# Patient Record
Sex: MALE | Race: WHITE | NOT HISPANIC OR LATINO | Employment: OTHER | URBAN - METROPOLITAN AREA
[De-identification: names, ages, dates, MRNs, and addresses within clinical notes are randomized per-mention and may not be internally consistent; named-entity substitution may affect disease eponyms.]

---

## 2017-02-06 ENCOUNTER — HOSPITAL ENCOUNTER (INPATIENT)
Facility: MEDICAL CENTER | Age: 57
LOS: 3 days | DRG: 378 | End: 2017-02-09
Attending: HOSPITALIST | Admitting: INTERNAL MEDICINE
Payer: COMMERCIAL

## 2017-02-06 ENCOUNTER — RESOLUTE PROFESSIONAL BILLING HOSPITAL PROF FEE (OUTPATIENT)
Dept: HOSPITALIST | Facility: MEDICAL CENTER | Age: 57
End: 2017-02-06
Payer: MEDICARE

## 2017-02-06 PROBLEM — K92.0 HEMATEMESIS: Status: ACTIVE | Noted: 2017-02-06

## 2017-02-06 PROBLEM — I85.00 ESOPHAGEAL VARICES (HCC): Status: ACTIVE | Noted: 2017-02-06

## 2017-02-06 LAB
ALBUMIN SERPL BCP-MCNC: 4.2 G/DL (ref 3.2–4.9)
ALBUMIN/GLOB SERPL: 1.1 G/DL
ALP SERPL-CCNC: 72 U/L (ref 30–99)
ALT SERPL-CCNC: 41 U/L (ref 2–50)
AMMONIA PLAS-SCNC: 55 UMOL/L (ref 11–45)
ANION GAP SERPL CALC-SCNC: 11 MMOL/L (ref 0–11.9)
APTT PPP: 30.6 SEC (ref 24.7–36)
AST SERPL-CCNC: 51 U/L (ref 12–45)
BASOPHILS # BLD AUTO: 0.7 % (ref 0–1.8)
BASOPHILS # BLD: 0.03 K/UL (ref 0–0.12)
BILIRUB SERPL-MCNC: 0.9 MG/DL (ref 0.1–1.5)
BUN SERPL-MCNC: 8 MG/DL (ref 8–22)
CALCIUM SERPL-MCNC: 9.5 MG/DL (ref 8.5–10.5)
CHLORIDE SERPL-SCNC: 105 MMOL/L (ref 96–112)
CO2 SERPL-SCNC: 22 MMOL/L (ref 20–33)
COMMENT 1642: NORMAL
CREAT SERPL-MCNC: 0.61 MG/DL (ref 0.5–1.4)
EOSINOPHIL # BLD AUTO: 0.16 K/UL (ref 0–0.51)
EOSINOPHIL NFR BLD: 3.8 % (ref 0–6.9)
ERYTHROCYTE [DISTWIDTH] IN BLOOD BY AUTOMATED COUNT: 46.4 FL (ref 35.9–50)
GFR SERPL CREATININE-BSD FRML MDRD: >60 ML/MIN/1.73 M 2
GLOBULIN SER CALC-MCNC: 3.8 G/DL (ref 1.9–3.5)
GLUCOSE SERPL-MCNC: 104 MG/DL (ref 65–99)
HCT VFR BLD AUTO: 43 % (ref 42–52)
HGB BLD-MCNC: 14.9 G/DL (ref 14–18)
IMM GRANULOCYTES # BLD AUTO: 0 K/UL (ref 0–0.11)
IMM GRANULOCYTES NFR BLD AUTO: 0 % (ref 0–0.9)
INR PPP: 1.21 (ref 0.87–1.13)
LYMPHOCYTES # BLD AUTO: 1.28 K/UL (ref 1–4.8)
LYMPHOCYTES NFR BLD: 30.3 % (ref 22–41)
MCH RBC QN AUTO: 32.1 PG (ref 27–33)
MCHC RBC AUTO-ENTMCNC: 34.7 G/DL (ref 33.7–35.3)
MCV RBC AUTO: 92.7 FL (ref 81.4–97.8)
MONOCYTES # BLD AUTO: 0.41 K/UL (ref 0–0.85)
MONOCYTES NFR BLD AUTO: 9.7 % (ref 0–13.4)
MORPHOLOGY BLD-IMP: NORMAL
NEUTROPHILS # BLD AUTO: 2.34 K/UL (ref 1.82–7.42)
NEUTROPHILS NFR BLD: 55.5 % (ref 44–72)
NRBC # BLD AUTO: 0 K/UL
NRBC BLD AUTO-RTO: 0 /100 WBC
PLATELET # BLD AUTO: 46 K/UL (ref 164–446)
PLATELETS.RETICULATED NFR BLD AUTO: 11.1 K/UL (ref 0.6–13.1)
PMV BLD AUTO: 12.4 FL (ref 9–12.9)
POTASSIUM SERPL-SCNC: 4.2 MMOL/L (ref 3.6–5.5)
PROT SERPL-MCNC: 8 G/DL (ref 6–8.2)
PROTHROMBIN TIME: 15.7 SEC (ref 12–14.6)
RBC # BLD AUTO: 4.64 M/UL (ref 4.7–6.1)
SODIUM SERPL-SCNC: 138 MMOL/L (ref 135–145)
WBC # BLD AUTO: 4.2 K/UL (ref 4.8–10.8)

## 2017-02-06 PROCEDURE — 85055 RETICULATED PLATELET ASSAY: CPT

## 2017-02-06 PROCEDURE — 770020 HCHG ROOM/CARE - TELE (206)

## 2017-02-06 PROCEDURE — 85730 THROMBOPLASTIN TIME PARTIAL: CPT

## 2017-02-06 PROCEDURE — 85610 PROTHROMBIN TIME: CPT

## 2017-02-06 PROCEDURE — 700111 HCHG RX REV CODE 636 W/ 250 OVERRIDE (IP): Performed by: INTERNAL MEDICINE

## 2017-02-06 PROCEDURE — A9270 NON-COVERED ITEM OR SERVICE: HCPCS | Performed by: INTERNAL MEDICINE

## 2017-02-06 PROCEDURE — 85025 COMPLETE CBC W/AUTO DIFF WBC: CPT

## 2017-02-06 PROCEDURE — 93005 ELECTROCARDIOGRAM TRACING: CPT | Performed by: INTERNAL MEDICINE

## 2017-02-06 PROCEDURE — HZ2ZZZZ DETOXIFICATION SERVICES FOR SUBSTANCE ABUSE TREATMENT: ICD-10-PCS | Performed by: INTERNAL MEDICINE

## 2017-02-06 PROCEDURE — 80053 COMPREHEN METABOLIC PANEL: CPT

## 2017-02-06 PROCEDURE — 700102 HCHG RX REV CODE 250 W/ 637 OVERRIDE(OP): Performed by: INTERNAL MEDICINE

## 2017-02-06 PROCEDURE — 99222 1ST HOSP IP/OBS MODERATE 55: CPT | Mod: AI | Performed by: INTERNAL MEDICINE

## 2017-02-06 PROCEDURE — 700101 HCHG RX REV CODE 250: Performed by: INTERNAL MEDICINE

## 2017-02-06 PROCEDURE — 82140 ASSAY OF AMMONIA: CPT

## 2017-02-06 PROCEDURE — 36415 COLL VENOUS BLD VENIPUNCTURE: CPT

## 2017-02-06 PROCEDURE — 93010 ELECTROCARDIOGRAM REPORT: CPT | Performed by: INTERNAL MEDICINE

## 2017-02-06 RX ORDER — DOCUSATE SODIUM 100 MG/1
100 CAPSULE, LIQUID FILLED ORAL EVERY MORNING
Status: DISCONTINUED | OUTPATIENT
Start: 2017-02-07 | End: 2017-02-09 | Stop reason: HOSPADM

## 2017-02-06 RX ORDER — LORAZEPAM 1 MG/1
2 TABLET ORAL
Status: DISCONTINUED | OUTPATIENT
Start: 2017-02-06 | End: 2017-02-09

## 2017-02-06 RX ORDER — AMOXICILLIN 250 MG
1 CAPSULE ORAL NIGHTLY
Status: DISCONTINUED | OUTPATIENT
Start: 2017-02-07 | End: 2017-02-09 | Stop reason: HOSPADM

## 2017-02-06 RX ORDER — FERROUS SULFATE 325(65) MG
325 TABLET ORAL
Status: DISCONTINUED | OUTPATIENT
Start: 2017-02-06 | End: 2017-02-09 | Stop reason: HOSPADM

## 2017-02-06 RX ORDER — LACTULOSE 20 G/30ML
30 SOLUTION ORAL 3 TIMES DAILY
Status: DISCONTINUED | OUTPATIENT
Start: 2017-02-06 | End: 2017-02-09 | Stop reason: HOSPADM

## 2017-02-06 RX ORDER — CHOLECALCIFEROL (VITAMIN D3) 125 MCG
2000 CAPSULE ORAL DAILY
Status: DISCONTINUED | OUTPATIENT
Start: 2017-02-07 | End: 2017-02-09 | Stop reason: HOSPADM

## 2017-02-06 RX ORDER — FOLIC ACID 1 MG/1
1 TABLET ORAL DAILY
Status: DISCONTINUED | OUTPATIENT
Start: 2017-02-07 | End: 2017-02-09 | Stop reason: HOSPADM

## 2017-02-06 RX ORDER — PROMETHAZINE HYDROCHLORIDE 25 MG/1
12.5-25 SUPPOSITORY RECTAL EVERY 4 HOURS PRN
Status: DISCONTINUED | OUTPATIENT
Start: 2017-02-06 | End: 2017-02-09 | Stop reason: HOSPADM

## 2017-02-06 RX ORDER — LORAZEPAM 2 MG/ML
1 INJECTION INTRAMUSCULAR
Status: DISCONTINUED | OUTPATIENT
Start: 2017-02-06 | End: 2017-02-09

## 2017-02-06 RX ORDER — AMOXICILLIN 250 MG
1 CAPSULE ORAL
Status: DISCONTINUED | OUTPATIENT
Start: 2017-02-06 | End: 2017-02-09 | Stop reason: HOSPADM

## 2017-02-06 RX ORDER — VITAMIN E 268 MG
400 CAPSULE ORAL DAILY
COMMUNITY
End: 2018-09-13

## 2017-02-06 RX ORDER — LORAZEPAM 1 MG/1
4 TABLET ORAL
Status: DISCONTINUED | OUTPATIENT
Start: 2017-02-06 | End: 2017-02-09

## 2017-02-06 RX ORDER — LORAZEPAM 2 MG/ML
2 INJECTION INTRAMUSCULAR
Status: DISCONTINUED | OUTPATIENT
Start: 2017-02-06 | End: 2017-02-09

## 2017-02-06 RX ORDER — LORAZEPAM 1 MG/1
0.5 TABLET ORAL EVERY 4 HOURS PRN
Status: DISCONTINUED | OUTPATIENT
Start: 2017-02-06 | End: 2017-02-09

## 2017-02-06 RX ORDER — LORAZEPAM 1 MG/1
3 TABLET ORAL
Status: DISCONTINUED | OUTPATIENT
Start: 2017-02-06 | End: 2017-02-09

## 2017-02-06 RX ORDER — BISACODYL 10 MG
10 SUPPOSITORY, RECTAL RECTAL
Status: DISCONTINUED | OUTPATIENT
Start: 2017-02-06 | End: 2017-02-09 | Stop reason: HOSPADM

## 2017-02-06 RX ORDER — ENEMA 19; 7 G/133ML; G/133ML
1 ENEMA RECTAL
Status: DISCONTINUED | OUTPATIENT
Start: 2017-02-06 | End: 2017-02-09 | Stop reason: HOSPADM

## 2017-02-06 RX ORDER — TRAZODONE HYDROCHLORIDE 50 MG/1
50 TABLET ORAL NIGHTLY
Status: DISCONTINUED | OUTPATIENT
Start: 2017-02-06 | End: 2017-02-09 | Stop reason: HOSPADM

## 2017-02-06 RX ORDER — ONDANSETRON 2 MG/ML
4 INJECTION INTRAMUSCULAR; INTRAVENOUS EVERY 4 HOURS PRN
Status: DISCONTINUED | OUTPATIENT
Start: 2017-02-06 | End: 2017-02-09 | Stop reason: HOSPADM

## 2017-02-06 RX ORDER — CHLORDIAZEPOXIDE HYDROCHLORIDE 25 MG/1
25 CAPSULE, GELATIN COATED ORAL EVERY 6 HOURS
Status: DISCONTINUED | OUTPATIENT
Start: 2017-02-08 | End: 2017-02-09 | Stop reason: HOSPADM

## 2017-02-06 RX ORDER — LORAZEPAM 2 MG/ML
1.5 INJECTION INTRAMUSCULAR
Status: DISCONTINUED | OUTPATIENT
Start: 2017-02-06 | End: 2017-02-09

## 2017-02-06 RX ORDER — LORAZEPAM 2 MG/ML
0.5 INJECTION INTRAMUSCULAR EVERY 4 HOURS PRN
Status: DISCONTINUED | OUTPATIENT
Start: 2017-02-06 | End: 2017-02-09

## 2017-02-06 RX ORDER — PROPRANOLOL HYDROCHLORIDE 10 MG/1
20 TABLET ORAL 2 TIMES DAILY
Status: DISCONTINUED | OUTPATIENT
Start: 2017-02-06 | End: 2017-02-09 | Stop reason: HOSPADM

## 2017-02-06 RX ORDER — LACTULOSE 20 G/30ML
30 SOLUTION ORAL
Status: DISCONTINUED | OUTPATIENT
Start: 2017-02-06 | End: 2017-02-09 | Stop reason: HOSPADM

## 2017-02-06 RX ORDER — ONDANSETRON 4 MG/1
4 TABLET, ORALLY DISINTEGRATING ORAL EVERY 4 HOURS PRN
Status: DISCONTINUED | OUTPATIENT
Start: 2017-02-06 | End: 2017-02-09 | Stop reason: HOSPADM

## 2017-02-06 RX ORDER — CHLORDIAZEPOXIDE HYDROCHLORIDE 25 MG/1
50 CAPSULE, GELATIN COATED ORAL EVERY 6 HOURS
Status: COMPLETED | OUTPATIENT
Start: 2017-02-07 | End: 2017-02-07

## 2017-02-06 RX ORDER — THIAMINE MONONITRATE (VIT B1) 100 MG
100 TABLET ORAL DAILY
Status: DISCONTINUED | OUTPATIENT
Start: 2017-02-07 | End: 2017-02-09 | Stop reason: HOSPADM

## 2017-02-06 RX ORDER — PROMETHAZINE HYDROCHLORIDE 25 MG/1
12.5-25 TABLET ORAL EVERY 4 HOURS PRN
Status: DISCONTINUED | OUTPATIENT
Start: 2017-02-06 | End: 2017-02-09 | Stop reason: HOSPADM

## 2017-02-06 RX ORDER — MORPHINE SULFATE 4 MG/ML
2 INJECTION, SOLUTION INTRAMUSCULAR; INTRAVENOUS
Status: DISCONTINUED | OUTPATIENT
Start: 2017-02-06 | End: 2017-02-07

## 2017-02-06 RX ORDER — PANTOPRAZOLE SODIUM 40 MG/1
40 TABLET, DELAYED RELEASE ORAL DAILY
Status: DISCONTINUED | OUTPATIENT
Start: 2017-02-07 | End: 2017-02-06

## 2017-02-06 RX ORDER — LORAZEPAM 1 MG/1
1 TABLET ORAL EVERY 4 HOURS PRN
Status: DISCONTINUED | OUTPATIENT
Start: 2017-02-06 | End: 2017-02-09

## 2017-02-06 RX ORDER — QUETIAPINE FUMARATE 25 MG/1
100 TABLET, FILM COATED ORAL
Status: DISCONTINUED | OUTPATIENT
Start: 2017-02-06 | End: 2017-02-09 | Stop reason: HOSPADM

## 2017-02-06 RX ORDER — OMEPRAZOLE 20 MG/1
20 CAPSULE, DELAYED RELEASE ORAL DAILY
Status: DISCONTINUED | OUTPATIENT
Start: 2017-02-07 | End: 2017-02-07

## 2017-02-06 RX ORDER — ACETAMINOPHEN 325 MG/1
650 TABLET ORAL EVERY 4 HOURS PRN
Status: DISCONTINUED | OUTPATIENT
Start: 2017-02-06 | End: 2017-02-06

## 2017-02-06 RX ADMIN — PROPRANOLOL HYDROCHLORIDE 20 MG: 10 TABLET ORAL at 23:04

## 2017-02-06 RX ADMIN — CHLORDIAZEPOXIDE HYDROCHLORIDE 50 MG: 25 CAPSULE ORAL at 23:04

## 2017-02-06 RX ADMIN — LACTULOSE 30 ML: 10 SOLUTION ORAL at 23:05

## 2017-02-06 RX ADMIN — POTASSIUM CHLORIDE: 2 INJECTION, SOLUTION, CONCENTRATE INTRAVENOUS at 22:30

## 2017-02-06 RX ADMIN — Medication 325 MG: at 23:04

## 2017-02-06 RX ADMIN — QUETIAPINE FUMARATE 100 MG: 25 TABLET, FILM COATED ORAL at 23:04

## 2017-02-06 RX ADMIN — TRAZODONE HYDROCHLORIDE 50 MG: 50 TABLET ORAL at 23:04

## 2017-02-06 ASSESSMENT — LIFESTYLE VARIABLES
TOTAL SCORE: 2
AVERAGE NUMBER OF DAYS PER WEEK YOU HAVE A DRINK CONTAINING ALCOHOL: 7
ORIENTATION AND CLOUDING OF SENSORIUM: ORIENTED AND CAN DO SERIAL ADDITIONS
EVER FELT BAD OR GUILTY ABOUT YOUR DRINKING: YES
ANXIETY: NO ANXIETY (AT EASE)
DOES PATIENT WANT TO STOP DRINKING: YES
CONSUMPTION TOTAL: POSITIVE
TREMOR: NO TREMOR
HAVE YOU EVER FELT YOU SHOULD CUT DOWN ON YOUR DRINKING: YES
AGITATION: NORMAL ACTIVITY
AUDITORY DISTURBANCES: NOT PRESENT
HOW MANY TIMES IN THE PAST YEAR HAVE YOU HAD 5 OR MORE DRINKS IN A DAY: 130
HEADACHE, FULLNESS IN HEAD: MILD
HAVE PEOPLE ANNOYED YOU BY CRITICIZING YOUR DRINKING: NO
ON A TYPICAL DAY WHEN YOU DRINK ALCOHOL HOW MANY DRINKS DO YOU HAVE: 6
DOES PATIENT WANT TO TALK TO SOMEONE ABOUT QUITTING: YES
EVER HAD A DRINK FIRST THING IN THE MORNING TO STEADY YOUR NERVES TO GET RID OF A HANGOVER: NO
TOTAL SCORE: 2
TOTAL SCORE: 2
PAROXYSMAL SWEATS: NO SWEAT VISIBLE
ALCOHOL_USE: YES
EVER_SMOKED: YES
TOTAL SCORE: 2
VISUAL DISTURBANCES: NOT PRESENT
NAUSEA AND VOMITING: NO NAUSEA AND NO VOMITING

## 2017-02-06 ASSESSMENT — PAIN SCALES - GENERAL
PAINLEVEL_OUTOF10: 0
PAINLEVEL_OUTOF10: 0

## 2017-02-06 NOTE — IP AVS SNAPSHOT
Tykoon Access Code: [email or web address]  Expires: 3/8/2017 11:28 AM    Your email address is not on file at [facility].  Email Addresses are required for you to sign up for Tykoon, please contact [phone] to verify your personal information and to provide your email address prior to attempting to register for Tykoon.    Inocencio Naylormings  [address]  [place] [address]    Tykoon  A secure, online tool to manage your health information     [facility]’s Tykoon® is a secure, online tool that connects you to your personalized health information from the privacy of your home -- day or night - making it very easy for you to manage your healthcare. Once the activation process is completed, you can even access your medical information using the Tykoon brayan, which is available for free in the Apple Brayan store or Google Play store.     To learn more about Tykoon, visit [email or web address]    There are two levels of access available (as shown below):   My Chart Features  [facility] Primary Care Doctor [facility]  Specialists [facility]  Urgent  Care Non-[facility] Primary Care Doctor   Email your healthcare team securely and privately 24/7 X X X    Manage appointments: schedule your next appointment; view details of past/upcoming appointments X      Request prescription refills. X      View recent personal medical records, including lab and immunizations X X X X   View health record, including health history, allergies, medications X X X X   Read reports about your outpatient visits, procedures, consult and ER notes X X X X   See your discharge summary, which is a recap of your hospital and/or ER visit that includes your diagnosis, lab results, and care plan X X  X     How to register for Intivixt:  Once your e-mail address has been verified, follow the following steps to sign up for Intivixt.     1. Go to  [email or web address]  2. Click on the Sign Up Now box, which takes you to the New Member Sign Up page. You  will need to provide the following information:  a. Enter your Platfora Access Code exactly as it appears at the top of this page. (You will not need to use this code after you’ve completed the sign-up process. If you do not sign up before the expiration date, you must request a new code.)   b. Enter your date of birth.   c. Enter your home email address.   d. Click Submit, and follow the next screen’s instructions.  3. Create a Mobile Content Networkst ID. This will be your Platfora login ID and cannot be changed, so think of one that is secure and easy to remember.  4. Create a Platfora password. You can change your password at any time.  5. Enter your Password Reset Question and Answer. This can be used at a later time if you forget your password.   6. Enter your e-mail address. This allows you to receive e-mail notifications when new information is available in Platfora.  7. Click Sign Up. You can now view your health information.    For assistance activating your Platfora account, call [phone]

## 2017-02-06 NOTE — IP AVS SNAPSHOT
" Home Care Instructions                                                                                                                  Name:Inocencio Shabazz  Medical Record Number:[id number]  CSN: [id number]    YOB: 1960   Age: 57 y.o.  Sex: male  HT:1.727 m (5' 8\") WT: 61.916 kg (136 lb 8 oz)          Admit Date: 2/6/2017     Discharge Date:   Today's Date: 2/9/2017  Attending Doctor:  Robbie Becker M.D.                  Allergies:  Haldol            Discharge Instructions       Discharge Instructions    Discharged to home by walking with self. Discharged via walking, hospital escort: Refused.  Special equipment needed: Not Applicable    Be sure to schedule a follow-up appointment with your primary care doctor or any specialists as instructed.     Discharge Plan:   Diet Plan: Discussed  Activity Level: Discussed  Smoking Cessation Offered: Patient Refused  Confirmed Follow up Appointment: Patient to Call and Schedule Appointment  Confirmed Symptoms Management: Discussed  Medication Reconciliation Updated: Yes  Influenza Vaccine Indication: Indicated: 9 to 64 years of age  Influenza Vaccine Given - only chart on this line when given: Influenza Vaccine Given (See MAR)    I understand that a diet low in cholesterol, fat, and sodium is recommended for good health. Unless I have been given specific instructions below for another diet, I accept this instruction as my diet prescription.   Other diet: AS TOLERATED    Special Instructions: None    · Is patient discharged on Warfarin / Coumadin?   No     · Is patient Post Blood Transfusion?  No    Depression / Suicide Risk    As you are [facility], it is important to learn how to keep safe from harming yourself.    Recognize the warning signs:  · Abrupt changes in personality, positive or negative- including increase in energy   · Giving away possessions  · Change in eating patterns- significant weight changes-  positive or " "negative  · Change in sleeping patterns- unable to sleep or sleeping all the time   · Unwillingness or inability to communicate  · Depression  · Unusual sadness, discouragement and loneliness  · Talk of wanting to die  · Neglect of personal appearance   · Rebelliousness- reckless behavior  · Withdrawal from people/activities they love  · Confusion- inability to concentrate     If you or a loved one observes any of these behaviors or has concerns about self-harm, here's what you can do:  · Talk about it- your feelings and reasons for harming yourself  · Remove any means that you might use to hurt yourself (examples: pills, rope, extension cords, firearm)  · Get professional help from the community (Mental Health, Substance Abuse, psychological counseling)  · Do not be alone:Call your Safe Contact- someone whom you trust who will be there for you.  · Call your local CRISIS HOTLINE 454-7175 or 772-027-9938  · Call your local Children's Mobile Crisis Response Team [place] (775) 398-1062 or wwwGradient X  · Call the toll free National Suicide Prevention Hotlines   · National Suicide Prevention Lifeline 127-413-RHYZ (7713)  · National Hope Line Network 800-SUICIDE (849-8953)  You Can Quit Smoking  If you are ready to quit smoking or are thinking about it, congratulations! You have chosen to help yourself be healthier and live longer! There are lots of different ways to quit smoking. Nicotine gum, nicotine patches, a nicotine inhaler, or nicotine nasal spray can help with physical craving. Hypnosis, support groups, and medicines help break the habit of smoking.  TIPS TO GET OFF AND STAY OFF CIGARETTES  · Learn to predict your moods. Do not let a bad situation be your excuse to have a cigarette. Some situations in your life might tempt you to have a cigarette.  · Ask friends and co-workers not to smoke around you.  · Make your home smoke-free.  · Never have \"just one\" cigarette. It leads to wanting another and " "another. Remind yourself of your decision to quit.  · On a card, make a list of your reasons for not smoking. Read it at least the same number of times a day as you have a cigarette. Tell yourself everyday, \"I do not want to smoke. I choose not to smoke.\"  · Ask someone at home or work to help you with your plan to quit smoking.  · Have something planned after you eat or have a cup of coffee. Take a walk or get other exercise to perk you up. This will help to keep you from overeating.  · Try a relaxation exercise to calm you down and decrease your stress. Remember, you may be tense and nervous the first two weeks after you quit. This will pass.  · Find new activities to keep your hands busy. Play with a pen, coin, or rubber band. Doodle or draw things on paper.  · Brush your teeth right after eating. This will help cut down the craving for the taste of tobacco after meals. You can try mouthwash too.  · Try gum, breath mints, or diet candy to keep something in your mouth.  IF YOU SMOKE AND WANT TO QUIT:  · Do not stock up on cigarettes. Never buy a carton. Wait until one pack is finished before you buy another.  · Never carry cigarettes with you at work or at home.  · Keep cigarettes as far away from you as possible. Leave them with someone else.  · Never carry matches or a lighter with you.  · Ask yourself, \"Do I need this cigarette or is this just a reflex?\"  · Bet with someone that you can quit. Put cigarette money in a [employer] bank every morning. If you smoke, you give up the money. If you do not smoke, by the end of the week, you keep the money.  · Keep trying. It takes 21 days to change a habit!  · Talk to your doctor about using medicines to help you quit. These include nicotine replacement gum, lozenges, or skin patches.     This information is not intended to replace advice given to you by your health care provider. Make sure you discuss any questions you have with your health care provider.     Document " Released: 10/14/2010 Document Revised: 03/11/2013 Document Reviewed: 10/14/2010  [employer] Interactive Patient Education ©2016 [employer]      Alcohol, Frequently Asked Questions  WHAT IS ALCOHOL?  Ethyl alcohol, or ethanol, affects mood or behavior (psychoactive). It is a drug found in beer, wine, and hard liquor. Hard liquor is whiskey, vodka, gin, etc. Alcohol is produced by the fermentation of yeast, sugars, and starches.   WHAT DOES ALCOHOL DO TO THE BODY?  · Alcohol is a central nervous system depressant.   · It is rapidly absorbed from the stomach and small intestine. It passes into the bloodstream. It is then widely distributed throughout the body.   · Harmful effects of alcohol, can include:   · Impaired judgment.   · Reduced reaction time.   · Slurred speech.   · Difficulty walking.   · The effects of alcohol on the body are directly related to the amount consumed.   · In small amounts, alcohol can have a relaxing effect.   · When consumed rapidly and in large amounts, alcohol can also result in coma and death.   · Alcohol can interact with a number of prescription and non-prescription medications in ways that can intensify the effect of alcohol, of the medications themselves, or both.   · Alcohol use by pregnant women can cause serious damage to the developing fetus.   WHAT IS A STANDARD DRINK?  A standard drink is one 12 ounce beer, one 5 ounce glass of wine, or one 1.5 ounce shot of distilled spirits. Each of these drinks contains about half an ounce of alcohol.   IS BEER OR WINE SAFER TO DRINK THAN HARD LIQUOR?   No. One 12 ounce beer has about the same amount of alcohol as one 5 ounce glass of wine, or one 1.5 ounce shot of liquor.   WHAT IS MODERATE DRINKING?  Based on current U.S. Government dietary guidelines, moderate drinking for women is defined as an average of 1 drink or less per day. Moderate drinking for men is defined as an average of 2 drinks or less per day.   WHAT IS HEAVY  DRINKING?  Heavy drinking is consuming alcohol in excess of 1 drink per day on average for women and greater than 2 drinks per day on average for men.   WHAT IS BINGE DRINKING?  Binge drinking is generally defined as having 5 or more drinks on one occasion. One occasion means in a row or within a short period of time. However, among women, binge drinking is often defined as having 4 or more drinks on one occasion. This lower cut-point is used for women because women are generally of smaller stature than men.   WHAT IS ALCOHOLISM?  Alcoholism is a primary, long-lasting (chronic) disease with inherited (genetic) tendencies. Alcoholism has psychological and social (psychosocial), and environmental factors influencing its development and signs and symptoms. The disease often becomes more severe (progressive) and eventually fatal. It is characterized by continuous or periodic:  · Lack of control over drinking.   · Fixation with the drug alcohol.   · Use of alcohol despite harmful consequences.   · Distortions in thinking, including denial.   WHAT IS ALCOHOL ABUSE?  Alcohol abuse is characterized by recurrent alcohol-related problems, including problems with relationships, job performance, or both; the use of alcohol in hazardous situations (e.g., while driving a car); or some combination of these.  WHY ARE SOME PEOPLE MORE SENSITIVE TO ALCOHOL THAN OTHERS?  Individual reactions to alcohol can vary greatly, and may be influenced by many factors, including:  · Age.   · Gender.   · Race.   · A specific origin or culture (ethnicity).   · Physical condition.   · The amount of food eaten before drinking.   · The use of drugs or medicines.   · Family history of alcohol problems.   · Many other factors as well.   Therefore, while drinking guidelines and definitions of drinking patterns can be very helpful in identifying risky patterns of alcohol use, personal decisions about whether to drink, and if so, when and how much, should  "take into account these individual factors as well.   WHAT DOES IT MEAN TO DRINK TOO MUCH?  · A person may be said to be \"drinking too much\" or engaging in \"excessive drinking\" if they exceed the guidelines for average or daily alcohol consumption.   · Among men, excessive drinking may be defined as more than 4 drinks per day or an average of more than 2 drinks per day over a 7 or 30 day period.   · Among women, excessive drinking may be defined as more than 3 drinks per day or an average of more than 1 drink per day over a 7 or 30 day period.   · Current guidelines state that some individuals (youth under age 21 years, pregnant women, and persons recovering from alcoholism) should not drink at all. Among these people, any alcohol consumption may be too much.   · Anyone who chooses to drink should be aware that individual reactions to alcohol can vary greatly. When unsure about drinking, it is best to consult one's own personal caregiver.   WHAT DOES IT MEAN TO GET DRUNK?  Drunkenness is the state of being intoxicated by consumption of alcoholic beverages to a degree that mental and physical faculties are noticeably impaired. However, the number of drinks that an individual needs to consume to get drunk varies based on a number of factors. These include: age, gender, physical condition, the amount of food eaten before drinking, and the use of drugs or medicines. Binge drinking typically results in intoxication.   WHAT DOES IT MEAN TO BE ABOVE THE LEGAL LIMIT FOR DRINKING?  Legal limits for operating a vehicle are defined by a government agency. Blood or breath tests are used to find out how much alcohol is in your system. If you are found to have more alcohol in your system than is allowed in your region, you will be punished by law. This does not mean it is safe to drive under the legal limit.  WHAT ARE COMMON HEALTH EFFECTS OF DRINKING TOO MUCH?  Excessive drinking, including binge and heavy drinking, has numerous " chronic (long-term) and acute (short-term) health effects.  Chronic health consequences of excessive drinking can include:  · Damage to liver cells (liver cirrhosis).   · Inflammation of the pancreas (pancreatitis).   · Various cancers, including:   · Liver cancer.   · Mouth cancer.   · Throat cancer.   · Voice box (larynx) cancer.   · Esophageal cancer.   · High blood pressure.   · Psychological disorders.   · Sudden (acute) health consequences of excessive drinking can include:   · Alcohol poisoning and death.   · Motor vehicle injuries.   · Falls.   · Domestic violence.   · Rape.   · Child abuse.   HOW DO I KNOW IF IT IS OKAY TO DRINK?  If you choose to drink alcohol, do so in moderation. Remember that there are some people who should not drink alcohol at all. These people include:  · Children and adolescents.   · People who cannot restrict their drinking to moderate levels.   · Women who may become pregnant or who are pregnant.   · People who plan to drive or operate machinery.   · People taking prescription or over-the-counter medications that can interact with alcohol.   When in doubt, it is always best to consult one's own caregiver.  HOW DO I KNOW IF I HAVE A DRINKING PROBLEM?  Drinking is a problem if it causes trouble in:  · Your relationships.   · School.   · Social activities.   · How you think and feel.   If you are concerned that either you or someone in your family might have a drinking problem, consult your caregivers. There are many resources available to assist people with drinking problems.  Document Released: 12/20/2004 Document Revised: 06/18/2013 Document Reviewed: 12/11/2009  ExitCare® Patient Information ©2013 [employer].      Follow-up Information     1. Follow up with [facility] In 1 week.    Contact information    [address] [place] [address] [id number] [phone]           Discharge Medication Instructions:    Below are the medications your physician  expects you to take upon discharge:    Review all your home medications and newly ordered medications with your doctor and/or pharmacist. Follow medication instructions as directed by your doctor and/or pharmacist.    Please keep your medication list with you and share with your physician.               Medication List      CONTINUE taking these medications        Instructions    ferrous sulfate 325 (65 FE) MG tablet   Last time this was given:  325 mg on 2/9/2017 12:55 PM   Next Dose Due:  Tonight Dinner    Take 1 Tab by mouth 3 times a day, with meals.   Dose:  325 mg       folic acid 1 MG Tabs   Last time this was given:  1 mg on 2/9/2017  9:49 AM   Commonly known as:  FOLVITE   Next Dose Due:  TOMORROW AM      Take 1 Tab by mouth every day.   Dose:  1 mg       lactulose 10 GM/15ML Soln   Last time this was given:  30 mL on 2/9/2017  9:48 AM   Next Dose Due:  TONIGHT     Take 20 g by mouth 3 times a day.   Dose:  20 g       MULTIVITAMIN ADULT PO   Next Dose Due:  Tomorrow am      Take 1 tablet by mouth every day.   Dose:  1 tablet       pantoprazole 40 MG Tbec   Commonly known as:  PROTONIX   Next Dose Due:  TOMORROW AM    Take 40 mg by mouth every day.   Dose:  40 mg       propranolol 40 MG Tabs   Last time this was given:  20 mg on 2/9/2017  9:48 AM   Commonly known as:  INDERAL   Next Dose Due:  TONIGHT    Take 20 mg by mouth 2 times a day.   Dose:  20 mg       quetiapine 200 MG Tabs   Last time this was given:  100 mg on 2/8/2017  8:37 PM   Commonly known as:  SEROQUEL   Next Dose Due:  TONIGHT    Take 100 mg by mouth every bedtime.   Dose:  100 mg       thiamine 100 MG tablet   Last time this was given:  100 mg on 2/9/2017  9:48 AM   Commonly known as:  THIAMINE   Next Dose Due:  TOMORROW AM      Take 1 Tab by mouth every day.   Dose:  100 mg       vitamin B-12 1000 MCG Tabs   Last time this was given:  2,000 mcg on 2/9/2017  9:48 AM   Next Dose Due:  TOMORROW AM      Take 2,000 mcg by mouth every day.    Dose:  2000 mcg       vitamin e 400 UNIT Caps   Last time this was given:  400 Units on 2/9/2017  9:48 AM   Commonly known as:  VITAMIN E   Next Dose Due:  TOMORROW AM      Take 400 Units by mouth every day.   Dose:  400 Units         STOP taking these medications     trazodone 50 MG Tabs   Commonly known as:  DESYREL               Instructions           Diet / Nutrition:    Follow any diet instructions given to you by your doctor or the dietician, including how much salt (sodium) you are allowed each day.    If you are overweight, talk to your doctor about a weight reduction plan.    Activity:    Remain physically active following your doctor's instructions about exercise and activity.    Rest often.     Any time you become even a little tired or short of breath, SIT DOWN and rest.    Worsening Symptoms:    Report any of the following signs and symptoms to the doctor's office immediately:    *Pain of jaw, arm, or neck  *Chest pain not relieved by medication                               *Dizziness or loss of consciousness  *Difficulty breathing even when at rest   *More tired than usual                                       *Bleeding drainage or swelling of surgical site  *Swelling of feet, ankles, legs or stomach                 *Fever (>100ºF)  *Pink or blood tinged sputum  *Weight gain (3lbs/day or 5lbs /week)           *Shock from internal defibrillator (if applicable)  *Palpitations or irregular heartbeats                *Cool and/or numb extremities    Stroke Awareness    Common Risk Factors for Stroke include:    Age  Atrial Fibrillation  Carotid Artery Stenosis  Diabetes Mellitus  Excessive alcohol consumption  High blood pressure  Overweight   Physical inactivity  Smoking    Warning signs and symptoms of a stroke include:    *Sudden numbness or weakness of the face, arm or leg (especially on one side of the body).  *Sudden confusion, trouble speaking or understanding.  *Sudden trouble seeing in one or  both eyes.  *Sudden trouble walking, dizziness, loss of balance or coordination.Sudden severe headache with no known cause.    It is very important to get treatment quickly when a stroke occurs. If you experience any of the above warning signs, call 911 immediately.                   Disclaimer         Quit Smoking / Tobacco Use:    I understand the use of any tobacco products increases my chance of suffering from future heart disease or stroke and could cause other illnesses which may shorten my life. Quitting the use of tobacco products is the single most important thing I can do to improve my health. For further information on smoking / tobacco cessation call a Toll Free Quit Line at 1-820.668.5646 (*National Cancer [place]) or 1-871.914.2776 (American Lung Association) or you can access the web based program at www.[email or web address] [email or web address] - 3P.org.    [place] Tobacco Users Help Line:  (439) 365-3368       Toll Free: 1-313.251.1834  Quit Tobacco Program [facility] (516)631-5499    Crisis Hotline:    [place] Crisis Hotline:  8-756-WNMFFRB or 1-761.577.4754    [place] Crisis Hotline:    1-163.600.2181 or 446-218-7546    Discharge Survey:   Thank you for choosing [facility]. We hope we did everything we could to make your hospital stay a pleasant one. You may be receiving a phone survey and we would appreciate your time and participation in answering the questions. Your input is very valuable to us in our efforts to improve our service to our patients and their families.        My signature on this form indicates that:    1. I have reviewed and understand the above information.  2. My questions regarding this information have been answered to my satisfaction.  3. I have formulated a plan with my discharge nurse to obtain my prescribed medications for home.                  Disclaimer         __________________________________                     __________       ________                       Patient Signature                                                  Date                    Time

## 2017-02-06 NOTE — IP AVS SNAPSHOT
" <p align=\"LEFT\"><IMG SRC=\"//[email or web address]$/[email or web address]\" alt=\"Image\" WIDTH=\"50%\" HEIGHT=\"200\" BORDER=\"\"></p>                   Name:Inocencio Shabazz  Medical Record Number:[id number]  CSN: [id number]    YOB: 1960   Age: 57 y.o.  Sex: male  HT:1.727 m (5' 8\") WT: 61.916 kg (136 lb 8 oz)          Admit Date: 2/6/2017     Discharge Date:   Today's Date: 2/9/2017  Attending Doctor:  Robbie Becker M.D.                  Allergies:  Haldol          Follow-up Information     1. Follow up with [facility] In 1 week.    Contact information    [address] [place] [address] [id number] [phone]           Medication List      Take these Medications        Instructions    ferrous sulfate 325 (65 FE) MG tablet    Take 1 Tab by mouth 3 times a day, with meals.   Dose:  325 mg       folic acid 1 MG Tabs   Commonly known as:  FOLVITE    Take 1 Tab by mouth every day.   Dose:  1 mg       lactulose 10 GM/15ML Soln    Take 20 g by mouth 3 times a day.   Dose:  20 g       MULTIVITAMIN ADULT PO    Take 1 tablet by mouth every day.   Dose:  1 tablet       pantoprazole 40 MG Tbec   Commonly known as:  PROTONIX    Take 40 mg by mouth every day.   Dose:  40 mg       propranolol 40 MG Tabs   Commonly known as:  INDERAL    Take 20 mg by mouth 2 times a day.   Dose:  20 mg       quetiapine 200 MG Tabs   Commonly known as:  SEROQUEL    Take 100 mg by mouth every bedtime.   Dose:  100 mg       thiamine 100 MG tablet   Commonly known as:  THIAMINE    Take 1 Tab by mouth every day.   Dose:  100 mg       vitamin B-12 1000 MCG Tabs    Take 2,000 mcg by mouth every day.   Dose:  2000 mcg       vitamin e 400 UNIT Caps   Commonly known as:  VITAMIN E    Take 400 Units by mouth every day.   Dose:  400 Units         "

## 2017-02-06 NOTE — IP AVS SNAPSHOT
2/9/2017          Inocencio Shabazz  [address]  [place] [address]    Dear Inocencio:    [facility] wants to ensure your discharge home is safe and you or your loved ones have had all your questions answered regarding your care after you leave the hospital.    You may receive a telephone call within two days of your discharge.  This call is to make certain you understand your discharge instructions as well as ensure we provided you with the best care possible during your stay with us.     The call will only last approximately 3-5 minutes and will be done by a nurse.    Once again, we want to ensure your discharge home is safe and that you have a clear understanding of any next steps in your care.  If you have any questions or concerns, please do not hesitate to contact us, we are here for you.  Thank you for choosing [facility] for your healthcare needs.    Sincerely,    Garrison Abraham    [facility]

## 2017-02-07 ENCOUNTER — APPOINTMENT (OUTPATIENT)
Dept: RADIOLOGY | Facility: MEDICAL CENTER | Age: 57
DRG: 378 | End: 2017-02-07
Attending: INTERNAL MEDICINE
Payer: COMMERCIAL

## 2017-02-07 PROBLEM — R51.9 HEADACHE: Status: ACTIVE | Noted: 2017-02-07

## 2017-02-07 PROBLEM — K21.9 GERD (GASTROESOPHAGEAL REFLUX DISEASE): Status: ACTIVE | Noted: 2017-02-07

## 2017-02-07 PROBLEM — B88.8 INFESTATION BY BED BUG: Status: ACTIVE | Noted: 2017-02-07

## 2017-02-07 LAB
BASOPHILS # BLD AUTO: 1.1 % (ref 0–1.8)
BASOPHILS # BLD: 0.03 K/UL (ref 0–0.12)
EKG IMPRESSION: NORMAL
EKG IMPRESSION: NORMAL
EOSINOPHIL # BLD AUTO: 0.12 K/UL (ref 0–0.51)
EOSINOPHIL NFR BLD: 4.2 % (ref 0–6.9)
ERYTHROCYTE [DISTWIDTH] IN BLOOD BY AUTOMATED COUNT: 45.2 FL (ref 35.9–50)
HCT VFR BLD AUTO: 41.1 % (ref 42–52)
HGB BLD-MCNC: 13.9 G/DL (ref 14–18)
IMM GRANULOCYTES # BLD AUTO: 0.01 K/UL (ref 0–0.11)
IMM GRANULOCYTES NFR BLD AUTO: 0.4 % (ref 0–0.9)
LYMPHOCYTES # BLD AUTO: 1.06 K/UL (ref 1–4.8)
LYMPHOCYTES NFR BLD: 37.2 % (ref 22–41)
MAGNESIUM SERPL-MCNC: 1.8 MG/DL (ref 1.5–2.5)
MCH RBC QN AUTO: 31.2 PG (ref 27–33)
MCHC RBC AUTO-ENTMCNC: 33.8 G/DL (ref 33.7–35.3)
MCV RBC AUTO: 92.2 FL (ref 81.4–97.8)
MONOCYTES # BLD AUTO: 0.29 K/UL (ref 0–0.85)
MONOCYTES NFR BLD AUTO: 10.2 % (ref 0–13.4)
MORPHOLOGY BLD-IMP: NORMAL
NEUTROPHILS # BLD AUTO: 1.34 K/UL (ref 1.82–7.42)
NEUTROPHILS NFR BLD: 46.9 % (ref 44–72)
NRBC # BLD AUTO: 0 K/UL
NRBC BLD AUTO-RTO: 0 /100 WBC
PHOSPHATE SERPL-MCNC: 2.8 MG/DL (ref 2.5–4.5)
PLATELET # BLD AUTO: 40 K/UL (ref 164–446)
PLATELETS.RETICULATED NFR BLD AUTO: 12 K/UL (ref 0.6–13.1)
PMV BLD AUTO: 12.8 FL (ref 9–12.9)
RBC # BLD AUTO: 4.46 M/UL (ref 4.7–6.1)
WBC # BLD AUTO: 2.9 K/UL (ref 4.8–10.8)

## 2017-02-07 PROCEDURE — 700102 HCHG RX REV CODE 250 W/ 637 OVERRIDE(OP): Performed by: HOSPITALIST

## 2017-02-07 PROCEDURE — 85025 COMPLETE CBC W/AUTO DIFF WBC: CPT

## 2017-02-07 PROCEDURE — 90686 IIV4 VACC NO PRSV 0.5 ML IM: CPT | Performed by: INTERNAL MEDICINE

## 2017-02-07 PROCEDURE — 99233 SBSQ HOSP IP/OBS HIGH 50: CPT | Performed by: HOSPITALIST

## 2017-02-07 PROCEDURE — 85055 RETICULATED PLATELET ASSAY: CPT

## 2017-02-07 PROCEDURE — 700102 HCHG RX REV CODE 250 W/ 637 OVERRIDE(OP): Performed by: INTERNAL MEDICINE

## 2017-02-07 PROCEDURE — 90471 IMMUNIZATION ADMIN: CPT

## 2017-02-07 PROCEDURE — A9270 NON-COVERED ITEM OR SERVICE: HCPCS | Performed by: HOSPITALIST

## 2017-02-07 PROCEDURE — 83735 ASSAY OF MAGNESIUM: CPT

## 2017-02-07 PROCEDURE — 36415 COLL VENOUS BLD VENIPUNCTURE: CPT

## 2017-02-07 PROCEDURE — 3E0234Z INTRODUCTION OF SERUM, TOXOID AND VACCINE INTO MUSCLE, PERCUTANEOUS APPROACH: ICD-10-PCS | Performed by: INTERNAL MEDICINE

## 2017-02-07 PROCEDURE — 700102 HCHG RX REV CODE 250 W/ 637 OVERRIDE(OP): Performed by: PHARMACIST

## 2017-02-07 PROCEDURE — A9270 NON-COVERED ITEM OR SERVICE: HCPCS | Performed by: INTERNAL MEDICINE

## 2017-02-07 PROCEDURE — 93010 ELECTROCARDIOGRAM REPORT: CPT | Performed by: INTERNAL MEDICINE

## 2017-02-07 PROCEDURE — 770020 HCHG ROOM/CARE - TELE (206)

## 2017-02-07 PROCEDURE — 700111 HCHG RX REV CODE 636 W/ 250 OVERRIDE (IP): Performed by: INTERNAL MEDICINE

## 2017-02-07 PROCEDURE — A9270 NON-COVERED ITEM OR SERVICE: HCPCS | Performed by: PHARMACIST

## 2017-02-07 PROCEDURE — 93005 ELECTROCARDIOGRAM TRACING: CPT | Performed by: INTERNAL MEDICINE

## 2017-02-07 PROCEDURE — 84100 ASSAY OF PHOSPHORUS: CPT

## 2017-02-07 RX ORDER — OMEPRAZOLE 20 MG/1
40 CAPSULE, DELAYED RELEASE ORAL DAILY
Status: DISCONTINUED | OUTPATIENT
Start: 2017-02-08 | End: 2017-02-09 | Stop reason: HOSPADM

## 2017-02-07 RX ORDER — VITAMIN E 268 MG
400 CAPSULE ORAL DAILY
Status: DISCONTINUED | OUTPATIENT
Start: 2017-02-07 | End: 2017-02-09 | Stop reason: HOSPADM

## 2017-02-07 RX ORDER — ACETAMINOPHEN 500 MG
500 TABLET ORAL EVERY 6 HOURS PRN
Status: DISCONTINUED | OUTPATIENT
Start: 2017-02-07 | End: 2017-02-09 | Stop reason: HOSPADM

## 2017-02-07 RX ADMIN — FOLIC ACID 1 MG: 1 TABLET ORAL at 09:58

## 2017-02-07 RX ADMIN — TRAZODONE HYDROCHLORIDE 50 MG: 50 TABLET ORAL at 22:45

## 2017-02-07 RX ADMIN — CHLORDIAZEPOXIDE HYDROCHLORIDE 50 MG: 25 CAPSULE ORAL at 13:27

## 2017-02-07 RX ADMIN — CHLORDIAZEPOXIDE HYDROCHLORIDE 50 MG: 25 CAPSULE ORAL at 18:44

## 2017-02-07 RX ADMIN — OMEPRAZOLE 20 MG: 20 CAPSULE, DELAYED RELEASE ORAL at 09:00

## 2017-02-07 RX ADMIN — CHLORDIAZEPOXIDE HYDROCHLORIDE 50 MG: 25 CAPSULE ORAL at 05:13

## 2017-02-07 RX ADMIN — Medication 100 MG: at 09:57

## 2017-02-07 RX ADMIN — PROPRANOLOL HYDROCHLORIDE 20 MG: 10 TABLET ORAL at 22:45

## 2017-02-07 RX ADMIN — Medication 325 MG: at 13:26

## 2017-02-07 RX ADMIN — LACTULOSE 30 ML: 10 SOLUTION ORAL at 16:48

## 2017-02-07 RX ADMIN — LACTULOSE 30 ML: 10 SOLUTION ORAL at 22:45

## 2017-02-07 RX ADMIN — Medication 325 MG: at 16:48

## 2017-02-07 RX ADMIN — Medication 325 MG: at 09:58

## 2017-02-07 RX ADMIN — CYANOCOBALAMIN TAB 500 MCG 2000 MCG: 500 TAB at 09:57

## 2017-02-07 RX ADMIN — QUETIAPINE FUMARATE 100 MG: 25 TABLET, FILM COATED ORAL at 22:45

## 2017-02-07 RX ADMIN — LACTULOSE 30 ML: 10 SOLUTION ORAL at 09:57

## 2017-02-07 RX ADMIN — Medication 400 UNITS: at 13:26

## 2017-02-07 RX ADMIN — THERA TABS 1 TABLET: TAB at 09:57

## 2017-02-07 RX ADMIN — INFLUENZA A VIRUS A/CALIFORNIA/7/2009 X-179A (H1N1) ANTIGEN (FORMALDEHYDE INACTIVATED), INFLUENZA A VIRUS A/HONG KONG/4801/2014 X-263B (H3N2) ANTIGEN (FORMALDEHYDE INACTIVATED), INFLUENZA B VIRUS B/PHUKET/3073/2013 ANTIGEN (FORMALDEHYDE INACTIVATED), AND INFLUENZA B VIRUS B/BRISBANE/60/2008 ANTIGEN (FORMALDEHYDE INACTIVATED) 0.5 ML: 15; 15; 15; 15 INJECTION, SUSPENSION INTRAMUSCULAR at 05:14

## 2017-02-07 ASSESSMENT — LIFESTYLE VARIABLES
TOTAL SCORE: 0
ANXIETY: NO ANXIETY (AT EASE)
ANXIETY: NO ANXIETY (AT EASE)
VISUAL DISTURBANCES: NOT PRESENT
ORIENTATION AND CLOUDING OF SENSORIUM: ORIENTED AND CAN DO SERIAL ADDITIONS
VISUAL DISTURBANCES: NOT PRESENT
ORIENTATION AND CLOUDING OF SENSORIUM: ORIENTED AND CAN DO SERIAL ADDITIONS
NAUSEA AND VOMITING: NO NAUSEA AND NO VOMITING
TREMOR: NO TREMOR
TOTAL SCORE: 0
ORIENTATION AND CLOUDING OF SENSORIUM: ORIENTED AND CAN DO SERIAL ADDITIONS
NAUSEA AND VOMITING: NO NAUSEA AND NO VOMITING
HEADACHE, FULLNESS IN HEAD: MILD
TREMOR: NO TREMOR
TOTAL SCORE: 2
AGITATION: NORMAL ACTIVITY
TOTAL SCORE: 2
ORIENTATION AND CLOUDING OF SENSORIUM: ORIENTED AND CAN DO SERIAL ADDITIONS
AGITATION: NORMAL ACTIVITY
PAROXYSMAL SWEATS: NO SWEAT VISIBLE
HEADACHE, FULLNESS IN HEAD: NOT PRESENT
VISUAL DISTURBANCES: NOT PRESENT
NAUSEA AND VOMITING: NO NAUSEA AND NO VOMITING
PAROXYSMAL SWEATS: NO SWEAT VISIBLE
ORIENTATION AND CLOUDING OF SENSORIUM: ORIENTED AND CAN DO SERIAL ADDITIONS
ANXIETY: NO ANXIETY (AT EASE)
TREMOR: NO TREMOR
VISUAL DISTURBANCES: NOT PRESENT
VISUAL DISTURBANCES: NOT PRESENT
TREMOR: NO TREMOR
ANXIETY: NO ANXIETY (AT EASE)
PAROXYSMAL SWEATS: NO SWEAT VISIBLE
TOTAL SCORE: 2
HEADACHE, FULLNESS IN HEAD: NOT PRESENT
ANXIETY: NO ANXIETY (AT EASE)
PAROXYSMAL SWEATS: NO SWEAT VISIBLE
NAUSEA AND VOMITING: NO NAUSEA AND NO VOMITING
HEADACHE, FULLNESS IN HEAD: MILD
AGITATION: NORMAL ACTIVITY
AUDITORY DISTURBANCES: NOT PRESENT
AUDITORY DISTURBANCES: NOT PRESENT
VISUAL DISTURBANCES: NOT PRESENT
AUDITORY DISTURBANCES: NOT PRESENT
AGITATION: NORMAL ACTIVITY
TREMOR: NO TREMOR
AGITATION: NORMAL ACTIVITY
PAROXYSMAL SWEATS: NO SWEAT VISIBLE
HEADACHE, FULLNESS IN HEAD: NOT PRESENT
HEADACHE, FULLNESS IN HEAD: MILD
NAUSEA AND VOMITING: NO NAUSEA AND NO VOMITING
TOTAL SCORE: 0
TREMOR: NO TREMOR
AUDITORY DISTURBANCES: NOT PRESENT
AGITATION: NORMAL ACTIVITY
AUDITORY DISTURBANCES: NOT PRESENT
ANXIETY: NO ANXIETY (AT EASE)
PAROXYSMAL SWEATS: NO SWEAT VISIBLE
AUDITORY DISTURBANCES: NOT PRESENT
NAUSEA AND VOMITING: NO NAUSEA AND NO VOMITING
ORIENTATION AND CLOUDING OF SENSORIUM: ORIENTED AND CAN DO SERIAL ADDITIONS

## 2017-02-07 ASSESSMENT — PAIN SCALES - GENERAL
PAINLEVEL_OUTOF10: 0
PAINLEVEL_OUTOF10: 2

## 2017-02-07 ASSESSMENT — ENCOUNTER SYMPTOMS
VOMITING: 0
DIARRHEA: 0
NAUSEA: 0
FEVER: 0
DEPRESSION: 0
SHORTNESS OF BREATH: 0
CHILLS: 0
COUGH: 0
ABDOMINAL PAIN: 0
DIZZINESS: 0
PALPITATIONS: 0
HEADACHES: 0
BLURRED VISION: 0
CONSTIPATION: 0
WHEEZING: 0

## 2017-02-07 NOTE — CARE PLAN
Problem: Safety  Goal: Will remain free from injury  Outcome: PROGRESSING AS EXPECTED  Pt educated about use of call light and bed alarm when getting out of bed. Call light within reach. Bed alarm in use. Pt verbalized understanding and calls appropriately. Personal slipper socks in place. Bed in low and locked position. Appropriate sign outside of room.         Problem: Knowledge Deficit  Goal: Knowledge of disease process/condition, treatment plan, diagnostic tests, and medications will improve  Outcome: PROGRESSING AS EXPECTED  Discussed POC with pt. Answered all questions appropriately. White board updated. All medications and interventions explained before performed. Pt verbalized understanding.

## 2017-02-07 NOTE — PROGRESS NOTES
Pt to floor with [facility] via chase. Consent to treat signed. Tele monitor in place. Monitor room notified. Bed alarm in use. Admitting provider paged. Pt resting in bed. No complaints at this time.

## 2017-02-07 NOTE — PROGRESS NOTES
2 RN skin check done with Shad QURESHI. Pt has small scabs to BUE. Sacrum is discolored, red/blanching, mepilex in place. L butt cheek has old dried skin. Heels are dry and calloused.

## 2017-02-07 NOTE — PROGRESS NOTES
Assumed care at 0715. Bedside report received from Night RN Birdie. Patient's chart and MAR reviewed. 12 hour chart check complete. Assessment complete, pt states no pain at this time. Pt is awake in bed. Pt is A & O x 4. Patient was updated on plan of care for the day. Questions answered and concerns addressed.  Pt denies any additional needs at this time. White board updated. Call light, phone and personal belongings within reach. Bed alarm on and working appropriately. Pt MD fabricio notified. Awaiting new room, patient to be showered for bed bugs once room available.

## 2017-02-07 NOTE — PROGRESS NOTES
Eriberto from Lab called with critical result of platelet 40 at 0348. Critical lab result read back to Eriberto.   MD is already aware of low platelet count. Will pass on to Day RN to address further in rounds.

## 2017-02-07 NOTE — PROGRESS NOTES
Hospital Medicine Progress Note, Adult, Complex               Author: Robbie Susankarin Date & Time created: 2/7/2017  9:11 AM     ID: 57 year old male hx of liver cirrhosis, admitted for nausea and vomiting, still drinking alcohol.     Interval History:  2/7 - no further vomiting, lethargic but arousable and answering appropriately.    Consults:  none    Review of Systems:  Review of Systems   Constitutional: Negative for fever and chills.   Eyes: Negative for blurred vision.   Respiratory: Negative for cough, shortness of breath and wheezing.    Cardiovascular: Negative for chest pain and palpitations.   Gastrointestinal: Negative for nausea, vomiting, abdominal pain, diarrhea and constipation.   Genitourinary: Negative for dysuria.   Neurological: Negative for dizziness and headaches.   Psychiatric/Behavioral: Negative for depression.       Physical Exam:  Physical Exam   Constitutional: He is oriented to person, place, and time. No distress.   Chronically appearing male  Lethargic     HENT:   Head: Normocephalic and atraumatic.   Eyes: Conjunctivae and EOM are normal. Pupils are equal, round, and reactive to light. No scleral icterus.   Neck: Normal range of motion. Neck supple. No JVD present.   Cardiovascular: Normal rate, regular rhythm and normal heart sounds.    No murmur heard.  Pulmonary/Chest: Breath sounds normal. No stridor. No respiratory distress. He has no wheezes. He has no rales.   Abdominal: Soft. Bowel sounds are normal. He exhibits no distension. There is no tenderness. There is no rebound.   Musculoskeletal: Normal range of motion. He exhibits no edema or tenderness.   Neurological: He is alert and oriented to person, place, and time.   letheragic but easily arousable.     Skin: Skin is warm and dry. He is not diaphoretic.       Labs:        Invalid input(s): [id number]      Recent Labs      02/06/17   2100  02/07/17   0152   SODIUM  138   --    POTASSIUM  4.2   --    CHLORIDE  105   --     CO2  22   --    BUN  8   --    CREATININE  0.61   --    MAGNESIUM   --   1.8   PHOSPHORUS   --   2.8   CALCIUM  9.5   --      Recent Labs      17   2100   ALTSGPT  41   ASTSGOT  51*   ALKPHOSPHAT  72   TBILIRUBIN  0.9   GLUCOSE  104*     Recent Labs      17   2100  17   0152   RBC  4.64*  4.46*   HEMOGLOBIN  14.9  13.9*   HEMATOCRIT  43.0  41.1*   PLATELETCT  46*  40*   PROTHROMBTM  15.7*   --    APTT  30.6   --    INR  1.21*   --      Recent Labs      17   2100  17   0152   WBC  4.2*  2.9*   NEUTSPOLYS  55.50  46.90   LYMPHOCYTES  30.30  37.20   MONOCYTES  9.70  10.20   EOSINOPHILS  3.80  4.20   BASOPHILS  0.70  1.10   ASTSGOT  51*   --    ALTSGPT  41   --    ALKPHOSPHAT  72   --    TBILIRUBIN  0.9   --            Hemodynamics:  Temp (24hrs), Av.1 °C (98.8 °F), Min:36.9 °C (98.4 °F), Max:37.4 °C (99.3 °F)  Temperature: 36.9 °C (98.4 °F)  Pulse  Av.7  Min: 60  Max: 65   Blood Pressure: 101/65 mmHg     Respiratory:    Respiration: 18, Pulse Oximetry: 93 %        RUL Breath Sounds: Clear, RML Breath Sounds: Diminished, RLL Breath Sounds: Diminished, ROM Breath Sounds: Clear, LLL Breath Sounds: Diminished  Fluids:    Intake/Output Summary (Last 24 hours) at 17 0911  Last data filed at 17 2300   Gross per 24 hour   Intake    120 ml   Output    350 ml   Net   -230 ml     Weight: 62.3 kg (137 lb 5.6 oz)  GI/Nutrition:  Orders Placed This Encounter   Procedures   • Diet NPO     Standing Status: Standing      Number of Occurrences: 1      Standing Expiration Date:      Order Specific Question:  Restrict to:     Answer:  Sips with Medications [3]     Medical Decision Making, by Problem:  Active Hospital Problems    Diagnosis   • *Hematemesis [K92.0]  -per patient had a episode prior to admission but no further episodes.  -H/H stable  -will monitor for now, may need to consult GI if acute bleeding  -transition diet to clear liquids and monitor   • Pancytopenia (HCC)  [D61.818]  -secondary to liver cirrhosis  -continue iron, folic acid, b12   • Alcoholic cirrhosis (HCC) [K70.30]  -continues to drink alcohol, last drink 2 days ago  - INR 1.21  -continue lactulose, ammonia 55  -continue mvi,thiamine, folate  -continue propranolol  -no evidence of fluid overload/ascites on exam  -continue vitamin E   • Chronic hepatitis C without hepatic coma (HCC) [B18.2]   • GERD (gastroesophageal reflux disease) [K21.9]  -continue omeprazole   • Headache [R51]  -pending MRI brain   • Esophageal varices (CMS-HCC) [I85.00]  -continue propranolol    • Schizophrenia (CMS-HCC) [F20.9]  -continue seroquel   • Alcoholism (CMS-HCC) [F10.20]  -continues to drink, last drink 2 days ago  -CIWA, librium, mvi, thiamine folate  -aspiration, seizure, fall and neurochecks precautions   • Portal hypertension (CMS-HCC) [K76.6]  -continue propranolol   *Bed bugs  -shower and isolation    I have discussed with treatment team during rounds  Full code    EKG reviewed, Labs reviewed, Medications reviewed and Radiology images reviewed  Jimenez catheter: No Jimenez        DVT prophylaxis - mechanical: SCDs  Ulcer prophylaxis: Not indicated

## 2017-02-07 NOTE — PROGRESS NOTES
Direct admit from [facility], Dr. Ardon, [phone].  Accepted by Dr. Chong for Hematemesis and esophageal varices.  ADT signed & held @ 9240, needs to be released upon pt arrival.  No written orders received.  Pt coming by ground.

## 2017-02-07 NOTE — PROGRESS NOTES
Eriberto from Lab called with critical result of platelets 46 at 2335. Critical lab result read back to Eriberto.   Carmella Hobson notified of critical lab result at 0020.  Critical lab result read back by Carmella Hobson.

## 2017-02-07 NOTE — PROGRESS NOTES
MRI has to be postponed until patient is clear of bed bugs. Charge RN notified. Patient to be showered and transferred to new room once one becomes available. RN to keep MRI updated on status.

## 2017-02-07 NOTE — H&P
CHIEF COMPLAINT:  Intractable headache and vomiting blood.    HISTORY OF PRESENT ILLNESS:  This is a 57-year-old male with a history of   liver cirrhosis, hepatitis C, had a history of bleeding esophageal varix that   was banded 1-2 years ago, who has been complaining of intractable headache for   the past 24 hours associated with nausea and vomiting.  He had taken about   3-4 tablets of aspirin to relieve the headache, but it did not seem like it   help.  Last night, he had one episode of vomiting blood but that did not   happen again.  In fact, today he did not have any vomiting, but he has been   nauseated whole day, feeling tired and weak.  Headaches persisted, so he   decided to present himself to the emergency [facility] at [facility].  CAT scan there of the   brain without contrast did not show acute changes, only showed atrophy with   ventriculomegaly.  Chest x-ray is clear.  Hemoglobin initially was 15.2.  The   patient was transferred to facility since they do not have a GI on call at the   moment.    PAST MEDICAL HISTORY:  Alcohol dependence, schizophrenia, GERD, liver   cirrhosis, esophageal varix, chronic hepatitis C, vitamin D deficiency,   acquired pancytopenia.    ALLERGIES:  TO HALDOL.    SOCIAL HISTORY:  Long history of alcohol abuse, a pint of whiskey every day.    Smokes about a pack a day for 40 years.  Denies drug use.    FAMILY HISTORY:  No family history of cancer.    PAST SURGICAL HISTORY:  EGD with banding.    HOME MEDICATIONS:  He takes cyanocobalamin 1000 mcg two tablets every day,   ferrous sulfate 325 mg three times a day, folic acid 1 mg daily, lactulose 20   g three times a day, Protonix 40 mg daily, propranolol 20 mg b.i.d., Seroquel   100 mg at bedtime, thiamine 100 mg daily, trazodone 50 mg every evening.    REVIEW OF SYSTEMS:  All other systems reviewed were negative.    PHYSICAL EXAMINATION:  VITAL SIGNS:  Blood pressure is 131/86, pulse of 60, respiratory rate of 18,   temperature of 37,  oxygen is 94% on room air.  GENERAL:  Patient is disheveled, lying in bed, not in distress.  HEENT:  Head normocephalic, atraumatic.  Eyes, pupils are reactive to light,   anicteric sclerae.  Pinkish palpebral conjunctivae.  Oral mucosa, no oral   lesions noted, moist mucosa.  NECK:  No JVD, no lymphadenopathy, no thyromegaly.   CHEST AND LUNGS:  Equal chest expansion.  Clear to auscultation bilaterally.    No crackles, no wheezing.  CARDIOVASCULAR:  Regular rate and rhythm.  S1 and S2 heard.  No murmurs noted.  GASTROINTESTINAL:  Positive bowel sounds.  No tenderness.  No   hepatosplenomegaly.  EXTREMITIES:  Pulses palpable in both upper and lower extremities.  No edema   noted.  NEUROLOGIC:  Cranial nerves II-XII intact.  Alert and oriented x3.  SKIN:  No cyanosis.  Capillary refill time normal.  The patient has bite marks   on both upper extremities, some are healed, some are about 3-4 days old.    LABORATORY DATA:  Currently pending.    ASSESSMENT AND PLAN:  1.  Intractable headache.  We will get MRI of the brain without contrast.  We   will give morphine p.r.n. for pain.  2.  Hematemesis.  His last hemoglobin was 15.2 which was done at [facility] today.  We   will trend this one and will check CBC again today and tomorrow.  I do not   think we need GI consult at this point in time, especially if hemoglobin and   hematocrit is stable.  I think this was triggered by his intractable headache   wherein he had nausea and vomiting.  3.  Alcohol abuse.  I will put him on CIWA protocol.  4.  History of esophageal varix, as above.  We will continue to monitor.  5.  History of schizophrenia.  Continue with Seroquel.  6.  Chronic hepatitis C.  7.  Vitamin D deficiency.  Continue vitamin D supplements.  8.  Acquired pancytopenia secondary to alcoholism. Was seen by hematologist  in the past.  9. Thrombocytopenia. Will monitor. Probably baseline.  10.  Deep venous thrombosis prophylaxis.  I will put him on sequential   compression  devices.  11. Bed bugs. Contact isolation.    MEDICAL DECISION MAKING:  More than 2 midnights.       ____________________________________     MD MAC Kemp / VICENTE    DD:  02/06/2017 22:09:24  DT:  02/06/2017 23:50:41    D#:  [id number]  Job#:  [id number]

## 2017-02-07 NOTE — PROGRESS NOTES
"Assumed care report received. Assessment completed. AOx4. Pt resting in bed denies any pain at this time. Pt states just has a \"little headache\". No other complaints at this time. Plan of care discussed, verbalized understanding. Fall precautions in place. Call light within reach. Tele monitor in place. White board updated. Bed alarm in use.     "

## 2017-02-08 ENCOUNTER — APPOINTMENT (OUTPATIENT)
Dept: RADIOLOGY | Facility: MEDICAL CENTER | Age: 57
DRG: 378 | End: 2017-02-08
Attending: INTERNAL MEDICINE
Payer: COMMERCIAL

## 2017-02-08 LAB
ALBUMIN SERPL BCP-MCNC: 3.6 G/DL (ref 3.2–4.9)
ALBUMIN/GLOB SERPL: 1.1 G/DL
ALP SERPL-CCNC: 63 U/L (ref 30–99)
ALT SERPL-CCNC: 39 U/L (ref 2–50)
AMMONIA PLAS-SCNC: 53 UMOL/L (ref 11–45)
ANION GAP SERPL CALC-SCNC: 6 MMOL/L (ref 0–11.9)
ANISOCYTOSIS BLD QL SMEAR: ABNORMAL
AST SERPL-CCNC: 45 U/L (ref 12–45)
BASOPHILS # BLD AUTO: 7 % (ref 0–1.8)
BASOPHILS # BLD: 0.11 K/UL (ref 0–0.12)
BILIRUB SERPL-MCNC: 1.1 MG/DL (ref 0.1–1.5)
BUN SERPL-MCNC: 8 MG/DL (ref 8–22)
CALCIUM SERPL-MCNC: 9 MG/DL (ref 8.5–10.5)
CHLORIDE SERPL-SCNC: 107 MMOL/L (ref 96–112)
CO2 SERPL-SCNC: 24 MMOL/L (ref 20–33)
CREAT SERPL-MCNC: 0.54 MG/DL (ref 0.5–1.4)
EKG IMPRESSION: NORMAL
EOSINOPHIL # BLD AUTO: 0.03 K/UL (ref 0–0.51)
EOSINOPHIL NFR BLD: 1.8 % (ref 0–6.9)
ERYTHROCYTE [DISTWIDTH] IN BLOOD BY AUTOMATED COUNT: 45.1 FL (ref 35.9–50)
GFR SERPL CREATININE-BSD FRML MDRD: >60 ML/MIN/1.73 M 2
GLOBULIN SER CALC-MCNC: 3.4 G/DL (ref 1.9–3.5)
GLUCOSE SERPL-MCNC: 85 MG/DL (ref 65–99)
HCT VFR BLD AUTO: 39.5 % (ref 42–52)
HGB BLD-MCNC: 13.6 G/DL (ref 14–18)
IMM GRANULOCYTES # BLD AUTO: 0 K/UL (ref 0–0.11)
IMM GRANULOCYTES NFR BLD AUTO: 0 % (ref 0–0.9)
LYMPHOCYTES # BLD AUTO: 0.46 K/UL (ref 1–4.8)
LYMPHOCYTES NFR BLD: 28.9 % (ref 22–41)
MACROCYTES BLD QL SMEAR: ABNORMAL
MAGNESIUM SERPL-MCNC: 1.7 MG/DL (ref 1.5–2.5)
MANUAL DIFF BLD: NORMAL
MCH RBC QN AUTO: 31.9 PG (ref 27–33)
MCHC RBC AUTO-ENTMCNC: 34.4 G/DL (ref 33.7–35.3)
MCV RBC AUTO: 92.7 FL (ref 81.4–97.8)
MONOCYTES # BLD AUTO: 0.2 K/UL (ref 0–0.85)
MONOCYTES NFR BLD AUTO: 12.3 % (ref 0–13.4)
MORPHOLOGY BLD-IMP: NORMAL
NEUTROPHILS # BLD AUTO: 0.8 K/UL (ref 1.82–7.42)
NEUTROPHILS NFR BLD: 50 % (ref 44–72)
NRBC # BLD AUTO: 0 K/UL
NRBC BLD AUTO-RTO: 0 /100 WBC
PHOSPHATE SERPL-MCNC: 4.3 MG/DL (ref 2.5–4.5)
PLATELET # BLD AUTO: 35 K/UL (ref 164–446)
PLATELET BLD QL SMEAR: NORMAL
PLATELETS.RETICULATED NFR BLD AUTO: 12.5 K/UL (ref 0.6–13.1)
PMV BLD AUTO: 12 FL (ref 9–12.9)
POTASSIUM SERPL-SCNC: 3.7 MMOL/L (ref 3.6–5.5)
PROT SERPL-MCNC: 7 G/DL (ref 6–8.2)
RBC # BLD AUTO: 4.26 M/UL (ref 4.7–6.1)
RBC BLD AUTO: PRESENT
SODIUM SERPL-SCNC: 137 MMOL/L (ref 135–145)
WBC # BLD AUTO: 1.6 K/UL (ref 4.8–10.8)

## 2017-02-08 PROCEDURE — 700112 HCHG RX REV CODE 229: Performed by: INTERNAL MEDICINE

## 2017-02-08 PROCEDURE — 85055 RETICULATED PLATELET ASSAY: CPT

## 2017-02-08 PROCEDURE — 84100 ASSAY OF PHOSPHORUS: CPT

## 2017-02-08 PROCEDURE — 85007 BL SMEAR W/DIFF WBC COUNT: CPT

## 2017-02-08 PROCEDURE — 700102 HCHG RX REV CODE 250 W/ 637 OVERRIDE(OP): Performed by: HOSPITALIST

## 2017-02-08 PROCEDURE — 70551 MRI BRAIN STEM W/O DYE: CPT

## 2017-02-08 PROCEDURE — 93005 ELECTROCARDIOGRAM TRACING: CPT | Performed by: INTERNAL MEDICINE

## 2017-02-08 PROCEDURE — A9270 NON-COVERED ITEM OR SERVICE: HCPCS | Performed by: HOSPITALIST

## 2017-02-08 PROCEDURE — 83735 ASSAY OF MAGNESIUM: CPT

## 2017-02-08 PROCEDURE — 36415 COLL VENOUS BLD VENIPUNCTURE: CPT

## 2017-02-08 PROCEDURE — 770006 HCHG ROOM/CARE - MED/SURG/GYN SEMI*

## 2017-02-08 PROCEDURE — 700102 HCHG RX REV CODE 250 W/ 637 OVERRIDE(OP): Performed by: INTERNAL MEDICINE

## 2017-02-08 PROCEDURE — 93010 ELECTROCARDIOGRAM REPORT: CPT | Performed by: INTERNAL MEDICINE

## 2017-02-08 PROCEDURE — 85027 COMPLETE CBC AUTOMATED: CPT

## 2017-02-08 PROCEDURE — 82140 ASSAY OF AMMONIA: CPT

## 2017-02-08 PROCEDURE — A9270 NON-COVERED ITEM OR SERVICE: HCPCS | Performed by: INTERNAL MEDICINE

## 2017-02-08 PROCEDURE — 99233 SBSQ HOSP IP/OBS HIGH 50: CPT | Performed by: HOSPITALIST

## 2017-02-08 PROCEDURE — 80053 COMPREHEN METABOLIC PANEL: CPT

## 2017-02-08 RX ORDER — POTASSIUM CHLORIDE 20 MEQ/1
40 TABLET, EXTENDED RELEASE ORAL ONCE
Status: COMPLETED | OUTPATIENT
Start: 2017-02-08 | End: 2017-02-08

## 2017-02-08 RX ADMIN — LACTULOSE 30 ML: 10 SOLUTION ORAL at 20:37

## 2017-02-08 RX ADMIN — DOCUSATE SODIUM 100 MG: 100 CAPSULE ORAL at 08:15

## 2017-02-08 RX ADMIN — QUETIAPINE FUMARATE 100 MG: 25 TABLET, FILM COATED ORAL at 20:37

## 2017-02-08 RX ADMIN — CHLORDIAZEPOXIDE HYDROCHLORIDE 25 MG: 25 CAPSULE ORAL at 06:12

## 2017-02-08 RX ADMIN — POTASSIUM CHLORIDE 40 MEQ: 1500 TABLET, EXTENDED RELEASE ORAL at 06:48

## 2017-02-08 RX ADMIN — Medication 400 UNITS: at 09:00

## 2017-02-08 RX ADMIN — CYANOCOBALAMIN TAB 500 MCG 2000 MCG: 500 TAB at 09:00

## 2017-02-08 RX ADMIN — FOLIC ACID 1 MG: 1 TABLET ORAL at 09:00

## 2017-02-08 RX ADMIN — THERA TABS 1 TABLET: TAB at 08:16

## 2017-02-08 RX ADMIN — CHLORDIAZEPOXIDE HYDROCHLORIDE 25 MG: 25 CAPSULE ORAL at 11:05

## 2017-02-08 RX ADMIN — Medication 100 MG: at 09:00

## 2017-02-08 RX ADMIN — TRAZODONE HYDROCHLORIDE 50 MG: 50 TABLET ORAL at 20:37

## 2017-02-08 RX ADMIN — PROPRANOLOL HYDROCHLORIDE 20 MG: 10 TABLET ORAL at 08:15

## 2017-02-08 RX ADMIN — Medication 325 MG: at 16:09

## 2017-02-08 RX ADMIN — Medication 325 MG: at 11:04

## 2017-02-08 RX ADMIN — CHLORDIAZEPOXIDE HYDROCHLORIDE 25 MG: 25 CAPSULE ORAL at 00:19

## 2017-02-08 RX ADMIN — CHLORDIAZEPOXIDE HYDROCHLORIDE 25 MG: 25 CAPSULE ORAL at 23:55

## 2017-02-08 RX ADMIN — CHLORDIAZEPOXIDE HYDROCHLORIDE 25 MG: 25 CAPSULE ORAL at 16:08

## 2017-02-08 RX ADMIN — ACETAMINOPHEN 500 MG: 500 TABLET, FILM COATED ORAL at 08:26

## 2017-02-08 RX ADMIN — OMEPRAZOLE 40 MG: 20 CAPSULE, DELAYED RELEASE ORAL at 08:16

## 2017-02-08 RX ADMIN — Medication 325 MG: at 08:16

## 2017-02-08 RX ADMIN — FOLIC ACID 1 MG: 1 TABLET ORAL at 08:16

## 2017-02-08 RX ADMIN — Medication 100 MG: at 08:16

## 2017-02-08 RX ADMIN — LACTULOSE 30 ML: 10 SOLUTION ORAL at 08:26

## 2017-02-08 ASSESSMENT — LIFESTYLE VARIABLES
AUDITORY DISTURBANCES: NOT PRESENT
PAROXYSMAL SWEATS: NO SWEAT VISIBLE
HEADACHE, FULLNESS IN HEAD: NOT PRESENT
TREMOR: NO TREMOR
TOTAL SCORE: 0
ANXIETY: NO ANXIETY (AT EASE)
ANXIETY: NO ANXIETY (AT EASE)
AUDITORY DISTURBANCES: NOT PRESENT
HEADACHE, FULLNESS IN HEAD: NOT PRESENT
AUDITORY DISTURBANCES: NOT PRESENT
ANXIETY: NO ANXIETY (AT EASE)
NAUSEA AND VOMITING: NO NAUSEA AND NO VOMITING
AGITATION: NORMAL ACTIVITY
AGITATION: NORMAL ACTIVITY
VISUAL DISTURBANCES: NOT PRESENT
AGITATION: NORMAL ACTIVITY
NAUSEA AND VOMITING: NO NAUSEA AND NO VOMITING
ORIENTATION AND CLOUDING OF SENSORIUM: ORIENTED AND CAN DO SERIAL ADDITIONS
NAUSEA AND VOMITING: NO NAUSEA AND NO VOMITING
HEADACHE, FULLNESS IN HEAD: NOT PRESENT
TREMOR: NO TREMOR
VISUAL DISTURBANCES: NOT PRESENT
ORIENTATION AND CLOUDING OF SENSORIUM: ORIENTED AND CAN DO SERIAL ADDITIONS
HEADACHE, FULLNESS IN HEAD: NOT PRESENT
ORIENTATION AND CLOUDING OF SENSORIUM: ORIENTED AND CAN DO SERIAL ADDITIONS
AGITATION: NORMAL ACTIVITY
TREMOR: NO TREMOR
PAROXYSMAL SWEATS: NO SWEAT VISIBLE
PAROXYSMAL SWEATS: NO SWEAT VISIBLE
ORIENTATION AND CLOUDING OF SENSORIUM: ORIENTED AND CAN DO SERIAL ADDITIONS
NAUSEA AND VOMITING: NO NAUSEA AND NO VOMITING
TOTAL SCORE: 0
TOTAL SCORE: 0
VISUAL DISTURBANCES: NOT PRESENT
AUDITORY DISTURBANCES: NOT PRESENT
VISUAL DISTURBANCES: NOT PRESENT
TREMOR: NO TREMOR
PAROXYSMAL SWEATS: NO SWEAT VISIBLE
TOTAL SCORE: 0
ANXIETY: NO ANXIETY (AT EASE)

## 2017-02-08 ASSESSMENT — PATIENT HEALTH QUESTIONNAIRE - PHQ9
SUM OF ALL RESPONSES TO PHQ QUESTIONS 1-9: 0
1. LITTLE INTEREST OR PLEASURE IN DOING THINGS: NOT AT ALL
SUM OF ALL RESPONSES TO PHQ9 QUESTIONS 1 AND 2: 0
2. FEELING DOWN, DEPRESSED, IRRITABLE, OR HOPELESS: NOT AT ALL

## 2017-02-08 ASSESSMENT — ENCOUNTER SYMPTOMS
CONSTIPATION: 0
COUGH: 0
CHILLS: 0
DIZZINESS: 0
PALPITATIONS: 0
FEVER: 0
VOMITING: 0
DEPRESSION: 0
HEADACHES: 0
BLURRED VISION: 0
NAUSEA: 0
SHORTNESS OF BREATH: 0
DIARRHEA: 0
ABDOMINAL PAIN: 0
WHEEZING: 0

## 2017-02-08 ASSESSMENT — PAIN SCALES - GENERAL
PAINLEVEL_OUTOF10: 0

## 2017-02-08 NOTE — PROGRESS NOTES
Payam from Lab called with critical result of WBC 1.6, Plt 35 at 0458. Critical lab result read back to Payam.   MD aware of critical labs. Will pass on to day RN to address in rounds.

## 2017-02-08 NOTE — CARE PLAN
Problem: Bowel/Gastric:  Goal: Normal bowel function is maintained or improved  Outcome: PROGRESSING AS EXPECTED  Pt denies any nausea or vomiting at this time. Pt states last BM was loose but denies any blood. Pt on clear liquid diet. Multiple snacks provided. Pt tolerating well.     Problem: Knowledge Deficit  Goal: Knowledge of disease process/condition, treatment plan, diagnostic tests, and medications will improve  Outcome: PROGRESSING AS EXPECTED  Discussed POC with pt. Answered all questions appropriately. White board updated. All medications and interventions explained before performed. Pt verbalized understanding. Pt understands reason for isolation and need to put belongings in bag.     Problem: Respiratory:  Goal: Respiratory status will improve  Outcome: PROGRESSING AS EXPECTED

## 2017-02-08 NOTE — CARE PLAN
Problem: Safety  Goal: Will remain free from injury  Intervention: Provide assistance with mobility  Ambulate in room

## 2017-02-08 NOTE — PROGRESS NOTES
Assumed care report received. Assessment completed. AOx4. Pt resting in bed, denies any pain at this time. No other complaints at this time. Plan of care discussed, verbalized understanding. Fall precautions in place. Call light within reach. Tele monitor in place. White board updated. Bed alarm in use. Pt given multiple snacks per request.

## 2017-02-08 NOTE — CARE PLAN
Problem: Knowledge Deficit  Goal: Knowledge of disease process/condition, treatment plan, diagnostic tests, and medications will improve  Intervention: Assess knowledge level of disease process/condition, treatment plan, diagnostic tests, and medications  Assess for readiness for ETOH detox

## 2017-02-08 NOTE — PROGRESS NOTES
Hospital Medicine Progress Note, Adult, Complex               Author: Robbie Susankarin Date & Time created: 2/8/2017  6:23 AM     ID: 57 year old male hx of liver cirrhosis, admitted for nausea and vomiting, still drinking alcohol.     Interval History:  2/7 - no further vomiting, lethargic but arousable and answering appropriately.  2/8 - SB 47-57. Rare PVC.Tolerating diet, no nausea/vomiting overnight.       Consults:  Heme-pending    Review of Systems:  Review of Systems   Constitutional: Negative for fever and chills.   Eyes: Negative for blurred vision.   Respiratory: Negative for cough, shortness of breath and wheezing.    Cardiovascular: Negative for chest pain and palpitations.   Gastrointestinal: Negative for nausea, vomiting, abdominal pain, diarrhea and constipation.   Genitourinary: Negative for dysuria.   Neurological: Negative for dizziness and headaches.   Psychiatric/Behavioral: Negative for depression.       Physical Exam:  Physical Exam   Constitutional: He is oriented to person, place, and time. No distress.   Chronically appearing male   HENT:   Head: Normocephalic and atraumatic.   Eyes: Conjunctivae and EOM are normal. Pupils are equal, round, and reactive to light. No scleral icterus.   Neck: Normal range of motion. Neck supple. No JVD present.   Cardiovascular: Normal rate, regular rhythm and normal heart sounds.    No murmur heard.  Pulmonary/Chest: Breath sounds normal. No stridor. No respiratory distress. He has no wheezes. He has no rales.   Abdominal: Soft. Bowel sounds are normal. He exhibits no distension. There is no tenderness. There is no rebound.   Musculoskeletal: Normal range of motion. He exhibits no edema or tenderness.   Neurological: He is alert and oriented to person, place, and time.   Skin: Skin is warm and dry. He is not diaphoretic.       Labs:        Invalid input(s): [id number]      Recent Labs      02/06/17   2100  02/07/17   0152  02/08/17   0333   SODIUM  138    --   137   POTASSIUM  4.2   --   3.7   CHLORIDE  105   --   107   CO2  22   --   24   BUN  8   --   8   CREATININE  0.61   --   0.54   MAGNESIUM   --   1.8  1.7   PHOSPHORUS   --   2.8  4.3   CALCIUM  9.5   --   9.0     Recent Labs      17   ALTSGPT  41  39   ASTSGOT  51*  45   ALKPHOSPHAT  72  63   TBILIRUBIN  0.9  1.1   GLUCOSE  104*  85     Recent Labs      17   RBC  4.64*  4.46*  4.26*   HEMOGLOBIN  14.9  13.9*  13.6*   HEMATOCRIT  43.0  41.1*  39.5*   PLATELETCT  46*  40*  35*   PROTHROMBTM  15.7*   --    --    APTT  30.6   --    --    INR  1.21*   --    --      Recent Labs      17   WBC  4.2*  2.9*  1.6*   NEUTSPOLYS  55.50  46.90  40.30*   LYMPHOCYTES  30.30  37.20  39.60   MONOCYTES  9.70  10.20  13.40   EOSINOPHILS  3.80  4.20  5.50   BASOPHILS  0.70  1.10  1.20   ASTSGOT  51*   --   45   ALTSGPT  41   --   39   ALKPHOSPHAT  72   --   63   TBILIRUBIN  0.9   --   1.1           Hemodynamics:  Temp (24hrs), Av.8 °C (98.2 °F), Min:36.5 °C (97.7 °F), Max:37.2 °C (98.9 °F)  Temperature: 36.7 °C (98 °F)  Pulse  Av.8  Min: 45  Max: 79   Blood Pressure: 104/72 mmHg     Respiratory:    Respiration: 18, Pulse Oximetry: 95 %        RUL Breath Sounds: Clear, RML Breath Sounds: Diminished, RLL Breath Sounds: Diminished, ROM Breath Sounds: Clear, LLL Breath Sounds: Diminished  Fluids:    Intake/Output Summary (Last 24 hours) at 17 0623  Last data filed at 17 0600   Gross per 24 hour   Intake    590 ml   Output    625 ml   Net    -35 ml     Weight: 59.6 kg (131 lb 6.3 oz)  GI/Nutrition:  Orders Placed This Encounter   Procedures   • DIET ORDER     Standing Status: Standing      Number of Occurrences: 1      Standing Expiration Date:      Order Specific Question:  Diet:     Answer:  Clear Liquid [10]     Medical Decision Making, by Problem:  Active Hospital Problems    Diagnosis    • *Hematemesis [K92.0], resolved  -per patient had a episode prior to admission but no further episodes.  -H/H stable  -will monitor for now, may need to consult GI if acute bleeding  -advance diet   • Pancytopenia (HCC) [D61.818]  -secondary to liver cirrhosis  -continue iron, folic acid, b12  -WBC 1.6, ANC 0.66, will call hematology ? neupogen   • Alcoholic cirrhosis (HCC) [K70.30]  -continues to drink alcohol, last drink 2 days ago  - INR 1.21  -continue lactulose, ammonia elevated  -continue mvi,thiamine, folate  -continue propranolol  -no evidence of fluid overload/ascites on exam  -continue vitamin E   • Chronic hepatitis C without hepatic coma (HCC) [B18.2]  -no acute issue   • GERD (gastroesophageal reflux disease) [K21.9]  -continue omeprazole   • Headache [R51]  -pending MRI brain   • Esophageal varices (CMS-HCC) [I85.00]  -continue propranolol    • Schizophrenia (CMS-HCC) [F20.9]  -continue seroquel   • Alcoholism (CMS-HCC) [F10.20]  -continues to drink, last drink 2 days ago  -CIWA, librium, mvi, thiamine folate  -aspiration, seizure, fall and neurochecks precautions   • Portal hypertension (CMS-HCC) [K76.6]  -continue propranolol   *Bed bugs  -shower and isolation    I have discussed with treatment team during rounds  Full code    EKG reviewed, Labs reviewed, Medications reviewed and Radiology images reviewed  Jimenez catheter: No Jimenez        DVT prophylaxis - mechanical: SCDs  Ulcer prophylaxis: Not indicated

## 2017-02-08 NOTE — PROGRESS NOTES
Pt A&Ox4, lethargic, awake in bed. IV site intact. C/O leg pain, will medicate asap. Contact precautions for bed bugs maintained. Will continue to monitor.

## 2017-02-08 NOTE — THERAPY
consult received; pt awaiting change of rooms/shower for bed bugs and free of bed bugs prior to MRI per nsg; asking to hold for now; will re-attempt as appropriate

## 2017-02-08 NOTE — CARE PLAN
Problem: Communication  Goal: The ability to communicate needs accurately and effectively will improve  Outcome: PROGRESSING AS EXPECTED  Pt able to effectively communicate needs.     Problem: Safety  Goal: Will remain free from falls  Outcome: PROGRESSING AS EXPECTED  Fall risk assessed and fall precautions in place, pt standby assist. Bed alarm on, appropriate signs in place, call light and personal belongings within reach. Pt educated to call for help and not get up without assistance. Verbalized understanding.

## 2017-02-09 VITALS
WEIGHT: 136.5 LBS | RESPIRATION RATE: 14 BRPM | TEMPERATURE: 98.7 F | SYSTOLIC BLOOD PRESSURE: 97 MMHG | HEIGHT: 68 IN | DIASTOLIC BLOOD PRESSURE: 64 MMHG | HEART RATE: 77 BPM | OXYGEN SATURATION: 96 % | BODY MASS INDEX: 20.69 KG/M2

## 2017-02-09 PROBLEM — B88.8 INFESTATION BY BED BUG: Status: RESOLVED | Noted: 2017-02-07 | Resolved: 2017-02-09

## 2017-02-09 PROBLEM — K92.0 HEMATEMESIS: Status: RESOLVED | Noted: 2017-02-06 | Resolved: 2017-02-09

## 2017-02-09 PROBLEM — R51.9 HEADACHE: Status: RESOLVED | Noted: 2017-02-07 | Resolved: 2017-02-09

## 2017-02-09 LAB
BASOPHILS # BLD AUTO: 1.1 % (ref 0–1.8)
BASOPHILS # BLD: 0.02 K/UL (ref 0–0.12)
EKG IMPRESSION: NORMAL
EOSINOPHIL # BLD AUTO: 0.09 K/UL (ref 0–0.51)
EOSINOPHIL NFR BLD: 5.1 % (ref 0–6.9)
ERYTHROCYTE [DISTWIDTH] IN BLOOD BY AUTOMATED COUNT: 44.5 FL (ref 35.9–50)
HCT VFR BLD AUTO: 38.4 % (ref 42–52)
HGB BLD-MCNC: 13 G/DL (ref 14–18)
IMM GRANULOCYTES # BLD AUTO: 0 K/UL (ref 0–0.11)
IMM GRANULOCYTES NFR BLD AUTO: 0 % (ref 0–0.9)
LYMPHOCYTES # BLD AUTO: 0.61 K/UL (ref 1–4.8)
LYMPHOCYTES NFR BLD: 34.9 % (ref 22–41)
MAGNESIUM SERPL-MCNC: 1.6 MG/DL (ref 1.5–2.5)
MCH RBC QN AUTO: 31.5 PG (ref 27–33)
MCHC RBC AUTO-ENTMCNC: 33.9 G/DL (ref 33.7–35.3)
MCV RBC AUTO: 93 FL (ref 81.4–97.8)
MONOCYTES # BLD AUTO: 0.21 K/UL (ref 0–0.85)
MONOCYTES NFR BLD AUTO: 12 % (ref 0–13.4)
NEUTROPHILS # BLD AUTO: 0.82 K/UL (ref 1.82–7.42)
NEUTROPHILS NFR BLD: 46.9 % (ref 44–72)
NRBC # BLD AUTO: 0 K/UL
NRBC BLD AUTO-RTO: 0 /100 WBC
PHOSPHATE SERPL-MCNC: 4.6 MG/DL (ref 2.5–4.5)
PLATELET # BLD AUTO: 34 K/UL (ref 164–446)
PMV BLD AUTO: 11.9 FL (ref 9–12.9)
RBC # BLD AUTO: 4.13 M/UL (ref 4.7–6.1)
WBC # BLD AUTO: 1.8 K/UL (ref 4.8–10.8)

## 2017-02-09 PROCEDURE — 700102 HCHG RX REV CODE 250 W/ 637 OVERRIDE(OP): Performed by: HOSPITALIST

## 2017-02-09 PROCEDURE — 97161 PT EVAL LOW COMPLEX 20 MIN: CPT

## 2017-02-09 PROCEDURE — 700111 HCHG RX REV CODE 636 W/ 250 OVERRIDE (IP): Performed by: HOSPITALIST

## 2017-02-09 PROCEDURE — 36415 COLL VENOUS BLD VENIPUNCTURE: CPT

## 2017-02-09 PROCEDURE — 700102 HCHG RX REV CODE 250 W/ 637 OVERRIDE(OP): Performed by: INTERNAL MEDICINE

## 2017-02-09 PROCEDURE — A9270 NON-COVERED ITEM OR SERVICE: HCPCS | Performed by: HOSPITALIST

## 2017-02-09 PROCEDURE — G8988 SELF CARE GOAL STATUS: HCPCS | Mod: CH

## 2017-02-09 PROCEDURE — 85025 COMPLETE CBC W/AUTO DIFF WBC: CPT

## 2017-02-09 PROCEDURE — 99239 HOSP IP/OBS DSCHRG MGMT >30: CPT | Performed by: HOSPITALIST

## 2017-02-09 PROCEDURE — G8980 MOBILITY D/C STATUS: HCPCS | Mod: CI

## 2017-02-09 PROCEDURE — A9270 NON-COVERED ITEM OR SERVICE: HCPCS | Performed by: INTERNAL MEDICINE

## 2017-02-09 PROCEDURE — G8987 SELF CARE CURRENT STATUS: HCPCS | Mod: CH

## 2017-02-09 PROCEDURE — 93010 ELECTROCARDIOGRAM REPORT: CPT | Performed by: INTERNAL MEDICINE

## 2017-02-09 PROCEDURE — 83735 ASSAY OF MAGNESIUM: CPT

## 2017-02-09 PROCEDURE — G8978 MOBILITY CURRENT STATUS: HCPCS | Mod: CI

## 2017-02-09 PROCEDURE — 93005 ELECTROCARDIOGRAM TRACING: CPT | Performed by: HOSPITALIST

## 2017-02-09 PROCEDURE — G8989 SELF CARE D/C STATUS: HCPCS | Mod: CH

## 2017-02-09 PROCEDURE — G8979 MOBILITY GOAL STATUS: HCPCS | Mod: CI

## 2017-02-09 PROCEDURE — 97165 OT EVAL LOW COMPLEX 30 MIN: CPT

## 2017-02-09 PROCEDURE — 84100 ASSAY OF PHOSPHORUS: CPT

## 2017-02-09 RX ORDER — MAGNESIUM SULFATE HEPTAHYDRATE 40 MG/ML
2 INJECTION, SOLUTION INTRAVENOUS ONCE
Status: COMPLETED | OUTPATIENT
Start: 2017-02-09 | End: 2017-02-09

## 2017-02-09 RX ADMIN — LACTULOSE 30 ML: 10 SOLUTION ORAL at 09:48

## 2017-02-09 RX ADMIN — CHLORDIAZEPOXIDE HYDROCHLORIDE 25 MG: 25 CAPSULE ORAL at 05:06

## 2017-02-09 RX ADMIN — Medication 100 MG: at 09:48

## 2017-02-09 RX ADMIN — Medication 325 MG: at 12:55

## 2017-02-09 RX ADMIN — CHLORDIAZEPOXIDE HYDROCHLORIDE 25 MG: 25 CAPSULE ORAL at 12:55

## 2017-02-09 RX ADMIN — PROPRANOLOL HYDROCHLORIDE 20 MG: 10 TABLET ORAL at 09:48

## 2017-02-09 RX ADMIN — Medication 400 UNITS: at 09:48

## 2017-02-09 RX ADMIN — CYANOCOBALAMIN TAB 500 MCG 2000 MCG: 500 TAB at 09:48

## 2017-02-09 RX ADMIN — FOLIC ACID 1 MG: 1 TABLET ORAL at 09:49

## 2017-02-09 RX ADMIN — Medication 325 MG: at 09:49

## 2017-02-09 RX ADMIN — THERA TABS 1 TABLET: TAB at 09:48

## 2017-02-09 RX ADMIN — OMEPRAZOLE 40 MG: 20 CAPSULE, DELAYED RELEASE ORAL at 09:48

## 2017-02-09 RX ADMIN — MAGNESIUM SULFATE IN WATER 2 G: 40 INJECTION, SOLUTION INTRAVENOUS at 06:39

## 2017-02-09 ASSESSMENT — PAIN SCALES - GENERAL
PAINLEVEL_OUTOF10: 0

## 2017-02-09 ASSESSMENT — LIFESTYLE VARIABLES
HEADACHE, FULLNESS IN HEAD: NOT PRESENT
HEADACHE, FULLNESS IN HEAD: NOT PRESENT
AGITATION: NORMAL ACTIVITY
TOTAL SCORE: 0
ANXIETY: NO ANXIETY (AT EASE)
AGITATION: NORMAL ACTIVITY
ANXIETY: NO ANXIETY (AT EASE)
TOTAL SCORE: 0
ORIENTATION AND CLOUDING OF SENSORIUM: ORIENTED AND CAN DO SERIAL ADDITIONS
NAUSEA AND VOMITING: NO NAUSEA AND NO VOMITING
AUDITORY DISTURBANCES: NOT PRESENT
AGITATION: NORMAL ACTIVITY
VISUAL DISTURBANCES: NOT PRESENT
HEADACHE, FULLNESS IN HEAD: NOT PRESENT
PAROXYSMAL SWEATS: NO SWEAT VISIBLE
ORIENTATION AND CLOUDING OF SENSORIUM: ORIENTED AND CAN DO SERIAL ADDITIONS
NAUSEA AND VOMITING: NO NAUSEA AND NO VOMITING
TREMOR: NO TREMOR
PAROXYSMAL SWEATS: NO SWEAT VISIBLE
AUDITORY DISTURBANCES: NOT PRESENT
VISUAL DISTURBANCES: NOT PRESENT
AUDITORY DISTURBANCES: NOT PRESENT
ANXIETY: NO ANXIETY (AT EASE)
TOTAL SCORE: 0
PAROXYSMAL SWEATS: NO SWEAT VISIBLE
TREMOR: NO TREMOR
TREMOR: NO TREMOR
ORIENTATION AND CLOUDING OF SENSORIUM: ORIENTED AND CAN DO SERIAL ADDITIONS
NAUSEA AND VOMITING: NO NAUSEA AND NO VOMITING
VISUAL DISTURBANCES: NOT PRESENT

## 2017-02-09 ASSESSMENT — GAIT ASSESSMENTS
GAIT LEVEL OF ASSIST: SUPERVISED
DISTANCE (FEET): 500

## 2017-02-09 ASSESSMENT — ACTIVITIES OF DAILY LIVING (ADL): TOILETING: INDEPENDENT

## 2017-02-09 NOTE — DISCHARGE INSTRUCTIONS
Discharge Instructions    Discharged to home by walking with self. Discharged via walking, hospital escort: Refused.  Special equipment needed: Not Applicable    Be sure to schedule a follow-up appointment with your primary care doctor or any specialists as instructed.     Discharge Plan:   Diet Plan: Discussed  Activity Level: Discussed  Smoking Cessation Offered: Patient Refused  Confirmed Follow up Appointment: Patient to Call and Schedule Appointment  Confirmed Symptoms Management: Discussed  Medication Reconciliation Updated: Yes  Influenza Vaccine Indication: Indicated: 9 to 64 years of age  Influenza Vaccine Given - only chart on this line when given: Influenza Vaccine Given (See MAR)    I understand that a diet low in cholesterol, fat, and sodium is recommended for good health. Unless I have been given specific instructions below for another diet, I accept this instruction as my diet prescription.   Other diet: AS TOLERATED    Special Instructions: None    · Is patient discharged on Warfarin / Coumadin?   No     · Is patient Post Blood Transfusion?  No    Depression / Suicide Risk    As you are discharged from this [facility] facility, it is important to learn how to keep safe from harming yourself.    Recognize the warning signs:  · Abrupt changes in personality, positive or negative- including increase in energy   · Giving away possessions  · Change in eating patterns- significant weight changes-  positive or negative  · Change in sleeping patterns- unable to sleep or sleeping all the time   · Unwillingness or inability to communicate  · Depression  · Unusual sadness, discouragement and loneliness  · Talk of wanting to die  · Neglect of personal appearance   · Rebelliousness- reckless behavior  · Withdrawal from people/activities they love  · Confusion- inability to concentrate     If you or a loved one observes any of these behaviors or has concerns about self-harm, here's what you can do:  · Talk  "about it- your feelings and reasons for harming yourself  · Remove any means that you might use to hurt yourself (examples: pills, rope, extension cords, firearm)  · Get professional help from the community (Mental Health, Substance Abuse, psychological counseling)  · Do not be alone:Call your Safe Contact- someone whom you trust who will be there for you.  · Call your local CRISIS HOTLINE 833-6749 or 260-539-8390  · Call your local Children's Mobile Crisis Response Team [place] (446) 891-1261 or wwwLemonwise  · Call the toll free National Suicide Prevention Hotlines   · National Suicide Prevention Lifeline 723-307-JZVO (9782)  · National Hope Line Network 800-SUICIDE (927-2440)  You Can Quit Smoking  If you are ready to quit smoking or are thinking about it, congratulations! You have chosen to help yourself be healthier and live longer! There are lots of different ways to quit smoking. Nicotine gum, nicotine patches, a nicotine inhaler, or nicotine nasal spray can help with physical craving. Hypnosis, support groups, and medicines help break the habit of smoking.  TIPS TO GET OFF AND STAY OFF CIGARETTES  · Learn to predict your moods. Do not let a bad situation be your excuse to have a cigarette. Some situations in your life might tempt you to have a cigarette.  · Ask friends and co-workers not to smoke around you.  · Make your home smoke-free.  · Never have \"just one\" cigarette. It leads to wanting another and another. Remind yourself of your decision to quit.  · On a card, make a list of your reasons for not smoking. Read it at least the same number of times a day as you have a cigarette. Tell yourself everyday, \"I do not want to smoke. I choose not to smoke.\"  · Ask someone at home or work to help you with your plan to quit smoking.  · Have something planned after you eat or have a cup of coffee. Take a walk or get other exercise to perk you up. This will help to keep you from overeating.  · Try a " "relaxation exercise to calm you down and decrease your stress. Remember, you may be tense and nervous the first two weeks after you quit. This will pass.  · Find new activities to keep your hands busy. Play with a pen, coin, or rubber band. Doodle or draw things on paper.  · Brush your teeth right after eating. This will help cut down the craving for the taste of tobacco after meals. You can try mouthwash too.  · Try gum, breath mints, or diet candy to keep something in your mouth.  IF YOU SMOKE AND WANT TO QUIT:  · Do not stock up on cigarettes. Never buy a carton. Wait until one pack is finished before you buy another.  · Never carry cigarettes with you at work or at home.  · Keep cigarettes as far away from you as possible. Leave them with someone else.  · Never carry matches or a lighter with you.  · Ask yourself, \"Do I need this cigarette or is this just a reflex?\"  · Bet with someone that you can quit. Put cigarette money in a [employer] bank every morning. If you smoke, you give up the money. If you do not smoke, by the end of the week, you keep the money.  · Keep trying. It takes 21 days to change a habit!  · Talk to your doctor about using medicines to help you quit. These include nicotine replacement gum, lozenges, or skin patches.     This information is not intended to replace advice given to you by your health care provider. Make sure you discuss any questions you have with your health care provider.     Document Released: 10/14/2010 Document Revised: 03/11/2013 Document Reviewed: 10/14/2010  [employer] Interactive Patient Education ©2016 [employer]      Alcohol, Frequently Asked Questions  WHAT IS ALCOHOL?  Ethyl alcohol, or ethanol, affects mood or behavior (psychoactive). It is a drug found in beer, wine, and hard liquor. Hard liquor is whiskey, vodka, gin, etc. Alcohol is produced by the fermentation of yeast, sugars, and starches.   WHAT DOES ALCOHOL DO TO THE BODY?  · Alcohol is a central nervous " system depressant.   · It is rapidly absorbed from the stomach and small intestine. It passes into the bloodstream. It is then widely distributed throughout the body.   · Harmful effects of alcohol, can include:   · Impaired judgment.   · Reduced reaction time.   · Slurred speech.   · Difficulty walking.   · The effects of alcohol on the body are directly related to the amount consumed.   · In small amounts, alcohol can have a relaxing effect.   · When consumed rapidly and in large amounts, alcohol can also result in coma and death.   · Alcohol can interact with a number of prescription and non-prescription medications in ways that can intensify the effect of alcohol, of the medications themselves, or both.   · Alcohol use by pregnant women can cause serious damage to the developing fetus.   WHAT IS A STANDARD DRINK?  A standard drink is one 12 ounce beer, one 5 ounce glass of wine, or one 1.5 ounce shot of distilled spirits. Each of these drinks contains about half an ounce of alcohol.   IS BEER OR WINE SAFER TO DRINK THAN HARD LIQUOR?   No. One 12 ounce beer has about the same amount of alcohol as one 5 ounce glass of wine, or one 1.5 ounce shot of liquor.   WHAT IS MODERATE DRINKING?  Based on current U.S. Government dietary guidelines, moderate drinking for women is defined as an average of 1 drink or less per day. Moderate drinking for men is defined as an average of 2 drinks or less per day.   WHAT IS HEAVY DRINKING?  Heavy drinking is consuming alcohol in excess of 1 drink per day on average for women and greater than 2 drinks per day on average for men.   WHAT IS BINGE DRINKING?  Binge drinking is generally defined as having 5 or more drinks on one occasion. One occasion means in a row or within a short period of time. However, among women, binge drinking is often defined as having 4 or more drinks on one occasion. This lower cut-point is used for women because women are generally of smaller stature than  "men.   WHAT IS ALCOHOLISM?  Alcoholism is a primary, long-lasting (chronic) disease with inherited (genetic) tendencies. Alcoholism has psychological and social (psychosocial), and environmental factors influencing its development and signs and symptoms. The disease often becomes more severe (progressive) and eventually fatal. It is characterized by continuous or periodic:  · Lack of control over drinking.   · Fixation with the drug alcohol.   · Use of alcohol despite harmful consequences.   · Distortions in thinking, including denial.   WHAT IS ALCOHOL ABUSE?  Alcohol abuse is characterized by recurrent alcohol-related problems, including problems with relationships, job performance, or both; the use of alcohol in hazardous situations (e.g., while driving a car); or some combination of these.  WHY ARE SOME PEOPLE MORE SENSITIVE TO ALCOHOL THAN OTHERS?  Individual reactions to alcohol can vary greatly, and may be influenced by many factors, including:  · Age.   · Gender.   · Race.   · A specific origin or culture (ethnicity).   · Physical condition.   · The amount of food eaten before drinking.   · The use of drugs or medicines.   · Family history of alcohol problems.   · Many other factors as well.   Therefore, while drinking guidelines and definitions of drinking patterns can be very helpful in identifying risky patterns of alcohol use, personal decisions about whether to drink, and if so, when and how much, should take into account these individual factors as well.   WHAT DOES IT MEAN TO DRINK TOO MUCH?  · A person may be said to be \"drinking too much\" or engaging in \"excessive drinking\" if they exceed the guidelines for average or daily alcohol consumption.   · Among men, excessive drinking may be defined as more than 4 drinks per day or an average of more than 2 drinks per day over a 7 or 30 day period.   · Among women, excessive drinking may be defined as more than 3 drinks per day or an average of more than 1 " drink per day over a 7 or 30 day period.   · Current guidelines state that some individuals (youth under age 21 years, pregnant women, and persons recovering from alcoholism) should not drink at all. Among these people, any alcohol consumption may be too much.   · Anyone who chooses to drink should be aware that individual reactions to alcohol can vary greatly. When unsure about drinking, it is best to consult one's own personal caregiver.   WHAT DOES IT MEAN TO GET DRUNK?  Drunkenness is the state of being intoxicated by consumption of alcoholic beverages to a degree that mental and physical faculties are noticeably impaired. However, the number of drinks that an individual needs to consume to get drunk varies based on a number of factors. These include: age, gender, physical condition, the amount of food eaten before drinking, and the use of drugs or medicines. Binge drinking typically results in intoxication.   WHAT DOES IT MEAN TO BE ABOVE THE LEGAL LIMIT FOR DRINKING?  Legal limits for operating a vehicle are defined by a government agency. Blood or breath tests are used to find out how much alcohol is in your system. If you are found to have more alcohol in your system than is allowed in your region, you will be punished by law. This does not mean it is safe to drive under the legal limit.  WHAT ARE COMMON HEALTH EFFECTS OF DRINKING TOO MUCH?  Excessive drinking, including binge and heavy drinking, has numerous chronic (long-term) and acute (short-term) health effects.  Chronic health consequences of excessive drinking can include:  · Damage to liver cells (liver cirrhosis).   · Inflammation of the pancreas (pancreatitis).   · Various cancers, including:   · Liver cancer.   · Mouth cancer.   · Throat cancer.   · Voice box (larynx) cancer.   · Esophageal cancer.   · High blood pressure.   · Psychological disorders.   · Sudden (acute) health consequences of excessive drinking can include:   · Alcohol poisoning  and death.   · Motor vehicle injuries.   · Falls.   · Domestic violence.   · Rape.   · Child abuse.   HOW DO I KNOW IF IT IS OKAY TO DRINK?  If you choose to drink alcohol, do so in moderation. Remember that there are some people who should not drink alcohol at all. These people include:  · Children and adolescents.   · People who cannot restrict their drinking to moderate levels.   · Women who may become pregnant or who are pregnant.   · People who plan to drive or operate machinery.   · People taking prescription or over-the-counter medications that can interact with alcohol.   When in doubt, it is always best to consult one's own caregiver.  HOW DO I KNOW IF I HAVE A DRINKING PROBLEM?  Drinking is a problem if it causes trouble in:  · Your relationships.   · School.   · Social activities.   · How you think and feel.   If you are concerned that either you or someone in your family might have a drinking problem, consult your caregivers. There are many resources available to assist people with drinking problems.  Document Released: 12/20/2004 Document Revised: 06/18/2013 Document Reviewed: 12/11/2009  [employer]® Patient Information ©2013 [employer].

## 2017-02-09 NOTE — PROGRESS NOTES
Report received from nightshift RN; care of patient assumed. POC discussed at bedside. No pain or immediate concerns stated at this time. Call light/safety measures in place. Will continue to round hourly.

## 2017-02-09 NOTE — PROGRESS NOTES
Patient discharged home. Education provided on alcohol and smoking cessation. Medications from home given back to patient from pharmacy. [facility]; verbalizes understanding. IV's discontinued; tips intact.

## 2017-02-09 NOTE — DISCHARGE SUMMARY
Hospital Medicine Discharge Note     Admit Date:  2/6/2017       Discharge Date:   2/9/17    Primary Care Provider:    [facility]    Attending Physician:  Robbie Becker    Discharge Diagnoses:   Principal Problem (Resolved):    Hematemesis POA: Unknown  Active Problems:    Pancytopenia (HCC) POA: Yes    Chronic hepatitis C without hepatic coma (HCC) POA: Yes    Alcoholic cirrhosis (HCC) POA: Yes    Portal hypertension (CMS-HCC) POA: Yes    Alcoholism (CMS-HCC) POA: Yes    Schizophrenia (CMS-HCC) POA: Yes    Esophageal varices (CMS-HCC) POA: Unknown    GERD (gastroesophageal reflux disease) POA: Unknown  Resolved Problems:    Headache POA: Unknown    Infestation by bed bug POA: Unknown      Chronic Medical Problems:  See above    Consultants:      none    Studies:    Imaging/ Testing:      MR-BRAIN-W/O   Final Result      1.  Slightly prominent atrophy for age, in keeping with the appearance on the prior CT   2.  MRI of the brain without contrast otherwise within normal limits for age with mild white matter changes.          Procedures:        None    Hospital Summary (Brief Narrative):       For H and P on admission, please refer to full H and P dictated by Dr. Campbell. Patient was admitted on 2/6/17 for complaints of vomiting blood and intractable headache. Patient reported that he had small amount of blood with repeated vomiting prior to coming to hospital. He had no further episodes while inpatient and his hemoglobin was stable at 13s.   He was kept NPO first and then slowly transitioned in diet and tolerated well. As he had no further episodes GI was not consulted.    He does have pancytopenia due to alcoholic liver cirrhosis and continues to drink alcohol. I have counseled him on alcohol cessation and he has been given information regarding alcohol cessation resources. He is to continue B12, iron, multivitamin, folic acid, thiamine, and vitamin E.   He is on lactulose having good bowel movements  here, alert and oriented on discharge.   He is to continue his lactulose. He has no signs of fluid overload, no abdominal pain or signs of sepsis concerning for spontaneous bacterial peritonitis.   He does not appear to have any further signs of withdrawal.   He is to continue propranolol for his varices on discharge.   He has history of chronic hepatitis C for which he should follow up outpatient.   He is continue pantoprazole for his GERD.   He had intractable headache on admission, MRI brain did not show any acute issues and headache resolved with supportive care.   He did have a bed bug found on him and he was cleaned and moved to another room.  He will be given number for resource for cleaning his home by case coordination on discharge.  Patient was also evaluated by physical therapy and occupational therapy did not require and further needs prior to discharge.      Disposition:   Discharge home    Condition:  Stable    Activity:   As tolerated     Diet:   hepatic    Discharge Medications:        (x)  Medication Reconciliation Completed   Inocencio Shabazz   Home Medication Instructions WAYNE:[id number]    Printed on:02/09/17 0303   Medication Information                      Cyanocobalamin (VITAMIN B-12) 1000 MCG Tab  Take 2,000 mcg by mouth every day.             ferrous sulfate 325 (65 FE) MG tablet  Take 1 Tab by mouth 3 times a day, with meals.             folic acid (FOLVITE) 1 MG Tab  Take 1 Tab by mouth every day.             lactulose 10 GM/15ML Solution  Take 20 g by mouth 3 times a day.             Multiple Vitamins-Minerals (MULTIVITAMIN ADULT PO)  Take 1 tablet by mouth every day.             pantoprazole (PROTONIX) 40 MG Tablet Delayed Response  Take 40 mg by mouth every day.             propranolol (INDERAL) 40 MG Tab  Take 20 mg by mouth 2 times a day.             quetiapine (SEROQUEL) 200 MG Tab  Take 100 mg by mouth every bedtime.             thiamine (THIAMINE) 100 MG tablet  Take 1 Tab by mouth  every day.             vitamin e (VITAMIN E) 400 UNIT Cap  Take 400 Units by mouth every day.                   Follow up appointment details :     Follow up with primary care provider at [facility] in 1 week.     Instructions:     See above    Time Spent on Discharge: 40 minutes    Physical Exam   Constitutional: He is oriented to person, place, and time. No distress.   Chronically appearing male   HENT:    Head: Normocephalic and atraumatic.   Eyes: Conjunctivae and EOM are normal. Pupils are equal, round, and reactive to light. No scleral icterus.   Neck: Normal range of motion. Neck supple. No JVD present.   Cardiovascular: Normal rate, regular rhythm and normal heart sounds.     No murmur heard.  Pulmonary/Chest: Breath sounds normal. No stridor. No respiratory distress. He has no wheezes. He has no rales.   Abdominal: Soft. Bowel sounds are normal. He exhibits no distension. There is no tenderness. There is no rebound.   Musculoskeletal: Normal range of motion. He exhibits no edema or tenderness.   Neurological: He is alert and oriented to person, place, and time.   Skin: Skin is warm and dry. He is not diaphoretic.

## 2017-02-09 NOTE — PROGRESS NOTES
Received report at bedside. Assumed care of pt. Pt is AAOx4. Pt is getting picked up by transport for MRI. Pt denies pain, SOB.  No signs of distress.  Dicussed plan of care and goals for shift with patient.  Pt verbalized understanding.  Call light and personal belongings in reach.  Safety precautions in place.

## 2017-02-09 NOTE — CARE PLAN
Problem: Mobility  Goal: Risk for activity intolerance will decrease  Outcome: PROGRESSING AS EXPECTED  PT/OT involved to assess patient's status.

## 2017-02-09 NOTE — THERAPY
"Physical Therapy Evaluation completed.   Bed Mobility:     Transfers:    Gait: Level Of Assist: Supervised with No Equipment Needed       Plan of Care: Patient demonstrates safety with mobility in this environment at this time.   Discharge Recommendations: Equipment: No Equipment Needed.     See \"Rehab Therapy-Acute\" Patient Summary Report for complete documentation.     Pt reports that he is at his baseline and he is ready to go home. Pt demonstrated ability to amb 500 ft and complete higher level balance including looking L/R/up/down, withstanding moderate perturbations and picking up item off the floor. Pt is motivated to return home and doesn't feel that there is anything else he needs in order to do so. Pt does not require skilled acute PT at this time.   "

## 2017-02-09 NOTE — CARE PLAN
Problem: Safety  Goal: Will remain free from injury  Outcome: PROGRESSING AS EXPECTED  Patient remains free from injury. Safety measures in place, call light in reach

## 2017-02-09 NOTE — PROGRESS NOTES
cara from Lab called with critical result of WBC at 1.8 and Plt at 34. Critical lab result read back to Cara.    is aware of these critical lab values.

## 2017-02-09 NOTE — DISCHARGE PLANNING
"The patient is discharging home.  I gave him some information on alcohol abuse, counseling and AA meetings.  I asked him if he wanted the information and he said he did and took it.  Also, he has bed bugs at home.  I talked to him about this and he said he is concerned about his bed and things at home possibly infested with bed bugs.  I found something that he can buy at [facility] called \"Hot Shot Bedbug and Flea Fogger\" that contains 3 cans of fogger and costs a little under $10.00.  I printed out a picture of it and the cost and gave it to the nurse to give to him when she gives him his discharge paper work.  "

## 2017-02-09 NOTE — THERAPY
"Occupational Therapy Evaluation completed.   Functional Status:  Supervision supine to sit.  Pt able to dress self supervised/mod independent.  Pt stood and walked hallway without AD supervised.  Pt reports he feels at his baseline and has no concerns with self-care at this time.  Plan of Care: Patient with no further skilled OT needs in the acute care setting at this time  Discharge Recommendations:  Equipment: No Equipment Needed.    See \"Rehab Therapy-Acute\" Patient Summary Report for complete documentation.    "

## 2017-02-09 NOTE — CARE PLAN
Problem: Communication  Goal: The ability to communicate needs accurately and effectively will improve  Outcome: PROGRESSING AS EXPECTED  Pt calls appropriately     Problem: Safety  Goal: Will remain free from injury  Outcome: PROGRESSING AS EXPECTED  Bed alarm on, safety precautions in place.     Problem: Bowel/Gastric:  Goal: Normal bowel function is maintained or improved  Outcome: PROGRESSING AS EXPECTED  Pt had multiple BMs as expected with lactulose

## 2017-04-12 ENCOUNTER — HOSPITAL ENCOUNTER (EMERGENCY)
Facility: MEDICAL CENTER | Age: 57
End: 2017-04-12
Attending: EMERGENCY MEDICINE
Payer: MEDICARE

## 2017-04-12 VITALS
OXYGEN SATURATION: 94 % | SYSTOLIC BLOOD PRESSURE: 146 MMHG | HEIGHT: 67 IN | HEART RATE: 102 BPM | WEIGHT: 125 LBS | RESPIRATION RATE: 16 BRPM | BODY MASS INDEX: 19.62 KG/M2 | DIASTOLIC BLOOD PRESSURE: 89 MMHG | TEMPERATURE: 99 F

## 2017-04-12 DIAGNOSIS — D69.6 THROMBOCYTOPENIA (HCC): ICD-10-CM

## 2017-04-12 DIAGNOSIS — R56.9 SEIZURE (HCC): ICD-10-CM

## 2017-04-12 LAB
ALBUMIN SERPL BCP-MCNC: 4.4 G/DL (ref 3.2–4.9)
ALBUMIN/GLOB SERPL: 1.1 G/DL
ALP SERPL-CCNC: 69 U/L (ref 30–99)
ALT SERPL-CCNC: 50 U/L (ref 2–50)
AMMONIA PLAS-SCNC: 54 UMOL/L (ref 11–45)
ANION GAP SERPL CALC-SCNC: 12 MMOL/L (ref 0–11.9)
APTT PPP: 28.9 SEC (ref 24.7–36)
AST SERPL-CCNC: 59 U/L (ref 12–45)
BASOPHILS # BLD AUTO: 0.8 % (ref 0–1.8)
BASOPHILS # BLD: 0.03 K/UL (ref 0–0.12)
BILIRUB SERPL-MCNC: 1.2 MG/DL (ref 0.1–1.5)
BUN SERPL-MCNC: 15 MG/DL (ref 8–22)
CALCIUM SERPL-MCNC: 9.6 MG/DL (ref 8.5–10.5)
CHLORIDE SERPL-SCNC: 104 MMOL/L (ref 96–112)
CO2 SERPL-SCNC: 21 MMOL/L (ref 20–33)
COMMENT 1642: NORMAL
CREAT SERPL-MCNC: 0.82 MG/DL (ref 0.5–1.4)
EKG IMPRESSION: NORMAL
EOSINOPHIL # BLD AUTO: 0.12 K/UL (ref 0–0.51)
EOSINOPHIL NFR BLD: 3.2 % (ref 0–6.9)
ERYTHROCYTE [DISTWIDTH] IN BLOOD BY AUTOMATED COUNT: 42.9 FL (ref 35.9–50)
ETHANOL BLD-MCNC: 0 G/DL
GFR SERPL CREATININE-BSD FRML MDRD: >60 ML/MIN/1.73 M 2
GLOBULIN SER CALC-MCNC: 4 G/DL (ref 1.9–3.5)
GLUCOSE SERPL-MCNC: 124 MG/DL (ref 65–99)
HCT VFR BLD AUTO: 40.6 % (ref 42–52)
HGB BLD-MCNC: 13.9 G/DL (ref 14–18)
IMM GRANULOCYTES # BLD AUTO: 0.01 K/UL (ref 0–0.11)
IMM GRANULOCYTES NFR BLD AUTO: 0.3 % (ref 0–0.9)
INR PPP: 1.2 (ref 0.87–1.13)
LIPASE SERPL-CCNC: 33 U/L (ref 11–82)
LYMPHOCYTES # BLD AUTO: 0.65 K/UL (ref 1–4.8)
LYMPHOCYTES NFR BLD: 17.4 % (ref 22–41)
MCH RBC QN AUTO: 31.5 PG (ref 27–33)
MCHC RBC AUTO-ENTMCNC: 34.2 G/DL (ref 33.7–35.3)
MCV RBC AUTO: 92.1 FL (ref 81.4–97.8)
MONOCYTES # BLD AUTO: 0.33 K/UL (ref 0–0.85)
MONOCYTES NFR BLD AUTO: 8.8 % (ref 0–13.4)
MORPHOLOGY BLD-IMP: NORMAL
NEUTROPHILS # BLD AUTO: 2.59 K/UL (ref 1.82–7.42)
NEUTROPHILS NFR BLD: 69.5 % (ref 44–72)
NRBC # BLD AUTO: 0 K/UL
NRBC BLD AUTO-RTO: 0 /100 WBC
PLATELET # BLD AUTO: 38 K/UL (ref 164–446)
PLATELETS.RETICULATED NFR BLD AUTO: 15.8 K/UL (ref 0.6–13.1)
PMV BLD AUTO: 12.3 FL (ref 9–12.9)
POTASSIUM SERPL-SCNC: 3.7 MMOL/L (ref 3.6–5.5)
PROT SERPL-MCNC: 8.4 G/DL (ref 6–8.2)
PROTHROMBIN TIME: 15.6 SEC (ref 12–14.6)
RBC # BLD AUTO: 4.41 M/UL (ref 4.7–6.1)
SODIUM SERPL-SCNC: 137 MMOL/L (ref 135–145)
WBC # BLD AUTO: 3.7 K/UL (ref 4.8–10.8)

## 2017-04-12 PROCEDURE — 99283 EMERGENCY DEPT VISIT LOW MDM: CPT

## 2017-04-12 PROCEDURE — 85055 RETICULATED PLATELET ASSAY: CPT

## 2017-04-12 PROCEDURE — 80307 DRUG TEST PRSMV CHEM ANLYZR: CPT

## 2017-04-12 PROCEDURE — 83690 ASSAY OF LIPASE: CPT

## 2017-04-12 PROCEDURE — 36415 COLL VENOUS BLD VENIPUNCTURE: CPT

## 2017-04-12 PROCEDURE — 85730 THROMBOPLASTIN TIME PARTIAL: CPT

## 2017-04-12 PROCEDURE — 85610 PROTHROMBIN TIME: CPT

## 2017-04-12 PROCEDURE — 80053 COMPREHEN METABOLIC PANEL: CPT

## 2017-04-12 PROCEDURE — 82140 ASSAY OF AMMONIA: CPT

## 2017-04-12 PROCEDURE — 93005 ELECTROCARDIOGRAM TRACING: CPT

## 2017-04-12 PROCEDURE — 85025 COMPLETE CBC W/AUTO DIFF WBC: CPT

## 2017-04-12 ASSESSMENT — PAIN SCALES - GENERAL: PAINLEVEL_OUTOF10: 0

## 2017-04-12 NOTE — ED AVS SNAPSHOT
Revegy Access Code: [email or web address]  Expires: 5/12/2017 11:45 AM    Your email address is not on file at [facility].  Email Addresses are required for you to sign up for Revegy, please contact [phone] to verify your personal information and to provide your email address prior to attempting to register for Revegy.    Inocencio Shabazz  [address]  [place] [address]    Revegy  A secure, online tool to manage your health information     [employer]’s Revegy® is a secure, online tool that connects you to your personalized health information from the privacy of your home -- day or night - making it very easy for you to manage your healthcare. Once the activation process is completed, you can even access your medical information using the Revegy brayan, which is available for free in the Apple Brayan store or Google Play store.     To learn more about Revegy, visit [email or web address]    There are two levels of access available (as shown below):   My Chart Features  [facility] Primary Care Doctor [facility]  Specialists [facility]  Urgent  Care Non-[facility] Primary Care Doctor   Email your healthcare team securely and privately 24/7 X X X    Manage appointments: schedule your next appointment; view details of past/upcoming appointments X      Request prescription refills. X      View recent personal medical records, including lab and immunizations X X X X   View health record, including health history, allergies, medications X X X X   Read reports about your outpatient visits, procedures, consult and ER notes X X X X   See your discharge summary, which is a recap of your hospital and/or ER visit that includes your diagnosis, lab results, and care plan X X  X     How to register for [employer]:  Once your e-mail address has been verified, follow the following steps to sign up for [employer].     1. Go to  [email or web address]  2. Click on the Sign Up Now box, which takes you to the New Member Sign Up page. You  will need to provide the following information:  a. Enter your Charge Payment Access Code exactly as it appears at the top of this page. (You will not need to use this code after you’ve completed the sign-up process. If you do not sign up before the expiration date, you must request a new code.)   b. Enter your date of birth.   c. Enter your home email address.   d. Click Submit, and follow the next screen’s instructions.  3. Create a [employer] ID. This will be your Charge Payment login ID and cannot be changed, so think of one that is secure and easy to remember.  4. Create a Charge Payment password. You can change your password at any time.  5. Enter your Password Reset Question and Answer. This can be used at a later time if you forget your password.   6. Enter your e-mail address. This allows you to receive e-mail notifications when new information is available in Charge Payment.  7. Click Sign Up. You can now view your health information.    For assistance activating your Charge Payment account, call [phone]

## 2017-04-12 NOTE — ED NOTES
Pt states he wants to walk home as he lives [address]. Patient says his sister wont come to get him and he says he can walk because he does not live far away. Pt walked around pod. Pt ambulatory with steady gait

## 2017-04-12 NOTE — PROGRESS NOTES
Patient given discharge instructions and verbalized understanding. PIV removed. Patient discharged with zero Rx's. VSS, patient had no comments or concerns for DC.

## 2017-04-12 NOTE — ED NOTES
Pt BIBA to [facility]. Patient had a seizure today. HX of seizures. Was marginally post ictal upon [facility] arrival. PT is no longer post ictal. Is A&O X4. Patient  Has an odd speech pattern

## 2017-04-12 NOTE — ED AVS SNAPSHOT
Home Care Instructions                                                                                                                Inocencio Shabazz   MRN: [id number]    Department:  [facility], Emergency Dept   Date of Visit:  4/12/2017            [facility], Emergency Dept    [address] [facility] [facility] [address]    Phone:  [phone]      You were seen by     Venkatesh Hartley M.D.      Your Diagnosis Was     Seizure (CMS-HCC)     R56.9       Follow-up Information     1. Follow up with [facility] In 1 day.    Why:  As needed, If symptoms worsen    Contact information    [address] [facility] [address]  [phone]        Medication Information     Review all of your home medications and newly ordered medications with your primary doctor and/or pharmacist as soon as possible. Follow medication instructions as directed by your doctor and/or pharmacist.     Please keep your complete medication list with you and share with your physician. Update the information when medications are discontinued, doses are changed, or new medications (including over-the-counter products) are added; and carry medication information at all times in the event of emergency situations.               Medication List      ASK your doctor about these medications        Instructions    Morning Afternoon Evening Bedtime    ferrous sulfate 325 (65 FE) MG tablet        Take 1 Tab by mouth 3 times a day, with meals.   Dose:  325 mg                        folic acid 1 MG Tabs   Commonly known as:  FOLVITE        Take 1 Tab by mouth every day.   Dose:  1 mg                        lactulose 10 GM/15ML Soln        Take 20 g by mouth 3 times a day.   Dose:  20 g                        MULTIVITAMIN ADULT PO        Take 1 tablet by mouth every day.   Dose:  1 tablet                        pantoprazole 40 MG Tbec   Commonly known as:  PROTONIX        Take 40 mg by mouth every  day.   Dose:  40 mg                        propranolol 40 MG Tabs   Commonly known as:  INDERAL        Take 20 mg by mouth 2 times a day.   Dose:  20 mg                        quetiapine 200 MG Tabs   Commonly known as:  SEROQUEL        Take 100 mg by mouth every bedtime.   Dose:  100 mg                        thiamine 100 MG tablet   Commonly known as:  THIAMINE        Take 1 Tab by mouth every day.   Dose:  100 mg                        vitamin B-12 1000 MCG Tabs        Take 2,000 mcg by mouth every day.   Dose:  2000 mcg                        vitamin e 400 UNIT Caps   Commonly known as:  VITAMIN E        Take 400 Units by mouth every day.   Dose:  400 Units                                Procedures and tests performed during your visit     AMMONIA    APTT    CBC WITH DIFFERENTIAL    COMP METABOLIC PANEL    DIAGNOSTIC ALCOHOL    DIFFERENTIAL COMMENT    EKG (ER)    ESTIMATED GFR    IMMATURE PLT FRACTION    LIPASE    PERIPHERAL SMEAR REVIEW    PROTHROMBIN TIME        Discharge Instructions       Driving and Equipment Restrictions  Some medical problems make it dangerous to drive, ride a bike, or use machines. Some of these problems are:  · A hard blow to the head (concussion).  · Passing out (fainting).  · Twitching and shaking (seizures).  · Low blood sugar.  · Taking medicine to help you relax (sedatives).  · Taking pain medicines.  · Wearing an eye patch.  · Wearing splints. This can make it hard to use parts of your body that you need to drive safely.  HOME CARE   · Do not drive until your doctor says it is okay.  · Do not use machines until your doctor says it is okay.  You may need a form signed by your doctor (medical release) before you can drive again. You may also need this form before you do other tasks where you need to be fully alert.  MAKE SURE YOU:  · Understand these instructions.  · Will watch your condition.  · Will get help right away if you are not doing well or get worse.     This information  is not intended to replace advice given to you by your health care provider. Make sure you discuss any questions you have with your health care provider.     Document Released: 01/25/2006 Document Revised: 03/11/2013 Document Reviewed: 04/27/2011  [employer] Interactive Patient Education ©2016 [employer]  Thrombocytopenia  Thrombocytopenia is a condition in which there is an abnormally small number of platelets in your blood. Platelets are also called thrombocytes. Platelets are needed for blood clotting.  CAUSES  Thrombocytopenia is caused by:   · Decreased production of platelets. This can be caused by:  ¨ Aplastic anemia in which your bone marrow quits making blood cells.  ¨ Cancer in the bone marrow.  ¨ Use of certain medicines, including chemotherapy.  ¨ Infection in the bone marrow.  ¨ Heavy alcohol consumption.  · Increased destruction of platelets. This can be caused by:  ¨ Certain immune diseases.  ¨ Use of certain drugs.  ¨ Certain blood clotting disorders.  ¨ Certain inherited disorders.  ¨ Certain bleeding disorders.  ¨ Pregnancy.  · Having an enlarged spleen (hypersplenism). In hypersplenism, the spleen gathers up platelets from circulation. This means the platelets are not available to help with blood clotting. The spleen can enlarge due to cirrhosis or other conditions.  SYMPTOMS   The symptoms of thrombocytopenia are side effects of poor blood clotting. Some of these are:  · Abnormal bleeding.  · Nosebleeds.  · Heavy menstrual periods.  · Blood in the urine or stools.  · Purpura. This is a purplish discoloration in the skin produced by small bleeding vessels near the surface of the skin.  · Bruising.  · A rash that may be petechial. This looks like pinpoint, purplish-red spots on the skin and mucous membranes. It is caused by bleeding from small blood vessels (capillaries).  DIAGNOSIS   Your caregiver will make this diagnosis based on your exam and blood tests. Sometimes, a bone marrow study is done  to look for the original cells (megakaryocytes) that make platelets.  TREATMENT   Treatment depends on the cause of the condition.  · Medicines may be given to help protect your platelets from being destroyed.  · In some cases, a replacement (transfusion) of platelets may be required to stop or prevent bleeding.  · Sometimes, the spleen must be surgically removed.  HOME CARE INSTRUCTIONS   · Check the skin and linings inside your mouth for bruising or bleeding as directed by your caregiver.  · Check your sputum, urine, and stool for blood as directed by your caregiver.  · Do not return to any activities that could cause bumps or bruises until your caregiver says it is okay.  · Take extra care not to cut yourself when shaving or when using scissors, needles, knives, and other tools.  · Take extra care not to burn yourself when ironing or cooking.  · Ask your caregiver if it is okay for you to drink alcohol.  · Only take over-the-counter or prescription medicines as directed by your caregiver.  · Notify all your caregivers, including dentists and eye doctors, about your condition.  SEEK IMMEDIATE MEDICAL CARE IF:   · You develop active bleeding from anywhere in your body.  · You develop unexplained bruising or bleeding.  · You have blood in your sputum, urine, or stool.  MAKE SURE YOU:  · Understand these instructions.  · Will watch your condition.  · Will get help right away if you are not doing well or get worse.     This information is not intended to replace advice given to you by your health care provider. Make sure you discuss any questions you have with your health care provider.     Document Released: 12/18/2006 Document Revised: 03/11/2013 Document Reviewed: 10/19/2012  Elsevier Interactive Patient Education ©2016 Elsevier Inc.            Patient Information     Patient Information    Following emergency treatment: all patient requiring follow-up care must return either to a private physician or a clinic if  your condition worsens before you are able to obtain further medical attention, please return to the emergency room.     Billing Information    At [facility], we work to make the billing process streamlined for our patients.  Our Representatives are here to answer any questions you may have regarding your hospital bill.  If you have insurance coverage and have supplied your insurance information to us, we will submit a claim to your insurer on your behalf.  Should you have any questions regarding your bill, we can be reached online or by phone as follows:  Online: You are able pay your bills online or live chat with our representatives about any billing questions you may have. We are here to help Monday - Friday from 8:00am to 7:30pm and 9:00am - 12:00pm on Saturdays.  Please visit [email or web address]  for more information.   Phone:  [phone] or [phone]    Please note that your emergency physician, surgeon, pathologist, radiologist, anesthesiologist, and other specialists are not employed by [facility] and will therefore bill separately for their services.  Please contact them directly for any questions concerning their bills at the numbers below:     Emergency Physician Services:  [phone]  [facility]:  [phone]  [facility]:  [phone]  [facility]:  [phone]    1. Your final bill may vary from the amount quoted upon discharge if all procedures are not complete at that time, or if your doctor has additional procedures of which we are not aware. You will receive an additional bill if you return to the Emergency Department at [facility] for suture removal regardless of the facility of which the sutures were placed.     2. Please arrange for settlement of this account at the emergency registration.    3. All self-pay accounts are due in full at the time of treatment.  If you are unable to meet this  obligation then payment is expected within 4-5 days.     4. If you have had radiology studies (CT, X-ray, Ultrasound, MRI), you have received a preliminary result during your emergency department visit. Please contact the radiology department [phone] to receive a copy of your final result. Please discuss the Final result with your primary physician or with the follow up physician provided.     Crisis Hotline:  [place] Crisis Hotline:  3-410-RHGPQSJ or 1-578.872.7992  [place] Crisis Hotline:    1-509.892.6946 or 018-988-7006         ED Discharge Follow Up Questions    1. In order to provide you with very good care, we would like to follow up with a phone call in the next few days.  May we have your permission to contact you?     YES /  NO    2. What is the best phone number to call you? (       )_____-__________    3. What is the best time to call you?      Morning  /  Afternoon  /  Evening                   Patient Signature:  ____________________________________________________________    Date:  ____________________________________________________________

## 2017-04-12 NOTE — ED AVS SNAPSHOT
4/12/2017          Inocencio Shabazz  [address]  [place] [address]    Dear Inocencio:    [facility] wants to ensure your discharge home is safe and you or your loved ones have had all your questions answered regarding your care after you leave the hospital.    You may receive a telephone call within two days of your discharge.  This call is to make certain you understand your discharge instructions as well as ensure we provided you with the best care possible during your stay with us.     The call will only last approximately 3-5 minutes and will be done by a nurse.    Once again, we want to ensure your discharge home is safe and that you have a clear understanding of any next steps in your care.  If you have any questions or concerns, please do not hesitate to contact us, we are here for you.  Thank you for choosing [facility] for your healthcare needs.    Sincerely,    Garrison Abraham    [facility]

## 2017-04-12 NOTE — DISCHARGE INSTRUCTIONS
Driving and Equipment Restrictions  Some medical problems make it dangerous to drive, ride a bike, or use machines. Some of these problems are:  · A hard blow to the head (concussion).  · Passing out (fainting).  · Twitching and shaking (seizures).  · Low blood sugar.  · Taking medicine to help you relax (sedatives).  · Taking pain medicines.  · Wearing an eye patch.  · Wearing splints. This can make it hard to use parts of your body that you need to drive safely.  HOME CARE   · Do not drive until your doctor says it is okay.  · Do not use machines until your doctor says it is okay.  You may need a form signed by your doctor (medical release) before you can drive again. You may also need this form before you do other tasks where you need to be fully alert.  MAKE SURE YOU:  · Understand these instructions.  · Will watch your condition.  · Will get help right away if you are not doing well or get worse.     This information is not intended to replace advice given to you by your health care provider. Make sure you discuss any questions you have with your health care provider.     Document Released: 01/25/2006 Document Revised: 03/11/2013 Document Reviewed: 04/27/2011  [employer] Interactive Patient Education ©2016 [employer]  Thrombocytopenia  Thrombocytopenia is a condition in which there is an abnormally small number of platelets in your blood. Platelets are also called thrombocytes. Platelets are needed for blood clotting.  CAUSES  Thrombocytopenia is caused by:   · Decreased production of platelets. This can be caused by:  ¨ Aplastic anemia in which your bone marrow quits making blood cells.  ¨ Cancer in the bone marrow.  ¨ Use of certain medicines, including chemotherapy.  ¨ Infection in the bone marrow.  ¨ Heavy alcohol consumption.  · Increased destruction of platelets. This can be caused by:  ¨ Certain immune diseases.  ¨ Use of certain drugs.  ¨ Certain blood clotting disorders.  ¨ Certain inherited  disorders.  ¨ Certain bleeding disorders.  ¨ Pregnancy.  · Having an enlarged spleen (hypersplenism). In hypersplenism, the spleen gathers up platelets from circulation. This means the platelets are not available to help with blood clotting. The spleen can enlarge due to cirrhosis or other conditions.  SYMPTOMS   The symptoms of thrombocytopenia are side effects of poor blood clotting. Some of these are:  · Abnormal bleeding.  · Nosebleeds.  · Heavy menstrual periods.  · Blood in the urine or stools.  · Purpura. This is a purplish discoloration in the skin produced by small bleeding vessels near the surface of the skin.  · Bruising.  · A rash that may be petechial. This looks like pinpoint, purplish-red spots on the skin and mucous membranes. It is caused by bleeding from small blood vessels (capillaries).  DIAGNOSIS   Your caregiver will make this diagnosis based on your exam and blood tests. Sometimes, a bone marrow study is done to look for the original cells (megakaryocytes) that make platelets.  TREATMENT   Treatment depends on the cause of the condition.  · Medicines may be given to help protect your platelets from being destroyed.  · In some cases, a replacement (transfusion) of platelets may be required to stop or prevent bleeding.  · Sometimes, the spleen must be surgically removed.  HOME CARE INSTRUCTIONS   · Check the skin and linings inside your mouth for bruising or bleeding as directed by your caregiver.  · Check your sputum, urine, and stool for blood as directed by your caregiver.  · Do not return to any activities that could cause bumps or bruises until your caregiver says it is okay.  · Take extra care not to cut yourself when shaving or when using scissors, needles, knives, and other tools.  · Take extra care not to burn yourself when ironing or cooking.  · Ask your caregiver if it is okay for you to drink alcohol.  · Only take over-the-counter or prescription medicines as directed by your  caregiver.  · Notify all your caregivers, including dentists and eye doctors, about your condition.  SEEK IMMEDIATE MEDICAL CARE IF:   · You develop active bleeding from anywhere in your body.  · You develop unexplained bruising or bleeding.  · You have blood in your sputum, urine, or stool.  MAKE SURE YOU:  · Understand these instructions.  · Will watch your condition.  · Will get help right away if you are not doing well or get worse.     This information is not intended to replace advice given to you by your health care provider. Make sure you discuss any questions you have with your health care provider.     Document Released: 12/18/2006 Document Revised: 03/11/2013 Document Reviewed: 10/19/2012  [employer] Interactive Patient Education ©2016 [employer] Inc.

## 2017-04-12 NOTE — ED PROVIDER NOTES
ED Provider Note    CHIEF COMPLAINT  Chief Complaint   Patient presents with   • Seizure     A&O x 4 seems at baseline       HPI  Inocencio Shabazz is a 57 y.o. male who presents for evaluation of possible seizure. The patient has a history of seizures and long-standing alcohol abuse. He also has a history of hepatitis C as well as thrombocytopenia which is chronic. No recent head trauma. He generally goes to the [facility] for most of his medical care but he was diverted here. On arrival the patient is alert and oriented. He has some odd behavior but this is apparently his baseline due to his underlying psychiatric disorder. He denies homicidality or suicidality. No headache numbness weakness or tingling. He has not drank alcohol for over 2 weeks she reports. No numbness weakness tingling fever chills chest pain shortness of breath    REVIEW OF SYSTEMS  See HPI for further details. No high fever chills numbness tingling or weakness All other systems are negative.     PAST MEDICAL HISTORY  Past Medical History   Diagnosis Date   • Psychiatric disorder      schitzo, bipolar   • Alcohol abuse    • Hepatitis C    • Cirrhosis    • Alcohol intoxication 3/13/2014       FAMILY HISTORY  No history of bleeding disorder    SOCIAL HISTORY  Social History     Social History   • Marital Status: Single     Spouse Name: N/A   • Number of Children: N/A   • Years of Education: N/A     Social History Main Topics   • Smoking status: Current Every Day Smoker -- 1.00 packs/day for 48 years     Types: Cigarettes   • Smokeless tobacco: Never Used   • Alcohol Use: Yes      Comment: 1-2 beers and 1/2 pints of whiskey every day   • Drug Use: No   • Sexual Activity: Not on file     Other Topics Concern   • Not on file     Social History Narrative    smoke cigarettes alcohol abuse     SURGICAL HISTORY  Past Surgical History   Procedure Laterality Date   • Gastroscopy  1/3/2016     Procedure: GASTROSCOPY WITH BANDING;  Surgeon: Alfredo  "KINDRA Antonio M.D.;  Location: SURGERY [facility];  Service:    • Gastroscopy  4/8/2016     Procedure: GASTROSCOPY;  Surgeon: Braeden Tobin M.D.;  Location: SURGERY [facility];  Service:        CURRENT MEDICATIONS  Home Medications     **Home medications have not yet been reviewed for this encounter**        No current facility-administered medications for this encounter.    Current outpatient prescriptions:   •  vitamin e (VITAMIN E) 400 UNIT Cap, Take 400 Units by mouth every day., Disp: , Rfl:   •  Multiple Vitamins-Minerals (MULTIVITAMIN ADULT PO), Take 1 tablet by mouth every day., Disp: , Rfl:   •  lactulose 10 GM/15ML Solution, Take 20 g by mouth 3 times a day., Disp: , Rfl:   •  pantoprazole (PROTONIX) 40 MG Tablet Delayed Response, Take 40 mg by mouth every day., Disp: , Rfl:   •  propranolol (INDERAL) 40 MG Tab, Take 20 mg by mouth 2 times a day., Disp: , Rfl:   •  ferrous sulfate 325 (65 FE) MG tablet, Take 1 Tab by mouth 3 times a day, with meals., Disp: 30 Tab, Rfl: 3  •  folic acid (FOLVITE) 1 MG Tab, Take 1 Tab by mouth every day., Disp: 30 Tab, Rfl: 3  •  Cyanocobalamin (VITAMIN B-12) 1000 MCG Tab, Take 2,000 mcg by mouth every day., Disp: , Rfl:   •  quetiapine (SEROQUEL) 200 MG Tab, Take 100 mg by mouth every bedtime., Disp: , Rfl:   •  thiamine (THIAMINE) 100 MG tablet, Take 1 Tab by mouth every day., Disp: 30 Tab, Rfl: 3    ALLERGIES  Allergies   Allergen Reactions   • Haldol [Haloperidol Lactate] Unspecified     Twitching   RXN=20 years ago       PHYSICAL EXAM  VITAL SIGNS: /89 mmHg  Pulse 102  Temp(Src) 37.2 °C (99 °F)  Resp 16  Ht 1.702 m (5' 7.01\")  Wt 56.7 kg (125 lb)  BMI 19.57 kg/m2  SpO2 94% Room air O2: 94    Constitutional: Well developed, Well nourished, No acute distress, Non-toxic appearance.   HENT: Normocephalic, Atraumatic, Bilateral external ears normal, Oropharynx moist, No oral exudates, Nose normal.   Eyes: PERRLA, EOMI, Conjunctiva normal, No discharge. "   Neck: Normal range of motion, No tenderness, Supple, No stridor.   Cardiovascular: Normal heart rate, Normal rhythm, No murmurs, No rubs, No gallops.   Thorax & Lungs: Normal breath sounds, No respiratory distress, No wheezing, No chest tenderness.   Abdomen: Bowel sounds normal, Soft, No tenderness, No masses, No pulsatile masses.   Skin: Warm, Dry, No erythema, No rash.   Back: No tenderness, No CVA tenderness.   Extremities: Intact distal pulses, No edema, No tenderness, No cyanosis, No clubbing.   Neurologic: Alert & oriented x 3, Normal motor function, Normal sensory function, No focal deficits noted.   Psychiatric: Patient has not affect but denies homicidality or suicidality does not appear to be acutely psychotic      Results for orders placed or performed during the hospital encounter of 04/12/17   CBC WITH DIFFERENTIAL   Result Value Ref Range    WBC 3.7 (L) 4.8 - 10.8 K/uL    RBC 4.41 (L) 4.70 - 6.10 M/uL    Hemoglobin 13.9 (L) 14.0 - 18.0 g/dL    Hematocrit 40.6 (L) 42.0 - 52.0 %    MCV 92.1 81.4 - 97.8 fL    MCH 31.5 27.0 - 33.0 pg    MCHC 34.2 33.7 - 35.3 g/dL    RDW 42.9 35.9 - 50.0 fL    Platelet Count 38 (LL) 164 - 446 K/uL    MPV 12.3 9.0 - 12.9 fL    Neutrophils-Polys 69.50 44.00 - 72.00 %    Lymphocytes 17.40 (L) 22.00 - 41.00 %    Monocytes 8.80 0.00 - 13.40 %    Eosinophils 3.20 0.00 - 6.90 %    Basophils 0.80 0.00 - 1.80 %    Immature Granulocytes 0.30 0.00 - 0.90 %    Nucleated RBC 0.00 /100 WBC    Neutrophils (Absolute) 2.59 1.82 - 7.42 K/uL    Lymphs (Absolute) 0.65 (L) 1.00 - 4.80 K/uL    Monos (Absolute) 0.33 0.00 - 0.85 K/uL    Eos (Absolute) 0.12 0.00 - 0.51 K/uL    Baso (Absolute) 0.03 0.00 - 0.12 K/uL    Immature Granulocytes (abs) 0.01 0.00 - 0.11 K/uL    NRBC (Absolute) 0.00 K/uL   COMP METABOLIC PANEL   Result Value Ref Range    Sodium 137 135 - 145 mmol/L    Potassium 3.7 3.6 - 5.5 mmol/L    Chloride 104 96 - 112 mmol/L    Co2 21 20 - 33 mmol/L    Anion Gap 12.0 (H) 0.0 - 11.9     Glucose 124 (H) 65 - 99 mg/dL    Bun 15 8 - 22 mg/dL    Creatinine 0.82 0.50 - 1.40 mg/dL    Calcium 9.6 8.5 - 10.5 mg/dL    AST(SGOT) 59 (H) 12 - 45 U/L    ALT(SGPT) 50 2 - 50 U/L    Alkaline Phosphatase 69 30 - 99 U/L    Total Bilirubin 1.2 0.1 - 1.5 mg/dL    Albumin 4.4 3.2 - 4.9 g/dL    Total Protein 8.4 (H) 6.0 - 8.2 g/dL    Globulin 4.0 (H) 1.9 - 3.5 g/dL    A-G Ratio 1.1 g/dL   AMMONIA   Result Value Ref Range    Ammonia 54 (H) 11 - 45 umol/L   PROTHROMBIN TIME   Result Value Ref Range    PT 15.6 (H) 12.0 - 14.6 sec    INR 1.20 (H) 0.87 - 1.13   APTT   Result Value Ref Range    APTT 28.9 24.7 - 36.0 sec   LIPASE   Result Value Ref Range    Lipase 33 11 - 82 U/L   DIAGNOSTIC ALCOHOL   Result Value Ref Range    Diagnostic Alcohol 0.00 0.00 g/dL   ESTIMATED GFR   Result Value Ref Range    GFR If African American >60 >60 mL/min/1.73 m 2    GFR If Non African American >60 >60 mL/min/1.73 m 2   PERIPHERAL SMEAR REVIEW   Result Value Ref Range    Peripheral Smear Review see below    IMMATURE PLT FRACTION   Result Value Ref Range    Imm. Plt Fraction 15.8 (H) 0.6 - 13.1 K/uL   DIFFERENTIAL COMMENT   Result Value Ref Range    Comments-Diff see below    EKG (ER)   Result Value Ref Range    Report       [facility] Emergency Dept.    Test Date:  2017  Pt Name:    JIMMY FERRER                 Department: ER  MRN:        [id number]                      Room:        20  Gender:     M                            Technician: [id number]  :        1960                   Requested By:FREDY PRYOR  Order #:    [id number]                    Kim MD:    Measurements  Intervals                                Axis  Rate:       89                           P:          74  FL:         160                          QRS:        64  QRSD:       96                           T:          63  QT:         392  QTc:        477    Interpretive Statements  SINUS ARRHYTHMIA, RATE    RSR' IN V1 OR V2,  PROBABLY NORMAL VARIANT  BORDERLINE PROLONGED QT INTERVAL  Compared to ECG 02/09/2017 06:41:01  RSR' in V1 or V2 now present  Sinus rhythm no longer present        COURSE & MEDICAL DECISION MAKING  Pertinent Labs & Imaging studies reviewed. (See chart for details)  The patient was observed for several hours. His mental status is at its baseline. Reviewed his records in the abdomen MRI of his brain just 6 weeks ago I did not feel that repeat emergent imaging is indicated. He does not have any significant lab abnormalities other than his chronic pancytopenia which is essentially unchanged. Patient will be discharged with recommendation that he follow up with [facility]    FINAL IMPRESSION  1. Possible seizure  2. Chronic thrombocytopenia      Electronically signed by: Venkatesh Hartley, 4/12/2017 10:01 AM

## 2017-05-10 ENCOUNTER — HOSPITAL ENCOUNTER (EMERGENCY)
Facility: MEDICAL CENTER | Age: 57
End: 2017-05-11
Attending: EMERGENCY MEDICINE
Payer: MEDICARE

## 2017-05-10 DIAGNOSIS — D69.6 THROMBOCYTOPENIA (HCC): ICD-10-CM

## 2017-05-10 DIAGNOSIS — F99 PSYCHIATRIC DISORDER: ICD-10-CM

## 2017-05-10 DIAGNOSIS — R53.82 CHRONIC FATIGUE: ICD-10-CM

## 2017-05-10 LAB
ALBUMIN SERPL BCP-MCNC: 4.2 G/DL (ref 3.2–4.9)
ALBUMIN/GLOB SERPL: 1.1 G/DL
ALP SERPL-CCNC: 69 U/L (ref 30–99)
ALT SERPL-CCNC: 73 U/L (ref 2–50)
ANION GAP SERPL CALC-SCNC: 11 MMOL/L (ref 0–11.9)
AST SERPL-CCNC: 102 U/L (ref 12–45)
BASOPHILS # BLD AUTO: 0.7 % (ref 0–1.8)
BASOPHILS # BLD: 0.02 K/UL (ref 0–0.12)
BILIRUB SERPL-MCNC: 0.8 MG/DL (ref 0.1–1.5)
BUN SERPL-MCNC: 7 MG/DL (ref 8–22)
CALCIUM SERPL-MCNC: 9.3 MG/DL (ref 8.5–10.5)
CHLORIDE SERPL-SCNC: 107 MMOL/L (ref 96–112)
CO2 SERPL-SCNC: 24 MMOL/L (ref 20–33)
COMMENT 1642: NORMAL
CREAT SERPL-MCNC: 0.58 MG/DL (ref 0.5–1.4)
EOSINOPHIL # BLD AUTO: 0.11 K/UL (ref 0–0.51)
EOSINOPHIL NFR BLD: 4 % (ref 0–6.9)
ERYTHROCYTE [DISTWIDTH] IN BLOOD BY AUTOMATED COUNT: 44.2 FL (ref 35.9–50)
GFR SERPL CREATININE-BSD FRML MDRD: >60 ML/MIN/1.73 M 2
GLOBULIN SER CALC-MCNC: 3.7 G/DL (ref 1.9–3.5)
GLUCOSE SERPL-MCNC: 112 MG/DL (ref 65–99)
HCT VFR BLD AUTO: 40.9 % (ref 42–52)
HGB BLD-MCNC: 14.2 G/DL (ref 14–18)
IMM GRANULOCYTES # BLD AUTO: 0.01 K/UL (ref 0–0.11)
IMM GRANULOCYTES NFR BLD AUTO: 0.4 % (ref 0–0.9)
LIPASE SERPL-CCNC: 75 U/L (ref 11–82)
LYMPHOCYTES # BLD AUTO: 0.91 K/UL (ref 1–4.8)
LYMPHOCYTES NFR BLD: 32.9 % (ref 22–41)
MCH RBC QN AUTO: 31.7 PG (ref 27–33)
MCHC RBC AUTO-ENTMCNC: 34.7 G/DL (ref 33.7–35.3)
MCV RBC AUTO: 91.3 FL (ref 81.4–97.8)
MONOCYTES # BLD AUTO: 0.2 K/UL (ref 0–0.85)
MONOCYTES NFR BLD AUTO: 7.2 % (ref 0–13.4)
MORPHOLOGY BLD-IMP: NORMAL
NEUTROPHILS # BLD AUTO: 1.52 K/UL (ref 1.82–7.42)
NEUTROPHILS NFR BLD: 54.8 % (ref 44–72)
NRBC # BLD AUTO: 0 K/UL
NRBC BLD AUTO-RTO: 0 /100 WBC
PLATELET # BLD AUTO: 43 K/UL (ref 164–446)
PLATELETS.RETICULATED NFR BLD AUTO: 11.4 K/UL (ref 0.6–13.1)
PMV BLD AUTO: 11.4 FL (ref 9–12.9)
POTASSIUM SERPL-SCNC: 3.8 MMOL/L (ref 3.6–5.5)
PROT SERPL-MCNC: 7.9 G/DL (ref 6–8.2)
RBC # BLD AUTO: 4.48 M/UL (ref 4.7–6.1)
SODIUM SERPL-SCNC: 142 MMOL/L (ref 135–145)
WBC # BLD AUTO: 2.8 K/UL (ref 4.8–10.8)

## 2017-05-10 PROCEDURE — 80053 COMPREHEN METABOLIC PANEL: CPT

## 2017-05-10 PROCEDURE — 85055 RETICULATED PLATELET ASSAY: CPT

## 2017-05-10 PROCEDURE — 99284 EMERGENCY DEPT VISIT MOD MDM: CPT

## 2017-05-10 PROCEDURE — 36415 COLL VENOUS BLD VENIPUNCTURE: CPT

## 2017-05-10 PROCEDURE — 84484 ASSAY OF TROPONIN QUANT: CPT

## 2017-05-10 PROCEDURE — 85025 COMPLETE CBC W/AUTO DIFF WBC: CPT

## 2017-05-10 PROCEDURE — 81003 URINALYSIS AUTO W/O SCOPE: CPT

## 2017-05-10 PROCEDURE — 83690 ASSAY OF LIPASE: CPT

## 2017-05-10 NOTE — ED AVS SNAPSHOT
Home Care Instructions                                                                                                                Inocencio Shabazz   MRN: [id number]    Department:  [facility], Emergency Dept   Date of Visit:  5/10/2017            [facility], Emergency Dept    [address] [address]    Phone:  [phone]      You were seen by     Dixie Matthews M.D.      Your Diagnosis Was     Chronic fatigue     R53.82       These are the medications you received during your hospitalization from 05/10/2017 2157 to 05/11/2017 0200     Date/Time Order Dose Route Action    05/11/2017 0109 NS infusion 1,000 mL 1,000 mL Intravenous New Bag      Follow-up Information     1. Follow up with [facility]. Call today.    Why:  For follow up appointment    Contact information    [address]  [facility] [address] [phone]        2. Go to [facility], Emergency Dept.    Specialty:  Emergency Medicine    Why:  If symptoms worsen    Contact information    [address] [facility] [address] [phone]      Medication Information     Review all of your home medications and newly ordered medications with your primary doctor and/or pharmacist as soon as possible. Follow medication instructions as directed by your doctor and/or pharmacist.     Please keep your complete medication list with you and share with your physician. Update the information when medications are discontinued, doses are changed, or new medications (including over-the-counter products) are added; and carry medication information at all times in the event of emergency situations.               Medication List      ASK your doctor about these medications        Instructions    Morning Afternoon Evening Bedtime    ferrous sulfate 325 (65 FE) MG tablet        Take 1 Tab by mouth 3 times a day, with meals.   Dose:  325 mg                        folic  acid 1 MG Tabs   Commonly known as:  FOLVITE        Take 1 Tab by mouth every day.   Dose:  1 mg                        lactulose 10 GM/15ML Soln        Take 20 g by mouth 3 times a day.   Dose:  20 g                        MULTIVITAMIN ADULT PO        Take 1 tablet by mouth every day.   Dose:  1 tablet                        pantoprazole 40 MG Tbec   Commonly known as:  PROTONIX        Take 40 mg by mouth every day.   Dose:  40 mg                        propranolol 40 MG Tabs   Commonly known as:  INDERAL        Take 20 mg by mouth 2 times a day.   Dose:  20 mg                        quetiapine 200 MG Tabs   Commonly known as:  SEROQUEL        Take 100 mg by mouth every bedtime.   Dose:  100 mg                        thiamine 100 MG tablet   Commonly known as:  THIAMINE        Take 1 Tab by mouth every day.   Dose:  100 mg                        vitamin B-12 1000 MCG Tabs        Take 2,000 mcg by mouth every day.   Dose:  2000 mcg                        vitamin e 400 UNIT Caps   Commonly known as:  VITAMIN E        Take 400 Units by mouth every day.   Dose:  400 Units                                Procedures and tests performed during your visit     CARDIAC MONITORING    CBC WITH DIFFERENTIAL    COMP METABOLIC PANEL    DIFFERENTIAL COMMENT    [id number] VIEW    ESTIMATED GFR    IMMATURE PLT FRACTION    LIPASE    O2 Protocol    PERIPHERAL SMEAR REVIEW    SALINE LOCK    TROPONIN    URINALYSIS,CULTURE IF INDICATED        Discharge Instructions       [facility] Call tomorrow to schedule an appointment. Return to the emergency department immediately if he develops any new or worsening symptoms including abdominal pain, chest pain, shortness of breath or any further concerns.      Fatigue  Fatigue is feeling tired all of the time, a lack of energy, or a lack of motivation. Occasional or mild fatigue is often a normal response to activity or life in  general. However, long-lasting (chronic) or extreme fatigue may indicate an underlying medical condition.  HOME CARE INSTRUCTIONS   Watch your fatigue for any changes. The following actions may help to lessen any discomfort you are feeling:  · Talk to your health care provider about how much sleep you need each night. Try to get the required amount every night.  · Take medicines only as directed by your health care provider.  · Eat a healthy and nutritious diet. Ask your health care provider if you need help changing your diet.  · Drink enough fluid to keep your urine clear or pale yellow.  · Practice ways of relaxing, such as yoga, meditation, massage therapy, or acupuncture.  · Exercise regularly.    · Change situations that cause you stress. Try to keep your work and personal routine reasonable.  · Do not abuse illegal drugs.  · Limit alcohol intake to no more than 1 drink per day for nonpregnant women and 2 drinks per day for men. One drink equals 12 ounces of beer, 5 ounces of wine, or 1½ ounces of hard liquor.  · Take a multivitamin, if directed by your health care provider.  SEEK MEDICAL CARE IF:   · Your fatigue does not get better.  · You have a fever.    · You have unintentional weight loss or gain.  · You have headaches.    · You have difficulty:    ¨ Falling asleep.  ¨ Sleeping throughout the night.  · You feel angry, guilty, anxious, or sad.     · You are unable to have a bowel movement (constipation).    · You skin is dry.     · Your legs or another part of your body is swollen.    SEEK IMMEDIATE MEDICAL CARE IF:   · You feel confused.    · Your vision is blurry.  · You feel faint or pass out.    · You have a severe headache.    · You have severe abdominal, pelvic, or back pain.    · You have chest pain, shortness of breath, or an irregular or fast heartbeat.    · You are unable to urinate or you urinate less than normal.    · You develop abnormal bleeding, such as bleeding from the rectum, vagina,  nose, lungs, or nipples.  · You vomit blood.     · You have thoughts about harming yourself or committing suicide.    · You are worried that you might harm someone else.       This information is not intended to replace advice given to you by your health care provider. Make sure you discuss any questions you have with your health care provider.     Document Released: 10/14/2008 Document Revised: 01/08/2016 Document Reviewed: 04/21/2015  [employer] Interactive Patient Education ©2016 [employer]            Patient Information     Patient Information    Following emergency treatment: all patient requiring follow-up care must return either to a private physician or a clinic if your condition worsens before you are able to obtain further medical attention, please return to the emergency room.     Billing Information    At [facility], we work to make the billing process streamlined for our patients.  Our Representatives are here to answer any questions you may have regarding your hospital bill.  If you have insurance coverage and have supplied your insurance information to us, we will submit a claim to your insurer on your behalf.  Should you have any questions regarding your bill, we can be reached online or by phone as follows:  Online: You are able pay your bills online or live chat with our representatives about any billing questions you may have. We are here to help Monday - Friday from 8:00am to 7:30pm and 9:00am - 12:00pm on Saturdays.  Please visit [email or web address]  for more information.   Phone:  [phone] or [phone]    Please note that your emergency physician, surgeon, pathologist, radiologist, anesthesiologist, and other specialists are not employed by [facility] and will therefore bill separately for their services.  Please contact them directly for any questions concerning their bills at the numbers below:     Emergency Physician Services:  [phone]  [facility]:  [phone]  [facility]:  [phone]  [facility]:  [phone]    1. Your final bill may vary from the amount quoted upon discharge if all procedures are not complete at that time, or if your doctor has additional procedures of which we are not aware. You will receive an additional bill if you return to the Emergency Department at [facility] for suture removal regardless of the facility of which the sutures were placed.     2. Please arrange for settlement of this account at the emergency registration.    3. All self-pay accounts are due in full at the time of treatment.  If you are unable to meet this obligation then payment is expected within 4-5 days.     4. If you have had radiology studies (CT, X-ray, Ultrasound, MRI), you have received a preliminary result during your emergency department visit. Please contact the radiology department [phone] to receive a copy of your final result. Please discuss the Final result with your primary physician or with the follow up physician provided.     Crisis Hotline:  [place] Crisis Hotline:  1-093-SOQZBPA or 1-411.425.3973  [place] Crisis Hotline:    1-119.954.7635 or 026-111-5247         ED Discharge Follow Up Questions    1. In order to provide you with very good care, we would like to follow up with a phone call in the next few days.  May we have your permission to contact you?     YES /  NO    2. What is the best phone number to call you? (       )_____-__________    3. What is the best time to call you?      Morning  /  Afternoon  /  Evening                   Patient Signature:  ____________________________________________________________    Date:  ____________________________________________________________

## 2017-05-10 NOTE — ED AVS SNAPSHOT
5/11/2017    Inocencio Shabazz  [address]  [place] [address]    Dear Inocencio:    [facility] wants to ensure your discharge home is safe and you or your loved ones have had all of your questions answered regarding your care after you leave the hospital.    Below is a list of resources and contact information should you have any questions regarding your hospital stay, follow-up instructions, or active medical symptoms.    Questions or Concerns Regarding… Contact   Medical Questions Related to Your Discharge  (7 days a week, 8am-5pm) Contact a Nurse Care Coordinator   [phone]   Medical Questions Not Related to Your Discharge  (24 hours a day / 7 days a week)  Contact the Nurse Health Line   [phone]    Medications or Discharge Instructions Refer to your discharge packet   or contact your [facility] Primary Care Provider   [phone]   Follow-up Appointment(s) Schedule your appointment via [employer]   or contact Scheduling [phone]   Billing Review your statement via [employer]  or contact Billing [phone]   Medical Records Review your records via [employer]   or contact Medical Records [phone]     You may receive a telephone call within two days of discharge. This call is to make certain you understand your discharge instructions and have the opportunity to have any questions answered. You can also easily access your medical information, test results and upcoming appointments via the [employer] free online health management tool. You can learn more and sign up at [email or web address] Sense. For assistance setting up your [employer] account, please call [phone].    Once again, we want to ensure your discharge home is safe and that you have a clear understanding of any next steps in your care. If you have any questions or concerns, please do not hesitate to contact us, we are here for you. Thank you for choosing [facility] for your healthcare needs.    Sincerely,    Your [facility] Healthcare Team

## 2017-05-10 NOTE — ED AVS SNAPSHOT
PatientKeeper Access Code: [email or web address]  Expires: 5/12/2017 11:45 AM    Your email address is not on file at [facility].  Email Addresses are required for you to sign up for PatientKeeper, please contact [phone] to verify your personal information and to provide your email address prior to attempting to register for PatientKeeper.    Inocencio Shabazz  [address]  [place] [address]    PatientKeeper  A secure, online tool to manage your health information     [facility]’s PatientKeeper® is a secure, online tool that connects you to your personalized health information from the privacy of your home -- day or night - making it very easy for you to manage your healthcare. Once the activation process is completed, you can even access your medical information using the PatientKeeper brayan, which is available for free in the Apple Brayan store or Google Play store.     To learn more about PatientKeeper, visit [email or web address]    There are two levels of access available (as shown below):   My Chart Features  [facility] Primary Care Doctor [facility]  Specialists [facility]  Urgent  Care Non-[facility] Primary Care Doctor   Email your healthcare team securely and privately 24/7 X X X    Manage appointments: schedule your next appointment; view details of past/upcoming appointments X      Request prescription refills. X      View recent personal medical records, including lab and immunizations X X X X   View health record, including health history, allergies, medications X X X X   Read reports about your outpatient visits, procedures, consult and ER notes X X X X   See your discharge summary, which is a recap of your hospital and/or ER visit that includes your diagnosis, lab results, and care plan X X  X     How to register for [employer]:  Once your e-mail address has been verified, follow the following steps to sign up for [employer].     1. Go to  [email or web address]  2. Click on the Sign Up Now box, which takes you to the New Member Sign Up page. You  will need to provide the following information:  a. Enter your [employer] Access Code exactly as it appears at the top of this page. (You will not need to use this code after you’ve completed the sign-up process. If you do not sign up before the expiration date, you must request a new code.)   b. Enter your date of birth.   c. Enter your home email address.   d. Click Submit, and follow the next screen’s instructions.  3. Create a VideoAvatarst ID. This will be your [employer] login ID and cannot be changed, so think of one that is secure and easy to remember.  4. Create a [employer] password. You can change your password at any time.  5. Enter your Password Reset Question and Answer. This can be used at a later time if you forget your password.   6. Enter your e-mail address. This allows you to receive e-mail notifications when new information is available in [employer].  7. Click Sign Up. You can now view your health information.    For assistance activating your [employer] account, call [phone]

## 2017-05-11 ENCOUNTER — APPOINTMENT (OUTPATIENT)
Dept: RADIOLOGY | Facility: MEDICAL CENTER | Age: 57
End: 2017-05-11
Attending: EMERGENCY MEDICINE
Payer: MEDICARE

## 2017-05-11 VITALS
HEIGHT: 68 IN | DIASTOLIC BLOOD PRESSURE: 77 MMHG | BODY MASS INDEX: 21.98 KG/M2 | OXYGEN SATURATION: 96 % | RESPIRATION RATE: 16 BRPM | HEART RATE: 83 BPM | SYSTOLIC BLOOD PRESSURE: 111 MMHG | WEIGHT: 145 LBS | TEMPERATURE: 98.8 F

## 2017-05-11 LAB
APPEARANCE UR: CLEAR
BILIRUB UR QL STRIP.AUTO: NEGATIVE
COLOR UR: YELLOW
CULTURE IF INDICATED INDCX: NO UA CULTURE
GLUCOSE UR STRIP.AUTO-MCNC: NEGATIVE MG/DL
KETONES UR STRIP.AUTO-MCNC: NEGATIVE MG/DL
LEUKOCYTE ESTERASE UR QL STRIP.AUTO: NEGATIVE
MICRO URNS: NORMAL
NITRITE UR QL STRIP.AUTO: NEGATIVE
PH UR STRIP.AUTO: 6.5 [PH]
PROT UR QL STRIP: NEGATIVE MG/DL
RBC UR QL AUTO: NEGATIVE
SP GR UR STRIP.AUTO: 1.01
TROPONIN I SERPL-MCNC: <0.01 NG/ML (ref 0–0.04)

## 2017-05-11 PROCEDURE — 700105 HCHG RX REV CODE 258: Performed by: EMERGENCY MEDICINE

## 2017-05-11 PROCEDURE — 74000 DX-ABDOMEN-1 VIEW: CPT

## 2017-05-11 RX ORDER — SODIUM CHLORIDE 9 MG/ML
1000 INJECTION, SOLUTION INTRAVENOUS ONCE
Status: COMPLETED | OUTPATIENT
Start: 2017-05-11 | End: 2017-05-11

## 2017-05-11 RX ADMIN — SODIUM CHLORIDE 1000 ML: 9 INJECTION, SOLUTION INTRAVENOUS at 01:09

## 2017-05-11 NOTE — DISCHARGE INSTRUCTIONS
[facility]. Call tomorrow to schedule an appointment. Return to the emergency department immediately if he develops any new or worsening symptoms including abdominal pain, chest pain, shortness of breath or any further concerns.      Fatigue  Fatigue is feeling tired all of the time, a lack of energy, or a lack of motivation. Occasional or mild fatigue is often a normal response to activity or life in general. However, long-lasting (chronic) or extreme fatigue may indicate an underlying medical condition.  HOME CARE INSTRUCTIONS   Watch your fatigue for any changes. The following actions may help to lessen any discomfort you are feeling:  · Talk to your health care provider about how much sleep you need each night. Try to get the required amount every night.  · Take medicines only as directed by your health care provider.  · Eat a healthy and nutritious diet. Ask your health care provider if you need help changing your diet.  · Drink enough fluid to keep your urine clear or pale yellow.  · Practice ways of relaxing, such as yoga, meditation, massage therapy, or acupuncture.  · Exercise regularly.    · Change situations that cause you stress. Try to keep your work and personal routine reasonable.  · Do not abuse illegal drugs.  · Limit alcohol intake to no more than 1 drink per day for nonpregnant women and 2 drinks per day for men. One drink equals 12 ounces of beer, 5 ounces of wine, or 1½ ounces of hard liquor.  · Take a multivitamin, if directed by your health care provider.  SEEK MEDICAL CARE IF:   · Your fatigue does not get better.  · You have a fever.    · You have unintentional weight loss or gain.  · You have headaches.    · You have difficulty:    ¨ Falling asleep.  ¨ Sleeping throughout the night.  · You feel angry, guilty, anxious, or sad.     · You are unable to have a bowel movement (constipation).    · You skin is dry.     · Your  legs or another part of your body is swollen.    SEEK IMMEDIATE MEDICAL CARE IF:   · You feel confused.    · Your vision is blurry.  · You feel faint or pass out.    · You have a severe headache.    · You have severe abdominal, pelvic, or back pain.    · You have chest pain, shortness of breath, or an irregular or fast heartbeat.    · You are unable to urinate or you urinate less than normal.    · You develop abnormal bleeding, such as bleeding from the rectum, vagina, nose, lungs, or nipples.  · You vomit blood.     · You have thoughts about harming yourself or committing suicide.    · You are worried that you might harm someone else.       This information is not intended to replace advice given to you by your health care provider. Make sure you discuss any questions you have with your health care provider.     Document Released: 10/14/2008 Document Revised: 01/08/2016 Document Reviewed: 04/21/2015  [employer] Patient Education ©2016 [employer]

## 2017-05-11 NOTE — ED NOTES
Pt given d/c instructions/follow up care/home care instructions, pt verbalized understanding of POC, pt ambulated to ER lobby, steady gait.

## 2017-05-11 NOTE — ED PROVIDER NOTES
"ED Provider Note    Scribed for Dixie Matthews M.D. by Evy Mccain. 5/11/2017, 12:06 AM.    Primary care provider: Pcp Not In Computer  Means of arrival: Walk-in   History obtained from: Patient   History limited by: None     CHIEF COMPLAINT  Chief Complaint   Patient presents with   • RLQ Pain     Radiating to back, described as \"aching\"       HPI  Inocencio Shabazz is a 57 y.o. male who presents to the Emergency Department due to intermittent abdominal pain. His pain is greater on the right than the left. Patient describes his pain as an \"aching\" sensation with associated weakness onset yesterday. His pain radiates to the back. He denies feeling abdominal pain at this moment in time. He also reports associated soft stools with change in coloration. Patient notes recent use of ferrous sulfate and states dark stools began after beginning use of ferrous sulfate. He denies rashes, headache, neck pain, chest pain, or shortness of breath.     [facility] however he missed his appointment and did not complete the bowel prep so he was unable to have that done.    REVIEW OF SYSTEMS  Pertinent positives include abdominal pain, weakness, back pain, soft stools, melena. Pertinent negatives include no rashes, headache, neck pain, chest pain, shortness of breath.  All other systems reviewed and negative. C     PAST MEDICAL HISTORY   has a past medical history of Psychiatric disorder; Alcohol abuse; Hepatitis C; Cirrhosis (CMS-HCC); and Alcohol intoxication (3/13/2014).    SURGICAL HISTORY   has past surgical history that includes gastroscopy (1/3/2016) and gastroscopy (4/8/2016).    SOCIAL HISTORY  Social History   Substance Use Topics   • Smoking status: Current Every Day Smoker -- 1.00 packs/day for 48 years     Types: Cigarettes   • Smokeless tobacco: Never Used   • Alcohol Use: Yes      Comment: 1-2 beers and 1/2 pints of whiskey every day      History   Drug Use No       FAMILY " "HISTORY  History reviewed. No pertinent family history.    CURRENT MEDICATIONS  No current facility-administered medications on file prior to encounter.     Current Outpatient Prescriptions on File Prior to Encounter   Medication Sig Dispense Refill   • vitamin e (VITAMIN E) 400 UNIT Cap Take 400 Units by mouth every day.     • Multiple Vitamins-Minerals (MULTIVITAMIN ADULT PO) Take 1 tablet by mouth every day.     • lactulose 10 GM/15ML Solution Take 20 g by mouth 3 times a day.     • pantoprazole (PROTONIX) 40 MG Tablet Delayed Response Take 40 mg by mouth every day.     • propranolol (INDERAL) 40 MG Tab Take 20 mg by mouth 2 times a day.     • ferrous sulfate 325 (65 FE) MG tablet Take 1 Tab by mouth 3 times a day, with meals. 30 Tab 3   • folic acid (FOLVITE) 1 MG Tab Take 1 Tab by mouth every day. 30 Tab 3   • Cyanocobalamin (VITAMIN B-12) 1000 MCG Tab Take 2,000 mcg by mouth every day.     • quetiapine (SEROQUEL) 200 MG Tab Take 100 mg by mouth every bedtime.     • thiamine (THIAMINE) 100 MG tablet Take 1 Tab by mouth every day. 30 Tab 3       ALLERGIES  Allergies   Allergen Reactions   • Haldol [Haloperidol Lactate] Unspecified     Twitching   RXN=20 years ago       PHYSICAL EXAM  Vital Signs: /77 mmHg  Pulse 106  Temp(Src) 37.1 °C (98.8 °F)  Resp 15  Ht 1.727 m (5' 8\")  Wt 65.772 kg (145 lb)  BMI 22.05 kg/m2  SpO2 96%  Constitutional: Alert, no acute distress  HENT: Normocephalic, atraumatic, moist mucus membranes  Eyes: Pupils equal and reactive, normal conjunctiva, non-icteric  Neck: Supple, normal range of motion, no stridor  Cardiovascular: Regular rhythm, Normal peripheral perfusion, no cyanosis, Normal cardiac auscultation  Pulmonary: No respiratory distress, normal work of breathing, no accessory muscle usage, Clear to auscultation bilaterally  Abdomen: Soft, Entirely non tender to palpation, no peritoneal signs, bowel sounds are present.   Skin: Warm, dry, no rashes or lesions  Back: No " pain with active range of motion  Musculoskeletal: Normal range of motion in all extremities, no swelling or deformity noted  Neurologic: Alert, oriented, normal motor function, no speech deficits  Psychiatric: Normal and appropriate mood and affect    DIAGNOSTIC STUDIES/PROCEDURES:    LABS  Labs Reviewed   CBC WITH DIFFERENTIAL - Abnormal; Notable for the following:     WBC 2.8 (*)     RBC 4.48 (*)     Hematocrit 40.9 (*)     Platelet Count 43 (*)     Neutrophils (Absolute) 1.52 (*)     Lymphs (Absolute) 0.91 (*)     All other components within normal limits   COMP METABOLIC PANEL - Abnormal; Notable for the following:     Glucose 112 (*)     Bun 7 (*)     AST(SGOT) 102 (*)     ALT(SGPT) 73 (*)     Globulin 3.7 (*)     All other components within normal limits   LIPASE   ESTIMATED GFR   PERIPHERAL SMEAR REVIEW   IMMATURE PLT FRACTION   DIFFERENTIAL COMMENT   URINALYSIS,CULTURE IF INDICATED   TROPONIN   POC URINALYSIS   All labs reviewed by me.    COURSE & MEDICAL DECISION MAKING  Pertinent Labs & Imaging studies reviewed. (See chart for details)    Review of old medical records for continuity of care. Patient has been seen previously at this facility multiple times. Laboratory values reviewed which indicate chronic thrombocytopenia with chronic low white blood count.    Differential diagnoses include but are not limited to: Pancreatitis, hepatitis, intra-abdominal infection, chronic abdominal pain.    12:06 AM - Patient seen and examined at bedside. Ordered estimated GFR, peripheral smear review, immature PLT fraction, differential comment, CBC with differential, CMP, lipase, and POC urinalysis to evaluate his symptoms.     Decision Making:  This is a 57 y.o. year old male who presents with complaint of feeling generally unwell. At triage she described right lower quadrant pain, on my assessment he states that he has had some intermittent abdominal pain that has since resolved. [facility], however he  elected to come to [facility] today. He is able to give no other specific complaints with the exception of intermittent abdominal pain.    On laboratory evaluation he has a normal urinalysis without evidence of infection. White blood count is 2.8, this does appear to be consistent with his baseline. Hemoglobin 14.2, platelet count of 43 also consistent with his baseline. No significant electrolyte abnormalities, very mildly elevated AST and ALT, normal T bilirubin. Lipase is normal without evidence of pancreatitis. He has had no chest pain, no shortness of breath, abdominal pain is localized to the lower abdomen area due to intermittent abdominal pain screening troponin ordered which is undetectable, history is very inconsistent with acute coronary syndrome, did not believe further serial enzymes are indicated at this time.    Plain film obtained, negative for free air, no evidence of obstruction.    At this time, patient does appear to be at his baseline state of health. Stress the importance of ongoing up [facility]. I do not believe he requires further evaluation or treatment as an inpatient. Plan is for discharge home. Return precautions given including worsening pain, lightheadedness, chest pain, fevers or any further concerns.    Discharge home in stable condition    FINAL IMPRESSION  1. Chronic fatigue    2. Psychiatric disorder    3. Thrombocytopenia (CMS-HCC)          Evy ALMAZAN (Scribe), am scribing for, and in the presence of, Dixie Matthews M.D..    Electronically signed by: Evy Mccain (Scribe), 5/11/2017    Dixie ALMAZAN M.D. personally performed the services described in this documentation, as scribed by Evy Mccain in my presence, and it is both accurate and complete.    The note accurately reflects work and decisions made by me.  Dixie Matthews  5/11/2017  4:05 AM

## 2017-05-11 NOTE — ED NOTES
"Chief Complaint   Patient presents with   • RLQ Pain     Radiating to back, described as \"aching\"     [facility], says he had to use the bathroom so he stopped at [facility] when he started having RLQ pain, radiating to back, coming and going.  Poor historian.  Unable to answer why he went to [facility] other then \"he didn't feel good and he called the ambulance\".      Denies drug use.  "

## 2017-12-10 ENCOUNTER — HOSPITAL ENCOUNTER (EMERGENCY)
Facility: MEDICAL CENTER | Age: 57
End: 2017-12-10
Attending: EMERGENCY MEDICINE
Payer: MEDICARE

## 2017-12-10 VITALS
BODY MASS INDEX: 19.25 KG/M2 | HEART RATE: 88 BPM | WEIGHT: 126.98 LBS | TEMPERATURE: 97.5 F | SYSTOLIC BLOOD PRESSURE: 128 MMHG | DIASTOLIC BLOOD PRESSURE: 80 MMHG | HEIGHT: 68 IN | OXYGEN SATURATION: 96 % | RESPIRATION RATE: 16 BRPM

## 2017-12-10 DIAGNOSIS — F10.10 ALCOHOL ABUSE: ICD-10-CM

## 2017-12-10 PROCEDURE — 99284 EMERGENCY DEPT VISIT MOD MDM: CPT

## 2017-12-10 ASSESSMENT — LIFESTYLE VARIABLES
TOTAL SCORE: 0
AGITATION: NORMAL ACTIVITY
PAROXYSMAL SWEATS: NO SWEAT VISIBLE
ORIENTATION AND CLOUDING OF SENSORIUM: ORIENTED AND CAN DO SERIAL ADDITIONS
ANXIETY: NO ANXIETY (AT EASE)
HEADACHE, FULLNESS IN HEAD: NOT PRESENT
TREMOR: NO TREMOR
AUDITORY DISTURBANCES: NOT PRESENT
VISUAL DISTURBANCES: NOT PRESENT
NAUSEA AND VOMITING: NO NAUSEA AND NO VOMITING

## 2017-12-10 ASSESSMENT — PAIN SCALES - GENERAL
PAINLEVEL_OUTOF10: 0

## 2017-12-10 NOTE — ED NOTES
".Inocencio Shabazz  .  Chief Complaint   Patient presents with   • Alcohol Intoxication     patient states 'I just don't feel good, I drank too much today\". patient reports drinkning 1 pint of whiskey, last drink at 1300 today. patient reports usually has 1/2 pint daily.     Patient BIB [facility] with above complaint. Patient ambulatory without difficulty. FS 91. ./78   Pulse 91   Temp 36.4 °C (97.5 °F)   Resp 17   Ht 1.727 m (5' 8\")   Wt 57.6 kg (126 lb 15.8 oz)   SpO2 96%   BMI 19.31 kg/m²     Patient to lobby and instructed to inform staff of any needs.  "

## 2017-12-11 NOTE — ED PROVIDER NOTES
"ED Provider Note    CHIEF COMPLAINT  Chief Complaint   Patient presents with   • Alcohol Intoxication     patient states 'I just don't feel good, I drank too much today\". patient reports drinkning 1 pint of whiskey, last drink at 1300 today. patient reports usually has 1/2 pint daily.       HPI  Inocencio Shabazz is a 57 y.o. male who presentsTo the emergency room today with complaints of alcohol intoxication. Patient states he was drinking alcohol today may drank too much and was not feeling good. Patient denies chest pain no shortness of breath no fever chills no changes in bowel or bladder or other complaints at this time.    REVIEW OF SYSTEMS  See HPI for further details. All other systems are negative.     PAST MEDICAL HISTORY  Past Medical History:   Diagnosis Date   • Alcohol intoxication (CMS-HCC) 3/13/2014   • Alcohol abuse    • Cirrhosis (CMS-HCC)    • Hepatitis C    • Psychiatric disorder     susu melton       FAMILY HISTORY  History reviewed. No pertinent family history.    SOCIAL HISTORY  Social History     Social History   • Marital status: Single     Spouse name: N/A   • Number of children: N/A   • Years of education: N/A     Social History Main Topics   • Smoking status: Current Every Day Smoker     Packs/day: 1.00     Years: 48.00     Types: Cigarettes   • Smokeless tobacco: Never Used   • Alcohol use Yes      Comment: 1-2 beers and 1/2 pints of whiskey every day   • Drug use:      Types: Inhaled      Comment: marijuana   • Sexual activity: Not on file     Other Topics Concern   • Not on file     Social History Narrative   • No narrative on file       SURGICAL HISTORY  Past Surgical History:   Procedure Laterality Date   • GASTROSCOPY  4/8/2016    Procedure: GASTROSCOPY;  Surgeon: Braeden Tobin M.D.;  Location: SURGERY [facility];  Service:    • GASTROSCOPY  1/3/2016    Procedure: GASTROSCOPY WITH BANDING;  Surgeon: Alfredo Antonio M.D.;  Location: SURGERY [facility];  Service:  " "      CURRENT MEDICATIONS  Home Medications     Reviewed by Jordan Tam R.N. (Registered Nurse) on 12/10/17 at 1420  Med List Status: Partial   Medication Last Dose Status   Cyanocobalamin (VITAMIN B-12) 1000 MCG Tab 12/9/2017 Active   ferrous sulfate 325 (65 FE) MG tablet 12/9/2017 Active   folic acid (FOLVITE) 1 MG Tab  Active   lactulose 10 GM/15ML Solution  Active   Multiple Vitamins-Minerals (MULTIVITAMIN ADULT PO)  Active   pantoprazole (PROTONIX) 40 MG Tablet Delayed Response  Active   propranolol (INDERAL) 40 MG Tab 12/10/2017 Active   quetiapine (SEROQUEL) 200 MG Tab  Active   thiamine (THIAMINE) 100 MG tablet  Active   vitamin e (VITAMIN E) 400 UNIT Cap  Active                ALLERGIES  Allergies   Allergen Reactions   • Haldol [Haloperidol Lactate] Unspecified     Twitching   RXN=20 years ago       PHYSICAL EXAM  VITAL SIGNS: /80   Pulse 88   Temp 36.4 °C (97.5 °F)   Resp 16   Ht 1.727 m (5' 8\")   Wt 57.6 kg (126 lb 15.8 oz)   SpO2 96%   BMI 19.31 kg/m²  Room air O2: 96    Constitutional: Well developed, Well nourished, No acute distress, Non-toxic appearance.   HENT: Normocephalic, Atraumatic, Bilateral external ears normal, Oropharynx moist, No oral exudates, Nose normal.   Eyes: PERRLA, EOMI, Conjunctiva normal, No discharge.   Neck: Normal range of motion, No tenderness, Supple, No stridor.   Cardiovascular: Normal heart rate, Normal rhythm, No murmurs, No rubs, No gallops.   Thorax & Lungs: Normal breath sounds, No respiratory distress, No wheezing, No chest tenderness.  Abdomen: Bowel sounds normal, Soft, No tenderness, No masses, No pulsatile masses.    Skin: Warm, Dry, No erythema, No rash.   Extremities: Intact distal pulses, No edema, No tenderness, No cyanosis, No clubbing.   Neurologic: No neurological deficits  Psychiatric: No suicidal or homicidal ideatio    COURSE & MEDICAL DECISION MAKING  Pertinent Labs & Imaging studies reviewed. (See chart for details)  Patient given " referral to alcohol treatment center. Discussed cessation of alcohol and tobacco will follow-up as directed and return if further problems. Patient is clinically cm and awake, ambulatory and conversive discharge as above.    FINAL IMPRESSION  1. Acute alcohol abuse/intoxication  2. Bipolar disorder  3.      Electronically signed by: Atif Cruz, 12/10/2017 11:09 PM

## 2017-12-11 NOTE — DISCHARGE INSTRUCTIONS
Alcohol Problems  Most adults who drink alcohol drink in moderation (not a lot) are at low risk for developing problems related to their drinking. However, all drinkers, including low-risk drinkers, should know about the health risks connected with drinking alcohol.  RECOMMENDATIONS FOR LOW-RISK DRINKING   Drink in moderation. Moderate drinking is defined as follows:   · Men - no more than 2 drinks per day.  · Nonpregnant women - no more than 1 drink per day.  · Over age 65 - no more than 1 drink per day.  A standard drink is 12 grams of pure alcohol, which is equal to a 12 ounce bottle of beer or wine cooler, a 5 ounce glass of wine, or 1.5 ounces of distilled spirits (such as whiskey, erik, vodka, or rum).   ABSTAIN FROM (DO NOT DRINK) ALCOHOL:  · When pregnant or considering pregnancy.  · When taking a medication that interacts with alcohol.  · If you are alcohol dependent.  · A medical condition that prohibits drinking alcohol (such as ulcer, liver disease, or heart disease).  DISCUSS WITH YOUR CAREGIVER:  · If you are at risk for coronary heart disease, discuss the potential benefits and risks of alcohol use: Light to moderate drinking is associated with lower rates of coronary heart disease in certain populations (for example, men over age 45 and postmenopausal women). Infrequent or nondrinkers are advised not to begin light to moderate drinking to reduce the risk of coronary heart disease so as to avoid creating an alcohol-related problem. Similar protective effects can likely be gained through proper diet and exercise.  · Women and the elderly have smaller amounts of body water than men. As a result women and the elderly achieve a higher blood alcohol concentration after drinking the same amount of alcohol.  · Exposing a fetus to alcohol can cause a broad range of birth defects referred to as Fetal Alcohol Syndrome (FAS) or Alcohol-Related Birth Defects (ARBD). Although FAS/ARBD is connected with excessive  alcohol consumption during pregnancy, studies also have reported neurobehavioral problems in infants born to mothers reporting drinking an average of 1 drink per day during pregnancy.  · Heavier drinking (the consumption of more than 4 drinks per occasion by men and more than 3 drinks per occasion by women) impairs learning (cognitive) and psychomotor functions and increases the risk of alcohol-related problems, including accidents and injuries.  CAGE QUESTIONS:   · Have you ever felt that you should Cut down on your drinking?  · Have people Annoyed you by criticizing your drinking?  · Have you ever felt bad or Guilty about your drinking?  · Have you ever had a drink first thing in the morning to steady your nerves or get rid of a hangover (Eye opener)?  If you answered positively to any of these questions: You may be at risk for alcohol-related problems if alcohol consumption is:   · Men: Greater than 14 drinks per week or more than 4 drinks per occasion.  · Women: Greater than 7 drinks per week or more than 3 drinks per occasion.  Do you or your family have a medical history of alcohol-related problems, such as:  · Blackouts.  · Sexual dysfunction.  · Depression.  · Trauma.  · Liver dysfunction.  · Sleep disorders.  · Hypertension.  · Chronic abdominal pain.  · Has your drinking ever caused you problems, such as problems with your family, problems with your work (or school) performance, or accidents/injuries?  · Do you have a compulsion to drink or a preoccupation with drinking?  · Do you have poor control or are you unable to stop drinking once you have started?  · Do you have to drink to avoid withdrawal symptoms?  · Do you have problems with withdrawal such as tremors, nausea, sweats, or mood disturbances?  · Does it take more alcohol than in the past to get you high?  · Do you feel a strong urge to drink?  · Do you change your plans so that you can have a drink?  · Do you ever drink in the morning to relieve  the shakes or a hangover?  If you have answered a number of the previous questions positively, it may be time for you to talk to your caregivers, family, and friends and see if they think you have a problem. Alcoholism is a chemical dependency that keeps getting worse and will eventually destroy your health and relationships. Many alcoholics end up dead, impoverished, or in intermediate. This is often the end result of all chemical dependency.  · Do not be discouraged if you are not ready to take action immediately.  · Decisions to change behavior often involve up and down desires to change and feeling like you cannot decide.  · Try to think more seriously about your drinking behavior.  · Think of the reasons to quit.  WHERE TO GO FOR ADDITIONAL INFORMATION   · The National [place] on Alcohol Abuse and Alcoholism (NIAAA)  www.niaaa.nih.gov  · National [place] on Alcoholism and Drug Dependence (NCADD)  www.ncadd.org  · American Society of Addiction Medicine (ASAM)  www.asam.org   Document Released: 12/18/2006 Document Revised: 03/11/2013 Document Reviewed: 08/05/2009  [employer]® Patient Information ©2014 [employer]

## 2017-12-11 NOTE — ED NOTES
Patient refusing resources. Patient requesting ambulance to take him home. Informed patient writer will escort him to lobby.

## 2017-12-11 NOTE — ED NOTES
Patient discharged to [facility] with steady gait, again refused to speak to life skills personnel or get resources regarding etoh use. Patient given discharge instructions and return precautions.

## 2017-12-11 NOTE — ED NOTES
"Patient ambulated back to [facility] with steady gait. Patient offers no medical complaints, just states \"I drank too much\" Denies SI/HI.  "

## 2018-02-05 ENCOUNTER — HOSPITAL ENCOUNTER (EMERGENCY)
Facility: MEDICAL CENTER | Age: 58
End: 2018-02-05
Attending: EMERGENCY MEDICINE
Payer: MEDICARE

## 2018-02-05 ENCOUNTER — APPOINTMENT (OUTPATIENT)
Dept: RADIOLOGY | Facility: MEDICAL CENTER | Age: 58
End: 2018-02-05
Attending: EMERGENCY MEDICINE
Payer: MEDICARE

## 2018-02-05 VITALS
RESPIRATION RATE: 16 BRPM | WEIGHT: 131.39 LBS | HEART RATE: 94 BPM | HEIGHT: 68 IN | OXYGEN SATURATION: 96 % | SYSTOLIC BLOOD PRESSURE: 153 MMHG | DIASTOLIC BLOOD PRESSURE: 89 MMHG | BODY MASS INDEX: 19.91 KG/M2 | TEMPERATURE: 98 F

## 2018-02-05 DIAGNOSIS — G43.009 MIGRAINE WITHOUT AURA AND WITHOUT STATUS MIGRAINOSUS, NOT INTRACTABLE: ICD-10-CM

## 2018-02-05 PROCEDURE — 96372 THER/PROPH/DIAG INJ SC/IM: CPT

## 2018-02-05 PROCEDURE — 700111 HCHG RX REV CODE 636 W/ 250 OVERRIDE (IP): Performed by: EMERGENCY MEDICINE

## 2018-02-05 PROCEDURE — 99284 EMERGENCY DEPT VISIT MOD MDM: CPT

## 2018-02-05 PROCEDURE — 70450 CT HEAD/BRAIN W/O DYE: CPT

## 2018-02-05 RX ORDER — SUMATRIPTAN 6 MG/.5ML
6 INJECTION, SOLUTION SUBCUTANEOUS ONCE
Status: COMPLETED | OUTPATIENT
Start: 2018-02-05 | End: 2018-02-05

## 2018-02-05 RX ORDER — SUMATRIPTAN 25 MG/1
25-50 TABLET, FILM COATED ORAL
Qty: 10 TAB | Refills: 0 | Status: SHIPPED | OUTPATIENT
Start: 2018-02-05 | End: 2018-09-13

## 2018-02-05 RX ADMIN — SUMATRIPTAN 6 MG: 6 INJECTION, SOLUTION SUBCUTANEOUS at 12:25

## 2018-02-05 ASSESSMENT — LIFESTYLE VARIABLES: DO YOU DRINK ALCOHOL: NO

## 2018-02-05 ASSESSMENT — PAIN SCALES - GENERAL
PAINLEVEL_OUTOF10: 2
PAINLEVEL_OUTOF10: 5

## 2018-02-05 NOTE — ED TRIAGE NOTES
BIB [facility] to triage  Chief Complaint   Patient presents with   • N/V     x2 hours     Pt denies any abd pain or diarrhea.

## 2018-02-05 NOTE — DISCHARGE INSTRUCTIONS
Migraine Headache    Take ibuprofen and Imitrex immediately at the first hint of headache.  Return for sudden onset, severe headache, headache and fever or headache and weakness.        You have a migraine headache. Symptoms of migraines include a throbbing headache, made worse by activity. Sometimes only one side of the head hurts. Nausea, vomiting and sinus pain or stuffiness is common with migraines. Visual symptoms such as light sensitivity, blind spots, or flashing lights may also occur. Loud noises may make migraine pain worse. Migraine headaches are caused by changes in the size of the blood vessels in the head and neck. Many factors may cause this problem including:  Ø Emotional stress, lack of sleep, and menstrual periods.  Ø Alcohol and some drugs (such as birth control pills).  Ø Diet factors (fasting, caffeine, food preservatives, chocolate).  Ø Environmental factors (weather changes, bright lights, odors, smoke).  Ø Jarring motions and loud noises may also bring on a migraine. Prevention of migraines includes dealing with the trigger factors.    Treatment may require medicines for pain, inflammation, and vomiting. Injections for pain and IV fluids can be used if the headache is very severe, or if you are vomiting repeatedly. Get plenty of rest over the next 24 hours.  Lie down in a quiet, dark place and try to sleep  Medicines to prevent the blood vessel changes may be prescribed.  For people with frequent migraines, other medicines such as beta blockers and antidepressants may be helpful. Please see your caregiver if you are not better in 1-2 days.     seek immediate medical care if:  Ø You develop a high fever, repeated vomiting, dehydration, or extreme weakness  Ø You develop fainting, worsening headache, confusion, or seizures  Ø You develop numbness or weakness of the limbs    ExitCare® Patient Information ©2007 [employer].

## 2018-02-05 NOTE — ED NOTES
PT assessment complete. Agree with triage note. Pt c/o n/v and HA for 1 day. Denies diarrhea or fever. Denies abd pain. PT in gown. Educated on NPO status until cleared by MD. Pt is alert, active, age appropriate, and NAD. No needs. Will continue to monitor.
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
## 2018-02-06 ENCOUNTER — PATIENT OUTREACH (OUTPATIENT)
Dept: HEALTH INFORMATION MANAGEMENT | Facility: OTHER | Age: 58
End: 2018-02-06

## 2018-07-11 ENCOUNTER — HOSPITAL ENCOUNTER (EMERGENCY)
Facility: MEDICAL CENTER | Age: 58
End: 2018-07-11
Payer: MEDICARE

## 2018-07-11 VITALS
RESPIRATION RATE: 18 BRPM | DIASTOLIC BLOOD PRESSURE: 64 MMHG | BODY MASS INDEX: 19.98 KG/M2 | TEMPERATURE: 97.9 F | OXYGEN SATURATION: 95 % | HEIGHT: 68 IN | HEART RATE: 66 BPM | SYSTOLIC BLOOD PRESSURE: 111 MMHG | WEIGHT: 131.84 LBS

## 2018-07-11 PROCEDURE — 302449 STATCHG TRIAGE ONLY (STATISTIC)

## 2018-07-11 ASSESSMENT — PAIN SCALES - GENERAL: PAINLEVEL_OUTOF10: 5

## 2018-07-12 NOTE — ED TRIAGE NOTES
"Inocencio Shabazz  58 y.o.  Chief Complaint   Patient presents with   • Alcohol Intoxication     \"too much to drink today\"     Patient bib EMS from home.  Patient A&O, reports \"I just feel weird, I don't understand - I always drink like this\". Patient unable to describe how he is feeling.   Patient denies SI/HI.    Explained wait time and triage process to pt. Pt placed back out in lobby, told to notify ED tech or triage RN of any changes, verbalized understanding.    "

## 2018-08-07 ENCOUNTER — APPOINTMENT (OUTPATIENT)
Dept: RADIOLOGY | Facility: MEDICAL CENTER | Age: 58
End: 2018-08-07
Attending: EMERGENCY MEDICINE
Payer: MEDICARE

## 2018-08-07 ENCOUNTER — HOSPITAL ENCOUNTER (EMERGENCY)
Facility: MEDICAL CENTER | Age: 58
End: 2018-08-07
Attending: EMERGENCY MEDICINE
Payer: MEDICARE

## 2018-08-07 VITALS
OXYGEN SATURATION: 92 % | WEIGHT: 131.84 LBS | DIASTOLIC BLOOD PRESSURE: 80 MMHG | TEMPERATURE: 97.9 F | RESPIRATION RATE: 14 BRPM | BODY MASS INDEX: 19.98 KG/M2 | HEART RATE: 53 BPM | HEIGHT: 68 IN | SYSTOLIC BLOOD PRESSURE: 133 MMHG

## 2018-08-07 DIAGNOSIS — R51.9 NONINTRACTABLE HEADACHE, UNSPECIFIED CHRONICITY PATTERN, UNSPECIFIED HEADACHE TYPE: ICD-10-CM

## 2018-08-07 DIAGNOSIS — F10.10 ALCOHOL ABUSE: ICD-10-CM

## 2018-08-07 PROCEDURE — 99284 EMERGENCY DEPT VISIT MOD MDM: CPT | Mod: 25

## 2018-08-07 PROCEDURE — 70450 CT HEAD/BRAIN W/O DYE: CPT

## 2018-08-07 ASSESSMENT — LIFESTYLE VARIABLES
DO YOU DRINK ALCOHOL: YES
EVER FELT BAD OR GUILTY ABOUT YOUR DRINKING: NO
HAVE YOU EVER FELT YOU SHOULD CUT DOWN ON YOUR DRINKING: NO
TOTAL SCORE: 0
ON A TYPICAL DAY WHEN YOU DRINK ALCOHOL HOW MANY DRINKS DO YOU HAVE: 6
HAVE PEOPLE ANNOYED YOU BY CRITICIZING YOUR DRINKING: NO
TOTAL SCORE: 0
TOTAL SCORE: 0
HOW MANY TIMES IN THE PAST YEAR HAVE YOU HAD 5 OR MORE DRINKS IN A DAY: 1
AVERAGE NUMBER OF DAYS PER WEEK YOU HAVE A DRINK CONTAINING ALCOHOL: 7
CONSUMPTION TOTAL: POSITIVE
EVER HAD A DRINK FIRST THING IN THE MORNING TO STEADY YOUR NERVES TO GET RID OF A HANGOVER: NO

## 2018-08-07 NOTE — ED TRIAGE NOTES
"Chief Complaint   Patient presents with   • Alcohol Intoxication   • Headache     x's 4 hours        BIB by EMS. Patient called EMS for above complaint. Patient states he drank a \"couple six packs today\".  Patient states that he \"took a bunch of Asprin and pain did not go away\".     Patient placed back in lobby and educated on triage plan.       "

## 2018-08-07 NOTE — ED NOTES
Pt sleeping with visible rise and fall of chest. NAD. Resp even and unlabored. Will continue to monitor.

## 2018-08-07 NOTE — ED PROVIDER NOTES
ED Provider Note    CHIEF COMPLAINT  Chief Complaint   Patient presents with   • Alcohol Intoxication   • Headache     x's 4 hours       HPI  Inocencio Shabazz is a 58 y.o. male who presents with a headache.  This started about 4 hours before coming in.  Says it came on gradually.  Across the front of his scalp.  Started after drinking throughout the day.  Reports that he has had about 12 beers today.  For his headache he took 3 ibuprofen tablets (nurse note states aspirin, but patient confirms it was ibuprofen).  At the time of my assessment the patient states that his headache is getting better.  He denies any blurred vision.  Denies slurred speech or confusion.  No weakness numbness neurologic symptoms.  Denies neck pain or stiffness.  Has not had a fever.  Denies trauma.    Denies abdominal pain, nausea, vomiting, diarrhea, black or bloody stool, leg swelling, rash, insect bites or sting    REVIEW OF SYSTEMS  As per HPI  All other systems reviewed and are negative.     PAST MEDICAL HISTORY  Past Medical History:   Diagnosis Date   • Alcohol abuse    • Alcohol intoxication (CMS-HCC) (HCC) 3/13/2014   • Cirrhosis (CMS-HCC) (HCC)    • Hepatitis C    • Psychiatric disorder     schitzo, bipolar       FAMILY HISTORY  No family history on file.    SOCIAL HISTORY  Social History   Substance Use Topics   • Smoking status: Current Every Day Smoker     Packs/day: 1.00     Years: 48.00     Types: Cigarettes   • Smokeless tobacco: Never Used   • Alcohol use Yes      Comment: 1-2 beers and 1/2 pints of whiskey every day       SURGICAL HISTORY  Past Surgical History:   Procedure Laterality Date   • GASTROSCOPY  4/8/2016    Procedure: GASTROSCOPY;  Surgeon: rBaeden Tobin M.D.;  Location: SURGERY [facility];  Service:    • GASTROSCOPY  1/3/2016    Procedure: GASTROSCOPY WITH BANDING;  Surgeon: Alfredo Antonio M.D.;  Location: SURGERY [facility];  Service:        CURRENT MEDICATIONS  Home Medications    **Home  "medications have not yet been reviewed for this encounter**         ALLERGIES  Allergies   Allergen Reactions   • Haldol [Haloperidol Lactate] Unspecified     Twitching   RXN=20 years ago       PHYSICAL EXAM  VITAL SIGNS: /80   Pulse (!) 53   Temp 36.6 °C (97.9 °F)   Resp 14   Ht 1.727 m (5' 8\")   Wt 59.8 kg (131 lb 13.4 oz)   SpO2 93%   BMI 20.05 kg/m²   Constitutional: He is tired, but awakens to voice and provides appropriate history.  HENT: Head atraumatic, ears normal inspection, oropharynx is benign with moist mucous membranes, nares clear  Eyes: Pupils equal round reactive, extraocular muscles are intact. No ptosis or miosis.  Neck: Neck is supple without meningismus. No JVD. No pain. No bruit is noted  Cardiovascular: Normal heart rate, Normal rhythm  Thorax & Lungs: Normal breath sounds, No respiratory distress, No wheezing, No chest tenderness.   Abdomen: Soft, No tenderness, No masses, No pulsatile masses.   Skin: No pathologic rash  Extremities: Intact distal pulses, No edema, No tenderness, No cyanosis, No clubbing.   Neurologic: Oriented to person place and events.  Symmetric motor and sensory throughout.  He has a steady gait.  Normal mood.  Denies hallucinations, SI or HI.    RADIOLOGY/PROCEDURES  CT-HEAD W/O   Final Result      No acute intracranial abnormality is identified.      Mild atrophy.      Mild paranasal sinus disease.            Imaging is interpreted by radiologist    COURSE & MEDICAL DECISION MAKING  Patient presents with headache.  He does suffer from occasional headaches.  He has a half history of alcohol abuse and has been abusing alcohol.  With alcohol history he would be more prone to intracranial hemorrhage and therefore a CT scan of the head was obtained at a short interval following onset of symptoms.  The CT scan was negative.  He does not have any neurologic symptoms.  No fevers or signs of infectious process.  He reported that after his CT scan his headache had " resolved.  He was noted to have mild paranasal sinus disease.  I have advised nasal Flonase.  Advised decrease alcohol consumption.  He should follow-up with his doctor within 1 week.  Advised return to the ER for recurrent headache, any neurologic symptoms or concern.    FINAL IMPRESSION  1.  Acute benign cephalgia  2.  Alcohol abuse    This dictation was created using voice recognition software. The accuracy of the dictation is limited to the abilities of the software. I expect there may be some errors of grammar and possibly content. The nursing notes were reviewed and certain aspects of this information were incorporated into this note.      Electronically signed by: Crow Bach, 8/7/2018 4:28 AM

## 2018-08-07 NOTE — DISCHARGE INSTRUCTIONS
"Alcohol and Headaches  Alcohol is a chemical known as ethanol. It is found in beverages such as beer, wine and liquor. Greater amounts are often found in liquor such as whiskey, vodka, scotch, mixed drinks and others. These drinks can also contain chemicals called congeners. Both ethanol and the congeners can effect how the person feels after drinking it. These \"after effects\" are often referred to as a hangover. Hangovers are rare with moderate alcohol drinking (1 to 3 average drinks). However, hangovers increase when the amount of alcohol consumed is more than moderate.  SYMPTOMS   A hangover can be accompanied by:   · Headache.   · Upset stomach.   · Nausea and vomiting.   · Dehydration.   A hangover is actually a withdrawal state from moderate to heavy alcohol consumption. The recovery process will take longer if you attempt to relieve this withdrawal with more alcohol. Drinking caffeine may relieve some of the fatigue associated with a hangover. However, this can cause more stomach irritation. Caffeine also makes a person urinate more (diuretic) and can worsen dehydration.  TREATMENT   There are a few actions that can reduce a hangover's severity and length.  · Drink 1 to 2 glasses of water (16 to 24 ounces) after you have quit drinking alcohol.   · Use pain medicines carefully and as told by your caregiver. For a headache, avoid acetaminophen. This drug is hard on the liver. Take aspirin instead and drink more water. If aspirin causes more stomach upset, ibuprofen may be a second choice.   · Get rest.   · Avoid high-fat foods and consider eating bananas (restores potassium and magnesium that will help both your stomach and headache). Oranges, apples and pears are good choices too.   · Take vitamins. Alcohol depletes the body's stores of vitamins A, B (especially B6) and C, which can intensify hangover symptoms.   · Drink 16 ounces of water each hour. This will rehydrate your body and make you feel better. " Sports drinks containing electrolytes may help your body rehydrate quicker than drinking water alone. The faster you restore proper fluid balance, the sooner you will feel better. Hydration is vital when treating a hangover.   · Exercise. As soon as you feel up to it, sweating helps remove toxins from the body faster.   SEEK MEDICAL CARE IF:   · Hangovers become more frequent.   · Hangovers interfere with major life activities such as job, family, health and relationships.   · You are unable to control your drinking, professional help may be needed. Contact your physician for a referral to specialized treatment.   SEEK IMMEDIATE MEDICAL CARE IF:   · You throw up blood.   · You pass dark or tarry stools.   · Your headache worsens over the next 24 hours instead getting better.   · Your abdominal or stomach pain worsens instead of getting better over the next 24 hours.   Document Released: 12/20/2004 Document Revised: 03/11/2013 Document Reviewed: 08/05/2009  [employer]® Patient Information ©2013 [employer].

## 2018-08-07 NOTE — ED NOTES
Pt sleeping with visible rise and fall of chest. NAD. VSS. Resp even and unlabored. Will continue to monitor.

## 2018-08-07 NOTE — ED NOTES
Agree with triage. Pt amb to green 37 with steady gait. Pt reports last drink was aprox one hour ago.

## 2018-09-07 ENCOUNTER — HOSPITAL ENCOUNTER (EMERGENCY)
Facility: MEDICAL CENTER | Age: 58
End: 2018-09-07
Attending: EMERGENCY MEDICINE
Payer: MEDICARE

## 2018-09-07 VITALS
TEMPERATURE: 98.3 F | SYSTOLIC BLOOD PRESSURE: 105 MMHG | RESPIRATION RATE: 16 BRPM | WEIGHT: 135 LBS | HEIGHT: 68 IN | HEART RATE: 99 BPM | OXYGEN SATURATION: 94 % | DIASTOLIC BLOOD PRESSURE: 61 MMHG | BODY MASS INDEX: 20.46 KG/M2

## 2018-09-07 DIAGNOSIS — K12.0 CANKER SORES ORAL: ICD-10-CM

## 2018-09-07 DIAGNOSIS — F10.20 ALCOHOLISM (HCC): ICD-10-CM

## 2018-09-07 PROCEDURE — 99284 EMERGENCY DEPT VISIT MOD MDM: CPT

## 2018-09-07 ASSESSMENT — LIFESTYLE VARIABLES
EVER HAD A DRINK FIRST THING IN THE MORNING TO STEADY YOUR NERVES TO GET RID OF A HANGOVER: YES
DO YOU DRINK ALCOHOL: YES
HOW MANY TIMES IN THE PAST YEAR HAVE YOU HAD 5 OR MORE DRINKS IN A DAY: 365
EVER FELT BAD OR GUILTY ABOUT YOUR DRINKING: NO
HAVE PEOPLE ANNOYED YOU BY CRITICIZING YOUR DRINKING: NO
TOTAL SCORE: 2
DOES PATIENT WANT TO STOP DRINKING: NO
CONSUMPTION TOTAL: POSITIVE
ON A TYPICAL DAY WHEN YOU DRINK ALCOHOL HOW MANY DRINKS DO YOU HAVE: 6
TOTAL SCORE: 2
HAVE YOU EVER FELT YOU SHOULD CUT DOWN ON YOUR DRINKING: YES
AVERAGE NUMBER OF DAYS PER WEEK YOU HAVE A DRINK CONTAINING ALCOHOL: 7
TOTAL SCORE: 2

## 2018-09-07 NOTE — DISCHARGE INSTRUCTIONS
Canker Sores  Canker sores are painful, open sores on the inside of the mouth and cheek. They may be white or yellow. Canker sores are diagnosed by exam. There are many causes of canker sores. The cause of canker sores is not well understood. It may be caused by vitamin deficiency, hormonal changes, allergic reactions to certain foods, or digestive problems. The sores usually heal in 1 to 2 weeks.  Canker sores are less painful if you avoid foods that cause problems. You can also:  · Avoid citrus juices, spicy or salty foods, and coffee until the sores are healed.   · Use a soft-bristled toothbrush.   · Chew your food carefully to avoid biting your cheek.   · Apply topical numbing medicine to the sore to help relieve pain.   · Apply a thin paste of baking soda and water to the sore to help heal the sore.   · Only use mouth rinses or medicines for pain or discomfort as directed by your caregiver.   SEEK MEDICAL CARE IF:   · Your symptoms are not better in 1 week.   · Your sores are still present after 2 weeks.   · Your sores are very painful.   · You have trouble breathing or swallowing.   · Your sores come back frequently.   Document Released: 03/09/2004 Document Revised: 03/11/2013 Document Reviewed: 02/08/2012  ExitCare® Patient Information ©2013 [employer].  Alcohol Problems  Most adults who drink alcohol drink in moderation (not a lot) are at low risk for developing problems related to their drinking. However, all drinkers, including low-risk drinkers, should know about the health risks connected with drinking alcohol.  RECOMMENDATIONS FOR LOW-RISK DRINKING   Drink in moderation. Moderate drinking is defined as follows:   · Men - no more than 2 drinks per day.  · Nonpregnant women - no more than 1 drink per day.  · Over age 65 - no more than 1 drink per day.  A standard drink is 12 grams of pure alcohol, which is equal to a 12 ounce bottle of beer or wine cooler, a 5 ounce glass of wine, or 1.5 ounces of  distilled spirits (such as whiskey, erik, vodka, or rum).   ABSTAIN FROM (DO NOT DRINK) ALCOHOL:  · When pregnant or considering pregnancy.  · When taking a medication that interacts with alcohol.  · If you are alcohol dependent.  · A medical condition that prohibits drinking alcohol (such as ulcer, liver disease, or heart disease).  DISCUSS WITH YOUR CAREGIVER:  · If you are at risk for coronary heart disease, discuss the potential benefits and risks of alcohol use: Light to moderate drinking is associated with lower rates of coronary heart disease in certain populations (for example, men over age 45 and postmenopausal women). Infrequent or nondrinkers are advised not to begin light to moderate drinking to reduce the risk of coronary heart disease so as to avoid creating an alcohol-related problem. Similar protective effects can likely be gained through proper diet and exercise.  · Women and the elderly have smaller amounts of body water than men. As a result women and the elderly achieve a higher blood alcohol concentration after drinking the same amount of alcohol.  · Exposing a fetus to alcohol can cause a broad range of birth defects referred to as Fetal Alcohol Syndrome (FAS) or Alcohol-Related Birth Defects (ARBD). Although FAS/ARBD is connected with excessive alcohol consumption during pregnancy, studies also have reported neurobehavioral problems in infants born to mothers reporting drinking an average of 1 drink per day during pregnancy.  · Heavier drinking (the consumption of more than 4 drinks per occasion by men and more than 3 drinks per occasion by women) impairs learning (cognitive) and psychomotor functions and increases the risk of alcohol-related problems, including accidents and injuries.  CAGE QUESTIONS:   · Have you ever felt that you should Cut down on your drinking?  · Have people Annoyed you by criticizing your drinking?  · Have you ever felt bad or Guilty about your drinking?  · Have you  ever had a drink first thing in the morning to steady your nerves or get rid of a hangover (Eye opener)?  If you answered positively to any of these questions: You may be at risk for alcohol-related problems if alcohol consumption is:   · Men: Greater than 14 drinks per week or more than 4 drinks per occasion.  · Women: Greater than 7 drinks per week or more than 3 drinks per occasion.  Do you or your family have a medical history of alcohol-related problems, such as:  · Blackouts.  · Sexual dysfunction.  · Depression.  · Trauma.  · Liver dysfunction.  · Sleep disorders.  · Hypertension.  · Chronic abdominal pain.  · Has your drinking ever caused you problems, such as problems with your family, problems with your work (or school) performance, or accidents/injuries?  · Do you have a compulsion to drink or a preoccupation with drinking?  · Do you have poor control or are you unable to stop drinking once you have started?  · Do you have to drink to avoid withdrawal symptoms?  · Do you have problems with withdrawal such as tremors, nausea, sweats, or mood disturbances?  · Does it take more alcohol than in the past to get you high?  · Do you feel a strong urge to drink?  · Do you change your plans so that you can have a drink?  · Do you ever drink in the morning to relieve the shakes or a hangover?  If you have answered a number of the previous questions positively, it may be time for you to talk to your caregivers, family, and friends and see if they think you have a problem. Alcoholism is a chemical dependency that keeps getting worse and will eventually destroy your health and relationships. Many alcoholics end up dead, impoverished, or in senior living. This is often the end result of all chemical dependency.  · Do not be discouraged if you are not ready to take action immediately.  · Decisions to change behavior often involve up and down desires to change and feeling like you cannot decide.  · Try to think more seriously  about your drinking behavior.  · Think of the reasons to quit.  WHERE TO GO FOR ADDITIONAL INFORMATION   · The National [place] on Alcohol Abuse and Alcoholism (NIAAA)  www.niaaa.nih.gov  · National [place] on Alcoholism and Drug Dependence (NCADD)  www.ncadd.org  · American Society of Addiction Medicine (ASAM)  www.asam.org   Document Released: 12/18/2006 Document Revised: 03/11/2013 Document Reviewed: 08/05/2009  ExitCare® Patient Information ©2014 [email or web address].

## 2018-09-07 NOTE — ED PROVIDER NOTES
ED Provider Note    Scribed for Hebert Cedillo M.D. by Roque Sellers. 9/7/2018  9:42 AM    Primary care provider: Pcp Not In Computer  Means of arrival: Ambulance  History obtained from: Patient  History limited by: None    CHIEF COMPLAINT  Chief Complaint   Patient presents with   • Mouth Pain     sore on his tongue       HPI  Inocencio Shabazz is a 58 y.o. male who presents to the Emergency Department via ambulance for evaluation of a mouth sore located to the tip of his tongue onset 1 week ago. [facility]'s [facility] No complaints of fever at this time.     REVIEW OF SYSTEMS  Pertinent positives include mouth sore.   Pertinent negatives include no fever.    See HPI for further details. E.      PAST MEDICAL HISTORY   has a past medical history of Alcohol abuse; Alcohol intoxication (CMS-HCC) (HCC) (3/13/2014); Cirrhosis (CMS-HCC) (HCC); Hepatitis C; and Psychiatric disorder.    SURGICAL HISTORY   has a past surgical history that includes gastroscopy (1/3/2016) and gastroscopy (4/8/2016).    SOCIAL HISTORY  Social History   Substance Use Topics   • Smoking status: Current Every Day Smoker     Packs/day: 1.00     Years: 48.00     Types: Cigarettes   • Smokeless tobacco: Never Used   • Alcohol use Yes      Comment: 1-2 beers and 1/2 pints of whiskey every day      History   Drug Use   • Types: Inhaled     Comment: marijuana       FAMILY HISTORY  No family history on file.    CURRENT MEDICATIONS  No current facility-administered medications on file prior to encounter.      Current Outpatient Prescriptions on File Prior to Encounter   Medication Sig Dispense Refill   • SUMAtriptan (IMITREX) 25 MG Tab tablet Take 1-2 Tabs by mouth Once PRN for Migraine for up to 1 dose. 10 Tab 0   • vitamin e (VITAMIN E) 400 UNIT Cap Take 400 Units by mouth every day.     • Multiple Vitamins-Minerals (MULTIVITAMIN ADULT PO) Take 1 tablet by mouth every day.     • lactulose 10 GM/15ML Solution Take 20 g by mouth 3  "times a day.     • pantoprazole (PROTONIX) 40 MG Tablet Delayed Response Take 40 mg by mouth every day.     • propranolol (INDERAL) 40 MG Tab Take 20 mg by mouth 2 times a day.     • ferrous sulfate 325 (65 FE) MG tablet Take 1 Tab by mouth 3 times a day, with meals. 30 Tab 3   • folic acid (FOLVITE) 1 MG Tab Take 1 Tab by mouth every day. 30 Tab 3   • Cyanocobalamin (VITAMIN B-12) 1000 MCG Tab Take 2,000 mcg by mouth every day.     • quetiapine (SEROQUEL) 200 MG Tab Take 100 mg by mouth every bedtime.     • thiamine (THIAMINE) 100 MG tablet Take 1 Tab by mouth every day. 30 Tab 3      ALLERGIES  Allergies   Allergen Reactions   • Haldol [Haloperidol Lactate] Unspecified     Twitching   RXN=20 years ago       PHYSICAL EXAM  VITAL SIGNS: /61   Pulse 89   Temp 36.8 °C (98.3 °F)   Resp 16   Ht 1.727 m (5' 8\")   Wt 61.2 kg (135 lb)   BMI 20.53 kg/m²     Nursing note and vitals reviewed.  Constitutional: Chronically ill appearing, disheveled, mild alcohol intoxication, Well-developed and well-nourished. No distress.   HENT: Poor dentition, poor aphthous ulcer on the mucosal surface of tongue, small and relatively benign, Head is normocephalic and atraumatic. Oropharynx is clear and moist without exudate or erythema.   Eyes: Pupils are equal, round, and reactive to light. Conjunctiva are normal.   Cardiovascular: Normal rate and regular rhythm. No murmur heard. Normal radial pulses.  Pulmonary/Chest: Breath sounds normal. No wheezes or rales.   Musculoskeletal: Extremities exhibit normal range of motion without edema or tenderness.   Neurological: Awake, alert and oriented to person, place, and time. No focal deficits noted.  Skin: Skin is warm and dry. No rash.   Psychiatric: Normal mood and affect. Appropriate for clinical situation.      COURSE & MEDICAL DECISION MAKING  Nursing notes, VS, PMSFHx reviewed in chart.     Review of past medical records [facility]      9:42 AM " - Patient seen and examined at bedside. Patient will be discharged from the ED with supportive care instructions for at home. Patient agrees with plan of care.     HTN/IDDM FOLLOW UP:  The patient is referred to a primary physician for blood pressure management, diabetic screening, and for all other preventive health concerns    The patient was discharged home with an information sheet on canker sores and told to return immediately for any signs or symptoms listed.  The patient agreed to the discharge precautions and follow-up plan which is documented in EPIC.     FINAL IMPRESSION  1. Canker sores oral    2. Alcoholism (HCC)          Roque ALMAZAN (Scribe), am scribing for, and in the presence of, Hebert Cedillo M.D..    Electronically signed by: Roque Sellers (Jonny), 9/7/2018    Hebert ALMAZAN M.D. personally performed the services described in this documentation, as scribed by Roque Sellers in my presence, and it is both accurate and complete.    The note accurately reflects work and decisions made by me.  Hebert Cedillo  9/7/2018  11:16 AM

## 2018-09-07 NOTE — ED NOTES
Pt discharged home as ordered by erp. Pt instructed to follow up with his PCP and return here as needed. Pt verbalized understanding. A cab was called for pt and pt was taken to lobby via wheelchair

## 2018-09-07 NOTE — ED TRIAGE NOTES
Chief Complaint   Patient presents with   • Mouth Pain     sore on his tongue     [facility] with c/o sore on the tip of his tongue

## 2018-09-08 ENCOUNTER — PATIENT OUTREACH (OUTPATIENT)
Dept: HEALTH INFORMATION MANAGEMENT | Facility: OTHER | Age: 58
End: 2018-09-08

## 2018-09-08 NOTE — PROGRESS NOTES
Placed discharge outreach phone call to pt s/p ER discharge 9/7/18.  Left voicemail providing my contact information and instructions to call with any questions or concerns.

## 2018-09-10 ENCOUNTER — HOSPITAL ENCOUNTER (EMERGENCY)
Facility: MEDICAL CENTER | Age: 58
End: 2018-09-10
Attending: EMERGENCY MEDICINE
Payer: MEDICARE

## 2018-09-10 ENCOUNTER — APPOINTMENT (OUTPATIENT)
Dept: RADIOLOGY | Facility: MEDICAL CENTER | Age: 58
End: 2018-09-10
Attending: EMERGENCY MEDICINE
Payer: MEDICARE

## 2018-09-10 VITALS
OXYGEN SATURATION: 92 % | WEIGHT: 127.87 LBS | HEART RATE: 72 BPM | TEMPERATURE: 98.9 F | HEIGHT: 68 IN | BODY MASS INDEX: 19.38 KG/M2 | RESPIRATION RATE: 18 BRPM

## 2018-09-10 DIAGNOSIS — I48.91 ATRIAL FIBRILLATION WITH RVR (HCC): ICD-10-CM

## 2018-09-10 DIAGNOSIS — D61.818 PANCYTOPENIA (HCC): ICD-10-CM

## 2018-09-10 DIAGNOSIS — K70.30 ALCOHOLIC CIRRHOSIS, UNSPECIFIED WHETHER ASCITES PRESENT (HCC): ICD-10-CM

## 2018-09-10 DIAGNOSIS — F10.10 ALCOHOL ABUSE: ICD-10-CM

## 2018-09-10 LAB
ALBUMIN SERPL BCP-MCNC: 3.7 G/DL (ref 3.2–4.9)
ALBUMIN/GLOB SERPL: 1 G/DL
ALP SERPL-CCNC: 62 U/L (ref 30–99)
ALT SERPL-CCNC: 55 U/L (ref 2–50)
ANION GAP SERPL CALC-SCNC: 10 MMOL/L (ref 0–11.9)
APTT PPP: 33.2 SEC (ref 24.7–36)
AST SERPL-CCNC: 77 U/L (ref 12–45)
BASOPHILS # BLD AUTO: 0 % (ref 0–1.8)
BASOPHILS # BLD: 0 K/UL (ref 0–0.12)
BILIRUB SERPL-MCNC: 0.8 MG/DL (ref 0.1–1.5)
BNP SERPL-MCNC: 22 PG/ML (ref 0–100)
BUN SERPL-MCNC: 9 MG/DL (ref 8–22)
CALCIUM SERPL-MCNC: 8.2 MG/DL (ref 8.5–10.5)
CHLORIDE SERPL-SCNC: 108 MMOL/L (ref 96–112)
CO2 SERPL-SCNC: 22 MMOL/L (ref 20–33)
CREAT SERPL-MCNC: 0.54 MG/DL (ref 0.5–1.4)
EOSINOPHIL # BLD AUTO: 0.04 K/UL (ref 0–0.51)
EOSINOPHIL NFR BLD: 1.7 % (ref 0–6.9)
ERYTHROCYTE [DISTWIDTH] IN BLOOD BY AUTOMATED COUNT: 47.6 FL (ref 35.9–50)
ETHANOL BLD-MCNC: 0.23 G/DL
GLOBULIN SER CALC-MCNC: 3.6 G/DL (ref 1.9–3.5)
GLUCOSE SERPL-MCNC: 102 MG/DL (ref 65–99)
HCT VFR BLD AUTO: 32.3 % (ref 42–52)
HGB BLD-MCNC: 11.2 G/DL (ref 14–18)
INR PPP: 1.3 (ref 0.87–1.13)
LIPASE SERPL-CCNC: 78 U/L (ref 11–82)
LYMPHOCYTES # BLD AUTO: 0.32 K/UL (ref 1–4.8)
LYMPHOCYTES NFR BLD: 12.2 % (ref 22–41)
MANUAL DIFF BLD: NORMAL
MCH RBC QN AUTO: 31.8 PG (ref 27–33)
MCHC RBC AUTO-ENTMCNC: 34.7 G/DL (ref 33.7–35.3)
MCV RBC AUTO: 91.8 FL (ref 81.4–97.8)
MONOCYTES # BLD AUTO: 0.23 K/UL (ref 0–0.85)
MONOCYTES NFR BLD AUTO: 8.7 % (ref 0–13.4)
MORPHOLOGY BLD-IMP: NORMAL
NEUTROPHILS # BLD AUTO: 2.01 K/UL (ref 1.82–7.42)
NEUTROPHILS NFR BLD: 77.4 % (ref 44–72)
NRBC # BLD AUTO: 0 K/UL
NRBC BLD-RTO: 0 /100 WBC
PLATELET # BLD AUTO: 32 K/UL (ref 164–446)
PLATELET BLD QL SMEAR: NORMAL
PLATELETS.RETICULATED NFR BLD AUTO: 13.1 K/UL (ref 0.6–13.1)
PMV BLD AUTO: 12.6 FL (ref 9–12.9)
POTASSIUM SERPL-SCNC: 3.3 MMOL/L (ref 3.6–5.5)
PROT SERPL-MCNC: 7.3 G/DL (ref 6–8.2)
PROTHROMBIN TIME: 15.9 SEC (ref 12–14.6)
RBC # BLD AUTO: 3.52 M/UL (ref 4.7–6.1)
RBC BLD AUTO: NORMAL
SODIUM SERPL-SCNC: 140 MMOL/L (ref 135–145)
TROPONIN I SERPL-MCNC: 0.02 NG/ML (ref 0–0.04)
TSH SERPL DL<=0.005 MIU/L-ACNC: 0.39 UIU/ML (ref 0.38–5.33)
WBC # BLD AUTO: 2.6 K/UL (ref 4.8–10.8)

## 2018-09-10 PROCEDURE — 83690 ASSAY OF LIPASE: CPT

## 2018-09-10 PROCEDURE — 700111 HCHG RX REV CODE 636 W/ 250 OVERRIDE (IP): Performed by: EMERGENCY MEDICINE

## 2018-09-10 PROCEDURE — 83880 ASSAY OF NATRIURETIC PEPTIDE: CPT

## 2018-09-10 PROCEDURE — 96374 THER/PROPH/DIAG INJ IV PUSH: CPT

## 2018-09-10 PROCEDURE — 85007 BL SMEAR W/DIFF WBC COUNT: CPT

## 2018-09-10 PROCEDURE — 36415 COLL VENOUS BLD VENIPUNCTURE: CPT

## 2018-09-10 PROCEDURE — 96375 TX/PRO/DX INJ NEW DRUG ADDON: CPT

## 2018-09-10 PROCEDURE — 700105 HCHG RX REV CODE 258: Performed by: EMERGENCY MEDICINE

## 2018-09-10 PROCEDURE — 700101 HCHG RX REV CODE 250: Performed by: EMERGENCY MEDICINE

## 2018-09-10 PROCEDURE — 85610 PROTHROMBIN TIME: CPT

## 2018-09-10 PROCEDURE — 84443 ASSAY THYROID STIM HORMONE: CPT

## 2018-09-10 PROCEDURE — 85027 COMPLETE CBC AUTOMATED: CPT

## 2018-09-10 PROCEDURE — 85730 THROMBOPLASTIN TIME PARTIAL: CPT

## 2018-09-10 PROCEDURE — 84484 ASSAY OF TROPONIN QUANT: CPT

## 2018-09-10 PROCEDURE — 71045 X-RAY EXAM CHEST 1 VIEW: CPT

## 2018-09-10 PROCEDURE — 80053 COMPREHEN METABOLIC PANEL: CPT

## 2018-09-10 PROCEDURE — 99285 EMERGENCY DEPT VISIT HI MDM: CPT

## 2018-09-10 PROCEDURE — 85055 RETICULATED PLATELET ASSAY: CPT

## 2018-09-10 PROCEDURE — 96376 TX/PRO/DX INJ SAME DRUG ADON: CPT

## 2018-09-10 PROCEDURE — 80307 DRUG TEST PRSMV CHEM ANLYZR: CPT

## 2018-09-10 PROCEDURE — 93005 ELECTROCARDIOGRAM TRACING: CPT | Performed by: EMERGENCY MEDICINE

## 2018-09-10 RX ORDER — METOPROLOL TARTRATE 1 MG/ML
5 INJECTION, SOLUTION INTRAVENOUS ONCE
Status: COMPLETED | OUTPATIENT
Start: 2018-09-10 | End: 2018-09-10

## 2018-09-10 RX ORDER — DILTIAZEM HYDROCHLORIDE 5 MG/ML
10 INJECTION INTRAVENOUS ONCE
Status: COMPLETED | OUTPATIENT
Start: 2018-09-10 | End: 2018-09-10

## 2018-09-10 RX ORDER — ONDANSETRON 2 MG/ML
4 INJECTION INTRAMUSCULAR; INTRAVENOUS ONCE
Status: CANCELLED | OUTPATIENT
Start: 2018-09-10 | End: 2018-09-10

## 2018-09-10 RX ORDER — SODIUM CHLORIDE 9 MG/ML
1000 INJECTION, SOLUTION INTRAVENOUS ONCE
Status: COMPLETED | OUTPATIENT
Start: 2018-09-10 | End: 2018-09-10

## 2018-09-10 RX ORDER — ONDANSETRON 2 MG/ML
4 INJECTION INTRAMUSCULAR; INTRAVENOUS ONCE
Status: COMPLETED | OUTPATIENT
Start: 2018-09-10 | End: 2018-09-10

## 2018-09-10 RX ORDER — METOPROLOL TARTRATE 1 MG/ML
5 INJECTION, SOLUTION INTRAVENOUS
Status: COMPLETED | OUTPATIENT
Start: 2018-09-10 | End: 2018-09-10

## 2018-09-10 RX ADMIN — SODIUM CHLORIDE 1000 ML: 9 INJECTION, SOLUTION INTRAVENOUS at 01:38

## 2018-09-10 RX ADMIN — ONDANSETRON 4 MG: 2 INJECTION INTRAMUSCULAR; INTRAVENOUS at 03:26

## 2018-09-10 RX ADMIN — METOPROLOL TARTRATE 5 MG: 1 INJECTION, SOLUTION INTRAVENOUS at 02:36

## 2018-09-10 RX ADMIN — METOPROLOL TARTRATE 5 MG: 1 INJECTION, SOLUTION INTRAVENOUS at 01:38

## 2018-09-10 RX ADMIN — METOPROLOL TARTRATE 5 MG: 1 INJECTION, SOLUTION INTRAVENOUS at 01:56

## 2018-09-10 RX ADMIN — DILTIAZEM HYDROCHLORIDE 10 MG: 5 INJECTION INTRAVENOUS at 02:51

## 2018-09-10 NOTE — ED NOTES
"Pt medicated per mar, c/o abd pain, canker sore in mouth that is \"freaking him out\". Pt also c/o \"severe cramping in my legs\". Pt updated on poc, removed pts shoes in attempt to comfort pt. vss except hr which ERP is aware of. Pt asking for meal and soda at this time. Educated pt on er protocol. No further needs from pt at this time,   "

## 2018-09-10 NOTE — ED NOTES
Lab called with critical result of platelets 32 at 0154. Critical lab result read back to Lab.   Dr. Jara notified of critical lab result at 0155.  Critical lab result read back by Dr. Jara.

## 2018-09-10 NOTE — DISCHARGE INSTRUCTIONS
Alcohol Problems  Most adults who drink alcohol drink in moderation (not a lot) are at low risk for developing problems related to their drinking. However, all drinkers, including low-risk drinkers, should know about the health risks connected with drinking alcohol.  RECOMMENDATIONS FOR LOW-RISK DRINKING   Drink in moderation. Moderate drinking is defined as follows:   · Men - no more than 2 drinks per day.  · Nonpregnant women - no more than 1 drink per day.  · Over age 65 - no more than 1 drink per day.  A standard drink is 12 grams of pure alcohol, which is equal to a 12 ounce bottle of beer or wine cooler, a 5 ounce glass of wine, or 1.5 ounces of distilled spirits (such as whiskey, erik, vodka, or rum).   ABSTAIN FROM (DO NOT DRINK) ALCOHOL:  · When pregnant or considering pregnancy.  · When taking a medication that interacts with alcohol.  · If you are alcohol dependent.  · A medical condition that prohibits drinking alcohol (such as ulcer, liver disease, or heart disease).  DISCUSS WITH YOUR CAREGIVER:  · If you are at risk for coronary heart disease, discuss the potential benefits and risks of alcohol use: Light to moderate drinking is associated with lower rates of coronary heart disease in certain populations (for example, men over age 45 and postmenopausal women). Infrequent or nondrinkers are advised not to begin light to moderate drinking to reduce the risk of coronary heart disease so as to avoid creating an alcohol-related problem. Similar protective effects can likely be gained through proper diet and exercise.  · Women and the elderly have smaller amounts of body water than men. As a result women and the elderly achieve a higher blood alcohol concentration after drinking the same amount of alcohol.  · Exposing a fetus to alcohol can cause a broad range of birth defects referred to as Fetal Alcohol Syndrome (FAS) or Alcohol-Related Birth Defects (ARBD). Although FAS/ARBD is connected with excessive  alcohol consumption during pregnancy, studies also have reported neurobehavioral problems in infants born to mothers reporting drinking an average of 1 drink per day during pregnancy.  · Heavier drinking (the consumption of more than 4 drinks per occasion by men and more than 3 drinks per occasion by women) impairs learning (cognitive) and psychomotor functions and increases the risk of alcohol-related problems, including accidents and injuries.  CAGE QUESTIONS:   · Have you ever felt that you should Cut down on your drinking?  · Have people Annoyed you by criticizing your drinking?  · Have you ever felt bad or Guilty about your drinking?  · Have you ever had a drink first thing in the morning to steady your nerves or get rid of a hangover (Eye opener)?  If you answered positively to any of these questions: You may be at risk for alcohol-related problems if alcohol consumption is:   · Men: Greater than 14 drinks per week or more than 4 drinks per occasion.  · Women: Greater than 7 drinks per week or more than 3 drinks per occasion.  Do you or your family have a medical history of alcohol-related problems, such as:  · Blackouts.  · Sexual dysfunction.  · Depression.  · Trauma.  · Liver dysfunction.  · Sleep disorders.  · Hypertension.  · Chronic abdominal pain.  · Has your drinking ever caused you problems, such as problems with your family, problems with your work (or school) performance, or accidents/injuries?  · Do you have a compulsion to drink or a preoccupation with drinking?  · Do you have poor control or are you unable to stop drinking once you have started?  · Do you have to drink to avoid withdrawal symptoms?  · Do you have problems with withdrawal such as tremors, nausea, sweats, or mood disturbances?  · Does it take more alcohol than in the past to get you high?  · Do you feel a strong urge to drink?  · Do you change your plans so that you can have a drink?  · Do you ever drink in the morning to relieve  the shakes or a hangover?  If you have answered a number of the previous questions positively, it may be time for you to talk to your caregivers, family, and friends and see if they think you have a problem. Alcoholism is a chemical dependency that keeps getting worse and will eventually destroy your health and relationships. Many alcoholics end up dead, impoverished, or in half-way. This is often the end result of all chemical dependency.  · Do not be discouraged if you are not ready to take action immediately.  · Decisions to change behavior often involve up and down desires to change and feeling like you cannot decide.  · Try to think more seriously about your drinking behavior.  · Think of the reasons to quit.  WHERE TO GO FOR ADDITIONAL INFORMATION   · The National [place] on Alcohol Abuse and Alcoholism (NIAAA)  www.niaaa.nih.gov  · National [place] on Alcoholism and Drug Dependence (NCADD)  www.ncadd.org  · American Society of Addiction Medicine (ASAM)  www.asam.org   Document Released: 12/18/2006 Document Revised: 03/11/2013 Document Reviewed: 08/05/2009  [employer]® Patient Information ©2014 V[employer].    Atrial Fibrillation  Atrial fibrillation is a type of irregular or rapid heartbeat (arrhythmia). In atrial fibrillation, the heart quivers continuously in a chaotic pattern. This occurs when parts of the heart receive disorganized signals that make the heart unable to pump blood normally. This can increase the risk for stroke, heart failure, and other heart-related conditions. There are different types of atrial fibrillation, including:  · Paroxysmal atrial fibrillation. This type starts suddenly, and it usually stops on its own shortly after it starts.  · Persistent atrial fibrillation. This type often lasts longer than a week. It may stop on its own or with treatment.  · Long-lasting persistent atrial fibrillation. This type lasts longer than 12 months.  · Permanent atrial fibrillation. This type does  not go away.  Talk with your health care provider to learn about the type of atrial fibrillation that you have.  What are the causes?  This condition is caused by some heart-related conditions or procedures, including:  · A heart attack.  · Coronary artery disease.  · Heart failure.  · Heart valve conditions.  · High blood pressure.  · Inflammation of the sac that surrounds the heart (pericarditis).  · Heart surgery.  · Certain heart rhythm disorders, such as Velez-Parkinson-White syndrome.  Other causes include:  · Pneumonia.  · Obstructive sleep apnea.  · Blockage of an artery in the lungs (pulmonary embolism, or PE).  · Lung cancer.  · Chronic lung disease.  · Thyroid problems, especially if the thyroid is overactive (hyperthyroidism).  · Caffeine.  · Excessive alcohol use or illegal drug use.  · Use of some medicines, including certain decongestants and diet pills.  Sometimes, the cause cannot be found.  What increases the risk?  This condition is more likely to develop in:  · People who are older in age.  · People who smoke.  · People who have diabetes mellitus.  · People who are overweight (obese).  · Athletes who exercise vigorously.  What are the signs or symptoms?  Symptoms of this condition include:  · A feeling that your heart is beating rapidly or irregularly.  · A feeling of discomfort or pain in your chest.  · Shortness of breath.  · Sudden light-headedness or weakness.  · Getting tired easily during exercise.  In some cases, there are no symptoms.  How is this diagnosed?  Your health care provider may be able to detect atrial fibrillation when taking your pulse. If detected, this condition may be diagnosed with:  · An electrocardiogram (ECG).  · A Holter monitor test that records your heartbeat patterns over a 24-hour period.  · Transthoracic echocardiogram (TTE) to evaluate how blood flows through your heart.  · Transesophageal echocardiogram (GIOVANNA) to view more detailed images of your heart.  · A  stress test.  · Imaging tests, such as a CT scan or chest X-ray.  · Blood tests.  How is this treated?  The main goals of treatment are to prevent blood clots from forming and to keep your heart beating at a normal rate and rhythm. The type of treatment that you receive depends on many factors, such as your underlying medical conditions and how you feel when you are experiencing atrial fibrillation.  This condition may be treated with:  · Medicine to slow down the heart rate, bring the heart’s rhythm back to normal, or prevent clots from forming.  · Electrical cardioversion. This is a procedure that resets your heart’s rhythm by delivering a controlled, low-energy shock to the heart through your skin.  · Different types of ablation, such as catheter ablation, catheter ablation with pacemaker, or surgical ablation. These procedures destroy the heart tissues that send abnormal signals. When the pacemaker is used, it is placed under your skin to help your heart beat in a regular rhythm.  Follow these instructions at home:  · Take over-the counter and prescription medicines only as told by your health care provider.  · If your health care provider prescribed a blood-thinning medicine (anticoagulant), take it exactly as told. Taking too much blood-thinning medicine can cause bleeding. If you do not take enough blood-thinning medicine, you will not have the protection that you need against stroke and other problems.  · Do not use tobacco products, including cigarettes, chewing tobacco, and e-cigarettes. If you need help quitting, ask your health care provider.  · If you have obstructive sleep apnea, manage your condition as told by your health care provider.  · Do not drink alcohol.  · Do not drink beverages that contain caffeine, such as coffee, soda, and tea.  · Maintain a healthy weight. Do not use diet pills unless your health care provider approves. Diet pills may make heart problems worse.  · Follow diet  instructions as told by your health care provider.  · Exercise regularly as told by your health care provider.  · Keep all follow-up visits as told by your health care provider. This is important.  How is this prevented?  · Avoid drinking beverages that contain caffeine or alcohol.  · Avoid certain medicines, especially medicines that are used for breathing problems.  · Avoid certain herbs and herbal medicines, such as those that contain ephedra or ginseng.  · Do not use illegal drugs, such as cocaine and amphetamines.  · Do not smoke.  · Manage your high blood pressure.  Contact a health care provider if:  · You notice a change in the rate, rhythm, or strength of your heartbeat.  · You are taking an anticoagulant and you notice increased bruising.  · You tire more easily when you exercise or exert yourself.  Get help right away if:  · You have chest pain, abdominal pain, sweating, or weakness.  · You feel nauseous.  · You notice blood in your vomit, bowel movement, or urine.  · You have shortness of breath.  · You suddenly have swollen feet and ankles.  · You feel dizzy.  · You have sudden weakness or numbness of the face, arm, or leg, especially on one side of the body.  · You have trouble speaking, trouble understanding, or both (aphasia).  · Your face or your eyelid droops on one side.  These symptoms may represent a serious problem that is an emergency. Do not wait to see if the symptoms will go away. Get medical help right away. Call your local emergency services (911 in the [place] Do not drive yourself to the hospital.   This information is not intended to replace advice given to you by your health care provider. Make sure you discuss any questions you have with your health care provider.  Document Released: 12/18/2006 Document Revised: 04/26/2017 Document Reviewed: 04/13/2016  [employer] Interactive Patient Education © 2017 [employer]    Alcoholic Liver Disease  Introduction  The liver is an organ that  converts food into energy, absorbs vitamins from food, removes toxins from the blood, and makes proteins. Alcoholic liver disease happens when the liver becomes damaged due to alcohol consumption and it stops working properly.  What are the causes?  This condition is caused by drinking too much alcohol over a number of years.  What increases the risk?  This condition is more likely to develop in:  · Women.  · People who have a family history of the disease.  · People who have poor nutrition.  · People who are obese.  What are the signs or symptoms?  Early symptoms of this condition include:  · Fatigue.  · Weakness.  · Abdominal discomfort on the upper right side.  Symptoms of moderate disease include:  · Fever.  · Yellow, pale, or darkening skin.  · Abdominal pain.  · Nausea and vomiting.  · Weight loss.  · Loss of appetite.  Symptoms of advanced disease include:  · Abdominal swelling.  · Nosebleeds or bleeding gums.  · Itchy skin.  · Enlarged fingertips (clubbing).  · Light-colored stools that smell very bad (steatorrhea).  · Mood changes.  · Feeling agitated.  · Confusion.  · Trouble concentrating.  Some people do not have symptoms until the condition becomes severe. Symptoms are often worse after heavy drinking.  How is this diagnosed?  This condition is diagnosed with:  · A physical exam.  · Blood tests.  · Taking a sample of liver tissue to examine under a microscope (liver biopsy).  You may also have other tests, including:  · X-rays.  · Ultrasound.  How is this treated?  Treatment may include:  · Stopping alcohol use to allow the liver to heal.  · Joining a support group or meeting with a counselor.  · Medicines to reduce inflammation. These may be recommended if the disease is severe.  · A liver transplant. This is the only treatment if the disease is very severe.  · Nutritional therapy. This may involve:  ¨ Taking vitamins.  ¨ Eating foods that are high in thiamine, such as whole-wheat cereals, pork, and  raw vegetables.  ¨ Eating foods that have a lot of folic acid, such as vegetables, fruits, meats, beans, nuts, and dairy foods.  ¨ Eating a diet that includes carbohydrate-rich foods, such as yogurt, beans, potatoes, and rice.  Follow these instructions at home:  · Do not drink alcohol.  · Take medicines only as directed by your health care provider.  · Take vitamins only as directed by your health care provider.  · Follow any diet instructions that are given to you by your health care provider.  Contact a health care provider if:  · You have a fever.  · You have shortness of breath or have difficulty breathing.  · You have bright red blood in your stool, or you have black, tarry stools.  · You are vomiting blood.  · Your skin color becomes more yellow, pale, or dark.  · You develop headaches.  · You have trouble thinking.  · You have problems balancing or walking.  This information is not intended to replace advice given to you by your health care provider. Make sure you discuss any questions you have with your health care provider.  Document Released: 01/08/2016 Document Revised: 05/25/2017 Document Reviewed: 11/19/2015  © 2017 [employer]

## 2018-09-10 NOTE — ED PROVIDER NOTES
CHIEF COMPLAINT  Chief Complaint   Patient presents with   • Atrial Fibrillation   • Dizziness       HPI  Inocencio Shabazz is a 58 y.o. male with a history of chronic alcohol abuse who presents with generalized malaise.  Denies chest pain or trouble breathing.  Was brought in by EMS.  He was found to be in atrial fibrillation with a rapid ventricular rate.  He denies being in this rhythm before.  Denies any blood thinner use.  States that he was drinking earlier today.  States he has not been feeling well all day.  No vomiting.  No abdominal pain.  No chest pain.  Blood pressure was normal upon arrival.  The patient has slurred speech and is not very forthcoming with much history.  Rather poor historian.  He is on propranolol chronically.    REVIEW OF SYSTEMS  See HPI for further details.  Limited secondary to the patient's intoxicated status.    PAST MEDICAL HISTORY   has a past medical history of Alcohol abuse; Alcohol intoxication (HCC) (3/13/2014); Cirrhosis (HCC); Hepatitis C; and Psychiatric disorder.    SOCIAL HISTORY  Social History     Social History Main Topics   • Smoking status: Current Every Day Smoker     Packs/day: 1.00     Years: 48.00     Types: Cigarettes   • Smokeless tobacco: Never Used   • Alcohol use Yes      Comment: 1-2 beers and 1/2 pints of whiskey every day   • Drug use: Yes     Types: Inhaled      Comment: marijuana   • Sexual activity: Not on file       SURGICAL HISTORY   has a past surgical history that includes gastroscopy (1/3/2016) and gastroscopy (4/8/2016).    CURRENT MEDICATIONS  Home Medications     Reviewed by Alexandra Manning R.N. (Registered Nurse) on 09/10/18 at 0118  Med List Status: Partial   Medication Last Dose Status   Cyanocobalamin (VITAMIN B-12) 1000 MCG Tab 12/9/2017 Active   ferrous sulfate 325 (65 FE) MG tablet 12/9/2017 Active   folic acid (FOLVITE) 1 MG Tab  Active   lactulose 10 GM/15ML Solution  Active   Multiple Vitamins-Minerals (MULTIVITAMIN ADULT PO)   "Active   pantoprazole (PROTONIX) 40 MG Tablet Delayed Response  Active   propranolol (INDERAL) 40 MG Tab 12/10/2017 Active   quetiapine (SEROQUEL) 200 MG Tab  Active   SUMAtriptan (IMITREX) 25 MG Tab tablet  Active   thiamine (THIAMINE) 100 MG tablet  Active   vitamin e (VITAMIN E) 400 UNIT Cap  Active                ALLERGIES  Allergies   Allergen Reactions   • Haldol [Haloperidol Lactate] Unspecified     Twitching   RXN=20 years ago       PHYSICAL EXAM  VITAL SIGNS: /88;  Pulse (!) 154   Temp 37.2 °C (98.9 °F)   Resp 18   Ht 1.727 m (5' 7.99\")   Wt 58 kg (127 lb 13.9 oz)   SpO2 94%   BMI 19.45 kg/m²   Pulse ox interpretation: I interpret this pulse ox as normal.  Constitutional: Alert in no apparent distress.  HENT: No signs of trauma, Bilateral external ears normal, Nose normal.   Eyes: Pupils are equal and reactive, Conjunctiva normal, Non-icteric.   Neck: Normal range of motion, No tenderness, Supple, No stridor.   Cardiovascular: Irregularly irregular rate and rhythm, no murmurs.   Thorax & Lungs: Normal breath sounds, No respiratory distress, No wheezing, No chest tenderness.   Abdomen: Bowel sounds normal, Soft, No tenderness, No masses, No pulsatile masses. No peritoneal signs.  Skin: Warm, Dry, No erythema, No rash.   Back: No bony tenderness, No CVA tenderness.   Extremities: Intact distal pulses, No edema, No tenderness, No cyanosis  Musculoskeletal: Good range of motion in all major joints. No tenderness to palpation or major deformities noted.   Neurologic: Alert, Normal motor function and gait, Normal sensory function, No focal deficits noted.       DIAGNOSTIC STUDIES / PROCEDURES    EKG  9/10/2018 at 1:15 AM  A. fib RVR at a rate of 138  Narrow QRS  QTC normal  No ST elevations or depressions  Prior EKG on 4/12/2017 showing normal sinus rhythm at 89    9/10/2018 at 3:19 AM  Normal sinus rhythm 71  CT, QRS, QTC within normal limits  No ST elevations or depressions  No T-wave " abnormalities  Normal axis      LABS  Labs Reviewed   CBC WITH DIFFERENTIAL - Abnormal; Notable for the following:        Result Value    WBC 2.6 (*)     RBC 3.52 (*)     Hemoglobin 11.2 (*)     Hematocrit 32.3 (*)     Platelet Count 32 (*)     Neutrophils-Polys 77.40 (*)     Lymphocytes 12.20 (*)     Lymphs (Absolute) 0.32 (*)     All other components within normal limits    Narrative:     Indicate which anticoagulants the patient is on:->NONE   COMP METABOLIC PANEL - Abnormal; Notable for the following:     Potassium 3.3 (*)     Glucose 102 (*)     Calcium 8.2 (*)     AST(SGOT) 77 (*)     ALT(SGPT) 55 (*)     Globulin 3.6 (*)     All other components within normal limits    Narrative:     Indicate which anticoagulants the patient is on:->NONE   DIAGNOSTIC ALCOHOL - Abnormal; Notable for the following:     Diagnostic Alcohol 0.23 (*)     All other components within normal limits    Narrative:     Indicate which anticoagulants the patient is on:->NONE   PROTHROMBIN TIME - Abnormal; Notable for the following:     PT 15.9 (*)     INR 1.30 (*)     All other components within normal limits    Narrative:     Indicate which anticoagulants the patient is on:->NONE   LIPASE    Narrative:     Indicate which anticoagulants the patient is on:->NONE   TSH    Narrative:     Indicate which anticoagulants the patient is on:->NONE   APTT    Narrative:     Indicate which anticoagulants the patient is on:->NONE   TROPONIN    Narrative:     Indicate which anticoagulants the patient is on:->NONE   BTYPE NATRIURETIC PEPTIDE    Narrative:     Indicate which anticoagulants the patient is on:->NONE   ESTIMATED GFR    Narrative:     Indicate which anticoagulants the patient is on:->NONE   DIFFERENTIAL MANUAL    Narrative:     Indicate which anticoagulants the patient is on:->NONE   PERIPHERAL SMEAR REVIEW    Narrative:     Indicate which anticoagulants the patient is on:->NONE   PLATELET ESTIMATE    Narrative:     Indicate which  anticoagulants the patient is on:->NONE   MORPHOLOGY    Narrative:     Indicate which anticoagulants the patient is on:->NONE   IMMATURE PLT FRACTION    Narrative:     Indicate which anticoagulants the patient is on:->NONE       RADIOLOGY  DX-CHEST-PORTABLE (1 VIEW)   Final Result         1.  No acute cardiopulmonary disease.          COURSE & MEDICAL DECISION MAKING  Pertinent Labs & Imaging studies reviewed. (See chart for details)  58 y.o. Male with a history of chronic alcohol abuse presenting with EMS.  Has generalized malaise and weakness as his chief complaint.  No chest pain, nausea, vomiting.  Has been ongoing for the past day approximately.  Denies prior history of A. fib.  Patient did present with A. fib RVR.  Thyroid function is normal.  No lower extremity swelling.  No prior history of PE.  No chronic anticoagulation use. He was given IV fluid hydration due to his rapid ventricular rate and possibility that it is secondary to hypovolemia.  Upon reevaluation, he do not show improvement with IV fluid hydration.    Patient has a CHADSVasc score of 0.  Also critically low platelet level of 32 at baseline.  Likely secondary to his chronic alcohol abuse/cirrhosis.  He does not appear to be a candidate for anticoagulation at this time.  We will defer further decisions regarding anticoagulation to his primary care physician and cardiology.    Patient was treated with metoprolol IV, 5 mg bolus ×3.  Did not have much response and continued to be in afib rapid ventricular rate.  He was given 1 dose of 10 mg diltiazem IV and spontaneously converted to normal sinus rhythm.  Patient does report feeling improved.  Laboratory studies and results were explained to the patient.  The importance of stopping chronic alcohol abuse was discussed with patient as this is the likely culprit for his atrial fibrillation.    The patient was discharged home in stable condition.  Follow-up with primary care and cardiology for further  "management.  To return immediately for any worsening of his symptoms or development of any other concerning signs or symptoms.    The patient will return for worsening symptoms or failure of improvement and is stable at the time of discharge. The patient verbalizes understanding in their own words.    Pulse 72   Temp 37.2 °C (98.9 °F)   Resp 18   Ht 1.727 m (5' 7.99\")   Wt 58 kg (127 lb 13.9 oz)   SpO2 92%   BMI 19.45 kg/m²     The patient was referred to primary care where they will receive further BP management.      [facility], Emergency Dept  [address] [address] [phone]    As needed, If symptoms worsen    [facility]  [address], [address]  [facility] [address] [phone]  Schedule an appointment as soon as possible for a visit in 2 days    \    FINAL IMPRESSION  1. Atrial fibrillation with RVR (HCC)    2. Alcohol abuse    3. Alcoholic cirrhosis, unspecified whether ascites present (HCC)    4. Pancytopenia (HCC)            Electronically signed by: Alan Jara, 9/10/2018 1:20 AM    "

## 2018-09-10 NOTE — ED TRIAGE NOTES
Pt bib remsa for new onset afib with rvr. Pt given 450 ml bolus, c/o dizziness, weakness at this time. ED tech at bedside for ekg, pt attached to monitor at this time.

## 2018-09-10 NOTE — ED NOTES
Pt converted back to SR, seen by ERP for re-eval, VSS. Discharged, given discharge instructions, verbalizes understanding of follow up with cardiology. PIV dc'd. Pt ambulatory to ED radha, steady on feet. Called for cab for ride home.

## 2018-09-11 ENCOUNTER — PATIENT OUTREACH (OUTPATIENT)
Dept: HEALTH INFORMATION MANAGEMENT | Facility: OTHER | Age: 58
End: 2018-09-11

## 2018-09-11 NOTE — PROGRESS NOTES
Placed discharge outreach phone call to pt s/p ER discharge 9/10/18.  Left voicemail providing my contact information and instructions to call with any questions or concerns.

## 2018-09-12 ENCOUNTER — PATIENT OUTREACH (OUTPATIENT)
Dept: HEALTH INFORMATION MANAGEMENT | Facility: OTHER | Age: 58
End: 2018-09-12

## 2018-09-12 NOTE — PROGRESS NOTES
09/12/2018 0938 - Discharge Outreach - received call from patient. States he is supposed to f/u with [facility], but can't get thru on number he was given. Number he was given does not appear to be [facility] number. Transferred to [phone] number. No further questions.

## 2018-09-13 ENCOUNTER — OFFICE VISIT (OUTPATIENT)
Dept: CARDIOLOGY | Facility: MEDICAL CENTER | Age: 58
End: 2018-09-13
Payer: MEDICARE

## 2018-09-13 VITALS
HEIGHT: 68 IN | DIASTOLIC BLOOD PRESSURE: 75 MMHG | BODY MASS INDEX: 20.61 KG/M2 | OXYGEN SATURATION: 97 % | HEART RATE: 90 BPM | WEIGHT: 136 LBS | SYSTOLIC BLOOD PRESSURE: 115 MMHG

## 2018-09-13 DIAGNOSIS — I48.0 PAROXYSMAL ATRIAL FIBRILLATION (HCC): ICD-10-CM

## 2018-09-13 PROBLEM — I48.91 AF (ATRIAL FIBRILLATION) (HCC): Status: ACTIVE | Noted: 2018-09-13

## 2018-09-13 LAB — EKG IMPRESSION: NORMAL

## 2018-09-13 PROCEDURE — 99204 OFFICE O/P NEW MOD 45 MIN: CPT | Performed by: INTERNAL MEDICINE

## 2018-09-13 PROCEDURE — 93000 ELECTROCARDIOGRAM COMPLETE: CPT | Performed by: INTERNAL MEDICINE

## 2018-09-13 NOTE — PROGRESS NOTES
Chief Complaint   Patient presents with   • Atrial Fibrillation     new patient       Subjective:   Inocencio Shabazz is a 58 y.o. male who presents today in follow-up from the emergency room in regards to his paroxysmal atrial fibrillation.  He is sent here in consultation by his doctor at the [facility].  Very poor historian.  I have extensive records in the hospital.  [facility]    No palpitations denies drinking although he smells of alcohol.  Does not know his medication.  Never been on a blood thinner    Past Medical History:   Diagnosis Date   • Alcohol abuse    • Alcohol intoxication (HCC) 3/13/2014   • Cirrhosis (HCC)    • Hepatitis C    • Psychiatric disorder     schrich, bipolar     Past Surgical History:   Procedure Laterality Date   • GASTROSCOPY  4/8/2016    Procedure: GASTROSCOPY;  Surgeon: Braeden Tobin M.D.;  Location: SURGERY [facility];  Service:    • GASTROSCOPY  1/3/2016    Procedure: GASTROSCOPY WITH BANDING;  Surgeon: Alfredo Antonio M.D.;  Location: SURGERY [facility];  Service:      History reviewed. No pertinent family history.  Social History     Social History   • Marital status: Single     Spouse name: N/A   • Number of children: N/A   • Years of education: N/A     Occupational History   • Not on file.     Social History Main Topics   • Smoking status: Current Every Day Smoker     Packs/day: 1.00     Years: 48.00     Types: Cigarettes   • Smokeless tobacco: Never Used   • Alcohol use Yes      Comment: 1-2 beers and 1/2 pints of whiskey every day   • Drug use: Yes     Types: Inhaled      Comment: marijuana   • Sexual activity: Not on file     Other Topics Concern   • Not on file     Social History Narrative   • No narrative on file     Allergies   Allergen Reactions   • Haldol [Haloperidol Lactate] Unspecified     Twitching   RXN=20 years ago     Outpatient Encounter Prescriptions as of 9/13/2018   Medication Sig Dispense Refill   • TraZODone & Diet  "Manage Prod (TRAZAMINE PO) Take  by mouth.     • Multiple Vitamins-Minerals (MULTIVITAMIN ADULT PO) Take 1 tablet by mouth every day.     • pantoprazole (PROTONIX) 40 MG Tablet Delayed Response Take 40 mg by mouth every day.     • propranolol (INDERAL) 40 MG Tab Take 20 mg by mouth 2 times a day.     • ferrous sulfate 325 (65 FE) MG tablet Take 1 Tab by mouth 3 times a day, with meals. 30 Tab 3   • folic acid (FOLVITE) 1 MG Tab Take 1 Tab by mouth every day. 30 Tab 3   • Cyanocobalamin (VITAMIN B-12) 1000 MCG Tab Take 2,000 mcg by mouth every day.     • quetiapine (SEROQUEL) 200 MG Tab Take 200 mg by mouth every bedtime.     • thiamine (THIAMINE) 100 MG tablet Take 1 Tab by mouth every day. 30 Tab 3   • [DISCONTINUED] SUMAtriptan (IMITREX) 25 MG Tab tablet Take 1-2 Tabs by mouth Once PRN for Migraine for up to 1 dose. (Patient not taking: Reported on 9/13/2018) 10 Tab 0   • [DISCONTINUED] vitamin e (VITAMIN E) 400 UNIT Cap Take 400 Units by mouth every day.     • [DISCONTINUED] lactulose 10 GM/15ML Solution Take 20 g by mouth 3 times a day.       No facility-administered encounter medications on file as of 9/13/2018.      Review of Systems   Unable to perform ROS: Mental acuity        Objective:   /75   Pulse 90   Ht 1.727 m (5' 8\")   Wt 61.7 kg (136 lb)   SpO2 97%   BMI 20.68 kg/m²     Physical Exam   Constitutional: He is oriented to person, place, and time.   Flat affect, smells of alcohol.  No acute distress sitting in the chair   HENT:   Head: Normocephalic and atraumatic.   Eyes: Pupils are equal, round, and reactive to light. EOM are normal.   Neck: No JVD present. No thyromegaly present.   Cardiovascular: Normal rate, regular rhythm and intact distal pulses.    No murmur heard.  Pulmonary/Chest: Breath sounds normal. No respiratory distress. He exhibits no tenderness.   Abdominal: Soft. Bowel sounds are normal. He exhibits no distension.   Musculoskeletal: Normal range of motion. He exhibits no " edema or tenderness.   Lymphadenopathy:     He has no cervical adenopathy.   Neurological: He is alert and oriented to person, place, and time. He exhibits normal muscle tone. Coordination normal.   No flap or tremor, no icterus   Skin: Skin is warm and dry. No rash noted. No pallor.   Psychiatric: Judgment and thought content normal.       Assessment:     1. Paroxysmal atrial fibrillation (HCC)  [facility] EKG (Clinic Performed)       Medical Decision Making:  Today's Assessment / Status / Plan:     I did an EKG today we compared it to the 2 done in the emergency room.  Initially he was in rapid atrial fibrillation when he is intoxicated.  Today and his other EKG are normal sinus rhythm normal EKG    Atrial fibrillation  Profound thrombocytopenia advanced alcohol liver disease.  Will not be put on anticoagulation because of this risk.  He agrees.  He does not want to take it anyway.    He is handicapped by his psychiatric disease and alcohol.  He will continue to follow the [facility]  We will see him back on an as-needed basis.  I gave him reassurance we talked about end-of-life care at length.    We spent more than 30 minutes together I wrote things down for him and gave him support and palliation

## 2018-09-15 LAB
EKG IMPRESSION: NORMAL
EKG IMPRESSION: NORMAL

## 2018-11-07 ENCOUNTER — HOSPITAL ENCOUNTER (EMERGENCY)
Facility: MEDICAL CENTER | Age: 58
End: 2018-11-07
Payer: MEDICARE

## 2018-11-07 VITALS
HEART RATE: 68 BPM | SYSTOLIC BLOOD PRESSURE: 110 MMHG | BODY MASS INDEX: 17.21 KG/M2 | HEIGHT: 68 IN | DIASTOLIC BLOOD PRESSURE: 64 MMHG | WEIGHT: 113.54 LBS | OXYGEN SATURATION: 94 % | TEMPERATURE: 99.2 F | RESPIRATION RATE: 18 BRPM

## 2018-11-07 PROCEDURE — 302449 STATCHG TRIAGE ONLY (STATISTIC)

## 2018-11-07 NOTE — ED TRIAGE NOTES
.  Chief Complaint   Patient presents with   • Abdominal Pain     started last night, LUQ; pt has hx of hepatitis per pt   • Alcohol Intoxication     pt states he drank last night but not this morning     Patient ambulatory to triage for above complaints; A&O X4; NAD observed in triage. Pt states he normally drinks 1 pint of whiskey a day, last drink last night; no symptoms of withdrawal at this time, pt denies diagnosed cirrhosis.   Patient placed in lobby and educated to notify staff of new or worsening symptoms.

## 2018-11-07 NOTE — ED NOTES
Pt not present in lobby. Ed tech stating pt had been asking for coffee and advised npo, pt may be getting coffee.

## 2018-11-29 ENCOUNTER — HOSPITAL ENCOUNTER (OUTPATIENT)
Facility: MEDICAL CENTER | Age: 58
End: 2018-11-29
Payer: MEDICARE

## 2018-11-29 PROCEDURE — 82274 ASSAY TEST FOR BLOOD FECAL: CPT

## 2018-12-07 LAB — HEMOCCULT STL QL IA: NEGATIVE

## 2019-03-12 ENCOUNTER — HOSPITAL ENCOUNTER (INPATIENT)
Facility: MEDICAL CENTER | Age: 59
LOS: 5 days | DRG: 377 | End: 2019-03-17
Attending: EMERGENCY MEDICINE | Admitting: INTERNAL MEDICINE
Payer: MEDICARE

## 2019-03-12 ENCOUNTER — APPOINTMENT (OUTPATIENT)
Dept: RADIOLOGY | Facility: MEDICAL CENTER | Age: 59
DRG: 377 | End: 2019-03-12
Attending: EMERGENCY MEDICINE
Payer: MEDICARE

## 2019-03-12 DIAGNOSIS — D64.9 CHRONIC ANEMIA: ICD-10-CM

## 2019-03-12 DIAGNOSIS — Z87.19 HISTORY OF ESOPHAGEAL VARICES: ICD-10-CM

## 2019-03-12 DIAGNOSIS — D69.6 THROMBOCYTOPENIA (HCC): ICD-10-CM

## 2019-03-12 DIAGNOSIS — K92.2 UPPER GI BLEED: ICD-10-CM

## 2019-03-12 DIAGNOSIS — K70.30 ALCOHOLIC CIRRHOSIS, UNSPECIFIED WHETHER ASCITES PRESENT (HCC): ICD-10-CM

## 2019-03-12 PROBLEM — N17.9 ACUTE KIDNEY FAILURE (HCC): Status: ACTIVE | Noted: 2019-03-12

## 2019-03-12 PROBLEM — G44.209 TENSION HEADACHE: Status: ACTIVE | Noted: 2019-03-12

## 2019-03-12 PROBLEM — R57.8 HEMORRHAGIC SHOCK (HCC): Status: ACTIVE | Noted: 2019-03-12

## 2019-03-12 LAB
ABO GROUP BLD: NORMAL
ALBUMIN SERPL BCP-MCNC: 3.3 G/DL (ref 3.2–4.9)
ALBUMIN/GLOB SERPL: 0.9 G/DL
ALP SERPL-CCNC: 63 U/L (ref 30–99)
ALT SERPL-CCNC: 79 U/L (ref 2–50)
AMMONIA PLAS-SCNC: 30 UMOL/L (ref 11–45)
ANION GAP SERPL CALC-SCNC: 13 MMOL/L (ref 0–11.9)
APTT PPP: 29.8 SEC (ref 24.7–36)
AST SERPL-CCNC: 92 U/L (ref 12–45)
BARCODED ABORH UBTYP: 8400
BARCODED PRD CODE UBPRD: NORMAL
BARCODED UNIT NUM UBUNT: NORMAL
BASOPHILS # BLD AUTO: 0.7 % (ref 0–1.8)
BASOPHILS # BLD: 0.04 K/UL (ref 0–0.12)
BILIRUB SERPL-MCNC: 2.1 MG/DL (ref 0.1–1.5)
BLD GP AB SCN SERPL QL: NORMAL
BUN SERPL-MCNC: 12 MG/DL (ref 8–22)
CALCIUM SERPL-MCNC: 8.4 MG/DL (ref 8.4–10.2)
CHLORIDE SERPL-SCNC: 102 MMOL/L (ref 96–112)
CO2 SERPL-SCNC: 22 MMOL/L (ref 20–33)
COMPONENT R 8504R: NORMAL
CREAT SERPL-MCNC: 0.83 MG/DL (ref 0.5–1.4)
EKG IMPRESSION: NORMAL
EOSINOPHIL # BLD AUTO: 0.09 K/UL (ref 0–0.51)
EOSINOPHIL NFR BLD: 1.6 % (ref 0–6.9)
ERYTHROCYTE [DISTWIDTH] IN BLOOD BY AUTOMATED COUNT: 42.5 FL (ref 35.9–50)
GLOBULIN SER CALC-MCNC: 3.6 G/DL (ref 1.9–3.5)
GLUCOSE SERPL-MCNC: 222 MG/DL (ref 65–99)
HCT VFR BLD AUTO: 19.9 % (ref 42–52)
HCT VFR BLD AUTO: 20.6 % (ref 42–52)
HCT VFR BLD AUTO: 30.3 % (ref 42–52)
HGB BLD-MCNC: 10.2 G/DL (ref 14–18)
HGB BLD-MCNC: 6.8 G/DL (ref 14–18)
HGB BLD-MCNC: 6.9 G/DL (ref 14–18)
IMM GRANULOCYTES # BLD AUTO: 0.03 K/UL (ref 0–0.11)
IMM GRANULOCYTES NFR BLD AUTO: 0.5 % (ref 0–0.9)
INR PPP: 1.6 (ref 0.87–1.13)
LIPASE SERPL-CCNC: 35 U/L (ref 7–58)
LYMPHOCYTES # BLD AUTO: 0.91 K/UL (ref 1–4.8)
LYMPHOCYTES NFR BLD: 16.4 % (ref 22–41)
MCH RBC QN AUTO: 31.5 PG (ref 27–33)
MCHC RBC AUTO-ENTMCNC: 33.7 G/DL (ref 33.7–35.3)
MCV RBC AUTO: 93.5 FL (ref 81.4–97.8)
MONOCYTES # BLD AUTO: 0.48 K/UL (ref 0–0.85)
MONOCYTES NFR BLD AUTO: 8.6 % (ref 0–13.4)
NEUTROPHILS # BLD AUTO: 4 K/UL (ref 1.82–7.42)
NEUTROPHILS NFR BLD: 72.2 % (ref 44–72)
NRBC # BLD AUTO: 0 K/UL
NRBC BLD-RTO: 0 /100 WBC
PLATELET # BLD AUTO: 55 K/UL (ref 164–446)
PMV BLD AUTO: 11.9 FL (ref 9–12.9)
POTASSIUM SERPL-SCNC: 3.5 MMOL/L (ref 3.6–5.5)
PRODUCT TYPE UPROD: NORMAL
PROT SERPL-MCNC: 6.9 G/DL (ref 6–8.2)
PROTHROMBIN TIME: 18.9 SEC (ref 12–14.6)
RBC # BLD AUTO: 3.24 M/UL (ref 4.7–6.1)
RH BLD: NORMAL
SODIUM SERPL-SCNC: 137 MMOL/L (ref 135–145)
TROPONIN I SERPL-MCNC: <0.02 NG/ML (ref 0–0.04)
UNIT STATUS USTAT: NORMAL
WBC # BLD AUTO: 5.6 K/UL (ref 4.8–10.8)

## 2019-03-12 PROCEDURE — 99291 CRITICAL CARE FIRST HOUR: CPT | Performed by: INTERNAL MEDICINE

## 2019-03-12 PROCEDURE — 96375 TX/PRO/DX INJ NEW DRUG ADDON: CPT

## 2019-03-12 PROCEDURE — 86900 BLOOD TYPING SEROLOGIC ABO: CPT

## 2019-03-12 PROCEDURE — 700105 HCHG RX REV CODE 258: Performed by: INTERNAL MEDICINE

## 2019-03-12 PROCEDURE — 36430 TRANSFUSION BLD/BLD COMPNT: CPT

## 2019-03-12 PROCEDURE — 90686 IIV4 VACC NO PRSV 0.5 ML IM: CPT | Performed by: INTERNAL MEDICINE

## 2019-03-12 PROCEDURE — 99291 CRITICAL CARE FIRST HOUR: CPT

## 2019-03-12 PROCEDURE — 85025 COMPLETE CBC W/AUTO DIFF WBC: CPT

## 2019-03-12 PROCEDURE — 85730 THROMBOPLASTIN TIME PARTIAL: CPT

## 2019-03-12 PROCEDURE — 700102 HCHG RX REV CODE 250 W/ 637 OVERRIDE(OP): Performed by: INTERNAL MEDICINE

## 2019-03-12 PROCEDURE — 86923 COMPATIBILITY TEST ELECTRIC: CPT

## 2019-03-12 PROCEDURE — 84484 ASSAY OF TROPONIN QUANT: CPT

## 2019-03-12 PROCEDURE — 96366 THER/PROPH/DIAG IV INF ADDON: CPT

## 2019-03-12 PROCEDURE — C9113 INJ PANTOPRAZOLE SODIUM, VIA: HCPCS | Performed by: INTERNAL MEDICINE

## 2019-03-12 PROCEDURE — 700111 HCHG RX REV CODE 636 W/ 250 OVERRIDE (IP): Performed by: INTERNAL MEDICINE

## 2019-03-12 PROCEDURE — 82140 ASSAY OF AMMONIA: CPT

## 2019-03-12 PROCEDURE — P9016 RBC LEUKOCYTES REDUCED: HCPCS

## 2019-03-12 PROCEDURE — 85018 HEMOGLOBIN: CPT | Mod: 91

## 2019-03-12 PROCEDURE — 96365 THER/PROPH/DIAG IV INF INIT: CPT

## 2019-03-12 PROCEDURE — 93005 ELECTROCARDIOGRAM TRACING: CPT | Performed by: EMERGENCY MEDICINE

## 2019-03-12 PROCEDURE — 83690 ASSAY OF LIPASE: CPT

## 2019-03-12 PROCEDURE — 86901 BLOOD TYPING SEROLOGIC RH(D): CPT

## 2019-03-12 PROCEDURE — 3E02340 INTRODUCTION OF INFLUENZA VACCINE INTO MUSCLE, PERCUTANEOUS APPROACH: ICD-10-PCS | Performed by: INTERNAL MEDICINE

## 2019-03-12 PROCEDURE — 700111 HCHG RX REV CODE 636 W/ 250 OVERRIDE (IP): Performed by: EMERGENCY MEDICINE

## 2019-03-12 PROCEDURE — A9270 NON-COVERED ITEM OR SERVICE: HCPCS | Performed by: INTERNAL MEDICINE

## 2019-03-12 PROCEDURE — 96368 THER/DIAG CONCURRENT INF: CPT

## 2019-03-12 PROCEDURE — C9113 INJ PANTOPRAZOLE SODIUM, VIA: HCPCS | Performed by: EMERGENCY MEDICINE

## 2019-03-12 PROCEDURE — 30233N1 TRANSFUSION OF NONAUTOLOGOUS RED BLOOD CELLS INTO PERIPHERAL VEIN, PERCUTANEOUS APPROACH: ICD-10-PCS | Performed by: INTERNAL MEDICINE

## 2019-03-12 PROCEDURE — 36415 COLL VENOUS BLD VENIPUNCTURE: CPT

## 2019-03-12 PROCEDURE — 94760 N-INVAS EAR/PLS OXIMETRY 1: CPT

## 2019-03-12 PROCEDURE — 80053 COMPREHEN METABOLIC PANEL: CPT

## 2019-03-12 PROCEDURE — 700105 HCHG RX REV CODE 258: Performed by: EMERGENCY MEDICINE

## 2019-03-12 PROCEDURE — 86850 RBC ANTIBODY SCREEN: CPT

## 2019-03-12 PROCEDURE — 85014 HEMATOCRIT: CPT

## 2019-03-12 PROCEDURE — 90471 IMMUNIZATION ADMIN: CPT

## 2019-03-12 PROCEDURE — 770022 HCHG ROOM/CARE - ICU (200)

## 2019-03-12 PROCEDURE — 85610 PROTHROMBIN TIME: CPT

## 2019-03-12 RX ORDER — POLYETHYLENE GLYCOL 3350 17 G/17G
1 POWDER, FOR SOLUTION ORAL
Status: DISCONTINUED | OUTPATIENT
Start: 2019-03-12 | End: 2019-03-17 | Stop reason: HOSPADM

## 2019-03-12 RX ORDER — BISACODYL 10 MG
10 SUPPOSITORY, RECTAL RECTAL
Status: DISCONTINUED | OUTPATIENT
Start: 2019-03-12 | End: 2019-03-17 | Stop reason: HOSPADM

## 2019-03-12 RX ORDER — SODIUM CHLORIDE 9 MG/ML
2000 INJECTION, SOLUTION INTRAVENOUS ONCE
Status: COMPLETED | OUTPATIENT
Start: 2019-03-12 | End: 2019-03-12

## 2019-03-12 RX ORDER — TRAZODONE HYDROCHLORIDE 50 MG/1
50 TABLET ORAL NIGHTLY
COMMUNITY
End: 2021-04-21

## 2019-03-12 RX ORDER — SODIUM CHLORIDE 9 MG/ML
INJECTION, SOLUTION INTRAVENOUS CONTINUOUS
Status: ACTIVE | OUTPATIENT
Start: 2019-03-12 | End: 2019-03-13

## 2019-03-12 RX ORDER — PANTOPRAZOLE SODIUM 40 MG/10ML
80 INJECTION, POWDER, LYOPHILIZED, FOR SOLUTION INTRAVENOUS ONCE
Status: COMPLETED | OUTPATIENT
Start: 2019-03-12 | End: 2019-03-12

## 2019-03-12 RX ORDER — FERROUS SULFATE 325(65) MG
650 TABLET ORAL DAILY
COMMUNITY
End: 2020-12-10

## 2019-03-12 RX ORDER — ONDANSETRON 2 MG/ML
4 INJECTION INTRAMUSCULAR; INTRAVENOUS ONCE
Status: COMPLETED | OUTPATIENT
Start: 2019-03-12 | End: 2019-03-12

## 2019-03-12 RX ORDER — NICOTINE 21 MG/24HR
21 PATCH, TRANSDERMAL 24 HOURS TRANSDERMAL
Status: DISCONTINUED | OUTPATIENT
Start: 2019-03-12 | End: 2019-03-17 | Stop reason: HOSPADM

## 2019-03-12 RX ORDER — SODIUM CHLORIDE, SODIUM LACTATE, POTASSIUM CHLORIDE, CALCIUM CHLORIDE 600; 310; 30; 20 MG/100ML; MG/100ML; MG/100ML; MG/100ML
INJECTION, SOLUTION INTRAVENOUS CONTINUOUS
Status: DISCONTINUED | OUTPATIENT
Start: 2019-03-12 | End: 2019-03-14

## 2019-03-12 RX ORDER — AMOXICILLIN 250 MG
2 CAPSULE ORAL 2 TIMES DAILY
Status: DISCONTINUED | OUTPATIENT
Start: 2019-03-12 | End: 2019-03-17 | Stop reason: HOSPADM

## 2019-03-12 RX ADMIN — NICOTINE 21 MG: 21 PATCH, EXTENDED RELEASE TRANSDERMAL at 16:07

## 2019-03-12 RX ADMIN — SODIUM CHLORIDE 2000 ML: 9 INJECTION, SOLUTION INTRAVENOUS at 08:17

## 2019-03-12 RX ADMIN — SODIUM CHLORIDE, POTASSIUM CHLORIDE, SODIUM LACTATE AND CALCIUM CHLORIDE: 600; 310; 30; 20 INJECTION, SOLUTION INTRAVENOUS at 10:28

## 2019-03-12 RX ADMIN — ONDANSETRON 4 MG: 2 INJECTION INTRAMUSCULAR; INTRAVENOUS at 08:24

## 2019-03-12 RX ADMIN — SODIUM CHLORIDE 500 ML: 9 INJECTION, SOLUTION INTRAVENOUS at 20:36

## 2019-03-12 RX ADMIN — SODIUM CHLORIDE 1 G: 900 INJECTION INTRAVENOUS at 09:02

## 2019-03-12 RX ADMIN — FENTANYL CITRATE 25 MCG: 50 INJECTION INTRAMUSCULAR; INTRAVENOUS at 22:19

## 2019-03-12 RX ADMIN — SODIUM CHLORIDE 8 MG/HR: 9 INJECTION, SOLUTION INTRAVENOUS at 13:02

## 2019-03-12 RX ADMIN — OCTREOTIDE ACETATE 50 MCG/HR: 200 INJECTION, SOLUTION INTRAVENOUS; SUBCUTANEOUS at 08:54

## 2019-03-12 RX ADMIN — PANTOPRAZOLE SODIUM 80 MG: 40 INJECTION, POWDER, LYOPHILIZED, FOR SOLUTION INTRAVENOUS at 08:25

## 2019-03-12 RX ADMIN — SODIUM CHLORIDE, POTASSIUM CHLORIDE, SODIUM LACTATE AND CALCIUM CHLORIDE: 600; 310; 30; 20 INJECTION, SOLUTION INTRAVENOUS at 20:33

## 2019-03-12 RX ADMIN — INFLUENZA A VIRUS A/MICHIGAN/45/2015 X-275 (H1N1) ANTIGEN (FORMALDEHYDE INACTIVATED), INFLUENZA A VIRUS A/SINGAPORE/INFIMH-16-0019/2016 IVR-186 (H3N2) ANTIGEN (FORMALDEHYDE INACTIVATED), INFLUENZA B VIRUS B/PHUKET/3073/2013 ANTIGEN (FORMALDEHYDE INACTIVATED), AND INFLUENZA B VIRUS B/MARYLAND/15/2016 BX-69A ANTIGEN (FORMALDEHYDE INACTIVATED) 0.5 ML: 15; 15; 15; 15 INJECTION, SUSPENSION INTRAMUSCULAR at 14:18

## 2019-03-12 RX ADMIN — DOCUSATE SODIUM AND SENNOSIDES 2 TABLET: 8.6; 5 TABLET, FILM COATED ORAL at 17:45

## 2019-03-12 RX ADMIN — FENTANYL CITRATE 25 MCG: 50 INJECTION INTRAMUSCULAR; INTRAVENOUS at 16:07

## 2019-03-12 ASSESSMENT — ENCOUNTER SYMPTOMS
VOMITING: 1
HALLUCINATIONS: 0
FEVER: 0
INSOMNIA: 0
WHEEZING: 0
COUGH: 0
MEMORY LOSS: 0
HEMOPTYSIS: 0
NERVOUS/ANXIOUS: 0
ABDOMINAL PAIN: 0
DEPRESSION: 1
CONSTIPATION: 0
SHORTNESS OF BREATH: 0
PALPITATIONS: 0
WEAKNESS: 1
HEARTBURN: 0
CHILLS: 0
SPUTUM PRODUCTION: 0
NAUSEA: 1
DIARRHEA: 0

## 2019-03-12 ASSESSMENT — COGNITIVE AND FUNCTIONAL STATUS - GENERAL
WALKING IN HOSPITAL ROOM: A LITTLE
SUGGESTED CMS G CODE MODIFIER DAILY ACTIVITY: CH
SUGGESTED CMS G CODE MODIFIER MOBILITY: CI
DAILY ACTIVITIY SCORE: 24
MOBILITY SCORE: 23

## 2019-03-12 ASSESSMENT — LIFESTYLE VARIABLES
TOTAL SCORE: 4
TOTAL SCORE: 4
DOES PATIENT WANT TO STOP DRINKING: NO
ALCOHOL_USE: YES
TOTAL SCORE: 4
HOW MANY TIMES IN THE PAST YEAR HAVE YOU HAD 5 OR MORE DRINKS IN A DAY: 4
SUBSTANCE_ABUSE: 1
EVER FELT BAD OR GUILTY ABOUT YOUR DRINKING: YES
EVER_SMOKED: YES
CONSUMPTION TOTAL: POSITIVE
HAVE YOU EVER FELT YOU SHOULD CUT DOWN ON YOUR DRINKING: YES
ON A TYPICAL DAY WHEN YOU DRINK ALCOHOL HOW MANY DRINKS DO YOU HAVE: 2
AVERAGE NUMBER OF DAYS PER WEEK YOU HAVE A DRINK CONTAINING ALCOHOL: 7
HAVE PEOPLE ANNOYED YOU BY CRITICIZING YOUR DRINKING: YES
EVER HAD A DRINK FIRST THING IN THE MORNING TO STEADY YOUR NERVES TO GET RID OF A HANGOVER: YES

## 2019-03-12 ASSESSMENT — PATIENT HEALTH QUESTIONNAIRE - PHQ9
1. LITTLE INTEREST OR PLEASURE IN DOING THINGS: NOT AT ALL
2. FEELING DOWN, DEPRESSED, IRRITABLE, OR HOPELESS: NOT AT ALL
SUM OF ALL RESPONSES TO PHQ9 QUESTIONS 1 AND 2: 0

## 2019-03-12 NOTE — ASSESSMENT & PLAN NOTE
Cr from 0.5 to 0.85 to 0.6  Recheck BMP tomorrow  Avoid nephrotoxins  Cautious electrolyte correction  Renal dose meds

## 2019-03-12 NOTE — ED PROVIDER NOTES
"ED Provider Note    CHIEF COMPLAINT  Chief Complaint   Patient presents with   • N/V   • Blood in Vomit       HPI  Inocencio Shabazz is a 59 y.o. male who presents to the emergency department by ambulance for nausea, vomiting, hematemesis.  Patient describes couple hours of increased nausea and bloody emesis.  Mild upper abdominal discomfort out back pain, chest pain.  No shortness of breath.    Patient provides vague history for alcoholic cirrhosis, initially denies previous GI bleeding but now says \"not this bad before.\"  Patient continues to drink alcohol but \"I try not to.\"  [facility] unknown name.    Admits to intermittent use of aspirin, denies other anticoagulation.    REVIEW OF SYSTEMS  See HPI for further details. All other systems are negative.     PAST MEDICAL HISTORY   has a past medical history of Alcohol abuse; Alcohol intoxication (HCC) (3/13/2014); Cirrhosis (HCC); Hepatitis C; and Psychiatric disorder.    SOCIAL HISTORY  Social History     Social History Main Topics   • Smoking status: Current Every Day Smoker     Packs/day: 1.00     Years: 48.00     Types: Cigarettes   • Smokeless tobacco: Never Used   • Alcohol use Yes      Comment: 1-2 beers and 1/2 pints of whiskey every day   • Drug use: Yes     Types: Inhaled      Comment: marijuana   • Sexual activity: Not on file       SURGICAL HISTORY   has a past surgical history that includes gastroscopy (1/3/2016) and gastroscopy (4/8/2016).    CURRENT MEDICATIONS  Home Medications    **Home medications have not yet been reviewed for this encounter**         ALLERGIES  Allergies   Allergen Reactions   • Haldol [Haloperidol Lactate] Unspecified     Twitching   RXN=20 years ago       PHYSICAL EXAM  VITAL SIGNS: /91   Pulse (!) 117   Temp 36.6 °C (97.8 °F) (Temporal)   Resp 20   Wt 60 kg (132 lb 4.4 oz)   SpO2 98%   BMI 20.11 kg/m²   Pulse ox interpretation: I interpret this pulse " ox as normal.  Constitutional: Alert in no apparent distress.  Pale.  Slightly diaphoretic.  Ill-appearing.  HENT: Normocephalic, atraumatic. Bilateral external ears normal, Nose normal.  Dry mucous membranes.    Eyes: Pupils are equal and reactive, Conjunctiva normal.   Neck: Normal range of motion, Supple  Lymphatic: No lymphadenopathy noted.   Cardiovascular: Tachycardic otherwise regular regular rate and rhythm, no murmurs. Distal pulses intact.    Thorax & Lungs: Coarse bilaterally otherwise normal breath sounds.  No wheezing/rales/ronchi. No increased work of breathing  Abdomen: Soft, non-distended.  Slightly tender in the upper abdomen, greatest in epigastric region without rebound, guarding or peritonitis.  No palpable pulsatile mass.  Skin: Warm.  Diaphoretic.  Pale.  Musculoskeletal: Good range of motion in all major joints.    Neurologic: Alert and oriented x4.  Moves 4 extremities spontaneously.  Psychiatric: Flat affect.  Cooperative.    DIAGNOSTIC STUDIES / PROCEDURES    LABS  Results for orders placed or performed during the hospital encounter of 03/12/19   CBC WITH DIFFERENTIAL   Result Value Ref Range    WBC 5.6 4.8 - 10.8 K/uL    RBC 3.24 (L) 4.70 - 6.10 M/uL    Hemoglobin 10.2 (L) 14.0 - 18.0 g/dL    Hematocrit 30.3 (L) 42.0 - 52.0 %    MCV 93.5 81.4 - 97.8 fL    MCH 31.5 27.0 - 33.0 pg    MCHC 33.7 33.7 - 35.3 g/dL    RDW 42.5 35.9 - 50.0 fL    Platelet Count 55 (L) 164 - 446 K/uL    MPV 11.9 9.0 - 12.9 fL    Neutrophils-Polys 72.20 (H) 44.00 - 72.00 %    Lymphocytes 16.40 (L) 22.00 - 41.00 %    Monocytes 8.60 0.00 - 13.40 %    Eosinophils 1.60 0.00 - 6.90 %    Basophils 0.70 0.00 - 1.80 %    Immature Granulocytes 0.50 0.00 - 0.90 %    Nucleated RBC 0.00 /100 WBC    Neutrophils (Absolute) 4.00 1.82 - 7.42 K/uL    Lymphs (Absolute) 0.91 (L) 1.00 - 4.80 K/uL    Monos (Absolute) 0.48 0.00 - 0.85 K/uL    Eos (Absolute) 0.09 0.00 - 0.51 K/uL    Baso (Absolute) 0.04 0.00 - 0.12 K/uL    Immature  Granulocytes (abs) 0.03 0.00 - 0.11 K/uL    NRBC (Absolute) 0.00 K/uL   COMP METABOLIC PANEL   Result Value Ref Range    Sodium 137 135 - 145 mmol/L    Potassium 3.5 (L) 3.6 - 5.5 mmol/L    Chloride 102 96 - 112 mmol/L    Co2 22 20 - 33 mmol/L    Anion Gap 13.0 (H) 0.0 - 11.9    Glucose 222 (H) 65 - 99 mg/dL    Bun 12 8 - 22 mg/dL    Creatinine 0.83 0.50 - 1.40 mg/dL    Calcium 8.4 8.4 - 10.2 mg/dL    AST(SGOT) 92 (H) 12 - 45 U/L    ALT(SGPT) 79 (H) 2 - 50 U/L    Alkaline Phosphatase 63 30 - 99 U/L    Total Bilirubin 2.1 (H) 0.1 - 1.5 mg/dL    Albumin 3.3 3.2 - 4.9 g/dL    Total Protein 6.9 6.0 - 8.2 g/dL    Globulin 3.6 (H) 1.9 - 3.5 g/dL    A-G Ratio 0.9 g/dL   LIPASE   Result Value Ref Range    Lipase 35 7 - 58 U/L   TROPONIN   Result Value Ref Range    Troponin I <0.02 0.00 - 0.04 ng/mL   PROTHROMBIN TIME (INR)   Result Value Ref Range    PT 18.9 (H) 12.0 - 14.6 sec    INR 1.60 (H) 0.87 - 1.13   APTT   Result Value Ref Range    APTT 29.8 24.7 - 36.0 sec   ESTIMATED GFR   Result Value Ref Range    GFR If African American >60 >60 mL/min/1.73 m 2    GFR If Non African American >60 >60 mL/min/1.73 m 2   EKG (NOW)   Result Value Ref Range    Report       [facility] Emergency Dept.    Test Date:  2019  Pt Name:    JIMMY FERRER                 Department: [facility] [place]  MRN:        [id number]                      [facility]  Gender:     Male                         Technician: [id number]  :        1960                   Requested By:MOODY WHITESIDE  Order #:    [id number]                    Reading MD: MOODY WHITESIDE, DO    Measurements  Intervals                                Axis  Rate:       124                          P:          84  DE:         131                          QRS:        81  QRSD:       87                           T:          79  QT:         313  QTc:        450    Interpretive Statements  Sinus tachycardia  Borderline right axis deviation  Compared to ECG  09/13/2018 16:21:00  Sinus rhythm no longer present    Electronically Signed On 3- 8:46:46 PDT by MOODY WHITESIDE, DO         COURSE & MEDICAL DECISION MAKING  Nursing notes and vital signs were reviewed. (See chart for details)  The patients records were reviewed, history was obtained from the patient;     Medical record review: Discharge summary from 11/1/16 after evaluation for abdominal pain.  This note describes history of GI bleeding with previous clipping in April for gastric ulcer and previous banding of varices in 2002 in 2016, pancytopenia from alcoholic bone marrow suppression.  Dr. Dutta consulted on patient at this time, and Tobin in 2016.  Discharge summary from 2/9/17 after evaluation for hematemesis with history of pancytopenia, hepatitis C without hepatic coma, alcoholic cirrhosis, portal hypertension, alcoholism, schizophrenia, esophageal varices, GERD.  Patient presented for hematemesis prior to arrival, however no recurrent episodes once in the hospital.  Hemoglobin was stable, patient was kept n.p.o. and slowly transition to diet, without further episodes GI was not consulted.  Noted on lactulose and encouraged to continue as well as the propranolol for known varices, and pantoprazole for GERD.  Since then seen for seizure, right lower quadrant abdominal pain, alcohol intoxication, nausea vomiting, atrial fibrillation and dizziness (new A. fib with RVR, treated with metoprolol and diltiazem with spontaneous conversion to normal sinus rhythm in the emergency department, thought secondary to his chronic alcoholism, noted to be pancytopenic with platelets of 32, not a anticoagulation candidate), none of these visits required hospitalization.    ED evaluation with evidence for upper GI bleed, likely secondary to the previously known esophageal varices and alcoholic cirrhosis.  Patient had large volume hematemesis on arrival to this facility, NG tube has been placed, bright red blood  output, although will lavage to clear and assess for persistent bleeding.  Hemodynamically stable, initially mild tachycardia, increased tachycardia with rate 150s-160s immediately following vomiting, of though has normalized once again, and improving low 100s with IV fluid bolus.  Never hypotensive.  Afebrile.  History of pancytopenia, although WBC is normal today.  Hemoglobin 10.2 hematocrit 30.3, only minimally decreased from prior.  Platelet count 55, previously 32.  No electrolyte derangement.  Chronic liver dysfunction.  Coagulopathy, INR 1.6.  No indication for blood transfusion at this time, however if bleeding persists I will proceed.  Hemoglobin will be trended.  Rocephin has been given.  Abdomen only slightly tender in the epigastric region otherwise benign, no peritonitis or acute abdomen.  Patient admitted to the intensive care unit with close GI consultation.  He is aware of the findings and agreeable to the disposition plan.    8:47 AM ICU intensivist and GI paged.    8:53 AM Dr. Tobin is aware of the patient agreeable to consultation.    9:05 AM Dr. Richardson is aware of the patient agreeable to admission.    The total critical care time on this patient is 40 minutes, continuous hemodynamic monitoring multiple bedside evaluations, resuscitating patient and assess response to treatment, speaking with admitting and consulting physician, deciphering test results, and arranging for ICU admission. This 40 minutes is exclusive of separately billable procedures.    FINAL IMPRESSION  (K92.2) Upper GI bleed  (Z87.19) History of esophageal varices  (K70.30) Alcoholic cirrhosis, unspecified whether ascites present (HCC)  (D64.9) Chronic anemia  (D69.6) Thrombocytopenia (HCC)      Electronically signed by: Joleen Ely, 3/12/2019 8:23 AM      This dictation was created using voice recognition software. The accuracy of the dictation is limited to the abilities of the software. I expect there may be some errors  of grammar and possibly content. The nursing notes were reviewed and certain aspects of this information were incorporated into this note.

## 2019-03-12 NOTE — ED TRIAGE NOTES
"BIB EMS [facility]used [facility] this [facility] per [facility] report).  Pt c/o nausea and vomiting bright red blood that started this am.  Hx etoh abuse, states last drink \"a few days ago\".  PTA: 112/68, HR 75, 98%RA, FSBS 180.  Less than 100ml NS in.    "

## 2019-03-12 NOTE — H&P
"Pulmonary History & Physical Note    Date of Service  3/12/2019    Primary Care Physician  Pcp Pt States None    Consultants  GI     Code Status  Full     Chief Complaint  Hematemesis- multiple times     History of Presenting Illness  59 y.o. male who presented 3/12/2019 with known h/o alcoholism, previous GI bleeding and mood disorder on seroquel presented c/o hematemsis x3-4 times over the last 3 days. Last etoh drink was 2 days ago. He states that he \"drinks a lot\" but unable to quantify. He has no recent respiratory symptoms. He hasn't seen his stool and unsure if he had either melena or hematochezia. Denies constipation and diarrhea. Has associated fatigue and dizziness. Was tachycardic in ER but no hypotension. His NG output showed chin blood of 200 cc in the ER then it started slowing down.     Review of Systems  Review of Systems   Constitutional: Positive for malaise/fatigue. Negative for chills and fever.   Respiratory: Negative for cough, hemoptysis, sputum production, shortness of breath and wheezing.    Cardiovascular: Negative for chest pain and palpitations.   Gastrointestinal: Positive for nausea and vomiting. Negative for abdominal pain, constipation, diarrhea and heartburn.   Skin: Negative for itching and rash.   Neurological: Positive for weakness.   Psychiatric/Behavioral: Positive for depression and substance abuse. Negative for hallucinations and memory loss. The patient is not nervous/anxious and does not have insomnia.        Past Medical History   has a past medical history of Alcohol abuse; Alcohol intoxication (HCC) (3/13/2014); Cirrhosis (HCC); Hepatitis C; and Psychiatric disorder.    Surgical History   has a past surgical history that includes gastroscopy (1/3/2016) and gastroscopy (4/8/2016).     Family History  family history is not on file.     Social History   reports that he has been smoking Cigarettes.  He has a 48.00 pack-year smoking history. He has never used smokeless " tobacco. He reports that he drinks alcohol. He reports that he uses drugs, including Inhaled.    Allergies  Allergies   Allergen Reactions   • Haldol [Haloperidol Lactate] Unspecified     Twitching   RXN=20 years ago       Medications  Prior to Admission Medications   Prescriptions Last Dose Informant Patient Reported? Taking?   Cyanocobalamin (VITAMIN B-12) 1000 MCG Tab  Patient's Home Pharmacy Yes No   Sig: Take 2,000 mcg by mouth every day.   Multiple Vitamins-Minerals (MULTIVITAMIN ADULT PO)   Yes No   Sig: Take 1 tablet by mouth every day.   TraZODone & Diet Manage Prod (TRAZAMINE PO)   Yes No   Sig: Take  by mouth.   ferrous sulfate 325 (65 FE) MG tablet  Patient's Home Pharmacy No No   Sig: Take 1 Tab by mouth 3 times a day, with meals.   folic acid (FOLVITE) 1 MG Tab  Patient's Home Pharmacy No No   Sig: Take 1 Tab by mouth every day.   pantoprazole (PROTONIX) 40 MG Tablet Delayed Response  Patient's Home Pharmacy Yes No   Sig: Take 40 mg by mouth every day.   propranolol (INDERAL) 40 MG Tab  Patient's Home Pharmacy Yes No   Sig: Take 20 mg by mouth 2 times a day.   quetiapine (SEROQUEL) 200 MG Tab  Patient's Home Pharmacy Yes No   Sig: Take 200 mg by mouth every bedtime.   thiamine (THIAMINE) 100 MG tablet  Patient's Home Pharmacy No No   Sig: Take 1 Tab by mouth every day.      Facility-Administered Medications: None       Physical Exam  Temp:  [36.6 °C (97.8 °F)] 36.6 °C (97.8 °F)  Pulse:  [] 94  Resp:  [14-20] 14  BP: (102-107)/(76-91) 105/91  SpO2:  [96 %-99 %] 97 %    Physical Exam   Constitutional: He is oriented to person, place, and time. No distress.   HENT:   Head: Normocephalic and atraumatic.   Mouth/Throat: Oropharynx is clear and moist. No oropharyngeal exudate.   NG in place    Eyes: Conjunctivae are normal. Right eye exhibits no discharge. No scleral icterus.   Neck: No tracheal deviation present. No thyromegaly present.   Cardiovascular: Normal rate, regular rhythm, normal heart  sounds and intact distal pulses.  Exam reveals no gallop and no friction rub.    No murmur heard.  Pulmonary/Chest: Effort normal and breath sounds normal. No stridor. No respiratory distress. He has no wheezes. He has no rales. He exhibits no tenderness.   Abdominal: Soft. He exhibits no distension. There is no tenderness.   Musculoskeletal: He exhibits no edema, tenderness or deformity.   Lymphadenopathy:     He has no cervical adenopathy.   Neurological: He is alert and oriented to person, place, and time.   Follows simple commands    Skin: Skin is warm. No rash noted. He is not diaphoretic. No erythema. No pallor.   Psychiatric: He has a normal mood and affect. His behavior is normal. Judgment and thought content normal.   Nursing note and vitals reviewed.      Laboratory:  Recent Labs      03/12/19   0809   WBC  5.6   RBC  3.24*   HEMOGLOBIN  10.2*   HEMATOCRIT  30.3*   MCV  93.5   MCH  31.5   MCHC  33.7   RDW  42.5   PLATELETCT  55*   MPV  11.9     Recent Labs      03/12/19   0809   SODIUM  137   POTASSIUM  3.5*   CHLORIDE  102   CO2  22   GLUCOSE  222*   BUN  12   CREATININE  0.83   CALCIUM  8.4     Recent Labs      03/12/19   0809   ALTSGPT  79*   ASTSGOT  92*   ALKPHOSPHAT  63   TBILIRUBIN  2.1*   LIPASE  35   GLUCOSE  222*     Recent Labs      03/12/19   0809   APTT  29.8   INR  1.60*             Recent Labs      03/12/19   0809   TROPONINI  <0.02       Urinalysis:    No results found     Imaging:  DX-ABDOMEN FOR TUBE PLACEMENT   Final Result      Slightly high positioning of the NG tube which would benefit from being advanced at least 5 more centimeters            Assessment/Plan:  I anticipate this patient will require at least two midnights for appropriate medical management, necessitating inpatient admission.    * Hemorrhagic shock (HCC)   Assessment & Plan    2ry to UGIB   Associated with GHANSHYAM   IV hydration  H&H q8  Avoid NSAIDs  PRBC for HB > 6  Strict I/O       GIB (gastrointestinal bleeding)-  (present on admission)   Assessment & Plan    Likely variceal origin  Rocephin IV prophylaxis   GI on board, appreciate input  NPO   PPI IV      Chronic hepatitis C without hepatic coma (HCC)- (present on admission)   Assessment & Plan    With elevated ALT/AST  Avoid tylenol      Tension headache   Assessment & Plan    IV fentanyl 25 PRN as cannot given NSAIDs nor tylenol      Acute kidney failure (HCC)   Assessment & Plan    Cr from 0.5 to 0.85  Recheck BMP tomorrow  Avoid nephrotoxins  Cautious electrolyte correction  Renal dose meds          Thrombocytopenia (HCC)- (present on admission)   Assessment & Plan    Chronic  55 today, avoid heparin products          VTE prophylaxis: SCD, pharmacological is contra-indicated     Patient is critically ill.   The patient continues to have: hemorrhagic shock with GHANSHYAM   The vital organ system that is affected is the: Kidneys2  If untreated there is a high chance of deterioration into: cardiac arrest  And eventually death.   The critical care that I am providing today is: management of shock   The critical that has been undertaken is medically complex.   There has been no overlap in critical care time.   Critical Care Time not including procedures: 33 minutes

## 2019-03-12 NOTE — ED NOTES
Call from [facility] office to relay that Dr. Tobin will be doing endoscopy tomorrow (3/13) at 3:00.

## 2019-03-12 NOTE — CONSULTS
GI Consult dictated, confirmation number [id number].  Scheduled for EGD tomorrow with anesthesia @3pm.

## 2019-03-12 NOTE — CONSULTS
[facility]    DATE OF SERVICE:  03/12/2019    GASTROINTESTINAL CONSULTATION    TIME:  12 p.m. or noon    REFERRING PHYSICIAN:  Joleen Ely DO    PRIMARY CARE PHYSICIAN:  At the [facility]    GASTROENTEROLOGIST:  Alfredo Antonio MD    REASON FOR CONSULTATION:  Hematemesis, melena.    HISTORY OF PRESENT ILLNESS:  A 59-year-old  male with alcohol and   hepatitis C induced liver cirrhosis, presents with hematemesis and melena.    Patient was hospitalized in 2016 for similar symptoms of hematemesis, anemia   from acute blood loss, and melena.  He was seen in consultation by Dr. Claudio Toledo.  I performed patient's upper endoscopy on 04/08/2016, which   revealed a gastric body bleeding ulcer that was treated with epinephrine and 2   Hemoclips were applied with good hemostasis.  Gastric ulcer was at the lesser   curvature of the stomach.  There was no evidence of esophageal nor gastric   varices.    Patient was lost to follow up and has been noncompliant.  Unfortunately, he   continues to drink alcohol, last time he drank alcohol was approximately 2-3   days ago.  He also has been taking NSAIDs with Excedrin PM for headaches.  He   described his prior hematemesis as moderate in amount, bright red blood to   coffee grounds, few episodes, approximately 3-5 in the last 24-48 hours.  He   also had melena that has been foul smelling and black and tarry.  Otherwise,   he denied any abdominal pain issues or hematochezia, dysphagia, or weight   loss.  He denies further modifying factors, associated symptoms, or timing   issues with his complaints.  Of note, I did speak to the ER physician and also   patient's ICU nurse to obtain further history and discuss patient's   management.    PAST MEDICAL HISTORY:  Hepatitis C genotype 3a and alcohol-induced liver   cirrhosis, schizophrenia, history of peptic ulcer disease with GI bleeding in   04/2016, alcoholism, medical noncompliance, vitamin D  deficiency.    PAST SURGICAL HISTORY:  Denied.    SOCIAL HISTORY:  , lives in [place].  Still drinks alcohol, smokes 1 pack   per day of cigarettes for greater than 20 years.  Denied illicit drug use.    FAMILY HISTORY:  Denied any family history of liver disease, hepatitis,   pancreatitis.    REVIEW OF SYSTEMS:  As per HPI, past medical history, past surgical history   sections, otherwise greater than 10 systems negative including constitutional,   head, eyes, ears, nose, throat, pulmonary, cardiovascular, GI, genital,   rectal, hematological, rheumatological, psychiatric, neurological,   dermatological, oncological, musculoskeletal.    PHYSICAL EXAMINATION:  VITAL SIGNS:  Temperature 98.4, respirations 14-20, pulse 90s-155, blood   pressure 110/90s.  Weight 125 pounds, height 68 inches.  GENERAL:  Well-developed white male, in no apparent distress, alert and   oriented.  HEENT:  Head:  Atraumatic, normocephalic.  Eyes:  Extraocular movements   intact, nonicteric sclerae.  Nose:  No erythema or discharge.  Mouth:  No   erythema or discharge.  No thrush noted.  Mallampati of II.  Poor dentition,   multiple teeth missing.  NECK:  Supple.  No lymphadenopathy or JVD.  PULMONARY:  Clear to auscultation bilaterally.  CARDIOVASCULAR:  Regular rate and rhythm with soft systolic murmur.  No rub or   gallop.  ABDOMEN:  Nondistended, nontender.  Positive bowel sounds, positive   hepatosplenomegaly, but no appreciable ascites.  No ecchymosis or rebound.  DERMATOLOGIC:  No spider angiomas or palmar erythema.  NEUROLOGICAL:  No focal deficits appreciated.  No asterixis.  MUSCULOSKELETAL:  Moving all extremities.  Strength 5/5 throughout.  DERMATOLOGICAL:  Positive palmar erythema, but no spider angiomas.  PSYCHIATRIC:  Appropriate mood, affect, interaction, poor insight.    LABORATORY DATA:  Hemoglobin 10.2, hematocrit 30.3, MCV 93.5, platelet count   55.  Potassium 3.5, anion gap 13, glucose 223, AST 92, ALT 79, total  bilirubin   2.1, albumin 3.3.  Troponin less than 0.02.  INR 1.6.  Blood type is AB   positive.  Otherwise, normal white blood cell count, sodium, chloride,   bicarbonate, BUN and creatinine, GFR greater than 60, calcium, lipase,   ammonia.    IMAGING:  Portable chest x-ray 09/10/2018, no acute cardiopulmonary   abnormality.  Abdominal and pelvic CT scan 07/12/2016, hepatic steatosis with   17.3 cm splenomegaly, trace amount of ascites, air fluid levels in colon,   metallic density within stomach.  EKG on 03/12/2019, sinus tachycardia with   borderline right axis deviation, rate of 124.    IMPRESSION:  A 59-year-old  male with alcohol-induced liver   cirrhosis, non-steroidal antiinflammatory drug usage, and medical   noncompliance, and unfortunate continued alcohol abuse, presents with   hematemesis and melena suggestive of upper gastrointestinal bleeding.    Notably, his last EGD in 2016 showed no evidence of esophageal nor gastric   varices, but instead he had a peptic ulcer bleed along the lesser curvature of   the gastric body.  He does report non-steroidal antiinflammatory drug usage   and I suspect that he probably has recurrent peptic ulcer disease bleed.  He   does not appear to be exsanguinating and is hemodynamically stable without   hypotension, he is not requiring pressors.  There is no evidence of   hematochezia.  Thus, I believe it is okay to wait to perform upper endoscopy   until tomorrow and also, with goals of hemodynamic monitoring and support with   fluid resuscitation and blood transfusions as needed if hemoglobin drops   below 6, we will treat him with Protonix drip as well as octreotide drip, keep   him on clear liquid diet, and make him n.p.o. after midnight.  EGD will be   better served tomorrow with anesthesia support due to patient's alcohol abuse,   anticipate intolerance as well as his liver cirrhosis.  He should be   optimized medically with correction of his hypokalemia before  proceeding.    Patient's comorbidities of alcohol-induced liver cirrhosis with a MELD score   of 14, MELD sodium of 16 as well as his acute GI bleeding, coagulopathy,   thrombocytopenia, alcohol abuse,  medical noncompliance, chronic hepatitis C,   hypokalemia, anemia from acute blood loss, hyperglycemia, impaired glucose   tolerance, and as per above, increased risk for adverse outcomes and   complexity of medical decision making, an EGD with anesthesia support would be   reasonable after patient has received fluid resuscitation and is more   hemodynamically stable.    PROBLEMS:  1.  Upper gastrointestinal bleeding, suspect recurrent peptic ulcer disease   rather than portal hypertensive bleeding or varices since last endoscopy in   2016 showed no evidence of gastric nor esophageal varices.  2.  Hematemesis.  3.  Melena.  4.  Alcohol-induced liver cirrhosis without ascites, MELD score of 14, MELD   sodium of 16.  5.  Medical noncompliance, continued alcoholism and NSAID usage.  6.  Suspected NSAID or Excedrin PM induced peptic ulcer disease, adverse   effects.  7.  History of peptic ulcer disease with gastric body ulcer bleed along lesser   curvature in 2016.    RECOMMENDATIONS:  1.  Admitted to ICU.  2.  Close hemodynamic monitoring support.  3.  Start 2 large bore IVs for good vascular access.  4.  Serial hemoglobin and hematocrits, transfuse for goal hemoglobin of   greater than 6.  5.  Clear liquid diet, n.p.o. after midnight.  6.  Octreotide and Protonix drips.  7.  Alcohol cessation.  I have counseled the patient about the importance of   quitting alcohol and the risk to getting decompensated liver cirrhosis and   death.  8.  Avoid NSAID usage, especially Excedrin, due to risk of recurrent peptic   ulcer disease and GI bleeding.  Advised that Tylenol would be okay up to 1500   mg once daily.  9.  EGD with hemostasis, possible esophageal varices, band ligation, biopsies   and/or other endotherapy, planned  tomorrow with anesthesia support.  Risks,   benefits, and alternatives were discussed with patient.  He was given   opportunity to ask questions and discuss other options, risks including but   not limited to perforation, infection, bleeding, missed lesions, possible need   for surgery, cardiopulmonary event, aspiration, stroke, hospitalization,   possibly prolonged discomfort, possibly repeat procedures, additional testing,   ineffective therapy and/or persistent bleeding, esophageal stricture   formation if esophageal varices are band ligated, and other potential   life-threatening complications.  Discussion was undertaken in layman's terms.    Patient stated clear understanding and acceptance of risks.  I answered his   questions in full to his satisfaction.  Informed consent was given by patient   in clear state of mind.    Dear Dr. Joleen Ely,    Thank you for asking me to evaluate your patient in consultation.  Please   refer to my consult note as per above for details of recommendations.  Please   feel free to call us with any questions.       ____________________________________     MD SHAHIDA PRICE / VICENTE    DD:  03/12/2019 12:14:36  DT:  03/12/2019 13:55:01    D#:  [id number]  Job#:  [id number]    cc: Brittanie Richardson MD, LURDES HARDY MD, Joleen Ely DO, [facility]

## 2019-03-12 NOTE — ASSESSMENT & PLAN NOTE
Chronic  Decreased from 55 to 18, I suspect this was a diluted sample as WBC dropped similarly   Repeat CBC stat  No active bleeding   Might need platelet transfusion    Telephone Encounter by Samuel Tobar at 01/25/18 11:12 AM     Author:  Samuel Tobar Service:  (none) Author Type:  Patient      Filed:  01/25/18 11:12 AM Encounter Date:  1/23/2018 Status:  Signed     :  Samuel Tobar (Patient )            Patient calling and states she was speaking with a nurse, call transferred to [facility].[AN1.1M]       Revision History        User Key Date/Time User Provider Type Action    > AN1.1 01/25/18 11:12 AM Samuel Tobar Patient  Sign    M - Manual

## 2019-03-12 NOTE — ED NOTES
Report called to TITUS Shields.  Aware pt is on the way and that I am available for any questions/concerns on arrival to floor.  Preparing for transfer via gurney w/ tele.  No changes at this time.

## 2019-03-12 NOTE — PROGRESS NOTES
1140-Pt received from ED. Transferred to ICU bed via slide board. ICU monitors applied. Bed control and call light within reach. Patient denies pain at this time. Requesting ice chips.     1200-Dr. Tobin at bedside. Ok for patient to be on clear liquids today. NPO at midnight. May pull NG tube if minimal output from NG x2 hours.

## 2019-03-12 NOTE — ASSESSMENT & PLAN NOTE
2ry to UGIB   Associated with GHANSHYAM, improved   IV hydration  H&H q6  Avoid NSAIDs  PRBC for HB > 6  Strict I/O

## 2019-03-12 NOTE — ED NOTES
Med rec updated and complete  Allergies reviewed  Pt was not sure of all doses of his medications.  [facility] @ [phone] to verify all prescription medications.  Pt reports that its been 3 days since he took any of his medications.  Pt and pts pharmacy reports no antibiotics in the last 30 days.

## 2019-03-12 NOTE — CARE PLAN
Problem: Venous Thromboembolism (VTW)/Deep Vein Thrombosis (DVT) Prevention:  Goal: Patient will participate in Venous Thrombosis (VTE)/Deep Vein Thrombosis (DVT)Prevention Measures  Outcome: PROGRESSING AS EXPECTED  Patient not a candidate for pharmacological DVT prophylaxis. SCDs in use.     Problem: Knowledge Deficit  Goal: Knowledge of disease process/condition, treatment plan, diagnostic tests, and medications will improve  Outcome: PROGRESSING AS EXPECTED  Patient aware of reason for admission. Understands bleed is related to alcohol use. Understands reason for ICU monitoring. Encouraged to ask questions of RN if needed.

## 2019-03-13 ENCOUNTER — ANESTHESIA (OUTPATIENT)
Dept: SURGERY | Facility: MEDICAL CENTER | Age: 59
DRG: 377 | End: 2019-03-13
Payer: MEDICARE

## 2019-03-13 ENCOUNTER — ANESTHESIA EVENT (OUTPATIENT)
Dept: SURGERY | Facility: MEDICAL CENTER | Age: 59
DRG: 377 | End: 2019-03-13
Payer: MEDICARE

## 2019-03-13 LAB
ALBUMIN SERPL BCP-MCNC: 2.9 G/DL (ref 3.2–4.9)
ALBUMIN/GLOB SERPL: 1.1 G/DL
ALP SERPL-CCNC: 46 U/L (ref 30–99)
ALT SERPL-CCNC: 51 U/L (ref 2–50)
ANION GAP SERPL CALC-SCNC: 3 MMOL/L (ref 0–11.9)
AST SERPL-CCNC: 56 U/L (ref 12–45)
BARCODED ABORH UBTYP: 9500
BARCODED PRD CODE UBPRD: NORMAL
BARCODED UNIT NUM UBUNT: NORMAL
BASOPHILS # BLD AUTO: 0.7 % (ref 0–1.8)
BASOPHILS # BLD: 0.01 K/UL (ref 0–0.12)
BILIRUB SERPL-MCNC: 1.7 MG/DL (ref 0.1–1.5)
BUN SERPL-MCNC: 10 MG/DL (ref 8–22)
CALCIUM SERPL-MCNC: 7.4 MG/DL (ref 8.4–10.2)
CHLORIDE SERPL-SCNC: 110 MMOL/L (ref 96–112)
CO2 SERPL-SCNC: 23 MMOL/L (ref 20–33)
COMMENT 1642: NORMAL
COMPONENT P 8504P: NORMAL
CREAT SERPL-MCNC: 0.64 MG/DL (ref 0.5–1.4)
EOSINOPHIL # BLD AUTO: 0.06 K/UL (ref 0–0.51)
EOSINOPHIL NFR BLD: 4.2 % (ref 0–6.9)
ERYTHROCYTE [DISTWIDTH] IN BLOOD BY AUTOMATED COUNT: 44.5 FL (ref 35.9–50)
ERYTHROCYTE [DISTWIDTH] IN BLOOD BY AUTOMATED COUNT: 45.1 FL (ref 35.9–50)
GLOBULIN SER CALC-MCNC: 2.6 G/DL (ref 1.9–3.5)
GLUCOSE SERPL-MCNC: 78 MG/DL (ref 65–99)
HCT VFR BLD AUTO: 21.3 % (ref 42–52)
HCT VFR BLD AUTO: 21.4 % (ref 42–52)
HCT VFR BLD AUTO: 23.4 % (ref 42–52)
HGB BLD-MCNC: 7.1 G/DL (ref 14–18)
HGB BLD-MCNC: 7.3 G/DL (ref 14–18)
HGB BLD-MCNC: 7.9 G/DL (ref 14–18)
IMM GRANULOCYTES # BLD AUTO: 0 K/UL (ref 0–0.11)
IMM GRANULOCYTES NFR BLD AUTO: 0 % (ref 0–0.9)
LYMPHOCYTES # BLD AUTO: 0.5 K/UL (ref 1–4.8)
LYMPHOCYTES NFR BLD: 35.2 % (ref 22–41)
MCH RBC QN AUTO: 31 PG (ref 27–33)
MCH RBC QN AUTO: 31.2 PG (ref 27–33)
MCHC RBC AUTO-ENTMCNC: 33.8 G/DL (ref 33.7–35.3)
MCHC RBC AUTO-ENTMCNC: 34.1 G/DL (ref 33.7–35.3)
MCV RBC AUTO: 91.5 FL (ref 81.4–97.8)
MCV RBC AUTO: 91.8 FL (ref 81.4–97.8)
MONOCYTES # BLD AUTO: 0.17 K/UL (ref 0–0.85)
MONOCYTES NFR BLD AUTO: 12 % (ref 0–13.4)
NEUTROPHILS # BLD AUTO: 0.68 K/UL (ref 1.82–7.42)
NEUTROPHILS NFR BLD: 47.9 % (ref 44–72)
NRBC # BLD AUTO: 0 K/UL
NRBC BLD-RTO: 0 /100 WBC
PLATELET # BLD AUTO: 18 K/UL (ref 164–446)
PLATELET # BLD AUTO: 29 K/UL (ref 164–446)
PLATELET BLD QL SMEAR: NORMAL
PLATELETS.RETICULATED NFR BLD AUTO: 15.8 K/UL (ref 0.6–13.1)
PMV BLD AUTO: 11 FL (ref 9–12.9)
PMV BLD AUTO: 13.2 FL (ref 9–12.9)
POTASSIUM SERPL-SCNC: 4 MMOL/L (ref 3.6–5.5)
PRODUCT TYPE UPROD: NORMAL
PROT SERPL-MCNC: 5.5 G/DL (ref 6–8.2)
RBC # BLD AUTO: 2.34 M/UL (ref 4.7–6.1)
RBC # BLD AUTO: 2.55 M/UL (ref 4.7–6.1)
RBC BLD AUTO: NORMAL
SODIUM SERPL-SCNC: 136 MMOL/L (ref 135–145)
UNIT STATUS USTAT: NORMAL
WBC # BLD AUTO: 1.4 K/UL (ref 4.8–10.8)
WBC # BLD AUTO: 1.4 K/UL (ref 4.8–10.8)

## 2019-03-13 PROCEDURE — 160002 HCHG RECOVERY MINUTES (STAT): Performed by: INTERNAL MEDICINE

## 2019-03-13 PROCEDURE — 80053 COMPREHEN METABOLIC PANEL: CPT

## 2019-03-13 PROCEDURE — 700111 HCHG RX REV CODE 636 W/ 250 OVERRIDE (IP)

## 2019-03-13 PROCEDURE — 160203 HCHG ENDO MINUTES - 1ST 30 MINS LEVEL 4: Performed by: INTERNAL MEDICINE

## 2019-03-13 PROCEDURE — 700101 HCHG RX REV CODE 250: Performed by: ANESTHESIOLOGY

## 2019-03-13 PROCEDURE — 160009 HCHG ANES TIME/MIN: Performed by: INTERNAL MEDICINE

## 2019-03-13 PROCEDURE — 770021 HCHG ROOM/CARE - ISO PRIVATE

## 2019-03-13 PROCEDURE — 85018 HEMOGLOBIN: CPT

## 2019-03-13 PROCEDURE — 87338 HPYLORI STOOL AG IA: CPT

## 2019-03-13 PROCEDURE — 160036 HCHG PACU - EA ADDL 30 MINS PHASE I: Performed by: INTERNAL MEDICINE

## 2019-03-13 PROCEDURE — 500066 HCHG BITE BLOCK, ECT: Performed by: INTERNAL MEDICINE

## 2019-03-13 PROCEDURE — 700105 HCHG RX REV CODE 258: Performed by: INTERNAL MEDICINE

## 2019-03-13 PROCEDURE — 85025 COMPLETE CBC W/AUTO DIFF WBC: CPT

## 2019-03-13 PROCEDURE — 36430 TRANSFUSION BLD/BLD COMPNT: CPT

## 2019-03-13 PROCEDURE — P9034 PLATELETS, PHERESIS: HCPCS

## 2019-03-13 PROCEDURE — C9113 INJ PANTOPRAZOLE SODIUM, VIA: HCPCS | Performed by: INTERNAL MEDICINE

## 2019-03-13 PROCEDURE — 0DJ08ZZ INSPECTION OF UPPER INTESTINAL TRACT, VIA NATURAL OR ARTIFICIAL OPENING ENDOSCOPIC: ICD-10-PCS | Performed by: INTERNAL MEDICINE

## 2019-03-13 PROCEDURE — 99232 SBSQ HOSP IP/OBS MODERATE 35: CPT | Performed by: INTERNAL MEDICINE

## 2019-03-13 PROCEDURE — 700111 HCHG RX REV CODE 636 W/ 250 OVERRIDE (IP): Performed by: INTERNAL MEDICINE

## 2019-03-13 PROCEDURE — 85014 HEMATOCRIT: CPT

## 2019-03-13 PROCEDURE — 700102 HCHG RX REV CODE 250 W/ 637 OVERRIDE(OP): Performed by: INTERNAL MEDICINE

## 2019-03-13 PROCEDURE — A9270 NON-COVERED ITEM OR SERVICE: HCPCS | Performed by: INTERNAL MEDICINE

## 2019-03-13 PROCEDURE — 700111 HCHG RX REV CODE 636 W/ 250 OVERRIDE (IP): Performed by: ANESTHESIOLOGY

## 2019-03-13 PROCEDURE — 85055 RETICULATED PLATELET ASSAY: CPT

## 2019-03-13 PROCEDURE — 160035 HCHG PACU - 1ST 60 MINS PHASE I: Performed by: INTERNAL MEDICINE

## 2019-03-13 PROCEDURE — 160048 HCHG OR STATISTICAL LEVEL 1-5: Performed by: INTERNAL MEDICINE

## 2019-03-13 PROCEDURE — 85027 COMPLETE CBC AUTOMATED: CPT

## 2019-03-13 RX ORDER — MORPHINE SULFATE 4 MG/ML
2 INJECTION, SOLUTION INTRAMUSCULAR; INTRAVENOUS
Status: DISCONTINUED | OUTPATIENT
Start: 2019-03-13 | End: 2019-03-13 | Stop reason: HOSPADM

## 2019-03-13 RX ORDER — HYDRALAZINE HYDROCHLORIDE 20 MG/ML
10 INJECTION INTRAMUSCULAR; INTRAVENOUS
Status: DISCONTINUED | OUTPATIENT
Start: 2019-03-13 | End: 2019-03-13 | Stop reason: HOSPADM

## 2019-03-13 RX ORDER — HALOPERIDOL 5 MG/ML
1 INJECTION INTRAMUSCULAR
Status: DISCONTINUED | OUTPATIENT
Start: 2019-03-13 | End: 2019-03-13 | Stop reason: HOSPADM

## 2019-03-13 RX ORDER — MORPHINE SULFATE 10 MG/ML
5 INJECTION, SOLUTION INTRAMUSCULAR; INTRAVENOUS
Status: DISCONTINUED | OUTPATIENT
Start: 2019-03-13 | End: 2019-03-13 | Stop reason: HOSPADM

## 2019-03-13 RX ORDER — OXYCODONE HCL 5 MG/5 ML
10 SOLUTION, ORAL ORAL
Status: DISCONTINUED | OUTPATIENT
Start: 2019-03-13 | End: 2019-03-13 | Stop reason: HOSPADM

## 2019-03-13 RX ORDER — SODIUM CHLORIDE, SODIUM LACTATE, POTASSIUM CHLORIDE, CALCIUM CHLORIDE 600; 310; 30; 20 MG/100ML; MG/100ML; MG/100ML; MG/100ML
INJECTION, SOLUTION INTRAVENOUS CONTINUOUS
Status: DISCONTINUED | OUTPATIENT
Start: 2019-03-13 | End: 2019-03-13 | Stop reason: HOSPADM

## 2019-03-13 RX ORDER — TRAZODONE HYDROCHLORIDE 50 MG/1
50 TABLET ORAL ONCE
Status: COMPLETED | OUTPATIENT
Start: 2019-03-13 | End: 2019-03-14

## 2019-03-13 RX ORDER — DIPHENHYDRAMINE HYDROCHLORIDE 50 MG/ML
12.5 INJECTION INTRAMUSCULAR; INTRAVENOUS
Status: DISCONTINUED | OUTPATIENT
Start: 2019-03-13 | End: 2019-03-13 | Stop reason: HOSPADM

## 2019-03-13 RX ORDER — METOPROLOL TARTRATE 1 MG/ML
1 INJECTION, SOLUTION INTRAVENOUS
Status: DISCONTINUED | OUTPATIENT
Start: 2019-03-13 | End: 2019-03-13 | Stop reason: HOSPADM

## 2019-03-13 RX ORDER — MIDAZOLAM HYDROCHLORIDE 1 MG/ML
INJECTION INTRAMUSCULAR; INTRAVENOUS
Status: COMPLETED
Start: 2019-03-13 | End: 2019-03-13

## 2019-03-13 RX ORDER — MIDAZOLAM HYDROCHLORIDE 1 MG/ML
1 INJECTION INTRAMUSCULAR; INTRAVENOUS
Status: DISCONTINUED | OUTPATIENT
Start: 2019-03-13 | End: 2019-03-13 | Stop reason: HOSPADM

## 2019-03-13 RX ADMIN — SODIUM CHLORIDE 8 MG/HR: 9 INJECTION, SOLUTION INTRAVENOUS at 00:35

## 2019-03-13 RX ADMIN — PROPOFOL 40 MG: 10 INJECTION, EMULSION INTRAVENOUS at 11:13

## 2019-03-13 RX ADMIN — NICOTINE 21 MG: 21 PATCH, EXTENDED RELEASE TRANSDERMAL at 05:13

## 2019-03-13 RX ADMIN — PROPOFOL 40 MG: 10 INJECTION, EMULSION INTRAVENOUS at 11:19

## 2019-03-13 RX ADMIN — SODIUM CHLORIDE 1 G: 900 INJECTION INTRAVENOUS at 17:42

## 2019-03-13 RX ADMIN — SODIUM CHLORIDE, POTASSIUM CHLORIDE, SODIUM LACTATE AND CALCIUM CHLORIDE: 600; 310; 30; 20 INJECTION, SOLUTION INTRAVENOUS at 11:02

## 2019-03-13 RX ADMIN — PROPOFOL 40 MG: 10 INJECTION, EMULSION INTRAVENOUS at 11:08

## 2019-03-13 RX ADMIN — MIDAZOLAM HYDROCHLORIDE 2 MG: 1 INJECTION, SOLUTION INTRAMUSCULAR; INTRAVENOUS at 10:49

## 2019-03-13 RX ADMIN — SODIUM CHLORIDE, POTASSIUM CHLORIDE, SODIUM LACTATE AND CALCIUM CHLORIDE: 600; 310; 30; 20 INJECTION, SOLUTION INTRAVENOUS at 06:19

## 2019-03-13 RX ADMIN — PROPOFOL 40 MG: 10 INJECTION, EMULSION INTRAVENOUS at 11:07

## 2019-03-13 RX ADMIN — SODIUM CHLORIDE 8 MG/HR: 9 INJECTION, SOLUTION INTRAVENOUS at 17:42

## 2019-03-13 RX ADMIN — FENTANYL CITRATE 25 MCG: 50 INJECTION INTRAMUSCULAR; INTRAVENOUS at 02:56

## 2019-03-13 RX ADMIN — FENTANYL CITRATE 100 MCG: 50 INJECTION INTRAMUSCULAR; INTRAVENOUS at 11:03

## 2019-03-13 ASSESSMENT — ENCOUNTER SYMPTOMS
HEMOPTYSIS: 0
SPUTUM PRODUCTION: 0
COUGH: 0
PALPITATIONS: 0
SHORTNESS OF BREATH: 0
HEADACHES: 1
CHILLS: 0
SEIZURES: 0
DIZZINESS: 0
LOSS OF CONSCIOUSNESS: 0
FEVER: 0
WHEEZING: 0
SPEECH CHANGE: 0

## 2019-03-13 ASSESSMENT — PAIN SCALES - GENERAL: PAIN_LEVEL: 1

## 2019-03-13 NOTE — ANESTHESIA POSTPROCEDURE EVALUATION
Patient: Inocencio Shabazz    Procedure Summary     Date:  03/13/19 Room / Location:   ENDOSCOPIC ULTRASOUND ROOM / SURGERY [facility]    Anesthesia Start:  1102 Anesthesia Stop:  1128    Procedure:  GASTROSCOPY Diagnosis:       Gastritis      (gastritis)    Surgeon:  Abdi Ba M.D. Responsible Provider:  Pito Welsh M.D.    Anesthesia Type:  MAC ASA Status:  3          Final Anesthesia Type: MAC  Last vitals  BP 71/42       Temp 36.5       Pulse 82      Resp 16       SpO2 97         Anesthesia Post Evaluation    Patient location during evaluation: bedside  Patient participation: waiting for patient participation  Level of consciousness: sleepy but conscious  Pain score: 1    Anesthetic complications: no

## 2019-03-13 NOTE — PROGRESS NOTES
1830- Dr. Richardson notified of drop in hemoglobin, confirmed with 2nd draw. 1 unit PRBC ordered.     1900-Report to TITUS Pearson. POC reviewed. All lines and drips overviewed.

## 2019-03-13 NOTE — ANESTHESIA QCDR
2019 [facility] Clinical Data Registry (for Quality Improvement)     Postoperative nausea/vomiting risk protocol (Adult = 18 yrs and Pediatric 3-17 yrs)- (430 and 463)  General inhalation anesthetic (NOT TIVA) with PONV risk factors: No  Provision of anti-emetic therapy with at least 2 different classes of agents: N/A  Patient DID NOT receive anti-emetic therapy and reason is documented in Medical Record: N/A    Multimodal Pain Management- (AQI59)  Patient undergoing Elective Surgery (i.e. Outpatient, or ASC, or Prescheduled Surgery prior to Hospital Admission): No  Use of Multimodal Pain Management, two or more drugs and/or interventions, NOT including systemic opioids: N/A  Exception: Documented allergy to multiple classes of analgesics: N/A    PACU assessment of acute postoperative pain prior to Anesthesia Care End- Applies to Patients Age = 18- (ABG7)  Initial PACU pain score is which of the following: < 7/10  Patient unable to report pain score: N/A    Post-anesthetic transfer of care checklist/protocol to PACU/ICU- (426 and 427)  Upon conclusion of case, patient transferred to which of the following locations: PACU/Non-ICU  Use of transfer checklist/protocol: Yes  Exclusion: Service Performed in Patient Hospital Room (and thus did not require transfer): N/A    PACU Reintubation- (AQI31)  General anesthesia requiring endotracheal intubation (ETT) along with subsequent extubation in OR or PACU: No  Required reintubation in the PACU: N/A  Extubation was a planned trial documented in the medical record prior to removal of the original airway device: N/A    Unplanned admission to ICU related to anesthesia service up through end of PACU care- (MD51)  Unplanned admission to ICU (not initially anticipated at anesthesia start time): No

## 2019-03-13 NOTE — PROCEDURES
Esophagogastroduodenoscopy/Push enteroscopy  Date of Procedure: 3/13/2019  Attending Physician: Abdi Ba MD    Indications: Anemia, melena, hematochezia  Instrument: Olympus Flexible Endoscope/ Olympus Pediatric Endoscope  Sedation:   Anesthesiologist/Type of Anesthesia:  Anesthesiologist: Pito Welsh M.D./CAROLYN    Surgical Staff:  Circulator: Edward Cota R.N.  Endoscopy Technician: Autumn Ricardo; Deja Armas    Pre-Anesthesia Assessment:  Prior to the procedure, a History and Physical was performed, and patient medications and allergies were reviewed. The patient’s tolerance of previous anesthesia was also reviewed. The risks and benefits of the procedure and the sedation options and risks were discussed with the patient including but not limited to infection, bleeding, aspiration, perforation, adverse medication reaction, missed diagnosis, and missed lesions. The patient verbalized understanding. All questions were answered, and informed consent was obtained.     After I obtained informed consent from the patient, the patient was placed in the left lateral position. Appropriate time-out protocol was followed: the correct patient, the correct procedure, and the correct equipment in the room were confirmed. Throughout the procedure, the patient’s blood pressure, pulse, and oxygen saturations were monitored continuously. The Olympus flexible gastroscope was gently passed through the incisoral orifice into the oral cavity and under direct visualization the esophagus was intubated. The endoscope was passed down the esophagus, through the stomach and into the 2nd portion of the duodenum. Retroflexion was performed at the gastroesophageal junction. Color, texture, mucosa and anatomy of esophagus, stomach, and duodenum were carefully examined with the scope.  After completion of the examination, the endoscope as removed. The patient tolerated the procedure well. There were no immediate postoperative  complications.       Findings:    Esophagus: No blood noted. 1 cord of grade 1 esophageal varix, completely flattens with insufflation. No high risk stigmata for bleeding.     Stomach: small hiatal hernia (2cm) with mucosal erythema the the hiatus possible early Reid Melvin. Erythema with mucosal abnormalities of the stomach in the body suggestive of portal hypertensive gastropathy with possible additional contribution from alcohol causing gastritis. No blood noted. No gastric varices seen. Retroflexion of the cardia fundus without gastric varices.    Duodenum/Small bowel: Examination to 2nd portion of duodenum normal. Scope was exchanged to pediatric colonoscope and a push enteroscopy was performed to jejunum (at least 50cm beyond pylorus) without any active bleeding    Impressions:   1. No active bleeding see in the examined area of upper GI  2. Portal hypertensive gastropathy with superimposed gastritis likely from alcohol  3. Small 2cm hiatal hernia with possible early Reid Melvin erythema without erosion  4. Grade 1 esophageal varix without high risk stigmata, no banding necessary.    Recommendations:   1. Complete 72 hours of IV PPI drip; transition to 40mg PO BID on discharge and down titration as outpatient  2. Complete and finish the octreotide then stop  3. Correct coagulopathy and thrombocytopenia if possible  4. Follow-up with clinic as outpatient.  5. Complete alcohol cessation.  6. Send stool for H. Pylori, treat if positive; biopsy not taken due to thrombocytopenia.  7. Can advance diet as tolerated by ICU; would start with clears  8. Monitor for alcohol withdraw.    GI TO SIGN OFF / STAND BY - Thank you very much for allowing me to participate in the care of your patient.  Please feel free to contact me anytime at [phone]        This note was generated using voice recognition software which has a small chance of producing errors of grammar and possibly content. I have made every  reasonable attempt to find and correct any obvious errors, but expect that some may not be found prior to finalization of this note

## 2019-03-13 NOTE — ANESTHESIA TIME REPORT
Times      Start Time                 End Time                    Dr. Maria Esther Burroughs Name      3/13/2019 11:02 AM 3/13/2019 11:28 AM                                     Premium Reason  Non-Premium     Anesthesia Start and Stop Event Times     Date Time Event    3/13/2019 1102 Anesthesia Start     1128 Anesthesia Stop

## 2019-03-13 NOTE — PROGRESS NOTES
"Critical Care Progress Note    Date of admission  3/12/2019    Chief Complaint  59 y.o. male admitted 3/12/2019 with hematemesis     Hospital Course  59 y.o. male who presented 3/12/2019 with known h/o alcoholism, previous GI bleeding and mood disorder on seroquel presented c/o hematemsis x3-4 times over the last 3 days. Last etoh drink was 2 days ago. He states that he \"drinks a lot\" but unable to quantify. He has no recent respiratory symptoms. He hasn't seen his stool and unsure if he had either melena or hematochezia. Denies constipation and diarrhea. Has associated fatigue and dizziness. Was tachycardic in ER but no hypotension. His NG output showed chin blood of 200 cc in the ER then it started slowing down.     Interval Problem Update  Reviewed last 24 hour events:  Transfused total of 2 PRBC   Hb now at 7.9  HR 60s, +  C/o tension headache, which responded to fentanyl 25 yesterday   No hematemesis/hematochezia or melena episodes  Receiving protonix and oct gtt   NPO for EGD  On RA     Review of Systems  Review of Systems   Constitutional: Negative for chills and fever.   Respiratory: Negative for cough, hemoptysis, sputum production, shortness of breath and wheezing.    Cardiovascular: Negative for chest pain and palpitations.   Skin: Negative for itching and rash.   Neurological: Positive for headaches. Negative for dizziness, speech change, seizures and loss of consciousness.        Vital Signs for last 24 hours   Temp:  [36.3 °C (97.4 °F)-37.4 °C (99.3 °F)] 36.3 °C (97.4 °F)  Pulse:  [] 66  Resp:  [11-30] 14  BP: ()/(57-91) 114/69  SpO2:  [90 %-100 %] 97 %    Physical Exam   Physical Exam   Constitutional: No distress.   HENT:   Head: Normocephalic and atraumatic.   Eyes: Conjunctivae are normal. Right eye exhibits no discharge. Left eye exhibits no discharge. No scleral icterus.   Neck: No tracheal deviation present. No thyromegaly present.   Cardiovascular: Normal rate, regular " rhythm, normal heart sounds and intact distal pulses.  Exam reveals no gallop and no friction rub.    No murmur heard.  Pulmonary/Chest: Effort normal and breath sounds normal. No stridor. No respiratory distress. He has no wheezes. He has no rales. He exhibits no tenderness.   Abdominal: Soft. He exhibits no distension. There is no tenderness.   Musculoskeletal: He exhibits no edema, tenderness or deformity.   Lymphadenopathy:     He has no cervical adenopathy.   Neurological: He is alert.   Skin: Skin is warm. No rash noted. He is not diaphoretic. No erythema. No pallor.   Psychiatric: He has a normal mood and affect. His behavior is normal. Judgment and thought content normal.   Nursing note and vitals reviewed.      Medications  Current Facility-Administered Medications   Medication Dose Route Frequency Provider Last Rate Last Dose   • octreotide (SANDOSTATIN) 1,250 mcg in  mL Infusion  50 mcg/hr Intravenous Continuous Joleen Ely D.O. 10 mL/hr at 03/12/19 0854 50 mcg/hr at 03/12/19 0854   • senna-docusate (PERICOLACE or SENOKOT S) 8.6-50 MG per tablet 2 Tab  2 Tab Oral BID Brittanie Richardson M.D.   Stopped at 03/13/19 0600    And   • polyethylene glycol/lytes (MIRALAX) PACKET 1 Packet  1 Packet Oral QDAY PRN Brittanie Richardson M.D.        And   • magnesium hydroxide (MILK OF MAGNESIA) suspension 30 mL  30 mL Oral QDAY PRN Brittanie Richardson M.D.        And   • bisacodyl (DULCOLAX) suppository 10 mg  10 mg Rectal QDAY PRN Brittanie Richardson M.D.       • lactated ringers infusion   Intravenous Continuous Brittanie Richardson M.D. 100 mL/hr at 03/13/19 0619     • pantoprazole (PROTONIX) 80 mg in  mL Infusion  8 mg/hr Intravenous Continuous Braeden Tobin M.D. 25 mL/hr at 03/13/19 0035 8 mg/hr at 03/13/19 0035   • nicotine (NICODERM) 21 MG/24HR 21 mg  21 mg Transdermal Daily-0600 Brittanie Richardson M.D.   21 mg at 03/13/19 0513    And   • nicotine polacrilex (NICORETTE) 2 MG piece 2 mg  2 mg Oral Q HOUR PRN Brittanie Richardson M.D.        • fentaNYL (SUBLIMAZE) injection 25 mcg  25 mcg Intravenous Q2HRS PRN Brittanie Richardson M.D.   25 mcg at 03/13/19 0256       Fluids    Intake/Output Summary (Last 24 hours) at 03/13/19 0701  Last data filed at 03/13/19 0600   Gross per 24 hour   Intake             4415 ml   Output             1850 ml   Net             2565 ml       Laboratory      Recent Labs      03/12/19   0809   TROPONINI  <0.02     Recent Labs      03/12/19   0809  03/13/19   0255   SODIUM  137  136   POTASSIUM  3.5*  4.0   CHLORIDE  102  110   CO2  22  23   BUN  12  10   CREATININE  0.83  0.64   CALCIUM  8.4  7.4*     Recent Labs      03/12/19   0809  03/13/19   0255   ALTSGPT  79*  51*   ASTSGOT  92*  56*   ALKPHOSPHAT  63  46   TBILIRUBIN  2.1*  1.7*   LIPASE  35   --    GLUCOSE  222*  78     Recent Labs      03/12/19   0809  03/13/19   0255  03/13/19   0410   WBC  5.6   --   1.4*   NEUTSPOLYS  72.20*   --   47.90   LYMPHOCYTES  16.40*   --   35.20   MONOCYTES  8.60   --   12.00   EOSINOPHILS  1.60   --   4.20   BASOPHILS  0.70   --   0.70   ASTSGOT  92*  56*   --    ALTSGPT  79*  51*   --    ALKPHOSPHAT  63  46   --    TBILIRUBIN  2.1*  1.7*   --      Recent Labs      03/12/19   0809   03/12/19   1712  03/13/19   0035  03/13/19   0410   RBC  3.24*   --    --    --   2.55*   HEMOGLOBIN  10.2*   < >  6.8*  7.1*  7.9*   HEMATOCRIT  30.3*   < >  19.9*  21.3*  23.4*   PLATELETCT  55*   --    --    --   18*   PROTHROMBTM  18.9*   --    --    --    --    APTT  29.8   --    --    --    --    INR  1.60*   --    --    --    --     < > = values in this interval not displayed.       Imaging  X-Ray:  I have personally reviewed the images and compared with prior images.    Assessment/Plan  * Hemorrhagic shock (HCC)   Assessment & Plan    2ry to UGIB   Associated with GHANSHYAM, improved   IV hydration  H&H q6  Avoid NSAIDs  PRBC for HB > 6  Strict I/O       GIB (gastrointestinal bleeding)- (present on admission)   Assessment & Plan    Likely variceal origin  GI  on board, appreciate input  NPO   Protonix gtt  Oct gtt     Chronic hepatitis C without hepatic coma (HCC)- (present on admission)   Assessment & Plan    With elevated ALT/AST  Avoid tylenol      Tension headache   Assessment & Plan    IV fentanyl 25 PRN as cannot given NSAIDs nor tylenol      Acute kidney failure (HCC)   Assessment & Plan    Cr from 0.5 to 0.85 to 0.6  Recheck BMP tomorrow  Avoid nephrotoxins  Cautious electrolyte correction  Renal dose meds          Thrombocytopenia (HCC)- (present on admission)   Assessment & Plan    Chronic  Decreased from 55 to 18, I suspect this was a diluted sample as WBC dropped similarly   Repeat CBC stat  No active bleeding   Might need platelet transfusion           VTE:  Contraindicated  Ulcer: PPI  Lines: Jimenez Catheter  Ongoing indication addressed    I have performed a physical exam and reviewed and updated ROS and Plan today (3/13/2019). In review of yesterday's note (3/12/2019), there are no changes except as documented above.     Discussed patient condition and risk of morbidity and/or mortality with RN, RT, Pharmacy and Patient

## 2019-03-13 NOTE — PROGRESS NOTES
Indication:Hematemesis, melena, anemia, history of cirrhosis.   Risks, benefits, and alternatives were discussed with consenting person(s). Consenting person(s) were given an opportunity to ask questions and discuss other options. Risks including but not limited to failed or incomplete EGD, ineffective therapy, perforation, infection, bleeding, missed lesion(s), missed diagnosis, cardiac and/or pulmonary event, aspiration, stroke, possible need for surgery, hospitalization possibly prolonged, discomfort, unsuccessful and/or incomplete procedure, possible need for repeat procedures and/or additional testings, damage to adjacent organs and/or vascular structures, medication reaction, disability, death, and other adverse events possibly life-threatening. Discussion was undertaken with Layman's terms. Consenting persons stated understanding and acceptance of these risks, and wished to proceed. Consent was given in clear state of mind.

## 2019-03-13 NOTE — PROGRESS NOTES
Gianna from Lab called with critical result of PLT 18 and WBC 1.4 at 0420. Critical lab result read back to Gianna.   Dr. Birmingham notified of critical lab result at 0435.  Critical lab result read back by Dr. Birmingham.  MD to review and add orders in EPIC if needed.    0515 - 1 unit of PLT running as ordered by Dr. Birmingham.

## 2019-03-13 NOTE — PROGRESS NOTES
Unit of platelets was given by night RN around 5 this am after CBC was taken.  See doc flow sheets.

## 2019-03-13 NOTE — CARE PLAN
Problem: Fluid Volume:  Goal: Maintain a balanced intake and output  Outcome: PROGRESSING AS EXPECTED  ICU I/Os.  Intervention: Administer intravenous fluids  IV fluids running as ordered.  Intervention: Encourage fluid intake  Clear liquids encouraged prior to midnight NPO.      Problem: Physical Regulation:  Goal: Diagnostic test results will improve    Intervention: Monitor CBC values  Q8 H&H.  1 unit PRBCs given after Hgb of 6.8.

## 2019-03-13 NOTE — OR NURSING
1124 To PACU from OR via gurney. Pt sleeping, respirations spontaneous and non-labored.   1140 BP trending low, pt does not rouse to verbal or physical stimuli.  1155 No changes.  1210 Pt waking up, shivering, warm blankets applied. Abdomen is soft and non-tender. Pt denies pain or nausea.  1225 Pt tolerating sips of water.  1237 Report to TITUS Roblero.

## 2019-03-13 NOTE — ANESTHESIA PREPROCEDURE EVALUATION
ASA IV    COPD, Hep C, Anemia, Platelet Count, 29,000    Relevant Problems   (+) Acute kidney failure (HCC)   (+) Chronic hepatitis C without hepatic coma (HCC)   (+) GERD (gastroesophageal reflux disease)   (+) Hepatitis C       Physical Exam    Airway   Mallampati: II  TM distance: >3 FB  Neck ROM: full       Cardiovascular - normal exam  Rhythm: regular  Rate: normal  (-) murmur     Dental - normal exam      Very poor dentition   Pulmonary - normal exam  Breath sounds clear to auscultation     Abdominal    Neurological - normal exam                 Anesthesia Plan    ASA 3   ASA physical status 3 criteria: alcohol and/or substance dependence or abuse and active hepatitis    Plan - MAC                       Informed Consent:    Anesthetic plan and risks discussed with patient.

## 2019-03-13 NOTE — PROGRESS NOTES
Received bedside report from Madeleine QURESHI.  Patient is AO x 4 resting in bed without discomfort or distress.  1 unit PRBCs pending.  BP 90s/60s and HR 80-90 - Sinus Arrhythmia / Sinus Rhythm.  Patient denies n/v or abdominal pain at this time.  POC reviewed, gtts verified, and safety protocols in place.  Plan for patient to have EGD with Dr. Tobin at 1500 tomorrow.

## 2019-03-14 LAB
BASOPHILS # BLD AUTO: 0.6 % (ref 0–1.8)
BASOPHILS # BLD: 0.01 K/UL (ref 0–0.12)
COMMENT 1642: NORMAL
EOSINOPHIL # BLD AUTO: 0.07 K/UL (ref 0–0.51)
EOSINOPHIL NFR BLD: 4.1 % (ref 0–6.9)
ERYTHROCYTE [DISTWIDTH] IN BLOOD BY AUTOMATED COUNT: 44.8 FL (ref 35.9–50)
H PYLORI AG STL QL IA: DETECTED
HCT VFR BLD AUTO: 21.8 % (ref 42–52)
HGB BLD-MCNC: 7.4 G/DL (ref 14–18)
IMM GRANULOCYTES # BLD AUTO: 0.01 K/UL (ref 0–0.11)
IMM GRANULOCYTES NFR BLD AUTO: 0.6 % (ref 0–0.9)
LYMPHOCYTES # BLD AUTO: 0.42 K/UL (ref 1–4.8)
LYMPHOCYTES NFR BLD: 24.6 % (ref 22–41)
MCH RBC QN AUTO: 31.9 PG (ref 27–33)
MCHC RBC AUTO-ENTMCNC: 33.9 G/DL (ref 33.7–35.3)
MCV RBC AUTO: 94 FL (ref 81.4–97.8)
MONOCYTES # BLD AUTO: 0.2 K/UL (ref 0–0.85)
MONOCYTES NFR BLD AUTO: 11.7 % (ref 0–13.4)
NEUTROPHILS # BLD AUTO: 1 K/UL (ref 1.82–7.42)
NEUTROPHILS NFR BLD: 58.4 % (ref 44–72)
NRBC # BLD AUTO: 0 K/UL
NRBC BLD-RTO: 0 /100 WBC
PLATELET # BLD AUTO: 28 K/UL (ref 164–446)
PLATELETS.RETICULATED NFR BLD AUTO: 10.1 K/UL (ref 0.6–13.1)
PMV BLD AUTO: 11.8 FL (ref 9–12.9)
RBC # BLD AUTO: 2.32 M/UL (ref 4.7–6.1)
WBC # BLD AUTO: 1.7 K/UL (ref 4.8–10.8)

## 2019-03-14 PROCEDURE — 700111 HCHG RX REV CODE 636 W/ 250 OVERRIDE (IP): Performed by: INTERNAL MEDICINE

## 2019-03-14 PROCEDURE — 85055 RETICULATED PLATELET ASSAY: CPT

## 2019-03-14 PROCEDURE — 85025 COMPLETE CBC W/AUTO DIFF WBC: CPT

## 2019-03-14 PROCEDURE — 700105 HCHG RX REV CODE 258: Performed by: INTERNAL MEDICINE

## 2019-03-14 PROCEDURE — 700102 HCHG RX REV CODE 250 W/ 637 OVERRIDE(OP): Performed by: INTERNAL MEDICINE

## 2019-03-14 PROCEDURE — A9270 NON-COVERED ITEM OR SERVICE: HCPCS | Performed by: INTERNAL MEDICINE

## 2019-03-14 PROCEDURE — C9113 INJ PANTOPRAZOLE SODIUM, VIA: HCPCS | Performed by: INTERNAL MEDICINE

## 2019-03-14 PROCEDURE — 770006 HCHG ROOM/CARE - MED/SURG/GYN SEMI*

## 2019-03-14 PROCEDURE — 99233 SBSQ HOSP IP/OBS HIGH 50: CPT | Performed by: HOSPITALIST

## 2019-03-14 RX ADMIN — SODIUM CHLORIDE 8 MG/HR: 9 INJECTION, SOLUTION INTRAVENOUS at 12:47

## 2019-03-14 RX ADMIN — NICOTINE 21 MG: 21 PATCH, EXTENDED RELEASE TRANSDERMAL at 05:46

## 2019-03-14 RX ADMIN — TRAZODONE HYDROCHLORIDE 50 MG: 50 TABLET ORAL at 00:28

## 2019-03-14 RX ADMIN — SODIUM CHLORIDE 1 G: 900 INJECTION INTRAVENOUS at 05:46

## 2019-03-14 RX ADMIN — SODIUM CHLORIDE 8 MG/HR: 9 INJECTION, SOLUTION INTRAVENOUS at 03:21

## 2019-03-14 ASSESSMENT — ENCOUNTER SYMPTOMS
BLURRED VISION: 0
MYALGIAS: 0
DOUBLE VISION: 0
BLOOD IN STOOL: 0
SENSORY CHANGE: 0
WEAKNESS: 1
VOMITING: 0
HEARTBURN: 0
ORTHOPNEA: 0
SORE THROAT: 0
COUGH: 0
EYE PAIN: 0
NECK PAIN: 0
HEADACHES: 0
HEMOPTYSIS: 0
PALPITATIONS: 0
PHOTOPHOBIA: 0
NAUSEA: 0
BACK PAIN: 0
TREMORS: 0
NERVOUS/ANXIOUS: 0
CHILLS: 0
STRIDOR: 0
TINGLING: 0
SPUTUM PRODUCTION: 0
PND: 0
SPEECH CHANGE: 0
MEMORY LOSS: 0
SHORTNESS OF BREATH: 0
DIZZINESS: 0
DEPRESSION: 0
CLAUDICATION: 0
FEVER: 0
CONSTIPATION: 0

## 2019-03-14 NOTE — PROGRESS NOTES
Gianna from Lab called with critical result of WBC of 1.7 and platelet count of 28 at 0412. Critical lab result read back to Gianna.   This critical lab result is within parameters established by Dr. Richardson for this patient.    MD is already aware of critically low platelets and WBCs.

## 2019-03-14 NOTE — CARE PLAN
Problem: Safety  Goal: Will remain free from falls    Intervention: Implement fall precautions   03/13/19 2000   OTHER   Environmental Precautions Personal Belongings, Wastebasket, Call Bell etc. in Easy Reach;Report Given to Other Health Care Providers Regarding Fall Risk;Communication Sign for Patients & Families;Bed in Low Position;Mobility Assessed & Appropriate Sign Placed;Treaded Slipper Socks on Patient   Bed Alarm Yes - Alarm On         Problem: Venous Thromboembolism (VTW)/Deep Vein Thrombosis (DVT) Prevention:  Goal: Patient will participate in Venous Thrombosis (VTE)/Deep Vein Thrombosis (DVT)Prevention Measures   03/13/19 2000   Mechanical/VTE Prophylaxis   Mechanical Prophylaxis  SCDs, Sequential Compression Device   SCDs, Sequential Compression Device On   OTHER   Pharmacologic Prophylaxis Used Contraindicated per MD

## 2019-03-14 NOTE — PROGRESS NOTES
Patient continues to call for any needs. Offered to sit in chair for lunch. Declines at this time stating he is comfortable.

## 2019-03-14 NOTE — CARE PLAN
Problem: Infection  Goal: Will remain free from infection  Outcome: PROGRESSING AS EXPECTED  Patient's wbc and neutrophil count low. Placed in protective isolation. All saff and visitors will use hand hygiene before entering room.     Problem: Bowel/Gastric:  Goal: Normal bowel function is maintained or improved  Outcome: PROGRESSING AS EXPECTED  Patient had BM this morning with no evidence of bleeding.

## 2019-03-14 NOTE — PROGRESS NOTES
Assumed pt care. AAOx4. Pt denied pain or SOB. Assessment completed. Reminded pt to call for assistance. Call light within reach, bed in lowest position, bed alarm on, will continue to monitor.

## 2019-03-14 NOTE — PROGRESS NOTES
0700-Report from TITUS Salinas. POC reviewed. Pt sitting up in bed. No needs at this time. Will continue to monitor.     0900-Pt assisted up to commode for BM. No evidence of bleeding. Continues to work on breakfast.

## 2019-03-14 NOTE — PROGRESS NOTES
[facility] Medicine Daily Progress Note    Date of Service  3/14/2019    Chief Complaint  59 y.o. male admitted 3/12/2019 with hematemesis      Hospital Course  59 y.o. male who presented 3/12/2019 with known History of alcoholism, and history of prior GI bleeding in the past, admitted to the hospital after patient experience multiple episodes of hematemesis over the course of 3 days prior to admission.  Patient did admit that he was drinking until 2 days up to his admission, he was unable to quantify the amount of alcohol he was using.  This is a result of hematemesis, gastric urology was consulted, patient underwent an emergent EGD which did not show any active bleeding except for portal hypertensive gastropathy, in addition to a small 2 cm hiatal hernia, grade 1 esophageal varices.     Interval Problem Update  No acute events overnight, patient denies any futher nausea/vommiting, denies melena or hematochezia.   We will continue IV Protonix gtt for total 72 hours, followed by Omeprazole 40mg BID.  Octreotide drip d/anitra.  Monitor H/H closely.       Consultants/Specialty   GI Consultants    Code Status  Full Code    Disposition  Home when medically stable     Review of Systems  Review of Systems   Constitutional: Positive for malaise/fatigue. Negative for chills and fever.   HENT: Negative for congestion, hearing loss, sore throat and tinnitus.    Eyes: Negative for blurred vision, double vision, photophobia and pain.   Respiratory: Negative for cough, hemoptysis, sputum production, shortness of breath and stridor.    Cardiovascular: Negative for chest pain, palpitations, orthopnea, claudication and PND.   Gastrointestinal: Negative for blood in stool, constipation, heartburn, melena, nausea and vomiting.   Genitourinary: Negative for dysuria, frequency and urgency.   Musculoskeletal: Negative for back pain, myalgias and neck pain.   Neurological: Positive for weakness. Negative for dizziness, tingling,  tremors, sensory change, speech change and headaches.   Psychiatric/Behavioral: Negative for depression, memory loss and suicidal ideas. The patient is not nervous/anxious.         Physical Exam  Temp:  [36.8 °C (98.3 °F)-37.2 °C (99 °F)] 36.9 °C (98.5 °F)  Pulse:  [65-82] 65  Resp:  [16-18] 18  BP: (122-140)/(72-83) 122/72  SpO2:  [96 %-98 %] 96 %    Physical Exam   Constitutional: He is oriented to person, place, and time. He appears well-developed and well-nourished. No distress.   HENT:   Head: Normocephalic and atraumatic.   Mouth/Throat: No oropharyngeal exudate.   Eyes: Pupils are equal, round, and reactive to light. Conjunctivae are normal. Right eye exhibits no discharge. No scleral icterus.   Neck: Neck supple. No JVD present. No thyromegaly present.   Cardiovascular: Normal rate and intact distal pulses.    No murmur heard.  Pulses:       Dorsalis pedis pulses are 2+ on the right side, and 2+ on the left side.   Cap refill < 3 s   Pulmonary/Chest: Effort normal and breath sounds normal. No stridor. No respiratory distress. He has no wheezes. He has no rales.   Abdominal: Soft. Bowel sounds are normal. He exhibits no distension. There is no tenderness. There is no rebound.   Musculoskeletal: Normal range of motion. He exhibits no edema.   Neurological: He is alert and oriented to person, place, and time. No cranial nerve deficit.   Skin: Skin is warm and dry. He is not diaphoretic. No erythema.   Psychiatric: He has a normal mood and affect. His behavior is normal. Thought content normal.   Nursing note and vitals reviewed.      Fluids    Intake/Output Summary (Last 24 hours) at 03/14/19 1315  Last data filed at 03/14/19 1200   Gross per 24 hour   Intake             2930 ml   Output             4050 ml   Net            -1120 ml       Laboratory  Recent Labs      03/13/19   0410  03/13/19   0933  03/14/19   0245   WBC  1.4*  1.4*  1.7*   RBC  2.55*  2.34*  2.32*   HEMOGLOBIN  7.9*  7.3*  7.4*   HEMATOCRIT   23.4*  21.4*  21.8*   MCV  91.8  91.5  94.0   MCH  31.0  31.2  31.9   MCHC  33.8  34.1  33.9   RDW  45.1  44.5  44.8   PLATELETCT  18*  29*  28*   MPV  13.2*  11.0  11.8     Recent Labs      03/12/19   0809  03/13/19   0255   SODIUM  137  136   POTASSIUM  3.5*  4.0   CHLORIDE  102  110   CO2  22  23   GLUCOSE  222*  78   BUN  12  10   CREATININE  0.83  0.64   CALCIUM  8.4  7.4*     Recent Labs      03/12/19   0809   APTT  29.8   INR  1.60*               Imaging  DX-ABDOMEN FOR TUBE PLACEMENT   Final Result      Slightly high positioning of the NG tube which would benefit from being advanced at least 5 more centimeters           Assessment/Plan  * Hemorrhagic shock (HCC)   Assessment & Plan    As result of Upper GI bleeding, this has resolved  Patient is stable and normotensive   CTM CBCs, transfuse Hgb<7.0g/dL     GIB (gastrointestinal bleeding)- (present on admission)   Assessment & Plan    Resolved  S/p EGD showing No active bleeding  Resume Protonix gtt for 72 hours total then transition to PO PPIs 40mg BID.  Monitor H/H closely       Chronic hepatitis C without hepatic coma (HCC)- (present on admission)   Assessment & Plan    Defer to outpatient GI/ID follow up      Tension headache   Assessment & Plan    Resolved  Prn tylenol      Acute kidney failure (HCC)   Assessment & Plan    Resolved  CTM     Thrombocytopenia (HCC)- (present on admission)   Assessment & Plan    2/2 to bone marrow suppression from alcohol toxins  No signs of further bleeding CTM daily CBC's          The total time spent face to face with this patient was about 38 mins of which 60% of time was spent on counseling, review of records including pertinent lab data and studies, as well as discussing diagnostic evaluation and work up, planned therapeutic interventions and future disposition of care. Where indicated, the assessment and plan reflect discussion of patient with consultants, other healthcare providers, family members, and additional  research needed to obtain further information in formulating the plan of care of this patient.          VTE prophylaxis: SCDs

## 2019-03-15 ENCOUNTER — PATIENT OUTREACH (OUTPATIENT)
Dept: HEALTH INFORMATION MANAGEMENT | Facility: OTHER | Age: 59
End: 2019-03-15

## 2019-03-15 PROBLEM — E87.6 HYPOKALEMIA: Status: ACTIVE | Noted: 2019-03-15

## 2019-03-15 PROBLEM — A04.8 HELICOBACTER PYLORI (H. PYLORI) INFECTION: Status: ACTIVE | Noted: 2019-03-15

## 2019-03-15 LAB
25(OH)D3 SERPL-MCNC: 21 NG/ML (ref 30–100)
ALBUMIN SERPL BCP-MCNC: 3 G/DL (ref 3.2–4.9)
ALBUMIN/GLOB SERPL: 1 G/DL
ALP SERPL-CCNC: 57 U/L (ref 30–99)
ALT SERPL-CCNC: 47 U/L (ref 2–50)
ANION GAP SERPL CALC-SCNC: 4 MMOL/L (ref 0–11.9)
AST SERPL-CCNC: 50 U/L (ref 12–45)
BASOPHILS # BLD AUTO: 0.7 % (ref 0–1.8)
BASOPHILS # BLD: 0.01 K/UL (ref 0–0.12)
BILIRUB SERPL-MCNC: 0.7 MG/DL (ref 0.1–1.5)
BUN SERPL-MCNC: 5 MG/DL (ref 8–22)
CALCIUM SERPL-MCNC: 7.9 MG/DL (ref 8.4–10.2)
CHLORIDE SERPL-SCNC: 109 MMOL/L (ref 96–112)
CO2 SERPL-SCNC: 24 MMOL/L (ref 20–33)
CREAT SERPL-MCNC: 0.71 MG/DL (ref 0.5–1.4)
EOSINOPHIL # BLD AUTO: 0.07 K/UL (ref 0–0.51)
EOSINOPHIL NFR BLD: 4.8 % (ref 0–6.9)
ERYTHROCYTE [DISTWIDTH] IN BLOOD BY AUTOMATED COUNT: 43.3 FL (ref 35.9–50)
FOLATE SERPL-MCNC: 20.8 NG/ML
GLOBULIN SER CALC-MCNC: 3 G/DL (ref 1.9–3.5)
GLUCOSE SERPL-MCNC: 109 MG/DL (ref 65–99)
HCT VFR BLD AUTO: 22 % (ref 42–52)
HGB BLD-MCNC: 7.5 G/DL (ref 14–18)
IMM GRANULOCYTES # BLD AUTO: 0.02 K/UL (ref 0–0.11)
IMM GRANULOCYTES NFR BLD AUTO: 1.4 % (ref 0–0.9)
LYMPHOCYTES # BLD AUTO: 0.43 K/UL (ref 1–4.8)
LYMPHOCYTES NFR BLD: 29.3 % (ref 22–41)
MAGNESIUM SERPL-MCNC: 1.7 MG/DL (ref 1.5–2.5)
MCH RBC QN AUTO: 31.4 PG (ref 27–33)
MCHC RBC AUTO-ENTMCNC: 34.1 G/DL (ref 33.7–35.3)
MCV RBC AUTO: 92.1 FL (ref 81.4–97.8)
MONOCYTES # BLD AUTO: 0.16 K/UL (ref 0–0.85)
MONOCYTES NFR BLD AUTO: 10.9 % (ref 0–13.4)
NEUTROPHILS # BLD AUTO: 0.78 K/UL (ref 1.82–7.42)
NEUTROPHILS NFR BLD: 52.9 % (ref 44–72)
NRBC # BLD AUTO: 0 K/UL
NRBC BLD-RTO: 0 /100 WBC
PHOSPHATE SERPL-MCNC: 3.6 MG/DL (ref 2.5–4.5)
PLATELET # BLD AUTO: 32 K/UL (ref 164–446)
PMV BLD AUTO: 10.6 FL (ref 9–12.9)
POTASSIUM SERPL-SCNC: 3.1 MMOL/L (ref 3.6–5.5)
PROT SERPL-MCNC: 6 G/DL (ref 6–8.2)
RBC # BLD AUTO: 2.39 M/UL (ref 4.7–6.1)
SODIUM SERPL-SCNC: 137 MMOL/L (ref 135–145)
VIT B12 SERPL-MCNC: 755 PG/ML (ref 211–911)
WBC # BLD AUTO: 1.5 K/UL (ref 4.8–10.8)

## 2019-03-15 PROCEDURE — 36415 COLL VENOUS BLD VENIPUNCTURE: CPT

## 2019-03-15 PROCEDURE — 82607 VITAMIN B-12: CPT

## 2019-03-15 PROCEDURE — A9270 NON-COVERED ITEM OR SERVICE: HCPCS | Performed by: INTERNAL MEDICINE

## 2019-03-15 PROCEDURE — 80053 COMPREHEN METABOLIC PANEL: CPT

## 2019-03-15 PROCEDURE — 83735 ASSAY OF MAGNESIUM: CPT

## 2019-03-15 PROCEDURE — 85025 COMPLETE CBC W/AUTO DIFF WBC: CPT

## 2019-03-15 PROCEDURE — 82306 VITAMIN D 25 HYDROXY: CPT

## 2019-03-15 PROCEDURE — 99233 SBSQ HOSP IP/OBS HIGH 50: CPT | Performed by: INTERNAL MEDICINE

## 2019-03-15 PROCEDURE — 84100 ASSAY OF PHOSPHORUS: CPT

## 2019-03-15 PROCEDURE — 700105 HCHG RX REV CODE 258: Performed by: INTERNAL MEDICINE

## 2019-03-15 PROCEDURE — 700111 HCHG RX REV CODE 636 W/ 250 OVERRIDE (IP): Performed by: INTERNAL MEDICINE

## 2019-03-15 PROCEDURE — C9113 INJ PANTOPRAZOLE SODIUM, VIA: HCPCS | Performed by: INTERNAL MEDICINE

## 2019-03-15 PROCEDURE — 82746 ASSAY OF FOLIC ACID SERUM: CPT

## 2019-03-15 PROCEDURE — 770006 HCHG ROOM/CARE - MED/SURG/GYN SEMI*

## 2019-03-15 PROCEDURE — 83550 IRON BINDING TEST: CPT

## 2019-03-15 PROCEDURE — 83540 ASSAY OF IRON: CPT

## 2019-03-15 PROCEDURE — 700102 HCHG RX REV CODE 250 W/ 637 OVERRIDE(OP): Performed by: INTERNAL MEDICINE

## 2019-03-15 RX ORDER — OMEPRAZOLE 20 MG/1
40 CAPSULE, DELAYED RELEASE ORAL 2 TIMES DAILY
Status: DISCONTINUED | OUTPATIENT
Start: 2019-03-15 | End: 2019-03-17 | Stop reason: HOSPADM

## 2019-03-15 RX ORDER — BISMUTH SUBSALICYLATE 262 MG/1
524 TABLET, CHEWABLE ORAL
Status: DISCONTINUED | OUTPATIENT
Start: 2019-03-15 | End: 2019-03-17 | Stop reason: HOSPADM

## 2019-03-15 RX ORDER — METRONIDAZOLE 500 MG/1
500 TABLET ORAL EVERY 12 HOURS
Status: DISCONTINUED | OUTPATIENT
Start: 2019-03-15 | End: 2019-03-17 | Stop reason: HOSPADM

## 2019-03-15 RX ORDER — TETRACYCLINE HYDROCHLORIDE 250 MG/1
500 CAPSULE ORAL 4 TIMES DAILY
Status: DISCONTINUED | OUTPATIENT
Start: 2019-03-15 | End: 2019-03-17 | Stop reason: HOSPADM

## 2019-03-15 RX ORDER — POTASSIUM CHLORIDE 20 MEQ/1
20 TABLET, EXTENDED RELEASE ORAL 2 TIMES DAILY
Status: DISCONTINUED | OUTPATIENT
Start: 2019-03-15 | End: 2019-03-17 | Stop reason: HOSPADM

## 2019-03-15 RX ADMIN — TETRACYCLINE HYDROCHLORIDE 500 MG: 250 CAPSULE ORAL at 20:44

## 2019-03-15 RX ADMIN — BISMUTH SUBSALICYLATE 524 MG: 262 TABLET, CHEWABLE ORAL at 11:29

## 2019-03-15 RX ADMIN — OMEPRAZOLE 40 MG: 20 CAPSULE, DELAYED RELEASE ORAL at 17:21

## 2019-03-15 RX ADMIN — SODIUM CHLORIDE 1 G: 900 INJECTION INTRAVENOUS at 06:18

## 2019-03-15 RX ADMIN — NICOTINE 21 MG: 21 PATCH, EXTENDED RELEASE TRANSDERMAL at 06:18

## 2019-03-15 RX ADMIN — POTASSIUM CHLORIDE 20 MEQ: 1500 TABLET, EXTENDED RELEASE ORAL at 20:44

## 2019-03-15 RX ADMIN — BISMUTH SUBSALICYLATE 524 MG: 262 TABLET, CHEWABLE ORAL at 20:44

## 2019-03-15 RX ADMIN — METRONIDAZOLE 500 MG: 500 TABLET ORAL at 10:22

## 2019-03-15 RX ADMIN — TETRACYCLINE HYDROCHLORIDE 500 MG: 250 CAPSULE ORAL at 14:52

## 2019-03-15 RX ADMIN — OMEPRAZOLE 40 MG: 20 CAPSULE, DELAYED RELEASE ORAL at 10:22

## 2019-03-15 RX ADMIN — BISMUTH SUBSALICYLATE 524 MG: 262 TABLET, CHEWABLE ORAL at 17:21

## 2019-03-15 RX ADMIN — METRONIDAZOLE 500 MG: 500 TABLET ORAL at 17:21

## 2019-03-15 RX ADMIN — SODIUM CHLORIDE 8 MG/HR: 9 INJECTION, SOLUTION INTRAVENOUS at 00:10

## 2019-03-15 ASSESSMENT — ENCOUNTER SYMPTOMS
VOMITING: 0
EYE REDNESS: 0
WEAKNESS: 1
EYE DISCHARGE: 0
ROS GI COMMENTS: POOR APPETITE
SINUS PAIN: 0
ABDOMINAL PAIN: 0
DIZZINESS: 0
SHORTNESS OF BREATH: 0
SORE THROAT: 0
COUGH: 0
HEADACHES: 0
NAUSEA: 0

## 2019-03-15 ASSESSMENT — LIFESTYLE VARIABLES: SUBSTANCE_ABUSE: 1

## 2019-03-15 NOTE — PROGRESS NOTES
Received report from night RN.  Assumed pt care.  Denies pain.  C/O mild nausea.  Discussed plan of care with patient. No other needs at this time.

## 2019-03-15 NOTE — DISCHARGE PLANNING
Care Transition Team Assessment    Patient lives alone and is independent with all ADLS.  Patient has no hx of HH or DME use.  [facility]     Information Source  Orientation : Oriented x 4  Information Given By: Patient  Informant's Name: Inocencio  Who is responsible for making decisions for patient? : Patient    Elopement Risk  Legal Hold: No  Ambulatory or Self Mobile in Wheelchair: Yes  Disoriented: No  Psychiatric Symptoms: None  History of Wandering: No  Elopement this Admit: No  Vocalizing Wanting to Leave: No  Displays Behaviors, Body Language Wanting to Leave: No-Not at Risk for Elopement  Elopement Risk: Not at Risk for Elopement    Interdisciplinary Discharge Planning  Does Admitting Nurse Feel This Could be a Complex Discharge?: No  [facility]   Lives with - Patient's Self Care Capacity: Alone and Able to Care For Self  Patient or legal guardian wants to designate a caregiver (see row info): No  Support Systems: None  Housing / Facility: [address]  Do You Take your Prescribed Medications Regularly: Yes  Able to Return to Previous ADL's: Yes  Mobility Issues: No  Prior Services: None  Patient Expects to be Discharged to:: home  Assistance Needed: No  Durable Medical Equipment: Not Applicable    Discharge Preparedness  What is your plan after discharge?: Home with help  What are your discharge supports?: Sibling  Prior Functional Level: Independent with Activities of Daily Living    Functional Assesment  Prior Functional Level: Independent with Activities of Daily Living    Finances  Financial Barriers to Discharge: No  Prescription Coverage: Yes    Vision / Hearing Impairment  Vision Impairment : Yes  Right Eye Vision: Wears Glasses  Left Eye Vision: Wears Glasses  Hearing Impairment : No    Domestic Abuse  Have you ever been the victim of abuse or violence?: No  Physical Abuse or Sexual Abuse: No  Verbal Abuse or Emotional Abuse: No  Possible Abuse Reported  to:: Not Applicable    Psychological Assessment  History of Substance Abuse: Alcohol    Discharge Risks or Barriers  Discharge risks or barriers?: No  Patient risk factors: Substance abuse    Anticipated Discharge Information  Anticipated discharge disposition: Home

## 2019-03-15 NOTE — PROGRESS NOTES
Pt downgraded from ICU and comfortable in bed in 216-1. No c/o or needs at this time. Hot tea provided and pt oriented to new room.

## 2019-03-15 NOTE — CARE PLAN
Problem: Safety  Goal: Will remain free from injury  Outcome: PROGRESSING AS EXPECTED  Bed in low and locked position.  Call light within reach.     Problem: Knowledge Deficit  Goal: Knowledge of disease process/condition, treatment plan, diagnostic tests, and medications will improve  Outcome: PROGRESSING AS EXPECTED

## 2019-03-15 NOTE — CARE PLAN
Problem: Safety  Goal: Will remain free from injury  Outcome: PROGRESSING AS EXPECTED  Bed in low and locked position.  Side rails up X2.  Patient uses call light appropriately for assistance.  Protective Isolation continues.  Nonskid slipper on feet.  Hourly rounding continues.    Problem: Pain Management  Goal: Pain level will decrease to patient's comfort goal  Outcome: PROGRESSING AS EXPECTED   03/14/19 1900   OTHER   Pain Rating Scale (NPRS) 0   Non Verbal Scale  Calm;Unlabored Breathing   Denies pain.

## 2019-03-15 NOTE — PROGRESS NOTES
Assumed care of patient at 1900.  Patient is alert and oriented, resting in bed watching television and denies needs at this time.  Protonix drip infusing.  Patient appears to be in no distress.  Encouraged patient to call for assistance with ambulation.  Hourly rounding continues.

## 2019-03-16 PROBLEM — E61.1 IRON DEFICIENCY: Status: ACTIVE | Noted: 2019-03-16

## 2019-03-16 LAB
ANION GAP SERPL CALC-SCNC: 7 MMOL/L (ref 0–11.9)
BASOPHILS # BLD AUTO: 0.6 % (ref 0–1.8)
BASOPHILS # BLD: 0.01 K/UL (ref 0–0.12)
BUN SERPL-MCNC: <5 MG/DL (ref 8–22)
CALCIUM SERPL-MCNC: 8.5 MG/DL (ref 8.4–10.2)
CHLORIDE SERPL-SCNC: 106 MMOL/L (ref 96–112)
CO2 SERPL-SCNC: 23 MMOL/L (ref 20–33)
CREAT SERPL-MCNC: 0.64 MG/DL (ref 0.5–1.4)
EOSINOPHIL # BLD AUTO: 0.07 K/UL (ref 0–0.51)
EOSINOPHIL NFR BLD: 4.1 % (ref 0–6.9)
ERYTHROCYTE [DISTWIDTH] IN BLOOD BY AUTOMATED COUNT: 43.9 FL (ref 35.9–50)
GLUCOSE SERPL-MCNC: 100 MG/DL (ref 65–99)
HCT VFR BLD AUTO: 23 % (ref 42–52)
HGB BLD-MCNC: 7.8 G/DL (ref 14–18)
IMM GRANULOCYTES # BLD AUTO: 0 K/UL (ref 0–0.11)
IMM GRANULOCYTES NFR BLD AUTO: 0 % (ref 0–0.9)
IRON SATN MFR SERPL: 7 % (ref 15–55)
IRON SATN MFR SERPL: 8 % (ref 15–55)
IRON SERPL-MCNC: 21 UG/DL (ref 50–180)
IRON SERPL-MCNC: 23 UG/DL (ref 50–180)
LYMPHOCYTES # BLD AUTO: 0.4 K/UL (ref 1–4.8)
LYMPHOCYTES NFR BLD: 23.7 % (ref 22–41)
MAGNESIUM SERPL-MCNC: 1.8 MG/DL (ref 1.5–2.5)
MCH RBC QN AUTO: 31.8 PG (ref 27–33)
MCHC RBC AUTO-ENTMCNC: 33.9 G/DL (ref 33.7–35.3)
MCV RBC AUTO: 93.9 FL (ref 81.4–97.8)
MONOCYTES # BLD AUTO: 0.2 K/UL (ref 0–0.85)
MONOCYTES NFR BLD AUTO: 11.8 % (ref 0–13.4)
NEUTROPHILS # BLD AUTO: 1.01 K/UL (ref 1.82–7.42)
NEUTROPHILS NFR BLD: 59.8 % (ref 44–72)
NRBC # BLD AUTO: 0 K/UL
NRBC BLD-RTO: 0 /100 WBC
PLATELET # BLD AUTO: 31 K/UL (ref 164–446)
PMV BLD AUTO: 12 FL (ref 9–12.9)
POTASSIUM SERPL-SCNC: 3.5 MMOL/L (ref 3.6–5.5)
RBC # BLD AUTO: 2.45 M/UL (ref 4.7–6.1)
SODIUM SERPL-SCNC: 136 MMOL/L (ref 135–145)
TIBC SERPL-MCNC: 287 UG/DL (ref 250–450)
TIBC SERPL-MCNC: 314 UG/DL (ref 250–450)
WBC # BLD AUTO: 1.7 K/UL (ref 4.8–10.8)

## 2019-03-16 PROCEDURE — A9270 NON-COVERED ITEM OR SERVICE: HCPCS | Performed by: INTERNAL MEDICINE

## 2019-03-16 PROCEDURE — 80048 BASIC METABOLIC PNL TOTAL CA: CPT

## 2019-03-16 PROCEDURE — 99232 SBSQ HOSP IP/OBS MODERATE 35: CPT | Performed by: INTERNAL MEDICINE

## 2019-03-16 PROCEDURE — 83735 ASSAY OF MAGNESIUM: CPT

## 2019-03-16 PROCEDURE — 700105 HCHG RX REV CODE 258: Performed by: INTERNAL MEDICINE

## 2019-03-16 PROCEDURE — 700111 HCHG RX REV CODE 636 W/ 250 OVERRIDE (IP): Performed by: INTERNAL MEDICINE

## 2019-03-16 PROCEDURE — 770006 HCHG ROOM/CARE - MED/SURG/GYN SEMI*

## 2019-03-16 PROCEDURE — 700102 HCHG RX REV CODE 250 W/ 637 OVERRIDE(OP): Performed by: INTERNAL MEDICINE

## 2019-03-16 PROCEDURE — 83540 ASSAY OF IRON: CPT

## 2019-03-16 PROCEDURE — 85025 COMPLETE CBC W/AUTO DIFF WBC: CPT

## 2019-03-16 PROCEDURE — 36415 COLL VENOUS BLD VENIPUNCTURE: CPT

## 2019-03-16 PROCEDURE — 83550 IRON BINDING TEST: CPT

## 2019-03-16 RX ORDER — ACETAMINOPHEN 325 MG/1
650 TABLET ORAL ONCE
Status: COMPLETED | OUTPATIENT
Start: 2019-03-16 | End: 2019-03-16

## 2019-03-16 RX ORDER — DIPHENHYDRAMINE HYDROCHLORIDE 50 MG/ML
25 INJECTION INTRAMUSCULAR; INTRAVENOUS ONCE
Status: COMPLETED | OUTPATIENT
Start: 2019-03-16 | End: 2019-03-16

## 2019-03-16 RX ORDER — DIPHENHYDRAMINE HCL 25 MG
25 TABLET ORAL ONCE
Status: COMPLETED | OUTPATIENT
Start: 2019-03-16 | End: 2019-03-16

## 2019-03-16 RX ADMIN — METRONIDAZOLE 500 MG: 500 TABLET ORAL at 05:32

## 2019-03-16 RX ADMIN — SODIUM CHLORIDE 25 MG: 9 INJECTION, SOLUTION INTRAVENOUS at 15:54

## 2019-03-16 RX ADMIN — NICOTINE 21 MG: 21 PATCH, EXTENDED RELEASE TRANSDERMAL at 05:33

## 2019-03-16 RX ADMIN — TETRACYCLINE HYDROCHLORIDE 500 MG: 250 CAPSULE ORAL at 17:47

## 2019-03-16 RX ADMIN — SODIUM CHLORIDE 1925 MG: 9 INJECTION, SOLUTION INTRAVENOUS at 17:43

## 2019-03-16 RX ADMIN — ACETAMINOPHEN 650 MG: 325 TABLET, FILM COATED ORAL at 15:54

## 2019-03-16 RX ADMIN — OMEPRAZOLE 40 MG: 20 CAPSULE, DELAYED RELEASE ORAL at 05:33

## 2019-03-16 RX ADMIN — POTASSIUM CHLORIDE 20 MEQ: 1500 TABLET, EXTENDED RELEASE ORAL at 17:46

## 2019-03-16 RX ADMIN — BISMUTH SUBSALICYLATE 524 MG: 262 TABLET, CHEWABLE ORAL at 21:38

## 2019-03-16 RX ADMIN — BISMUTH SUBSALICYLATE 524 MG: 262 TABLET, CHEWABLE ORAL at 17:47

## 2019-03-16 RX ADMIN — TETRACYCLINE HYDROCHLORIDE 500 MG: 250 CAPSULE ORAL at 21:38

## 2019-03-16 RX ADMIN — POTASSIUM CHLORIDE 20 MEQ: 1500 TABLET, EXTENDED RELEASE ORAL at 05:32

## 2019-03-16 RX ADMIN — OMEPRAZOLE 40 MG: 20 CAPSULE, DELAYED RELEASE ORAL at 17:46

## 2019-03-16 RX ADMIN — DIPHENHYDRAMINE HCL 25 MG: 25 TABLET ORAL at 15:54

## 2019-03-16 RX ADMIN — METRONIDAZOLE 500 MG: 500 TABLET ORAL at 17:47

## 2019-03-16 RX ADMIN — BISMUTH SUBSALICYLATE 524 MG: 262 TABLET, CHEWABLE ORAL at 14:00

## 2019-03-16 RX ADMIN — BISMUTH SUBSALICYLATE 524 MG: 262 TABLET, CHEWABLE ORAL at 06:59

## 2019-03-16 RX ADMIN — TETRACYCLINE HYDROCHLORIDE 500 MG: 250 CAPSULE ORAL at 14:00

## 2019-03-16 ASSESSMENT — ENCOUNTER SYMPTOMS
SINUS PAIN: 0
NAUSEA: 0
TREMORS: 0
FEVER: 0
DIAPHORESIS: 0
CHILLS: 0
ROS GI COMMENTS: POOR APPETITE
PALPITATIONS: 0
DIZZINESS: 0
SHORTNESS OF BREATH: 0
WHEEZING: 0
COUGH: 0
EYE DISCHARGE: 0
BRUISES/BLEEDS EASILY: 0
CONSTIPATION: 0
EYE REDNESS: 0
WEAKNESS: 1
VOMITING: 0
HEADACHES: 0
ABDOMINAL PAIN: 0

## 2019-03-16 ASSESSMENT — LIFESTYLE VARIABLES: SUBSTANCE_ABUSE: 1

## 2019-03-16 NOTE — PROGRESS NOTES
Hospital Medicine Daily Progress Note    Date of Service  3/15/2019    Chief Complaint  59 y.o. male admitted 3/12/2019 with hematemesis      Hospital Course  59 y.o. male who presented 3/12/2019 with known History of alcoholism, and history of prior GI bleeding in the past, admitted to the hospital after patient experience multiple episodes of hematemesis over the course of 3 days prior to admission.  Patient did admit that he was drinking until 2 days up to his admission, he was unable to quantify the amount of alcohol he was using.  This is a result of hematemesis, gastric urology was consulted, patient underwent an emergent EGD which did not show any active bleeding except for portal hypertensive gastropathy, in addition to a small 2 cm hiatal hernia, grade 1 esophageal varices.     Interval Problem Update  No acute events overnight, patient denies any futher nausea/vommiting, denies melena or hematochezia.   We will continue IV Protonix gtt for total 72 hours, followed by Omeprazole 40mg BID.  Octreotide drip d/anitra.  Monitor H/H closely.       Consultants/Specialty   GI Consultants    Code Status  Full Code    Disposition  Home when medically stable     Review of Systems  Review of Systems   Constitutional: Positive for malaise/fatigue.   HENT: Negative for sinus pain and sore throat.    Eyes: Negative for discharge and redness.   Respiratory: Negative for cough and shortness of breath.    Cardiovascular: Negative for chest pain and leg swelling.   Gastrointestinal: Positive for melena (Runny). Negative for abdominal pain, nausea and vomiting.        Poor appetite   Musculoskeletal: Negative for joint pain.   Skin: Negative for itching and rash.   Neurological: Positive for weakness. Negative for dizziness and headaches.   Psychiatric/Behavioral: Positive for substance abuse.        Physical Exam  Temp:  [36.7 °C (98.1 °F)-36.9 °C (98.4 °F)] 36.7 °C (98.1 °F)  Pulse:  [75-79] 79  Resp:  [18-20] 18  BP:  (102-124)/(57-76) 102/61  SpO2:  [94 %-98 %] 98 %    Physical Exam   Constitutional: He is oriented to person, place, and time. He appears well-developed and well-nourished. No distress.   Frail, chronically ill   HENT:   Head: Normocephalic and atraumatic.   Poor dentition   Eyes: Pupils are equal, round, and reactive to light. Conjunctivae and EOM are normal. Right eye exhibits no discharge. Left eye exhibits no discharge.   Neck: Neck supple.   Cardiovascular: Normal rate and regular rhythm.    Pulmonary/Chest: Effort normal and breath sounds normal.   Abdominal: Soft. Bowel sounds are normal. He exhibits no distension. There is no tenderness.   Musculoskeletal: He exhibits no edema or tenderness.   Neurological: He is alert and oriented to person, place, and time. No cranial nerve deficit.   Skin: Skin is warm and dry. He is not diaphoretic. There is pallor.   Psychiatric: He has a normal mood and affect.   Nursing note and vitals reviewed.      Fluids    Intake/Output Summary (Last 24 hours) at 03/15/19 2013  Last data filed at 03/15/19 1600   Gross per 24 hour   Intake             1890 ml   Output                0 ml   Net             1890 ml       Laboratory  Recent Labs      03/13/19   0933  03/14/19   0245  03/15/19   1037   WBC  1.4*  1.7*  1.5*   RBC  2.34*  2.32*  2.39*   HEMOGLOBIN  7.3*  7.4*  7.5*   HEMATOCRIT  21.4*  21.8*  22.0*   MCV  91.5  94.0  92.1   MCH  31.2  31.9  31.4   MCHC  34.1  33.9  34.1   RDW  44.5  44.8  43.3   PLATELETCT  29*  28*  32*   MPV  11.0  11.8  10.6     Recent Labs      03/13/19   0255  03/15/19   1037   SODIUM  136  137   POTASSIUM  4.0  3.1*   CHLORIDE  110  109   CO2  23  24   GLUCOSE  78  109*   BUN  10  5*   CREATININE  0.64  0.71   CALCIUM  7.4*  7.9*                   Imaging  DX-ABDOMEN FOR TUBE PLACEMENT   Final Result      Slightly high positioning of the NG tube which would benefit from being advanced at least 5 more centimeters           Assessment/Plan  *  Hemorrhagic shock (HCC)   Assessment & Plan    As result of Upper GI bleeding, this has resolved  Patient is stable and normotensive   CTM CBCs, transfuse Hgb<7.0g/dL  Still having melena     GIB (gastrointestinal bleeding)- (present on admission)   Assessment & Plan    Resolved  S/p EGD showing No active bleeding  Transition to oral PPI  Treat H. pylori       Chronic hepatitis C without hepatic coma (HCC)- (present on admission)   Assessment & Plan    Defer to outpatient GI/ID follow up      Helicobacter pylori (H. pylori) infection   Assessment & Plan      Tetracycline regimen likely the safest given his electrolyte issues     Hypokalemia   Assessment & Plan    Replace p.o., check magnesium     Tension headache   Assessment & Plan    Resolved  Prn tylenol      Acute kidney failure (HCC)   Assessment & Plan    Resolved  CTM     Pancytopenia (HCC)- (present on admission)   Assessment & Plan      Continue to monitor  I do not think liver disease explains the pancytopenia but this may be related to his hepatitis C  Outpatient follow-up with hematology  B12 and folate levels are normal-iron studies pending                   VTE prophylaxis: SCDs

## 2019-03-16 NOTE — PROGRESS NOTES
Assumed care of patient at 1900.  Patient is alert and oriented, resting in bed watching television.  PIVs remains saline locked.  Patient denies needs and appears to be in no distress.  Hourly rounding continues.

## 2019-03-16 NOTE — CARE PLAN
Problem: Pain Management  Goal: Pain level will decrease to patient's comfort goal  Outcome: PROGRESSING AS EXPECTED   03/15/19 2052   OTHER   Pain Rating Scale (NPRS) 0   Non Verbal Scale  Calm;Unlabored Breathing   Patient continues to deny pain.    Problem: Skin Integrity  Goal: Risk for impaired skin integrity will decrease  Outcome: PROGRESSING AS EXPECTED  Skin remains intact.  Patient able to change positions frequently per self.

## 2019-03-16 NOTE — PROGRESS NOTES
[facility] Medicine Daily Progress Note    Date of Service  3/16/2019    Chief Complaint  59 y.o. male admitted 3/12/2019 with hematemesis      Hospital Course  59 y.o. male who presented 3/12/2019 with known History of alcoholism, and history of prior GI bleeding in the past, admitted to the hospital after patient experience multiple episodes of hematemesis over the course of 3 days prior to admission.  Patient did admit that he was drinking until 2 days up to his admission, he was unable to quantify the amount of alcohol he was using.  This is a result of hematemesis, gastric urology was consulted, patient underwent an emergent EGD which did not show any active bleeding except for portal hypertensive gastropathy, in addition to a small 2 cm hiatal hernia, grade 1 esophageal varices.     Interval Problem Update  He is now on oral ppi and hemoglobin is stable  Iron stores are low and patient has persistently low blood cell counts.       Consultants/Specialty   GI Consultants    Code Status  Full Code    Disposition  Home     Review of Systems  Review of Systems   Constitutional: Positive for malaise/fatigue. Negative for chills, diaphoresis and fever.   HENT: Negative for congestion and sinus pain.    Eyes: Negative for discharge and redness.   Respiratory: Negative for cough, shortness of breath and wheezing.    Cardiovascular: Negative for chest pain, palpitations and leg swelling.   Gastrointestinal: Negative for abdominal pain, constipation, melena, nausea and vomiting.        Poor appetite   Genitourinary: Negative for dysuria, frequency and hematuria.   Musculoskeletal: Negative for joint pain.   Skin: Negative for itching.   Neurological: Positive for weakness. Negative for dizziness, tremors and headaches.   Endo/Heme/Allergies: Does not bruise/bleed easily.   Psychiatric/Behavioral: Positive for substance abuse.        Physical Exam  Temp:  [36.7 °C (98.1 °F)-36.8 °C (98.3 °F)] 36.8 °C (98.3  °F)  Pulse:  [64-79] 68  Resp:  [18] 18  BP: (102-115)/(60-67) 115/67  SpO2:  [97 %-100 %] 100 %    Physical Exam   Constitutional: He is oriented to person, place, and time. He appears cachectic. He is easily aroused. No distress.   HENT:   Head: Atraumatic.   Mouth/Throat: Oropharynx is clear and moist. No oropharyngeal exudate.   Poor dentition   Eyes: Conjunctivae are normal. Right eye exhibits no discharge. Left eye exhibits no discharge. No scleral icterus.   Neck: Neck supple.   Cardiovascular: Normal rate and regular rhythm.    Pulmonary/Chest: Effort normal and breath sounds normal. He has no wheezes. He has no rales.   Abdominal: Soft. He exhibits no distension. There is no tenderness.   Musculoskeletal: He exhibits no edema or tenderness.   Neurological: He is alert, oriented to person, place, and time and easily aroused. No cranial nerve deficit.   Skin: No rash noted. He is not diaphoretic. No erythema. There is pallor.   Psychiatric: He has a normal mood and affect.   Nursing note and vitals reviewed.      Fluids    Intake/Output Summary (Last 24 hours) at 03/16/19 1235  Last data filed at 03/16/19 0900   Gross per 24 hour   Intake             1060 ml   Output                0 ml   Net             1060 ml       Laboratory  Recent Labs      03/14/19   0245  03/15/19   1037  03/16/19   0324   WBC  1.7*  1.5*  1.7*   RBC  2.32*  2.39*  2.45*   HEMOGLOBIN  7.4*  7.5*  7.8*   HEMATOCRIT  21.8*  22.0*  23.0*   MCV  94.0  92.1  93.9   MCH  31.9  31.4  31.8   MCHC  33.9  34.1  33.9   RDW  44.8  43.3  43.9   PLATELETCT  28*  32*  31*   MPV  11.8  10.6  12.0     Recent Labs      03/15/19   1037  03/16/19   0324   SODIUM  137  136   POTASSIUM  3.1*  3.5*   CHLORIDE  109  106   CO2  24  23   GLUCOSE  109*  100*   BUN  5*  <5*   CREATININE  0.71  0.64   CALCIUM  7.9*  8.5                   Imaging  DX-ABDOMEN FOR TUBE PLACEMENT   Final Result      Slightly high positioning of the NG tube which would benefit from  being advanced at least 5 more centimeters           Assessment/Plan  * Hemorrhagic shock (HCC)   Assessment & Plan    As result of Upper GI bleeding, this has resolved       GIB (gastrointestinal bleeding)- (present on admission)   Assessment & Plan    Resolved  S/p EGD showing No active bleeding  Continue oral ppi and h. Pylori treatment       Chronic hepatitis C without hepatic coma (HCC)- (present on admission)   Assessment & Plan    outpatient follow up      Iron deficiency   Assessment & Plan    Will order iv replacement as patient is severely deficient and has low blood cell counts     Helicobacter pylori (H. pylori) infection   Assessment & Plan      Tetracycline regimen ordered     Hypokalemia   Assessment & Plan    Replace      Tension headache   Assessment & Plan    Resolved       Acute kidney failure (HCC)   Assessment & Plan    Resolved       Pancytopenia (HCC)- (present on admission)   Assessment & Plan    Possibly due to liver disease  Monitor labs, stable this admission                   VTE prophylaxis: SCDs

## 2019-03-17 VITALS
OXYGEN SATURATION: 98 % | WEIGHT: 126.1 LBS | SYSTOLIC BLOOD PRESSURE: 96 MMHG | TEMPERATURE: 97.5 F | BODY MASS INDEX: 19.11 KG/M2 | DIASTOLIC BLOOD PRESSURE: 66 MMHG | HEIGHT: 68 IN | RESPIRATION RATE: 18 BRPM | HEART RATE: 65 BPM

## 2019-03-17 LAB
ANION GAP SERPL CALC-SCNC: 7 MMOL/L (ref 0–11.9)
BUN SERPL-MCNC: <5 MG/DL (ref 8–22)
CALCIUM SERPL-MCNC: 8.5 MG/DL (ref 8.4–10.2)
CHLORIDE SERPL-SCNC: 108 MMOL/L (ref 96–112)
CO2 SERPL-SCNC: 22 MMOL/L (ref 20–33)
CREAT SERPL-MCNC: 0.77 MG/DL (ref 0.5–1.4)
GLUCOSE SERPL-MCNC: 102 MG/DL (ref 65–99)
HIV 1+2 AB+HIV1 P24 AG SERPL QL IA: NON REACTIVE
POTASSIUM SERPL-SCNC: 3.8 MMOL/L (ref 3.6–5.5)
SODIUM SERPL-SCNC: 137 MMOL/L (ref 135–145)

## 2019-03-17 PROCEDURE — 87389 HIV-1 AG W/HIV-1&-2 AB AG IA: CPT

## 2019-03-17 PROCEDURE — 99239 HOSP IP/OBS DSCHRG MGMT >30: CPT | Performed by: INTERNAL MEDICINE

## 2019-03-17 PROCEDURE — A9270 NON-COVERED ITEM OR SERVICE: HCPCS | Performed by: INTERNAL MEDICINE

## 2019-03-17 PROCEDURE — A9270 NON-COVERED ITEM OR SERVICE: HCPCS | Performed by: HOSPITALIST

## 2019-03-17 PROCEDURE — 700111 HCHG RX REV CODE 636 W/ 250 OVERRIDE (IP): Performed by: HOSPITALIST

## 2019-03-17 PROCEDURE — 700102 HCHG RX REV CODE 250 W/ 637 OVERRIDE(OP): Performed by: INTERNAL MEDICINE

## 2019-03-17 PROCEDURE — 700102 HCHG RX REV CODE 250 W/ 637 OVERRIDE(OP): Performed by: HOSPITALIST

## 2019-03-17 PROCEDURE — 36415 COLL VENOUS BLD VENIPUNCTURE: CPT

## 2019-03-17 PROCEDURE — 80048 BASIC METABOLIC PNL TOTAL CA: CPT

## 2019-03-17 RX ORDER — PROPRANOLOL HYDROCHLORIDE 20 MG/1
20 TABLET ORAL 2 TIMES DAILY
Qty: 60 TAB | Refills: 3 | Status: ON HOLD | OUTPATIENT
Start: 2019-03-17 | End: 2021-04-23

## 2019-03-17 RX ORDER — ONDANSETRON 2 MG/ML
4 INJECTION INTRAMUSCULAR; INTRAVENOUS EVERY 6 HOURS PRN
Status: DISCONTINUED | OUTPATIENT
Start: 2019-03-17 | End: 2019-03-17 | Stop reason: HOSPADM

## 2019-03-17 RX ORDER — BISMUTH SUBSALICYLATE 262 MG/1
524 TABLET, CHEWABLE ORAL
Qty: 84 TAB | Refills: 0 | Status: SHIPPED | OUTPATIENT
Start: 2019-03-17 | End: 2019-11-13

## 2019-03-17 RX ORDER — DIPHENHYDRAMINE HCL 25 MG
25 TABLET ORAL ONCE
Status: COMPLETED | OUTPATIENT
Start: 2019-03-17 | End: 2019-03-17

## 2019-03-17 RX ORDER — TETRACYCLINE HYDROCHLORIDE 500 MG/1
500 CAPSULE ORAL 4 TIMES DAILY
Qty: 40 CAP | Refills: 0 | Status: SHIPPED | OUTPATIENT
Start: 2019-03-17 | End: 2019-11-13

## 2019-03-17 RX ORDER — METRONIDAZOLE 500 MG/1
500 TABLET ORAL EVERY 12 HOURS
Qty: 20 TAB | Refills: 0 | Status: SHIPPED | OUTPATIENT
Start: 2019-03-17 | End: 2019-11-13

## 2019-03-17 RX ORDER — PANTOPRAZOLE SODIUM 40 MG/1
40 TABLET, DELAYED RELEASE ORAL 2 TIMES DAILY
Qty: 60 TAB | Refills: 1 | Status: ON HOLD | OUTPATIENT
Start: 2019-03-17 | End: 2019-11-18

## 2019-03-17 RX ORDER — POTASSIUM CHLORIDE 20 MEQ/1
20 TABLET, EXTENDED RELEASE ORAL DAILY
Qty: 30 TAB | Refills: 1 | Status: ON HOLD | OUTPATIENT
Start: 2019-03-17 | End: 2021-04-23

## 2019-03-17 RX ADMIN — BISMUTH SUBSALICYLATE 524 MG: 262 TABLET, CHEWABLE ORAL at 06:24

## 2019-03-17 RX ADMIN — TETRACYCLINE HYDROCHLORIDE 500 MG: 250 CAPSULE ORAL at 10:11

## 2019-03-17 RX ADMIN — ONDANSETRON 4 MG: 2 INJECTION INTRAMUSCULAR; INTRAVENOUS at 01:12

## 2019-03-17 RX ADMIN — DIPHENHYDRAMINE HCL 25 MG: 25 TABLET ORAL at 01:12

## 2019-03-17 RX ADMIN — POTASSIUM CHLORIDE 20 MEQ: 1500 TABLET, EXTENDED RELEASE ORAL at 05:40

## 2019-03-17 RX ADMIN — METRONIDAZOLE 500 MG: 500 TABLET ORAL at 05:40

## 2019-03-17 RX ADMIN — BISMUTH SUBSALICYLATE 524 MG: 262 TABLET, CHEWABLE ORAL at 11:46

## 2019-03-17 RX ADMIN — OMEPRAZOLE 40 MG: 20 CAPSULE, DELAYED RELEASE ORAL at 05:40

## 2019-03-17 RX ADMIN — NICOTINE 21 MG: 21 PATCH, EXTENDED RELEASE TRANSDERMAL at 05:40

## 2019-03-17 NOTE — CARE PLAN
Problem: Safety  Goal: Will remain free from injury  Outcome: PROGRESSING AS EXPECTED  Pt is injury-free at this moment   Fall precautions in place including: bed locked & at lowest position, call light & personal belongings within reach, x2 upper bed rails up   Encouraged pt to call for any assistance   Hourly rounding in progress       Problem: Pain Management  Goal: Pain level will decrease to patient's comfort goal  Outcome: PROGRESSING AS EXPECTED  Pt currently denied pain   Encouraged to report to the nurse if experiencing pain, pt verbalized understanding

## 2019-03-17 NOTE — DISCHARGE INSTRUCTIONS
Discharge Instructions    Discharged to home by taxi with self. Discharged via wheelchair, hospital escort: Yes.  Special equipment needed: Not Applicable    Be sure to schedule a follow-up appointment with your primary care doctor or any specialists as instructed.     Discharge Plan:   Smoking Cessation Offered: Patient Refused  Influenza Vaccine Indication: Indicated: 9 to 64 years of age  Influenza Vaccine Given - only chart on this line when given: (P) Influenza Vaccine Given (See MAR)    I understand that a diet low in cholesterol, fat, and sodium is recommended for good health. Unless I have been given specific instructions below for another diet, I accept this instruction as my diet prescription.   Other diet: reg    Special Instructions: None    · Is patient discharged on Warfarin / Coumadin?   No     Depression / Suicide Risk    As you are discharged from this [facility] facility, it is important to learn how to keep safe from harming yourself.    Recognize the warning signs:  · Abrupt changes in personality, positive or negative- including increase in energy   · Giving away possessions  · Change in eating patterns- significant weight changes-  positive or negative  · Change in sleeping patterns- unable to sleep or sleeping all the time   · Unwillingness or inability to communicate  · Depression  · Unusual sadness, discouragement and loneliness  · Talk of wanting to die  · Neglect of personal appearance   · Rebelliousness- reckless behavior  · Withdrawal from people/activities they love  · Confusion- inability to concentrate     If you or a loved one observes any of these behaviors or has concerns about self-harm, here's what you can do:  · Talk about it- your feelings and reasons for harming yourself  · Remove any means that you might use to hurt yourself (examples: pills, rope, extension cords, firearm)  · Get professional help from the community (Mental Health, Substance Abuse, psychological  counseling)  · Do not be alone:Call your Safe Contact- someone whom you trust who will be there for you.  · Call your local CRISIS HOTLINE 116-2494 or 673-021-5814  · Call your local Children's Mobile Crisis Response Team [place] (303) 028-0759 or www.[employer]  · Call the toll free National Suicide Prevention Hotlines   · National Suicide Prevention Lifeline 933-736-SWFW (0656)  · National Hope Line Network 800-SUICIDE (666-2000)

## 2019-03-17 NOTE — PROGRESS NOTES
Patient discharged to home per order.  Discharge instructions reviewed with patient; he verbalize understanding and signed copies placed in chart.  Patient has all belongings; signed copy of form in chart.  Patient left facility ambulatory at 1535 via Taxi accompanied by rehab staff .  Have enjoyed working with this pleasant patient.

## 2019-03-17 NOTE — PROGRESS NOTES
Pt c/o nausea and requested sleeping pill; however pt has no med via MAR  Paged on-call hospitalist for order and given pt Ginger-beka in the meantime     Dr. Dover returned call and received order:  Zofran 4mg q6 hours   Benadryl 25mg x1 time

## 2019-03-17 NOTE — CARE PLAN
Problem: Safety  Goal: Will remain free from injury  Outcome: PROGRESSING AS EXPECTED  Pt uses call light consistently and appropriately. Waits for assistance and is marlyn to ambulate  Self in room, pt appears stable on his feet this shift. difficuytl for pt to verbalize needs, this is pt baseline behavior..

## 2019-03-17 NOTE — PROGRESS NOTES
Pt with no needs throughout shift. Iron infusing with no s/s reaction with test dose. Will cont to monitor.

## 2019-03-17 NOTE — CARE PLAN
Problem: Bowel/Gastric:  Goal: Normal bowel function is maintained or improved  Outcome: PROGRESSING AS EXPECTED  Patient having regular bowel movements; last BM 03/17/19 .  Denies s/s constipation.  Will continue to monitor. Educated pt on s/s of bleeding in BM, no visible s/s present at this time.

## 2019-03-17 NOTE — PROGRESS NOTES
Received report from day shift nurse.   Assumed pt care   Pt is A&Ox4, resting comfortably in bed.   Pt on r.a.. No signs of SOB/respiratory distress.  Labs noted, VSS.   Pt denied pain   IV iron currently infusing at 62.5ml/hr without any reactions noted  Will returns for night time meds and assessment.   Fall precautions in place.  Call light and personal belongings within reach.   Continue to monitor

## 2019-03-17 NOTE — DISCHARGE PLANNING
Pt states he's unable to secure transport home. No friends or family that can pick him up. Provided cab voucher to assist with transport

## 2019-03-18 NOTE — DISCHARGE SUMMARY
Discharge Summary    CHIEF COMPLAINT ON ADMISSION  Chief Complaint   Patient presents with   • N/V   • Blood in Vomit       Reason for Admission  Coughing up Blood     Admission Date  3/12/2019    CODE STATUS  Prior    HPI & HOSPITAL COURSE  59 y.o. male who presented 3/12/2019 with known History of alcoholism, and history of prior GI bleeding in the past, admitted to the hospital after patient experience multiple episodes of hematemesis over the course of 3 days prior to admission.  Patient did admit that he was drinking until 2 days up to his admission, he was unable to quantify the amount of alcohol he was using.  This is a result of hematemesis, gastric urology was consulted, patient underwent an emergent EGD which did not show any active bleeding except for portal hypertensive gastropathy, in addition to a small 2 cm hiatal hernia, grade 1 esophageal varices.  Stool study for H pylori was positive and he was started on antibiotic therapy.  His melena resolved and his hemoglobin remained stable in the mid sevens.  He received iron replacement.    Patient also had significant pancytopenia during his hospitalization, I would look back through his old labs and it appears this is an intermittent issue going back 2-3 years.  I am reluctant to attribute this solely to his alcohol issues.  I am [facility] to further evaluate.  It could be that the pancytopenia attributable to his chronic hepatitis C as well.       Therefore, he is discharged in good and stable condition to home with close outpatient follow-up.    The patient met 2-midnight criteria for an inpatient stay at the time of discharge.    Discharge Date  3/17/2019    FOLLOW UP ITEMS POST DISCHARGE  PCP  GI- hep C  ? Bone marrow Biopsy    DISCHARGE DIAGNOSES  Principal Problem:    Hemorrhagic shock (HCC) POA: Unknown  Active Problems:    GIB (gastrointestinal bleeding) POA: Yes     Chronic hepatitis C without hepatic coma (HCC) POA: Yes    Pancytopenia (HCC) POA: Yes    Acute kidney failure (HCC) POA: Unknown    Tension headache POA: Unknown    Hypokalemia POA: Unknown    Helicobacter pylori (H. pylori) infection POA: Unknown    Iron deficiency POA: Unknown  Resolved Problems:    * No resolved hospital problems. *      FOLLOW UP  No future appointments.  Abdi Ba M.D.  [address] [facility] [address]  [phone]      GI office will call you to schedule your follow up appointment , thank you     Nnamdi Ballesteros M.D.  [address] [facility] [id number] [phone]    Schedule an appointment as soon as possible for a visit in 1 week        MEDICATIONS ON DISCHARGE     Medication List      START taking these medications      Instructions   bismuth subsalicylate 262 MG Chew  Commonly known as:  PEPTO-BISMOL   Take 2 Tabs by mouth 4 Times a Day,Before Meals and at Bedtime.  Dose:  524 mg     metroNIDAZOLE 500 MG Tabs  Commonly known as:  FLAGYL   Take 1 Tab by mouth every 12 hours.  Dose:  500 mg     potassium chloride SA 20 MEQ Tbcr  Commonly known as:  Kdur   Take 1 Tab by mouth every day.  Dose:  20 mEq     tetracycline 500 MG Caps  Commonly known as:  ACHROMYCIN, SUMYCIN   Take 1 Cap by mouth 4 times a day.  Dose:  500 mg        CHANGE how you take these medications      Instructions   pantoprazole 40 MG Tbec  What changed:  when to take this  Commonly known as:  PROTONIX   Take 1 Tab by mouth 2 times a day.  Dose:  40 mg     propranolol 20 MG Tabs  What changed:  medication strength  Commonly known as:  INDERAL   Take 1 Tab by mouth 2 times a day.  Dose:  20 mg        CONTINUE taking these medications      Instructions   ferrous sulfate 325 (65 Fe) MG tablet   Take 650 mg by mouth every day.  Dose:  650 mg     folic acid 1 MG Tabs  Commonly known as:  FOLVITE   Take 1 Tab by mouth every day.  Dose:  1 mg     MULTIVITAMIN ADULT PO   Take 1 tablet by mouth every day.  Dose:  1 tablet      QUEtiapine 200 MG Tabs  Commonly known as:  SEROQUEL   Take 200 mg by mouth every bedtime.  Dose:  200 mg     traZODone 50 MG Tabs  Commonly known as:  DESYREL   Take 50 mg by mouth every evening.  Dose:  50 mg     vitamin B-12 1000 MCG Tabs   Take 2,000 mcg by mouth every day.  Dose:  2000 mcg        STOP taking these medications    COLDCOUGH PO     thiamine 100 MG tablet  Commonly known as:  THIAMINE            Allergies  Allergies   Allergen Reactions   • Haldol [Haloperidol Lactate] Unspecified     Twitching   RXN=20 years ago       DIET  Regular  No aclohol    ACTIVITY  regular    CONSULTATIONS  [facility]- [facility]in    PROCEDURES  Esophagogastroduodenoscopy/Push enteroscopy  Date of Procedure: 3/13/2019  Attending Physician: Abdi Ba MD     Indications: Anemia, melena, hematochezia  Instrument: Olympus Flexible Endoscope/ Olympus Pediatric Endoscope  Sedation:   Anesthesiologist/Type of Anesthesia:  Anesthesiologist: Pito Welsh M.D./CAROLYN     Surgical Staff:  Circulator: Edward Cota R.N.  Endoscopy Technician: Autumn Ricardo; Deja Armas     Pre-Anesthesia Assessment:  Prior to the procedure, a History and Physical was performed, and patient medications and allergies were reviewed. The patient’s tolerance of previous anesthesia was also reviewed. The risks and benefits of the procedure and the sedation options and risks were discussed with the patient including but not limited to infection, bleeding, aspiration, perforation, adverse medication reaction, missed diagnosis, and missed lesions. The patient verbalized understanding. All questions were answered, and informed consent was obtained.      After I obtained informed consent from the patient, the patient was placed in the left lateral position. Appropriate time-out protocol was followed: the correct patient, the correct procedure, and the correct equipment in the room were confirmed. Throughout the procedure, the patient’s blood  pressure, pulse, and oxygen saturations were monitored continuously. The Olympus flexible gastroscope was gently passed through the incisoral orifice into the oral cavity and under direct visualization the esophagus was intubated. The endoscope was passed down the esophagus, through the stomach and into the 2nd portion of the duodenum. Retroflexion was performed at the gastroesophageal junction. Color, texture, mucosa and anatomy of esophagus, stomach, and duodenum were carefully examined with the scope.  After completion of the examination, the endoscope as removed. The patient tolerated the procedure well. There were no immediate postoperative complications.         Findings:    Esophagus: No blood noted. 1 cord of grade 1 esophageal varix, completely flattens with insufflation. No high risk stigmata for bleeding.      Stomach: small hiatal hernia (2cm) with mucosal erythema the the hiatus possible early Reid Melvin. Erythema with mucosal abnormalities of the stomach in the body suggestive of portal hypertensive gastropathy with possible additional contribution from alcohol causing gastritis. No blood noted. No gastric varices seen. Retroflexion of the cardia fundus without gastric varices.     Duodenum/Small bowel: Examination to 2nd portion of duodenum normal. Scope was exchanged to pediatric colonoscope and a push enteroscopy was performed to jejunum (at least 50cm beyond pylorus) without any active bleeding     Impressions:   1. No active bleeding see in the examined area of upper GI  2. Portal hypertensive gastropathy with superimposed gastritis likely from alcohol  3. Small 2cm hiatal hernia with possible early Reid Melvin erythema without erosion  4. Grade 1 esophageal varix without high risk stigmata, no banding necessary.     Recommendations:   1. Complete 72 hours of IV PPI drip; transition to 40mg PO BID on discharge and down titration as outpatient  2. Complete and finish the octreotide then  stop  3. Correct coagulopathy and thrombocytopenia if possible  4. Follow-up with clinic as outpatient.  5. Complete alcohol cessation.  6. Send stool for H. Pylori, treat if positive; biopsy not taken due to thrombocytopenia.  7. Can advance diet as tolerated by ICU; would start with clears  8. Monitor for alcohol withdraw.     GI TO SIGN OFF / STAND BY - Thank you very much for allowing me to participate in the care of your patient.  Please feel free to contact me anytime at [phone]          LABORATORY  Lab Results   Component Value Date    SODIUM 137 03/17/2019    POTASSIUM 3.8 03/17/2019    CHLORIDE 108 03/17/2019    CO2 22 03/17/2019    GLUCOSE 102 (H) 03/17/2019    BUN <5 (L) 03/17/2019    CREATININE 0.77 03/17/2019        Lab Results   Component Value Date    WBC 1.7 (LL) 03/16/2019    HEMOGLOBIN 7.8 (L) 03/16/2019    HEMATOCRIT 23.0 (L) 03/16/2019    PLATELETCT 31 (LL) 03/16/2019        Total time of the discharge process exceeds 40 minutes.

## 2019-03-19 ENCOUNTER — PATIENT OUTREACH (OUTPATIENT)
Dept: HEALTH INFORMATION MANAGEMENT | Facility: OTHER | Age: 59
End: 2019-03-19

## 2019-03-19 NOTE — PROGRESS NOTES
SCP post discharge med rec completed. Unable to reach patient for f/u. Advise patient to separate tetracycline and MVI/iron.

## 2019-03-22 ENCOUNTER — HOSPITAL ENCOUNTER (EMERGENCY)
Facility: MEDICAL CENTER | Age: 59
End: 2019-03-22
Attending: EMERGENCY MEDICINE
Payer: MEDICARE

## 2019-03-22 VITALS
HEART RATE: 73 BPM | WEIGHT: 134.48 LBS | HEIGHT: 68 IN | RESPIRATION RATE: 16 BRPM | OXYGEN SATURATION: 97 % | DIASTOLIC BLOOD PRESSURE: 61 MMHG | BODY MASS INDEX: 20.38 KG/M2 | TEMPERATURE: 97.8 F | SYSTOLIC BLOOD PRESSURE: 97 MMHG

## 2019-03-22 DIAGNOSIS — F10.20 ALCOHOLISM (HCC): ICD-10-CM

## 2019-03-22 DIAGNOSIS — R11.0 NAUSEA: ICD-10-CM

## 2019-03-22 DIAGNOSIS — K29.20 CHRONIC ALCOHOLIC GASTRITIS WITHOUT HEMORRHAGE: ICD-10-CM

## 2019-03-22 DIAGNOSIS — R79.89 ELEVATED LFTS: ICD-10-CM

## 2019-03-22 LAB
ALBUMIN SERPL BCP-MCNC: 4.1 G/DL (ref 3.2–4.9)
ALBUMIN/GLOB SERPL: 1.5 G/DL
ALP SERPL-CCNC: 53 U/L (ref 30–99)
ALT SERPL-CCNC: 55 U/L (ref 2–50)
ANION GAP SERPL CALC-SCNC: 12 MMOL/L (ref 0–11.9)
AST SERPL-CCNC: 91 U/L (ref 12–45)
BASOPHILS # BLD AUTO: 1.3 % (ref 0–1.8)
BASOPHILS # BLD: 0.05 K/UL (ref 0–0.12)
BILIRUB SERPL-MCNC: 0.5 MG/DL (ref 0.1–1.5)
BUN SERPL-MCNC: 10 MG/DL (ref 8–22)
CALCIUM SERPL-MCNC: 8.5 MG/DL (ref 8.5–10.5)
CHLORIDE SERPL-SCNC: 107 MMOL/L (ref 96–112)
CO2 SERPL-SCNC: 20 MMOL/L (ref 20–33)
CREAT SERPL-MCNC: 0.55 MG/DL (ref 0.5–1.4)
EOSINOPHIL # BLD AUTO: 0.09 K/UL (ref 0–0.51)
EOSINOPHIL NFR BLD: 2.4 % (ref 0–6.9)
ERYTHROCYTE [DISTWIDTH] IN BLOOD BY AUTOMATED COUNT: 56.8 FL (ref 35.9–50)
GLOBULIN SER CALC-MCNC: 2.8 G/DL (ref 1.9–3.5)
GLUCOSE SERPL-MCNC: 72 MG/DL (ref 65–99)
HCT VFR BLD AUTO: 31 % (ref 42–52)
HGB BLD-MCNC: 9.9 G/DL (ref 14–18)
IMM GRANULOCYTES # BLD AUTO: 0.03 K/UL (ref 0–0.11)
IMM GRANULOCYTES NFR BLD AUTO: 0.8 % (ref 0–0.9)
INR PPP: 1.27 (ref 0.87–1.13)
LIPASE SERPL-CCNC: 59 U/L (ref 11–82)
LYMPHOCYTES # BLD AUTO: 0.94 K/UL (ref 1–4.8)
LYMPHOCYTES NFR BLD: 24.9 % (ref 22–41)
MCH RBC QN AUTO: 32.4 PG (ref 27–33)
MCHC RBC AUTO-ENTMCNC: 32.1 G/DL (ref 33.7–35.3)
MCV RBC AUTO: 100.7 FL (ref 81.4–97.8)
MONOCYTES # BLD AUTO: 0.47 K/UL (ref 0–0.85)
MONOCYTES NFR BLD AUTO: 12.5 % (ref 0–13.4)
NEUTROPHILS # BLD AUTO: 2.19 K/UL (ref 1.82–7.42)
NEUTROPHILS NFR BLD: 58.1 % (ref 44–72)
NRBC # BLD AUTO: 0 K/UL
NRBC BLD-RTO: 0 /100 WBC
PLATELET # BLD AUTO: 76 K/UL (ref 164–446)
PMV BLD AUTO: 11.2 FL (ref 9–12.9)
POTASSIUM SERPL-SCNC: 3.7 MMOL/L (ref 3.6–5.5)
PROT SERPL-MCNC: 6.9 G/DL (ref 6–8.2)
PROTHROMBIN TIME: 16 SEC (ref 12–14.6)
RBC # BLD AUTO: 3.06 M/UL (ref 4.7–6.1)
SODIUM SERPL-SCNC: 139 MMOL/L (ref 135–145)
WBC # BLD AUTO: 3.8 K/UL (ref 4.8–10.8)

## 2019-03-22 PROCEDURE — 80053 COMPREHEN METABOLIC PANEL: CPT

## 2019-03-22 PROCEDURE — 85610 PROTHROMBIN TIME: CPT

## 2019-03-22 PROCEDURE — 83690 ASSAY OF LIPASE: CPT

## 2019-03-22 PROCEDURE — 99284 EMERGENCY DEPT VISIT MOD MDM: CPT

## 2019-03-22 PROCEDURE — 85025 COMPLETE CBC W/AUTO DIFF WBC: CPT

## 2019-03-22 ASSESSMENT — ENCOUNTER SYMPTOMS
CHILLS: 0
NAUSEA: 1
FEVER: 0
SHORTNESS OF BREATH: 0
BLOOD IN STOOL: 0
HEADACHES: 0
ABDOMINAL PAIN: 1
VOMITING: 1

## 2019-03-22 NOTE — ED PROVIDER NOTES
"ED Provider Note    ED Provider Note    Primary care provider: Nnamdi Ballesteros M.D.  Means of arrival: EMS  History obtained from: Patient  History limited by: None    CHIEF COMPLAINT  Chief Complaint   Patient presents with   • Alcohol Intoxication   • Nausea       HPI  Inocencio Shabazz is a 59 y.o. male who presents to the Emergency Department via EMS with a chief complaint of nausea, and alcohol intoxication.  Patient states \"I do not feel good\".  He states \"my stomach is getting ready to explode\".  Patient does admit to alcohol intoxication tonight.  Complains of upper abdominal pain.  Ask \"what do I do about it\".  I asked him about his recent admission and instructions upon discharge and antibiotics that he was prescribed.  He states he did not read his discharge instructions and that he \"just throws them away\".  Denies any fever.  No chest pain or shortness of breath.    REVIEW OF SYSTEMS  Review of Systems   Constitutional: Negative for chills and fever.   HENT: Negative for congestion.    Respiratory: Negative for shortness of breath.    Cardiovascular: Negative for chest pain.   Gastrointestinal: Positive for abdominal pain, nausea and vomiting. Negative for blood in stool.   Genitourinary: Negative for dysuria.   Neurological: Negative for headaches.   All other systems reviewed and are negative.      PAST MEDICAL HISTORY   has a past medical history of Alcohol abuse; Alcohol intoxication (HCC) (3/13/2014); Cirrhosis (HCC); Hepatitis C; and Psychiatric disorder.    SURGICAL HISTORY   has a past surgical history that includes gastroscopy (1/3/2016); gastroscopy (4/8/2016); and gastroscopy (3/13/2019).    SOCIAL HISTORY  Social History   Substance Use Topics   • Smoking status: Current Every Day Smoker     Packs/day: 1.00     Years: 48.00     Types: Cigarettes   • Smokeless tobacco: Never Used   • Alcohol use Yes      Comment: 1-2 beers and 1/2 pints of whiskey every day      History   Drug Use   • Types: Inhaled " "    Comment: marijuana       FAMILY HISTORY  No family history on file.    CURRENT MEDICATIONS  Home Medications    **Home medications have not yet been reviewed for this encounter**         ALLERGIES  Allergies   Allergen Reactions   • Haldol [Haloperidol Lactate] Unspecified     Twitching   RXN=20 years ago       PHYSICAL EXAM  VITAL SIGNS: BP (!) 97/61   Pulse 76   Temp 36.6 °C (97.8 °F) (Temporal)   Resp 16   Ht 1.727 m (5' 8\")   Wt 61 kg (134 lb 7.7 oz)   BMI 20.45 kg/m²   Vitals reviewed.  Constitutional: Patient is oriented to person, place, and time. Appears well-developed and well-nourished. No distress.    Head: Normocephalic and atraumatic.   Ears: Normal external ears bilaterally.   Mouth/Throat: Oropharynx is clear and moist  Eyes: Conjunctivae are normal. Pupils are equal, round, and reactive to light.   Neck: Normal range of motion. Neck supple.  Cardiovascular: Normal rate, regular rhythm and normal heart sounds.   Pulmonary/Chest: Effort normal and breath sounds normal. No respiratory distress, no wheezes, rhonchi, or rales.   Abdominal: Soft. Bowel sounds are normal. There is epigastric tenderness. No rebound or guarding, or peritoneal signs.  Musculoskeletal: No edema and no tenderness.   Neurological: No focal deficits.   Skin: Skin is warm and dry. No erythema. No pallor.   Psychiatric: Patient has a normal mood and affect.     LABS  Results for orders placed or performed during the hospital encounter of 03/22/19   CBC WITH DIFFERENTIAL   Result Value Ref Range    WBC 3.8 (L) 4.8 - 10.8 K/uL    RBC 3.06 (L) 4.70 - 6.10 M/uL    Hemoglobin 9.9 (L) 14.0 - 18.0 g/dL    Hematocrit 31.0 (L) 42.0 - 52.0 %    .7 (H) 81.4 - 97.8 fL    MCH 32.4 27.0 - 33.0 pg    MCHC 32.1 (L) 33.7 - 35.3 g/dL    RDW 56.8 (H) 35.9 - 50.0 fL    Platelet Count 76 (L) 164 - 446 K/uL    MPV 11.2 9.0 - 12.9 fL    Neutrophils-Polys 58.10 44.00 - 72.00 %    Lymphocytes 24.90 22.00 - 41.00 %    Monocytes 12.50 0.00 - " 13.40 %    Eosinophils 2.40 0.00 - 6.90 %    Basophils 1.30 0.00 - 1.80 %    Immature Granulocytes 0.80 0.00 - 0.90 %    Nucleated RBC 0.00 /100 WBC    Neutrophils (Absolute) 2.19 1.82 - 7.42 K/uL    Lymphs (Absolute) 0.94 (L) 1.00 - 4.80 K/uL    Monos (Absolute) 0.47 0.00 - 0.85 K/uL    Eos (Absolute) 0.09 0.00 - 0.51 K/uL    Baso (Absolute) 0.05 0.00 - 0.12 K/uL    Immature Granulocytes (abs) 0.03 0.00 - 0.11 K/uL    NRBC (Absolute) 0.00 K/uL   COMP METABOLIC PANEL   Result Value Ref Range    Sodium 139 135 - 145 mmol/L    Potassium 3.7 3.6 - 5.5 mmol/L    Chloride 107 96 - 112 mmol/L    Co2 20 20 - 33 mmol/L    Anion Gap 12.0 (H) 0.0 - 11.9    Glucose 72 65 - 99 mg/dL    Bun 10 8 - 22 mg/dL    Creatinine 0.55 0.50 - 1.40 mg/dL    Calcium 8.5 8.5 - 10.5 mg/dL    AST(SGOT) 91 (H) 12 - 45 U/L    ALT(SGPT) 55 (H) 2 - 50 U/L    Alkaline Phosphatase 53 30 - 99 U/L    Total Bilirubin 0.5 0.1 - 1.5 mg/dL    Albumin 4.1 3.2 - 4.9 g/dL    Total Protein 6.9 6.0 - 8.2 g/dL    Globulin 2.8 1.9 - 3.5 g/dL    A-G Ratio 1.5 g/dL   Prothrombin Time   Result Value Ref Range    PT 16.0 (H) 12.0 - 14.6 sec    INR 1.27 (H) 0.87 - 1.13   LIPASE   Result Value Ref Range    Lipase 59 11 - 82 U/L   ESTIMATED GFR   Result Value Ref Range    GFR If African American >60 >60 mL/min/1.73 m 2    GFR If Non African American >60 >60 mL/min/1.73 m 2       All labs reviewed by me.    COURSE & MEDICAL DECISION MAKING  Pertinent Labs & Imaging studies reviewed. (See chart for details)    Obtained and reviewed past medical records. Admitted earlier this month with N/V, hematemesis. History of alcoholism. During admit underwent EGD which did not show active bleeding. There were esophageal varices. H. Pylori +, started  On ABX. Hgb 7.9, 7.37.4, 7.5 and 7.8 on last admit.     3:57 AM - Patient seen and examined at bedside.  Patient presents emergency department via EMS with history of alcohol intoxication and recent alcohol intake.  He complains of  nausea.  He has known gastritis with esophageal varices.  Admittedly, continues to drink.  He also admittedly, did not read or comply with his recent discharge instructions.  Vital signs are normal.  I reviewed his recent lab and discharge summary.  Patient be treated with antiemetics.  Will be kept n.p.o. at this time secondary to recent nausea and vomiting.    She will diagnosis includes but not limited to: Alcoholic gastritis, nausea and vomiting related to gastritis, anemia, pancreatitis    7:32 AM patient reevaluated the bedside.  He has no new complaints.  He is up, alert, talking.  We discussed lab results.  Comparison from previously, hemoglobin now 9.9.  He does have elevated LFTs which in the setting of his recent alcohol intake I suspect is related to that.  He is encouraged to discontinue drinking.  He had no vomiting here in the ED.  His only Request right now is for a soda.  He has been tolerating fluids.  He is eating chips and a snack sandwich.  Well-appearing and nontoxic with normal vital signs.  Of encouraged him to follow-up as instructed at his recent discharge from the hospital.  At this time, I feel he can safely be discharged home.    Patient is in stable condition on discharge.      FINAL IMPRESSION  1. Alcoholism (HCC)    2. Nausea    3. Chronic alcoholic gastritis without hemorrhage    4. Elevated LFTs

## 2019-03-22 NOTE — ED TRIAGE NOTES
Patient to ED via [facility] from home. Patient drinks about 0.5 pints of whiskey daily and is complaining of nausea and intermittent RUQ pain. Patient was seen and evaluated at [facility] and discharged.

## 2019-03-22 NOTE — ED NOTES
Patient up ambulating around ED. Requesting tea and food. Given water for PO challenge. MD aware of patient's status.

## 2019-04-16 ENCOUNTER — PATIENT OUTREACH (OUTPATIENT)
Dept: HEALTH INFORMATION MANAGEMENT | Facility: OTHER | Age: 59
End: 2019-04-16

## 2019-04-16 NOTE — PROGRESS NOTES
Patient Inocencio Shabazz discharged on 3/17/19. Upon discharge patient was instructed to follow up with PCP and GI provider Dr. Abdi Ba. I[facility] made multiple outreach calls to the patient, emergency contacts, pharmacy, and any other contacts listed in EPIC, however, was unable to successfully reach patient. I[facility] continued to monitor patient throughout the case as confirmed they did not have appointments with PCP or GI. [facility] The patient has been provided with my contact information in the event they would like assistance in the future. Patient was discharged with a high LACE score, however, a PPS screening was not conducted due to lack of contact.

## 2019-11-13 ENCOUNTER — APPOINTMENT (OUTPATIENT)
Dept: RADIOLOGY | Facility: MEDICAL CENTER | Age: 59
DRG: 432 | End: 2019-11-13
Attending: INTERNAL MEDICINE
Payer: MEDICARE

## 2019-11-13 ENCOUNTER — HOSPITAL ENCOUNTER (INPATIENT)
Facility: MEDICAL CENTER | Age: 59
LOS: 5 days | DRG: 432 | End: 2019-11-18
Attending: EMERGENCY MEDICINE | Admitting: INTERNAL MEDICINE
Payer: MEDICARE

## 2019-11-13 ENCOUNTER — APPOINTMENT (OUTPATIENT)
Dept: RADIOLOGY | Facility: MEDICAL CENTER | Age: 59
DRG: 432 | End: 2019-11-13
Attending: EMERGENCY MEDICINE
Payer: MEDICARE

## 2019-11-13 DIAGNOSIS — K92.2 GASTROINTESTINAL HEMORRHAGE, UNSPECIFIED GASTROINTESTINAL HEMORRHAGE TYPE: ICD-10-CM

## 2019-11-13 PROBLEM — J96.01 ACUTE RESPIRATORY FAILURE WITH HYPOXIA (HCC): Status: ACTIVE | Noted: 2019-11-13

## 2019-11-13 PROBLEM — E83.42 HYPOMAGNESEMIA: Status: ACTIVE | Noted: 2019-11-13

## 2019-11-13 PROBLEM — I85.01 BLEEDING ESOPHAGEAL VARICES (HCC): Status: ACTIVE | Noted: 2017-02-06

## 2019-11-13 PROBLEM — J96.91 RESPIRATORY FAILURE WITH HYPOXIA (HCC): Status: ACTIVE | Noted: 2019-11-13

## 2019-11-13 PROBLEM — D62 ACUTE BLOOD LOSS ANEMIA: Status: ACTIVE | Noted: 2019-11-13

## 2019-11-13 LAB
ABO GROUP BLD: NORMAL
ACTION RANGE TRIGGERED IACRT: NO
ALBUMIN SERPL BCP-MCNC: 3.5 G/DL (ref 3.2–4.9)
ALBUMIN/GLOB SERPL: 1.1 G/DL
ALP SERPL-CCNC: 57 U/L (ref 30–99)
ALT SERPL-CCNC: 45 U/L (ref 2–50)
ANION GAP SERPL CALC-SCNC: 11 MMOL/L (ref 0–11.9)
APTT PPP: 30.5 SEC (ref 24.7–36)
AST SERPL-CCNC: 68 U/L (ref 12–45)
BARCODED ABORH UBTYP: 5100
BARCODED ABORH UBTYP: 6200
BARCODED ABORH UBTYP: 8400
BARCODED PRD CODE UBPRD: NORMAL
BARCODED UNIT NUM UBUNT: NORMAL
BASE EXCESS BLDA CALC-SCNC: -8 MMOL/L (ref -4–3)
BASOPHILS # BLD AUTO: 0.8 % (ref 0–1.8)
BASOPHILS # BLD AUTO: 1.1 % (ref 0–1.8)
BASOPHILS # BLD: 0.04 K/UL (ref 0–0.12)
BASOPHILS # BLD: 0.05 K/UL (ref 0–0.12)
BILIRUB SERPL-MCNC: 0.6 MG/DL (ref 0.1–1.5)
BLD GP AB SCN SERPL QL: NORMAL
BODY TEMPERATURE: ABNORMAL DEGREES
BUN SERPL-MCNC: 20 MG/DL (ref 8–22)
CALCIUM SERPL-MCNC: 8 MG/DL (ref 8.5–10.5)
CFT BLD TEG: 7.8 MIN (ref 5–10)
CHLORIDE SERPL-SCNC: 109 MMOL/L (ref 96–112)
CLOT ANGLE BLD TEG: 43.1 DEGREES (ref 53–72)
CLOT LYSIS 30M P MA LENFR BLD TEG: 0 % (ref 0–8)
CO2 BLDA-SCNC: 18 MMOL/L (ref 20–33)
CO2 SERPL-SCNC: 22 MMOL/L (ref 20–33)
COMPONENT P 8504P: NORMAL
COMPONENT P 8504P: NORMAL
COMPONENT R 8504R: NORMAL
CREAT SERPL-MCNC: 0.61 MG/DL (ref 0.5–1.4)
CT.EXTRINSIC BLD ROTEM: 4.8 MIN (ref 1–3)
EKG IMPRESSION: NORMAL
EOSINOPHIL # BLD AUTO: 0.05 K/UL (ref 0–0.51)
EOSINOPHIL # BLD AUTO: 0.07 K/UL (ref 0–0.51)
EOSINOPHIL NFR BLD: 1.1 % (ref 0–6.9)
EOSINOPHIL NFR BLD: 1.5 % (ref 0–6.9)
ERYTHROCYTE [DISTWIDTH] IN BLOOD BY AUTOMATED COUNT: 51.8 FL (ref 35.9–50)
ERYTHROCYTE [DISTWIDTH] IN BLOOD BY AUTOMATED COUNT: 51.8 FL (ref 35.9–50)
ERYTHROCYTE [DISTWIDTH] IN BLOOD BY AUTOMATED COUNT: 52.1 FL (ref 35.9–50)
ETHANOL BLD-MCNC: 0.32 G/DL
GLOBULIN SER CALC-MCNC: 3.1 G/DL (ref 1.9–3.5)
GLUCOSE SERPL-MCNC: 119 MG/DL (ref 65–99)
HCO3 BLDA-SCNC: 16.7 MMOL/L (ref 17–25)
HCT VFR BLD AUTO: 26.2 % (ref 42–52)
HCT VFR BLD AUTO: 35.3 % (ref 42–52)
HCT VFR BLD AUTO: 35.9 % (ref 42–52)
HGB BLD-MCNC: 11.7 G/DL (ref 14–18)
HGB BLD-MCNC: 11.8 G/DL (ref 14–18)
HGB BLD-MCNC: 6.5 G/DL (ref 14–18)
HGB BLD-MCNC: 9.1 G/DL (ref 14–18)
HGB BLD-MCNC: 9.1 G/DL (ref 14–18)
HOROWITZ INDEX BLDA+IHG-RTO: 398 MM[HG]
IMM GRANULOCYTES # BLD AUTO: 0.01 K/UL (ref 0–0.11)
IMM GRANULOCYTES # BLD AUTO: 0.02 K/UL (ref 0–0.11)
IMM GRANULOCYTES NFR BLD AUTO: 0.2 % (ref 0–0.9)
IMM GRANULOCYTES NFR BLD AUTO: 0.4 % (ref 0–0.9)
INR PPP: 1.33 (ref 0.87–1.13)
INST. QUALIFIED PATIENT IIQPT: YES
LIPASE SERPL-CCNC: 77 U/L (ref 11–82)
LYMPHOCYTES # BLD AUTO: 1.52 K/UL (ref 1–4.8)
LYMPHOCYTES # BLD AUTO: 1.55 K/UL (ref 1–4.8)
LYMPHOCYTES NFR BLD: 32.1 % (ref 22–41)
LYMPHOCYTES NFR BLD: 32.8 % (ref 22–41)
MAGNESIUM SERPL-MCNC: 1.7 MG/DL (ref 1.5–2.5)
MCF BLD TEG: 44.8 MM (ref 50–70)
MCH RBC QN AUTO: 32.2 PG (ref 27–33)
MCH RBC QN AUTO: 32.8 PG (ref 27–33)
MCH RBC QN AUTO: 33 PG (ref 27–33)
MCHC RBC AUTO-ENTMCNC: 32.9 G/DL (ref 33.7–35.3)
MCHC RBC AUTO-ENTMCNC: 33.1 G/DL (ref 33.7–35.3)
MCHC RBC AUTO-ENTMCNC: 34.7 G/DL (ref 33.7–35.3)
MCV RBC AUTO: 100.3 FL (ref 81.4–97.8)
MCV RBC AUTO: 92.6 FL (ref 81.4–97.8)
MCV RBC AUTO: 98.9 FL (ref 81.4–97.8)
MONOCYTES # BLD AUTO: 0.37 K/UL (ref 0–0.85)
MONOCYTES # BLD AUTO: 0.38 K/UL (ref 0–0.85)
MONOCYTES NFR BLD AUTO: 7.8 % (ref 0–13.4)
MONOCYTES NFR BLD AUTO: 8 % (ref 0–13.4)
NEUTROPHILS # BLD AUTO: 2.67 K/UL (ref 1.82–7.42)
NEUTROPHILS # BLD AUTO: 2.72 K/UL (ref 1.82–7.42)
NEUTROPHILS NFR BLD: 56.7 % (ref 44–72)
NEUTROPHILS NFR BLD: 57.5 % (ref 44–72)
NRBC # BLD AUTO: 0 K/UL
NRBC # BLD AUTO: 0 K/UL
NRBC BLD-RTO: 0 /100 WBC
NRBC BLD-RTO: 0 /100 WBC
O2/TOTAL GAS SETTING VFR VENT: 40 %
PA AA BLD-ACNC: 97.3 %
PA ADP BLD-ACNC: 62.1 %
PCO2 BLDA: 29.2 MMHG (ref 26–37)
PCO2 TEMP ADJ BLDA: 28.6 MMHG (ref 26–37)
PH BLDA: 7.37 [PH] (ref 7.4–7.5)
PH TEMP ADJ BLDA: 7.37 [PH] (ref 7.4–7.5)
PHOSPHATE SERPL-MCNC: 3.2 MG/DL (ref 2.5–4.5)
PLATELET # BLD AUTO: 35 K/UL (ref 164–446)
PLATELET # BLD AUTO: 42 K/UL (ref 164–446)
PLATELET # BLD AUTO: 48 K/UL (ref 164–446)
PMV BLD AUTO: 11.7 FL (ref 9–12.9)
PMV BLD AUTO: 12 FL (ref 9–12.9)
PMV BLD AUTO: 12.5 FL (ref 9–12.9)
PO2 BLDA: 159 MMHG (ref 64–87)
PO2 TEMP ADJ BLDA: 156 MMHG (ref 64–87)
POTASSIUM SERPL-SCNC: 3.4 MMOL/L (ref 3.6–5.5)
PRODUCT TYPE UPROD: NORMAL
PROT SERPL-MCNC: 6.6 G/DL (ref 6–8.2)
PROTHROMBIN TIME: 16.9 SEC (ref 12–14.6)
RBC # BLD AUTO: 2.83 M/UL (ref 4.7–6.1)
RBC # BLD AUTO: 3.57 M/UL (ref 4.7–6.1)
RBC # BLD AUTO: 3.58 M/UL (ref 4.7–6.1)
RH BLD: NORMAL
SAO2 % BLDA: 99 % (ref 93–99)
SODIUM SERPL-SCNC: 142 MMOL/L (ref 135–145)
SPECIMEN DRAWN FROM PATIENT: ABNORMAL
TEG ALGORITHM TGALG: ABNORMAL
TRIGL SERPL-MCNC: 136 MG/DL (ref 0–149)
TROPONIN T SERPL-MCNC: 6 NG/L (ref 6–19)
UNIT STATUS USTAT: NORMAL
WBC # BLD AUTO: 2.3 K/UL (ref 4.8–10.8)
WBC # BLD AUTO: 4.7 K/UL (ref 4.8–10.8)
WBC # BLD AUTO: 4.7 K/UL (ref 4.8–10.8)

## 2019-11-13 PROCEDURE — 83735 ASSAY OF MAGNESIUM: CPT

## 2019-11-13 PROCEDURE — 303105 HCHG CATHETER EXTRA

## 2019-11-13 PROCEDURE — 02HV33Z INSERTION OF INFUSION DEVICE INTO SUPERIOR VENA CAVA, PERCUTANEOUS APPROACH: ICD-10-PCS | Performed by: INTERNAL MEDICINE

## 2019-11-13 PROCEDURE — 99291 CRITICAL CARE FIRST HOUR: CPT

## 2019-11-13 PROCEDURE — 86850 RBC ANTIBODY SCREEN: CPT

## 2019-11-13 PROCEDURE — 700105 HCHG RX REV CODE 258: Performed by: INTERNAL MEDICINE

## 2019-11-13 PROCEDURE — C9113 INJ PANTOPRAZOLE SODIUM, VIA: HCPCS | Performed by: INTERNAL MEDICINE

## 2019-11-13 PROCEDURE — 700111 HCHG RX REV CODE 636 W/ 250 OVERRIDE (IP): Mod: JG | Performed by: EMERGENCY MEDICINE

## 2019-11-13 PROCEDURE — 700111 HCHG RX REV CODE 636 W/ 250 OVERRIDE (IP)

## 2019-11-13 PROCEDURE — 71045 X-RAY EXAM CHEST 1 VIEW: CPT

## 2019-11-13 PROCEDURE — 304538 HCHG NG TUBE

## 2019-11-13 PROCEDURE — 87522 HEPATITIS C REVRS TRNSCRPJ: CPT

## 2019-11-13 PROCEDURE — 96368 THER/DIAG CONCURRENT INF: CPT

## 2019-11-13 PROCEDURE — C1751 CATH, INF, PER/CENT/MIDLINE: HCPCS

## 2019-11-13 PROCEDURE — 96375 TX/PRO/DX INJ NEW DRUG ADDON: CPT

## 2019-11-13 PROCEDURE — 93005 ELECTROCARDIOGRAM TRACING: CPT | Performed by: EMERGENCY MEDICINE

## 2019-11-13 PROCEDURE — 5A1935Z RESPIRATORY VENTILATION, LESS THAN 24 CONSECUTIVE HOURS: ICD-10-PCS | Performed by: INTERNAL MEDICINE

## 2019-11-13 PROCEDURE — 99292 CRITICAL CARE ADDL 30 MIN: CPT | Mod: 25 | Performed by: INTERNAL MEDICINE

## 2019-11-13 PROCEDURE — 502240 HCHG MISC OR SUPPLY RC 0272: Performed by: INTERNAL MEDICINE

## 2019-11-13 PROCEDURE — 84100 ASSAY OF PHOSPHORUS: CPT

## 2019-11-13 PROCEDURE — 700105 HCHG RX REV CODE 258: Performed by: EMERGENCY MEDICINE

## 2019-11-13 PROCEDURE — 06L38CZ OCCLUSION OF ESOPHAGEAL VEIN WITH EXTRALUMINAL DEVICE, VIA NATURAL OR ARTIFICIAL OPENING ENDOSCOPIC: ICD-10-PCS | Performed by: INTERNAL MEDICINE

## 2019-11-13 PROCEDURE — 36415 COLL VENOUS BLD VENIPUNCTURE: CPT

## 2019-11-13 PROCEDURE — 85610 PROTHROMBIN TIME: CPT

## 2019-11-13 PROCEDURE — P9016 RBC LEUKOCYTES REDUCED: HCPCS | Mod: 91

## 2019-11-13 PROCEDURE — 160207 HCHG ENDO MINUTES - EA ADDL 1 MIN LEVEL 3: Performed by: INTERNAL MEDICINE

## 2019-11-13 PROCEDURE — 302214 INTUBATION BOX: Performed by: EMERGENCY MEDICINE

## 2019-11-13 PROCEDURE — 770022 HCHG ROOM/CARE - ICU (200)

## 2019-11-13 PROCEDURE — 31500 INSERT EMERGENCY AIRWAY: CPT | Performed by: INTERNAL MEDICINE

## 2019-11-13 PROCEDURE — P9034 PLATELETS, PHERESIS: HCPCS

## 2019-11-13 PROCEDURE — 85025 COMPLETE CBC W/AUTO DIFF WBC: CPT

## 2019-11-13 PROCEDURE — 36430 TRANSFUSION BLD/BLD COMPNT: CPT

## 2019-11-13 PROCEDURE — 80053 COMPREHEN METABOLIC PANEL: CPT

## 2019-11-13 PROCEDURE — 700105 HCHG RX REV CODE 258: Performed by: STUDENT IN AN ORGANIZED HEALTH CARE EDUCATION/TRAINING PROGRAM

## 2019-11-13 PROCEDURE — 86901 BLOOD TYPING SEROLOGIC RH(D): CPT

## 2019-11-13 PROCEDURE — 0BH17EZ INSERTION OF ENDOTRACHEAL AIRWAY INTO TRACHEA, VIA NATURAL OR ARTIFICIAL OPENING: ICD-10-PCS | Performed by: INTERNAL MEDICINE

## 2019-11-13 PROCEDURE — 86923 COMPATIBILITY TEST ELECTRIC: CPT

## 2019-11-13 PROCEDURE — 99152 MOD SED SAME PHYS/QHP 5/>YRS: CPT | Performed by: INTERNAL MEDICINE

## 2019-11-13 PROCEDURE — 99153 MOD SED SAME PHYS/QHP EA: CPT | Performed by: INTERNAL MEDICINE

## 2019-11-13 PROCEDURE — 80307 DRUG TEST PRSMV CHEM ANLYZR: CPT

## 2019-11-13 PROCEDURE — 36556 INSERT NON-TUNNEL CV CATH: CPT

## 2019-11-13 PROCEDURE — 85018 HEMOGLOBIN: CPT | Mod: 91

## 2019-11-13 PROCEDURE — 82803 BLOOD GASES ANY COMBINATION: CPT

## 2019-11-13 PROCEDURE — 85027 COMPLETE CBC AUTOMATED: CPT

## 2019-11-13 PROCEDURE — 36600 WITHDRAWAL OF ARTERIAL BLOOD: CPT

## 2019-11-13 PROCEDURE — 700111 HCHG RX REV CODE 636 W/ 250 OVERRIDE (IP): Performed by: INTERNAL MEDICINE

## 2019-11-13 PROCEDURE — 51702 INSERT TEMP BLADDER CATH: CPT

## 2019-11-13 PROCEDURE — 96366 THER/PROPH/DIAG IV INF ADDON: CPT

## 2019-11-13 PROCEDURE — 84478 ASSAY OF TRIGLYCERIDES: CPT

## 2019-11-13 PROCEDURE — 30233N1 TRANSFUSION OF NONAUTOLOGOUS RED BLOOD CELLS INTO PERIPHERAL VEIN, PERCUTANEOUS APPROACH: ICD-10-PCS | Performed by: INTERNAL MEDICINE

## 2019-11-13 PROCEDURE — 84484 ASSAY OF TROPONIN QUANT: CPT

## 2019-11-13 PROCEDURE — C9113 INJ PANTOPRAZOLE SODIUM, VIA: HCPCS | Performed by: EMERGENCY MEDICINE

## 2019-11-13 PROCEDURE — 700111 HCHG RX REV CODE 636 W/ 250 OVERRIDE (IP): Mod: JG | Performed by: STUDENT IN AN ORGANIZED HEALTH CARE EDUCATION/TRAINING PROGRAM

## 2019-11-13 PROCEDURE — 700101 HCHG RX REV CODE 250: Performed by: INTERNAL MEDICINE

## 2019-11-13 PROCEDURE — 85347 COAGULATION TIME ACTIVATED: CPT

## 2019-11-13 PROCEDURE — 500066 HCHG BITE BLOCK, ECT: Performed by: INTERNAL MEDICINE

## 2019-11-13 PROCEDURE — 94002 VENT MGMT INPAT INIT DAY: CPT

## 2019-11-13 PROCEDURE — 30233R1 TRANSFUSION OF NONAUTOLOGOUS PLATELETS INTO PERIPHERAL VEIN, PERCUTANEOUS APPROACH: ICD-10-PCS | Performed by: INTERNAL MEDICINE

## 2019-11-13 PROCEDURE — 83690 ASSAY OF LIPASE: CPT

## 2019-11-13 PROCEDURE — 96365 THER/PROPH/DIAG IV INF INIT: CPT

## 2019-11-13 PROCEDURE — 36556 INSERT NON-TUNNEL CV CATH: CPT | Mod: RT | Performed by: INTERNAL MEDICINE

## 2019-11-13 PROCEDURE — 99291 CRITICAL CARE FIRST HOUR: CPT | Mod: 25 | Performed by: INTERNAL MEDICINE

## 2019-11-13 PROCEDURE — 96367 TX/PROPH/DG ADDL SEQ IV INF: CPT

## 2019-11-13 PROCEDURE — 160048 HCHG OR STATISTICAL LEVEL 1-5: Performed by: INTERNAL MEDICINE

## 2019-11-13 PROCEDURE — 85384 FIBRINOGEN ACTIVITY: CPT

## 2019-11-13 PROCEDURE — 86900 BLOOD TYPING SEROLOGIC ABO: CPT

## 2019-11-13 PROCEDURE — 31500 INSERT EMERGENCY AIRWAY: CPT

## 2019-11-13 PROCEDURE — 76705 ECHO EXAM OF ABDOMEN: CPT

## 2019-11-13 PROCEDURE — 85576 BLOOD PLATELET AGGREGATION: CPT

## 2019-11-13 PROCEDURE — 160202 HCHG ENDO MINUTES - 1ST 30 MINS LEVEL 3: Performed by: INTERNAL MEDICINE

## 2019-11-13 PROCEDURE — 85730 THROMBOPLASTIN TIME PARTIAL: CPT

## 2019-11-13 RX ORDER — SODIUM CHLORIDE, SODIUM LACTATE, POTASSIUM CHLORIDE, AND CALCIUM CHLORIDE .6; .31; .03; .02 G/100ML; G/100ML; G/100ML; G/100ML
1000 INJECTION, SOLUTION INTRAVENOUS ONCE
Status: COMPLETED | OUTPATIENT
Start: 2019-11-13 | End: 2019-11-13

## 2019-11-13 RX ORDER — POTASSIUM CHLORIDE 7.45 MG/ML
10 INJECTION INTRAVENOUS
Status: DISPENSED | OUTPATIENT
Start: 2019-11-13 | End: 2019-11-13

## 2019-11-13 RX ORDER — ETOMIDATE 2 MG/ML
20 INJECTION INTRAVENOUS ONCE
Status: COMPLETED | OUTPATIENT
Start: 2019-11-13 | End: 2019-11-13

## 2019-11-13 RX ORDER — MIDAZOLAM HYDROCHLORIDE 1 MG/ML
INJECTION INTRAMUSCULAR; INTRAVENOUS
Status: DISCONTINUED | OUTPATIENT
Start: 2019-11-13 | End: 2019-11-13 | Stop reason: HOSPADM

## 2019-11-13 RX ORDER — POLYETHYLENE GLYCOL 3350 17 G/17G
1 POWDER, FOR SOLUTION ORAL
Status: DISCONTINUED | OUTPATIENT
Start: 2019-11-13 | End: 2019-11-13

## 2019-11-13 RX ORDER — ONDANSETRON 2 MG/ML
4 INJECTION INTRAMUSCULAR; INTRAVENOUS ONCE
Status: COMPLETED | OUTPATIENT
Start: 2019-11-13 | End: 2019-11-13

## 2019-11-13 RX ORDER — FOLIC ACID 1 MG/1
1 TABLET ORAL DAILY
Status: DISCONTINUED | OUTPATIENT
Start: 2019-11-14 | End: 2019-11-13

## 2019-11-13 RX ORDER — AMOXICILLIN 250 MG
2 CAPSULE ORAL 2 TIMES DAILY
Status: DISCONTINUED | OUTPATIENT
Start: 2019-11-13 | End: 2019-11-15

## 2019-11-13 RX ORDER — MIDAZOLAM HYDROCHLORIDE 1 MG/ML
INJECTION INTRAMUSCULAR; INTRAVENOUS
Status: DISPENSED
Start: 2019-11-13 | End: 2019-11-13

## 2019-11-13 RX ORDER — BISACODYL 10 MG
10 SUPPOSITORY, RECTAL RECTAL
Status: DISCONTINUED | OUTPATIENT
Start: 2019-11-13 | End: 2019-11-13

## 2019-11-13 RX ORDER — POLYETHYLENE GLYCOL 3350 17 G/17G
1 POWDER, FOR SOLUTION ORAL
Status: DISCONTINUED | OUTPATIENT
Start: 2019-11-13 | End: 2019-11-15

## 2019-11-13 RX ORDER — SODIUM CHLORIDE, SODIUM LACTATE, POTASSIUM CHLORIDE, CALCIUM CHLORIDE 600; 310; 30; 20 MG/100ML; MG/100ML; MG/100ML; MG/100ML
1000 INJECTION, SOLUTION INTRAVENOUS ONCE
Status: COMPLETED | OUTPATIENT
Start: 2019-11-13 | End: 2019-11-13

## 2019-11-13 RX ORDER — MAGNESIUM SULFATE HEPTAHYDRATE 40 MG/ML
4 INJECTION, SOLUTION INTRAVENOUS ONCE
Status: COMPLETED | OUTPATIENT
Start: 2019-11-13 | End: 2019-11-13

## 2019-11-13 RX ORDER — MIDAZOLAM HYDROCHLORIDE 1 MG/ML
2 INJECTION INTRAMUSCULAR; INTRAVENOUS ONCE
Status: COMPLETED | OUTPATIENT
Start: 2019-11-13 | End: 2019-11-13

## 2019-11-13 RX ORDER — IPRATROPIUM BROMIDE AND ALBUTEROL SULFATE 2.5; .5 MG/3ML; MG/3ML
3 SOLUTION RESPIRATORY (INHALATION)
Status: DISCONTINUED | OUTPATIENT
Start: 2019-11-13 | End: 2019-11-18 | Stop reason: HOSPADM

## 2019-11-13 RX ORDER — SODIUM CHLORIDE, SODIUM LACTATE, POTASSIUM CHLORIDE, CALCIUM CHLORIDE 600; 310; 30; 20 MG/100ML; MG/100ML; MG/100ML; MG/100ML
INJECTION, SOLUTION INTRAVENOUS CONTINUOUS
Status: DISCONTINUED | OUTPATIENT
Start: 2019-11-13 | End: 2019-11-15

## 2019-11-13 RX ORDER — MIDAZOLAM HYDROCHLORIDE 1 MG/ML
INJECTION INTRAMUSCULAR; INTRAVENOUS
Status: COMPLETED
Start: 2019-11-13 | End: 2019-11-13

## 2019-11-13 RX ORDER — PANTOPRAZOLE SODIUM 40 MG/10ML
40 INJECTION, POWDER, LYOPHILIZED, FOR SOLUTION INTRAVENOUS 2 TIMES DAILY
Status: DISCONTINUED | OUTPATIENT
Start: 2019-11-13 | End: 2019-11-13

## 2019-11-13 RX ORDER — AMOXICILLIN 250 MG
2 CAPSULE ORAL 2 TIMES DAILY
Status: DISCONTINUED | OUTPATIENT
Start: 2019-11-13 | End: 2019-11-13

## 2019-11-13 RX ORDER — PROPOFOL 10 MG/ML
30 INJECTION, EMULSION INTRAVENOUS ONCE
Status: COMPLETED | OUTPATIENT
Start: 2019-11-13 | End: 2019-11-13

## 2019-11-13 RX ORDER — METOCLOPRAMIDE HYDROCHLORIDE 5 MG/ML
10 INJECTION INTRAMUSCULAR; INTRAVENOUS EVERY 6 HOURS
Status: DISCONTINUED | OUTPATIENT
Start: 2019-11-13 | End: 2019-11-13

## 2019-11-13 RX ORDER — THIAMINE MONONITRATE (VIT B1) 100 MG
100 TABLET ORAL DAILY
Status: DISCONTINUED | OUTPATIENT
Start: 2019-11-14 | End: 2019-11-13

## 2019-11-13 RX ORDER — SUCCINYLCHOLINE CHLORIDE 20 MG/ML
60 INJECTION INTRAMUSCULAR; INTRAVENOUS ONCE
Status: COMPLETED | OUTPATIENT
Start: 2019-11-13 | End: 2019-11-13

## 2019-11-13 RX ORDER — PHENYLEPHRINE HCL IN 0.9% NACL 0.5 MG/5ML
100 SYRINGE (ML) INTRAVENOUS
Status: ACTIVE | OUTPATIENT
Start: 2019-11-13 | End: 2019-11-13

## 2019-11-13 RX ORDER — SODIUM CHLORIDE 9 MG/ML
500 INJECTION, SOLUTION INTRAVENOUS
Status: DISCONTINUED | OUTPATIENT
Start: 2019-11-13 | End: 2019-11-13

## 2019-11-13 RX ORDER — BISACODYL 10 MG
10 SUPPOSITORY, RECTAL RECTAL
Status: DISCONTINUED | OUTPATIENT
Start: 2019-11-13 | End: 2019-11-15

## 2019-11-13 RX ORDER — FOLIC ACID 1 MG/1
1 TABLET ORAL DAILY
Status: DISCONTINUED | OUTPATIENT
Start: 2019-11-14 | End: 2019-11-15

## 2019-11-13 RX ORDER — PHENYLEPHRINE HCL IN 0.9% NACL 0.5 MG/5ML
SYRINGE (ML) INTRAVENOUS
Status: COMPLETED
Start: 2019-11-13 | End: 2019-11-13

## 2019-11-13 RX ORDER — MIDAZOLAM HYDROCHLORIDE 1 MG/ML
.5-2 INJECTION INTRAMUSCULAR; INTRAVENOUS PRN
Status: DISCONTINUED | OUTPATIENT
Start: 2019-11-13 | End: 2019-11-13

## 2019-11-13 RX ORDER — LORAZEPAM 2 MG/ML
2-4 INJECTION INTRAMUSCULAR
Status: DISCONTINUED | OUTPATIENT
Start: 2019-11-13 | End: 2019-11-13

## 2019-11-13 RX ORDER — SODIUM CHLORIDE 9 MG/ML
1000 INJECTION, SOLUTION INTRAVENOUS ONCE
Status: COMPLETED | OUTPATIENT
Start: 2019-11-13 | End: 2019-11-13

## 2019-11-13 RX ORDER — METOCLOPRAMIDE HYDROCHLORIDE 5 MG/ML
INJECTION INTRAMUSCULAR; INTRAVENOUS
Status: DISPENSED
Start: 2019-11-13 | End: 2019-11-13

## 2019-11-13 RX ORDER — THIAMINE MONONITRATE (VIT B1) 100 MG
100 TABLET ORAL DAILY
Status: DISCONTINUED | OUTPATIENT
Start: 2019-11-16 | End: 2019-11-15

## 2019-11-13 RX ORDER — METOCLOPRAMIDE HYDROCHLORIDE 5 MG/ML
10 INJECTION INTRAMUSCULAR; INTRAVENOUS ONCE
Status: COMPLETED | OUTPATIENT
Start: 2019-11-13 | End: 2019-11-13

## 2019-11-13 RX ADMIN — OCTREOTIDE ACETATE 50 MCG/HR: 200 INJECTION, SOLUTION INTRAVENOUS; SUBCUTANEOUS at 09:00

## 2019-11-13 RX ADMIN — FENTANYL CITRATE 25 MCG: 50 INJECTION, SOLUTION INTRAMUSCULAR; INTRAVENOUS at 18:10

## 2019-11-13 RX ADMIN — PROPOFOL 25 MCG/KG/MIN: 10 INJECTION, EMULSION INTRAVENOUS at 17:31

## 2019-11-13 RX ADMIN — SODIUM CHLORIDE, POTASSIUM CHLORIDE, SODIUM LACTATE AND CALCIUM CHLORIDE 1000 ML: 600; 310; 30; 20 INJECTION, SOLUTION INTRAVENOUS at 09:00

## 2019-11-13 RX ADMIN — FENTANYL CITRATE 100 MCG: 50 INJECTION, SOLUTION INTRAMUSCULAR; INTRAVENOUS at 14:50

## 2019-11-13 RX ADMIN — SODIUM CHLORIDE, POTASSIUM CHLORIDE, SODIUM LACTATE AND CALCIUM CHLORIDE 1000 ML: 600; 310; 30; 20 INJECTION, SOLUTION INTRAVENOUS at 07:37

## 2019-11-13 RX ADMIN — FENTANYL CITRATE 50 MCG: 50 INJECTION, SOLUTION INTRAMUSCULAR; INTRAVENOUS at 10:52

## 2019-11-13 RX ADMIN — SODIUM CHLORIDE 8 MG/HR: 9 INJECTION, SOLUTION INTRAVENOUS at 06:54

## 2019-11-13 RX ADMIN — MIDAZOLAM HYDROCHLORIDE 2 MG: 1 INJECTION INTRAMUSCULAR; INTRAVENOUS at 08:31

## 2019-11-13 RX ADMIN — PROPOFOL 10 MCG/KG/MIN: 10 INJECTION, EMULSION INTRAVENOUS at 10:44

## 2019-11-13 RX ADMIN — ONDANSETRON 4 MG: 2 INJECTION INTRAMUSCULAR; INTRAVENOUS at 06:37

## 2019-11-13 RX ADMIN — PROPOFOL 30 MG: 10 INJECTION, EMULSION INTRAVENOUS at 10:42

## 2019-11-13 RX ADMIN — THIAMINE HYDROCHLORIDE 500 MG: 100 INJECTION, SOLUTION INTRAMUSCULAR; INTRAVENOUS at 11:42

## 2019-11-13 RX ADMIN — SODIUM CHLORIDE, POTASSIUM CHLORIDE, SODIUM LACTATE AND CALCIUM CHLORIDE 125 ML: 600; 310; 30; 20 INJECTION, SOLUTION INTRAVENOUS at 15:38

## 2019-11-13 RX ADMIN — FENTANYL CITRATE 50 MCG: 50 INJECTION, SOLUTION INTRAMUSCULAR; INTRAVENOUS at 08:38

## 2019-11-13 RX ADMIN — MIDAZOLAM HYDROCHLORIDE 2 MG: 1 INJECTION, SOLUTION INTRAMUSCULAR; INTRAVENOUS at 08:31

## 2019-11-13 RX ADMIN — THIAMINE HYDROCHLORIDE: 100 INJECTION, SOLUTION INTRAMUSCULAR; INTRAVENOUS at 12:59

## 2019-11-13 RX ADMIN — SODIUM CHLORIDE, POTASSIUM CHLORIDE, SODIUM LACTATE AND CALCIUM CHLORIDE: 600; 310; 30; 20 INJECTION, SOLUTION INTRAVENOUS at 23:30

## 2019-11-13 RX ADMIN — METOCLOPRAMIDE 10 MG: 5 INJECTION, SOLUTION INTRAMUSCULAR; INTRAVENOUS at 07:39

## 2019-11-13 RX ADMIN — PROPOFOL 50 MCG/KG/MIN: 10 INJECTION, EMULSION INTRAVENOUS at 23:30

## 2019-11-13 RX ADMIN — SODIUM CHLORIDE, POTASSIUM CHLORIDE, SODIUM LACTATE AND CALCIUM CHLORIDE 1000 ML: 600; 310; 30; 20 INJECTION, SOLUTION INTRAVENOUS at 13:59

## 2019-11-13 RX ADMIN — ETOMIDATE 20 MG: 2 INJECTION INTRAVENOUS at 08:18

## 2019-11-13 RX ADMIN — POTASSIUM CHLORIDE 10 MEQ: 7.46 INJECTION, SOLUTION INTRAVENOUS at 10:17

## 2019-11-13 RX ADMIN — SODIUM CHLORIDE, POTASSIUM CHLORIDE, SODIUM LACTATE AND CALCIUM CHLORIDE: 600; 310; 30; 20 INJECTION, SOLUTION INTRAVENOUS at 13:50

## 2019-11-13 RX ADMIN — FENTANYL CITRATE 100 MCG: 50 INJECTION, SOLUTION INTRAMUSCULAR; INTRAVENOUS at 10:11

## 2019-11-13 RX ADMIN — Medication 50 MCG/HR: at 15:27

## 2019-11-13 RX ADMIN — SODIUM CHLORIDE 8 MG/HR: 9 INJECTION, SOLUTION INTRAVENOUS at 21:55

## 2019-11-13 RX ADMIN — SODIUM CHLORIDE 1000 ML: 9 INJECTION, SOLUTION INTRAVENOUS at 06:37

## 2019-11-13 RX ADMIN — OCTREOTIDE ACETATE 50 MCG/HR: 200 INJECTION, SOLUTION INTRAVENOUS; SUBCUTANEOUS at 06:54

## 2019-11-13 RX ADMIN — CEFTRIAXONE SODIUM 1 G: 1 INJECTION, POWDER, FOR SOLUTION INTRAMUSCULAR; INTRAVENOUS at 06:37

## 2019-11-13 RX ADMIN — MAGNESIUM SULFATE IN WATER 4 G: 40 INJECTION, SOLUTION INTRAVENOUS at 11:55

## 2019-11-13 RX ADMIN — SODIUM CHLORIDE 8 MG/HR: 9 INJECTION, SOLUTION INTRAVENOUS at 12:21

## 2019-11-13 RX ADMIN — SUCCINYLCHOLINE CHLORIDE 60 MG: 20 INJECTION, SOLUTION INTRAMUSCULAR; INTRAVENOUS; PARENTERAL at 08:18

## 2019-11-13 RX ADMIN — SODIUM CHLORIDE, POTASSIUM CHLORIDE, SODIUM LACTATE AND CALCIUM CHLORIDE: 600; 310; 30; 20 INJECTION, SOLUTION INTRAVENOUS at 08:20

## 2019-11-13 RX ADMIN — SODIUM CHLORIDE 80 MG: 9 INJECTION, SOLUTION INTRAVENOUS at 06:43

## 2019-11-13 ASSESSMENT — ENCOUNTER SYMPTOMS
ROS GI COMMENTS: HEMATEMESIS
SORE THROAT: 0
SHORTNESS OF BREATH: 0
POLYDIPSIA: 0
VOMITING: 1
MYALGIAS: 0
WHEEZING: 0
DIZZINESS: 0
HEARTBURN: 0
SENSORY CHANGE: 0
ABDOMINAL PAIN: 0
DIZZINESS: 1
BLOOD IN STOOL: 0
BACK PAIN: 1
DIARRHEA: 0
COUGH: 0
CLAUDICATION: 0
DOUBLE VISION: 0
CHILLS: 0
PALPITATIONS: 1
HEMOPTYSIS: 0
NAUSEA: 1
PALPITATIONS: 0
ABDOMINAL PAIN: 1
FEVER: 0
SPEECH CHANGE: 0
BLURRED VISION: 0
SPUTUM PRODUCTION: 0
HEADACHES: 0
CONSTIPATION: 0
SINUS PAIN: 0
BACK PAIN: 0

## 2019-11-13 ASSESSMENT — LIFESTYLE VARIABLES: DO YOU DRINK ALCOHOL: YES

## 2019-11-13 NOTE — ASSESSMENT & PLAN NOTE
Drinking up to 3 pints daily  Still intoxicated on admit  will use Ativan for sedation/treatment of alcohol withdrawal  Bernardo vargas

## 2019-11-13 NOTE — PROCEDURES
Intubation  Date/Time: 11/13/2019 8:46 AM  Performed by: Luz Maria Louise M.D.  Authorized by: Luz Maria Louise M.D.     Consent:     Consent obtained:  Written    Consent given by:  Patient    Risks discussed:  Aspiration, bleeding, brain injury, death, hypoxia and dental trauma    Alternatives discussed:  No treatment  Pre-procedure details:     Patient status:  Awake    Mallampati score:  III    Paralytics:  Succinylcholine (etomidate)  Procedure details:     Preoxygenation:  Nonrebreather mask    CPR in progress: no      Intubation method:  Oral    Oral intubation technique:  Direct    Laryngoscope blade:  Mac 3    Tube size (mm):  8.0    Tube type:  Cuffed    Number of attempts:  1 (grade 2 view)    Cricoid pressure: no      Tube visualized through cords: yes    Placement assessment:     ETT to teeth:  24    Tube secured with:  ETT carnes    Breath sounds:  Equal and absent over the epigastrium    Placement verification: chest rise, equal breath sounds and ETCO2 detector      Chest x-ray findings: pending.  Post-procedure details:     Patient tolerance of procedure:  Tolerated well, no immediate complications

## 2019-11-13 NOTE — H&P
Internal Medicine Admitting History and Physical    Note Author: Sandra Bartlett D.O.       Name Inocencio Shabazz     1960   Age/Sex 59 y.o. male   MRN [id number]   Code Status Full Code     After 5PM or if no immediate response to page, please call for cross-coverage  Attending/Team: Dr. Louise / JOHN Hanson             Chief Complaint:   Hematemesis     HPI:  Mr. Shabazz is a 59 year old male [place] with past medical history of alcohol use, history of chronic hepatitis C, cirrhosis, history of H. Pylori gastric ulcer and esophageal varix noted in the EGD from  who presented to the hospital with hematemesis. Patient is a poor historian and states that symptoms began last night. He drinks on a daily basis including 6 beers and 5 pints of whiskey daily. Patient reports that he has had similar symptoms before but never this severe. He denies any recent illness, fevers, chills, chest pain, abdominal pain or swelling of his abdomen.      Of note, patient has been admitted multiple times with hematemesis. In , patient had an EGD which showed portal hypertensive gastropathy with gastritis, hiatal hernia and grade 1 esophageal varix which did not require banding. Patient reports that he has been taking his medications including his vitamins and pantoprazole daily.     In the ED, patient was noted to have blood pressures 80s/60s with tachycardia in the 130s. Patient was having active bleeding and by the time patient was examined had vomited about 1.5L or bright red blood. Patient was noted to have hemoglobin of 11.7, potassium 3.4, blood alcohol level of 0.32 and INR of 1.33. Chest X-ray was obtained which was unremarkable. Patient received ceftriaxone, 2L IVF, Octreotide, Reglan, Pantoprazole and Zofran in the ED. He was receiving one unit Gastroenterology was consulted for emergent EGD.     Patient was intubated for airway protection with etomidate and succinylcholine.     Review of Systems  Per Dr. Lincoln, this form needs to go to his behavioral health provider.    Heidine notified form will need to be completed by his behavioral health provider.  He states Dr. Lincoln doesn't need to do the form he just needs his medical records to be sent to [facility] and [facility] ([facility]  He will  the form he dropped off that was given to Dr. Lincoln.   Release of information sent to medical records yesterday.      Constitutional: Negative for chills, fever and malaise/fatigue.   HENT: Negative for congestion, hearing loss, nosebleeds, sinus pain and sore throat.    Eyes: Negative for blurred vision and double vision.   Respiratory: Negative for cough, sputum production, shortness of breath and wheezing.    Cardiovascular: Positive for palpitations. Negative for chest pain, claudication and leg swelling.   Gastrointestinal: Positive for melena, nausea and vomiting. Negative for abdominal pain, blood in stool, constipation, diarrhea and heartburn.   Genitourinary: Negative for dysuria, frequency and urgency.   Musculoskeletal: Negative for back pain and joint pain.   Skin: Negative for itching and rash.   Neurological: Negative for dizziness, sensory change, speech change and headaches.   Endo/Heme/Allergies: Negative for polydipsia.   Psychiatric/Behavioral:        + Alcohol use    All other systems reviewed and are negative.            Past Medical History (Chronic medical problem, known complications and current treatment)    Alcohol use, history of chronic hepatitis C, cirrhosis, history of H. Pylori and esophageal varix     Past Surgical History:  Past Surgical History:   Procedure Laterality Date   • GASTROSCOPY  3/13/2019    Procedure: GASTROSCOPY;  Surgeon: Abdi Ba M.D.;  Location: SURGERY [facility];  Service: Gastroenterology   • GASTROSCOPY  4/8/2016    Procedure: GASTROSCOPY;  Surgeon: Braeden Tobin M.D.;  Location: SURGERY [facility];  Service:    • GASTROSCOPY  1/3/2016    Procedure: GASTROSCOPY WITH BANDING;  Surgeon: Alfredo Antonio M.D.;  Location: SURGERY [facility];  Service:        Current Outpatient Medications:  Home Medications     Reviewed by Levy Omer (Pharmacy Tech) on 11/13/19 at 0808  Med List Status: Complete   Medication Last Dose Status   Cyanocobalamin (VITAMIN B-12) 1000 MCG Tab 11/12/2019 Active   ferrous sulfate 325 (65 Fe) MG tablet 11/12/2019 Active    folic acid (FOLVITE) 1 MG Tab 11/12/2019 Active   Multiple Vitamins-Minerals (MULTIVITAMIN ADULT PO) 11/12/2019 Active   pantoprazole (PROTONIX) 40 MG Tablet Delayed Response 11/12/2019 Active   potassium chloride SA (KDUR) 20 MEQ Tab CR 11/12/2019 Active   propranolol (INDERAL) 20 MG Tab 11/12/2019 Active   quetiapine (SEROQUEL) 200 MG Tab 11/11/2019 Active   traZODone (DESYREL) 50 MG Tab 11/11/2019 Active                Medication Allergy/Sensitivities:  Allergies   Allergen Reactions   • Haloperidol Unspecified     Twitching           Family History (mandatory)   No family history on file.    Social History (mandatory)   Social History     Socioeconomic History   • Marital status: Single     Spouse name: Not on file   • Number of children: Not on file   • Years of education: Not on file   • Highest education level: Not on file   Occupational History   • Not on file   Social Needs   • Financial resource strain: Not on file   • Food insecurity:     Worry: Not on file     Inability: Not on file   • Transportation needs:     Medical: Not on file     Non-medical: Not on file   Tobacco Use   • Smoking status: Current Every Day Smoker     Packs/day: 1.00     Years: 48.00     Pack years: 48.00     Types: Cigarettes   • Smokeless tobacco: Never Used   Substance and Sexual Activity   • Alcohol use: Yes     Comment: 1-2 beers and 1/2 pints of whiskey every day   • Drug use: Yes     Types: Inhaled     Comment: marijuana   • Sexual activity: Not on file   Lifestyle   • Physical activity:     Days per week: Not on file     Minutes per session: Not on file   • Stress: Not on file   Relationships   • Social connections:     Talks on phone: Not on file     Gets together: Not on file     Attends Rastafari service: Not on file     Active member of club or organization: Not on file     Attends meetings of clubs or organizations: Not on file     Relationship status: Not on file   • Intimate partner violence:     Fear of current or  ex partner: Not on file     Emotionally abused: Not on file     Physically abused: Not on file     Forced sexual activity: Not on file   Other Topics Concern   • Not on file   Social History Narrative   • Not on file     Living situation: Lives in apartment by himself, [place], would like his sister to make his medical decisions for him   Smokin ppd x 40+ years   ETOH: Daily 6 beers a day + 5-6 whiskey fifths a day, last used last night   Recreational drugs: + Marijuana use     PCP : Nnamdi Ballesteros M.D.    Physical Exam     Vitals:    19 1245 19 1300 19 1315 19 1330   BP: (!) 89/67 (!) 86/64 (!) 94/68 104/64   Pulse: (!) 114 (!) 114 (!) 105 (!) 125   Resp: 20 20 20 (!) 29   Temp:       TempSrc:       SpO2: 100% 100% 100% 100%   Weight:       Height:         Body mass index is 21.14 kg/m².  O2 therapy: Pulse Oximetry: 100 %, O2 Delivery: None (Room Air)    Physical Exam   Constitutional: He appears distressed.   Thin statured male vomiting bright red blood with emesis bag in hand, appears uncomfortable, prominent clavicles   HENT:   Head: Normocephalic and atraumatic.   Mouth/Throat: No oropharyngeal exudate.   + Dry mucous membranes, Dried blood on lips, no bleeding in posterior oropharynx, Mallampati II    Eyes: Pupils are equal, round, and reactive to light. EOM are normal. No scleral icterus.   Neck: Normal range of motion. No JVD present. No tracheal deviation present.   Cardiovascular: Regular rhythm and intact distal pulses.   No murmur heard.  Tachycardic   Pulmonary/Chest:   Diminished breath sounds, but clear to auscultation bilaterally without crackles, wheezes, no crepitus    Abdominal: Soft. Bowel sounds are normal. He exhibits no distension. There is no tenderness. There is no rebound.   Musculoskeletal:         General: No edema.   Skin: He is not diaphoretic.         Data Review       Old Records Request:   Completed  Current Records review/summary: Completed    Lab Data  Review:  Recent Results (from the past 24 hour(s))   COD (ADULT)    Collection Time: 11/13/19  5:32 AM   Result Value Ref Range    ABO Grouping Only AB     Rh Grouping Only POS     Antibody Screen-Cod NEG     Component R       R3                  Red Blood Cells3    [id number]   issued       11/13/19   07:48      Product Type Red Blood Cells LR Pheresis     Dispense Status issued     Unit Number (Barcoded) [id number]     Product Code (Barcoded) G4259Z13     Blood Type (Barcoded) 8400    CBC WITH DIFFERENTIAL    Collection Time: 11/13/19  5:32 AM   Result Value Ref Range    WBC 4.7 (L) 4.8 - 10.8 K/uL    RBC 3.57 (L) 4.70 - 6.10 M/uL    Hemoglobin 11.7 (L) 14.0 - 18.0 g/dL    Hematocrit 35.3 (L) 42.0 - 52.0 %    MCV 98.9 (H) 81.4 - 97.8 fL    MCH 32.8 27.0 - 33.0 pg    MCHC 33.1 (L) 33.7 - 35.3 g/dL    RDW 51.8 (H) 35.9 - 50.0 fL    Platelet Count 35 (LL) 164 - 446 K/uL    MPV 12.5 9.0 - 12.9 fL    Neutrophils-Polys 56.70 44.00 - 72.00 %    Lymphocytes 32.80 22.00 - 41.00 %    Monocytes 7.80 0.00 - 13.40 %    Eosinophils 1.50 0.00 - 6.90 %    Basophils 0.80 0.00 - 1.80 %    Immature Granulocytes 0.40 0.00 - 0.90 %    Nucleated RBC 0.00 /100 WBC    Neutrophils (Absolute) 2.67 1.82 - 7.42 K/uL    Lymphs (Absolute) 1.55 1.00 - 4.80 K/uL    Monos (Absolute) 0.37 0.00 - 0.85 K/uL    Eos (Absolute) 0.07 0.00 - 0.51 K/uL    Baso (Absolute) 0.04 0.00 - 0.12 K/uL    Immature Granulocytes (abs) 0.02 0.00 - 0.11 K/uL    NRBC (Absolute) 0.00 K/uL   COMP METABOLIC PANEL    Collection Time: 11/13/19  5:32 AM   Result Value Ref Range    Sodium 142 135 - 145 mmol/L    Potassium 3.4 (L) 3.6 - 5.5 mmol/L    Chloride 109 96 - 112 mmol/L    Co2 22 20 - 33 mmol/L    Anion Gap 11.0 0.0 - 11.9    Glucose 119 (H) 65 - 99 mg/dL    Bun 20 8 - 22 mg/dL    Creatinine 0.61 0.50 - 1.40 mg/dL    Calcium 8.0 (L) 8.5 - 10.5 mg/dL    AST(SGOT) 68 (H) 12 - 45 U/L    ALT(SGPT) 45 2 - 50 U/L    Alkaline Phosphatase 57 30 - 99 U/L    Total  Bilirubin 0.6 0.1 - 1.5 mg/dL    Albumin 3.5 3.2 - 4.9 g/dL    Total Protein 6.6 6.0 - 8.2 g/dL    Globulin 3.1 1.9 - 3.5 g/dL    A-G Ratio 1.1 g/dL   LIPASE    Collection Time: 19  5:32 AM   Result Value Ref Range    Lipase 77 11 - 82 U/L   PROTHROMBIN TIME    Collection Time: 19  5:32 AM   Result Value Ref Range    PT 16.9 (H) 12.0 - 14.6 sec    INR 1.33 (H) 0.87 - 1.13   APTT    Collection Time: 19  5:32 AM   Result Value Ref Range    APTT 30.5 24.7 - 36.0 sec   DIAGNOSTIC ALCOHOL    Collection Time: 19  5:32 AM   Result Value Ref Range    Diagnostic Alcohol 0.32 (H) 0.00 g/dL   TROPONIN    Collection Time: 19  5:32 AM   Result Value Ref Range    Troponin T 6 6 - 19 ng/L   ESTIMATED GFR    Collection Time: 19  5:32 AM   Result Value Ref Range    GFR If African American >60 >60 mL/min/1.73 m 2    GFR If Non African American >60 >60 mL/min/1.73 m 2   MAGNESIUM    Collection Time: 19  5:32 AM   Result Value Ref Range    Magnesium 1.7 1.5 - 2.5 mg/dL   PHOSPHORUS    Collection Time: 19  5:32 AM   Result Value Ref Range    Phosphorus 3.2 2.5 - 4.5 mg/dL   Triglyceride    Collection Time: 19  5:32 AM   Result Value Ref Range    Triglycerides 136 0 - 149 mg/dL   EKG (NOW)    Collection Time: 19  5:39 AM   Result Value Ref Range    Report       [facility] Emergency Dept.    Test Date:  2019  Pt Name:    JIMMY FERRER                 Department: ER  MRN:        [id number]                      Room:       RD 09  Gender:     Male                         Technician: [id number]  :        1960                   Requested By:ER TRIAGE PROTOCOL  Order #:    [id number]                    Reading MD:    Measurements  Intervals                                Axis  Rate:       134                          P:          78  NC:         144                          QRS:        78  QRSD:       78                           T:          92  QT:          304  QTc:        454    Interpretive Statements  SINUS TACHYCARDIA  NONSPECIFIC T ABNORMALITIES, LATERAL LEADS  BASELINE WANDER IN LEAD(S) V2  Compared to ECG 03/12/2019 08:19:34  T-wave abnormality now present     CBC WITH DIFFERENTIAL    Collection Time: 11/13/19  5:51 AM   Result Value Ref Range    WBC 4.7 (L) 4.8 - 10.8 K/uL    RBC 3.58 (L) 4.70 - 6.10 M/uL    Hemoglobin 11.8 (L) 14.0 - 18.0 g/dL    Hematocrit 35.9 (L) 42.0 - 52.0 %    .3 (H) 81.4 - 97.8 fL    MCH 33.0 27.0 - 33.0 pg    MCHC 32.9 (L) 33.7 - 35.3 g/dL    RDW 52.1 (H) 35.9 - 50.0 fL    Platelet Count 42 (LL) 164 - 446 K/uL    MPV 12.0 9.0 - 12.9 fL    Neutrophils-Polys 57.50 44.00 - 72.00 %    Lymphocytes 32.10 22.00 - 41.00 %    Monocytes 8.00 0.00 - 13.40 %    Eosinophils 1.10 0.00 - 6.90 %    Basophils 1.10 0.00 - 1.80 %    Immature Granulocytes 0.20 0.00 - 0.90 %    Nucleated RBC 0.00 /100 WBC    Neutrophils (Absolute) 2.72 1.82 - 7.42 K/uL    Lymphs (Absolute) 1.52 1.00 - 4.80 K/uL    Monos (Absolute) 0.38 0.00 - 0.85 K/uL    Eos (Absolute) 0.05 0.00 - 0.51 K/uL    Baso (Absolute) 0.05 0.00 - 0.12 K/uL    Immature Granulocytes (abs) 0.01 0.00 - 0.11 K/uL    NRBC (Absolute) 0.00 K/uL   PLATELETS REQUEST    Collection Time: 11/13/19  6:39 AM   Result Value Ref Range    Component P       P1                  Plt Pheresis        [id number]   issued       11/13/19   09:14      Product Type P1     Dispense Status issued     Unit Number (Barcoded) [id number]     Product Code (Barcoded) B0477I16     Blood Type (Barcoded) 5100     Component P       PTP                 Plts,Pheresis       [id number]   issued       11/13/19   11:00      Product Type Platelets  Pheresis LR     Dispense Status issued     Unit Number (Barcoded) [id number]     Product Code (Barcoded) U8511H42     Blood Type (Barcoded) 6200    PLATELET MAPPING WITH BASIC TEG    Collection Time: 11/13/19  7:45 AM   Result Value Ref Range    Reaction Time Initial-R 7.8 5.0 -  10.0 min    Clot Kinetics-K 4.8 (H) 1.0 - 3.0 min    Clot Angle-Angle 43.1 (L) 53.0 - 72.0 degrees    Maximum Clot Strength-MA 44.8 (L) 50.0 - 70.0 mm    Lysis 30 minutes-LY30 0.0 0.0 - 8.0 %    % Inhibition ADP 62.1 %    % Inhibition AA 97.3 %    TEG Algorithm Link Algorithm    HGB (Hemoglobin) for 48 hours    Collection Time: 11/13/19  7:45 AM   Result Value Ref Range    Hemoglobin 9.1 (L) 14.0 - 18.0 g/dL   ISTAT ARTERIAL BLOOD GAS    Collection Time: 11/13/19 12:15 PM   Result Value Ref Range    Ph 7.365 (L) 7.400 - 7.500    Pco2 29.2 26.0 - 37.0 mmHg    Po2 159 (H) 64 - 87 mmHg    Tco2 18 (L) 20 - 33 mmol/L    S02 99 93 - 99 %    Hco3 16.7 (L) 17.0 - 25.0 mmol/L    BE -8 (L) -4 - 3 mmol/L    Body Temp 97.7 F degrees    O2 Therapy 40 %    iPF Ratio 398     Ph Temp Caroline 7.373 (L) 7.400 - 7.500    Pco2 Temp Co 28.6 26.0 - 37.0 mmHg    Po2 Temp Cor 156 (H) 64 - 87 mmHg    Specimen Arterial     Action Range Triggered NO     Inst. Qualified Patient YES        Imaging/Procedures Review:    Independant Imaging Review: Completed  DX-CHEST-LIMITED (1 VIEW)   Final Result      Right internal jugular line tip overlies the SVC. Endotracheal tube terminates above the manuel.   No pneumothorax.      DX-CHEST-PORTABLE (1 VIEW)   Final Result      1.  Satisfactory ET tube placement.   2.  No acute abnormality.      DX-CHEST-PORTABLE (1 VIEW)   Final Result         1.  No acute cardiopulmonary disease.      US-RUQ    (Results Pending)        EKG:   EKG Independent Review: Completed  QTc:454, HR: 134 Sinus Tachycardia, no acute ischemic changes          Assessment/Plan     * Hemorrhagic shock (HCC)- (present on admission)  Assessment & Plan  Patient presents with bleeding esophageal varices with hypotension, tachycardia and large volume of bright red emesis. Gastroenterology was consulted and performed emergent EGD and underwent banding.   - Resuscitation with blood   - Serial H/H   - Received 1u of pRBCs and 2u platelets   -  Transfuse as clinically indicated   - Central line in place in case pressor support is needed, can consider Levophed   - Phenylephrine prn    Bleeding esophageal varices (HCC)- (present on admission)  Assessment & Plan  Patient presented with known esophageal varix presented with hematemesis, hemodynamically unstable.   - 2 Large bore IVs and central line in place   - Octreotide drip in place  - IV pantoprazole drip   - Ceftriaxone for prophylaxis in setting of cirrhosis   - Serial H/H   - Transfuse < 7  - TEG reviewed   - IVF  - Received 1 unit of pRBCs and 2u platelets  - EGD 11/13/19 showed Grade II esophageal varices, banded x 5   - Gastroenterology on board    Respiratory failure with hypoxia (HCC)- (present on admission)  Assessment & Plan  Patient was intubated for airway protection in setting of variceal bleed   - Wean ventilator   - RT   - Continue to monitor     Cirrhosis of liver (HCC)- (present on admission)  Assessment & Plan  Patient with varices, coagulopathy and pancytopenia reflective of cirrhosis.   - Currently stable   - RUQ ultrasound pending     Hepatitis C- (present on admission)  Assessment & Plan  History of untreated hepatitis C   - Obtain viral load   - Outpatient follow up     Alcoholism (HCC)- (present on admission)  Assessment & Plan  Patient with heavy alcohol use. Intoxicated on presentation.   - Propofol for sedation   - Fentanyl prn     Pancytopenia (HCC)- (present on admission)  Assessment & Plan  Secondary to bone marrow suppression from cirrhosis   - continue to monitor       Anticipated Hospital stay:  >2 midnights        Quality Measures  Quality-Core Measures   Reviewed items::  EKG reviewed, Labs reviewed, Medications reviewed and Radiology images reviewed  Jimenez catheter::  Critically Ill - Requiring Accurate Measurement of Urinary Output  Central line in place:  Need for access, Shock and Vasopressors  DVT prophylaxis - mechanical:  SCDs    PCP: Nnamdi Ballesteros M.D.

## 2019-11-13 NOTE — ED NOTES
RN Assist: Siva from lab called with critical platelet of 42 at 0706. Notified [place] of critical lab at 0707.

## 2019-11-13 NOTE — ASSESSMENT & PLAN NOTE
Patient presents with bleeding esophageal varices with hypotension, tachycardia and large volume of bright red emesis. Gastroenterology was consulted and performed emergent EGD and underwent banding.   - Improving without needing pressor support   - Resuscitation with blood   - Serial H/H   - Received 3u of pRBCs and 2u platelets   - Transfuse as clinically indicated

## 2019-11-13 NOTE — ASSESSMENT & PLAN NOTE
Patient presented with known esophageal varix presented with hematemesis, hemodynamically unstable. EGD 11/13/19 showed Grade II esophageal varices, banded x 5. Received 3 units of pRBCs and 2u platelets.  - Omeprazole PO BID   - Gastroenterology signed off   - Bactrim to complete a seven day course  - continue propranolol   - PT/OT- no further needs

## 2019-11-13 NOTE — ASSESSMENT & PLAN NOTE
History of hepatitis C and alcoholic liver disease  Avoid hepatotoxins and alcohol cessation counseling

## 2019-11-13 NOTE — RESPIRATORY CARE
Intubation Assist    Reason for intubation; airway protection, massive GI bleed   Difficult Intubation/Number of attempts; No, 1  Evidence of aspiration; no    Airway ETT Oral 8.0-Secured At  (cm): 23 (11/13/19 0825)  Ramirez Vent Mode: APVCMV (11/13/19 0818)     Rate (breaths/min): 20 (11/13/19 0818)  Vt Target (mL): 450 (11/13/19 0818)  FiO2: 50 (11/13/19 0818)  PEEP/CPAP: 8 (11/13/19 0818)      Pt intubated with no complications for airway protection by Dr. Luz Maria Louise with a 8.0 ET tube @23 teeth. Confirmed by positive color change on ezcap, bilateral breath sounds, condensation in tube, CXR pending. Placed on APVCMV settings

## 2019-11-13 NOTE — ASSESSMENT & PLAN NOTE
Hemodynamically unstable  History of esophageal varices and recurrent gastric ulcer, H. Pylori  Has vomited 1.2L chin blood in ER  Has 2x 18g IVs  Octreotide gtt  Pantoprazole IV BID  Ceftriaxone (cirrhosis, although no history of ascites)  2U PRBC on it's way  PLT on the way  GI has been consulted  reglan 10  I intubated for airway protection with massive UGIB

## 2019-11-13 NOTE — CONSULTS
Critical Care Consultation    Date of consult: 11/13/2019    Referring Physician  Anjana Maldonado D.O.    Reason for Consultation  GIB    History of Presenting Illness  59 y.o. male who presented 11/13/2019 with h/o hepC and EtOH cirrhosis, active alcoholism, known esophageal varices who presents with hematemesis. Overnight developed large volume bright red blood hematemesis.  Lives in a [facility] and in ER has had approximately 1.5L chin hematemesis. Hypotensive to MAPs in 50s.  Inocencio reports he has been in his usual state of health until this started.  Drinks up to 3 pints hard alcohol in a day plus beer.  Still intoxicated.     Code Status  Full Code    Review of Systems  Review of Systems   Constitutional: Negative for chills and fever.   HENT: Negative for congestion.    Respiratory: Negative for shortness of breath.    Cardiovascular: Negative for chest pain.   Gastrointestinal: Positive for melena, nausea and vomiting.        Hematemesis   Genitourinary: Negative for frequency.   Musculoskeletal: Positive for back pain.   Skin: Negative for rash.   Neurological: Positive for dizziness.       Past Medical History   has a past medical history of Alcohol abuse, Alcohol intoxication (HCC) (3/13/2014), Cirrhosis (HCC), Hepatitis C, Pancytopenia (HCC), and Psychiatric disorder.    Surgical History   has a past surgical history that includes gastroscopy (1/3/2016); gastroscopy (4/8/2016); and gastroscopy (3/13/2019).    Family History  family history is not on file.    Social History   reports that he has been smoking cigarettes. He has a 48.00 pack-year smoking history. He has never used smokeless tobacco. He reports current alcohol use. He reports current drug use. Drug: Inhaled.    Medications  Home Medications     Reviewed by Monique Estrada R.N. (Registered Nurse) on 11/13/19 at 0528  Med List Status: <None>   Medication Last Dose Status   bismuth subsalicylate (PEPTO-BISMOL) 262 MG Chew Tab  Active    Cyanocobalamin (VITAMIN B-12) 1000 MCG Tab  Active   ferrous sulfate 325 (65 Fe) MG tablet  Active   folic acid (FOLVITE) 1 MG Tab  Active   metroNIDAZOLE (FLAGYL) 500 MG Tab  Active   Multiple Vitamins-Minerals (MULTIVITAMIN ADULT PO)  Active   pantoprazole (PROTONIX) 40 MG Tablet Delayed Response  Active   potassium chloride SA (KDUR) 20 MEQ Tab CR  Active   propranolol (INDERAL) 20 MG Tab  Active   quetiapine (SEROQUEL) 200 MG Tab  Active   tetracycline (ACHROMYCIN, SUMYCIN) 500 MG Cap  Active   traZODone (DESYREL) 50 MG Tab  Active              Current Facility-Administered Medications   Medication Dose Route Frequency Provider Last Rate Last Dose   • octreotide (SANDOSTATIN) 1,250 mcg in  mL Infusion  50 mcg/hr Intravenous Continuous POORNIMA Arguelles.ROWENA 10 mL/hr at 11/13/19 0654 50 mcg/hr at 11/13/19 0654   • pantoprazole (PROTONIX) injection 40 mg  40 mg Intravenous BID Luz Maria Louise M.D.       • etomidate (AMIDATE) injection 20 mg  20 mg Intravenous Once Luz Maria Louise M.D.       • succinylcholine (ANECTINE) injection 60 mg  60 mg Intravenous Once Luz Maria Louise M.D.       • senna-docusate (PERICOLACE or SENOKOT S) 8.6-50 MG per tablet 2 Tab  2 Tab Oral BID Irtgayathri Bartlett D.O.        And   • polyethylene glycol/lytes (MIRALAX) PACKET 1 Packet  1 Packet Oral QDAY PRN POORNIMA Wood.O.        And   • magnesium hydroxide (MILK OF MAGNESIA) suspension 30 mL  30 mL Oral QDAY PRN Irtgayathri Bartlett D.O.        And   • bisacodyl (DULCOLAX) suppository 10 mg  10 mg Rectal QDAY PRN Raegantgayathri Bartlett D.O.       • Respiratory Care per Protocol   Nebulization Continuous RT IrtPOORNIMA Saucedo.O.       • lactated ringers infusion   Intravenous Continuous Irtgayathri Bartlett D.O.       • [START ON 11/14/2019] cefTRIAXone (ROCEPHIN) 1 g in  mL IVPB  1 g Intravenous Q24HRS IrtPOORNIMA Saucedo.O.       • Pharmacy Consult Request   Other PHARMACY TO DOSE Irtqa POORNIMA Bartlett.O.       • octreotide (SANDOSTATIN) 1,250 mcg in  mL Infusion   50 mcg/hr Intravenous Continuous Raegantgayathri Bartlett D.O.         Current Outpatient Medications   Medication Sig Dispense Refill   • bismuth subsalicylate (PEPTO-BISMOL) 262 MG Chew Tab Take 2 Tabs by mouth 4 Times a Day,Before Meals and at Bedtime. 84 Tab 0   • propranolol (INDERAL) 20 MG Tab Take 1 Tab by mouth 2 times a day. 60 Tab 3   • potassium chloride SA (KDUR) 20 MEQ Tab CR Take 1 Tab by mouth every day. 30 Tab 1   • metroNIDAZOLE (FLAGYL) 500 MG Tab Take 1 Tab by mouth every 12 hours. 20 Tab 0   • tetracycline (ACHROMYCIN, SUMYCIN) 500 MG Cap Take 1 Cap by mouth 4 times a day. 40 Cap 0   • pantoprazole (PROTONIX) 40 MG Tablet Delayed Response Take 1 Tab by mouth 2 times a day. 60 Tab 1   • ferrous sulfate 325 (65 Fe) MG tablet Take 650 mg by mouth every day.     • traZODone (DESYREL) 50 MG Tab Take 50 mg by mouth every evening.     • Multiple Vitamins-Minerals (MULTIVITAMIN ADULT PO) Take 1 tablet by mouth every day.     • folic acid (FOLVITE) 1 MG Tab Take 1 Tab by mouth every day. 30 Tab 3   • Cyanocobalamin (VITAMIN B-12) 1000 MCG Tab Take 2,000 mcg by mouth every day.     • quetiapine (SEROQUEL) 200 MG Tab Take 200 mg by mouth every bedtime.         Allergies  Allergies   Allergen Reactions   • Haldol [Haloperidol Lactate] Unspecified     Twitching   RXN=20 years ago       Vital Signs last 24 hours  Temp:  [36.9 °C (98.5 °F)] 36.9 °C (98.5 °F)  Pulse:  [115-152] 134  Resp:  [18] 18  BP: ()/(55-73) 92/69  SpO2:  [90 %-95 %] 95 %    Physical Exam  Physical Exam  Constitutional:       Appearance: He is ill-appearing.   HENT:      Nose:      Comments: Blood in nares     Mouth/Throat:      Comments: Poor dentition, blood in mouth  Cardiovascular:      Rate and Rhythm: Regular rhythm. Tachycardia present.      Pulses: Normal pulses.   Pulmonary:      Effort: Pulmonary effort is normal.      Breath sounds: Normal breath sounds.   Abdominal:      General: Abdomen is flat. Bowel sounds are normal.       Palpations: Abdomen is soft.      Tenderness: There is tenderness (mild, diffuse).   Musculoskeletal:      Right lower leg: No edema.      Left lower leg: No edema.   Skin:     General: Skin is warm and dry.   Neurological:      General: No focal deficit present.      Mental Status: He is alert.   Psychiatric:         Mood and Affect: Mood normal.         Fluids    Intake/Output Summary (Last 24 hours) at 11/13/2019 0755  Last data filed at 11/13/2019 0712  Gross per 24 hour   Intake --   Output 1200 ml   Net -1200 ml       Laboratory  Recent Results (from the past 48 hour(s))   COD (ADULT)    Collection Time: 11/13/19  5:32 AM   Result Value Ref Range    ABO Grouping Only AB     Rh Grouping Only POS     Antibody Screen-Cod NEG     Component R       R3                  Red Blood Cells3    [id number]   issued       11/13/19   07:48      Product Type Red Blood Cells LR Pheresis     Dispense Status issued     Unit Number (Barcoded) [id number]     Product Code (Barcoded) V2590G01     Blood Type (Barcoded) 8400    CBC WITH DIFFERENTIAL    Collection Time: 11/13/19  5:32 AM   Result Value Ref Range    WBC 4.7 (L) 4.8 - 10.8 K/uL    RBC 3.57 (L) 4.70 - 6.10 M/uL    Hemoglobin 11.7 (L) 14.0 - 18.0 g/dL    Hematocrit 35.3 (L) 42.0 - 52.0 %    MCV 98.9 (H) 81.4 - 97.8 fL    MCH 32.8 27.0 - 33.0 pg    MCHC 33.1 (L) 33.7 - 35.3 g/dL    RDW 51.8 (H) 35.9 - 50.0 fL    Platelet Count 35 (LL) 164 - 446 K/uL    MPV 12.5 9.0 - 12.9 fL    Neutrophils-Polys 56.70 44.00 - 72.00 %    Lymphocytes 32.80 22.00 - 41.00 %    Monocytes 7.80 0.00 - 13.40 %    Eosinophils 1.50 0.00 - 6.90 %    Basophils 0.80 0.00 - 1.80 %    Immature Granulocytes 0.40 0.00 - 0.90 %    Nucleated RBC 0.00 /100 WBC    Neutrophils (Absolute) 2.67 1.82 - 7.42 K/uL    Lymphs (Absolute) 1.55 1.00 - 4.80 K/uL    Monos (Absolute) 0.37 0.00 - 0.85 K/uL    Eos (Absolute) 0.07 0.00 - 0.51 K/uL    Baso (Absolute) 0.04 0.00 - 0.12 K/uL    Immature Granulocytes (abs)  0.02 0.00 - 0.11 K/uL    NRBC (Absolute) 0.00 K/uL   COMP METABOLIC PANEL    Collection Time: 19  5:32 AM   Result Value Ref Range    Sodium 142 135 - 145 mmol/L    Potassium 3.4 (L) 3.6 - 5.5 mmol/L    Chloride 109 96 - 112 mmol/L    Co2 22 20 - 33 mmol/L    Anion Gap 11.0 0.0 - 11.9    Glucose 119 (H) 65 - 99 mg/dL    Bun 20 8 - 22 mg/dL    Creatinine 0.61 0.50 - 1.40 mg/dL    Calcium 8.0 (L) 8.5 - 10.5 mg/dL    AST(SGOT) 68 (H) 12 - 45 U/L    ALT(SGPT) 45 2 - 50 U/L    Alkaline Phosphatase 57 30 - 99 U/L    Total Bilirubin 0.6 0.1 - 1.5 mg/dL    Albumin 3.5 3.2 - 4.9 g/dL    Total Protein 6.6 6.0 - 8.2 g/dL    Globulin 3.1 1.9 - 3.5 g/dL    A-G Ratio 1.1 g/dL   LIPASE    Collection Time: 19  5:32 AM   Result Value Ref Range    Lipase 77 11 - 82 U/L   PROTHROMBIN TIME    Collection Time: 19  5:32 AM   Result Value Ref Range    PT 16.9 (H) 12.0 - 14.6 sec    INR 1.33 (H) 0.87 - 1.13   APTT    Collection Time: 19  5:32 AM   Result Value Ref Range    APTT 30.5 24.7 - 36.0 sec   DIAGNOSTIC ALCOHOL    Collection Time: 19  5:32 AM   Result Value Ref Range    Diagnostic Alcohol 0.32 (H) 0.00 g/dL   TROPONIN    Collection Time: 19  5:32 AM   Result Value Ref Range    Troponin T 6 6 - 19 ng/L   ESTIMATED GFR    Collection Time: 19  5:32 AM   Result Value Ref Range    GFR If African American >60 >60 mL/min/1.73 m 2    GFR If Non African American >60 >60 mL/min/1.73 m 2   EKG (NOW)    Collection Time: 19  5:39 AM   Result Value Ref Range    Report       [facility] Emergency Dept.    Test Date:  2019  Pt Name:    JIMMY FERRER                 Department: ER  MRN:        [id number]                      Room:       [facility]  Gender:     Male                         Technician: [id number]  :        1960                   Requested By:ER TRIAGE PROTOCOL  Order #:    [id number]                    Reading MD:    Measurements  Intervals                                 Axis  Rate:       134                          P:          78  KY:         144                          QRS:        78  QRSD:       78                           T:          92  QT:         304  QTc:        454    Interpretive Statements  SINUS TACHYCARDIA  NONSPECIFIC T ABNORMALITIES, LATERAL LEADS  BASELINE WANDER IN LEAD(S) V2  Compared to ECG 03/12/2019 08:19:34  T-wave abnormality now present     CBC WITH DIFFERENTIAL    Collection Time: 11/13/19  5:51 AM   Result Value Ref Range    WBC 4.7 (L) 4.8 - 10.8 K/uL    RBC 3.58 (L) 4.70 - 6.10 M/uL    Hemoglobin 11.8 (L) 14.0 - 18.0 g/dL    Hematocrit 35.9 (L) 42.0 - 52.0 %    .3 (H) 81.4 - 97.8 fL    MCH 33.0 27.0 - 33.0 pg    MCHC 32.9 (L) 33.7 - 35.3 g/dL    RDW 52.1 (H) 35.9 - 50.0 fL    Platelet Count 42 (LL) 164 - 446 K/uL    MPV 12.0 9.0 - 12.9 fL    Neutrophils-Polys 57.50 44.00 - 72.00 %    Lymphocytes 32.10 22.00 - 41.00 %    Monocytes 8.00 0.00 - 13.40 %    Eosinophils 1.10 0.00 - 6.90 %    Basophils 1.10 0.00 - 1.80 %    Immature Granulocytes 0.20 0.00 - 0.90 %    Nucleated RBC 0.00 /100 WBC    Neutrophils (Absolute) 2.72 1.82 - 7.42 K/uL    Lymphs (Absolute) 1.52 1.00 - 4.80 K/uL    Monos (Absolute) 0.38 0.00 - 0.85 K/uL    Eos (Absolute) 0.05 0.00 - 0.51 K/uL    Baso (Absolute) 0.05 0.00 - 0.12 K/uL    Immature Granulocytes (abs) 0.01 0.00 - 0.11 K/uL    NRBC (Absolute) 0.00 K/uL       Imaging  DX-CHEST-PORTABLE (1 VIEW)   Final Result         1.  No acute cardiopulmonary disease.          Assessment/Plan  * GIB (gastrointestinal bleeding)- (present on admission)  Assessment & Plan  Hemodynamically unstable  History of esophageal varices and recurrent gastric ulcer, H. Pylori  Has vomited 1.2L chin blood in ER  Has 2x 18g IVs  Octreotide gtt  Pantoprazole IV BID  Ceftriaxone (cirrhosis, although no history of ascites)  2U PRBC on it's way  PLT on the way  GI has been consulted  reglan 10  I intubated for airway protection with massive  UGIB    Respiratory failure with hypoxia (HCC)  Assessment & Plan  Intubated for airway protection in setting of massive GI bleed  Lung protective ventilation  Minimize PEEP to optimize hemodynamics in a hypovolemic Patient    Hemorrhagic shock (HCC)- (present on admission)  Assessment & Plan  Getting 2U PRBC, PLT given shock  Trend Hgb q4h  After initial resuscitation, transfuse <7  TEG pending    Alcoholism (HCC)- (present on admission)  Assessment & Plan  Drinking up to 3 pints daily  Still intoxicated on admit  will use Ativan for sedation/treatment of alcohol withdrawal  Rally bag    Cirrhosis of liver (HCC)- (present on admission)  Assessment & Plan  Albumin normal, but h/o varices and hematologic abnormalities consistent with cirrhosis  Right upper quadrant ultrasound to rule out portal vein thrombosis as cause of GI bleed  No history of encephalopathy, ascites or hypervolemia    Hepatitis C- (present on admission)  Assessment & Plan  untreated    Pancytopenia (HCC)- (present on admission)  Assessment & Plan  Chronic  Probably d/t EtOH, cirrhosis  Severe thrombocytopenia in setting of bleed-->transfuse PLT    Sister would be proxy decision maker as needed    Discussed patient condition and risk of morbidity and/or mortality with RN, RT, Pharmacy, [employer] resident, Patient and GI.    The patient remains critically ill.  Critical care time = 84 minutes in directly providing and coordinating critical care and extensive data review.  No time overlap and excludes procedures.

## 2019-11-13 NOTE — ASSESSMENT & PLAN NOTE
Secondary to bone marrow suppression from cirrhosis   - continue to monitor; no current active bleeding, currently platelet count 23  - Consider transfusion if platelets are < 20 & further thrombocytopenia workup outpatient

## 2019-11-13 NOTE — ASSESSMENT & PLAN NOTE
Patient with varices, coagulopathy and pancytopenia reflective of cirrhosis. Likely multifactorial to alcohol use and hepatitis c.   - Currently stable   - RUQ ultrasound shows cirrhosis and small ascites

## 2019-11-13 NOTE — PROGRESS NOTES
Critical Care Progress Note    Date of admission  11/13/2019    Chief Complaint  59 y.o. male admitted 11/13/2019 with an acute gastrointestinal hemorrhage.    Hospital Course    This gentleman was admitted to the ICU with respiratory failure and an acute gastrointestinal hemorrhage.      Interval Problem Update  Reviewed last 24 hour events:      1000 hours:    Just arrived in ICU and I am assuming care  ST - 155  Vent waveforms and airway mechanics reviewed  BA 0.32    1910 hours:    Multiple serial reassessments  I am transfusion additional PRBCs for acute blood loss anemia  UOP acceptable  Fentanyl 50    Propofol 35      Review of Systems  Review of Systems   Unable to perform ROS: Acuity of condition        Vital Signs for last 24 hours   Temp:  [36.3 °C (97.3 °F)-37.6 °C (99.7 °F)] 37.6 °C (99.7 °F)  Pulse:  [103-182] 114  Resp:  [13-45] 20  BP: ()/() 97/65  SpO2:  [90 %-100 %] 97 %    Hemodynamic parameters for last 24 hours       Respiratory Information for the last 24 hours  Ramirez Vent Mode: APVCMV  Rate (breaths/min): 20  Vt Target (mL): 450  PEEP/CPAP: 5  FiO2: 30  P MEAN: 9.6  Control VTE (exp VT): 450    Physical Exam   Physical Exam  Constitutional:       Appearance: He is not diaphoretic.      Comments: On ventilator   HENT:      Head: Normocephalic.      Right Ear: External ear normal.      Left Ear: External ear normal.      Nose: Nose normal.      Mouth/Throat:      Mouth: Mucous membranes are dry.   Eyes:      General:         Right eye: No discharge.         Left eye: No discharge.      Extraocular Movements: Extraocular movements intact.      Pupils: Pupils are equal, round, and reactive to light.   Neck:      Musculoskeletal: Normal range of motion and neck supple.   Cardiovascular:      Pulses: Normal pulses.      Heart sounds: No murmur. No gallop.       Comments: Sinus tachycardia  Pulmonary:      Breath sounds: No wheezing or rales.   Abdominal:      General: Abdomen is  flat. There is no distension.      Palpations: Abdomen is soft.      Tenderness: There is no tenderness.   Musculoskeletal:      Right lower leg: No edema.      Left lower leg: No edema.      Comments: No clubbing or cyanosis   Skin:     General: Skin is warm and dry.   Neurological:      Comments: Sedated.  Agitated at times.  Moves all 4 extremities.         Medications  Current Facility-Administered Medications   Medication Dose Route Frequency Provider Last Rate Last Dose   • lactated ringers infusion   Intravenous Continuous Irtqa Tri, D.O. 125 mL/hr at 11/13/19 1538 125 mL at 11/13/19 1538   • [START ON 11/14/2019] cefTRIAXone (ROCEPHIN) 1 g in  mL IVPB  1 g Intravenous Q24HRS Irtqa Tri, D.O.       • Pharmacy Consult Request   Other PHARMACY TO DOSE Irtqa Tri, D.O.       • octreotide (SANDOSTATIN) 1,250 mcg in  mL Infusion  50 mcg/hr Intravenous Continuous Irtqa Tri, D.O. 10 mL/hr at 11/13/19 0900 50 mcg/hr at 11/13/19 0900   • Respiratory Care per Protocol   Nebulization Continuous RT Luz Maria Louise M.D.       • ipratropium-albuterol (DUONEB) nebulizer solution  3 mL Nebulization Q2HRS PRN (RT) Luz Maria Louise M.D.       • MD Alert...ICU Electrolyte Replacement per Pharmacy   Other PHARMACY TO DOSE Luz Maria oLuise M.D.       • MIDAZOLAM HCL 2 MG/2ML INJ SOLN            • pantoprazole (PROTONIX) 80 mg in  mL Infusion  8 mg/hr Intravenous Continuous Tamela Smith M.D. 25 mL/hr at 11/13/19 1221 8 mg/hr at 11/13/19 1221   • thiamine (B-1) 500 mg in D5W 100 mL IVPB  500 mg Intravenous DAILY Jose Brizuela M.D. 200 mL/hr at 11/13/19 1142 500 mg at 11/13/19 1142    Followed by   • [START ON 11/16/2019] thiamine tablet 100 mg  100 mg Enteral Tube DAILY Jose Brizuela M.D.       • propofol (DIPRIVAN) injection  0-80 mcg/kg/min Intravenous Continuous Jose Brizuela M.D. 18.4 mL/hr at 11/13/19 1924 50 mcg/kg/min at 11/13/19 1924   • [START ON 11/14/2019] folic  acid (FOLVITE) tablet 1 mg  1 mg Enteral Tube DAILY Jose Brizuela M.D.        And   • [START ON 11/14/2019] multivitamin (THERAGRAN) tablet 1 Tab  1 Tab Enteral Tube DAILY Jose Brizuela M.D.       • senna-docusate (PERICOLACE or SENOKOT S) 8.6-50 MG per tablet 2 Tab  2 Tab Enteral Tube BID Jose Brizuela M.D.   Stopped at 11/13/19 1800    And   • polyethylene glycol/lytes (MIRALAX) PACKET 1 Packet  1 Packet Enteral Tube QDAY PRN Jose Brizuela M.D.        And   • magnesium hydroxide (MILK OF MAGNESIA) suspension 30 mL  30 mL Enteral Tube QDAY PRN Jose Brizuela M.D.        And   • bisacodyl (DULCOLAX) suppository 10 mg  10 mg Rectal QDAY PRN Jose Brizuela M.D.       • fentaNYL (SUBLIMAZE) 50 mcg/mL in 50mL (Continuous Infusion)   Intravenous Continuous Jose Brizuela M.D. 1 mL/hr at 11/13/19 1928 50 mcg/hr at 11/13/19 1928   • fentaNYL (SUBLIMAZE) injection  mcg   mcg Intravenous Q HOUR PRN Jose Brizuela M.D.   25 mcg at 11/13/19 1810       Fluids    Intake/Output Summary (Last 24 hours) at 11/13/2019 1929  Last data filed at 11/13/2019 1758  Gross per 24 hour   Intake 950 ml   Output 2300 ml   Net -1350 ml       Laboratory  Recent Labs     11/13/19  1215   ISTATAPH 7.365*   ISTATAPCO2 29.2   ISTATAPO2 159*   ISTATATCO2 18*   [id number] 99   ISTATARTHCO3 16.7*   ISTATARTBE -8*   ISTATTEMP 97.7 F   ISTATFIO2 40   ISTATSPEC Arterial   ISTATAPHTC 7.373*   [id number] 156*         Recent Labs     11/13/19  0532   SODIUM 142   POTASSIUM 3.4*   CHLORIDE 109   CO2 22   BUN 20   CREATININE 0.61   MAGNESIUM 1.7   PHOSPHORUS 3.2   CALCIUM 8.0*     Recent Labs     11/13/19  0532   ALTSGPT 45   ASTSGOT 68*   ALKPHOSPHAT 57   TBILIRUBIN 0.6   LIPASE 77   GLUCOSE 119*     Recent Labs     11/13/19  0532 11/13/19  0551   WBC 4.7* 4.7*   NEUTSPOLYS 56.70 57.50   LYMPHOCYTES 32.80 32.10   MONOCYTES 7.80 8.00   EOSINOPHILS 1.50 1.10   BASOPHILS 0.80 1.10    ASTSGOT 68*  --    ALTSGPT 45  --    ALKPHOSPHAT 57  --    TBILIRUBIN 0.6  --      Recent Labs     11/13/19  0532 11/13/19  0551 11/13/19  0745 11/13/19  1416   RBC 3.57* 3.58*  --   --    HEMOGLOBIN 11.7* 11.8* 9.1* 6.5*   HEMATOCRIT 35.3* 35.9*  --   --    PLATELETCT 35* 42*  --   --    PROTHROMBTM 16.9*  --   --   --    APTT 30.5  --   --   --    INR 1.33*  --   --   --        Imaging  X-Ray:  I have personally reviewed the images and compared with prior images. and My impression is: Clear lungs    Assessment/Plan  * Bleeding esophageal varices (HCC)- (present on admission)  Assessment & Plan  S/P emergent EGD with banding of esophageal varices on 11/13  Continue octreotide and PPI drips  Trend hemoglobin and transfuse PRBCs to keep hemoglobin greater than 7    Acute respiratory failure with hypoxia (HCC)- (present on admission)  Assessment & Plan  Intubated on 11/13  All the appropriate ventilator bundles are in place  Full vent support    Hemorrhagic shock (HCC)- (present on admission)  Assessment & Plan  Transfuse PRBCs  Fluid resuscitate    Acute blood loss anemia  Assessment & Plan  Gastrointestinal source of blood loss  Transfuse PRBCs to keep hemoglobin greater than 7    Alcohol abuse- (present on admission)  Assessment & Plan  Cessation counseling when clinically appropriate  High-dose IV thiamine and supplemental vitamins  Observe for evidence of withdrawal    Cirrhosis of liver (HCC)- (present on admission)  Assessment & Plan  History of hepatitis C and alcoholic liver disease  Avoid hepatotoxins    Hepatitis C- (present on admission)  Assessment & Plan  Check viral load    Hypomagnesemia  Assessment & Plan  Replete magnesium    Hypokalemia- (present on admission)  Assessment & Plan  Replete potassium    Thrombocytopenia (HCC)- (present on admission)  Assessment & Plan  Follow platelet count and transfuse as necessary       VTE:  Contraindicated  Ulcer: PPI  Lines: Central Line  Ongoing indication  addressed and Jimenez Catheter  Ongoing indication addressed    I have performed a physical exam and reviewed and updated ROS and Plan today (11/13/2019). In review of yesterday's note (11/12/2019), there are no changes except as documented above.     I have assessed and reassessed his respiratory status with ventilator adjustments, ventilator waveforms, airway mechanics, blood pressure, hemodynamics, cardiovascular status, serial hemoglobins and response to the transfusion of blood products, urine output and neurologic status with the titration of propofol.  He is at high risk for worsening gastrointestinal, respiratory and cardiovascular system dysfunction.    Discussed patient condition and risk of morbidity and/or mortality with RN, RT, Pharmacy and [facility] resident     The patient remains critically ill.  Critical care time = 100 minutes in directly providing and coordinating critical care and extensive data review.  No time overlap and excludes procedures.    The time spent is in addition to the time spent by Dr. Louise earlier today.    Jose Brizuela MD  Pulmonary and Critical Care Medicine

## 2019-11-13 NOTE — ASSESSMENT & PLAN NOTE
Patient with heavy alcohol use. Intoxicated on presentation.   - Was on Propofol  And Fentanyl for sedation, NO need for CIWA protocol thus far  - Alcohol cessation counseling provided

## 2019-11-13 NOTE — ASSESSMENT & PLAN NOTE
Chronic  Probably d/t EtOH, cirrhosis  Severe thrombocytopenia in setting of bleed-->transfuse PLT

## 2019-11-13 NOTE — ASSESSMENT & PLAN NOTE
History of untreated hepatitis C. Likely contributing to cirrhosis.   - Viral load grater than a million  - Outpatient follow up

## 2019-11-13 NOTE — ED NOTES
GI completed scoping and placed 5 bands. Received 1L LR bolus, fentanyl and versed during procedure.  RBC unit complete. Platelets infusing.     Bedside report completed with ICU RN.     Pt being transported to floor in critical condition.

## 2019-11-13 NOTE — ED NOTES
Medication reconciliation updated and complete per pt at bedside  Allergies have been verified and updated   No oral ABX within the last 14 days  Pt home pharmacy:[facility]

## 2019-11-13 NOTE — ED NOTES
successful intubation. OG placed. Pt with increased -180's. MD notified. Orders received and meds given.

## 2019-11-13 NOTE — ASSESSMENT & PLAN NOTE
Patient was intubated initially for airway protection in setting of variceal bleed. Likely secondary to atlectasis.   - On Ventilator 11/13-11/14  - RT on board   - Incentive spirometry   - Mucinex for cough  - DuoNebs as needed  - Mobilization

## 2019-11-13 NOTE — PROCEDURES
Date of service:  11/13/2019    Title:  Central venous catheter placement - internal jugular vein    Indication:  Hemorrhagic shock.  Requires central venous access for medication administration.    Narrative:    The right neck was prepped with chlorhexidine and draped in the usual sterile fashion.  1% Xylocaine solution was used for topical anesthesia.  A triple lumen central venous catheter was placed into the right internal jugular vein under ultrasound guidance using the technique described by Carlos without difficulty or apparent complication.  The line was sutured into place and a sterile dressing was placed over the line.  All 3 ports flush and return venous blood easily.  The patient tolerated the procedure quite nicely.  No complications are apparent.  A STAT CXR is ordered to confirm placement.      Jose Brizuela MD  Pulmonary and Critical Care Medicine

## 2019-11-13 NOTE — ED NOTES
Lab called with critical result of Hgb drop from 11.8 to 9.1 at 0814. Critical lab result read back to lab.   Dr. Louise notified of critical lab result at 0814.  Critical lab result read back by Dr. Louise.

## 2019-11-13 NOTE — OR SURGEON
Immediate Post OP Note    PreOp Diagnosis: massive hematemesis of at least 1.5L  PostOp Diagnosis: grade II esophageal varices with recent stigmata, banded x 5    Procedure(s):  GASTROSCOPY with banding - Wound Class: Clean Contaminated    Surgeon(s):  Tamela Smith M.D.    Anesthesiologist/Type of Anesthesia:  No anesthesia staff entered./Sedation    Surgical Staff:  Endoscopy Technician: Yesy Hayes  Sedation/Monitoring Nurse: Red Crawley, R.N.:   Fentanyl 100 mcg + Versed 3 mg IV + 1L LR    Specimens removed if any:  * No specimens in log *    Estimated Blood Loss: 500cc    Findings:  Grade II esophageal varices, 4 cords, red cory.  Banded x 5  Large amount of partially clotted chin red blood in stomach lavaged away.  No definite gastric varices, no ulcers or masses  Blood in duodenum but no mucosal lesions    Complications: none        11/13/2019 9:34 AM Tamela Smith M.D.

## 2019-11-13 NOTE — ED NOTES
Assumed care of pt, report received. Introduced self as pt RN. Pt with active bloody emesis at 0725. approx 600cc.   Admitting MD at bedside. Requesting pt be intubated.   3rd PIV obtained. Blood sent to lab for repeat CBC and platelet mapping.   Pt medicated for nausea. LR bolus infusing.

## 2019-11-13 NOTE — PROCEDURES
DATE OF SERVICE:  11/13/2019    PROCEDURE:  Esophagogastroduodenoscopy with esophageal variceal banding.    PREPROCEDURE DIAGNOSES:  A 59-year-old male with alcoholic cirrhosis and   chronic hepatitis C who presented with a blood alcohol level of 0.32, massive   hematemesis and a previous history of a grade I esophageal varix and a remote   history of a gastric ulcer.    POSTPROCEDURE DIAGNOSES:  1.  Grade II esophageal varices, not actively bleeding, stigmata of recent   bleeding, banded x5.  2.  Possible gastric varices.    Two-physician emergency consent was obtained.    DESCRIPTION OF PROCEDURE:  The patient was turned in the left lateral   decubitus position and sedation monitoring was in place.  The patient is   intubated.  Total of Fentanyl 100 mcg and Versed 3 mg were given as sedation.    The patient received 1 liter of lactated Ringer's during the procedure.  The   Olympus 190 therapeutic scope was introduced into the esophagus under direct   visualization.  There was fresh blood in the esophagus.  This was lavaged   away.  At least 1 or 2 columns of grade I varices was initially appreciated   with stigmata.  There was no active bleeding.  The endoscope was advanced into   the stomach where there was a large amount of thin, bright red blood.  This   was lavaged away.  There were no ulcerations or masses in the fundus, body or   antrum.  Upon retroflexion, there may be gastric varices, but it was not   clear.  Regardless, these were not bleeding.  Retroflexion was taken down and   the endoscope was advanced through the pylorus into the first and second   portions of the duodenum.  There was mild nonerosive duodenitis.  Blood was   lavaged away.  I did not appreciate any ulcerations.  The endoscope was pulled   back into the esophagus, very careful surveillance was taken of the distal   one-third of the esophagus and it did appear he had grade II esophageal   varices, 4 columns with the stigmata.  The  endoscope was removed and the   banding device was applied.  The patient was reintubated and 5 bands were   placed in the distal esophagus proximal between 35 and 40 cm.  There was no   back bleeding from the banding.  The endoscope was removed.  The patient   tolerated the procedure.    The patient is on octreotide and pantoprazole drip has been ordered.  The   patient will be receiving his platelet transfusion and just completed 1 unit   of packed red blood cells and an additional 1 liter lactated Ringer's.       ____________________________________     MD GERMAINE SALDIVAR / VICENTE    DD:  11/13/2019 09:41:05  DT:  11/13/2019 11:58:10    D#:  [id number]  Job#:  [id number]

## 2019-11-13 NOTE — PROGRESS NOTES
Dr. Brizuela at bedside with [employer] Resident for emergent placement of central line. Patient premedicated per MAR. Per resident, consent obtained from family in ED. Timeout called at 1059.

## 2019-11-13 NOTE — CONSULTS
DATE OF SERVICE:  11/13/2019    GASTROENTEROLOGY CONSULTATION    REFERRING PHYSICIAN:  Anjana Maldonado DO    REASON FOR THE CONSULT:  Hematemesis.    HISTORY OF PRESENT ILLNESS:  The patient is a 59-year-old male with chronic   hepatitis C, genotype 3A, who I do not believe has been treated and has a   history of alcoholic cirrhosis.  He was last seen by our group in March at   [facility] for hematemesis.  EGD revealed a grade I esophageal   varix, single cord that was flattened with insufflation.  He had a 2 cm hiatal   hernia, possible portal hypertensive gastropathy, but no source of bleeding   was found.  Dr. Ba did perform push enteroscopy and again no source of the   bleeding.  The patient also has a past history of a gastric ulcer in 2016 that   required epinephrine and hemoclipping x2.  During his March admission, he was   found to be Helicobacter pylori positive on stooled antigen, but unclear   whether or not he took antibiotics.    He began vomiting this morning and it is reported that he vomited 1 liter with   EMS.  He vomited another approximately 500 mL here in the ER.  He has   subsequently been intubated; I am unable to obtain any history for him.  He   continues to drink as his blood alcohol level is 0.32.    ALLERGIES:  HALDOL.    MEDICATIONS:  Prior to admission, propranolol 20 mg twice daily, K-Dur 20 mEq   daily, pantoprazole 40 mg twice daily, ferrous sulfate 325 mg 2 tablets daily,   trazodone 50 mg every evening, folic acid 1 mg everyday, vitamin B12 2000 mcg   daily, Seroquel 200 mg every evening.  It is unclear whether those are simply   his medications from discharge or if he has actually been taking those.    SURGERIES:  Unknown at this time other than endoscopy.    MEDICAL ILLNESSES:  Chronic hepatitis C, genotype 3A, chart states   schizophrenia, alcoholic cirrhosis.    SOCIAL HISTORY:  Reportedly smokes, drinks daily, uses marijuana.    FAMILY HISTORY:  Unable to  obtain.    REVIEW OF SYSTEMS:  Unable to obtain.    PHYSICAL EXAMINATION:  VITAL SIGNS:  Temperature is 98.7, heart rate is 166, blood pressure 97/68.    The patient was recently intubated and received fentanyl 50 mcg and Versed 2   mg.  IV fluids are wide open, first unit of packed red blood cells infusing   and he is on an octreotide drip.  GENERAL:  This is a 59-year-old male who appears appropriate for stated age.  HEENT:  Pupils are round.  Sclerae are anicteric.  NECK:  Soft.  No adenopathy.  LUNGS:  Clear to auscultation.  CARDIOVASCULAR:  Tachycardic, S1 and S2, without murmur, gallop or rub.  ABDOMEN:  Bowel sounds are present and soft.  No palpable masses.  I did not   appreciate hepatosplenomegaly.  EXTREMITIES:  No clubbing, cyanosis or peripheral edema.  SKIN:  Smooth, moist, and warm.  NEUROLOGIC:  He is sedated.    LABORATORY DATA:  WBC 4.7, hemoglobin 11.8, hematocrit 35.9, platelets 42.    CMP notable for BUN 20, creatinine 0.61, AST 68, ALT 45, alkaline phosphatase   57, total bilirubin 0.6, albumin 3.5.  INR is 1.33.  Blood alcohol 0.32.    IMAGING:  Ultrasound of the abdomen is pending.    IMPRESSION:  1.  Hematemesis.  2.  Anemia, acute blood loss.  3.  Thrombocytopenia.  4.  Decompensated alcoholic cirrhosis.  5.  History of grade I esophageal varix.  6.  History of gastric ulcer.  7.  Schizophrenia per chart.    RECOMMENDATIONS:  1.  He is receiving adequate IV hydration.  2.  He is on ceftriaxone for presumed ascites and a cirrhotic with bleeding.  3.  He will be on pantoprazole IV drip as well as octreotide drip.  4.  I will be performing a bedside upper endoscopy with a 2-way physician   emergency consent.  5.  Further orders will be based on findings.    MELD score is 10.       ____________________________________     MD GERMAINE SALDIVAR / NTS    DD:  11/13/2019 08:53:19  DT:  11/13/2019 12:47:49    D#:  [id number]  Job#:  [id number]

## 2019-11-13 NOTE — PROCEDURES
Date: 11/13/19  Time: 11:00  Indication: Vascular access  Resident: Dr. Bartlett   Attending: Dr. Brizuela   A time-out was completed verifying correct patient, procedure, site, positioning, and special equipment if applicable. The patient was placed in a dependent position appropriate for central line placement based on the vein to be cannulated. The patient’s right neck was prepped and draped in sterile fashion. 1% Lidocaine was used to anesthetize the surrounding skin area. A triple lumen 9 [place] Cordis catheter was introduced into the the right internal jugular vein using the Seldinger technique and under ultrasound guidance. The catheter was threaded smoothly over the guide wire and appropriate blood return was obtained. Each lumen of the catheter was evacuated of air and flushed with sterile saline. The catheter was then sutured in place to the skin and a sterile dressing applied. Perfusion to the extremity distal to the point of catheter insertion was checked and found to be adequate. Attending was present for the entire procedure.  Estimated Blood Loss: <5 cc  The patient tolerated the procedure well and there were no immediate complications. A chest x-ray is pending at this time.

## 2019-11-13 NOTE — ASSESSMENT & PLAN NOTE
Intubated on 11/13-11/14  Continue oxygen  Incentive spirometry and deep breathing exercises  Increase activity

## 2019-11-13 NOTE — ED PROVIDER NOTES
ED Provider Note    ED Provider Note    Primary care provider: Nnamdi Ballesteros M.D.  Means of arrival: EMS   History obtained from: Patient and chart review   History limited by: Poor historian     CHIEF COMPLAINT  Chief Complaint   Patient presents with   • Upper GI Bleed   • Alcohol Intoxication       HPI  Inocencio Shabazz is a 59 y.o. male with PMHx of alcoholism, esophageal varices, previous GI bleed, and chronic hep C who presents to the Emergency Department by EMS due to vomiting blood. Per the nurse, the EMS had stated that he threw up about a liter of blood, while in the room he threw up about another half liter of blood. Patient states that he started throwing up earlier today. He endorses nausea. He also endorses drinking last night, does not know how much he drank. He states that he does take pantoprazole at home. Otherwise he cannot state what other medications he takes at home. He is a poor historian, but patient does state that he is 'not supposed to drink.' He does not know whether he has seen a GI doctor outpatient. Per chart review, it appears that Dr Ba performed an EGD on patient in the past for previous esophageal bleeds.     On arrival he was noted to have tachycardia up to 140s with soft pressures of systolic 76-88, patient was started on fluid resuscitation, placed two large bore IVs, started on IV protonix, octreotide, and ceftriaxone. GI was immediately paged and Dr Tafoya returned page.     REVIEW OF SYSTEMS  Review of Systems   Constitutional: Negative for chills and fever.   Respiratory: Negative for cough, hemoptysis, sputum production, shortness of breath and wheezing.    Cardiovascular: Negative for chest pain and palpitations.   Gastrointestinal: Positive for abdominal pain, nausea and vomiting.   Genitourinary: Negative for urgency.   Musculoskeletal: Negative for myalgias.   Skin: Negative for rash.       PAST MEDICAL HISTORY   has a past medical history of Alcohol abuse, Alcohol  "intoxication (HCC) (3/13/2014), Cirrhosis (HCC), Hepatitis C, Pancytopenia (HCC), and Psychiatric disorder.    SURGICAL HISTORY   has a past surgical history that includes gastroscopy (1/3/2016); gastroscopy (4/8/2016); and gastroscopy (3/13/2019).    SOCIAL HISTORY  Social History     Tobacco Use   • Smoking status: Current Every Day Smoker     Packs/day: 1.00     Years: 48.00     Pack years: 48.00     Types: Cigarettes   • Smokeless tobacco: Never Used   Substance Use Topics   • Alcohol use: Yes     Comment: 1-2 beers and 1/2 pints of whiskey every day   • Drug use: Yes     Types: Inhaled     Comment: marijuana      Social History     Substance and Sexual Activity   Drug Use Yes   • Types: Inhaled    Comment: marijuana       FAMILY HISTORY  No family history on file.    CURRENT MEDICATIONS  Home Medications     Reviewed by Levy Omer (Pharmacy Tech) on 11/13/19 at 0808  Med List Status: Complete   Medication Last Dose Status   Cyanocobalamin (VITAMIN B-12) 1000 MCG Tab 11/12/2019 Active   ferrous sulfate 325 (65 Fe) MG tablet 11/12/2019 Active   folic acid (FOLVITE) 1 MG Tab 11/12/2019 Active   Multiple Vitamins-Minerals (MULTIVITAMIN ADULT PO) 11/12/2019 Active   pantoprazole (PROTONIX) 40 MG Tablet Delayed Response 11/12/2019 Active   potassium chloride SA (KDUR) 20 MEQ Tab CR 11/12/2019 Active   propranolol (INDERAL) 20 MG Tab 11/12/2019 Active   quetiapine (SEROQUEL) 200 MG Tab 11/11/2019 Active   traZODone (DESYREL) 50 MG Tab 11/11/2019 Active                ALLERGIES  Allergies   Allergen Reactions   • Haloperidol Unspecified     Twitching         PHYSICAL EXAM  VITAL SIGNS: /71   Pulse (!) 137   Temp 36.3 °C (97.3 °F)   Resp 20   Ht 1.702 m (5' 7\")   Wt 61.2 kg (135 lb)   SpO2 100%   BMI 21.14 kg/m²   Vitals reviewed.  Constitutional: Patient is oriented to person, place, and time. Appears disheveled, cachectic, in mild distress. Actively throwing up blood.    Head: Normocephalic and " atraumatic.   Ears: Normal external ears bilaterally.   Mouth/Throat: Blood noted around mouth, dark blood tinged tongue  Eyes: Conjunctivae are normal. Pupils are equal, round, and reactive to light.   Neck: Normal range of motion. Neck supple.  Cardiovascular: Tachycardic, regular rhythm and normal heart sounds. Normal peripheral pulses.  Pulmonary/Chest: Effort normal and breath sounds normal. No respiratory distress, no wheezes, rhonchi, or rales.   Abdominal: Soft. Bowel sounds are normal. There is no tenderness. No rebound or guarding, or peritoneal signs. No CVA tenderness.  Musculoskeletal: No edema and no tenderness.   Lymphadenopathy: No cervical adenopathy.   Neurological: No focal deficits.   Skin: Skin is warm and dry. No erythema. No pallor.   Psychiatric: Patient has a normal mood and affect.     LABS  Results for orders placed or performed during the hospital encounter of 11/13/19   COD (ADULT)   Result Value Ref Range    ABO Grouping Only AB     Rh Grouping Only POS     Antibody Screen-Cod NEG     Component R       R3                  Red Blood Cells3    [id number]   issued       11/13/19   07:48      Product Type Red Blood Cells LR Pheresis     Dispense Status issued     Unit Number (Barcoded) [id number]     Product Code (Barcoded) A8054X52     Blood Type (Barcoded) 8400    CBC WITH DIFFERENTIAL   Result Value Ref Range    WBC 4.7 (L) 4.8 - 10.8 K/uL    RBC 3.57 (L) 4.70 - 6.10 M/uL    Hemoglobin 11.7 (L) 14.0 - 18.0 g/dL    Hematocrit 35.3 (L) 42.0 - 52.0 %    MCV 98.9 (H) 81.4 - 97.8 fL    MCH 32.8 27.0 - 33.0 pg    MCHC 33.1 (L) 33.7 - 35.3 g/dL    RDW 51.8 (H) 35.9 - 50.0 fL    Platelet Count 35 (LL) 164 - 446 K/uL    MPV 12.5 9.0 - 12.9 fL    Neutrophils-Polys 56.70 44.00 - 72.00 %    Lymphocytes 32.80 22.00 - 41.00 %    Monocytes 7.80 0.00 - 13.40 %    Eosinophils 1.50 0.00 - 6.90 %    Basophils 0.80 0.00 - 1.80 %    Immature Granulocytes 0.40 0.00 - 0.90 %    Nucleated RBC 0.00 /100  WBC    Neutrophils (Absolute) 2.67 1.82 - 7.42 K/uL    Lymphs (Absolute) 1.55 1.00 - 4.80 K/uL    Monos (Absolute) 0.37 0.00 - 0.85 K/uL    Eos (Absolute) 0.07 0.00 - 0.51 K/uL    Baso (Absolute) 0.04 0.00 - 0.12 K/uL    Immature Granulocytes (abs) 0.02 0.00 - 0.11 K/uL    NRBC (Absolute) 0.00 K/uL   COMP METABOLIC PANEL   Result Value Ref Range    Sodium 142 135 - 145 mmol/L    Potassium 3.4 (L) 3.6 - 5.5 mmol/L    Chloride 109 96 - 112 mmol/L    Co2 22 20 - 33 mmol/L    Anion Gap 11.0 0.0 - 11.9    Glucose 119 (H) 65 - 99 mg/dL    Bun 20 8 - 22 mg/dL    Creatinine 0.61 0.50 - 1.40 mg/dL    Calcium 8.0 (L) 8.5 - 10.5 mg/dL    AST(SGOT) 68 (H) 12 - 45 U/L    ALT(SGPT) 45 2 - 50 U/L    Alkaline Phosphatase 57 30 - 99 U/L    Total Bilirubin 0.6 0.1 - 1.5 mg/dL    Albumin 3.5 3.2 - 4.9 g/dL    Total Protein 6.6 6.0 - 8.2 g/dL    Globulin 3.1 1.9 - 3.5 g/dL    A-G Ratio 1.1 g/dL   LIPASE   Result Value Ref Range    Lipase 77 11 - 82 U/L   PROTHROMBIN TIME   Result Value Ref Range    PT 16.9 (H) 12.0 - 14.6 sec    INR 1.33 (H) 0.87 - 1.13   APTT   Result Value Ref Range    APTT 30.5 24.7 - 36.0 sec   DIAGNOSTIC ALCOHOL   Result Value Ref Range    Diagnostic Alcohol 0.32 (H) 0.00 g/dL   TROPONIN   Result Value Ref Range    Troponin T 6 6 - 19 ng/L   ESTIMATED GFR   Result Value Ref Range    GFR If African American >60 >60 mL/min/1.73 m 2    GFR If Non African American >60 >60 mL/min/1.73 m 2   PLATELETS REQUEST   Result Value Ref Range    Component P       P1                  Plt Pheresis        [id number]   issued       11/13/19   09:14      Product Type P1     Dispense Status issued     Unit Number (Barcoded) [id number]     Product Code (Barcoded) E0574O65     Blood Type (Barcoded) 5100     Component P       PTP                 Plts,Pheresis       [id number]   issued       11/13/19   11:00      Product Type Platelets  Pheresis LR     Dispense Status issued     Unit Number (Barcoded) [id number]     Product  Code (Barcoded) G0370W55     Blood Type (Barcoded) 620    CBC WITH DIFFERENTIAL   Result Value Ref Range    WBC 4.7 (L) 4.8 - 10.8 K/uL    RBC 3.58 (L) 4.70 - 6.10 M/uL    Hemoglobin 11.8 (L) 14.0 - 18.0 g/dL    Hematocrit 35.9 (L) 42.0 - 52.0 %    .3 (H) 81.4 - 97.8 fL    MCH 33.0 27.0 - 33.0 pg    MCHC 32.9 (L) 33.7 - 35.3 g/dL    RDW 52.1 (H) 35.9 - 50.0 fL    Platelet Count 42 (LL) 164 - 446 K/uL    MPV 12.0 9.0 - 12.9 fL    Neutrophils-Polys 57.50 44.00 - 72.00 %    Lymphocytes 32.10 22.00 - 41.00 %    Monocytes 8.00 0.00 - 13.40 %    Eosinophils 1.10 0.00 - 6.90 %    Basophils 1.10 0.00 - 1.80 %    Immature Granulocytes 0.20 0.00 - 0.90 %    Nucleated RBC 0.00 /100 WBC    Neutrophils (Absolute) 2.72 1.82 - 7.42 K/uL    Lymphs (Absolute) 1.52 1.00 - 4.80 K/uL    Monos (Absolute) 0.38 0.00 - 0.85 K/uL    Eos (Absolute) 0.05 0.00 - 0.51 K/uL    Baso (Absolute) 0.05 0.00 - 0.12 K/uL    Immature Granulocytes (abs) 0.01 0.00 - 0.11 K/uL    NRBC (Absolute) 0.00 K/uL   PLATELET MAPPING WITH BASIC TEG   Result Value Ref Range    Reaction Time Initial-R 7.8 5.0 - 10.0 min    Clot Kinetics-K 4.8 (H) 1.0 - 3.0 min    Clot Angle-Angle 43.1 (L) 53.0 - 72.0 degrees    Maximum Clot Strength-MA 44.8 (L) 50.0 - 70.0 mm    Lysis 30 minutes-LY30 0.0 0.0 - 8.0 %    % Inhibition ADP 62.1 %    % Inhibition AA 97.3 %    TEG Algorithm Link Algorithm    HGB (Hemoglobin) for 48 hours   Result Value Ref Range    Hemoglobin 9.1 (L) 14.0 - 18.0 g/dL   MAGNESIUM   Result Value Ref Range    Magnesium 1.7 1.5 - 2.5 mg/dL   PHOSPHORUS   Result Value Ref Range    Phosphorus 3.2 2.5 - 4.5 mg/dL   Triglyceride   Result Value Ref Range    Triglycerides 136 0 - 149 mg/dL   EKG (NOW)   Result Value Ref Range    Report       [facility] Emergency Dept.    Test Date:  2019-11-13  Pt Name:    JIMMY FERRER                 Department: ER  MRN:        [id number]                      Room:       RD 09  Gender:     Male                          Technician: [id number]  :        [id number]                   Requested By:ER TRIAGE PROTOCOL  Order #:    [id number]                    Reading MD:    Measurements  Intervals                                Axis  Rate:       134                          P:          78  NH:         144                          QRS:        78  QRSD:       78                           T:          92  QT:         304  QTc:        454    Interpretive Statements  SINUS TACHYCARDIA  NONSPECIFIC T ABNORMALITIES, LATERAL LEADS  BASELINE WANDER IN LEAD(S) V2  Compared to ECG 2019 08:19:34  T-wave abnormality now present         All labs reviewed by me.    EKG Interpretation  Interpreted by me    Rhythm: ST  Rate: 134  Axis: normal  Ectopy: none  Conduction: normal  ST Segments: no acute change  T Waves: no acute change  Q Waves: none    Clinical Impression: Comparison made to prior EKG from 2019, patient at that time also had a sinus tachycardia at a rate of 124.  No morphology changes noted today.  Sinus tachycardia.  No acute ST segment elevation.      RADIOLOGY  DX-CHEST-PORTABLE (1 VIEW)   Final Result      1.  Satisfactory ET tube placement.   2.  No acute abnormality.      DX-CHEST-PORTABLE (1 VIEW)   Final Result         1.  No acute cardiopulmonary disease.      US-RUQ    (Results Pending)   DX-CHEST-FOR LINE PLACEMENT Perform procedure in: Patient's Room    (Results Pending)     The radiologist's interpretation of all radiological studies have been reviewed by me.    COURSE & MEDICAL DECISION MAKING  Pertinent Labs & Imaging studies reviewed. (See chart for details)    Obtained and reviewed past medical records.  Patient's last encounter he was seen by myself for alcohol intoxication and nausea.  This was in March of this year.  Patient was admitted to the hospital earlier in March of this year with blood in his vomit.  At that time, patient underwent emergent EGD.  He has a history of esophageal varices,  gastropathy and a hiatal hernia.    5:44 AM - Patient seen and examined at bedside.  Patient is unstable.  He is hypotensive and tachycardic.  He is however, awake and alert, requesting something to drink.  Patient actively vomiting blood while in the room. Patient treated with IV fluids, zofran for nausea, IV protonix, octreotide, and ceftriaxone. CBC, CMP, lipase, PT/INR, ECG, diagnostic alcohol, CXR.     The differential diagnoses include but are not limited to: esophageal varices, alcoholic gastritis, pancreatitis     06:20 AM- Fang paged    06:26am D/W , who agrees to see the patient in consultation.  He is aware, the patient is actively having hematemesis.  He is also made aware, the patient is unstable with hypotension and tachycardia    6:40am [facility] Paged    6:50 AM - Spoke to [facility] team on the phone. Sign out given. Intensivist paged, Dr. Louise.    0703Am D/W Dr. Louise, ongoing care.  Will plan for intubation for airway management.  Patient continues to stick his finger down his throat.  He still having active hematemesis    0823 Intubate by Dr. Louise. Dr. Smith in Dept to see patient.    Patient reevaluated the bedside.  He has been scoped by Dr. Rice.  He is intubated but tolerating it well.  He did undergo multiple variceal banding's.  He is received blood products as well packed cells and platelets.  He is still tachycardic.  Transferring to ICU shortly.    The total critical care time on this patient is 55 minutes, resuscitating patient, speaking with admitting physician, and deciphering test results. This 55 minutes is exclusive of separately billable procedures.    FINAL IMPRESSION  1. Gastrointestinal hemorrhage, unspecified gastrointestinal hemorrhage type    2.  Alcohol intoxication  3.  Thrombocytopenia  4.  History of esophageal varices  5.  Hepatitis C    Critical care time: 55 minutes

## 2019-11-14 ENCOUNTER — APPOINTMENT (OUTPATIENT)
Dept: RADIOLOGY | Facility: MEDICAL CENTER | Age: 59
DRG: 432 | End: 2019-11-14
Attending: INTERNAL MEDICINE
Payer: MEDICARE

## 2019-11-14 PROBLEM — E83.39 HYPOPHOSPHATEMIA: Status: ACTIVE | Noted: 2019-11-14

## 2019-11-14 PROBLEM — D72.819 LEUKOPENIA: Status: ACTIVE | Noted: 2019-11-14

## 2019-11-14 LAB
ACTION RANGE TRIGGERED IACRT: NO
ALBUMIN SERPL BCP-MCNC: 2.9 G/DL (ref 3.2–4.9)
ALBUMIN/GLOB SERPL: 1.5 G/DL
ALP SERPL-CCNC: 43 U/L (ref 30–99)
ALT SERPL-CCNC: 34 U/L (ref 2–50)
AMMONIA PLAS-SCNC: 47 UMOL/L (ref 11–45)
ANION GAP SERPL CALC-SCNC: 5 MMOL/L (ref 0–11.9)
AST SERPL-CCNC: 66 U/L (ref 12–45)
BASE EXCESS BLDA CALC-SCNC: -1 MMOL/L (ref -4–3)
BASOPHILS # BLD AUTO: 0.5 % (ref 0–1.8)
BASOPHILS # BLD: 0.01 K/UL (ref 0–0.12)
BILIRUB SERPL-MCNC: 1.3 MG/DL (ref 0.1–1.5)
BODY TEMPERATURE: ABNORMAL DEGREES
BUN SERPL-MCNC: 15 MG/DL (ref 8–22)
CALCIUM SERPL-MCNC: 7.6 MG/DL (ref 8.5–10.5)
CHLORIDE SERPL-SCNC: 113 MMOL/L (ref 96–112)
CO2 BLDA-SCNC: 22 MMOL/L (ref 20–33)
CO2 SERPL-SCNC: 24 MMOL/L (ref 20–33)
CREAT SERPL-MCNC: 0.6 MG/DL (ref 0.5–1.4)
EOSINOPHIL # BLD AUTO: 0.06 K/UL (ref 0–0.51)
EOSINOPHIL NFR BLD: 3 % (ref 0–6.9)
ERYTHROCYTE [DISTWIDTH] IN BLOOD BY AUTOMATED COUNT: 54 FL (ref 35.9–50)
ERYTHROCYTE [DISTWIDTH] IN BLOOD BY AUTOMATED COUNT: 54.6 FL (ref 35.9–50)
ERYTHROCYTE [DISTWIDTH] IN BLOOD BY AUTOMATED COUNT: 56 FL (ref 35.9–50)
GLOBULIN SER CALC-MCNC: 2 G/DL (ref 1.9–3.5)
GLUCOSE SERPL-MCNC: 99 MG/DL (ref 65–99)
HCO3 BLDA-SCNC: 21.2 MMOL/L (ref 17–25)
HCT VFR BLD AUTO: 24.6 % (ref 42–52)
HCT VFR BLD AUTO: 25 % (ref 42–52)
HCT VFR BLD AUTO: 25.4 % (ref 42–52)
HGB BLD-MCNC: 8.2 G/DL (ref 14–18)
HGB BLD-MCNC: 8.4 G/DL (ref 14–18)
HGB BLD-MCNC: 8.5 G/DL (ref 14–18)
HOROWITZ INDEX BLDA+IHG-RTO: 207 MM[HG]
IMM GRANULOCYTES # BLD AUTO: 0 K/UL (ref 0–0.11)
IMM GRANULOCYTES NFR BLD AUTO: 0 % (ref 0–0.9)
INST. QUALIFIED PATIENT IIQPT: YES
LYMPHOCYTES # BLD AUTO: 0.69 K/UL (ref 1–4.8)
LYMPHOCYTES NFR BLD: 34.3 % (ref 22–41)
MAGNESIUM SERPL-MCNC: 1.9 MG/DL (ref 1.5–2.5)
MCH RBC QN AUTO: 31.6 PG (ref 27–33)
MCH RBC QN AUTO: 31.8 PG (ref 27–33)
MCH RBC QN AUTO: 32 PG (ref 27–33)
MCHC RBC AUTO-ENTMCNC: 33.1 G/DL (ref 33.7–35.3)
MCHC RBC AUTO-ENTMCNC: 33.3 G/DL (ref 33.7–35.3)
MCHC RBC AUTO-ENTMCNC: 34 G/DL (ref 33.7–35.3)
MCV RBC AUTO: 93.6 FL (ref 81.4–97.8)
MCV RBC AUTO: 95.5 FL (ref 81.4–97.8)
MCV RBC AUTO: 96.1 FL (ref 81.4–97.8)
MONOCYTES # BLD AUTO: 0.35 K/UL (ref 0–0.85)
MONOCYTES NFR BLD AUTO: 17.4 % (ref 0–13.4)
NEUTROPHILS # BLD AUTO: 0.9 K/UL (ref 1.82–7.42)
NEUTROPHILS NFR BLD: 44.8 % (ref 44–72)
NRBC # BLD AUTO: 0 K/UL
NRBC BLD-RTO: 0 /100 WBC
O2/TOTAL GAS SETTING VFR VENT: 30 %
PCO2 BLDA: 25.7 MMHG (ref 26–37)
PCO2 TEMP ADJ BLDA: 26.4 MMHG (ref 26–37)
PH BLDA: 7.52 [PH] (ref 7.4–7.5)
PH TEMP ADJ BLDA: 7.51 [PH] (ref 7.4–7.5)
PHOSPHATE SERPL-MCNC: 1 MG/DL (ref 2.5–4.5)
PLATELET # BLD AUTO: 30 K/UL (ref 164–446)
PLATELET # BLD AUTO: 36 K/UL (ref 164–446)
PLATELET # BLD AUTO: 40 K/UL (ref 164–446)
PMV BLD AUTO: 11.5 FL (ref 9–12.9)
PMV BLD AUTO: 11.5 FL (ref 9–12.9)
PMV BLD AUTO: 11.8 FL (ref 9–12.9)
PO2 BLDA: 62 MMHG (ref 64–87)
PO2 TEMP ADJ BLDA: 65 MMHG (ref 64–87)
POTASSIUM SERPL-SCNC: 3.8 MMOL/L (ref 3.6–5.5)
PROT SERPL-MCNC: 4.9 G/DL (ref 6–8.2)
RBC # BLD AUTO: 2.56 M/UL (ref 4.7–6.1)
RBC # BLD AUTO: 2.66 M/UL (ref 4.7–6.1)
RBC # BLD AUTO: 2.67 M/UL (ref 4.7–6.1)
SAO2 % BLDA: 94 % (ref 93–99)
SODIUM SERPL-SCNC: 142 MMOL/L (ref 135–145)
SPECIMEN DRAWN FROM PATIENT: ABNORMAL
WBC # BLD AUTO: 1.6 K/UL (ref 4.8–10.8)
WBC # BLD AUTO: 1.8 K/UL (ref 4.8–10.8)
WBC # BLD AUTO: 2 K/UL (ref 4.8–10.8)

## 2019-11-14 PROCEDURE — 84100 ASSAY OF PHOSPHORUS: CPT

## 2019-11-14 PROCEDURE — 700105 HCHG RX REV CODE 258: Performed by: INTERNAL MEDICINE

## 2019-11-14 PROCEDURE — 94003 VENT MGMT INPAT SUBQ DAY: CPT

## 2019-11-14 PROCEDURE — 71045 X-RAY EXAM CHEST 1 VIEW: CPT

## 2019-11-14 PROCEDURE — 770022 HCHG ROOM/CARE - ICU (200)

## 2019-11-14 PROCEDURE — 85025 COMPLETE CBC W/AUTO DIFF WBC: CPT

## 2019-11-14 PROCEDURE — 99291 CRITICAL CARE FIRST HOUR: CPT | Performed by: INTERNAL MEDICINE

## 2019-11-14 PROCEDURE — 700111 HCHG RX REV CODE 636 W/ 250 OVERRIDE (IP): Performed by: INTERNAL MEDICINE

## 2019-11-14 PROCEDURE — 36600 WITHDRAWAL OF ARTERIAL BLOOD: CPT

## 2019-11-14 PROCEDURE — 99292 CRITICAL CARE ADDL 30 MIN: CPT | Performed by: INTERNAL MEDICINE

## 2019-11-14 PROCEDURE — 82803 BLOOD GASES ANY COMBINATION: CPT

## 2019-11-14 PROCEDURE — 82140 ASSAY OF AMMONIA: CPT

## 2019-11-14 PROCEDURE — 700101 HCHG RX REV CODE 250: Performed by: INTERNAL MEDICINE

## 2019-11-14 PROCEDURE — 700111 HCHG RX REV CODE 636 W/ 250 OVERRIDE (IP): Performed by: STUDENT IN AN ORGANIZED HEALTH CARE EDUCATION/TRAINING PROGRAM

## 2019-11-14 PROCEDURE — C9113 INJ PANTOPRAZOLE SODIUM, VIA: HCPCS | Performed by: INTERNAL MEDICINE

## 2019-11-14 PROCEDURE — 85027 COMPLETE CBC AUTOMATED: CPT | Mod: 91

## 2019-11-14 PROCEDURE — 700105 HCHG RX REV CODE 258: Performed by: STUDENT IN AN ORGANIZED HEALTH CARE EDUCATION/TRAINING PROGRAM

## 2019-11-14 PROCEDURE — 83735 ASSAY OF MAGNESIUM: CPT

## 2019-11-14 PROCEDURE — 80053 COMPREHEN METABOLIC PANEL: CPT

## 2019-11-14 PROCEDURE — 94150 VITAL CAPACITY TEST: CPT

## 2019-11-14 RX ORDER — LORAZEPAM 2 MG/ML
2 INJECTION INTRAMUSCULAR
Status: DISCONTINUED | OUTPATIENT
Start: 2019-11-14 | End: 2019-11-16

## 2019-11-14 RX ORDER — LORAZEPAM 2 MG/ML
1 INJECTION INTRAMUSCULAR
Status: DISCONTINUED | OUTPATIENT
Start: 2019-11-14 | End: 2019-11-16

## 2019-11-14 RX ORDER — LORAZEPAM 2 MG/ML
3 INJECTION INTRAMUSCULAR
Status: DISCONTINUED | OUTPATIENT
Start: 2019-11-14 | End: 2019-11-16

## 2019-11-14 RX ORDER — ONDANSETRON 2 MG/ML
4 INJECTION INTRAMUSCULAR; INTRAVENOUS EVERY 4 HOURS PRN
Status: DISCONTINUED | OUTPATIENT
Start: 2019-11-14 | End: 2019-11-16

## 2019-11-14 RX ORDER — LORAZEPAM 2 MG/ML
4 INJECTION INTRAMUSCULAR
Status: DISCONTINUED | OUTPATIENT
Start: 2019-11-14 | End: 2019-11-16

## 2019-11-14 RX ORDER — MAGNESIUM SULFATE HEPTAHYDRATE 40 MG/ML
2 INJECTION, SOLUTION INTRAVENOUS ONCE
Status: COMPLETED | OUTPATIENT
Start: 2019-11-14 | End: 2019-11-14

## 2019-11-14 RX ADMIN — POTASSIUM PHOSPHATE, MONOBASIC AND POTASSIUM PHOSPHATE, DIBASIC 30 MMOL: 224; 236 INJECTION, SOLUTION, CONCENTRATE INTRAVENOUS at 08:26

## 2019-11-14 RX ADMIN — ONDANSETRON 4 MG: 2 INJECTION INTRAMUSCULAR; INTRAVENOUS at 20:52

## 2019-11-14 RX ADMIN — SODIUM CHLORIDE 8 MG/HR: 9 INJECTION, SOLUTION INTRAVENOUS at 07:39

## 2019-11-14 RX ADMIN — THIAMINE HYDROCHLORIDE 500 MG: 100 INJECTION, SOLUTION INTRAMUSCULAR; INTRAVENOUS at 04:13

## 2019-11-14 RX ADMIN — PROPOFOL 60 MCG/KG/MIN: 10 INJECTION, EMULSION INTRAVENOUS at 03:11

## 2019-11-14 RX ADMIN — SODIUM CHLORIDE, POTASSIUM CHLORIDE, SODIUM LACTATE AND CALCIUM CHLORIDE: 600; 310; 30; 20 INJECTION, SOLUTION INTRAVENOUS at 07:43

## 2019-11-14 RX ADMIN — OCTREOTIDE ACETATE 50 MCG/HR: 200 INJECTION, SOLUTION INTRAVENOUS; SUBCUTANEOUS at 08:39

## 2019-11-14 RX ADMIN — ONDANSETRON 4 MG: 2 INJECTION INTRAMUSCULAR; INTRAVENOUS at 15:21

## 2019-11-14 RX ADMIN — SODIUM CHLORIDE, POTASSIUM CHLORIDE, SODIUM LACTATE AND CALCIUM CHLORIDE: 600; 310; 30; 20 INJECTION, SOLUTION INTRAVENOUS at 15:24

## 2019-11-14 RX ADMIN — PROPOFOL 50 MCG/KG/MIN: 10 INJECTION, EMULSION INTRAVENOUS at 09:01

## 2019-11-14 RX ADMIN — SODIUM CHLORIDE 8 MG/HR: 9 INJECTION, SOLUTION INTRAVENOUS at 16:57

## 2019-11-14 RX ADMIN — MAGNESIUM SULFATE IN WATER 2 G: 40 INJECTION, SOLUTION INTRAVENOUS at 06:22

## 2019-11-14 RX ADMIN — CEFTRIAXONE SODIUM 1 G: 1 INJECTION, POWDER, FOR SOLUTION INTRAMUSCULAR; INTRAVENOUS at 04:57

## 2019-11-14 ASSESSMENT — COPD QUESTIONNAIRES
COPD SCREENING SCORE: 7
DO YOU EVER COUGH UP ANY MUCUS OR PHLEGM?: YES, EVERY DAY
DURING THE PAST 4 WEEKS HOW MUCH DID YOU FEEL SHORT OF BREATH: SOME OF THE TIME
HAVE YOU SMOKED AT LEAST 100 CIGARETTES IN YOUR ENTIRE LIFE: YES

## 2019-11-14 ASSESSMENT — PULMONARY FUNCTION TESTS: FVC: 1

## 2019-11-14 ASSESSMENT — LIFESTYLE VARIABLES: EVER_SMOKED: YES

## 2019-11-14 NOTE — CARE PLAN
Adult Ventilation Update    Total Vent Days: 2  Vent: APVCMV 20/450/+5/30%    Patient Lines/Drains/Airways Status      Active Airway       Name: Placement date: Placement time: Site: Days:    Airway ETT Oral 8.0  11/13/19   0818   Oral  2                  Plateau Pressure (Q Shift): 19 (11/13/19 1840)  Static Compliance (ml / cm H2O): 30.5 (11/13/19 2211)    Patient failed trials because of Barriers to Wean: Evidence of active shock,hemmorhage or sepsis( Severe GIB) (11/13/19 1045)  Barriers to SBT    Length of Weaning Trial      Cough: Moist;Productive (11/14/19 0000)  Sputum Amount: Small (11/14/19 0000)  Sputum Color: Tan (11/14/19 0000)  Sputum Consistency: Thick (11/14/19 0000)    Mobility  Activity Performed: Unable to mobilize (11/13/19 2010)  Reason Not Mobilized: Bed rest;Unstable condition(low hgb) (11/13/19 2010)    Events/Summary/Plan: No vent changes made this shift. Pt currently stable on vent. SBT start 11/14 day shift per MD.

## 2019-11-14 NOTE — ASSESSMENT & PLAN NOTE
Gastrointestinal source of blood loss  His hemoglobin is stable and there is no evidence of any more bleeding

## 2019-11-14 NOTE — DISCHARGE PLANNING
Care Transition Team Assessment  In the case of an emergency, pt's legal NOK is sister Melina Sanderson     RNCM met with pt at bedside and obtained the information used in this assessment. Pt verified accuracy of facesheet. Pt lives in a single story apartment alone.  [facility] Prior to current hospitalization, pt was completely independent in ADLS/IADLS.  Pt has a limited support system. Pt admits any hx of substance use, ETOH and denies any dx of mh. Discharge plans currently undetermined. May need SNF.    Information Source:pt  Orientation : Oriented x 4  Information Given By: Patient  Informant's Name: (Inocencio)  Who is responsible for making decisions for patient? : Patient         Elopement Risk  Legal Hold: No  Ambulatory or Self Mobile in Wheelchair: No-Not an Elopement Risk  Elopement Risk: Not at Risk for Elopement    Interdisciplinary Discharge Planning  Does Admitting Nurse Feel This Could be a Complex Discharge?: No  Primary Care Physician: (Dr. Ballesteros)  Lives with - Patient's Self Care Capacity: Alone and Able to Care For Self  Housing / Facility: 1 Story Apartment / Condo  Do You Take your Prescribed Medications Regularly: Yes  Mobility Issues: No    Discharge Preparedness  What is your plan after discharge?: Uncertain - pending medical team collaboration  What are your discharge supports?: Sibling  Prior Functional Level: Ambulatory, Independent with Activities of Daily Living, Independent with Medication Management  Difficulity with ADLs: None  Difficulity with IADLs: None    Functional Assesment  Prior Functional Level: Ambulatory, Independent with Activities of Daily Living, Independent with Medication Management    Finances  Financial Barriers to Discharge: No  Prescription Coverage: Yes                   Domestic Abuse  Have you ever been the victim of abuse or violence?: No    Psychological Assessment  History of Substance Abuse: Alcohol  Date Last Used - Alcohol: unknown  History  of Psychiatric Problems: No    Discharge Risks or Barriers  Discharge risks or barriers?: Substance abuse  Patient risk factors: Substance abuse    Anticipated Discharge Information  Anticipated discharge disposition: Discharge needs currently unknown

## 2019-11-14 NOTE — CARE PLAN
Vent day 1. Pt on APV/CMV: 20/480/305/5. Pt has 8.0 ETT @ 23. Pt w/ small white/clear secretions during shift. No SBT until 11/14 per MD if pt meets criteria.

## 2019-11-14 NOTE — PROGRESS NOTES
Jaci from Lab called with critical result of WBC 1.8 and plt 36 at 1132. Critical lab result read back to Jaci.   Dr. Poon notified of critical lab result at 1140.  Critical lab result read back by Dr. Poon.

## 2019-11-14 NOTE — PROGRESS NOTES
Rohith from Lab called with critical result of WBC, PLT at 0440. Critical lab result read back to Rohith.   Dr. Louise notified of critical lab result at 0448.  Critical lab result read back by Dr. Louise. No orders rec'd.

## 2019-11-14 NOTE — PROGRESS NOTES
from Lab called with critical result of WBC, HGB, PLT at 2250. Critical lab result read back to .   Dr. Louise notified of critical lab result at 2258.  Critical lab result read back by Dr. Louise. No orders rec'd.

## 2019-11-14 NOTE — ASSESSMENT & PLAN NOTE
S/P emergent EGD with banding of esophageal varices on 11/13  Stop octreotide and PPI drips  Omeprazole, 20 mg twice daily  Start Inderal, 20 mg twice daily  Bleeding has stopped

## 2019-11-14 NOTE — PROGRESS NOTES
Critical Care Progress Note    Date of admission  11/13/2019    Chief Complaint  59 y.o. male admitted 11/13/2019 with an acute gastrointestinal hemorrhage.    Hospital Course    This gentleman was admitted to the ICU with respiratory failure and an acute gastrointestinal hemorrhage.      Interval Problem Update  Reviewed last 24 hour events:      1010 hours:    Follows  Agitated  Fent 50  SR-ST  SBP   Replete K, PO4 and Mg  PPI and Octreotide gtts  Vent 2  PEEP 5   30%  SBT - FVC 1.0, NIF -50, RSBI 30  Rocephin 2/7    1545 hours:    Multiple serial reassessments  Check ammonia  Discussed with GI  SAT and SBT done  Trial of liberation      Review of Systems  Review of Systems   Unable to perform ROS: Acuity of condition        Vital Signs for last 24 hours   Temp:  [37.6 °C (99.7 °F)] 37.6 °C (99.7 °F)  Pulse:  [] 77  Resp:  [14-40] 18  BP: ()/(53-74) 105/66  SpO2:  [94 %-100 %] 97 %    Hemodynamic parameters for last 24 hours       Respiratory Information for the last 24 hours  Ramirez Vent Mode: APVCMV  Rate (breaths/min): 20  Vt Target (mL): 450  PEEP/CPAP: 5  FiO2: 30  P Support: 5  P MEAN: 7.3  Length of Weaning Trial (Hours): 1  Control VTE (exp VT): 487    Physical Exam   Physical Exam  Constitutional:       Appearance: He is not diaphoretic.      Comments: On ventilator   HENT:      Head: Normocephalic.      Right Ear: External ear normal.      Left Ear: External ear normal.      Nose: Nose normal.      Mouth/Throat:      Mouth: Mucous membranes are moist.      Pharynx: No oropharyngeal exudate.   Eyes:      Extraocular Movements: Extraocular movements intact.      Conjunctiva/sclera: Conjunctivae normal.      Pupils: Pupils are equal, round, and reactive to light.   Neck:      Musculoskeletal: Normal range of motion and neck supple. No muscular tenderness.   Cardiovascular:      Pulses: Normal pulses.      Heart sounds: No murmur. No friction rub.      Comments: Sinus rhythm-sinus  tachycardia  Pulmonary:      Breath sounds: No wheezing or rales.   Abdominal:      General: Abdomen is flat. There is no distension.      Palpations: Abdomen is soft.      Tenderness: There is no tenderness. There is no guarding or rebound.   Musculoskeletal:         General: No tenderness.      Right lower leg: No edema.      Left lower leg: No edema.      Comments: No clubbing or cyanosis   Skin:     General: Skin is warm and dry.   Neurological:      Comments: Sedated.  He arouses and follows.  He nods.  He moves all 4 extremities.         Medications  Current Facility-Administered Medications   Medication Dose Route Frequency Provider Last Rate Last Dose   • ondansetron (ZOFRAN) syringe/vial injection 4 mg  4 mg Intravenous Q4HRS PRN Jose Brizuela M.D.   4 mg at 11/14/19 1521   • lactated ringers infusion   Intravenous Continuous Irtqa Ilyas, D.O. 125 mL/hr at 11/14/19 1524     • cefTRIAXone (ROCEPHIN) 1 g in  mL IVPB  1 g Intravenous Q24HRS Irtqa Ilyas, D.O.   Stopped at 11/14/19 0527   • Pharmacy Consult Request   Other PHARMACY TO DOSE Irtqa Ilyas, D.O.       • octreotide (SANDOSTATIN) 1,250 mcg in  mL Infusion  50 mcg/hr Intravenous Continuous Irtqa Ilyas, D.O. 10 mL/hr at 11/14/19 0839 50 mcg/hr at 11/14/19 0839   • Respiratory Care per Protocol   Nebulization Continuous RT Luz Maria Louise M.D.       • ipratropium-albuterol (DUONEB) nebulizer solution  3 mL Nebulization Q2HRS PRN (RT) Luz Maria Louise M.D.       • MD Alert...ICU Electrolyte Replacement per Pharmacy   Other PHARMACY TO DOSE Luz Maria Louise M.D.       • pantoprazole (PROTONIX) 80 mg in  mL Infusion  8 mg/hr Intravenous Continuous Tamela Smith M.D. 25 mL/hr at 11/14/19 1657 8 mg/hr at 11/14/19 1657   • thiamine (B-1) 500 mg in D5W 100 mL IVPB  500 mg Intravenous DAILY Jose Brizuela M.D. 200 mL/hr at 11/14/19 0413 500 mg at 11/14/19 0413    Followed by   • [START ON 11/16/2019] thiamine tablet 100 mg   100 mg Enteral Tube DAILY Jose Brizuela M.D.       • folic acid (FOLVITE) tablet 1 mg  1 mg Enteral Tube DAILY Jose Brizuela M.D.   Stopped at 11/14/19 0845    And   • multivitamin (THERAGRAN) tablet 1 Tab  1 Tab Enteral Tube DAILY Jose Brizuela M.D.   Stopped at 11/14/19 0845   • senna-docusate (PERICOLACE or SENOKOT S) 8.6-50 MG per tablet 2 Tab  2 Tab Enteral Tube BID Jose Brizuela M.D.   Stopped at 11/13/19 1800    And   • polyethylene glycol/lytes (MIRALAX) PACKET 1 Packet  1 Packet Enteral Tube QDAY PRN Jose Brizuela M.D.        And   • magnesium hydroxide (MILK OF MAGNESIA) suspension 30 mL  30 mL Enteral Tube QDAY PRN Jose Brizuela M.D.        And   • bisacodyl (DULCOLAX) suppository 10 mg  10 mg Rectal QDAY PRN Jose Brizuela M.D.           Fluids    Intake/Output Summary (Last 24 hours) at 11/14/2019 1911  Last data filed at 11/14/2019 1800  Gross per 24 hour   Intake 4668.77 ml   Output 865 ml   Net 3803.77 ml       Laboratory  Recent Labs     11/13/19  1215 11/14/19  0438   ISTATAPH 7.365* 7.524*   ISTATAPCO2 29.2 25.7*   ISTATAPO2 159* 62*   ISTATATCO2 18* 22   [id number] 99 94   ISTATARTHCO3 16.7* 21.2   ISTATARTBE -8* -1   ISTATTEMP 97.7 F 99.7 F   ISTATFIO2 40 30   ISTATSPEC Arterial Arterial   ISTATAPHTC 7.373* 7.515*   [id number] 156* 65         Recent Labs     11/13/19  0532 11/14/19  0409   SODIUM 142 142   POTASSIUM 3.4* 3.8   CHLORIDE 109 113*   CO2 22 24   BUN 20 15   CREATININE 0.61 0.60   MAGNESIUM 1.7 1.9   PHOSPHORUS 3.2 1.0*   CALCIUM 8.0* 7.6*     Recent Labs     11/13/19  0532 11/14/19 0409   ALTSGPT 45 34   ASTSGOT 68* 66*   ALKPHOSPHAT 57 43   TBILIRUBIN 0.6 1.3   LIPASE 77  --    GLUCOSE 119* 99     Recent Labs     11/13/19  0532 11/13/19  0551  11/14/19  0409 11/14/19  1025 11/14/19  1747   WBC 4.7* 4.7*   < > 2.0* 1.8* 1.6*   NEUTSPOLYS 56.70 57.50  --  44.80  --   --    LYMPHOCYTES 32.80 32.10  --  34.30  --   --     MONOCYTES 7.80 8.00  --  17.40*  --   --    EOSINOPHILS 1.50 1.10  --  3.00  --   --    BASOPHILS 0.80 1.10  --  0.50  --   --    ASTSGOT 68*  --   --  66*  --   --    ALTSGPT 45  --   --  34  --   --    ALKPHOSPHAT 57  --   --  43  --   --    TBILIRUBIN 0.6  --   --  1.3  --   --     < > = values in this interval not displayed.     Recent Labs     11/13/19  0532  11/14/19  0409 11/14/19  1025 11/14/19  1747   RBC 3.57*   < > 2.67* 2.66* 2.56*   HEMOGLOBIN 11.7*   < > 8.5* 8.4* 8.2*   HEMATOCRIT 35.3*   < > 25.0* 25.4* 24.6*   PLATELETCT 35*   < > 40* 36* 30*   PROTHROMBTM 16.9*  --   --   --   --    APTT 30.5  --   --   --   --    INR 1.33*  --   --   --   --     < > = values in this interval not displayed.       Imaging  X-Ray:  I have personally reviewed the images and compared with prior images. and My impression is: The lungs are clear    Assessment/Plan  * Bleeding esophageal varices (HCC)- (present on admission)  Assessment & Plan  S/P emergent EGD with banding of esophageal varices on 11/13  Continue octreotide and PPI drips  Trend hemoglobin  Transfuse PRBCs to keep hemoglobin greater than 7    Acute respiratory failure with hypoxia (HCC)- (present on admission)  Assessment & Plan  Intubated on 11/13  Continue vent support  SBT as tolerated  All the appropriate ventilator bundles are in place    Hemorrhagic shock (HCC)- (present on admission)  Assessment & Plan  Shock has resolved    Acute blood loss anemia  Assessment & Plan  Gastrointestinal source of blood loss  Transfuse PRBCs as necessary to keep hemoglobin greater than 7    Alcohol abuse- (present on admission)  Assessment & Plan  Continue high-dose IV thiamine and supplemental vitamins  Observe for evidence of withdrawal  Cessation counseling when clinically appropriate    Cirrhosis of liver (HCC)- (present on admission)  Assessment & Plan  History of hepatitis C and alcoholic liver disease  Avoid hepatotoxins    Hepatitis C- (present on  admission)  Assessment & Plan  Check viral load    Leukopenia  Assessment & Plan  Suspect due to bone marrow suppression from alcohol and poor nutritional status    Hypophosphatemia  Assessment & Plan  Replete phosphorus    Hypomagnesemia  Assessment & Plan  Replete magnesium    Hypokalemia- (present on admission)  Assessment & Plan  Replete potassium    Thrombocytopenia (HCC)- (present on admission)  Assessment & Plan  Follow platelet count and transfuse as necessary       VTE:  Contraindicated  Ulcer: PPI  Lines: Central Line  Ongoing indication addressed and Jimenez Catheter  Ongoing indication addressed    I have performed a physical exam and reviewed and updated ROS and Plan today (11/14/2019). In review of yesterday's note (11/13/2019), there are no changes except as documented above.     I have assessed and reassessed his respiratory status with spontaneous breathing trials and ventilator adjustments, airway mechanics, ventilator waveforms, blood pressure, hemodynamics, cardiovascular status, serial hemoglobins and his neurologic status.  He is at high risk for worsening gastrointestinal, cardiovascular and respiratory system dysfunction.    Discussed patient condition and risk of morbidity and/or mortality with RN, RT, Pharmacy, [facility] resident, Charge nurse / hot rounds, QA team and GI     The patient remains critically ill.  Critical care time = 92 minutes in directly providing and coordinating critical care and extensive data review.  No time overlap and excludes procedures.    Jose Brizuela MD  Pulmonary and Critical Care Medicine

## 2019-11-14 NOTE — PROGRESS NOTES
"[facility] ICU Progress Note      Admit Date: 11/13/2019  ICU Day: 2    Resident(s): Sandra Bartlett  Attending: JOHN SAMS/ Dr. Brizuela     Date & Time:   11/14/2019   6:34 AM       Patient ID:    Name:             Inocencio Shabazz   YOB: 1960  Age:                 59 y.o.  male   MRN:               [id number]    HPI:  Patient presented with bleeding esophageal varices with hemodynamic instability.      Consultants:  ICU:   Gastroenterology     Interval Events:  No acute overnight events. Patient became agitated and was started on propofol and fentanyl drips overnight. Patient's hemoglobin has remained stable. Patient's tolerated SBT well and was extubated later in the morning which he tolerated well.     Review of Systems   Unable to perform ROS: Acuity of condition       PHYSICAL EXAM    Vitals:    11/14/19 0500 11/14/19 0530 11/14/19 0600 11/14/19 0630   BP: (!) 86/55 (!) 80/53 (!) 86/56 (!) 87/58   Pulse: 94 86 75 74   Resp: 20 20 20 20   Temp:       TempSrc:       SpO2: 95% 95% 99% 99%   Weight:       Height:         Body mass index is 22.41 kg/m².  BP (!) 87/58   Pulse 74   Temp 37.6 °C (99.7 °F) (Bladder)   Resp 20   Ht 1.702 m (5' 7\")   Wt 64.9 kg (143 lb 1.3 oz)   SpO2 99%   BMI 22.41 kg/m²   O2 therapy: Pulse Oximetry: 99 %, O2 Delivery: Ventilator    Physical Exam  Constitutional: Laying in bed in no acute distress. Resting his eyes   HENT:   Head: Normocephalic and atraumatic.   Mouth/Throat: Moist mucous membranes EOM are normal. No scleral icterus.   Neck: No JVD present. No tracheal deviation present.   Cardiovascular: Regular rhythm and intact distal pulses. No murmur.   Pulmonary/Chest: + nasal cannula in place, clear to auscultation bilaterally   Abdominal: Soft. Bowel sounds are normal. He exhibits no distension. There is no tenderness. There is no rebound.   Musculoskeletal:   No lower extremity edema.   Skin: warm   Neurological: Alert, following commands, moves all 4 " extremities   Respiratory:  Ramirez Vent Mode: APVCMV  Respiration: 20, Pulse Oximetry: 99 %    Chest Tube Drains:    Recent Labs     19  1215 19  0438   ISTATAPH 7.365* 7.524*   ISTATAPCO2 29.2 25.7*   ISTATAPO2 159* 62*   ISTATATCO2 18* 22   [id number] 99 94   ISTATARTHCO3 16.7* 21.2   ISTATARTBE -8* -1   ISTATTEMP 97.7 F 99.7 F   ISTATFIO2 40 30   ISTATSPEC Arterial Arterial   ISTATAPHTC 7.373* 7.515*   [id number] 156* 65       HemoDynamics:  Pulse: 74 Blood Pressure: (!) 87/58      Neuro:  See above     Fluids:       Intake/Output Summary (Last 24 hours) at 2019 0634  Last data filed at 2019 0600  Gross per 24 hour   Intake 4797.4 ml   Output 2175 ml   Net 2622.4 ml       Weight: 64.9 kg (143 lb 1.3 oz)  Body mass index is 22.41 kg/m².    Recent Labs     19  0532 19  0409   SODIUM 142 142   POTASSIUM 3.4* 3.8   CHLORIDE 109 113*   CO2 22 24   BUN 20 15   CREATININE 0.61 0.60   MAGNESIUM 1.7 1.9   PHOSPHORUS 3.2 1.0*   CALCIUM 8.0* 7.6*       GI/Nutrition:  Recent Labs     19  0532 19  0409   ALTSGPT 45 34   ASTSGOT 68* 66*   ALKPHOSPHAT 57 43   TBILIRUBIN 0.6 1.3   LIPASE 77  --    GLUCOSE 119* 99       Heme:  Recent Labs     19  0532 19  0551  19  1416 19  2210 19  0409   RBC 3.57* 3.58*  --   --  2.83* 2.67*   HEMOGLOBIN 11.7* 11.8*   < > 6.5* 9.1* 8.5*   HEMATOCRIT 35.3* 35.9*  --   --  26.2* 25.0*   PLATELETCT 35* 42*  --   --  48* 40*   PROTHROMBTM 16.9*  --   --   --   --   --    APTT 30.5  --   --   --   --   --    INR 1.33*  --   --   --   --   --     < > = values in this interval not displayed.       Infectious Disease:  Monitored Temp 2  Av.2 °C (99 °F)  Min: 36.3 °C (97.3 °F)  Max: 37.9 °C (100.2 °F)  Temp  Av °C (98.6 °F)  Min: 36.3 °C (97.3 °F)  Max: 37.6 °C (99.7 °F)  Recent Labs     19  0532 19  0551 19  2210 19  0409   WBC 4.7* 4.7* 2.3* 2.0*   NEUTSPOLYS 56.70 57.50  --  44.80    LYMPHOCYTES 32.80 32.10  --  34.30   MONOCYTES 7.80 8.00  --  17.40*   EOSINOPHILS 1.50 1.10  --  3.00   BASOPHILS 0.80 1.10  --  0.50   ASTSGOT 68*  --   --  66*   ALTSGPT 45  --   --  34   ALKPHOSPHAT 57  --   --  43   TBILIRUBIN 0.6  --   --  1.3       Meds:  • potassium phosphate ivpb  30 mmol     • magnesium sulfate  2 g 2 g (11/14/19 0622)   • LR   125 mL/hr at 11/13/19 2330   • cefTRIAXone (ROCEPHIN) IV  1 g Stopped (11/14/19 0527)   • Pharmacy       • Octreotide infusion  50 mcg/hr 50 mcg/hr (11/13/19 0900)   • Respiratory Care per Protocol       • ipratropium-albuterol  3 mL     • MD Alert...Adult ICU Electrolyte Replacement per Pharmacy       • pantoprazole (PROTONIX) infusion  8 mg/hr 8 mg/hr (11/13/19 2155)   • thiamine ivpb  500 mg 500 mg (11/14/19 0413)    Followed by   • [START ON 11/16/2019] thiamine  100 mg     • propofol  0-80 mcg/kg/min 40 mcg/kg/min (11/14/19 0618)   • folic acid  1 mg      And   • multivitamin  1 Tab     • senna-docusate  2 Tab      And   • polyethylene glycol/lytes  1 Packet      And   • magnesium hydroxide  30 mL      And   • bisacodyl  10 mg     • fentaNYL   50 mcg/hr (11/13/19 1928)   • fentaNYL   mcg          Procedures:  Intubated 11/13-14  Propofol/Fentanyl drips  EGD 11/13  Blood / Platelet transfusion     Imaging:  DX-CHEST-PORTABLE (1 VIEW)   Final Result         1.  No acute cardiopulmonary disease.      US-RUQ   Final Result         1. Cirrhosis and portal hypertension. No hepatic mass lesions.   2. Contracted gallbladder with thickened wall, likely related to underlying cirrhosis.   3. Small volume ascites.      DX-CHEST-LIMITED (1 VIEW)   Final Result      Right internal jugular line tip overlies the SVC. Endotracheal tube terminates above the manuel.   No pneumothorax.      DX-CHEST-PORTABLE (1 VIEW)   Final Result      1.  Satisfactory ET tube placement.   2.  No acute abnormality.      DX-CHEST-PORTABLE (1 VIEW)   Final Result         1.  No acute  cardiopulmonary disease.          Problem and Plan:    * Bleeding esophageal varices (HCC)- (present on admission)  Assessment & Plan  Patient presented with known esophageal varix presented with hematemesis, hemodynamically unstable.   - 2 Large bore IVs and central line in place   - Octreotide drip   - IV pantoprazole drip   - Ceftriaxone for prophylaxis in setting of cirrhosis   - Serial H/H   - Transfuse < 7  - IVF  - Will start diet when cleared by GI  - Received 3 units of pRBCs and 2u platelets  - EGD 11/13/19 showed Grade II esophageal varices, banded x 5   - Gastroenterology on board    Acute respiratory failure with hypoxia (HCC)- (present on admission)  Assessment & Plan  Patient was intubated for airway protection in setting of variceal bleed   - Ventilator 11/13-14  - RT on board   - supplemental oxygen as needed     Hemorrhagic shock (HCC)- (present on admission)  Assessment & Plan  Patient presents with bleeding esophageal varices with hypotension, tachycardia and large volume of bright red emesis. Gastroenterology was consulted and performed emergent EGD and underwent banding.   - Improving without needing pressor support   - Resuscitation with blood   - Serial H/H   - Received 3u of pRBCs and 2u platelets   - Transfuse as clinically indicated     Acute blood loss anemia  Assessment & Plan  - Continue to monitor with serial H/H    Alcohol abuse- (present on admission)  Assessment & Plan  Patient with heavy alcohol use. Intoxicated on presentation.   - Was Propofol  And Fentanyl for sedation   - Will monitor for withdrawal, can consider CIWA or phenobarbital     Cirrhosis of liver (HCC)- (present on admission)  Assessment & Plan  Patient with varices, coagulopathy and pancytopenia reflective of cirrhosis.   - Currently stable   - RUQ ultrasound shows cirrhosis and small ascites     Hepatitis C- (present on admission)  Assessment & Plan  History of untreated hepatitis C   - Viral load pending  -  Outpatient follow up     Hypomagnesemia  Assessment & Plan  Replete as needed     Hypokalemia- (present on admission)  Assessment & Plan  - Replete as needed    Thrombocytopenia (HCC)- (present on admission)  Assessment & Plan  Secondary to bone marrow suppression from cirrhosis   - continue to monitor; no current active bleeding      DISPO: Remain in ICU   CODE STATUS: Full Code    Quality Measures:  Jimenez Catheter:  Critically ill   DVT Prophylaxis:   SCDs  Ulcer Prophylaxis:   Pantoprazole drip   Antibiotics:   Ceftriaxone   Lines:  PIVs + Rt IJ

## 2019-11-14 NOTE — CARE PLAN
Problem: Safety - Medical Restraint  Goal: Remains free of injury from restraints (Restraint for Interference with Medical Device)  Description  INTERVENTIONS:  1. Determine that other, less restrictive measures have been tried or would not be effective before applying the restraint  2. Evaluate the patient's condition at the time of restraint application  3. Inform patient/family regarding the reason for restraint  4. Q2H: Monitor safety, psychosocial status, comfort, nutrition and hydration  Outcome: PROGRESSING AS EXPECTED     Problem: Safety  Goal: Will remain free from injury  Outcome: PROGRESSING AS EXPECTED     Problem: Infection  Goal: Will remain free from infection  Outcome: PROGRESSING AS EXPECTED

## 2019-11-14 NOTE — PROGRESS NOTES
Gastroenterology Progress Note     Author: Venkatesh España   Date & Time Created: 11/14/2019 2:18 PM    Chief Complaint:  Melena    Interval History:  Extubted and doing well on room air. No reports of melena. Patient rather lethargic but denies abd pain, nausea.     EGD revealed moderate sized eso varicies that were banded yesterday. Hgb appears stable. BUN normal    Review of Systems:  Review of Systems   Unable to perform ROS: Mental acuity       Physical Exam:  Physical Exam  HENT:      Head: Atraumatic.   Eyes:      General: No scleral icterus.     Extraocular Movements: Extraocular movements intact.   Cardiovascular:      Rate and Rhythm: Normal rate and regular rhythm.   Pulmonary:      Effort: Pulmonary effort is normal. No respiratory distress.      Breath sounds: No wheezing or rales.   Abdominal:      General: Abdomen is flat. There is no distension.      Tenderness: There is no tenderness.   Musculoskeletal:         General: No swelling.   Skin:     General: Skin is warm and dry.         Labs:  Recent Labs     11/13/19  1215 11/14/19  0438   ISTATAPH 7.365* 7.524*   ISTATAPCO2 29.2 25.7*   ISTATAPO2 159* 62*   ISTATATCO2 18* 22   [id number] 99 94   ISTATARTHCO3 16.7* 21.2   ISTATARTBE -8* -1   ISTATTEMP 97.7 F 99.7 F   ISTATFIO2 40 30   ISTATSPEC Arterial Arterial   ISTATAPHTC 7.373* 7.515*   [id number] 156* 65         Recent Labs     11/13/19  0532 11/14/19  0409   SODIUM 142 142   POTASSIUM 3.4* 3.8   CHLORIDE 109 113*   CO2 22 24   BUN 20 15   CREATININE 0.61 0.60   MAGNESIUM 1.7 1.9   PHOSPHORUS 3.2 1.0*   CALCIUM 8.0* 7.6*     Recent Labs     11/13/19  0532 11/14/19  0409   ALTSGPT 45 34   ASTSGOT 68* 66*   ALKPHOSPHAT 57 43   TBILIRUBIN 0.6 1.3   LIPASE 77  --    GLUCOSE 119* 99     Recent Labs     11/13/19  0532  11/13/19  2210 11/14/19  0409 11/14/19  1025   RBC 3.57*   < > 2.83* 2.67* 2.66*   HEMOGLOBIN 11.7*   < > 9.1* 8.5* 8.4*   HEMATOCRIT 35.3*   < > 26.2* 25.0* 25.4*   PLATELETCT 35*   <  > 48* 40* 36*   PROTHROMBTM 16.9*  --   --   --   --    APTT 30.5  --   --   --   --    INR 1.33*  --   --   --   --     < > = values in this interval not displayed.     Recent Labs     19  0532 19  0551 19  2210 19  0409 19  1025   WBC 4.7* 4.7* 2.3* 2.0* 1.8*   NEUTSPOLYS 56.70 57.50  --  44.80  --    LYMPHOCYTES 32.80 32.10  --  34.30  --    MONOCYTES 7.80 8.00  --  17.40*  --    EOSINOPHILS 1.50 1.10  --  3.00  --    BASOPHILS 0.80 1.10  --  0.50  --    ASTSGOT 68*  --   --  66*  --    ALTSGPT 45  --   --  34  --    ALKPHOSPHAT 57  --   --  43  --    TBILIRUBIN 0.6  --   --  1.3  --      Hemodynamics:  Temp (24hrs), Av.5 °C (99.5 °F), Min:37.4 °C (99.3 °F), Max:37.6 °C (99.7 °F)  Temperature: 37.6 °C (99.7 °F), Monitored Temp: 37.1 °C (98.8 °F)  Pulse  Av.3  Min: 70  Max: 182   Blood Pressure: (!) 94/63     Respiratory:  Ramirez Vent Mode: APVCMV, Rate (breaths/min): 20, PEEP/CPAP: 5, FiO2: 30, P Peak (PIP): 13, P MEAN: 7.3 Respiration: 16, Pulse Oximetry: 99 %     Work Of Breathing / Effort: Mild  RUL Breath Sounds: Clear, RML Breath Sounds: Clear, RLL Breath Sounds: Diminished, ROM Breath Sounds: Clear, LLL Breath Sounds: Diminished  Fluids:    Intake/Output Summary (Last 24 hours) at 2019 1418  Last data filed at 2019 1400  Gross per 24 hour   Intake 4188.77 ml   Output 1740 ml   Net 2448.77 ml     Weight: 64.9 kg (143 lb 1.3 oz)  GI/Nutrition:  Orders Placed This Encounter   Procedures   • Diet NPO     Standing Status:   Standing     Number of Occurrences:   1     Order Specific Question:   Type:     Answer:   Now [1]     Order Specific Question:   Restrict to:     Answer:   Strict [1]     Medical Decision Making, by Problem:  Active Hospital Problems    Diagnosis   • *Bleeding esophageal varices (HCC) [I85.01]   • Acute respiratory failure with hypoxia (HCC) [J96.01]   • Hemorrhagic shock (HCC) [R57.8]   • Acute blood loss anemia [D62]   • Cirrhosis of  liver (HCC) [K74.60]   • Alcohol abuse [F10.10]   • Hepatitis C [B19.20]   • Hypomagnesemia [E83.42]   • Hypokalemia [E87.6]   • Thrombocytopenia (HCC) [D69.6]     Impression and Plan    1) Hematemesis - resolved - appears secondary to esophageal variceal bleeding. Banded x5 yesterday. Stabel. Need to continue octreotide drip for another 2 days and then can stop. Need to continue abx and PPI. Discuss etoh avoidance when more alert. If tolerating po and it is stable ok to advance to clears today can be transferred to tele.  Continue to trend hgBs  2) UGI bleeding  3) Melena  4) Cirrhosis  5) non-compliance  6) h/o PUD    I will sign off for now. See reccs above and feel free to call if any new concerns.    [facility]        Quality-Core Measures

## 2019-11-14 NOTE — CARE PLAN
Problem: Fluid Volume:  Goal: Will maintain balanced intake and output  Note:   I&O monitored and documented in flowsheets.     Problem: Pain Management  Goal: Pain level will decrease to patient's comfort goal  Note:   CPOT assessed throughout shift and pt medicated per MAR.

## 2019-11-15 PROBLEM — R57.8 HEMORRHAGIC SHOCK (HCC): Status: RESOLVED | Noted: 2019-03-12 | Resolved: 2019-11-15

## 2019-11-15 LAB
ALBUMIN SERPL BCP-MCNC: 2.7 G/DL (ref 3.2–4.9)
ALBUMIN/GLOB SERPL: 1.1 G/DL
ALP SERPL-CCNC: 49 U/L (ref 30–99)
ALT SERPL-CCNC: 40 U/L (ref 2–50)
ANION GAP SERPL CALC-SCNC: 6 MMOL/L (ref 0–11.9)
AST SERPL-CCNC: 76 U/L (ref 12–45)
BASOPHILS # BLD AUTO: 1.1 % (ref 0–1.8)
BASOPHILS # BLD: 0.02 K/UL (ref 0–0.12)
BILIRUB SERPL-MCNC: 1.3 MG/DL (ref 0.1–1.5)
BUN SERPL-MCNC: 7 MG/DL (ref 8–22)
CALCIUM SERPL-MCNC: 7.5 MG/DL (ref 8.5–10.5)
CHLORIDE SERPL-SCNC: 109 MMOL/L (ref 96–112)
CO2 SERPL-SCNC: 23 MMOL/L (ref 20–33)
CREAT SERPL-MCNC: 0.45 MG/DL (ref 0.5–1.4)
EOSINOPHIL # BLD AUTO: 0.06 K/UL (ref 0–0.51)
EOSINOPHIL NFR BLD: 3.2 % (ref 0–6.9)
ERYTHROCYTE [DISTWIDTH] IN BLOOD BY AUTOMATED COUNT: 50.1 FL (ref 35.9–50)
ERYTHROCYTE [DISTWIDTH] IN BLOOD BY AUTOMATED COUNT: 51.8 FL (ref 35.9–50)
GLOBULIN SER CALC-MCNC: 2.4 G/DL (ref 1.9–3.5)
GLUCOSE SERPL-MCNC: 108 MG/DL (ref 65–99)
HCT VFR BLD AUTO: 24.8 % (ref 42–52)
HCT VFR BLD AUTO: 25.3 % (ref 42–52)
HGB BLD-MCNC: 8.4 G/DL (ref 14–18)
HGB BLD-MCNC: 8.4 G/DL (ref 14–18)
IMM GRANULOCYTES # BLD AUTO: 0.02 K/UL (ref 0–0.11)
IMM GRANULOCYTES NFR BLD AUTO: 1.1 % (ref 0–0.9)
LYMPHOCYTES # BLD AUTO: 0.39 K/UL (ref 1–4.8)
LYMPHOCYTES NFR BLD: 21.1 % (ref 22–41)
MAGNESIUM SERPL-MCNC: 1.6 MG/DL (ref 1.5–2.5)
MCH RBC QN AUTO: 31.8 PG (ref 27–33)
MCH RBC QN AUTO: 32.2 PG (ref 27–33)
MCHC RBC AUTO-ENTMCNC: 33.2 G/DL (ref 33.7–35.3)
MCHC RBC AUTO-ENTMCNC: 33.9 G/DL (ref 33.7–35.3)
MCV RBC AUTO: 95 FL (ref 81.4–97.8)
MCV RBC AUTO: 95.8 FL (ref 81.4–97.8)
MONOCYTES # BLD AUTO: 0.22 K/UL (ref 0–0.85)
MONOCYTES NFR BLD AUTO: 11.9 % (ref 0–13.4)
NEUTROPHILS # BLD AUTO: 1.14 K/UL (ref 1.82–7.42)
NEUTROPHILS NFR BLD: 61.6 % (ref 44–72)
NRBC # BLD AUTO: 0 K/UL
NRBC BLD-RTO: 0 /100 WBC
PHOSPHATE SERPL-MCNC: 3 MG/DL (ref 2.5–4.5)
PLATELET # BLD AUTO: 24 K/UL (ref 164–446)
PLATELET # BLD AUTO: 26 K/UL (ref 164–446)
PMV BLD AUTO: 11.8 FL (ref 9–12.9)
PMV BLD AUTO: 11.9 FL (ref 9–12.9)
POTASSIUM SERPL-SCNC: 3.8 MMOL/L (ref 3.6–5.5)
PROT SERPL-MCNC: 5.1 G/DL (ref 6–8.2)
RBC # BLD AUTO: 2.61 M/UL (ref 4.7–6.1)
RBC # BLD AUTO: 2.64 M/UL (ref 4.7–6.1)
SODIUM SERPL-SCNC: 138 MMOL/L (ref 135–145)
WBC # BLD AUTO: 1.8 K/UL (ref 4.8–10.8)
WBC # BLD AUTO: 1.9 K/UL (ref 4.8–10.8)

## 2019-11-15 PROCEDURE — 83735 ASSAY OF MAGNESIUM: CPT

## 2019-11-15 PROCEDURE — 85027 COMPLETE CBC AUTOMATED: CPT

## 2019-11-15 PROCEDURE — 700111 HCHG RX REV CODE 636 W/ 250 OVERRIDE (IP): Performed by: INTERNAL MEDICINE

## 2019-11-15 PROCEDURE — 700105 HCHG RX REV CODE 258: Performed by: STUDENT IN AN ORGANIZED HEALTH CARE EDUCATION/TRAINING PROGRAM

## 2019-11-15 PROCEDURE — A9270 NON-COVERED ITEM OR SERVICE: HCPCS | Performed by: INTERNAL MEDICINE

## 2019-11-15 PROCEDURE — 700105 HCHG RX REV CODE 258: Performed by: INTERNAL MEDICINE

## 2019-11-15 PROCEDURE — 99406 BEHAV CHNG SMOKING 3-10 MIN: CPT

## 2019-11-15 PROCEDURE — C9113 INJ PANTOPRAZOLE SODIUM, VIA: HCPCS | Performed by: INTERNAL MEDICINE

## 2019-11-15 PROCEDURE — C9113 INJ PANTOPRAZOLE SODIUM, VIA: HCPCS | Performed by: STUDENT IN AN ORGANIZED HEALTH CARE EDUCATION/TRAINING PROGRAM

## 2019-11-15 PROCEDURE — 700102 HCHG RX REV CODE 250 W/ 637 OVERRIDE(OP): Performed by: STUDENT IN AN ORGANIZED HEALTH CARE EDUCATION/TRAINING PROGRAM

## 2019-11-15 PROCEDURE — 770020 HCHG ROOM/CARE - TELE (206)

## 2019-11-15 PROCEDURE — A9270 NON-COVERED ITEM OR SERVICE: HCPCS | Performed by: STUDENT IN AN ORGANIZED HEALTH CARE EDUCATION/TRAINING PROGRAM

## 2019-11-15 PROCEDURE — 700102 HCHG RX REV CODE 250 W/ 637 OVERRIDE(OP): Performed by: INTERNAL MEDICINE

## 2019-11-15 PROCEDURE — 700111 HCHG RX REV CODE 636 W/ 250 OVERRIDE (IP): Performed by: STUDENT IN AN ORGANIZED HEALTH CARE EDUCATION/TRAINING PROGRAM

## 2019-11-15 PROCEDURE — 84100 ASSAY OF PHOSPHORUS: CPT

## 2019-11-15 PROCEDURE — 85025 COMPLETE CBC W/AUTO DIFF WBC: CPT

## 2019-11-15 PROCEDURE — 80053 COMPREHEN METABOLIC PANEL: CPT

## 2019-11-15 PROCEDURE — 99233 SBSQ HOSP IP/OBS HIGH 50: CPT | Performed by: INTERNAL MEDICINE

## 2019-11-15 RX ORDER — FOLIC ACID 1 MG/1
1 TABLET ORAL DAILY
Status: COMPLETED | OUTPATIENT
Start: 2019-11-15 | End: 2019-11-17

## 2019-11-15 RX ORDER — POLYETHYLENE GLYCOL 3350 17 G/17G
1 POWDER, FOR SOLUTION ORAL
Status: DISCONTINUED | OUTPATIENT
Start: 2019-11-15 | End: 2019-11-18 | Stop reason: HOSPADM

## 2019-11-15 RX ORDER — TRAZODONE HYDROCHLORIDE 50 MG/1
50 TABLET ORAL ONCE
Status: COMPLETED | OUTPATIENT
Start: 2019-11-15 | End: 2019-11-15

## 2019-11-15 RX ORDER — THIAMINE MONONITRATE (VIT B1) 100 MG
100 TABLET ORAL DAILY
Status: DISCONTINUED | OUTPATIENT
Start: 2019-11-16 | End: 2019-11-18 | Stop reason: HOSPADM

## 2019-11-15 RX ORDER — MAGNESIUM SULFATE HEPTAHYDRATE 40 MG/ML
4 INJECTION, SOLUTION INTRAVENOUS ONCE
Status: COMPLETED | OUTPATIENT
Start: 2019-11-15 | End: 2019-11-15

## 2019-11-15 RX ORDER — BISACODYL 10 MG
10 SUPPOSITORY, RECTAL RECTAL
Status: DISCONTINUED | OUTPATIENT
Start: 2019-11-15 | End: 2019-11-18 | Stop reason: HOSPADM

## 2019-11-15 RX ORDER — SULFAMETHOXAZOLE AND TRIMETHOPRIM 800; 160 MG/1; MG/1
1 TABLET ORAL EVERY 12 HOURS
Status: DISCONTINUED | OUTPATIENT
Start: 2019-11-16 | End: 2019-11-18 | Stop reason: HOSPADM

## 2019-11-15 RX ORDER — AMOXICILLIN 250 MG
2 CAPSULE ORAL 2 TIMES DAILY
Status: DISCONTINUED | OUTPATIENT
Start: 2019-11-15 | End: 2019-11-18 | Stop reason: HOSPADM

## 2019-11-15 RX ORDER — OMEPRAZOLE 20 MG/1
20 CAPSULE, DELAYED RELEASE ORAL 2 TIMES DAILY
Status: DISCONTINUED | OUTPATIENT
Start: 2019-11-16 | End: 2019-11-18 | Stop reason: HOSPADM

## 2019-11-15 RX ORDER — POTASSIUM CHLORIDE 20 MEQ/1
40 TABLET, EXTENDED RELEASE ORAL ONCE
Status: COMPLETED | OUTPATIENT
Start: 2019-11-15 | End: 2019-11-15

## 2019-11-15 RX ORDER — GUAIFENESIN 600 MG/1
600 TABLET, EXTENDED RELEASE ORAL 2 TIMES DAILY PRN
Status: DISCONTINUED | OUTPATIENT
Start: 2019-11-15 | End: 2019-11-18 | Stop reason: HOSPADM

## 2019-11-15 RX ADMIN — SODIUM CHLORIDE 8 MG/HR: 9 INJECTION, SOLUTION INTRAVENOUS at 12:07

## 2019-11-15 RX ADMIN — SODIUM CHLORIDE 8 MG/HR: 9 INJECTION, SOLUTION INTRAVENOUS at 04:00

## 2019-11-15 RX ADMIN — CEFTRIAXONE SODIUM 1 G: 1 INJECTION, POWDER, FOR SOLUTION INTRAMUSCULAR; INTRAVENOUS at 06:09

## 2019-11-15 RX ADMIN — SODIUM CHLORIDE 8 MG/HR: 9 INJECTION, SOLUTION INTRAVENOUS at 10:13

## 2019-11-15 RX ADMIN — SODIUM CHLORIDE, POTASSIUM CHLORIDE, SODIUM LACTATE AND CALCIUM CHLORIDE: 600; 310; 30; 20 INJECTION, SOLUTION INTRAVENOUS at 07:38

## 2019-11-15 RX ADMIN — ONDANSETRON 4 MG: 2 INJECTION INTRAMUSCULAR; INTRAVENOUS at 16:01

## 2019-11-15 RX ADMIN — OCTREOTIDE ACETATE 50 MCG/HR: 200 INJECTION, SOLUTION INTRAVENOUS; SUBCUTANEOUS at 12:09

## 2019-11-15 RX ADMIN — FOLIC ACID 1 MG: 1 TABLET ORAL at 06:12

## 2019-11-15 RX ADMIN — THIAMINE HYDROCHLORIDE 500 MG: 100 INJECTION, SOLUTION INTRAMUSCULAR; INTRAVENOUS at 05:21

## 2019-11-15 RX ADMIN — POTASSIUM CHLORIDE 40 MEQ: 1500 TABLET, EXTENDED RELEASE ORAL at 10:11

## 2019-11-15 RX ADMIN — THERA TABS 1 TABLET: TAB at 06:12

## 2019-11-15 RX ADMIN — TRAZODONE HYDROCHLORIDE 50 MG: 50 TABLET ORAL at 22:44

## 2019-11-15 RX ADMIN — SENNOSIDES AND DOCUSATE SODIUM 2 TABLET: 8.6; 5 TABLET ORAL at 12:12

## 2019-11-15 RX ADMIN — MAGNESIUM SULFATE IN WATER 4 G: 40 INJECTION, SOLUTION INTRAVENOUS at 07:38

## 2019-11-15 RX ADMIN — ONDANSETRON 4 MG: 2 INJECTION INTRAMUSCULAR; INTRAVENOUS at 20:25

## 2019-11-15 ASSESSMENT — PATIENT HEALTH QUESTIONNAIRE - PHQ9
1. LITTLE INTEREST OR PLEASURE IN DOING THINGS: NOT AT ALL
SUM OF ALL RESPONSES TO PHQ9 QUESTIONS 1 AND 2: 0
2. FEELING DOWN, DEPRESSED, IRRITABLE, OR HOPELESS: NOT AT ALL

## 2019-11-15 ASSESSMENT — COGNITIVE AND FUNCTIONAL STATUS - GENERAL
DRESSING REGULAR LOWER BODY CLOTHING: A LITTLE
PERSONAL GROOMING: A LITTLE
SUGGESTED CMS G CODE MODIFIER MOBILITY: CK
MOBILITY SCORE: 18
CLIMB 3 TO 5 STEPS WITH RAILING: A LITTLE
WALKING IN HOSPITAL ROOM: A LITTLE
SUGGESTED CMS G CODE MODIFIER DAILY ACTIVITY: CK
DRESSING REGULAR UPPER BODY CLOTHING: A LITTLE
HELP NEEDED FOR BATHING: A LITTLE
STANDING UP FROM CHAIR USING ARMS: A LITTLE
DAILY ACTIVITIY SCORE: 19
MOVING FROM LYING ON BACK TO SITTING ON SIDE OF FLAT BED: A LITTLE
TURNING FROM BACK TO SIDE WHILE IN FLAT BAD: A LITTLE
MOVING TO AND FROM BED TO CHAIR: A LITTLE
TOILETING: A LITTLE

## 2019-11-15 ASSESSMENT — LIFESTYLE VARIABLES
DOES PATIENT WANT TO STOP DRINKING: YES
TOTAL SCORE: 2
AVERAGE NUMBER OF DAYS PER WEEK YOU HAVE A DRINK CONTAINING ALCOHOL: 7
HOW MANY TIMES IN THE PAST YEAR HAVE YOU HAD 5 OR MORE DRINKS IN A DAY: 325
DOES PATIENT WANT TO TALK TO SOMEONE ABOUT QUITTING: YES
HAVE PEOPLE ANNOYED YOU BY CRITICIZING YOUR DRINKING: NO
TOTAL SCORE: 2
TOTAL SCORE: 2
EVER HAD A DRINK FIRST THING IN THE MORNING TO STEADY YOUR NERVES TO GET RID OF A HANGOVER: NO
ALCOHOL_USE: YES
SUBSTANCE_ABUSE: 1
ON A TYPICAL DAY WHEN YOU DRINK ALCOHOL HOW MANY DRINKS DO YOU HAVE: 20
CONSUMPTION TOTAL: POSITIVE
HAVE YOU EVER FELT YOU SHOULD CUT DOWN ON YOUR DRINKING: YES
EVER FELT BAD OR GUILTY ABOUT YOUR DRINKING: YES

## 2019-11-15 ASSESSMENT — ENCOUNTER SYMPTOMS
ABDOMINAL PAIN: 0
CHILLS: 0
BRUISES/BLEEDS EASILY: 0
NAUSEA: 1
SPUTUM PRODUCTION: 1
BLOOD IN STOOL: 0
FOCAL WEAKNESS: 0
SEIZURES: 0
FLANK PAIN: 0
DIARRHEA: 0
PHOTOPHOBIA: 0
HEMOPTYSIS: 0
MYALGIAS: 0
DOUBLE VISION: 0
WHEEZING: 0
SHORTNESS OF BREATH: 1
PALPITATIONS: 0
HALLUCINATIONS: 0
TREMORS: 0
BLURRED VISION: 0
FEVER: 0
CONSTIPATION: 0
HEADACHES: 0
SPUTUM PRODUCTION: 0
TINGLING: 0
SPEECH CHANGE: 0
SENSORY CHANGE: 0
VOMITING: 0
COUGH: 1
NERVOUS/ANXIOUS: 0
STRIDOR: 0
NECK PAIN: 0

## 2019-11-15 NOTE — PROGRESS NOTES
Joleen from Lab called with critical result of WBC 1.6 and plt 30 at 1806. Critical lab result read back to Joleen.   This critical lab result is within parameters established by [facility] for this patient (per resident, call if WBC <0.5, plt <30, or Hgb <7).

## 2019-11-15 NOTE — PROGRESS NOTES
"Pt tremulous and nauseas. Pt states he \"doesn't feel right\".    [facility] updated on pt assessment paired with potential for ETOH withdraw.  MD to put in orders  "

## 2019-11-15 NOTE — CARE PLAN
Problem: Venous Thromboembolism (VTW)/Deep Vein Thrombosis (DVT) Prevention:  Goal: Patient will participate in Venous Thrombosis (VTE)/Deep Vein Thrombosis (DVT)Prevention Measures  Outcome: PROGRESSING AS EXPECTED  Flowsheets (Taken 11/15/2019 1343)  SCDs, Sequential Compression Device: On  Intervention: Encourage ambulation/mobilization at level directed by Physical Therapy in collaboration with Interdisciplinary Team  Note:   Mobilization encouraged and performed     Problem: Fluid Volume:  Goal: Will maintain balanced intake and output  Outcome: PROGRESSING AS EXPECTED  Intervention: Monitor, educate, and encourage compliance with therapeutic intake of liquids  Note:   Intake and output monitored and documented. Patient assessed for signs of fluid volume imbalance.

## 2019-11-15 NOTE — ASSESSMENT & PLAN NOTE
Secondary to bone marrow suppression   - Currently stable, no signs of infection  - Monitor ANC ( currently at 0.86)

## 2019-11-15 NOTE — PROGRESS NOTES
"[facility] ICU Progress Note      Admit Date: 11/13/2019  ICU Day: 2    Resident(s): Sandra Bartlett  Attending: JOHN SAMS/ Dr. Brizuela     Date & Time:   11/15/2019   6:39 AM       Patient ID:    Name:             Inocencio Shabazz   YOB: 1960  Age:                 59 y.o.  male   MRN:               [id number]    HPI:  Patient presented with bleeding esophageal varices with hemodynamic instability.      Consultants:  ICU:   Gastroenterology, signed off     Interval Events:  No acute overnight events. In anticipation of withdrawal, patient was started on CIWA protocol, but has not required any ativan. He reports that he feels unwell but denies any hematemesis and abdominal pain. He reports that he does not have much of an appetite and remains on IVF. Patient's platelets have been downtrending but there are no signs of bleeding at this time. Hemoglobin has been stable. He reports having a dry clear cough.     Review of Systems   Unable to perform ROS: Acuity of condition   Constitutional: Negative for chills and fever.   Respiratory: Positive for cough and shortness of breath. Negative for sputum production and wheezing.    Cardiovascular: Negative for chest pain, palpitations and leg swelling.   Gastrointestinal: Positive for nausea. Negative for abdominal pain, blood in stool, constipation, diarrhea, melena and vomiting.   Genitourinary: Negative for dysuria, frequency and urgency.   Neurological: Negative for tingling, tremors, sensory change and speech change.       PHYSICAL EXAM    Vitals:    11/15/19 0300 11/15/19 0400 11/15/19 0500 11/15/19 0600   BP: 118/74 113/69 116/68 120/77   Pulse: 79 79 81 86   Resp: 14 (!) 22 15 13   Temp:       TempSrc:  Bladder  Bladder   SpO2: 93% 95% 95% 97%   Weight:  66.4 kg (146 lb 6.2 oz)     Height:         Body mass index is 22.93 kg/m².  /77   Pulse 86   Temp 37.6 °C (99.7 °F) (Bladder)   Resp 13   Ht 1.702 m (5' 7\")   Wt 66.4 kg (146 lb 6.2 oz)   " SpO2 97%   BMI 22.93 kg/m²   O2 therapy: Pulse Oximetry: 97 %, O2 (LPM): 1, O2 Delivery: Silicone Nasal Cannula    Physical Exam  Constitutional: Laying in bed in no acute distress. Interactive.   HENT:   Head: Normocephalic and atraumatic.   Mouth/Throat: Moist mucous membranes EOM are normal. No scleral icterus. Emesis bag with clear secretions.   Neck: No JVD present. No tracheal deviation present. Right central line in place without surrounding erythema or hematoma.   Cardiovascular: Regular rhythm and intact distal pulses. No murmur.   Pulmonary/Chest: + nasal cannula in place, clear to auscultation bilaterally   Abdominal: Soft. Bowel sounds are normal. He exhibits no distension. There is no tenderness. There is no rebound.   Musculoskeletal:   No lower extremity edema.   Skin: warm   Neurological: Alert, following commands, moves all 4 extremities   Respiratory:  Ramirez Vent Mode: APVCMV  Respiration: 13, Pulse Oximetry: 97 %, O2 Daily Delivery Respiratory : Silicone Nasal Cannula    Chest Tube Drains:    Recent Labs     11/13/19  1215 11/14/19  0438   ISTATAPH 7.365* 7.524*   ISTATAPCO2 29.2 25.7*   ISTATAPO2 159* 62*   ISTATATCO2 18* 22   [id number] 99 94   ISTATARTHCO3 16.7* 21.2   ISTATARTBE -8* -1   ISTATTEMP 97.7 F 99.7 F   ISTATFIO2 40 30   ISTATSPEC Arterial Arterial   ISTATAPHTC 7.373* 7.515*   [id number] 156* 65       HemoDynamics:  Pulse: 86 Blood Pressure: 120/77      Neuro:  See above     Fluids:       Intake/Output Summary (Last 24 hours) at 11/14/2019 0634  Last data filed at 11/14/2019 0600  Gross per 24 hour   Intake 4797.4 ml   Output 2175 ml   Net 2622.4 ml       Weight: 66.4 kg (146 lb 6.2 oz)  Body mass index is 22.93 kg/m².    Recent Labs     11/13/19  0532 11/14/19  0409 11/15/19  0205   SODIUM 142 142 138   POTASSIUM 3.4* 3.8 3.8   CHLORIDE 109 113* 109   CO2 22 24 23   BUN 20 15 7*   CREATININE 0.61 0.60 0.45*   MAGNESIUM 1.7 1.9 1.6   PHOSPHORUS 3.2 1.0* 3.0   CALCIUM 8.0* 7.6*  7.5*       GI/Nutrition:  Recent Labs     19  0532 11/14/19  0409 11/15/19  0205   ALTSGPT 45 34 40   ASTSGOT 68* 66* 76*   ALKPHOSPHAT 57 43 49   TBILIRUBIN 0.6 1.3 1.3   LIPASE 77  --   --    GLUCOSE 119* 99 108*       Heme:  Recent Labs     19  0532  19  1025 11/14/19  1747 11/15/19  0205   RBC 3.57*   < > 2.66* 2.56* 2.61*   HEMOGLOBIN 11.7*   < > 8.4* 8.2* 8.4*   HEMATOCRIT 35.3*   < > 25.4* 24.6* 24.8*   PLATELETCT 35*   < > 36* 30* 26*   PROTHROMBTM 16.9*  --   --   --   --    APTT 30.5  --   --   --   --    INR 1.33*  --   --   --   --     < > = values in this interval not displayed.       Infectious Disease:  Monitored Temp 2  Av.2 °C (98.9 °F)  Min: 36.8 °C (98.2 °F)  Max: 37.7 °C (99.9 °F)  Recent Labs     19  0532 19  0551  11/14/19  0409 11/14/19  1025 11/14/19  1747 11/15/19  0205   WBC 4.7* 4.7*   < > 2.0* 1.8* 1.6* 1.9*   NEUTSPOLYS 56.70 57.50  --  44.80  --   --  61.60   LYMPHOCYTES 32.80 32.10  --  34.30  --   --  21.10*   MONOCYTES 7.80 8.00  --  17.40*  --   --  11.90   EOSINOPHILS 1.50 1.10  --  3.00  --   --  3.20   BASOPHILS 0.80 1.10  --  0.50  --   --  1.10   ASTSGOT 68*  --   --  66*  --   --  76*   ALTSGPT 45  --   --  34  --   --  40   ALKPHOSPHAT 57  --   --  43  --   --  49   TBILIRUBIN 0.6  --   --  1.3  --   --  1.3    < > = values in this interval not displayed.       Meds:  • folic acid  1 mg      And   • multivitamin  1 Tab     • magnesium sulfate  4 g     • ondansetron  4 mg     • LORazepam  4 mg      Or   • LORazepam  3 mg      Or   • LORazepam  2 mg      Or   • LORazepam  1 mg     • LR   125 mL/hr at 19 1524   • cefTRIAXone (ROCEPHIN) IV  1 g Stopped (11/15/19 0639)   • Pharmacy       • Octreotide infusion  50 mcg/hr 50 mcg/hr (19 0839)   • Respiratory Care per Protocol       • ipratropium-albuterol  3 mL     • MD Alert...Adult ICU Electrolyte Replacement per Pharmacy       • pantoprazole (PROTONIX) infusion  8 mg/hr 8 mg/hr  (11/15/19 0400)   • [START ON 11/16/2019] thiamine  100 mg     • senna-docusate  2 Tab      And   • polyethylene glycol/lytes  1 Packet      And   • magnesium hydroxide  30 mL      And   • bisacodyl  10 mg          Procedures:  Intubated 11/13-14  Propofol/Fentanyl drips  EGD 11/13  Blood / Platelet transfusion     Imaging:  DX-CHEST-PORTABLE (1 VIEW)   Final Result         1.  No acute cardiopulmonary disease.      US-RUQ   Final Result         1. Cirrhosis and portal hypertension. No hepatic mass lesions.   2. Contracted gallbladder with thickened wall, likely related to underlying cirrhosis.   3. Small volume ascites.      DX-CHEST-LIMITED (1 VIEW)   Final Result      Right internal jugular line tip overlies the SVC. Endotracheal tube terminates above the manuel.   No pneumothorax.      DX-CHEST-PORTABLE (1 VIEW)   Final Result      1.  Satisfactory ET tube placement.   2.  No acute abnormality.      DX-CHEST-PORTABLE (1 VIEW)   Final Result         1.  No acute cardiopulmonary disease.          Problem and Plan:    * Bleeding esophageal varices (HCC)- (present on admission)  Assessment & Plan  Patient presented with known esophageal varix presented with hematemesis, hemodynamically unstable. EGD 11/13/19 showed Grade II esophageal varices, banded x 5. Received 3 units of pRBCs and 2u platelets.  - 2 Large bore IVs and central line in place  - Octreotide drip till 11/16  - IV pantoprazole drip   - Transition to po omeprazole 11/16  - Ceftriaxone for prophylaxis in setting of cirrhosis   - Serial H/H   - Transfuse < 7  - Stop IVF  - Tolerating liquid diet well   - Gastroenterology signed off     Acute respiratory failure with hypoxia (HCC)- (present on admission)  Assessment & Plan  Patient was intubated for airway protection in setting of variceal bleed   - Ventilator 11/13-14  - RT on board   - supplemental oxygen as needed   - Incentive spirometry   - Mucinex for cough    Acute blood loss anemia  Assessment &  Plan  - Continue to monitor with serial H/H    Alcohol abuse- (present on admission)  Assessment & Plan  Patient with heavy alcohol use. Intoxicated on presentation.   - Was on Propofol  And Fentanyl for sedation   - CIWA protocol on board, but currently stable   - Alcohol cessation counseling provided    Cirrhosis of liver (HCC)- (present on admission)  Assessment & Plan  Patient with varices, coagulopathy and pancytopenia reflective of cirrhosis.   - Currently stable   - RUQ ultrasound shows cirrhosis and small ascites     Hepatitis C- (present on admission)  Assessment & Plan  History of untreated hepatitis C   - Viral load pending  - Outpatient follow up     Leukopenia  Assessment & Plan  Secondary to bone marrow suppression   - Currently stable, no signs of infection      Hypomagnesemia  Assessment & Plan  Replete as needed     Hypokalemia- (present on admission)  Assessment & Plan  - Replete as needed    Thrombocytopenia (HCC)- (present on admission)  Assessment & Plan  Secondary to bone marrow suppression from cirrhosis   - continue to monitor; no current active bleeding  - Consider transfusion if platelets are < 20      DISPO: Transfer to Telemetry   CODE STATUS: Full Code    Quality Measures:  Jimenez Catheter:  Removed   DVT Prophylaxis:   SCDs  Ulcer Prophylaxis:   Pantoprazole drip   Antibiotics:   Ceftriaxone   Lines:  PIVs + Rt IJ (Will try to remove)

## 2019-11-15 NOTE — PROGRESS NOTES
Lab called with critical result of WBC at 1.8 and Platelet at 24. Critical lab result read back to lab.   Dr. Brizuela notified of critical lab result. Critical lab result read back by Dr. Brizuela.

## 2019-11-15 NOTE — RESPIRATORY CARE
" COPD EDUCATION by COPD CLINICAL EDUCATOR  11/15/2019 at 12:45 PM by Cira Lazcano     Patient reviewed by COPD education team. Smoking Cessation Intervention and education completed, 4 minutes spent on smoking cessation education with patient    Provided smoking cessation packet with \"Tips to Quit\" and flyer for \"Free Smoking Cessation Classes\".     "

## 2019-11-15 NOTE — CARE PLAN
Problem: Safety  Goal: Will remain free from falls  Outcome: PROGRESSING AS EXPECTED  Intervention: Implement fall precautions  Flowsheets (Taken 11/14/2019 2000)  Bed Alarm: Yes - Alarm On     Problem: Bowel/Gastric:  Goal: Will not experience complications related to bowel motility  Outcome: PROGRESSING AS EXPECTED  Intervention: Implement interventions to promote bowel evacuation if inadequate bowel movements in past 48 hours  Note:   Pt up to [facility] for BM

## 2019-11-15 NOTE — PROGRESS NOTES
Critical Care Progress Note    Date of admission  11/13/2019    Chief Complaint  59 y.o. male admitted 11/13/2019 with an acute gastrointestinal hemorrhage.    Hospital Course    This gentleman was admitted to the ICU with respiratory failure and an acute gastrointestinal hemorrhage.      Interval Problem Update  Reviewed last 24 hour events:      He feels a little bit tired and weak.  He has a cough with some white to light yellow sputum production.  He feels congested.  His nose feels stuffy.  He has had a little bit of nausea but no hematemesis.  He denies abdominal pain.  He has no angina, palpitations or syncope.  He is not feeling anxious or nervous at this time.      Review of Systems  Review of Systems   Constitutional: Positive for malaise/fatigue. Negative for chills and fever.   HENT: Negative for ear pain and nosebleeds.    Eyes: Negative for blurred vision, double vision and photophobia.   Respiratory: Positive for cough and sputum production. Negative for hemoptysis and stridor.    Cardiovascular: Negative for chest pain, palpitations and leg swelling.   Gastrointestinal: Positive for melena. Negative for abdominal pain and vomiting.   Genitourinary: Negative for dysuria, flank pain and urgency.   Musculoskeletal: Negative for myalgias and neck pain.   Skin: Negative for rash.   Neurological: Negative for speech change, focal weakness, seizures and headaches.   Endo/Heme/Allergies: Does not bruise/bleed easily.   Psychiatric/Behavioral: Positive for substance abuse. Negative for hallucinations and suicidal ideas. The patient is not nervous/anxious.         Vital Signs for last 24 hours   Temp:  [37.3 °C (99.1 °F)] 37.3 °C (99.1 °F)  Pulse:  [] 84  Resp:  [13-33] 20  BP: ()/(56-82) 124/78  SpO2:  [89 %-99 %] 90 %    Hemodynamic parameters for last 24 hours       Respiratory Information for the last 24 hours       Physical Exam   Physical Exam  Constitutional:       General: He is not in  acute distress.     Appearance: He is not toxic-appearing or diaphoretic.   HENT:      Head: Normocephalic.      Right Ear: External ear normal.      Left Ear: External ear normal.      Nose: Congestion present.      Mouth/Throat:      Mouth: Mucous membranes are moist.      Pharynx: Oropharynx is clear.   Eyes:      General:         Right eye: No discharge.         Left eye: No discharge.      Extraocular Movements: Extraocular movements intact.      Pupils: Pupils are equal, round, and reactive to light.   Neck:      Musculoskeletal: Normal range of motion and neck supple. No muscular tenderness.   Cardiovascular:      Pulses: Normal pulses.      Heart sounds: No murmur. No gallop.       Comments: Sinus rhythm  Pulmonary:      Effort: Pulmonary effort is normal. No respiratory distress.      Breath sounds: No stridor. Rales (Few coarse crackles) present. No wheezing.   Abdominal:      General: Abdomen is flat. Bowel sounds are normal. There is no distension.      Palpations: Abdomen is soft.      Tenderness: There is no tenderness. There is no rebound.   Musculoskeletal:         General: No swelling.      Right lower leg: No edema.      Left lower leg: No edema.      Comments: No clubbing or cyanosis   Skin:     General: Skin is warm and dry.      Capillary Refill: Capillary refill takes less than 2 seconds.   Neurological:      General: No focal deficit present.      Cranial Nerves: No cranial nerve deficit.      Comments: No focal weakness         Medications  Current Facility-Administered Medications   Medication Dose Route Frequency Provider Last Rate Last Dose   • folic acid (FOLVITE) tablet 1 mg  1 mg Oral DAILY Jose Brizuela M.D.   1 mg at 11/15/19 0612    And   • multivitamin (THERAGRAN) tablet 1 Tab  1 Tab Oral DAILY Jose Brizuela M.D.   1 Tab at 11/15/19 0612   • magnesium sulfate IVPB premix 4 g  4 g Intravenous Once RY WoodOSydni 25 mL/hr at 11/15/19 0738 4 g at 11/15/19 0738   •  guaiFENesin ER (MUCINEX) tablet 600 mg  600 mg Oral BID PRN Irtgayathri Bartlett, D.O.       • octreotide (SANDOSTATIN) 1,250 mcg in  mL Infusion  50 mcg/hr Intravenous Continuous Irtqa Ilsherrys, D.O. 10 mL/hr at 11/15/19 1012 50 mcg/hr at 11/15/19 1012   • pantoprazole (PROTONIX) 80 mg in  mL Infusion  8 mg/hr Intravenous Continuous Jose Brizuela M.D.       • [START ON 11/16/2019] omeprazole (PRILOSEC) capsule 20 mg  20 mg Oral BID Jose Brizuela M.D.       • senna-docusate (PERICOLACE or SENOKOT S) 8.6-50 MG per tablet 2 Tab  2 Tab Oral BID Jose Brizuela M.D.        And   • polyethylene glycol/lytes (MIRALAX) PACKET 1 Packet  1 Packet Oral QDAY PRN Jose Brizuela M.D.        And   • magnesium hydroxide (MILK OF MAGNESIA) suspension 30 mL  30 mL Oral QDAY PRN Jose Brizuela M.D.        And   • bisacodyl (DULCOLAX) suppository 10 mg  10 mg Rectal QDAY PRN Jose Brizuela M.D.       • [START ON 11/16/2019] thiamine tablet 100 mg  100 mg Oral DAILY Jose Brizuela M.D.       • [START ON 11/16/2019] sulfamethoxazole-trimethoprim (BACTRIM DS) 800-160 MG tablet 1 Tab  1 Tab Oral Q12HRS Jose Brizuela M.D.       • ondansetron (ZOFRAN) syringe/vial injection 4 mg  4 mg Intravenous Q4HRS PRN Jose Brizuela M.D.   4 mg at 11/14/19 2052   • LORazepam (ATIVAN) injection 4 mg  4 mg Intravenous Q15 MIN PRN Mahi Fox M.D.        Or   • LORazepam (ATIVAN) injection 3 mg  3 mg Intravenous Q15 MIN PRN Mahi Fox M.D.        Or   • LORazepam (ATIVAN) injection 2 mg  2 mg Intravenous Q15 MIN PRN Mahi Fox M.D.        Or   • LORazepam (ATIVAN) injection 1 mg  1 mg Intravenous Q15 MIN PRN Mahi Fox M.D.       • lactated ringers infusion   Intravenous Continuous Irtqalexia Bartlett D.O. 125 mL/hr at 11/15/19 0738     • Respiratory Care per Protocol   Nebulization Continuous RT Luz Maria Louise M.D.       • ipratropium-albuterol (DUONEB)  nebulizer solution  3 mL Nebulization Q2HRS PRN (RT) Luz Maria Louise M.D.           Fluids    Intake/Output Summary (Last 24 hours) at 11/15/2019 1058  Last data filed at 11/15/2019 1000  Gross per 24 hour   Intake 4161.81 ml   Output 1480 ml   Net 2681.81 ml       Laboratory  Recent Labs     11/13/19  1215 11/14/19  0438   ISTATAPH 7.365* 7.524*   ISTATAPCO2 29.2 25.7*   ISTATAPO2 159* 62*   ISTATATCO2 18* 22   [id number] 99 94   ISTATARTHCO3 16.7* 21.2   ISTATARTBE -8* -1   ISTATTEMP 97.7 F 99.7 F   ISTATFIO2 40 30   ISTATSPEC Arterial Arterial   ISTATAPHTC 7.373* 7.515*   [id number] 156* 65         Recent Labs     11/13/19  0532 11/14/19  0409 11/15/19  0205   SODIUM 142 142 138   POTASSIUM 3.4* 3.8 3.8   CHLORIDE 109 113* 109   CO2 22 24 23   BUN 20 15 7*   CREATININE 0.61 0.60 0.45*   MAGNESIUM 1.7 1.9 1.6   PHOSPHORUS 3.2 1.0* 3.0   CALCIUM 8.0* 7.6* 7.5*     Recent Labs     11/13/19  0532 11/14/19  0409 11/15/19  0205   ALTSGPT 45 34 40   ASTSGOT 68* 66* 76*   ALKPHOSPHAT 57 43 49   TBILIRUBIN 0.6 1.3 1.3   LIPASE 77  --   --    GLUCOSE 119* 99 108*     Recent Labs     11/13/19  0532 11/13/19  0551  11/14/19  0409 11/14/19  1025 11/14/19  1747 11/15/19  0205   WBC 4.7* 4.7*   < > 2.0* 1.8* 1.6* 1.9*   NEUTSPOLYS 56.70 57.50  --  44.80  --   --  61.60   LYMPHOCYTES 32.80 32.10  --  34.30  --   --  21.10*   MONOCYTES 7.80 8.00  --  17.40*  --   --  11.90   EOSINOPHILS 1.50 1.10  --  3.00  --   --  3.20   BASOPHILS 0.80 1.10  --  0.50  --   --  1.10   ASTSGOT 68*  --   --  66*  --   --  76*   ALTSGPT 45  --   --  34  --   --  40   ALKPHOSPHAT 57  --   --  43  --   --  49   TBILIRUBIN 0.6  --   --  1.3  --   --  1.3    < > = values in this interval not displayed.     Recent Labs     11/13/19  0532  11/14/19  1025 11/14/19  1747 11/15/19  0205   RBC 3.57*   < > 2.66* 2.56* 2.61*   HEMOGLOBIN 11.7*   < > 8.4* 8.2* 8.4*   HEMATOCRIT 35.3*   < > 25.4* 24.6* 24.8*   PLATELETCT 35*   < > 36* 30* 26*   PROTHROMBTM  16.9*  --   --   --   --    APTT 30.5  --   --   --   --    INR 1.33*  --   --   --   --     < > = values in this interval not displayed.       Imaging  None    Assessment/Plan  * Bleeding esophageal varices (HCC)- (present on admission)  Assessment & Plan  S/P emergent EGD with banding of esophageal varices on 11/13  Continue octreotide and PPI drips for 1 more day and then stop octreotide and change PPI to enteral preparation twice daily  Trend hemoglobin  Transfuse PRBCs to keep hemoglobin greater than 7    Acute respiratory failure with hypoxia (HCC)- (present on admission)  Assessment & Plan  Intubated on 11/13-11/14  Resolving  Continue oxygen    Acute blood loss anemia  Assessment & Plan  Gastrointestinal source of blood loss  Hemoglobin has been stable    Alcohol abuse- (present on admission)  Assessment & Plan  Continue supplemental vitamins  Start CIWA protocol  No evidence of withdrawal at this time  Cessation counseling    Cirrhosis of liver (HCC)- (present on admission)  Assessment & Plan  History of hepatitis C and alcoholic liver disease  Avoid hepatotoxins and alcohol cessation counseling    Hepatitis C- (present on admission)  Assessment & Plan  Viral load pending    Leukopenia  Assessment & Plan  Suspect due to bone marrow suppression from alcohol and poor nutritional status  Follow    Hypomagnesemia  Assessment & Plan  Replete magnesium    Hypokalemia- (present on admission)  Assessment & Plan  Replete potassium    Thrombocytopenia (HCC)- (present on admission)  Assessment & Plan  Trend platelet count  Transfuse if platelets less than 20,000       VTE:  Contraindicated  Ulcer: PPI  Lines: Central Line  Ongoing indication addressed and Jimenez Catheter  Ongoing indication addressed    I have performed a physical exam and reviewed and updated ROS and Plan today (11/15/2019). In review of yesterday's note (11/14/2019), there are no changes except as documented above.     OK to transfer out of ICU.   [facility] will sign off.  Please call if you have any questions.    Discussed patient condition and risk of morbidity and/or mortality with RN, RT, Pharmacy, [facility] resident, Charge nurse / hot rounds and QA team     Jose Brizuela MD  Pulmonary and Critical Care Medicine

## 2019-11-15 NOTE — PROGRESS NOTES
Rohith from Lab called with critical result of plt 26 at 0228. Critical lab result read back to Rohith.   Dr. Fox notified of critical lab result at 0230.  Critical lab result read back by Dr. Rea0.

## 2019-11-15 NOTE — PROGRESS NOTES
[facility] ICU Transfer Note                                                                                         ICU Admit Date: 11/13/19     ICU Discharge Date: 11/16/19     Primary Diagnosis:   Hemorrhagic Shock   Bleeding esophageal varices      Secondary diagnosis:  Acute respiratory failure   Acute blood loss anemia   Leukopenia   Thormbocytopenia  Cirrhosis   Hepatitis C, untreated   Electrolyte abnormalities   Alcohol dependence     ICU Course Summary (Brief Narrative):  Ms. Shabazz is a 59 year old male  with past medical history of cirrhosis, history of esophageal varices, alcohol dependence, pancytopenia and untreated hepatitis C who presented to the hospital with large volume hematemesis. Patient was intubated for airway protection. Patient was initially hypotensive with hemoglobin of 6.5 and was resuscitated with IV fluids, 3u pRBCs and 2u platelets. Patient was treated with Octreotide drip, Pantoprazole drip and Ceftriaxone. Gastroenterology was urgently consulted and had an EGD on 11/13/19 with Dr. Smith. He was found to have Grade II esophageal varices and underwent banding x 5. Patient continued to improve and there was no evidence of further bleeding. Patient was extubated on 11/14/19 which he tolerated well. Patient was continued on Pantoprazole and Octreotide drips until 11/16/19 and then transitioned to oral PPI. Patient will remain on Bactrim to complete a seven day therapy. Patient was monitored and did not exhibit any signs of alcohol withdrawal     Important Events in the ICU:   Intubated 11/13-11/14/19  EGD with banding 11/13/19  Blood transfusion 3u pRBCs  Platelet transfusion 2u      Labs and imaging studies to be continued with their indications:  - Follow CBC  - Electrolytes      Follow-up:   - Discharge planning, PT/OT  - Outpatient follow up with GI   - Hepatitis C

## 2019-11-16 ENCOUNTER — APPOINTMENT (OUTPATIENT)
Dept: RADIOLOGY | Facility: MEDICAL CENTER | Age: 59
DRG: 432 | End: 2019-11-16
Attending: STUDENT IN AN ORGANIZED HEALTH CARE EDUCATION/TRAINING PROGRAM
Payer: MEDICARE

## 2019-11-16 LAB
ALBUMIN SERPL BCP-MCNC: 2.9 G/DL (ref 3.2–4.9)
ALBUMIN/GLOB SERPL: 1.2 G/DL
ALP SERPL-CCNC: 52 U/L (ref 30–99)
ALT SERPL-CCNC: 40 U/L (ref 2–50)
ANION GAP SERPL CALC-SCNC: 6 MMOL/L (ref 0–11.9)
AST SERPL-CCNC: 66 U/L (ref 12–45)
BASOPHILS # BLD AUTO: 0.6 % (ref 0–1.8)
BASOPHILS # BLD: 0.01 K/UL (ref 0–0.12)
BILIRUB SERPL-MCNC: 1.1 MG/DL (ref 0.1–1.5)
BUN SERPL-MCNC: 3 MG/DL (ref 8–22)
CALCIUM SERPL-MCNC: 7.8 MG/DL (ref 8.5–10.5)
CHLORIDE SERPL-SCNC: 110 MMOL/L (ref 96–112)
CO2 SERPL-SCNC: 24 MMOL/L (ref 20–33)
CREAT SERPL-MCNC: 0.51 MG/DL (ref 0.5–1.4)
EOSINOPHIL # BLD AUTO: 0.07 K/UL (ref 0–0.51)
EOSINOPHIL NFR BLD: 4.5 % (ref 0–6.9)
ERYTHROCYTE [DISTWIDTH] IN BLOOD BY AUTOMATED COUNT: 49.8 FL (ref 35.9–50)
GLOBULIN SER CALC-MCNC: 2.4 G/DL (ref 1.9–3.5)
GLUCOSE SERPL-MCNC: 144 MG/DL (ref 65–99)
GRAM STN SPEC: NORMAL
HCT VFR BLD AUTO: 25.2 % (ref 42–52)
HCV RNA SERPL NAA+PROBE-ACNC: ABNORMAL K[IU]/ML
HCV RNA SERPL NAA+PROBE-LOG IU: 6.08 LOG IU/ML
HCV RNA SERPL QL NAA+PROBE: DETECTED
HGB BLD-MCNC: 8.4 G/DL (ref 14–18)
IMM GRANULOCYTES # BLD AUTO: 0.01 K/UL (ref 0–0.11)
IMM GRANULOCYTES NFR BLD AUTO: 0.6 % (ref 0–0.9)
LYMPHOCYTES # BLD AUTO: 0.37 K/UL (ref 1–4.8)
LYMPHOCYTES NFR BLD: 23.9 % (ref 22–41)
MAGNESIUM SERPL-MCNC: 1.7 MG/DL (ref 1.5–2.5)
MCH RBC QN AUTO: 31.9 PG (ref 27–33)
MCHC RBC AUTO-ENTMCNC: 33.3 G/DL (ref 33.7–35.3)
MCV RBC AUTO: 95.8 FL (ref 81.4–97.8)
MONOCYTES # BLD AUTO: 0.2 K/UL (ref 0–0.85)
MONOCYTES NFR BLD AUTO: 12.9 % (ref 0–13.4)
NEUTROPHILS # BLD AUTO: 0.89 K/UL (ref 1.82–7.42)
NEUTROPHILS NFR BLD: 57.5 % (ref 44–72)
NRBC # BLD AUTO: 0 K/UL
NRBC BLD-RTO: 0 /100 WBC
PHOSPHATE SERPL-MCNC: 2.9 MG/DL (ref 2.5–4.5)
PLATELET # BLD AUTO: 23 K/UL (ref 164–446)
PMV BLD AUTO: 12.6 FL (ref 9–12.9)
POTASSIUM SERPL-SCNC: 3.5 MMOL/L (ref 3.6–5.5)
PROT SERPL-MCNC: 5.3 G/DL (ref 6–8.2)
RBC # BLD AUTO: 2.63 M/UL (ref 4.7–6.1)
SIGNIFICANT IND 70042: NORMAL
SITE SITE: NORMAL
SODIUM SERPL-SCNC: 140 MMOL/L (ref 135–145)
SOURCE SOURCE: NORMAL
WBC # BLD AUTO: 1.6 K/UL (ref 4.8–10.8)

## 2019-11-16 PROCEDURE — 84100 ASSAY OF PHOSPHORUS: CPT

## 2019-11-16 PROCEDURE — 700102 HCHG RX REV CODE 250 W/ 637 OVERRIDE(OP): Performed by: INTERNAL MEDICINE

## 2019-11-16 PROCEDURE — 85025 COMPLETE CBC W/AUTO DIFF WBC: CPT

## 2019-11-16 PROCEDURE — 700101 HCHG RX REV CODE 250: Performed by: INTERNAL MEDICINE

## 2019-11-16 PROCEDURE — 700102 HCHG RX REV CODE 250 W/ 637 OVERRIDE(OP): Performed by: STUDENT IN AN ORGANIZED HEALTH CARE EDUCATION/TRAINING PROGRAM

## 2019-11-16 PROCEDURE — 71045 X-RAY EXAM CHEST 1 VIEW: CPT

## 2019-11-16 PROCEDURE — A9270 NON-COVERED ITEM OR SERVICE: HCPCS | Performed by: INTERNAL MEDICINE

## 2019-11-16 PROCEDURE — 700111 HCHG RX REV CODE 636 W/ 250 OVERRIDE (IP): Performed by: STUDENT IN AN ORGANIZED HEALTH CARE EDUCATION/TRAINING PROGRAM

## 2019-11-16 PROCEDURE — 770020 HCHG ROOM/CARE - TELE (206)

## 2019-11-16 PROCEDURE — 80053 COMPREHEN METABOLIC PANEL: CPT

## 2019-11-16 PROCEDURE — 87205 SMEAR GRAM STAIN: CPT

## 2019-11-16 PROCEDURE — 94640 AIRWAY INHALATION TREATMENT: CPT

## 2019-11-16 PROCEDURE — A9270 NON-COVERED ITEM OR SERVICE: HCPCS | Performed by: STUDENT IN AN ORGANIZED HEALTH CARE EDUCATION/TRAINING PROGRAM

## 2019-11-16 PROCEDURE — 99233 SBSQ HOSP IP/OBS HIGH 50: CPT | Performed by: INTERNAL MEDICINE

## 2019-11-16 PROCEDURE — 83735 ASSAY OF MAGNESIUM: CPT

## 2019-11-16 RX ORDER — POTASSIUM CHLORIDE 20 MEQ/1
40 TABLET, EXTENDED RELEASE ORAL DAILY
Status: DISCONTINUED | OUTPATIENT
Start: 2019-11-16 | End: 2019-11-17

## 2019-11-16 RX ORDER — CHOLECALCIFEROL (VITAMIN D3) 125 MCG
1000 CAPSULE ORAL DAILY
Status: DISCONTINUED | OUTPATIENT
Start: 2019-11-16 | End: 2019-11-18 | Stop reason: HOSPADM

## 2019-11-16 RX ORDER — TRAZODONE HYDROCHLORIDE 50 MG/1
50 TABLET ORAL NIGHTLY
Status: DISCONTINUED | OUTPATIENT
Start: 2019-11-16 | End: 2019-11-18 | Stop reason: HOSPADM

## 2019-11-16 RX ORDER — QUETIAPINE FUMARATE 200 MG/1
200 TABLET, FILM COATED ORAL
Status: DISCONTINUED | OUTPATIENT
Start: 2019-11-16 | End: 2019-11-18 | Stop reason: HOSPADM

## 2019-11-16 RX ORDER — PROPRANOLOL HYDROCHLORIDE 20 MG/1
20 TABLET ORAL 2 TIMES DAILY
Status: DISCONTINUED | OUTPATIENT
Start: 2019-11-16 | End: 2019-11-18 | Stop reason: HOSPADM

## 2019-11-16 RX ORDER — FERROUS SULFATE 325(65) MG
650 TABLET ORAL DAILY
Status: DISCONTINUED | OUTPATIENT
Start: 2019-11-16 | End: 2019-11-16

## 2019-11-16 RX ORDER — ACETAMINOPHEN 500 MG
1000 TABLET ORAL EVERY 8 HOURS PRN
Status: DISCONTINUED | OUTPATIENT
Start: 2019-11-16 | End: 2019-11-18 | Stop reason: HOSPADM

## 2019-11-16 RX ORDER — LANOLIN ALCOHOL/MO/W.PET/CERES
2000 CREAM (GRAM) TOPICAL DAILY
Status: DISCONTINUED | OUTPATIENT
Start: 2019-11-16 | End: 2019-11-16

## 2019-11-16 RX ORDER — MAGNESIUM SULFATE HEPTAHYDRATE 40 MG/ML
2 INJECTION, SOLUTION INTRAVENOUS ONCE
Status: COMPLETED | OUTPATIENT
Start: 2019-11-16 | End: 2019-11-16

## 2019-11-16 RX ADMIN — IPRATROPIUM BROMIDE AND ALBUTEROL SULFATE 3 ML: .5; 3 SOLUTION RESPIRATORY (INHALATION) at 04:42

## 2019-11-16 RX ADMIN — FOLIC ACID 1 MG: 1 TABLET ORAL at 05:22

## 2019-11-16 RX ADMIN — PROPRANOLOL HYDROCHLORIDE 20 MG: 20 TABLET ORAL at 07:36

## 2019-11-16 RX ADMIN — SULFAMETHOXAZOLE AND TRIMETHOPRIM 1 TABLET: 800; 160 TABLET ORAL at 05:22

## 2019-11-16 RX ADMIN — CYANOCOBALAMIN TAB 500 MCG 1000 MCG: 500 TAB at 07:31

## 2019-11-16 RX ADMIN — OMEPRAZOLE 20 MG: 20 CAPSULE, DELAYED RELEASE ORAL at 17:23

## 2019-11-16 RX ADMIN — QUETIAPINE FUMARATE 200 MG: 200 TABLET ORAL at 20:26

## 2019-11-16 RX ADMIN — POTASSIUM CHLORIDE 40 MEQ: 1500 TABLET, EXTENDED RELEASE ORAL at 07:31

## 2019-11-16 RX ADMIN — GUAIFENESIN 600 MG: 600 TABLET, EXTENDED RELEASE ORAL at 02:29

## 2019-11-16 RX ADMIN — SULFAMETHOXAZOLE AND TRIMETHOPRIM 1 TABLET: 800; 160 TABLET ORAL at 17:23

## 2019-11-16 RX ADMIN — OMEPRAZOLE 20 MG: 20 CAPSULE, DELAYED RELEASE ORAL at 05:22

## 2019-11-16 RX ADMIN — MAGNESIUM SULFATE 2 G: 2 INJECTION INTRAVENOUS at 07:28

## 2019-11-16 RX ADMIN — THERA TABS 1 TABLET: TAB at 05:22

## 2019-11-16 RX ADMIN — Medication 100 MG: at 05:22

## 2019-11-16 RX ADMIN — TRAZODONE HYDROCHLORIDE 50 MG: 50 TABLET ORAL at 20:25

## 2019-11-16 RX ADMIN — ACETAMINOPHEN 1000 MG: 500 TABLET ORAL at 02:29

## 2019-11-16 ASSESSMENT — LIFESTYLE VARIABLES
SUBSTANCE_ABUSE: 1
AGITATION: NORMAL ACTIVITY
TREMOR: TREMOR NOT VISIBLE BUT CAN BE FELT, FINGERTIP TO FINGERTIP
AGITATION: SOMEWHAT MORE THAN NORMAL ACTIVITY
TOTAL SCORE: 1
ORIENTATION AND CLOUDING OF SENSORIUM: ORIENTED AND CAN DO SERIAL ADDITIONS
VISUAL DISTURBANCES: NOT PRESENT
VISUAL DISTURBANCES: NOT PRESENT
TOTAL SCORE: 2
NAUSEA AND VOMITING: NO NAUSEA AND NO VOMITING
PAROXYSMAL SWEATS: NO SWEAT VISIBLE
NAUSEA AND VOMITING: NO NAUSEA AND NO VOMITING
ANXIETY: NO ANXIETY (AT EASE)
ANXIETY: NO ANXIETY (AT EASE)
PAROXYSMAL SWEATS: NO SWEAT VISIBLE
TREMOR: TREMOR NOT VISIBLE BUT CAN BE FELT, FINGERTIP TO FINGERTIP
AUDITORY DISTURBANCES: NOT PRESENT
ORIENTATION AND CLOUDING OF SENSORIUM: ORIENTED AND CAN DO SERIAL ADDITIONS
HEADACHE, FULLNESS IN HEAD: NOT PRESENT
HEADACHE, FULLNESS IN HEAD: NOT PRESENT
AUDITORY DISTURBANCES: NOT PRESENT

## 2019-11-16 ASSESSMENT — ENCOUNTER SYMPTOMS
SINUS PAIN: 0
BLOOD IN STOOL: 0
FOCAL WEAKNESS: 0
TREMORS: 0
ABDOMINAL PAIN: 0
EYE PAIN: 0
DIARRHEA: 0
SPUTUM PRODUCTION: 0
PND: 0
WHEEZING: 0
MYALGIAS: 0
HALLUCINATIONS: 0
COUGH: 1
BRUISES/BLEEDS EASILY: 0
VOMITING: 0
NECK PAIN: 0
NERVOUS/ANXIOUS: 0
LOSS OF CONSCIOUSNESS: 0
PALPITATIONS: 0
CONSTIPATION: 0
HEADACHES: 0
SPUTUM PRODUCTION: 1
CHILLS: 0
EYE REDNESS: 0
EYE DISCHARGE: 0
SEIZURES: 0
SENSORY CHANGE: 0
FEVER: 0
DIAPHORESIS: 0
SHORTNESS OF BREATH: 1
SPEECH CHANGE: 0
TINGLING: 0
CLAUDICATION: 0
NAUSEA: 0
HEMOPTYSIS: 0

## 2019-11-16 NOTE — PROGRESS NOTES
[facility] ICU Progress Note      Admit Date: 11/13/2019      Resident(s): Sandra Bartlett  Attending: JOHN SAMS/ Dr. Brizuela     Date & Time:   11/16/2019   6:25 AM       Patient ID:    Name:             Inocencio Shabazz   YOB: 1960  Age:                 59 y.o.  male   MRN:               [id number]    HPI:  Patient presented with bleeding esophageal varices with hemodynamic instability.      Consultants:  ICU:   Gastroenterology, signed off     Interval Events:  No acute overnight events. He received trazodone and tylenol last night for headache. However, night nurse noted that patient was requiring increased oxygen requirements. Patient reports that he was having cough with clear sputum and thin secretions. RT gave patient breathing treatments and patient felt better after. He did not require any Ativan. Patient denies any abdominal pain, heart burn, nausea or vomiting.     Chest x-ray shows atelectasis at the bases of the lungs. Hemoglobin and platelets remain stable. Electrolytes repleted. Transitioned to oral bactrim. Advanced to soft diet.     Review of Systems   Constitutional: Negative for chills and fever.   Respiratory: Positive for cough and shortness of breath (at night, but resolved after breathing treatment). Negative for sputum production and wheezing.    Cardiovascular: Negative for chest pain, palpitations and leg swelling.   Gastrointestinal: Negative for abdominal pain, blood in stool, constipation, diarrhea, melena, nausea and vomiting.   Genitourinary: Negative for dysuria, frequency and urgency.   Neurological: Negative for tingling, tremors, sensory change and speech change.       PHYSICAL EXAM    Vitals:    11/16/19 0200 11/16/19 0300 11/16/19 0400 11/16/19 0443   BP: 138/81 144/108 117/77    Pulse: 80 78 97 79   Resp: (!) 21 20 15 14   Temp:  37.5 °C (99.5 °F)     TempSrc:  Temporal     SpO2: 93% 92% 93% 94%   Weight:  65.1 kg (143 lb 8.3 oz)     Height:         Body mass index  "is 22.48 kg/m².  /77   Pulse 79   Temp 37.5 °C (99.5 °F) (Temporal)   Resp 14   Ht 1.702 m (5' 7\")   Wt 65.1 kg (143 lb 8.3 oz)   SpO2 94%   BMI 22.48 kg/m²   O2 therapy: Pulse Oximetry: 94 %, O2 (LPM): 4, O2 Delivery: Silicone Nasal Cannula    Physical Exam  Constitutional: Laying in bed in no acute distress. Interactive.   HENT:   Head: Normocephalic and atraumatic.   Mouth/Throat: Moist mucous membranes EOM are normal. No scleral icterus.  Neck: No JVD present. No tracheal deviation present. Right central line in place without surrounding erythema or hematoma.   Cardiovascular: Regular rhythm and intact distal pulses. No murmur.   Pulmonary/Chest: + nasal cannula in place, however saturating well on room air. Clear to auscultation bilaterally   Abdominal: Soft. Bowel sounds are normal. He exhibits no distension. There is no tenderness. There is no rebound.   Musculoskeletal:   No lower extremity edema.   Skin: warm   Neurological: Alert, following commands, moves all 4 extremities   Respiratory:     Respiration: 14, Pulse Oximetry: 94 %, O2 Daily Delivery Respiratory : Silicone Nasal Cannula    Chest Tube Drains:    Recent Labs     11/13/19  1215 11/14/19  0438   ISTATAPH 7.365* 7.524*   ISTATAPCO2 29.2 25.7*   ISTATAPO2 159* 62*   ISTATATCO2 18* 22   [id number] 99 94   ISTATARTHCO3 16.7* 21.2   ISTATARTBE -8* -1   ISTATTEMP 97.7 F 99.7 F   ISTATFIO2 40 30   ISTATSPEC Arterial Arterial   ISTATAPHTC 7.373* 7.515*   FANGYLOZ4PM 156* 65       HemoDynamics:  Pulse: 79 Blood Pressure: 117/77      Neuro:  See above     Fluids:       Intake/Output Summary (Last 24 hours) at 11/14/2019 0634  Last data filed at 11/14/2019 0600  Gross per 24 hour   Intake 4797.4 ml   Output 2175 ml   Net 2622.4 ml       Weight: 65.1 kg (143 lb 8.3 oz)  Body mass index is 22.48 kg/m².    Recent Labs     11/14/19  0409 11/15/19  0205 11/16/19  0324   SODIUM 142 138 140   POTASSIUM 3.8 3.8 3.5*   CHLORIDE 113* 109 110   CO2 24 " 23 24   BUN 15 7* 3*   CREATININE 0.60 0.45* 0.51   MAGNESIUM 1.9 1.6 1.7   PHOSPHORUS 1.0* 3.0 2.9   CALCIUM 7.6* 7.5* 7.8*       GI/Nutrition:  Recent Labs     11/14/19  0409 11/15/19  0205 11/16/19  0324   ALTSGPT 34 40 40   ASTSGOT 66* 76* 66*   ALKPHOSPHAT 43 49 52   TBILIRUBIN 1.3 1.3 1.1   GLUCOSE 99 108* 144*       Heme:  Recent Labs     11/15/19  0205 11/15/19  1439 11/16/19  0324   RBC 2.61* 2.64* 2.63*   HEMOGLOBIN 8.4* 8.4* 8.4*   HEMATOCRIT 24.8* 25.3* 25.2*   PLATELETCT 26* 24* 23*       Infectious Disease:  Monitored Temp 2  Av.3 °C (99.1 °F)  Min: 37.3 °C (99.1 °F)  Max: 37.3 °C (99.1 °F)  Temp  Av.3 °C (99.1 °F)  Min: 37.1 °C (98.8 °F)  Max: 37.5 °C (99.5 °F)  Recent Labs     11/14/19  0409  11/15/19  0205 11/15/19  1439 11/16/19  0324   WBC 2.0*   < > 1.9* 1.8* 1.6*   NEUTSPOLYS 44.80  --  61.60  --  57.50   LYMPHOCYTES 34.30  --  21.10*  --  23.90   MONOCYTES 17.40*  --  11.90  --  12.90   EOSINOPHILS 3.00  --  3.20  --  4.50   BASOPHILS 0.50  --  1.10  --  0.60   ASTSGOT 66*  --  76*  --  66*   ALTSGPT 34  --  40  --  40   ALKPHOSPHAT 43  --  49  --  52   TBILIRUBIN 1.3  --  1.3  --  1.1    < > = values in this interval not displayed.       Meds:  • acetaminophen  1,000 mg     • folic acid  1 mg      And   • multivitamin  1 Tab     • guaiFENesin ER  600 mg     • omeprazole  20 mg     • senna-docusate  2 Tab      And   • polyethylene glycol/lytes  1 Packet      And   • magnesium hydroxide  30 mL      And   • bisacodyl  10 mg     • thiamine  100 mg     • sulfamethoxazole-trimethoprim  1 Tab     • ondansetron  4 mg     • LORazepam  4 mg      Or   • LORazepam  3 mg      Or   • LORazepam  2 mg      Or   • LORazepam  1 mg     • Respiratory Care per Protocol       • ipratropium-albuterol  3 mL          Procedures:  Intubated -  Propofol/Fentanyl drips  EGD   Blood / Platelet transfusion     Imaging:  DX-CHEST-PORTABLE (1 VIEW)   Final Result      Slightly increased interstitial  opacity in each lower lobe. Possible bibasilar interstitial edema or pneumonia. No lobar consolidation.      DX-CHEST-PORTABLE (1 VIEW)   Final Result         1.  No acute cardiopulmonary disease.      US-RUQ   Final Result         1. Cirrhosis and portal hypertension. No hepatic mass lesions.   2. Contracted gallbladder with thickened wall, likely related to underlying cirrhosis.   3. Small volume ascites.      DX-CHEST-LIMITED (1 VIEW)   Final Result      Right internal jugular line tip overlies the SVC. Endotracheal tube terminates above the manuel.   No pneumothorax.      DX-CHEST-PORTABLE (1 VIEW)   Final Result      1.  Satisfactory ET tube placement.   2.  No acute abnormality.      DX-CHEST-PORTABLE (1 VIEW)   Final Result         1.  No acute cardiopulmonary disease.          Problem and Plan:    * Bleeding esophageal varices (HCC)- (present on admission)  Assessment & Plan  Patient presented with known esophageal varix presented with hematemesis, hemodynamically unstable. EGD 11/13/19 showed Grade II esophageal varices, banded x 5. Received 3 units of pRBCs and 2u platelets.  - Stop Octreotide and pantoprazole drips   - Omeprazole BID   - Gastroenterology signed off   - Bactrim to complete a seven day course  - H/H stable  - Advance diet   - Restarted propranolol   - PT/OT    Acute respiratory failure with hypoxia (HCC)- (present on admission)  Assessment & Plan  Patient was intubated for airway protection in setting of variceal bleed. Likely secondary to atlectasis.   - Ventilator 11/13-14  - RT on board   - supplemental oxygen as needed   - Incentive spirometry   - Mucinex for cough  - DuoNebs as needed  - Mobilization     Acute blood loss anemia  Assessment & Plan  - Continue to monitor H/H    Alcohol abuse- (present on admission)  Assessment & Plan  Patient with heavy alcohol use. Intoxicated on presentation.   - Was on Propofol  And Fentanyl for sedation   - Discontinue CIWA protocol, has remained  stable   - Alcohol cessation counseling provided    Cirrhosis of liver (HCC)- (present on admission)  Assessment & Plan  Patient with varices, coagulopathy and pancytopenia reflective of cirrhosis. Likely multifactorial to alcohol use and hepatitis c.   - Currently stable   - RUQ ultrasound shows cirrhosis and small ascites     Hepatitis C- (present on admission)  Assessment & Plan  History of untreated hepatitis C. Likely contributing to cirrhosis.   - Viral load grater than a million  - Outpatient follow up     Leukopenia  Assessment & Plan  Secondary to bone marrow suppression   - Currently stable, no signs of infection    Hypomagnesemia  Assessment & Plan  Replete as needed     Hypokalemia- (present on admission)  Assessment & Plan  - Replete as needed    Thrombocytopenia (HCC)- (present on admission)  Assessment & Plan  Secondary to bone marrow suppression from cirrhosis   - continue to monitor; no current active bleeding  - Consider transfusion if platelets are < 20      DISPO: Transfer to Telemetry   CODE STATUS: Full Code    Quality Measures:  Jimenez Catheter:  None  DVT Prophylaxis:   SCDs  Ulcer Prophylaxis:   Omeprazole   Antibiotics:  Bactrim   Lines:  PIV

## 2019-11-16 NOTE — PROGRESS NOTES
Dr. Fox paged about pt's increase in O2 demands from 1L to 4L NC. Pt has had a continuous productive cough during the night. Coughing up clear thin secretions. Breathing treatment had minimal effect. Orders received and implemented.

## 2019-11-16 NOTE — PROGRESS NOTES
Dr. Brizuela updated that pt's sputum culture was not usable and canceled by lab; no need to reorder lab per Dr. Brizuela.

## 2019-11-16 NOTE — PROGRESS NOTES
Pt to T716-2 via wheelchair with chart, medications and two bags of belongings. No acute distress upon transfer; pt put on tele monitor by Margaret QURESHI.

## 2019-11-16 NOTE — PROGRESS NOTES
Critical Care Progress Note    Date of admission  11/13/2019    Chief Complaint  59 y.o. male admitted 11/13/2019 with an acute gastrointestinal hemorrhage.    Hospital Course    This gentleman was admitted to the ICU with respiratory failure and an acute gastrointestinal hemorrhage.      Interval Problem Update  Reviewed last 24 hour events:      He was short of breath last night, but improved with a breathing treatment.  He has been a smoker most of his life.  He is never been diagnosed with asthma or COPD.  He is coughing up some whitish sputum.  He denies hemoptysis.  He has no abdominal pain, nausea or vomiting.  He has no angina, palpitations or syncope.      Review of Systems  Review of Systems   Constitutional: Negative for diaphoresis and fever.   HENT: Negative for hearing loss, sinus pain and tinnitus.    Eyes: Negative for pain, discharge and redness.   Respiratory: Positive for cough and sputum production. Negative for hemoptysis.    Cardiovascular: Negative for chest pain, claudication and PND.   Gastrointestinal: Positive for melena. Negative for abdominal pain, nausea and vomiting.   Genitourinary: Negative for dysuria, hematuria and urgency.   Musculoskeletal: Negative for joint pain, myalgias and neck pain.   Skin: Negative for itching.   Neurological: Negative for sensory change, speech change, focal weakness, seizures, loss of consciousness and headaches.   Endo/Heme/Allergies: Does not bruise/bleed easily.   Psychiatric/Behavioral: Positive for substance abuse. Negative for hallucinations and suicidal ideas. The patient is not nervous/anxious.         Vital Signs for last 24 hours   Temp:  [37 °C (98.6 °F)-37.5 °C (99.5 °F)] 37 °C (98.6 °F)  Pulse:  [62-98] 62  Resp:  [14-30] 22  BP: (114-145)/() 122/74  SpO2:  [88 %-100 %] 96 %    Hemodynamic parameters for last 24 hours       Respiratory Information for the last 24 hours       Physical Exam   Physical Exam  Constitutional:        Appearance: He is not diaphoretic.   HENT:      Head: Normocephalic.      Right Ear: External ear normal.      Left Ear: External ear normal.      Nose: No rhinorrhea.      Mouth/Throat:      Mouth: Mucous membranes are moist.      Pharynx: No oropharyngeal exudate or posterior oropharyngeal erythema.   Eyes:      Extraocular Movements: Extraocular movements intact.      Conjunctiva/sclera: Conjunctivae normal.      Pupils: Pupils are equal, round, and reactive to light.   Neck:      Musculoskeletal: Normal range of motion and neck supple. No neck rigidity.   Cardiovascular:      Rate and Rhythm: Regular rhythm.      Pulses: Normal pulses.      Heart sounds: No murmur. No friction rub. No gallop.    Pulmonary:      Effort: Pulmonary effort is normal. No respiratory distress.      Breath sounds: Rales (There are a few coarse crackles at the bases) present. No wheezing.   Abdominal:      General: Abdomen is flat. Bowel sounds are normal. There is no distension.      Palpations: Abdomen is soft.      Tenderness: There is no tenderness. There is no guarding.   Musculoskeletal:         General: No tenderness.      Right lower leg: No edema.      Left lower leg: No edema.      Comments: No clubbing or cyanosis   Skin:     General: Skin is warm and dry.      Capillary Refill: Capillary refill takes less than 2 seconds.      Coloration: Skin is not jaundiced.      Findings: No rash.   Neurological:      General: No focal deficit present.      Mental Status: He is alert.      Cranial Nerves: No cranial nerve deficit.      Comments: No focal weakness   Psychiatric:         Behavior: Behavior normal.         Medications  Current Facility-Administered Medications   Medication Dose Route Frequency Provider Last Rate Last Dose   • acetaminophen (TYLENOL) tablet 1,000 mg  1,000 mg Oral Q8HRS PRN Mahi Fox M.D.   1,000 mg at 11/16/19 0229   • propranolol (INDERAL) tablet 20 mg  20 mg Oral BID Sandra Bartlett D.O.   20 mg at  11/16/19 0736   • QUEtiapine (SEROQUEL) tablet 200 mg  200 mg Oral QHS Irtgayathri Bartlett D.O.       • traZODone (DESYREL) tablet 50 mg  50 mg Oral Nightly Irtqalexia Bartlett, D.O.       • cyanocobalamin (VITAMIN B-12) tablet 1,000 mcg  1,000 mcg Oral DAILY Irtqalexia Bartlett, D.O.   1,000 mcg at 11/16/19 0731   • potassium chloride SA (Kdur) tablet 40 mEq  40 mEq Oral DAILY Irtqalexia Bartlett, D.O.   40 mEq at 11/16/19 0731   • folic acid (FOLVITE) tablet 1 mg  1 mg Oral DAILY Jose Brizuela M.D.   1 mg at 11/16/19 0522    And   • multivitamin (THERAGRAN) tablet 1 Tab  1 Tab Oral DAILY Jose Brizuela M.D.   1 Tab at 11/16/19 0522   • guaiFENesin ER (MUCINEX) tablet 600 mg  600 mg Oral BID PRN Irtgayathri Bartlett, D.O.   600 mg at 11/16/19 0229   • omeprazole (PRILOSEC) capsule 20 mg  20 mg Oral BID Jose Brizuela M.D.   20 mg at 11/16/19 0522   • senna-docusate (PERICOLACE or SENOKOT S) 8.6-50 MG per tablet 2 Tab  2 Tab Oral BID Jose Brizuela M.D.   Stopped at 11/15/19 1800    And   • polyethylene glycol/lytes (MIRALAX) PACKET 1 Packet  1 Packet Oral QDAY PRN Jose Brizuela M.D.        And   • magnesium hydroxide (MILK OF MAGNESIA) suspension 30 mL  30 mL Oral QDAY PRN Jose Brizuela M.D.        And   • bisacodyl (DULCOLAX) suppository 10 mg  10 mg Rectal QDAY PRN Jose Brizuela M.D.       • thiamine tablet 100 mg  100 mg Oral DAILY Jose Brizuela M.D.   100 mg at 11/16/19 0522   • sulfamethoxazole-trimethoprim (BACTRIM DS) 800-160 MG tablet 1 Tab  1 Tab Oral Q12HRS Jose Brizuela M.D.   1 Tab at 11/16/19 0522   • Respiratory Care per Protocol   Nebulization Continuous RT Luz Maria Louise M.D.       • ipratropium-albuterol (DUONEB) nebulizer solution  3 mL Nebulization Q2HRS PRN (RT) Luz Maria Louise M.D.   3 mL at 11/16/19 0442       Fluids    Intake/Output Summary (Last 24 hours) at 11/16/2019 1045  Last data filed at 11/16/2019 0800  Gross per 24 hour   Intake 2040.9 ml    Output 2650 ml   Net -609.1 ml       Laboratory  Recent Labs     11/13/19  1215 11/14/19  0438   ISTATAPH 7.365* 7.524*   ISTATAPCO2 29.2 25.7*   ISTATAPO2 159* 62*   ISTATATCO2 18* 22   [id number] 99 94   ISTATARTHCO3 16.7* 21.2   ISTATARTBE -8* -1   ISTATTEMP 97.7 F 99.7 F   ISTATFIO2 40 30   ISTATSPEC Arterial Arterial   ISTATAPHTC 7.373* 7.515*   [id number] 156* 65         Recent Labs     11/14/19  0409 11/15/19  0205 11/16/19  0324   SODIUM 142 138 140   POTASSIUM 3.8 3.8 3.5*   CHLORIDE 113* 109 110   CO2 24 23 24   BUN 15 7* 3*   CREATININE 0.60 0.45* 0.51   MAGNESIUM 1.9 1.6 1.7   PHOSPHORUS 1.0* 3.0 2.9   CALCIUM 7.6* 7.5* 7.8*     Recent Labs     11/14/19  0409 11/15/19  0205 11/16/19  0324   ALTSGPT 34 40 40   ASTSGOT 66* 76* 66*   ALKPHOSPHAT 43 49 52   TBILIRUBIN 1.3 1.3 1.1   GLUCOSE 99 108* 144*     Recent Labs     11/14/19  0409  11/15/19  0205 11/15/19  1439 11/16/19  0324   WBC 2.0*   < > 1.9* 1.8* 1.6*   NEUTSPOLYS 44.80  --  61.60  --  57.50   LYMPHOCYTES 34.30  --  21.10*  --  23.90   MONOCYTES 17.40*  --  11.90  --  12.90   EOSINOPHILS 3.00  --  3.20  --  4.50   BASOPHILS 0.50  --  1.10  --  0.60   ASTSGOT 66*  --  76*  --  66*   ALTSGPT 34  --  40  --  40   ALKPHOSPHAT 43  --  49  --  52   TBILIRUBIN 1.3  --  1.3  --  1.1    < > = values in this interval not displayed.     Recent Labs     11/15/19  0205 11/15/19  1439 11/16/19  0324   RBC 2.61* 2.64* 2.63*   HEMOGLOBIN 8.4* 8.4* 8.4*   HEMATOCRIT 24.8* 25.3* 25.2*   PLATELETCT 26* 24* 23*       Imaging  None    Assessment/Plan  * Bleeding esophageal varices (HCC)- (present on admission)  Assessment & Plan  S/P emergent EGD with banding of esophageal varices on 11/13  Stop octreotide and PPI drips  Omeprazole, 20 mg twice daily  Start Inderal, 20 mg twice daily  Bleeding has stopped    Acute respiratory failure with hypoxia (HCC)- (present on admission)  Assessment & Plan  Intubated on 11/13-11/14  Continue oxygen  Incentive spirometry and  deep breathing exercises  Increase activity    Acute blood loss anemia  Assessment & Plan  Gastrointestinal source of blood loss  His hemoglobin is stable and there is no evidence of any more bleeding    Alcohol abuse- (present on admission)  Assessment & Plan  No evidence of withdrawal  Cessation counseling    Cirrhosis of liver (HCC)- (present on admission)  Assessment & Plan  History of hepatitis C and alcoholic liver disease  Avoid hepatotoxins and alcohol cessation counseling    Hepatitis C- (present on admission)  Assessment & Plan  Viral load 1.1 million and change    Leukopenia  Assessment & Plan  Suspect due to bone marrow suppression from alcohol and poor nutritional status  Follow    Hypomagnesemia  Assessment & Plan  Replete magnesium    Hypokalemia- (present on admission)  Assessment & Plan  Replete potassium    Thrombocytopenia (HCC)- (present on admission)  Assessment & Plan  Trend platelet count  Transfuse if platelets less than 20,000       VTE:  Contraindicated  Ulcer: PPI  Lines: None    I have performed a physical exam and reviewed and updated ROS and Plan today (11/16/2019). In review of yesterday's note (11/15/2019), there are no changes except as documented above.     OK to transfer out of ICU.  [facility]  Please call if you have any questions.    Discussed patient condition and risk of morbidity and/or mortality with RN, RT, Pharmacy, [facility] resident, Charge nurse / hot rounds and QA team     Jose Brizuela MD  Pulmonary and Critical Care Medicine

## 2019-11-16 NOTE — CARE PLAN
Problem: Safety  Goal: Will remain free from injury  Outcome: PROGRESSING AS EXPECTED  Note:   Bed in low position with bed alarm on. Call light within reach. Lower bed rails in place. Treaded socks in use. Pt near nurses station.        Problem: Skin Integrity  Goal: Risk for impaired skin integrity will decrease  Outcome: PROGRESSING AS EXPECTED  Note:   Skin assessment complete. Appropriate barriers in place. Extra pillows in place for repositioning. Silicone NC in use.

## 2019-11-16 NOTE — PROGRESS NOTES
Lab called with critical result of WBC at 1.6 and Platelets at 23. Critical lab result read back.  This critical lab result is within parameters established by  for this patient

## 2019-11-16 NOTE — CARE PLAN
Problem: Respiratory:  Goal: Respiratory status will improve  Outcome: PROGRESSING AS EXPECTED  Note:   Pt O2 demand down to 1-2 L     Problem: Skin Integrity  Goal: Risk for impaired skin integrity will decrease  Outcome: PROGRESSING AS EXPECTED  Note:   Pt turns self form side to side

## 2019-11-17 LAB
ALBUMIN SERPL BCP-MCNC: 2.7 G/DL (ref 3.2–4.9)
ALBUMIN/GLOB SERPL: 1 G/DL
ALP SERPL-CCNC: 52 U/L (ref 30–99)
ALT SERPL-CCNC: 36 U/L (ref 2–50)
ANION GAP SERPL CALC-SCNC: 4 MMOL/L (ref 0–11.9)
AST SERPL-CCNC: 50 U/L (ref 12–45)
BASOPHILS # BLD AUTO: 0.6 % (ref 0–1.8)
BASOPHILS # BLD: 0.01 K/UL (ref 0–0.12)
BILIRUB SERPL-MCNC: 0.8 MG/DL (ref 0.1–1.5)
BUN SERPL-MCNC: 3 MG/DL (ref 8–22)
CALCIUM SERPL-MCNC: 8 MG/DL (ref 8.5–10.5)
CHLORIDE SERPL-SCNC: 110 MMOL/L (ref 96–112)
CO2 SERPL-SCNC: 25 MMOL/L (ref 20–33)
CREAT SERPL-MCNC: 0.64 MG/DL (ref 0.5–1.4)
EOSINOPHIL # BLD AUTO: 0.06 K/UL (ref 0–0.51)
EOSINOPHIL NFR BLD: 3.9 % (ref 0–6.9)
ERYTHROCYTE [DISTWIDTH] IN BLOOD BY AUTOMATED COUNT: 48.7 FL (ref 35.9–50)
GLOBULIN SER CALC-MCNC: 2.6 G/DL (ref 1.9–3.5)
GLUCOSE SERPL-MCNC: 95 MG/DL (ref 65–99)
HCT VFR BLD AUTO: 24.6 % (ref 42–52)
HGB BLD-MCNC: 8.2 G/DL (ref 14–18)
IMM GRANULOCYTES # BLD AUTO: 0.01 K/UL (ref 0–0.11)
IMM GRANULOCYTES NFR BLD AUTO: 0.6 % (ref 0–0.9)
LYMPHOCYTES # BLD AUTO: 0.47 K/UL (ref 1–4.8)
LYMPHOCYTES NFR BLD: 30.3 % (ref 22–41)
MAGNESIUM SERPL-MCNC: 1.7 MG/DL (ref 1.5–2.5)
MCH RBC QN AUTO: 31.9 PG (ref 27–33)
MCHC RBC AUTO-ENTMCNC: 33.3 G/DL (ref 33.7–35.3)
MCV RBC AUTO: 95.7 FL (ref 81.4–97.8)
MONOCYTES # BLD AUTO: 0.22 K/UL (ref 0–0.85)
MONOCYTES NFR BLD AUTO: 14.2 % (ref 0–13.4)
NEUTROPHILS # BLD AUTO: 0.78 K/UL (ref 1.82–7.42)
NEUTROPHILS NFR BLD: 50.4 % (ref 44–72)
NRBC # BLD AUTO: 0 K/UL
NRBC BLD-RTO: 0 /100 WBC
PHOSPHATE SERPL-MCNC: 3.4 MG/DL (ref 2.5–4.5)
PLATELET # BLD AUTO: 28 K/UL (ref 164–446)
PMV BLD AUTO: 12.6 FL (ref 9–12.9)
POTASSIUM SERPL-SCNC: 3.7 MMOL/L (ref 3.6–5.5)
PROT SERPL-MCNC: 5.3 G/DL (ref 6–8.2)
RBC # BLD AUTO: 2.57 M/UL (ref 4.7–6.1)
SODIUM SERPL-SCNC: 139 MMOL/L (ref 135–145)
WBC # BLD AUTO: 1.6 K/UL (ref 4.8–10.8)

## 2019-11-17 PROCEDURE — A9270 NON-COVERED ITEM OR SERVICE: HCPCS | Performed by: STUDENT IN AN ORGANIZED HEALTH CARE EDUCATION/TRAINING PROGRAM

## 2019-11-17 PROCEDURE — A9270 NON-COVERED ITEM OR SERVICE: HCPCS | Performed by: INTERNAL MEDICINE

## 2019-11-17 PROCEDURE — 36415 COLL VENOUS BLD VENIPUNCTURE: CPT

## 2019-11-17 PROCEDURE — 700111 HCHG RX REV CODE 636 W/ 250 OVERRIDE (IP): Performed by: STUDENT IN AN ORGANIZED HEALTH CARE EDUCATION/TRAINING PROGRAM

## 2019-11-17 PROCEDURE — 700102 HCHG RX REV CODE 250 W/ 637 OVERRIDE(OP): Performed by: INTERNAL MEDICINE

## 2019-11-17 PROCEDURE — 770020 HCHG ROOM/CARE - TELE (206)

## 2019-11-17 PROCEDURE — 84100 ASSAY OF PHOSPHORUS: CPT

## 2019-11-17 PROCEDURE — 85025 COMPLETE CBC W/AUTO DIFF WBC: CPT

## 2019-11-17 PROCEDURE — 700102 HCHG RX REV CODE 250 W/ 637 OVERRIDE(OP): Performed by: STUDENT IN AN ORGANIZED HEALTH CARE EDUCATION/TRAINING PROGRAM

## 2019-11-17 PROCEDURE — 99232 SBSQ HOSP IP/OBS MODERATE 35: CPT | Mod: GC | Performed by: INTERNAL MEDICINE

## 2019-11-17 PROCEDURE — 80053 COMPREHEN METABOLIC PANEL: CPT

## 2019-11-17 PROCEDURE — 83735 ASSAY OF MAGNESIUM: CPT

## 2019-11-17 RX ORDER — MAGNESIUM SULFATE HEPTAHYDRATE 40 MG/ML
2 INJECTION, SOLUTION INTRAVENOUS ONCE
Status: COMPLETED | OUTPATIENT
Start: 2019-11-17 | End: 2019-11-17

## 2019-11-17 RX ADMIN — QUETIAPINE FUMARATE 200 MG: 200 TABLET ORAL at 21:04

## 2019-11-17 RX ADMIN — THERA TABS 1 TABLET: TAB at 06:29

## 2019-11-17 RX ADMIN — CYANOCOBALAMIN TAB 500 MCG 1000 MCG: 500 TAB at 06:29

## 2019-11-17 RX ADMIN — Medication 100 MG: at 06:30

## 2019-11-17 RX ADMIN — MAGNESIUM SULFATE IN WATER 2 G: 40 INJECTION, SOLUTION INTRAVENOUS at 11:08

## 2019-11-17 RX ADMIN — FOLIC ACID 1 MG: 1 TABLET ORAL at 06:29

## 2019-11-17 RX ADMIN — POTASSIUM CHLORIDE 40 MEQ: 1500 TABLET, EXTENDED RELEASE ORAL at 06:29

## 2019-11-17 RX ADMIN — SULFAMETHOXAZOLE AND TRIMETHOPRIM 1 TABLET: 800; 160 TABLET ORAL at 06:29

## 2019-11-17 RX ADMIN — OMEPRAZOLE 20 MG: 20 CAPSULE, DELAYED RELEASE ORAL at 17:49

## 2019-11-17 RX ADMIN — TRAZODONE HYDROCHLORIDE 50 MG: 50 TABLET ORAL at 21:04

## 2019-11-17 RX ADMIN — OMEPRAZOLE 20 MG: 20 CAPSULE, DELAYED RELEASE ORAL at 06:29

## 2019-11-17 RX ADMIN — SULFAMETHOXAZOLE AND TRIMETHOPRIM 1 TABLET: 800; 160 TABLET ORAL at 17:49

## 2019-11-17 RX ADMIN — PROPRANOLOL HYDROCHLORIDE 20 MG: 20 TABLET ORAL at 06:29

## 2019-11-17 ASSESSMENT — ENCOUNTER SYMPTOMS
DOUBLE VISION: 0
SHORTNESS OF BREATH: 0
PALPITATIONS: 0
DIARRHEA: 0
FEVER: 0
BLOOD IN STOOL: 0
VOMITING: 0
BLURRED VISION: 0
NAUSEA: 0
CONSTIPATION: 0
MYALGIAS: 0
DIZZINESS: 0
CHILLS: 0
ABDOMINAL PAIN: 1
COUGH: 0
HEMOPTYSIS: 0
SORE THROAT: 0

## 2019-11-17 ASSESSMENT — LIFESTYLE VARIABLES
AUDITORY DISTURBANCES: NOT PRESENT
TOTAL SCORE: 3
ORIENTATION AND CLOUDING OF SENSORIUM: ORIENTED AND CAN DO SERIAL ADDITIONS
NAUSEA AND VOMITING: NO NAUSEA AND NO VOMITING
TREMOR: TREMOR NOT VISIBLE BUT CAN BE FELT, FINGERTIP TO FINGERTIP
AGITATION: SOMEWHAT MORE THAN NORMAL ACTIVITY
NAUSEA AND VOMITING: NO NAUSEA AND NO VOMITING
PAROXYSMAL SWEATS: BARELY PERCEPTIBLE SWEATING. PALMS MOIST
HEADACHE, FULLNESS IN HEAD: NOT PRESENT
VISUAL DISTURBANCES: NOT PRESENT
TOTAL SCORE: 3
PAROXYSMAL SWEATS: BARELY PERCEPTIBLE SWEATING. PALMS MOIST
ORIENTATION AND CLOUDING OF SENSORIUM: ORIENTED AND CAN DO SERIAL ADDITIONS
AGITATION: NORMAL ACTIVITY
VISUAL DISTURBANCES: NOT PRESENT
ANXIETY: MILDLY ANXIOUS
AUDITORY DISTURBANCES: NOT PRESENT
ANXIETY: NO ANXIETY (AT EASE)
HEADACHE, FULLNESS IN HEAD: NOT PRESENT
TREMOR: TREMOR NOT VISIBLE BUT CAN BE FELT, FINGERTIP TO FINGERTIP

## 2019-11-17 NOTE — PROGRESS NOTES
Received report from day RN Margaret, assumed care of patient. Patient is in bed resting comfortably. Advised patient to call if he needs to get up, bed locked and low position, bed alarm on.

## 2019-11-17 NOTE — PROGRESS NOTES
Notified by lab of critical lab results for WBC 1.6 same as yesterday, and platelets improved to 28. Expected results for patient condition, will continue to monitor.

## 2019-11-17 NOTE — PROGRESS NOTES
Bedside report received from TITUS Morejon. Assumed care of pt. Pt resting comfortably in bed, A&O X4. No signs of distress, no complaints of pain at this time. Tele box on. Call light and belongings within reach. Bed alarm on, bed locked and in lowest position.

## 2019-11-17 NOTE — PROGRESS NOTES
Internal Medicine Interval Note  Note Author: Gina Purdy D.O.     Name Inocencio Shabazz     1960   Age/Sex 59 y.o. male   MRN [id number]   Code Status Full Code      After 5PM or if no immediate response to page, please call for cross-coverage  Attending/Team: Dr. Roy/Penelope See Patient List for primary contact information  Call [phone] to page    1st Call - Day Intern (R1):   Dr. Purdy 2nd Call - Day Sr. Resident (R2/R3):   Dr. Benavides     Reason for interval visit  (Principal Problem)   Variceal Bleeding     Interval Problem Daily Status Update   - Overnight events: No acute events overnight - pending PT/OT evaluation   - Subjective: complaining of RUQ of abdominal pain;   - Vitals: Stable  - Pertinent physical: RUQ abdominal pain   - Labs/Cultures: See Below - WATCHING ANC (currently 780)  - Imaging: None  - Consults: GI - signed off   - Interventions: Extubated , EGD w/ banding   - Med changes: None - continue PO PPI, Bactrim  - Discharge plan: Monitor overnight - continue current plan, likely d/c tomorrow after PT/OT clearance      Review of Systems   Constitutional: Negative for chills and fever.   HENT: Negative for sore throat.    Eyes: Negative for blurred vision and double vision.   Respiratory: Negative for cough, hemoptysis and shortness of breath.    Cardiovascular: Negative for chest pain, palpitations and leg swelling.   Gastrointestinal: Positive for abdominal pain. Negative for blood in stool, constipation, diarrhea, melena, nausea and vomiting.   Genitourinary: Negative for dysuria and hematuria.   Musculoskeletal: Negative for myalgias.   Neurological: Negative for dizziness.   Psychiatric/Behavioral: Negative for suicidal ideas.     Lab Results   Component Value Date/Time    WBC 1.6 (LL) 2019 02:52 AM    RBC 2.57 (L) 2019 02:52 AM    HEMOGLOBIN 8.2 (L) 2019 02:52 AM    HEMATOCRIT 24.6 (L) 2019 02:52 AM    MCV 95.7 2019  02:52 AM    MCH 31.9 11/17/2019 02:52 AM    MCHC 33.3 (L) 11/17/2019 02:52 AM    MPV 12.6 11/17/2019 02:52 AM    NEUTSPOLYS 50.40 11/17/2019 02:52 AM    LYMPHOCYTES 30.30 11/17/2019 02:52 AM    MONOCYTES 14.20 (H) 11/17/2019 02:52 AM    EOSINOPHILS 3.90 11/17/2019 02:52 AM    BASOPHILS 0.60 11/17/2019 02:52 AM    HYPOCHROMIA 1+ 04/10/2016 04:40 PM    ANISOCYTOSIS 1+ 02/08/2017 03:33 AM      Lab Results   Component Value Date/Time    SODIUM 139 11/17/2019 02:52 AM    POTASSIUM 3.7 11/17/2019 02:52 AM    CHLORIDE 110 11/17/2019 02:52 AM    CO2 25 11/17/2019 02:52 AM    GLUCOSE 95 11/17/2019 02:52 AM    BUN 3 (L) 11/17/2019 02:52 AM    CREATININE 0.64 11/17/2019 02:52 AM      Recent Labs     11/14/19  1601 11/15/19  0205 11/16/19  0324 11/17/19  0252   ASTSGOT  --  76* 66* 50*   ALTSGPT  --  40 40 36   TBILIRUBIN  --  1.3 1.1 0.8   ALKPHOSPHAT  --  49 52 52   GLOBULIN  --  2.4 2.4 2.6   AMMONIA 47*  --   --   --        Disposition/Barriers to discharge:   PT/OT clearance     Consultants/Specialty  GI -  Dr. Smith/Dr. España  ICU - Dr. Louise  PCP: Nnamdi Ballesteros M.D.    Quality Measures  Quality-Core Measures   Reviewed items::  Labs reviewed and Medications reviewed  Jimenez catheter::  No Jimenez  DVT prophylaxis pharmacological::  Contraindicated - High bleeding risk  DVT prophylaxis - mechanical:  SCDs        Physical Exam       Vitals:    11/16/19 2330 11/17/19 0400 11/17/19 0628 11/17/19 0718   BP: 106/61 100/58 123/74 113/66   Pulse: 64 65  65   Resp: 17 17 18   Temp: 36.8 °C (98.3 °F) 37.9 °C (100.3 °F)  37.2 °C (99 °F)   TempSrc: Temporal Temporal  Temporal   SpO2: 98% 96%  91%   Weight:       Height:         Body mass index is 22.69 kg/m². Weight: 65.7 kg (144 lb 13.5 oz)  Oxygen Therapy:  Pulse Oximetry: 91 %, O2 (LPM): 0, O2 Delivery: None (Room Air)    Physical Exam   Constitutional: He is oriented to person, place, and time and well-developed, well-nourished, and in no distress.   HENT:   Head: Normocephalic  and atraumatic.   Eyes: Pupils are equal, round, and reactive to light. Conjunctivae and EOM are normal.   Neck: Normal range of motion. Neck supple. No JVD present.   Cardiovascular: Normal rate, regular rhythm, normal heart sounds and intact distal pulses.   Pulmonary/Chest: Effort normal and breath sounds normal. No respiratory distress. He has no wheezes. He has no rales. He exhibits no tenderness.   Abdominal: Soft. Bowel sounds are normal. He exhibits no distension and no mass. There is no rebound and no guarding.   Mild Diffuse abdominal pain   Musculoskeletal: Normal range of motion.         General: No edema.   Neurological: He is alert and oriented to person, place, and time. GCS score is 15.   Skin: Skin is warm and dry. He is not diaphoretic.   Psychiatric: Mood, memory, affect and judgment normal.       Assessment/Plan     * Bleeding esophageal varices (HCC)- (present on admission)  Assessment & Plan  Patient presented with known esophageal varix presented with hematemesis, hemodynamically unstable. EGD 11/13/19 showed Grade II esophageal varices, banded x 5. Received 3 units of pRBCs and 2u platelets.  - Omeprazole PO BID   - Gastroenterology signed off   - Bactrim to complete a seven day course  - H/H stable  - Advance diet   - Restarted propranolol   - PT/OT    Acute respiratory failure with hypoxia (HCC)- (present on admission)  Assessment & Plan  Patient was intubated for airway protection in setting of variceal bleed. Likely secondary to atlectasis.   - Ventilator 11/13-14  - RT on board   - supplemental oxygen as needed   - Incentive spirometry   - Mucinex for cough  - DuoNebs as needed  - Mobilization     Acute blood loss anemia  Assessment & Plan  - Continue to monitor H/H    Alcohol abuse- (present on admission)  Assessment & Plan  Patient with heavy alcohol use. Intoxicated on presentation.   - Was on Propofol  And Fentanyl for sedation, NO need for CIWA protocol thus far  - Alcohol cessation  counseling provided    Cirrhosis of liver (HCC)- (present on admission)  Assessment & Plan  Patient with varices, coagulopathy and pancytopenia reflective of cirrhosis. Likely multifactorial to alcohol use and hepatitis c.   - Currently stable   - RUQ ultrasound shows cirrhosis and small ascites     Hepatitis C- (present on admission)  Assessment & Plan  History of untreated hepatitis C. Likely contributing to cirrhosis.   - Viral load grater than a million  - Outpatient follow up     Leukopenia  Assessment & Plan  Secondary to bone marrow suppression   - Currently stable, no signs of infection  - Monitor ANC     Hypomagnesemia  Assessment & Plan  Replete as needed     Hypokalemia- (present on admission)  Assessment & Plan  - Replete as needed    Thrombocytopenia (HCC)- (present on admission)  Assessment & Plan  Secondary to bone marrow suppression from cirrhosis   - continue to monitor; no current active bleeding  - Consider transfusion if platelets are < 20 & further thrombocytopenia workup

## 2019-11-18 ENCOUNTER — PATIENT OUTREACH (OUTPATIENT)
Dept: HEALTH INFORMATION MANAGEMENT | Facility: OTHER | Age: 59
End: 2019-11-18

## 2019-11-18 VITALS
HEIGHT: 67 IN | TEMPERATURE: 98.1 F | WEIGHT: 144.18 LBS | HEART RATE: 68 BPM | SYSTOLIC BLOOD PRESSURE: 98 MMHG | DIASTOLIC BLOOD PRESSURE: 64 MMHG | RESPIRATION RATE: 18 BRPM | BODY MASS INDEX: 22.63 KG/M2 | OXYGEN SATURATION: 96 %

## 2019-11-18 PROBLEM — J96.01 ACUTE RESPIRATORY FAILURE WITH HYPOXIA (HCC): Status: RESOLVED | Noted: 2019-11-13 | Resolved: 2019-11-18

## 2019-11-18 PROBLEM — E83.42 HYPOMAGNESEMIA: Status: RESOLVED | Noted: 2019-11-13 | Resolved: 2019-11-18

## 2019-11-18 PROBLEM — E83.39 HYPOPHOSPHATEMIA: Status: RESOLVED | Noted: 2019-11-14 | Resolved: 2019-11-18

## 2019-11-18 LAB
ALBUMIN SERPL BCP-MCNC: 3.2 G/DL (ref 3.2–4.9)
ALBUMIN/GLOB SERPL: 1.1 G/DL
ALP SERPL-CCNC: 76 U/L (ref 30–99)
ALT SERPL-CCNC: 47 U/L (ref 2–50)
ANION GAP SERPL CALC-SCNC: 8 MMOL/L (ref 0–11.9)
AST SERPL-CCNC: 69 U/L (ref 12–45)
BASOPHILS # BLD AUTO: 0.7 % (ref 0–1.8)
BASOPHILS # BLD: 0.01 K/UL (ref 0–0.12)
BILIRUB SERPL-MCNC: 0.6 MG/DL (ref 0.1–1.5)
BUN SERPL-MCNC: 5 MG/DL (ref 8–22)
CALCIUM SERPL-MCNC: 8.1 MG/DL (ref 8.5–10.5)
CHLORIDE SERPL-SCNC: 109 MMOL/L (ref 96–112)
CO2 SERPL-SCNC: 23 MMOL/L (ref 20–33)
CREAT SERPL-MCNC: 0.68 MG/DL (ref 0.5–1.4)
EOSINOPHIL # BLD AUTO: 0.04 K/UL (ref 0–0.51)
EOSINOPHIL NFR BLD: 2.6 % (ref 0–6.9)
ERYTHROCYTE [DISTWIDTH] IN BLOOD BY AUTOMATED COUNT: 50.1 FL (ref 35.9–50)
GLOBULIN SER CALC-MCNC: 2.8 G/DL (ref 1.9–3.5)
GLUCOSE SERPL-MCNC: 92 MG/DL (ref 65–99)
HCT VFR BLD AUTO: 28.3 % (ref 42–52)
HGB BLD-MCNC: 9.2 G/DL (ref 14–18)
IMM GRANULOCYTES # BLD AUTO: 0 K/UL (ref 0–0.11)
IMM GRANULOCYTES NFR BLD AUTO: 0 % (ref 0–0.9)
LYMPHOCYTES # BLD AUTO: 0.38 K/UL (ref 1–4.8)
LYMPHOCYTES NFR BLD: 24.8 % (ref 22–41)
MAGNESIUM SERPL-MCNC: 1.7 MG/DL (ref 1.5–2.5)
MCH RBC QN AUTO: 32.2 PG (ref 27–33)
MCHC RBC AUTO-ENTMCNC: 32.5 G/DL (ref 33.7–35.3)
MCV RBC AUTO: 99 FL (ref 81.4–97.8)
MONOCYTES # BLD AUTO: 0.24 K/UL (ref 0–0.85)
MONOCYTES NFR BLD AUTO: 15.7 % (ref 0–13.4)
MORPHOLOGY BLD-IMP: NORMAL
NEUTROPHILS # BLD AUTO: 0.86 K/UL (ref 1.82–7.42)
NEUTROPHILS NFR BLD: 56.2 % (ref 44–72)
NRBC # BLD AUTO: 0 K/UL
NRBC BLD-RTO: 0 /100 WBC
PLATELET # BLD AUTO: 23 K/UL (ref 164–446)
PLATELETS.RETICULATED NFR BLD AUTO: 15.4 K/UL (ref 0.6–13.1)
PMV BLD AUTO: 13.5 FL (ref 9–12.9)
POTASSIUM SERPL-SCNC: 3.9 MMOL/L (ref 3.6–5.5)
PROT SERPL-MCNC: 6 G/DL (ref 6–8.2)
RBC # BLD AUTO: 2.86 M/UL (ref 4.7–6.1)
SODIUM SERPL-SCNC: 140 MMOL/L (ref 135–145)
WBC # BLD AUTO: 1.5 K/UL (ref 4.8–10.8)

## 2019-11-18 PROCEDURE — 700102 HCHG RX REV CODE 250 W/ 637 OVERRIDE(OP): Performed by: INTERNAL MEDICINE

## 2019-11-18 PROCEDURE — 97161 PT EVAL LOW COMPLEX 20 MIN: CPT

## 2019-11-18 PROCEDURE — A9270 NON-COVERED ITEM OR SERVICE: HCPCS | Performed by: STUDENT IN AN ORGANIZED HEALTH CARE EDUCATION/TRAINING PROGRAM

## 2019-11-18 PROCEDURE — 97535 SELF CARE MNGMENT TRAINING: CPT

## 2019-11-18 PROCEDURE — 700102 HCHG RX REV CODE 250 W/ 637 OVERRIDE(OP): Performed by: STUDENT IN AN ORGANIZED HEALTH CARE EDUCATION/TRAINING PROGRAM

## 2019-11-18 PROCEDURE — 36415 COLL VENOUS BLD VENIPUNCTURE: CPT

## 2019-11-18 PROCEDURE — 80053 COMPREHEN METABOLIC PANEL: CPT

## 2019-11-18 PROCEDURE — 99238 HOSP IP/OBS DSCHRG MGMT 30/<: CPT | Mod: GC | Performed by: INTERNAL MEDICINE

## 2019-11-18 PROCEDURE — 85055 RETICULATED PLATELET ASSAY: CPT

## 2019-11-18 PROCEDURE — 83735 ASSAY OF MAGNESIUM: CPT

## 2019-11-18 PROCEDURE — A9270 NON-COVERED ITEM OR SERVICE: HCPCS | Performed by: INTERNAL MEDICINE

## 2019-11-18 PROCEDURE — 85025 COMPLETE CBC W/AUTO DIFF WBC: CPT

## 2019-11-18 RX ORDER — OMEPRAZOLE 20 MG/1
20 CAPSULE, DELAYED RELEASE ORAL 2 TIMES DAILY
Qty: 30 CAP | Refills: 1 | Status: SHIPPED | OUTPATIENT
Start: 2019-11-18 | End: 2019-12-18

## 2019-11-18 RX ORDER — GUAIFENESIN 600 MG/1
600 TABLET, EXTENDED RELEASE ORAL 2 TIMES DAILY PRN
Qty: 14 TAB | Refills: 0 | Status: SHIPPED | OUTPATIENT
Start: 2019-11-18 | End: 2019-11-18

## 2019-11-18 RX ORDER — LANOLIN ALCOHOL/MO/W.PET/CERES
100 CREAM (GRAM) TOPICAL DAILY
Qty: 30 TAB | Refills: 1 | Status: SHIPPED | OUTPATIENT
Start: 2019-11-19 | End: 2019-11-18

## 2019-11-18 RX ORDER — OMEPRAZOLE 20 MG/1
20 CAPSULE, DELAYED RELEASE ORAL 2 TIMES DAILY
Qty: 30 CAP | Refills: 1 | Status: SHIPPED | OUTPATIENT
Start: 2019-11-18 | End: 2019-11-18

## 2019-11-18 RX ORDER — LANOLIN ALCOHOL/MO/W.PET/CERES
100 CREAM (GRAM) TOPICAL DAILY
Qty: 30 TAB | Refills: 1 | Status: SHIPPED | OUTPATIENT
Start: 2019-11-19 | End: 2019-12-18

## 2019-11-18 RX ORDER — GUAIFENESIN 600 MG/1
600 TABLET, EXTENDED RELEASE ORAL 2 TIMES DAILY PRN
Qty: 14 TAB | Refills: 0 | Status: SHIPPED | OUTPATIENT
Start: 2019-11-18 | End: 2019-11-25

## 2019-11-18 RX ORDER — NICOTINE 21 MG/24HR
14 PATCH, TRANSDERMAL 24 HOURS TRANSDERMAL
Status: DISCONTINUED | OUTPATIENT
Start: 2019-11-18 | End: 2019-11-18 | Stop reason: HOSPADM

## 2019-11-18 RX ORDER — SULFAMETHOXAZOLE AND TRIMETHOPRIM 800; 160 MG/1; MG/1
1 TABLET ORAL EVERY 12 HOURS
Qty: 8 TAB | Refills: 0 | Status: SHIPPED | OUTPATIENT
Start: 2019-11-18 | End: 2019-11-18

## 2019-11-18 RX ORDER — SULFAMETHOXAZOLE AND TRIMETHOPRIM 800; 160 MG/1; MG/1
1 TABLET ORAL EVERY 12 HOURS
Qty: 8 TAB | Refills: 0 | Status: SHIPPED | OUTPATIENT
Start: 2019-11-18 | End: 2019-11-22

## 2019-11-18 RX ADMIN — OMEPRAZOLE 20 MG: 20 CAPSULE, DELAYED RELEASE ORAL at 06:10

## 2019-11-18 RX ADMIN — SULFAMETHOXAZOLE AND TRIMETHOPRIM 1 TABLET: 800; 160 TABLET ORAL at 06:10

## 2019-11-18 RX ADMIN — Medication 100 MG: at 06:10

## 2019-11-18 RX ADMIN — CYANOCOBALAMIN TAB 500 MCG 1000 MCG: 500 TAB at 06:11

## 2019-11-18 RX ADMIN — NICOTINE 14 MG: 14 PATCH TRANSDERMAL at 06:10

## 2019-11-18 RX ADMIN — PROPRANOLOL HYDROCHLORIDE 20 MG: 20 TABLET ORAL at 06:12

## 2019-11-18 ASSESSMENT — ENCOUNTER SYMPTOMS
FEVER: 0
CONSTIPATION: 0
BLOOD IN STOOL: 0
PALPITATIONS: 0
SHORTNESS OF BREATH: 0
MYALGIAS: 0
DIARRHEA: 0
VOMITING: 0
DIZZINESS: 0
DOUBLE VISION: 0
CHILLS: 0
HEMOPTYSIS: 0
ABDOMINAL PAIN: 0
BLURRED VISION: 0
COUGH: 0
NAUSEA: 0
SORE THROAT: 0

## 2019-11-18 ASSESSMENT — LIFESTYLE VARIABLES
AGITATION: NORMAL ACTIVITY
VISUAL DISTURBANCES: NOT PRESENT
HEADACHE, FULLNESS IN HEAD: NOT PRESENT
TOTAL SCORE: 2
AUDITORY DISTURBANCES: NOT PRESENT
HEADACHE, FULLNESS IN HEAD: NOT PRESENT
TREMOR: *
AGITATION: SOMEWHAT MORE THAN NORMAL ACTIVITY
TOTAL SCORE: 3
VISUAL DISTURBANCES: NOT PRESENT
PAROXYSMAL SWEATS: NO SWEAT VISIBLE
ORIENTATION AND CLOUDING OF SENSORIUM: ORIENTED AND CAN DO SERIAL ADDITIONS
NAUSEA AND VOMITING: NO NAUSEA AND NO VOMITING
NAUSEA AND VOMITING: NO NAUSEA AND NO VOMITING
ANXIETY: NO ANXIETY (AT EASE)
PAROXYSMAL SWEATS: NO SWEAT VISIBLE
TREMOR: TREMOR NOT VISIBLE BUT CAN BE FELT, FINGERTIP TO FINGERTIP
AUDITORY DISTURBANCES: VERY MILD HARSHNESS OR ABILITY TO FRIGHTEN
ANXIETY: NO ANXIETY (AT EASE)
ORIENTATION AND CLOUDING OF SENSORIUM: ORIENTED AND CAN DO SERIAL ADDITIONS

## 2019-11-18 ASSESSMENT — GAIT ASSESSMENTS
GAIT LEVEL OF ASSIST: SUPERVISED
DISTANCE (FEET): 200

## 2019-11-18 ASSESSMENT — COGNITIVE AND FUNCTIONAL STATUS - GENERAL
MOBILITY SCORE: 24
DAILY ACTIVITIY SCORE: 22
SUGGESTED CMS G CODE MODIFIER DAILY ACTIVITY: CJ
SUGGESTED CMS G CODE MODIFIER MOBILITY: CH
DRESSING REGULAR LOWER BODY CLOTHING: A LITTLE
HELP NEEDED FOR BATHING: A LITTLE

## 2019-11-18 ASSESSMENT — ACTIVITIES OF DAILY LIVING (ADL): TOILETING: INDEPENDENT

## 2019-11-18 NOTE — CARE PLAN
Problem: Venous Thromboembolism (VTW)/Deep Vein Thrombosis (DVT) Prevention:  Goal: Patient will participate in Venous Thrombosis (VTE)/Deep Vein Thrombosis (DVT)Prevention Measures  Outcome: PROGRESSING AS EXPECTED     Problem: Knowledge Deficit  Goal: Knowledge of disease process/condition, treatment plan, diagnostic tests, and medications will improve  Outcome: PROGRESSING AS EXPECTED     Problem: Psychosocial Needs:  Goal: Level of anxiety will decrease  Outcome: PROGRESSING AS EXPECTED     Pt is eager for discharge. Meeting goals as expected.

## 2019-11-18 NOTE — THERAPY
"Pt is a 58 y/o male admitted for bleeding esophageal varices and shock with hx of ETOH. Pt presents to acute PT at what appears to be PLOF baseline. No gait, strength, or balance deficits observed during ambulation w/o AD or stair negotiation (1 flight with railing). Pt with no further acute PT needs at this time. Continue mobility with nursing staff.       Physical Therapy Evaluation completed.   Bed Mobility:   Up with OT upon PT arrival  Transfers: Mod I w/o AD to return to EOB  Gait: SPV   with No Equipment Needed     x200 ft, negotiated 1 flight of stairs with railing and SPV  Plan of Care: No further acute PT needs  Discharge Recommendations: Equipment: No Equipment Needed. Post-acute therapy Currently anticipate no further skilled therapy needs once patient is discharged from the inpatient setting.    See \"Rehab Therapy-Acute\" Patient Summary Report for complete documentation.     "

## 2019-11-18 NOTE — DISCHARGE INSTRUCTIONS
Discharge Instructions    Discharged to home by car with relative. Discharged via wheelchair, hospital escort: Refused.  Special equipment needed: Not Applicable    Be sure to schedule a follow-up appointment with your primary care doctor or any specialists as instructed.     Discharge Plan:   Smoking Cessation Offered: Patient Refused  Influenza Vaccine Indication: Not indicated: Previously immunized this influenza season and > 8 years of age    I understand that a diet low in cholesterol, fat, and sodium is recommended for good health. Unless I have been given specific instructions below for another diet, I accept this instruction as my diet prescription.   Other diet: none    Special Instructions: None    · Is patient discharged on Warfarin / Coumadin?   No       Gastrointestinal Bleeding  Introduction  Gastrointestinal bleeding is bleeding somewhere along the path food travels through the body (digestive tract). This path is anywhere between the mouth and the opening of the butt (anus). You may have blood in your poop (stools) or have black poop. If you throw up (vomit), there may be blood in it.  This condition can be mild, serious, or even life-threatening. If you have a lot of bleeding, you may need to stay in the hospital.  Follow these instructions at home:  · Take over-the-counter and prescription medicines only as told by your doctor.  · Eat foods that have a lot of fiber in them. These foods include whole grains, fruits, and vegetables. You can also try eating 1-3 prunes each day.  · Drink enough fluid to keep your pee (urine) clear or pale yellow.  · Keep all follow-up visits as told by your doctor. This is important.  Contact a doctor if:  · Your symptoms do not get better.  Get help right away if:  · Your bleeding gets worse.  · You feel dizzy or you pass out (faint).  · You feel weak.  · You have very bad cramps in your back or belly (abdomen).  · You pass large clumps of blood (clots) in your  poop.  · Your symptoms are getting worse.  This information is not intended to replace advice given to you by your health care provider. Make sure you discuss any questions you have with your health care provider.  Document Released: 09/26/2009 Document Revised: 05/25/2017 Document Reviewed: 06/06/2016  © 2017 [employer]      Cirrhosis  Cirrhosis is long-term (chronic) liver injury. The liver is your largest internal organ, and it performs many functions. The liver converts food into energy, removes toxic material from your blood, makes important proteins, and absorbs necessary vitamins from your diet.  If you have cirrhosis, it means many of your healthy liver cells have been replaced by scar tissue. This prevents blood from flowing through your liver, which makes it difficult for your liver to function. This scarring is not reversible, but treatment can prevent it from getting worse.  What are the causes?  Hepatitis C and long-term alcohol abuse are the most common causes of cirrhosis. Other causes include:  · Nonalcoholic fatty liver disease.  · Hepatitis B infection.  · Autoimmune hepatitis.  · Diseases that cause blockage of ducts inside the liver.  · Inherited liver diseases.  · Reactions to certain long-term medicines.  · Parasitic infections.  · Long-term exposure to certain toxins.  What increases the risk?  You may have a higher risk of cirrhosis if you:  · Have certain hepatitis viruses.  · Abuse alcohol, especially if you are female.  · Are overweight.  · Share needles.  · Have unprotected sex with someone who has hepatitis.  What are the signs or symptoms?  You may not have any signs and symptoms at first. Symptoms may not develop until the damage to your liver starts to get worse. Signs and symptoms of cirrhosis may include:  · Tenderness in the right-upper part of your abdomen.  · Weakness and tiredness (fatigue).  · Loss of appetite.  · Nausea.  · Weight loss and muscle loss.  · Itchiness.  · Yellow  skin and eyes (jaundice).  · Buildup of fluid in the abdomen (ascites).  · Swelling of the feet and ankles (edema).  · Appearance of tiny blood vessels under the skin.  · Mental confusion.  · Easy bruising and bleeding.  How is this diagnosed?  Your health care provider may suspect cirrhosis based on your symptoms and medical history, especially if you have other medical conditions or a history of alcohol abuse. Your health care provider will do a physical exam to feel your liver and check for signs of cirrhosis. Your health care provider may perform other tests, including:  · Blood tests to check:  ¨ Whether you have hepatitis B or C.  ¨ Kidney function.  ¨ Liver function.  · Imaging tests such as:  ¨ MRI or CT scan to look for changes seen in advanced cirrhosis.  ¨ Ultrasound to see if normal liver tissue is being replaced by scar tissue.  · A procedure using a long needle to take a sample of liver tissue (biopsy) for examination under a microscope. Liver biopsy can confirm the diagnosis of cirrhosis.  How is this treated?  Treatment depends on how damaged your liver is and what caused the damage. Treatment may include treating cirrhosis symptoms or treating the underlying causes of the condition to try to slow the progression of the damage. Treatment may include:  · Making lifestyle changes, such as:  ¨ Eating a healthy diet.  ¨ Restricting salt intake.  ¨ Maintaining a healthy weight.  ¨ Not abusing drugs or alcohol.  · Taking medicines to:  ¨ Treat liver infections or other infections.  ¨ Control itching.  ¨ Reduce fluid buildup.  ¨ Reduce certain blood toxins.  ¨ Reduce risk of bleeding from enlarged blood vessels in the stomach or esophagus (varices).  · If varices are causing bleeding problems, you may need treatment with a procedure that ties up the vessels causing them to fall off (band ligation).  · If cirrhosis is causing your liver to fail, your health care provider may recommend a liver  transplant.  · Other treatments may be recommended depending on any complications of cirrhosis, such as liver-related kidney failure (hepatorenal syndrome).  Follow these instructions at home:  · Take medicines only as directed by your health care provider. Do not use drugs that are toxic to your liver. Ask your health care provider before taking any new medicines, including over-the-counter medicines.  · Rest as needed.  · Eat a well-balanced diet. Ask your health care provider or dietitian for more information.  · You may have to follow a low-salt diet or restrict your water intake as directed.  · Do not drink alcohol. This is especially important if you are taking acetaminophen.  · Keep all follow-up visits as directed by your health care provider. This is important.  Contact a health care provider if:  · You have fatigue or weakness that is getting worse.  · You develop swelling of the hands, feet, legs, or face.  · You have a fever.  · You develop loss of appetite.  · You have nausea or vomiting.  · You develop jaundice.  · You develop easy bruising or bleeding.  Get help right away if:  · You vomit bright red blood or a material that looks like coffee grounds.  · You have blood in your stools.  · Your stools appear black and tarry.  · You become confused.  · You have chest pain or trouble breathing.  This information is not intended to replace advice given to you by your health care provider. Make sure you discuss any questions you have with your health care provider.  Document Released: 12/18/2006 Document Revised: 04/27/2017 Document Reviewed: 08/26/2015  [employer] Interactive Patient Education © 2017 [employer]      Depression / Suicide Risk    As you are discharged from this [facility] facility, it is important to learn how to keep safe from harming yourself.    Recognize the warning signs:  · Abrupt changes in personality, positive or negative- including increase in energy   · Giving away  possessions  · Change in eating patterns- significant weight changes-  positive or negative  · Change in sleeping patterns- unable to sleep or sleeping all the time   · Unwillingness or inability to communicate  · Depression  · Unusual sadness, discouragement and loneliness  · Talk of wanting to die  · Neglect of personal appearance   · Rebelliousness- reckless behavior  · Withdrawal from people/activities they love  · Confusion- inability to concentrate     If you or a loved one observes any of these behaviors or has concerns about self-harm, here's what you can do:  · Talk about it- your feelings and reasons for harming yourself  · Remove any means that you might use to hurt yourself (examples: pills, rope, extension cords, firearm)  · Get professional help from the community (Mental Health, Substance Abuse, psychological counseling)  · Do not be alone:Call your Safe Contact- someone whom you trust who will be there for you.  · Call your local CRISIS HOTLINE 573-0781 or 030-865-7653  · Call your local Children's Mobile Crisis Response Team [place] (262) 245-7165 or www.[employer]  · Call the toll free National Suicide Prevention Hotlines   · National Suicide Prevention Lifeline 116-567-HUJO (2206)  · National Hope Line Network 800-SUICIDE (973-1578)

## 2019-11-18 NOTE — DISCHARGE SUMMARY
Internal Medicine Discharge Summary  Note Author: Surekha Willis M.D.       Name Inocencio Shabazz     1960   Age/Sex 59 y.o. male   MRN [id number]         Admit Date:  2019       Discharge Date:   2019    Service:   [facility] Internal Medicine White Team  Attending Physician(s):     Senior Resident(s):     Phong Resident(s):     PCP: Nnamdi Ballesteros M.D.      Primary Diagnosis:   Esophageal varices    Secondary Diagnoses:                Principal Problem:    Bleeding esophageal varices (HCC) POA: Yes  Active Problems:    Hepatitis C POA: Yes    Cirrhosis of liver (HCC) POA: Yes    Alcohol abuse POA: Yes    Acute blood loss anemia POA: Unknown    Thrombocytopenia (HCC) POA: Yes    Hypokalemia POA: Yes    Leukopenia POA: Unknown  Resolved Problems:    Hemorrhagic shock (HCC) POA: Yes    Acute respiratory failure with hypoxia (HCC) POA: Yes    Hypomagnesemia POA: Unknown    Hypophosphatemia POA: Unknown      Hospital Summary (Brief Narrative):       Ms. Shabazz is a 59 year old male [place] [employer] with past medical history of cirrhosis, history of esophageal varices, alcohol dependence, pancytopenia and untreated hepatitis C who presented to the hospital with large volume hematemesis.  Patient was admitted to ICU.  Patient was intubated for airway protection. Patient was initially hypotensive with hemoglobin of 6.5 and was resuscitated with IV fluids, 3u pRBCs and 2u platelets. Patient was treated with Octreotide drip, Pantoprazole drip and Ceftriaxone. Gastroenterology was urgently consulted and had an EGD on 19 with Dr. Smith. He was found to have Grade II esophageal varices and underwent banding x 5. Patient continued to improve and there was no evidence of further bleeding. Patient was extubated on 19 which he tolerated well. Patient was continued on Pantoprazole and Octreotide drips until 19 and then transitioned to oral PPI. Patient will  remain on Bactrim to complete a seven day therapy. Patient was stablized and transferred to the floor. Patient was monitored and did not exhibit any signs of alcohol withdrawal. He did not have any further PT/OT needs.  Currently stable for discharge with close outpatient follow up with GI ( for hep C treatment and monitoring esophageal varices) and PCP( for monitoring pancytopenia).      Consultants:     Critical care  Gastroenterology    Procedures:        EGD and banding    Imaging/ Testing:      US-RUQ:    1. Cirrhosis and portal hypertension. No hepatic mass lesions.  2. Contracted gallbladder with thickened wall, likely related to underlying cirrhosis.  3. Small volume ascites.    CXR: Slightly increased interstitial opacity in each lower lobe. Possible bibasilar interstitial edema or pneumonia. No lobar consolidation.    Discharge Medications:         Medication Reconciliation: Completed       Medication List      START taking these medications      Instructions   guaiFENesin  MG Tb12  Commonly known as:  MUCINEX   Take 1 Tab by mouth 2 times a day as needed for Cough for up to 7 days.  Dose:  600 mg     omeprazole 20 MG delayed-release capsule  Commonly known as:  PRILOSEC   Take 1 Cap by mouth 2 Times a Day for 30 days.  Dose:  20 mg     sulfamethoxazole-trimethoprim 800-160 MG tablet  Commonly known as:  BACTRIM DS   Take 1 Tab by mouth every 12 hours for 4 days.  Dose:  1 Tab     thiamine 100 MG tablet  Start taking on:  November 19, 2019  Commonly known as:  THIAMINE   Take 1 Tab by mouth every day for 29 days.  Dose:  100 mg        CONTINUE taking these medications      Instructions   ferrous sulfate 325 (65 Fe) MG tablet   Take 650 mg by mouth every day.  Dose:  650 mg     folic acid 1 MG Tabs  Commonly known as:  FOLVITE   Take 1 Tab by mouth every day.  Dose:  1 mg     MULTIVITAMIN ADULT PO   Take 1 tablet by mouth every day.  Dose:  1 tablet     potassium chloride SA 20 MEQ Tbcr  Commonly  known as:  Kdur   Take 1 Tab by mouth every day.  Dose:  20 mEq     propranolol 20 MG Tabs  Commonly known as:  INDERAL   Take 1 Tab by mouth 2 times a day.  Dose:  20 mg     QUEtiapine 200 MG Tabs  Commonly known as:  SEROQUEL   Take 200 mg by mouth every bedtime.  Dose:  200 mg     traZODone 50 MG Tabs  Commonly known as:  DESYREL   Take 50 mg by mouth every evening.  Dose:  50 mg     vitamin B-12 1000 MCG Tabs   Take 2,000 mcg by mouth every day.  Dose:  2,000 mcg        STOP taking these medications    pantoprazole 40 MG Tbec  Commonly known as:  PROTONIX                  Disposition:   Home    Diet:   Regular    Activity:   As tolerated    Instructions:      Avoid alcohol intake to prevent worsening of underlying liver disease and esophageal varices  Please schedule appointment with PCP ( monitor pancytopenia) about a week and also f/u with GI.  The patient was instructed to return to the ER in the event of worsening symptoms. I have counseled the patient on the importance of compliance and the patient has agreed to proceed with all medical recommendations and follow up plan indicated above.   The patient understands that all medications come with benefits and risks. Risks may include permanent injury or death and these risks can be minimized with close reassessment and monitoring.        Primary Care Provider:      Discharge summary faxed to primary care provider:  Deferred  Copy of discharge summary given to the patient: Completed      Follow up appointment details :      No future appointments.  Nnamdi Ballesteros M.D.  [address] [place] [facility] [id number] [phone]      Please call to schedule a hospital follow up appointment with your PCP as soon as possible. Please get a referral to be seen by a gastroenterology specialist. Thank you.         Pending Studies:        None    Time spent on discharge day patient visit, preparing discharge paperwork and arranging for patient follow up.    Summary of follow up  "issues:   Please follow up with GI and PCP      Discharge Time (Minutes) :    30 mins  Hospital Course Type:  Inpatient Stay >2 midnights      Condition on Discharge  Stable  ______________________________________________________________________    Interval history/exam for day of discharge:    Overnight no acute events, Vitals have been stable.   - Denies any nausea,vomiting, Diarrhea. Denies any bleeding episodes or any abdominal pain today.  -Patient is able to walk around comfortably, no further PT requirements as well. otherwise medically stable for discharge.  - Labs: WBC is about the same at 1.5, absolute neutrophil count improved from 0.7 to 0.86 today.Otherwise stable.    /77   Pulse 63   Temp 36.4 °C (97.6 °F) (Temporal)   Resp 18   Ht 1.702 m (5' 7\")   Wt 65.4 kg (144 lb 2.9 oz)   SpO2 97%   BMI 22.58 kg/m²      Physical Exam   Constitutional: He is oriented to person, place, and time and well-developed, well-nourished, and in no distress.   HENT:   Head: Normocephalic and atraumatic.   Eyes: Pupils are equal, round, and reactive to light. Conjunctivae and EOM are normal.   Neck: Normal range of motion. Neck supple. No JVD present.   Cardiovascular: Normal rate, regular rhythm, normal heart sounds and intact distal pulses.   Pulmonary/Chest: Effort normal and breath sounds normal. No respiratory distress. He has no wheezes. He has no rales. He exhibits no tenderness.   Abdominal: Soft. Bowel sounds are normal. He exhibits no distension and no mass. There is no tenderness. There is no rebound and no guarding.   Musculoskeletal: Normal range of motion.         General: No edema.   Neurological: He is alert and oriented to person, place, and time. GCS score is 15.   Skin: Skin is warm and dry. He is not diaphoretic.   Psychiatric: Mood, memory, affect and judgment normal.     Most Recent Labs:    Lab Results   Component Value Date/Time    WBC 1.5 (LL) 11/18/2019 02:59 AM    RBC 2.86 (L) " 11/18/2019 02:59 AM    HEMOGLOBIN 9.2 (L) 11/18/2019 02:59 AM    HEMATOCRIT 28.3 (L) 11/18/2019 02:59 AM    MCV 99.0 (H) 11/18/2019 02:59 AM    MCH 32.2 11/18/2019 02:59 AM    MCHC 32.5 (L) 11/18/2019 02:59 AM    MPV 13.5 (H) 11/18/2019 02:59 AM    NEUTSPOLYS 56.20 11/18/2019 02:59 AM    LYMPHOCYTES 24.80 11/18/2019 02:59 AM    MONOCYTES 15.70 (H) 11/18/2019 02:59 AM    EOSINOPHILS 2.60 11/18/2019 02:59 AM    BASOPHILS 0.70 11/18/2019 02:59 AM    HYPOCHROMIA 1+ 04/10/2016 04:40 PM    ANISOCYTOSIS 1+ 02/08/2017 03:33 AM      Lab Results   Component Value Date/Time    SODIUM 140 11/18/2019 02:59 AM    POTASSIUM 3.9 11/18/2019 02:59 AM    CHLORIDE 109 11/18/2019 02:59 AM    CO2 23 11/18/2019 02:59 AM    GLUCOSE 92 11/18/2019 02:59 AM    BUN 5 (L) 11/18/2019 02:59 AM    CREATININE 0.68 11/18/2019 02:59 AM      Lab Results   Component Value Date/Time    ALTSGPT 47 11/18/2019 02:59 AM    ASTSGOT 69 (H) 11/18/2019 02:59 AM    ALKPHOSPHAT 76 11/18/2019 02:59 AM    TBILIRUBIN 0.6 11/18/2019 02:59 AM    LIPASE 77 11/13/2019 05:32 AM    ALBUMIN 3.2 11/18/2019 02:59 AM    GLOBULIN 2.8 11/18/2019 02:59 AM    INR 1.33 (H) 11/13/2019 05:32 AM    MACROCYTOSIS 1+ 02/08/2017 03:33 AM     Lab Results   Component Value Date/Time    PROTHROMBTM 16.9 (H) 11/13/2019 05:32 AM    INR 1.33 (H) 11/13/2019 05:32 AM

## 2019-11-18 NOTE — PROGRESS NOTES
Internal Medicine Interval Note  Note Author: Surekha Willis M.D.     Name Inocencio Shabazz     1960   Age/Sex 59 y.o. male   MRN [id number]   Code Status Full Code      After 5PM or if no immediate response to page, please call for cross-coverage  Attending/Team: Dr. Roy/Penelope See Patient List for primary contact information  Call [phone] to page    1st Call - Day Intern (R1):   Dr. Willis 2nd Call - Day Sr. Resident (R2/R3):   Dr. Benavides     Reason for interval visit  (Principal Problem)   Variceal Bleeding s/p banding on .    Interval Problem Daily Status Update   -Overnight no acute events, Vitals have been stable.   - Denies any nausea,vomiting, Diarrhea. Denies any bleeding episodes or any abdominal pain today.  -Patient is able to walk around comfortably, no further PT requirements as well. otherwise medically stable for discharge.  - Labs: WBC is about the same at 1.5, absolute neutrophil count improved from 0.7 to 0.86 today.Otherwise stable.       Review of Systems   Constitutional: Negative for chills and fever.   HENT: Negative for sore throat.    Eyes: Negative for blurred vision and double vision.   Respiratory: Negative for cough, hemoptysis and shortness of breath.    Cardiovascular: Negative for chest pain, palpitations and leg swelling.   Gastrointestinal: Negative for abdominal pain, blood in stool, constipation, diarrhea, melena, nausea and vomiting.   Genitourinary: Negative for dysuria and hematuria.   Musculoskeletal: Negative for myalgias.   Neurological: Negative for dizziness.   Psychiatric/Behavioral: Negative for suicidal ideas.     Lab Results   Component Value Date/Time    WBC 1.5 (LL) 2019 02:59 AM    RBC 2.86 (L) 2019 02:59 AM    HEMOGLOBIN 9.2 (L) 2019 02:59 AM    HEMATOCRIT 28.3 (L) 2019 02:59 AM    MCV 99.0 (H) 2019 02:59 AM    MCH 32.2 2019 02:59 AM    MCHC 32.5 (L) 2019 02:59 AM    MPV 13.5 (H)  11/18/2019 02:59 AM    NEUTSPOLYS 56.20 11/18/2019 02:59 AM    LYMPHOCYTES 24.80 11/18/2019 02:59 AM    MONOCYTES 15.70 (H) 11/18/2019 02:59 AM    EOSINOPHILS 2.60 11/18/2019 02:59 AM    BASOPHILS 0.70 11/18/2019 02:59 AM    HYPOCHROMIA 1+ 04/10/2016 04:40 PM    ANISOCYTOSIS 1+ 02/08/2017 03:33 AM      Lab Results   Component Value Date/Time    SODIUM 140 11/18/2019 02:59 AM    POTASSIUM 3.9 11/18/2019 02:59 AM    CHLORIDE 109 11/18/2019 02:59 AM    CO2 23 11/18/2019 02:59 AM    GLUCOSE 92 11/18/2019 02:59 AM    BUN 5 (L) 11/18/2019 02:59 AM    CREATININE 0.68 11/18/2019 02:59 AM      Recent Labs     11/16/19  0324 11/17/19  0252 11/18/19  0259   ASTSGOT 66* 50* 69*   ALTSGPT 40 36 47   TBILIRUBIN 1.1 0.8 0.6   ALKPHOSPHAT 52 52 76   GLOBULIN 2.4 2.6 2.8       Disposition/Barriers to discharge:   Potential discharge today    Consultants/Specialty  GI -  Dr. Smith/Dr. España  ICU - Dr. Louise  PCP: Nnamdi Ballesteros M.D.    Quality Measures  Quality-Core Measures   Reviewed items::  Labs reviewed and Medications reviewed  Jimenez catheter::  No Jimenez  DVT prophylaxis pharmacological::  Contraindicated - High bleeding risk  DVT prophylaxis - mechanical:  SCDs        Physical Exam       Vitals:    11/18/19 0015 11/18/19 0345 11/18/19 0612 11/18/19 0802   BP: 112/68 113/74  134/77   Pulse: 68 76 66 63   Resp: 17 17  18   Temp: 36.9 °C (98.5 °F) 36.9 °C (98.4 °F)  36.4 °C (97.6 °F)   TempSrc: Temporal Temporal  Temporal   SpO2: 96% 95%  97%   Weight:       Height:         Body mass index is 22.58 kg/m². Weight: 65.4 kg (144 lb 2.9 oz)  Oxygen Therapy:  Pulse Oximetry: 97 %, O2 (LPM): 0, O2 Delivery: None (Room Air)    Physical Exam   Constitutional: He is oriented to person, place, and time and well-developed, well-nourished, and in no distress.   HENT:   Head: Normocephalic and atraumatic.   Eyes: Pupils are equal, round, and reactive to light. Conjunctivae and EOM are normal.   Neck: Normal range of motion. Neck supple. No  JVD present.   Cardiovascular: Normal rate, regular rhythm, normal heart sounds and intact distal pulses.   Pulmonary/Chest: Effort normal and breath sounds normal. No respiratory distress. He has no wheezes. He has no rales. He exhibits no tenderness.   Abdominal: Soft. Bowel sounds are normal. He exhibits no distension and no mass. There is no tenderness. There is no rebound and no guarding.   Musculoskeletal: Normal range of motion.         General: No edema.   Neurological: He is alert and oriented to person, place, and time. GCS score is 15.   Skin: Skin is warm and dry. He is not diaphoretic.   Psychiatric: Mood, memory, affect and judgment normal.       Assessment/Plan     * Bleeding esophageal varices (HCC)- (present on admission)  Assessment & Plan  Patient presented with known esophageal varix presented with hematemesis, hemodynamically unstable. EGD 11/13/19 showed Grade II esophageal varices, banded x 5. Received 3 units of pRBCs and 2u platelets.  - Omeprazole PO BID   - Gastroenterology signed off   - Bactrim to complete a seven day course  - continue propranolol   - PT/OT- no further needs    Acute respiratory failure with hypoxia (HCC)- (present on admission)  Assessment & Plan  Patient was intubated initially for airway protection in setting of variceal bleed. Likely secondary to atlectasis.   - On Ventilator 11/13-11/14  - RT on board   - Incentive spirometry   - Mucinex for cough  - DuoNebs as needed  - Mobilization     Acute blood loss anemia  Assessment & Plan  - Continue to monitor H/H    Alcohol abuse- (present on admission)  Assessment & Plan  Patient with heavy alcohol use. Intoxicated on presentation.   - Was on Propofol  And Fentanyl for sedation, NO need for CIWA protocol thus far  - Alcohol cessation counseling provided    Cirrhosis of liver (HCC)- (present on admission)  Assessment & Plan  Patient with varices, coagulopathy and pancytopenia reflective of cirrhosis. Likely multifactorial  to alcohol use and hepatitis c.   - Currently stable   - RUQ ultrasound shows cirrhosis and small ascites     Hepatitis C- (present on admission)  Assessment & Plan  History of untreated hepatitis C. Likely contributing to cirrhosis.   - Viral load grater than a million  - Outpatient follow up     Leukopenia  Assessment & Plan  Secondary to bone marrow suppression   - Currently stable, no signs of infection  - Monitor ANC ( currently at 0.86)    Hypomagnesemia  Assessment & Plan  Replete as needed     Hypokalemia- (present on admission)  Assessment & Plan  Currently stable at 3.9  -  Monitor and Replete as needed    Thrombocytopenia (HCC)- (present on admission)  Assessment & Plan  Secondary to bone marrow suppression from cirrhosis   - continue to monitor; no current active bleeding, currently platelet count 23  - Consider transfusion if platelets are < 20 & further thrombocytopenia workup

## 2019-11-18 NOTE — PROGRESS NOTES
Received report updates from Day RN Margaret, patient in bed resting comfortably. Expressed that he wants to leave, informed patient he will not be discharged tonight but re-evaluate in the morning with Medical team. Bed low and locked, bed alarm on, assumed care of patient.

## 2019-11-18 NOTE — PROGRESS NOTES
Bedside report received from TITUS Morejon. Assumed care of pt. Pt in restroom. Ambulating independently with steady gait.No signs of distress, no complaints of pain at this time. Tele box on. Call light and belongings within reach. Bed locked and in lowest position.

## 2019-11-18 NOTE — THERAPY
"Occupational Therapy Education and screening completed.   Functional Status:  SPV level for BADLs in this setting  Plan of Care: Patient with no further skilled OT needs in the acute care setting at this time  Discharge Recommendations:  Equipment: No Equipment Needed. Post-acute therapy Currently anticipate no further skilled therapy needs once patient is discharged from the inpatient setting.    See \"Rehab Therapy-Acute\" Patient Summary Report for complete documentation.      Pt is a 60 y/o male who presents to acute due to esophageal varices and acute respiratory failure. PMH includes alcohol abuse. Pt appears to be at functional baseline. No further acute OT needs noted at this time.   "

## 2019-11-18 NOTE — CARE PLAN
Problem: Knowledge Deficit  Goal: Knowledge of disease process/condition, treatment plan, diagnostic tests, and medications will improve  Outcome: PROGRESSING AS EXPECTED     Problem: Fluid Volume:  Goal: Will maintain balanced intake and output  Outcome: PROGRESSING AS EXPECTED     Problem: Respiratory:  Goal: Respiratory status will improve  Outcome: PROGRESSING AS EXPECTED

## 2019-11-18 NOTE — PROGRESS NOTES
Discharge instructions and written perscriptions given to patient and reviewed. Pt verbalizes understanding of need to follow up and be compliant with instructions.

## 2019-11-18 NOTE — CARE PLAN
Problem: Communication  Goal: The ability to communicate needs accurately and effectively will improve  Outcome: PROGRESSING AS EXPECTED     Problem: Safety  Goal: Will remain free from injury  Outcome: PROGRESSING AS EXPECTED     Problem: Bowel/Gastric:  Goal: Will not experience complications related to bowel motility  Outcome: PROGRESSING AS EXPECTED

## 2019-11-29 ENCOUNTER — HOSPITAL ENCOUNTER (OUTPATIENT)
Facility: MEDICAL CENTER | Age: 59
End: 2019-11-29
Payer: MEDICARE

## 2019-11-29 PROCEDURE — 82274 ASSAY TEST FOR BLOOD FECAL: CPT

## 2019-12-07 ENCOUNTER — HOSPITAL ENCOUNTER (EMERGENCY)
Facility: MEDICAL CENTER | Age: 59
End: 2019-12-07
Attending: EMERGENCY MEDICINE
Payer: COMMERCIAL

## 2019-12-07 VITALS
DIASTOLIC BLOOD PRESSURE: 53 MMHG | RESPIRATION RATE: 19 BRPM | BODY MASS INDEX: 22.73 KG/M2 | OXYGEN SATURATION: 97 % | HEART RATE: 67 BPM | TEMPERATURE: 98 F | HEIGHT: 68 IN | WEIGHT: 150 LBS | SYSTOLIC BLOOD PRESSURE: 139 MMHG

## 2019-12-07 DIAGNOSIS — K29.20 ALCOHOLIC GASTRITIS WITHOUT BLEEDING, UNSPECIFIED CHRONICITY: ICD-10-CM

## 2019-12-07 DIAGNOSIS — F10.10 ALCOHOL ABUSE: ICD-10-CM

## 2019-12-07 LAB
ALBUMIN SERPL BCP-MCNC: 3.9 G/DL (ref 3.2–4.9)
ALBUMIN/GLOB SERPL: 1.1 G/DL
ALP SERPL-CCNC: 66 U/L (ref 30–99)
ALT SERPL-CCNC: 46 U/L (ref 2–50)
ANION GAP SERPL CALC-SCNC: 13 MMOL/L (ref 0–11.9)
AST SERPL-CCNC: 66 U/L (ref 12–45)
BASOPHILS # BLD AUTO: 1 % (ref 0–1.8)
BASOPHILS # BLD: 0.03 K/UL (ref 0–0.12)
BILIRUB SERPL-MCNC: 0.6 MG/DL (ref 0.1–1.5)
BUN SERPL-MCNC: 8 MG/DL (ref 8–22)
CALCIUM SERPL-MCNC: 8.8 MG/DL (ref 8.5–10.5)
CHLORIDE SERPL-SCNC: 109 MMOL/L (ref 96–112)
CO2 SERPL-SCNC: 21 MMOL/L (ref 20–33)
CREAT SERPL-MCNC: 0.49 MG/DL (ref 0.5–1.4)
EOSINOPHIL # BLD AUTO: 0.05 K/UL (ref 0–0.51)
EOSINOPHIL NFR BLD: 1.7 % (ref 0–6.9)
ERYTHROCYTE [DISTWIDTH] IN BLOOD BY AUTOMATED COUNT: 47.8 FL (ref 35.9–50)
GLOBULIN SER CALC-MCNC: 3.4 G/DL (ref 1.9–3.5)
GLUCOSE SERPL-MCNC: 72 MG/DL (ref 65–99)
HCT VFR BLD AUTO: 37.4 % (ref 42–52)
HGB BLD-MCNC: 11.9 G/DL (ref 14–18)
IMM GRANULOCYTES # BLD AUTO: 0 K/UL (ref 0–0.11)
IMM GRANULOCYTES NFR BLD AUTO: 0 % (ref 0–0.9)
LIPASE SERPL-CCNC: 50 U/L (ref 11–82)
LYMPHOCYTES # BLD AUTO: 0.87 K/UL (ref 1–4.8)
LYMPHOCYTES NFR BLD: 30.3 % (ref 22–41)
MCH RBC QN AUTO: 31.1 PG (ref 27–33)
MCHC RBC AUTO-ENTMCNC: 31.8 G/DL (ref 33.7–35.3)
MCV RBC AUTO: 97.7 FL (ref 81.4–97.8)
MONOCYTES # BLD AUTO: 0.26 K/UL (ref 0–0.85)
MONOCYTES NFR BLD AUTO: 9.1 % (ref 0–13.4)
NEUTROPHILS # BLD AUTO: 1.66 K/UL (ref 1.82–7.42)
NEUTROPHILS NFR BLD: 57.9 % (ref 44–72)
NRBC # BLD AUTO: 0 K/UL
NRBC BLD-RTO: 0 /100 WBC
PLATELET # BLD AUTO: 48 K/UL (ref 164–446)
PMV BLD AUTO: 12.1 FL (ref 9–12.9)
POTASSIUM SERPL-SCNC: 3.7 MMOL/L (ref 3.6–5.5)
PROT SERPL-MCNC: 7.3 G/DL (ref 6–8.2)
RBC # BLD AUTO: 3.83 M/UL (ref 4.7–6.1)
SODIUM SERPL-SCNC: 143 MMOL/L (ref 135–145)
WBC # BLD AUTO: 2.9 K/UL (ref 4.8–10.8)

## 2019-12-07 PROCEDURE — 80053 COMPREHEN METABOLIC PANEL: CPT

## 2019-12-07 PROCEDURE — 83690 ASSAY OF LIPASE: CPT

## 2019-12-07 PROCEDURE — A9270 NON-COVERED ITEM OR SERVICE: HCPCS | Performed by: EMERGENCY MEDICINE

## 2019-12-07 PROCEDURE — 85025 COMPLETE CBC W/AUTO DIFF WBC: CPT

## 2019-12-07 PROCEDURE — 36415 COLL VENOUS BLD VENIPUNCTURE: CPT

## 2019-12-07 PROCEDURE — 99285 EMERGENCY DEPT VISIT HI MDM: CPT

## 2019-12-07 PROCEDURE — 700102 HCHG RX REV CODE 250 W/ 637 OVERRIDE(OP): Performed by: EMERGENCY MEDICINE

## 2019-12-07 RX ORDER — SUCRALFATE ORAL 1 G/10ML
1 SUSPENSION ORAL 4 TIMES DAILY
Qty: 280 ML | Refills: 0 | Status: SHIPPED | OUTPATIENT
Start: 2019-12-07 | End: 2019-12-14

## 2019-12-07 RX ORDER — OMEPRAZOLE 20 MG/1
20 CAPSULE, DELAYED RELEASE ORAL DAILY
Qty: 30 CAP | Refills: 0 | Status: SHIPPED | OUTPATIENT
Start: 2019-12-07 | End: 2020-01-06

## 2019-12-07 RX ADMIN — LIDOCAINE HYDROCHLORIDE 30 ML: 20 SOLUTION OROPHARYNGEAL at 07:09

## 2019-12-07 ASSESSMENT — PAIN DESCRIPTION - DESCRIPTORS: DESCRIPTORS: SHARP

## 2019-12-07 NOTE — ED NOTES
Break RN: pt requesting something to eat/drink. MD Burt notified, pt is to remain NPO at this time.     Pt educated on abdominal pain complaint and the need to remain NPO at this time. Pt acknowledged.

## 2019-12-07 NOTE — ED PROVIDER NOTES
"ED Provider Note    CHIEF COMPLAINT  Chief Complaint   Patient presents with   • Abdominal Pain       HPI  Inocencio Shabazz is a 59 y.o. male who presents with complaints of epigastric abdominal pain described as intermittent.  Patient extremely reluctant to give me historical details, is vague stating he has had the pain on and off for \"some time\".  Patient was seen at the [facility] this morning and evaluated.  When he called the ambulance to return to the ER, per the chart the patient was diverted here as they did not want to see him again.  Patient states they told him the problem was that he drank too much alcohol and needed to stop.    Upon my arrival to the bedside, patient diverted all questioning, and stated first he wanted a blanket and a soda.    REVIEW OF SYSTEMS  Constitutional: Denies fever  Respiratory: No shortness of breath  Cardiac: No chest pain or syncope  Gastrointestinal: Abdominal pain.  Denies bloody stool.  Musculoskeletal: No acute back pain  Neurologic: No headache  Genitourinary: No dysuria       All other systems are negative.     PAST MEDICAL HISTORY  Past Medical History:   Diagnosis Date   • Alcohol abuse    • Alcohol intoxication (HCC) 3/13/2014   • Cirrhosis (HCC)    • GIB (gastrointestinal bleeding) 1/3/2016   • Hepatitis C    • Pancytopenia (HCC)    • Psychiatric disorder     schitzo, bipolar       FAMILY HISTORY  No family history on file.    SOCIAL HISTORY  Social History     Socioeconomic History   • Marital status: Single     Spouse name: Not on file   • Number of children: Not on file   • Years of education: Not on file   • Highest education level: Not on file   Occupational History   • Not on file   Social Needs   • Financial resource strain: Not on file   • Food insecurity:     Worry: Not on file     Inability: Not on file   • Transportation needs:     Medical: Not on file     Non-medical: Not on file   Tobacco Use   • Smoking status: Current Every Day Smoker     Packs/day: " 1.00     Years: 48.00     Pack years: 48.00     Types: Cigarettes   • Smokeless tobacco: Never Used   Substance and Sexual Activity   • Alcohol use: Yes     Comment: 1-2 beers and 1/2 pints of whiskey every day   • Drug use: Yes     Types: Inhaled     Comment: marijuana   • Sexual activity: Not on file   Lifestyle   • Physical activity:     Days per week: Not on file     Minutes per session: Not on file   • Stress: Not on file   Relationships   • Social connections:     Talks on phone: Not on file     Gets together: Not on file     Attends Hoahaoism service: Not on file     Active member of club or organization: Not on file     Attends meetings of clubs or organizations: Not on file     Relationship status: Not on file   • Intimate partner violence:     Fear of current or ex partner: Not on file     Emotionally abused: Not on file     Physically abused: Not on file     Forced sexual activity: Not on file   Other Topics Concern   • Not on file   Social History Narrative   • Not on file       SURGICAL HISTORY  Past Surgical History:   Procedure Laterality Date   • GASTROSCOPY-ENDO N/A 11/13/2019    Procedure: GASTROSCOPY with banding;  Surgeon: Tamela Smith M.D.;  Location: ENDOSCOPY [facility];  Service: Gastroenterology   • GASTROSCOPY  3/13/2019    Procedure: GASTROSCOPY;  Surgeon: Abdi Ba M.D.;  Location: SURGERY [facility];  Service: Gastroenterology   • GASTROSCOPY  4/8/2016    Procedure: GASTROSCOPY;  Surgeon: Braeden Tobin M.D.;  Location: SURGERY [facility];  Service:    • GASTROSCOPY  1/3/2016    Procedure: GASTROSCOPY WITH BANDING;  Surgeon: Alfredo Antonio M.D.;  Location: SURGERY [facility];  Service:        CURRENT MEDICATIONS  Home Medications    **Home medications have not yet been reviewed for this encounter**         ALLERGIES  Allergies   Allergen Reactions   • Haloperidol Unspecified     Twitching         PHYSICAL EXAM  VITAL SIGNS: /72   Pulse 68   Temp  "36.7 °C (98 °F) (Oral)   Resp 18   Ht 1.727 m (5' 8\")   Wt 68 kg (150 lb)   SpO2 95%   BMI 22.81 kg/m²   Constitutional: No distress  ENT: Nares clear, mucous membranes tacky.  Eyes:  Conjunctiva normal, No discharge.    Lymphatic: No adenopathy.   Cardiovascular: Normal heart rate, Normal rhythm.   Pulmonary: No wheezing, no rales  Gastrointestinal: Epigastric tenderness without guarding.  Negative Pettit sign.  No distention.  Skin: Warm, Dry, No jaundice.   Musculoskeletal:  No CVA tenderness.   Neurologic: No tremor.  Normal motor and sensory function, No focal deficits noted.   Psychiatric: Flat affect..    RADIOLOGY/PROCEDURES/Labs  Results for orders placed or performed during the hospital encounter of 12/07/19   CBC WITH DIFFERENTIAL   Result Value Ref Range    WBC 2.9 (L) 4.8 - 10.8 K/uL    RBC 3.83 (L) 4.70 - 6.10 M/uL    Hemoglobin 11.9 (L) 14.0 - 18.0 g/dL    Hematocrit 37.4 (L) 42.0 - 52.0 %    MCV 97.7 81.4 - 97.8 fL    MCH 31.1 27.0 - 33.0 pg    MCHC 31.8 (L) 33.7 - 35.3 g/dL    RDW 47.8 35.9 - 50.0 fL    Platelet Count 48 (LL) 164 - 446 K/uL    MPV 12.1 9.0 - 12.9 fL    Neutrophils-Polys 57.90 44.00 - 72.00 %    Lymphocytes 30.30 22.00 - 41.00 %    Monocytes 9.10 0.00 - 13.40 %    Eosinophils 1.70 0.00 - 6.90 %    Basophils 1.00 0.00 - 1.80 %    Immature Granulocytes 0.00 0.00 - 0.90 %    Nucleated RBC 0.00 /100 WBC    Neutrophils (Absolute) 1.66 (L) 1.82 - 7.42 K/uL    Lymphs (Absolute) 0.87 (L) 1.00 - 4.80 K/uL    Monos (Absolute) 0.26 0.00 - 0.85 K/uL    Eos (Absolute) 0.05 0.00 - 0.51 K/uL    Baso (Absolute) 0.03 0.00 - 0.12 K/uL    Immature Granulocytes (abs) 0.00 0.00 - 0.11 K/uL    NRBC (Absolute) 0.00 K/uL   COMP METABOLIC PANEL   Result Value Ref Range    Sodium 143 135 - 145 mmol/L    Potassium 3.7 3.6 - 5.5 mmol/L    Chloride 109 96 - 112 mmol/L    Co2 21 20 - 33 mmol/L    Anion Gap 13.0 (H) 0.0 - 11.9    Glucose 72 65 - 99 mg/dL    Bun 8 8 - 22 mg/dL    Creatinine 0.49 (L) 0.50 - " 1.40 mg/dL    Calcium 8.8 8.5 - 10.5 mg/dL    AST(SGOT) 66 (H) 12 - 45 U/L    ALT(SGPT) 46 2 - 50 U/L    Alkaline Phosphatase 66 30 - 99 U/L    Total Bilirubin 0.6 0.1 - 1.5 mg/dL    Albumin 3.9 3.2 - 4.9 g/dL    Total Protein 7.3 6.0 - 8.2 g/dL    Globulin 3.4 1.9 - 3.5 g/dL    A-G Ratio 1.1 g/dL   LIPASE   Result Value Ref Range    Lipase 50 11 - 82 U/L   ESTIMATED GFR   Result Value Ref Range    GFR If African American >60 >60 mL/min/1.73 m 2    GFR If Non African American >60 >60 mL/min/1.73 m 2         COURSE & MEDICAL DECISION MAKING  Pertinent Labs & Imaging studies reviewed. (See chart for details)  Patient with vomiting, epigastric pain of unknown etiology.  Patient felt  improved after GI cocktail, there is some residual discomfort but he felt better.  There is no associated chest pain, discomfort does not appear to be cardiac in nature.  Suspect alcoholic gastritis although could be gastroenteritis versus food poisoning.  Patient is currently able to tolerate oral intake, will continue on nausea medication.  No evidence of pancreatitis, his liver function tests were normal, no renal insufficiency.  Patient was prescribed omeprazole, sucralfate, advised to follow-up with his primary doctor at the [facility] since possible.  He is agreed to return if worse or for any concerns    FINAL IMPRESSION  1. Alcohol abuse     2. Alcoholic gastritis without bleeding, unspecified chronicity             Electronically signed by: Alan Davis, 12/7/2019 7:02 AM

## 2019-12-07 NOTE — ED TRIAGE NOTES
Pt brought in ambulatory by JESUS. Pt C/O intermittent ABD pain, [facility] and D/C 2 hours ago and proceeded to call EMS again for same symptoms. [facility] since he was already seen and evaluated. VSS.

## 2019-12-07 NOTE — DISCHARGE INSTRUCTIONS
Follow-up with your primary doctor soon as possible.  Please discontinue or severely limit your alcohol use    Return for any concerns.

## 2019-12-10 LAB — HEMOCCULT STL QL IA: NEGATIVE

## 2019-12-17 ENCOUNTER — HOSPITAL ENCOUNTER (EMERGENCY)
Facility: MEDICAL CENTER | Age: 59
End: 2019-12-17
Attending: EMERGENCY MEDICINE
Payer: MEDICARE

## 2019-12-17 VITALS
SYSTOLIC BLOOD PRESSURE: 107 MMHG | RESPIRATION RATE: 16 BRPM | HEIGHT: 68 IN | BODY MASS INDEX: 20.58 KG/M2 | WEIGHT: 135.8 LBS | DIASTOLIC BLOOD PRESSURE: 59 MMHG | HEART RATE: 95 BPM | TEMPERATURE: 96.9 F | OXYGEN SATURATION: 96 %

## 2019-12-17 VITALS
SYSTOLIC BLOOD PRESSURE: 95 MMHG | HEIGHT: 68 IN | RESPIRATION RATE: 16 BRPM | BODY MASS INDEX: 21.67 KG/M2 | TEMPERATURE: 98.5 F | OXYGEN SATURATION: 96 % | WEIGHT: 143 LBS | DIASTOLIC BLOOD PRESSURE: 62 MMHG | HEART RATE: 74 BPM

## 2019-12-17 DIAGNOSIS — R25.2 LEG CRAMPS: ICD-10-CM

## 2019-12-17 DIAGNOSIS — D69.6 THROMBOCYTOPENIA (HCC): ICD-10-CM

## 2019-12-17 DIAGNOSIS — K29.20 ACUTE ALCOHOLIC GASTRITIS WITHOUT HEMORRHAGE: ICD-10-CM

## 2019-12-17 DIAGNOSIS — K72.10 END STAGE LIVER DISEASE (HCC): ICD-10-CM

## 2019-12-17 DIAGNOSIS — R10.13 EPIGASTRIC PAIN: ICD-10-CM

## 2019-12-17 LAB
ABO GROUP BLD: NORMAL
ALBUMIN SERPL BCP-MCNC: 4 G/DL (ref 3.2–4.9)
ALBUMIN SERPL BCP-MCNC: 4.3 G/DL (ref 3.2–4.9)
ALBUMIN/GLOB SERPL: 1.1 G/DL
ALBUMIN/GLOB SERPL: 1.2 G/DL
ALP SERPL-CCNC: 67 U/L (ref 30–99)
ALP SERPL-CCNC: 80 U/L (ref 30–99)
ALT SERPL-CCNC: 46 U/L (ref 2–50)
ALT SERPL-CCNC: 53 U/L (ref 2–50)
ANION GAP SERPL CALC-SCNC: 14 MMOL/L (ref 0–11.9)
ANION GAP SERPL CALC-SCNC: 7 MMOL/L (ref 0–11.9)
APTT PPP: 31.1 SEC (ref 24.7–36)
AST SERPL-CCNC: 75 U/L (ref 12–45)
AST SERPL-CCNC: 89 U/L (ref 12–45)
BASOPHILS # BLD AUTO: 0.9 % (ref 0–1.8)
BASOPHILS # BLD AUTO: 1.1 % (ref 0–1.8)
BASOPHILS # BLD: 0.01 K/UL (ref 0–0.12)
BASOPHILS # BLD: 0.03 K/UL (ref 0–0.12)
BILIRUB SERPL-MCNC: 0.4 MG/DL (ref 0.1–1.5)
BILIRUB SERPL-MCNC: 0.6 MG/DL (ref 0.1–1.5)
BLD GP AB SCN SERPL QL: NORMAL
BUN SERPL-MCNC: 12 MG/DL (ref 8–22)
BUN SERPL-MCNC: 14 MG/DL (ref 8–22)
CALCIUM SERPL-MCNC: 8.6 MG/DL (ref 8.5–10.5)
CALCIUM SERPL-MCNC: 8.7 MG/DL (ref 8.5–10.5)
CHLORIDE SERPL-SCNC: 104 MMOL/L (ref 96–112)
CHLORIDE SERPL-SCNC: 105 MMOL/L (ref 96–112)
CK SERPL-CCNC: 81 U/L (ref 0–154)
CO2 SERPL-SCNC: 22 MMOL/L (ref 20–33)
CO2 SERPL-SCNC: 25 MMOL/L (ref 20–33)
COMMENT 1642: NORMAL
CREAT SERPL-MCNC: 0.56 MG/DL (ref 0.5–1.4)
CREAT SERPL-MCNC: 0.62 MG/DL (ref 0.5–1.4)
EOSINOPHIL # BLD AUTO: 0.03 K/UL (ref 0–0.51)
EOSINOPHIL # BLD AUTO: 0.06 K/UL (ref 0–0.51)
EOSINOPHIL NFR BLD: 1.8 % (ref 0–6.9)
EOSINOPHIL NFR BLD: 2.1 % (ref 0–6.9)
ERYTHROCYTE [DISTWIDTH] IN BLOOD BY AUTOMATED COUNT: 47.4 FL (ref 35.9–50)
ERYTHROCYTE [DISTWIDTH] IN BLOOD BY AUTOMATED COUNT: 47.7 FL (ref 35.9–50)
ETHANOL BLD-MCNC: 0 G/DL
GLOBULIN SER CALC-MCNC: 3.7 G/DL (ref 1.9–3.5)
GLOBULIN SER CALC-MCNC: 3.8 G/DL (ref 1.9–3.5)
GLUCOSE SERPL-MCNC: 103 MG/DL (ref 65–99)
GLUCOSE SERPL-MCNC: 111 MG/DL (ref 65–99)
HCT VFR BLD AUTO: 34.2 % (ref 42–52)
HCT VFR BLD AUTO: 41.6 % (ref 42–52)
HGB BLD-MCNC: 11.1 G/DL (ref 14–18)
HGB BLD-MCNC: 13.4 G/DL (ref 14–18)
IMM GRANULOCYTES # BLD AUTO: 0.01 K/UL (ref 0–0.11)
IMM GRANULOCYTES NFR BLD AUTO: 0.4 % (ref 0–0.9)
INR PPP: 1.17 (ref 0.87–1.13)
LYMPHOCYTES # BLD AUTO: 0.37 K/UL (ref 1–4.8)
LYMPHOCYTES # BLD AUTO: 0.71 K/UL (ref 1–4.8)
LYMPHOCYTES NFR BLD: 23.2 % (ref 22–41)
LYMPHOCYTES NFR BLD: 25 % (ref 22–41)
MAGNESIUM SERPL-MCNC: 1.9 MG/DL (ref 1.5–2.5)
MANUAL DIFF BLD: NORMAL
MCH RBC QN AUTO: 31.5 PG (ref 27–33)
MCH RBC QN AUTO: 31.8 PG (ref 27–33)
MCHC RBC AUTO-ENTMCNC: 32.2 G/DL (ref 33.7–35.3)
MCHC RBC AUTO-ENTMCNC: 32.5 G/DL (ref 33.7–35.3)
MCV RBC AUTO: 97.2 FL (ref 81.4–97.8)
MCV RBC AUTO: 98.6 FL (ref 81.4–97.8)
MONOCYTES # BLD AUTO: 0.13 K/UL (ref 0–0.85)
MONOCYTES # BLD AUTO: 0.33 K/UL (ref 0–0.85)
MONOCYTES NFR BLD AUTO: 11.6 % (ref 0–13.4)
MONOCYTES NFR BLD AUTO: 8 % (ref 0–13.4)
MORPHOLOGY BLD-IMP: NORMAL
MORPHOLOGY BLD-IMP: NORMAL
NEUTROPHILS # BLD AUTO: 1.06 K/UL (ref 1.82–7.42)
NEUTROPHILS # BLD AUTO: 1.7 K/UL (ref 1.82–7.42)
NEUTROPHILS NFR BLD: 59.8 % (ref 44–72)
NEUTROPHILS NFR BLD: 66.1 % (ref 44–72)
NRBC # BLD AUTO: 0 K/UL
NRBC # BLD AUTO: 0 K/UL
NRBC BLD-RTO: 0 /100 WBC
NRBC BLD-RTO: 0 /100 WBC
OVALOCYTES BLD QL SMEAR: NORMAL
PHOSPHATE SERPL-MCNC: 2.7 MG/DL (ref 2.5–4.5)
PLATELET # BLD AUTO: 27 K/UL (ref 164–446)
PLATELET # BLD AUTO: 40 K/UL (ref 164–446)
PLATELET BLD QL SMEAR: NORMAL
PLATELETS.RETICULATED NFR BLD AUTO: 10.8 K/UL (ref 0.6–13.1)
PLATELETS.RETICULATED NFR BLD AUTO: 11.1 K/UL (ref 0.6–13.1)
PMV BLD AUTO: 12.4 FL (ref 9–12.9)
PMV BLD AUTO: 13.3 FL (ref 9–12.9)
POIKILOCYTOSIS BLD QL SMEAR: NORMAL
POTASSIUM SERPL-SCNC: 3.6 MMOL/L (ref 3.6–5.5)
POTASSIUM SERPL-SCNC: 3.7 MMOL/L (ref 3.6–5.5)
PROT SERPL-MCNC: 7.8 G/DL (ref 6–8.2)
PROT SERPL-MCNC: 8 G/DL (ref 6–8.2)
PROTHROMBIN TIME: 15.2 SEC (ref 12–14.6)
RBC # BLD AUTO: 3.52 M/UL (ref 4.7–6.1)
RBC # BLD AUTO: 4.22 M/UL (ref 4.7–6.1)
RBC BLD AUTO: PRESENT
RH BLD: NORMAL
SODIUM SERPL-SCNC: 137 MMOL/L (ref 135–145)
SODIUM SERPL-SCNC: 140 MMOL/L (ref 135–145)
WBC # BLD AUTO: 1.6 K/UL (ref 4.8–10.8)
WBC # BLD AUTO: 2.8 K/UL (ref 4.8–10.8)

## 2019-12-17 PROCEDURE — 83735 ASSAY OF MAGNESIUM: CPT

## 2019-12-17 PROCEDURE — 86900 BLOOD TYPING SEROLOGIC ABO: CPT

## 2019-12-17 PROCEDURE — 85055 RETICULATED PLATELET ASSAY: CPT

## 2019-12-17 PROCEDURE — 82550 ASSAY OF CK (CPK): CPT

## 2019-12-17 PROCEDURE — 85730 THROMBOPLASTIN TIME PARTIAL: CPT

## 2019-12-17 PROCEDURE — 84100 ASSAY OF PHOSPHORUS: CPT

## 2019-12-17 PROCEDURE — 85027 COMPLETE CBC AUTOMATED: CPT

## 2019-12-17 PROCEDURE — A9270 NON-COVERED ITEM OR SERVICE: HCPCS | Performed by: EMERGENCY MEDICINE

## 2019-12-17 PROCEDURE — 36415 COLL VENOUS BLD VENIPUNCTURE: CPT

## 2019-12-17 PROCEDURE — 99284 EMERGENCY DEPT VISIT MOD MDM: CPT

## 2019-12-17 PROCEDURE — 85055 RETICULATED PLATELET ASSAY: CPT | Mod: 91

## 2019-12-17 PROCEDURE — 86901 BLOOD TYPING SEROLOGIC RH(D): CPT

## 2019-12-17 PROCEDURE — 80307 DRUG TEST PRSMV CHEM ANLYZR: CPT

## 2019-12-17 PROCEDURE — 94760 N-INVAS EAR/PLS OXIMETRY 1: CPT

## 2019-12-17 PROCEDURE — 85007 BL SMEAR W/DIFF WBC COUNT: CPT

## 2019-12-17 PROCEDURE — 80053 COMPREHEN METABOLIC PANEL: CPT

## 2019-12-17 PROCEDURE — 700102 HCHG RX REV CODE 250 W/ 637 OVERRIDE(OP): Performed by: EMERGENCY MEDICINE

## 2019-12-17 PROCEDURE — 96374 THER/PROPH/DIAG INJ IV PUSH: CPT

## 2019-12-17 PROCEDURE — 99285 EMERGENCY DEPT VISIT HI MDM: CPT

## 2019-12-17 PROCEDURE — 85610 PROTHROMBIN TIME: CPT

## 2019-12-17 PROCEDURE — 80053 COMPREHEN METABOLIC PANEL: CPT | Mod: 91

## 2019-12-17 PROCEDURE — 86850 RBC ANTIBODY SCREEN: CPT

## 2019-12-17 PROCEDURE — 85025 COMPLETE CBC W/AUTO DIFF WBC: CPT

## 2019-12-17 PROCEDURE — 700111 HCHG RX REV CODE 636 W/ 250 OVERRIDE (IP): Performed by: EMERGENCY MEDICINE

## 2019-12-17 RX ORDER — ONDANSETRON 2 MG/ML
4 INJECTION INTRAMUSCULAR; INTRAVENOUS ONCE
Status: COMPLETED | OUTPATIENT
Start: 2019-12-17 | End: 2019-12-17

## 2019-12-17 RX ADMIN — LIDOCAINE HYDROCHLORIDE 30 ML: 20 SOLUTION OROPHARYNGEAL at 09:09

## 2019-12-17 RX ADMIN — ONDANSETRON 4 MG: 2 INJECTION INTRAMUSCULAR; INTRAVENOUS at 09:09

## 2019-12-17 ASSESSMENT — PAIN DESCRIPTION - DESCRIPTORS: DESCRIPTORS: SHARP

## 2019-12-17 NOTE — ED NOTES
Brennen from Lab called with critical result of Plateletes at 40. Critical lab result read back to Brennen.   Dr. Cedillo notified of critical lab result at 0917.  Critical lab result read back by Dr. Cedillo  .

## 2019-12-17 NOTE — ED PROVIDER NOTES
ED Provider Note    Scribed for Hebert Cedillo M.D. by Lily Barr. 12/17/2019  8:55 AM    Primary care provider: Nnamdi Ballesteros M.D.  Means of arrival: Ambulance  History obtained from: Patient  History limited by: None    CHIEF COMPLAINT  •  Abdominal Pain    HPI  Inocencio Shabazz is a 59 y.o. male who presents to the Emergency Department by ambulance for abdominal pain with an onset of 2 days. Recent admission in November 2019 for a GI bleed related to esophageal varices. The patient has a history of chronic alcohol abuse. He developed an acute onset of epigastric abdominal pain two days ago described as waxing and waning pain. No alleviating or exacerbating factors reported. He endorses nausea and chronic dark stools secondary to taking iron supplements. Negative for fevers, chills, chest pain, shortness of breath, hematemesis, vomiting or diarrhea.      REVIEW OF SYSTEMS  Pertinent positives include abdominal pain, nausea and chronic dark stool.   Pertinent negatives include no fevers, chills, chest pain, shortness of breath, hematemesis, vomiting or diarrhea.    All other systems reviewed and negative. See HPI for further details.       PAST MEDICAL HISTORY  Patient has a past medical history of Alcohol abuse, Alcohol intoxication (HCC) (3/13/2014), Cirrhosis (HCC), GIB (gastrointestinal bleeding) (1/3/2016), Hepatitis C, Pancytopenia (HCC), and Psychiatric disorder.      SURGICAL HISTORY  Patient has a past surgical history that includes gastroscopy (1/3/2016); gastroscopy (4/8/2016); gastroscopy (3/13/2019); and gastroscopy-endo (N/A, 11/13/2019).      SOCIAL HISTORY  Social History     Tobacco Use   • Smoking status: Current Every Day Smoker     Packs/day: 1.00     Years: 48.00     Pack years: 48.00     Types: Cigarettes   • Smokeless tobacco: Never Used   Substance Use Topics   • Alcohol use: Yes     Comment: 1-2 beers and 1/2 pints of whiskey every day   • Drug use: Yes     Types: Inhaled      "Comment: marijuana      Social History     Substance and Sexual Activity   Drug Use Yes   • Types: Inhaled    Comment: marijuana       FAMILY HISTORY  History reviewed. No pertinent family history.       CURRENT MEDICATIONS  Home Medications     Reviewed by Jeimy Linder R.N. (Registered Nurse) on 12/17/19 at 0839  Med List Status: Partial   Medication Last Dose Status   Cyanocobalamin (VITAMIN B-12) 1000 MCG Tab  Active   ferrous sulfate 325 (65 Fe) MG tablet  Active   folic acid (FOLVITE) 1 MG Tab  Active   Multiple Vitamins-Minerals (MULTIVITAMIN ADULT PO)  Active   omeprazole (PRILOSEC) 20 MG delayed-release capsule  Active   omeprazole (PRILOSEC) 20 MG delayed-release capsule  Active   potassium chloride SA (KDUR) 20 MEQ Tab CR  Active   propranolol (INDERAL) 20 MG Tab  Active   quetiapine (SEROQUEL) 200 MG Tab  Active   thiamine (THIAMINE) 100 MG tablet  Active   traZODone (DESYREL) 50 MG Tab  Active                ALLERGIES  Allergies   Allergen Reactions   • Haloperidol Unspecified     Twitching         PHYSICAL EXAM  VITAL SIGNS: /70   Pulse 76   Temp 36.9 °C (98.5 °F) (Temporal)   Resp 16   Ht 1.727 m (5' 8\")   Wt 64.9 kg (143 lb)   BMI 21.74 kg/m²     Nursing note and vitals reviewed.    Constitutional: Laying in bed with the blanket pulled over his head. Initially refuses to pull down the blanket to interact. Disheveled. No distress.   HENT: Head is normocephalic and atraumatic. Oropharynx is clear and moist without exudate or erythema.   Eyes: Pupils are equal, round, and reactive to light. Conjunctiva are normal.   Cardiovascular: Normal rate and regular rhythm. No murmur heard. Normal radial pulses.  Pulmonary/Chest: Breath sounds normal. No wheezes or rales.   Abdominal: Soft and mild tenderness to epigastrium. No distention    Musculoskeletal: Extremities exhibit normal range of motion without edema or tenderness.   Neurological: Awake, alert and oriented to person, place, and time. No " focal deficits noted.  Skin: Skin is warm and dry. No rash.   Psychiatric: Normal mood and affect. Appropriate for clinical situation.      DIAGNOSTIC STUDIES / PROCEDURES    LABS  Results for orders placed or performed during the hospital encounter of 12/17/19   CBC WITH DIFFERENTIAL   Result Value Ref Range    WBC 2.8 (L) 4.8 - 10.8 K/uL    RBC 4.22 (L) 4.70 - 6.10 M/uL    Hemoglobin 13.4 (L) 14.0 - 18.0 g/dL    Hematocrit 41.6 (L) 42.0 - 52.0 %    MCV 98.6 (H) 81.4 - 97.8 fL    MCH 31.8 27.0 - 33.0 pg    MCHC 32.2 (L) 33.7 - 35.3 g/dL    RDW 47.7 35.9 - 50.0 fL    Platelet Count 40 (LL) 164 - 446 K/uL    MPV 12.4 9.0 - 12.9 fL    Neutrophils-Polys 59.80 44.00 - 72.00 %    Lymphocytes 25.00 22.00 - 41.00 %    Monocytes 11.60 0.00 - 13.40 %    Eosinophils 2.10 0.00 - 6.90 %    Basophils 1.10 0.00 - 1.80 %    Immature Granulocytes 0.40 0.00 - 0.90 %    Nucleated RBC 0.00 /100 WBC    Neutrophils (Absolute) 1.70 (L) 1.82 - 7.42 K/uL    Lymphs (Absolute) 0.71 (L) 1.00 - 4.80 K/uL    Monos (Absolute) 0.33 0.00 - 0.85 K/uL    Eos (Absolute) 0.06 0.00 - 0.51 K/uL    Baso (Absolute) 0.03 0.00 - 0.12 K/uL    Immature Granulocytes (abs) 0.01 0.00 - 0.11 K/uL    NRBC (Absolute) 0.00 K/uL   COMP METABOLIC PANEL   Result Value Ref Range    Sodium 140 135 - 145 mmol/L    Potassium 3.6 3.6 - 5.5 mmol/L    Chloride 104 96 - 112 mmol/L    Co2 22 20 - 33 mmol/L    Anion Gap 14.0 (H) 0.0 - 11.9    Glucose 103 (H) 65 - 99 mg/dL    Bun 12 8 - 22 mg/dL    Creatinine 0.62 0.50 - 1.40 mg/dL    Calcium 8.7 8.5 - 10.5 mg/dL    AST(SGOT) 89 (H) 12 - 45 U/L    ALT(SGPT) 53 (H) 2 - 50 U/L    Alkaline Phosphatase 80 30 - 99 U/L    Total Bilirubin 0.4 0.1 - 1.5 mg/dL    Albumin 4.3 3.2 - 4.9 g/dL    Total Protein 8.0 6.0 - 8.2 g/dL    Globulin 3.7 (H) 1.9 - 3.5 g/dL    A-G Ratio 1.2 g/dL   PROTHROMBIN TIME (INR)   Result Value Ref Range    PT 15.2 (H) 12.0 - 14.6 sec    INR 1.17 (H) 0.87 - 1.13   APTT   Result Value Ref Range    APTT 31.1 24.7 -  "36.0 sec   ESTIMATED GFR   Result Value Ref Range    GFR If African American >60 >60 mL/min/1.73 m 2    GFR If Non African American >60 >60 mL/min/1.73 m 2   PERIPHERAL SMEAR REVIEW   Result Value Ref Range    Peripheral Smear Review see below    IMMATURE PLT FRACTION   Result Value Ref Range    Imm. Plt Fraction 11.1 0.6 - 13.1 K/uL   DIFFERENTIAL COMMENT   Result Value Ref Range    Comments-Diff see below       All labs reviewed by me.      COURSE & MEDICAL DECISION MAKING  Pertinent Labs & Imaging studies reviewed. (See chart for details)    Differential Diagnoses include but are not limited to: pancreatitis, gastroenteritis, alcohol abuse or GI bleed.    8:55 AM Obtained and reviewed patient's electronic medical records which indicate the patient was admitted one month ago for GI bleed related to esophageal varices.    8:57 AM Patient seen and examined at bedside for abdominal pain.      Initial orders in the Emergency Department included laboratory testing: CBC with differential, CMP, estimated GFR, occult blood stool, INR, APTT and COD.  Initial treatment in the Emergency Department included GI cocktail 30 mg PO and Zofran 4 mg IV. Patient was asked to remain NPO.    Discharge plan was discussed with the patient and includes following up with Dr. Ballesteros. Return to the ED for new or persisting symptoms including abdominal pain, hematemesis or any additional concerns.  The patient verbalizes understanding and will comply.  Patient is stable at the time of discharge.  Vital signs were reviewed: BP (!) 95/62   Pulse 74   Temp 36.9 °C (98.5 °F) (Temporal)   Resp 16   Ht 1.727 m (5' 8\")   Wt 64.9 kg (143 lb)   SpO2 96%   BMI 21.74 kg/m²          Decision Making:  This is a 59 y.o. male that presents with abdominal pain likely related to alcohol gastritis. Dark stools are chronic and likely secondary to iron supplementation. Hemoglobin today is higher than baseline at 13.4. Thrombocytopenia noted with platelet " count of 40. CMP remarkable for end stage liver disease. Plan to discharge the patient with strict return precautions.       DISPOSITION  Patient will be discharged home in stable condition.      FOLLOW UP  Nnamdi Ballesteros M.D.  [address] [facility] [id number] [phone]    Schedule an appointment as soon as possible for a visit       [facility], Emergency Dept  [address] [facility] [address] [phone]    If symptoms worsen      DIAGNOSIS  1. Epigastric pain    2. Acute alcoholic gastritis without hemorrhage    3. Thrombocytopenia (HCC)    4. End stage liver disease (HCC)           Lily ALMAZAN (Scribe), am scribing for, and in the presence of, Hebert Cedillo M.D.    Electronically signed by: Lily Barr (Scribe), 12/17/2019    Hebert ALMAZAN M.D. personally performed the services described in this documentation, as scribed by Lily Barr in my presence, and it is both accurate and complete. C.     The note accurately reflects work and decisions made by me.  Hebert Cedillo  12/17/2019  11:10 AM

## 2019-12-17 NOTE — DISCHARGE PLANNING
Medical Social Work    Referral: Transportation    Intervention: [facility] was notified that pt requesting assistance w/ transportation home as pt does not have the ability to pay and no family who can assist. [facility] attempted to set up medical [employer] for this pt who is a [employer] member. However, [employer], was unavailable. [facility] provided bedside RN w/ [employer] #[id number].     Plan: Pt to transport home via Tax[employer].

## 2019-12-18 ENCOUNTER — PATIENT OUTREACH (OUTPATIENT)
Dept: HEALTH INFORMATION MANAGEMENT | Facility: OTHER | Age: 59
End: 2019-12-18

## 2019-12-18 NOTE — ED TRIAGE NOTES
"Chief Complaint   Patient presents with   • Leg Cramps     bilateral     States \"it has always done this.  Nothing new today.\"  Pt ambulatory to triage without assistance.  Triage process explained to patient.  Pt back to waiting room.  Pt instructed to inform RN if any changes or questions arise.    "

## 2019-12-18 NOTE — ED NOTES
Joleen from Lab called with critical result of WBC 1.8, platelet 27 at 20:15. Critical lab result read back to Joleen.   Dr. Thompson notified of critical lab result at 20:17.  Critical lab result read back by Dr. Thompson.

## 2019-12-18 NOTE — ED PROVIDER NOTES
ED Provider Note    Scribed for Tin Oliver D.O. by Yohan Venegas. 12/17/2019  7:07 PM    Primary care provider: Nnamdi Ballesteros M.D.  Means of arrival: Walk-In  History obtained from: Patient  History limited by: None    CHIEF COMPLAINT  Chief Complaint   Patient presents with   • Leg Cramps     bilateral       HPI  Inocencio Shabazz is a 59 y.o. male who presents to the Emergency Department for evaluation of leg cramping. The patient notes that he has experienced similar cramping like this before but it has never been this bad. He notes of an associated rash on the outside of his left leg. Denies leg swelling. The patient notes that he frequently drinks alcohol but he has not had any today.     REVIEW OF SYSTEMS  Pertinent positives include bilateral leg cramping and rash on the outside of his left leg. Pertinent negatives include no leg swelling.  All other systems reviewed and negative.    PAST MEDICAL HISTORY  Past Medical History:   Diagnosis Date   • Alcohol abuse    • Alcohol intoxication (HCC) 3/13/2014   • Cirrhosis (HCC)    • GIB (gastrointestinal bleeding) 1/3/2016   • Hepatitis C    • Pancytopenia (HCC)    • Psychiatric disorder     schitzo, bipolar       SURGICAL HISTORY  Past Surgical History:   Procedure Laterality Date   • GASTROSCOPY-ENDO N/A 11/13/2019    Procedure: GASTROSCOPY with banding;  Surgeon: Tamela Smith M.D.;  Location: ENDOSCOPY [facility];  Service: Gastroenterology   • GASTROSCOPY  3/13/2019    Procedure: GASTROSCOPY;  Surgeon: Abdi Ba M.D.;  Location: SURGERY [facility];  Service: Gastroenterology   • GASTROSCOPY  4/8/2016    Procedure: GASTROSCOPY;  Surgeon: Braeden Tobin M.D.;  Location: SURGERY [facility];  Service:    • GASTROSCOPY  1/3/2016    Procedure: GASTROSCOPY WITH BANDING;  Surgeon: Alfredo Antonio M.D.;  Location: SURGERY [facility];  Service:         SOCIAL HISTORY  Social History     Tobacco Use   • Smoking status:  "Current Every Day Smoker     Packs/day: 1.00     Years: 48.00     Pack years: 48.00     Types: Cigarettes   • Smokeless tobacco: Never Used   Substance Use Topics   • Alcohol use: Yes     Comment: 1-2 beers and 1/2 pints of whiskey every day   • Drug use: Yes     Types: Inhaled     Comment: marijuana      Social History     Substance and Sexual Activity   Drug Use Yes   • Types: Inhaled    Comment: marijuana       FAMILY HISTORY  No family history noted.    CURRENT MEDICATIONS  Home Medications    **Home medications have not yet been reviewed for this encounter**         ALLERGIES  Allergies   Allergen Reactions   • Haloperidol Unspecified     Twitching         PHYSICAL EXAM  VITAL SIGNS: /93   Pulse (!) 118   Temp 36.1 °C (96.9 °F) (Temporal)   Resp 18   Ht 1.727 m (5' 8\")   Wt 61.6 kg (135 lb 12.9 oz)   SpO2 96%   BMI 20.65 kg/m²     Nursing notes and vitals reviewed.  Constitutional: Well developed, Well nourished, No acute distress, Non-toxic appearance.   Eyes: PERRLA, EOMI, Conjunctiva normal, No discharge.   Cardiovascular: Tachycardic heart rate, Normal rhythm, No murmurs, No rubs, No gallops.   Thorax & Lungs: No respiratory distress, No rales, No rhonchi, No wheezing, No chest tenderness.   Abdomen: Bowel sounds normal, Soft, No tenderness, No guarding, No rebound, No masses, No pulsatile masses.   Skin: Warm, Dry, No erythema, No rash.   Musculoskeletal: Slight tenderness to palpation to lower extremities, No pedal edema, Intact distal pulses, No edema, No cyanosis, No clubbing. Good range of motion in all major joints. No major deformities noted, no CVA tenderness, no midline back tenderness.   Neurologic: Alert & oriented x 3, Normal motor function, Normal sensory function, No focal deficits noted.  Psychiatric: Affect normal for clinical presentation.    DIAGNOSTIC STUDIES/PROCEDURES    LABS  Results for orders placed or performed during the hospital encounter of 12/17/19   CBC WITH " DIFFERENTIAL   Result Value Ref Range    WBC 1.6 (LL) 4.8 - 10.8 K/uL    RBC 3.52 (L) 4.70 - 6.10 M/uL    Hemoglobin 11.1 (L) 14.0 - 18.0 g/dL    Hematocrit 34.2 (L) 42.0 - 52.0 %    MCV 97.2 81.4 - 97.8 fL    MCH 31.5 27.0 - 33.0 pg    MCHC 32.5 (L) 33.7 - 35.3 g/dL    RDW 47.4 35.9 - 50.0 fL    Platelet Count 27 (LL) 164 - 446 K/uL    MPV 13.3 (H) 9.0 - 12.9 fL    Neutrophils-Polys 66.10 44.00 - 72.00 %    Lymphocytes 23.20 22.00 - 41.00 %    Monocytes 8.00 0.00 - 13.40 %    Eosinophils 1.80 0.00 - 6.90 %    Basophils 0.90 0.00 - 1.80 %    Nucleated RBC 0.00 /100 WBC    Neutrophils (Absolute) 1.06 (L) 1.82 - 7.42 K/uL    Lymphs (Absolute) 0.37 (L) 1.00 - 4.80 K/uL    Monos (Absolute) 0.13 0.00 - 0.85 K/uL    Eos (Absolute) 0.03 0.00 - 0.51 K/uL    Baso (Absolute) 0.01 0.00 - 0.12 K/uL    NRBC (Absolute) 0.00 K/uL   COMP METABOLIC PANEL   Result Value Ref Range    Sodium 137 135 - 145 mmol/L    Potassium 3.7 3.6 - 5.5 mmol/L    Chloride 105 96 - 112 mmol/L    Co2 25 20 - 33 mmol/L    Anion Gap 7.0 0.0 - 11.9    Glucose 111 (H) 65 - 99 mg/dL    Bun 14 8 - 22 mg/dL    Creatinine 0.56 0.50 - 1.40 mg/dL    Calcium 8.6 8.5 - 10.5 mg/dL    AST(SGOT) 75 (H) 12 - 45 U/L    ALT(SGPT) 46 2 - 50 U/L    Alkaline Phosphatase 67 30 - 99 U/L    Total Bilirubin 0.6 0.1 - 1.5 mg/dL    Albumin 4.0 3.2 - 4.9 g/dL    Total Protein 7.8 6.0 - 8.2 g/dL    Globulin 3.8 (H) 1.9 - 3.5 g/dL    A-G Ratio 1.1 g/dL   DIAGNOSTIC ALCOHOL   Result Value Ref Range    Diagnostic Alcohol 0.00 0.00 g/dL   MAGNESIUM   Result Value Ref Range    Magnesium 1.9 1.5 - 2.5 mg/dL   PHOSPHORUS   Result Value Ref Range    Phosphorus 2.7 2.5 - 4.5 mg/dL   CREATINE KINASE   Result Value Ref Range    CPK Total 81 0 - 154 U/L   ESTIMATED GFR   Result Value Ref Range    GFR If African American >60 >60 mL/min/1.73 m 2    GFR If Non African American >60 >60 mL/min/1.73 m 2   DIFFERENTIAL MANUAL   Result Value Ref Range    Manual Diff Status PERFORMED    PERIPHERAL  SMEAR REVIEW   Result Value Ref Range    Peripheral Smear Review see below    PLATELET ESTIMATE   Result Value Ref Range    Plt Estimation Marked Decrease    MORPHOLOGY   Result Value Ref Range    RBC Morphology Present     Poikilocytosis 1+     Ovalocytes 1+    IMMATURE PLT FRACTION   Result Value Ref Range    Imm. Plt Fraction 10.8 0.6 - 13.1 K/uL       All labs reviewed by me.    RADIOLOGY  No orders to display     The radiologist's interpretation of all radiological studies have been reviewed by me.    COURSE & MEDICAL DECISION MAKING  Pertinent Labs & Imaging studies reviewed. (See chart for details)    7:07 PM - Patient seen and examined at bedside. Ordered Creatine Kinase, CBC w/, CMP, Diagnostic ETOH, Magnesium, and Phosphorus to evaluate his symptoms.     This is a charming 59 y.o. male that presents with bilateral leg swelling.  The patient does have a longstanding history of pancytopenia and today does have pancytopenia with a white blood cell count of 1600, hemoglobin is 11.1 platelet count of 27,000.  The patient is actively bleeding therefore he will not be transfused.  As for his leg cramps, the patient has notes a significant electrolyte abnormality causing his symptoms.  The patient was positive p.o. on discharge.  Initial heart rate is 143 but on my evaluation a normal heart rate entire time he was here.  The patient be following with a primary care physician for further evaluation and management.      DISPOSITION:  Patient will be discharged home in stable condition.    FOLLOW UP:  [facility], Emergency Dept  [address] [address] [phone]    If symptoms worsen    Nnamdi Ballesteros M.D.  [address] [address] [phone]    Schedule an appointment as soon as possible for a visit       FINAL IMPRESSION  1. Leg cramps Active         Yohan ALMAZAN (Scribe), am scribing for, and in the presence of, Tin Oliver D.O    Electronically  signed by: Yohan Venegas (Scribe), 12/17/2019    ITin D.O. personally performed the services described in this documentation, as scribed by Yohan Venegas in my presence, and it is both accurate and complete. C    The note accurately reflects work and decisions made by me.  Tin Oliver  12/17/2019  10:19 PM

## 2019-12-18 NOTE — ED NOTES
D/C instructions provided to patient at bedside, verbalizes understanding and states plans for follow-up with PCP as recommended.   IV removed and dressed.   Pt given crackers, coffee and socks. Bus pass provided for ride home.   All belongings accounted for, all questions answered at this time.

## 2019-12-26 NOTE — PROGRESS NOTES
A 59-year-old male was an emergent admission to [facility] from 11/13/2019 to 11/18/2019 to treat Esophageal varices with bleeding. [facility] visited the patient bedside. The patient was discharged Home.     The patient was ordered to start/continue to take the following medications: Thiamine Hydrochloride (Thiamine), Sulfamethoxazole (Bactrim), Omeprazole, and Guaifenesin (Mucinex). The patient filled all discharge medications.     [facility] was unable to contact patient post-discharge.

## 2020-01-07 ENCOUNTER — APPOINTMENT (OUTPATIENT)
Dept: RADIOLOGY | Facility: MEDICAL CENTER | Age: 60
End: 2020-01-07
Attending: EMERGENCY MEDICINE
Payer: MEDICARE

## 2020-01-07 ENCOUNTER — HOSPITAL ENCOUNTER (EMERGENCY)
Facility: MEDICAL CENTER | Age: 60
End: 2020-01-07
Attending: EMERGENCY MEDICINE
Payer: MEDICARE

## 2020-01-07 VITALS
HEIGHT: 68 IN | TEMPERATURE: 98.6 F | HEART RATE: 69 BPM | DIASTOLIC BLOOD PRESSURE: 91 MMHG | SYSTOLIC BLOOD PRESSURE: 131 MMHG | BODY MASS INDEX: 20.46 KG/M2 | RESPIRATION RATE: 16 BRPM | WEIGHT: 135 LBS

## 2020-01-07 DIAGNOSIS — F10.921 ACUTE ALCOHOLIC INTOXICATION WITH DELIRIUM (HCC): ICD-10-CM

## 2020-01-07 DIAGNOSIS — F19.10 SUBSTANCE ABUSE (HCC): ICD-10-CM

## 2020-01-07 DIAGNOSIS — D61.818 PANCYTOPENIA (HCC): ICD-10-CM

## 2020-01-07 DIAGNOSIS — R44.3 HALLUCINATIONS: ICD-10-CM

## 2020-01-07 LAB
ALBUMIN SERPL BCP-MCNC: 3.9 G/DL (ref 3.2–4.9)
ALBUMIN/GLOB SERPL: 1.2 G/DL
ALP SERPL-CCNC: 63 U/L (ref 30–99)
ALT SERPL-CCNC: 109 U/L (ref 2–50)
AMPHET UR QL SCN: NEGATIVE
ANION GAP SERPL CALC-SCNC: 10 MMOL/L (ref 0–11.9)
AST SERPL-CCNC: 116 U/L (ref 12–45)
BARBITURATES UR QL SCN: NEGATIVE
BASOPHILS # BLD AUTO: 1.8 % (ref 0–1.8)
BASOPHILS # BLD: 0.04 K/UL (ref 0–0.12)
BENZODIAZ UR QL SCN: NEGATIVE
BILIRUB SERPL-MCNC: 0.6 MG/DL (ref 0.1–1.5)
BUN SERPL-MCNC: 10 MG/DL (ref 8–22)
BZE UR QL SCN: NEGATIVE
CALCIUM SERPL-MCNC: 8.4 MG/DL (ref 8.5–10.5)
CANNABINOIDS UR QL SCN: POSITIVE
CHLORIDE SERPL-SCNC: 109 MMOL/L (ref 96–112)
CO2 SERPL-SCNC: 25 MMOL/L (ref 20–33)
CREAT SERPL-MCNC: 0.59 MG/DL (ref 0.5–1.4)
EKG IMPRESSION: NORMAL
EOSINOPHIL # BLD AUTO: 0.05 K/UL (ref 0–0.51)
EOSINOPHIL NFR BLD: 2.7 % (ref 0–6.9)
ERYTHROCYTE [DISTWIDTH] IN BLOOD BY AUTOMATED COUNT: 46.4 FL (ref 35.9–50)
ETHANOL BLD-MCNC: 0.27 G/DL
GLOBULIN SER CALC-MCNC: 3.3 G/DL (ref 1.9–3.5)
GLUCOSE SERPL-MCNC: 86 MG/DL (ref 65–99)
HCT VFR BLD AUTO: 38.2 % (ref 42–52)
HGB BLD-MCNC: 12.8 G/DL (ref 14–18)
LIPASE SERPL-CCNC: 79 U/L (ref 11–82)
LYMPHOCYTES # BLD AUTO: 0.48 K/UL (ref 1–4.8)
LYMPHOCYTES NFR BLD: 23.9 % (ref 22–41)
MANUAL DIFF BLD: NORMAL
MCH RBC QN AUTO: 32 PG (ref 27–33)
MCHC RBC AUTO-ENTMCNC: 33.5 G/DL (ref 33.7–35.3)
MCV RBC AUTO: 95.5 FL (ref 81.4–97.8)
METHADONE UR QL SCN: NEGATIVE
MONOCYTES # BLD AUTO: 0.18 K/UL (ref 0–0.85)
MONOCYTES NFR BLD AUTO: 8.8 % (ref 0–13.4)
MORPHOLOGY BLD-IMP: NORMAL
NEUTROPHILS # BLD AUTO: 1.26 K/UL (ref 1.82–7.42)
NEUTROPHILS NFR BLD: 62.8 % (ref 44–72)
NRBC # BLD AUTO: 0 K/UL
NRBC BLD-RTO: 0 /100 WBC
OPIATES UR QL SCN: NEGATIVE
OVALOCYTES BLD QL SMEAR: NORMAL
OXYCODONE UR QL SCN: NEGATIVE
PCP UR QL SCN: NEGATIVE
PLATELET # BLD AUTO: 41 K/UL (ref 164–446)
PLATELET BLD QL SMEAR: NORMAL
PLATELETS.RETICULATED NFR BLD AUTO: 11.1 K/UL (ref 0.6–13.1)
PMV BLD AUTO: 12.2 FL (ref 9–12.9)
POC BREATHALIZER: 0.07 PERCENT (ref 0–0.01)
POIKILOCYTOSIS BLD QL SMEAR: NORMAL
POTASSIUM SERPL-SCNC: 3.4 MMOL/L (ref 3.6–5.5)
PROPOXYPH UR QL SCN: NEGATIVE
PROT SERPL-MCNC: 7.2 G/DL (ref 6–8.2)
RBC # BLD AUTO: 4 M/UL (ref 4.7–6.1)
RBC BLD AUTO: PRESENT
SODIUM SERPL-SCNC: 144 MMOL/L (ref 135–145)
TROPONIN T SERPL-MCNC: <6 NG/L (ref 6–19)
WBC # BLD AUTO: 2 K/UL (ref 4.8–10.8)

## 2020-01-07 PROCEDURE — 80053 COMPREHEN METABOLIC PANEL: CPT

## 2020-01-07 PROCEDURE — 85055 RETICULATED PLATELET ASSAY: CPT

## 2020-01-07 PROCEDURE — 80307 DRUG TEST PRSMV CHEM ANLYZR: CPT

## 2020-01-07 PROCEDURE — A9270 NON-COVERED ITEM OR SERVICE: HCPCS | Performed by: EMERGENCY MEDICINE

## 2020-01-07 PROCEDURE — 99285 EMERGENCY DEPT VISIT HI MDM: CPT

## 2020-01-07 PROCEDURE — 700111 HCHG RX REV CODE 636 W/ 250 OVERRIDE (IP): Performed by: EMERGENCY MEDICINE

## 2020-01-07 PROCEDURE — 93005 ELECTROCARDIOGRAM TRACING: CPT | Performed by: EMERGENCY MEDICINE

## 2020-01-07 PROCEDURE — 302970 POC BREATHALIZER: Performed by: EMERGENCY MEDICINE

## 2020-01-07 PROCEDURE — 700102 HCHG RX REV CODE 250 W/ 637 OVERRIDE(OP): Performed by: EMERGENCY MEDICINE

## 2020-01-07 PROCEDURE — 90791 PSYCH DIAGNOSTIC EVALUATION: CPT

## 2020-01-07 PROCEDURE — 85027 COMPLETE CBC AUTOMATED: CPT

## 2020-01-07 PROCEDURE — 84484 ASSAY OF TROPONIN QUANT: CPT

## 2020-01-07 PROCEDURE — 83690 ASSAY OF LIPASE: CPT

## 2020-01-07 PROCEDURE — 85007 BL SMEAR W/DIFF WBC COUNT: CPT

## 2020-01-07 PROCEDURE — 71045 X-RAY EXAM CHEST 1 VIEW: CPT

## 2020-01-07 RX ORDER — ONDANSETRON 4 MG/1
4 TABLET, ORALLY DISINTEGRATING ORAL ONCE
Status: COMPLETED | OUTPATIENT
Start: 2020-01-07 | End: 2020-01-07

## 2020-01-07 RX ORDER — POTASSIUM CHLORIDE 20 MEQ/1
40 TABLET, EXTENDED RELEASE ORAL ONCE
Status: COMPLETED | OUTPATIENT
Start: 2020-01-07 | End: 2020-01-07

## 2020-01-07 RX ORDER — ONDANSETRON 2 MG/ML
4 INJECTION INTRAMUSCULAR; INTRAVENOUS ONCE
Status: DISCONTINUED | OUTPATIENT
Start: 2020-01-07 | End: 2020-01-07

## 2020-01-07 RX ADMIN — POTASSIUM CHLORIDE 40 MEQ: 1500 TABLET, EXTENDED RELEASE ORAL at 20:25

## 2020-01-07 RX ADMIN — ONDANSETRON 4 MG: 4 TABLET, ORALLY DISINTEGRATING ORAL at 15:48

## 2020-01-07 ASSESSMENT — LIFESTYLE VARIABLES
CONSUMPTION TOTAL: POSITIVE
HAVE PEOPLE ANNOYED YOU BY CRITICIZING YOUR DRINKING: YES
ANXIETY: NO ANXIETY (AT EASE)
HEADACHE, FULLNESS IN HEAD: NOT PRESENT
NAUSEA AND VOMITING: NO NAUSEA AND NO VOMITING
TREMOR: MODERATE TREMOR WITH ARMS EXTENDED
VISUAL DISTURBANCES: NOT PRESENT
TOTAL SCORE: 4
DO YOU DRINK ALCOHOL: YES
AGITATION: NORMAL ACTIVITY
TOTAL SCORE: 4
DOES PATIENT WANT TO STOP DRINKING: YES
HOW MANY TIMES IN THE PAST YEAR HAVE YOU HAD 5 OR MORE DRINKS IN A DAY: 360
AUDITORY DISTURBANCES: VERY MILD HARSHNESS OR ABILITY TO FRIGHTEN
PAROXYSMAL SWEATS: NO SWEAT VISIBLE
TOTAL SCORE: 4
AVERAGE NUMBER OF DAYS PER WEEK YOU HAVE A DRINK CONTAINING ALCOHOL: 7
TOTAL SCORE: 5
ON A TYPICAL DAY WHEN YOU DRINK ALCOHOL HOW MANY DRINKS DO YOU HAVE: 10
EVER FELT BAD OR GUILTY ABOUT YOUR DRINKING: YES
ORIENTATION AND CLOUDING OF SENSORIUM: ORIENTED AND CAN DO SERIAL ADDITIONS
EVER HAD A DRINK FIRST THING IN THE MORNING TO STEADY YOUR NERVES TO GET RID OF A HANGOVER: YES
HAVE YOU EVER FELT YOU SHOULD CUT DOWN ON YOUR DRINKING: YES

## 2020-01-07 NOTE — ED PROVIDER NOTES
ED Provider Note    Scribed for Amparo Diaz M.D. by Sidra Fonseca. 1/7/2020, 12:28 PM.    Primary care provider: Nnamdi Ballesteros M.D.  Means of arrival: Ambulance  History obtained from: Patient  History limited by: Patient's altered mental status.    CHIEF COMPLAINT  Chief Complaint   Patient presents with   • Hallucinations       HPI  Inocencio Shabazz is a 59 y.o. male who presents to the Emergency Department for evaluation following alcohol ingestion. Per patient, he smoked a significant amount of cocaine yesterday. The patient additionally reports to have drank a large amount of whiskey and vodka yesterday. Upon EMS arrival, the patient began to complain of associated hallucinations onset today. He also currently reports a sensation of generalized numbness, but no suicidal ideations or homicidal ideations. No alleviating or aggravating factors reported. The patient denies any shortness of breath, chest pain, abdominal pain, or vomiting.  He reports no recent falls or head trauma. The patient is currently prescribed psychiatric medications, and states he takes all medications as prescribed.     HPI limited secondary to patient's a poor historian.    REVIEW OF SYSTEMS  Pertinent positives include drug ingestion, hallucinations, and generalized numbness. Pertinent negatives include no suicidal ideations, homicidal ideations, shortness of breath, chest pain, abdominal pain, or vomiting, weakness on one side of his body, blurry vision.   ROS limited secondary to patient's altered mental status.     PAST MEDICAL HISTORY   has a past medical history of Alcohol abuse, Alcohol intoxication (HCC) (3/13/2014), Cirrhosis (HCC), GIB (gastrointestinal bleeding) (1/3/2016), Hepatitis C, Pancytopenia (HCC), and Psychiatric disorder.    SURGICAL HISTORY   has a past surgical history that includes gastroscopy (1/3/2016); gastroscopy (4/8/2016); gastroscopy (3/13/2019); and gastroscopy-endo (N/A, 11/13/2019).    SOCIAL  "HISTORY  Social History     Tobacco Use   • Smoking status: Current Every Day Smoker     Packs/day: 1.00     Years: 48.00     Pack years: 48.00     Types: Cigarettes   • Smokeless tobacco: Never Used   Substance Use Topics   • Alcohol use: Yes     Comment: 1-2 beers and 1/2 pints of whiskey every day   • Drug use: Yes     Types: Inhaled     Comment: marijuana, cocaine, meth      Social History     Substance and Sexual Activity   Drug Use Yes   • Types: Inhaled    Comment: marijuana, cocaine, meth       FAMILY HISTORY  History reviewed. No pertinent family history.    CURRENT MEDICATIONS  Home Medications    **Home medications have not yet been reviewed for this encounter**         ALLERGIES  Allergies   Allergen Reactions   • Haloperidol Unspecified     Twitching         PHYSICAL EXAM  VITAL SIGNS: /50   Pulse 86   Temp 36.7 °C (98.1 °F) (Temporal)   Resp 18   Ht 1.727 m (5' 8\")   Wt 61.2 kg (135 lb)   BMI 20.53 kg/m²     Constitutional: Sleeping but arouses to voice. Well developed, No acute distress, Non-toxic appearance.   HENT: Normocephalic, Atraumatic, Bilateral external ears normal, Oropharynx moist  Eyes: PERRL, EOMI, Conjunctiva normal,  Neck: Normal range of motion, No tenderness, Supple  Cardiovascular: Normal heart rate, Normal rhythm  Thorax & Lungs: Normal breath sounds, No respiratory distress  Abdomen: Benign abdominal exam, no guarding no rebound, no tenderness, no distention  Skin: Warm, Dry, No erythema, No rash.   Back: No tenderness, No CVA tenderness.   Extremities: Intact distal pulses, No edema, No tenderness   Neurologic: Sleep but arouses to voice, Normal motor function, Normal sensory function, No focal deficits noted.   Psychiatric: Appropriate. No SI. No HI.                                                      DIAGNOSTIC STUDIES / PROCEDURES\    LABS  Results for orders placed or performed during the hospital encounter of 01/07/20   URINE DRUG SCREEN (TRIAGE)   Result Value " Ref Range    Amphetamines Urine Negative Negative    Barbiturates Negative Negative    Benzodiazepines Negative Negative    Cocaine Metabolite Negative Negative    Methadone Negative Negative    Opiates Negative Negative    Oxycodone Negative Negative    Phencyclidine -Pcp Negative Negative    Propoxyphene Negative Negative    Cannabinoid Metab Positive (A) Negative   Blood Alcohol   Result Value Ref Range    Diagnostic Alcohol 0.27 (H) 0.00 g/dL   CBC WITH DIFFERENTIAL   Result Value Ref Range    WBC 2.0 (LL) 4.8 - 10.8 K/uL    RBC 4.00 (L) 4.70 - 6.10 M/uL    Hemoglobin 12.8 (L) 14.0 - 18.0 g/dL    Hematocrit 38.2 (L) 42.0 - 52.0 %    MCV 95.5 81.4 - 97.8 fL    MCH 32.0 27.0 - 33.0 pg    MCHC 33.5 (L) 33.7 - 35.3 g/dL    RDW 46.4 35.9 - 50.0 fL    Platelet Count 41 (LL) 164 - 446 K/uL    MPV 12.2 9.0 - 12.9 fL    Neutrophils-Polys 62.80 44.00 - 72.00 %    Lymphocytes 23.90 22.00 - 41.00 %    Monocytes 8.80 0.00 - 13.40 %    Eosinophils 2.70 0.00 - 6.90 %    Basophils 1.80 0.00 - 1.80 %    Nucleated RBC 0.00 /100 WBC    Neutrophils (Absolute) 1.26 (L) 1.82 - 7.42 K/uL    Lymphs (Absolute) 0.48 (L) 1.00 - 4.80 K/uL    Monos (Absolute) 0.18 0.00 - 0.85 K/uL    Eos (Absolute) 0.05 0.00 - 0.51 K/uL    Baso (Absolute) 0.04 0.00 - 0.12 K/uL    NRBC (Absolute) 0.00 K/uL   COMP METABOLIC PANEL   Result Value Ref Range    Sodium 144 135 - 145 mmol/L    Potassium 3.4 (L) 3.6 - 5.5 mmol/L    Chloride 109 96 - 112 mmol/L    Co2 25 20 - 33 mmol/L    Anion Gap 10.0 0.0 - 11.9    Glucose 86 65 - 99 mg/dL    Bun 10 8 - 22 mg/dL    Creatinine 0.59 0.50 - 1.40 mg/dL    Calcium 8.4 (L) 8.5 - 10.5 mg/dL    AST(SGOT) 116 (H) 12 - 45 U/L    ALT(SGPT) 109 (H) 2 - 50 U/L    Alkaline Phosphatase 63 30 - 99 U/L    Total Bilirubin 0.6 0.1 - 1.5 mg/dL    Albumin 3.9 3.2 - 4.9 g/dL    Total Protein 7.2 6.0 - 8.2 g/dL    Globulin 3.3 1.9 - 3.5 g/dL    A-G Ratio 1.2 g/dL   DIFFERENTIAL MANUAL   Result Value Ref Range    Manual Diff Status  PERFORMED    PERIPHERAL SMEAR REVIEW   Result Value Ref Range    Peripheral Smear Review see below    PLATELET ESTIMATE   Result Value Ref Range    Plt Estimation Marked Decrease    MORPHOLOGY   Result Value Ref Range    RBC Morphology Present     Poikilocytosis 1+     Ovalocytes 1+    IMMATURE PLT FRACTION   Result Value Ref Range    Imm. Plt Fraction 11.1 0.6 - 13.1 K/uL   ESTIMATED GFR   Result Value Ref Range    GFR If African American >60 >60 mL/min/1.73 m 2    GFR If Non African American >60 >60 mL/min/1.73 m 2   TROPONIN   Result Value Ref Range    Troponin T <6 6 - 19 ng/L   LIPASE   Result Value Ref Range    Lipase 79 11 - 82 U/L   EKG   Result Value Ref Range    Report       [facility] Emergency Dept.    Test Date:  2020  Pt Name:    JIMMY FERRER                 Department: ER  MRN:        [id number]                      Room:       [facility]  Gender:     Male                         Technician: [id number]  :        1960                   Requested By:AMPARO LEIGH  Order #:    [id number]                    Reading MD: Amparo Vance    Measurements  Intervals                                Axis  Rate:       61                           P:          -24  MO:         164                          QRS:        74  QRSD:       92                           T:          73  QT:         448  QTc:        452    Interpretive Statements  SINUS RHYTHM  Compared to ECG 2019 05:39:16  Sinus tachycardia no longer present  T-wave abnormality no longer present  Electronically Signed On 2020 18:06:56 PST by Amparo Vance       All labs reviewed by me.    EKG  12 lead interpreted by me, as shown above.     RADIOLOGY  DX-CHEST-PORTABLE (1 VIEW)   Final Result      1.  There is no acute cardiopulmonary process.        The radiologist's interpretations of all radiological studies have been reviewed by me.          COURSE & MEDICAL DECISION MAKING  Nursing notes, VS, PMSFHx reviewed in chart.      12:28 PM Patient seen and examined at bedside. The patient presents with hallucinations following drug ingestion and the differential diagnosis includes but is not limited to intoxicated, versus hallucinations from drug ingestion versus dehydration versus electrolyte abnormality. The patient is difficult to get a history from, but currently denies hallucinations. Physical exam is reassuring with non focal neurological exam and no findings of trauma. Will check labs and reevaluate once sober. Ordered for Urine Drug Screen, Blood Alcohol, CBC with differential, and CMP to evaluate.     Patient's laboratory studies have returned and show evidence of platelets of 41.  Patient has a history of thrombocytopenia in the past and this is consistent.  There is no dramatic changes to his platelets.  Patient also has a white count of 2 which seems chronic for the patient as well.  Previous white count in December was 1.6.  Patient has no evidence of a bandemia.  Patient does have slightly elevated liver function tests and patient admits to drinking a lot over the last 24 hours.  He has no evidence of pancreatitis.  Urine drug screen does not show evidence of recent cocaine use or methamphetamine use.    3:02 PM - Reviewed patient's lab results as shown above. Patient with platelets of 41, but not as low as previous platelets of 27. He will be given dose of 40 mEq of Potassium Chloride.    3:58 PM - Patient was reevaluated at bedside. He reports to have developed a sharp abdominal pain followed by an episode of vomiting. Patient treated with Zofran 4 mg. The patient reports to feel improved now following emesis. EKG taken at bedside as he did report to the nurse that he may be having some chest pain.  However EKG was unremarkable.  Troponin was also normal.  He did not express chest pain to me and has not had any further episodes of chest pain.  He has not had any suicidal or homicidal ideations and therefore I have not  placed him on a legal hold.  I do however think behavioral health needs to evaluate the patient to ensure that his hallucinations are not interfering with his ability to care for himself.    6:02 PM - Will repeat breathalizer at 6:45PM. If normal, Behavioral Health will meet with patient. He has had no further episodes of vomiting or chest pain.     8 PM patient has been evaluated by the alert team.  Patient is amenable to being transferred for help with his alcoholism.  Therefore social work is [facility] [place] [facility] help of his chronic alcohol abuse.  Patient is currently resting comfortably with no complaints.  Patient is not on a legal hold as he has had no suicidal or homicidal ideations.  His hallucinations were also deemed to not be affecting his ability to care for himself.    FINAL IMPRESSION  1. Hallucinations    2. Substance abuse (HCC)    3. Pancytopenia (HCC)    4. Acute alcoholic intoxication with delirium (HCC)          Sidra ALMAZAN (Jonny), am scribing for, and in the presence of, Amparo Diaz M.D..    Electronically signed by: Sidra Fonseca (Jonny), 1/7/2020    IAmparo M.D. personally performed the services described in this documentation, as scribed by Sidra Fonseca in my presence, and it is both accurate and complete.    C.    The note accurately reflects work and decisions made by me.  Amparo Diaz  1/7/2020  8:41 PM

## 2020-01-07 NOTE — ED NOTES
Pt c/o abd pain after eating then vomited on the floor.  Oral potassium held.  ERP notified.  Orders received.

## 2020-01-07 NOTE — ED TRIAGE NOTES
BIB [facility] to G33 w/ c/o auditory & visual hallucinations.  Hx of schizophrenia.  States compliant w/ meds.  Pt unable to state what his specific hallucinations are.  Pt denies SI/HI.  Pt is groggy so difficult to asses at times.  States he has been drinking whiskey & vodka pta.  States cocaine & meth use yesterday.  Pt in gown, chart up for ERP.

## 2020-01-08 NOTE — ED NOTES
Pt ambulated to restroom with steady gait, voiding without difficulties, urine sample collected and sent.

## 2020-01-08 NOTE — ED NOTES
Pt provided water.  Remains cp free.  Awaiting pt to be sober and then will request alert team evaluates him.  Pt updated.
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
## 2020-01-08 NOTE — ED NOTES
Ride from [facility] at bedside, pt in no acute distress, aware of follow up care and reasons to return to ER, to CTC with ride in stable condition.

## 2020-01-08 NOTE — ED PROVIDER NOTES
ED Provider Note Addendum     This is an addendum to the note on Inocencio Shabazz.  For further details and full chart information, see patient's initial note.          9:46 PM - Patient was evaluated by [facility]. [facility]    Disposition:  Patient will be transferred to the [facility] in stable condition for further psychiatric care and evaluation.     FINAL IMPRESSION  1. Hallucinations    2. Substance abuse (HCC)    3. Pancytopenia (HCC)    4. Acute alcoholic intoxication with delirium (HCC)            The note accurately reflects work and decisions made by me.  Nnamdi Dee  1/7/2020  11:51 PM

## 2020-01-08 NOTE — ED NOTES
Assumed care of pt from prior RN. Pt resting in stretcher, in no acute distress. Behavioral Health at bedside.

## 2020-01-08 NOTE — ED NOTES
Juice provided.  breathalyzer 0.072.  Alert team notified so they mack eval.  Close obs in place.

## 2020-02-05 ENCOUNTER — HOSPITAL ENCOUNTER (EMERGENCY)
Facility: MEDICAL CENTER | Age: 60
End: 2020-02-06
Attending: EMERGENCY MEDICINE
Payer: MEDICARE

## 2020-02-05 DIAGNOSIS — F69 BEHAVIOR CONCERN IN ADULT: ICD-10-CM

## 2020-02-05 DIAGNOSIS — F10.920 ALCOHOLIC INTOXICATION WITHOUT COMPLICATION (HCC): ICD-10-CM

## 2020-02-05 LAB
ALBUMIN SERPL BCP-MCNC: 4.2 G/DL (ref 3.2–4.9)
ALBUMIN/GLOB SERPL: 1.1 G/DL
ALP SERPL-CCNC: 61 U/L (ref 30–99)
ALT SERPL-CCNC: 128 U/L (ref 2–50)
AMMONIA PLAS-SCNC: 42 UMOL/L (ref 11–45)
ANION GAP SERPL CALC-SCNC: 11 MMOL/L (ref 0–11.9)
AST SERPL-CCNC: 152 U/L (ref 12–45)
BASOPHILS # BLD AUTO: 1.1 % (ref 0–1.8)
BASOPHILS # BLD: 0.04 K/UL (ref 0–0.12)
BILIRUB SERPL-MCNC: 0.6 MG/DL (ref 0.1–1.5)
BUN SERPL-MCNC: 9 MG/DL (ref 8–22)
CALCIUM SERPL-MCNC: 8.6 MG/DL (ref 8.5–10.5)
CHLORIDE SERPL-SCNC: 109 MMOL/L (ref 96–112)
CO2 SERPL-SCNC: 24 MMOL/L (ref 20–33)
CREAT SERPL-MCNC: 0.67 MG/DL (ref 0.5–1.4)
EOSINOPHIL # BLD AUTO: 0.06 K/UL (ref 0–0.51)
EOSINOPHIL NFR BLD: 1.6 % (ref 0–6.9)
ERYTHROCYTE [DISTWIDTH] IN BLOOD BY AUTOMATED COUNT: 51.7 FL (ref 35.9–50)
ETHANOL BLD-MCNC: 0.29 G/DL
GLOBULIN SER CALC-MCNC: 4 G/DL (ref 1.9–3.5)
GLUCOSE SERPL-MCNC: 78 MG/DL (ref 65–99)
HCT VFR BLD AUTO: 43.7 % (ref 42–52)
HGB BLD-MCNC: 13.8 G/DL (ref 14–18)
IMM GRANULOCYTES # BLD AUTO: 0.02 K/UL (ref 0–0.11)
IMM GRANULOCYTES NFR BLD AUTO: 0.5 % (ref 0–0.9)
LIPASE SERPL-CCNC: 41 U/L (ref 11–82)
LYMPHOCYTES # BLD AUTO: 0.91 K/UL (ref 1–4.8)
LYMPHOCYTES NFR BLD: 24.8 % (ref 22–41)
MCH RBC QN AUTO: 30.5 PG (ref 27–33)
MCHC RBC AUTO-ENTMCNC: 31.6 G/DL (ref 33.7–35.3)
MCV RBC AUTO: 96.7 FL (ref 81.4–97.8)
MONOCYTES # BLD AUTO: 0.2 K/UL (ref 0–0.85)
MONOCYTES NFR BLD AUTO: 5.4 % (ref 0–13.4)
NEUTROPHILS # BLD AUTO: 2.44 K/UL (ref 1.82–7.42)
NEUTROPHILS NFR BLD: 66.6 % (ref 44–72)
NRBC # BLD AUTO: 0 K/UL
NRBC BLD-RTO: 0 /100 WBC
PLATELET # BLD AUTO: 59 K/UL (ref 164–446)
PMV BLD AUTO: 11.3 FL (ref 9–12.9)
POTASSIUM SERPL-SCNC: 3.8 MMOL/L (ref 3.6–5.5)
PROT SERPL-MCNC: 8.2 G/DL (ref 6–8.2)
RBC # BLD AUTO: 4.52 M/UL (ref 4.7–6.1)
SODIUM SERPL-SCNC: 144 MMOL/L (ref 135–145)
WBC # BLD AUTO: 3.7 K/UL (ref 4.8–10.8)

## 2020-02-05 PROCEDURE — 83690 ASSAY OF LIPASE: CPT

## 2020-02-05 PROCEDURE — 99284 EMERGENCY DEPT VISIT MOD MDM: CPT

## 2020-02-05 PROCEDURE — 80053 COMPREHEN METABOLIC PANEL: CPT

## 2020-02-05 PROCEDURE — 82140 ASSAY OF AMMONIA: CPT

## 2020-02-05 PROCEDURE — 85025 COMPLETE CBC W/AUTO DIFF WBC: CPT

## 2020-02-05 PROCEDURE — 36415 COLL VENOUS BLD VENIPUNCTURE: CPT

## 2020-02-05 PROCEDURE — 80307 DRUG TEST PRSMV CHEM ANLYZR: CPT

## 2020-02-06 VITALS
OXYGEN SATURATION: 95 % | WEIGHT: 132.72 LBS | HEIGHT: 68 IN | BODY MASS INDEX: 20.11 KG/M2 | RESPIRATION RATE: 16 BRPM | TEMPERATURE: 97.7 F | SYSTOLIC BLOOD PRESSURE: 105 MMHG | DIASTOLIC BLOOD PRESSURE: 78 MMHG | HEART RATE: 66 BPM

## 2020-02-06 NOTE — ED PROVIDER NOTES
"ED Provider Note    CHIEF COMPLAINT  Chief Complaint   Patient presents with   • Alcohol Intoxication   • Psych Eval       HPI  Inocencio Shabazz is a 60 y.o. male who presents to the emergency department with chief complaint of alcohol intoxication.  The patient just tells me that he \"feels like he is dying\".  When I asked what he means by that he really cannot explain it.  He does states he is not feeling well and sometimes he is seeing things but he denies homicidal or suicidal ideation.  He states he has been drinking a day and is been on a binge for the last month.  He does have a history of cirrhosis and GI bleed secondary to hepatitis and alcohol abuse but states he still continuing to drink.  He denies any lower extremity swelling.    History however is limited secondary to the patient's compliance in the setting of alcohol intoxication    REVIEW OF SYSTEMS  Positives as above. Pertinent negatives include chest pain abdominal pain leg swelling otherwise limited secondary to alcohol intoxication  All other review of systems are negative    PAST MEDICAL HISTORY   has a past medical history of Alcohol abuse, Alcohol intoxication (HCC) (3/13/2014), Cirrhosis (HCC), GIB (gastrointestinal bleeding) (1/3/2016), Hepatitis C, Pancytopenia (HCC), and Psychiatric disorder.    SOCIAL HISTORY  Social History     Tobacco Use   • Smoking status: Current Every Day Smoker     Packs/day: 1.00     Years: 48.00     Pack years: 48.00     Types: Cigarettes   • Smokeless tobacco: Never Used   Substance and Sexual Activity   • Alcohol use: Yes     Comment: 1-2 beers and 1/2 pints of whiskey every day   • Drug use: Yes     Types: Inhaled     Comment: marijuana, cocaine, meth   • Sexual activity: Not on file       SURGICAL HISTORY   has a past surgical history that includes gastroscopy (1/3/2016); gastroscopy (4/8/2016); gastroscopy (3/13/2019); and gastroscopy-endo (N/A, 11/13/2019).    CURRENT MEDICATIONS  Home Medications  " "  **Home medications have not yet been reviewed for this encounter**         ALLERGIES  Allergies   Allergen Reactions   • Haloperidol Unspecified     Twitching         PHYSICAL EXAM  VITAL SIGNS: BP (!) 99/62   Pulse 67   Temp 36.5 °C (97.7 °F) (Temporal)   Resp 16   Ht 1.727 m (5' 8\")   Wt 60.2 kg (132 lb 11.5 oz)   SpO2 98%   BMI 20.18 kg/m²    Pulse ox interpretation: I interpret this pulse ox as normal.  Constitutional: Alert   Mild distress intermittently tearful smells of alcohol  HENT: Normocephalic atraumatic, MMM  Eyes: PER, Conjunctiva normal, Non-icteric.   Neck: Normal range of motion, No tenderness, Supple, No stridor.   Cardiovascular: Regular rate and rhythm, no murmurs.   Thorax & Lungs: Normal breath sounds, No respiratory distress, No wheezing, No chest tenderness.   Abdomen: Bowel sounds normal, Soft, No tenderness, No pulsatile masses. No peritoneal signs.  Skin: Warm, Dry, No erythema, No rash.   Back: No bony tenderness, No CVA tenderness.   Extremities/MSK: Intact distal pulses, No edema, No tenderness, No cyanosis, no major deformities noted  Neurologic: Alert and oriented x3, No focal deficits noted.        DIFFERENTIAL DIAGNOSIS AND WORK UP PLAN    This is a 60 y.o. male who presents with alcohol intoxication and general generalized complaints of evaluate for some hepatic encephalopathy with the setting of cirrhosis and electrolyte abnormalities or infection and he is not really meeting criteria for legal hold at this time is not suicidal or homicidal although he states he feels\"\" not right but this is most likely secondary to alcohol intoxication    DIAGNOSTIC STUDIES / PROCEDURES    LABS  Pertinent Lab Findings  CBC with pancytopenia which is consistent with prior, CMP with elevated AST and ALT consistent with alcohol intoxication with normal coags alcohol is elevated at 0.29 normal lipase normal ammonia pending urine drug screen      RADIOLOGY  No orders to display     The " "radiologist's interpretation of all radiological studies have been reviewed by me.      COURSE & MEDICAL DECISION MAKING  Pertinent Labs & Imaging studies reviewed. (See chart for details)    12:07 AM  Patient is resting comfortably will allow the patient to further metabolize may need to be reassessed prior to discharge just to ensure he has not changed his tune about suicidal or homicidal ideation, there is no evidence of hepatic encephalopathy or severe electrolyte abnormality, his labs are baseline for him.  There are no signs of alcohol withdrawal at this time and he will be discharged after he is more sober    BP (!) 99/62   Pulse (!) 58   Temp 36.5 °C (97.7 °F) (Temporal)   Resp 16   Ht 1.727 m (5' 8\")   Wt 60.2 kg (132 lb 11.5 oz)   SpO2 95%   BMI 20.18 kg/m²       The patient will return for new or worsening symptoms and is stable at the time of discharge.    The patient is referred to a primary physician for blood pressure management, diabetic screening, and for all other preventative health concerns.    DISPOSITION:  Patient will be discharged home in stable condition.    FOLLOW UP:  Nnamdi Ballesteros M.D.  [address] [place] [id number] [phone]    Schedule an appointment as soon as possible for a visit       [facility], Emergency Dept  [address] [id number] [phone]    If symptoms worsen      OUTPATIENT MEDICATIONS:  New Prescriptions    No medications on file           FINAL IMPRESSION  1. Alcoholic intoxication without complication (HCC)     2. Behavior concern in adult             Electronically signed by: Joleen Cedeno M.D., 2/5/2020 9:46 PM    This dictation has been created using voice recognition software and/or scribes. The accuracy of the dictation is limited by the abilities of the software and the expertise of the scribes. I expect there may be some errors of grammar and possibly content. I made every attempt to manually correct the " errors within my dictation. However, errors related to voice recognition software and/or scribes may still exist and should be interpreted within the appropriate context.

## 2020-02-06 NOTE — ED NOTES
Pt given discharge instructions.  Pt aware not to drive after drinking. PIV removed, dressing applied. Signed copy in chart. Pt states all belongings in possession. Pt ambulatory off unit with steady gait.

## 2020-02-06 NOTE — ED TRIAGE NOTES
"Chief Complaint   Patient presents with   • Alcohol Intoxication   • Psych Eval     59 yo male ambulatory with steady gait to triage for above complaint. Pt states he is hearing voices and seeing hallucinations \"they keep saying I could win\", pt also reports ETOH today but can't state amount \"I drank a lot today man, that's why I think I'm dying\" FSBS 77 per EMS. Denies SI/HI.    Educated on triage process, encourage to inform staff of any changes. Charge RN notified.     BP (!) 99/62   Pulse 67   Temp 36.5 °C (97.7 °F) (Temporal)   Resp 16   Ht 1.727 m (5' 8\")   Wt 60.2 kg (132 lb 11.5 oz)   SpO2 98%   BMI 20.18 kg/m²   "

## 2020-02-18 ENCOUNTER — PATIENT OUTREACH (OUTPATIENT)
Dept: HEALTH INFORMATION MANAGEMENT | Facility: OTHER | Age: 60
End: 2020-02-18

## 2020-02-25 NOTE — PROGRESS NOTES
A 59-year-old male was an emergent admission to [facility] from 1/13/2020 to 1/19/2020 to treat nausea with vomiting, unspecified. IHD visited the patient bedside. No additional medical conditions were listed. The patient was not under clinical case management.     The patient has no medication orders.    The patient was ordered to follow-up with PCP. The patient had the following appointment: 2/7/2020 @ 10:50 Nnamdi Ballesteros, internal medicine - CONFIRMED AS KEPT. The patient has no future appointments scheduled.     IHD communicated with the patient/caregiver via phone 15 times. IHD did not visit the patient in-person post-discharge. During the bedside visit, IHD identified that the patient had food insecurity barriers.    IHD followed the patient for a total of 34 days and patient was receptive to services. IHD Worked closely with the patient's family or caregivers to assist the patient. As a result, patient adhered with discharge orders, patient attended follow up appointments, and patient successfully recovered or avoided further complications.

## 2020-03-07 ENCOUNTER — HOSPITAL ENCOUNTER (EMERGENCY)
Facility: MEDICAL CENTER | Age: 60
End: 2020-03-07
Attending: EMERGENCY MEDICINE
Payer: COMMERCIAL

## 2020-03-07 VITALS
OXYGEN SATURATION: 93 % | BODY MASS INDEX: 18.64 KG/M2 | TEMPERATURE: 99.1 F | HEART RATE: 97 BPM | DIASTOLIC BLOOD PRESSURE: 70 MMHG | WEIGHT: 123 LBS | RESPIRATION RATE: 18 BRPM | SYSTOLIC BLOOD PRESSURE: 104 MMHG | HEIGHT: 68 IN

## 2020-03-07 DIAGNOSIS — F10.920 ALCOHOLIC INTOXICATION WITHOUT COMPLICATION (HCC): ICD-10-CM

## 2020-03-07 PROCEDURE — 99285 EMERGENCY DEPT VISIT HI MDM: CPT

## 2020-03-07 PROCEDURE — A9270 NON-COVERED ITEM OR SERVICE: HCPCS | Performed by: EMERGENCY MEDICINE

## 2020-03-07 PROCEDURE — 700102 HCHG RX REV CODE 250 W/ 637 OVERRIDE(OP): Performed by: EMERGENCY MEDICINE

## 2020-03-07 RX ORDER — IBUPROFEN 600 MG/1
600 TABLET ORAL ONCE
Status: COMPLETED | OUTPATIENT
Start: 2020-03-07 | End: 2020-03-07

## 2020-03-07 RX ADMIN — IBUPROFEN 600 MG: 600 TABLET ORAL at 04:04

## 2020-03-07 ASSESSMENT — LIFESTYLE VARIABLES
HAVE YOU EVER FELT YOU SHOULD CUT DOWN ON YOUR DRINKING: YES
EVER HAD A DRINK FIRST THING IN THE MORNING TO STEADY YOUR NERVES TO GET RID OF A HANGOVER: YES
CONSUMPTION TOTAL: INCOMPLETE
TOTAL SCORE: 4
HAVE PEOPLE ANNOYED YOU BY CRITICIZING YOUR DRINKING: YES
EVER FELT BAD OR GUILTY ABOUT YOUR DRINKING: YES
TOTAL SCORE: 4
DO YOU DRINK ALCOHOL: YES
DOES PATIENT WANT TO STOP DRINKING: NO
TOTAL SCORE: 4

## 2020-03-07 ASSESSMENT — FIBROSIS 4 INDEX: FIB4 SCORE: 14.12

## 2020-03-07 NOTE — ED PROVIDER NOTES
"ED Provider Note    CHIEF COMPLAINT  Chief Complaint   Patient presents with   • Alcohol Intoxication       HPI  Inocencio Shabazz is a 60 y.o. male who presents to the emergency room today with alcohol intoxication patient does admits to drinking feels like \" I am dying\" he was told that alcohol will kill him he just wanted to be checked out denies chest pain no shortness of breath no fever chills no change in bowel bladder patient is ambulatory does admit to drinking multiple beers this past evening.    REVIEW OF SYSTEMS  See HPI for further details. All other systems are negative.     PAST MEDICAL HISTORY  Past Medical History:   Diagnosis Date   • GIB (gastrointestinal bleeding) 1/3/2016   • Alcohol intoxication (HCC) 3/13/2014   • Alcohol abuse    • Cirrhosis (HCC)    • Hepatitis C    • Pancytopenia (HCC)    • Psychiatric disorder     schitzo, bipolar       FAMILY HISTORY  No family history on file.    SOCIAL HISTORY  Social History     Socioeconomic History   • Marital status: Single     Spouse name: Not on file   • Number of children: Not on file   • Years of education: Not on file   • Highest education level: Not on file   Occupational History   • Not on file   Social Needs   • Financial resource strain: Not on file   • Food insecurity     Worry: Not on file     Inability: Not on file   • Transportation needs     Medical: Not on file     Non-medical: Not on file   Tobacco Use   • Smoking status: Current Every Day Smoker     Packs/day: 1.00     Years: 48.00     Pack years: 48.00     Types: Cigarettes   • Smokeless tobacco: Never Used   Substance and Sexual Activity   • Alcohol use: Yes     Comment: 1-2 beers and 1/2 pints of whiskey every day   • Drug use: Yes     Types: Inhaled     Comment: marijuana, cocaine, meth   • Sexual activity: Not on file   Lifestyle   • Physical activity     Days per week: Not on file     Minutes per session: Not on file   • Stress: Not on file   Relationships   • Social " "connections     Talks on phone: Not on file     Gets together: Not on file     Attends Shinto service: Not on file     Active member of club or organization: Not on file     Attends meetings of clubs or organizations: Not on file     Relationship status: Not on file   • Intimate partner violence     Fear of current or ex partner: Not on file     Emotionally abused: Not on file     Physically abused: Not on file     Forced sexual activity: Not on file   Other Topics Concern   • Not on file   Social History Narrative   • Not on file       SURGICAL HISTORY  Past Surgical History:   Procedure Laterality Date   • GASTROSCOPY-ENDO N/A 11/13/2019    Procedure: GASTROSCOPY with banding;  Surgeon: Tamela Smith M.D.;  Location: ENDOSCOPY [facility];  Service: Gastroenterology   • GASTROSCOPY  3/13/2019    Procedure: GASTROSCOPY;  Surgeon: Abdi Ba M.D.;  Location: SURGERY [facility];  Service: Gastroenterology   • GASTROSCOPY  4/8/2016    Procedure: GASTROSCOPY;  Surgeon: Braeden Tobin M.D.;  Location: SURGERY [facility];  Service:    • GASTROSCOPY  1/3/2016    Procedure: GASTROSCOPY WITH BANDING;  Surgeon: Alfredo Antonio M.D.;  Location: SURGERY [facility];  Service:        CURRENT MEDICATIONS  Home Medications    **Home medications have not yet been reviewed for this encounter**         ALLERGIES  Allergies   Allergen Reactions   • Haloperidol Unspecified     Twitching         PHYSICAL EXAM  VITAL SIGNS: BP (!) 99/69   Pulse 97   Temp 37.6 °C (99.7 °F) (Temporal)   Resp 18   Ht 1.727 m (5' 8\")   Wt 55.8 kg (123 lb)   SpO2 93%   BMI 18.70 kg/m²       Constitutional: Well developed, Well nourished, No acute distress, Non-toxic appearance.   HENT: Normocephalic, Atraumatic, Bilateral external ears normal, Oropharynx moist, No oral exudates, Nose normal.   Eyes: PERRLA, EOMI, Conjunctiva normal, No discharge.   Neck: Normal range of motion, No tenderness, Supple, No stridor. "   Cardiovascular: Normal heart rate, Normal rhythm, No murmurs, No rubs, No gallops.   Thorax & Lungs: Normal breath sounds, No respiratory distress, No wheezing, No chest tenderness.  Abdomen: Bowel sounds normal, Soft, No tenderness, No masses, No pulsatile masses.    Skin: Warm, Dry, No erythema, No rash.   Extremities: Intact distal pulses, No edema, No tenderness, No cyanosis, No clubbing.   Neurologic: No neurological deficits  Psychiatric: No suicidal homicidal ideation        COURSE & MEDICAL DECISION MAKING  Pertinent Labs & Imaging studies reviewed. (See chart for details)  Patient was kept into is awake alert amatory and sober he was given a meal here had no further complaints he is resting comfortably on reexamination we discussed follow-up with his primary care physician and discontinue alcohol use we also discussed treatment programs he states he was not interested in this at this time.    FINAL IMPRESSION  1.  Acute alcohol intoxication  2.  Bipolar disorder  3.      Electronically signed by: Atif Cruz D.O., 3/7/2020 4:49 AM

## 2020-03-07 NOTE — ED TRIAGE NOTES
"Pt states someone told him he would die if he didn't stop drinking. Pt has not stopped drinking ETOH daily and arrives via EMS asking ER staff \"am I alive?\" Pt is concerned that he is going to die because he cannot stop drinking. Pt reports he goes to the hospital often to check. Pt also complains of intermittent \"gut pain\" for a \"long time\" does not currently have this pain.   "

## 2020-04-12 ENCOUNTER — APPOINTMENT (OUTPATIENT)
Dept: RADIOLOGY | Facility: MEDICAL CENTER | Age: 60
End: 2020-04-12
Attending: EMERGENCY MEDICINE
Payer: MEDICARE

## 2020-04-12 ENCOUNTER — HOSPITAL ENCOUNTER (EMERGENCY)
Facility: MEDICAL CENTER | Age: 60
End: 2020-04-12
Attending: EMERGENCY MEDICINE
Payer: MEDICARE

## 2020-04-12 VITALS
SYSTOLIC BLOOD PRESSURE: 116 MMHG | BODY MASS INDEX: 21.19 KG/M2 | DIASTOLIC BLOOD PRESSURE: 62 MMHG | TEMPERATURE: 98.4 F | HEIGHT: 67 IN | RESPIRATION RATE: 16 BRPM | OXYGEN SATURATION: 98 % | HEART RATE: 76 BPM | WEIGHT: 135 LBS

## 2020-04-12 DIAGNOSIS — F10.920 ALCOHOLIC INTOXICATION WITHOUT COMPLICATION (HCC): ICD-10-CM

## 2020-04-12 DIAGNOSIS — F10.10 CHRONIC ALCOHOL ABUSE: ICD-10-CM

## 2020-04-12 DIAGNOSIS — R79.89 ELEVATED LIVER FUNCTION TESTS: ICD-10-CM

## 2020-04-12 DIAGNOSIS — R11.2 NON-INTRACTABLE VOMITING WITH NAUSEA, UNSPECIFIED VOMITING TYPE: ICD-10-CM

## 2020-04-12 DIAGNOSIS — D61.818 PANCYTOPENIA (HCC): ICD-10-CM

## 2020-04-12 LAB
ALBUMIN SERPL BCP-MCNC: 3.8 G/DL (ref 3.2–4.9)
ALBUMIN/GLOB SERPL: 1 G/DL
ALP SERPL-CCNC: 74 U/L (ref 30–99)
ALT SERPL-CCNC: 55 U/L (ref 2–50)
ANION GAP SERPL CALC-SCNC: 19 MMOL/L (ref 7–16)
AST SERPL-CCNC: 125 U/L (ref 12–45)
BASOPHILS # BLD AUTO: 1.3 % (ref 0–1.8)
BASOPHILS # BLD: 0.03 K/UL (ref 0–0.12)
BILIRUB SERPL-MCNC: 0.8 MG/DL (ref 0.1–1.5)
BUN SERPL-MCNC: 8 MG/DL (ref 8–22)
CALCIUM SERPL-MCNC: 8.3 MG/DL (ref 8.5–10.5)
CHLORIDE SERPL-SCNC: 99 MMOL/L (ref 96–112)
CO2 SERPL-SCNC: 22 MMOL/L (ref 20–33)
COMMENT 1642: NORMAL
CREAT SERPL-MCNC: 0.58 MG/DL (ref 0.5–1.4)
EKG IMPRESSION: NORMAL
EOSINOPHIL # BLD AUTO: 0.09 K/UL (ref 0–0.51)
EOSINOPHIL NFR BLD: 3.9 % (ref 0–6.9)
ERYTHROCYTE [DISTWIDTH] IN BLOOD BY AUTOMATED COUNT: 44.3 FL (ref 35.9–50)
ETHANOL BLD-MCNC: 196.9 MG/DL (ref 0–10.1)
GLOBULIN SER CALC-MCNC: 3.9 G/DL (ref 1.9–3.5)
GLUCOSE SERPL-MCNC: 79 MG/DL (ref 65–99)
HCT VFR BLD AUTO: 37.9 % (ref 42–52)
HGB BLD-MCNC: 12.4 G/DL (ref 14–18)
IMM GRANULOCYTES # BLD AUTO: 0 K/UL (ref 0–0.11)
IMM GRANULOCYTES NFR BLD AUTO: 0 % (ref 0–0.9)
LACTATE BLD-SCNC: 2.3 MMOL/L (ref 0.5–2)
LIPASE SERPL-CCNC: 48 U/L (ref 11–82)
LYMPHOCYTES # BLD AUTO: 0.62 K/UL (ref 1–4.8)
LYMPHOCYTES NFR BLD: 27.1 % (ref 22–41)
MCH RBC QN AUTO: 31.5 PG (ref 27–33)
MCHC RBC AUTO-ENTMCNC: 32.7 G/DL (ref 33.7–35.3)
MCV RBC AUTO: 96.2 FL (ref 81.4–97.8)
MONOCYTES # BLD AUTO: 0.2 K/UL (ref 0–0.85)
MONOCYTES NFR BLD AUTO: 8.7 % (ref 0–13.4)
MORPHOLOGY BLD-IMP: NORMAL
NEUTROPHILS # BLD AUTO: 1.35 K/UL (ref 1.82–7.42)
NEUTROPHILS NFR BLD: 59 % (ref 44–72)
NRBC # BLD AUTO: 0 K/UL
NRBC BLD-RTO: 0 /100 WBC
PLATELET # BLD AUTO: 48 K/UL (ref 164–446)
PLATELETS.RETICULATED NFR BLD AUTO: 8.6 K/UL (ref 0.6–13.1)
PMV BLD AUTO: 11.6 FL (ref 9–12.9)
POTASSIUM SERPL-SCNC: 3.6 MMOL/L (ref 3.6–5.5)
PROT SERPL-MCNC: 7.7 G/DL (ref 6–8.2)
RBC # BLD AUTO: 3.94 M/UL (ref 4.7–6.1)
SODIUM SERPL-SCNC: 140 MMOL/L (ref 135–145)
TROPONIN T SERPL-MCNC: <6 NG/L (ref 6–19)
WBC # BLD AUTO: 2.3 K/UL (ref 4.8–10.8)

## 2020-04-12 PROCEDURE — 85025 COMPLETE CBC W/AUTO DIFF WBC: CPT

## 2020-04-12 PROCEDURE — 96376 TX/PRO/DX INJ SAME DRUG ADON: CPT

## 2020-04-12 PROCEDURE — 83605 ASSAY OF LACTIC ACID: CPT

## 2020-04-12 PROCEDURE — 71045 X-RAY EXAM CHEST 1 VIEW: CPT

## 2020-04-12 PROCEDURE — 96374 THER/PROPH/DIAG INJ IV PUSH: CPT

## 2020-04-12 PROCEDURE — 83690 ASSAY OF LIPASE: CPT

## 2020-04-12 PROCEDURE — 93005 ELECTROCARDIOGRAM TRACING: CPT | Performed by: EMERGENCY MEDICINE

## 2020-04-12 PROCEDURE — 84484 ASSAY OF TROPONIN QUANT: CPT

## 2020-04-12 PROCEDURE — 80307 DRUG TEST PRSMV CHEM ANLYZR: CPT

## 2020-04-12 PROCEDURE — 85055 RETICULATED PLATELET ASSAY: CPT

## 2020-04-12 PROCEDURE — 700105 HCHG RX REV CODE 258: Performed by: EMERGENCY MEDICINE

## 2020-04-12 PROCEDURE — 99285 EMERGENCY DEPT VISIT HI MDM: CPT

## 2020-04-12 PROCEDURE — 700111 HCHG RX REV CODE 636 W/ 250 OVERRIDE (IP): Performed by: EMERGENCY MEDICINE

## 2020-04-12 PROCEDURE — 80053 COMPREHEN METABOLIC PANEL: CPT

## 2020-04-12 RX ORDER — SODIUM CHLORIDE 9 MG/ML
1000 INJECTION, SOLUTION INTRAVENOUS ONCE
Status: COMPLETED | OUTPATIENT
Start: 2020-04-12 | End: 2020-04-12

## 2020-04-12 RX ORDER — ONDANSETRON 2 MG/ML
4 INJECTION INTRAMUSCULAR; INTRAVENOUS ONCE
Status: COMPLETED | OUTPATIENT
Start: 2020-04-12 | End: 2020-04-12

## 2020-04-12 RX ORDER — SODIUM CHLORIDE, SODIUM LACTATE, POTASSIUM CHLORIDE, CALCIUM CHLORIDE 600; 310; 30; 20 MG/100ML; MG/100ML; MG/100ML; MG/100ML
1000 INJECTION, SOLUTION INTRAVENOUS ONCE
Status: COMPLETED | OUTPATIENT
Start: 2020-04-12 | End: 2020-04-12

## 2020-04-12 RX ADMIN — SODIUM CHLORIDE 1000 ML: 9 INJECTION, SOLUTION INTRAVENOUS at 15:56

## 2020-04-12 RX ADMIN — ONDANSETRON 4 MG: 2 INJECTION INTRAMUSCULAR; INTRAVENOUS at 18:45

## 2020-04-12 RX ADMIN — ONDANSETRON 4 MG: 2 INJECTION INTRAMUSCULAR; INTRAVENOUS at 15:56

## 2020-04-12 RX ADMIN — SODIUM CHLORIDE, POTASSIUM CHLORIDE, SODIUM LACTATE AND CALCIUM CHLORIDE 1000 ML: 600; 310; 30; 20 INJECTION, SOLUTION INTRAVENOUS at 18:44

## 2020-04-12 ASSESSMENT — FIBROSIS 4 INDEX: FIB4 SCORE: 14.12

## 2020-04-12 NOTE — ED TRIAGE NOTES
Chief Complaint   Patient presents with   • Other     Pt feels like he is dying.  Unable to be more specific.  has been evaluated multiple times recently at multiple facilities.  Has been advised to stop drinking and to stop smoking weed.  Non compliant with instructions to this point.

## 2020-04-12 NOTE — ED PROVIDER NOTES
"ED Provider Note    CHIEF COMPLAINT  Chief Complaint   Patient presents with   • Other     Pt feels like he is dying.  Unable to be more specific.  has been evaluated multiple times recently at multiple facilities.  Has been advised to stop drinking and to stop smoking weed.  Non compliant with instructions to this point.       HPI  Inocencio Shabazz is a 60 y.o. male with a history of chronic alcohol abuse, liver cirrhosis, GI bleed secondary to esophageal varices, hepatitis C, bipolar disorder who presents with complaints of not feeling well for the past 2 days.  In triage he told nursing he felt like \"he was dying\".  The patient says his last drink of alcohol was about 5 hours ago.  He has been feeling lightheaded and dizzy and has had occasional sharp pain to his left abdomen.  He denies any vomiting, diarrhea, bloody stools, or black stools.  The patient has been seen several times emergency department acute alcohol intoxication, and on his last 2 visits in March and February of this year, he had similar complaints of feeling like he was dying.    REVIEW OF SYSTEMS  See HPI for further details. All other systems are negative.     PAST MEDICAL HISTORY  Past Medical History:   Diagnosis Date   • Alcohol abuse    • Alcohol intoxication (HCC) 3/13/2014   • Cirrhosis (HCC)    • GIB (gastrointestinal bleeding) 1/3/2016   • Hepatitis C    • Pancytopenia (HCC)    • Psychiatric disorder     susu melton       FAMILY HISTORY  History reviewed. No pertinent family history.    SOCIAL HISTORY  Social History     Socioeconomic History   • Marital status: Single     Spouse name: Not on file   • Number of children: Not on file   • Years of education: Not on file   • Highest education level: Not on file   Occupational History   • Not on file   Social Needs   • Financial resource strain: Not on file   • Food insecurity     Worry: Not on file     Inability: Not on file   • Transportation needs     Medical: Not on file     " Non-medical: Not on file   Tobacco Use   • Smoking status: Current Every Day Smoker     Packs/day: 1.00     Years: 48.00     Pack years: 48.00     Types: Cigarettes   • Smokeless tobacco: Never Used   Substance and Sexual Activity   • Alcohol use: Yes     Comment: 1-2 beers and 1/2 pints of whiskey every day   • Drug use: Yes     Types: Inhaled     Comment: marijuana, cocaine, meth   • Sexual activity: Not on file   Lifestyle   • Physical activity     Days per week: Not on file     Minutes per session: Not on file   • Stress: Not on file   Relationships   • Social connections     Talks on phone: Not on file     Gets together: Not on file     Attends Baptism service: Not on file     Active member of club or organization: Not on file     Attends meetings of clubs or organizations: Not on file     Relationship status: Not on file   • Intimate partner violence     Fear of current or ex partner: Not on file     Emotionally abused: Not on file     Physically abused: Not on file     Forced sexual activity: Not on file   Other Topics Concern   • Not on file   Social History Narrative   • Not on file       SURGICAL HISTORY  Past Surgical History:   Procedure Laterality Date   • GASTROSCOPY-ENDO N/A 11/13/2019    Procedure: GASTROSCOPY with banding;  Surgeon: Tamela Smith M.D.;  Location: ENDOSCOPY [facility];  Service: Gastroenterology   • GASTROSCOPY  3/13/2019    Procedure: GASTROSCOPY;  Surgeon: Abdi Ba M.D.;  Location: SURGERY [facility];  Service: Gastroenterology   • GASTROSCOPY  4/8/2016    Procedure: GASTROSCOPY;  Surgeon: Braeden Tobin M.D.;  Location: SURGERY [facility];  Service:    • GASTROSCOPY  1/3/2016    Procedure: GASTROSCOPY WITH BANDING;  Surgeon: Alfredo Antonio M.D.;  Location: SURGERY [facility];  Service:        CURRENT MEDICATIONS  Home Medications    **Home medications have not yet been reviewed for this encounter**         ALLERGIES  Allergies   Allergen  "Reactions   • Haloperidol Unspecified     Twitching         PHYSICAL EXAM  VITAL SIGNS: Blood Pressure 107/68   Pulse 80   Temperature 36.9 °C (98.4 °F) (Temporal)   Respiration 14   Height 1.702 m (5' 7\")   Weight 61.2 kg (135 lb)   Oxygen Saturation 97%   Body Mass Index 21.14 kg/m²   Constitutional: Awake, alert, in no acute distress, Non-toxic appearance.   HENT: Atraumatic. Bilateral external ears normal, mucous membranes mildly dry, throat nonerythematous without exudates, nose is normal.  Eyes: PERRL, EOMI, conjunctiva moist, noninjected.  Neck: Nontender, Normal range of motion, No nuchal rigidity, No stridor.   Lymphatic: No lymphadenopathy noted.   Cardiovascular: Regular rate and rhythm, no murmurs, rubs, gallops.  Thorax & Lungs:  Good breath sounds bilaterally, no wheezes, rales, or retractions.  No chest tenderness.  Abdomen: Bowel sounds normal, Soft, nontender, nondistended, no rebound, guarding, masses.  Back: No CVA or spinal tenderness.  Extremities: Intact distal pulses, No edema, No tenderness.   Skin: Warm, Dry, No rashes.   Musculoskeletal: No joint swelling or tenderness.  Neurologic: Alert & oriented x 3, sensory and motor function normal. No focal deficits.   Psychiatric: Affect normal, Judgment normal, Mood normal.     EKG  Twelve-lead EKG shows a normal sinus rhythm, rate of 61, normal intervals, normal axis, no acute ST wave elevations or ST depressions, no pathologic Q waves, inverted T waves in lead aVL, V1 through V2, no pathologic Q waves, no evidence of an acute injury or ischemic pattern on my reading, in comparison to previous EKG from January 2020 there is a new inverted T wave in lead V2, otherwise no other acute changes..         RADIOLOGY/PROCEDURES  DX-CHEST-PORTABLE (1 VIEW)   Final Result      No acute cardiac or pulmonary abnormality is noted.            COURSE & MEDICAL DECISION MAKING  Pertinent Labs & Imaging studies reviewed. (See chart for details)  The patient " presents with the above complaints.  Clinically appears dehydrated with dry mucous membranes, and also appears intoxicated.  IV is placed, he was given a bolus of normal saline 1 L and Zofran 4 mg IV.    EKG shows a normal sinus rhythm, unchanged from previous.  Chest x-ray shows no acute cardiac or pulmonary abnormalities.  CBC showed a white count of 2300, hemoglobin 12.4, platelets 48, 59% polys, 27% lymphs, chemistry shows normal electrolytes, anion gap 19, SGOT 125, SGPT 55, total bilirubin 0.8, lipase normal, lactic acid 2.3, diagnosed alcohol 196.9, ammonia less than 6.  On recheck the patient was feeling improved.  He was given a meal tray and some juice, and subsequently had an episode of small amount of emesis.  Patient denied having any further nausea or abdominal pain.  He was given additional lactated Ringer's and Zofran.  Findings were discussed with the patient.  He has numerous lab abnormalities all which are likely chronic secondary to his alcohol abuse.  He was noted to be pancytopenic with a white count 2300, hemoglobin 12.4 and platelets 48,000.  These are all chronic when compared to previous lab work.  He also has some chronic elevation in his liver function test, but with a normal bilirubin.  His lactic acid was mildly elevated which I suspect is secondary to dehydration and likely alcoholic ketosis.  The patient will be discharged with instructions to cut back on his alcohol intake, drink plenty of fluids.  He is to return to the ER for any worsening symptoms, recurrent vomiting, bloody stools or black stools, or any other problems.    FINAL IMPRESSION  1.  Acute alcohol intoxication  2.  Alcohol abuse  3.  Chronic pancytopenia likely secondary to alcohol abuse  4.  Elevated liver function tests chronic  5.  Nausea, vomiting      Electronically signed by: Rikki Dupree M.D., 4/12/2020 3:15 PM

## 2020-04-13 NOTE — ED NOTES
"This RN chela pt due to [facility] giving him cab vouchers multiple times in the past we can only give him a bus pass. Pt refused the bus pass and states, \"I'll just walk home then\" and pt walked out of department with signing discharge paper work .  "

## 2020-04-17 ENCOUNTER — PATIENT OUTREACH (OUTPATIENT)
Dept: HEALTH INFORMATION MANAGEMENT | Facility: OTHER | Age: 60
End: 2020-04-17

## 2020-04-21 NOTE — PROGRESS NOTES
A 60-year-old male was an emergent admission to [facility] from 3/13/2020 to 3/18/2020 to treat Gastrointestinal hemorrhage, unspecified. The Patient Navigator did not visit the patient bedside. The patient was discharged Home.     The patient has no medication orders.     The patient was ordered to follow-up with PCP.   The patient had the following appointment:Primary Care Physician, general/family practice - PATIENT IS NOT ESTABLISHED.   The patient has no future appointments scheduled.     The Patient Navigator followed the patient for a total of 32 days and Patient’s family (or caregiver) was engaged. In summary, the Patient Navigator Provided education to patient (health edu, benefits, resources, etc.). As a result, Patient did not authorize [facility] to coordinate their appointment(s).

## 2020-04-27 NOTE — DOCUMENTATION QUERY
[facility]                                                                       Query Response Note      PATIENT:               JIMMY FERRER  ACCT #:                  [id number]  MRN:                     [id number]  :                      1960  ADMIT DATE:       2020 2:37 PM  DISCH DATE:        2020 8:38 PM  RESPONDING  PROVIDER #:        [id number]           QUERY TEXT:    Your assistance is needed in determining the reason  for  DRUG TEST PRSMV CHEM that was ordered.  This service is non-covered by the diagnoses documented in the medical record.      Can you please provide an additional diagnosis that describes the sign or symptom that  (prompted/initiated) the  DRUG TEST PRSMV CHEM.    NOTE:  If an appropriate answer is not listed below, please respond with a new note.        The patient's Clinical Indicators include:   DRUG TEST PRSMV CHEM  Options provided:   -- Other related diagnosis, Please specify diagnosis demonstrating medical necessity for lab/imaging/other test.)   -- Unable to determine      Query created by: Summer Bartholomew on 2020 2:45 AM    RESPONSE TEXT:    Other related diagnosis- Urine drug screen cancelled, never performed Urine drug screen cancelled          Electronically signed by:  JAIME MARCOS MD 2020 8:28 PM

## 2020-05-14 ENCOUNTER — HOSPITAL ENCOUNTER (EMERGENCY)
Facility: MEDICAL CENTER | Age: 60
End: 2020-05-14
Attending: EMERGENCY MEDICINE
Payer: MEDICARE

## 2020-05-14 VITALS
WEIGHT: 130.29 LBS | OXYGEN SATURATION: 95 % | RESPIRATION RATE: 17 BRPM | DIASTOLIC BLOOD PRESSURE: 94 MMHG | SYSTOLIC BLOOD PRESSURE: 155 MMHG | HEART RATE: 84 BPM | TEMPERATURE: 98.1 F | BODY MASS INDEX: 19.75 KG/M2 | HEIGHT: 68 IN

## 2020-05-14 DIAGNOSIS — D61.818 PANCYTOPENIA (HCC): ICD-10-CM

## 2020-05-14 DIAGNOSIS — M79.651 RIGHT THIGH PAIN: ICD-10-CM

## 2020-05-14 DIAGNOSIS — F10.11 HISTORY OF ALCOHOL ABUSE: ICD-10-CM

## 2020-05-14 DIAGNOSIS — R11.0 NAUSEA: ICD-10-CM

## 2020-05-14 LAB
ALBUMIN SERPL BCP-MCNC: 3.7 G/DL (ref 3.2–4.9)
ALBUMIN/GLOB SERPL: 1 G/DL
ALP SERPL-CCNC: 78 U/L (ref 30–99)
ALT SERPL-CCNC: 60 U/L (ref 2–50)
ANION GAP SERPL CALC-SCNC: 11 MMOL/L (ref 7–16)
AST SERPL-CCNC: 69 U/L (ref 12–45)
BASOPHILS # BLD AUTO: 1.2 % (ref 0–1.8)
BASOPHILS # BLD: 0.02 K/UL (ref 0–0.12)
BILIRUB SERPL-MCNC: 0.5 MG/DL (ref 0.1–1.5)
BUN SERPL-MCNC: 8 MG/DL (ref 8–22)
CALCIUM SERPL-MCNC: 8.6 MG/DL (ref 8.5–10.5)
CHLORIDE SERPL-SCNC: 106 MMOL/L (ref 96–112)
CO2 SERPL-SCNC: 23 MMOL/L (ref 20–33)
COMMENT 1642: NORMAL
CREAT SERPL-MCNC: 0.47 MG/DL (ref 0.5–1.4)
EOSINOPHIL # BLD AUTO: 0.03 K/UL (ref 0–0.51)
EOSINOPHIL NFR BLD: 1.8 % (ref 0–6.9)
ERYTHROCYTE [DISTWIDTH] IN BLOOD BY AUTOMATED COUNT: 45.4 FL (ref 35.9–50)
GLOBULIN SER CALC-MCNC: 3.7 G/DL (ref 1.9–3.5)
GLUCOSE SERPL-MCNC: 82 MG/DL (ref 65–99)
HCT VFR BLD AUTO: 36.5 % (ref 42–52)
HGB BLD-MCNC: 11.9 G/DL (ref 14–18)
IMM GRANULOCYTES # BLD AUTO: 0.01 K/UL (ref 0–0.11)
IMM GRANULOCYTES NFR BLD AUTO: 0.6 % (ref 0–0.9)
LYMPHOCYTES # BLD AUTO: 0.54 K/UL (ref 1–4.8)
LYMPHOCYTES NFR BLD: 32.3 % (ref 22–41)
MCH RBC QN AUTO: 31.2 PG (ref 27–33)
MCHC RBC AUTO-ENTMCNC: 32.6 G/DL (ref 33.7–35.3)
MCV RBC AUTO: 95.8 FL (ref 81.4–97.8)
MONOCYTES # BLD AUTO: 0.28 K/UL (ref 0–0.85)
MONOCYTES NFR BLD AUTO: 16.8 % (ref 0–13.4)
MORPHOLOGY BLD-IMP: NORMAL
NEUTROPHILS # BLD AUTO: 0.79 K/UL (ref 1.82–7.42)
NEUTROPHILS NFR BLD: 47.3 % (ref 44–72)
NRBC # BLD AUTO: 0 K/UL
NRBC BLD-RTO: 0 /100 WBC
PLATELET # BLD AUTO: 32 K/UL (ref 164–446)
PLATELETS.RETICULATED NFR BLD AUTO: 12.1 K/UL (ref 0.6–13.1)
PMV BLD AUTO: 12.5 FL (ref 9–12.9)
POTASSIUM SERPL-SCNC: 3.8 MMOL/L (ref 3.6–5.5)
PROT SERPL-MCNC: 7.4 G/DL (ref 6–8.2)
RBC # BLD AUTO: 3.81 M/UL (ref 4.7–6.1)
SODIUM SERPL-SCNC: 140 MMOL/L (ref 135–145)
WBC # BLD AUTO: 1.7 K/UL (ref 4.8–10.8)

## 2020-05-14 PROCEDURE — 85025 COMPLETE CBC W/AUTO DIFF WBC: CPT

## 2020-05-14 PROCEDURE — 99283 EMERGENCY DEPT VISIT LOW MDM: CPT

## 2020-05-14 PROCEDURE — 85055 RETICULATED PLATELET ASSAY: CPT

## 2020-05-14 PROCEDURE — 700111 HCHG RX REV CODE 636 W/ 250 OVERRIDE (IP): Performed by: EMERGENCY MEDICINE

## 2020-05-14 PROCEDURE — 80053 COMPREHEN METABOLIC PANEL: CPT

## 2020-05-14 RX ORDER — ONDANSETRON 4 MG/1
4 TABLET, ORALLY DISINTEGRATING ORAL ONCE
Status: COMPLETED | OUTPATIENT
Start: 2020-05-14 | End: 2020-05-14

## 2020-05-14 RX ORDER — ONDANSETRON 4 MG/1
4 TABLET, ORALLY DISINTEGRATING ORAL EVERY 8 HOURS PRN
Qty: 10 TAB | Refills: 1 | Status: SHIPPED | OUTPATIENT
Start: 2020-05-14 | End: 2022-02-05

## 2020-05-14 RX ADMIN — ONDANSETRON 4 MG: 4 TABLET, ORALLY DISINTEGRATING ORAL at 19:00

## 2020-05-14 ASSESSMENT — LIFESTYLE VARIABLES: DO YOU DRINK ALCOHOL: YES

## 2020-05-14 ASSESSMENT — FIBROSIS 4 INDEX: FIB4 SCORE: 21.77

## 2020-05-15 NOTE — ED NOTES
Pt ambulatory to green 31. Pt states intermittent pain, and the pain changes. Worse with movement, denies injury, pain radiates down right leg.

## 2020-05-15 NOTE — ED PROVIDER NOTES
"ED Provider Note    CHIEF COMPLAINT  Chief Complaint   Patient presents with   • Leg Pain     R leg, no trauma, no deformity   • Nausea     denies vomiting       HPI  Inocencio Shabazz is a 60 y.o. male who presents with complaints of right leg pain when he walks.  He indicates his right upper thigh.  He states the pain resolves when he lies down.  He denies trauma.  Review of chart shows previous multiple visits for alcohol abuse, noted to have pancytopenia on multiple previous visits.  He denies hemorrhage at this time.  He states he has nausea and intermittent pain in his epigastric region.  No vomiting.  He denies diarrhea.  Patient states \"I think I need to quit drinking\".  No chest pain, no shortness of breath    REVIEW OF SYSTEMS  Constitutional: Denies fever  Respiratory: No shortness of breath  Cardiac: No chest pain or syncope  Gastrointestinal: Nausea, intermittent abdominal pain  Musculoskeletal: Right leg pain now resolved  Neurologic: No headache  Genitourinary: Denies dysuria  N: Denies leg swelling       All other systems are negative.     PAST MEDICAL HISTORY  Past Medical History:   Diagnosis Date   • Alcohol abuse    • Alcohol intoxication (HCC) 3/13/2014   • Cirrhosis (HCC)    • GIB (gastrointestinal bleeding) 1/3/2016   • Hepatitis C    • Pancytopenia (HCC)    • Psychiatric disorder     schitzo, bipolar       FAMILY HISTORY  No family history on file.    SOCIAL HISTORY  Social History     Socioeconomic History   • Marital status: Single     Spouse name: Not on file   • Number of children: Not on file   • Years of education: Not on file   • Highest education level: Not on file   Occupational History   • Not on file   Social Needs   • Financial resource strain: Not on file   • Food insecurity     Worry: Not on file     Inability: Not on file   • Transportation needs     Medical: Not on file     Non-medical: Not on file   Tobacco Use   • Smoking status: Current Every Day Smoker     Packs/day: " 1.00     Years: 48.00     Pack years: 48.00     Types: Cigarettes   • Smokeless tobacco: Never Used   Substance and Sexual Activity   • Alcohol use: Yes     Comment: 1-2 beers and 1/2 pints of whiskey every day   • Drug use: Yes     Types: Inhaled     Comment: marijuana, cocaine, meth   • Sexual activity: Not on file   Lifestyle   • Physical activity     Days per week: Not on file     Minutes per session: Not on file   • Stress: Not on file   Relationships   • Social connections     Talks on phone: Not on file     Gets together: Not on file     Attends Bahai service: Not on file     Active member of club or organization: Not on file     Attends meetings of clubs or organizations: Not on file     Relationship status: Not on file   • Intimate partner violence     Fear of current or ex partner: Not on file     Emotionally abused: Not on file     Physically abused: Not on file     Forced sexual activity: Not on file   Other Topics Concern   • Not on file   Social History Narrative   • Not on file       SURGICAL HISTORY  Past Surgical History:   Procedure Laterality Date   • GASTROSCOPY-ENDO N/A 11/13/2019    Procedure: GASTROSCOPY with banding;  Surgeon: Tamela Smith M.D.;  Location: ENDOSCOPY [facility];  Service: Gastroenterology   • GASTROSCOPY  3/13/2019    Procedure: GASTROSCOPY;  Surgeon: Abdi Ba M.D.;  Location: SURGERY [facility];  Service: Gastroenterology   • GASTROSCOPY  4/8/2016    Procedure: GASTROSCOPY;  Surgeon: Braeden Tobin M.D.;  Location: SURGERY [facility];  Service:    • GASTROSCOPY  1/3/2016    Procedure: GASTROSCOPY WITH BANDING;  Surgeon: Alfredo Antonio M.D.;  Location: SURGERY [facility];  Service:        CURRENT MEDICATIONS  Home Medications    **Home medications have not yet been reviewed for this encounter**         ALLERGIES  Allergies   Allergen Reactions   • Haloperidol Unspecified     Twitching         PHYSICAL EXAM  VITAL SIGNS: /94    "Pulse 84   Temp 36.8 °C (98.2 °F) (Temporal)   Resp 17   Ht 1.727 m (5' 8\")   Wt 59.1 kg (130 lb 4.7 oz)   SpO2 95%   BMI 19.81 kg/m²   Constitutional: Nontoxic appearance, no distress  ENT: Nares clear, mucous membranes moist.  Eyes:  Conjunctiva normal, No discharge.    Lymphatic: No adenopathy.   Cardiovascular: Normal heart rate, Normal rhythm.   Pulmonary: No wheezing, no rales  Gastrointestinal: Abdomen is soft, nontender, no guarding.  Bowel sounds are active.  No palpable mass  Skin: Warm, Dry, No jaundice.   Musculoskeletal:  No CVA tenderness.  Right hip shows normal range of motion without pain or stiffness.  Right quadricep musculature is nontender.  There is no palpable leg mass.  Right knee and right ankle are nontender.  No palpable evidence of DVT  Neurologic:  Normal motor and sensory function, No focal deficits noted.   Psychiatric: Flat affect, Normal mood.    RADIOLOGY/PROCEDURES/Labs  Results for orders placed or performed during the hospital encounter of 05/14/20   CBC WITH DIFFERENTIAL   Result Value Ref Range    WBC 1.7 (LL) 4.8 - 10.8 K/uL    RBC 3.81 (L) 4.70 - 6.10 M/uL    Hemoglobin 11.9 (L) 14.0 - 18.0 g/dL    Hematocrit 36.5 (L) 42.0 - 52.0 %    MCV 95.8 81.4 - 97.8 fL    MCH 31.2 27.0 - 33.0 pg    MCHC 32.6 (L) 33.7 - 35.3 g/dL    RDW 45.4 35.9 - 50.0 fL    Platelet Count 32 (LL) 164 - 446 K/uL    MPV 12.5 9.0 - 12.9 fL    Neutrophils-Polys 47.30 44.00 - 72.00 %    Lymphocytes 32.30 22.00 - 41.00 %    Monocytes 16.80 (H) 0.00 - 13.40 %    Eosinophils 1.80 0.00 - 6.90 %    Basophils 1.20 0.00 - 1.80 %    Immature Granulocytes 0.60 0.00 - 0.90 %    Nucleated RBC 0.00 /100 WBC    Neutrophils (Absolute) 0.79 (L) 1.82 - 7.42 K/uL    Lymphs (Absolute) 0.54 (L) 1.00 - 4.80 K/uL    Monos (Absolute) 0.28 0.00 - 0.85 K/uL    Eos (Absolute) 0.03 0.00 - 0.51 K/uL    Baso (Absolute) 0.02 0.00 - 0.12 K/uL    Immature Granulocytes (abs) 0.01 0.00 - 0.11 K/uL    NRBC (Absolute) 0.00 K/uL   COMP " METABOLIC PANEL   Result Value Ref Range    Sodium 140 135 - 145 mmol/L    Potassium 3.8 3.6 - 5.5 mmol/L    Chloride 106 96 - 112 mmol/L    Co2 23 20 - 33 mmol/L    Anion Gap 11.0 7.0 - 16.0    Glucose 82 65 - 99 mg/dL    Bun 8 8 - 22 mg/dL    Creatinine 0.47 (L) 0.50 - 1.40 mg/dL    Calcium 8.6 8.5 - 10.5 mg/dL    AST(SGOT) 69 (H) 12 - 45 U/L    ALT(SGPT) 60 (H) 2 - 50 U/L    Alkaline Phosphatase 78 30 - 99 U/L    Total Bilirubin 0.5 0.1 - 1.5 mg/dL    Albumin 3.7 3.2 - 4.9 g/dL    Total Protein 7.4 6.0 - 8.2 g/dL    Globulin 3.7 (H) 1.9 - 3.5 g/dL    A-G Ratio 1.0 g/dL   ESTIMATED GFR   Result Value Ref Range    GFR If African American >60 >60 mL/min/1.73 m 2    GFR If Non African American >60 >60 mL/min/1.73 m 2   PERIPHERAL SMEAR REVIEW   Result Value Ref Range    Peripheral Smear Review see below    IMMATURE PLT FRACTION   Result Value Ref Range    Imm. Plt Fraction 12.1 0.6 - 13.1 K/uL   DIFFERENTIAL COMMENT   Result Value Ref Range    Comments-Diff see below          COURSE & MEDICAL DECISION MAKING  Pertinent Labs & Imaging studies reviewed. (See chart for details)  With normal exam of the right leg, no tenderness with active or passive range of motion of the right hip or knee, radiographic imaging not indicated.  There is no clinical evidence of the DVT.  The pain is present when walking.  He does have good pulses in his right foot, this does not appear to be claudication.  Etiology of the discomfort is unknown, possibly radiation of pain from arthritic right hip.  Patient's labs were rechecked, he is leukopenic, pancytopenic however previous labs shows similar findings.  There is no evidence of active hemorrhage at this time or infection at this time.  Patient provided reassurance.  He is prescribed Zofran for his nausea.  He is refused referral to detox center.  He is asked to follow-up with primary doctor for ongoing evaluation and to return if worse or for any concerns.    FINAL IMPRESSION  1.  Pancytopenia (HCC)      Chronic   2. History of alcohol abuse     3. Nausea     4. Right thigh pain      Resolved           Electronically signed by: Alan Davis M.D., 5/14/2020 8:05 PM

## 2020-05-15 NOTE — ED NOTES
Lab called with critical result of WBC 1.7, platelets 32 at 1934 Critical lab result read back to Lab.   Dr. Davis notified of critical lab result at 1935.  Critical lab result read back by Dr. Davis.

## 2020-05-15 NOTE — ED TRIAGE NOTES
Chief Complaint   Patient presents with   • Leg Pain     R leg, no trauma, no deformity   • Nausea     denies vomiting

## 2020-05-15 NOTE — DISCHARGE INSTRUCTIONS
See your primary doctor within a week if no improvement, return if worse or for any concerns.  Please moderate or discontinue your alcohol use.

## 2020-07-11 ENCOUNTER — HOSPITAL ENCOUNTER (EMERGENCY)
Facility: MEDICAL CENTER | Age: 60
End: 2020-07-12
Attending: EMERGENCY MEDICINE
Payer: MEDICARE

## 2020-07-11 DIAGNOSIS — R00.2 PALPITATIONS: ICD-10-CM

## 2020-07-11 DIAGNOSIS — F10.10 ALCOHOL ABUSE: ICD-10-CM

## 2020-07-11 LAB — EKG IMPRESSION: NORMAL

## 2020-07-11 PROCEDURE — 93005 ELECTROCARDIOGRAM TRACING: CPT | Performed by: EMERGENCY MEDICINE

## 2020-07-11 PROCEDURE — 93005 ELECTROCARDIOGRAM TRACING: CPT

## 2020-07-11 ASSESSMENT — FIBROSIS 4 INDEX: FIB4 SCORE: 17.26

## 2020-07-12 VITALS
SYSTOLIC BLOOD PRESSURE: 117 MMHG | DIASTOLIC BLOOD PRESSURE: 84 MMHG | BODY MASS INDEX: 20.46 KG/M2 | HEIGHT: 68 IN | TEMPERATURE: 97.7 F | WEIGHT: 135 LBS | OXYGEN SATURATION: 96 % | HEART RATE: 62 BPM | RESPIRATION RATE: 14 BRPM

## 2020-07-12 LAB
ALBUMIN SERPL BCP-MCNC: 4 G/DL (ref 3.2–4.9)
ALBUMIN/GLOB SERPL: 1.2 G/DL
ALP SERPL-CCNC: 68 U/L (ref 30–99)
ALT SERPL-CCNC: 56 U/L (ref 2–50)
ANION GAP SERPL CALC-SCNC: 12 MMOL/L (ref 7–16)
AST SERPL-CCNC: 69 U/L (ref 12–45)
BASOPHILS # BLD AUTO: 0 % (ref 0–1.8)
BASOPHILS # BLD: 0 K/UL (ref 0–0.12)
BILIRUB SERPL-MCNC: 0.6 MG/DL (ref 0.1–1.5)
BUN SERPL-MCNC: 9 MG/DL (ref 8–22)
CALCIUM SERPL-MCNC: 8.7 MG/DL (ref 8.5–10.5)
CHLORIDE SERPL-SCNC: 107 MMOL/L (ref 96–112)
CO2 SERPL-SCNC: 19 MMOL/L (ref 20–33)
CREAT SERPL-MCNC: 0.48 MG/DL (ref 0.5–1.4)
EOSINOPHIL # BLD AUTO: 0.02 K/UL (ref 0–0.51)
EOSINOPHIL NFR BLD: 0.9 % (ref 0–6.9)
ERYTHROCYTE [DISTWIDTH] IN BLOOD BY AUTOMATED COUNT: 47.8 FL (ref 35.9–50)
ETHANOL BLD-MCNC: 26.6 MG/DL (ref 0–10.1)
GLOBULIN SER CALC-MCNC: 3.4 G/DL (ref 1.9–3.5)
GLUCOSE SERPL-MCNC: 108 MG/DL (ref 65–99)
HCT VFR BLD AUTO: 37 % (ref 42–52)
HGB BLD-MCNC: 12.1 G/DL (ref 14–18)
LIPASE SERPL-CCNC: 41 U/L (ref 11–82)
LYMPHOCYTES # BLD AUTO: 0.57 K/UL (ref 1–4.8)
LYMPHOCYTES NFR BLD: 33.6 % (ref 22–41)
MANUAL DIFF BLD: NORMAL
MCH RBC QN AUTO: 31 PG (ref 27–33)
MCHC RBC AUTO-ENTMCNC: 32.7 G/DL (ref 33.7–35.3)
MCV RBC AUTO: 94.9 FL (ref 81.4–97.8)
MONOCYTES # BLD AUTO: 0.17 K/UL (ref 0–0.85)
MONOCYTES NFR BLD AUTO: 10 % (ref 0–13.4)
MORPHOLOGY BLD-IMP: NORMAL
NEUTROPHILS # BLD AUTO: 0.94 K/UL (ref 1.82–7.42)
NEUTROPHILS NFR BLD: 55.5 % (ref 44–72)
NRBC # BLD AUTO: 0 K/UL
NRBC BLD-RTO: 0 /100 WBC
PLATELET # BLD AUTO: 38 K/UL (ref 164–446)
PLATELET BLD QL SMEAR: NORMAL
PLATELETS.RETICULATED NFR BLD AUTO: 5.8 K/UL (ref 0.6–13.1)
PMV BLD AUTO: 11.3 FL (ref 9–12.9)
POTASSIUM SERPL-SCNC: 3.6 MMOL/L (ref 3.6–5.5)
PROT SERPL-MCNC: 7.4 G/DL (ref 6–8.2)
RBC # BLD AUTO: 3.9 M/UL (ref 4.7–6.1)
RBC BLD AUTO: NORMAL
SODIUM SERPL-SCNC: 138 MMOL/L (ref 135–145)
TROPONIN T SERPL-MCNC: <6 NG/L (ref 6–19)
WBC # BLD AUTO: 1.7 K/UL (ref 4.8–10.8)

## 2020-07-12 PROCEDURE — 85007 BL SMEAR W/DIFF WBC COUNT: CPT

## 2020-07-12 PROCEDURE — 96366 THER/PROPH/DIAG IV INF ADDON: CPT

## 2020-07-12 PROCEDURE — 85055 RETICULATED PLATELET ASSAY: CPT

## 2020-07-12 PROCEDURE — 700101 HCHG RX REV CODE 250: Performed by: EMERGENCY MEDICINE

## 2020-07-12 PROCEDURE — 84484 ASSAY OF TROPONIN QUANT: CPT

## 2020-07-12 PROCEDURE — 80307 DRUG TEST PRSMV CHEM ANLYZR: CPT

## 2020-07-12 PROCEDURE — 96365 THER/PROPH/DIAG IV INF INIT: CPT

## 2020-07-12 PROCEDURE — 83690 ASSAY OF LIPASE: CPT

## 2020-07-12 PROCEDURE — 85027 COMPLETE CBC AUTOMATED: CPT

## 2020-07-12 PROCEDURE — 80053 COMPREHEN METABOLIC PANEL: CPT

## 2020-07-12 PROCEDURE — 99284 EMERGENCY DEPT VISIT MOD MDM: CPT

## 2020-07-12 RX ADMIN — THIAMINE HYDROCHLORIDE: 100 INJECTION, SOLUTION INTRAMUSCULAR; INTRAVENOUS at 02:21

## 2020-07-12 NOTE — ED NOTES
Technician from Lab called with critical result of WBC at 1.7 and platelets at 38. Critical lab result read back to technician.   Dr. Iglesias notified of critical lab result at 0112.  Critical lab result read back by Dr. Iglesias.

## 2020-07-12 NOTE — ED NOTES
Pt discharge home. Pt given discharge instructions. Pt verbalized understanding, all questions answered, vss upon d/c. Pt steady gait during ambulation.

## 2020-07-12 NOTE — ED NOTES
Pt rounded on, asleep in bed, respirations even and unlabored, repositioning self as needed, meds infusing.

## 2020-07-12 NOTE — ED PROVIDER NOTES
ED Provider Note    CHIEF COMPLAINT  Chief Complaint   Patient presents with   • Palpitations     pt reports it feels like his heart is racing while he was walking home tonight. He is also c/o dizziness and lightheadedness. denies cp. Pt denies any cardiac hx.   • ETOH Withdrawal       HPI  Inocencio Shabazz is a 60 y.o. male who presents to the emergency department for feeling generally unwell.  Admits to chronic alcohol abuse with reported last alcohol intake yesterday.  Intake is roughly half pint of whiskey per day with additional couple of beers.  Recently been feeling generally unwell with difficulty walking and lightheaded dizziness.  [facility] now coming here stating that he felt that he was discharged too early.    REVIEW OF SYSTEMS  See HPI for further details. All other systems are negative.     PAST MEDICAL HISTORY   has a past medical history of Alcohol abuse, Alcohol intoxication (HCC) (3/13/2014), Cirrhosis (HCC), GIB (gastrointestinal bleeding) (1/3/2016), Hepatitis C, Pancytopenia (HCC), and Psychiatric disorder.    SOCIAL HISTORY  Social History     Tobacco Use   • Smoking status: Current Every Day Smoker     Packs/day: 1.00     Years: 48.00     Pack years: 48.00     Types: Cigarettes   • Smokeless tobacco: Never Used   Substance and Sexual Activity   • Alcohol use: Yes     Comment: 1-2 beers and 1/2 pints of whiskey every day   • Drug use: Yes     Types: Inhaled     Comment: marijuana, cocaine, meth   • Sexual activity: Not on file       SURGICAL HISTORY   has a past surgical history that includes gastroscopy (1/3/2016); gastroscopy (4/8/2016); gastroscopy (3/13/2019); and gastroscopy-endo (N/A, 11/13/2019).    CURRENT MEDICATIONS  Home Medications    **Home medications have not yet been reviewed for this encounter**         ALLERGIES  Allergies   Allergen Reactions   • Haloperidol Unspecified     Twitching         PHYSICAL EXAM  VITAL SIGNS: BP  "132/88   Pulse (!) 106   Temp 36.5 °C (97.7 °F) (Oral)   Resp 16   Ht 1.727 m (5' 8\")   Wt 61.2 kg (135 lb)   SpO2 98%   BMI 20.53 kg/m²  @LIN[[id number]::@   Pulse ox interpretation: I interpret this pulse ox as normal.  Constitutional: Alert in no apparent distress.  HENT: No signs of trauma, Bilateral external ears normal, Nose normal.  Poor dentition  Eyes: Pupils are equal and reactive, Conjunctiva normal, Non-icteric.   Neck: Normal range of motion, No tenderness, Supple, No stridor.   Cardiovascular: Mildly tachycardic, regular rate and rhythm, no murmurs.   Thorax & Lungs: Normal breath sounds, No respiratory distress, No wheezing, No chest tenderness.   Abdomen: Bowel sounds normal, Soft, No tenderness, No masses, No pulsatile masses. No peritoneal signs.  Skin: Warm, Dry, No erythema, No rash.   Back: No bony tenderness, No CVA tenderness.   Extremities: Intact distal pulses, No edema, No tenderness, No cyanosis  Musculoskeletal: Good range of motion in all major joints. No tenderness to palpation or major deformities noted.   Neurologic: Alert , Normal motor function, Normal sensory function, No focal deficits noted.   Psychiatric: Affect normal, Judgment normal, Mood normal.       DIAGNOSTIC STUDIES / PROCEDURES    EKG  Results for orders placed or performed during the hospital encounter of 20   EKG (NOW)   Result Value Ref Range    Report       [facility] Emergency Dept.    Test Date:  2020  Pt Name:    JIMMY FERRER                 Department: ER  MRN:        [id number]                      Room:  Gender:     Male                         Technician: [id number]  :        1960                   Requested By:ER TRIAGE PROTOCOL  Order #:    [id number]                    Reading MD:    Measurements  Intervals                                Axis  Rate:       106                          P:          78  MN:         152                          QRS:        72  QRSD:       84   "                         T:          72  QT:         328  QTc:        436    Interpretive Statements  SINUS TACHYCARDIA WITH IRREGULAR RATE    Compared to ECG 04/12/2020 15:35:05  Sinus rhythm no longer present  T-wave abnormality no longer present           LABS  Results for orders placed or performed during the hospital encounter of 07/11/20   CBC WITH DIFFERENTIAL   Result Value Ref Range    WBC 1.7 (LL) 4.8 - 10.8 K/uL    RBC 3.90 (L) 4.70 - 6.10 M/uL    Hemoglobin 12.1 (L) 14.0 - 18.0 g/dL    Hematocrit 37.0 (L) 42.0 - 52.0 %    MCV 94.9 81.4 - 97.8 fL    MCH 31.0 27.0 - 33.0 pg    MCHC 32.7 (L) 33.7 - 35.3 g/dL    RDW 47.8 35.9 - 50.0 fL    Platelet Count 38 (LL) 164 - 446 K/uL    MPV 11.3 9.0 - 12.9 fL    Neutrophils-Polys 55.50 44.00 - 72.00 %    Lymphocytes 33.60 22.00 - 41.00 %    Monocytes 10.00 0.00 - 13.40 %    Eosinophils 0.90 0.00 - 6.90 %    Basophils 0.00 0.00 - 1.80 %    Nucleated RBC 0.00 /100 WBC    Neutrophils (Absolute) 0.94 (L) 1.82 - 7.42 K/uL    Lymphs (Absolute) 0.57 (L) 1.00 - 4.80 K/uL    Monos (Absolute) 0.17 0.00 - 0.85 K/uL    Eos (Absolute) 0.02 0.00 - 0.51 K/uL    Baso (Absolute) 0.00 0.00 - 0.12 K/uL    NRBC (Absolute) 0.00 K/uL   COMP METABOLIC PANEL   Result Value Ref Range    Sodium 138 135 - 145 mmol/L    Potassium 3.6 3.6 - 5.5 mmol/L    Chloride 107 96 - 112 mmol/L    Co2 19 (L) 20 - 33 mmol/L    Anion Gap 12.0 7.0 - 16.0    Glucose 108 (H) 65 - 99 mg/dL    Bun 9 8 - 22 mg/dL    Creatinine 0.48 (L) 0.50 - 1.40 mg/dL    Calcium 8.7 8.5 - 10.5 mg/dL    AST(SGOT) 69 (H) 12 - 45 U/L    ALT(SGPT) 56 (H) 2 - 50 U/L    Alkaline Phosphatase 68 30 - 99 U/L    Total Bilirubin 0.6 0.1 - 1.5 mg/dL    Albumin 4.0 3.2 - 4.9 g/dL    Total Protein 7.4 6.0 - 8.2 g/dL    Globulin 3.4 1.9 - 3.5 g/dL    A-G Ratio 1.2 g/dL   LIPASE   Result Value Ref Range    Lipase 41 11 - 82 U/L   DIAGNOSTIC ALCOHOL   Result Value Ref Range    Diagnostic Alcohol 26.6 (H) 0.0 - 10.1 mg/dL   DIFFERENTIAL  MANUAL   Result Value Ref Range    Manual Diff Status PERFORMED    PERIPHERAL SMEAR REVIEW   Result Value Ref Range    Peripheral Smear Review see below    PLATELET ESTIMATE   Result Value Ref Range    Plt Estimation Decreased    MORPHOLOGY   Result Value Ref Range    RBC Morphology Normal    IMMATURE PLT FRACTION   Result Value Ref Range    Imm. Plt Fraction 5.8 0.6 - 13.1 K/uL   ESTIMATED GFR   Result Value Ref Range    GFR If African American >60 >60 mL/min/1.73 m 2    GFR If Non African American >60 >60 mL/min/1.73 m 2   TROPONIN   Result Value Ref Range    Troponin T <6 6 - 19 ng/L   EKG (NOW)   Result Value Ref Range    Report       [facility] Emergency Dept.    Test Date:  2020  Pt Name:    JIMMY FERRER                 Department: ER  MRN:        [id number]                      Room:  Gender:     Male                         Technician: [id number]  :        1960                   Requested By:ER TRIAGE PROTOCOL  Order #:    [id number]                    Reading MD:    Measurements  Intervals                                Axis  Rate:       106                          P:          78  DE:         152                          QRS:        72  QRSD:       84                           T:          72  QT:         328  QTc:        436    Interpretive Statements  SINUS TACHYCARDIA WITH IRREGULAR RATE    Compared to ECG 2020 15:35:05  Sinus rhythm no longer present  T-wave abnormality no longer present           RADIOLOGY  No orders to display           COURSE & MEDICAL DECISION MAKING  Pertinent Labs & Imaging studies reviewed. (See chart for details)  Patient presented to the emergency department with above complaints.  [facility]  Repeat evaluation here is largely unremarkable.  I have asked him to reduce his overall alcohol intake and [facility].  No evidence of acute ACS causation of his  palpitations tonight.    FINAL IMPRESSION  1. Alcohol abuse    2. Palpitations      3. h/o Polysubstance abuse      Electronically signed by: Venkatesh Iglesias M.D., 7/12/2020 12:33 AM

## 2020-07-12 NOTE — ED TRIAGE NOTES
"Chief Complaint   Patient presents with   • Palpitations     pt reports it feels like his heart is racing while he was walking home tonight. He is also c/o dizziness and lightheadedness. denies cp. Pt denies any cardiac hx.       /88   Pulse (!) 145   Temp 36.5 °C (97.7 °F) (Oral)   Resp 16   Ht 1.727 m (5' 8\")   Wt 61.2 kg (135 lb)   SpO2 98%   BMI 20.53 kg/m²      Ekg obtained. Pt informs he has had a couple of drinks tonight. Not very forthcoming with information during assessment. Pt's current heartrate after the EKG is 106 laying down.    Pt Informed regarding triage process and verbalized understanding to inform triage tech or RN for any changes in condition.  Placed in lobby.    "

## 2020-07-12 NOTE — ED NOTES
Pt ambulated unassisted to rm 4 from [facility] for chief complaint of palpitations. Pt states heart does not feel like his heart is racing any more. Pt confirm extensive hx of ETOH and ETOH withdraw. Pt states he drinks a 6 pack of beer and a pint of whiskey everyday. Pt denies ETOH today and confirms he thinks he is withdrawing. Pt states [facility]. Pt states he left approx. 2230 7/11/200 (approx. 2hs ago.).

## 2020-09-24 ENCOUNTER — HOSPITAL ENCOUNTER (EMERGENCY)
Facility: MEDICAL CENTER | Age: 60
End: 2020-09-24
Attending: EMERGENCY MEDICINE
Payer: MEDICARE

## 2020-09-24 ENCOUNTER — APPOINTMENT (OUTPATIENT)
Dept: RADIOLOGY | Facility: MEDICAL CENTER | Age: 60
End: 2020-09-24
Attending: EMERGENCY MEDICINE
Payer: MEDICARE

## 2020-09-24 VITALS
RESPIRATION RATE: 19 BRPM | HEART RATE: 88 BPM | SYSTOLIC BLOOD PRESSURE: 115 MMHG | WEIGHT: 177 LBS | OXYGEN SATURATION: 96 % | DIASTOLIC BLOOD PRESSURE: 71 MMHG | BODY MASS INDEX: 26.83 KG/M2 | TEMPERATURE: 98.4 F | HEIGHT: 68 IN

## 2020-09-24 DIAGNOSIS — F10.920 ALCOHOLIC INTOXICATION WITHOUT COMPLICATION (HCC): ICD-10-CM

## 2020-09-24 DIAGNOSIS — R07.9 CHEST PAIN, UNSPECIFIED TYPE: ICD-10-CM

## 2020-09-24 LAB
ALBUMIN SERPL BCP-MCNC: 4.5 G/DL (ref 3.2–4.9)
ALBUMIN/GLOB SERPL: 1 G/DL
ALP SERPL-CCNC: 78 U/L (ref 30–99)
ALT SERPL-CCNC: 71 U/L (ref 2–50)
ANION GAP SERPL CALC-SCNC: 18 MMOL/L (ref 7–16)
AST SERPL-CCNC: 79 U/L (ref 12–45)
BASOPHILS # BLD AUTO: 0.9 % (ref 0–1.8)
BASOPHILS # BLD: 0.04 K/UL (ref 0–0.12)
BILIRUB SERPL-MCNC: 0.5 MG/DL (ref 0.1–1.5)
BUN SERPL-MCNC: 7 MG/DL (ref 8–22)
CALCIUM SERPL-MCNC: 9 MG/DL (ref 8.5–10.5)
CHLORIDE SERPL-SCNC: 105 MMOL/L (ref 96–112)
CO2 SERPL-SCNC: 17 MMOL/L (ref 20–33)
CREAT SERPL-MCNC: 0.65 MG/DL (ref 0.5–1.4)
EKG IMPRESSION: NORMAL
EOSINOPHIL # BLD AUTO: 0.09 K/UL (ref 0–0.51)
EOSINOPHIL NFR BLD: 2.1 % (ref 0–6.9)
ERYTHROCYTE [DISTWIDTH] IN BLOOD BY AUTOMATED COUNT: 47.8 FL (ref 35.9–50)
ETHANOL BLD-MCNC: 206.6 MG/DL (ref 0–10.1)
GLOBULIN SER CALC-MCNC: 4.6 G/DL (ref 1.9–3.5)
GLUCOSE SERPL-MCNC: 84 MG/DL (ref 65–99)
HCT VFR BLD AUTO: 44.3 % (ref 42–52)
HGB BLD-MCNC: 15 G/DL (ref 14–18)
IMM GRANULOCYTES # BLD AUTO: 0.01 K/UL (ref 0–0.11)
IMM GRANULOCYTES NFR BLD AUTO: 0.2 % (ref 0–0.9)
LIPASE SERPL-CCNC: 71 U/L (ref 11–82)
LYMPHOCYTES # BLD AUTO: 1.06 K/UL (ref 1–4.8)
LYMPHOCYTES NFR BLD: 24.4 % (ref 22–41)
MCH RBC QN AUTO: 32 PG (ref 27–33)
MCHC RBC AUTO-ENTMCNC: 33.9 G/DL (ref 33.7–35.3)
MCV RBC AUTO: 94.5 FL (ref 81.4–97.8)
MONOCYTES # BLD AUTO: 0.29 K/UL (ref 0–0.85)
MONOCYTES NFR BLD AUTO: 6.7 % (ref 0–13.4)
NEUTROPHILS # BLD AUTO: 2.86 K/UL (ref 1.82–7.42)
NEUTROPHILS NFR BLD: 65.7 % (ref 44–72)
NRBC # BLD AUTO: 0 K/UL
NRBC BLD-RTO: 0 /100 WBC
PLATELET # BLD AUTO: 61 K/UL (ref 164–446)
PMV BLD AUTO: 11.5 FL (ref 9–12.9)
POTASSIUM SERPL-SCNC: 4.1 MMOL/L (ref 3.6–5.5)
PROT SERPL-MCNC: 9.1 G/DL (ref 6–8.2)
RBC # BLD AUTO: 4.69 M/UL (ref 4.7–6.1)
SODIUM SERPL-SCNC: 140 MMOL/L (ref 135–145)
TROPONIN T SERPL-MCNC: <6 NG/L (ref 6–19)
WBC # BLD AUTO: 4.4 K/UL (ref 4.8–10.8)

## 2020-09-24 PROCEDURE — 85025 COMPLETE CBC W/AUTO DIFF WBC: CPT

## 2020-09-24 PROCEDURE — 99285 EMERGENCY DEPT VISIT HI MDM: CPT

## 2020-09-24 PROCEDURE — 700102 HCHG RX REV CODE 250 W/ 637 OVERRIDE(OP): Performed by: EMERGENCY MEDICINE

## 2020-09-24 PROCEDURE — 83690 ASSAY OF LIPASE: CPT

## 2020-09-24 PROCEDURE — 93005 ELECTROCARDIOGRAM TRACING: CPT | Mod: 76 | Performed by: EMERGENCY MEDICINE

## 2020-09-24 PROCEDURE — 700111 HCHG RX REV CODE 636 W/ 250 OVERRIDE (IP): Performed by: EMERGENCY MEDICINE

## 2020-09-24 PROCEDURE — A9270 NON-COVERED ITEM OR SERVICE: HCPCS | Performed by: EMERGENCY MEDICINE

## 2020-09-24 PROCEDURE — 96374 THER/PROPH/DIAG INJ IV PUSH: CPT

## 2020-09-24 PROCEDURE — 80053 COMPREHEN METABOLIC PANEL: CPT

## 2020-09-24 PROCEDURE — 71045 X-RAY EXAM CHEST 1 VIEW: CPT

## 2020-09-24 PROCEDURE — 84484 ASSAY OF TROPONIN QUANT: CPT

## 2020-09-24 PROCEDURE — 80307 DRUG TEST PRSMV CHEM ANLYZR: CPT

## 2020-09-24 RX ORDER — ONDANSETRON 2 MG/ML
4 INJECTION INTRAMUSCULAR; INTRAVENOUS ONCE
Status: COMPLETED | OUTPATIENT
Start: 2020-09-24 | End: 2020-09-24

## 2020-09-24 RX ORDER — ASPIRIN 81 MG/1
324 TABLET, CHEWABLE ORAL ONCE
Status: COMPLETED | OUTPATIENT
Start: 2020-09-24 | End: 2020-09-24

## 2020-09-24 RX ORDER — ASPIRIN 300 MG/1
300 SUPPOSITORY RECTAL ONCE
Status: COMPLETED | OUTPATIENT
Start: 2020-09-24 | End: 2020-09-24

## 2020-09-24 RX ADMIN — ASPIRIN 324 MG: 81 TABLET, CHEWABLE ORAL at 03:20

## 2020-09-24 RX ADMIN — LIDOCAINE HYDROCHLORIDE 30 ML: 20 SOLUTION OROPHARYNGEAL at 03:20

## 2020-09-24 RX ADMIN — ONDANSETRON 4 MG: 2 INJECTION INTRAMUSCULAR; INTRAVENOUS at 04:15

## 2020-09-24 ASSESSMENT — HEART SCORE
HISTORY: SLIGHTLY SUSPICIOUS
RISK FACTORS: NO KNOWN RISK FACTORS
TROPONIN: LESS THAN OR EQUAL TO NORMAL LIMIT
ECG: NORMAL
AGE: 45-64
HEART SCORE: 1

## 2020-09-24 ASSESSMENT — FIBROSIS 4 INDEX: FIB4 SCORE: 15.04

## 2020-09-24 ASSESSMENT — ENCOUNTER SYMPTOMS: NAUSEA: 1

## 2020-09-24 NOTE — ED PROVIDER NOTES
ED Provider Note    Scribed for Paula Pettit M.D. by Alok Senior. 9/24/2020, 2:53 AM.    Primary care provider: Nnamdi Ballesteros M.D.  Means of arrival: EMS  History obtained from: Patient and EMS  History limited by: None    CHIEF COMPLAINT  Chief Complaint   Patient presents with   • Alcohol Intoxication   • N/V       HPI  Inocencio Shabazz is a 60 y.o. male who presents to the Emergency Department for evaluation of chest pain onset yesterday. Per EMS, his complaint upon EMS arrival was feeling nauseous and weak after drinking two fifths of whiskey. During the ambulance ride over his story changed and he started complaining of dull chest pain that does not radiate. EMS believes he has a history of alcohol abuse. Per patient, he started having chest pain yesterday with associated nausea and has felt generally unwell since. He does not report any alleviating factors. He was seen by Dr. Iglesias ([facility]) on 7/11/2020 for similar symptoms.  Per chart review he has been in the emergency department for similar complaints multiple times in the past.  He has no known underlying cardiac disease.    PPE Note: I personally donned full PPE for all patient encounters during this visit, including being clean-shaven with an N95 respirator mask and gloves.     Scribe remained outside the patient's room and did not have any contact with the patient for the duration of patient encounter.    REVIEW OF SYSTEMS  Review of Systems   Constitutional: Positive for malaise/fatigue.   Cardiovascular: Positive for chest pain.   Gastrointestinal: Positive for nausea.   All other systems reviewed and are negative.        PAST MEDICAL HISTORY   has a past medical history of Alcohol abuse, Alcohol intoxication (HCC) (3/13/2014), Cirrhosis (HCC), GIB (gastrointestinal bleeding) (1/3/2016), Hepatitis C, Pancytopenia (HCC), and Psychiatric disorder.    SURGICAL HISTORY   has a past surgical history that includes gastroscopy (1/3/2016);  "gastroscopy (4/8/2016); gastroscopy (3/13/2019); and gastroscopy-endo (N/A, 11/13/2019).    SOCIAL HISTORY  Social History     Tobacco Use   • Smoking status: Current Every Day Smoker     Packs/day: 1.00     Years: 48.00     Pack years: 48.00     Types: Cigarettes   • Smokeless tobacco: Never Used   Substance Use Topics   • Alcohol use: Yes     Comment: 1-2 beers and 1/2 pints of whiskey every day   • Drug use: Yes     Types: Inhaled     Comment: marijuana, cocaine, meth      Social History     Substance and Sexual Activity   Drug Use Yes   • Types: Inhaled    Comment: marijuana, cocaine, meth       FAMILY HISTORY  No pertinent family history.    CURRENT MEDICATIONS  Current Outpatient Medications   Medication Instructions   • ferrous sulfate 650 mg, Oral, DAILY   • folic acid (FOLVITE) 1 mg, Oral, DAILY   • Multiple Vitamins-Minerals (MULTIVITAMIN ADULT PO) 1 tablet, Oral, DAILY   • ondansetron (ZOFRAN ODT) 4 mg, Oral, EVERY 8 HOURS PRN   • potassium chloride SA (KDUR) 20 MEQ Tab CR 20 mEq, Oral, DAILY   • propranolol (INDERAL) 20 mg, Oral, 2 TIMES DAILY   • QUEtiapine (SEROQUEL) 200 mg, Oral, EVERY BEDTIME   • traZODone (DESYREL) 50 mg, Oral, NIGHTLY   • vitamin B-12 2,000 mcg, Oral, DAILY        ALLERGIES  Allergies   Allergen Reactions   • Haloperidol Unspecified     Twitching         PHYSICAL EXAM  VITAL SIGNS: /84   Pulse 64   Temp 36.4 °C (97.6 °F) (Oral)   Resp 19   Ht 1.727 m (5' 8\")   Wt 80.3 kg (177 lb)   SpO2 99%   BMI 26.91 kg/m²   Vitals reviewed by myself.  Physical Exam  Nursing note and vitals reviewed.  Constitutional: Well-developed and well-nourished.   HENT: Head is normocephalic and atraumatic.  Eyes: extra-ocular movements intact  Cardiovascular: regular rate and regular rhythm. No murmur heard.  Pulmonary/Chest: Breath sounds normal. No wheezes or rales.   Abdominal: Soft and non-tender. No distention.    Musculoskeletal: Extremities exhibit normal range of motion without edema " or tenderness.   Neurological: Sleepy but arousable, slurred speech, no focal neurologic deficits  Skin: Skin is warm and dry. No rash.        DIAGNOSTIC STUDIES /  LABS  Labs Reviewed   CBC WITH DIFFERENTIAL - Abnormal; Notable for the following components:       Result Value    WBC 4.4 (*)     RBC 4.69 (*)     Platelet Count 61 (*)     All other components within normal limits   COMP METABOLIC PANEL - Abnormal; Notable for the following components:    Co2 17 (*)     Anion Gap 18.0 (*)     Bun 7 (*)     AST(SGOT) 79 (*)     ALT(SGPT) 71 (*)     Total Protein 9.1 (*)     Globulin 4.6 (*)     All other components within normal limits   DIAGNOSTIC ALCOHOL - Abnormal; Notable for the following components:    Diagnostic Alcohol 206.6 (*)     All other components within normal limits   TROPONIN   LIPASE   ESTIMATED GFR       All labs reviewed by me.    EKG Interpretation:  Interpreted by myself    12 Lead EKG interpreted by me to show:  EKG at 2:52 AM: Normal sinus rhythm, heart rate 65, normal axis, normal intervals, , QRS 99, QTc 418, no acute ST segment changes, no evidence of acute arrhythmia or ischemia, no dynamic changes when compared to prior EKG from July 2020  My impression of this EKG: Does not indicate ischemia or arrhythmia at this time.    RADIOLOGY  DX-CHEST-PORTABLE (1 VIEW)   Final Result      No acute cardiopulmonary abnormality.        The radiologist's interpretation of all radiological studies have been reviewed by me.      REASSESSMENT  2:53 AM - Patient evaluated at bedside. Discussed ordering labs and imaging to evaluate his symptoms. Ordered  mg and GI Cocktail 30 mL for his symptoms.    COURSE & MEDICAL DECISION MAKING  Nursing notes, VS, PMSFHx reviewed in chart.    Patient is a 58-year-old male who comes in for chest pain and alcohol intoxication.  Differential diagnosis includes alcohol intoxication, gastritis, arrhythmia, acute coronary syndrome.  Diagnostic work-up includes  labs, EKG and chest x-ray.  As patient symptoms have been going on for greater than 24 hours if initial EKG and troponin are negative I feel this is sufficient to rule out acute coronary syndrome.    Patient's initial vitals are within normal limits, clinically he is intoxicated.  EKG returns and demonstrates no evidence of acute AMI or ischemia.  Labs returned and are unremarkable.  Troponin is within normal limits.  Patient has a heart score of 1 making him low risk for acute coronary syndrome.  Lipase is within normal limits ruling out pancreatitis.  Patient has mild leukopenia, this is unchanged from his baseline.  He is also thrombocytopenic again unchanged from his baseline and likely related to his chronic drinking.  Chest x-ray demonstrates no acute abnormalities.  Only lab abnormality that is significant is his alcohol level of 206.  Currently patient is clear from his chest point standpoint and there is no evidence of acute cardiac or pulmonary pathology.  I discussed this with patient and advised him we will monitor him until he is clinically sober.  Patient is signed out to oncoming physician to follow-up clinical sobriety and subsequently discharge.  Patient is in stable condition.      FINAL IMPRESSION  1. Alcoholic intoxication without complication (HCC)    2. Chest pain, unspecified type          I, Alok Senior (Jonny), am scribing for, and in the presence of, Paula Pettit M.D..    Electronically signed by: Alok Senior (Jonny), 9/24/2020    IPaula M.D. personally performed the services described in this documentation, as scribed by Alok Senior in my presence, and it is both accurate and complete.    C.    The note accurately reflects work and decisions made by me.  Paula Pettit M.D.  9/24/2020  5:14 AM

## 2020-09-24 NOTE — ED PROVIDER NOTES
ED Provider Note Addendum    Scribed for Hebert Cedillo M.D. by Farheen Magana on 9/24/2020 at 6:25 AM.     This is an addendum to the note on Inocencio Shabazz.  For further details and full chart information, see patient's initial note.       6:25 AM - I discussed the patient's case with off going provider who will transfer care of the patient to me at this time.        6:26 AM  - Patient evaluated by myself.  Patient is resting comfortably at this time with no complaints. He is able to ambulate without difficulty and clinically sober. I updated him that he is now stable for discharge. Patient verbalizes understanding and agreement to this plan of care.     Disposition:  Patient will be transferred to an appropriate psychiatric facility in stable condition for further psychiatric care and evaluation.  Patient will be placed under the care of my partner awaiting transfer.    FINAL IMPRESSION  1. Alcoholic intoxication without complication (HCC)    2. Chest pain, unspecified type         I, Farheen Magana (Scribe), am scribing for, and in the presence of, Hebert Cedillo M.D..    Electronically signed by: Farheen Magana (Scribe), 9/24/2020    I, Hebert Cedillo M.D. personally performed the services described in this documentation, as scribed by Farheen Magana in my presence, and it is both accurate and complete. C.    The note accurately reflects work and decisions made by me.  Hebert Cedillo M.D.  9/24/2020  11:21 AM

## 2020-09-24 NOTE — ED TRIAGE NOTES
Inocencio Shabazz  60 y.o.  Chief Complaint   Patient presents with   • Alcohol Intoxication   • N/V

## 2020-09-24 NOTE — ED NOTES
Inocencio Shabazz is being discharged from the Emergency Department in stable condition. Discharge and follow up instructions were given to patient.   Inocencio Shabazz is alert and oriented and verbalizes understanding. VSS. The patient ambulates with steady gait.

## 2020-12-10 ENCOUNTER — HOSPITAL ENCOUNTER (INPATIENT)
Facility: MEDICAL CENTER | Age: 60
LOS: 1 days | DRG: 897 | End: 2020-12-12
Attending: EMERGENCY MEDICINE | Admitting: HOSPITALIST
Payer: COMMERCIAL

## 2020-12-10 ENCOUNTER — APPOINTMENT (OUTPATIENT)
Dept: RADIOLOGY | Facility: MEDICAL CENTER | Age: 60
DRG: 897 | End: 2020-12-10
Attending: EMERGENCY MEDICINE
Payer: COMMERCIAL

## 2020-12-10 DIAGNOSIS — R07.9 CHEST PAIN, UNSPECIFIED TYPE: ICD-10-CM

## 2020-12-10 LAB
ALBUMIN SERPL BCP-MCNC: 4.1 G/DL (ref 3.2–4.9)
ALBUMIN/GLOB SERPL: 1.1 G/DL
ALP SERPL-CCNC: 76 U/L (ref 30–99)
ALT SERPL-CCNC: 69 U/L (ref 2–50)
AMMONIA PLAS-SCNC: 40 UMOL/L (ref 11–45)
ANION GAP SERPL CALC-SCNC: 11 MMOL/L (ref 7–16)
AST SERPL-CCNC: 111 U/L (ref 12–45)
BASOPHILS # BLD AUTO: 0.8 % (ref 0–1.8)
BASOPHILS # BLD: 0.03 K/UL (ref 0–0.12)
BILIRUB SERPL-MCNC: 0.6 MG/DL (ref 0.1–1.5)
BUN SERPL-MCNC: 10 MG/DL (ref 8–22)
CALCIUM SERPL-MCNC: 9.3 MG/DL (ref 8.5–10.5)
CHLORIDE SERPL-SCNC: 104 MMOL/L (ref 96–112)
CO2 SERPL-SCNC: 25 MMOL/L (ref 20–33)
COVID ORDER STATUS COVID19: NORMAL
CREAT SERPL-MCNC: 0.57 MG/DL (ref 0.5–1.4)
EKG IMPRESSION: NORMAL
EOSINOPHIL # BLD AUTO: 0.04 K/UL (ref 0–0.51)
EOSINOPHIL NFR BLD: 1.1 % (ref 0–6.9)
ERYTHROCYTE [DISTWIDTH] IN BLOOD BY AUTOMATED COUNT: 44.5 FL (ref 35.9–50)
ETHANOL BLD-MCNC: 73.3 MG/DL (ref 0–10)
GLOBULIN SER CALC-MCNC: 3.6 G/DL (ref 1.9–3.5)
GLUCOSE SERPL-MCNC: 88 MG/DL (ref 65–99)
HCT VFR BLD AUTO: 40.4 % (ref 42–52)
HGB BLD-MCNC: 13.4 G/DL (ref 14–18)
IMM GRANULOCYTES # BLD AUTO: 0.02 K/UL (ref 0–0.11)
IMM GRANULOCYTES NFR BLD AUTO: 0.6 % (ref 0–0.9)
LIPASE SERPL-CCNC: 52 U/L (ref 11–82)
LYMPHOCYTES # BLD AUTO: 0.5 K/UL (ref 1–4.8)
LYMPHOCYTES NFR BLD: 13.8 % (ref 22–41)
MCH RBC QN AUTO: 30.8 PG (ref 27–33)
MCHC RBC AUTO-ENTMCNC: 33.2 G/DL (ref 33.7–35.3)
MCV RBC AUTO: 92.9 FL (ref 81.4–97.8)
MONOCYTES # BLD AUTO: 0.27 K/UL (ref 0–0.85)
MONOCYTES NFR BLD AUTO: 7.5 % (ref 0–13.4)
NEUTROPHILS # BLD AUTO: 2.76 K/UL (ref 1.82–7.42)
NEUTROPHILS NFR BLD: 76.2 % (ref 44–72)
NRBC # BLD AUTO: 0 K/UL
NRBC BLD-RTO: 0 /100 WBC
PLATELET # BLD AUTO: 42 K/UL (ref 164–446)
PMV BLD AUTO: 11.6 FL (ref 9–12.9)
POTASSIUM SERPL-SCNC: 3.6 MMOL/L (ref 3.6–5.5)
PROT SERPL-MCNC: 7.7 G/DL (ref 6–8.2)
RBC # BLD AUTO: 4.35 M/UL (ref 4.7–6.1)
SODIUM SERPL-SCNC: 140 MMOL/L (ref 135–145)
TROPONIN T SERPL-MCNC: <6 NG/L (ref 6–19)
TROPONIN T SERPL-MCNC: <6 NG/L (ref 6–19)
WBC # BLD AUTO: 3.6 K/UL (ref 4.8–10.8)

## 2020-12-10 PROCEDURE — 85025 COMPLETE CBC W/AUTO DIFF WBC: CPT

## 2020-12-10 PROCEDURE — 700101 HCHG RX REV CODE 250: Performed by: STUDENT IN AN ORGANIZED HEALTH CARE EDUCATION/TRAINING PROGRAM

## 2020-12-10 PROCEDURE — G0378 HOSPITAL OBSERVATION PER HR: HCPCS

## 2020-12-10 PROCEDURE — 71045 X-RAY EXAM CHEST 1 VIEW: CPT

## 2020-12-10 PROCEDURE — 99285 EMERGENCY DEPT VISIT HI MDM: CPT

## 2020-12-10 PROCEDURE — 80053 COMPREHEN METABOLIC PANEL: CPT

## 2020-12-10 PROCEDURE — U0003 INFECTIOUS AGENT DETECTION BY NUCLEIC ACID (DNA OR RNA); SEVERE ACUTE RESPIRATORY SYNDROME CORONAVIRUS 2 (SARS-COV-2) (CORONAVIRUS DISEASE [COVID-19]), AMPLIFIED PROBE TECHNIQUE, MAKING USE OF HIGH THROUGHPUT TECHNOLOGIES AS DESCRIBED BY CMS-2020-01-R: HCPCS

## 2020-12-10 PROCEDURE — A9270 NON-COVERED ITEM OR SERVICE: HCPCS | Performed by: EMERGENCY MEDICINE

## 2020-12-10 PROCEDURE — 93005 ELECTROCARDIOGRAM TRACING: CPT

## 2020-12-10 PROCEDURE — 84484 ASSAY OF TROPONIN QUANT: CPT

## 2020-12-10 PROCEDURE — 96366 THER/PROPH/DIAG IV INF ADDON: CPT

## 2020-12-10 PROCEDURE — 700102 HCHG RX REV CODE 250 W/ 637 OVERRIDE(OP): Performed by: EMERGENCY MEDICINE

## 2020-12-10 PROCEDURE — 83690 ASSAY OF LIPASE: CPT

## 2020-12-10 PROCEDURE — A9270 NON-COVERED ITEM OR SERVICE: HCPCS | Performed by: STUDENT IN AN ORGANIZED HEALTH CARE EDUCATION/TRAINING PROGRAM

## 2020-12-10 PROCEDURE — 80307 DRUG TEST PRSMV CHEM ANLYZR: CPT

## 2020-12-10 PROCEDURE — 82140 ASSAY OF AMMONIA: CPT

## 2020-12-10 PROCEDURE — 700102 HCHG RX REV CODE 250 W/ 637 OVERRIDE(OP): Performed by: STUDENT IN AN ORGANIZED HEALTH CARE EDUCATION/TRAINING PROGRAM

## 2020-12-10 PROCEDURE — HZ2ZZZZ DETOXIFICATION SERVICES FOR SUBSTANCE ABUSE TREATMENT: ICD-10-PCS | Performed by: STUDENT IN AN ORGANIZED HEALTH CARE EDUCATION/TRAINING PROGRAM

## 2020-12-10 PROCEDURE — 99218 PR INITIAL OBSERVATION CARE,LEVL I: CPT | Performed by: STUDENT IN AN ORGANIZED HEALTH CARE EDUCATION/TRAINING PROGRAM

## 2020-12-10 PROCEDURE — 93005 ELECTROCARDIOGRAM TRACING: CPT | Performed by: EMERGENCY MEDICINE

## 2020-12-10 PROCEDURE — C9803 HOPD COVID-19 SPEC COLLECT: HCPCS | Performed by: STUDENT IN AN ORGANIZED HEALTH CARE EDUCATION/TRAINING PROGRAM

## 2020-12-10 PROCEDURE — 96365 THER/PROPH/DIAG IV INF INIT: CPT

## 2020-12-10 RX ORDER — POLYETHYLENE GLYCOL 3350 17 G/17G
1 POWDER, FOR SOLUTION ORAL
Status: DISCONTINUED | OUTPATIENT
Start: 2020-12-10 | End: 2020-12-12 | Stop reason: HOSPADM

## 2020-12-10 RX ORDER — LABETALOL HYDROCHLORIDE 5 MG/ML
10 INJECTION, SOLUTION INTRAVENOUS EVERY 4 HOURS PRN
Status: DISCONTINUED | OUTPATIENT
Start: 2020-12-10 | End: 2020-12-12 | Stop reason: HOSPADM

## 2020-12-10 RX ORDER — PANTOPRAZOLE SODIUM 40 MG/1
40 TABLET, DELAYED RELEASE ORAL
Status: ON HOLD | COMMUNITY
End: 2023-08-14

## 2020-12-10 RX ORDER — ASPIRIN 300 MG/1
300 SUPPOSITORY RECTAL ONCE
Status: COMPLETED | OUTPATIENT
Start: 2020-12-10 | End: 2020-12-10

## 2020-12-10 RX ORDER — LORAZEPAM 1 MG/1
1 TABLET ORAL EVERY 4 HOURS PRN
Status: DISCONTINUED | OUTPATIENT
Start: 2020-12-10 | End: 2020-12-12 | Stop reason: HOSPADM

## 2020-12-10 RX ORDER — LORAZEPAM 0.5 MG/1
0.5 TABLET ORAL EVERY 4 HOURS PRN
Status: DISCONTINUED | OUTPATIENT
Start: 2020-12-10 | End: 2020-12-12 | Stop reason: HOSPADM

## 2020-12-10 RX ORDER — CLONIDINE HYDROCHLORIDE 0.1 MG/1
0.1 TABLET ORAL
Status: DISCONTINUED | OUTPATIENT
Start: 2020-12-10 | End: 2020-12-12 | Stop reason: HOSPADM

## 2020-12-10 RX ORDER — ACETAMINOPHEN 325 MG/1
650 TABLET ORAL EVERY 6 HOURS PRN
Status: DISCONTINUED | OUTPATIENT
Start: 2020-12-10 | End: 2020-12-12 | Stop reason: HOSPADM

## 2020-12-10 RX ORDER — LORAZEPAM 2 MG/ML
1 INJECTION INTRAMUSCULAR
Status: DISCONTINUED | OUTPATIENT
Start: 2020-12-10 | End: 2020-12-12 | Stop reason: HOSPADM

## 2020-12-10 RX ORDER — THIAMINE MONONITRATE (VIT B1) 100 MG
100 TABLET ORAL DAILY
Status: DISCONTINUED | OUTPATIENT
Start: 2020-12-11 | End: 2020-12-12 | Stop reason: HOSPADM

## 2020-12-10 RX ORDER — THIAMINE MONONITRATE (VIT B1) 100 MG
100 TABLET ORAL DAILY
COMMUNITY
End: 2021-04-21

## 2020-12-10 RX ORDER — QUETIAPINE FUMARATE 200 MG/1
200 TABLET, FILM COATED ORAL
Status: ON HOLD | COMMUNITY
End: 2023-08-14

## 2020-12-10 RX ORDER — FOLIC ACID 1 MG/1
1 TABLET ORAL DAILY
Status: DISCONTINUED | OUTPATIENT
Start: 2020-12-11 | End: 2020-12-12 | Stop reason: HOSPADM

## 2020-12-10 RX ORDER — LORAZEPAM 2 MG/ML
1.5 INJECTION INTRAMUSCULAR
Status: DISCONTINUED | OUTPATIENT
Start: 2020-12-10 | End: 2020-12-12 | Stop reason: HOSPADM

## 2020-12-10 RX ORDER — ASPIRIN 81 MG/1
324 TABLET, CHEWABLE ORAL ONCE
Status: COMPLETED | OUTPATIENT
Start: 2020-12-10 | End: 2020-12-10

## 2020-12-10 RX ORDER — AMOXICILLIN 250 MG
2 CAPSULE ORAL 2 TIMES DAILY
Status: DISCONTINUED | OUTPATIENT
Start: 2020-12-10 | End: 2020-12-12 | Stop reason: HOSPADM

## 2020-12-10 RX ORDER — LORAZEPAM 2 MG/ML
0.5 INJECTION INTRAMUSCULAR EVERY 4 HOURS PRN
Status: DISCONTINUED | OUTPATIENT
Start: 2020-12-10 | End: 2020-12-12 | Stop reason: HOSPADM

## 2020-12-10 RX ORDER — LORAZEPAM 2 MG/1
4 TABLET ORAL
Status: DISCONTINUED | OUTPATIENT
Start: 2020-12-10 | End: 2020-12-12 | Stop reason: HOSPADM

## 2020-12-10 RX ORDER — LORAZEPAM 2 MG/1
2 TABLET ORAL
Status: DISCONTINUED | OUTPATIENT
Start: 2020-12-10 | End: 2020-12-12 | Stop reason: HOSPADM

## 2020-12-10 RX ORDER — LORAZEPAM 2 MG/ML
2 INJECTION INTRAMUSCULAR
Status: DISCONTINUED | OUTPATIENT
Start: 2020-12-10 | End: 2020-12-12 | Stop reason: HOSPADM

## 2020-12-10 RX ORDER — BISACODYL 10 MG
10 SUPPOSITORY, RECTAL RECTAL
Status: DISCONTINUED | OUTPATIENT
Start: 2020-12-10 | End: 2020-12-12 | Stop reason: HOSPADM

## 2020-12-10 RX ADMIN — LIDOCAINE HYDROCHLORIDE 30 ML: 20 SOLUTION OROPHARYNGEAL at 16:32

## 2020-12-10 RX ADMIN — LORAZEPAM 0.5 MG: 0.5 TABLET ORAL at 21:39

## 2020-12-10 RX ADMIN — ASPIRIN 324 MG: 81 TABLET, CHEWABLE ORAL at 16:31

## 2020-12-10 RX ADMIN — ACETAMINOPHEN 650 MG: 325 TABLET, FILM COATED ORAL at 22:31

## 2020-12-10 RX ADMIN — THIAMINE HYDROCHLORIDE: 100 INJECTION, SOLUTION INTRAMUSCULAR; INTRAVENOUS at 19:22

## 2020-12-10 ASSESSMENT — LIFESTYLE VARIABLES
AGITATION: SOMEWHAT MORE THAN NORMAL ACTIVITY
TOTAL SCORE: 5
ORIENTATION AND CLOUDING OF SENSORIUM: ORIENTED AND CAN DO SERIAL ADDITIONS
DOES PATIENT WANT TO TALK TO SOMEONE ABOUT QUITTING: NO
AUDITORY DISTURBANCES: NOT PRESENT
AGITATION: NORMAL ACTIVITY
HAVE YOU EVER FELT YOU SHOULD CUT DOWN ON YOUR DRINKING: NO
TOTAL SCORE: 0
HAVE PEOPLE ANNOYED YOU BY CRITICIZING YOUR DRINKING: NO
EVER FELT BAD OR GUILTY ABOUT YOUR DRINKING: NO
ANXIETY: MILDLY ANXIOUS
VISUAL DISTURBANCES: NOT PRESENT
AUDITORY DISTURBANCES: NOT PRESENT
AVERAGE NUMBER OF DAYS PER WEEK YOU HAVE A DRINK CONTAINING ALCOHOL: 7
TOTAL SCORE: 0
DOES PATIENT WANT TO STOP DRINKING: YES
TOTAL SCORE: 0
NAUSEA AND VOMITING: NO NAUSEA AND NO VOMITING
PAROXYSMAL SWEATS: BARELY PERCEPTIBLE SWEATING. PALMS MOIST
HOW MANY TIMES IN THE PAST YEAR HAVE YOU HAD 5 OR MORE DRINKS IN A DAY: 7
NAUSEA AND VOMITING: MILD NAUSEA WITH NO VOMITING
HEADACHE, FULLNESS IN HEAD: NOT PRESENT
PAROXYSMAL SWEATS: NO SWEAT VISIBLE
ANXIETY: *
DO YOU DRINK ALCOHOL: YES
VISUAL DISTURBANCES: NOT PRESENT
ON A TYPICAL DAY WHEN YOU DRINK ALCOHOL HOW MANY DRINKS DO YOU HAVE: 3
EVER HAD A DRINK FIRST THING IN THE MORNING TO STEADY YOUR NERVES TO GET RID OF A HANGOVER: NO
CONSUMPTION TOTAL: POSITIVE
TREMOR: TREMOR NOT VISIBLE BUT CAN BE FELT, FINGERTIP TO FINGERTIP
HEADACHE, FULLNESS IN HEAD: NOT PRESENT
TREMOR: *
ORIENTATION AND CLOUDING OF SENSORIUM: ORIENTED AND CAN DO SERIAL ADDITIONS
TOTAL SCORE: 4

## 2020-12-10 ASSESSMENT — PAIN DESCRIPTION - PAIN TYPE
TYPE: ACUTE PAIN
TYPE: ACUTE PAIN

## 2020-12-10 ASSESSMENT — ENCOUNTER SYMPTOMS
CHILLS: 0
FEVER: 0
ABDOMINAL PAIN: 1

## 2020-12-10 ASSESSMENT — FIBROSIS 4 INDEX: FIB4 SCORE: 9.53

## 2020-12-10 NOTE — ED TRIAGE NOTES
"Ambulatory to triage with   Chief Complaint   Patient presents with   • ETOH Withdrawal   • Chest Pressure   5/10 \"chest heaviness.\" States he doesn't \"feel right.\" Ill appearance. States last drink was 8 hours ago. Generally drinks beer and whiskey, cannot verbalize an amount. To EKG.     "

## 2020-12-11 PROBLEM — R10.9 PAIN IN THE ABDOMEN: Status: ACTIVE | Noted: 2020-12-11

## 2020-12-11 LAB
ALBUMIN SERPL BCP-MCNC: 3.9 G/DL (ref 3.2–4.9)
ALBUMIN/GLOB SERPL: 1.1 G/DL
ALP SERPL-CCNC: 73 U/L (ref 30–99)
ALT SERPL-CCNC: 62 U/L (ref 2–50)
ANION GAP SERPL CALC-SCNC: 8 MMOL/L (ref 7–16)
AST SERPL-CCNC: 85 U/L (ref 12–45)
BASOPHILS # BLD AUTO: 0.5 % (ref 0–1.8)
BASOPHILS # BLD: 0.01 K/UL (ref 0–0.12)
BILIRUB SERPL-MCNC: 1.1 MG/DL (ref 0.1–1.5)
BUN SERPL-MCNC: 8 MG/DL (ref 8–22)
CALCIUM SERPL-MCNC: 9 MG/DL (ref 8.5–10.5)
CHLORIDE SERPL-SCNC: 100 MMOL/L (ref 96–112)
CO2 SERPL-SCNC: 27 MMOL/L (ref 20–33)
CREAT SERPL-MCNC: 0.5 MG/DL (ref 0.5–1.4)
EOSINOPHIL # BLD AUTO: 0.06 K/UL (ref 0–0.51)
EOSINOPHIL NFR BLD: 2.9 % (ref 0–6.9)
ERYTHROCYTE [DISTWIDTH] IN BLOOD BY AUTOMATED COUNT: 44 FL (ref 35.9–50)
GLOBULIN SER CALC-MCNC: 3.7 G/DL (ref 1.9–3.5)
GLUCOSE SERPL-MCNC: 105 MG/DL (ref 65–99)
HCT VFR BLD AUTO: 39.7 % (ref 42–52)
HGB BLD-MCNC: 13.3 G/DL (ref 14–18)
IMM GRANULOCYTES # BLD AUTO: 0.01 K/UL (ref 0–0.11)
IMM GRANULOCYTES NFR BLD AUTO: 0.5 % (ref 0–0.9)
LYMPHOCYTES # BLD AUTO: 0.54 K/UL (ref 1–4.8)
LYMPHOCYTES NFR BLD: 26.5 % (ref 22–41)
MAGNESIUM SERPL-MCNC: 2 MG/DL (ref 1.5–2.5)
MCH RBC QN AUTO: 31.3 PG (ref 27–33)
MCHC RBC AUTO-ENTMCNC: 33.5 G/DL (ref 33.7–35.3)
MCV RBC AUTO: 93.4 FL (ref 81.4–97.8)
MONOCYTES # BLD AUTO: 0.2 K/UL (ref 0–0.85)
MONOCYTES NFR BLD AUTO: 9.8 % (ref 0–13.4)
NEUTROPHILS # BLD AUTO: 1.22 K/UL (ref 1.82–7.42)
NEUTROPHILS NFR BLD: 59.8 % (ref 44–72)
NRBC # BLD AUTO: 0 K/UL
NRBC BLD-RTO: 0 /100 WBC
PLATELET # BLD AUTO: 37 K/UL (ref 164–446)
PMV BLD AUTO: 10.9 FL (ref 9–12.9)
POTASSIUM SERPL-SCNC: 3.3 MMOL/L (ref 3.6–5.5)
PROT SERPL-MCNC: 7.6 G/DL (ref 6–8.2)
RBC # BLD AUTO: 4.25 M/UL (ref 4.7–6.1)
SARS-COV-2 RNA RESP QL NAA+PROBE: NOTDETECTED
SODIUM SERPL-SCNC: 135 MMOL/L (ref 135–145)
SPECIMEN SOURCE: NORMAL
WBC # BLD AUTO: 2 K/UL (ref 4.8–10.8)

## 2020-12-11 PROCEDURE — A9270 NON-COVERED ITEM OR SERVICE: HCPCS | Performed by: STUDENT IN AN ORGANIZED HEALTH CARE EDUCATION/TRAINING PROGRAM

## 2020-12-11 PROCEDURE — 700102 HCHG RX REV CODE 250 W/ 637 OVERRIDE(OP): Performed by: STUDENT IN AN ORGANIZED HEALTH CARE EDUCATION/TRAINING PROGRAM

## 2020-12-11 PROCEDURE — 80053 COMPREHEN METABOLIC PANEL: CPT

## 2020-12-11 PROCEDURE — 85025 COMPLETE CBC W/AUTO DIFF WBC: CPT

## 2020-12-11 PROCEDURE — 36415 COLL VENOUS BLD VENIPUNCTURE: CPT

## 2020-12-11 PROCEDURE — 99232 SBSQ HOSP IP/OBS MODERATE 35: CPT | Mod: GC | Performed by: HOSPITALIST

## 2020-12-11 PROCEDURE — 96376 TX/PRO/DX INJ SAME DRUG ADON: CPT

## 2020-12-11 PROCEDURE — 770020 HCHG ROOM/CARE - TELE (206)

## 2020-12-11 PROCEDURE — 83735 ASSAY OF MAGNESIUM: CPT

## 2020-12-11 PROCEDURE — 700111 HCHG RX REV CODE 636 W/ 250 OVERRIDE (IP): Performed by: STUDENT IN AN ORGANIZED HEALTH CARE EDUCATION/TRAINING PROGRAM

## 2020-12-11 RX ORDER — OMEPRAZOLE 20 MG/1
20 CAPSULE, DELAYED RELEASE ORAL DAILY
Status: DISCONTINUED | OUTPATIENT
Start: 2020-12-11 | End: 2020-12-12 | Stop reason: HOSPADM

## 2020-12-11 RX ORDER — NICOTINE 21 MG/24HR
21 PATCH, TRANSDERMAL 24 HOURS TRANSDERMAL
Status: DISCONTINUED | OUTPATIENT
Start: 2020-12-12 | End: 2020-12-11

## 2020-12-11 RX ORDER — NICOTINE 21 MG/24HR
21 PATCH, TRANSDERMAL 24 HOURS TRANSDERMAL
Status: DISCONTINUED | OUTPATIENT
Start: 2020-12-11 | End: 2020-12-12 | Stop reason: HOSPADM

## 2020-12-11 RX ORDER — PANTOPRAZOLE SODIUM 40 MG/1
40 TABLET, DELAYED RELEASE ORAL DAILY
Status: DISCONTINUED | OUTPATIENT
Start: 2020-12-11 | End: 2020-12-11

## 2020-12-11 RX ORDER — POTASSIUM CHLORIDE 20 MEQ/1
40 TABLET, EXTENDED RELEASE ORAL 2 TIMES DAILY
Status: COMPLETED | OUTPATIENT
Start: 2020-12-11 | End: 2020-12-11

## 2020-12-11 RX ORDER — CHOLECALCIFEROL (VITAMIN D3) 125 MCG
2000 CAPSULE ORAL DAILY
Status: DISCONTINUED | OUTPATIENT
Start: 2020-12-11 | End: 2020-12-12 | Stop reason: HOSPADM

## 2020-12-11 RX ORDER — PROPRANOLOL HYDROCHLORIDE 20 MG/1
20 TABLET ORAL 2 TIMES DAILY
Status: DISCONTINUED | OUTPATIENT
Start: 2020-12-11 | End: 2020-12-12 | Stop reason: HOSPADM

## 2020-12-11 RX ORDER — QUETIAPINE FUMARATE 200 MG/1
200 TABLET, FILM COATED ORAL EVERY EVENING
Status: DISCONTINUED | OUTPATIENT
Start: 2020-12-11 | End: 2020-12-12 | Stop reason: HOSPADM

## 2020-12-11 RX ADMIN — POTASSIUM CHLORIDE 40 MEQ: 20 TABLET, EXTENDED RELEASE ORAL at 17:56

## 2020-12-11 RX ADMIN — NICOTINE TRANSDERMAL SYSTEM 21 MG: 21 PATCH, EXTENDED RELEASE TRANSDERMAL at 20:48

## 2020-12-11 RX ADMIN — PROPRANOLOL HYDROCHLORIDE 20 MG: 20 TABLET ORAL at 12:00

## 2020-12-11 RX ADMIN — PROPRANOLOL HYDROCHLORIDE 20 MG: 20 TABLET ORAL at 22:14

## 2020-12-11 RX ADMIN — QUETIAPINE FUMARATE 200 MG: 200 TABLET ORAL at 20:47

## 2020-12-11 RX ADMIN — CYANOCOBALAMIN TAB 500 MCG 2000 MCG: 500 TAB at 08:58

## 2020-12-11 RX ADMIN — LORAZEPAM 1 MG: 1 TABLET ORAL at 17:56

## 2020-12-11 RX ADMIN — POTASSIUM CHLORIDE 40 MEQ: 20 TABLET, EXTENDED RELEASE ORAL at 09:15

## 2020-12-11 RX ADMIN — LORAZEPAM 2 MG: 2 TABLET ORAL at 08:58

## 2020-12-11 RX ADMIN — OMEPRAZOLE 20 MG: 20 CAPSULE, DELAYED RELEASE ORAL at 08:58

## 2020-12-11 RX ADMIN — Medication 100 MG: at 06:00

## 2020-12-11 RX ADMIN — FOLIC ACID 1 MG: 1 TABLET ORAL at 05:41

## 2020-12-11 RX ADMIN — THERA TABS 1 TABLET: TAB at 05:41

## 2020-12-11 RX ADMIN — LORAZEPAM 0.5 MG: 2 INJECTION INTRAMUSCULAR; INTRAVENOUS at 03:58

## 2020-12-11 ASSESSMENT — LIFESTYLE VARIABLES
ANXIETY: MILDLY ANXIOUS
TREMOR: TREMOR NOT VISIBLE BUT CAN BE FELT, FINGERTIP TO FINGERTIP
ANXIETY: MILDLY ANXIOUS
HEADACHE, FULLNESS IN HEAD: VERY MILD
TREMOR: *
ORIENTATION AND CLOUDING OF SENSORIUM: ORIENTED AND CAN DO SERIAL ADDITIONS
VISUAL DISTURBANCES: NOT PRESENT
AGITATION: *
AUDITORY DISTURBANCES: NOT PRESENT
VISUAL DISTURBANCES: MILD SENSITIVITY
AGITATION: NORMAL ACTIVITY
HEADACHE, FULLNESS IN HEAD: MILD
ANXIETY: *
AGITATION: NORMAL ACTIVITY
AUDITORY DISTURBANCES: NOT PRESENT
ANXIETY: MILDLY ANXIOUS
PAROXYSMAL SWEATS: BARELY PERCEPTIBLE SWEATING. PALMS MOIST
VISUAL DISTURBANCES: NOT PRESENT
HEADACHE, FULLNESS IN HEAD: VERY MILD
AGITATION: NORMAL ACTIVITY
TREMOR: *
ORIENTATION AND CLOUDING OF SENSORIUM: ORIENTED AND CAN DO SERIAL ADDITIONS
ORIENTATION AND CLOUDING OF SENSORIUM: ORIENTED AND CAN DO SERIAL ADDITIONS
TOTAL SCORE: 9
NAUSEA AND VOMITING: NO NAUSEA AND NO VOMITING
TREMOR: *
PAROXYSMAL SWEATS: NO SWEAT VISIBLE
NAUSEA AND VOMITING: MILD NAUSEA WITH NO VOMITING
NAUSEA AND VOMITING: *
AUDITORY DISTURBANCES: NOT PRESENT
AGITATION: SOMEWHAT MORE THAN NORMAL ACTIVITY
ORIENTATION AND CLOUDING OF SENSORIUM: CANNOT DO SERIAL ADDITIONS OR IS UNCERTAIN ABOUT DATE
PAROXYSMAL SWEATS: BARELY PERCEPTIBLE SWEATING. PALMS MOIST
ORIENTATION AND CLOUDING OF SENSORIUM: ORIENTED AND CAN DO SERIAL ADDITIONS
NAUSEA AND VOMITING: *
NAUSEA AND VOMITING: MILD NAUSEA WITH NO VOMITING
AUDITORY DISTURBANCES: NOT PRESENT
TREMOR: TREMOR NOT VISIBLE BUT CAN BE FELT, FINGERTIP TO FINGERTIP
TOTAL SCORE: 11
TOTAL SCORE: 9
NAUSEA AND VOMITING: MILD NAUSEA WITH NO VOMITING
ANXIETY: *
AUDITORY DISTURBANCES: NOT PRESENT
HEADACHE, FULLNESS IN HEAD: NOT PRESENT
TREMOR: *
ANXIETY: MILDLY ANXIOUS
PAROXYSMAL SWEATS: *
VISUAL DISTURBANCES: NOT PRESENT
VISUAL DISTURBANCES: NOT PRESENT
ORIENTATION AND CLOUDING OF SENSORIUM: ORIENTED AND CAN DO SERIAL ADDITIONS
AGITATION: NORMAL ACTIVITY
AGITATION: NORMAL ACTIVITY
NAUSEA AND VOMITING: *
TOTAL SCORE: 5
VISUAL DISTURBANCES: NOT PRESENT
TOTAL SCORE: 7
ANXIETY: *
AUDITORY DISTURBANCES: NOT PRESENT
AUDITORY DISTURBANCES: NOT PRESENT
PAROXYSMAL SWEATS: *
HEADACHE, FULLNESS IN HEAD: VERY MILD
TREMOR: *
PAROXYSMAL SWEATS: NO SWEAT VISIBLE
HEADACHE, FULLNESS IN HEAD: MILD
TOTAL SCORE: 5
HEADACHE, FULLNESS IN HEAD: MILD
PAROXYSMAL SWEATS: BARELY PERCEPTIBLE SWEATING. PALMS MOIST
TOTAL SCORE: 9
VISUAL DISTURBANCES: NOT PRESENT
ORIENTATION AND CLOUDING OF SENSORIUM: ORIENTED AND CAN DO SERIAL ADDITIONS

## 2020-12-11 ASSESSMENT — ENCOUNTER SYMPTOMS
PALPITATIONS: 0
BLURRED VISION: 0
CONSTIPATION: 0
SEIZURES: 0
HEMOPTYSIS: 0
BLOOD IN STOOL: 0
ABDOMINAL PAIN: 0
LOSS OF CONSCIOUSNESS: 0
FEVER: 0
CHILLS: 0
VOMITING: 0
DEPRESSION: 0
HEADACHES: 0
DIAPHORESIS: 0
DIARRHEA: 0
MYALGIAS: 0
TREMORS: 0
NAUSEA: 0
DIZZINESS: 0
SHORTNESS OF BREATH: 0
COUGH: 0
WEAKNESS: 0

## 2020-12-11 ASSESSMENT — PAIN DESCRIPTION - PAIN TYPE: TYPE: ACUTE PAIN

## 2020-12-11 ASSESSMENT — FIBROSIS 4 INDEX: FIB4 SCORE: 18.09

## 2020-12-11 NOTE — PROGRESS NOTES
Daily Progress Note:     Date of Service: 12/11/2020  Primary Team: [facility] Team   Attending: Elyssa Ramos M.D.   Senior Resident: Dr. Carrera  Intern: Dr. Kaminski  Contact:  [phone]    Chief Complaint: Acute alcohol intoxication with chest and abdominal pain    Subjective:   Patient was started on CIWA and received total of 1 mg Ativan overnight.  Today patient reports that his chest pain and abdominal pain have both resolved after receiving GI cocktail.  At this time patient denies headache, visions changes, tremors, pins/needles sensations, A/V hallucinations, anxiety, sweating, N/V, hematochezia/melena.  He is unable to to provide accurate history of his previous hospitalizations.  Otherwise no acute complaints at this time.    Consultants/Specialty:  None    Review of Systems:    Review of Systems   Constitutional: Negative for chills, diaphoresis, fever and malaise/fatigue.   HENT: Negative for hearing loss.    Eyes: Negative for blurred vision.   Respiratory: Negative for cough, hemoptysis and shortness of breath.    Cardiovascular: Negative for chest pain, palpitations and leg swelling.   Gastrointestinal: Negative for abdominal pain, blood in stool, constipation, diarrhea, melena, nausea and vomiting.   Genitourinary: Negative for dysuria and hematuria.   Musculoskeletal: Negative for myalgias.   Skin: Negative for rash.   Neurological: Negative for dizziness, tremors, seizures, loss of consciousness, weakness and headaches.   Psychiatric/Behavioral: Negative for depression.       Objective Data:   Physical Exam:   Vitals:   Temp:  [35.7 °C (96.2 °F)-37.4 °C (99.3 °F)] 35.7 °C (96.2 °F)  Pulse:  [] 65  Resp:  [10-40] 10  BP: (127-159)/(82-93) 133/83  SpO2:  [94 %-98 %] 98 %    Physical Exam  Vitals signs and nursing note reviewed.   Constitutional:       General: He is not in acute distress.     Appearance: He is normal weight. He is not diaphoretic.   HENT:      Head: Normocephalic  and atraumatic.      Nose: Nose normal.      Mouth/Throat:      Mouth: Mucous membranes are dry.   Eyes:      General: No scleral icterus.     Conjunctiva/sclera: Conjunctivae normal.      Pupils: Pupils are equal, round, and reactive to light.   Neck:      Musculoskeletal: Neck supple.   Cardiovascular:      Rate and Rhythm: Normal rate and regular rhythm.      Pulses: Normal pulses.      Heart sounds: No murmur. No friction rub. No gallop.    Pulmonary:      Effort: Pulmonary effort is normal. No respiratory distress.      Breath sounds: Normal breath sounds. No wheezing, rhonchi or rales.   Abdominal:      General: Abdomen is flat. Bowel sounds are normal. There is no distension.      Tenderness: There is no abdominal tenderness. There is no guarding.   Musculoskeletal: Normal range of motion.      Right lower leg: No edema.      Left lower leg: No edema.   Skin:     General: Skin is warm and dry.      Capillary Refill: Capillary refill takes less than 2 seconds.   Neurological:      General: No focal deficit present.      Mental Status: He is alert and oriented to person, place, and time.   Psychiatric:         Mood and Affect: Mood normal.         Labs:   Recent Labs     12/10/20  1640 12/11/20  0036   WBC 3.6* 2.0*   RBC 4.35* 4.25*   HEMOGLOBIN 13.4* 13.3*   HEMATOCRIT 40.4* 39.7*   MCV 92.9 93.4   MCH 30.8 31.3   RDW 44.5 44.0   PLATELETCT 42* 37*   MPV 11.6 10.9   NEUTSPOLYS 76.20* 59.80   LYMPHOCYTES 13.80* 26.50   MONOCYTES 7.50 9.80   EOSINOPHILS 1.10 2.90   BASOPHILS 0.80 0.50      Recent Labs     12/10/20  1640 12/11/20  0036   SODIUM 140 135   POTASSIUM 3.6 3.3*   CHLORIDE 104 100   CO2 25 27   GLUCOSE 88 105*   BUN 10 8      Recent Labs     12/10/20  1640 12/11/20  0036   ALBUMIN 4.1 3.9   TBILIRUBIN 0.6 1.1   ALKPHOSPHAT 76 73   TOTPROTEIN 7.7 7.6   ALTSGPT 69* 62*   ASTSGOT 111* 85*   CREATININE 0.57 0.50        Imaging:   DX-CHEST-PORTABLE (1 VIEW)   Final Result      No acute cardiopulmonary  process is seen.      Atherosclerotic calcification is seen.           Problem Representation:    Patient is a 61 y/o M with alcohol-induced cirrhosis, current alcohol abuse, esophageal varices s/p multiple banding requiring ICU-hospitalization who presented with acute alcohol intoxication with multiple complaints of chest pain and abdominal pain with negative cardiac workup and both sources of pain have resolved. Patient now currently afebrile and HD stable continuing inpatient management for suspected possible transition to alcohol withdrawal.      Alcohol abuse- (present on admission)  Assessment & Plan  History of chronic alcohol abuse, ROSINA, cirrhosis, ETOH-induced pancreatitis, esophageal varices s/p banding x 5  Subjective complaints of chest pain and abdominal discomfort that have resolved  No documentation of seizures and patient denies any history  BAL on admission 73, Lipase normal  No hematemesis, melena, hematochezia, Hgb stable 13.3  No ascites  Ativan - total 1 mg administered over last 24 hours per [facility]    Assessment:  Acute alcohol intoxication with concern for possible alcohol withdrawal, otherwise afebrile HD stable without concerns for intra-abdominal infection, pancreatitis, or GI bleed at this time.    Plan:  - [facility] protocol  - Continue MVI / Folic / B1 supplementation  - Counseled patient on importance of alcohol cessation  - Fall, Aspiration, Seizure precautions  - Electrolyte replacement - giving KCl 40 mEq PO x 2 doses, CTM with BMP    Cirrhosis of liver (HCC)- (present on admission)  Assessment & Plan  History of alcohol induced cirrhosis, Hepatitis C, esophageal varices  AxOx3, no asterixis, jaundice, scleral icterus, bruising, spider angiomata  No signs of hematemesis, hematochezia, or melena at this time, Hgb stable  U/S at bedside didn't show any ascites    Plan:  Continue to monitor clinically    Pain in the chest- (present on admission)  Assessment & Plan  Pt complaining of chest  pain, per chart he has been seen in the ED multiple times with similar complaints  Troponin was negative  EKG negative for any ischemic/infarct changes  Pain has resolved, do not suspect cardiac etiology    Plan:  Continue to monitor    Pain in the abdomen  Assessment & Plan  Complaints of abdominal discomfort  Relieved with GI cocktail  Resolved at this time  No ascites or other clinical signs of infection    Plan:  Continue to monitor  If occurs again trial of GI cocktail again    Thrombocytopenia (HCC)- (present on admission)  Assessment & Plan  Chronic, likely from ETOH use  No active bleeding    Plan:  - Continue to monitor   - SCDs, holding pharmacological DVT prophylaxis

## 2020-12-11 NOTE — ED NOTES
Med rec complete per pt at bedside  Allergies reviewed  No ABX in last 14 days    Pt reports he has not had his medications in 2-3 days

## 2020-12-11 NOTE — ED NOTES
Report given to TITUS Haile. Pt transferred to T804-2 with RN on monitor, all belongings accounted for and sent with pt

## 2020-12-11 NOTE — ASSESSMENT & PLAN NOTE
History of alcohol induced cirrhosis, Hepatitis C, esophageal varices  AxOx3, no asterixis, jaundice, scleral icterus, bruising, spider angiomata  No signs of hematemesis, hematochezia, or melena at this time, Hgb stable  U/S at bedside didn't show any ascites    Plan:  Continue to monitor clinically

## 2020-12-11 NOTE — ASSESSMENT & PLAN NOTE
Complaints of abdominal discomfort  Relieved with GI cocktail  Resolved at this time  No ascites or other clinical signs of infection    Plan:  Continue to monitor  If occurs again trial of GI cocktail again

## 2020-12-11 NOTE — ED PROVIDER NOTES
ED Provider Note    CHIEF COMPLAINT  Chief Complaint   Patient presents with   • ETOH Withdrawal   • Chest Pressure       HPI  Inocencio Shabazz is a 60 y.o. male who presents stating he feels like he is going to die.  The patient states that he drinks on a daily basis and his last drink about 8 hours prior to arrival.  He is very vague in his complaints.  He keeps tell me he feels like he is going to die.  He states he has had some intermittent chest pressure and further questioning the patient as he did have a chief complaint of chest pressure.  He states this is been ongoing for quite some time.  He describes it as pressure in the substernal region with no radicular component.  He is unaware of any exacerbating or relieving factors.  He does not have any associated dyspnea nor nausea.  He currently does not have any chest pain.  He does not have any pain or swelling to his lower extremities.  He states he is a known alcoholic and would like help at this time.    REVIEW OF SYSTEMS  See HPI for further details. All other systems are negative.     PAST MEDICAL HISTORY  Past Medical History:   Diagnosis Date   • GIB (gastrointestinal bleeding) 1/3/2016   • Alcohol intoxication (HCC) 3/13/2014   • Alcohol abuse    • Cirrhosis (HCC)    • Hepatitis C    • Pancytopenia (HCC)    • Psychiatric disorder     susu melton       FAMILY HISTORY  [unfilled]    SOCIAL HISTORY  Social History     Socioeconomic History   • Marital status: Single     Spouse name: Not on file   • Number of children: Not on file   • Years of education: Not on file   • Highest education level: Not on file   Occupational History   • Not on file   Social Needs   • Financial resource strain: Not on file   • Food insecurity     Worry: Not on file     Inability: Not on file   • Transportation needs     Medical: Not on file     Non-medical: Not on file   Tobacco Use   • Smoking status: Current Every Day Smoker     Packs/day: 1.00     Years: 48.00      Pack years: 48.00     Types: Cigarettes   • Smokeless tobacco: Never Used   Substance and Sexual Activity   • Alcohol use: Yes     Comment: 1-2 beers and 1/2 pints of whiskey every day   • Drug use: Yes     Types: Inhaled     Comment: marijuana, cocaine, meth   • Sexual activity: Not on file   Lifestyle   • Physical activity     Days per week: Not on file     Minutes per session: Not on file   • Stress: Not on file   Relationships   • Social connections     Talks on phone: Not on file     Gets together: Not on file     Attends [employer] service: Not on file     Active member of club or organization: Not on file     Attends meetings of clubs or organizations: Not on file     Relationship status: Not on file   • Intimate partner violence     Fear of current or ex partner: Not on file     Emotionally abused: Not on file     Physically abused: Not on file     Forced sexual activity: Not on file   Other Topics Concern   • Not on file   Social History Narrative   • Not on file       SURGICAL HISTORY  Past Surgical History:   Procedure Laterality Date   • GASTROSCOPY-ENDO N/A 11/13/2019    Procedure: GASTROSCOPY with banding;  Surgeon: Tamela Smith M.D.;  Location: ENDOSCOPY [facility];  Service: Gastroenterology   • GASTROSCOPY  3/13/2019    Procedure: GASTROSCOPY;  Surgeon: Abdi Ba M.D.;  Location: SURGERY [facility];  Service: Gastroenterology   • GASTROSCOPY  4/8/2016    Procedure: GASTROSCOPY;  Surgeon: Braeden Tobin M.D.;  Location: SURGERY [facility];  Service:    • GASTROSCOPY  1/3/2016    Procedure: GASTROSCOPY WITH BANDING;  Surgeon: Alfredo Antonio M.D.;  Location: SURGERY [facility];  Service:        CURRENT MEDICATIONS  Home Medications    **Home medications have not yet been reviewed for this encounter**         ALLERGIES  Allergies   Allergen Reactions   • Haloperidol Unspecified     Twitching         PHYSICAL EXAM  VITAL SIGNS: /92   Pulse (!) 122   Temp 37 °C  "(98.6 °F) (Temporal)   Resp 16   Ht 1.727 m (5' 8\")   Wt 65.8 kg (145 lb 1 oz)   SpO2 95%   BMI 22.06 kg/m²       Constitutional: Anxious, Non-toxic appearance.   HENT: Normocephalic, Atraumatic, Bilateral external ears normal, Oropharynx moist, No oral exudates, Nose normal.   Eyes: PERRLA, EOMI, Conjunctiva normal, No discharge.   Neck: Normal range of motion, No tenderness, Supple, No stridor.   Lymphatic: No lymphadenopathy noted.   Cardiovascular: Normal heart rate, Normal rhythm, No murmurs, No rubs, No gallops.   Thorax & Lungs: Normal breath sounds, No respiratory distress, No wheezing, No chest tenderness.   Abdomen: Bowel sounds normal, Soft, No tenderness, No masses, No pulsatile masses.   Skin: Warm, Dry, No erythema, No rash.   Back: No tenderness, No CVA tenderness.    Extremities: Intact distal pulses, No edema, No tenderness, No cyanosis, No clubbing.   Neurologic: Alert & oriented x 3, Normal motor function, Normal sensory function, No focal deficits noted.   Psychiatric: Affect normal, Judgment normal, Mood normal.     Results for orders placed or performed during the hospital encounter of 12/10/20   CBC with Differential   Result Value Ref Range    WBC 3.6 (L) 4.8 - 10.8 K/uL    RBC 4.35 (L) 4.70 - 6.10 M/uL    Hemoglobin 13.4 (L) 14.0 - 18.0 g/dL    Hematocrit 40.4 (L) 42.0 - 52.0 %    MCV 92.9 81.4 - 97.8 fL    MCH 30.8 27.0 - 33.0 pg    MCHC 33.2 (L) 33.7 - 35.3 g/dL    RDW 44.5 35.9 - 50.0 fL    Platelet Count 42 (LL) 164 - 446 K/uL    MPV 11.6 9.0 - 12.9 fL    Neutrophils-Polys 76.20 (H) 44.00 - 72.00 %    Lymphocytes 13.80 (L) 22.00 - 41.00 %    Monocytes 7.50 0.00 - 13.40 %    Eosinophils 1.10 0.00 - 6.90 %    Basophils 0.80 0.00 - 1.80 %    Immature Granulocytes 0.60 0.00 - 0.90 %    Nucleated RBC 0.00 /100 WBC    Neutrophils (Absolute) 2.76 1.82 - 7.42 K/uL    Lymphs (Absolute) 0.50 (L) 1.00 - 4.80 K/uL    Monos (Absolute) 0.27 0.00 - 0.85 K/uL    Eos (Absolute) 0.04 0.00 - 0.51 K/uL "    Baso (Absolute) 0.03 0.00 - 0.12 K/uL    Immature Granulocytes (abs) 0.02 0.00 - 0.11 K/uL    NRBC (Absolute) 0.00 K/uL   Complete Metabolic Panel (CMP)   Result Value Ref Range    Sodium 140 135 - 145 mmol/L    Potassium 3.6 3.6 - 5.5 mmol/L    Chloride 104 96 - 112 mmol/L    Co2 25 20 - 33 mmol/L    Anion Gap 11.0 7.0 - 16.0    Glucose 88 65 - 99 mg/dL    Bun 10 8 - 22 mg/dL    Creatinine 0.57 0.50 - 1.40 mg/dL    Calcium 9.3 8.5 - 10.5 mg/dL    AST(SGOT) 111 (H) 12 - 45 U/L    ALT(SGPT) 69 (H) 2 - 50 U/L    Alkaline Phosphatase 76 30 - 99 U/L    Total Bilirubin 0.6 0.1 - 1.5 mg/dL    Albumin 4.1 3.2 - 4.9 g/dL    Total Protein 7.7 6.0 - 8.2 g/dL    Globulin 3.6 (H) 1.9 - 3.5 g/dL    A-G Ratio 1.1 g/dL   Troponin   Result Value Ref Range    Troponin T <6 6 - 19 ng/L   DIAGNOSTIC ALCOHOL   Result Value Ref Range    Diagnostic Alcohol 73.3 (H) 0.0 - 10.0 mg/dL   ESTIMATED GFR   Result Value Ref Range    GFR If African American >60 >60 mL/min/1.73 m 2    GFR If Non African American >60 >60 mL/min/1.73 m 2   EKG   Result Value Ref Range    Report       [facility] Emergency Dept.    Test Date:  2020-12-10  Pt Name:    JIMMY FERRER                 Department: ER  MRN:        [id number]                      Room:  Gender:     Male                         Technician: [id number]  :        [id number]                   Requested By:ER TRIAGE PROTOCOL  Order #:    [id number]                    Reading MD: INDERJIT MACKENZIE MD    Measurements  Intervals                                Axis  Rate:       83                           P:          70  MN:         144                          QRS:        73  QRSD:       94                           T:          68  QT:         372  QTc:        437    Interpretive Statements  Twelve-lead EKG shows a normal sinus rhythm with a ventricular rate of 83,  normal QRS, normal intervals, no ST segment elevation or depression, normal T    waves.  Electronically Signed On  12- 16:22:05 PST by JUSTIN GOINS MD         RADIOLOGY/PROCEDURES  DX-CHEST-PORTABLE (1 VIEW)   Final Result      No acute cardiopulmonary process is seen.      Atherosclerotic calcification is seen.            COURSE & MEDICAL DECISION MAKING  Pertinent Labs & Imaging studies reviewed. (See chart for details)  This a 60-year-old gentleman who presents with vague complaints.  He has been having some intermittent chest pain and an EKG was performed on arrival and the patient received aspirin.  EKG does not show any ischemic changes and the patient's troponin is negative which is comforting.  I cannot rule out unstable angina and omit the patient to the hospital for further work-up of the chest pain as I do not have a clear source otherwise.  Chest x-ray does not show any evidence of a pulmonary source.  The patient has not had any cough nor fever to support an infectious source.  This could be from esophagitis as the patient is a heavy drinker and he did receive a GI cocktail.  He continued to have some periodic pain throughout his stay but no persistent discomfort.  His abdomen is benign.  He will be at high risk for alcohol withdrawal.  Admit the patient to the hospital for chest pain rule out and then possibly transfer the patient for further alcohol abuse and withdrawals.    FINAL IMPRESSION  1.  Chest pain  2.  Alcohol abuse    Disposition  The patient will be admitted in stable condition         Electronically signed by: Justin Goins M.D., 12/10/2020 4:20 PM

## 2020-12-11 NOTE — PROGRESS NOTES
Report received from Lindsey QURESHI. Pt has complaint of bilateral leg pain at this time 4/10 tingling in nature. He describes the pain as transient. He denies SOB, chest pain, N/v at this time. Pt transported to [facility] on zoll monitor. Pt placed on tele box with confirmed rhythm of SR at 85. POC reviewed and all questions answered.

## 2020-12-11 NOTE — ASSESSMENT & PLAN NOTE
Chronic, likely from ETOH use  No active bleeding    Plan:  - Continue to monitor   - SCDs, holding pharmacological DVT prophylaxis

## 2020-12-11 NOTE — ED NOTES
Pt comfort check. Given meal tray. Denies c/p at this time. Will cont to monitor   “Patient's name, , procedure and correct site were confirmed during the [place] Timeout.”

## 2020-12-11 NOTE — H&P
Hospital Medicine History & Physical Note    Date of Service  12/10/2020    Primary Care Physician  Nnamdi Ballesteros M.D.    Consultants  NA    Code Status  Full Code    Chief Complaint  Chief Complaint   Patient presents with   • ETOH Withdrawal   • Chest Pressure       History of Presenting Illness  60 y.o. male with PMHx of ETOH, Cirrhosis, Hep C and pancytopenia who presented 12/10/2020 with acute alcohol intoxication and complaints of feeling unwell, he is rather unspecific but overall states his abdomen is waving and he has chest pain intermittently. Denies any chest pain currently. He is otherwise not forthcoming with more history. He is unable to tell me if he has history of severe withdrawal or seizures.     In ED, vitals stable, labs show pancytopenia which is not different from his baseline. BAL 73.3, initial troponin is negative, EKG shows SR without significant ST segment changes, xray is neg for cardiopulmonary pathology       Review of Systems  Review of Systems   Unable to perform ROS: Mental acuity   Constitutional: Negative for chills and fever.   Cardiovascular: Positive for chest pain.   Gastrointestinal: Positive for abdominal pain.       Past Medical History   has a past medical history of Alcohol abuse, Alcohol intoxication (HCC) (3/13/2014), Cirrhosis (HCC), GIB (gastrointestinal bleeding) (1/3/2016), Hepatitis C, Pancytopenia (HCC), and Psychiatric disorder.    Surgical History   has a past surgical history that includes gastroscopy (1/3/2016); gastroscopy (4/8/2016); gastroscopy (3/13/2019); and gastroscopy-endo (N/A, 11/13/2019).     Family History  family history is not on file.     Social History   reports that he has been smoking cigarettes. He has a 48.00 pack-year smoking history. He has never used smokeless tobacco. He reports current alcohol use. He reports current drug use. Drug: Inhaled.    Allergies  Allergies   Allergen Reactions   • Haloperidol Unspecified     Twitching/involuntary  movements        Medications  Prior to Admission Medications   Prescriptions Last Dose Informant Patient Reported? Taking?   Cyanocobalamin (VITAMIN B-12) 1000 MCG Tab >2 days at unknown Patient Yes No   Sig: Take 2,000 mcg by mouth every day.   folic acid (FOLVITE) 1 MG Tab NOT TAKING at NOT TAKING Patient No No   Sig: Take 1 Tab by mouth every day.   Patient not taking: Reported on 12/10/2020   ondansetron (ZOFRAN ODT) 4 MG TABLET DISPERSIBLE NOT TAKING at NOT TAKING Patient No No   Sig: Take 1 Tab by mouth every 8 hours as needed.   Patient not taking: Reported on 12/10/2020   pantoprazole (PROTONIX) 40 MG Tablet Delayed Response >2 days at unknown Patient Yes Yes   Sig: Take 40 mg by mouth every day.   potassium chloride SA (KDUR) 20 MEQ Tab CR NOT TAKING at NOT TAKING Patient No No   Sig: Take 1 Tab by mouth every day.   Patient not taking: Reported on 12/10/2020   propranolol (INDERAL) 20 MG Tab NOT TAKING at NOT TAKING Patient No No   Sig: Take 1 Tab by mouth 2 times a day.   Patient not taking: Reported on 12/10/2020   quetiapine (SEROQUEL) 400 MG tablet >2 days at unknown Patient Yes Yes   Sig: Take 200 mg by mouth every evening. Prescribed as 400 mg daily   thiamine (VITAMIN B-1) 100 MG Tab >2 days at unknown Patient Yes Yes   Sig: Take 100 mg by mouth every day.   traZODone (DESYREL) 50 MG Tab >2 days at unknown Patient Yes No   Sig: Take 50 mg by mouth every evening.      Facility-Administered Medications: None       Physical Exam  Temp:  [37 °C (98.6 °F)] 37 °C (98.6 °F)  Pulse:  [] 68  Resp:  [16-40] 40  BP: (127-148)/(82-92) 148/82  SpO2:  [94 %-96 %] 96 %    Physical Exam  Constitutional:       General: He is not in acute distress.     Appearance: He is normal weight. He is not ill-appearing.   HENT:      Head: Normocephalic and atraumatic.      Mouth/Throat:      Mouth: Mucous membranes are moist.      Pharynx: Oropharynx is clear.   Eyes:      Extraocular Movements: Extraocular movements  intact.      Pupils: Pupils are equal, round, and reactive to light.   Cardiovascular:      Rate and Rhythm: Normal rate and regular rhythm.      Pulses: Normal pulses.      Heart sounds: No murmur.   Pulmonary:      Effort: Pulmonary effort is normal. No respiratory distress.      Breath sounds: Normal breath sounds.   Abdominal:      General: Abdomen is flat. Bowel sounds are normal. There is no distension.      Palpations: Abdomen is soft.      Tenderness: There is no abdominal tenderness.   Musculoskeletal: Normal range of motion.         General: No swelling or tenderness.   Skin:     General: Skin is warm and dry.      Capillary Refill: Capillary refill takes less than 2 seconds.   Neurological:      General: No focal deficit present.      Mental Status: He is alert. He is disoriented.   Psychiatric:      Comments: Flat affect         Laboratory:  Recent Labs     12/10/20  1640   WBC 3.6*   RBC 4.35*   HEMOGLOBIN 13.4*   HEMATOCRIT 40.4*   MCV 92.9   MCH 30.8   MCHC 33.2*   RDW 44.5   PLATELETCT 42*   MPV 11.6     Recent Labs     12/10/20  1640   SODIUM 140   POTASSIUM 3.6   CHLORIDE 104   CO2 25   GLUCOSE 88   BUN 10   CREATININE 0.57   CALCIUM 9.3     Recent Labs     12/10/20  1640   ALTSGPT 69*   ASTSGOT 111*   ALKPHOSPHAT 76   TBILIRUBIN 0.6   GLUCOSE 88         No results for input(s): NTPROBNP in the last 72 hours.      Recent Labs     12/10/20  1640   TROPONINT <6       Imaging:  DX-CHEST-PORTABLE (1 VIEW)   Final Result      No acute cardiopulmonary process is seen.      Atherosclerotic calcification is seen.            Assessment/Plan:  I anticipate this patient is appropriate for observation status at this time.    * Pain in the chest- (present on admission)  Assessment & Plan  Pt complaining of chest pain, per chart he has been seen in the ED multiple times with similar complaints, suspect non-cardiac chest pain   - troponin negative   - EKG shows no ischemic changes   - will trend  troponin      Alcohol abuse- (present on admission)  Assessment & Plan  Chronic alcohol use, states he drinks everyday, is acutely intoxicated and unable to tell me if more about how much or if he has every severe withdrawal symptoms  - BAL 73 on admission   - rally bag ordered with PO vitamins   - CIWA in place for potential ETOH withdrawal   - pt educated on importance of cessation     Cirrhosis of liver (HCC)- (present on admission)  Assessment & Plan  History of cirrhosis with hx of hepatitis C and GI bleed in past   - no current evidence of decompensation or bleeding   - ctm    Thrombocytopenia (HCC)- (present on admission)  Assessment & Plan  Chronic, likely from ETOH use, no active bleeding   - monitor   - SCDs, holding pharmacological DVT prophylaxis

## 2020-12-11 NOTE — ASSESSMENT & PLAN NOTE
Pt complaining of chest pain, per chart he has been seen in the ED multiple times with similar complaints, suspect non-cardiac chest pain   Troponin was negative  EKG negative for any ischemic/infarct changes  Pain has resolved    Plan:  Continue to monitor

## 2020-12-11 NOTE — ED NOTES
Report received from TITUS Vilchis. Pt resting comfortably in gurney, equal and unlabored respirations. CIWA negative. IV detox bag started. Pt updated on POC and verbalized understanding. Call light provided to pt per request, pt provided with a pillow. Declining any other needs

## 2020-12-11 NOTE — ASSESSMENT & PLAN NOTE
History of chronic alcohol abuse, ROSINA, cirrhosis, ETOH-induced pancreatitis, esophageal varices s/p banding x 5  Subjective complaints of chest pain and abdominal discomfort that have resolved  No documentation of seizures and patient denies any history  BAL on admission 73, Lipase normal  No hematemesis, melena, hematochezia, Hgb stable 13.3  No ascites  Ativan - total 1 mg administered over last 24 hours per CIWA    Assessment:  Acute alcohol intoxication with concern for possible alcohol withdrawal, otherwise afebrile HD stable without concerns for intra-abdominal infection, pancreatitis, or GI bleed at this time.    Plan:  - [facility] protocol  - Continue MVI / Folic / B1 supplementation  - Counseled patient on importance of alcohol cessation  - Fall, Aspiration, and Seizure precautions  - Electrolyte replacement - giving KCl 40 mEq PO x 2 doses, CTM with BMP

## 2020-12-12 VITALS
HEART RATE: 91 BPM | RESPIRATION RATE: 16 BRPM | WEIGHT: 130.29 LBS | HEIGHT: 68 IN | DIASTOLIC BLOOD PRESSURE: 84 MMHG | BODY MASS INDEX: 19.75 KG/M2 | TEMPERATURE: 98.1 F | SYSTOLIC BLOOD PRESSURE: 115 MMHG | OXYGEN SATURATION: 97 %

## 2020-12-12 LAB
ANION GAP SERPL CALC-SCNC: 9 MMOL/L (ref 7–16)
BUN SERPL-MCNC: 7 MG/DL (ref 8–22)
CALCIUM SERPL-MCNC: 8.9 MG/DL (ref 8.5–10.5)
CHLORIDE SERPL-SCNC: 103 MMOL/L (ref 96–112)
CO2 SERPL-SCNC: 22 MMOL/L (ref 20–33)
CREAT SERPL-MCNC: 0.5 MG/DL (ref 0.5–1.4)
ERYTHROCYTE [DISTWIDTH] IN BLOOD BY AUTOMATED COUNT: 42.9 FL (ref 35.9–50)
GLUCOSE SERPL-MCNC: 102 MG/DL (ref 65–99)
HCT VFR BLD AUTO: 39.5 % (ref 42–52)
HGB BLD-MCNC: 13.5 G/DL (ref 14–18)
MCH RBC QN AUTO: 31.5 PG (ref 27–33)
MCHC RBC AUTO-ENTMCNC: 34.2 G/DL (ref 33.7–35.3)
MCV RBC AUTO: 92.3 FL (ref 81.4–97.8)
PLATELET # BLD AUTO: 30 K/UL (ref 164–446)
PMV BLD AUTO: 11.8 FL (ref 9–12.9)
POTASSIUM SERPL-SCNC: 3.8 MMOL/L (ref 3.6–5.5)
RBC # BLD AUTO: 4.28 M/UL (ref 4.7–6.1)
SODIUM SERPL-SCNC: 134 MMOL/L (ref 135–145)
WBC # BLD AUTO: 1.9 K/UL (ref 4.8–10.8)

## 2020-12-12 PROCEDURE — A9270 NON-COVERED ITEM OR SERVICE: HCPCS | Performed by: STUDENT IN AN ORGANIZED HEALTH CARE EDUCATION/TRAINING PROGRAM

## 2020-12-12 PROCEDURE — 700111 HCHG RX REV CODE 636 W/ 250 OVERRIDE (IP): Performed by: STUDENT IN AN ORGANIZED HEALTH CARE EDUCATION/TRAINING PROGRAM

## 2020-12-12 PROCEDURE — 700102 HCHG RX REV CODE 250 W/ 637 OVERRIDE(OP): Performed by: STUDENT IN AN ORGANIZED HEALTH CARE EDUCATION/TRAINING PROGRAM

## 2020-12-12 PROCEDURE — 85027 COMPLETE CBC AUTOMATED: CPT

## 2020-12-12 PROCEDURE — 3E02340 INTRODUCTION OF INFLUENZA VACCINE INTO MUSCLE, PERCUTANEOUS APPROACH: ICD-10-PCS | Performed by: STUDENT IN AN ORGANIZED HEALTH CARE EDUCATION/TRAINING PROGRAM

## 2020-12-12 PROCEDURE — 36415 COLL VENOUS BLD VENIPUNCTURE: CPT

## 2020-12-12 PROCEDURE — 80048 BASIC METABOLIC PNL TOTAL CA: CPT

## 2020-12-12 PROCEDURE — 90471 IMMUNIZATION ADMIN: CPT

## 2020-12-12 PROCEDURE — 90686 IIV4 VACC NO PRSV 0.5 ML IM: CPT | Performed by: STUDENT IN AN ORGANIZED HEALTH CARE EDUCATION/TRAINING PROGRAM

## 2020-12-12 PROCEDURE — 99239 HOSP IP/OBS DSCHRG MGMT >30: CPT | Mod: GC | Performed by: HOSPITALIST

## 2020-12-12 RX ORDER — NICOTINE 21 MG/24HR
1 PATCH, TRANSDERMAL 24 HOURS TRANSDERMAL EVERY 24 HOURS
Qty: 30 PATCH | Refills: 3 | Status: SHIPPED | OUTPATIENT
Start: 2020-12-12 | End: 2020-12-12

## 2020-12-12 RX ORDER — NICOTINE 21 MG/24HR
1 PATCH, TRANSDERMAL 24 HOURS TRANSDERMAL EVERY 24 HOURS
Qty: 30 PATCH | Refills: 3 | Status: SHIPPED | OUTPATIENT
Start: 2020-12-12 | End: 2022-02-05

## 2020-12-12 RX ORDER — POTASSIUM CHLORIDE 20 MEQ/1
40 TABLET, EXTENDED RELEASE ORAL ONCE
Status: COMPLETED | OUTPATIENT
Start: 2020-12-12 | End: 2020-12-12

## 2020-12-12 RX ADMIN — CYANOCOBALAMIN TAB 500 MCG 2000 MCG: 500 TAB at 04:49

## 2020-12-12 RX ADMIN — INFLUENZA A VIRUS A/GUANGDONG-MAONAN/SWL1536/2019 CNIC-1909 (H1N1) ANTIGEN (FORMALDEHYDE INACTIVATED), INFLUENZA A VIRUS A/HONG KONG/2671/2019 (H3N2) ANTIGEN (FORMALDEHYDE INACTIVATED), INFLUENZA B VIRUS B/PHUKET/3073/2013 ANTIGEN (FORMALDEHYDE INACTIVATED), AND INFLUENZA B VIRUS B/WASHINGTON/02/2019 ANTIGEN (FORMALDEHYDE INACTIVATED) 0.5 ML: 15; 15; 15; 15 INJECTION, SUSPENSION INTRAMUSCULAR at 12:33

## 2020-12-12 RX ADMIN — Medication 100 MG: at 04:50

## 2020-12-12 RX ADMIN — POTASSIUM CHLORIDE 40 MEQ: 1500 TABLET, EXTENDED RELEASE ORAL at 08:16

## 2020-12-12 RX ADMIN — OMEPRAZOLE 20 MG: 20 CAPSULE, DELAYED RELEASE ORAL at 04:49

## 2020-12-12 RX ADMIN — THERA TABS 1 TABLET: TAB at 04:49

## 2020-12-12 RX ADMIN — FOLIC ACID 1 MG: 1 TABLET ORAL at 04:49

## 2020-12-12 RX ADMIN — PROPRANOLOL HYDROCHLORIDE 20 MG: 20 TABLET ORAL at 10:12

## 2020-12-12 ASSESSMENT — LIFESTYLE VARIABLES
ANXIETY: NO ANXIETY (AT EASE)
AUDITORY DISTURBANCES: NOT PRESENT
TREMOR: TREMOR NOT VISIBLE BUT CAN BE FELT, FINGERTIP TO FINGERTIP
NAUSEA AND VOMITING: NO NAUSEA AND NO VOMITING
HEADACHE, FULLNESS IN HEAD: NOT PRESENT
AGITATION: NORMAL ACTIVITY
PAROXYSMAL SWEATS: NO SWEAT VISIBLE
HEADACHE, FULLNESS IN HEAD: NOT PRESENT
AGITATION: SOMEWHAT MORE THAN NORMAL ACTIVITY
AGITATION: NORMAL ACTIVITY
TOTAL SCORE: 3
TREMOR: TREMOR NOT VISIBLE BUT CAN BE FELT, FINGERTIP TO FINGERTIP
TOTAL SCORE: 2
AUDITORY DISTURBANCES: NOT PRESENT
TREMOR: TREMOR NOT VISIBLE BUT CAN BE FELT, FINGERTIP TO FINGERTIP
ORIENTATION AND CLOUDING OF SENSORIUM: ORIENTED AND CAN DO SERIAL ADDITIONS
ORIENTATION AND CLOUDING OF SENSORIUM: ORIENTED AND CAN DO SERIAL ADDITIONS
VISUAL DISTURBANCES: NOT PRESENT
ORIENTATION AND CLOUDING OF SENSORIUM: ORIENTED AND CAN DO SERIAL ADDITIONS
PAROXYSMAL SWEATS: BARELY PERCEPTIBLE SWEATING. PALMS MOIST
HEADACHE, FULLNESS IN HEAD: NOT PRESENT
TREMOR: *
VISUAL DISTURBANCES: NOT PRESENT
PAROXYSMAL SWEATS: BARELY PERCEPTIBLE SWEATING. PALMS MOIST
AUDITORY DISTURBANCES: NOT PRESENT
VISUAL DISTURBANCES: NOT PRESENT
ANXIETY: NO ANXIETY (AT EASE)
AGITATION: NORMAL ACTIVITY
ANXIETY: NO ANXIETY (AT EASE)
TOTAL SCORE: 2
AUDITORY DISTURBANCES: NOT PRESENT
NAUSEA AND VOMITING: NO NAUSEA AND NO VOMITING
HEADACHE, FULLNESS IN HEAD: NOT PRESENT
VISUAL DISTURBANCES: NOT PRESENT
ORIENTATION AND CLOUDING OF SENSORIUM: ORIENTED AND CAN DO SERIAL ADDITIONS
PAROXYSMAL SWEATS: NO SWEAT VISIBLE
NAUSEA AND VOMITING: NO NAUSEA AND NO VOMITING
TOTAL SCORE: 1
ANXIETY: NO ANXIETY (AT EASE)
NAUSEA AND VOMITING: NO NAUSEA AND NO VOMITING

## 2020-12-12 NOTE — DISCHARGE INSTRUCTIONS
Discharge Instructions:    New Medications:  -Start taking Multivitamin 1 tab daily  -Start using the Nicotine 24 gm patch once daily  -Start using the Nicotine 2 gm gum every hour up to 24/day as needed to help with smoking cessation    Continue Home Medications as prescribed:  -Continue Thiamine and Folic acid 1 tab each day  -Continue Pantoprazole 40 mg tab once daily  -Continue Propranolol 20 mg 1 tab twice a day  -Continue Quetiapine 200 mg once a day  -Continue Trazadone 50 mg once a night as needed to help with sleep  -Continue Potassium 20 mEq once a day  -Continue Zofran 4 mg ever 8 hours as needed for nausea/vomiting    Health Maintenance:  -Advise to stop drinking alcohol completely, so as to avoid further health complications and future hospitalizations    Discharge Instructions    Discharged to home by car with self. Discharged via wheelchair, hospital escort: Yes.  Special equipment needed: Not Applicable    Be sure to schedule a follow-up appointment with your primary care doctor or any specialists as instructed.     Discharge Plan:   Influenza Vaccine Indication: Indicated: 9 to 64 years of age  Influenza Vaccine Given - only chart on this line when given: Influenza Vaccine Given (See MAR)    I understand that a diet low in cholesterol, fat, and sodium is recommended for good health. Unless I have been given specific instructions below for another diet, I accept this instruction as my diet prescription.   Other diet: Regular    Special Instructions: None    · Is patient discharged on Warfarin / Coumadin?   No     Depression / Suicide Risk    As you are discharged from this [facility] facility, it is important to learn how to keep safe from harming yourself.    Recognize the warning signs:  · Abrupt changes in personality, positive or negative- including increase in energy   · Giving away possessions  · Change in eating patterns- significant weight changes-  positive or negative  · Change in  sleeping patterns- unable to sleep or sleeping all the time   · Unwillingness or inability to communicate  · Depression  · Unusual sadness, discouragement and loneliness  · Talk of wanting to die  · Neglect of personal appearance   · Rebelliousness- reckless behavior  · Withdrawal from people/activities they love  · Confusion- inability to concentrate     If you or a loved one observes any of these behaviors or has concerns about self-harm, here's what you can do:  · Talk about it- your feelings and reasons for harming yourself  · Remove any means that you might use to hurt yourself (examples: pills, rope, extension cords, firearm)  · Get professional help from the community (Mental Health, Substance Abuse, psychological counseling)  · Do not be alone:Call your Safe Contact- someone whom you trust who will be there for you.  · Call your local CRISIS HOTLINE 745-1322 or 882-649-7160  · Call your local Children's Mobile Crisis Response Team [place] (267) 953-2190 or www.Dweho  · Call the toll free National Suicide Prevention Hotlines   · National Suicide Prevention Lifeline 743-140-FAPL (8209)  · National Hope Line Network 800-SUICIDE (010-3334)      Coping with Quitting Smoking    Quitting smoking is a physical and mental challenge. You will face cravings, withdrawal symptoms, and temptation. Before quitting, work with your health care provider to make a plan that can help you cope. Preparation can help you quit and keep you from giving in.  How can I cope with cravings?  Cravings usually last for 5-10 minutes. If you get through it, the craving will pass. Consider taking the following actions to help you cope with cravings:  Keep your mouth busy:  Chew sugar-free gum.  Suck on hard candies or a straw.  Brush your teeth.  Keep your hands and body busy:  Immediately change to a different activity when you feel a craving.  Squeeze or play with a ball.  Do an activity or a hobby, like making bead  jewelry, practicing needlepoint, or working with wood.  Mix up your normal routine.  Take a short exercise break. Go for a quick walk or run up and down stairs.  Spend time in public places where smoking is not allowed.  Focus on doing something kind or helpful for someone else.  Call a friend or family member to talk during a craving.  Join a support group.  Call a quit line, such as 2-800-QUIT-NOW.  Talk with your health care provider about medicines that might help you cope with cravings and make quitting easier for you.  How can I deal with withdrawal symptoms?  Your body may experience negative effects as it tries to get used to not having nicotine in the system. These effects are called withdrawal symptoms. They may include:  Feeling hungrier than normal.  Trouble concentrating.  Irritability.  Trouble sleeping.  Feeling depressed.  Restlessness and agitation.  Craving a cigarette.  To manage withdrawal symptoms:  Avoid places, people, and activities that trigger your cravings.  Remember why you want to quit.  Get plenty of sleep.  Avoid coffee and other caffeinated drinks. These may worsen some of your symptoms.  How can I handle social situations?  Social situations can be difficult when you are quitting smoking, especially in the first few weeks. To manage this, you can:  Avoid parties, bars, and other social situations where people might be smoking.  Avoid alcohol.  Leave right away if you have the urge to smoke.  Explain to your family and friends that you are quitting smoking. Ask for understanding and support.  Plan activities with friends or family where smoking is not an option.  What are some ways I can cope with stress?  Wanting to smoke may cause stress, and stress can make you want to smoke. Find ways to manage your stress. Relaxation techniques can help. For example:  Breathe slowly and deeply, in through your nose and out through your mouth.  Listen to soothing, relaxing music.  Talk with a  family member or friend about your stress.  Light a candle.  Soak in a bath or take a shower.  Think about a peaceful place.  What are some ways I can prevent weight gain?  Be aware that many people gain weight after they quit smoking. However, not everyone does. To keep from gaining weight, have a plan in place before you quit and stick to the plan after you quit. Your plan should include:  Having healthy snacks. When you have a craving, it may help to:  Eat plain popcorn, crunchy carrots, celery, or other cut vegetables.  Chew sugar-free gum.  Changing how you eat:  Eat small portion sizes at meals.  Eat 4-6 small meals throughout the day instead of 1-2 large meals a day.  Be mindful when you eat. Do not watch television or do other things that might distract you as you eat.  Exercising regularly:  Make time to exercise each day. If you do not have time for a long workout, do short bouts of exercise for 5-10 minutes several times a day.  Do some form of strengthening exercise, like weight lifting, and some form of aerobic exercise, like running or swimming.  Drinking plenty of water or other low-calorie or no-calorie drinks. Drink 6-8 glasses of water daily, or as much as instructed by your health care provider.  Summary  Quitting smoking is a physical and mental challenge. You will face cravings, withdrawal symptoms, and temptation to smoke again. Preparation can help you as you go through these challenges.  You can cope with cravings by keeping your mouth busy (such as by chewing gum), keeping your body and hands busy, and making calls to family, friends, or a helpline for people who want to quit smoking.  You can cope with withdrawal symptoms by avoiding places where people smoke, avoiding drinks with caffeine, and getting plenty of rest.  Ask your health care provider about the different ways to prevent weight gain, avoid stress, and handle social situations.  This information is not intended to replace advice  given to you by your health care provider. Make sure you discuss any questions you have with your health care provider.  Document Released: 12/15/2017 Document Revised: 11/30/2018 Document Reviewed: 12/15/2017  Elsevier Patient Education © 2020 Elsevier Inc.    Substance Use Disorder  Substance use disorder occurs when a person's repeated use of drugs or alcohol interferes with his or her ability to be productive. This disorder can cause problems with mental and physical health. It can affect your ability to have healthy relationships, and it can keep you from being able to meet your responsibilities at work, home, or school. It can also lead to addiction, which is a condition in which the person cannot stop using the substance consistently for a period of time.  Addiction changes the way the brain works. Because of these changes, addiction is a chronic condition. Substance use disorder can be mild, moderate, or severe.  The most commonly abused substances include:  · Alcohol.  · Tobacco.  · Marijuana.  · Stimulants, such as cocaine and methamphetamine.  · Hallucinogens, such as LSD and PCP.  · Opioids, such as some prescription pain medicines and heroin.  What are the causes?  This condition may develop due to many complex social, psychological, or physical reasons, such as:  · Stress.  · Abuse.  · Peer pressure.  · Anxiety or depression.  What increases the risk?  This condition is more likely to develop in people who:  · Use substances to cope with stress.  · Have been abused.  · Have a mental health disorder, such as depression.  · Have a family history of substance use disorder.  What are the signs or symptoms?  Symptoms of this condition include:  · Using the substance for longer periods of time or at a higher dosage than what is normal or intended.  · Having a lasting desire to use the substance.  · Being unable to slow down or stop the use of the substance.  · Spending an abnormal amount of time getting the  substance, using the substance, or recovering from using the substance.  · Using the substance in a way that interferes with work, school, social activities, and personal relationships.  · Using the substance even after having negative consequences, such as:  ? Health problems.  ? Legal or financial troubles.  ? Job loss.  ? Relationship problems.  · Needing more and more of the substance to get the same effect (developing tolerance).  · Experiencing unpleasant symptoms if you do not use the substance (withdrawal).  · Using the substance to avoid withdrawal symptoms.  How is this diagnosed?  This condition may be diagnosed based on:  · A physical exam.  · Your history of substance use.  · Your symptoms. This includes:  ? How substance use affects your life.  ? Changes in personality, behaviors, and mood.  ? Having at least two symptoms of substance use disorder within a 12-month period.  ? Health issues related to substance use, such as liver damage, shortness of breath, fatigue, cough, or heart problems.  · Blood or urine tests to screen for alcohol and drugs.  How is this treated?  This condition may be treated by:  · Stopping substance use safely. This may require taking medicines and being closely monitored for several days.  · Taking part in group and individual counseling from mental health providers who help people with substance use disorder.  · Staying at a live-in (residential) treatment center for several days or weeks.  · Attending daily counseling sessions at a treatment center.  · Taking medicine as told by your health care provider:  ? To ease symptoms and prevent complications during withdrawal.  ? To treat other mental health issues, such as depression or anxiety.  ? To block cravings by causing the same effects as the substance.  ? To block the effects of the substance or replace good sensations with unpleasant ones.  · Participating in a support group to share your experience with others who are  going through the same thing. These groups are an important part of long-term recovery for many people.  Recovery can be a long process. Many people who undergo treatment start using the substance again after stopping (relapse). If you relapse, that does not mean that treatment will not work.  Follow these instructions at home:    · Take over-the-counter and prescription medicines only as told by your health care provider.  · Do not use any drugs or alcohol.  · Avoid temptations or triggers that you associate with your use of the substance.  · Learn and practice techniques for managing stress.  · Have a plan for vulnerable moments. Get phone numbers of people who are willing to help and who are committed to your recovery.  · Attend support groups on a regular basis. These groups include 12-step programs like Alcoholics Anonymous and Narcotics Anonymous.  · Keep all follow-up visits as told by your health care providers. This is important. This includes continuing to work with therapists and support groups.  Contact a health care provider if:  · You cannot take your medicines as told.  · Your symptoms get worse.  · You have trouble resisting the urge to use drugs or alcohol.  Get help right away if you:  · Relapse.  · Think that you may have taken too much of a drug. The hotline of the National Poison Control Center is (858) 700-4210.  · Have signs of an overdose. Symptoms include:  ? Chest pain.  ? Confusion.  ? Sleepiness or difficulty staying awake.  ? Slowed breathing.  ? Nausea or vomiting.  ? A seizure.  · Have serious thoughts about hurting yourself or someone else.  Drug overdose is an emergency. Do not wait to see if the symptoms will go away. Get medical help right away. Call your local emergency services (911 in the [place] Do not drive yourself to the hospital.  If you ever feel like you may hurt yourself or others, or have thoughts about taking your own life, get help right away. You can go to your  nearest emergency department or call:  · Your local emergency services (911 in the [place]  · A suicide crisis helpline, such as the National Suicide Prevention Lifeline at 1-538.840.2383. This is open 24 hours a day.  Summary  · Substance use disorder occurs when a person's repeated use of drugs or alcohol interferes with his or her ability to be productive.  · Taking part in group and individual counseling from mental health providers is a common treatment for people with substance use disorder.  · Recovery can be a long process. Many people who undergo treatment start using the substance again after stopping (relapse). A relapse does not mean that treatment will not work.  · Attend support groups such as Alcoholics Anonymous and Narcotics Anonymous. These groups are an important part of long-term recovery for many people.  This information is not intended to replace advice given to you by your health care provider. Make sure you discuss any questions you have with your health care provider.  Document Released: 08/09/2006 Document Revised: 04/09/2020 Document Reviewed: 01/29/2019  [employer] Patient Education © 2020 [employer]    Nicotine skin patches  What is this medicine?  NICOTINE (ARTURO oh teen) helps people stop smoking. The patches replace the nicotine found in cigarettes and help to decrease withdrawal effects. They are most effective when used in combination with a stop-smoking program.  This medicine may be used for other purposes; ask your health care provider or pharmacist if you have questions.  COMMON BRAND NAME(S): Habitrol, Nicoderm CQ, Nicotrol  What should I tell my health care provider before I take this medicine?  They need to know if you have any of these conditions:  · diabetes  · heart disease, angina, irregular heartbeat or previous heart attack  · high blood pressure  · lung disease, including asthma  · overactive thyroid  · pheochromocytoma  · seizures or a history of seizures  · skin  problems, like eczema  · stomach problems or ulcers  · an unusual or allergic reaction to nicotine, adhesives, other medicines, foods, dyes, or preservatives  · pregnant or trying to get pregnant  · breast-feeding  How should I use this medicine?  This medicine is for use on the skin. Follow the directions that come with the patches. Find an area of skin on your upper arm, chest, or back that is clean, dry, greaseless, undamaged and hairless. Wash hands with plain soap and water. Do not use anything that contains aloe, lanolin or glycerin as these may prevent the patch from sticking. Dry thoroughly. Remove the patch from the sealed pouch. Do not try to cut or trim the patch. Using your palm, press the patch firmly in place for 10 seconds to make sure that there is good contact with your skin. After applying the patch, wash your hands. Change the patch every day, keeping to a regular schedule. When you apply a new patch, use a new area of skin. Wait at least 1 week before using the same area again.  Talk to your pediatrician regarding the use of this medicine in children. Special care may be needed.  Overdosage: If you think you have taken too much of this medicine contact a poison control center or emergency room at once.  NOTE: This medicine is only for you. Do not share this medicine with others.  What if I miss a dose?  If you forget to replace a patch, use it as soon as you can. Only use one patch at a time and do not leave on the skin for longer than directed. If a patch falls off, you can replace it, but keep to your schedule and remove the patch at the right time.  What may interact with this medicine?  · medicines for asthma  · medicines for blood pressure  · medicines for mental depression  This list may not describe all possible interactions. Give your health care provider a list of all the medicines, herbs, non-prescription drugs, or dietary supplements you use. Also tell them if you smoke, drink alcohol,  or use illegal drugs. Some items may interact with your medicine.  What should I watch for while using this medicine?  You should begin using the nicotine patch the day you stop smoking. It is okay if you do not succeed at your attempt to quit and have a cigarette. You can still continue your quit attempt and keep using the product as directed. Just throw away your cigarettes and get back to your quit plan.  You can keep the patch in place during swimming, bathing, and showering. If your patch falls off during these activities, replace it.  When you first apply the patch, your skin may itch or burn. This should go away soon. When you remove a patch, the skin may look red, but this should only last for a few days. Call your doctor or health care professional if skin redness does not go away after 4 days, if your skin swells, or if you get a rash.  If you are a diabetic and you quit smoking, the effects of insulin may be increased and you may need to reduce your insulin dose. Check with your doctor or health care professional about how you should adjust your insulin dose.  If you are going to have a magnetic resonance imaging (MRI) procedure, tell your MRI technician if you have this patch on your body. It must be removed before a MRI.  What side effects may I notice from receiving this medicine?  Side effects that you should report to your doctor or health care professional as soon as possible:  · allergic reactions like skin rash, itching or hives, swelling of the face, lips, or tongue  · breathing problems  · changes in hearing  · changes in vision  · chest pain  · cold sweats  · confusion  · fast, irregular heartbeat  · feeling faint or lightheaded, falls  · headache  · increased saliva  · skin redness that lasts more than 4 days  · stomach pain  · signs and symptoms of nicotine overdose like nausea; vomiting; dizziness; weakness; and rapid heartbeat  Side effects that usually do not require medical attention  (report to your doctor or health care professional if they continue or are bothersome):  · diarrhea  · dry mouth  · hiccups  · irritability  · nervousness or restlessness  · trouble sleeping or vivid dreams  This list may not describe all possible side effects. Call your doctor for medical advice about side effects. You may report side effects to FDA at 6-476-MHM-9412.  Where should I keep my medicine?  Keep out of the reach of children.  Store at room temperature between 20 and 25 degrees C (68 and 77 degrees F). Protect from heat and light. Store in manufacturers packaging until ready to use. Throw away unused medicine after the expiration date. When you remove a patch, fold with sticky sides together; put in an empty opened pouch and throw away.  NOTE: This sheet is a summary. It may not cover all possible information. If you have questions about this medicine, talk to your doctor, pharmacist, or health care provider.  © 2020 Elsevier/Gold Standard (2015-11-16 15:46:21)    Multivitamin with Minerals and Iron formulations (oral solid dosage forms)  What is this medicine?  MULTIVITAMIN with MINERAL and IRON combinations are used to help provide good nutrition.  This medicine may be used for other purposes; ask your health care provider or pharmacist if you have questions.  COMMON BRAND NAME(S): Active FE, Androvite, Bacmin, Caromega, Centrum, Centrum Specialist Energy, Centrum Specialist Heart, Centrum Specialist Vision, CertaVite Senior with Antioxidant Nutrients, Daily Multiple, Daily Multiple Vitamins with Minerals, Daily Estefania plus Iron, Ferrocite Plus, Generix-T, Geritol, Geritol Multivitamin, Integra Plus, Megavite Multivitamin, Multilex, Multilex T&M, Multivitamin With Minerals, Niva-Plus, Nu-Iron V, One-Daily, Optivite PMT, PNV Prenatal Plus Multivitamin, Prenatal Plus, Prenatal Plus Low Iron, PreNatal Vitamins Plus, PrePLUS, Strovite Forte, Super Theravite-M, Thera Advanced, Thera-M, Thera-Tab M High  Potency, Therapeutic-M, Therems, Therems-H, Therems-M, Uni-Thera M Advanced, Unicap M, Unicap SR, Unicomplex-M, Vitafol, Vol-Plus  What should I tell my health care provider before I take this medicine?  They need to know if you have any of these conditions:  -bleeding or clotting disorder  -history of anemia of any type  -other chronic health condition  -an unusual or allergic reaction to vitamins, minerals, other medicines, foods, dyes, or preservatives  -pregnant or trying to get pregnant  -breast-feeding  How should I use this medicine?  Take by mouth with a glass of water. May take with food. Some brands, but not all, may be chewed before swallowing. Follow the directions on the prescription or product label. The usual dose is one tablet once a day. Do not take your medicine more often than directed.  Contact your pediatrician regarding the use of this medicine in children. Special care may be needed.  Overdosage: If you think you have taken too much of this medicine contact a poison control center or emergency room at once.  NOTE: This medicine is only for you. Do not share this medicine with others.  What if I miss a dose?  If you miss a dose, take it as soon as you can. If it is almost time for your next dose, take only that dose. Do not take double or extra doses.  What may interact with this medicine?  -antacids  -cefdinir  -cefditoren  -etidronate  -fluoroquinolone antibiotics (examples: ciprofloxacin, gatifloxacin, levofloxacin)  -levodopa  -tetracycline antibiotics (examples: doxycycline, minocycline, tetracycline)  -thyroid hormones  -warfarin  This list may not describe all possible interactions. Give your health care provider a list of all the medicines, herbs, non-prescription drugs, or dietary supplements you use. Also tell them if you smoke, drink alcohol, or use illegal drugs. Some items may interact with your medicine.  What should I watch for while using this medicine?  Get regular checks on  your progress. Remember that vitamin and mineral supplements do not replace the need for good nutrition from a balanced diet. Talk with your health care professional if you have questions or need advice.  What side effects may I notice from receiving this medicine?  Side effects that you should report to your doctor or health care professional as soon as possible:  -allergic reaction such as skin rash or difficulty breathing  -vomiting  Side effects that usually do not require medical attention (report to your doctor or health care professional if they continue or are bothersome):  -nausea  -stomach upset  This list may not describe all possible side effects. Call your doctor for medical advice about side effects. You may report side effects to FDA at 9-410-FDA-1502.  Where should I keep my medicine?  Keep out of the reach of children.  Most vitamins and minerals should be stored at controlled room temperature between 15 and 30 degrees C (59 and 86 degrees F). Check your specific product directions. Protect from heat and moisture. Throw away any unused medicine after the expiration date.  NOTE: This sheet is a summary. It may not cover all possible information. If you have questions about this medicine, talk to your doctor, pharmacist, or health care provider.  © 2018 Elsevier/Gold Standard (2016-07-14 08:55:11)

## 2020-12-12 NOTE — DISCHARGE SUMMARY
Discharge Summary    Date of Admission: 12/10/2020  Date of Discharge: No discharge date for patient encounter.  Discharging Attending: No att. providers found   Discharging Senior Resident: Dr. Carrera   Discharging Intern: Dr. Kaminski     CHIEF COMPLAINT ON ADMISSION  Chief Complaint   Patient presents with   • ETOH Withdrawal   • Chest Pressure       Reason for Admission  Chest pain, alcohol intoxication     Admission Date  12/10/2020    CODE STATUS  Full Code    HPI & HOSPITAL COURSE  This is 60-year-old male with past medical history of  alcohol dependence, pancreatitis, decompensated cirrhosis of the liver complicated by variceal bleeding status post banding X 5 in 2019 , untreated hepatitis C and pancytopenia admitted on December 10, 2020 for chest pain and abdominal pain.  On presentation in the emergency department he was hemodynamically stable.  Blood work notable for mild hypokalemia, pancytopenia (at baseline) and mildly elevated AST and ALT.    Chest pain:  Patient presented with atypical chest pain.  He has been seen multiple times in the past with similar complaints.  EKG on admission negative for ischemia/infarct changes.  Interval troponin obtained were all within normal limits.  Pain resolved completely during hospitalization with as needed GI cocktail.     Alcohol withdrawal:  History of chronic alcohol abuse.  BAL 73 on presentation, lipase normal, no signs of hematemesis, melena, hematochezia noted.  Patient was started on CIWA protocol and monitored on continuous cardiac monitoring.  He is started on MVI, folic acid, thiamine supplementation.  Overall during hospitalization CIWA score remained to be low (4-8) not requiring much Ativan.     Therefore, he is discharged in fair and stable condition to home with close outpatient follow-up.    The patient met 2-midnight criteria for an inpatient stay at the time of discharge.    PHYSICAL EXAM ON DISCHARGE  Temp:  [35.7 °C (96.2 °F)-37.1 °C (98.7  °F)] 36.8 °C (98.3 °F)  Pulse:  [62-85] 85  Resp:  [10-18] 17  BP: (104-133)/(68-83) 114/76  SpO2:  [97 %-98 %] 97 %    Physical Exam  Constitutional:       General: He is not in acute distress.  HENT:      Head: Normocephalic and atraumatic.      Mouth/Throat:      Mouth: Mucous membranes are moist.   Eyes:      Extraocular Movements: Extraocular movements intact.      Pupils: Pupils are equal, round, and reactive to light.   Cardiovascular:      Rate and Rhythm: Normal rate.      Pulses: Normal pulses.      Heart sounds: Normal heart sounds. No murmur. No friction rub.   Pulmonary:      Effort: Pulmonary effort is normal. No respiratory distress.      Breath sounds: Normal breath sounds.   Abdominal:      General: Abdomen is flat. There is no distension.      Palpations: Abdomen is soft.      Tenderness: There is no abdominal tenderness.   Musculoskeletal:      Right lower leg: No edema.      Left lower leg: No edema.   Skin:     General: Skin is warm.   Neurological:      General: No focal deficit present.      Mental Status: He is alert and oriented to person, place, and time. Mental status is at baseline.         Discharge Date  12/12/2020    FOLLOW UP ITEMS POST DISCHARGE  Follow up with PCP for chronic conditions.     DISCHARGE DIAGNOSES  Principal Problem:    Pain in the chest POA: Yes  Active Problems:    Alcohol abuse POA: Yes    Cirrhosis of liver (HCC) POA: Yes    Thrombocytopenia (HCC) POA: Yes    Pain in the abdomen POA: Unknown  Resolved Problems:    * No resolved hospital problems. *      FOLLOW UP  No future appointments.  No follow-up provider specified.    MEDICATIONS ON DISCHARGE     Medication List      START taking these medications      Instructions   multivitamin Tabs  Start taking on: December 13, 2020   Take 1 Tab by mouth every day.  Dose: 1 Tab     nicotine 21 MG/24HR Pt24  Commonly known as: NICODERM   Place 1 Patch on the skin every 24 hours.  Dose: 1 Patch     nicotine polacrilex 2 MG  Gum  Commonly known as: NICORETTE   Take 1 Each by mouth every 1 hour as needed for Smoking Cessation (For nicotine urge).  Dose: 2 mg        CONTINUE taking these medications      Instructions   folic acid 1 MG Tabs  Commonly known as: FOLVITE   Take 1 Tab by mouth every day.  Dose: 1 mg     ondansetron 4 MG Tbdp  Commonly known as: Zofran ODT   Take 1 Tab by mouth every 8 hours as needed.  Dose: 4 mg     pantoprazole 40 MG Tbec  Commonly known as: PROTONIX   Take 40 mg by mouth every day.  Dose: 40 mg     potassium chloride SA 20 MEQ Tbcr  Commonly known as: Kdur   Take 1 Tab by mouth every day.  Dose: 20 mEq     propranolol 20 MG Tabs  Commonly known as: INDERAL   Take 1 Tab by mouth 2 times a day.  Dose: 20 mg     quetiapine 400 MG tablet  Commonly known as: SEROQUEL   Take 200 mg by mouth every evening. Prescribed as 400 mg daily  Dose: 200 mg     thiamine 100 MG Tabs  Commonly known as: vitamin B-1   Take 100 mg by mouth every day.  Dose: 100 mg     traZODone 50 MG Tabs  Commonly known as: DESYREL   Take 50 mg by mouth every evening.  Dose: 50 mg     vitamin B-12 1000 MCG Tabs   Take 2,000 mcg by mouth every day.  Dose: 2,000 mcg            Allergies  Allergies   Allergen Reactions   • Haloperidol Unspecified     Twitching/involuntary movements        DIET  Orders Placed This Encounter   Procedures   • Diet Order Diet: Regular     Standing Status:   Standing     Number of Occurrences:   1     Order Specific Question:   Diet:     Answer:   Regular [1]       ACTIVITY  As tolerated.  Weight bearing as tolerated    CONSULTATIONS  None     PROCEDURES  None

## 2020-12-12 NOTE — DISCHARGE PLANNING
Anticipated Discharge Disposition: home    Action: Patient has no money and noone to pick him up today. RN ZEYAD provided cab voucher #[id number].    Barriers to Discharge: none    Plan: Patient to discharge home today

## 2020-12-12 NOTE — PROGRESS NOTES
Bedside report received from day shift RN. Assumed care at 1900. Pt is A&Ox4. Pt is in bed. Pt denies pain at this time. Pt was updated on plan of care. Pt has call light, personal belongings, and bedside table within reach. Bed is in the lowest position and bed alarm is on. Will continue to monitor

## 2021-02-09 ENCOUNTER — HOSPITAL ENCOUNTER (EMERGENCY)
Facility: MEDICAL CENTER | Age: 61
End: 2021-02-10
Attending: EMERGENCY MEDICINE
Payer: MEDICARE

## 2021-02-09 PROCEDURE — 99283 EMERGENCY DEPT VISIT LOW MDM: CPT

## 2021-02-09 PROCEDURE — 83690 ASSAY OF LIPASE: CPT

## 2021-02-09 PROCEDURE — 82077 ASSAY SPEC XCP UR&BREATH IA: CPT

## 2021-02-09 PROCEDURE — 85025 COMPLETE CBC W/AUTO DIFF WBC: CPT

## 2021-02-09 PROCEDURE — 82330 ASSAY OF CALCIUM: CPT

## 2021-02-09 PROCEDURE — 36415 COLL VENOUS BLD VENIPUNCTURE: CPT

## 2021-02-09 PROCEDURE — 80053 COMPREHEN METABOLIC PANEL: CPT

## 2021-02-09 ASSESSMENT — FIBROSIS 4 INDEX: FIB4 SCORE: 22.67

## 2021-02-10 VITALS
RESPIRATION RATE: 18 BRPM | HEART RATE: 90 BPM | OXYGEN SATURATION: 93 % | DIASTOLIC BLOOD PRESSURE: 86 MMHG | BODY MASS INDEX: 21.98 KG/M2 | SYSTOLIC BLOOD PRESSURE: 130 MMHG | TEMPERATURE: 97.7 F | HEIGHT: 68 IN | WEIGHT: 145 LBS

## 2021-02-10 LAB
ALBUMIN SERPL BCP-MCNC: 3.7 G/DL (ref 3.2–4.9)
ALBUMIN/GLOB SERPL: 1 G/DL
ALP SERPL-CCNC: 71 U/L (ref 30–99)
ALT SERPL-CCNC: 45 U/L (ref 2–50)
ANION GAP SERPL CALC-SCNC: 11 MMOL/L (ref 7–16)
AST SERPL-CCNC: 62 U/L (ref 12–45)
BASOPHILS # BLD AUTO: 1 % (ref 0–1.8)
BASOPHILS # BLD: 0.02 K/UL (ref 0–0.12)
BILIRUB SERPL-MCNC: 1.2 MG/DL (ref 0.1–1.5)
BUN SERPL-MCNC: 14 MG/DL (ref 8–22)
CA-I SERPL-SCNC: 1.1 MMOL/L (ref 1.1–1.3)
CALCIUM SERPL-MCNC: 9 MG/DL (ref 8.5–10.5)
CHLORIDE SERPL-SCNC: 100 MMOL/L (ref 96–112)
CO2 SERPL-SCNC: 25 MMOL/L (ref 20–33)
CREAT SERPL-MCNC: 0.46 MG/DL (ref 0.5–1.4)
EOSINOPHIL # BLD AUTO: 0.04 K/UL (ref 0–0.51)
EOSINOPHIL NFR BLD: 2.1 % (ref 0–6.9)
ERYTHROCYTE [DISTWIDTH] IN BLOOD BY AUTOMATED COUNT: 47.8 FL (ref 35.9–50)
ETHANOL BLD-MCNC: <10.1 MG/DL (ref 0–10)
GLOBULIN SER CALC-MCNC: 3.7 G/DL (ref 1.9–3.5)
GLUCOSE SERPL-MCNC: 97 MG/DL (ref 65–99)
HCT VFR BLD AUTO: 36.9 % (ref 42–52)
HGB BLD-MCNC: 12.2 G/DL (ref 14–18)
IMM GRANULOCYTES # BLD AUTO: 0.01 K/UL (ref 0–0.11)
IMM GRANULOCYTES NFR BLD AUTO: 0.5 % (ref 0–0.9)
LIPASE SERPL-CCNC: 50 U/L (ref 11–82)
LYMPHOCYTES # BLD AUTO: 0.41 K/UL (ref 1–4.8)
LYMPHOCYTES NFR BLD: 21.5 % (ref 22–41)
MCH RBC QN AUTO: 31.9 PG (ref 27–33)
MCHC RBC AUTO-ENTMCNC: 33.1 G/DL (ref 33.7–35.3)
MCV RBC AUTO: 96.6 FL (ref 81.4–97.8)
MONOCYTES # BLD AUTO: 0.19 K/UL (ref 0–0.85)
MONOCYTES NFR BLD AUTO: 9.9 % (ref 0–13.4)
NEUTROPHILS # BLD AUTO: 1.24 K/UL (ref 1.82–7.42)
NEUTROPHILS NFR BLD: 65 % (ref 44–72)
NRBC # BLD AUTO: 0 K/UL
NRBC BLD-RTO: 0 /100 WBC
PLATELET # BLD AUTO: 31 K/UL (ref 164–446)
PMV BLD AUTO: 11.7 FL (ref 9–12.9)
POTASSIUM SERPL-SCNC: 3.3 MMOL/L (ref 3.6–5.5)
PROT SERPL-MCNC: 7.4 G/DL (ref 6–8.2)
RBC # BLD AUTO: 3.82 M/UL (ref 4.7–6.1)
SODIUM SERPL-SCNC: 136 MMOL/L (ref 135–145)
WBC # BLD AUTO: 1.9 K/UL (ref 4.8–10.8)

## 2021-02-10 NOTE — ED PROVIDER NOTES
ED Provider Note    CHIEF COMPLAINT  Chief Complaint   Patient presents with    Cramps       HPI  Inocencio Shabazz is a 61 y.o. male who presents to the emergency room complaining of leg cramps. Past medical history significant for chronic alcohol abuse. Denies alcohol this afternoon however. States he has been having increasing leg cramps. Chronic tobacco use as well as marijuana use. Denies recent cocaine or meth use. No other complaints other than muscle cramping. No notable aggravating factors.    REVIEW OF SYSTEMS  See HPI for further details. All other systems are negative.     PAST MEDICAL HISTORY   has a past medical history of Alcohol abuse, Alcohol intoxication (HCC) (3/13/2014), Cirrhosis (HCC), GIB (gastrointestinal bleeding) (1/3/2016), Hepatitis C, Pancytopenia (HCC), and Psychiatric disorder.    SOCIAL HISTORY  Social History     Tobacco Use    Smoking status: Current Every Day Smoker     Packs/day: 1.00     Years: 48.00     Pack years: 48.00     Types: Cigarettes    Smokeless tobacco: Never Used   Substance and Sexual Activity    Alcohol use: Yes     Comment: 1-2 beers and 1/2 pints of whiskey every day    Drug use: Yes     Types: Inhaled     Comment: marijuana, cocaine, meth    Sexual activity: Not on file       SURGICAL HISTORY   has a past surgical history that includes gastroscopy (1/3/2016); gastroscopy (4/8/2016); gastroscopy (3/13/2019); and gastroscopy-endo (N/A, 11/13/2019).    CURRENT MEDICATIONS  Home Medications       Reviewed by Roselyn Godfrey R.N. (Registered Nurse) on 02/09/21 at 2332  Med List Status: <None>     Medication Last Dose Status   Cyanocobalamin (VITAMIN B-12) 1000 MCG Tab  Active   folic acid (FOLVITE) 1 MG Tab  Active   multivitamin (THERAGRAN) Tab  Active   nicotine (NICODERM) 21 MG/24HR PATCH 24 HR  Active   nicotine polacrilex (NICORETTE) 2 MG Gum  Active   ondansetron (ZOFRAN ODT) 4 MG TABLET DISPERSIBLE  Active   pantoprazole (PROTONIX) 40 MG Tablet Delayed  "Response  Active   potassium chloride SA (KDUR) 20 MEQ Tab CR  Active   propranolol (INDERAL) 20 MG Tab  Active   quetiapine (SEROQUEL) 400 MG tablet  Active   thiamine (VITAMIN B-1) 100 MG Tab  Active   traZODone (DESYREL) 50 MG Tab  Active                    ALLERGIES  Allergies   Allergen Reactions    Haloperidol Unspecified     Twitching/involuntary movements        PHYSICAL EXAM  VITAL SIGNS: /86   Pulse 90   Temp 36.5 °C (97.7 °F) (Temporal)   Resp 18   Ht 1.727 m (5' 8\")   Wt 65.8 kg (145 lb)   SpO2 93%   BMI 22.05 kg/m²  @LIN[[id number]::@  Pulse ox interpretation: I interpret this pulse ox as normal.  Constitutional: Alert in no apparent distress.  HENT: Normocephalic, Atraumatic, Bilateral external ears normal. Nose normal.   Eyes: Pupils are equal and reactive. Conjunctiva normal, non-icteric.   Heart: Regular rate and rythm, no murmurs.    Lungs: Clear to auscultation bilaterally.  Skin: Warm, Dry, No erythema, No rash.   Neurologic: Alert, Grossly non-focal.   Psychiatric: Affect normal, Judgment normal, Mood normal, Appears appropriate and not intoxicated.     Results for orders placed or performed during the hospital encounter of 02/09/21   CBC WITH DIFFERENTIAL   Result Value Ref Range    WBC 1.9 (LL) 4.8 - 10.8 K/uL    RBC 3.82 (L) 4.70 - 6.10 M/uL    Hemoglobin 12.2 (L) 14.0 - 18.0 g/dL    Hematocrit 36.9 (L) 42.0 - 52.0 %    MCV 96.6 81.4 - 97.8 fL    MCH 31.9 27.0 - 33.0 pg    MCHC 33.1 (L) 33.7 - 35.3 g/dL    RDW 47.8 35.9 - 50.0 fL    Platelet Count 31 (LL) 164 - 446 K/uL    MPV 11.7 9.0 - 12.9 fL    Neutrophils-Polys 65.00 44.00 - 72.00 %    Lymphocytes 21.50 (L) 22.00 - 41.00 %    Monocytes 9.90 0.00 - 13.40 %    Eosinophils 2.10 0.00 - 6.90 %    Basophils 1.00 0.00 - 1.80 %    Immature Granulocytes 0.50 0.00 - 0.90 %    Nucleated RBC 0.00 /100 WBC    Neutrophils (Absolute) 1.24 (L) 1.82 - 7.42 K/uL    Lymphs (Absolute) 0.41 (L) 1.00 - 4.80 K/uL    Monos (Absolute) 0.19 0.00 - 0.85 " K/uL    Eos (Absolute) 0.04 0.00 - 0.51 K/uL    Baso (Absolute) 0.02 0.00 - 0.12 K/uL    Immature Granulocytes (abs) 0.01 0.00 - 0.11 K/uL    NRBC (Absolute) 0.00 K/uL   COMP METABOLIC PANEL   Result Value Ref Range    Sodium 136 135 - 145 mmol/L    Potassium 3.3 (L) 3.6 - 5.5 mmol/L    Chloride 100 96 - 112 mmol/L    Co2 25 20 - 33 mmol/L    Anion Gap 11.0 7.0 - 16.0    Glucose 97 65 - 99 mg/dL    Bun 14 8 - 22 mg/dL    Creatinine 0.46 (L) 0.50 - 1.40 mg/dL    Calcium 9.0 8.5 - 10.5 mg/dL    AST(SGOT) 62 (H) 12 - 45 U/L    ALT(SGPT) 45 2 - 50 U/L    Alkaline Phosphatase 71 30 - 99 U/L    Total Bilirubin 1.2 0.1 - 1.5 mg/dL    Albumin 3.7 3.2 - 4.9 g/dL    Total Protein 7.4 6.0 - 8.2 g/dL    Globulin 3.7 (H) 1.9 - 3.5 g/dL    A-G Ratio 1.0 g/dL   LIPASE   Result Value Ref Range    Lipase 50 11 - 82 U/L   IONIZED CALCIUM   Result Value Ref Range    Ionized Calcium 1.1 1.1 - 1.3 mmol/L   DIAGNOSTIC ALCOHOL   Result Value Ref Range    Diagnostic Alcohol <10.1 0.0 - 10.0 mg/dL   ESTIMATED GFR   Result Value Ref Range    GFR If African American >60 >60 mL/min/1.73 m 2    GFR If Non African American >60 >60 mL/min/1.73 m 2           COURSE & MEDICAL DECISION MAKING  Pertinent Labs & Imaging studies reviewed. (See chart for details)  Patient presenting the emerge department for leg cramps.  Patient with history of multiple ER visits as found on chart review.  Electrolytes were completed in addition to additional hematology secondary to a recent admission where he was hypokalemic.  Given his leg cramps additional calcium was completed.  Both of these were normal tonight.  We will discharge from the ER at this point.  He is understanding to continue with his vitamin and electrolyte supplements as previously provided.      The patient will return for worsening symptoms and is stable at the time of discharge. The patient verbalizes understanding and will comply.    FINAL IMPRESSION  Leg cramps           Electronically signed  by: Venkatesh Iglesias M.D., 2/9/2021 11:50 PM

## 2021-02-10 NOTE — ED TRIAGE NOTES
"Chief Complaint   Patient presents with   • Cramps     /93   Pulse 90   Temp 36.6 °C (97.9 °F) (Temporal)   Resp 16   Ht 1.727 m (5' 8\")   Wt 65.8 kg (145 lb)   SpO2 94%   BMI 22.05 kg/m²   BIB EMS for muscle cramps. 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  Daily drinker, no EtOH today.  .  Pt AOx4, GCS 15. Connected to monitor.      Chart up for eval.    "

## 2021-02-18 ENCOUNTER — HOSPITAL ENCOUNTER (EMERGENCY)
Facility: MEDICAL CENTER | Age: 61
End: 2021-02-18
Attending: EMERGENCY MEDICINE
Payer: MEDICARE

## 2021-02-18 VITALS
SYSTOLIC BLOOD PRESSURE: 144 MMHG | HEIGHT: 68 IN | HEART RATE: 97 BPM | BODY MASS INDEX: 22.05 KG/M2 | WEIGHT: 145.5 LBS | DIASTOLIC BLOOD PRESSURE: 85 MMHG | TEMPERATURE: 98.2 F | RESPIRATION RATE: 20 BRPM | OXYGEN SATURATION: 97 %

## 2021-02-18 DIAGNOSIS — M62.838 MUSCLE SPASM OF RIGHT LOWER EXTREMITY: ICD-10-CM

## 2021-02-18 DIAGNOSIS — D61.818 PANCYTOPENIA (HCC): ICD-10-CM

## 2021-02-18 LAB
ALBUMIN SERPL BCP-MCNC: 4 G/DL (ref 3.2–4.9)
ALBUMIN/GLOB SERPL: 1.1 G/DL
ALP SERPL-CCNC: 81 U/L (ref 30–99)
ALT SERPL-CCNC: 56 U/L (ref 2–50)
ANION GAP SERPL CALC-SCNC: 10 MMOL/L (ref 7–16)
AST SERPL-CCNC: 64 U/L (ref 12–45)
BASOPHILS # BLD AUTO: 0.9 % (ref 0–1.8)
BASOPHILS # BLD: 0.02 K/UL (ref 0–0.12)
BILIRUB SERPL-MCNC: 0.5 MG/DL (ref 0.1–1.5)
BUN SERPL-MCNC: 12 MG/DL (ref 8–22)
CALCIUM SERPL-MCNC: 9.1 MG/DL (ref 8.5–10.5)
CHLORIDE SERPL-SCNC: 101 MMOL/L (ref 96–112)
CO2 SERPL-SCNC: 24 MMOL/L (ref 20–33)
CREAT SERPL-MCNC: 0.56 MG/DL (ref 0.5–1.4)
EOSINOPHIL # BLD AUTO: 0.1 K/UL (ref 0–0.51)
EOSINOPHIL NFR BLD: 5.2 % (ref 0–6.9)
ERYTHROCYTE [DISTWIDTH] IN BLOOD BY AUTOMATED COUNT: 48.5 FL (ref 35.9–50)
GLOBULIN SER CALC-MCNC: 3.8 G/DL (ref 1.9–3.5)
GLUCOSE SERPL-MCNC: 112 MG/DL (ref 65–99)
HCT VFR BLD AUTO: 36.1 % (ref 42–52)
HGB BLD-MCNC: 12.1 G/DL (ref 14–18)
LYMPHOCYTES # BLD AUTO: 0.43 K/UL (ref 1–4.8)
LYMPHOCYTES NFR BLD: 21.7 % (ref 22–41)
MAGNESIUM SERPL-MCNC: 2 MG/DL (ref 1.5–2.5)
MANUAL DIFF BLD: NORMAL
MCH RBC QN AUTO: 31.8 PG (ref 27–33)
MCHC RBC AUTO-ENTMCNC: 33.5 G/DL (ref 33.7–35.3)
MCV RBC AUTO: 95 FL (ref 81.4–97.8)
MONOCYTES # BLD AUTO: 0.31 K/UL (ref 0–0.85)
MONOCYTES NFR BLD AUTO: 15.7 % (ref 0–13.4)
MORPHOLOGY BLD-IMP: NORMAL
NEUTROPHILS # BLD AUTO: 1.13 K/UL (ref 1.82–7.42)
NEUTROPHILS NFR BLD: 56.5 % (ref 44–72)
NRBC # BLD AUTO: 0 K/UL
NRBC BLD-RTO: 0 /100 WBC
PLATELET # BLD AUTO: 33 K/UL (ref 164–446)
PLATELET BLD QL SMEAR: NORMAL
PLATELETS.RETICULATED NFR BLD AUTO: 6.9 K/UL (ref 0.6–13.1)
PMV BLD AUTO: 11.6 FL (ref 9–12.9)
POTASSIUM SERPL-SCNC: 3.6 MMOL/L (ref 3.6–5.5)
PROT SERPL-MCNC: 7.8 G/DL (ref 6–8.2)
RBC # BLD AUTO: 3.8 M/UL (ref 4.7–6.1)
RBC BLD AUTO: NORMAL
SODIUM SERPL-SCNC: 135 MMOL/L (ref 135–145)
WBC # BLD AUTO: 2 K/UL (ref 4.8–10.8)

## 2021-02-18 PROCEDURE — 99283 EMERGENCY DEPT VISIT LOW MDM: CPT

## 2021-02-18 PROCEDURE — 36415 COLL VENOUS BLD VENIPUNCTURE: CPT

## 2021-02-18 PROCEDURE — 700102 HCHG RX REV CODE 250 W/ 637 OVERRIDE(OP): Performed by: EMERGENCY MEDICINE

## 2021-02-18 PROCEDURE — 85007 BL SMEAR W/DIFF WBC COUNT: CPT

## 2021-02-18 PROCEDURE — 80053 COMPREHEN METABOLIC PANEL: CPT

## 2021-02-18 PROCEDURE — 85027 COMPLETE CBC AUTOMATED: CPT

## 2021-02-18 PROCEDURE — 83735 ASSAY OF MAGNESIUM: CPT

## 2021-02-18 PROCEDURE — 85055 RETICULATED PLATELET ASSAY: CPT

## 2021-02-18 PROCEDURE — A9270 NON-COVERED ITEM OR SERVICE: HCPCS | Performed by: EMERGENCY MEDICINE

## 2021-02-18 RX ORDER — GAUZE BANDAGE 2" X 2"
100 BANDAGE TOPICAL ONCE
Status: COMPLETED | OUTPATIENT
Start: 2021-02-18 | End: 2021-02-18

## 2021-02-18 RX ORDER — ACETAMINOPHEN 325 MG/1
650 TABLET ORAL ONCE
Status: COMPLETED | OUTPATIENT
Start: 2021-02-18 | End: 2021-02-18

## 2021-02-18 RX ORDER — POTASSIUM CHLORIDE 20 MEQ/1
40 TABLET, EXTENDED RELEASE ORAL ONCE
Status: COMPLETED | OUTPATIENT
Start: 2021-02-18 | End: 2021-02-18

## 2021-02-18 RX ADMIN — Medication 100 MG: at 03:33

## 2021-02-18 RX ADMIN — THERA TABS 1 TABLET: TAB at 03:34

## 2021-02-18 RX ADMIN — ACETAMINOPHEN 650 MG: 325 TABLET ORAL at 03:34

## 2021-02-18 RX ADMIN — POTASSIUM CHLORIDE 40 MEQ: 1500 TABLET, EXTENDED RELEASE ORAL at 03:33

## 2021-02-18 ASSESSMENT — FIBROSIS 4 INDEX: FIB4 SCORE: 18.78

## 2021-02-18 NOTE — ED PROVIDER NOTES
"ED Provider Note    Scribed for Hemal Mims M.D. by Montrell Diaz. 2/18/2021,  2:23 AM.    Means of Arrival: Walk in  History obtained from: Patient  History limited by: None    CHIEF COMPLAINT  Chief Complaint   Patient presents with    Leg Pain     bilateral; intermittent x2 years; denies swelling or trauma        HPI  Inocencio Shabazz is a 61 y.o. male who presents to the Emergency Department with intermittent moderate bilateral leg pain onset a couple of days ago. Per the patient, his calves have begun to cramp spontaneously; he describes feeling as if his calves \"lock up\". He has had these symptoms before for 2 years but has not previously been able to successfully alleviate them long term. Additionally, he has been experiencing weakness for the past couple of days. The patient denies swelling, trauma to the legs, fever, cough, other pains.  No other exacerbating or alleviating factors were noted. He adds that he uses alcohol, with last use being yesterday.     REVIEW OF SYSTEMS  CONSTITUTIONAL:  No fever.  RESPIRATORY:  No cough  MUSCULOSKELETAL:  Bilateral calf pain (cramping). No swelling. No leg trauma. No other pains.     See HPI for further details.   All other systems are negative.     PAST MEDICAL HISTORY  Past Medical History:   Diagnosis Date    Alcohol abuse     Alcohol intoxication (HCC) 3/13/2014    Cirrhosis (HCC)     GIB (gastrointestinal bleeding) 1/3/2016    Hepatitis C     Pancytopenia (HCC)     Psychiatric disorder     schitzo, bipolar       FAMILY HISTORY  History reviewed. No pertinent family history.    SOCIAL HISTORY   reports that he has been smoking cigarettes. He has a 48.00 pack-year smoking history. He has never used smokeless tobacco. He reports current alcohol use. He reports current drug use. Drug: Inhaled.    SURGICAL HISTORY  Past Surgical History:   Procedure Laterality Date    GASTROSCOPY-ENDO N/A 11/13/2019    Procedure: GASTROSCOPY with banding;  Surgeon: Tamela ARGUELLES" "MARCUS Smith;  Location: ENDOSCOPY [facility];  Service: Gastroenterology    GASTROSCOPY  3/13/2019    Procedure: GASTROSCOPY;  Surgeon: Abdi Ba M.D.;  Location: SURGERY [facility];  Service: Gastroenterology    GASTROSCOPY  4/8/2016    Procedure: GASTROSCOPY;  Surgeon: Braeden Tobin M.D.;  Location: SURGERY [facility];  Service:     GASTROSCOPY  1/3/2016    Procedure: GASTROSCOPY WITH BANDING;  Surgeon: Alfredo Antonio M.D.;  Location: SURGERY [facility];  Service:        CURRENT MEDICATIONS  Home Medications       Reviewed by Omaira Ochoa R.N. (Registered Nurse) on 02/18/21 at 0150  Med List Status: Not Addressed     Medication Last Dose Status   Cyanocobalamin (VITAMIN B-12) 1000 MCG Tab  Active   folic acid (FOLVITE) 1 MG Tab  Active   multivitamin (THERAGRAN) Tab  Active   nicotine (NICODERM) 21 MG/24HR PATCH 24 HR  Active   nicotine polacrilex (NICORETTE) 2 MG Gum  Active   ondansetron (ZOFRAN ODT) 4 MG TABLET DISPERSIBLE  Active   pantoprazole (PROTONIX) 40 MG Tablet Delayed Response  Active   potassium chloride SA (KDUR) 20 MEQ Tab CR  Active   propranolol (INDERAL) 20 MG Tab  Active   quetiapine (SEROQUEL) 400 MG tablet  Active   thiamine (VITAMIN B-1) 100 MG Tab  Active   traZODone (DESYREL) 50 MG Tab  Active                    ALLERGIES  Allergies   Allergen Reactions    Haloperidol Unspecified     Twitching/involuntary movements        PHYSICAL EXAM  VITAL SIGNS: /92   Pulse (!) 103   Temp 36.8 °C (98.2 °F) (Temporal)   Resp 18   Ht 1.727 m (5' 8\")   Wt 66 kg (145 lb 8.1 oz)   SpO2 96%   BMI 22.12 kg/m²    Gen: Alert, no acute distress  HEENT: ATNC  Eyes: PERRL, EOMI, normal conjunctiva.   Neck: trachea midline  Resp: no respiratory distress. Lungs clear to auscultation bilaterally.   CV: Tachycardic rate, regular rhythm. No mumurs. No JVD. 2+ dorsalis pedis pulses bilaterally. No calf tenderness. No edema.  Abd: non-distended. Soft, non-tender  Ext: No " deformities. Good capillary refill. CSM intact.   Psych: normal mood  Neuro: speech fluent. No tremor. No focal neurological deficits.  Sensation and strength intact bilateral lower extremities    DIAGNOSTIC STUDIES / PROCEDURES     LABS  Labs Reviewed   CBC WITH DIFFERENTIAL - Abnormal; Notable for the following components:       Result Value    WBC 2.0 (*)     RBC 3.80 (*)     Hemoglobin 12.1 (*)     Hematocrit 36.1 (*)     MCHC 33.5 (*)     Platelet Count 33 (*)     Lymphocytes 21.70 (*)     Monocytes 15.70 (*)     Neutrophils (Absolute) 1.13 (*)     Lymphs (Absolute) 0.43 (*)     All other components within normal limits   COMP METABOLIC PANEL - Abnormal; Notable for the following components:    Glucose 112 (*)     AST(SGOT) 64 (*)     ALT(SGPT) 56 (*)     Globulin 3.8 (*)     All other components within normal limits   MAGNESIUM   DIFFERENTIAL MANUAL   PERIPHERAL SMEAR REVIEW   PLATELET ESTIMATE   MORPHOLOGY   IMMATURE PLT FRACTION   ESTIMATED GFR     All labs reviewed by me.    COURSE & MEDICAL DECISION MAKING  Pertinent Labs & Imaging studies reviewed. (See chart for details)    PPE Note: I personally donned full PPE for all patient encounters during this visit, including wearing a mask, gloves, and eye protection. Scribe remained outside the patient's room and did not have any contact with the patient for the duration of patient encounter.      2:23 AM Patient seen and examined at bedside. Ordered for labs to evaluate.     3:19 AM Informed by nurse that the patient has a WBC of 2.0 and platelet count of 33.     3:21 AM Patient will be treated with potassium chloride SA 40 mEq, multivitamin tablet, acetaminophen 650 mg, and thiamine 100 mcg.     3:25 AM Recheck: Patient re-evaluated at bedside. Patient reports feeling well. Discussed patient's condition and treatment plan, including plans for discharge. Patient's lab results discussed. The patient understood and is in agreement.     Medical Decision  Making:  Patient presents with leg cramps, preliminary in the right leg.  He has good pulses, good capillary refill, low suspicion for claudication.  No clinical signs of DVT.  No evidence of cellulitis.  Patient demonstrates no focal neurologic deficits.  He predominantly complains of pain in the right leg although the triage note says both legs.  He is denying any current pain.  He denies any recent worsening in his left leg spasms, however occasionally will have bilateral leg spasms.  No evidence of cauda equina syndrome.  Patient's labs demonstrate persistent pancytopenia, not worse from his previous test.  Electrolytes demonstrate low normal potassium, will replete to upper end of normal.  Given the patient's alcoholism, will provide multivitamin thiamine.  He is referred to his primary care doctor for follow-up.  He was given return precautions, anticipatory guidance, and the opportunity ask questions prior to discharge.    The patient will return for new or worsening symptoms and is stable at the time of discharge.    The patient is referred to a primary physician for blood pressure management, diabetic screening, and for all other preventative health concerns.    DISPOSITION:  Patient will be discharged home in stable condition.    FOLLOW UP:  Nnamdi Ballesteros M.D.  [address] [place] [id number] [phone]    Schedule an appointment as soon as possible for a visit       [facility], Emergency Dept  [address] [place] [id number] [phone]    If symptoms worsen    FINAL IMPRESSION  1. Muscle spasm of right lower extremity    2. Pancytopenia (HCC)         Montrell ALMAZAN (Jonny), am scribing for, and in the presence of, Hemal Mims M.D..    Electronically signed by: Montrell Diaz (Jonny), 2/18/2021    Hemal ALMAZAN M.D. personally performed the services described in this documentation, as scribed by Montrell Diaz in my presence, and it is both accurate and  complete.    C    The note accurately reflects work and decisions made by me.  Hemal Mims M.D.  2/18/2021  3:45 AM

## 2021-02-18 NOTE — ED NOTES
from Lab called with critical result of WBC 2.0 and platelets 33  at 0316. Critical lab result read back to .   Dr. Mims notified of critical lab result at 0319.  Critical lab result read back by Dr. Mims.

## 2021-02-18 NOTE — ED NOTES
Pt given d/c instructions and verbalized understanding. No PIV placed in ED. Pt left ED a/o x 4 GCS 15 ambulatory without assistance with steady gait.

## 2021-02-18 NOTE — ED TRIAGE NOTES
Inocencio Shabazz  61 y.o.  Chief Complaint   Patient presents with   • Leg Pain     bilateral; intermittent x2 years; denies swelling or trauma          Pt placed out in the lobby and educated on triage process. Pt advised to let staff know of any changes.

## 2021-02-18 NOTE — DISCHARGE INSTRUCTIONS
You are seen in the emergency department for leg cramps.  Your physical exam and labs were reassuring.  You continue to have very low blood counts, however these have not changed from your past labs.  We recommend abstaining from alcohol.    You have been given medications to hopefully help treat your muscle spasms.  Please eat plenty of fruits and vegetables, which can help maintain normal electrolytes and prevent muscle spasms.    Please follow-up with your regular doctor.    Please return to the emergency department or seek medical attention if you develop:  Fevers, vomiting, bloody stools, dark tarry stools, any other new or concerning findings

## 2021-04-12 ENCOUNTER — HOSPITAL ENCOUNTER (EMERGENCY)
Facility: MEDICAL CENTER | Age: 61
End: 2021-04-12
Attending: EMERGENCY MEDICINE
Payer: COMMERCIAL

## 2021-04-12 VITALS
OXYGEN SATURATION: 94 % | WEIGHT: 133.16 LBS | TEMPERATURE: 98.7 F | SYSTOLIC BLOOD PRESSURE: 147 MMHG | HEIGHT: 68 IN | RESPIRATION RATE: 19 BRPM | BODY MASS INDEX: 20.18 KG/M2 | DIASTOLIC BLOOD PRESSURE: 89 MMHG | HEART RATE: 72 BPM

## 2021-04-12 DIAGNOSIS — M79.604 CHRONIC PAIN OF BOTH LOWER EXTREMITIES: ICD-10-CM

## 2021-04-12 DIAGNOSIS — G89.29 CHRONIC PAIN OF BOTH LOWER EXTREMITIES: ICD-10-CM

## 2021-04-12 DIAGNOSIS — M79.605 CHRONIC PAIN OF BOTH LOWER EXTREMITIES: ICD-10-CM

## 2021-04-12 DIAGNOSIS — R53.81 MALAISE: ICD-10-CM

## 2021-04-12 PROCEDURE — 99283 EMERGENCY DEPT VISIT LOW MDM: CPT

## 2021-04-12 PROCEDURE — 700111 HCHG RX REV CODE 636 W/ 250 OVERRIDE (IP): Performed by: EMERGENCY MEDICINE

## 2021-04-12 RX ORDER — ONDANSETRON 4 MG/1
4 TABLET, ORALLY DISINTEGRATING ORAL ONCE
Status: COMPLETED | OUTPATIENT
Start: 2021-04-12 | End: 2021-04-12

## 2021-04-12 RX ADMIN — ONDANSETRON 4 MG: 4 TABLET, ORALLY DISINTEGRATING ORAL at 13:38

## 2021-04-12 ASSESSMENT — FIBROSIS 4 INDEX: FIB4 SCORE: 16.33

## 2021-04-12 ASSESSMENT — PAIN DESCRIPTION - PAIN TYPE: TYPE: ACUTE PAIN

## 2021-04-12 NOTE — ED TRIAGE NOTES
"Patient vital signs rechecked and documented per [facility]. Patient denies any new needs at this time.  Patient updated on wait times, thanked for patience. Pt informed to alert triage tech or triage RN with any needs and/or changes in condition; patient verbalized understanding.   Patient stated \"it feels the same\".   "

## 2021-04-12 NOTE — ED PROVIDER NOTES
"ED Provider Note    This patient was cared for during the COVID-19 pandemic. History and physical exam may be limited/truncated by the inherent challenges of PPE and the need to decrease staff exposure to novel coronavirus. Some aspects of disease management may be different to protect staff and help slow the spread of disease. I verified that, if possible, the patient was wearing a mask and I was wearing appropriate PPE every time I encountered the patient.       CHIEF COMPLAINT  Chief Complaint   Patient presents with   • Leg Pain     bilateral leg aches chronic    • Other     \"just dont feel good\"       HPI  Inocencio Shabazz is a 61 y.o. male with a history of alcohol abuse who presents for evaluation of nausea onset prior to arrival. The patient states he drank too much whiskey which resulted in vomiting and nausea. He last vomited one hour ago. He states he has not had a drink in two or three days, but does not know how to stop drinking. He denies alleviating factors. He denies fever or abdominal pain.  [facility] he said that he still did not feel well so he came here for evaluation.  Patient did not mention chronic leg pain during my initial evaluation.    REVIEW OF SYSTEMS  Positive for nausea and vomiting, negative for fever or abdominal pain.    PAST MEDICAL HISTORY   has a past medical history of Alcohol abuse, Alcohol intoxication (HCC) (3/13/2014), Cirrhosis (HCC), GIB (gastrointestinal bleeding) (1/3/2016), Hepatitis C, Pancytopenia (HCC), and Psychiatric disorder.    SOCIAL HISTORY  Social History     Tobacco Use   • Smoking status: Current Every Day Smoker     Packs/day: 1.00     Years: 48.00     Pack years: 48.00     Types: Cigarettes   • Smokeless tobacco: Never Used   Substance and Sexual Activity   • Alcohol use: Yes     Comment: 1-2 beers and 1/2 pints of whiskey every day   • Drug use: Yes     Types: Inhaled     Comment: marijuana, cocaine, meth   • " "Sexual activity: Not on file       SURGICAL HISTORY   has a past surgical history that includes gastroscopy (1/3/2016); gastroscopy (4/8/2016); gastroscopy (3/13/2019); and gastroscopy-endo (N/A, 11/13/2019).    CURRENT MEDICATIONS  Home Medications     Reviewed by Lea Ramos R.N. (Registered Nurse) on 04/12/21 at [employer]  Med List Status: Partial   Medication Last Dose Status   Cyanocobalamin (VITAMIN B-12) 1000 MCG Tab 4/11/2021 Active   folic acid (FOLVITE) 1 MG Tab  Active   multivitamin (THERAGRAN) Tab 4/11/2021 Active   nicotine (NICODERM) 21 MG/24HR PATCH 24 HR  Active   nicotine polacrilex (NICORETTE) 2 MG Gum  Active   ondansetron (ZOFRAN ODT) 4 MG TABLET DISPERSIBLE  Active   pantoprazole (PROTONIX) 40 MG Tablet Delayed Response 4/11/2021 Active   potassium chloride SA (KDUR) 20 MEQ Tab CR  Active   propranolol (INDERAL) 20 MG Tab  Active   quetiapine (SEROQUEL) 400 MG tablet 4/11/2021 Active   thiamine (VITAMIN B-1) 100 MG Tab 4/11/2021 Active   traZODone (DESYREL) 50 MG Tab  Active                ALLERGIES  Allergies   Allergen Reactions   • Haloperidol Unspecified     Twitching/involuntary movements        PHYSICAL EXAM  VITAL SIGNS: /78   Pulse 70   Temp 37.1 °C (98.7 °F) (Temporal)   Resp 14   Ht 1.727 m (5' 8\")   Wt 60.4 kg (133 lb 2.5 oz)   SpO2 96%   BMI 20.25 kg/m²   Constitutional: Alert in no apparent distress.  HENT: Normocephalic, Atraumatic, Bilateral external ears normal. Nose normal.   Eyes: Pupils are equal and reactive. Conjunctiva normal, non-icteric.   Heart: Regular rate and rythm, no murmurs.    Lungs: Clear to auscultation bilaterally.  Skin: Warm, Dry, No erythema, No rash.   Neurologic: Alert, Grossly non-focal.   Psychiatric: Affect normal, Judgment normal, Mood normal, Appears appropriate and not intoxicated.   Extremities: Intact distal pulses, No edema, No tenderness    DIAGNOSTIC STUDIES / PROCEDURES    Prior records were reviewed patient was here in mid " "February with bilateral leg pain.  He has had the symptoms for about 2 years at that time.  He was feeling weak at that time as well.  He was seen had labs done at that time.  He was pancytopenic which is chronic.  Given detox bag.    COURSE & MEDICAL DECISION MAKING  Pertinent Labs & Imaging studies reviewed. (See chart for details)    This is a 61-year-old gentleman who presents with nausea and chronic leg pain.  He is well-appearing on exam.  He was seen here a few months ago for this leg pain.  Extensive work-up was performed at that time and he has pancytopenia.  Continues to drink.    1:20 PM Patient presents to the ED and was seen and evaluated at bedside. Patient will be treated with ondansetron 4 mg.      2:31 PM - Patient was reevaluated at bedside. The patient is able to stomach tea at this time. He reports feeling about the same, but appears improved.  Per nursing he did report improvement of his abdominal discomfort.  I did offer him resources for detox and he did not want them at this time.  I discussed plan for discharge and follow up as outlined below. The patient verbalizes they feel comfortable going home. The patient is stable for discharge at this time and will return for any new or worsening symptoms. Patient verbalizes understanding and support with my plan for discharge. The patient's last set of vitals were /78   Pulse 81   Temp 37.1 °C (98.7 °F) (Temporal)   Resp 19   Ht 1.727 m (5' 8\")   Wt 60.4 kg (133 lb 2.5 oz)   SpO2 94%   BMI 20.25 kg/m²       The patient will return for new or worsening symptoms and is stable at the time of discharge. Patient was given return precautions. Patient and/or family member verbalizes understanding and will comply.    DISPOSITION:  Patient will be discharged home in stable condition.    FOLLOW UP:  Nnamdi Ballesteros M.D.  [address] [id number] [phone]      As needed    [facility], Emergency Dept  [address] " [facility] [id number] [phone]    Return for worsening symptoms fevers vomiting or other concerns    Your blood pressure is elevated here in the emergency department. Please monitor your blood pressure over the next several days. If your blood pressure continues to be 120/80 or higher please contact your physician for blood pressure management.       FINAL IMPRESSION  1. Malaise     2. Chronic pain of both lower extremities         2.   3.     This dictation has been creating using voice recognition software. The accuracy of the dictation is limited the abilities of the software.  I expect there may be some errors of grammar and possibly content. I made every attempt to manually correct the errors within my dictation. However errors related to this voice recognition software may still exist and should be interpreted within the appropriate context.        The note accurately reflects work and decisions made by me.  Carmella Knott M.D.  4/12/2021  9:08 PM

## 2021-04-12 NOTE — ED TRIAGE NOTES
".  Chief Complaint   Patient presents with   • Leg Pain     bilateral leg aches chronic    • Other     \"just dont feel good\"     Pt ambulated to triage with above c/c. [facility]. Pt reports last drank alcohol 2 days ago.   Mask applied to patient prior to triage. This RN in ppe prior to encounter.   "

## 2021-04-21 ENCOUNTER — HOSPITAL ENCOUNTER (EMERGENCY)
Facility: MEDICAL CENTER | Age: 61
End: 2021-04-21
Attending: EMERGENCY MEDICINE | Admitting: EMERGENCY MEDICINE
Payer: COMMERCIAL

## 2021-04-21 ENCOUNTER — HOSPITAL ENCOUNTER (OUTPATIENT)
Facility: MEDICAL CENTER | Age: 61
End: 2021-04-23
Attending: EMERGENCY MEDICINE | Admitting: STUDENT IN AN ORGANIZED HEALTH CARE EDUCATION/TRAINING PROGRAM
Payer: COMMERCIAL

## 2021-04-21 ENCOUNTER — APPOINTMENT (OUTPATIENT)
Dept: RADIOLOGY | Facility: MEDICAL CENTER | Age: 61
End: 2021-04-21
Attending: EMERGENCY MEDICINE
Payer: COMMERCIAL

## 2021-04-21 VITALS
SYSTOLIC BLOOD PRESSURE: 115 MMHG | WEIGHT: 135 LBS | HEART RATE: 95 BPM | TEMPERATURE: 97.3 F | RESPIRATION RATE: 16 BRPM | OXYGEN SATURATION: 98 % | DIASTOLIC BLOOD PRESSURE: 83 MMHG | BODY MASS INDEX: 20.53 KG/M2

## 2021-04-21 DIAGNOSIS — R00.0 TACHYCARDIA: ICD-10-CM

## 2021-04-21 DIAGNOSIS — F10.10 ALCOHOL ABUSE: ICD-10-CM

## 2021-04-21 DIAGNOSIS — R07.9 CHEST PAIN, UNSPECIFIED TYPE: ICD-10-CM

## 2021-04-21 DIAGNOSIS — R11.0 NAUSEA: ICD-10-CM

## 2021-04-21 LAB
ALBUMIN SERPL BCP-MCNC: 4.3 G/DL (ref 3.2–4.9)
ALBUMIN/GLOB SERPL: 1 G/DL
ALP SERPL-CCNC: 72 U/L (ref 30–99)
ALT SERPL-CCNC: 58 U/L (ref 2–50)
ANION GAP SERPL CALC-SCNC: 9 MMOL/L (ref 7–16)
AST SERPL-CCNC: 86 U/L (ref 12–45)
BASOPHILS # BLD AUTO: 0.5 % (ref 0–1.8)
BASOPHILS # BLD: 0.01 K/UL (ref 0–0.12)
BILIRUB SERPL-MCNC: 0.9 MG/DL (ref 0.1–1.5)
BUN SERPL-MCNC: 13 MG/DL (ref 8–22)
CALCIUM SERPL-MCNC: 9.4 MG/DL (ref 8.5–10.5)
CHLORIDE SERPL-SCNC: 99 MMOL/L (ref 96–112)
CO2 SERPL-SCNC: 27 MMOL/L (ref 20–33)
CREAT SERPL-MCNC: 0.62 MG/DL (ref 0.5–1.4)
D DIMER PPP IA.FEU-MCNC: 1.78 UG/ML (FEU) (ref 0–0.5)
EKG IMPRESSION: NORMAL
EOSINOPHIL # BLD AUTO: 0.03 K/UL (ref 0–0.51)
EOSINOPHIL NFR BLD: 1.6 % (ref 0–6.9)
ERYTHROCYTE [DISTWIDTH] IN BLOOD BY AUTOMATED COUNT: 45.2 FL (ref 35.9–50)
ETHANOL BLD-MCNC: <10.1 MG/DL (ref 0–10)
GLOBULIN SER CALC-MCNC: 4.2 G/DL (ref 1.9–3.5)
GLUCOSE SERPL-MCNC: 185 MG/DL (ref 65–99)
HCT VFR BLD AUTO: 38.6 % (ref 42–52)
HGB BLD-MCNC: 12.9 G/DL (ref 14–18)
IMM GRANULOCYTES # BLD AUTO: 0 K/UL (ref 0–0.11)
IMM GRANULOCYTES NFR BLD AUTO: 0 % (ref 0–0.9)
LYMPHOCYTES # BLD AUTO: 0.44 K/UL (ref 1–4.8)
LYMPHOCYTES NFR BLD: 24.2 % (ref 22–41)
MAGNESIUM SERPL-MCNC: 1.7 MG/DL (ref 1.5–2.5)
MCH RBC QN AUTO: 31.4 PG (ref 27–33)
MCHC RBC AUTO-ENTMCNC: 33.4 G/DL (ref 33.7–35.3)
MCV RBC AUTO: 93.9 FL (ref 81.4–97.8)
MONOCYTES # BLD AUTO: 0.35 K/UL (ref 0–0.85)
MONOCYTES NFR BLD AUTO: 19.2 % (ref 0–13.4)
NEUTROPHILS # BLD AUTO: 0.99 K/UL (ref 1.82–7.42)
NEUTROPHILS NFR BLD: 54.5 % (ref 44–72)
NRBC # BLD AUTO: 0 K/UL
NRBC BLD-RTO: 0 /100 WBC
PHOSPHATE SERPL-MCNC: 3.5 MG/DL (ref 2.5–4.5)
PLATELET # BLD AUTO: 39 K/UL (ref 164–446)
PMV BLD AUTO: 11.8 FL (ref 9–12.9)
POTASSIUM SERPL-SCNC: 3.7 MMOL/L (ref 3.6–5.5)
PROT SERPL-MCNC: 8.5 G/DL (ref 6–8.2)
RBC # BLD AUTO: 4.11 M/UL (ref 4.7–6.1)
SODIUM SERPL-SCNC: 135 MMOL/L (ref 135–145)
TROPONIN T SERPL-MCNC: <6 NG/L (ref 6–19)
WBC # BLD AUTO: 1.8 K/UL (ref 4.8–10.8)

## 2021-04-21 PROCEDURE — 93005 ELECTROCARDIOGRAM TRACING: CPT | Performed by: EMERGENCY MEDICINE

## 2021-04-21 PROCEDURE — G0378 HOSPITAL OBSERVATION PER HR: HCPCS

## 2021-04-21 PROCEDURE — 85379 FIBRIN DEGRADATION QUANT: CPT

## 2021-04-21 PROCEDURE — U0003 INFECTIOUS AGENT DETECTION BY NUCLEIC ACID (DNA OR RNA); SEVERE ACUTE RESPIRATORY SYNDROME CORONAVIRUS 2 (SARS-COV-2) (CORONAVIRUS DISEASE [COVID-19]), AMPLIFIED PROBE TECHNIQUE, MAKING USE OF HIGH THROUGHPUT TECHNOLOGIES AS DESCRIBED BY CMS-2020-01-R: HCPCS

## 2021-04-21 PROCEDURE — 700105 HCHG RX REV CODE 258: Performed by: EMERGENCY MEDICINE

## 2021-04-21 PROCEDURE — 700102 HCHG RX REV CODE 250 W/ 637 OVERRIDE(OP): Performed by: EMERGENCY MEDICINE

## 2021-04-21 PROCEDURE — 71045 X-RAY EXAM CHEST 1 VIEW: CPT

## 2021-04-21 PROCEDURE — 99220 PR INITIAL OBSERVATION CARE,LEVL III: CPT | Performed by: STUDENT IN AN ORGANIZED HEALTH CARE EDUCATION/TRAINING PROGRAM

## 2021-04-21 PROCEDURE — 99285 EMERGENCY DEPT VISIT HI MDM: CPT

## 2021-04-21 PROCEDURE — 700111 HCHG RX REV CODE 636 W/ 250 OVERRIDE (IP): Performed by: EMERGENCY MEDICINE

## 2021-04-21 PROCEDURE — U0005 INFEC AGEN DETEC AMPLI PROBE: HCPCS

## 2021-04-21 PROCEDURE — 84484 ASSAY OF TROPONIN QUANT: CPT

## 2021-04-21 PROCEDURE — 99284 EMERGENCY DEPT VISIT MOD MDM: CPT

## 2021-04-21 PROCEDURE — A9270 NON-COVERED ITEM OR SERVICE: HCPCS | Performed by: EMERGENCY MEDICINE

## 2021-04-21 PROCEDURE — 80053 COMPREHEN METABOLIC PANEL: CPT

## 2021-04-21 PROCEDURE — 85025 COMPLETE CBC W/AUTO DIFF WBC: CPT

## 2021-04-21 PROCEDURE — 83735 ASSAY OF MAGNESIUM: CPT

## 2021-04-21 PROCEDURE — 93005 ELECTROCARDIOGRAM TRACING: CPT

## 2021-04-21 PROCEDURE — 84100 ASSAY OF PHOSPHORUS: CPT

## 2021-04-21 PROCEDURE — 82077 ASSAY SPEC XCP UR&BREATH IA: CPT

## 2021-04-21 RX ORDER — ASPIRIN 81 MG/1
324 TABLET, CHEWABLE ORAL DAILY
Status: DISCONTINUED | OUTPATIENT
Start: 2021-04-22 | End: 2021-04-23 | Stop reason: HOSPADM

## 2021-04-21 RX ORDER — MAGNESIUM OXIDE 420 MG/1
420 TABLET ORAL DAILY
COMMUNITY
End: 2021-05-13

## 2021-04-21 RX ORDER — DILTIAZEM HYDROCHLORIDE 120 MG/1
120 CAPSULE, COATED, EXTENDED RELEASE ORAL DAILY
Status: ON HOLD | COMMUNITY
End: 2022-02-06

## 2021-04-21 RX ORDER — AMOXICILLIN 250 MG
2 CAPSULE ORAL 2 TIMES DAILY
Status: DISCONTINUED | OUTPATIENT
Start: 2021-04-21 | End: 2021-04-23 | Stop reason: HOSPADM

## 2021-04-21 RX ORDER — ASPIRIN 300 MG/1
300 SUPPOSITORY RECTAL DAILY
Status: DISCONTINUED | OUTPATIENT
Start: 2021-04-22 | End: 2021-04-23 | Stop reason: HOSPADM

## 2021-04-21 RX ORDER — ONDANSETRON 4 MG/1
4 TABLET, ORALLY DISINTEGRATING ORAL ONCE
Status: COMPLETED | OUTPATIENT
Start: 2021-04-21 | End: 2021-04-21

## 2021-04-21 RX ORDER — SODIUM CHLORIDE 9 MG/ML
1000 INJECTION, SOLUTION INTRAVENOUS ONCE
Status: COMPLETED | OUTPATIENT
Start: 2021-04-21 | End: 2021-04-21

## 2021-04-21 RX ORDER — FOLIC ACID 1 MG/1
1 TABLET ORAL DAILY
Status: DISCONTINUED | OUTPATIENT
Start: 2021-04-22 | End: 2021-04-23 | Stop reason: HOSPADM

## 2021-04-21 RX ORDER — DEXTROSE MONOHYDRATE 25 G/50ML
50 INJECTION, SOLUTION INTRAVENOUS
Status: DISCONTINUED | OUTPATIENT
Start: 2021-04-21 | End: 2021-04-23 | Stop reason: HOSPADM

## 2021-04-21 RX ORDER — ASPIRIN 325 MG
325 TABLET ORAL DAILY
Status: DISCONTINUED | OUTPATIENT
Start: 2021-04-22 | End: 2021-04-23 | Stop reason: HOSPADM

## 2021-04-21 RX ORDER — POLYETHYLENE GLYCOL 3350 17 G/17G
1 POWDER, FOR SOLUTION ORAL
Status: DISCONTINUED | OUTPATIENT
Start: 2021-04-21 | End: 2021-04-23 | Stop reason: HOSPADM

## 2021-04-21 RX ORDER — ASPIRIN 81 MG/1
324 TABLET, CHEWABLE ORAL ONCE
Status: COMPLETED | OUTPATIENT
Start: 2021-04-21 | End: 2021-04-21

## 2021-04-21 RX ORDER — GAUZE BANDAGE 2" X 2"
100 BANDAGE TOPICAL DAILY
Status: DISCONTINUED | OUTPATIENT
Start: 2021-04-22 | End: 2021-04-23 | Stop reason: HOSPADM

## 2021-04-21 RX ORDER — BISACODYL 10 MG
10 SUPPOSITORY, RECTAL RECTAL
Status: DISCONTINUED | OUTPATIENT
Start: 2021-04-21 | End: 2021-04-23 | Stop reason: HOSPADM

## 2021-04-21 RX ORDER — OMEPRAZOLE 20 MG/1
20 CAPSULE, DELAYED RELEASE ORAL 2 TIMES DAILY
Status: DISCONTINUED | OUTPATIENT
Start: 2021-04-21 | End: 2021-04-23 | Stop reason: HOSPADM

## 2021-04-21 RX ADMIN — ONDANSETRON 4 MG: 4 TABLET, ORALLY DISINTEGRATING ORAL at 10:42

## 2021-04-21 RX ADMIN — SODIUM CHLORIDE 1000 ML: 9 INJECTION, SOLUTION INTRAVENOUS at 22:00

## 2021-04-21 RX ADMIN — ASPIRIN 324 MG: 81 TABLET, CHEWABLE ORAL at 21:02

## 2021-04-21 ASSESSMENT — ENCOUNTER SYMPTOMS
HEADACHES: 0
BLURRED VISION: 0
MYALGIAS: 0
FEVER: 0
NECK PAIN: 0
HEMOPTYSIS: 0
NAUSEA: 0
DOUBLE VISION: 0
COUGH: 0
CHILLS: 0
BRUISES/BLEEDS EASILY: 0
HEARTBURN: 0
PALPITATIONS: 0
DEPRESSION: 0
DIZZINESS: 0

## 2021-04-21 ASSESSMENT — PATIENT HEALTH QUESTIONNAIRE - PHQ9
SUM OF ALL RESPONSES TO PHQ9 QUESTIONS 1 AND 2: 0
1. LITTLE INTEREST OR PLEASURE IN DOING THINGS: NOT AT ALL
2. FEELING DOWN, DEPRESSED, IRRITABLE, OR HOPELESS: NOT AT ALL

## 2021-04-21 ASSESSMENT — PAIN DESCRIPTION - DESCRIPTORS: DESCRIPTORS: SHARP

## 2021-04-21 ASSESSMENT — PAIN DESCRIPTION - PAIN TYPE: TYPE: ACUTE PAIN

## 2021-04-21 ASSESSMENT — FIBROSIS 4 INDEX
FIB4 SCORE: 16.33

## 2021-04-21 NOTE — ED TRIAGE NOTES
"Pt comes in reporting \"I drank too much and I dont feel good\" Pt stating anxiety and general illness. Pt vague with symptoms. \"There is something wrong with me\"  "

## 2021-04-21 NOTE — ED PROVIDER NOTES
"ED Provider Note    CHIEF COMPLAINT  Chief Complaint   Patient presents with   • Alcohol Intoxication   • Weakness       HPI  Inocencio Shabazz is a 61 y.o. male who presents to the emergency department through triage for \"I just do not feel well.\"  Patient admits to frequent alcohol use, increased use last night.  Awoke this morning not feeling great.  Nauseous, decreased appetite.  \"I just need to get some rest.\"  Denies vomiting.  Denies chest pain, shortness of breath, palpitations or syncope.  Denies abdominal pain or back pain.  Denies dysuria, hematuria.    Denies suicidal homicidal ideation.  Denies hallucination.  Denies history of significant withdrawal.    REVIEW OF SYSTEMS  See HPI for further details. All other systems are negative.     PAST MEDICAL HISTORY   has a past medical history of Alcohol abuse, Alcohol intoxication (HCC) (3/13/2014), Cirrhosis (HCC), GIB (gastrointestinal bleeding) (1/3/2016), Hepatitis C, Pancytopenia (HCC), and Psychiatric disorder.    SOCIAL HISTORY  Social History     Tobacco Use   • Smoking status: Current Every Day Smoker     Packs/day: 1.00     Years: 48.00     Pack years: 48.00     Types: Cigarettes   • Smokeless tobacco: Never Used   Substance and Sexual Activity   • Alcohol use: Yes     Comment: 1-2 beers and 1/2 pints of whiskey every day   • Drug use: Yes     Types: Inhaled     Comment: marijuana, cocaine, meth   • Sexual activity: Not on file       SURGICAL HISTORY   has a past surgical history that includes gastroscopy (1/3/2016); gastroscopy (4/8/2016); gastroscopy (3/13/2019); and gastroscopy-endo (N/A, 11/13/2019).    CURRENT MEDICATIONS  Home Medications     Reviewed by Cori Gillette R.N. (Registered Nurse) on 04/21/21 at 0940  Med List Status: Partial   Medication Last Dose Status   Cyanocobalamin (VITAMIN B-12) 1000 MCG Tab  Active   folic acid (FOLVITE) 1 MG Tab  Active   multivitamin (THERAGRAN) Tab  Active   nicotine (NICODERM) 21 MG/24HR PATCH 24 " HR  Active   nicotine polacrilex (NICORETTE) 2 MG Gum  Active   ondansetron (ZOFRAN ODT) 4 MG TABLET DISPERSIBLE  Active   pantoprazole (PROTONIX) 40 MG Tablet Delayed Response  Active   potassium chloride SA (KDUR) 20 MEQ Tab CR  Active   propranolol (INDERAL) 20 MG Tab  Active   quetiapine (SEROQUEL) 400 MG tablet  Active   thiamine (VITAMIN B-1) 100 MG Tab  Active   traZODone (DESYREL) 50 MG Tab  Active                ALLERGIES  Allergies   Allergen Reactions   • Haloperidol Unspecified     Twitching/involuntary movements        PHYSICAL EXAM  VITAL SIGNS: /85   Pulse 97   Temp 36.4 °C (97.5 °F) (Temporal)   Resp 19   Wt 61.2 kg (135 lb)   SpO2 97%   BMI 20.53 kg/m²   Pulse ox interpretation: I interpret this pulse ox as normal.  Constitutional: Alert in no apparent distress.  HENT: Normocephalic, atraumatic. Bilateral external ears normal, Nose normal.  Slightly dry mucous membranes.    Eyes: Pupils are equal and reactive, Conjunctiva normal.   Neck: Normal range of motion, Supple.   Lymphatic: No lymphadenopathy noted.   Cardiovascular: Regular rate and rhythm, no murmurs. Distal pulses intact.    Thorax & Lungs: Normal breath sounds.  No wheezing/rales/ronchi. No increased work of breathing  Abdomen: Soft, non-distended, non-tender to palpation.   Skin: Warm, Dry, No erythema, No rash.   Musculoskeletal: Good range of motion in all major joints. .   Neurologic: Alert and oriented x4.  Speech is slow but clear and cohesive.  Moves 4 extremity spontaneously.  Ambulates independently.  Psychiatric: Flat affect.  Cooperative.    COURSE & MEDICAL DECISION MAKING  Nursing notes and vital signs were reviewed. (See chart for details)  The patients records were reviewed, history was obtained from the patient;    ED evaluation for nausea and fatigue is really nonspecific, however patient does have history of chronic alcohol dependence, and may be secondary to this.  His exam is quite unremarkable, abdominal  exam is benign.  Neurologically intact and nonfocal.  Ambulates independently.  Hemodynamically stable.  No evidence for alcohol withdrawal or delirium tremens.  He received Zofran ODT, and tolerated oral fluids without vomiting or discomfort there after.    Refused evaluation for detox or transfer to [facility]    Patient is stable for discharge at this time, anticipatory guidance provided, close follow-up is encouraged, and strict ED return instructions have been detailed. Patient is agreeable to the disposition and plan.    Patient's blood pressure was elevated in the emergency department, and has been referred to primary care for close monitoring.      FINAL IMPRESSION  (F10.10) Alcohol abuse  (R11.0) Nausea      Electronically signed by: Joleen Ely D.O., 4/21/2021 11:40 AM      This dictation was created using voice recognition software. The accuracy of the dictation is limited to the abilities of the software. I expect there may be some errors of grammar and possibly content. The nursing notes were reviewed and certain aspects of this information were incorporated into this note.

## 2021-04-21 NOTE — ED NOTES
Pt given discharge instructions/home care instructions explained, pt verbalized understanding of instructions given/pt understands the importance of follow up, pt ambulatory to ER lobby, steady gait.

## 2021-04-21 NOTE — DISCHARGE INSTRUCTIONS
Follow-up with primary care or [facility] for reevaluation, medication management, detox or substance abuse counseling.    Return the emergency department for altered mental status, seizure, hallucination, vomiting, syncope, chest pain or other new concerns.

## 2021-04-22 ENCOUNTER — HOSPITAL ENCOUNTER (OUTPATIENT)
Dept: RADIOLOGY | Facility: MEDICAL CENTER | Age: 61
End: 2021-04-22
Attending: STUDENT IN AN ORGANIZED HEALTH CARE EDUCATION/TRAINING PROGRAM
Payer: COMMERCIAL

## 2021-04-22 PROBLEM — F17.200 TOBACCO DEPENDENCE: Status: ACTIVE | Noted: 2021-04-22

## 2021-04-22 LAB
GLUCOSE BLD-MCNC: 100 MG/DL (ref 65–99)
GLUCOSE BLD-MCNC: 105 MG/DL (ref 65–99)
GLUCOSE BLD-MCNC: 75 MG/DL (ref 65–99)
SARS-COV-2 RNA RESP QL NAA+PROBE: NOTDETECTED
SPECIMEN SOURCE: NORMAL
TROPONIN T SERPL-MCNC: <6 NG/L (ref 6–19)

## 2021-04-22 PROCEDURE — 99226 PR SUBSEQUENT OBSERVATION CARE,LEVEL III: CPT | Mod: 25 | Performed by: INTERNAL MEDICINE

## 2021-04-22 PROCEDURE — A9270 NON-COVERED ITEM OR SERVICE: HCPCS | Performed by: STUDENT IN AN ORGANIZED HEALTH CARE EDUCATION/TRAINING PROGRAM

## 2021-04-22 PROCEDURE — G0378 HOSPITAL OBSERVATION PER HR: HCPCS

## 2021-04-22 PROCEDURE — 71275 CT ANGIOGRAPHY CHEST: CPT | Mod: ME

## 2021-04-22 PROCEDURE — 82962 GLUCOSE BLOOD TEST: CPT | Mod: 91

## 2021-04-22 PROCEDURE — 700102 HCHG RX REV CODE 250 W/ 637 OVERRIDE(OP): Performed by: INTERNAL MEDICINE

## 2021-04-22 PROCEDURE — A9270 NON-COVERED ITEM OR SERVICE: HCPCS | Performed by: INTERNAL MEDICINE

## 2021-04-22 PROCEDURE — 700117 HCHG RX CONTRAST REV CODE 255: Performed by: STUDENT IN AN ORGANIZED HEALTH CARE EDUCATION/TRAINING PROGRAM

## 2021-04-22 PROCEDURE — 84484 ASSAY OF TROPONIN QUANT: CPT

## 2021-04-22 PROCEDURE — 700102 HCHG RX REV CODE 250 W/ 637 OVERRIDE(OP): Performed by: STUDENT IN AN ORGANIZED HEALTH CARE EDUCATION/TRAINING PROGRAM

## 2021-04-22 PROCEDURE — 99406 BEHAV CHNG SMOKING 3-10 MIN: CPT | Performed by: INTERNAL MEDICINE

## 2021-04-22 PROCEDURE — 36415 COLL VENOUS BLD VENIPUNCTURE: CPT

## 2021-04-22 RX ORDER — ACETAMINOPHEN 500 MG
500 TABLET ORAL EVERY 6 HOURS PRN
Status: DISCONTINUED | OUTPATIENT
Start: 2021-04-22 | End: 2021-04-23 | Stop reason: HOSPADM

## 2021-04-22 RX ORDER — NICOTINE 21 MG/24HR
14 PATCH, TRANSDERMAL 24 HOURS TRANSDERMAL
Status: DISCONTINUED | OUTPATIENT
Start: 2021-04-22 | End: 2021-04-23 | Stop reason: HOSPADM

## 2021-04-22 RX ADMIN — ASPIRIN 325 MG: 325 TABLET ORAL at 05:20

## 2021-04-22 RX ADMIN — OMEPRAZOLE 20 MG: 20 CAPSULE, DELAYED RELEASE ORAL at 00:47

## 2021-04-22 RX ADMIN — FOLIC ACID 1 MG: 1 TABLET ORAL at 05:19

## 2021-04-22 RX ADMIN — Medication 100 MG: at 05:20

## 2021-04-22 RX ADMIN — OMEPRAZOLE 20 MG: 20 CAPSULE, DELAYED RELEASE ORAL at 12:15

## 2021-04-22 RX ADMIN — OMEPRAZOLE 20 MG: 20 CAPSULE, DELAYED RELEASE ORAL at 23:48

## 2021-04-22 RX ADMIN — ACETAMINOPHEN 500 MG: 500 TABLET ORAL at 21:16

## 2021-04-22 RX ADMIN — IOHEXOL 40 ML: 350 INJECTION, SOLUTION INTRAVENOUS at 01:30

## 2021-04-22 RX ADMIN — NICOTINE 14 MG: 14 PATCH TRANSDERMAL at 16:01

## 2021-04-22 RX ADMIN — DOCUSATE SODIUM 50 MG AND SENNOSIDES 8.6 MG 2 TABLET: 8.6; 5 TABLET, FILM COATED ORAL at 05:20

## 2021-04-22 ASSESSMENT — LIFESTYLE VARIABLES
ON A TYPICAL DAY WHEN YOU DRINK ALCOHOL HOW MANY DRINKS DO YOU HAVE: 4
EVER HAD A DRINK FIRST THING IN THE MORNING TO STEADY YOUR NERVES TO GET RID OF A HANGOVER: YES
AVERAGE NUMBER OF DAYS PER WEEK YOU HAVE A DRINK CONTAINING ALCOHOL: 5
CONSUMPTION TOTAL: POSITIVE
EVER FELT BAD OR GUILTY ABOUT YOUR DRINKING: NO
TOTAL SCORE: 1
HAVE PEOPLE ANNOYED YOU BY CRITICIZING YOUR DRINKING: NO
HOW MANY TIMES IN THE PAST YEAR HAVE YOU HAD 5 OR MORE DRINKS IN A DAY: 200
HAVE YOU EVER FELT YOU SHOULD CUT DOWN ON YOUR DRINKING: NO
TOTAL SCORE: 1
TOTAL SCORE: 1
ALCOHOL_USE: YES

## 2021-04-22 ASSESSMENT — COGNITIVE AND FUNCTIONAL STATUS - GENERAL
MOVING TO AND FROM BED TO CHAIR: A LITTLE
PERSONAL GROOMING: A LITTLE
MOBILITY SCORE: 18
TURNING FROM BACK TO SIDE WHILE IN FLAT BAD: A LITTLE
MOVING FROM LYING ON BACK TO SITTING ON SIDE OF FLAT BED: A LITTLE
CLIMB 3 TO 5 STEPS WITH RAILING: A LITTLE
SUGGESTED CMS G CODE MODIFIER MOBILITY: CK
DAILY ACTIVITIY SCORE: 18
TOILETING: A LITTLE
SUGGESTED CMS G CODE MODIFIER DAILY ACTIVITY: CK
HELP NEEDED FOR BATHING: A LITTLE
DRESSING REGULAR UPPER BODY CLOTHING: A LITTLE
EATING MEALS: A LITTLE
WALKING IN HOSPITAL ROOM: A LITTLE
DRESSING REGULAR LOWER BODY CLOTHING: A LITTLE
STANDING UP FROM CHAIR USING ARMS: A LITTLE

## 2021-04-22 ASSESSMENT — FIBROSIS 4 INDEX: FIB4 SCORE: 18.24

## 2021-04-22 ASSESSMENT — PAIN DESCRIPTION - PAIN TYPE
TYPE: ACUTE PAIN

## 2021-04-22 NOTE — PROGRESS NOTES
Assumed care of pt. Bedside report received from night RN Laura. Pt appears to be resting with unlabored respirations. Call light, phone and personal belongings within reach. Bed locked in lowest position, 3 side rails up, bed alarm on and working appropriately.

## 2021-04-22 NOTE — ED NOTES
Med Rec complete per Pt's home pharmacy.  Allergies reviewed.  No oral ABX in the last 14 days.

## 2021-04-22 NOTE — ED NOTES
Report given to Bonifacio QURESHI. Pt transported via [facility] with monitor and RN.

## 2021-04-22 NOTE — H&P
Hospital Medicine History & Physical Note    Date of Service  4/21/2021    Primary Care Physician  Nnamdi Ballesteros M.D.    Consultants      Code Status  Full Code    Chief Complaint  Chief Complaint   Patient presents with   • Cramping     pt states this morninging pt started experiencing hands and feet bilaterally cramping. Pt took tums to try to relieve the pain, and soak his feet in water with no relief.   • Chest Pain     sharp chest pain       History of Presenting Illness  61 y.o. male past medical history of alcohol abuse, tobacco abuse, hepatitis C, cirrhosis, pancytopenia who presented 4/21/2021 with generalized malaise and chest pain that started earlier today.  Patient states the chest pain was substernal, sharp, nonradiating, 6 out of 10 intensity, associated with shortness of breath.  Patient states he never experienced this type of pain in the past.  No alleviating or exacerbating factors noted.  Last alcohol drink 2 days ago.  He reports no chest pain at this time. EKG showing slight ST depressions in lateral leads V4-V6. He will be observed in telemetry floor.     Review of Systems  Review of Systems   Constitutional: Negative for chills and fever.   HENT: Negative for hearing loss and tinnitus.    Eyes: Negative for blurred vision and double vision.   Respiratory: Negative for cough and hemoptysis.    Cardiovascular: Positive for chest pain. Negative for palpitations.   Gastrointestinal: Negative for heartburn and nausea.   Genitourinary: Negative for dysuria and urgency.   Musculoskeletal: Negative for myalgias and neck pain.   Neurological: Negative for dizziness and headaches.   Endo/Heme/Allergies: Does not bruise/bleed easily.   Psychiatric/Behavioral: Negative for depression and suicidal ideas.       Past Medical History   has a past medical history of Alcohol abuse, Alcohol intoxication (HCC) (3/13/2014), Cirrhosis (HCC), GIB (gastrointestinal bleeding) (1/3/2016), Hepatitis C, Pancytopenia  (HCC), and Psychiatric disorder.    Surgical History   has a past surgical history that includes gastroscopy (1/3/2016); gastroscopy (4/8/2016); gastroscopy (3/13/2019); and gastroscopy-endo (N/A, 11/13/2019).     Family History  Reviewed and not pertinent     Social History   reports that he has been smoking cigarettes. He has a 48.00 pack-year smoking history. He has never used smokeless tobacco. He reports current alcohol use. He reports current drug use. Drug: Inhaled.    Allergies  Allergies   Allergen Reactions   • Haloperidol Unspecified     Twitching/involuntary movements        Medications  Prior to Admission Medications   Prescriptions Last Dose Informant Patient Reported? Taking?   DILTIAZem CD (DILTIAZEM CD) 120 MG CAPSULE SR 24 HR unknown at unknown Patient's Home Pharmacy Yes Yes   Sig: Take 120 mg by mouth every day.   Magnesium Oxide 420 MG Tab unknown at unknown Patient's Home Pharmacy Yes Yes   Sig: Take 420 mg by mouth every day.   QUEtiapine (SEROQUEL) 200 MG Tab unknown at unknown Patient's Home Pharmacy Yes No   Sig: Take 200 mg by mouth every evening.   folic acid (FOLVITE) 1 MG Tab Not Taking at Unknown time Patient's Home Pharmacy No No   Sig: Take 1 Tab by mouth every day.   Patient not taking: Reported on 4/21/2021   multivitamin (THERAGRAN) Tab Not Taking at Unknown time Patient's Home Pharmacy No No   Sig: Take 1 Tab by mouth every day.   Patient not taking: Reported on 4/21/2021   nicotine (NICODERM) 21 MG/24HR PATCH 24 HR Not Taking at Unknown time Patient's Home Pharmacy No No   Sig: Place 1 Patch on the skin every 24 hours.   Patient not taking: Reported on 4/21/2021   nicotine polacrilex (NICORETTE) 2 MG Gum Not Taking at Unknown time Patient's Home Pharmacy No No   Sig: Take 1 Each by mouth every 1 hour as needed for Smoking Cessation (For nicotine urge).   Patient not taking: Reported on 4/21/2021   ondansetron (ZOFRAN ODT) 4 MG TABLET DISPERSIBLE Not Taking at Unknown time  Patient's Home Pharmacy No No   Sig: Take 1 Tab by mouth every 8 hours as needed.   Patient not taking: Reported on 4/21/2021   pantoprazole (PROTONIX) 40 MG Tablet Delayed Response unknown at unknown Patient's Home Pharmacy Yes No   Sig: Take 40 mg by mouth 2 times a day.   potassium chloride SA (KDUR) 20 MEQ Tab CR Not Taking at Unknown time Patient's Home Pharmacy No No   Sig: Take 1 Tab by mouth every day.   Patient not taking: Reported on 4/21/2021   propranolol (INDERAL) 20 MG Tab Not Taking at Unknown time Patient's Home Pharmacy No No   Sig: Take 1 Tab by mouth 2 times a day.   Patient not taking: Reported on 4/21/2021      Facility-Administered Medications: None       Physical Exam  Temp:  [36.3 °C (97.3 °F)-36.4 °C (97.6 °F)] 36.3 °C (97.3 °F)  Pulse:  [] 93  Resp:  [16-24] 19  BP: (111-128)/(74-88) 120/80  SpO2:  [92 %-98 %] 93 %    Physical Exam  Vitals and nursing note reviewed.   Constitutional:       General: He is not in acute distress.     Appearance: He is not ill-appearing.      Comments: Drowsy but arousable   HENT:      Head: Normocephalic and atraumatic.      Right Ear: Tympanic membrane normal.      Left Ear: Tympanic membrane normal.      Nose: Nose normal.      Mouth/Throat:      Pharynx: Oropharynx is clear.   Eyes:      General:         Right eye: No discharge.         Left eye: No discharge.      Extraocular Movements: Extraocular movements intact.      Pupils: Pupils are equal, round, and reactive to light.   Cardiovascular:      Rate and Rhythm: Regular rhythm. Tachycardia present.      Heart sounds: Normal heart sounds.   Pulmonary:      Effort: Pulmonary effort is normal. No respiratory distress.      Breath sounds: Normal breath sounds. No stridor.   Abdominal:      General: Abdomen is flat. Bowel sounds are normal. There is no distension.      Palpations: Abdomen is soft. There is no mass (Hepatomegaly ).   Musculoskeletal:         General: No swelling or tenderness. Normal  range of motion.      Cervical back: Normal range of motion and neck supple. No rigidity or tenderness.   Skin:     General: Skin is warm and dry.      Capillary Refill: Capillary refill takes less than 2 seconds.      Coloration: Skin is not jaundiced or pale.   Neurological:      General: No focal deficit present.      Mental Status: He is oriented to person, place, and time.   Psychiatric:         Mood and Affect: Mood normal.         Behavior: Behavior normal.         Laboratory:  Recent Labs     21   WBC 1.8*   RBC 4.11*   HEMOGLOBIN 12.9*   HEMATOCRIT 38.6*   MCV 93.9   MCH 31.4   MCHC 33.4*   RDW 45.2   PLATELETCT 39*   MPV 11.8     Recent Labs     21   SODIUM 135   POTASSIUM 3.7   CHLORIDE 99   CO2 27   GLUCOSE 185*   BUN 13   CREATININE 0.62   CALCIUM 9.4     Recent Labs     21   ALTSGPT 58*   ASTSGOT 86*   ALKPHOSPHAT 72   TBILIRUBIN 0.9   GLUCOSE 185*         No results for input(s): NTPROBNP in the last 72 hours.      Recent Labs     21   TROPONINT <6       Imaging:  DX-CHEST-PORTABLE (1 VIEW)   Final Result      No acute cardiopulmonary abnormality.        Results for orders placed or performed during the hospital encounter of 21   EKG   Result Value Ref Range    Report       [facility] Emergency Dept.    Test Date:  2021  Pt Name:    JIMMY FERRER                 Department: ER  MRN:        [id number]                      Room:  Gender:     Male                         Technician: [id number]  :        [id number]                   Requested By:ER TRIAGE PROTOCOL  Order #:    [id number]                    Reading MD: Jose Antoine MD    Measurements  Intervals                                Axis  Rate:       142                          P:          68  NC:         129                          QRS:        63  QRSD:       89                           T:          30  QT:         282  QTc:        435    Interpretive Statements  Sinus  tachycardia  Borderline ST elevation, anterior leads  Compared to ECG 12/10/2020 15:44:53  Sinus rhythm no longer present  ST (T wave) deviation still present  Electronically Signed On 4- 20:31:31 PDT by Jose Antoine MD           Assessment/Plan:  I anticipate this patient is appropriate for observation status at this time.    * Pain in the chest- (present on admission)  Assessment & Plan  Telemetry monitoring   Serial troponin/EKG  Aspirin 324 mg daily   Echocardiogram transthoracic   If any dynamic increase in troponin/chest pain and dyanmic ekg changes consider heparin, gtt with risks/benefits       Alcohol abuse- (present on admission)  Assessment & Plan  Monitor for withdrawal last drink as per patient 2 days ago   Check alcohol, magnesium and phosphorus levels   Thiamine and folic acid       Thrombocytopenia (HCC)- (present on admission)  Assessment & Plan  Chronic secondary to liver disease and alcohol abuse  No bleeding noted monitor platelets       VTE: SCD

## 2021-04-22 NOTE — ED PROVIDER NOTES
"ER Provider Note     Scribed for Jose Antoine M.D. by Tim Parson. 4/21/2021, 8:32 PM.    Primary Care Provider: Nnamdi Ballesteros M.D.  Means of Arrival: Walk-in   History obtained from: Patient  History limited by: The patient being a poor historian    CHIEF COMPLAINT  Chief Complaint   Patient presents with    Cramping     pt states this morninging pt started experiencing hands and feet bilaterally cramping. Pt took tums to try to relieve the pain, and soak his feet in water with no relief.    Chest Pain     sharp chest pain       HPI  Inocencio Shabazz is a 61 y.o. male who presents to the Emergency Department for evaluation of chest pain onset 2 days ago. The patient reports that his chest pain is sharp in quality and is joined by associated shortness of breath. He presents to the ED because hs \"thinks he is dying.\" The patient has tried to take Tums to relieve his sharp chest pain, but he denies that this alleviated his pain. Thus, no alleviating or exacerbating factors were identified for the patient's sharp chest pain. The patient states that he smokes cigarettes and drinks alcohol. The patient has a history of alcohol abuse, gastrointestinal bleeding, and Hepatitis C.    PPE Note: I personally donned PPE for all patient encounters during this visit, including wearing a mask. Scribe remained outside the patient's room and did not have any contact with the patient for the duration of patient encounter.      The patient's history of present illness is limited secondary to him being a poor historian.    REVIEW OF SYSTEMS  See HPI for further details.    The patient's review of systems is limited secondary to him being a poor historian.    PAST MEDICAL HISTORY   has a past medical history of Alcohol abuse, Alcohol intoxication (HCC) (3/13/2014), Cirrhosis (HCC), GIB (gastrointestinal bleeding) (1/3/2016), Hepatitis C, Pancytopenia (HCC), and Psychiatric disorder.    SURGICAL HISTORY   has a past surgical " "history that includes gastroscopy (1/3/2016); gastroscopy (4/8/2016); gastroscopy (3/13/2019); and gastroscopy-endo (N/A, 11/13/2019).    SOCIAL HISTORY  Social History     Tobacco Use    Smoking status: Current Every Day Smoker     Packs/day: 1.00     Years: 48.00     Pack years: 48.00     Types: Cigarettes    Smokeless tobacco: Never Used   Substance Use Topics    Alcohol use: Yes     Comment: 1-2 beers and 1/2 pints of whiskey every day    Drug use: Yes     Types: Inhaled     Comment: marijuana, cocaine, meth      Social History     Substance and Sexual Activity   Drug Use Yes    Types: Inhaled    Comment: marijuana, cocaine, meth       FAMILY HISTORY  None noted    CURRENT MEDICATIONS  Home Medications       Reviewed by Levy Walsh (Pharmacy Tech) on 04/21/21 at 2226  Med List Status: Complete     Medication Last Dose Status   DILTIAZem CD (DILTIAZEM CD) 120 MG CAPSULE SR 24 HR unknown Active   folic acid (FOLVITE) 1 MG Tab Not Taking Active   Magnesium Oxide 420 MG Tab unknown Active   multivitamin (THERAGRAN) Tab Not Taking Active   nicotine (NICODERM) 21 MG/24HR PATCH 24 HR Not Taking Active   nicotine polacrilex (NICORETTE) 2 MG Gum Not Taking Active   ondansetron (ZOFRAN ODT) 4 MG TABLET DISPERSIBLE Not Taking Active   pantoprazole (PROTONIX) 40 MG Tablet Delayed Response unknown Active   potassium chloride SA (KDUR) 20 MEQ Tab CR Not Taking Active   propranolol (INDERAL) 20 MG Tab Not Taking Active   QUEtiapine (SEROQUEL) 200 MG Tab unknown Active                    ALLERGIES  Allergies   Allergen Reactions    Haloperidol Unspecified     Twitching/involuntary movements        PHYSICAL EXAM  VITAL SIGNS: /81   Pulse (!) 151   Temp 36.3 °C (97.3 °F) (Temporal)   Resp (!) 22   Ht 1.727 m (5' 8\")   Wt 62.2 kg (137 lb 2 oz)   SpO2 95%   BMI 20.85 kg/m²      Constitutional: Pale-appearing. Alert in no apparent distress.  HENT: No signs of trauma, Bilateral external ears normal, Nose " normal.   Eyes: Pupils are equal and reactive, Conjunctiva normal, Non-icteric.   Neck: Normal range of motion, No tenderness, Supple, No stridor.   Lymphatic: No lymphadenopathy noted.   Cardiovascular: Tachycardic rate and regular rhythm, no palpable thrill  Thorax & Lungs: No respiratory distress,  No chest tenderness.   Abdomen: Bowel sounds normal, Soft, No tenderness, No masses, No pulsatile masses. No peritoneal signs.  Skin: Pale. Warm, Dry, No erythema, No rash.   Back: No bony tenderness, No CVA tenderness.   Extremities: Intact distal pulses, No edema, No tenderness, No cyanosis.  Musculoskeletal: Good range of motion in all major joints. No tenderness to palpation or major deformities noted.   Neurologic: Alert , Normal motor function, Normal sensory function, No focal deficits noted.   Psychiatric: Affect normal, Judgment normal, Mood normal.     DIAGNOSTIC STUDIES / PROCEDURES    EKG Interpretation:  Interpreted by me    12 Lead EKG interpreted by me to show:  Normal sinus rhythm  Rate 142  no ST elevation  V5 and V6 with ST Depression  Leads II and III in AVF  My impression of this EKG: Changed from prior EKG    LABS  Labs Reviewed   CBC WITH DIFFERENTIAL - Abnormal; Notable for the following components:       Result Value    WBC 1.8 (*)     RBC 4.11 (*)     Hemoglobin 12.9 (*)     Hematocrit 38.6 (*)     MCHC 33.4 (*)     Platelet Count 39 (*)     Monocytes 19.20 (*)     Neutrophils (Absolute) 0.99 (*)     Lymphs (Absolute) 0.44 (*)     All other components within normal limits   COMP METABOLIC PANEL - Abnormal; Notable for the following components:    Glucose 185 (*)     AST(SGOT) 86 (*)     ALT(SGPT) 58 (*)     Total Protein 8.5 (*)     Globulin 4.2 (*)     All other components within normal limits   D-DIMER - Abnormal; Notable for the following components:    D-Dimer Screen 1.78 (*)     All other components within normal limits   TROPONIN   ESTIMATED GFR   SARS-COV-2, PCR (IN-HOUSE)    Narrative:      Have you been in close contact with a person who is suspected  or known to be positive for COVID-19 within the last 30 days  (e.g. last seen that person < 30 days ago)->No   MAGNESIUM   PHOSPHORUS   DIAGNOSTIC ALCOHOL   TROPONIN   POCT GLUCOSE DEVICE RESULTS     All labs reviewed by me.    RADIOLOGY  CT-CTA CHEST PULMONARY ARTERY W/ RECONS   Final Result      1.  No pulmonary embolus. No acute abnormality in the chest.   2.  Hepatic steatosis.   3.  Small hiatal hernia.         DX-CHEST-PORTABLE (1 VIEW)   Final Result      No acute cardiopulmonary abnormality.      EC-ECHOCARDIOGRAM COMPLETE W/O CONT    (Results Pending)      The radiologist's interpretation of all radiological studies have been reviewed by me.    COURSE & MEDICAL DECISION MAKING  Pertinent Labs & Imaging studies reviewed. (See chart for details)    This is a 61 y.o. male that presents with concerning story of chest pain.  At this time he will be given aspirin.  He will be given fluids for his tachycardia.  We will get a screening EKG to evaluate for ischemia as well as troponin and electrolytes and then reassess..     8:32 PM - Patient seen and examined at bedside. Ordered DX-Chest, CBC Diff, CMP, Troponin, and an EKG.  Patient will be medicated with  mg for his symptoms.     9:44 PM - Patient will be treated with NS Infusion 1000 mL.    HYDRATION: Based on the patient's presentation of Tachycardia the patient was given IV fluids. IV Hydration was used because oral hydration was not adequate alone. Upon recheck following hydration, the patient was improved.    9:53 PM - Ordered SARS-CoV-2 for hospitalization    9:56 PM - Paged hospitalist    10:15 PM - I discussed the patient's case and the above findings with Dr. Hooks (Hospitalist) who agrees to evaluate the patient for hospitalization.    Patient has a negative chest x-ray.  The patient did have an elevated heart rate and D-dimer is elevated.  CTA was done.  It was negative.  Patient  troponin is negative.  Given the patient's elevated heart score we will need to be admitted.  Patient also has EKG changes.  Will admit the patient in guarded condition.    DISPOSITION:  Patient will be hospitalized by Dr. Hooks in guarded condition.    FINAL IMPRESSION  1. Chest pain, unspecified type    2. Tachycardia          Tim ALMAZAN (Scribe), am scribing for, and in the presence of, Jose Antoine M.D..    Electronically signed by: Tim Parson (Scribe), 4/21/2021    Jose ALMAZAN M.D. personally performed the services described in this documentation, as scribed by Tim Parson in my presence, and it is both accurate and complete.     C    The note accurately reflects work and decisions made by me.  Jose Antoine M.D.  4/22/2021  1:56 AM

## 2021-04-22 NOTE — PROGRESS NOTES
Spoke with TITUS Pardo in ED. Pts d-dimer lab results elevated. Hospitalist aware. Order for CT of the chest pending. Pt denies CP or SOB at this time. VSS. Will continue to monitor.

## 2021-04-22 NOTE — PROGRESS NOTES
"2 RN skin check complete with Bharati, RN.   Devices in place PIV, Tele monitor.  Skin assessed under devices intact.  Confirmed pressure ulcers found on none.  New potential pressure ulcers noted on none. Wound consult placed NA.  The following interventions in place pt encouraged to reposition frequently. Moisturizer in use. Pillows for support and comfort.     Bilat ears intact pink and blanching.  Bilat elbows intact, dry, pink and blanching   Sacrum intact pink and blanching   Bilat heels intact pink and blanching     2 open small scabs located on LLQ from reported \"insect bite\" per pt. Sites are pink with no drainage.   "

## 2021-04-22 NOTE — CARE PLAN
Problem: Communication  Goal: The ability to communicate needs accurately and effectively will improve  Outcome: PROGRESSING AS EXPECTED  Notes: Pt is able to express needs and concerns to this RN. Pt has clear speech and appropriate thought processes.     Problem: Safety  Goal: Will remain free from falls  Outcome: PROGRESSING AS EXPECTED  Notes: Pt is actively participating in fall prevention program. Pt is wearing non-skid socks, pt has bed alarm on and is working appropriately.

## 2021-04-22 NOTE — ASSESSMENT & PLAN NOTE
I spent 3 minutes, discussing tobacco dependence and cardiac as well as pulmonary risk. Nicotine replacement and smoking cessation instructions. Code 11356

## 2021-04-22 NOTE — ED TRIAGE NOTES
"Chief Complaint   Patient presents with   • Cramping     pt states this morninging pt started experiencing hands and feet bilaterally cramping. Pt took tums to try to relieve the pain, and soak his feet in water with no relief.   • Chest Pain     sharp chest pain     Patient states around this morning started experiencing cramping in his hands and feet bilaterally. C/O of sharp chest pain during the morning. Pt declining chest pain at this time. However, pt's . EKG completed. Pt is a poor historian, pt does have a history of chronic alcohol use. Pt was in the ER earlier in the day. Patient continues to repeat in triage, \"I just don't feel good.\" Pt unable to give a full description.  GCS 15.  Hx: cirrhosis, GIB, hep c, alcohol abuse     Pt is alert and oriented, speaking in full sentences, follows commands and responds appropriately to questions. NAD. Resp are even and unlabored.      Charge RN notified, pt going to [facility]    /81   Pulse (!) 170   Temp 36.3 °C (97.3 °F) (Temporal)   Resp 16   Ht 1.727 m (5' 8\")   Wt 62.2 kg (137 lb 2 oz)   SpO2 95%   BMI 20.85 kg/m²   "

## 2021-04-22 NOTE — PROGRESS NOTES
Report received from TITUS Pardo in ED. Ed RN informed this RN pt reported calf pain, MD had been notified, and D-Dimer had been sent. Pt A&Ox4. No complaints of CP or current calf pain. Pt to unit with ACLS RN on zoll. Tele monitor placed. Monitors notified pt SR 78. Pt oriented to room. Educated on use of call light and POC discussed. Pt verbalized understanding and all questions answered at this time.

## 2021-04-22 NOTE — CARE PLAN
Problem: Safety  Goal: Will remain free from falls  Outcome: PROGRESSING AS EXPECTED   Fall precautions in place. Pt oriented to room and use of call light. Pt verbalized understanding. Bed locked in lowest position. Bed alarm on. Call light within reach  Problem: Knowledge Deficit  Goal: Knowledge of disease process/condition, treatment plan, diagnostic tests, and medications will improve  Outcome: PROGRESSING AS EXPECTED   POC discussed with pt. Pt verbalized understanding. All questions answered at this time.

## 2021-04-22 NOTE — ASSESSMENT & PLAN NOTE
Monitor for withdrawal last drink as per patient 2 days ago   Check alcohol, magnesium and phosphorus levels   Thiamine and folic acid

## 2021-04-22 NOTE — ASSESSMENT & PLAN NOTE
"Telemetry monitoring   Serial troponin/EKG  Aspirin 324 mg daily   Echocardiogram transthoracic   If any dynamic increase in troponin/chest pain and dyanmic ekg changes consider heparin, gtt with risks/benefits\"    Echo pending  Also observe for withdrawal  Ordered thiamine    "

## 2021-04-22 NOTE — PROGRESS NOTES
[facility] Medicine Daily Progress Note    Date of Service  4/22/2021    Chief Complaint  61 y.o. male admitted 4/21/2021 with Cramping (pt states this morninging pt started experiencing hands and feet bilaterally cramping. Pt took tums to try to relieve the pain, and soak his feet in water with no relief.) and Chest Pain (sharp chest pain)        Hospital Course  No notes on file  History of tobacco dependence.  Alcohol dependence with chronic pancytopenia  Presented with Cramping (pt states this morninging pt started experiencing hands and feet bilaterally cramping. Pt took tums to try to relieve the pain, and soak his feet in water with no relief.) and Chest Pain (sharp chest pain)  Also general malaise.  At the ED, afebrile, hemodynamically stable. He was reportedly drowsy.  PANCYTOPENIC. PLTs 39K. Hyperglycemia. Mild transaminitis. Normal bilirubin.    Interval Problem Update  Not having chest pain now. He however wants detox.    Consultants/Specialty      Code Status  Full Code    Disposition  Possibly d/c tomorrow if not withdrawing and echo normal    Review of Systems  Review of Systems   Unable to perform ROS: Other   Unreliable, alcohol dependence    Physical Exam  Temp:  [36.3 °C (97.3 °F)-36.9 °C (98.5 °F)] 36.9 °C (98.4 °F)  Pulse:  [] 66  Resp:  [16-24] 18  BP: (111-146)/(74-86) 146/84  SpO2:  [92 %-96 %] 96 %    Physical Exam  Vitals and nursing note reviewed.   Constitutional:       Comments: Thin, older appearing, disheveled   HENT:      Head: Normocephalic and atraumatic.      Right Ear: External ear normal.      Left Ear: External ear normal.      Nose: Nose normal.      Mouth/Throat:      Mouth: Mucous membranes are moist.   Eyes:      General: No scleral icterus.     Conjunctiva/sclera: Conjunctivae normal.   Cardiovascular:      Rate and Rhythm: Normal rate and regular rhythm.      Heart sounds: No murmur. No friction rub. No gallop.    Pulmonary:      Effort: Pulmonary effort is normal.     "  Breath sounds: Normal breath sounds.   Abdominal:      General: Abdomen is flat. Bowel sounds are normal. There is no distension.      Palpations: Abdomen is soft.      Tenderness: There is no abdominal tenderness. There is no guarding.   Musculoskeletal:         General: Normal range of motion.      Cervical back: Normal range of motion and neck supple.   Skin:     General: Skin is warm.   Neurological:      Mental Status: He is alert and oriented to person, place, and time. Mental status is at baseline.   Psychiatric:         Mood and Affect: Mood normal.         Behavior: Behavior normal.         Thought Content: Thought content normal.         Judgment: Judgment normal.         Fluids    Intake/Output Summary (Last 24 hours) at 4/22/2021 0850  Last data filed at 4/21/2021 2315  Gross per 24 hour   Intake 120 ml   Output --   Net 120 ml       Laboratory  Recent Labs     04/21/21 2020   WBC 1.8*   RBC 4.11*   HEMOGLOBIN 12.9*   HEMATOCRIT 38.6*   MCV 93.9   MCH 31.4   MCHC 33.4*   RDW 45.2   PLATELETCT 39*   MPV 11.8     Recent Labs     04/21/21 2020   SODIUM 135   POTASSIUM 3.7   CHLORIDE 99   CO2 27   GLUCOSE 185*   BUN 13   CREATININE 0.62   CALCIUM 9.4                   Imaging  CT-CTA CHEST PULMONARY ARTERY W/ RECONS   Final Result      1.  No pulmonary embolus. No acute abnormality in the chest.   2.  Hepatic steatosis.   3.  Small hiatal hernia.         DX-CHEST-PORTABLE (1 VIEW)   Final Result      No acute cardiopulmonary abnormality.      EC-ECHOCARDIOGRAM COMPLETE W/O CONT    (Results Pending)        Assessment/Plan  * Chest pain, alcohol dependence and risk for withdrawal- (present on admission)  Assessment & Plan  Telemetry monitoring   Serial troponin/EKG  Aspirin 324 mg daily   Echocardiogram transthoracic   If any dynamic increase in troponin/chest pain and dyanmic ekg changes consider heparin, gtt with risks/benefits\"    Echo pending  Also observe for withdrawal  Ordered thiamine      Alcohol " abuse- (present on admission)  Assessment & Plan  Monitor for withdrawal last drink as per patient 2 days ago   Check alcohol, magnesium and phosphorus levels   Thiamine and folic acid       Tobacco dependence  Assessment & Plan  I spent 3 minutes, discussing tobacco dependence and cardiac as well as pulmonary risk. Nicotine replacement and smoking cessation instructions. Code 17525      Thrombocytopenia (HCC)- (present on admission)  Assessment & Plan  Chronic secondary to liver disease and alcohol abuse  No bleeding noted monitor platelets          VTE prophylaxis: SCDs

## 2021-04-23 ENCOUNTER — APPOINTMENT (OUTPATIENT)
Dept: CARDIOLOGY | Facility: MEDICAL CENTER | Age: 61
End: 2021-04-23
Attending: STUDENT IN AN ORGANIZED HEALTH CARE EDUCATION/TRAINING PROGRAM
Payer: COMMERCIAL

## 2021-04-23 ENCOUNTER — PATIENT OUTREACH (OUTPATIENT)
Dept: HEALTH INFORMATION MANAGEMENT | Facility: OTHER | Age: 61
End: 2021-04-23

## 2021-04-23 VITALS
BODY MASS INDEX: 21.22 KG/M2 | OXYGEN SATURATION: 97 % | RESPIRATION RATE: 18 BRPM | HEIGHT: 68 IN | HEART RATE: 71 BPM | SYSTOLIC BLOOD PRESSURE: 142 MMHG | TEMPERATURE: 98.4 F | WEIGHT: 139.99 LBS | DIASTOLIC BLOOD PRESSURE: 89 MMHG

## 2021-04-23 LAB
ALBUMIN SERPL BCP-MCNC: 4.1 G/DL (ref 3.2–4.9)
BASOPHILS # BLD AUTO: 0.7 % (ref 0–1.8)
BASOPHILS # BLD: 0.01 K/UL (ref 0–0.12)
BUN SERPL-MCNC: 9 MG/DL (ref 8–22)
CALCIUM SERPL-MCNC: 9.1 MG/DL (ref 8.5–10.5)
CHLORIDE SERPL-SCNC: 105 MMOL/L (ref 96–112)
CHOLEST SERPL-MCNC: 116 MG/DL (ref 100–199)
CO2 SERPL-SCNC: 24 MMOL/L (ref 20–33)
CREAT SERPL-MCNC: 0.59 MG/DL (ref 0.5–1.4)
EOSINOPHIL # BLD AUTO: 0.09 K/UL (ref 0–0.51)
EOSINOPHIL NFR BLD: 6.6 % (ref 0–6.9)
ERYTHROCYTE [DISTWIDTH] IN BLOOD BY AUTOMATED COUNT: 44.1 FL (ref 35.9–50)
GLUCOSE BLD-MCNC: 101 MG/DL (ref 65–99)
GLUCOSE BLD-MCNC: 82 MG/DL (ref 65–99)
GLUCOSE BLD-MCNC: 88 MG/DL (ref 65–99)
GLUCOSE BLD-MCNC: 88 MG/DL (ref 65–99)
GLUCOSE SERPL-MCNC: 85 MG/DL (ref 65–99)
HCT VFR BLD AUTO: 42.9 % (ref 42–52)
HDLC SERPL-MCNC: 43 MG/DL
HGB BLD-MCNC: 14.1 G/DL (ref 14–18)
IMM GRANULOCYTES # BLD AUTO: 0 K/UL (ref 0–0.11)
IMM GRANULOCYTES NFR BLD AUTO: 0 % (ref 0–0.9)
LDLC SERPL CALC-MCNC: 64 MG/DL
LV EJECT FRACT  99904: 55
LV EJECT FRACT MOD 2C 99903: 61.17
LV EJECT FRACT MOD 4C 99902: 52.76
LV EJECT FRACT MOD BP 99901: 56.82
LYMPHOCYTES # BLD AUTO: 0.41 K/UL (ref 1–4.8)
LYMPHOCYTES NFR BLD: 29.9 % (ref 22–41)
MCH RBC QN AUTO: 31.1 PG (ref 27–33)
MCHC RBC AUTO-ENTMCNC: 32.9 G/DL (ref 33.7–35.3)
MCV RBC AUTO: 94.7 FL (ref 81.4–97.8)
MONOCYTES # BLD AUTO: 0.19 K/UL (ref 0–0.85)
MONOCYTES NFR BLD AUTO: 13.9 % (ref 0–13.4)
NEUTROPHILS # BLD AUTO: 0.67 K/UL (ref 1.82–7.42)
NEUTROPHILS NFR BLD: 48.9 % (ref 44–72)
NRBC # BLD AUTO: 0 K/UL
NRBC BLD-RTO: 0 /100 WBC
PHOSPHATE SERPL-MCNC: 3.3 MG/DL (ref 2.5–4.5)
PLATELET # BLD AUTO: 28 K/UL (ref 164–446)
PMV BLD AUTO: 11.8 FL (ref 9–12.9)
POTASSIUM SERPL-SCNC: 3.9 MMOL/L (ref 3.6–5.5)
RBC # BLD AUTO: 4.53 M/UL (ref 4.7–6.1)
SODIUM SERPL-SCNC: 138 MMOL/L (ref 135–145)
TRIGL SERPL-MCNC: 45 MG/DL (ref 0–149)
WBC # BLD AUTO: 1.4 K/UL (ref 4.8–10.8)

## 2021-04-23 PROCEDURE — 700102 HCHG RX REV CODE 250 W/ 637 OVERRIDE(OP): Performed by: INTERNAL MEDICINE

## 2021-04-23 PROCEDURE — 80061 LIPID PANEL: CPT

## 2021-04-23 PROCEDURE — 99217 PR OBSERVATION CARE DISCHARGE: CPT | Performed by: INTERNAL MEDICINE

## 2021-04-23 PROCEDURE — G0378 HOSPITAL OBSERVATION PER HR: HCPCS

## 2021-04-23 PROCEDURE — A9270 NON-COVERED ITEM OR SERVICE: HCPCS | Performed by: INTERNAL MEDICINE

## 2021-04-23 PROCEDURE — 80069 RENAL FUNCTION PANEL: CPT

## 2021-04-23 PROCEDURE — A9270 NON-COVERED ITEM OR SERVICE: HCPCS | Performed by: STUDENT IN AN ORGANIZED HEALTH CARE EDUCATION/TRAINING PROGRAM

## 2021-04-23 PROCEDURE — 36415 COLL VENOUS BLD VENIPUNCTURE: CPT

## 2021-04-23 PROCEDURE — 93306 TTE W/DOPPLER COMPLETE: CPT

## 2021-04-23 PROCEDURE — 93306 TTE W/DOPPLER COMPLETE: CPT | Mod: 26 | Performed by: INTERNAL MEDICINE

## 2021-04-23 PROCEDURE — 82962 GLUCOSE BLOOD TEST: CPT | Mod: 91

## 2021-04-23 PROCEDURE — 700102 HCHG RX REV CODE 250 W/ 637 OVERRIDE(OP): Performed by: STUDENT IN AN ORGANIZED HEALTH CARE EDUCATION/TRAINING PROGRAM

## 2021-04-23 PROCEDURE — 85025 COMPLETE CBC W/AUTO DIFF WBC: CPT

## 2021-04-23 RX ORDER — AMOXICILLIN 250 MG
2 CAPSULE ORAL 2 TIMES DAILY
Qty: 30 TABLET | Refills: 0 | COMMUNITY
Start: 2021-04-23 | End: 2021-05-13

## 2021-04-23 RX ORDER — LANOLIN ALCOHOL/MO/W.PET/CERES
100 CREAM (GRAM) TOPICAL DAILY
Qty: 30 TABLET | COMMUNITY
Start: 2021-04-24 | End: 2021-05-24

## 2021-04-23 RX ORDER — POLYETHYLENE GLYCOL 3350 17 G/17G
POWDER, FOR SOLUTION ORAL
Refills: 3 | COMMUNITY
Start: 2021-04-23 | End: 2021-05-13

## 2021-04-23 RX ADMIN — ASPIRIN 325 MG: 325 TABLET ORAL at 06:18

## 2021-04-23 RX ADMIN — Medication 100 MG: at 06:17

## 2021-04-23 RX ADMIN — NICOTINE 14 MG: 14 PATCH TRANSDERMAL at 06:19

## 2021-04-23 RX ADMIN — OMEPRAZOLE 20 MG: 20 CAPSULE, DELAYED RELEASE ORAL at 12:01

## 2021-04-23 RX ADMIN — FOLIC ACID 1 MG: 1 TABLET ORAL at 06:17

## 2021-04-23 ASSESSMENT — PAIN DESCRIPTION - PAIN TYPE
TYPE: ACUTE PAIN
TYPE: ACUTE PAIN

## 2021-04-23 NOTE — PROGRESS NOTES
Pt dc'd at 1530. IV and monitor removed; monitor room notified. Pt left unit via ambulating with this RN. Personal belongings with pt when leaving unit. Pt given discharge instructions prior to leaving unit including where to  prescriptions and when to follow-up; verbalizes understanding. Copy of discharge instructions with pt and in the chart.

## 2021-04-23 NOTE — DISCHARGE SUMMARY
Discharge Summary    CHIEF COMPLAINT ON ADMISSION  Chief Complaint   Patient presents with   • Cramping     pt states this morninging pt started experiencing hands and feet bilaterally cramping. Pt took tums to try to relieve the pain, and soak his feet in water with no relief.   • Chest Pain     sharp chest pain       Reason for Admission  Pain      Admission Date  4/21/2021    CODE STATUS  Full Code    HPI & HOSPITAL COURSE  This is a 61 y.o. male here with Cramping (pt states this morninging pt started experiencing hands and feet bilaterally cramping. Pt took tums to try to relieve the pain, and soak his feet in water with no relief.) and Chest Pain (sharp chest pain)  Please review Dr. Ananth Hooks M.D. notes for further details of history of present illness, past medical/social/family histories, allergies and medications. History of tobacco dependence.  Alcohol dependence with chronic pancytopenia  Presented with Cramping (pt states this morninging pt started experiencing hands and feet bilaterally cramping. Pt took tums to try to relieve the pain, and soak his feet in water with no relief.) and Chest Pain (sharp chest pain)  Also general malaise.  At the ED, afebrile, hemodynamically stable. He was reportedly drowsy.  PANCYTOPENIC but this is chronic.  He was admitted for CP work-up. Per admitting doctor echo ordered.  Echo:  Normal transthoracic echocardiogram.   Compared to the images of the prior study done 10/29/16 -  there has   been no significant change.   Currently he has NO chest pain, shortness of breath, dizziness or palpitations.  He did NOT have any withdrawal sx and if he stopped drinking 2dprior to admission this would be the 5th day of no alcohol,  He will therefore be discharged.    At discharge date, Inocencio Shabazz afebrile and hemodynamically stable.  Inocencio Shabazz wanted to be discharged today.    Discharge Physical Exam  General/Constitutional: No acute distress. Somewhat  Thin  Head: Normocephalic, atraumatic  ENT: Oral mucosa is moist. No obvious pharyngeal exudates  Eyes: Pink conjunctiva, no scleral icterus  Neck: Supple, no lymphadenopathy  Cardiovascular: Normal rate and regular rhythm. S1,2 noted. No murmurs, gallops or rubs.  Pulmonary: Clear to auscultation bilaterally. No wheezes, rales or rhonchi.  Abdominal: Soft, nontender, not distended, bowel sounds normoactive. No guarding or peritoneal signs.  Musculoskeletal: No tenderness to palpation of chest wall.  Neurologic: Alert and oriented. Grossly nonfocal, moving all extremities.  Genitourinary: No gross hematuria  Skin: No obvious rash.  Psychiatric: Pleasant, cooperative.  Vitals Reviewed  Labs Reviewed  Imaging reviewed  Nursing notes reviewed      Imaging  EC-ECHOCARDIOGRAM COMPLETE W/O CONT   Final Result      CT-CTA CHEST PULMONARY ARTERY W/ RECONS   Final Result      1.  No pulmonary embolus. No acute abnormality in the chest.   2.  Hepatic steatosis.   3.  Small hiatal hernia.         DX-CHEST-PORTABLE (1 VIEW)   Final Result      No acute cardiopulmonary abnormality.              Therefore, he is discharged in fair and stable condition to home with close outpatient follow-up.        Discharge Date  4/23/2021    FOLLOW UP ITEMS POST DISCHARGE      DISCHARGE DIAGNOSES  Principal Problem:    Chest pain, alcohol dependence and risk for withdrawal POA: Yes  Active Problems:    Alcohol abuse POA: Yes    Tobacco dependence POA: Unknown    Thrombocytopenia (HCC) POA: Yes  Resolved Problems:    * No resolved hospital problems. *      FOLLOW UP  No future appointments.  [place] [id number] [facility] [id number] [phone]  Call  [facility] call [facility] to schedule a follow up appointment with your Primary Care Provider. Thank You.    Nnamdi Ballesteros M.D.  [address] [facility] [id number] [phone]    In 1 week  Please call their office to schedule a follow up  appointment!    Follow up Nnamdi Ballesteros M.D. in 1 week. Can refer or follow up to Cardiology in 1 week if having chest pain again for elective ischemic work-up. Advise NO MORE ALCOHOL, NO MORE SMOKING as well. Follow up with Gastroenterology in 1 week for alcoholic liver disease and chronic pancytpenia. Return to ER in the event of new or worsening symptoms. Please note importance of compliance and the patient has agreed to proceed with all medical recommendations and follow up plan indicated above. All medications come with benefits and risks. Risks may include permanent injury or death and these risks can be minimized with close reassessment and monitoring. Please make it to your scheduled follow ups with Nnamdi Ballesteros M.D., and/or specialists clinic.  MEDICATIONS ON DISCHARGE     Medication List      START taking these medications      Instructions   polyethylene glycol/lytes 17 g Pack  Commonly known as: MIRALAX   Take  by mouth 1 time a day as needed (if sennosides and docusate ineffective after 24 hours).     senna-docusate 8.6-50 MG Tabs  Commonly known as: PERICOLACE or SENOKOT S   Take 2 Tablets by mouth 2 times a day.  Dose: 2 tablet     thiamine 100 MG tablet  Start taking on: April 24, 2021  Commonly known as: THIAMINE   Take 1 tablet by mouth every day.  Dose: 100 mg        CONTINUE taking these medications      Instructions   dilTIAZem  MG Cp24  Generic drug: DILTIAZem CD   Take 120 mg by mouth every day.  Dose: 120 mg     folic acid 1 MG Tabs  Commonly known as: FOLVITE   Take 1 Tab by mouth every day.  Dose: 1 mg     Magnesium Oxide 420 MG Tabs   Take 420 mg by mouth every day.  Dose: 420 mg     multivitamin Tabs   Take 1 Tab by mouth every day.  Dose: 1 tablet     nicotine 21 MG/24HR Pt24  Commonly known as: NICODERM   Place 1 Patch on the skin every 24 hours.  Dose: 1 Patch     nicotine polacrilex 2 MG Gum  Commonly known as: NICORETTE   Take 1 Each by mouth every 1 hour as needed for Smoking  Cessation (For nicotine urge).  Dose: 2 mg     ondansetron 4 MG Tbdp  Commonly known as: Zofran ODT   Take 1 Tab by mouth every 8 hours as needed.  Dose: 4 mg     pantoprazole 40 MG Tbec  Commonly known as: PROTONIX   Take 40 mg by mouth 2 times a day.  Dose: 40 mg     QUEtiapine 200 MG Tabs  Commonly known as: Seroquel   Take 200 mg by mouth every evening.  Dose: 200 mg        STOP taking these medications    potassium chloride SA 20 MEQ Tbcr  Commonly known as: Kdur     propranolol 20 MG Tabs  Commonly known as: INDERAL            Allergies  Allergies   Allergen Reactions   • Haloperidol Unspecified     Twitching/involuntary movements        DIET  Orders Placed This Encounter   Procedures   • Diet Order Diet: Cardiac; Miscellaneous modifications: (optional): No Decaf, No Caffeine(for test)     Standing Status:   Standing     Number of Occurrences:   1     Order Specific Question:   Diet:     Answer:   Cardiac [6]     Order Specific Question:   Miscellaneous modifications: (optional)     Answer:   No Decaf, No Caffeine(for test) [11]       ACTIVITY  Avoid heavy lifting or strenuous activity      CONSULTATIONS      PROCEDURES  DX-CHEST-PORTABLE (1 VIEW)    Result Date: 4/21/2021 4/21/2021 8:32 PM HISTORY/REASON FOR EXAM:  Chest Pain. TECHNIQUE/EXAM DESCRIPTION AND NUMBER OF VIEWS: Single portable view of the chest. COMPARISON: 12/10/2020 FINDINGS: LUNGS: Clear. No effusions. PNEUMOTHORAX: None. LINES AND TUBES: None. MEDIASTINUM: No cardiomegaly. Atherosclerosis. MUSCULOSKELETAL STRUCTURES: No acute displaced fracture.     No acute cardiopulmonary abnormality.    CT-CTA CHEST PULMONARY ARTERY W/ RECONS    Result Date: 4/22/2021 4/22/2021 1:20 AM HISTORY/REASON FOR EXAM:  PE suspected, intermediate probability TECHNIQUE/EXAM DESCRIPTION: CT angiogram scan for pulmonary embolism with contrast, with reconstructions. 1.25 mm helical sections were obtained from the lung apices through the lung bases following the rapid  bolus administration of 40 mL of Omnipaque 350 nonionic contrast. Thin-section overlapping reconstruction interval was utilized. Coronal reconstructions were generated from the axial data. MIP post processing was performed and utilized for the interpretation. Low dose optimization technique was utilized for this CT exam including automated exposure control and adjustment of the mA and/or kV according to patient size. COMPARISON: None FINDINGS: Pulmonary Embolism: None. Lungs: No focal consolidation. Nodular scarring in the right lung apex. Prior granulomatous exposure. No suspicious nodules or masses. No pleural effusions. Mediastinum: Unremarkable thyroid. No mediastinal mass. Nodes: No enlarged lymph nodes. Heart: Not enlarged. No pericardial effusion. Aorta and great vessels: No aneurysm. Atherosclerosis. Musculoskeletal structures: No acute fracture or destructive lesion. Upper abdomen: Hepatic steatosis. Small hiatal hernia.     1.  No pulmonary embolus. No acute abnormality in the chest. 2.  Hepatic steatosis. 3.  Small hiatal hernia.     EC-ECHOCARDIOGRAM COMPLETE W/O CONT    Result Date: 2021  Transthoracic Echo Report Echocardiography Laboratory CONCLUSIONS Normal transthoracic echocardiogram. Compared to the images of the prior study done 10/29/16 -  there has been no significant change. JIMMY FERRER Exam Date:         2021                    08:30 Exam Location:     Inpatient Priority:          Routine Ordering Physician:        MARJ ARGUELLO Referring Physician:       [id number] Monson Sonographer:               Urszula Rodgers RVT [facility] Age:    61     Gender:    M MRN:    [id number] :    1960 BSA:    1.74   Ht (in):    68     Wt (lb):    137 Exam Type:     Complete Indications:     Chest Pain ICD Codes:       786.5 CPT Codes:       79938 BP:   120    /   80     HR:   70 Technical Quality:       Good MEASUREMENTS  (Male / Female) Normal Values 2D ECHO LV  Diastolic Diameter PLAX        4.5 cm                4.2 - 5.9 / 3.9 - 5.3 cm LV Systolic Diameter PLAX         2.7 cm                2.1 - 4.0 cm IVS Diastolic Thickness           0.78 cm               LVPW Diastolic Thickness          0.88 cm               LVOT Diameter                     2.1 cm                Estimated LV Ejection Fraction    55 %                  LV Ejection Fraction MOD BP       56.8 %                >= 55  % LV Ejection Fraction MOD 4C       52.8 %                LV Ejection Fraction MOD 2C       61.2 %                IVC Diameter                      1.6 cm                DOPPLER AV Peak Velocity                  1 m/s                 AV Peak Gradient                  4.1 mmHg              AV Mean Gradient                  2.6 mmHg              LVOT Peak Velocity                0.82 m/s              AV Area Cont Eq vti               2.8 cm2               Mitral E Point Velocity           0.88 m/s              Mitral E to A Ratio               1                     Mitral A Duration                 118 ms                MV Pressure Half Time             46 ms                 MV Area PHT                       4.8 cm2               MV Deceleration Time              159 ms                PV Peak Velocity                  0.76 m/s              PV Peak Gradient                  2.3 mmHg              RVOT Peak Velocity                0.69 m/s              * Indicates values subject to auto-interpretation LV EF:  55    % FINDINGS Left Ventricle The left ventricle was normal in size and thickness. Normal left ventricular systolic function. Normal regional wall motion. Left ventricular ejection fraction is visually estimated to be 55%. Normal diastolic function. Right Ventricle The right ventricle was normal in size and function. Right Atrium The right atrium is normal in size. Prominent eustachian valve. Normal inferior vena cava size and inspiratory collapse. Left Atrium The left atrium is normal in  size.  Left atrial volume index is 24 mL/sq m. Mitral Valve Mitral annular calcification. No mitral stenosis. Trace mitral regurgitation. Aortic Valve Tricuspid aortic valve. Aortic sclerosis without stenosis. No aortic insufficiency. Tricuspid Valve Structurally normal tricuspid valve without significant stenosis. Trace tricuspid regurgitation. Unable to estimate pulmonary artery pressure due to an inadequate tricuspid regurgitant jet. Pulmonic Valve Structurally normal pulmonic valve without significant stenosis or regurgitation. Pericardium Normal pericardium without effusion. Aorta The aortic root is normal.  Ascending aorta diameter is 3.2 cm. Dank Giles MD (Electronically Signed) Final Date:     23 April 2021                 10:53      LABORATORY  Lab Results   Component Value Date    SODIUM 138 04/23/2021    POTASSIUM 3.9 04/23/2021    CHLORIDE 105 04/23/2021    CO2 24 04/23/2021    GLUCOSE 85 04/23/2021    BUN 9 04/23/2021    CREATININE 0.59 04/23/2021        Lab Results   Component Value Date    WBC 1.4 (LL) 04/23/2021    HEMOGLOBIN 14.1 04/23/2021    HEMATOCRIT 42.9 04/23/2021    PLATELETCT 28 (LL) 04/23/2021        Total time of the discharge process exceeds 35 minutes.

## 2021-04-23 NOTE — PROGRESS NOTES
Monitor Summary    SR/SB 56-75    Events: ja down to 41, unsustained; tachy up to 150's unsustained  Ectopy: TAIWO NEW PAC  .16/.08/.36

## 2021-04-23 NOTE — PROGRESS NOTES
Autumn from Lab called with critical result of Platelets at 28. Critical lab result read back to Autumn.   Dr. Rueda notified of critical lab result at 0613.  Critical lab result read back by Dr. Rueda.

## 2021-04-23 NOTE — PROGRESS NOTES
Monitor Summary  SR 58-74  R PAC  1.4 sec pause  .14/.08.34    10 beats of vtach; self-resolving asymptomatic

## 2021-04-23 NOTE — CARE PLAN
Problem: Safety  Goal: Will remain free from injury  Outcome: PROGRESSING AS EXPECTED   -Fall precautions in place; pt remains free from falls    Problem: Pain Management  Goal: Pain level will decrease to patient's comfort goal  Outcome: PROGRESSING AS EXPECTED   -Ordered pain medication sufficient. Pt resting comfortably

## 2021-04-23 NOTE — PROGRESS NOTES
Autumn from Lab called with critical result of WBC at 1.4. Critical lab result read back to Autumn.   Dr. Rueda notified of critical lab result at 0612.  Critical lab result read back by Dr. Rueda.

## 2021-04-23 NOTE — CARE PLAN
"Pt educated regarding plan of care and medications. All questions answered.  Pt educated on the importance fall prevention methods, such as treaded sock and the bed alarm. Pt stated they will use the call light prior to any attempts of ambulation. Ambulatory ability assessed, treaded socks in place, bed locked and in low position, frequent trips to bathroom offered, and call light and phone within reach.   Discussed EtOH abstinence and pt states he has \"cut back, but not quit.\" Pt has resources for EtOH cessation support.   "

## 2021-04-23 NOTE — DISCHARGE INSTRUCTIONS
From Dr. Khan  Follow up Nnamdi Ballesteros M.D. in 1 week. Can refer or follow up to Cardiology in 1 week if having chest pain again for elective ischemic work-up. Advise NO MORE ALCOHOL, NO MORE SMOKING as well.   Return to ER in the event of new or worsening symptoms. Please note importance of compliance and the patient has agreed to proceed with all medical recommendations and follow up plan indicated above. All medications come with benefits and risks. Risks may include permanent injury or death and these risks can be minimized with close reassessment and monitoring. Please make it to your scheduled follow ups with Nnamdi Ballesteros M.D., and/or specialists clinic.    Discharge Instructions    Discharged to home by car with friend. Discharged via wheelchair, hospital escort: Yes.  Special equipment needed: Not Applicable    Be sure to schedule a follow-up appointment with your primary care doctor or any specialists as instructed.     Discharge Plan:        I understand that a diet low in cholesterol, fat, and sodium is recommended for good health. Unless I have been given specific instructions below for another diet, I accept this instruction as my diet prescription.   Other diet:   Heart-Healthy Eating Plan  Heart-healthy meal planning includes:  · Eating less unhealthy fats.  · Eating more healthy fats.  · Making other changes in your diet.  Talk with your doctor or a diet specialist (dietitian) to create an eating plan that is right for you.  What is my plan?  Your doctor may recommend an eating plan that includes:  · Total fat: ______% or less of total calories a day.  · Saturated fat: ______% or less of total calories a day.  · Cholesterol: less than _________mg a day.  What are tips for following this plan?  Cooking  Avoid frying your food. Try to bake, boil, grill, or broil it instead. You can also reduce fat by:  · Removing the skin from poultry.  · Removing all visible fats from meats.  · Steaming vegetables in  water or broth.  Meal planning    · At meals, divide your plate into four equal parts:  ? Fill one-half of your plate with vegetables and green salads.  ? Fill one-fourth of your plate with whole grains.  ? Fill one-fourth of your plate with lean protein foods.  · Eat 4-5 servings of vegetables per day. A serving of vegetables is:  ? 1 cup of raw or cooked vegetables.  ? 2 cups of raw leafy greens.  · Eat 4-5 servings of fruit per day. A serving of fruit is:  ? 1 medium whole fruit.  ? ¼ cup of dried fruit.  ? ½ cup of fresh, frozen, or canned fruit.  ? ½ cup of 100% fruit juice.  · Eat more foods that have soluble fiber. These are apples, broccoli, carrots, beans, peas, and barley. Try to get 20-30 g of fiber per day.  · Eat 4-5 servings of nuts, legumes, and seeds per week:  ? 1 serving of dried beans or legumes equals ½ cup after being cooked.  ? 1 serving of nuts is ¼ cup.  ? 1 serving of seeds equals 1 tablespoon.  General information  · Eat more home-cooked food. Eat less restaurant, buffet, and fast food.  · Limit or avoid alcohol.  · Limit foods that are high in starch and sugar.  · Avoid fried foods.  · Lose weight if you are overweight.  · Keep track of how much salt (sodium) you eat. This is important if you have high blood pressure. Ask your doctor to tell you more about this.  · Try to add vegetarian meals each week.  Fats  · Choose healthy fats. These include olive oil and canola oil, flaxseeds, walnuts, almonds, and seeds.  · Eat more omega-3 fats. These include salmon, mackerel, sardines, tuna, flaxseed oil, and ground flaxseeds. Try to eat fish at least 2 times each week.  · Check food labels. Avoid foods with trans fats or high amounts of saturated fat.  · Limit saturated fats.  ? These are often found in animal products, such as meats, butter, and cream.  ? These are also found in plant foods, such as palm oil, palm kernel oil, and coconut oil.  · Avoid foods with partially hydrogenated oils in  them. These have trans fats. Examples are stick margarine, some tub margarines, cookies, crackers, and other baked goods.  What foods can I eat?  Fruits  All fresh, canned (in natural juice), or frozen fruits.  Vegetables  Fresh or frozen vegetables (raw, steamed, roasted, or grilled). Green salads.  Grains  Most grains. Choose whole wheat and whole grains most of the time. Rice and pasta, including brown rice and pastas made with whole wheat.  Meats and other proteins  Lean, well-trimmed beef, veal, pork, and lamb. Chicken and turkey without skin. All fish and shellfish. Wild duck, rabbit, pheasant, and venison. Egg whites or low-cholesterol egg substitutes. Dried beans, peas, lentils, and tofu. Seeds and most nuts.  Dairy  Low-fat or nonfat cheeses, including ricotta and mozzarella. Skim or 1% milk that is liquid, powdered, or evaporated. Buttermilk that is made with low-fat milk. Nonfat or low-fat yogurt.  Fats and oils  Non-hydrogenated (trans-free) margarines. Vegetable oils, including soybean, sesame, sunflower, olive, peanut, safflower, corn, canola, and cottonseed. Salad dressings or mayonnaise made with a vegetable oil.  Beverages  Mineral water. Coffee and tea. Diet carbonated beverages.  Sweets and desserts  Sherbet, gelatin, and fruit ice. Small amounts of dark chocolate.  Limit all sweets and desserts.  Seasonings and condiments  All seasonings and condiments.  The items listed above may not be a complete list of foods and drinks you can eat. Contact a dietitian for more options.  What foods should I avoid?  Fruits  Canned fruit in heavy syrup. Fruit in cream or butter sauce. Fried fruit. Limit coconut.  Vegetables  Vegetables cooked in cheese, cream, or butter sauce. Fried vegetables.  Grains  Breads that are made with saturated or trans fats, oils, or whole milk. Croissants. Sweet rolls. Donuts. High-fat crackers, such as cheese crackers.  Meats and other proteins  Fatty meats, such as hot dogs, ribs,  sausage, lorenzo, rib-eye roast or steak. High-fat deli meats, such as salami and bologna. Caviar. Domestic duck and goose. Organ meats, such as liver.  Dairy  Cream, sour cream, cream cheese, and creamed cottage cheese. Whole-milk cheeses. Whole or 2% milk that is liquid, evaporated, or condensed. Whole buttermilk. Cream sauce or high-fat cheese sauce. Yogurt that is made from whole milk.  Fats and oils  Meat fat, or shortening. Cocoa butter, hydrogenated oils, palm oil, coconut oil, palm kernel oil. Solid fats and shortenings, including lorenzo fat, salt pork, lard, and butter. Nondairy cream substitutes. Salad dressings with cheese or sour cream.  Beverages  Regular sodas and juice drinks with added sugar.  Sweets and desserts  Frosting. Pudding. Cookies. Cakes. Pies. Milk chocolate or white chocolate. Buttered syrups. Full-fat ice cream or ice cream drinks.  The items listed above may not be a complete list of foods and drinks to avoid. Contact a dietitian for more information.  Summary  · Heart-healthy meal planning includes eating less unhealthy fats, eating more healthy fats, and making other changes in your diet.  · Eat a balanced diet. This includes fruits and vegetables, low-fat or nonfat dairy, lean protein, nuts and legumes, whole grains, and heart-healthy oils and fats.  This information is not intended to replace advice given to you by your health care provider. Make sure you discuss any questions you have with your health care provider.  Document Released: 06/18/2013 Document Revised: 02/21/2019 Document Reviewed: 01/25/2019  [employer] Patient Education © 2020 [employer]      Special Instructions: None    · Is patient discharged on Warfarin / Coumadin?   No       Alcohol Abuse and Dependence Information, Adult  Alcohol is a widely available drug. People drink alcohol in different amounts. People who drink alcohol very often and in large amounts often have problems during and after drinking. They may  develop what is called an alcohol use disorder. There are two main types of alcohol use disorders:  · Alcohol abuse. This is when you use alcohol too much or too often. You may use alcohol to make yourself feel happy or to reduce stress. You may have a hard time setting a limit on the amount you drink.  · Alcohol dependence. This is when you use alcohol consistently for a period of time, and your body changes as a result. This can make it hard to stop drinking because you may start to feel sick or feel different when you do not use alcohol. These symptoms are known as withdrawal.  How can alcohol abuse and dependence affect me?  Alcohol abuse and dependence can have a negative effect on your life. Drinking too much can lead to addiction. You may feel like you need alcohol to function normally. You may drink alcohol before work in the morning, during the day, or as soon as you get home from work in the evening. These actions can result in:  · Poor work performance.  · Job loss.  · Financial problems.  · Car crashes or criminal charges from driving after drinking alcohol.  · Problems in your relationships with friends and family.  · Losing the trust and respect of coworkers, friends, and family.  Drinking heavily over a long period of time can permanently damage your body and brain, and can cause lifelong health issues, such as:  · Damage to your liver or pancreas.  · Heart problems, high blood pressure, or stroke.  · Certain cancers.  · Decreased ability to fight infections.  · Brain or nerve damage.  · Depression.  · Early (premature) death.  If you are careless or you crave alcohol, it is easy to drink more than your body can handle (overdose). Alcohol overdose is a serious situation that requires hospitalization. It may lead to permanent injuries or death.  What can increase my risk?  · Having a family history of alcohol abuse.  · Having depression or other mental health conditions.  · Beginning to drink at an  early age.  · Binge drinking often.  · Experiencing trauma, stress, and an unstable home life during childhood.  · Spending time with people who drink often.  What actions can I take to prevent or manage alcohol abuse and dependence?  · Do not drink alcohol if:  ? Your health care provider tells you not to drink.  ? You are pregnant, may be pregnant, or are planning to become pregnant.  · If you drink alcohol:  ? Limit how much you use to:  § 0-1 drink a day for women.  § 0-2 drinks a day for men.  ? Be aware of how much alcohol is in your drink. In the [place] one drink equals one 12 oz bottle of beer (355 mL), one 5 oz glass of wine (148 mL), or one 1½ oz glass of hard liquor (44 mL).  · Stop drinking if you have been drinking too much. This can be very hard to do if you are used to abusing alcohol. If you begin to have withdrawal symptoms, talk with your health care provider or a person that you trust. These symptoms may include anxiety, shaky hands, headache, nausea, sweating, or not being able to sleep.  · Choose to drink nonalcoholic beverages in social gatherings and places where there may be alcohol.  Activity  · Spend more time on activities that you enjoy that do not involve alcohol, like hobbies or exercise.  · Find healthy ways to cope with stress, such as exercise, meditation, or spending time with people you care about.  General information  · Talk to your family, coworkers, and friends about supporting you in your efforts to stop drinking. If they drink, ask them not to drink around you. Spend more time with people who do not drink alcohol.  · If you think that you have an alcohol dependency problem:  ? Tell friends or family about your concerns.  ? Talk with your health care provider or another health professional about where to get help.  ? Work with a therapist and a chemical dependency counselor.  ? Consider joining a support group for people who struggle with alcohol abuse and dependence.  Where to  find support    · Your health care provider.  · SMART Recovery: www.smartrecovery.org  Therapy and support groups  · Local treatment centers or chemical dependency counselors.  · Local AA groups in your community: www.aa.org  Where to find more information  · Centers for Disease Control and Prevention: www.cdc.gov  · National [place] on Alcohol Abuse and Alcoholism: www.niaaa.nih.gov  · Alcoholics Anonymous (AA): www.aa.org  Contact a health care provider if:  · You drank more or for longer than you intended on more than one occasion.  · You tried to stop drinking or to cut back on how much you drink, but you were not able to.  · You often drink to the point of vomiting or passing out.  · You want to drink so badly that you cannot think about anything else.  · You have problems in your life due to drinking, but you continue to drink.  · You keep drinking even though you feel anxious, depressed, or have experienced memory loss.  · You have stopped doing the things you used to enjoy in order to drink.  · You have to drink more than you used to in order to get the effect you want.  · You experience anxiety, sweating, nausea, shakiness, and trouble sleeping when you try to stop drinking.  Get help right away if:  · You have thoughts about hurting yourself or others.  · You have serious withdrawal symptoms, including:  ? Confusion.  ? Racing heart.  ? High blood pressure.  ? Fever.  If you ever feel like you may hurt yourself or others, or have thoughts about taking your own life, get help right away. You can go to your nearest emergency department or call:  · Your local emergency services (911 in the [place]  · A suicide crisis helpline, such as the National Suicide Prevention Lifeline at 1-956.723.4551. This is open 24 hours a day.  Summary  · Alcohol abuse and dependence can have a negative effect on your life. Drinking too much or too often can lead to addiction.  · If you drink alcohol, limit how much you  use.  · If you are having trouble keeping your drinking under control, find ways to change your behavior. Hobbies, calming activities, exercise, or support groups can help.  · If you feel you need help with changing your drinking habits, talk with your health care provider, a good friend, or a therapist, or go to an AA group.  This information is not intended to replace advice given to you by your health care provider. Make sure you discuss any questions you have with your health care provider.  Document Released: 12/12/2017 Document Revised: 04/07/2020 Document Reviewed: 02/25/2020  [employer] Patient Education © 2020 [employer]    Chest Pain, Nonspecific  It is often hard to give a specific diagnosis for the cause of chest pain. There is always a chance that your pain could be related to something serious, like a heart attack or a blood clot in the lungs. You need to follow up with your caregiver for further evaluation. More lab tests or other studies such as X-rays, electrocardiography, stress testing, or cardiac imaging may be needed to find the cause of your pain.  Most of the time, nonspecific chest pain improves within 2 to 3 days with rest and mild pain medicine. For the next few days, avoid physical exertion or activities that bring on pain. Do not smoke. Avoid drinking alcohol. Call your caregiver for routine follow-up as advised.   SEEK IMMEDIATE MEDICAL CARE IF:  · You develop increased chest pain or pain that radiates to the arm, neck, jaw, back, or abdomen.   · You develop shortness of breath, increased coughing, or you start coughing up blood.   · You have severe back or abdominal pain, nausea, or vomiting.   · You develop severe weakness, fainting, fever, or chills.   Document Released: 12/18/2006 Document Revised: 03/11/2013 Document Reviewed: 06/06/2008  [employer]® Patient Information ©2013 [employer].  Depression / Suicide Risk    As you are discharged from this [facility] facility, it is  important to learn how to keep safe from harming yourself.    Recognize the warning signs:  · Abrupt changes in personality, positive or negative- including increase in energy   · Giving away possessions  · Change in eating patterns- significant weight changes-  positive or negative  · Change in sleeping patterns- unable to sleep or sleeping all the time   · Unwillingness or inability to communicate  · Depression  · Unusual sadness, discouragement and loneliness  · Talk of wanting to die  · Neglect of personal appearance   · Rebelliousness- reckless behavior  · Withdrawal from people/activities they love  · Confusion- inability to concentrate     If you or a loved one observes any of these behaviors or has concerns about self-harm, here's what you can do:  · Talk about it- your feelings and reasons for harming yourself  · Remove any means that you might use to hurt yourself (examples: pills, rope, extension cords, firearm)  · Get professional help from the community (Mental Health, Substance Abuse, psychological counseling)  · Do not be alone:Call your Safe Contact- someone whom you trust who will be there for you.  · Call your local CRISIS HOTLINE 439-4221 or 004-693-5722  · Call your local Children's Mobile Crisis Response Team [place] (355) 598-8447 or www.[employer]  · Call the toll free National Suicide Prevention Hotlines   · National Suicide Prevention Lifeline 956-878-VDWM (9046)  · National Hope Line Network 800-SUICIDE (867-6635)

## 2021-04-23 NOTE — PROGRESS NOTES
Received report from day shift RNYesy. Assumed care of pt. Pt reports no needs at this time. Updated pt on plan of care. Pt resting comfortably in bed. Fall and skin precautions in place. Educated on use of call light. Hourly rounding and continuous monitoring in place.

## 2021-05-02 PROCEDURE — 83690 ASSAY OF LIPASE: CPT

## 2021-05-02 PROCEDURE — 85025 COMPLETE CBC W/AUTO DIFF WBC: CPT

## 2021-05-02 PROCEDURE — 80053 COMPREHEN METABOLIC PANEL: CPT

## 2021-05-02 PROCEDURE — 99284 EMERGENCY DEPT VISIT MOD MDM: CPT

## 2021-05-02 ASSESSMENT — FIBROSIS 4 INDEX: FIB4 SCORE: 25.41

## 2021-05-03 ENCOUNTER — HOSPITAL ENCOUNTER (EMERGENCY)
Facility: MEDICAL CENTER | Age: 61
End: 2021-05-03
Attending: EMERGENCY MEDICINE
Payer: COMMERCIAL

## 2021-05-03 VITALS
WEIGHT: 136.69 LBS | BODY MASS INDEX: 20.72 KG/M2 | TEMPERATURE: 97.7 F | HEART RATE: 97 BPM | RESPIRATION RATE: 16 BRPM | OXYGEN SATURATION: 97 % | HEIGHT: 68 IN | DIASTOLIC BLOOD PRESSURE: 71 MMHG | SYSTOLIC BLOOD PRESSURE: 127 MMHG

## 2021-05-03 DIAGNOSIS — R53.81 MALAISE: ICD-10-CM

## 2021-05-03 LAB
ALBUMIN SERPL BCP-MCNC: 4.8 G/DL (ref 3.2–4.9)
ALBUMIN/GLOB SERPL: 1.1 G/DL
ALP SERPL-CCNC: 87 U/L (ref 30–99)
ALT SERPL-CCNC: 87 U/L (ref 2–50)
ANION GAP SERPL CALC-SCNC: 10 MMOL/L (ref 7–16)
APPEARANCE UR: CLEAR
AST SERPL-CCNC: 149 U/L (ref 12–45)
BACTERIA #/AREA URNS HPF: NEGATIVE /HPF
BASOPHILS # BLD AUTO: 1.2 % (ref 0–1.8)
BASOPHILS # BLD: 0.03 K/UL (ref 0–0.12)
BILIRUB SERPL-MCNC: 0.9 MG/DL (ref 0.1–1.5)
BILIRUB UR QL STRIP.AUTO: ABNORMAL
BUN SERPL-MCNC: 6 MG/DL (ref 8–22)
CALCIUM SERPL-MCNC: 8.8 MG/DL (ref 8.5–10.5)
CHLORIDE SERPL-SCNC: 99 MMOL/L (ref 96–112)
CO2 SERPL-SCNC: 25 MMOL/L (ref 20–33)
COLOR UR: ABNORMAL
CREAT SERPL-MCNC: 0.53 MG/DL (ref 0.5–1.4)
EOSINOPHIL # BLD AUTO: 0.04 K/UL (ref 0–0.51)
EOSINOPHIL NFR BLD: 1.6 % (ref 0–6.9)
EPI CELLS #/AREA URNS HPF: NEGATIVE /HPF
ERYTHROCYTE [DISTWIDTH] IN BLOOD BY AUTOMATED COUNT: 45.2 FL (ref 35.9–50)
GLOBULIN SER CALC-MCNC: 4.4 G/DL (ref 1.9–3.5)
GLUCOSE SERPL-MCNC: 89 MG/DL (ref 65–99)
GLUCOSE UR STRIP.AUTO-MCNC: NEGATIVE MG/DL
HCT VFR BLD AUTO: 41.9 % (ref 42–52)
HGB BLD-MCNC: 14.3 G/DL (ref 14–18)
HYALINE CASTS #/AREA URNS LPF: ABNORMAL /LPF
IMM GRANULOCYTES # BLD AUTO: 0.01 K/UL (ref 0–0.11)
IMM GRANULOCYTES NFR BLD AUTO: 0.4 % (ref 0–0.9)
KETONES UR STRIP.AUTO-MCNC: ABNORMAL MG/DL
LEUKOCYTE ESTERASE UR QL STRIP.AUTO: NEGATIVE
LIPASE SERPL-CCNC: 59 U/L (ref 11–82)
LYMPHOCYTES # BLD AUTO: 0.49 K/UL (ref 1–4.8)
LYMPHOCYTES NFR BLD: 19.2 % (ref 22–41)
MCH RBC QN AUTO: 31.9 PG (ref 27–33)
MCHC RBC AUTO-ENTMCNC: 34.1 G/DL (ref 33.7–35.3)
MCV RBC AUTO: 93.5 FL (ref 81.4–97.8)
MICRO URNS: ABNORMAL
MONOCYTES # BLD AUTO: 0.22 K/UL (ref 0–0.85)
MONOCYTES NFR BLD AUTO: 8.6 % (ref 0–13.4)
MUCOUS THREADS #/AREA URNS HPF: ABNORMAL /HPF
NEUTROPHILS # BLD AUTO: 1.76 K/UL (ref 1.82–7.42)
NEUTROPHILS NFR BLD: 69 % (ref 44–72)
NITRITE UR QL STRIP.AUTO: NEGATIVE
NRBC # BLD AUTO: 0 K/UL
NRBC BLD-RTO: 0 /100 WBC
PH UR STRIP.AUTO: 6.5 [PH] (ref 5–8)
PLATELET # BLD AUTO: 52 K/UL (ref 164–446)
PMV BLD AUTO: 10.9 FL (ref 9–12.9)
POTASSIUM SERPL-SCNC: 3.5 MMOL/L (ref 3.6–5.5)
PROT SERPL-MCNC: 9.2 G/DL (ref 6–8.2)
PROT UR QL STRIP: 30 MG/DL
RBC # BLD AUTO: 4.48 M/UL (ref 4.7–6.1)
RBC # URNS HPF: ABNORMAL /HPF
RBC UR QL AUTO: NEGATIVE
SODIUM SERPL-SCNC: 134 MMOL/L (ref 135–145)
SP GR UR STRIP.AUTO: 1.02
UROBILINOGEN UR STRIP.AUTO-MCNC: 4 MG/DL
WBC # BLD AUTO: 2.6 K/UL (ref 4.8–10.8)
WBC #/AREA URNS HPF: ABNORMAL /HPF

## 2021-05-03 PROCEDURE — 81001 URINALYSIS AUTO W/SCOPE: CPT

## 2021-05-03 NOTE — ED PROVIDER NOTES
"ED Provider Note    ED Provider Note    Primary care provider: Nnamdi Ballesteros M.D.  Means of arrival: Walk-in  History obtained from: Patient    CHIEF COMPLAINT  Chief Complaint   Patient presents with   • Leg Pain   • Abdominal Pain     Seen at 2:16 AM.   HPI  Inocencio Shabazz is a 61 y.o. male who presents to the Emergency Department with a chief complaint of \"I don't feel good \".  He has a difficult time being specific.  He states that sometimes he feels that he is going to die.  He notes some cramping in his body.  [facility].  He is very nonspecific and vague and does not have any other acute complaints today.    REVIEW OF SYSTEMS  See HPI,   Remainder of ROS negative.     PAST MEDICAL HISTORY   has a past medical history of Alcohol abuse, Alcohol intoxication (HCC) (3/13/2014), Cirrhosis (HCC), GIB (gastrointestinal bleeding) (1/3/2016), Hepatitis C, Pancytopenia (HCC), and Psychiatric disorder.    SURGICAL HISTORY   has a past surgical history that includes gastroscopy (1/3/2016); gastroscopy (4/8/2016); gastroscopy (3/13/2019); and gastroscopy-endo (N/A, 11/13/2019).    SOCIAL HISTORY  Social History     Tobacco Use   • Smoking status: Current Every Day Smoker     Packs/day: 1.00     Years: 48.00     Pack years: 48.00     Types: Cigarettes   • Smokeless tobacco: Never Used   Substance Use Topics   • Alcohol use: Yes     Comment: 1-2 beers and 1/2 pints of whiskey every day   • Drug use: Yes     Types: Inhaled     Comment: marijuana, cocaine, meth      Social History     Substance and Sexual Activity   Drug Use Yes   • Types: Inhaled    Comment: marijuana, cocaine, meth       FAMILY HISTORY  No family history on file.    CURRENT MEDICATIONS  Reviewed.  See Encounter Summary.     ALLERGIES  Allergies   Allergen Reactions   • Haloperidol Unspecified     Twitching/involuntary movements        PHYSICAL EXAM  VITAL SIGNS: /90   Pulse (!) 113  " " Temp 36.4 °C (97.5 °F) (Temporal)   Resp 16   Ht 1.727 m (5' 8\")   Wt 62 kg (136 lb 11 oz)   SpO2 98%   BMI 20.78 kg/m²   Constitutional: Awake, alert in no apparent distress.  HENT: Normocephalic, Bilateral external ears normal. Nose normal.  Mildly dry mucosal membranes.  Temporal wasting noted.  Eyes: Conjunctiva normal, non-icteric, EOMI.    Thorax & Lungs: Easy unlabored respirations, Clear to ascultation bilaterally.  Cardiovascular: Borderline tachycardic, No murmurs, rubs or gallops. Bilateral pulses symmetrical.   Abdomen:  Soft, nontender, nondistended, normal active bowel sounds.   :    Skin: Visualized skin is  Dry, No erythema, No rash.   Musculoskeletal:   No cyanosis, clubbing or edema. No leg asymmetry.   Neurologic: Alert, Grossly non-focal.   Psychiatric: Normal affect, Normal mood  Lymphatic:  No cervical LAD    RADIOLOGY  No orders to display         COURSE & MEDICAL DECISION MAKING  Pertinent Labs & Imaging studies reviewed. (See chart for details)    Differential diagnoses include but are not limited to: Psychiatric, electrolyte imbalance, alcoholism    2:16 AM - Medical record reviewed, frequent visits for cramping.  The patient does have a history of alcohol dependence.  He has chronic pancytopenia.  He was admitted several times this year to our facility, most recently 4/21 for chest pain.  CTA unremarkable, 2D echo unremarkable.    Decision Making:  This is a pleasant 61 y.o. year old male who presents with a chief complaint that he does not feel good.  He is very vague and does not give me any specifics, he does note some diffuse body cramping.  On the chart review he has been here 6 times in 2021, 2 admissions.  Patient had a full cardiac work-up less than 2 weeks ago.  [facility]  He did have screening labs that show no significant abnormalities, he does have a leukopenia that is chronic, borderline hypokalemic.    I told the patient that I have " nothing to offer him here, it does not appear to be emergent condition given that it has been ongoing for possibly years as there are multiple visits dating back to 2019 have similar issues.  I do not feel that he requires any further work-up on an emergent basis and strongly encourage him to follow-up with his primary care physician.    Discharge Medications:  New Prescriptions    No medications on file       The patient was discharged home (see d/c instructions) was told to return immediately for any signs or symptoms listed, or any worsening at all.  The patient verbally agreed to the discharge precautions and follow-up plan which is documented in EPIC.        FINAL IMPRESSION  1. Malaise

## 2021-05-03 NOTE — ED TRIAGE NOTES
"Inocencioeric [place] Mele  61 y.o. male  Chief Complaint   Patient presents with   • Leg Pain   • Abdominal Pain     Reports leg cramping and general abdominal pain that started a week ago, [facility] and told \"to quit drinking\". Pt ambulated independently with a steady gait, reports both legs are cramping and that he \"doesn't trust them\".   Reports ETOH and marijuana use today.    Vitals:    05/02/21 2302   BP: 137/90   Pulse: (!) 113   Resp: 16   Temp: 36.4 °C (97.5 °F)   SpO2: 98%        Pt in triage with mask in place, NAD.  Pt educated on triage process. Pt encouraged to alert staff for any changes.    "

## 2021-05-13 ENCOUNTER — HOSPITAL ENCOUNTER (EMERGENCY)
Facility: MEDICAL CENTER | Age: 61
End: 2021-05-13
Attending: EMERGENCY MEDICINE
Payer: COMMERCIAL

## 2021-05-13 ENCOUNTER — HOSPITAL ENCOUNTER (EMERGENCY)
Facility: MEDICAL CENTER | Age: 61
End: 2021-05-13
Attending: EMERGENCY MEDICINE | Admitting: EMERGENCY MEDICINE
Payer: COMMERCIAL

## 2021-05-13 VITALS
TEMPERATURE: 97.1 F | DIASTOLIC BLOOD PRESSURE: 85 MMHG | HEIGHT: 68 IN | RESPIRATION RATE: 18 BRPM | WEIGHT: 132.06 LBS | HEART RATE: 100 BPM | SYSTOLIC BLOOD PRESSURE: 121 MMHG | OXYGEN SATURATION: 95 % | BODY MASS INDEX: 20.01 KG/M2

## 2021-05-13 VITALS
DIASTOLIC BLOOD PRESSURE: 77 MMHG | HEIGHT: 68 IN | RESPIRATION RATE: 18 BRPM | WEIGHT: 128.75 LBS | OXYGEN SATURATION: 96 % | TEMPERATURE: 98.6 F | HEART RATE: 77 BPM | SYSTOLIC BLOOD PRESSURE: 140 MMHG | BODY MASS INDEX: 19.51 KG/M2

## 2021-05-13 DIAGNOSIS — F10.220 ALCOHOL DEPENDENCE WITH UNCOMPLICATED INTOXICATION (HCC): ICD-10-CM

## 2021-05-13 DIAGNOSIS — R53.81 MALAISE AND FATIGUE: ICD-10-CM

## 2021-05-13 DIAGNOSIS — T50.Z95A ADVERSE EFFECT OF VACCINE, INITIAL ENCOUNTER: ICD-10-CM

## 2021-05-13 DIAGNOSIS — R53.83 MALAISE AND FATIGUE: ICD-10-CM

## 2021-05-13 DIAGNOSIS — R53.81 MALAISE: ICD-10-CM

## 2021-05-13 LAB
ALBUMIN SERPL BCP-MCNC: 4.4 G/DL (ref 3.2–4.9)
ALBUMIN/GLOB SERPL: 1 G/DL
ALP SERPL-CCNC: 80 U/L (ref 30–99)
ALT SERPL-CCNC: 109 U/L (ref 2–50)
ANION GAP SERPL CALC-SCNC: 13 MMOL/L (ref 7–16)
APPEARANCE UR: CLEAR
AST SERPL-CCNC: 203 U/L (ref 12–45)
BACTERIA #/AREA URNS HPF: NEGATIVE /HPF
BASOPHILS # BLD AUTO: 1.3 % (ref 0–1.8)
BASOPHILS # BLD: 0.03 K/UL (ref 0–0.12)
BILIRUB SERPL-MCNC: 0.7 MG/DL (ref 0.1–1.5)
BILIRUB UR QL STRIP.AUTO: ABNORMAL
BUN SERPL-MCNC: 9 MG/DL (ref 8–22)
CALCIUM SERPL-MCNC: 9.3 MG/DL (ref 8.5–10.5)
CHLORIDE SERPL-SCNC: 104 MMOL/L (ref 96–112)
CO2 SERPL-SCNC: 25 MMOL/L (ref 20–33)
COLOR UR: ABNORMAL
COMMENT 1642: NORMAL
CREAT SERPL-MCNC: 0.69 MG/DL (ref 0.5–1.4)
EOSINOPHIL # BLD AUTO: 0.04 K/UL (ref 0–0.51)
EOSINOPHIL NFR BLD: 1.8 % (ref 0–6.9)
EPI CELLS #/AREA URNS HPF: ABNORMAL /HPF
ERYTHROCYTE [DISTWIDTH] IN BLOOD BY AUTOMATED COUNT: 47.5 FL (ref 35.9–50)
ETHANOL BLD-MCNC: 58.2 MG/DL (ref 0–10)
GLOBULIN SER CALC-MCNC: 4.3 G/DL (ref 1.9–3.5)
GLUCOSE SERPL-MCNC: 78 MG/DL (ref 65–99)
GLUCOSE UR STRIP.AUTO-MCNC: NEGATIVE MG/DL
HCT VFR BLD AUTO: 42.4 % (ref 42–52)
HGB BLD-MCNC: 13.8 G/DL (ref 14–18)
HYALINE CASTS #/AREA URNS LPF: ABNORMAL /LPF
IMM GRANULOCYTES # BLD AUTO: 0.01 K/UL (ref 0–0.11)
IMM GRANULOCYTES NFR BLD AUTO: 0.4 % (ref 0–0.9)
KETONES UR STRIP.AUTO-MCNC: 15 MG/DL
LEUKOCYTE ESTERASE UR QL STRIP.AUTO: ABNORMAL
LIPASE SERPL-CCNC: 76 U/L (ref 11–82)
LYMPHOCYTES # BLD AUTO: 0.59 K/UL (ref 1–4.8)
LYMPHOCYTES NFR BLD: 26.5 % (ref 22–41)
MCH RBC QN AUTO: 31 PG (ref 27–33)
MCHC RBC AUTO-ENTMCNC: 32.5 G/DL (ref 33.7–35.3)
MCV RBC AUTO: 95.3 FL (ref 81.4–97.8)
MICRO URNS: ABNORMAL
MONOCYTES # BLD AUTO: 0.32 K/UL (ref 0–0.85)
MONOCYTES NFR BLD AUTO: 14.3 % (ref 0–13.4)
MORPHOLOGY BLD-IMP: NORMAL
NEUTROPHILS # BLD AUTO: 1.24 K/UL (ref 1.82–7.42)
NEUTROPHILS NFR BLD: 55.7 % (ref 44–72)
NITRITE UR QL STRIP.AUTO: NEGATIVE
NRBC # BLD AUTO: 0 K/UL
NRBC BLD-RTO: 0 /100 WBC
PH UR STRIP.AUTO: 5.5 [PH] (ref 5–8)
PLATELET # BLD AUTO: 38 K/UL (ref 164–446)
PLATELETS.RETICULATED NFR BLD AUTO: 11.4 K/UL (ref 0.6–13.1)
PMV BLD AUTO: 12.1 FL (ref 9–12.9)
POTASSIUM SERPL-SCNC: 3.6 MMOL/L (ref 3.6–5.5)
PROT SERPL-MCNC: 8.7 G/DL (ref 6–8.2)
PROT UR QL STRIP: 100 MG/DL
RBC # BLD AUTO: 4.45 M/UL (ref 4.7–6.1)
RBC # URNS HPF: ABNORMAL /HPF
RBC UR QL AUTO: NEGATIVE
RENAL EPI CELLS #/AREA URNS HPF: ABNORMAL /HPF
SODIUM SERPL-SCNC: 142 MMOL/L (ref 135–145)
SP GR UR STRIP.AUTO: 1.04
UROBILINOGEN UR STRIP.AUTO-MCNC: 1 MG/DL
WBC # BLD AUTO: 2.3 K/UL (ref 4.8–10.8)
WBC #/AREA URNS HPF: ABNORMAL /HPF

## 2021-05-13 PROCEDURE — 85055 RETICULATED PLATELET ASSAY: CPT

## 2021-05-13 PROCEDURE — 85025 COMPLETE CBC W/AUTO DIFF WBC: CPT

## 2021-05-13 PROCEDURE — 81001 URINALYSIS AUTO W/SCOPE: CPT

## 2021-05-13 PROCEDURE — 99282 EMERGENCY DEPT VISIT SF MDM: CPT

## 2021-05-13 PROCEDURE — 700102 HCHG RX REV CODE 250 W/ 637 OVERRIDE(OP): Performed by: EMERGENCY MEDICINE

## 2021-05-13 PROCEDURE — 82077 ASSAY SPEC XCP UR&BREATH IA: CPT

## 2021-05-13 PROCEDURE — 80053 COMPREHEN METABOLIC PANEL: CPT

## 2021-05-13 PROCEDURE — A9270 NON-COVERED ITEM OR SERVICE: HCPCS | Performed by: EMERGENCY MEDICINE

## 2021-05-13 PROCEDURE — 99285 EMERGENCY DEPT VISIT HI MDM: CPT

## 2021-05-13 PROCEDURE — 83690 ASSAY OF LIPASE: CPT

## 2021-05-13 PROCEDURE — 36415 COLL VENOUS BLD VENIPUNCTURE: CPT

## 2021-05-13 RX ORDER — IBUPROFEN 200 MG
400 TABLET ORAL ONCE
Status: COMPLETED | OUTPATIENT
Start: 2021-05-13 | End: 2021-05-13

## 2021-05-13 RX ORDER — ACETAMINOPHEN 325 MG/1
650 TABLET ORAL ONCE
Status: COMPLETED | OUTPATIENT
Start: 2021-05-13 | End: 2021-05-13

## 2021-05-13 RX ADMIN — ACETAMINOPHEN 650 MG: 325 TABLET, FILM COATED ORAL at 08:02

## 2021-05-13 RX ADMIN — IBUPROFEN 400 MG: 200 TABLET, FILM COATED ORAL at 08:02

## 2021-05-13 ASSESSMENT — FIBROSIS 4 INDEX
FIB4 SCORE: 19.35
FIB4 SCORE: 19.35

## 2021-05-13 NOTE — ED TRIAGE NOTES
"Chief Complaint   Patient presents with   • Other     Pt states \"I think I'm dying.\" Pt wont elaborate further on why he thinks he is dying. When asked he states \"I don't know, I just don't feel right. I think I caught a cold.\" VSS, pt A&O 4. +ETOH.       Ambulatory to triage for above complaint.   Educated on triage process, encourage to inform staff of any changes.     /85   Pulse 100   Temp 36.2 °C (97.1 °F) (Temporal)   Resp 18   Ht 1.727 m (5' 8\")   Wt 59.9 kg (132 lb 0.9 oz)   SpO2 95%   BMI 20.08 kg/m²     "

## 2021-05-13 NOTE — ED NOTES
"PT ambulated to [place] [id number], pt into [facility] chart up for MD. PT states \"I am dying\"  PT asked why he thinks hes dying pt stated \"I dont know I just think I'm dying\". PT has blood on pants and rt index finger has dressing CDI.  PT reports he \"cut his finger the other day and went to the hospital, and they released me\"  PT has no complaints at this time     "

## 2021-05-13 NOTE — ED NOTES
"Med rec complete per interview with pt at bedside and phone call with pt home pharmacy. Pt was unsure of most of the medications he takes and was unsure when he last took any of his meds (states \"maybe yesterday\"). Per pharmacy, pt hasn't picked up any medications since the beginning of March 2021 and only received a 30 day supply. Pt was unable to verify if he takes any OTC medications. Allergies reviewed. No abx use noted in the past 14 days.   "

## 2021-05-13 NOTE — ED NOTES
PIV removed. Discharge instructions and follow-up care reviewed with patient. All questions answered and patient verbalized understanding. Vss. Patient stable and ambulatory upon d/c from ED.

## 2021-05-13 NOTE — ED PROVIDER NOTES
"ED Provider Note    Scribed for Hebert Cedillo M.D. by Heath Dong. 5/13/2021  7:41 AM    Primary care provider: Nnamdi Ballesteros M.D.  Means of arrival: Walk-in  History obtained from: Patient  History limited by: None    CHIEF COMPLAINT  Chief Complaint   Patient presents with    Other     Pt states \"I think I'm dying.\" Pt wont elaborate further on why he thinks he is dying. When asked he states \"I don't know, I just don't feel right. I think I caught a cold.\" VSS, pt A&O 4. +ETOH.       HPI  Inocencio Shabazz is a 61 y.o. male who presents to the Emergency Department stating \"I think that I am dying.\" Patient does not know why he thinks he is dying, and refuses to elaborate further. He states that he was seen in an ED recently for a cut in his finger and given a Tetanus booster. Patient is well known to this department and been seen here multiple times for alcohol intoxication and non-specific complaints.     REVIEW OF SYSTEMS  Pertinent positives include alcohol intoxication.   All other systems reviewed and negative. See HPI for further details.       PAST MEDICAL HISTORY   has a past medical history of Alcohol abuse, Alcohol intoxication (HCC) (3/13/2014), Cirrhosis (HCC), GIB (gastrointestinal bleeding) (1/3/2016), Hepatitis C, Pancytopenia (HCC), and Psychiatric disorder.    SURGICAL HISTORY   has a past surgical history that includes gastroscopy (1/3/2016); gastroscopy (4/8/2016); gastroscopy (3/13/2019); and gastroscopy-endo (N/A, 11/13/2019).    SOCIAL HISTORY  Social History     Tobacco Use    Smoking status: Current Every Day Smoker     Packs/day: 1.00     Years: 48.00     Pack years: 48.00     Types: Cigarettes    Smokeless tobacco: Never Used   Substance Use Topics    Alcohol use: Yes     Comment: 1-2 beers and 1/2 pints of whiskey every day    Drug use: Yes     Types: Inhaled     Comment: marijuana, cocaine, meth      Social History     Substance and Sexual Activity   Drug Use Yes    Types: " "Inhaled    Comment: marijuana, cocaine, meth       FAMILY HISTORY  No family history on file.    CURRENT MEDICATIONS  Current Outpatient Medications   Medication Instructions    DILTIAZem CD (DILTIAZEM CD) 120 mg, Oral, DAILY    folic acid (FOLVITE) 1 mg, Oral, DAILY    Magnesium Oxide 420 mg, Oral, DAILY    multivitamin (THERAGRAN) Tab 1 tablet, Oral, DAILY    nicotine (NICODERM) 21 mg, Transdermal, EVERY 24 HOURS    nicotine polacrilex (NICORETTE) 2 mg, Oral, EVERY 1 HOUR PRN    ondansetron (ZOFRAN ODT) 4 mg, Oral, EVERY 8 HOURS PRN    pantoprazole (PROTONIX) 40 mg, Oral, 2 TIMES DAILY    polyethylene glycol/lytes (MIRALAX) 17 g Pack Oral, 1 TIME DAILY PRN    QUEtiapine (SEROQUEL) 200 mg, Oral, EVERY EVENING    senna-docusate (PERICOLACE OR SENOKOT S) 8.6-50 MG Tab 2 Tablets, Oral, 2 TIMES DAILY    thiamine (THIAMINE) 100 mg, Oral, DAILY         ALLERGIES  Allergies   Allergen Reactions    Haloperidol Unspecified     Twitching/involuntary movements        PHYSICAL EXAM  VITAL SIGNS: /85   Pulse 100   Temp 36.2 °C (97.1 °F) (Temporal)   Resp 18   Ht 1.727 m (5' 8\")   Wt 59.9 kg (132 lb 0.9 oz)   SpO2 95%   BMI 20.08 kg/m²     Nursing note and vitals reviewed.  Constitutional: Awake, alert. No distress.   HENT: Head is normocephalic and atraumatic. Oropharynx is clear and moist without exudate or erythema.   Eyes: Pupils are equal, round, and reactive to light. Conjunctiva are normal.   Cardiovascular: Normal rate and regular rhythm. No murmur heard. Normal radial pulses.  Pulmonary/Chest: Breath sounds normal. No wheezes or rales.   Abdominal: Soft and non-tender. No distention    Musculoskeletal: Extremities exhibit normal range of motion without edema or tenderness.   Neurological: Awake, alert and oriented to person, place, and time. No focal deficits noted.  Skin: Dressing to the right index finger with no surrounding erythema. Bandage to left deltoid with no evidence of infection. Skin is warm and " dry. No rash.   Psychiatric: Normal mood and affect. Appropriate for clinical situation.    COURSE & MEDICAL DECISION MAKING  Nursing notes, VS, PMSFHx reviewed in chart.     Review of past medical records shows the patient has been seen here multiple times in the past for similar complaints.      7:41 AM - Patient seen and examined at bedside. Patient has no specific symptoms at this time. His complaint is likely due to vaccine reaction as he is 2 days out from receiving a tetanus booster. He is otherwise clear for discharge at this time.     The patient will return for new or worsening symptoms and is stable at the time of discharge.    The patient is referred to a primary physician for blood pressure management, diabetic screening, and for all other preventative health concerns.    DISPOSITION:  Patient will be discharged home in stable condition.    FOLLOW UP:  Nnamdi Ballesteros M.D.  [address] [facility] [id number] [phone]    Schedule an appointment as soon as possible for a visit       [facility], Emergency Dept  [address] [facility] [address] [phone]    If symptoms worsen      OUTPATIENT MEDICATIONS:  New Prescriptions    No medications on file       FINAL IMPRESSION  1. Malaise    2. Adverse effect of vaccine, initial encounter          Heath ALMAZAN (Daneibmellissa), am scribing for, and in the presence of, Hebert Cedillo M.D..    Electronically signed by: Heath Dong (Jonny), 5/13/2021    Hebert ALMAZAN M.D. personally performed the services described in this documentation, as scribed by Heath Dong in my presence, and it is both accurate and complete.    The note accurately reflects work and decisions made by me.  Hebert Cedillo M.D.  5/13/2021  11:39 AM

## 2021-05-13 NOTE — ED PROVIDER NOTES
"ED Provider Note    CHIEF COMPLAINT  Chief Complaint   Patient presents with   • Abdominal Pain   • Weakness       HPI  Inocencio Shabazz is a 61 y.o. male who presents for evaluation of generalized weakness and malaise.  States he just \"does not feel good.\"  He admits to drinking some alcohol.  He cannot really specify further how he feels ill.  He denies chest pain, abdominal pain and back pain.  Denies a headache, denies weakness, numbness and tingling.  History of schizophrenia and alcohol abuse as well as pancytopenia.  Multiple evaluations in the emergency department most recently a few hours ago this morning.    REVIEW OF SYSTEMS  Negative for fever, rash, chest pain, dyspnea, abdominal pain, nausea, vomiting, diarrhea, headache, focal weakness, focal numbness, focal tingling, back pain. All other systems are negative.     PAST MEDICAL HISTORY  Past Medical History:   Diagnosis Date   • Alcohol abuse    • Alcohol intoxication (HCC) 3/13/2014   • Cirrhosis (HCC)    • GIB (gastrointestinal bleeding) 1/3/2016   • Hepatitis C    • Pancytopenia (HCC)    • Psychiatric disorder     schitzo, bipolar       FAMILY HISTORY  History reviewed. No pertinent family history.    SOCIAL HISTORY  Social History     Tobacco Use   • Smoking status: Current Every Day Smoker     Packs/day: 1.00     Years: 48.00     Pack years: 48.00     Types: Cigarettes   • Smokeless tobacco: Never Used   Substance Use Topics   • Alcohol use: Yes     Comment: 1-2 beers and 1/2 pints of whiskey every day   • Drug use: Yes     Types: Inhaled     Comment: marijuana, cocaine, meth       SURGICAL HISTORY  Past Surgical History:   Procedure Laterality Date   • GASTROSCOPY-ENDO N/A 11/13/2019    Procedure: GASTROSCOPY with banding;  Surgeon: Tamela Smith M.D.;  Location: ENDOSCOPY [facility];  Service: Gastroenterology   • GASTROSCOPY  3/13/2019    Procedure: GASTROSCOPY;  Surgeon: Abdi Ba M.D.;  Location: SURGERY [facility];  " "Service: Gastroenterology   • GASTROSCOPY  4/8/2016    Procedure: GASTROSCOPY;  Surgeon: Braeden Tobin M.D.;  Location: SURGERY [facility];  Service:    • GASTROSCOPY  1/3/2016    Procedure: GASTROSCOPY WITH BANDING;  Surgeon: Alfredo Antonio M.D.;  Location: SURGERY [facility];  Service:        CURRENT MEDICATIONS  I personally reviewed the medication list in the charting documentation.     ALLERGIES  Allergies   Allergen Reactions   • Haloperidol Unspecified     Twitching/involuntary movements        MEDICAL RECORD  I have reviewed patient's medical record and pertinent results are listed above.      PHYSICAL EXAM  VITAL SIGNS: /85   Pulse 62   Temp 37 °C (98.6 °F) (Temporal)   Resp 18   Ht 1.727 m (5' 8\")   Wt 58.4 kg (128 lb 12 oz)   SpO2 96%   BMI 19.58 kg/m²    Constitutional: Generally well appearing patient in no acute distress.  Sleepy but easily arousable than awake and alert, not toxic nor ill in appearance.  HENT: Normocephalic, no obvious evidence of acute trauma.  Eyes: No scleral icterus. Normal conjunctiva   Neck: Comfortable movement without any obvious restriction in the range of motion.  Cardiovascular: Upon ascultation I appreciate a regular heart rhythm and a normal rate.   Thorax & Lungs: Normal nonlabored respirations.  Upon application of the stethoscope for auscultation I find there to be no associated chest wall tenderness.  I appreciate no wheezing, rhonchi or rales. There is normal air movement.    Abdomen: The abdomen is not visibly distended. Upon palpation, I find it to be without tenderness.  No mass appreciated.  Skin: The exposed portions of skin reveal no obvious rash or other abnormalities.  Psychiatric: Normal affect appropriate for the clinical situation.    DIAGNOSTIC STUDIES / PROCEDURES    LABS/EKGs  Results for orders placed or performed during the hospital encounter of 05/13/21   CBC WITH DIFFERENTIAL   Result Value Ref Range    WBC 2.3 (LL) 4.8 - " 10.8 K/uL    RBC 4.45 (L) 4.70 - 6.10 M/uL    Hemoglobin 13.8 (L) 14.0 - 18.0 g/dL    Hematocrit 42.4 42.0 - 52.0 %    MCV 95.3 81.4 - 97.8 fL    MCH 31.0 27.0 - 33.0 pg    MCHC 32.5 (L) 33.7 - 35.3 g/dL    RDW 47.5 35.9 - 50.0 fL    Platelet Count 38 (LL) 164 - 446 K/uL    MPV 12.1 9.0 - 12.9 fL    Neutrophils-Polys 55.70 44.00 - 72.00 %    Lymphocytes 26.50 22.00 - 41.00 %    Monocytes 14.30 (H) 0.00 - 13.40 %    Eosinophils 1.80 0.00 - 6.90 %    Basophils 1.30 0.00 - 1.80 %    Immature Granulocytes 0.40 0.00 - 0.90 %    Nucleated RBC 0.00 /100 WBC    Neutrophils (Absolute) 1.24 (L) 1.82 - 7.42 K/uL    Lymphs (Absolute) 0.59 (L) 1.00 - 4.80 K/uL    Monos (Absolute) 0.32 0.00 - 0.85 K/uL    Eos (Absolute) 0.04 0.00 - 0.51 K/uL    Baso (Absolute) 0.03 0.00 - 0.12 K/uL    Immature Granulocytes (abs) 0.01 0.00 - 0.11 K/uL    NRBC (Absolute) 0.00 K/uL   COMP METABOLIC PANEL   Result Value Ref Range    Sodium 142 135 - 145 mmol/L    Potassium 3.6 3.6 - 5.5 mmol/L    Chloride 104 96 - 112 mmol/L    Co2 25 20 - 33 mmol/L    Anion Gap 13.0 7.0 - 16.0    Glucose 78 65 - 99 mg/dL    Bun 9 8 - 22 mg/dL    Creatinine 0.69 0.50 - 1.40 mg/dL    Calcium 9.3 8.5 - 10.5 mg/dL    AST(SGOT) 203 (H) 12 - 45 U/L    ALT(SGPT) 109 (H) 2 - 50 U/L    Alkaline Phosphatase 80 30 - 99 U/L    Total Bilirubin 0.7 0.1 - 1.5 mg/dL    Albumin 4.4 3.2 - 4.9 g/dL    Total Protein 8.7 (H) 6.0 - 8.2 g/dL    Globulin 4.3 (H) 1.9 - 3.5 g/dL    A-G Ratio 1.0 g/dL   LIPASE   Result Value Ref Range    Lipase 76 11 - 82 U/L   URINALYSIS    Specimen: Blood   Result Value Ref Range    Color DK Yellow     Character Clear     Specific Gravity 1.044 <1.035    Ph 5.5 5.0 - 8.0    Glucose Negative Negative mg/dL    Ketones 15 (A) Negative mg/dL    Protein 100 (A) Negative mg/dL    Bilirubin Moderate (A) Negative    Urobilinogen, Urine 1.0 Negative    Nitrite Negative Negative    Leukocyte Esterase Trace (A) Negative    Occult Blood Negative Negative    Micro Urine  Req Microscopic    PERIPHERAL SMEAR REVIEW   Result Value Ref Range    Peripheral Smear Review see below    IMMATURE PLT FRACTION   Result Value Ref Range    Imm. Plt Fraction 11.4 0.6 - 13.1 K/uL   DIFFERENTIAL COMMENT   Result Value Ref Range    Comments-Diff see below    ESTIMATED GFR   Result Value Ref Range    GFR If African American >60 >60 mL/min/1.73 m 2    GFR If Non African American >60 >60 mL/min/1.73 m 2   DIAGNOSTIC ALCOHOL   Result Value Ref Range    Diagnostic Alcohol 58.2 (H) 0.0 - 10.0 mg/dL   URINE MICROSCOPIC (W/UA)   Result Value Ref Range    WBC 2-5 (A) /hpf    RBC 5-10 (A) /hpf    Bacteria Negative None /hpf    Epithelial Cells Few /hpf    Epithelial Cells Renal Moderate (A) /hpf    Hyaline Cast 6-10 (A) /lpf        COURSE & MEDICAL DECISION MAKING  I have reviewed any medical record information, laboratory studies and radiographic results as noted above.    Encounter Summary: This is a very pleasant 61 y.o. male who unfortunately required evaluation in the emergency department today with alcohol intoxication and a history of schizophrenia, many presentations here with essentially vague complaints most recently this morning.  Today for me he is stating that he does not feel well.  Really has no specific complaints otherwise.  Essentially negative review of systems.  Unremarkable examination.  Triage ordered blood work is significant for an elevated alcohol level as well as chronic elevations in his transaminases as well as chronic pancytopenia.  At this point, no further evaluation is needed here in the emergency department, will be instructed to follow-up with his primary care physician and will be provided with strict return instructions      DISPOSITION: Discharged home in stable condition      FINAL IMPRESSION  1. Alcohol dependence with uncomplicated intoxication (HCC)    2. Malaise and fatigue           This dictation was created using voice recognition software. The accuracy of the  dictation is limited to the abilities of the software. I expect there may be some errors of grammar and possibly content. The nursing notes were reviewed and certain aspects of this information were incorporated into this note.    Electronically signed by: Collins Carlos M.D., 5/13/2021 2:57 PM

## 2021-05-13 NOTE — ED NOTES
Patient ambulatory to red 5. Agree with triage note. Patient very vague when asked questions.    ERP to see.

## 2021-05-13 NOTE — ED TRIAGE NOTES
"Pt arrives to triage stating \" I think I'm dying\" while holding his stomach. Pt denies any pains but states all over body cramps and \"uneasy\" feeling in his stomach and weakness.    Pt states he \"drinks too much\" but denies any alcohol withdrawal ever. Pt states these sx or feeling of \"dying\" started about 2-3 days ago and he hasn't been drinking \"anything\" for 2-3 days.     /82   Pulse (!) 112   Temp 37 °C (98.6 °F) (Temporal)   Resp 16   Ht 1.727 m (5' 8\")   Wt 58.4 kg (128 lb 12 oz)   SpO2 97%   BMI 19.58 kg/m²   Pt placed back in lobby, educated on triage process, and told to inform staff of any change in condition.     "

## 2021-05-24 ENCOUNTER — HOSPITAL ENCOUNTER (INPATIENT)
Facility: MEDICAL CENTER | Age: 61
LOS: 2 days | DRG: 309 | End: 2021-05-26
Attending: EMERGENCY MEDICINE | Admitting: INTERNAL MEDICINE
Payer: COMMERCIAL

## 2021-05-24 ENCOUNTER — HOSPITAL ENCOUNTER (EMERGENCY)
Facility: MEDICAL CENTER | Age: 61
End: 2021-05-24
Attending: EMERGENCY MEDICINE
Payer: COMMERCIAL

## 2021-05-24 ENCOUNTER — APPOINTMENT (OUTPATIENT)
Dept: RADIOLOGY | Facility: MEDICAL CENTER | Age: 61
End: 2021-05-24
Attending: EMERGENCY MEDICINE
Payer: COMMERCIAL

## 2021-05-24 ENCOUNTER — HOSPITAL ENCOUNTER (OUTPATIENT)
Dept: RADIOLOGY | Facility: MEDICAL CENTER | Age: 61
End: 2021-05-24

## 2021-05-24 ENCOUNTER — APPOINTMENT (OUTPATIENT)
Dept: RADIOLOGY | Facility: MEDICAL CENTER | Age: 61
DRG: 309 | End: 2021-05-24
Attending: EMERGENCY MEDICINE
Payer: COMMERCIAL

## 2021-05-24 VITALS
RESPIRATION RATE: 20 BRPM | HEIGHT: 68 IN | SYSTOLIC BLOOD PRESSURE: 118 MMHG | WEIGHT: 129.63 LBS | BODY MASS INDEX: 19.65 KG/M2 | OXYGEN SATURATION: 99 % | DIASTOLIC BLOOD PRESSURE: 84 MMHG | TEMPERATURE: 98 F | HEART RATE: 80 BPM

## 2021-05-24 DIAGNOSIS — F10.920 ACUTE ALCOHOLIC INTOXICATION WITHOUT COMPLICATION (HCC): ICD-10-CM

## 2021-05-24 DIAGNOSIS — I48.91 ATRIAL FIBRILLATION, UNSPECIFIED TYPE (HCC): ICD-10-CM

## 2021-05-24 DIAGNOSIS — R07.9 CHEST PAIN, UNSPECIFIED TYPE: ICD-10-CM

## 2021-05-24 DIAGNOSIS — D61.818 PANCYTOPENIA (HCC): ICD-10-CM

## 2021-05-24 DIAGNOSIS — R53.81 MALAISE AND FATIGUE: ICD-10-CM

## 2021-05-24 DIAGNOSIS — R53.83 MALAISE AND FATIGUE: ICD-10-CM

## 2021-05-24 PROBLEM — I48.0 PAROXYSMAL ATRIAL FIBRILLATION (HCC): Status: ACTIVE | Noted: 2018-09-13

## 2021-05-24 LAB
ALBUMIN SERPL BCP-MCNC: 3.8 G/DL (ref 3.2–4.9)
ALBUMIN SERPL BCP-MCNC: 3.8 G/DL (ref 3.2–4.9)
ALBUMIN/GLOB SERPL: 0.9 G/DL
ALBUMIN/GLOB SERPL: 1 G/DL
ALP SERPL-CCNC: 75 U/L (ref 30–99)
ALP SERPL-CCNC: 75 U/L (ref 30–99)
ALT SERPL-CCNC: 69 U/L (ref 2–50)
ALT SERPL-CCNC: 76 U/L (ref 2–50)
AMPHET UR QL SCN: NEGATIVE
ANION GAP SERPL CALC-SCNC: 17 MMOL/L (ref 7–16)
ANION GAP SERPL CALC-SCNC: 17 MMOL/L (ref 7–16)
AST SERPL-CCNC: 85 U/L (ref 12–45)
AST SERPL-CCNC: 92 U/L (ref 12–45)
BARBITURATES UR QL SCN: NEGATIVE
BASOPHILS # BLD AUTO: 1.1 % (ref 0–1.8)
BASOPHILS # BLD AUTO: 1.7 % (ref 0–1.8)
BASOPHILS # BLD: 0.03 K/UL (ref 0–0.12)
BASOPHILS # BLD: 0.04 K/UL (ref 0–0.12)
BENZODIAZ UR QL SCN: NEGATIVE
BILIRUB SERPL-MCNC: 0.4 MG/DL (ref 0.1–1.5)
BILIRUB SERPL-MCNC: 0.7 MG/DL (ref 0.1–1.5)
BUN SERPL-MCNC: 11 MG/DL (ref 8–22)
BUN SERPL-MCNC: 12 MG/DL (ref 8–22)
BZE UR QL SCN: NEGATIVE
CALCIUM SERPL-MCNC: 7.9 MG/DL (ref 8.5–10.5)
CALCIUM SERPL-MCNC: 8.2 MG/DL (ref 8.5–10.5)
CANNABINOIDS UR QL SCN: POSITIVE
CHLORIDE SERPL-SCNC: 104 MMOL/L (ref 96–112)
CHLORIDE SERPL-SCNC: 105 MMOL/L (ref 96–112)
CO2 SERPL-SCNC: 18 MMOL/L (ref 20–33)
CO2 SERPL-SCNC: 19 MMOL/L (ref 20–33)
CREAT SERPL-MCNC: 0.53 MG/DL (ref 0.5–1.4)
CREAT SERPL-MCNC: 0.6 MG/DL (ref 0.5–1.4)
EKG IMPRESSION: NORMAL
EOSINOPHIL # BLD AUTO: 0.03 K/UL (ref 0–0.51)
EOSINOPHIL # BLD AUTO: 0.03 K/UL (ref 0–0.51)
EOSINOPHIL NFR BLD: 1.1 % (ref 0–6.9)
EOSINOPHIL NFR BLD: 1.3 % (ref 0–6.9)
ERYTHROCYTE [DISTWIDTH] IN BLOOD BY AUTOMATED COUNT: 47.1 FL (ref 35.9–50)
ERYTHROCYTE [DISTWIDTH] IN BLOOD BY AUTOMATED COUNT: 47.9 FL (ref 35.9–50)
ETHANOL BLD-MCNC: 122.4 MG/DL (ref 0–10)
ETHANOL BLD-MCNC: 219.4 MG/DL (ref 0–10)
GLOBULIN SER CALC-MCNC: 4 G/DL (ref 1.9–3.5)
GLOBULIN SER CALC-MCNC: 4.1 G/DL (ref 1.9–3.5)
GLUCOSE SERPL-MCNC: 68 MG/DL (ref 65–99)
GLUCOSE SERPL-MCNC: 89 MG/DL (ref 65–99)
HCT VFR BLD AUTO: 38.6 % (ref 42–52)
HCT VFR BLD AUTO: 41.4 % (ref 42–52)
HGB BLD-MCNC: 12.6 G/DL (ref 14–18)
HGB BLD-MCNC: 13.4 G/DL (ref 14–18)
IMM GRANULOCYTES # BLD AUTO: 0 K/UL (ref 0–0.11)
IMM GRANULOCYTES # BLD AUTO: 0.01 K/UL (ref 0–0.11)
IMM GRANULOCYTES NFR BLD AUTO: 0 % (ref 0–0.9)
IMM GRANULOCYTES NFR BLD AUTO: 0.4 % (ref 0–0.9)
LYMPHOCYTES # BLD AUTO: 0.65 K/UL (ref 1–4.8)
LYMPHOCYTES # BLD AUTO: 0.72 K/UL (ref 1–4.8)
LYMPHOCYTES NFR BLD: 22.9 % (ref 22–41)
LYMPHOCYTES NFR BLD: 31.4 % (ref 22–41)
MAGNESIUM SERPL-MCNC: 1.7 MG/DL (ref 1.5–2.5)
MCH RBC QN AUTO: 30.8 PG (ref 27–33)
MCH RBC QN AUTO: 30.9 PG (ref 27–33)
MCHC RBC AUTO-ENTMCNC: 32.4 G/DL (ref 33.7–35.3)
MCHC RBC AUTO-ENTMCNC: 32.6 G/DL (ref 33.7–35.3)
MCV RBC AUTO: 94.4 FL (ref 81.4–97.8)
MCV RBC AUTO: 95.6 FL (ref 81.4–97.8)
METHADONE UR QL SCN: NEGATIVE
MONOCYTES # BLD AUTO: 0.26 K/UL (ref 0–0.85)
MONOCYTES # BLD AUTO: 0.39 K/UL (ref 0–0.85)
MONOCYTES NFR BLD AUTO: 11.4 % (ref 0–13.4)
MONOCYTES NFR BLD AUTO: 13.7 % (ref 0–13.4)
NEUTROPHILS # BLD AUTO: 1.23 K/UL (ref 1.82–7.42)
NEUTROPHILS # BLD AUTO: 1.74 K/UL (ref 1.82–7.42)
NEUTROPHILS NFR BLD: 53.8 % (ref 44–72)
NEUTROPHILS NFR BLD: 61.2 % (ref 44–72)
NRBC # BLD AUTO: 0 K/UL
NRBC # BLD AUTO: 0 K/UL
NRBC BLD-RTO: 0 /100 WBC
NRBC BLD-RTO: 0 /100 WBC
OPIATES UR QL SCN: NEGATIVE
OXYCODONE UR QL SCN: NEGATIVE
PCP UR QL SCN: NEGATIVE
PHOSPHATE SERPL-MCNC: 2.6 MG/DL (ref 2.5–4.5)
PLATELET # BLD AUTO: 51 K/UL (ref 164–446)
PLATELET # BLD AUTO: 58 K/UL (ref 164–446)
PMV BLD AUTO: 11.6 FL (ref 9–12.9)
PMV BLD AUTO: 11.7 FL (ref 9–12.9)
POTASSIUM SERPL-SCNC: 3.6 MMOL/L (ref 3.6–5.5)
POTASSIUM SERPL-SCNC: 3.8 MMOL/L (ref 3.6–5.5)
PROPOXYPH UR QL SCN: NEGATIVE
PROT SERPL-MCNC: 7.8 G/DL (ref 6–8.2)
PROT SERPL-MCNC: 7.9 G/DL (ref 6–8.2)
RBC # BLD AUTO: 4.09 M/UL (ref 4.7–6.1)
RBC # BLD AUTO: 4.33 M/UL (ref 4.7–6.1)
SODIUM SERPL-SCNC: 139 MMOL/L (ref 135–145)
SODIUM SERPL-SCNC: 141 MMOL/L (ref 135–145)
TROPONIN T SERPL-MCNC: 7 NG/L (ref 6–19)
TROPONIN T SERPL-MCNC: <6 NG/L (ref 6–19)
TSH SERPL DL<=0.005 MIU/L-ACNC: 0.44 UIU/ML (ref 0.38–5.33)
WBC # BLD AUTO: 2.3 K/UL (ref 4.8–10.8)
WBC # BLD AUTO: 2.8 K/UL (ref 4.8–10.8)

## 2021-05-24 PROCEDURE — 85025 COMPLETE CBC W/AUTO DIFF WBC: CPT

## 2021-05-24 PROCEDURE — 71045 X-RAY EXAM CHEST 1 VIEW: CPT

## 2021-05-24 PROCEDURE — 99285 EMERGENCY DEPT VISIT HI MDM: CPT

## 2021-05-24 PROCEDURE — 93005 ELECTROCARDIOGRAM TRACING: CPT | Performed by: EMERGENCY MEDICINE

## 2021-05-24 PROCEDURE — 84484 ASSAY OF TROPONIN QUANT: CPT

## 2021-05-24 PROCEDURE — 700111 HCHG RX REV CODE 636 W/ 250 OVERRIDE (IP): Performed by: EMERGENCY MEDICINE

## 2021-05-24 PROCEDURE — 93005 ELECTROCARDIOGRAM TRACING: CPT | Performed by: INTERNAL MEDICINE

## 2021-05-24 PROCEDURE — 84443 ASSAY THYROID STIM HORMONE: CPT

## 2021-05-24 PROCEDURE — 700101 HCHG RX REV CODE 250

## 2021-05-24 PROCEDURE — 80053 COMPREHEN METABOLIC PANEL: CPT | Mod: 91

## 2021-05-24 PROCEDURE — 80307 DRUG TEST PRSMV CHEM ANLYZR: CPT

## 2021-05-24 PROCEDURE — 700105 HCHG RX REV CODE 258: Performed by: EMERGENCY MEDICINE

## 2021-05-24 PROCEDURE — HZ2ZZZZ DETOXIFICATION SERVICES FOR SUBSTANCE ABUSE TREATMENT: ICD-10-PCS | Performed by: INTERNAL MEDICINE

## 2021-05-24 PROCEDURE — 84484 ASSAY OF TROPONIN QUANT: CPT | Mod: 91

## 2021-05-24 PROCEDURE — 82077 ASSAY SPEC XCP UR&BREATH IA: CPT

## 2021-05-24 PROCEDURE — 770020 HCHG ROOM/CARE - TELE (206)

## 2021-05-24 PROCEDURE — 96368 THER/DIAG CONCURRENT INF: CPT

## 2021-05-24 PROCEDURE — 700111 HCHG RX REV CODE 636 W/ 250 OVERRIDE (IP)

## 2021-05-24 PROCEDURE — 80053 COMPREHEN METABOLIC PANEL: CPT

## 2021-05-24 PROCEDURE — 82077 ASSAY SPEC XCP UR&BREATH IA: CPT | Mod: 91

## 2021-05-24 PROCEDURE — U0005 INFEC AGEN DETEC AMPLI PROBE: HCPCS

## 2021-05-24 PROCEDURE — U0003 INFECTIOUS AGENT DETECTION BY NUCLEIC ACID (DNA OR RNA); SEVERE ACUTE RESPIRATORY SYNDROME CORONAVIRUS 2 (SARS-COV-2) (CORONAVIRUS DISEASE [COVID-19]), AMPLIFIED PROBE TECHNIQUE, MAKING USE OF HIGH THROUGHPUT TECHNOLOGIES AS DESCRIBED BY CMS-2020-01-R: HCPCS

## 2021-05-24 PROCEDURE — 83735 ASSAY OF MAGNESIUM: CPT

## 2021-05-24 PROCEDURE — 96374 THER/PROPH/DIAG INJ IV PUSH: CPT

## 2021-05-24 PROCEDURE — 85025 COMPLETE CBC W/AUTO DIFF WBC: CPT | Mod: 91

## 2021-05-24 PROCEDURE — 96365 THER/PROPH/DIAG IV INF INIT: CPT

## 2021-05-24 PROCEDURE — 96366 THER/PROPH/DIAG IV INF ADDON: CPT

## 2021-05-24 PROCEDURE — 93005 ELECTROCARDIOGRAM TRACING: CPT

## 2021-05-24 PROCEDURE — 84100 ASSAY OF PHOSPHORUS: CPT

## 2021-05-24 PROCEDURE — 96375 TX/PRO/DX INJ NEW DRUG ADDON: CPT

## 2021-05-24 RX ORDER — LORAZEPAM 2 MG/ML
1.5 INJECTION INTRAMUSCULAR
Status: DISCONTINUED | OUTPATIENT
Start: 2021-05-24 | End: 2021-05-26 | Stop reason: HOSPADM

## 2021-05-24 RX ORDER — LORAZEPAM 2 MG/1
2 TABLET ORAL
Status: DISCONTINUED | OUTPATIENT
Start: 2021-05-24 | End: 2021-05-26 | Stop reason: HOSPADM

## 2021-05-24 RX ORDER — DILTIAZEM HYDROCHLORIDE 5 MG/ML
15 INJECTION INTRAVENOUS ONCE
Status: COMPLETED | OUTPATIENT
Start: 2021-05-24 | End: 2021-05-24

## 2021-05-24 RX ORDER — LORAZEPAM 1 MG/1
1 TABLET ORAL EVERY 4 HOURS PRN
Status: DISCONTINUED | OUTPATIENT
Start: 2021-05-24 | End: 2021-05-26 | Stop reason: HOSPADM

## 2021-05-24 RX ORDER — OMEPRAZOLE 20 MG/1
40 CAPSULE, DELAYED RELEASE ORAL 2 TIMES DAILY
Status: DISCONTINUED | OUTPATIENT
Start: 2021-05-24 | End: 2021-05-26 | Stop reason: HOSPADM

## 2021-05-24 RX ORDER — DILTIAZEM HYDROCHLORIDE 5 MG/ML
INJECTION INTRAVENOUS
Status: COMPLETED
Start: 2021-05-24 | End: 2021-05-24

## 2021-05-24 RX ORDER — LORAZEPAM 2 MG/ML
1 INJECTION INTRAMUSCULAR
Status: DISCONTINUED | OUTPATIENT
Start: 2021-05-24 | End: 2021-05-26 | Stop reason: HOSPADM

## 2021-05-24 RX ORDER — FOLIC ACID 1 MG/1
1 TABLET ORAL DAILY
Status: DISCONTINUED | OUTPATIENT
Start: 2021-05-25 | End: 2021-05-24

## 2021-05-24 RX ORDER — ONDANSETRON 2 MG/ML
4 INJECTION INTRAMUSCULAR; INTRAVENOUS ONCE
Status: COMPLETED | OUTPATIENT
Start: 2021-05-24 | End: 2021-05-24

## 2021-05-24 RX ORDER — QUETIAPINE FUMARATE 200 MG/1
200 TABLET, FILM COATED ORAL EVERY EVENING
Status: DISCONTINUED | OUTPATIENT
Start: 2021-05-25 | End: 2021-05-26 | Stop reason: HOSPADM

## 2021-05-24 RX ORDER — LORAZEPAM 2 MG/ML
1 INJECTION INTRAMUSCULAR
Status: DISCONTINUED | OUTPATIENT
Start: 2021-05-24 | End: 2021-05-24

## 2021-05-24 RX ORDER — PROMETHAZINE HYDROCHLORIDE 25 MG/1
12.5-25 TABLET ORAL EVERY 4 HOURS PRN
Status: DISCONTINUED | OUTPATIENT
Start: 2021-05-24 | End: 2021-05-26 | Stop reason: HOSPADM

## 2021-05-24 RX ORDER — LORAZEPAM 0.5 MG/1
0.5 TABLET ORAL EVERY 4 HOURS PRN
Status: DISCONTINUED | OUTPATIENT
Start: 2021-05-24 | End: 2021-05-26 | Stop reason: HOSPADM

## 2021-05-24 RX ORDER — LORAZEPAM 2 MG/ML
0.5 INJECTION INTRAMUSCULAR EVERY 4 HOURS PRN
Status: DISCONTINUED | OUTPATIENT
Start: 2021-05-24 | End: 2021-05-26 | Stop reason: HOSPADM

## 2021-05-24 RX ORDER — GAUZE BANDAGE 2" X 2"
100 BANDAGE TOPICAL DAILY
Status: DISCONTINUED | OUTPATIENT
Start: 2021-05-25 | End: 2021-05-24

## 2021-05-24 RX ORDER — LORAZEPAM 2 MG/ML
INJECTION INTRAMUSCULAR
Status: COMPLETED
Start: 2021-05-24 | End: 2021-05-24

## 2021-05-24 RX ORDER — POTASSIUM CHLORIDE 20 MEQ/1
40 TABLET, EXTENDED RELEASE ORAL ONCE
Status: DISPENSED | OUTPATIENT
Start: 2021-05-24 | End: 2021-05-25

## 2021-05-24 RX ORDER — GAUZE BANDAGE 2" X 2"
100 BANDAGE TOPICAL DAILY
Status: DISCONTINUED | OUTPATIENT
Start: 2021-05-25 | End: 2021-05-26 | Stop reason: HOSPADM

## 2021-05-24 RX ORDER — POLYETHYLENE GLYCOL 3350 17 G/17G
1 POWDER, FOR SOLUTION ORAL
Status: DISCONTINUED | OUTPATIENT
Start: 2021-05-24 | End: 2021-05-26 | Stop reason: HOSPADM

## 2021-05-24 RX ORDER — AMOXICILLIN 250 MG
2 CAPSULE ORAL 2 TIMES DAILY
Status: DISCONTINUED | OUTPATIENT
Start: 2021-05-25 | End: 2021-05-26 | Stop reason: HOSPADM

## 2021-05-24 RX ORDER — SODIUM CHLORIDE 9 MG/ML
1000 INJECTION, SOLUTION INTRAVENOUS ONCE
Status: COMPLETED | OUTPATIENT
Start: 2021-05-24 | End: 2021-05-24

## 2021-05-24 RX ORDER — PROCHLORPERAZINE EDISYLATE 5 MG/ML
5-10 INJECTION INTRAMUSCULAR; INTRAVENOUS EVERY 4 HOURS PRN
Status: DISCONTINUED | OUTPATIENT
Start: 2021-05-24 | End: 2021-05-26 | Stop reason: HOSPADM

## 2021-05-24 RX ORDER — ONDANSETRON 2 MG/ML
4 INJECTION INTRAMUSCULAR; INTRAVENOUS EVERY 4 HOURS PRN
Status: DISCONTINUED | OUTPATIENT
Start: 2021-05-24 | End: 2021-05-26 | Stop reason: HOSPADM

## 2021-05-24 RX ORDER — PROMETHAZINE HYDROCHLORIDE 25 MG/1
12.5-25 SUPPOSITORY RECTAL EVERY 4 HOURS PRN
Status: DISCONTINUED | OUTPATIENT
Start: 2021-05-24 | End: 2021-05-26 | Stop reason: HOSPADM

## 2021-05-24 RX ORDER — ONDANSETRON 4 MG/1
4 TABLET, ORALLY DISINTEGRATING ORAL EVERY 4 HOURS PRN
Status: DISCONTINUED | OUTPATIENT
Start: 2021-05-24 | End: 2021-05-26 | Stop reason: HOSPADM

## 2021-05-24 RX ORDER — BISACODYL 10 MG
10 SUPPOSITORY, RECTAL RECTAL
Status: DISCONTINUED | OUTPATIENT
Start: 2021-05-24 | End: 2021-05-26 | Stop reason: HOSPADM

## 2021-05-24 RX ORDER — ADENOSINE 3 MG/ML
INJECTION, SOLUTION INTRAVENOUS
Status: COMPLETED
Start: 2021-05-24 | End: 2021-05-24

## 2021-05-24 RX ORDER — LORAZEPAM 2 MG/ML
3 INJECTION INTRAMUSCULAR
Status: DISCONTINUED | OUTPATIENT
Start: 2021-05-24 | End: 2021-05-24

## 2021-05-24 RX ORDER — LORAZEPAM 2 MG/1
4 TABLET ORAL
Status: DISCONTINUED | OUTPATIENT
Start: 2021-05-24 | End: 2021-05-26 | Stop reason: HOSPADM

## 2021-05-24 RX ORDER — POTASSIUM CHLORIDE 20 MEQ/1
20 TABLET, EXTENDED RELEASE ORAL 2 TIMES DAILY
Status: COMPLETED | OUTPATIENT
Start: 2021-05-24 | End: 2021-05-25

## 2021-05-24 RX ORDER — LORAZEPAM 2 MG/ML
4 INJECTION INTRAMUSCULAR
Status: DISCONTINUED | OUTPATIENT
Start: 2021-05-24 | End: 2021-05-24

## 2021-05-24 RX ORDER — LORAZEPAM 2 MG/ML
2 INJECTION INTRAMUSCULAR
Status: DISCONTINUED | OUTPATIENT
Start: 2021-05-24 | End: 2021-05-26 | Stop reason: HOSPADM

## 2021-05-24 RX ORDER — ADENOSINE 3 MG/ML
6 INJECTION, SOLUTION INTRAVENOUS ONCE
Status: ACTIVE | OUTPATIENT
Start: 2021-05-24 | End: 2021-05-25

## 2021-05-24 RX ORDER — ACETAMINOPHEN 325 MG/1
650 TABLET ORAL EVERY 6 HOURS PRN
Status: DISCONTINUED | OUTPATIENT
Start: 2021-05-24 | End: 2021-05-26 | Stop reason: HOSPADM

## 2021-05-24 RX ORDER — LORAZEPAM 2 MG/ML
2 INJECTION INTRAMUSCULAR
Status: DISCONTINUED | OUTPATIENT
Start: 2021-05-24 | End: 2021-05-24

## 2021-05-24 RX ORDER — DILTIAZEM HYDROCHLORIDE 180 MG/1
180 CAPSULE, COATED, EXTENDED RELEASE ORAL DAILY
Status: DISCONTINUED | OUTPATIENT
Start: 2021-05-25 | End: 2021-05-26 | Stop reason: HOSPADM

## 2021-05-24 RX ORDER — HEPARIN SODIUM 5000 [USP'U]/ML
5000 INJECTION, SOLUTION INTRAVENOUS; SUBCUTANEOUS EVERY 8 HOURS
Status: DISCONTINUED | OUTPATIENT
Start: 2021-05-25 | End: 2021-05-26 | Stop reason: HOSPADM

## 2021-05-24 RX ORDER — DILTIAZEM HYDROCHLORIDE 5 MG/ML
10 INJECTION INTRAVENOUS ONCE
Status: COMPLETED | OUTPATIENT
Start: 2021-05-24 | End: 2021-05-24

## 2021-05-24 RX ORDER — LORAZEPAM 2 MG/ML
2 INJECTION INTRAMUSCULAR ONCE
Status: ACTIVE | OUTPATIENT
Start: 2021-05-24 | End: 2021-05-25

## 2021-05-24 RX ORDER — FOLIC ACID 1 MG/1
1 TABLET ORAL DAILY
Status: DISCONTINUED | OUTPATIENT
Start: 2021-05-25 | End: 2021-05-26 | Stop reason: HOSPADM

## 2021-05-24 RX ADMIN — ONDANSETRON 4 MG: 2 INJECTION INTRAMUSCULAR; INTRAVENOUS at 12:59

## 2021-05-24 RX ADMIN — DILTIAZEM HYDROCHLORIDE 15 MG/HR: 5 INJECTION INTRAVENOUS at 21:44

## 2021-05-24 RX ADMIN — DILTIAZEM HYDROCHLORIDE 15 MG: 5 INJECTION INTRAVENOUS at 21:12

## 2021-05-24 RX ADMIN — SODIUM CHLORIDE 1000 ML: 9 INJECTION, SOLUTION INTRAVENOUS at 13:00

## 2021-05-24 RX ADMIN — ADENOSINE 6 MG: 3 INJECTION INTRAVENOUS at 21:04

## 2021-05-24 RX ADMIN — THIAMINE HYDROCHLORIDE: 100 INJECTION, SOLUTION INTRAMUSCULAR; INTRAVENOUS at 21:26

## 2021-05-24 RX ADMIN — SODIUM CHLORIDE 1000 ML: 9 INJECTION, SOLUTION INTRAVENOUS at 22:40

## 2021-05-24 RX ADMIN — SODIUM CHLORIDE 1000 ML: 9 INJECTION, SOLUTION INTRAVENOUS at 21:15

## 2021-05-24 RX ADMIN — LORAZEPAM 2 MG: 2 INJECTION INTRAMUSCULAR; INTRAVENOUS at 21:10

## 2021-05-24 RX ADMIN — DILTIAZEM HYDROCHLORIDE 10 MG: 5 INJECTION INTRAVENOUS at 21:20

## 2021-05-24 RX ADMIN — Medication: at 21:26

## 2021-05-24 ASSESSMENT — ENCOUNTER SYMPTOMS
FEVER: 0
NAUSEA: 0
VOMITING: 0
PALPITATIONS: 1
SHORTNESS OF BREATH: 1

## 2021-05-24 ASSESSMENT — FIBROSIS 4 INDEX
FIB4 SCORE: 17.91
FIB4 SCORE: 11.46
FIB4 SCORE: 32.24

## 2021-05-24 NOTE — DISCHARGE INSTRUCTIONS
Follow-up with your primary doctor for recheck as soon as possible.  Please moderate or discontinue your alcohol use.  Return if worse or for any concerns

## 2021-05-24 NOTE — ED TRIAGE NOTES
"Chief Complaint   Patient presents with   • Illness     BIB EMS to triage. pt states he does not feel well. pt states \"I think I am dying\", chest feels heavy.    • Alcohol Problem     hx of, last drink approximately yesterday, pt responds \"i don't know\" to many questions     Pt ambulatory to triage for above.   BS: 67 by EMS. Pt given apple juice  Pt was sitting in chair and rolled onto ground from chair, no injury.       /77   Pulse (!) 102   Temp 36.7 °C (98 °F) (Temporal)   Resp 16   Ht 1.727 m (5' 8\")   Wt 58.8 kg (129 lb 10.1 oz)   SpO2 95%   BMI 19.71 kg/m²     "

## 2021-05-24 NOTE — ED PROVIDER NOTES
"ED Provider Note    CHIEF COMPLAINT  Chief Complaint   Patient presents with   • Illness     BIB EMS to triage. pt states he does not feel well. pt states \"I think I am dying\", chest feels heavy.    • Alcohol Problem     hx of, last drink approximately yesterday, pt responds \"i don't know\" to many questions       HPI  Inocencio Shabazz is a 61 y.o. male who presents with similar complaints to previous visit on May 13.  He states he drank too much alcohol yesterday and today feels generally weak.  He states his entire body, head chest and extremities feels heavy.  Triage note states chest feels heavy, patient response to this question with \"everything feels heavy.\" Patient denies suicidal ideation.  States he vomited once this morning.  No change in bowel habits.  He denies fever or cough.  No new leg swelling.  No abdominal pain    REVIEW OF SYSTEMS    Constitutional: General malaise  Respiratory: No shortness of breath  Cardiac: No exertional chest pain, no syncope  Gastrointestinal: Nausea.  Denies abdominal pain  Musculoskeletal: No acute neck or back pain  Neurologic: Generalized weakness.  Denies focal weakness or numbness.  No headache.  Psychiatric: History of schizophrenia, bipolar.  He denies suicidal ideation       All other systems are negative.       PAST MEDICAL HISTORY  Past Medical History:   Diagnosis Date   • Alcohol abuse    • Alcohol intoxication (HCC) 3/13/2014   • Cirrhosis (HCC)    • GIB (gastrointestinal bleeding) 1/3/2016   • Hepatitis C    • Pancytopenia (HCC)    • Psychiatric disorder     schitzo, bipolar       FAMILY HISTORY  No family history on file.    SOCIAL HISTORY  Social History     Socioeconomic History   • Marital status: Single     Spouse name: Not on file   • Number of children: Not on file   • Years of education: Not on file   • Highest education level: Not on file   Occupational History   • Not on file   Tobacco Use   • Smoking status: Current Every Day Smoker     Packs/day: " 1.00     Years: 48.00     Pack years: 48.00     Types: Cigarettes   • Smokeless tobacco: Never Used   Substance and Sexual Activity   • Alcohol use: Yes     Comment: 1-2 beers and 1/2 pints of whiskey every day   • Drug use: Yes     Types: Inhaled     Comment: marijuana, cocaine, meth   • Sexual activity: Not on file   Other Topics Concern   • Not on file   Social History Narrative   • Not on file     Social Determinants of Health     Financial Resource Strain:    • Difficulty of Paying Living Expenses:    Food Insecurity:    • Worried About Running Out of Food in the Last Year:    • Ran Out of Food in the Last Year:    Transportation Needs:    • Lack of Transportation (Medical):    • Lack of Transportation (Non-Medical):    Physical Activity:    • Days of Exercise per Week:    • Minutes of Exercise per Session:    Stress:    • Feeling of Stress :    Social Connections:    • Frequency of Communication with Friends and Family:    • Frequency of Social Gatherings with Friends and Family:    • Attends Mandaeism Services:    • Active Member of Clubs or Organizations:    • Attends Club or Organization Meetings:    • Marital Status:    Intimate Partner Violence:    • Fear of Current or Ex-Partner:    • Emotionally Abused:    • Physically Abused:    • Sexually Abused:        SURGICAL HISTORY  Past Surgical History:   Procedure Laterality Date   • GASTROSCOPY-ENDO N/A 11/13/2019    Procedure: GASTROSCOPY with banding;  Surgeon: Tamela Smith M.D.;  Location: ENDOSCOPY [facility];  Service: Gastroenterology   • GASTROSCOPY  3/13/2019    Procedure: GASTROSCOPY;  Surgeon: Abdi Ba M.D.;  Location: SURGERY [facility];  Service: Gastroenterology   • GASTROSCOPY  4/8/2016    Procedure: GASTROSCOPY;  Surgeon: Braeden Tobin M.D.;  Location: SURGERY [facility];  Service:    • GASTROSCOPY  1/3/2016    Procedure: GASTROSCOPY WITH BANDING;  Surgeon: Alfredo Antonio M.D.;  Location: SURGERY [facility];  " Service:        CURRENT MEDICATIONS  Home Medications    **Home medications have not yet been reviewed for this encounter**         ALLERGIES  Allergies   Allergen Reactions   • Haloperidol Unspecified     Twitching/involuntary movements        PHYSICAL EXAM  VITAL SIGNS: /77   Pulse (!) 102   Temp 36.7 °C (98 °F) (Temporal)   Resp 16   Ht 1.727 m (5' 8\")   Wt 58.8 kg (129 lb 10.1 oz)   SpO2 95%   BMI 19.71 kg/m²   Constitutional:  Non-toxic appearance.   HENT: No facial swelling, no epistaxis  Eyes: Anicteric, no conjunctivitis.     Cardiovascular: Tachycardic heart rate, Normal rhythm  Pulmonary:  No wheezing, No rales.   Gastrointestinal: Soft, No tenderness, No distention or palpable mass  Skin: Warm, Dry, No cyanosis.  No asymmetric swelling of the extremities  Neurologic: Speech is clear, follows commands, facial expression is symmetrical.  Strength normal, sensation normal  Psychiatric: Affect flat,  Mood normal.  Patient is calm and cooperative  Musculoskeletal: Chest wall nontender.  Flanks nontender.    EKG/Labs  Results for orders placed or performed during the hospital encounter of 05/24/21   CBC WITH DIFFERENTIAL   Result Value Ref Range    WBC 2.3 (LL) 4.8 - 10.8 K/uL    RBC 4.33 (L) 4.70 - 6.10 M/uL    Hemoglobin 13.4 (L) 14.0 - 18.0 g/dL    Hematocrit 41.4 (L) 42.0 - 52.0 %    MCV 95.6 81.4 - 97.8 fL    MCH 30.9 27.0 - 33.0 pg    MCHC 32.4 (L) 33.7 - 35.3 g/dL    RDW 47.9 35.9 - 50.0 fL    Platelet Count 58 (L) 164 - 446 K/uL    MPV 11.7 9.0 - 12.9 fL    Neutrophils-Polys 53.80 44.00 - 72.00 %    Lymphocytes 31.40 22.00 - 41.00 %    Monocytes 11.40 0.00 - 13.40 %    Eosinophils 1.30 0.00 - 6.90 %    Basophils 1.70 0.00 - 1.80 %    Immature Granulocytes 0.40 0.00 - 0.90 %    Nucleated RBC 0.00 /100 WBC    Neutrophils (Absolute) 1.23 (L) 1.82 - 7.42 K/uL    Lymphs (Absolute) 0.72 (L) 1.00 - 4.80 K/uL    Monos (Absolute) 0.26 0.00 - 0.85 K/uL    Eos (Absolute) 0.03 0.00 - 0.51 K/uL    Baso " (Absolute) 0.04 0.00 - 0.12 K/uL    Immature Granulocytes (abs) 0.01 0.00 - 0.11 K/uL    NRBC (Absolute) 0.00 K/uL   COMP METABOLIC PANEL   Result Value Ref Range    Sodium 139 135 - 145 mmol/L    Potassium 3.8 3.6 - 5.5 mmol/L    Chloride 104 96 - 112 mmol/L    Co2 18 (L) 20 - 33 mmol/L    Anion Gap 17.0 (H) 7.0 - 16.0    Glucose 68 65 - 99 mg/dL    Bun 12 8 - 22 mg/dL    Creatinine 0.53 0.50 - 1.40 mg/dL    Calcium 7.9 (L) 8.5 - 10.5 mg/dL    AST(SGOT) 92 (H) 12 - 45 U/L    ALT(SGPT) 76 (H) 2 - 50 U/L    Alkaline Phosphatase 75 30 - 99 U/L    Total Bilirubin 0.4 0.1 - 1.5 mg/dL    Albumin 3.8 3.2 - 4.9 g/dL    Total Protein 7.9 6.0 - 8.2 g/dL    Globulin 4.1 (H) 1.9 - 3.5 g/dL    A-G Ratio 0.9 g/dL   TROPONIN   Result Value Ref Range    Troponin T <6 6 - 19 ng/L   DIAGNOSTIC ALCOHOL   Result Value Ref Range    Diagnostic Alcohol 219.4 (H) 0.0 - 10.0 mg/dL   ESTIMATED GFR   Result Value Ref Range    GFR If African American >60 >60 mL/min/1.73 m 2    GFR If Non African American >60 >60 mL/min/1.73 m 2   EKG (NOW)   Result Value Ref Range    Report       [facility] of [facility] Emergency Dept.    Test Date:  2021  Pt Name:    JIMMY FERRER                 Department: ER  MRN:        [id number]                      Room:       [facility]  Gender:     Male                         Technician: [id number]  :        [id number]                   Requested By:JOHN NDIAYE  Order #:    [id number]                    Reading MD: JOHN NDIAYE MD    Measurements  Intervals                                Axis  Rate:       58                           P:          76  MA:         156                          QRS:        70  QRSD:       94                           T:          79  QT:         444  QTc:        437    Interpretive Statements  SINUS BRADYCARDIA  RSR' IN V1 OR V2, PROBABLY NORMAL VARIANT  NONSPECIFIC T ABNORMALITIES, ANT-LAT LEADS  Compared to ECG 2021 20:01:23  RSR' in V1 or V2 now present  T-wave  "abnormality now present  Sinus tachycardia no longer present  ST (T wave) deviation no longer present  Electronically Signed  On 5- 14:51:13 PDT by ALAN NDIAYE MD           RADIOLOGY/PROCEDURES  DX-CHEST-PORTABLE (1 VIEW)   Final Result      No evidence of acute cardiopulmonary process.      OUTSIDE IMAGES-DX CHEST   Final Result            COURSE & MEDICAL DECISION MAKING  Pertinent Labs & Imaging studies reviewed. (See chart for details)  Patient with malaise, all over body pressure-like discomfort.  This is of unknown etiology and nonspecific.  The patient is afebrile, normal white blood cell count, no evidence of infection.  EKG shows no ischemic change, his troponin is negative.  Patient is intoxicated, states he heavily drank yesterday, this may be secondary to alcohol poisoning, or \"hung over\".  Patient with psychiatric history, denies suicidal ideation or active hallucination at this time.  Patient has been seen previously for similar complaints.  Patient found to be pancytopenic, this is chronic in comparison with his previous labs and I suspect secondary to his alcohol use.  Patient advised to moderate or discontinue his alcohol use, to continue to take his medications as prescribed and follow-up with his primary care doctor soon as possible.    FINAL IMPRESSION     1. Malaise and fatigue     2. Acute alcoholic intoxication without complication (HCC)     3. Pancytopenia (HCC)                     Electronically signed by: Alan Ndiaye M.D., 5/24/2021 12:02 PM    "

## 2021-05-24 NOTE — ED NOTES
Pt discharged home. Explained discharge instructions. Questions and concerns addressed. Pt verbalized understanding of instructions specific to today's visit for fatigue and cp. Wristband removed. Pt advised to follow-up with pcp asap or return to ED for any new or worsening of symptoms. Pt is ambulating well and steady on feet. VS stable. Pt self will be escorting pt home. Pt in agreement that all belongings are with them at this time.    PIV removed and dressing applied  Pt verbalized understanding of dc instructions. Pt demonstrating steady gait and good safety awareness at this time. Pt calm cooperative a, o x4.

## 2021-05-25 LAB
ERYTHROCYTE [DISTWIDTH] IN BLOOD BY AUTOMATED COUNT: 46.6 FL (ref 35.9–50)
FERRITIN SERPL-MCNC: 239 NG/ML (ref 22–322)
FOLATE SERPL-MCNC: >40 NG/ML
HCT VFR BLD AUTO: 31.9 % (ref 42–52)
HGB BLD-MCNC: 10.8 G/DL (ref 14–18)
HGB RETIC QN AUTO: 34 PG/CELL (ref 29–35)
IMM RETICS NFR: 2.9 % (ref 9.3–17.4)
IRON SATN MFR SERPL: 93 % (ref 15–55)
IRON SERPL-MCNC: 239 UG/DL (ref 50–180)
LACTATE BLD-SCNC: 0.8 MMOL/L (ref 0.5–2)
LACTATE BLD-SCNC: 1.8 MMOL/L (ref 0.5–2)
LACTATE BLD-SCNC: 2.1 MMOL/L (ref 0.5–2)
MAGNESIUM SERPL-MCNC: 2 MG/DL (ref 1.5–2.5)
MCH RBC QN AUTO: 31.9 PG (ref 27–33)
MCHC RBC AUTO-ENTMCNC: 33.9 G/DL (ref 33.7–35.3)
MCV RBC AUTO: 94.1 FL (ref 81.4–97.8)
PHOSPHATE SERPL-MCNC: 2.4 MG/DL (ref 2.5–4.5)
PLATELET # BLD AUTO: 36 K/UL (ref 164–446)
PMV BLD AUTO: 11.4 FL (ref 9–12.9)
RBC # BLD AUTO: 3.39 M/UL (ref 4.7–6.1)
RETICS # AUTO: 0.04 M/UL (ref 0.04–0.06)
RETICS/RBC NFR: 1.2 % (ref 0.8–2.1)
SARS-COV-2 RNA RESP QL NAA+PROBE: NOTDETECTED
SPECIMEN SOURCE: NORMAL
TIBC SERPL-MCNC: 256 UG/DL (ref 250–450)
UIBC SERPL-MCNC: <17 UG/DL (ref 110–370)
VIT B12 SERPL-MCNC: 560 PG/ML (ref 211–911)
WBC # BLD AUTO: 1.4 K/UL (ref 4.8–10.8)

## 2021-05-25 PROCEDURE — 83540 ASSAY OF IRON: CPT

## 2021-05-25 PROCEDURE — 83550 IRON BINDING TEST: CPT

## 2021-05-25 PROCEDURE — 82607 VITAMIN B-12: CPT

## 2021-05-25 PROCEDURE — 82746 ASSAY OF FOLIC ACID SERUM: CPT

## 2021-05-25 PROCEDURE — 770020 HCHG ROOM/CARE - TELE (206)

## 2021-05-25 PROCEDURE — A9270 NON-COVERED ITEM OR SERVICE: HCPCS | Performed by: INTERNAL MEDICINE

## 2021-05-25 PROCEDURE — 84100 ASSAY OF PHOSPHORUS: CPT

## 2021-05-25 PROCEDURE — 36415 COLL VENOUS BLD VENIPUNCTURE: CPT

## 2021-05-25 PROCEDURE — 700111 HCHG RX REV CODE 636 W/ 250 OVERRIDE (IP): Performed by: INTERNAL MEDICINE

## 2021-05-25 PROCEDURE — 85027 COMPLETE CBC AUTOMATED: CPT

## 2021-05-25 PROCEDURE — 700102 HCHG RX REV CODE 250 W/ 637 OVERRIDE(OP): Performed by: INTERNAL MEDICINE

## 2021-05-25 PROCEDURE — 99223 1ST HOSP IP/OBS HIGH 75: CPT | Performed by: INTERNAL MEDICINE

## 2021-05-25 PROCEDURE — 83735 ASSAY OF MAGNESIUM: CPT

## 2021-05-25 PROCEDURE — 85046 RETICYTE/HGB CONCENTRATE: CPT

## 2021-05-25 PROCEDURE — 82728 ASSAY OF FERRITIN: CPT

## 2021-05-25 PROCEDURE — 83605 ASSAY OF LACTIC ACID: CPT

## 2021-05-25 RX ORDER — NICOTINE 21 MG/24HR
1 PATCH, TRANSDERMAL 24 HOURS TRANSDERMAL EVERY 24 HOURS
Status: DISCONTINUED | OUTPATIENT
Start: 2021-05-26 | End: 2021-05-26 | Stop reason: HOSPADM

## 2021-05-25 RX ADMIN — OMEPRAZOLE 40 MG: 20 CAPSULE, DELAYED RELEASE ORAL at 17:50

## 2021-05-25 RX ADMIN — METOPROLOL TARTRATE 25 MG: 25 TABLET, FILM COATED ORAL at 11:16

## 2021-05-25 RX ADMIN — OMEPRAZOLE 40 MG: 20 CAPSULE, DELAYED RELEASE ORAL at 05:17

## 2021-05-25 RX ADMIN — HEPARIN SODIUM 5000 UNITS: 5000 INJECTION, SOLUTION INTRAVENOUS; SUBCUTANEOUS at 14:31

## 2021-05-25 RX ADMIN — POTASSIUM CHLORIDE 20 MEQ: 1500 TABLET, EXTENDED RELEASE ORAL at 05:17

## 2021-05-25 RX ADMIN — QUETIAPINE FUMARATE 200 MG: 200 TABLET ORAL at 17:51

## 2021-05-25 RX ADMIN — DILTIAZEM HYDROCHLORIDE 180 MG: 180 CAPSULE, COATED, EXTENDED RELEASE ORAL at 05:17

## 2021-05-25 RX ADMIN — POTASSIUM CHLORIDE 20 MEQ: 1500 TABLET, EXTENDED RELEASE ORAL at 01:04

## 2021-05-25 RX ADMIN — METOPROLOL TARTRATE 25 MG: 25 TABLET, FILM COATED ORAL at 17:51

## 2021-05-25 RX ADMIN — OMEPRAZOLE 40 MG: 20 CAPSULE, DELAYED RELEASE ORAL at 01:04

## 2021-05-25 RX ADMIN — FOLIC ACID 1 MG: 1 TABLET ORAL at 05:17

## 2021-05-25 RX ADMIN — THIAMINE HCL TAB 100 MG 100 MG: 100 TAB at 05:16

## 2021-05-25 RX ADMIN — THERA TABS 1 TABLET: TAB at 05:17

## 2021-05-25 ASSESSMENT — LIFESTYLE VARIABLES
NAUSEA AND VOMITING: NO NAUSEA AND NO VOMITING
HAVE PEOPLE ANNOYED YOU BY CRITICIZING YOUR DRINKING: NO
AUDITORY DISTURBANCES: NOT PRESENT
AUDITORY DISTURBANCES: NOT PRESENT
HEADACHE, FULLNESS IN HEAD: NOT PRESENT
AGITATION: NORMAL ACTIVITY
ORIENTATION AND CLOUDING OF SENSORIUM: ORIENTED AND CAN DO SERIAL ADDITIONS
HEADACHE, FULLNESS IN HEAD: NOT PRESENT
ORIENTATION AND CLOUDING OF SENSORIUM: CANNOT DO SERIAL ADDITIONS OR IS UNCERTAIN ABOUT DATE
EVER FELT BAD OR GUILTY ABOUT YOUR DRINKING: NO
AUDITORY DISTURBANCES: NOT PRESENT
TOTAL SCORE: 0
TOTAL SCORE: 0
NAUSEA AND VOMITING: NO NAUSEA AND NO VOMITING
VISUAL DISTURBANCES: NOT PRESENT
TOTAL SCORE: 0
VISUAL DISTURBANCES: NOT PRESENT
ORIENTATION AND CLOUDING OF SENSORIUM: ORIENTED AND CAN DO SERIAL ADDITIONS
TREMOR: NO TREMOR
PAROXYSMAL SWEATS: NO SWEAT VISIBLE
ON A TYPICAL DAY WHEN YOU DRINK ALCOHOL HOW MANY DRINKS DO YOU HAVE: 4
ANXIETY: NO ANXIETY (AT EASE)
VISUAL DISTURBANCES: NOT PRESENT
ANXIETY: MILDLY ANXIOUS
AGITATION: NORMAL ACTIVITY
AGITATION: NORMAL ACTIVITY
ANXIETY: NO ANXIETY (AT EASE)
TREMOR: NO TREMOR
DOES PATIENT WANT TO STOP DRINKING: NO
AVERAGE NUMBER OF DAYS PER WEEK YOU HAVE A DRINK CONTAINING ALCOHOL: 5
HOW MANY TIMES IN THE PAST YEAR HAVE YOU HAD 5 OR MORE DRINKS IN A DAY: 200
HAVE YOU EVER FELT YOU SHOULD CUT DOWN ON YOUR DRINKING: NO
VISUAL DISTURBANCES: NOT PRESENT
TOTAL SCORE: 0
ALCOHOL_USE: YES
HEADACHE, FULLNESS IN HEAD: NOT PRESENT
TOTAL SCORE: 0
AUDITORY DISTURBANCES: NOT PRESENT
NAUSEA AND VOMITING: NO NAUSEA AND NO VOMITING
NAUSEA AND VOMITING: NO NAUSEA AND NO VOMITING
ANXIETY: NO ANXIETY (AT EASE)
ANXIETY: NO ANXIETY (AT EASE)
AUDITORY DISTURBANCES: NOT PRESENT
TOTAL SCORE: 2
NAUSEA AND VOMITING: NO NAUSEA AND NO VOMITING
AGITATION: NORMAL ACTIVITY
PAROXYSMAL SWEATS: NO SWEAT VISIBLE
TREMOR: NO TREMOR
NAUSEA AND VOMITING: NO NAUSEA AND NO VOMITING
PAROXYSMAL SWEATS: NO SWEAT VISIBLE
AGITATION: NORMAL ACTIVITY
HEADACHE, FULLNESS IN HEAD: NOT PRESENT
TOTAL SCORE: 0
ORIENTATION AND CLOUDING OF SENSORIUM: ORIENTED AND CAN DO SERIAL ADDITIONS
ANXIETY: MILDLY ANXIOUS
TREMOR: NO TREMOR
AGITATION: NORMAL ACTIVITY
AUDITORY DISTURBANCES: NOT PRESENT
ORIENTATION AND CLOUDING OF SENSORIUM: CANNOT DO SERIAL ADDITIONS OR IS UNCERTAIN ABOUT DATE
TOTAL SCORE: 2
ORIENTATION AND CLOUDING OF SENSORIUM: ORIENTED AND CAN DO SERIAL ADDITIONS
TREMOR: TREMOR NOT VISIBLE BUT CAN BE FELT, FINGERTIP TO FINGERTIP
PAROXYSMAL SWEATS: NO SWEAT VISIBLE
TOTAL SCORE: 1
CONSUMPTION TOTAL: POSITIVE
VISUAL DISTURBANCES: NOT PRESENT
TREMOR: NO TREMOR
VISUAL DISTURBANCES: NOT PRESENT
PAROXYSMAL SWEATS: NO SWEAT VISIBLE
HEADACHE, FULLNESS IN HEAD: NOT PRESENT
PAROXYSMAL SWEATS: NO SWEAT VISIBLE
EVER HAD A DRINK FIRST THING IN THE MORNING TO STEADY YOUR NERVES TO GET RID OF A HANGOVER: NO
HEADACHE, FULLNESS IN HEAD: NOT PRESENT

## 2021-05-25 ASSESSMENT — COGNITIVE AND FUNCTIONAL STATUS - GENERAL
SUGGESTED CMS G CODE MODIFIER DAILY ACTIVITY: CI
MOBILITY SCORE: 20
MOVING FROM LYING ON BACK TO SITTING ON SIDE OF FLAT BED: A LITTLE
DAILY ACTIVITIY SCORE: 23
WALKING IN HOSPITAL ROOM: A LITTLE
SUGGESTED CMS G CODE MODIFIER MOBILITY: CJ
STANDING UP FROM CHAIR USING ARMS: A LITTLE
DRESSING REGULAR LOWER BODY CLOTHING: A LITTLE
CLIMB 3 TO 5 STEPS WITH RAILING: A LITTLE

## 2021-05-25 ASSESSMENT — ENCOUNTER SYMPTOMS
COUGH: 0
NECK PAIN: 0
NAUSEA: 0
DEPRESSION: 0
SORE THROAT: 0
DIZZINESS: 0
STRIDOR: 0
HEADACHES: 0
MYALGIAS: 0
INSOMNIA: 0
BLURRED VISION: 0
DOUBLE VISION: 0
PALPITATIONS: 0
FEVER: 0
WEIGHT LOSS: 0
HEMOPTYSIS: 0
BRUISES/BLEEDS EASILY: 0
VOMITING: 0

## 2021-05-25 ASSESSMENT — PATIENT HEALTH QUESTIONNAIRE - PHQ9
2. FEELING DOWN, DEPRESSED, IRRITABLE, OR HOPELESS: NOT AT ALL
SUM OF ALL RESPONSES TO PHQ9 QUESTIONS 1 AND 2: 0
1. LITTLE INTEREST OR PLEASURE IN DOING THINGS: NOT AT ALL

## 2021-05-25 ASSESSMENT — FIBROSIS 4 INDEX: FIB4 SCORE: 17.91

## 2021-05-25 NOTE — ED NOTES
Med Rec completed: per patient at bedside and [facility]  Preferred Pharmacy:   Allergies:  Allergies   Allergen Reactions   • Haloperidol Unspecified     Twitching/involuntary movements        No ORAL antibiotics in last 14 days    Home Medications:    **Patient unable to identify what medications he takes or last time he took medications. [facility] Called [facility] and per pharmacy patient was just discharged from [facility]. He reported patient had these medications ordered on 3.11.21 but did not pick them up.  Medication Sig   • DILTIAZem CD (DILTIAZEM CD) 120 MG CAPSULE SR 24 HR Take 120 mg by mouth every day.   • QUEtiapine (SEROQUEL) 200 MG Tab Take 200 mg by mouth every evening.   • pantoprazole (PROTONIX) 40 MG Tablet Delayed Response Take 40 mg by mouth 2 times a day.

## 2021-05-25 NOTE — PROGRESS NOTES
I was called to consult on this patient for ICU admission.  Briefly he has EtOH dependence, cirrhosis, pAF and is here with a fib with RVR.  He was on diltiazem 20mg.  I placed some order for him, then went to see him and he had just converted to sinus so Dr. Hernandez with the hospitalist service will admit him to telemetry.  Discussed with Dr. Hernandez. Critical care is happy to consult at any time if needed.

## 2021-05-25 NOTE — ED TRIAGE NOTES
Chief Complaint   Patient presents with   • Palpitations     Pt c/o palpitations that started aprox 1 hr at rest, denies any cardiac hx, pt seen here earlier today for ETOH intox    PPE Note: I personally donned full PPE for all patient encounters during this visit, including a N95 respirator mask, gloves, and goggles.

## 2021-05-25 NOTE — H&P
Hospital Medicine History & Physical Note    Date of Service  May 24, 2021    Primary Care Physician  Nnamdi Ballesteros M.D.    Consultants  Intensivist Dr. Jean Baptiste    Code Status  Full Code    Chief Complaint  Chief Complaint   Patient presents with   • Palpitations       History of Presenting Illness  61 y.o. male with history of alcohol dependence, hepatitis C with cirrhosis and paroxysmal atrial fibrillation who presented 5/24/2021 with 1 hour of palpitations and chest pain associated with shortness of breath that prompted call to EMS he is found to have atrial fibrillation with RVR receiving adenosine without conversion he receives all a bolus of IV Cardizem and continuous drip he converted to normal sinus rhythm and referred to the hospitalist for admission.  At bedside he is somnolent after receiving 2 mg of Ativan for anticipated alcohol withdrawal.  He is unable to provide meaningful history.   The patient was hospitalized on 4/23/2021 for a cardiac work up. Echocardiogram was normal and demonstrated an ejection fraction of 55%.  Patient was also in the emergency department earlier today where EKG demonstrated sinus bradycardia.    Review of Systems  Review of Systems   Unable to perform ROS: Medical condition   Constitutional: Negative for fever, malaise/fatigue and weight loss.   HENT: Negative for sore throat and tinnitus.    Eyes: Negative for blurred vision and double vision.   Respiratory: Negative for cough, hemoptysis and stridor.    Cardiovascular: Negative for chest pain and palpitations.   Gastrointestinal: Negative for nausea and vomiting.   Genitourinary: Negative for dysuria and urgency.   Musculoskeletal: Negative for myalgias and neck pain.   Skin: Negative for itching and rash.   Neurological: Negative for dizziness and headaches.   Endo/Heme/Allergies: Does not bruise/bleed easily.   Psychiatric/Behavioral: Negative for depression. The patient does not have insomnia.        Past Medical History    has a past medical history of Alcohol abuse, Alcohol intoxication (HCC) (3/13/2014), Cirrhosis (HCC), GIB (gastrointestinal bleeding) (1/3/2016), Hepatitis C, Pancytopenia (HCC), and Psychiatric disorder.    Surgical History   has a past surgical history that includes gastroscopy (1/3/2016); gastroscopy (4/8/2016); gastroscopy (3/13/2019); and gastroscopy-endo (N/A, 11/13/2019).     Family History  family history is not on file.  Unobtainable    Social History   reports that he has been smoking cigarettes. He has a 48.00 pack-year smoking history. He has never used smokeless tobacco. He reports current alcohol use. He reports current drug use. Drug: Inhaled.    Allergies  Allergies   Allergen Reactions   • Haloperidol Unspecified     Twitching/involuntary movements        Medications  Prior to Admission Medications   Prescriptions Last Dose Informant Patient Reported? Taking?   DILTIAZem CD (DILTIAZEM CD) 120 MG CAPSULE SR 24 HR Never Picked Up at Never Picked Up Patient's Home Pharmacy Yes No   Sig: Take 120 mg by mouth every day.   QUEtiapine (SEROQUEL) 200 MG Tab Never Picked Up at Never Picked Up Patient's Home Pharmacy Yes No   Sig: Take 200 mg by mouth every evening.   folic acid (FOLVITE) 1 MG Tab Not Taking at Not Taking Patient's Home Pharmacy No No   Sig: Take 1 Tab by mouth every day.   Patient not taking: Reported on 5/24/2021   multivitamin (THERAGRAN) Tab Not Taking at Not Taking Patient's Home Pharmacy No No   Sig: Take 1 Tab by mouth every day.   Patient not taking: Reported on 5/24/2021   nicotine (NICODERM) 21 MG/24HR PATCH 24 HR Not Taking at Not Taking Patient's Home Pharmacy No No   Sig: Place 1 Patch on the skin every 24 hours.   Patient not taking: Reported on 5/13/2021   nicotine polacrilex (NICORETTE) 2 MG Gum Not Taking at Not Taking Patient's Home Pharmacy No No   Sig: Take 1 Each by mouth every 1 hour as needed for Smoking Cessation (For nicotine urge).   Patient not taking: Reported on  5/13/2021   ondansetron (ZOFRAN ODT) 4 MG TABLET DISPERSIBLE Not Taking at Not Taking Patient's Home Pharmacy No No   Sig: Take 1 Tab by mouth every 8 hours as needed.   Patient not taking: Reported on 5/13/2021   pantoprazole (PROTONIX) 40 MG Tablet Delayed Response Never Picked Up at Never Picked Up Patient's Home Pharmacy Yes No   Sig: Take 40 mg by mouth 2 times a day.      Facility-Administered Medications: None       Physical Exam  Temp:  [36.6 °C (97.8 °F)-37.1 °C (98.8 °F)] 36.7 °C (98.1 °F)  Pulse:  [] 76  Resp:  [13-26] 18  BP: ()/(59-84) 104/74  SpO2:  [89 %-99 %] 94 %    Physical Exam  Vitals and nursing note reviewed.   Constitutional:       General: He is not in acute distress.     Appearance: Normal appearance. He is normal weight. He is not toxic-appearing.      Comments: Sedated in no acute distress   HENT:      Head: Normocephalic and atraumatic.      Nose: Nose normal. No congestion or rhinorrhea.      Mouth/Throat:      Mouth: Mucous membranes are moist.      Pharynx: Oropharynx is clear.   Eyes:      Extraocular Movements: Extraocular movements intact.      Conjunctiva/sclera: Conjunctivae normal.      Pupils: Pupils are equal, round, and reactive to light.   Neck:      Vascular: No carotid bruit.   Cardiovascular:      Rate and Rhythm: Normal rate and regular rhythm.      Pulses: Normal pulses.      Heart sounds: Normal heart sounds. No murmur heard.   No gallop.    Pulmonary:      Effort: No respiratory distress.      Breath sounds: Normal breath sounds. No wheezing or rales.   Abdominal:      General: Abdomen is flat. Bowel sounds are normal. There is no distension.      Palpations: Abdomen is soft. There is no mass.      Tenderness: There is no abdominal tenderness.      Hernia: No hernia is present.   Musculoskeletal:         General: No tenderness or signs of injury.      Cervical back: Normal range of motion and neck supple. No muscular tenderness.   Lymphadenopathy:       Cervical: No cervical adenopathy.   Skin:     Capillary Refill: Capillary refill takes less than 2 seconds.      Coloration: Skin is not jaundiced or pale.      Findings: No bruising.   Neurological:      General: No focal deficit present.      Mental Status: He is alert and oriented to person, place, and time. Mental status is at baseline.      Cranial Nerves: No cranial nerve deficit.      Motor: No weakness.      Coordination: Coordination normal.   Psychiatric:         Mood and Affect: Mood normal.         Thought Content: Thought content normal.         Judgment: Judgment normal.         Laboratory:  Recent Labs     05/24/21 1200 05/24/21 2053 05/25/21 0212   WBC 2.3* 2.8* 1.4*   RBC 4.33* 4.09* 3.39*   HEMOGLOBIN 13.4* 12.6* 10.8*   HEMATOCRIT 41.4* 38.6* 31.9*   MCV 95.6 94.4 94.1   MCH 30.9 30.8 31.9   MCHC 32.4* 32.6* 33.9   RDW 47.9 47.1 46.6   PLATELETCT 58* 51* 36*   MPV 11.7 11.6 11.4     Recent Labs     05/24/21 1200 05/24/21 2053   SODIUM 139 141   POTASSIUM 3.8 3.6   CHLORIDE 104 105   CO2 18* 19*   GLUCOSE 68 89   BUN 12 11   CREATININE 0.53 0.60   CALCIUM 7.9* 8.2*     Recent Labs     05/24/21 1200 05/24/21 2053   ALTSGPT 76* 69*   ASTSGOT 92* 85*   ALKPHOSPHAT 75 75   TBILIRUBIN 0.4 0.7   GLUCOSE 68 89         No results for input(s): NTPROBNP in the last 72 hours.      Recent Labs     05/24/21 1200 05/24/21 2053   TROPONINT <6 7       Imaging:  DX-CHEST-PORTABLE (1 VIEW)   Final Result      No radiographic evidence of acute cardiopulmonary process.            Assessment/Plan:  I anticipate this patient will require at least two midnights for appropriate medical management, necessitating inpatient admission.    * Paroxysmal atrial fibrillation (HCC)  Assessment & Plan  Converted to normal sinus rhythm, complicated by alcoholism and noncompliance.  Continue Cardizem at 180 mg p.o. daily    Alcohol abuse- (present on admission)  Assessment & Plan  Anticipate alcohol withdrawal, CIWA  protocol initiated    Hepatitis C- (present on admission)  Assessment & Plan  Refer to outpatient GI    Schizophrenia (HCC)- (present on admission)  Assessment & Plan  Continue outpatient Seroquel, consider additional agent or mental health consult    Pancytopenia (HCC)  Assessment & Plan  Likely secondary to alcoholism, follow-up CBC and anemia work-up

## 2021-05-25 NOTE — PROGRESS NOTES
Bedside report received, patient resting in bed, no apparent signs of distress, equal rise and fall of the chest, tele monitor in place, on 0.5 L O2 via NC. RN assessed needs, no additional needs at this time. Call light within reach. Bed locked in lowest position, non skid socks on, bed rails up x2, bed alarm in use, hourly rounding in place.

## 2021-05-25 NOTE — PROGRESS NOTES
Monitor summary:      Rhythm: SR  Rate: 72-81  Ectopy: rPAC  Measurements: .18/.08/.38        12hr chart check

## 2021-05-25 NOTE — CARE PLAN
The patient is Watcher - Medium risk of patient condition declining or worsening    Shift Goals  Clinical Goals: CIWA monitoring  Patient Goals: FABRIZIO - pt too lethargic  Family Goals: None present on NOC    Progress made toward(s) clinical / shift goals: Progressing    Patient is not progressing towards the following goals: n/a        Problem: Optimal Care for Alcohol Withdrawal  Goal: Optimal Care for the alcohol withdrawal patient  Outcome: Progressing     Problem: Seizure Precautions  Goal: Implementation of seizure precautions  Outcome: Progressing     Problem: Risk for Aspiration  Goal: Patient's risk for aspiration will be absent or decrease  Outcome: Progressing

## 2021-05-25 NOTE — ASSESSMENT & PLAN NOTE
Known history of paroxysmal A. Fib  Rapid rates are likely due to alcohol intoxication and/or withdrawal  Continue to assess fluid status  Dilt drip  If any further hypotension, will switch to amiodarone  Check TSH  No indication for repeat echocardiogram as he had a normal echo in April   verbal cues

## 2021-05-25 NOTE — PROGRESS NOTES
2 RN skin check completed with TITUS Naranjo.  Devices in place 2x PIV, 1x NC.  Skin assessed under devices CDI.  NO pressure ulcers found.  NO new potential pressure ulcers noted. Wound consult placed n/a  The following interventions in place - pillows in use for support and positioning, CIWA monitoring, pt educated on using call light appropriately. High fall risk precautions in place.      Sacrum is red and blanching.  R ankle/shin noted to have scratches with no drainage.

## 2021-05-25 NOTE — ASSESSMENT & PLAN NOTE
Chronic, stable  Due to alcohol and liver impairment  No chemical DVT prophylaxis as his platelets are frequently below 50 at baseline

## 2021-05-25 NOTE — ED NOTES
Pt came in with heart palpitations, erp at bedside,   2104 1L NS started  2110 ativan 2mg given   2112 diltiazem 15mg given   2120  diltiazem 10mg given   2126  Detox bag started

## 2021-05-25 NOTE — DIETARY
"Nutrition services: Day 1 of admit.  Inocencio Shabazz is a 61 y.o. male with admitting DX of A-fib      Consult received for: MST 3, potential weight loss without poor PO PTA (pt reported unsure). Attempted visit, pt sleeping soundly (did not wake when name called).    Assessment:  Height: 172.7 cm (5' 8\")  Weight: 63.1 kg (139 lb 1.8 oz)  Body mass index is 21.15 kg/m²., BMI classification: Normal  Diet/Intake: Cardiac, % x2 meals    Evaluation:   1. Weight trend:   · 137 lb via standing scale (4/21/21)  · 129 lb via standing scale (5/24/21), 5.8% weight loss over 1 month is severe  2. PMH: Alcohol abuse, Cirrhosis, Hep C, Schizophrenia, Bipolar   3. Labs: Reviewed  4. Meds: Kdur, Detox IV (Completed), MVI, Folic Acid  5. Limited nutrition focused physical exam: no abnormal muscle/fat loss observed in face/shoulders/arms.  6. Pt denied poor PO intake PTA per nutrition admit screen.    Malnutrition Risk: Increased risk due to 5.8% weight loss over 1 month, does not meet criteria at this time.    Recommendations/Plan:  1. RD to interview as able to further assess malnutrition risk   2. Encourage intake of meals  3. Document intake of all meals as % taken in ADL's to provide interdisciplinary communication across all shifts.   4. Monitor weight.  5. Nutrition rep will continue to see patient for ongoing meal and snack preferences.     RD following        "

## 2021-05-25 NOTE — ED PROVIDER NOTES
ED Provider Note    Scribed for Paula Pettit M.D. by Abigail David. 5/24/2021, 9:07 PM.    Primary care provider: Nnamdi Ballesteros M.D.  Means of arrival: Walk-In  History obtained from: Patient  History limited by: None    CHIEF COMPLAINT  Chief Complaint   Patient presents with   • Palpitations       HPI  Inocencio Shabazz is a 61 y.o. male who presents to the Emergency Department for evaluation of acute and intense palpitations and chest pain that began one hour prior to arrival. The patient admits that he is a chronic alcoholic who struggles with alcohol induced Cirrhosis and Hepatitis C. He admits that his last drink was 2 hours ago, but he usually drinks 0.5 pints of Whiskey per night. He states that his chest felt heavy, prompting him to call EMS. He endorses associated chest pain, shortness of breath, and fatigue, but denies fever, nausea, emesis, or Covid-19 symptoms.      The patient was hospitalized on 4/23/2021 for a cardiac work up. Echocardiogram was normal and demonstrated an ejection fraction of 55%.  Patient was also in the emergency department earlier today where EKG demonstrated sinus bradycardia.    REVIEW OF SYSTEMS  Review of Systems   Constitutional: Negative for fever.   Respiratory: Positive for shortness of breath.    Cardiovascular: Positive for chest pain and palpitations.   Gastrointestinal: Negative for nausea and vomiting.   All other systems reviewed and are negative.    PAST MEDICAL HISTORY   has a past medical history of Alcohol abuse, Alcohol intoxication (HCC) (3/13/2014), Cirrhosis (HCC), GIB (gastrointestinal bleeding) (1/3/2016), Hepatitis C, Pancytopenia (HCC), and Psychiatric disorder.    SURGICAL HISTORY   has a past surgical history that includes gastroscopy (1/3/2016); gastroscopy (4/8/2016); gastroscopy (3/13/2019); and gastroscopy-endo (N/A, 11/13/2019).    SOCIAL HISTORY  Social History     Tobacco Use   • Smoking status: Current Every Day Smoker     Packs/day: 1.00  "    Years: 48.00     Pack years: 48.00     Types: Cigarettes   • Smokeless tobacco: Never Used   Substance Use Topics   • Alcohol use: Yes     Comment: 1-2 beers and 1/2 pints of whiskey every day   • Drug use: Yes     Types: Inhaled     Comment: marijuana, cocaine, meth      Social History     Substance and Sexual Activity   Drug Use Yes   • Types: Inhaled    Comment: marijuana, cocaine, meth       FAMILY HISTORY  None pertinent    CURRENT MEDICATIONS  Scheduled Medications   Medication Dose Frequency   • adenosine  6 mg Once   • LORazepam  2 mg Once       ALLERGIES  Allergies   Allergen Reactions   • Haloperidol Unspecified     Twitching/involuntary movements        PHYSICAL EXAM  VITAL SIGNS: Pulse (!) 185   Temp 37.1 °C (98.8 °F) (Temporal)   Resp 16   Ht 1.727 m (5' 8\")   Wt 58.5 kg (129 lb)   SpO2 95%   BMI 19.61 kg/m²   Vitals reviewed by myself.  Nursing note and vitals reviewed.  Constitutional: Well-developed and well-nourished. No acute distress.   HENT: Head is normocephalic and atraumatic.  Eyes: extra-ocular movements intact  Cardiovascular: tachycardic rate and irregular rhythm. No murmur heard.  Pulmonary/Chest: Breath sounds normal. No wheezes or rales.   Abdominal: Soft and non-tender. No distention.    Musculoskeletal: Extremities exhibit normal range of motion without edema or tenderness.   Neurological: Awake and alert  Skin: Skin is warm and dry. No rash.     DIAGNOSTIC STUDIES /  LABS  Labs Reviewed   CBC WITH DIFFERENTIAL - Abnormal; Notable for the following components:       Result Value    WBC 2.8 (*)     RBC 4.09 (*)     Hemoglobin 12.6 (*)     Hematocrit 38.6 (*)     MCHC 32.6 (*)     Platelet Count 51 (*)     Monocytes 13.70 (*)     Neutrophils (Absolute) 1.74 (*)     Lymphs (Absolute) 0.65 (*)     All other components within normal limits   COMP METABOLIC PANEL - Abnormal; Notable for the following components:    Co2 19 (*)     Anion Gap 17.0 (*)     Calcium 8.2 (*)     AST(SGOT) " 85 (*)     ALT(SGPT) 69 (*)     Globulin 4.0 (*)     All other components within normal limits   DIAGNOSTIC ALCOHOL - Abnormal; Notable for the following components:    Diagnostic Alcohol 122.4 (*)     All other components within normal limits   TROPONIN   SARS-COV-2, PCR (IN-HOUSE)    Narrative:     Have you been in close contact with a person who is suspected  or known to be positive for COVID-19 within the last 30 days  (e.g. last seen that person < 30 days ago)->No   ESTIMATED GFR   MAGNESIUM   PHOSPHORUS   URINE DRUG SCREEN     All labs reviewed by me.    EKG Interpretation:  Interpreted by myself    12 Lead EKG interpreted by me to show:  EKG at 8:45 PM: Atrial fibrillation with rapid ventricular rate, heart rate 164, normal axis, QRS 82, QTc slightly prolonged at 496, no acute ST-T segment changes, no evidence of acute ischemia, atrial fibrillation is new for this patient when compared to prior EKG from earlier today  My impression of this EKG: Does not indicate ischemia  at this time.    EKG at 9:40 PM interpreted by myself, status post diltiazem atrial fibrillation with rapid ventricular response, heart rate now improved to 129 when compared to prior: EKG, normal axis, intervals notable for prolonged QT of 510, QRS 90, no acute ST-T segment changes, atrial fibrillation persists but rate is improved, no evidence of ischemia per my interpretation    RADIOLOGY  DX-CHEST-PORTABLE (1 VIEW)   Final Result      No radiographic evidence of acute cardiopulmonary process.        The radiologist's interpretation of all radiological studies have been reviewed by me.        REASSESSMENT  I verified that the patient was wearing a mask and I was wearing appropriate PPE every time I entered the room. The patient's mask was on the patient at all times during my encounter except for a brief view of the oropharynx. Patient is hooked up to defibrillation upon initial examination. Ordered labs and imaging for the patient.      9:08-evaluated patient at bedside, heart rate in the 200s, difficult to assess if this is SVT versus atrial fibrillation therefore elected to push adenosine.  Pushing Adenosine now.    9:10 PM- Heart rate slowed enough with Adenosine to demonstrate A-fib    9:11 PM- Administered 2 Ativan for concerns of alcohol withdrawal, started IV fluids and gave bolus of diltiazem    9:21 PM- Patient heart rate is in 150's following first dose of diltiazem.     9:42 PM- Heart rate is down to the 130's.    9:54 PM- I reevaluated the patient at this time, and informed him that he would have to be hospitalized due to his irregular heart rate.     10:02 PM I discussed the patient's case and the above findings with Dr. Hernandez (hospitalist) who agrees to hospitalize the patient.     10:12 PM discussed the case with Dr. Louise who will consult on the patient      CRITICAL CARE  The very real possibilty of a deterioration of this patient's condition required the highest level of my preparedness for sudden, emergent intervention.  I provided critical care services, which included medication orders, frequent reevaluations of the patient's condition and response to treatment, ordering and reviewing test results, and discussing the case with various consultants.  The critical care time associated with the care of the patient was 35 minutes. Review chart for interventions. This time is exclusive of any other billable procedures.    COURSE & MEDICAL DECISION MAKING  Nursing notes, VS, PMSFHx reviewed in chart.    Patient is a 61-year-old male who comes in for evaluation of palpitations and chest pain.  Differential diagnosis includes arrhythmia, acute coronary syndrome, electrolyte disturbance, alcohol withdrawal.  Diagnostic work-up includes labs and EKG.    Patient's initial vitals notable for tachycardia.  Upon my evaluation patient's heart rate is in the 200s, difficult to assess if this is atrial fibrillation versus SVT given the  rapid heart rate therefore I elected to treat patient with adenosine and IV fluids.  After adenosine administration patient's heart rate slowed down enough to assess that this is in fact atrial fibrillation, therefore patient was given diltiazem bolus.  After initial diltiazem bolus heart rate slowed to the 150s but he continues to be in atrial fibrillation with RVR therefore he is given second bolus and started on diltiazem drip after which heart rate improved to 130s.  Labs returned and troponin is within normal limits.  Patient's alcohol level is 122.  He is pancytopenic which is baseline for him.  He will be hospitalized for ongoing management of his new onset atrial fibrillation with rapid ventricular response.  Discussed case with Dr. Hernandez and Dr. Jean Baptiste who have accepted patient for hospitalization.  Patient is in critical condition    HYDRATION: Based on the patient's presentation of Tachycardia the patient was given IV fluids. IV Hydration was used because oral hydration was not adequate alone. Upon recheck following hydration, the patient was improved.    DISPOSITION:  Patient will be hospitalized by Dr. Hernandez in critical condition.    FINAL IMPRESSION  1. Atrial fibrillation, unspecified type (HCC)    2. Chest pain, unspecified type          I, Abigail Frausto), tyler scribing for, and in the presence of, Paula Pettit M.D..    Electronically signed by: Abigail David (Jonny), 5/24/2021    IPaula M.D. personally performed the services described in this documentation, as scribed by Abigail David in my presence, and it is both accurate and complete.    The note accurately reflects work and decisions made by me.  Paula Pettit M.D.  5/24/2021  10:17 PM

## 2021-05-25 NOTE — PALLIATIVE CARE
Palliative Care follow-up  Consult received and E[facility] reviewed [facility]. Currently no ACP documents on file. [facility] the [facility] for any [facility]/POLST documents.       Plan: Formal consult to follow.    Thank you for allowing Palliative Care to support this patient and family. Contact [phone] for additional assistance, change in patient status, or with any questions/concerns.

## 2021-05-25 NOTE — PROGRESS NOTES
Received report from ED RN. Assumed care of patient at 2330. Patient A/Ox3 - lethargic at this this time - s/p ativan administration on report. On 4L O2, no signs of respiratory distress. Sleeping comfortably with no complaints of pain or signs of distress. Call light and belongings within reach. Bed in lowest locked position. Upper side rails raised. Seizure and aspiration precautions in place. Hourly rounding in place. Will continue to monitor.

## 2021-05-25 NOTE — ASSESSMENT & PLAN NOTE
Converted to normal sinus rhythm, complicated by alcoholism and noncompliance.  Continue Cardizem at 180 mg p.o. daily

## 2021-05-25 NOTE — PROGRESS NOTES
I saw and examined the patient today.  He was admitted for palpitation.  Patient has history of cirrhosis of the liver related to alcohol.  He was found to have A. fib with RVR and was given IV diltiazem and currently the patient is in sinus rhythm and rate is controlled.

## 2021-05-25 NOTE — ASSESSMENT & PLAN NOTE
Active alcohol use and dependence  RASS based ICU Ativan protocol  Vitamins  Counseling when appropriate

## 2021-05-25 NOTE — CARE PLAN
Problem: Knowledge Deficit - Standard  Goal: Patient and family/care givers will demonstrate understanding of plan of care, disease process/condition, diagnostic tests and medications  Outcome: Progressing   Pt updated on POC, tests, and medications. Pt verbalizes understanding and has no further questions at this time. Pt educated on calling for any more questions.     Problem: Seizure Precautions  Goal: Implementation of seizure precautions  Outcome: Progressing   Seizure precautions in place, side rails padded, suction at bedside.     Problem: Fall Risk  Goal: Patient will remain free from falls  Outcome: Progressing  Call light within reach, patient verbalized the understanding on the need to call when needing assistance. Bed locked in lowest position, non skid socks on, bed rails up x2, bed alarm in use, hourly rounding in place.      The patient is Stable - Low risk of patient condition declining or worsening    Shift Goals  Clinical Goals: PAMELAWA

## 2021-05-25 NOTE — PROGRESS NOTES
Nila from Lab called with critical result of WBC 1.4 at PLT 36. Critical lab result read back to Nila.   Vanessa SOLARES notified of critical lab result at 0423. [facility].

## 2021-05-26 VITALS
OXYGEN SATURATION: 95 % | RESPIRATION RATE: 17 BRPM | TEMPERATURE: 98.3 F | HEART RATE: 71 BPM | WEIGHT: 133.16 LBS | BODY MASS INDEX: 20.18 KG/M2 | DIASTOLIC BLOOD PRESSURE: 71 MMHG | HEIGHT: 68 IN | SYSTOLIC BLOOD PRESSURE: 109 MMHG

## 2021-05-26 LAB
BASOPHILS # BLD AUTO: 0.8 % (ref 0–1.8)
BASOPHILS # BLD: 0.01 K/UL (ref 0–0.12)
EOSINOPHIL # BLD AUTO: 0.06 K/UL (ref 0–0.51)
EOSINOPHIL NFR BLD: 4.9 % (ref 0–6.9)
ERYTHROCYTE [DISTWIDTH] IN BLOOD BY AUTOMATED COUNT: 46.2 FL (ref 35.9–50)
HCT VFR BLD AUTO: 35.6 % (ref 42–52)
HGB BLD-MCNC: 11.6 G/DL (ref 14–18)
IMM GRANULOCYTES # BLD AUTO: 0 K/UL (ref 0–0.11)
IMM GRANULOCYTES NFR BLD AUTO: 0 % (ref 0–0.9)
LYMPHOCYTES # BLD AUTO: 0.4 K/UL (ref 1–4.8)
LYMPHOCYTES NFR BLD: 32.5 % (ref 22–41)
MCH RBC QN AUTO: 31.2 PG (ref 27–33)
MCHC RBC AUTO-ENTMCNC: 32.6 G/DL (ref 33.7–35.3)
MCV RBC AUTO: 95.7 FL (ref 81.4–97.8)
MONOCYTES # BLD AUTO: 0.19 K/UL (ref 0–0.85)
MONOCYTES NFR BLD AUTO: 15.4 % (ref 0–13.4)
NEUTROPHILS # BLD AUTO: 0.57 K/UL (ref 1.82–7.42)
NEUTROPHILS NFR BLD: 46.4 % (ref 44–72)
NRBC # BLD AUTO: 0 K/UL
NRBC BLD-RTO: 0 /100 WBC
PLATELET # BLD AUTO: 29 K/UL (ref 164–446)
PMV BLD AUTO: 11.9 FL (ref 9–12.9)
RBC # BLD AUTO: 3.72 M/UL (ref 4.7–6.1)
WBC # BLD AUTO: 1.2 K/UL (ref 4.8–10.8)

## 2021-05-26 PROCEDURE — 700102 HCHG RX REV CODE 250 W/ 637 OVERRIDE(OP): Performed by: INTERNAL MEDICINE

## 2021-05-26 PROCEDURE — 85025 COMPLETE CBC W/AUTO DIFF WBC: CPT

## 2021-05-26 PROCEDURE — A9270 NON-COVERED ITEM OR SERVICE: HCPCS | Performed by: INTERNAL MEDICINE

## 2021-05-26 PROCEDURE — 97165 OT EVAL LOW COMPLEX 30 MIN: CPT

## 2021-05-26 PROCEDURE — 97161 PT EVAL LOW COMPLEX 20 MIN: CPT

## 2021-05-26 PROCEDURE — 36415 COLL VENOUS BLD VENIPUNCTURE: CPT

## 2021-05-26 PROCEDURE — 700102 HCHG RX REV CODE 250 W/ 637 OVERRIDE(OP): Performed by: STUDENT IN AN ORGANIZED HEALTH CARE EDUCATION/TRAINING PROGRAM

## 2021-05-26 PROCEDURE — A9270 NON-COVERED ITEM OR SERVICE: HCPCS | Performed by: STUDENT IN AN ORGANIZED HEALTH CARE EDUCATION/TRAINING PROGRAM

## 2021-05-26 RX ADMIN — METOPROLOL TARTRATE 25 MG: 25 TABLET, FILM COATED ORAL at 05:33

## 2021-05-26 RX ADMIN — THERA TABS 1 TABLET: TAB at 05:33

## 2021-05-26 RX ADMIN — OMEPRAZOLE 40 MG: 20 CAPSULE, DELAYED RELEASE ORAL at 05:33

## 2021-05-26 RX ADMIN — DILTIAZEM HYDROCHLORIDE 180 MG: 180 CAPSULE, COATED, EXTENDED RELEASE ORAL at 05:33

## 2021-05-26 RX ADMIN — FOLIC ACID 1 MG: 1 TABLET ORAL at 05:33

## 2021-05-26 RX ADMIN — NICOTINE TRANSDERMAL SYSTEM 21 MG: 21 PATCH, EXTENDED RELEASE TRANSDERMAL at 05:32

## 2021-05-26 RX ADMIN — THIAMINE HCL TAB 100 MG 100 MG: 100 TAB at 05:33

## 2021-05-26 ASSESSMENT — GAIT ASSESSMENTS
GAIT LEVEL OF ASSIST: SUPERVISED
DISTANCE (FEET): 300
DEVIATION: NO DEVIATION

## 2021-05-26 ASSESSMENT — ACTIVITIES OF DAILY LIVING (ADL): TOILETING: INDEPENDENT

## 2021-05-26 ASSESSMENT — LIFESTYLE VARIABLES
PAROXYSMAL SWEATS: NO SWEAT VISIBLE
AUDITORY DISTURBANCES: NOT PRESENT
TREMOR: NO TREMOR
TOTAL SCORE: 3
HEADACHE, FULLNESS IN HEAD: NOT PRESENT
ANXIETY: MILDLY ANXIOUS
NAUSEA AND VOMITING: NO NAUSEA AND NO VOMITING
VISUAL DISTURBANCES: NOT PRESENT
ANXIETY: MILDLY ANXIOUS
HEADACHE, FULLNESS IN HEAD: MILD
VISUAL DISTURBANCES: NOT PRESENT
PAROXYSMAL SWEATS: NO SWEAT VISIBLE
TOTAL SCORE: 1
ORIENTATION AND CLOUDING OF SENSORIUM: ORIENTED AND CAN DO SERIAL ADDITIONS
ORIENTATION AND CLOUDING OF SENSORIUM: ORIENTED AND CAN DO SERIAL ADDITIONS
AUDITORY DISTURBANCES: NOT PRESENT
AGITATION: NORMAL ACTIVITY
NAUSEA AND VOMITING: NO NAUSEA AND NO VOMITING
AGITATION: NORMAL ACTIVITY
TREMOR: NO TREMOR

## 2021-05-26 ASSESSMENT — COGNITIVE AND FUNCTIONAL STATUS - GENERAL
SUGGESTED CMS G CODE MODIFIER MOBILITY: CI
CLIMB 3 TO 5 STEPS WITH RAILING: A LITTLE
MOBILITY SCORE: 23
SUGGESTED CMS G CODE MODIFIER DAILY ACTIVITY: CI
DAILY ACTIVITIY SCORE: 23
HELP NEEDED FOR BATHING: A LITTLE

## 2021-05-26 ASSESSMENT — PAIN DESCRIPTION - PAIN TYPE: TYPE: ACUTE PAIN

## 2021-05-26 NOTE — PROGRESS NOTES
Isolation Precautions:    Patient is on Protective isolation for pancytopenia and low ANC.    Patient educated on reason for isolation, how the infection may be transmitted, and how to help prevent transmission to others.     Patient educated that protective isolation precautions involves staff and visitors wearing PPE, follow  Standard Precautions and perform meticulous hand hygiene in order to prevent transmission of infection.     Patient transport and mobilization on unit. Patient educated that they may leave their room, but prior to exiting, the patient needs to have on a fresh patient gown, ensure the potentially infectious area is covered, perform appropriate hand hygiene immediately prior to exiting the room.

## 2021-05-26 NOTE — THERAPY
Occupational Therapy   Initial Evaluation     Patient Name: Inocencio Shabazz  Age:  61 y.o., Sex:  male  Medical Record #: [id number]  Today's Date: 5/26/2021          Assessment  Patient is 61 y.o. male presented to [facility] following c/o 1hr of palpitations and CP along with sob, noted to have a-fib w/ RVR, alcohol withdrawal. Pt was able to perform basic self care tasks, functional mobility and t/f's with supervision, w/o AD, no lob noted, slightly odd affect and unable to recall who provides him meals but pt reports this is his baseline cognition and has no concerns discharging home once medically cleared. No further acute OT indicated at this time.       Plan    Recommend Occupational Therapy for Evaluation only     DC Equipment Recommendations: None  Discharge Recommendations: Anticipate that the patient will have no further occupational therapy needs after discharge from the hospital      Objective       05/26/21 0924   Prior Living Situation   Prior Services None;Other (Comments)  (reports recieved 3 meals/day but unsure where he gets it fro)   Housing / Facility 2 Story Apartment / Condo   Elevator No   Bathroom Set up Bathtub / Shower Combination   Equipment Owned Grab Bar(s) In Tub / Shower   Lives with - Patient's Self Care Capacity Alone and Able to Care For Self   Comments I w/ ADLs/iADLs baseline, reports walks ~1mile to obtain groceries and if he needs to go anywhere. Reports recieves 3x meals/ day but unsure who provides it to hime, doesn't think its MOW   Prior Level of ADL Function   Self Feeding Independent   Grooming / Hygiene Independent   Bathing Independent   Dressing Independent   Toileting Independent   Prior Level of IADL Function   Medication Management Independent   Laundry Independent   Kitchen Mobility Independent   Finances Independent   Home Management Independent   Shopping Independent   Prior Level Of Mobility Independent Without Device in Community   Driving / Transportation  Walks;Utilizes Public Transportation   Occupation (Pre-Hospital Vocational) Not Employed   Cognition    Cognition / Consciousness WDL   Level of Consciousness Alert   Comments agreeable to session, slightly odd affect, pt reports no concerns with cognition and believes he's at his baseline   Balance Assessment   Sitting Balance (Static) Fair +   Sitting Balance (Dynamic) Fair +   Standing Balance (Static) Fair +   Standing Balance (Dynamic) Fair +   Weight Shift Sitting Good   Weight Shift Standing Fair   Comments w/o AD, no lob noted   Bed Mobility    Comments up in recliner pre/post session   ADL Assessment   Eating Independent   Grooming Supervision;Standing   Bathing   (discussed home s/u)   Upper Body Dressing Supervision   Lower Body Dressing Supervision   Toileting Supervision   Functional Mobility   Sit to Stand Supervised   Bed, Chair, Wheelchair Transfer Supervised   Toilet Transfers Supervised   Transfer Method Stand Step   Mobility within room   Comments w/o AD, no lob noted   Anticipated Discharge Equipment and Recommendations   DC Equipment Recommendations None

## 2021-05-26 NOTE — DISCHARGE SUMMARY
Discharge Summary    CHIEF COMPLAINT ON ADMISSION  Chief Complaint   Patient presents with   • Palpitations       Reason for Admission  Body aches, chest pressure     Admission Date  5/24/2021    CODE STATUS  Full Code    HPI & HOSPITAL COURSE  61 y.o. male with history of alcohol dependence, hepatitis C with cirrhosis and paroxysmal atrial fibrillation who presented 5/24/2021 with 1 hour of palpitations and chest pain associated with shortness of breath that prompted call to EMS he is found to have atrial fibrillation with RVR receiving adenosine without conversion he receives all a bolus of IV Cardizem and continuous drip he converted to normal sinus rhythm and referred to the hospitalist for admission.  At bedside he is somnolent after receiving 2 mg of Ativan for anticipated alcohol withdrawal.  He is unable to provide meaningful history.   The patient was hospitalized on 4/23/2021 for a cardiac work up. Echocardiogram was normal and demonstrated an ejection fraction of 55%.    The patient denies any palpitation, chest pain or shortness of breath today.  He is now withdrawing from alcohol.  He felt like he can go home today.  His heart rate has been under control and will continue his home medication with diltiazem.  He was educated about alcohol cessation and to follow-up with his PCP closely.  I saw and examined the patient today.  PT and OT were evaluated patient and if they cleared him he will be discharged.    Please call [phone] to schedule PCP appointment for patient.    Required specialty appointments include:     As below  Therefore, he is discharged in fair and stable condition to home with close outpatient follow-up.    The patient met 2-midnight criteria for an inpatient stay at the time of discharge.    Discharge Date  05/26/21      FOLLOW UP ITEMS POST DISCHARGE      DISCHARGE DIAGNOSES  Principal Problem:    Paroxysmal atrial fibrillation (HCC) POA: Unknown  Active Problems:    Alcohol abuse  POA: Yes    Hepatitis C POA: Yes    Schizophrenia (HCC) POA: Yes    Pancytopenia (HCC) POA: Unknown  Resolved Problems:    * No resolved hospital problems. *      FOLLOW UP  No future appointments.  Nnamdi Ballesteros M.D.  [address] [place] [id number] [phone]    In 1 week        MEDICATIONS ON DISCHARGE     Medication List      CONTINUE taking these medications      Instructions   dilTIAZem  MG Cp24  Generic drug: DILTIAZem CD   Take 120 mg by mouth every day.  Dose: 120 mg     folic acid 1 MG Tabs  Commonly known as: FOLVITE   Take 1 Tab by mouth every day.  Dose: 1 mg     multivitamin Tabs   Take 1 Tab by mouth every day.  Dose: 1 tablet     nicotine 21 MG/24HR Pt24  Commonly known as: NICODERM   Place 1 Patch on the skin every 24 hours.  Dose: 1 Patch     nicotine polacrilex 2 MG Gum  Commonly known as: NICORETTE   Take 1 Each by mouth every 1 hour as needed for Smoking Cessation (For nicotine urge).  Dose: 2 mg     ondansetron 4 MG Tbdp  Commonly known as: Zofran ODT   Take 1 Tab by mouth every 8 hours as needed.  Dose: 4 mg     pantoprazole 40 MG Tbec  Commonly known as: PROTONIX   Take 40 mg by mouth 2 times a day.  Dose: 40 mg     QUEtiapine 200 MG Tabs  Commonly known as: Seroquel   Take 200 mg by mouth every evening.  Dose: 200 mg            Allergies  Allergies   Allergen Reactions   • Haloperidol Unspecified     Twitching/involuntary movements        DIET  Orders Placed This Encounter   Procedures   • Diet Order Diet: Cardiac     Standing Status:   Standing     Number of Occurrences:   1     Order Specific Question:   Diet:     Answer:   Cardiac [6]       ACTIVITY  As tolerated.  Weight bearing as tolerated    CONSULTATIONS      PROCEDURES      LABORATORY  Lab Results   Component Value Date    SODIUM 141 05/24/2021    POTASSIUM 3.6 05/24/2021    CHLORIDE 105 05/24/2021    CO2 19 (L) 05/24/2021    GLUCOSE 89 05/24/2021    BUN 11 05/24/2021    CREATININE 0.60 05/24/2021        Lab Results    Component Value Date    WBC 1.2 (LL) 05/26/2021    HEMOGLOBIN 11.6 (L) 05/26/2021    HEMATOCRIT 35.6 (L) 05/26/2021    PLATELETCT 29 (LL) 05/26/2021        Total time of the discharge process exceeds 38 minutes.

## 2021-05-26 NOTE — THERAPY
Physical Therapy   Initial Evaluation     Patient Name: Inocencio Shabzaz  Age:  61 y.o., Sex:  male  Medical Record #: [id number]  Today's Date: 5/26/2021          Assessment  Patient is 61 y.o. male admitted for palpitations and CP. Pt found to have afib with RVR and etoh withdrawal. Pt reporting he feels much better. He appears to be at his functional baseline for mobility ambulating with no AD and SPV. No further acute PT needs.     Plan    Recommend Physical Therapy for Evaluation only     DC Equipment Recommendations: None  Discharge Recommendations: Anticipate that the patient will have no further physical therapy needs after discharge from the hospital        05/26/21 0932   Prior Living Situation   Prior Services None   Housing / Facility 2 Story Apartment / Condo   Steps Into Home   (FOS)   Steps In Home 0   Elevator No   Bathroom Set up Bathtub / Shower Combination   Equipment Owned Grab Bar(s) In Tub / Shower   Lives with - Patient's Self Care Capacity Alone and Able to Care For Self   Comments indepednent prior, walks for groceries   Prior Level of Functional Mobility   Bed Mobility Independent   Transfer Status Independent   Ambulation Independent   Distance Ambulation (Feet)   (community)   Assistive Devices Used None   Stairs Independent   Comments independent prior   Gait Analysis   Gait Level Of Assist Supervised   Assistive Device None   Distance (Feet) 300   # of Times Distance was Traveled 1   Deviation No deviation   # of Stairs Climbed 0   Weight Bearing Status no restrictions   Comments declined stair negotiation   Functional Mobility   Sit to Stand Supervised   Bed, Chair, Wheelchair Transfer Supervised   Toilet Transfers Supervised   Transfer Method Stand Step   Mobility in room   Anticipated Discharge Equipment and Recommendations   DC Equipment Recommendations None   Discharge Recommendations Anticipate that the patient will have no further physical therapy needs after discharge from the  [facility]

## 2021-05-26 NOTE — PROGRESS NOTES
Charge, TITUS Steven called with critical result from lab of WBC 1.2, PLT 29. Notified OC hospitalist, Nicholas. ANC low and requiring protective isolation. Bed 1 being moved out of the room per Charge, TITUS.

## 2021-05-26 NOTE — PROGRESS NOTES
Patient discharged to home via taxi with self. Discharge paperwork was discussed with the patient. LDAs were removed. Tele monitor disconnected. Pt prescriptions reviewed and follow-up appointment discussed. Patient escorted out by RN, cab voucher provided.

## 2021-05-26 NOTE — CARE PLAN
Problem: Nutritional:  Goal: Achieve adequate nutritional intake  Description: Patient will consume 50% of meals  Outcome: Progressing     See RD note

## 2021-05-26 NOTE — DISCHARGE PLANNING
Anticipated Discharge Disposition: Home    Action: RN CM spoke with patient at bedside to deliver 2nd IMM, patient signed 2nd IMM at 1140.  2nd IMM faxed to [facility].  TITUS JEFFERS provided cab voucher to bedside TITUS Hein.    Barriers to Discharge: None     Plan: Case coordination available to discuss any discharge barriers.

## 2021-05-26 NOTE — PROGRESS NOTES
Paged Estefani SOLARES. Pt requesting nicotine patch. Order received to include on morning med pass.

## 2021-05-26 NOTE — PROGRESS NOTES
Monitor summary:      Rhythm: SR  Rate: 64-73  Ectopy: rPAC, rPVC  Measurements: .16/.06/.41      12hr chart check

## 2021-05-26 NOTE — CARE PLAN
The patient is Watcher - Medium risk of patient condition declining or worsening    Shift Goals  Clinical Goals: Free from dysrythmia  Patient Goals: Participate in POC with goal of discharging  Family Goals: None present on NOC    Progress made toward(s) clinical / shift goals:  Patient is educated of disease process and condition. Treatment team has included patient in plan of care. All medications indications and side effects are explained. Patient is encouraged to ask questions. Patient indicates understanding.      Patient is not progressing towards the following goals: n/a        Problem: Knowledge Deficit - Standard  Goal: Patient and family/care givers will demonstrate understanding of plan of care, disease process/condition, diagnostic tests and medications  Outcome: Progressing     Problem: Optimal Care for Alcohol Withdrawal  Goal: Optimal Care for the alcohol withdrawal patient  Outcome: Progressing     Problem: Seizure Precautions  Goal: Implementation of seizure precautions  Outcome: Progressing

## 2021-05-26 NOTE — DISCHARGE INSTRUCTIONS
Discharge Instructions    Discharged to home by taxi with escort. Discharged via wheelchair, hospital escort: Yes.  Special equipment needed: Not Applicable    Be sure to schedule a follow-up appointment with your primary care doctor or any specialists as instructed.     Discharge Plan:   Diet Plan: Discussed  Activity Level: Discussed  Confirmed Follow up Appointment: Patient to Call and Schedule Appointment  Confirmed Symptoms Management: Discussed  Medication Reconciliation Updated: Yes    I understand that a diet low in cholesterol, fat, and sodium is recommended for good health. Unless I have been given specific instructions below for another diet, I accept this instruction as my diet prescription.   Other diet: heart healthy    Special Instructions: None    · Is patient discharged on Warfarin / Coumadin?   No     Depression / Suicide Risk    As you are discharged from this [facility] facility, it is important to learn how to keep safe from harming yourself.    Recognize the warning signs:  · Abrupt changes in personality, positive or negative- including increase in energy   · Giving away possessions  · Change in eating patterns- significant weight changes-  positive or negative  · Change in sleeping patterns- unable to sleep or sleeping all the time   · Unwillingness or inability to communicate  · Depression  · Unusual sadness, discouragement and loneliness  · Talk of wanting to die  · Neglect of personal appearance   · Rebelliousness- reckless behavior  · Withdrawal from people/activities they love  · Confusion- inability to concentrate     If you or a loved one observes any of these behaviors or has concerns about self-harm, here's what you can do:  · Talk about it- your feelings and reasons for harming yourself  · Remove any means that you might use to hurt yourself (examples: pills, rope, extension cords, firearm)  · Get professional help from the community (Mental Health, Substance Abuse, psychological  counseling)  · Do not be alone:Call your Safe Contact- someone whom you trust who will be there for you.  · Call your local CRISIS HOTLINE 526-6767 or 642-319-3380  · Call your local Children's Mobile Crisis Response Team [place] (553) 165-9349 or www.PressPad  · Call the toll free National Suicide Prevention Hotlines   · National Suicide Prevention Lifeline 570-589-QQHM (1855)  · National Hope Line Network 800-SUICIDE (929-1225)

## 2021-06-10 ENCOUNTER — HOSPITAL ENCOUNTER (EMERGENCY)
Facility: MEDICAL CENTER | Age: 61
End: 2021-06-10
Attending: EMERGENCY MEDICINE
Payer: COMMERCIAL

## 2021-06-10 ENCOUNTER — APPOINTMENT (OUTPATIENT)
Dept: RADIOLOGY | Facility: MEDICAL CENTER | Age: 61
End: 2021-06-10
Attending: EMERGENCY MEDICINE
Payer: COMMERCIAL

## 2021-06-10 VITALS
WEIGHT: 133.16 LBS | SYSTOLIC BLOOD PRESSURE: 143 MMHG | BODY MASS INDEX: 20.18 KG/M2 | RESPIRATION RATE: 18 BRPM | OXYGEN SATURATION: 98 % | DIASTOLIC BLOOD PRESSURE: 92 MMHG | TEMPERATURE: 98 F | HEART RATE: 67 BPM | HEIGHT: 68 IN

## 2021-06-10 DIAGNOSIS — R07.9 CHEST PAIN, UNSPECIFIED TYPE: ICD-10-CM

## 2021-06-10 DIAGNOSIS — R10.9 ABDOMINAL PAIN, UNSPECIFIED ABDOMINAL LOCATION: ICD-10-CM

## 2021-06-10 LAB
ALBUMIN SERPL BCP-MCNC: 3.9 G/DL (ref 3.2–4.9)
ALBUMIN/GLOB SERPL: 1 G/DL
ALP SERPL-CCNC: 76 U/L (ref 30–99)
ALT SERPL-CCNC: 66 U/L (ref 2–50)
ANION GAP SERPL CALC-SCNC: 12 MMOL/L (ref 7–16)
AST SERPL-CCNC: 92 U/L (ref 12–45)
BASOPHILS # BLD AUTO: 0.9 % (ref 0–1.8)
BASOPHILS # BLD: 0.02 K/UL (ref 0–0.12)
BILIRUB SERPL-MCNC: 0.7 MG/DL (ref 0.1–1.5)
BUN SERPL-MCNC: 7 MG/DL (ref 8–22)
CALCIUM SERPL-MCNC: 8 MG/DL (ref 8.5–10.5)
CHLORIDE SERPL-SCNC: 107 MMOL/L (ref 96–112)
CO2 SERPL-SCNC: 23 MMOL/L (ref 20–33)
CREAT SERPL-MCNC: 0.55 MG/DL (ref 0.5–1.4)
EKG IMPRESSION: NORMAL
EOSINOPHIL # BLD AUTO: 0.02 K/UL (ref 0–0.51)
EOSINOPHIL NFR BLD: 0.9 % (ref 0–6.9)
ERYTHROCYTE [DISTWIDTH] IN BLOOD BY AUTOMATED COUNT: 46 FL (ref 35.9–50)
GLOBULIN SER CALC-MCNC: 4 G/DL (ref 1.9–3.5)
GLUCOSE SERPL-MCNC: 85 MG/DL (ref 65–99)
HCT VFR BLD AUTO: 38.6 % (ref 42–52)
HGB BLD-MCNC: 12.6 G/DL (ref 14–18)
LIPASE SERPL-CCNC: 56 U/L (ref 11–82)
LYMPHOCYTES # BLD AUTO: 0.52 K/UL (ref 1–4.8)
LYMPHOCYTES NFR BLD: 21.7 % (ref 22–41)
MANUAL DIFF BLD: NORMAL
MCH RBC QN AUTO: 31 PG (ref 27–33)
MCHC RBC AUTO-ENTMCNC: 32.6 G/DL (ref 33.7–35.3)
MCV RBC AUTO: 95.1 FL (ref 81.4–97.8)
MONOCYTES # BLD AUTO: 0.12 K/UL (ref 0–0.85)
MONOCYTES NFR BLD AUTO: 5.2 % (ref 0–13.4)
MORPHOLOGY BLD-IMP: NORMAL
NEUTROPHILS # BLD AUTO: 1.71 K/UL (ref 1.82–7.42)
NEUTROPHILS NFR BLD: 71.3 % (ref 44–72)
NRBC # BLD AUTO: 0 K/UL
NRBC BLD-RTO: 0 /100 WBC
PLATELET # BLD AUTO: 46 K/UL (ref 164–446)
PLATELET BLD QL SMEAR: NORMAL
PLATELETS.RETICULATED NFR BLD AUTO: 8.1 K/UL (ref 0.6–13.1)
PMV BLD AUTO: 11.7 FL (ref 9–12.9)
POTASSIUM SERPL-SCNC: 4.1 MMOL/L (ref 3.6–5.5)
PROT SERPL-MCNC: 7.9 G/DL (ref 6–8.2)
RBC # BLD AUTO: 4.06 M/UL (ref 4.7–6.1)
SODIUM SERPL-SCNC: 142 MMOL/L (ref 135–145)
TROPONIN T SERPL-MCNC: <6 NG/L (ref 6–19)
WBC # BLD AUTO: 2.4 K/UL (ref 4.8–10.8)

## 2021-06-10 PROCEDURE — 84484 ASSAY OF TROPONIN QUANT: CPT

## 2021-06-10 PROCEDURE — 80053 COMPREHEN METABOLIC PANEL: CPT

## 2021-06-10 PROCEDURE — 71045 X-RAY EXAM CHEST 1 VIEW: CPT

## 2021-06-10 PROCEDURE — 93005 ELECTROCARDIOGRAM TRACING: CPT | Performed by: EMERGENCY MEDICINE

## 2021-06-10 PROCEDURE — 36415 COLL VENOUS BLD VENIPUNCTURE: CPT

## 2021-06-10 PROCEDURE — 99284 EMERGENCY DEPT VISIT MOD MDM: CPT

## 2021-06-10 PROCEDURE — 85055 RETICULATED PLATELET ASSAY: CPT

## 2021-06-10 PROCEDURE — 83690 ASSAY OF LIPASE: CPT

## 2021-06-10 PROCEDURE — 93005 ELECTROCARDIOGRAM TRACING: CPT

## 2021-06-10 PROCEDURE — 85007 BL SMEAR W/DIFF WBC COUNT: CPT

## 2021-06-10 PROCEDURE — 85027 COMPLETE CBC AUTOMATED: CPT

## 2021-06-10 ASSESSMENT — FIBROSIS 4 INDEX: FIB4 SCORE: 22.23

## 2021-06-10 NOTE — ED TRIAGE NOTES
"Inocencio Naylormings  Chief Complaint   Patient presents with   • Chest Pain     L upper chest, started while walking home from [facility]   • Abdominal Pain     L sided      Pt ambulated to triage without difficulty. Pt is a poor historian. Pt reports he was admitted to [facility] \"for about a day and a half\". Pt was walking home this afternoon when he experienced L upper chest pain. Pt reports pain is intermittent. Pt also reports L side abdominal pain. Hx ETOH abuse, marijuana use, and smokes 1ppd. Denies SOB, cough. No cardiac history.    Patient informed of triage process and to inform staff of any changes/worsening symptoms. Pt verbalized understanding. Pt denies concerns. Pt back to waiting room.    /86   Pulse 83   Temp 36.7 °C (98 °F) (Temporal)   Resp 16   Ht 1.727 m (5' 8\")   Wt 60.4 kg (133 lb 2.5 oz)   SpO2 95%   "

## 2021-06-11 NOTE — ED NOTES
from Lab called with critical result of WBC 2.4 and platelet 46 at 1807. Critical lab result read back to .   Dr. Iglesias notified of critical lab result at 1808.  Critical lab result read back by Dr. Iglesias.

## 2021-06-11 NOTE — ED PROVIDER NOTES
ED Provider Note    Scribed for Dr. Alan Iglesias M.D. by Yohan Pleitez. 6/10/2021  6:02 PM    Primary care provider: Nnamdi Ballesteros M.D.  Means of arrival: Walk In  History obtained from: Patient  History limited by: None    CHIEF COMPLAINT  Chief Complaint   Patient presents with    Chest Pain     L upper chest, started while walking home from [facility]    Abdominal Pain     L sided        HPI  Inocencio Shabazz is a 61 y.o. male who presents to the Emergency Department for evaluation of intermittent, sharp, left upper chest pain onset prior to arrival while walking home from the [facility]. Patient also complains of associated left sided abdominal pain, back pain, and lightheadedness. He denies any shortness of breath or cough. Patient states he has been in the [facility] for a couple days, but is unsure exactly why. Patient admits to smoking tobacco (1 pack per day). He has a history of alcohol abuse.    I verified that the patient was wearing a mask and I was wearing appropriate PPE every time I entered the room. The patient's mask was on the patient at all times during my encounter except for a brief view of the oropharynx.    REVIEW OF SYSTEMS  Pertinent positives include chest pain, abdominal pain, back pain, and lightheadedness. Pertinent negatives include no shortness of breath or cough. As above, all other systems reviewed and are negative.   See HPI for further details.     PAST MEDICAL HISTORY   has a past medical history of Alcohol abuse, Alcohol intoxication (HCC) (3/13/2014), Cirrhosis (HCC), GIB (gastrointestinal bleeding) (1/3/2016), Hepatitis C, Pancytopenia (HCC), and Psychiatric disorder.    SURGICAL HISTORY   has a past surgical history that includes gastroscopy (1/3/2016); gastroscopy (4/8/2016); gastroscopy (3/13/2019); and gastroscopy-endo (N/A, 11/13/2019).    SOCIAL HISTORY  Social History     Tobacco Use    Smoking status: Current Every Day Smoker     Packs/day: 1.00     Years: 48.00  "    Pack years: 48.00     Types: Cigarettes    Smokeless tobacco: Never Used   Vaping Use    Vaping Use: Never used   Substance Use Topics    Alcohol use: Yes     Comment: \"couple of pints\" whiskey every day    Drug use: Yes     Types: Inhaled     Comment: marijuana; not currently -  cocaine, meth      Social History     Substance and Sexual Activity   Drug Use Yes    Types: Inhaled    Comment: marijuana; not currently -  cocaine, meth       FAMILY HISTORY  History reviewed. No pertinent family history.    CURRENT MEDICATIONS  Current Outpatient Medications   Medication Instructions    DILTIAZem CD (DILTIAZEM CD) 120 mg, Oral, DAILY    folic acid (FOLVITE) 1 mg, Oral, DAILY    multivitamin (THERAGRAN) Tab 1 tablet, Oral, DAILY    nicotine (NICODERM) 21 mg, Transdermal, EVERY 24 HOURS    nicotine polacrilex (NICORETTE) 2 mg, Oral, EVERY 1 HOUR PRN    ondansetron (ZOFRAN ODT) 4 mg, Oral, EVERY 8 HOURS PRN    pantoprazole (PROTONIX) 40 mg, Oral, 2 TIMES DAILY    QUEtiapine (SEROQUEL) 200 mg, Oral, EVERY EVENING       ALLERGIES  Allergies   Allergen Reactions    Haloperidol Unspecified     Twitching/involuntary movements        PHYSICAL EXAM  VITAL SIGNS: /92   Pulse 67   Temp 36.7 °C (98 °F) (Temporal)   Resp 18   Ht 1.727 m (5' 8\")   Wt 60.4 kg (133 lb 2.5 oz)   SpO2 98%   BMI 20.25 kg/m²     Constitutional: Well developed, Well nourished, no acute distress, Non-toxic appearance.   HENT: Normocephalic, Atraumatic, Bilateral external ears normal, Oropharynx moist, No oral exudates.   Eyes: PERRLA, EOMI, Conjunctiva normal, No discharge.   Neck: No tenderness, Supple, No stridor.   Lymphatic: No lymphadenopathy noted.   Cardiovascular: Normal heart rate, Normal rhythm.   Thorax & Lungs: Clear to auscultation bilaterally, No respiratory distress, No wheezing, No crackles.   Abdomen: Soft, No tenderness, No masses, No pulsatile masses.   Skin: Warm, Dry, No erythema, No rash.   Extremities:, No edema No " cyanosis.   Musculoskeletal: No tenderness to palpation or major deformities noted.  Intact distal pulses  Neurologic: Awake, alert. Moves all extremities spontaneously. Tremulous.  Psychiatric: Affect normal, Judgment normal, Mood normal.       LABS  Results for orders placed or performed during the hospital encounter of 06/10/21   CBC with Differential   Result Value Ref Range    WBC 2.4 (LL) 4.8 - 10.8 K/uL    RBC 4.06 (L) 4.70 - 6.10 M/uL    Hemoglobin 12.6 (L) 14.0 - 18.0 g/dL    Hematocrit 38.6 (L) 42.0 - 52.0 %    MCV 95.1 81.4 - 97.8 fL    MCH 31.0 27.0 - 33.0 pg    MCHC 32.6 (L) 33.7 - 35.3 g/dL    RDW 46.0 35.9 - 50.0 fL    Platelet Count 46 (LL) 164 - 446 K/uL    MPV 11.7 9.0 - 12.9 fL    Neutrophils-Polys 71.30 44.00 - 72.00 %    Lymphocytes 21.70 (L) 22.00 - 41.00 %    Monocytes 5.20 0.00 - 13.40 %    Eosinophils 0.90 0.00 - 6.90 %    Basophils 0.90 0.00 - 1.80 %    Nucleated RBC 0.00 /100 WBC    Neutrophils (Absolute) 1.71 (L) 1.82 - 7.42 K/uL    Lymphs (Absolute) 0.52 (L) 1.00 - 4.80 K/uL    Monos (Absolute) 0.12 0.00 - 0.85 K/uL    Eos (Absolute) 0.02 0.00 - 0.51 K/uL    Baso (Absolute) 0.02 0.00 - 0.12 K/uL    NRBC (Absolute) 0.00 K/uL   Complete Metabolic Panel (CMP)   Result Value Ref Range    Sodium 142 135 - 145 mmol/L    Potassium 4.1 3.6 - 5.5 mmol/L    Chloride 107 96 - 112 mmol/L    Co2 23 20 - 33 mmol/L    Anion Gap 12.0 7.0 - 16.0    Glucose 85 65 - 99 mg/dL    Bun 7 (L) 8 - 22 mg/dL    Creatinine 0.55 0.50 - 1.40 mg/dL    Calcium 8.0 (L) 8.5 - 10.5 mg/dL    AST(SGOT) 92 (H) 12 - 45 U/L    ALT(SGPT) 66 (H) 2 - 50 U/L    Alkaline Phosphatase 76 30 - 99 U/L    Total Bilirubin 0.7 0.1 - 1.5 mg/dL    Albumin 3.9 3.2 - 4.9 g/dL    Total Protein 7.9 6.0 - 8.2 g/dL    Globulin 4.0 (H) 1.9 - 3.5 g/dL    A-G Ratio 1.0 g/dL   Troponin   Result Value Ref Range    Troponin T <6 6 - 19 ng/L   LIPASE   Result Value Ref Range    Lipase 56 11 - 82 U/L   ESTIMATED GFR   Result Value Ref Range    GFR If  African American >60 >60 mL/min/1.73 m 2    GFR If Non African American >60 >60 mL/min/1.73 m 2   DIFFERENTIAL MANUAL   Result Value Ref Range    Manual Diff Status PERFORMED    PERIPHERAL SMEAR REVIEW   Result Value Ref Range    Peripheral Smear Review see below    PLATELET ESTIMATE   Result Value Ref Range    Plt Estimation Decreased    IMMATURE PLT FRACTION   Result Value Ref Range    Imm. Plt Fraction 8.1 0.6 - 13.1 K/uL   EKG (NOW)   Result Value Ref Range    Report       [facility] Emergency Dept.    Test Date:  2021-06-10  Pt Name:    JIMMY FERRER                 Department: ER  MRN:        [id number]                      Room:  Gender:     Male                         Technician: [id number]  :        1960                   Requested By:ER TRIAGE PROTOCOL  Order #:    [id number]                    Reading MD: JOHN OCAMPO MD    Measurements  Intervals                                Axis  Rate:       63                           P:          49  NE:         148                          QRS:        61  QRSD:       86                           T:          66  QT:         436  QTc:        447    Interpretive Statements  SINUS RHYTHM  Compared to ECG 2021 22:10:42  T-wave abnormality no longer present  Electronically Signed On 6- 18:25:42 PDT by JOHN OCAMPO MD       All labs reviewed by me.    EKG  Interpreted by me as above    RADIOLOGY  DX-CHEST-PORTABLE (1 VIEW)    (Results Pending)     The radiologist's interpretation of all radiological studies have been reviewed by me.    COURSE & MEDICAL DECISION MAKING  Pertinent Labs & Imaging studies reviewed. (See chart for details)    6:02 PM - Patient seen and examined at bedside. Ordered DX-Chest, EKG, eGFR, Differential manual, Platelet estimate, Immature PLT fraction, CBC w diff, CMP, Troponin, and Lipase to evaluate his symptoms.    6:18 PM - Patient was reevaluated at bedside. Patient informs me that he has a boil on his  left buttock. On exam, the patient points to an area that possibly had a boil in the past, but is not of concern at this time. Discussed lab and radiology results with the patient and informed them of the plan for discharge. Discussed with patient that his lab results from today's visit are consistent with his past visits. The patient will return for new or worsening symptoms and is stable at the time of discharge. Patient verbalizes understanding and agreement to this plan of care.    The patient is referred to a primary physician for blood pressure management, diabetic screening, and for all other preventative health concerns.    Decision Making:  Patient completely symptom-free at the time I saw him no specific complaints, although he says he does feel somewhat lightheaded.  The patient had some chest and abdominal pain.  I reviewed the patient's old record this is been adequately worked up multiple times he is asymptomatic now and I think can be safely discharged with follow-up.  He does have some laboratory abnormalities which appear pre-existing.      DISPOSITION:  Patient will be discharged home in stable condition.    FOLLOW UP:  No follow-up provider specified.      FINAL IMPRESSION  1. Chest pain, unspecified type    2. Abdominal pain, unspecified abdominal location          Yohan ALMAZAN (Jonny), am scribing for, and in the presence of, Alan Iglesias M.D..    Electronically signed by: Yohan Pleitez (Jonny), 6/10/2021    Alan ALMAZAN M.D. personally performed the services described in this documentation, as scribed by Yohan Pleitez in my presence, and it is both accurate and complete. C.    The note accurately reflects work and decisions made by me.  Alan Iglesias M.D.  6/10/2021  9:53 PM

## 2021-07-01 ENCOUNTER — APPOINTMENT (OUTPATIENT)
Dept: RADIOLOGY | Facility: MEDICAL CENTER | Age: 61
End: 2021-07-01
Attending: EMERGENCY MEDICINE
Payer: COMMERCIAL

## 2021-07-01 ENCOUNTER — HOSPITAL ENCOUNTER (EMERGENCY)
Facility: MEDICAL CENTER | Age: 61
End: 2021-07-01
Attending: EMERGENCY MEDICINE
Payer: COMMERCIAL

## 2021-07-01 VITALS
RESPIRATION RATE: 17 BRPM | OXYGEN SATURATION: 98 % | HEIGHT: 68 IN | HEART RATE: 78 BPM | TEMPERATURE: 98 F | BODY MASS INDEX: 20.05 KG/M2 | DIASTOLIC BLOOD PRESSURE: 77 MMHG | WEIGHT: 132.28 LBS | SYSTOLIC BLOOD PRESSURE: 138 MMHG

## 2021-07-01 DIAGNOSIS — F10.288 ALCOHOL DEPENDENCE WITH OTHER ALCOHOL-INDUCED DISORDER (HCC): ICD-10-CM

## 2021-07-01 LAB
ALBUMIN SERPL BCP-MCNC: 4.5 G/DL (ref 3.2–4.9)
ALBUMIN/GLOB SERPL: 1.1 G/DL
ALP SERPL-CCNC: 86 U/L (ref 30–99)
ALT SERPL-CCNC: 70 U/L (ref 2–50)
AMMONIA PLAS-SCNC: 28 UMOL/L (ref 11–45)
ANION GAP SERPL CALC-SCNC: 11 MMOL/L (ref 7–16)
AST SERPL-CCNC: 88 U/L (ref 12–45)
BASOPHILS # BLD AUTO: 1 % (ref 0–1.8)
BASOPHILS # BLD: 0.03 K/UL (ref 0–0.12)
BILIRUB SERPL-MCNC: 0.8 MG/DL (ref 0.1–1.5)
BUN SERPL-MCNC: 15 MG/DL (ref 8–22)
CALCIUM SERPL-MCNC: 8.7 MG/DL (ref 8.5–10.5)
CHLORIDE SERPL-SCNC: 102 MMOL/L (ref 96–112)
CO2 SERPL-SCNC: 26 MMOL/L (ref 20–33)
CREAT SERPL-MCNC: 0.57 MG/DL (ref 0.5–1.4)
EOSINOPHIL # BLD AUTO: 0.04 K/UL (ref 0–0.51)
EOSINOPHIL NFR BLD: 1.3 % (ref 0–6.9)
ERYTHROCYTE [DISTWIDTH] IN BLOOD BY AUTOMATED COUNT: 45.3 FL (ref 35.9–50)
ETHANOL BLD-MCNC: <10.1 MG/DL (ref 0–10)
GLOBULIN SER CALC-MCNC: 4.1 G/DL (ref 1.9–3.5)
GLUCOSE SERPL-MCNC: 81 MG/DL (ref 65–99)
HCT VFR BLD AUTO: 42.5 % (ref 42–52)
HGB BLD-MCNC: 13.8 G/DL (ref 14–18)
IMM GRANULOCYTES # BLD AUTO: 0.01 K/UL (ref 0–0.11)
IMM GRANULOCYTES NFR BLD AUTO: 0.3 % (ref 0–0.9)
LACTATE BLD-SCNC: 1.3 MMOL/L (ref 0.5–2)
LIPASE SERPL-CCNC: 46 U/L (ref 11–82)
LYMPHOCYTES # BLD AUTO: 0.6 K/UL (ref 1–4.8)
LYMPHOCYTES NFR BLD: 19.7 % (ref 22–41)
MAGNESIUM SERPL-MCNC: 2.4 MG/DL (ref 1.5–2.5)
MCH RBC QN AUTO: 30.4 PG (ref 27–33)
MCHC RBC AUTO-ENTMCNC: 32.5 G/DL (ref 33.7–35.3)
MCV RBC AUTO: 93.6 FL (ref 81.4–97.8)
MONOCYTES # BLD AUTO: 0.33 K/UL (ref 0–0.85)
MONOCYTES NFR BLD AUTO: 10.9 % (ref 0–13.4)
NEUTROPHILS # BLD AUTO: 2.03 K/UL (ref 1.82–7.42)
NEUTROPHILS NFR BLD: 66.8 % (ref 44–72)
NRBC # BLD AUTO: 0 K/UL
NRBC BLD-RTO: 0 /100 WBC
PHOSPHATE SERPL-MCNC: 2.5 MG/DL (ref 2.5–4.5)
PLATELET # BLD AUTO: 64 K/UL (ref 164–446)
PMV BLD AUTO: 11.2 FL (ref 9–12.9)
POTASSIUM SERPL-SCNC: 4 MMOL/L (ref 3.6–5.5)
PROT SERPL-MCNC: 8.6 G/DL (ref 6–8.2)
RBC # BLD AUTO: 4.54 M/UL (ref 4.7–6.1)
SODIUM SERPL-SCNC: 139 MMOL/L (ref 135–145)
WBC # BLD AUTO: 3 K/UL (ref 4.8–10.8)

## 2021-07-01 PROCEDURE — 80053 COMPREHEN METABOLIC PANEL: CPT

## 2021-07-01 PROCEDURE — 82140 ASSAY OF AMMONIA: CPT

## 2021-07-01 PROCEDURE — 82077 ASSAY SPEC XCP UR&BREATH IA: CPT

## 2021-07-01 PROCEDURE — 71045 X-RAY EXAM CHEST 1 VIEW: CPT

## 2021-07-01 PROCEDURE — 36415 COLL VENOUS BLD VENIPUNCTURE: CPT

## 2021-07-01 PROCEDURE — 96365 THER/PROPH/DIAG IV INF INIT: CPT

## 2021-07-01 PROCEDURE — 87040 BLOOD CULTURE FOR BACTERIA: CPT

## 2021-07-01 PROCEDURE — 96375 TX/PRO/DX INJ NEW DRUG ADDON: CPT

## 2021-07-01 PROCEDURE — 84100 ASSAY OF PHOSPHORUS: CPT

## 2021-07-01 PROCEDURE — 83735 ASSAY OF MAGNESIUM: CPT

## 2021-07-01 PROCEDURE — 700111 HCHG RX REV CODE 636 W/ 250 OVERRIDE (IP): Performed by: EMERGENCY MEDICINE

## 2021-07-01 PROCEDURE — 83690 ASSAY OF LIPASE: CPT

## 2021-07-01 PROCEDURE — 99284 EMERGENCY DEPT VISIT MOD MDM: CPT

## 2021-07-01 PROCEDURE — 83605 ASSAY OF LACTIC ACID: CPT

## 2021-07-01 PROCEDURE — 700101 HCHG RX REV CODE 250: Performed by: EMERGENCY MEDICINE

## 2021-07-01 PROCEDURE — 85025 COMPLETE CBC W/AUTO DIFF WBC: CPT

## 2021-07-01 RX ORDER — LORAZEPAM 2 MG/ML
1 INJECTION INTRAMUSCULAR ONCE
Status: COMPLETED | OUTPATIENT
Start: 2021-07-01 | End: 2021-07-01

## 2021-07-01 RX ADMIN — THIAMINE HYDROCHLORIDE 1000 ML: 100 INJECTION, SOLUTION INTRAMUSCULAR; INTRAVENOUS at 16:45

## 2021-07-01 RX ADMIN — LORAZEPAM 1 MG: 2 INJECTION INTRAMUSCULAR; INTRAVENOUS at 16:43

## 2021-07-01 ASSESSMENT — FIBROSIS 4 INDEX: FIB4 SCORE: 15.51

## 2021-07-01 NOTE — ED NOTES
Lab draw completed. Blood, 1st set of blood cultures (left AC), green and purple top on ice, and red top sent to lab     Gave patient urinal

## 2021-07-01 NOTE — ED NOTES
Patient walked with a steady gate at this time to [address] area room 29. Placed patient into gown, gave warm blanket, and call light. Ready to be seen

## 2021-07-01 NOTE — ED PROVIDER NOTES
"ED Provider Note    Scribed for Cecille Brown M.D. by Tina Auguste. 7/1/2021  4:03 PM    Primary care provider: Nnamdi Ballesteros M.D.  Means of arrival: walk-in   History obtained from: patient  History limited by: none    CHIEF COMPLAINT  Chief Complaint   Patient presents with    Body Aches     At the [facility] yesterday for the same body aches and was discharged       HPI  Inocencio Shabazz is a 61 y.o. male who presents to the Emergency Department for evaluation of body aches onset yesterday. He admits to associated symptoms of bruises, but denies hallucinations, itchiness. No alleviating factors were reported. The patient admits to \"too much\" drinking and states his last drink was a few days ago. The patient denies history of diabetes or heart attacks. The patient denies any recent falls. The patient admits to marijuana.      REVIEW OF SYSTEMS  HEENT:  No ear pain, congestion, or sore throat   EYES: no discharge, redness, or vision changes  CARDIAC: no chest pain, no palpitations    PULMONARY: no dyspnea, cough, or congestion   GI: no vomiting, diarrhea, or abdominal pain   : no dysuria, back pain, or hematuria   Neuro: no weakness, numbness, aphasia, or headache  Musculoskeletal: no swelling, deformity, pain, or joint swelling  Endocrine: no fevers, sweating, or weight loss   SKIN: Contusions but no pruritic  Psychological:  No hallucinations    See history of present illness. All other systems are negative. C.    PAST MEDICAL HISTORY   has a past medical history of Alcohol abuse, Alcohol intoxication (HCC) (3/13/2014), Cirrhosis (HCC), GIB (gastrointestinal bleeding) (1/3/2016), Hepatitis C, Pancytopenia (HCC), and Psychiatric disorder.    SURGICAL HISTORY   has a past surgical history that includes gastroscopy (1/3/2016); gastroscopy (4/8/2016); gastroscopy (3/13/2019); and gastroscopy-endo (N/A, 11/13/2019).    SOCIAL HISTORY  Social History     Tobacco Use    Smoking status: Current Every Day Smoker " "    Packs/day: 1.00     Years: 48.00     Pack years: 48.00     Types: Cigarettes    Smokeless tobacco: Never Used   Vaping Use    Vaping Use: Never used   Substance Use Topics    Alcohol use: Yes     Comment: \"couple of pints\" whiskey every day    Drug use: Yes     Types: Inhaled     Comment: marijuana; not currently -  cocaine, meth      Social History     Substance and Sexual Activity   Drug Use Yes    Types: Inhaled    Comment: marijuana; not currently -  cocaine, meth       FAMILY HISTORY  History reviewed. No pertinent family history.    CURRENT MEDICATIONS  Home Medications       Reviewed by Nomi Chaudhari R.N. (Registered Nurse) on 07/01/21 at 1337  Med List Status: Partial     Medication Last Dose Status   DILTIAZem CD (DILTIAZEM CD) 120 MG CAPSULE SR 24 HR  Active   folic acid (FOLVITE) 1 MG Tab  Active   multivitamin (THERAGRAN) Tab  Active   nicotine (NICODERM) 21 MG/24HR PATCH 24 HR  Active   nicotine polacrilex (NICORETTE) 2 MG Gum  Active   ondansetron (ZOFRAN ODT) 4 MG TABLET DISPERSIBLE  Active   pantoprazole (PROTONIX) 40 MG Tablet Delayed Response  Active   QUEtiapine (SEROQUEL) 200 MG Tab  Active                    ALLERGIES  Allergies   Allergen Reactions    Haloperidol Unspecified     Twitching/involuntary movements        PHYSICAL EXAM  VITAL SIGNS: /111   Pulse (!) 102   Temp 37.3 °C (99.1 °F) (Temporal)   Resp 16   Ht 1.727 m (5' 8\")   Wt 60 kg (132 lb 4.4 oz)   SpO2 96%   BMI 20.11 kg/m²     Constitutional: Well developed, Deconditioned, No acute distress, Non-toxic appearance.   HEENT: Normocephalic, Atraumatic,  external ears normal, pharynx pink,  Mucous  Membranes moist, No rhinorrhea or mucosal edema  Eyes: PERRL, EOMI, Conjunctiva normal, No discharge.   Neck: Normal range of motion, No tenderness, Supple, No stridor.   Lymphatic: No lymphadenopathy    Cardiovascular: Tachycardiac, Regular Rhythm, No murmurs,  rubs, or gallops.   Thorax & Lungs: Lungs clear to " auscultation bilaterally, No respiratory distress, No wheezes, rhales or rhonchi, No chest wall tenderness.   Abdomen: Bowel sounds normal, Soft, non tender, non distended,  No pulsatile masses., no rebound guarding or peritoneal signs.   Skin: Warm, Dry, Bruising under skin, No rash,   Back:  No CVA tenderness,  No spinal tenderness, bony crepitance, step offs, or instability.   Neurologic: Alert & oriented clear speech no focal deficits  Extremities: Equal, intact distal pulses, No cyanosis, clubbing or edema,  No tenderness.   Musculoskeletal: Good range of motion in all major joints. No tenderness to palpation or major deformities noted.      DIAGNOSTIC STUDIES / PROCEDURES    LABS  Results for orders placed or performed during the hospital encounter of 07/01/21   LACTIC ACID   Result Value Ref Range    Lactic Acid 1.3 0.5 - 2.0 mmol/L   CBC WITH DIFFERENTIAL   Result Value Ref Range    WBC 3.0 (L) 4.8 - 10.8 K/uL    RBC 4.54 (L) 4.70 - 6.10 M/uL    Hemoglobin 13.8 (L) 14.0 - 18.0 g/dL    Hematocrit 42.5 42.0 - 52.0 %    MCV 93.6 81.4 - 97.8 fL    MCH 30.4 27.0 - 33.0 pg    MCHC 32.5 (L) 33.7 - 35.3 g/dL    RDW 45.3 35.9 - 50.0 fL    Platelet Count 64 (L) 164 - 446 K/uL    MPV 11.2 9.0 - 12.9 fL    Neutrophils-Polys 66.80 44.00 - 72.00 %    Lymphocytes 19.70 (L) 22.00 - 41.00 %    Monocytes 10.90 0.00 - 13.40 %    Eosinophils 1.30 0.00 - 6.90 %    Basophils 1.00 0.00 - 1.80 %    Immature Granulocytes 0.30 0.00 - 0.90 %    Nucleated RBC 0.00 /100 WBC    Neutrophils (Absolute) 2.03 1.82 - 7.42 K/uL    Lymphs (Absolute) 0.60 (L) 1.00 - 4.80 K/uL    Monos (Absolute) 0.33 0.00 - 0.85 K/uL    Eos (Absolute) 0.04 0.00 - 0.51 K/uL    Baso (Absolute) 0.03 0.00 - 0.12 K/uL    Immature Granulocytes (abs) 0.01 0.00 - 0.11 K/uL    NRBC (Absolute) 0.00 K/uL   COMP METABOLIC PANEL   Result Value Ref Range    Sodium 139 135 - 145 mmol/L    Potassium 4.0 3.6 - 5.5 mmol/L    Chloride 102 96 - 112 mmol/L    Co2 26 20 - 33 mmol/L     Anion Gap 11.0 7.0 - 16.0    Glucose 81 65 - 99 mg/dL    Bun 15 8 - 22 mg/dL    Creatinine 0.57 0.50 - 1.40 mg/dL    Calcium 8.7 8.5 - 10.5 mg/dL    AST(SGOT) 88 (H) 12 - 45 U/L    ALT(SGPT) 70 (H) 2 - 50 U/L    Alkaline Phosphatase 86 30 - 99 U/L    Total Bilirubin 0.8 0.1 - 1.5 mg/dL    Albumin 4.5 3.2 - 4.9 g/dL    Total Protein 8.6 (H) 6.0 - 8.2 g/dL    Globulin 4.1 (H) 1.9 - 3.5 g/dL    A-G Ratio 1.1 g/dL   DIAGNOSTIC ALCOHOL   Result Value Ref Range    Diagnostic Alcohol <10.1 0.0 - 10.0 mg/dL   MAGNESIUM   Result Value Ref Range    Magnesium 2.4 1.5 - 2.5 mg/dL   PHOSPHORUS   Result Value Ref Range    Phosphorus 2.5 2.5 - 4.5 mg/dL   AMMONIA   Result Value Ref Range    Ammonia 28 11 - 45 umol/L   LIPASE   Result Value Ref Range    Lipase 46 11 - 82 U/L   ESTIMATED GFR   Result Value Ref Range    GFR If African American >60 >60 mL/min/1.73 m 2    GFR If Non African American >60 >60 mL/min/1.73 m 2      All labs reviewed by me.      RADIOLOGY  DX-CHEST-PORTABLE (1 VIEW)   Final Result      No acute cardiopulmonary disease.        The radiologist's interpretation of all radiological studies have been reviewed by me.    COURSE & MEDICAL DECISION MAKING  Nursing notes, VS, PMSFHx reviewed in chart.    [facility] The patient has a history of heavy alcohol consupmtion. Low platelet (blood clotting) count was last done 6/10/2021.     4:03 PM Patient seen and examined at bedside. Patient will be treated with Ativan 1 mg, and detox IV 1000mL. Intravenous fluids administered for dehydration Ordered DX-Chest, Estimated GFR, Lipase, Lactic Acid, CBC with diff, CMP, Blood Culture, Diagnostics, Magnesium,Phosphorus, Ammonia to evaluate his symptoms. The differential diagnoses include but are not limited to: alcohol withdraw, sepsis, electrolyte abnormality, dehydration    5:27 PM Patient was reevaluated at bedside. Discussed lab and radiology results with the patient and informed  them their results are mostly likely secondary to chronic alcoholism.  I encouraged the patient to stop drinking and provided resources to life skills alcohol.    HYDRATION: Based on the patient's presentation of Dehydration the patient was given IV fluids. IV Hydration was used because oral hydration was not adequate alone. Upon recheck following hydration, the patient was improved.        The patient will return for new or worsening symptoms and is stable at the time of discharge.    The patient is referred to a primary physician for blood pressure management, diabetic screening, and for all other preventative health concerns.    DISPOSITION:  Patient will be discharged home in stable condition.    FOLLOW UP:  Nnamdi Ballesteros M.D.  [address] [facility] [id number] [phone]    Call in 1 day  for recheck    FINAL IMPRESSION  1. Alcohol dependence with other alcohol-induced disorder (HCC)          Tina ALMAZAN (Jonny), am scribing for, and in the presence of, Cecille Brown M.D..    Electronically signed by: Tina Auguste (Jonny), 7/1/2021    Cecille ALMAZAN M.D. personally performed the services described in this documentation, as scribed by Tina Auguste in my presence, and it is both accurate and complete.    The note accurately reflects work and decisions made by me.  Cecille Brown M.D.  7/1/2021  9:03 PM

## 2021-07-06 LAB
BACTERIA BLD CULT: NORMAL
BACTERIA BLD CULT: NORMAL
SIGNIFICANT IND 70042: NORMAL
SIGNIFICANT IND 70042: NORMAL
SITE SITE: NORMAL
SITE SITE: NORMAL
SOURCE SOURCE: NORMAL
SOURCE SOURCE: NORMAL

## 2021-07-20 ENCOUNTER — APPOINTMENT (OUTPATIENT)
Dept: RADIOLOGY | Facility: MEDICAL CENTER | Age: 61
End: 2021-07-20
Attending: EMERGENCY MEDICINE
Payer: COMMERCIAL

## 2021-07-20 ENCOUNTER — HOSPITAL ENCOUNTER (EMERGENCY)
Facility: MEDICAL CENTER | Age: 61
End: 2021-07-21
Attending: EMERGENCY MEDICINE
Payer: COMMERCIAL

## 2021-07-20 DIAGNOSIS — R07.9 CHEST PAIN, UNSPECIFIED TYPE: ICD-10-CM

## 2021-07-20 LAB — EKG IMPRESSION: NORMAL

## 2021-07-20 PROCEDURE — 85027 COMPLETE CBC AUTOMATED: CPT

## 2021-07-20 PROCEDURE — 84484 ASSAY OF TROPONIN QUANT: CPT

## 2021-07-20 PROCEDURE — 99284 EMERGENCY DEPT VISIT MOD MDM: CPT

## 2021-07-20 PROCEDURE — 83690 ASSAY OF LIPASE: CPT

## 2021-07-20 PROCEDURE — 85007 BL SMEAR W/DIFF WBC COUNT: CPT

## 2021-07-20 PROCEDURE — 93005 ELECTROCARDIOGRAM TRACING: CPT

## 2021-07-20 PROCEDURE — 80053 COMPREHEN METABOLIC PANEL: CPT

## 2021-07-20 PROCEDURE — 93005 ELECTROCARDIOGRAM TRACING: CPT | Performed by: EMERGENCY MEDICINE

## 2021-07-20 PROCEDURE — 71045 X-RAY EXAM CHEST 1 VIEW: CPT

## 2021-07-20 ASSESSMENT — FIBROSIS 4 INDEX: FIB4 SCORE: 10.35

## 2021-07-21 VITALS
TEMPERATURE: 97.5 F | RESPIRATION RATE: 34 BRPM | HEIGHT: 68 IN | WEIGHT: 145 LBS | HEART RATE: 86 BPM | BODY MASS INDEX: 21.98 KG/M2 | DIASTOLIC BLOOD PRESSURE: 77 MMHG | SYSTOLIC BLOOD PRESSURE: 139 MMHG | OXYGEN SATURATION: 95 %

## 2021-07-21 LAB
ALBUMIN SERPL BCP-MCNC: 4 G/DL (ref 3.2–4.9)
ALBUMIN/GLOB SERPL: 1 G/DL
ALP SERPL-CCNC: 71 U/L (ref 30–99)
ALT SERPL-CCNC: 74 U/L (ref 2–50)
ANION GAP SERPL CALC-SCNC: 15 MMOL/L (ref 7–16)
AST SERPL-CCNC: 103 U/L (ref 12–45)
BASOPHILS # BLD AUTO: 1.8 % (ref 0–1.8)
BASOPHILS # BLD: 0.05 K/UL (ref 0–0.12)
BILIRUB SERPL-MCNC: 0.4 MG/DL (ref 0.1–1.5)
BUN SERPL-MCNC: 11 MG/DL (ref 8–22)
CALCIUM SERPL-MCNC: 8.3 MG/DL (ref 8.5–10.5)
CHLORIDE SERPL-SCNC: 110 MMOL/L (ref 96–112)
CO2 SERPL-SCNC: 20 MMOL/L (ref 20–33)
CREAT SERPL-MCNC: 0.57 MG/DL (ref 0.5–1.4)
EOSINOPHIL # BLD AUTO: 0.07 K/UL (ref 0–0.51)
EOSINOPHIL NFR BLD: 2.7 % (ref 0–6.9)
ERYTHROCYTE [DISTWIDTH] IN BLOOD BY AUTOMATED COUNT: 48.2 FL (ref 35.9–50)
GLOBULIN SER CALC-MCNC: 4 G/DL (ref 1.9–3.5)
GLUCOSE SERPL-MCNC: 109 MG/DL (ref 65–99)
HCT VFR BLD AUTO: 39.2 % (ref 42–52)
HGB BLD-MCNC: 13 G/DL (ref 14–18)
LIPASE SERPL-CCNC: 61 U/L (ref 11–82)
LYMPHOCYTES # BLD AUTO: 0.71 K/UL (ref 1–4.8)
LYMPHOCYTES NFR BLD: 27.4 % (ref 22–41)
MANUAL DIFF BLD: NORMAL
MCH RBC QN AUTO: 31.2 PG (ref 27–33)
MCHC RBC AUTO-ENTMCNC: 33.2 G/DL (ref 33.7–35.3)
MCV RBC AUTO: 94 FL (ref 81.4–97.8)
MONOCYTES # BLD AUTO: 0.32 K/UL (ref 0–0.85)
MONOCYTES NFR BLD AUTO: 12.4 % (ref 0–13.4)
MORPHOLOGY BLD-IMP: NORMAL
NEUTROPHILS # BLD AUTO: 1.45 K/UL (ref 1.82–7.42)
NEUTROPHILS NFR BLD: 55.7 % (ref 44–72)
NRBC # BLD AUTO: 0 K/UL
NRBC BLD-RTO: 0 /100 WBC
PLATELET # BLD AUTO: 65 K/UL (ref 164–446)
PLATELET BLD QL SMEAR: NORMAL
PMV BLD AUTO: 11.2 FL (ref 9–12.9)
POTASSIUM SERPL-SCNC: 3.8 MMOL/L (ref 3.6–5.5)
PROT SERPL-MCNC: 8 G/DL (ref 6–8.2)
RBC # BLD AUTO: 4.17 M/UL (ref 4.7–6.1)
RBC BLD AUTO: NORMAL
SODIUM SERPL-SCNC: 145 MMOL/L (ref 135–145)
TROPONIN T SERPL-MCNC: <6 NG/L (ref 6–19)
WBC # BLD AUTO: 2.6 K/UL (ref 4.8–10.8)

## 2021-07-21 NOTE — ED TRIAGE NOTES
"Chief Complaint   Patient presents with   • Chest Pain     BIB EMS from home. pt having new onset chest heaviness.      /81   Pulse 79   Temp 36.4 °C (97.5 °F) (Temporal)   Resp 18   Ht 1.727 m (5' 8\")   Wt 65.8 kg (145 lb)   SpO2 96%   BMI 22.05 kg/m²     Pt received 324 mg ASA from EMS    Pt has hx of alcohol abuse. Pt reports last drink was yesterday.   "

## 2021-07-21 NOTE — ED PROVIDER NOTES
"ED Provider Note    CHIEF COMPLAINT  Chief Complaint   Patient presents with   • Chest Pain     BIB EMS from home. pt having new onset chest heaviness.        HPI  Inocencio Shabazz is a 61 y.o. male who presents with chest heaviness and sharp left-sided chest pain lasting a few seconds at a time.  The patient states that he has a history of chronic alcohol abuse and drank about 5 1/2 pints of vodka yesterday.  He woke up with this chest discomfort.  No known prior history of cardiovascular disease.  He had nausea and vomited once after he forced himself to vomit thinking it might help him feel better.  No hematemesis.  No bloody stool or black stool.  No trouble with his breathing.  The patient is a chronic tobacco user.  Denies history of diabetes or hypertension.  He takes Seroquel and trazodone at night and takes pantoprazole in the morning.  No changes to his medications.    REVIEW OF SYSTEMS  See HPI for further details. All other systems are negative.     PAST MEDICAL HISTORY   has a past medical history of Alcohol abuse, Alcohol intoxication (HCC) (3/13/2014), Cirrhosis (HCC), GIB (gastrointestinal bleeding) (1/3/2016), Hepatitis C, Pancytopenia (HCC), and Psychiatric disorder.    SOCIAL HISTORY  Social History     Tobacco Use   • Smoking status: Current Every Day Smoker     Packs/day: 1.00     Years: 48.00     Pack years: 48.00     Types: Cigarettes   • Smokeless tobacco: Never Used   Vaping Use   • Vaping Use: Never used   Substance and Sexual Activity   • Alcohol use: Yes     Comment: \"couple of pints\" whiskey every day   • Drug use: Yes     Types: Inhaled     Comment: marijuana; not currently -  cocaine, meth   • Sexual activity: Not on file       SURGICAL HISTORY   has a past surgical history that includes gastroscopy (1/3/2016); gastroscopy (4/8/2016); gastroscopy (3/13/2019); and gastroscopy-endo (N/A, 11/13/2019).    CURRENT MEDICATIONS  Home Medications     Reviewed by Suzan Bañuelos R.N. " "(Registered Nurse) on 07/20/21 at 4583  Med List Status: Partial   Medication Last Dose Status   DILTIAZem CD (DILTIAZEM CD) 120 MG CAPSULE SR 24 HR  Active   folic acid (FOLVITE) 1 MG Tab  Active   multivitamin (THERAGRAN) Tab  Active   nicotine (NICODERM) 21 MG/24HR PATCH 24 HR  Active   nicotine polacrilex (NICORETTE) 2 MG Gum  Active   ondansetron (ZOFRAN ODT) 4 MG TABLET DISPERSIBLE  Active   pantoprazole (PROTONIX) 40 MG Tablet Delayed Response  Active   QUEtiapine (SEROQUEL) 200 MG Tab  Active                ALLERGIES  Allergies   Allergen Reactions   • Haloperidol Unspecified     Twitching/involuntary movements        PHYSICAL EXAM  VITAL SIGNS: /81   Pulse 79   Temp 36.4 °C (97.5 °F) (Temporal)   Resp 18   Ht 1.727 m (5' 8\")   Wt 65.8 kg (145 lb)   SpO2 96%   BMI 22.05 kg/m²   Pulse ox interpretation: I interpret this pulse ox as normal.  Constitutional: Alert in no apparent distress.  HENT: No signs of trauma, Bilateral external ears normal, Nose normal.   Eyes: Conjunctiva normal, Non-icteric.   Neck: Normal range of motion, Supple, No stridor.   Cardiovascular: Regular rate and rhythm.   Thorax & Lungs: Normal breath sounds, No respiratory distress, No wheezing, No chest tenderness.   Abdomen: Soft, No tenderness, No masses, No pulsatile masses. No peritoneal signs.  Skin: Warm, Dry, No erythema, No rash.   Back: No bony tenderness, No CVA tenderness.   Extremities: Intact distal pulses, No edema, No tenderness, No cyanosis  Musculoskeletal: Good range of motion in all major joints. No major deformities noted.   Neurologic: Alert, No focal deficits noted.       DIAGNOSTIC STUDIES / PROCEDURES    EKG - Physician interpretation  Results for orders placed or performed during the hospital encounter of 07/20/21   EKG   Result Value Ref Range    Report       [facility].    Test Date:  2021-07-20  Pt Name:    JIMMY FERRER                 Department: ER  MRN:       "  [id number]                      Room:       [facility]  Gender:     Male                         Technician: [id number]  :        1960                   Requested By:ER TRIAGE PROTOCOL  Order #:    [id number]                    Reading MD: JOHN CARRERA MD    Measurements  Intervals                                Axis  Rate:       65                           P:          -39  MS:         149                          QRS:        61  QRSD:       94                           T:          67  QT:         392  QTc:        408    Interpretive Statements  Sinus rhythm  Compared to ECG 06/10/2021 15:02:16  No significant changes  Electronically Signed On 2021 23:22:36 PDT by JOHN CARRERA MD           LABS  Labs Reviewed   CBC WITH DIFFERENTIAL - Abnormal; Notable for the following components:       Result Value    WBC 2.6 (*)     RBC 4.17 (*)     Hemoglobin 13.0 (*)     Hematocrit 39.2 (*)     MCHC 33.2 (*)     Platelet Count 65 (*)     Neutrophils (Absolute) 1.45 (*)     Lymphs (Absolute) 0.71 (*)     All other components within normal limits   COMP METABOLIC PANEL - Abnormal; Notable for the following components:    Glucose 109 (*)     Calcium 8.3 (*)     AST(SGOT) 103 (*)     ALT(SGPT) 74 (*)     Globulin 4.0 (*)     All other components within normal limits   TROPONIN   LIPASE   ESTIMATED GFR   DIFFERENTIAL MANUAL   PERIPHERAL SMEAR REVIEW   PLATELET ESTIMATE   MORPHOLOGY         RADIOLOGY  DX-CHEST-PORTABLE (1 VIEW)   Final Result         1.  No acute cardiopulmonary disease.            COURSE & MEDICAL DECISION MAKING    Medications - No data to display    Pertinent Labs & Imaging studies reviewed. (See chart for details)  61 y.o. male with a history of chronic alcohol abuse and smoking history presenting with sharp chest pains lasting seconds at a time.  Started when he woke up today.  No diaphoresis.  No vomiting.  No reproducible chest pain with exertion.  No known cardiovascular disease history.  He states that he  "drank an excessive amount of vodka yesterday.  Pain is rather atypical for ACS.  EKG is unremarkable for acute ischemic findings.  Troponin is negative as well.  Laboratory studies were performed which were largely unremarkable.    Patient has known chronic leukopenia and slight elevation in liver enzymes with normal T bili.  Also with low platelets.  These findings are likely related to chronic alcohol abuse.    Patient has a heart score of 2 given age and given risk factors.  Low risk for major adverse cardiac event in the near future.  Outpatient work-up for further testing regarding chest pain appears appropriate.  No respiratory symptoms.  No hypoxia or respiratory distress.  No lower extremity swelling or pain.  Very low suspicion for pulmonary embolus in this setting.  Chest pain is transient and sharp in nature.  Symptoms are not typical of an aortic dissection.    Ultimately, I suspect that the patient's pain symptoms are a result of alcoholic gastritis or possibly peptic ulcer disease.  No bloody or black stool.  No hematemesis.  Hemoglobin is stable.    Recommending outpatient management.  He should also reduce alcohol consumption.  He was ultimately discharged home in stable condition.    The patient will not drink alcohol nor drive with prescribed medications.   The patient was instructed to follow-up with primary care physician for further management.  To return immediately for any worsening symptoms or development of any other concerning signs or symptoms. The patient verbalizes understanding in their own words.    /77   Pulse 86   Temp 36.4 °C (97.5 °F) (Temporal)   Resp (!) 34   Ht 1.727 m (5' 8\")   Wt 65.8 kg (145 lb)   SpO2 95%   BMI 22.05 kg/m²     The patient was referred to primary care where they will receive further BP management.      [facility], Emergency Dept  [address] [address] [phone]    As needed, If symptoms worsen    Primary " care doctor    Schedule an appointment as soon as possible for a visit         FINAL IMPRESSION  1. Chest pain, unspecified type            Electronically signed by: Alan Jara M.D., 7/20/2021 11:15 PM

## 2021-07-31 ENCOUNTER — HOSPITAL ENCOUNTER (EMERGENCY)
Facility: MEDICAL CENTER | Age: 61
End: 2021-07-31
Attending: EMERGENCY MEDICINE
Payer: COMMERCIAL

## 2021-07-31 ENCOUNTER — APPOINTMENT (OUTPATIENT)
Dept: RADIOLOGY | Facility: MEDICAL CENTER | Age: 61
End: 2021-07-31
Attending: EMERGENCY MEDICINE
Payer: COMMERCIAL

## 2021-07-31 VITALS
OXYGEN SATURATION: 95 % | WEIGHT: 131.39 LBS | BODY MASS INDEX: 19.91 KG/M2 | SYSTOLIC BLOOD PRESSURE: 128 MMHG | HEIGHT: 68 IN | DIASTOLIC BLOOD PRESSURE: 67 MMHG | HEART RATE: 61 BPM | RESPIRATION RATE: 19 BRPM | TEMPERATURE: 97.5 F

## 2021-07-31 DIAGNOSIS — R11.2 NAUSEA AND VOMITING, INTRACTABILITY OF VOMITING NOT SPECIFIED, UNSPECIFIED VOMITING TYPE: ICD-10-CM

## 2021-07-31 DIAGNOSIS — F10.10 ALCOHOL ABUSE: ICD-10-CM

## 2021-07-31 DIAGNOSIS — R07.9 CHEST PAIN, UNSPECIFIED TYPE: ICD-10-CM

## 2021-07-31 LAB
ALBUMIN SERPL BCP-MCNC: 4.6 G/DL (ref 3.2–4.9)
ALBUMIN/GLOB SERPL: 1 G/DL
ALP SERPL-CCNC: 86 U/L (ref 30–99)
ALT SERPL-CCNC: 56 U/L (ref 2–50)
ANION GAP SERPL CALC-SCNC: 17 MMOL/L (ref 7–16)
AST SERPL-CCNC: 61 U/L (ref 12–45)
BASOPHILS # BLD AUTO: 1 % (ref 0–1.8)
BASOPHILS # BLD: 0.04 K/UL (ref 0–0.12)
BILIRUB SERPL-MCNC: 0.4 MG/DL (ref 0.1–1.5)
BUN SERPL-MCNC: 13 MG/DL (ref 8–22)
CALCIUM SERPL-MCNC: 8.8 MG/DL (ref 8.5–10.5)
CHLORIDE SERPL-SCNC: 105 MMOL/L (ref 96–112)
CO2 SERPL-SCNC: 21 MMOL/L (ref 20–33)
CREAT SERPL-MCNC: 0.76 MG/DL (ref 0.5–1.4)
EKG IMPRESSION: NORMAL
EOSINOPHIL # BLD AUTO: 0.07 K/UL (ref 0–0.51)
EOSINOPHIL NFR BLD: 1.8 % (ref 0–6.9)
ERYTHROCYTE [DISTWIDTH] IN BLOOD BY AUTOMATED COUNT: 49.5 FL (ref 35.9–50)
GLOBULIN SER CALC-MCNC: 4.8 G/DL (ref 1.9–3.5)
GLUCOSE SERPL-MCNC: 88 MG/DL (ref 65–99)
HCT VFR BLD AUTO: 46.3 % (ref 42–52)
HGB BLD-MCNC: 14.9 G/DL (ref 14–18)
IMM GRANULOCYTES # BLD AUTO: 0.02 K/UL (ref 0–0.11)
IMM GRANULOCYTES NFR BLD AUTO: 0.5 % (ref 0–0.9)
LIPASE SERPL-CCNC: 63 U/L (ref 11–82)
LYMPHOCYTES # BLD AUTO: 0.83 K/UL (ref 1–4.8)
LYMPHOCYTES NFR BLD: 20.9 % (ref 22–41)
MCH RBC QN AUTO: 31.1 PG (ref 27–33)
MCHC RBC AUTO-ENTMCNC: 32.2 G/DL (ref 33.7–35.3)
MCV RBC AUTO: 96.7 FL (ref 81.4–97.8)
MONOCYTES # BLD AUTO: 0.36 K/UL (ref 0–0.85)
MONOCYTES NFR BLD AUTO: 9.1 % (ref 0–13.4)
NEUTROPHILS # BLD AUTO: 2.65 K/UL (ref 1.82–7.42)
NEUTROPHILS NFR BLD: 66.7 % (ref 44–72)
NRBC # BLD AUTO: 0 K/UL
NRBC BLD-RTO: 0 /100 WBC
PLATELET # BLD AUTO: 66 K/UL (ref 164–446)
PMV BLD AUTO: 11.5 FL (ref 9–12.9)
POTASSIUM SERPL-SCNC: 4.2 MMOL/L (ref 3.6–5.5)
PROT SERPL-MCNC: 9.4 G/DL (ref 6–8.2)
RBC # BLD AUTO: 4.79 M/UL (ref 4.7–6.1)
SODIUM SERPL-SCNC: 143 MMOL/L (ref 135–145)
TROPONIN T SERPL-MCNC: <6 NG/L (ref 6–19)
WBC # BLD AUTO: 4 K/UL (ref 4.8–10.8)

## 2021-07-31 PROCEDURE — 93005 ELECTROCARDIOGRAM TRACING: CPT | Performed by: EMERGENCY MEDICINE

## 2021-07-31 PROCEDURE — 71045 X-RAY EXAM CHEST 1 VIEW: CPT

## 2021-07-31 PROCEDURE — 700111 HCHG RX REV CODE 636 W/ 250 OVERRIDE (IP): Performed by: EMERGENCY MEDICINE

## 2021-07-31 PROCEDURE — 99284 EMERGENCY DEPT VISIT MOD MDM: CPT

## 2021-07-31 PROCEDURE — 83690 ASSAY OF LIPASE: CPT

## 2021-07-31 PROCEDURE — 80053 COMPREHEN METABOLIC PANEL: CPT

## 2021-07-31 PROCEDURE — 85025 COMPLETE CBC W/AUTO DIFF WBC: CPT

## 2021-07-31 PROCEDURE — 93005 ELECTROCARDIOGRAM TRACING: CPT

## 2021-07-31 PROCEDURE — 84484 ASSAY OF TROPONIN QUANT: CPT

## 2021-07-31 RX ORDER — ONDANSETRON 2 MG/ML
4 INJECTION INTRAMUSCULAR; INTRAVENOUS ONCE
Status: DISCONTINUED | OUTPATIENT
Start: 2021-07-31 | End: 2021-07-31 | Stop reason: HOSPADM

## 2021-07-31 RX ORDER — ONDANSETRON 4 MG/1
4 TABLET, ORALLY DISINTEGRATING ORAL ONCE
Status: COMPLETED | OUTPATIENT
Start: 2021-07-31 | End: 2021-07-31

## 2021-07-31 RX ADMIN — ONDANSETRON 4 MG: 4 TABLET, ORALLY DISINTEGRATING ORAL at 16:51

## 2021-07-31 ASSESSMENT — FIBROSIS 4 INDEX: FIB4 SCORE: 11.61

## 2021-07-31 ASSESSMENT — LIFESTYLE VARIABLES
DO YOU DRINK ALCOHOL: YES
DOES PATIENT WANT TO STOP DRINKING: CANNOT ASSESS

## 2021-07-31 NOTE — ED NOTES
Pt had episode of clear emesis 200 ml. He had previously refused nausea medication and was recently given a cup of water. Medicated with zofran.

## 2021-07-31 NOTE — ED PROVIDER NOTES
ED Provider Note    Scribed for Dr. Alan Iglesias M.D. by Kathrin Davila. 7/31/2021  2:37 PM    Primary care provider: Nnamdi Ballesteros M.D. (Inactive)  Means of arrival: Walk in  History obtained from: Patient   History limited by: poor historian    CHIEF COMPLAINT  Chief Complaint   Patient presents with   • Vomiting     X 2 days, denies blood in vomit, denies known sick contacts. Daily ETOH, 1 pint/day. Pt denies ETOH day d/t vomiting.    • Chest Pain     X 2 days, described as heavy, intermittent, substernal, non-radiating, denies cardiac hx.        HPI  Inocencio Shabazz is a 61 y.o. male who presents to the Emergency Department chest pain onset yesterday. He describes his chest pain as heaviness. Patient has associated vomiting, but states his nausea is improved at this time. Denies abdominal pain. Patient has a history of alcohol abuse and told nursing he drink around 1 pint a day, but has not had any alcohol for 1 day secondary to vomiting.  Patient has a history of ER visits with similar type complaints    Further history of present illness cannot be obtained due to the patient's poor historian    REVIEW OF SYSTEMS  Pertinent positives include chest pain and vomiting. Pertinent negatives include no abdominal pain.   See HPI for further details.     Further history of present illness cannot be obtained due to the patient's poor historian     PAST MEDICAL HISTORY   has a past medical history of Alcohol abuse, Alcohol intoxication (HCC) (3/13/2014), Cirrhosis (HCC), GIB (gastrointestinal bleeding) (1/3/2016), Hepatitis C, Pancytopenia (HCC), and Psychiatric disorder.    SURGICAL HISTORY   has a past surgical history that includes gastroscopy (1/3/2016); gastroscopy (4/8/2016); gastroscopy (3/13/2019); and gastroscopy-endo (N/A, 11/13/2019).    SOCIAL HISTORY  Social History     Tobacco Use   • Smoking status: Current Every Day Smoker     Packs/day: 1.00     Years: 48.00     Pack years: 48.00     Types: Cigarettes  "  • Smokeless tobacco: Never Used   Vaping Use   • Vaping Use: Never used   Substance Use Topics   • Alcohol use: Yes     Comment: \"couple of pints\" whiskey every day   • Drug use: Yes     Types: Inhaled     Comment: marijuana; not currently -  cocaine, meth      Social History     Substance and Sexual Activity   Drug Use Yes   • Types: Inhaled    Comment: marijuana; not currently -  cocaine, meth       FAMILY HISTORY  Patient denies a pertinent family history.    CURRENT MEDICATIONS  Home Medications     Reviewed by John Mo R.N. (Registered Nurse) on 07/31/21 at 1135  Med List Status: <None>   Medication Last Dose Status   DILTIAZem CD (DILTIAZEM CD) 120 MG CAPSULE SR 24 HR  Active   folic acid (FOLVITE) 1 MG Tab  Active   multivitamin (THERAGRAN) Tab  Active   nicotine (NICODERM) 21 MG/24HR PATCH 24 HR  Active   nicotine polacrilex (NICORETTE) 2 MG Gum  Active   ondansetron (ZOFRAN ODT) 4 MG TABLET DISPERSIBLE  Active   pantoprazole (PROTONIX) 40 MG Tablet Delayed Response  Active   QUEtiapine (SEROQUEL) 200 MG Tab  Active                ALLERGIES  Allergies   Allergen Reactions   • Haloperidol Unspecified     Twitching/involuntary movements        PHYSICAL EXAM  VITAL SIGNS: /80   Pulse (!) 57   Temp 36.6 °C (97.9 °F) (Temporal)   Resp 18   Ht 1.727 m (5' 8\")   Wt 59.6 kg (131 lb 6.3 oz)   SpO2 96%   BMI 19.98 kg/m²     Constitutional: Well developed, Well nourished, no distress, Non-toxic appearance.   HENT: Normocephalic, Atraumatic, Bilateral external ears normal, Oropharynx moist, No oral exudates.   Eyes: PERRLA, EOMI, Conjunctiva normal, No discharge.   Neck: No tenderness, Supple, No stridor.   Lymphatic: No lymphadenopathy noted.   Cardiovascular: Normal heart rate, Normal rhythm.   Thorax & Lungs: Clear to auscultation bilaterally, No respiratory distress, No wheezing, No crackles.   Abdomen: Soft, minimal   epigastric tenderness, No masses, No pulsatile masses.   Skin: Warm, Dry, " No erythema, No rash.   Extremities:, No edema No cyanosis.   Musculoskeletal: No tenderness to palpation or major deformities noted.  Intact distal pulses  Neurologic: Awake, alert. Moves all extremities spontaneously.  Psychiatric: Affect flat l.     LABS  Results for orders placed or performed during the hospital encounter of 07/31/21   CBC with Differential   Result Value Ref Range    WBC 4.0 (L) 4.8 - 10.8 K/uL    RBC 4.79 4.70 - 6.10 M/uL    Hemoglobin 14.9 14.0 - 18.0 g/dL    Hematocrit 46.3 42.0 - 52.0 %    MCV 96.7 81.4 - 97.8 fL    MCH 31.1 27.0 - 33.0 pg    MCHC 32.2 (L) 33.7 - 35.3 g/dL    RDW 49.5 35.9 - 50.0 fL    Platelet Count 66 (L) 164 - 446 K/uL    MPV 11.5 9.0 - 12.9 fL    Neutrophils-Polys 66.70 44.00 - 72.00 %    Lymphocytes 20.90 (L) 22.00 - 41.00 %    Monocytes 9.10 0.00 - 13.40 %    Eosinophils 1.80 0.00 - 6.90 %    Basophils 1.00 0.00 - 1.80 %    Immature Granulocytes 0.50 0.00 - 0.90 %    Nucleated RBC 0.00 /100 WBC    Neutrophils (Absolute) 2.65 1.82 - 7.42 K/uL    Lymphs (Absolute) 0.83 (L) 1.00 - 4.80 K/uL    Monos (Absolute) 0.36 0.00 - 0.85 K/uL    Eos (Absolute) 0.07 0.00 - 0.51 K/uL    Baso (Absolute) 0.04 0.00 - 0.12 K/uL    Immature Granulocytes (abs) 0.02 0.00 - 0.11 K/uL    NRBC (Absolute) 0.00 K/uL   Complete Metabolic Panel (CMP)   Result Value Ref Range    Sodium 143 135 - 145 mmol/L    Potassium 4.2 3.6 - 5.5 mmol/L    Chloride 105 96 - 112 mmol/L    Co2 21 20 - 33 mmol/L    Anion Gap 17.0 (H) 7.0 - 16.0    Glucose 88 65 - 99 mg/dL    Bun 13 8 - 22 mg/dL    Creatinine 0.76 0.50 - 1.40 mg/dL    Calcium 8.8 8.5 - 10.5 mg/dL    AST(SGOT) 61 (H) 12 - 45 U/L    ALT(SGPT) 56 (H) 2 - 50 U/L    Alkaline Phosphatase 86 30 - 99 U/L    Total Bilirubin 0.4 0.1 - 1.5 mg/dL    Albumin 4.6 3.2 - 4.9 g/dL    Total Protein 9.4 (H) 6.0 - 8.2 g/dL    Globulin 4.8 (H) 1.9 - 3.5 g/dL    A-G Ratio 1.0 g/dL   Troponin   Result Value Ref Range    Troponin T <6 6 - 19 ng/L   ESTIMATED GFR   Result  Value Ref Range    GFR If African American >60 >60 mL/min/1.73 m 2    GFR If Non African American >60 >60 mL/min/1.73 m 2   LIPASE   Result Value Ref Range    Lipase 63 11 - 82 U/L   EKG (NOW)   Result Value Ref Range    Report       [facility] Emergency Dept.    Test Date:  2021  Pt Name:    JIMMY FERRER                 Department: ER  MRN:        [id number]                      Room:  Gender:     Male                         Technician: [id number]  :        1960                   Requested By:ER TRIAGE PROTOCOL  Order #:    [id number]                    Reading MD: JOHN OCAMPO MD    Measurements  Intervals                                Axis  Rate:       69                           P:          76  KY:         152                          QRS:        59  QRSD:       94                           T:          77  QT:         384  QTc:        412    Interpretive Statements  SINUS ARRHYTHMIA, RATE  59- 94  Compared to ECG 2021 22:57:25  Sinus rhythm no longer present  Electronically Signed On 2021 15:01:29 PDT by JOHN OCAMPO MD       All labs reviewed by me.    EKG  Interpreted by me as above    RADIOLOGY  DX-CHEST-PORTABLE (1 VIEW)   Final Result      No acute cardiac or pulmonary abnormality is noted.        The radiologist's interpretation of all radiological studies have been reviewed by me.    COURSE & MEDICAL DECISION MAKING  Pertinent Labs & Imaging studies reviewed. (See chart for details)    Reviewed patient's old medical records which showed he has been seen in the ED 12 times this year.    2:37 PM - Patient seen and examined at bedside. I discussed that we will obtain labs and imaging to further evaluate for a cause of his symptoms. Patient will be treated with Zofran 4 mg. Ordered chest x-ray, lipase, CBC w/ diff, CMP, troponin, and EKG to evaluate his symptoms. The differential diagnoses include but are not limited to: acute coronary syndrome, gastritis, chest  wall pain.    4:35 PM - Patient was reevaluated at bedside. Discussed lab and radiology results with the patient and informed them that there were no concerning abnormalities. Discussed return precautions. Patient will be discharged at this time. He verbalizes agreement with discharge and plan of care.       Decision Making:  Patient evaluated for some nausea vomiting and nonspecific chest pain for which he has been multiple examined and evaluated multiple times.  He has normal work-up today.  EKG not showing any acute changes.  Work-up is negative.  Score would be low at about 2 or 3 the patient would seem quite unlikely to have dissection pulmonary embolus or other acute event based on her evaluation.  Patient is treated with improvement in symptoms.  He may have some component of pain secondary to alcoholic gastritis as he has excessive alcohol consumption.  Patient referred for follow-up encouraged to try to decrease his alcohol consumption    The patient will return for new or worsening symptoms and is stable at the time of discharge.    DISPOSITION:  Patient will be discharged home in stable condition.    FOLLOW UP:  With you PCP as needed.    [facility]    FINAL IMPRESSION  1. Chest pain, unspecified type    2. Nausea and vomiting, intractability of vomiting not specified, unspecified vomiting type    3. Alcohol abuse          Kathrin ALMAZAN (Jonny), am scribing for, and in the presence of, Alan Iglesias M.D..    Electronically signed by: Kathrin Davila (Jonny), 7/31/2021    Alan ALMAZAN M.D. personally performed the services described in this documentation, as scribed by Kathrin Davila in my presence, and it is both accurate and complete. C     The note accurately reflects work and decisions made by me.  Alan Iglesias M.D.  7/31/2021  6:04 PM

## 2021-07-31 NOTE — ED TRIAGE NOTES
"Chief Complaint   Patient presents with   • Vomiting     X 2 days, denies blood in vomit, denies known sick contacts. Daily ETOH, 1 pint/day. Pt denies ETOH day d/t vomiting.    • Chest Pain     X 2 days, described as heavy, intermittent, substernal, non-radiating, denies cardiac hx.      Pt ambulatory to triage for above complaints. VSS on RA, gCS 15, NAD. Pt denies hx of ETOH withdrawal. Pt is poor historian.    Pt attempting to induce vomiting by putting his fingers in back of mouth, pt instructed to not do this.    Pt returned to [facility]. Educated on triage process and to inform staff of any changes.     /79   Pulse (!) 114   Temp 36.6 °C (97.9 °F) (Temporal)   Resp 16   Ht 1.727 m (5' 8\")   Wt 59.6 kg (131 lb 6.3 oz)   SpO2 97%   BMI 19.98 kg/m²      "

## 2021-07-31 NOTE — ED NOTES
"Ambulated to room. Pt changed to gown. Pt was recently seen for same cc. When asked if pain has changed is different or continuous pt stats \"I don't know\". He has bandages and ecg stickers from other facility states maybe [facility] at [facility] yesterday. Has not followed up with pcp. Labs previously collected in [facility]  "

## 2021-08-01 NOTE — ED NOTES
Discharged home to self. Pt understands to take home medications as prescribed. Follow up with [facility] as scheduled. Advised to refrain from alcohol use.

## 2021-08-07 ENCOUNTER — HOSPITAL ENCOUNTER (EMERGENCY)
Facility: MEDICAL CENTER | Age: 61
End: 2021-08-08
Attending: EMERGENCY MEDICINE
Payer: COMMERCIAL

## 2021-08-07 DIAGNOSIS — F10.10 ALCOHOL ABUSE: ICD-10-CM

## 2021-08-07 DIAGNOSIS — E86.0 DEHYDRATION: ICD-10-CM

## 2021-08-07 PROCEDURE — 80053 COMPREHEN METABOLIC PANEL: CPT

## 2021-08-07 PROCEDURE — 85025 COMPLETE CBC W/AUTO DIFF WBC: CPT

## 2021-08-07 PROCEDURE — 83690 ASSAY OF LIPASE: CPT

## 2021-08-07 PROCEDURE — 700105 HCHG RX REV CODE 258: Performed by: EMERGENCY MEDICINE

## 2021-08-07 PROCEDURE — U0005 INFEC AGEN DETEC AMPLI PROBE: HCPCS

## 2021-08-07 PROCEDURE — 36415 COLL VENOUS BLD VENIPUNCTURE: CPT

## 2021-08-07 PROCEDURE — U0003 INFECTIOUS AGENT DETECTION BY NUCLEIC ACID (DNA OR RNA); SEVERE ACUTE RESPIRATORY SYNDROME CORONAVIRUS 2 (SARS-COV-2) (CORONAVIRUS DISEASE [COVID-19]), AMPLIFIED PROBE TECHNIQUE, MAKING USE OF HIGH THROUGHPUT TECHNOLOGIES AS DESCRIBED BY CMS-2020-01-R: HCPCS

## 2021-08-07 PROCEDURE — 700111 HCHG RX REV CODE 636 W/ 250 OVERRIDE (IP): Performed by: EMERGENCY MEDICINE

## 2021-08-07 PROCEDURE — 99285 EMERGENCY DEPT VISIT HI MDM: CPT

## 2021-08-07 PROCEDURE — 96374 THER/PROPH/DIAG INJ IV PUSH: CPT

## 2021-08-07 RX ORDER — SODIUM CHLORIDE 9 MG/ML
1000 INJECTION, SOLUTION INTRAVENOUS ONCE
Status: COMPLETED | OUTPATIENT
Start: 2021-08-07 | End: 2021-08-08

## 2021-08-07 RX ORDER — ONDANSETRON 2 MG/ML
4 INJECTION INTRAMUSCULAR; INTRAVENOUS ONCE
Status: COMPLETED | OUTPATIENT
Start: 2021-08-07 | End: 2021-08-07

## 2021-08-07 RX ADMIN — ONDANSETRON 4 MG: 2 INJECTION INTRAMUSCULAR; INTRAVENOUS at 23:35

## 2021-08-07 RX ADMIN — SODIUM CHLORIDE 1000 ML: 9 INJECTION, SOLUTION INTRAVENOUS at 23:32

## 2021-08-07 ASSESSMENT — FIBROSIS 4 INDEX: FIB4 SCORE: 7.78

## 2021-08-08 ENCOUNTER — HOSPITAL ENCOUNTER (EMERGENCY)
Facility: MEDICAL CENTER | Age: 61
End: 2021-08-08
Attending: EMERGENCY MEDICINE
Payer: MEDICARE

## 2021-08-08 VITALS
OXYGEN SATURATION: 93 % | DIASTOLIC BLOOD PRESSURE: 97 MMHG | HEIGHT: 68 IN | BODY MASS INDEX: 20.38 KG/M2 | TEMPERATURE: 99.3 F | WEIGHT: 134.48 LBS | SYSTOLIC BLOOD PRESSURE: 140 MMHG | HEART RATE: 95 BPM | RESPIRATION RATE: 16 BRPM

## 2021-08-08 VITALS
RESPIRATION RATE: 18 BRPM | HEIGHT: 68 IN | BODY MASS INDEX: 19.95 KG/M2 | DIASTOLIC BLOOD PRESSURE: 74 MMHG | HEART RATE: 75 BPM | WEIGHT: 131.61 LBS | SYSTOLIC BLOOD PRESSURE: 120 MMHG | TEMPERATURE: 98.3 F | OXYGEN SATURATION: 96 %

## 2021-08-08 DIAGNOSIS — R53.81 MALAISE: ICD-10-CM

## 2021-08-08 DIAGNOSIS — Z13.9 ENCOUNTER FOR MEDICAL SCREENING EXAMINATION: ICD-10-CM

## 2021-08-08 LAB
ALBUMIN SERPL BCP-MCNC: 4.1 G/DL (ref 3.2–4.9)
ALBUMIN/GLOB SERPL: 1 G/DL
ALP SERPL-CCNC: 74 U/L (ref 30–99)
ALT SERPL-CCNC: 52 U/L (ref 2–50)
ANION GAP SERPL CALC-SCNC: 15 MMOL/L (ref 7–16)
AST SERPL-CCNC: 76 U/L (ref 12–45)
BASOPHILS # BLD AUTO: 1.2 % (ref 0–1.8)
BASOPHILS # BLD: 0.03 K/UL (ref 0–0.12)
BILIRUB SERPL-MCNC: 0.4 MG/DL (ref 0.1–1.5)
BUN SERPL-MCNC: 12 MG/DL (ref 8–22)
CALCIUM SERPL-MCNC: 8.6 MG/DL (ref 8.5–10.5)
CHLORIDE SERPL-SCNC: 99 MMOL/L (ref 96–112)
CO2 SERPL-SCNC: 23 MMOL/L (ref 20–33)
CREAT SERPL-MCNC: 0.55 MG/DL (ref 0.5–1.4)
EOSINOPHIL # BLD AUTO: 0.05 K/UL (ref 0–0.51)
EOSINOPHIL NFR BLD: 1.9 % (ref 0–6.9)
ERYTHROCYTE [DISTWIDTH] IN BLOOD BY AUTOMATED COUNT: 45.3 FL (ref 35.9–50)
GLOBULIN SER CALC-MCNC: 4.1 G/DL (ref 1.9–3.5)
GLUCOSE SERPL-MCNC: 79 MG/DL (ref 65–99)
HCT VFR BLD AUTO: 40.3 % (ref 42–52)
HGB BLD-MCNC: 13.5 G/DL (ref 14–18)
IMM GRANULOCYTES # BLD AUTO: 0 K/UL (ref 0–0.11)
IMM GRANULOCYTES NFR BLD AUTO: 0 % (ref 0–0.9)
LIPASE SERPL-CCNC: 63 U/L (ref 11–82)
LYMPHOCYTES # BLD AUTO: 0.91 K/UL (ref 1–4.8)
LYMPHOCYTES NFR BLD: 35.4 % (ref 22–41)
MCH RBC QN AUTO: 30.9 PG (ref 27–33)
MCHC RBC AUTO-ENTMCNC: 33.5 G/DL (ref 33.7–35.3)
MCV RBC AUTO: 92.2 FL (ref 81.4–97.8)
MONOCYTES # BLD AUTO: 0.27 K/UL (ref 0–0.85)
MONOCYTES NFR BLD AUTO: 10.5 % (ref 0–13.4)
NEUTROPHILS # BLD AUTO: 1.31 K/UL (ref 1.82–7.42)
NEUTROPHILS NFR BLD: 51 % (ref 44–72)
NRBC # BLD AUTO: 0 K/UL
NRBC BLD-RTO: 0 /100 WBC
PLATELET # BLD AUTO: 50 K/UL (ref 164–446)
PMV BLD AUTO: 11.9 FL (ref 9–12.9)
POTASSIUM SERPL-SCNC: 3.7 MMOL/L (ref 3.6–5.5)
PROT SERPL-MCNC: 8.2 G/DL (ref 6–8.2)
RBC # BLD AUTO: 4.37 M/UL (ref 4.7–6.1)
SARS-COV-2 RNA RESP QL NAA+PROBE: NOTDETECTED
SODIUM SERPL-SCNC: 137 MMOL/L (ref 135–145)
SPECIMEN SOURCE: NORMAL
WBC # BLD AUTO: 2.6 K/UL (ref 4.8–10.8)

## 2021-08-08 PROCEDURE — 99282 EMERGENCY DEPT VISIT SF MDM: CPT | Mod: EDC

## 2021-08-08 ASSESSMENT — LIFESTYLE VARIABLES
DO YOU DRINK ALCOHOL: YES
HAVE PEOPLE ANNOYED YOU BY CRITICIZING YOUR DRINKING: NO
TOTAL SCORE: 0
EVER FELT BAD OR GUILTY ABOUT YOUR DRINKING: NO
TOTAL SCORE: 0
EVER HAD A DRINK FIRST THING IN THE MORNING TO STEADY YOUR NERVES TO GET RID OF A HANGOVER: NO
HAVE YOU EVER FELT YOU SHOULD CUT DOWN ON YOUR DRINKING: NO
CONSUMPTION TOTAL: INCOMPLETE
TOTAL SCORE: 0
DOES PATIENT WANT TO STOP DRINKING: NO

## 2021-08-08 ASSESSMENT — FIBROSIS 4 INDEX: FIB4 SCORE: 13.28

## 2021-08-08 NOTE — ED NOTES
Pt upset about the wait time. Apologized for the wait. Educated pt about the triage process and to notify staff of any medical changes.

## 2021-08-08 NOTE — ED NOTES
RV @ 2052 has asked about wait times multiple times. Explained process and apologized each time. Patient nods head.

## 2021-08-08 NOTE — ED NOTES
Inocencio Shabazz D/C'd.  Discharge instructions including s/s to return to ED, and hydration importance  provided to pt.    Pt verbalized understanding with no further questions and concerns.    Copy of discharge provided to pt.  Signed copy in chart.    Pt ambulates out of department; pt in NAD, awake, alert, interactive and oriented.

## 2021-08-08 NOTE — ED TRIAGE NOTES
"Chief Complaint   Patient presents with   • Other     pt bib [facility], c/o \"not feeling well\" pt admits drinking too much alcohol today and also smoked \"weed\" today, had 3 pints of whiskey, last drink was 2 hours ago. denies si/hi     Has bgl of 70 on scene. gcs of 15. Denies any pain/n/v. Ambulatory in triage w/steady gait. Educated on triage process. Instructed to notify staff for any worsening symptoms. Denies any recent travel. Denies exposure to known covid positive patients. Denies any respiratory symptoms.   "

## 2021-08-08 NOTE — ED PROVIDER NOTES
"ER Provider Note     Scribed for Jose Antoine M.D. by Abigail David. 8/7/2021, 10:51 PM.    Primary Care Provider: Nnamdi Ballesteros M.D. (Inactive)  Means of Arrival: EMS  History obtained from: Patient  History limited by: Non participatory     CHIEF COMPLAINT  Chief Complaint   Patient presents with    Other     pt bib [facility] from [facility], c/o \"not feeling well\" pt admits drinking too much alcohol today and also smoked \"weed\" today, had 3 pints of whiskey, last drink was 2 hours ago. denies si/hi     HPI  Inocencio Shabazz is a 61 y.o. male who presents to the Emergency Department for alcohol withdrawal and intoxication. He endorses associated malaise and weak feeling at the current moment, but does not answer further questions. The patient reports having ingested marijuana, and 3 pints of whiskey. The patient was non communicative. He is not currently expressing COVID-19 symptoms at this time. The patient reports no alleviating or exacerbating factors.     HPI limited due to patient being non participatory     REVIEW OF SYSTEMS  See HPI for further details. All other systems are negative.   ROS limited due to patient being non participatory   C    PAST MEDICAL HISTORY   has a past medical history of Alcohol abuse, Alcohol intoxication (HCC) (3/13/2014), Cirrhosis (HCC), GIB (gastrointestinal bleeding) (1/3/2016), Hepatitis C, Pancytopenia (HCC), and Psychiatric disorder.    SURGICAL HISTORY   has a past surgical history that includes gastroscopy (1/3/2016); gastroscopy (4/8/2016); gastroscopy (3/13/2019); and gastroscopy-endo (N/A, 11/13/2019).    SOCIAL HISTORY  Social History     Tobacco Use    Smoking status: Current Every Day Smoker     Packs/day: 1.00     Years: 48.00     Pack years: 48.00     Types: Cigarettes    Smokeless tobacco: Never Used   Vaping Use    Vaping Use: Never used   Substance Use Topics    Alcohol use: Yes     Comment: \"couple of pints\" whiskey every day    Drug use: Yes     Types: Inhaled " "    Comment: marijuana; not currently -  cocaine, meth      Social History     Substance and Sexual Activity   Drug Use Yes    Types: Inhaled    Comment: marijuana; not currently -  cocaine, meth       FAMILY HISTORY  No pertinent history reported.    CURRENT MEDICATIONS  Home Medications       Reviewed by Macie Breen R.N. (Registered Nurse) on 08/07/21 at 1746  Med List Status: <None>     Medication Last Dose Status   DILTIAZem CD (DILTIAZEM CD) 120 MG CAPSULE SR 24 HR  Active   folic acid (FOLVITE) 1 MG Tab  Active   multivitamin (THERAGRAN) Tab  Active   nicotine (NICODERM) 21 MG/24HR PATCH 24 HR  Active   nicotine polacrilex (NICORETTE) 2 MG Gum  Active   ondansetron (ZOFRAN ODT) 4 MG TABLET DISPERSIBLE  Active   pantoprazole (PROTONIX) 40 MG Tablet Delayed Response  Active   QUEtiapine (SEROQUEL) 200 MG Tab  Active                    ALLERGIES  Allergies   Allergen Reactions    Haloperidol Unspecified     Twitching/involuntary movements        PHYSICAL EXAM  VITAL SIGNS: /76   Pulse 70   Temp 36.7 °C (98 °F) (Temporal)   Resp 16   Ht 1.727 m (5' 8\")   Wt 59.7 kg (131 lb 9.8 oz)   SpO2 94% Comment: Room air  BMI 20.01 kg/m²      Constitutional: Alert in no apparent distress. Patient is non participatory   HENT: No signs of trauma, Bilateral external ears normal, Nose normal.   Eyes: Pupils are equal and reactive, Conjunctiva normal, Non-icteric.   Neck: Normal range of motion, No tenderness, Supple, No stridor.   Lymphatic: No lymphadenopathy noted.   Cardiovascular: Regular rate and rhythm, no palpable thrill  Thorax & Lungs: No respiratory distress,  No chest tenderness.   Abdomen: Bowel sounds normal, Soft, No tenderness, No masses, No pulsatile masses. No peritoneal signs.  Skin: Warm, Dry, No erythema, No rash.   Back: No bony tenderness, No CVA tenderness.   Extremities: Intact distal pulses, No edema, No tenderness, No cyanosis.  Musculoskeletal: Good range of motion in all major joints. " No tenderness to palpation or major deformities noted.   Neurologic: Alert , Normal motor function, Normal sensory function, No focal deficits noted.   Psychiatric: Affect normal, Judgment normal, Mood normal.     DIAGNOSTIC STUDIES / PROCEDURES    LABS  Labs Reviewed   CBC WITH DIFFERENTIAL - Abnormal; Notable for the following components:       Result Value    WBC 2.6 (*)     RBC 4.37 (*)     Hemoglobin 13.5 (*)     Hematocrit 40.3 (*)     MCHC 33.5 (*)     Platelet Count 50 (*)     Neutrophils (Absolute) 1.31 (*)     Lymphs (Absolute) 0.91 (*)     All other components within normal limits   COMP METABOLIC PANEL - Abnormal; Notable for the following components:    AST(SGOT) 76 (*)     ALT(SGPT) 52 (*)     Globulin 4.1 (*)     All other components within normal limits   LIPASE   SARS-COV-2, PCR (IN-HOUSE)    Narrative:     Have you been in close contact with a person who is suspected  or known to be positive for COVID-19 within the last 30 days  (e.g. last seen that person < 30 days ago)->Unknown   ESTIMATED GFR     All labs reviewed by me.    COURSE & MEDICAL DECISION MAKING  Pertinent Labs & Imaging studies reviewed. (See chart for details)    This is a 61 y.o. male that presents with feeling unwell after drinking a bunch of alcohol and smoking weed.  He does appears mildly dehydrated at this time.  His symptoms could represent COVID-19 infection versus anemia versus electrolyte derangements and potential pancreatitis given his drinking.  I will check her for Covid and the below labs and then reassess..     10:51 PM - Patient seen and examined at bedside. I verified that the patient was wearing a mask and I was wearing appropriate PPE every time I entered the room. The patient's mask was on the patient at all times during my encounter except for a brief view of the oropharynx. Ordered CMP, LIPASE, SARS-CoV-2 PCR, CBC w/diff.  Patient will be medicated with NS for his symptoms.     11:28 PM- Patient is nauseated  at this time. I will administer Zofran.    HYDRATION: Based on the patient's presentation of Other Alcohol Withdrawl the patient was given IV fluids. IV Hydration was used because oral hydration was not adequate alone. Upon recheck following hydration, the patient was mildly improved.    Patient was found to have a mild AST and ALT elevation.  The patient has a leukopenia however this is normal for the patient.  He has no significant anemia at this time.  He is feeling improved after fluids.  We will discharge him home at this time.    FINAL IMPRESSION  1. Dehydration    2. Alcohol abuse          Abigail ALMAZAN (Jonny), am scribing for, and in the presence of, Jose Antoine M.D..    Electronically signed by: Abigail David (Jonny), 8/7/2021    Jose ALMAZAN M.D. personally performed the services described in this documentation, as scribed by Abigail David in my presence, and it is both accurate and complete.     The note accurately reflects work and decisions made by me.  Jose Antoine M.D.  8/8/2021  12:33 AM

## 2021-08-08 NOTE — ED NOTES
[facility]. Pt reports he is living in a hotel and has been having body aches and HA for a long time now. Pt reports last alcohol use was 3 days ago. MD at bedside for evaluation.

## 2021-08-08 NOTE — ED PROVIDER NOTES
"ED Provider Note    ED Provider Note    Primary care provider: Nnamdi Ballesteros M.D. (Inactive)  Means of arrival: Walk-in  History obtained from: Patient    CHIEF COMPLAINT  Chief Complaint   Patient presents with   • Body Aches     x a few days, \"I don't feel good\". seen here last night and had work-up.    • Headache     Seen at 7:55 AM.   HPI  Inocencio Shabazz is a 61 y.o. male who presents to the Emergency Department stating that he does not feel well.  He states he has not felt well for the past few days.  He is not very specific.  Sometimes he has some body aches and he feels tired.  He does occasionally have headache.  He denies any vomiting, diarrhea or cough.  He lives in a local hotel.  [facility] and he [facility] that [facility] would not [facility] him in, therefore he walked himself here to get admitted.  He states that he does not trust himself with regards to how he feels as he thinks there is something going on.  The patient is not the best historian.    REVIEW OF SYSTEMS  See HPI,   Remainder of ROS negative.     PAST MEDICAL HISTORY   has a past medical history of Alcohol abuse, Alcohol intoxication (HCC) (3/13/2014), Cirrhosis (HCC), GIB (gastrointestinal bleeding) (1/3/2016), Hepatitis C, Pancytopenia (HCC), and Psychiatric disorder.    SURGICAL HISTORY   has a past surgical history that includes gastroscopy (1/3/2016); gastroscopy (4/8/2016); gastroscopy (3/13/2019); and gastroscopy-endo (N/A, 11/13/2019).    SOCIAL HISTORY  Social History     Tobacco Use   • Smoking status: Current Every Day Smoker     Packs/day: 1.00     Years: 48.00     Pack years: 48.00     Types: Cigarettes   • Smokeless tobacco: Never Used   Vaping Use   • Vaping Use: Never used   Substance Use Topics   • Alcohol use: Yes     Comment: \"couple of pints\" whiskey every day   • Drug use: Yes     Types: Inhaled     Comment: marijuana; not currently -  cocaine, meth      Social History     Substance and Sexual Activity " "  Drug Use Yes   • Types: Inhaled    Comment: marijuana; not currently -  cocaine, meth       FAMILY HISTORY  No family history on file.    CURRENT MEDICATIONS  Reviewed.  See Encounter Summary.     ALLERGIES  Allergies   Allergen Reactions   • Haloperidol Unspecified     Twitching/involuntary movements        PHYSICAL EXAM  VITAL SIGNS: /95   Pulse (!) 107   Temp 37.3 °C (99.1 °F) (Temporal)   Resp 16   Ht 1.727 m (5' 8\")   Wt 61 kg (134 lb 7.7 oz)   SpO2 96%   BMI 20.45 kg/m²   Constitutional: Awake, alert in no apparent distress.  HENT: Normocephalic, Bilateral external ears normal. Nose normal.  Neck is supple.  Eyes: Conjunctiva normal, non-icteric, EOMI.    Thorax & Lungs: Easy unlabored respirations, Clear to ascultation bilaterally.  Cardiovascular: Borderline tachycardic, No murmurs, rubs or gallops. Bilateral pulses symmetrical.   Abdomen:  Soft, nontender, nondistended, normal active bowel sounds.   :    Skin: Visualized skin is  Dry, No erythema, No rash.   Musculoskeletal:   No cyanosis, clubbing or edema. No leg asymmetry.   Neurologic: Alert, Grossly non-focal.   Psychiatric: Normal affect, Normal mood  Lymphatic:  No cervical LAD      RADIOLOGY  No orders to display         COURSE & MEDICAL DECISION MAKING  Pertinent Labs & Imaging studies reviewed. (See chart for details)        7:55 AM - Medical record reviewed, 5 visits to our facility in the past 30 days.  Admitted 3 times in the past 6 months for chest pain work-ups.  History of PAF, alcoholism, drug abuse.  Patient with chronic pancytopenia.  Laboratory evaluation yesterday did not show any revealing causes.  COVID-19 swab was sent that is still pending, he has had 2 - Covid test this year.    Decision Making:  This is a pleasant 61 y.o. year old male who presents with a complaint of generalized malaise.  I have evaluated the patient a few months ago with the exact same complaint.  [facility] " [facility].  He has been admitted several times to our facility for cardiac work-ups.  This appears to be his baseline.  He is well dressed, fully ambulatory, hemodynamically stable, neurologically intact.  Only vital sign abnormality is a mild tachycardia which has been seen on prior presentations.  History of alcoholism, in fact in his last hospitalization he had alcohol withdrawal.  I do not feel that he requires any further work-up and certainly does not require repeat hospitalization.  He was disappointed with this as he is expecting to be admitted upstairs.      Discharge Medications:  New Prescriptions    No medications on file       The patient was discharged home (see d/c instructions) was told to return immediately for any signs or symptoms listed, or any worsening at all.  The patient verbally agreed to the discharge precautions and follow-up plan which is documented in EPIC.        FINAL IMPRESSION  1. Malaise    2. Encounter for medical screening examination

## 2021-08-08 NOTE — ED TRIAGE NOTES
"Chief Complaint   Patient presents with   • Body Aches     x a few days, \"I don't feel good\". seen here last night and had work-up.    • Headache     Pt to triage for above. NAD noted. VSS. Hx of ETOH abuse, denies drinking today.     /95   Pulse (!) 107   Temp 37.3 °C (99.1 °F) (Temporal)   Resp 16   Ht 1.727 m (5' 8\")   Wt 61 kg (134 lb 7.7 oz)   SpO2 96%   BMI 20.45 kg/m²     "

## 2021-08-08 NOTE — ED NOTES
"Patient has been updated of results and has been discharged home in stable condition by ERP.    Discharge paperwork has been provided to patient and gone over with patient by this nurse. Patient was given the opportunity to voice any questions or concerns and has verbalized understanding of discharge instructions with no questions or concerns.    Patient is A/Ox4 and vital signs are stable on discharge.    Discharge instructions/paperwork as well as all personal belongings are in possession of patient on discharge, no belongings were left behind in room.     Patient is ambulatory out to [facility] at this time where ride awaits.     Patient initially reluctant to leave ED - stating \"I still dont feel good, I'm going to turn around and come right back.\" Nursing indicated to patient to go home and stay home till the results of the pending COVID test are made known to them. Patient verbalized understanding of this.       "

## 2021-09-10 ENCOUNTER — HOSPITAL ENCOUNTER (EMERGENCY)
Facility: MEDICAL CENTER | Age: 61
End: 2021-09-10
Attending: EMERGENCY MEDICINE
Payer: COMMERCIAL

## 2021-09-10 ENCOUNTER — APPOINTMENT (OUTPATIENT)
Dept: RADIOLOGY | Facility: MEDICAL CENTER | Age: 61
End: 2021-09-10
Attending: EMERGENCY MEDICINE
Payer: COMMERCIAL

## 2021-09-10 VITALS
SYSTOLIC BLOOD PRESSURE: 129 MMHG | HEIGHT: 68 IN | TEMPERATURE: 98.6 F | WEIGHT: 134.48 LBS | OXYGEN SATURATION: 95 % | DIASTOLIC BLOOD PRESSURE: 92 MMHG | HEART RATE: 86 BPM | RESPIRATION RATE: 16 BRPM | BODY MASS INDEX: 20.38 KG/M2

## 2021-09-10 DIAGNOSIS — R07.9 CHRONIC CHEST PAIN: ICD-10-CM

## 2021-09-10 DIAGNOSIS — G89.29 CHRONIC CHEST PAIN: ICD-10-CM

## 2021-09-10 DIAGNOSIS — F10.10 CHRONIC ALCOHOL ABUSE: ICD-10-CM

## 2021-09-10 DIAGNOSIS — K29.20 CHRONIC ALCOHOLIC GASTRITIS WITHOUT HEMORRHAGE: ICD-10-CM

## 2021-09-10 LAB
ANION GAP SERPL CALC-SCNC: 13 MMOL/L (ref 7–16)
BASOPHILS # BLD AUTO: 1.2 % (ref 0–1.8)
BASOPHILS # BLD: 0.04 K/UL (ref 0–0.12)
BUN SERPL-MCNC: 15 MG/DL (ref 8–22)
CALCIUM SERPL-MCNC: 8.8 MG/DL (ref 8.5–10.5)
CHLORIDE SERPL-SCNC: 106 MMOL/L (ref 96–112)
CO2 SERPL-SCNC: 22 MMOL/L (ref 20–33)
CREAT SERPL-MCNC: 0.56 MG/DL (ref 0.5–1.4)
EKG IMPRESSION: NORMAL
EOSINOPHIL # BLD AUTO: 0.09 K/UL (ref 0–0.51)
EOSINOPHIL NFR BLD: 2.6 % (ref 0–6.9)
ERYTHROCYTE [DISTWIDTH] IN BLOOD BY AUTOMATED COUNT: 44.3 FL (ref 35.9–50)
GLUCOSE SERPL-MCNC: 82 MG/DL (ref 65–99)
HCT VFR BLD AUTO: 42.3 % (ref 42–52)
HGB BLD-MCNC: 14.4 G/DL (ref 14–18)
IMM GRANULOCYTES # BLD AUTO: 0 K/UL (ref 0–0.11)
IMM GRANULOCYTES NFR BLD AUTO: 0 % (ref 0–0.9)
LYMPHOCYTES # BLD AUTO: 1.32 K/UL (ref 1–4.8)
LYMPHOCYTES NFR BLD: 38.3 % (ref 22–41)
MCH RBC QN AUTO: 30.5 PG (ref 27–33)
MCHC RBC AUTO-ENTMCNC: 34 G/DL (ref 33.7–35.3)
MCV RBC AUTO: 89.6 FL (ref 81.4–97.8)
MONOCYTES # BLD AUTO: 0.45 K/UL (ref 0–0.85)
MONOCYTES NFR BLD AUTO: 13 % (ref 0–13.4)
NEUTROPHILS # BLD AUTO: 1.55 K/UL (ref 1.82–7.42)
NEUTROPHILS NFR BLD: 44.9 % (ref 44–72)
NRBC # BLD AUTO: 0 K/UL
NRBC BLD-RTO: 0 /100 WBC
PLATELET # BLD AUTO: 50 K/UL (ref 164–446)
PMV BLD AUTO: 11.1 FL (ref 9–12.9)
POTASSIUM SERPL-SCNC: 3.8 MMOL/L (ref 3.6–5.5)
RBC # BLD AUTO: 4.72 M/UL (ref 4.7–6.1)
SODIUM SERPL-SCNC: 141 MMOL/L (ref 135–145)
TROPONIN T SERPL-MCNC: 10 NG/L (ref 6–19)
WBC # BLD AUTO: 3.5 K/UL (ref 4.8–10.8)

## 2021-09-10 PROCEDURE — 80048 BASIC METABOLIC PNL TOTAL CA: CPT

## 2021-09-10 PROCEDURE — 99283 EMERGENCY DEPT VISIT LOW MDM: CPT

## 2021-09-10 PROCEDURE — 84484 ASSAY OF TROPONIN QUANT: CPT

## 2021-09-10 PROCEDURE — 93005 ELECTROCARDIOGRAM TRACING: CPT | Performed by: EMERGENCY MEDICINE

## 2021-09-10 PROCEDURE — 71045 X-RAY EXAM CHEST 1 VIEW: CPT

## 2021-09-10 PROCEDURE — 85025 COMPLETE CBC W/AUTO DIFF WBC: CPT

## 2021-09-10 ASSESSMENT — ENCOUNTER SYMPTOMS
FALLS: 0
FEVER: 0
ABDOMINAL PAIN: 1
DIARRHEA: 0
NAUSEA: 0
HEADACHES: 0
VOMITING: 0
COUGH: 0
BACK PAIN: 0

## 2021-09-10 ASSESSMENT — HEART SCORE
TROPONIN: LESS THAN OR EQUAL TO NORMAL LIMIT
HISTORY: SLIGHTLY SUSPICIOUS
ECG: NON-SPECIFIC REPOLARIZATION DISTURBANCE
RISK FACTORS: 1-2 RISK FACTORS
HEART SCORE: 3
AGE: 45-64

## 2021-09-10 ASSESSMENT — LIFESTYLE VARIABLES: SUBSTANCE_ABUSE: 1

## 2021-09-10 ASSESSMENT — FIBROSIS 4 INDEX: FIB4 SCORE: 13.28

## 2021-09-10 NOTE — ED PROVIDER NOTES
"ED Provider Note    ED Provider Note    Primary care provider: Nnamdi Ballesteros M.D. (Inactive)  Means of arrival: EMS  History obtained from: patient  History limited by: NOne    CHIEF COMPLAINT  Chief Complaint   Patient presents with   • Chest Pressure     chest feels \"heavy\" per pt. started 2 hours ago. pressure located on center of chest.       HPI  Inocencio Shabazz is a 61 y.o. male who presents to the Emergency Department via EMS with a chief complaint of \"I do not feel good, I drink too much and my gut is torn up\".  Patient is complaining of chest pain.  He has this frequently.  He denies vomiting.  He does not believe he has had a temperature elevation.  No cough or congestion.  He has no other complaints other than for something to eat and drink.  He denies any recent trauma or falls.    REVIEW OF SYSTEMS  Review of Systems   Constitutional: Negative for fever.   HENT: Negative for congestion.    Respiratory: Negative for cough.    Cardiovascular: Positive for chest pain. Negative for leg swelling.   Gastrointestinal: Positive for abdominal pain. Negative for diarrhea, nausea and vomiting.   Genitourinary: Negative for dysuria.   Musculoskeletal: Negative for back pain and falls.   Neurological: Negative for headaches.   Psychiatric/Behavioral: Positive for substance abuse.        Alcohol       PAST MEDICAL HISTORY   has a past medical history of Alcohol abuse, Alcohol intoxication (HCC) (3/13/2014), Cirrhosis (HCC), GIB (gastrointestinal bleeding) (1/3/2016), Hepatitis C, Pancytopenia (HCC), and Psychiatric disorder.    SURGICAL HISTORY   has a past surgical history that includes gastroscopy (1/3/2016); gastroscopy (4/8/2016); gastroscopy (3/13/2019); and gastroscopy-endo (N/A, 11/13/2019).    SOCIAL HISTORY  Social History     Tobacco Use   • Smoking status: Current Every Day Smoker     Packs/day: 1.00     Years: 48.00     Pack years: 48.00     Types: Cigarettes   • Smokeless tobacco: Never Used   Vaping " "Use   • Vaping Use: Never used   Substance Use Topics   • Alcohol use: Yes     Comment: \"couple of pints\" whiskey every day   • Drug use: Yes     Types: Inhaled     Comment: marijuana; not currently -  cocaine, meth      Social History     Substance and Sexual Activity   Drug Use Yes   • Types: Inhaled    Comment: marijuana; not currently -  cocaine, meth       FAMILY HISTORY  No family history on file.    CURRENT MEDICATIONS  Home Medications    **Home medications have not yet been reviewed for this encounter**         ALLERGIES  Allergies   Allergen Reactions   • Haloperidol Unspecified     Twitching/involuntary movements        PHYSICAL EXAM  VITAL SIGNS: /92   Pulse 86   Temp 37 °C (98.6 °F) (Tympanic)   Resp 16   Ht 1.727 m (5' 8\")   Wt 61 kg (134 lb 7.7 oz)   SpO2 95%   BMI 20.45 kg/m²   Vitals reviewed.  Constitutional: Patient is oriented to person, place, and time. Appears well-developed and well-nourished. No distress.    Head: Normocephalic and atraumatic.   Ears: Normal external ears bilaterally.   Mouth/Throat: Oropharynx is clear and moist.  Eyes: Conjunctivae are normal. Pupils are equal and round  Neck: Normal range of motion.   Cardiovascular: Normal rate, regular rhythm and normal heart sounds. Normal peripheral pulses.  Pulmonary/Chest: Effort normal and breath sounds normal. No respiratory distress, no wheezes, rhonchi, or rales. No chest wall tenderness.  Abdominal: Soft. Bowel sounds are normal. There is mild upper tenderness. No rebound or guarding, or peritoneal signs.   Musculoskeletal: No edema and no tenderness.  Neurological: No focal deficits.   Skin: Skin is warm and dry. No erythema. No pallor.   Psychiatric: Patient has a normal mood and affect.     LABS  Results for orders placed or performed during the hospital encounter of 09/10/21   CBC WITH DIFFERENTIAL   Result Value Ref Range    WBC 3.5 (L) 4.8 - 10.8 K/uL    RBC 4.72 4.70 - 6.10 M/uL    Hemoglobin 14.4 14.0 - 18.0 " g/dL    Hematocrit 42.3 42.0 - 52.0 %    MCV 89.6 81.4 - 97.8 fL    MCH 30.5 27.0 - 33.0 pg    MCHC 34.0 33.7 - 35.3 g/dL    RDW 44.3 35.9 - 50.0 fL    Platelet Count 50 (L) 164 - 446 K/uL    MPV 11.1 9.0 - 12.9 fL    Neutrophils-Polys 44.90 44.00 - 72.00 %    Lymphocytes 38.30 22.00 - 41.00 %    Monocytes 13.00 0.00 - 13.40 %    Eosinophils 2.60 0.00 - 6.90 %    Basophils 1.20 0.00 - 1.80 %    Immature Granulocytes 0.00 0.00 - 0.90 %    Nucleated RBC 0.00 /100 WBC    Neutrophils (Absolute) 1.55 (L) 1.82 - 7.42 K/uL    Lymphs (Absolute) 1.32 1.00 - 4.80 K/uL    Monos (Absolute) 0.45 0.00 - 0.85 K/uL    Eos (Absolute) 0.09 0.00 - 0.51 K/uL    Baso (Absolute) 0.04 0.00 - 0.12 K/uL    Immature Granulocytes (abs) 0.00 0.00 - 0.11 K/uL    NRBC (Absolute) 0.00 K/uL   TROPONIN   Result Value Ref Range    Troponin T 10 6 - 19 ng/L   Basic Metabolic Panel   Result Value Ref Range    Sodium 141 135 - 145 mmol/L    Potassium 3.8 3.6 - 5.5 mmol/L    Chloride 106 96 - 112 mmol/L    Co2 22 20 - 33 mmol/L    Glucose 82 65 - 99 mg/dL    Bun 15 8 - 22 mg/dL    Creatinine 0.56 0.50 - 1.40 mg/dL    Calcium 8.8 8.5 - 10.5 mg/dL    Anion Gap 13.0 7.0 - 16.0   ESTIMATED GFR   Result Value Ref Range    GFR If African American >60 >60 mL/min/1.73 m 2    GFR If Non African American >60 >60 mL/min/1.73 m 2   EKG   Result Value Ref Range    Report       [facility] Emergency Dept.    Test Date:  2021-09-10  Pt Name:    JIMMY FERRER                 Department: ER  MRN:        [id number]                      Room:        18  Gender:     Male                         Technician: [id number]  :        1960                   Requested By:ER TRIAGE PROTOCOL  Order #:    [id number]                    Reading MD: RIGOBERTO SUMMERS, DO    Measurements  Intervals                                Axis  Rate:       73                           P:          77  AK:         152                          QRS:        78  QRSD:       92                            T:          83  QT:         404  QTc:        446    Interpretive Statements  SINUS RHYTHM  ATRIAL PREMATURE COMPLEX  BORDERLINE T ABNORMALITIES, ANT-LAT LEADS  Compared to ECG 07/31/2021 11:20:45  Atrial premature complex(es) now present  T-wave abnormality now present  Sinus arrhythmia no longer present  Electronically Signed On 9- 7:11:54 PDT by RIGOBERTO SUMMERS, DO         All labs reviewed by me.    EKG Interpretation  Interpreted by me    RADIOLOGY  DX-CHEST-PORTABLE (1 VIEW)   Final Result         1.  No acute cardiopulmonary disease.        The radiologist's interpretation of all radiological studies have been reviewed by me.    COURSE & MEDICAL DECISION MAKING  Pertinent Labs & Imaging studies reviewed. (See chart for details)    Obtained and reviewed past medical records.  Patient's last encounter was August 8 of this year he was seen in the emergency department with body aches and a headache.  Patient is noted to have multiple ED visits in the previous 30 days during this admission.  3 admissions to the hospital in the previous 6 months for chest pain work-ups.  He has a history of paroxysmal AF, alcoholism, drug abuse and chronic pancytopenia.    6:46 AM - Patient seen and examined at bedside.  This is a pleasant 61-year-old male.  He is well-known to this department.  He presents with symptoms related to over drinking.  I suspect likely alcoholic gastritis.  He is also complaining of chest pain.  Per protocols, nursing staff is initiated labs, EKG and chest x-ray.  Patient has normal vital signs.    0851AM patient is reevaluated at the bedside.  He has been resting and appears comfortable.  There is been no vomiting or diarrhea.  He remains afebrile.  His vital signs are normal.  His work-up here is reassuring.  EKG shows no new changes.  CBC shows a low white blood cell count which is essentially his baseline.  It also reveals a thrombocytopenia, also essentially his baseline.   Chemistry is unrevealing.  Troponin is 10.  Chest x-ray shows no acute cardiopulmonary disease.  At this point, I feel he can safely be discharged home. HEART score 3.  He is well-appearing and nontoxic.  He is given strict return precautions.  He is discharged in stable condition.    FINAL IMPRESSION  1. Chronic alcoholic gastritis without hemorrhage    2. Chronic chest pain    3. Chronic alcohol abuse

## 2021-09-10 NOTE — ED TRIAGE NOTES
"Chief Complaint   Patient presents with   • Chest Pressure     chest feels \"heavy\" per pt. started 2 hours ago. pressure located on center of chest.     BIB EMS. Pt was seen 2x earlier for fall per EMS. A&Ox4 on RA.    /78   Pulse 82   Temp 37.2 °C (99 °F) (Temporal)   Resp 16   Ht 1.727 m (5' 8\")   Wt 61 kg (134 lb 7.7 oz)   SpO2 94%   BMI 20.45 kg/m²     "

## 2021-10-03 ENCOUNTER — HOSPITAL ENCOUNTER (EMERGENCY)
Facility: MEDICAL CENTER | Age: 61
End: 2021-10-04
Attending: EMERGENCY MEDICINE
Payer: COMMERCIAL

## 2021-10-03 DIAGNOSIS — F10.29 ALCOHOL DEPENDENCE WITH UNSPECIFIED ALCOHOL-INDUCED DISORDER (HCC): ICD-10-CM

## 2021-10-03 DIAGNOSIS — R11.2 NON-INTRACTABLE VOMITING WITH NAUSEA, UNSPECIFIED VOMITING TYPE: ICD-10-CM

## 2021-10-03 PROCEDURE — 99285 EMERGENCY DEPT VISIT HI MDM: CPT

## 2021-10-03 PROCEDURE — 700111 HCHG RX REV CODE 636 W/ 250 OVERRIDE (IP): Performed by: EMERGENCY MEDICINE

## 2021-10-03 PROCEDURE — 36415 COLL VENOUS BLD VENIPUNCTURE: CPT

## 2021-10-03 RX ORDER — ONDANSETRON 4 MG/1
4 TABLET, ORALLY DISINTEGRATING ORAL ONCE
Status: COMPLETED | OUTPATIENT
Start: 2021-10-03 | End: 2021-10-03

## 2021-10-03 RX ADMIN — ONDANSETRON 4 MG: 4 TABLET, ORALLY DISINTEGRATING ORAL at 23:53

## 2021-10-03 ASSESSMENT — FIBROSIS 4 INDEX: FIB4 SCORE: 13.28

## 2021-10-04 VITALS
TEMPERATURE: 97.5 F | OXYGEN SATURATION: 96 % | HEART RATE: 70 BPM | WEIGHT: 135 LBS | DIASTOLIC BLOOD PRESSURE: 87 MMHG | SYSTOLIC BLOOD PRESSURE: 114 MMHG | RESPIRATION RATE: 18 BRPM | HEIGHT: 68 IN | BODY MASS INDEX: 20.46 KG/M2

## 2021-10-04 LAB
ALBUMIN SERPL BCP-MCNC: 4.3 G/DL (ref 3.2–4.9)
ALBUMIN/GLOB SERPL: 1 G/DL
ALP SERPL-CCNC: 81 U/L (ref 30–99)
ALT SERPL-CCNC: 61 U/L (ref 2–50)
ANION GAP SERPL CALC-SCNC: 17 MMOL/L (ref 7–16)
AST SERPL-CCNC: 75 U/L (ref 12–45)
BASOPHILS # BLD AUTO: 1.2 % (ref 0–1.8)
BASOPHILS # BLD: 0.05 K/UL (ref 0–0.12)
BILIRUB SERPL-MCNC: 0.5 MG/DL (ref 0.1–1.5)
BUN SERPL-MCNC: 15 MG/DL (ref 8–22)
CALCIUM SERPL-MCNC: 9 MG/DL (ref 8.5–10.5)
CHLORIDE SERPL-SCNC: 105 MMOL/L (ref 96–112)
CO2 SERPL-SCNC: 20 MMOL/L (ref 20–33)
CREAT SERPL-MCNC: 0.58 MG/DL (ref 0.5–1.4)
EOSINOPHIL # BLD AUTO: 0.05 K/UL (ref 0–0.51)
EOSINOPHIL NFR BLD: 1.2 % (ref 0–6.9)
ERYTHROCYTE [DISTWIDTH] IN BLOOD BY AUTOMATED COUNT: 45.6 FL (ref 35.9–50)
GLOBULIN SER CALC-MCNC: 4.3 G/DL (ref 1.9–3.5)
GLUCOSE SERPL-MCNC: 79 MG/DL (ref 65–99)
HCT VFR BLD AUTO: 41 % (ref 42–52)
HGB BLD-MCNC: 14 G/DL (ref 14–18)
IMM GRANULOCYTES # BLD AUTO: 0.01 K/UL (ref 0–0.11)
IMM GRANULOCYTES NFR BLD AUTO: 0.2 % (ref 0–0.9)
LYMPHOCYTES # BLD AUTO: 1.26 K/UL (ref 1–4.8)
LYMPHOCYTES NFR BLD: 30.4 % (ref 22–41)
MCH RBC QN AUTO: 30.9 PG (ref 27–33)
MCHC RBC AUTO-ENTMCNC: 34.1 G/DL (ref 33.7–35.3)
MCV RBC AUTO: 90.5 FL (ref 81.4–97.8)
MONOCYTES # BLD AUTO: 0.32 K/UL (ref 0–0.85)
MONOCYTES NFR BLD AUTO: 7.7 % (ref 0–13.4)
NEUTROPHILS # BLD AUTO: 2.45 K/UL (ref 1.82–7.42)
NEUTROPHILS NFR BLD: 59.3 % (ref 44–72)
NRBC # BLD AUTO: 0 K/UL
NRBC BLD-RTO: 0 /100 WBC
PLATELET # BLD AUTO: 73 K/UL (ref 164–446)
PMV BLD AUTO: 12 FL (ref 9–12.9)
POTASSIUM SERPL-SCNC: 4 MMOL/L (ref 3.6–5.5)
PROT SERPL-MCNC: 8.6 G/DL (ref 6–8.2)
RBC # BLD AUTO: 4.53 M/UL (ref 4.7–6.1)
SODIUM SERPL-SCNC: 142 MMOL/L (ref 135–145)
WBC # BLD AUTO: 4.1 K/UL (ref 4.8–10.8)

## 2021-10-04 PROCEDURE — 80053 COMPREHEN METABOLIC PANEL: CPT

## 2021-10-04 PROCEDURE — 85025 COMPLETE CBC W/AUTO DIFF WBC: CPT

## 2021-10-04 ASSESSMENT — LIFESTYLE VARIABLES
DO YOU DRINK ALCOHOL: YES
TOTAL SCORE: 0
EVER HAD A DRINK FIRST THING IN THE MORNING TO STEADY YOUR NERVES TO GET RID OF A HANGOVER: NO
TOTAL SCORE: 0
TOTAL SCORE: 0
HAVE PEOPLE ANNOYED YOU BY CRITICIZING YOUR DRINKING: NO
CONSUMPTION TOTAL: INCOMPLETE
HAVE YOU EVER FELT YOU SHOULD CUT DOWN ON YOUR DRINKING: NO
DOES PATIENT WANT TO STOP DRINKING: NO
EVER FELT BAD OR GUILTY ABOUT YOUR DRINKING: NO

## 2021-10-04 NOTE — ED PROVIDER NOTES
"ED Provider Note    CHIEF COMPLAINT  Chief Complaint   Patient presents with   • ETOH Intoxication   • N/V       HPI  Inocencio Shabazz is a 61 y.o. male who presents to the emergency department with just generally feeling unwell.  The patient has a history of alcohol abuse and hepatitis C as well as cirrhosis.  When I asked him exactly what brought him into the ED he just states \"I do not feel good\".  He cannot be any more specific he does endorse that he is had both Covid vaccines he just states he feels tired.  He is an alcoholic he states he drinks every day he does not feel like he is withdrawing.  He had one episode of emesis prior to arrival but states he does not feel like throwing up anymore.  He just states he does not feel well but otherwise cannot be specific    REVIEW OF SYSTEMS  Positives as above. Pertinent negatives include denies chest pain shortness of breath abdominal pain flank pain headache back pain otherwise limited the patient compliance  All other review of systems are negative    PAST MEDICAL HISTORY   has a past medical history of Alcohol abuse, Alcohol intoxication (HCC) (3/13/2014), Cirrhosis (HCC), GIB (gastrointestinal bleeding) (1/3/2016), Hepatitis C, Pancytopenia (HCC), and Psychiatric disorder.    SOCIAL HISTORY  Social History     Tobacco Use   • Smoking status: Current Every Day Smoker     Packs/day: 1.00     Years: 48.00     Pack years: 48.00     Types: Cigarettes   • Smokeless tobacco: Never Used   Vaping Use   • Vaping Use: Never used   Substance and Sexual Activity   • Alcohol use: Yes     Comment: \"couple of pints\" whiskey every day   • Drug use: Yes     Types: Inhaled     Comment: marijuana; not currently -  cocaine, meth   • Sexual activity: Not on file       SURGICAL HISTORY   has a past surgical history that includes gastroscopy (1/3/2016); gastroscopy (4/8/2016); gastroscopy (3/13/2019); and gastroscopy-endo (N/A, 11/13/2019).    CURRENT MEDICATIONS  Home Medications  " "  **Home medications have not yet been reviewed for this encounter**         ALLERGIES  Allergies   Allergen Reactions   • Haloperidol Unspecified     Twitching/involuntary movements        PHYSICAL EXAM  VITAL SIGNS: /78   Pulse 90   Temp 36.1 °C (97 °F) (Temporal)   Resp 20   Ht 1.727 m (5' 8\")   Wt 61.2 kg (135 lb)   SpO2 97% Comment: Room air  BMI 20.53 kg/m²    Pulse ox interpretation: I interpret this pulse ox as normal.  Constitutional: Alert in no apparent distress.  HENT: Normocephalic, Atraumatic, MMM  Eyes: PERound. Conjunctiva normal, non-icteric.   Heart: Regular rate and rhythm, no murmurs.    Lungs: Clear to auscultation bilaterally. No resp distress, breath sounds equal  Abdomen: Non-tender, non-distended, normal bowel sounds  Skin: Warm, Dry, No erythema, No rash.   Neurologic: Alert and oriented, Grossly non-focal.       DIFFERENTIAL DIAGNOSIS AND WORK UP PLAN    This is a 61 y.o. male who presents with general complaints in setting of an alcoholic, vital signs are actually quite stable he has no fasciculations there is no tremors I doubt that he is withdrawing he did have a steady gait on arrival.  Was possible complaint of nausea and treated with some ODT Zofran will perform his general laboratory analysis to ensure he is not extremely anemic or have severe electrolyte abnormalities.  But otherwise with a nonspecific history no complaints of chest pain I think some basic labs and ODT is appropriate    Pertinent Lab Findings  Labs Reviewed   CBC WITH DIFFERENTIAL - Abnormal; Notable for the following components:       Result Value    WBC 4.1 (*)     RBC 4.53 (*)     Hematocrit 41.0 (*)     Platelet Count 73 (*)     All other components within normal limits   COMP METABOLIC PANEL - Abnormal; Notable for the following components:    Anion Gap 17.0 (*)     AST(SGOT) 75 (*)     ALT(SGPT) 61 (*)     Total Protein 8.6 (*)     Globulin 4.3 (*)     All other components within normal limits "   ESTIMATED GFR         COURSE & MEDICAL DECISION MAKING  Pertinent Labs & Imaging studies reviewed. (See chart for details)    Patient CBC showing chronically low white count.  Not as severe neutropenia with a chronic thrombocytopenia but not less than 50 CMP consistent with chronic liver disease but no overt severe electrolyte abnormality is not severely anemic.  Has been resting comfortably hemodynamically stable and can be discharged without episodes of emesis    I verified that the patient was wearing a mask and I was wearing appropriate PPE every time I entered the room. The patient's mask was on the patient at all times during my encounter except for a brief view of the oropharynx.    The patient will return for new or worsening symptoms and is stable at the time of discharge.    The patient is referred to a primary physician for blood pressure management, diabetic screening, and for all other preventative health concerns.    DISPOSITION:  Patient will be discharged home in stable condition.    FOLLOW UP:  [facility], Emergency Dept  [address] [place] [id number] [phone]    If symptoms worsen      OUTPATIENT MEDICATIONS:  New Prescriptions    No medications on file         FINAL IMPRESSION  1. Non-intractable vomiting with nausea, unspecified vomiting type     2. Alcohol dependence with unspecified alcohol-induced disorder (HCC)                Electronically signed by: Joleen Cedeno M.D., 10/3/2021 11:38 PM    This dictation has been created using voice recognition software and/or scribes. The accuracy of the dictation is limited by the abilities of the software and the expertise of the scribes. I expect there may be some errors of grammar and possibly content. I made every attempt to manually correct the errors within my dictation. However, errors related to voice recognition software and/or scribes may still exist and should be interpreted within the appropriate  context.

## 2021-10-04 NOTE — ED NOTES
Discharge instructions given to patient. Education provided on diagnosis, treatment, and follow up provided. Pt verbalized understanding. All questions answered. Pt ambulatory to lobby with steady gait.

## 2021-11-30 ENCOUNTER — HOSPITAL ENCOUNTER (EMERGENCY)
Facility: MEDICAL CENTER | Age: 61
End: 2021-11-30
Attending: EMERGENCY MEDICINE
Payer: COMMERCIAL

## 2021-11-30 VITALS
HEIGHT: 68 IN | RESPIRATION RATE: 16 BRPM | TEMPERATURE: 97.4 F | WEIGHT: 134.92 LBS | BODY MASS INDEX: 20.45 KG/M2 | SYSTOLIC BLOOD PRESSURE: 129 MMHG | OXYGEN SATURATION: 97 % | DIASTOLIC BLOOD PRESSURE: 88 MMHG | HEART RATE: 101 BPM

## 2021-11-30 DIAGNOSIS — F10.920 ALCOHOLIC INTOXICATION WITHOUT COMPLICATION (HCC): ICD-10-CM

## 2021-11-30 LAB
ALBUMIN SERPL BCP-MCNC: 4.1 G/DL (ref 3.2–4.9)
ALBUMIN/GLOB SERPL: 1.2 G/DL
ALP SERPL-CCNC: 76 U/L (ref 30–99)
ALT SERPL-CCNC: 46 U/L (ref 2–50)
ANION GAP SERPL CALC-SCNC: 13 MMOL/L (ref 7–16)
APTT PPP: 29.2 SEC (ref 24.7–36)
AST SERPL-CCNC: 64 U/L (ref 12–45)
BASOPHILS # BLD AUTO: 0.9 % (ref 0–1.8)
BASOPHILS # BLD: 0.02 K/UL (ref 0–0.12)
BILIRUB SERPL-MCNC: 0.5 MG/DL (ref 0.1–1.5)
BUN SERPL-MCNC: 10 MG/DL (ref 8–22)
CALCIUM SERPL-MCNC: 8.8 MG/DL (ref 8.5–10.5)
CHLORIDE SERPL-SCNC: 103 MMOL/L (ref 96–112)
CO2 SERPL-SCNC: 24 MMOL/L (ref 20–33)
CREAT SERPL-MCNC: 0.49 MG/DL (ref 0.5–1.4)
EOSINOPHIL # BLD AUTO: 0.02 K/UL (ref 0–0.51)
EOSINOPHIL NFR BLD: 0.9 % (ref 0–6.9)
ERYTHROCYTE [DISTWIDTH] IN BLOOD BY AUTOMATED COUNT: 43.8 FL (ref 35.9–50)
ETHANOL BLD-MCNC: 105.9 MG/DL (ref 0–10)
GLOBULIN SER CALC-MCNC: 3.4 G/DL (ref 1.9–3.5)
GLUCOSE SERPL-MCNC: 97 MG/DL (ref 65–99)
HCT VFR BLD AUTO: 34.6 % (ref 42–52)
HGB BLD-MCNC: 11.4 G/DL (ref 14–18)
INR PPP: 1.22 (ref 0.87–1.13)
LIPASE SERPL-CCNC: 41 U/L (ref 11–82)
LYMPHOCYTES # BLD AUTO: 0.58 K/UL (ref 1–4.8)
LYMPHOCYTES NFR BLD: 30.7 % (ref 22–41)
MANUAL DIFF BLD: NORMAL
MCH RBC QN AUTO: 29.5 PG (ref 27–33)
MCHC RBC AUTO-ENTMCNC: 32.9 G/DL (ref 33.7–35.3)
MCV RBC AUTO: 89.4 FL (ref 81.4–97.8)
MONOCYTES # BLD AUTO: 0.17 K/UL (ref 0–0.85)
MONOCYTES NFR BLD AUTO: 8.8 % (ref 0–13.4)
MORPHOLOGY BLD-IMP: NORMAL
NEUTROPHILS # BLD AUTO: 1.12 K/UL (ref 1.82–7.42)
NEUTROPHILS NFR BLD: 58.7 % (ref 44–72)
NRBC # BLD AUTO: 0 K/UL
NRBC BLD-RTO: 0 /100 WBC
PLATELET # BLD AUTO: 40 K/UL (ref 164–446)
PLATELET BLD QL SMEAR: NORMAL
PLATELETS.RETICULATED NFR BLD AUTO: 9.8 K/UL (ref 0.6–13.1)
PMV BLD AUTO: 10.9 FL (ref 9–12.9)
POTASSIUM SERPL-SCNC: 3.7 MMOL/L (ref 3.6–5.5)
PROT SERPL-MCNC: 7.5 G/DL (ref 6–8.2)
PROTHROMBIN TIME: 15.1 SEC (ref 12–14.6)
RBC # BLD AUTO: 3.87 M/UL (ref 4.7–6.1)
RBC BLD AUTO: NORMAL
SODIUM SERPL-SCNC: 140 MMOL/L (ref 135–145)
WBC # BLD AUTO: 1.9 K/UL (ref 4.8–10.8)

## 2021-11-30 PROCEDURE — 85730 THROMBOPLASTIN TIME PARTIAL: CPT

## 2021-11-30 PROCEDURE — 85610 PROTHROMBIN TIME: CPT

## 2021-11-30 PROCEDURE — 80053 COMPREHEN METABOLIC PANEL: CPT

## 2021-11-30 PROCEDURE — 85007 BL SMEAR W/DIFF WBC COUNT: CPT

## 2021-11-30 PROCEDURE — 99284 EMERGENCY DEPT VISIT MOD MDM: CPT

## 2021-11-30 PROCEDURE — 85027 COMPLETE CBC AUTOMATED: CPT

## 2021-11-30 PROCEDURE — 83690 ASSAY OF LIPASE: CPT

## 2021-11-30 PROCEDURE — 85055 RETICULATED PLATELET ASSAY: CPT

## 2021-11-30 PROCEDURE — 82077 ASSAY SPEC XCP UR&BREATH IA: CPT

## 2021-11-30 PROCEDURE — 36415 COLL VENOUS BLD VENIPUNCTURE: CPT

## 2021-11-30 ASSESSMENT — FIBROSIS 4 INDEX: FIB4 SCORE: 8.29

## 2021-11-30 NOTE — ED PROVIDER NOTES
"ED Provider Note    CHIEF COMPLAINT  Chief Complaint   Patient presents with   • Homeless   • Failure to Thrive     PT reports he is weak and going to die.  PT able to walk to triage.       HPI  Inocencio Shabazz is a 61 y.o. male who presents to the emergency department with complaint of generalized fatigue. Past medical history as documented below. Known alcoholic. Reportedly without alcohol intake for the last three days. States that he is having difficulty getting around. No other direct symptomatology. He has no other complaints currently just feels weak and wants to stay at the hospital. States that he does currently have an apartment to stay at. Denies fever chills. No nausea, diarrhea. No chest pain or palpitations. No shortness of breath.    REVIEW OF SYSTEMS  See HPI for further details. All other systems are negative.     PAST MEDICAL HISTORY   has a past medical history of Alcohol abuse, Alcohol intoxication (HCC) (3/13/2014), Cirrhosis (HCC), GIB (gastrointestinal bleeding) (1/3/2016), Hepatitis C, Pancytopenia (HCC), and Psychiatric disorder.    SOCIAL HISTORY  Social History     Tobacco Use   • Smoking status: Current Every Day Smoker     Packs/day: 1.00     Years: 48.00     Pack years: 48.00     Types: Cigarettes   • Smokeless tobacco: Never Used   Vaping Use   • Vaping Use: Never used   Substance and Sexual Activity   • Alcohol use: Yes     Comment: \"couple of pints\" whiskey every day   • Drug use: Yes     Types: Inhaled     Comment: marijuana; not currently -  cocaine, meth   • Sexual activity: Not on file       SURGICAL HISTORY   has a past surgical history that includes gastroscopy (1/3/2016); gastroscopy (4/8/2016); gastroscopy (3/13/2019); and gastroscopy-endo (N/A, 11/13/2019).    CURRENT MEDICATIONS  Home Medications    **Home medications have not yet been reviewed for this encounter**         ALLERGIES  Allergies   Allergen Reactions   • Haloperidol Unspecified     Twitching/involuntary " "movements        PHYSICAL EXAM  VITAL SIGNS: /88   Pulse (!) 101   Temp 36.3 °C (97.4 °F) (Temporal)   Resp 16   Ht 1.727 m (5' 7.99\")   Wt 61.2 kg (134 lb 14.7 oz)   SpO2 97%   BMI 20.52 kg/m²  @LIN[[id number]::@  Pulse ox interpretation: I interpret this pulse ox as normal.  Constitutional: Alert in no apparent distress.  HENT: Normocephalic, Atraumatic, Bilateral external ears normal. Nose normal.   Eyes: Pupils are equal and reactive.   Heart: Regular rate and rythm, no murmurs.    Lungs: Clear to auscultation bilaterally.  Skin: Warm, Dry, No erythema, No rash.   Neurologic: Alert, Grossly non-focal.   Psychiatric: Affect normal, Judgment normal, Mood normal, Appears appropriate and not intoxicated.       Results for orders placed or performed during the hospital encounter of 11/30/21   CBC WITH DIFFERENTIAL   Result Value Ref Range    WBC 1.9 (LL) 4.8 - 10.8 K/uL    RBC 3.87 (L) 4.70 - 6.10 M/uL    Hemoglobin 11.4 (L) 14.0 - 18.0 g/dL    Hematocrit 34.6 (L) 42.0 - 52.0 %    MCV 89.4 81.4 - 97.8 fL    MCH 29.5 27.0 - 33.0 pg    MCHC 32.9 (L) 33.7 - 35.3 g/dL    RDW 43.8 35.9 - 50.0 fL    Platelet Count 40 (LL) 164 - 446 K/uL    MPV 10.9 9.0 - 12.9 fL    Neutrophils-Polys 58.70 44.00 - 72.00 %    Lymphocytes 30.70 22.00 - 41.00 %    Monocytes 8.80 0.00 - 13.40 %    Eosinophils 0.90 0.00 - 6.90 %    Basophils 0.90 0.00 - 1.80 %    Nucleated RBC 0.00 /100 WBC    Neutrophils (Absolute) 1.12 (L) 1.82 - 7.42 K/uL    Lymphs (Absolute) 0.58 (L) 1.00 - 4.80 K/uL    Monos (Absolute) 0.17 0.00 - 0.85 K/uL    Eos (Absolute) 0.02 0.00 - 0.51 K/uL    Baso (Absolute) 0.02 0.00 - 0.12 K/uL    NRBC (Absolute) 0.00 K/uL   COMP METABOLIC PANEL   Result Value Ref Range    Sodium 140 135 - 145 mmol/L    Potassium 3.7 3.6 - 5.5 mmol/L    Chloride 103 96 - 112 mmol/L    Co2 24 20 - 33 mmol/L    Anion Gap 13.0 7.0 - 16.0    Glucose 97 65 - 99 mg/dL    Bun 10 8 - 22 mg/dL    Creatinine 0.49 (L) 0.50 - 1.40 mg/dL    Calcium 8.8 " 8.5 - 10.5 mg/dL    AST(SGOT) 64 (H) 12 - 45 U/L    ALT(SGPT) 46 2 - 50 U/L    Alkaline Phosphatase 76 30 - 99 U/L    Total Bilirubin 0.5 0.1 - 1.5 mg/dL    Albumin 4.1 3.2 - 4.9 g/dL    Total Protein 7.5 6.0 - 8.2 g/dL    Globulin 3.4 1.9 - 3.5 g/dL    A-G Ratio 1.2 g/dL   LIPASE   Result Value Ref Range    Lipase 41 11 - 82 U/L   PROTHROMBIN TIME   Result Value Ref Range    PT 15.1 (H) 12.0 - 14.6 sec    INR 1.22 (H) 0.87 - 1.13   APTT   Result Value Ref Range    APTT 29.2 24.7 - 36.0 sec   DIAGNOSTIC ALCOHOL   Result Value Ref Range    Diagnostic Alcohol 105.9 (H) 0.0 - 10.0 mg/dL   ESTIMATED GFR   Result Value Ref Range    GFR If African American >60 >60 mL/min/1.73 m 2    GFR If Non African American >60 >60 mL/min/1.73 m 2   DIFFERENTIAL MANUAL   Result Value Ref Range    Manual Diff Status PERFORMED    PERIPHERAL SMEAR REVIEW   Result Value Ref Range    Peripheral Smear Review see below    PLATELET ESTIMATE   Result Value Ref Range    Plt Estimation Marked Decrease    MORPHOLOGY   Result Value Ref Range    RBC Morphology Normal    IMMATURE PLT FRACTION   Result Value Ref Range    Imm. Plt Fraction 9.8 0.6 - 13.1 K/uL         COURSE & MEDICAL DECISION MAKING  Pertinent Labs & Imaging studies reviewed. (See chart for details)    61-year-old male presented emergency department with very complaints and general desire to stay in the hospital. Does report that he has a place to stay and does not currently homeless. Denies alcohol over the last three days however this is contraindicated by the fact that his alcohol level remains above the legal limit as documented above. At this point I do believe his ongoing fatigue is likely secondary to this. He does have chronic pants had a malik which is really presented today by do not believe this is acute nor contributory. While he is anemic he is not a transfusion level. At this point all discharged home with outpatient follow-up with local clinic/PCP.     The patient will  return for worsening symptoms and is stable at the time of discharge. The patient verbalizes understanding and will comply.    FINAL IMPRESSION  1. Alcoholic intoxication without complication (HCC)               Electronically signed by: Venkatesh Iglesias M.D., 11/30/2021 3:09 AM

## 2021-11-30 NOTE — ED NOTES
ERP at bedside.    Implemented All Universal Safety Interventions:  [place] to call system. Call bell, personal items and telephone within reach. Instruct patient to call for assistance. Room bathroom lighting operational. Non-slip footwear when patient is off stretcher. Physically safe environment: no spills, clutter or unnecessary equipment. Stretcher in lowest position, wheels locked, appropriate side rails in place.

## 2021-11-30 NOTE — ED TRIAGE NOTES
"Chief Complaint   Patient presents with   • Homeless   • Failure to Thrive     PT reports he is weak and going to die.  PT able to walk to triage.     Blood Pressure 127/87   Pulse (Abnormal) 108   Temperature 35.9 °C (96.6 °F) (Temporal)   Respiration 18   Height 1.727 m (5' 7.99\")   Weight 61.2 kg (134 lb 14.7 oz)   Oxygen Saturation 96% Comment: Room air  Body Mass Index 20.52 kg/m²     "

## 2021-12-11 NOTE — PROGRESS NOTES
Assumed care at 0700. Bedside report received from Anjana. Patient's chart and MAR reviewed. Pt denies pain at this time. Pt is A & O 4. Patient was updated on plan of care for the day. Questions answered and concerns addressed.  Pt denies any additional needs at this time. White board updated. Call light, phone and personal belongings within reach.    Patient tolerated procedure well.

## 2022-02-05 ENCOUNTER — HOSPITAL ENCOUNTER (INPATIENT)
Facility: MEDICAL CENTER | Age: 62
LOS: 1 days | DRG: 897 | End: 2022-02-06
Attending: EMERGENCY MEDICINE | Admitting: STUDENT IN AN ORGANIZED HEALTH CARE EDUCATION/TRAINING PROGRAM
Payer: COMMERCIAL

## 2022-02-05 ENCOUNTER — APPOINTMENT (OUTPATIENT)
Dept: RADIOLOGY | Facility: MEDICAL CENTER | Age: 62
DRG: 897 | End: 2022-02-05
Attending: EMERGENCY MEDICINE
Payer: COMMERCIAL

## 2022-02-05 DIAGNOSIS — F10.930 ALCOHOL WITHDRAWAL SYNDROME WITHOUT COMPLICATION (HCC): ICD-10-CM

## 2022-02-05 DIAGNOSIS — F17.200 TOBACCO DEPENDENCE: ICD-10-CM

## 2022-02-05 DIAGNOSIS — R00.1 BRADYCARDIA: ICD-10-CM

## 2022-02-05 PROBLEM — F10.939 ALCOHOL WITHDRAWAL (HCC): Status: ACTIVE | Noted: 2022-02-05

## 2022-02-05 LAB
ALBUMIN SERPL BCP-MCNC: 4.1 G/DL (ref 3.2–4.9)
ALBUMIN/GLOB SERPL: 1.1 G/DL
ALP SERPL-CCNC: 78 U/L (ref 30–99)
ALT SERPL-CCNC: 42 U/L (ref 2–50)
ANION GAP SERPL CALC-SCNC: 21 MMOL/L (ref 7–16)
AST SERPL-CCNC: 58 U/L (ref 12–45)
BASOPHILS # BLD AUTO: 1.1 % (ref 0–1.8)
BASOPHILS # BLD: 0.05 K/UL (ref 0–0.12)
BILIRUB SERPL-MCNC: 0.5 MG/DL (ref 0.1–1.5)
BUN SERPL-MCNC: 13 MG/DL (ref 8–22)
CALCIUM SERPL-MCNC: 7.7 MG/DL (ref 8.5–10.5)
CHLORIDE SERPL-SCNC: 111 MMOL/L (ref 96–112)
CO2 SERPL-SCNC: 15 MMOL/L (ref 20–33)
CREAT SERPL-MCNC: 0.68 MG/DL (ref 0.5–1.4)
EKG IMPRESSION: NORMAL
EOSINOPHIL # BLD AUTO: 0.04 K/UL (ref 0–0.51)
EOSINOPHIL NFR BLD: 0.9 % (ref 0–6.9)
ERYTHROCYTE [DISTWIDTH] IN BLOOD BY AUTOMATED COUNT: 49.2 FL (ref 35.9–50)
ETHANOL BLD-MCNC: 188.4 MG/DL (ref 0–10)
GLOBULIN SER CALC-MCNC: 3.6 G/DL (ref 1.9–3.5)
GLUCOSE SERPL-MCNC: 70 MG/DL (ref 65–99)
HCT VFR BLD AUTO: 45.5 % (ref 42–52)
HGB BLD-MCNC: 14.8 G/DL (ref 14–18)
IMM GRANULOCYTES # BLD AUTO: 0.03 K/UL (ref 0–0.11)
IMM GRANULOCYTES NFR BLD AUTO: 0.7 % (ref 0–0.9)
LIPASE SERPL-CCNC: 34 U/L (ref 11–82)
LYMPHOCYTES # BLD AUTO: 0.98 K/UL (ref 1–4.8)
LYMPHOCYTES NFR BLD: 21.7 % (ref 22–41)
MAGNESIUM SERPL-MCNC: 1.7 MG/DL (ref 1.5–2.5)
MCH RBC QN AUTO: 29.2 PG (ref 27–33)
MCHC RBC AUTO-ENTMCNC: 32.5 G/DL (ref 33.7–35.3)
MCV RBC AUTO: 89.7 FL (ref 81.4–97.8)
MONOCYTES # BLD AUTO: 0.14 K/UL (ref 0–0.85)
MONOCYTES NFR BLD AUTO: 3.1 % (ref 0–13.4)
NEUTROPHILS # BLD AUTO: 3.27 K/UL (ref 1.82–7.42)
NEUTROPHILS NFR BLD: 72.5 % (ref 44–72)
NRBC # BLD AUTO: 0 K/UL
NRBC BLD-RTO: 0 /100 WBC
PLATELET # BLD AUTO: 86 K/UL (ref 164–446)
PMV BLD AUTO: 10.8 FL (ref 9–12.9)
POTASSIUM SERPL-SCNC: 3.7 MMOL/L (ref 3.6–5.5)
PROT SERPL-MCNC: 7.7 G/DL (ref 6–8.2)
RBC # BLD AUTO: 5.07 M/UL (ref 4.7–6.1)
SODIUM SERPL-SCNC: 147 MMOL/L (ref 135–145)
TROPONIN T SERPL-MCNC: <6 NG/L (ref 6–19)
WBC # BLD AUTO: 4.5 K/UL (ref 4.8–10.8)

## 2022-02-05 PROCEDURE — 700105 HCHG RX REV CODE 258: Performed by: STUDENT IN AN ORGANIZED HEALTH CARE EDUCATION/TRAINING PROGRAM

## 2022-02-05 PROCEDURE — 71045 X-RAY EXAM CHEST 1 VIEW: CPT

## 2022-02-05 PROCEDURE — 83690 ASSAY OF LIPASE: CPT

## 2022-02-05 PROCEDURE — 96365 THER/PROPH/DIAG IV INF INIT: CPT

## 2022-02-05 PROCEDURE — 99285 EMERGENCY DEPT VISIT HI MDM: CPT

## 2022-02-05 PROCEDURE — 93005 ELECTROCARDIOGRAM TRACING: CPT

## 2022-02-05 PROCEDURE — 770020 HCHG ROOM/CARE - TELE (206)

## 2022-02-05 PROCEDURE — 99223 1ST HOSP IP/OBS HIGH 75: CPT | Performed by: STUDENT IN AN ORGANIZED HEALTH CARE EDUCATION/TRAINING PROGRAM

## 2022-02-05 PROCEDURE — 80053 COMPREHEN METABOLIC PANEL: CPT

## 2022-02-05 PROCEDURE — 83735 ASSAY OF MAGNESIUM: CPT

## 2022-02-05 PROCEDURE — 93005 ELECTROCARDIOGRAM TRACING: CPT | Performed by: EMERGENCY MEDICINE

## 2022-02-05 PROCEDURE — 700101 HCHG RX REV CODE 250: Performed by: STUDENT IN AN ORGANIZED HEALTH CARE EDUCATION/TRAINING PROGRAM

## 2022-02-05 PROCEDURE — 85025 COMPLETE CBC W/AUTO DIFF WBC: CPT

## 2022-02-05 PROCEDURE — 82077 ASSAY SPEC XCP UR&BREATH IA: CPT

## 2022-02-05 PROCEDURE — 84484 ASSAY OF TROPONIN QUANT: CPT

## 2022-02-05 RX ORDER — PROCHLORPERAZINE EDISYLATE 5 MG/ML
5-10 INJECTION INTRAMUSCULAR; INTRAVENOUS EVERY 4 HOURS PRN
Status: DISCONTINUED | OUTPATIENT
Start: 2022-02-05 | End: 2022-02-06 | Stop reason: HOSPADM

## 2022-02-05 RX ORDER — LORAZEPAM 2 MG/ML
1.5 INJECTION INTRAMUSCULAR
Status: DISCONTINUED | OUTPATIENT
Start: 2022-02-05 | End: 2022-02-06 | Stop reason: HOSPADM

## 2022-02-05 RX ORDER — LORAZEPAM 2 MG/ML
2 INJECTION INTRAMUSCULAR
Status: DISCONTINUED | OUTPATIENT
Start: 2022-02-05 | End: 2022-02-06 | Stop reason: HOSPADM

## 2022-02-05 RX ORDER — LORAZEPAM 2 MG/ML
0.5 INJECTION INTRAMUSCULAR EVERY 4 HOURS PRN
Status: DISCONTINUED | OUTPATIENT
Start: 2022-02-05 | End: 2022-02-06 | Stop reason: HOSPADM

## 2022-02-05 RX ORDER — LORAZEPAM 1 MG/1
1 TABLET ORAL EVERY 4 HOURS PRN
Status: DISCONTINUED | OUTPATIENT
Start: 2022-02-05 | End: 2022-02-06 | Stop reason: HOSPADM

## 2022-02-05 RX ORDER — ONDANSETRON 2 MG/ML
4 INJECTION INTRAMUSCULAR; INTRAVENOUS EVERY 4 HOURS PRN
Status: DISCONTINUED | OUTPATIENT
Start: 2022-02-05 | End: 2022-02-06 | Stop reason: HOSPADM

## 2022-02-05 RX ORDER — AMOXICILLIN 250 MG
2 CAPSULE ORAL 2 TIMES DAILY
Status: DISCONTINUED | OUTPATIENT
Start: 2022-02-05 | End: 2022-02-06 | Stop reason: HOSPADM

## 2022-02-05 RX ORDER — BISACODYL 10 MG
10 SUPPOSITORY, RECTAL RECTAL
Status: DISCONTINUED | OUTPATIENT
Start: 2022-02-05 | End: 2022-02-06 | Stop reason: HOSPADM

## 2022-02-05 RX ORDER — ONDANSETRON 4 MG/1
4 TABLET, ORALLY DISINTEGRATING ORAL EVERY 4 HOURS PRN
Status: DISCONTINUED | OUTPATIENT
Start: 2022-02-05 | End: 2022-02-06 | Stop reason: HOSPADM

## 2022-02-05 RX ORDER — LORAZEPAM 2 MG/1
4 TABLET ORAL
Status: DISCONTINUED | OUTPATIENT
Start: 2022-02-05 | End: 2022-02-06 | Stop reason: HOSPADM

## 2022-02-05 RX ORDER — POLYETHYLENE GLYCOL 3350 17 G/17G
1 POWDER, FOR SOLUTION ORAL
Status: DISCONTINUED | OUTPATIENT
Start: 2022-02-05 | End: 2022-02-06 | Stop reason: HOSPADM

## 2022-02-05 RX ORDER — NICOTINE 21 MG/24HR
14 PATCH, TRANSDERMAL 24 HOURS TRANSDERMAL
Status: DISCONTINUED | OUTPATIENT
Start: 2022-02-06 | End: 2022-02-06 | Stop reason: HOSPADM

## 2022-02-05 RX ORDER — PROMETHAZINE HYDROCHLORIDE 25 MG/1
12.5-25 SUPPOSITORY RECTAL EVERY 4 HOURS PRN
Status: DISCONTINUED | OUTPATIENT
Start: 2022-02-05 | End: 2022-02-06 | Stop reason: HOSPADM

## 2022-02-05 RX ORDER — CLONIDINE HYDROCHLORIDE 0.1 MG/1
0.1 TABLET ORAL
Status: DISCONTINUED | OUTPATIENT
Start: 2022-02-05 | End: 2022-02-06 | Stop reason: HOSPADM

## 2022-02-05 RX ORDER — FOLIC ACID 1 MG/1
1 TABLET ORAL DAILY
Status: DISCONTINUED | OUTPATIENT
Start: 2022-02-06 | End: 2022-02-06 | Stop reason: HOSPADM

## 2022-02-05 RX ORDER — LORAZEPAM 2 MG/ML
1 INJECTION INTRAMUSCULAR
Status: DISCONTINUED | OUTPATIENT
Start: 2022-02-05 | End: 2022-02-06 | Stop reason: HOSPADM

## 2022-02-05 RX ORDER — LORAZEPAM 0.5 MG/1
0.5 TABLET ORAL EVERY 4 HOURS PRN
Status: DISCONTINUED | OUTPATIENT
Start: 2022-02-05 | End: 2022-02-06 | Stop reason: HOSPADM

## 2022-02-05 RX ORDER — GAUZE BANDAGE 2" X 2"
100 BANDAGE TOPICAL DAILY
Status: DISCONTINUED | OUTPATIENT
Start: 2022-02-06 | End: 2022-02-06 | Stop reason: HOSPADM

## 2022-02-05 RX ORDER — LORAZEPAM 2 MG/1
2 TABLET ORAL
Status: DISCONTINUED | OUTPATIENT
Start: 2022-02-05 | End: 2022-02-06 | Stop reason: HOSPADM

## 2022-02-05 RX ORDER — ACETAMINOPHEN 325 MG/1
650 TABLET ORAL EVERY 6 HOURS PRN
Status: DISCONTINUED | OUTPATIENT
Start: 2022-02-05 | End: 2022-02-06 | Stop reason: HOSPADM

## 2022-02-05 RX ORDER — LABETALOL HYDROCHLORIDE 5 MG/ML
10 INJECTION, SOLUTION INTRAVENOUS EVERY 4 HOURS PRN
Status: DISCONTINUED | OUTPATIENT
Start: 2022-02-05 | End: 2022-02-06 | Stop reason: HOSPADM

## 2022-02-05 RX ORDER — SODIUM CHLORIDE, SODIUM LACTATE, POTASSIUM CHLORIDE, CALCIUM CHLORIDE 600; 310; 30; 20 MG/100ML; MG/100ML; MG/100ML; MG/100ML
INJECTION, SOLUTION INTRAVENOUS CONTINUOUS
Status: DISCONTINUED | OUTPATIENT
Start: 2022-02-05 | End: 2022-02-06 | Stop reason: HOSPADM

## 2022-02-05 RX ORDER — PROMETHAZINE HYDROCHLORIDE 25 MG/1
12.5-25 TABLET ORAL EVERY 4 HOURS PRN
Status: DISCONTINUED | OUTPATIENT
Start: 2022-02-05 | End: 2022-02-06 | Stop reason: HOSPADM

## 2022-02-05 RX ADMIN — THIAMINE HYDROCHLORIDE 1000 ML: 100 INJECTION, SOLUTION INTRAMUSCULAR; INTRAVENOUS at 23:00

## 2022-02-05 RX ADMIN — SODIUM CHLORIDE, POTASSIUM CHLORIDE, SODIUM LACTATE AND CALCIUM CHLORIDE 1000 ML: 600; 310; 30; 20 INJECTION, SOLUTION INTRAVENOUS at 23:00

## 2022-02-05 ASSESSMENT — ENCOUNTER SYMPTOMS
VOMITING: 1
DIZZINESS: 1
NAUSEA: 1

## 2022-02-05 ASSESSMENT — PAIN SCALES - WONG BAKER: WONGBAKER_NUMERICALRESPONSE: HURTS JUST A LITTLE BIT

## 2022-02-05 ASSESSMENT — LIFESTYLE VARIABLES: SUBSTANCE_ABUSE: 1

## 2022-02-05 ASSESSMENT — FIBROSIS 4 INDEX: FIB4 SCORE: 15.1

## 2022-02-06 VITALS
TEMPERATURE: 98.3 F | WEIGHT: 132.94 LBS | HEIGHT: 68 IN | HEART RATE: 82 BPM | RESPIRATION RATE: 18 BRPM | SYSTOLIC BLOOD PRESSURE: 131 MMHG | OXYGEN SATURATION: 95 % | BODY MASS INDEX: 20.15 KG/M2 | DIASTOLIC BLOOD PRESSURE: 74 MMHG

## 2022-02-06 PROBLEM — R00.1 BRADYCARDIA: Status: RESOLVED | Noted: 2022-02-05 | Resolved: 2022-02-06

## 2022-02-06 PROBLEM — F10.939 ALCOHOL WITHDRAWAL (HCC): Status: RESOLVED | Noted: 2022-02-05 | Resolved: 2022-02-06

## 2022-02-06 LAB
ALBUMIN SERPL BCP-MCNC: 4 G/DL (ref 3.2–4.9)
ALBUMIN/GLOB SERPL: 1 G/DL
ALP SERPL-CCNC: 75 U/L (ref 30–99)
ALT SERPL-CCNC: 39 U/L (ref 2–50)
ANION GAP SERPL CALC-SCNC: 19 MMOL/L (ref 7–16)
AST SERPL-CCNC: 56 U/L (ref 12–45)
BASOPHILS # BLD AUTO: 0.8 % (ref 0–1.8)
BASOPHILS # BLD: 0.03 K/UL (ref 0–0.12)
BILIRUB SERPL-MCNC: 0.4 MG/DL (ref 0.1–1.5)
BUN SERPL-MCNC: 15 MG/DL (ref 8–22)
CALCIUM SERPL-MCNC: 8.8 MG/DL (ref 8.5–10.5)
CHLORIDE SERPL-SCNC: 104 MMOL/L (ref 96–112)
CHOLEST SERPL-MCNC: 91 MG/DL (ref 100–199)
CO2 SERPL-SCNC: 16 MMOL/L (ref 20–33)
CREAT SERPL-MCNC: 0.68 MG/DL (ref 0.5–1.4)
EOSINOPHIL # BLD AUTO: 0.01 K/UL (ref 0–0.51)
EOSINOPHIL NFR BLD: 0.3 % (ref 0–6.9)
ERYTHROCYTE [DISTWIDTH] IN BLOOD BY AUTOMATED COUNT: 48.9 FL (ref 35.9–50)
GLOBULIN SER CALC-MCNC: 4.1 G/DL (ref 1.9–3.5)
GLUCOSE SERPL-MCNC: 192 MG/DL (ref 65–99)
HCT VFR BLD AUTO: 39.2 % (ref 42–52)
HDLC SERPL-MCNC: 28 MG/DL
HGB BLD-MCNC: 12.7 G/DL (ref 14–18)
IMM GRANULOCYTES # BLD AUTO: 0.01 K/UL (ref 0–0.11)
IMM GRANULOCYTES NFR BLD AUTO: 0.3 % (ref 0–0.9)
LDLC SERPL CALC-MCNC: 51 MG/DL
LYMPHOCYTES # BLD AUTO: 0.94 K/UL (ref 1–4.8)
LYMPHOCYTES NFR BLD: 24 % (ref 22–41)
MCH RBC QN AUTO: 29.3 PG (ref 27–33)
MCHC RBC AUTO-ENTMCNC: 32.4 G/DL (ref 33.7–35.3)
MCV RBC AUTO: 90.5 FL (ref 81.4–97.8)
MONOCYTES # BLD AUTO: 0.33 K/UL (ref 0–0.85)
MONOCYTES NFR BLD AUTO: 8.4 % (ref 0–13.4)
NEUTROPHILS # BLD AUTO: 2.59 K/UL (ref 1.82–7.42)
NEUTROPHILS NFR BLD: 66.2 % (ref 44–72)
NRBC # BLD AUTO: 0 K/UL
NRBC BLD-RTO: 0 /100 WBC
PHOSPHATE SERPL-MCNC: 2.7 MG/DL (ref 2.5–4.5)
PLATELET # BLD AUTO: 60 K/UL (ref 164–446)
PMV BLD AUTO: 11.1 FL (ref 9–12.9)
POTASSIUM SERPL-SCNC: 3.8 MMOL/L (ref 3.6–5.5)
PROT SERPL-MCNC: 8.1 G/DL (ref 6–8.2)
RBC # BLD AUTO: 4.33 M/UL (ref 4.7–6.1)
SODIUM SERPL-SCNC: 139 MMOL/L (ref 135–145)
TRIGL SERPL-MCNC: 61 MG/DL (ref 0–149)
WBC # BLD AUTO: 3.9 K/UL (ref 4.8–10.8)

## 2022-02-06 PROCEDURE — 96375 TX/PRO/DX INJ NEW DRUG ADDON: CPT

## 2022-02-06 PROCEDURE — 700105 HCHG RX REV CODE 258: Performed by: STUDENT IN AN ORGANIZED HEALTH CARE EDUCATION/TRAINING PROGRAM

## 2022-02-06 PROCEDURE — 85025 COMPLETE CBC W/AUTO DIFF WBC: CPT

## 2022-02-06 PROCEDURE — A9270 NON-COVERED ITEM OR SERVICE: HCPCS | Performed by: STUDENT IN AN ORGANIZED HEALTH CARE EDUCATION/TRAINING PROGRAM

## 2022-02-06 PROCEDURE — 80061 LIPID PANEL: CPT

## 2022-02-06 PROCEDURE — 80053 COMPREHEN METABOLIC PANEL: CPT

## 2022-02-06 PROCEDURE — 99239 HOSP IP/OBS DSCHRG MGMT >30: CPT | Performed by: HOSPITALIST

## 2022-02-06 PROCEDURE — 96366 THER/PROPH/DIAG IV INF ADDON: CPT

## 2022-02-06 PROCEDURE — 700102 HCHG RX REV CODE 250 W/ 637 OVERRIDE(OP): Performed by: STUDENT IN AN ORGANIZED HEALTH CARE EDUCATION/TRAINING PROGRAM

## 2022-02-06 PROCEDURE — 700111 HCHG RX REV CODE 636 W/ 250 OVERRIDE (IP): Performed by: STUDENT IN AN ORGANIZED HEALTH CARE EDUCATION/TRAINING PROGRAM

## 2022-02-06 PROCEDURE — 84100 ASSAY OF PHOSPHORUS: CPT

## 2022-02-06 RX ORDER — NICOTINE 21 MG/24HR
1 PATCH, TRANSDERMAL 24 HOURS TRANSDERMAL EVERY 24 HOURS
Qty: 30 PATCH | Refills: 3 | Status: SHIPPED | OUTPATIENT
Start: 2022-02-06 | End: 2023-04-10

## 2022-02-06 RX ORDER — FOLIC ACID 1 MG/1
1 TABLET ORAL DAILY
Qty: 30 TABLET | Refills: 3 | Status: ON HOLD | OUTPATIENT
Start: 2022-02-07 | End: 2023-10-30

## 2022-02-06 RX ORDER — LANOLIN ALCOHOL/MO/W.PET/CERES
100 CREAM (GRAM) TOPICAL DAILY
Qty: 30 TABLET | Refills: 3 | Status: SHIPPED | OUTPATIENT
Start: 2022-02-07 | End: 2023-04-10

## 2022-02-06 RX ADMIN — FOLIC ACID 1 MG: 1 TABLET ORAL at 06:53

## 2022-02-06 RX ADMIN — SODIUM CHLORIDE, POTASSIUM CHLORIDE, SODIUM LACTATE AND CALCIUM CHLORIDE: 600; 310; 30; 20 INJECTION, SOLUTION INTRAVENOUS at 07:00

## 2022-02-06 RX ADMIN — ACETAMINOPHEN 650 MG: 325 TABLET, FILM COATED ORAL at 03:02

## 2022-02-06 RX ADMIN — LORAZEPAM 2 MG: 2 INJECTION INTRAMUSCULAR; INTRAVENOUS at 03:02

## 2022-02-06 RX ADMIN — Medication 100 MG: at 06:54

## 2022-02-06 RX ADMIN — SENNOSIDES AND DOCUSATE SODIUM 2 TABLET: 50; 8.6 TABLET ORAL at 06:54

## 2022-02-06 RX ADMIN — NICOTINE 14 MG: 14 PATCH TRANSDERMAL at 06:50

## 2022-02-06 RX ADMIN — THERA TABS 1 TABLET: TAB at 06:54

## 2022-02-06 ASSESSMENT — LIFESTYLE VARIABLES
ORIENTATION AND CLOUDING OF SENSORIUM: ORIENTED AND CAN DO SERIAL ADDITIONS
AUDITORY DISTURBANCES: NOT PRESENT
TOTAL SCORE: 0
VISUAL DISTURBANCES: NOT PRESENT
ANXIETY: NO ANXIETY (AT EASE)
HEADACHE, FULLNESS IN HEAD: NOT PRESENT
PAROXYSMAL SWEATS: NO SWEAT VISIBLE
NAUSEA AND VOMITING: NO NAUSEA AND NO VOMITING
AGITATION: NORMAL ACTIVITY
TREMOR: NO TREMOR

## 2022-02-06 ASSESSMENT — FIBROSIS 4 INDEX: FIB4 SCORE: 9.56

## 2022-02-06 ASSESSMENT — PAIN DESCRIPTION - PAIN TYPE: TYPE: ACUTE PAIN

## 2022-02-06 NOTE — PROGRESS NOTES
Patient wanting to leave. MD Segura was notified. MD Segura ok to discharge patient. Patient to discontinue diltiazem and [facility] and to re-check paroxysmal a-fib r/t heart rate in ED. Patient has been in a SR-ST per monitor room since admitted to unit. Patient to stop diltiazem since low heart rate. Will follow orders and discharge pt per MD Segura.

## 2022-02-06 NOTE — ED TRIAGE NOTES
"Chief Complaint   Patient presents with   • N/V   • Dizziness   • Weakness   • Irregular Heart Beat     PT called 911 due to dizzyness and N/V PT was found to have a HR ranging from 35 to 90.  PT with Hx of daily ETOH use and is A/O x 4 at this time still c/o dizzyness and N/V     Blood Pressure 130/89   Pulse 60 Comment: 35 to 90  Temperature 37.1 °C (98.8 °F) (Temporal)   Respiration 16   Height 1.727 m (5' 7.99\")   Weight 61.2 kg (134 lb 14.7 oz)   Oxygen Saturation 99%   Body Mass Index 20.52 kg/m²     "

## 2022-02-06 NOTE — H&P
"Hospital Medicine History & Physical Note    Date of Service  2/5/2022    Primary Care Physician  Nnamdi Ballesteros M.D.    Consultants  Dr. Blackmon, cardiology    Code Status  Full Code    Chief Complaint  Chief Complaint   Patient presents with   • N/V   • Dizziness   • Weakness   • Irregular Heart Beat     PT called 911 due to dizzyness and N/V PT was found to have a HR ranging from 35 to 90.  PT with Hx of daily ETOH use and is A/O x 4 at this time still c/o dizzyness and N/V       History of Presenting Illness  Inocencio Shabazz is a 62 y.o. male with a past medical hx of EtOH abuse, EtOH induced cirrhosis, Hep C, nicotine dependence who presented 2/5/2022 after he experienced N/V today and called 911.  Per reports patient was seen to have HR from 30-90 in triage upon arrival to the ED.  Patient reports that he drinks EtOH daily, and \"maybe too much EtOH today.\"  He is a poor historian when seen in the ED and rest of hx is obtained from reports.      Notable findings include:  Na 148, RR 26, HR 38, WBC 4.5, EtOH 188.4, AST 58, AG 21, platelets 86    Chest xray showing : no acute cardiopulmonary abnormalities    EKG showing sinus bradycardia with gin Blackmon, Cardiology consulted in ED    I discussed the plan of care with patient.    Review of Systems  Review of Systems   Gastrointestinal: Positive for nausea and vomiting.   Neurological: Positive for dizziness.   Psychiatric/Behavioral: Positive for substance abuse.   All other systems reviewed and are negative.      Past Medical History   has a past medical history of Alcohol abuse, Alcohol intoxication (HCC) (3/13/2014), Cirrhosis (HCC), GIB (gastrointestinal bleeding) (1/3/2016), Hepatitis C, Pancytopenia (HCC), and Psychiatric disorder.    Surgical History   has a past surgical history that includes gastroscopy (1/3/2016); gastroscopy (4/8/2016); gastroscopy (3/13/2019); and gastroscopy-endo (N/A, 11/13/2019).     Family History  family " history is not on file.   Family history reviewed with patient. There is no family history that is pertinent to the chief complaint.     Social History   reports that he has been smoking cigarettes. He has a 48.00 pack-year smoking history. He has never used smokeless tobacco. He reports current alcohol use. He reports current drug use. Drug: Inhaled.    Allergies  Allergies   Allergen Reactions   • Haloperidol Unspecified     Twitching/involuntary movements        Medications  Prior to Admission Medications   Prescriptions Last Dose Informant Patient Reported? Taking?   DILTIAZem CD (DILTIAZEM CD) 120 MG CAPSULE SR 24 HR  Patient's Home Pharmacy Yes No   Sig: Take 120 mg by mouth every day.   QUEtiapine (SEROQUEL) 200 MG Tab  Patient's Home Pharmacy Yes No   Sig: Take 200 mg by mouth every evening.   folic acid (FOLVITE) 1 MG Tab  Patient's Home Pharmacy No No   Sig: Take 1 Tab by mouth every day.   Patient not taking: Reported on 5/24/2021   multivitamin (THERAGRAN) Tab  Patient's Home Pharmacy No No   Sig: Take 1 Tab by mouth every day.   Patient not taking: Reported on 5/24/2021   nicotine (NICODERM) 21 MG/24HR PATCH 24 HR  Patient's Home Pharmacy No No   Sig: Place 1 Patch on the skin every 24 hours.   Patient not taking: Reported on 5/13/2021   nicotine polacrilex (NICORETTE) 2 MG Gum  Patient's Home Pharmacy No No   Sig: Take 1 Each by mouth every 1 hour as needed for Smoking Cessation (For nicotine urge).   Patient not taking: Reported on 5/13/2021   ondansetron (ZOFRAN ODT) 4 MG TABLET DISPERSIBLE  Patient's Home Pharmacy No No   Sig: Take 1 Tab by mouth every 8 hours as needed.   Patient not taking: Reported on 5/13/2021   pantoprazole (PROTONIX) 40 MG Tablet Delayed Response  Patient's Home Pharmacy Yes No   Sig: Take 40 mg by mouth 2 times a day.      Facility-Administered Medications: None       Physical Exam  Temp:  [37.1 °C (98.8 °F)] 37.1 °C (98.8 °F)  Pulse:  [38-60] 38  Resp:  [16-26] 26  BP:  (130-148)/(72-89) 148/72  SpO2:  [99 %] 99 %  Blood Pressure: 148/72   Temperature: 37.1 °C (98.8 °F)   Pulse: (!) 38   Respiration: (!) 26   Pulse Oximetry: 99 %       Physical Exam     Constitutional: Resting comfortably in NAD   HENT: Normocephalic, no obvious evidence of acute trauma.  Eyes: No scleral icterus. Normal conjunctiva   Neck: Comfortable movement without any obvious restriction in the range of motion.  Cardiovascular: bradycardia  Thorax & Lungs: No respiratory distress. No wheezing, rales or rhonchi heard on ausculation.  there is no obvious chest wall tenderness. I appreciate normal air movement throughout.   Abdomen: The abdomen is not visibly distended. Upon palpation, I find it to be without tenderness.  No mass appreciated.  Skin: The exposed portions of skin reveal no obvious rash or other abnormalities.  Extremities/Musculoskeletal: no lower extremity edema with no asymmetry.  Neurologic: Alert & oriented. No focal deficits observed.   Psychiatric: Normal affect appropriate for the clinical situation.    Laboratory:  Recent Labs     02/05/22 2025   WBC 4.5*   RBC 5.07   HEMOGLOBIN 14.8   HEMATOCRIT 45.5   MCV 89.7   MCH 29.2   MCHC 32.5*   RDW 49.2   PLATELETCT 86*   MPV 10.8     Recent Labs     02/05/22 2025   SODIUM 147*   POTASSIUM 3.7   CHLORIDE 111   CO2 15*   GLUCOSE 70   BUN 13   CREATININE 0.68   CALCIUM 7.7*     Recent Labs     02/05/22 2025   ALTSGPT 42   ASTSGOT 58*   ALKPHOSPHAT 78   TBILIRUBIN 0.5   LIPASE 34   GLUCOSE 70         No results for input(s): NTPROBNP in the last 72 hours.      Recent Labs     02/05/22 2025   TROPONINT <6       Imaging:  DX-CHEST-PORTABLE (1 VIEW)   Final Result      No acute cardiopulmonary abnormality identified.            Assessment/Plan:  I anticipate this patient     * Bradycardia- (present on admission)  Assessment & Plan  Chest xray showing : no acute cardiopulmonary abnormalities    EKG showing sinus bradycardia with gin Burroughs  [facility] consulted in ED    Admitted with telemetry    Alcohol withdrawal (HCC)- (present on admission)  Assessment & Plan  Admitted with telemetry  SW consult  CIWA  Bradycardia on admission    Tobacco dependence- (present on admission)  Assessment & Plan  45py hx of smoking and current 1/2 ppd smoker  Smoking education discussed and assistance in smoking cessation offered >5 minutes  Nicotine patch ordered    Thrombocytopenia (HCC)- (present on admission)  Assessment & Plan  Platelets 86 on admission   Continue to monitor H & H      VTE prophylaxis: SCDs/TEDs

## 2022-02-06 NOTE — PROGRESS NOTES
Report received, with ANGIE Miles, from [facility]  Assumed pt care. Pt was up to bathroom with CNA. Pt A&O x 4. Fall precautions in place, call light and belongings within reach, bed in lowest position. No signs of distress. RA, on the tele monitor.

## 2022-02-06 NOTE — ED PROVIDER NOTES
"ED Provider Note    Scribed for Dr. Alan Iglesias M.D. by Hemal Mclaughlin. 2/5/2022  8:11 PM    Primary care provider: Nnamdi Ballesteros M.D.  Means of arrival: EMS  History obtained from: Patient, nursing note  History limited by: Acute medical condition    CHIEF COMPLAINT  Chief Complaint   Patient presents with   • N/V   • Dizziness   • Weakness   • Irregular Heart Beat     PT called 911 due to dizzyness and N/V PT was found to have a HR ranging from 35 to 90.  PT with Hx of daily ETOH use and is A/O x 4 at this time still c/o dizzyness and N/V       HPI  Inocencio Shabazz is a 62 y.o. male who presents to the Emergency Department via EMS for evaluation of nausea/vomiting, dizziness, weakness, and irregular heartbeat. Patient states he started feeling unwell with nausea earlier this evening. He started vomiting earlier tonight. States he \"didn't drink that much.\" Per nursing note, patient also reported dizziness, weakness, and irregular heartbeat. He called 911 due to dizziness and N/V. Triage note states patient was found to have a heart rate ranging from 35 to 90. Patient with history of daily ETOH use, per triage note.  He is a vague historian so the history is somewhat uncertain    Further history is unobtainable secondary to acute medical condition.    REVIEW OF SYSTEMS  Pertinent positives include nausea, vomiting, dizziness, weakness, irregular heart beat.  See HPI for further details.     Further history is unobtainable secondary to acute medical condition.    PAST MEDICAL HISTORY   has a past medical history of Alcohol abuse, Alcohol intoxication (HCC) (3/13/2014), Cirrhosis (HCC), GIB (gastrointestinal bleeding) (1/3/2016), Hepatitis C, Pancytopenia (HCC), and Psychiatric disorder.    SURGICAL HISTORY   has a past surgical history that includes gastroscopy (1/3/2016); gastroscopy (4/8/2016); gastroscopy (3/13/2019); and gastroscopy-endo (N/A, 11/13/2019).    SOCIAL HISTORY  Social History     Tobacco " "Use   • Smoking status: Current Every Day Smoker     Packs/day: 1.00     Years: 48.00     Pack years: 48.00     Types: Cigarettes   • Smokeless tobacco: Never Used   Vaping Use   • Vaping Use: Never used   Substance Use Topics   • Alcohol use: Yes     Comment: \"couple of pints\" whiskey every day   • Drug use: Yes     Types: Inhaled     Comment: marijuana; not currently -  cocaine, meth      Social History     Substance and Sexual Activity   Drug Use Yes   • Types: Inhaled    Comment: marijuana; not currently -  cocaine, meth       FAMILY HISTORY  No pertinent family history reported.     CURRENT MEDICATIONS  No current facility-administered medications on file prior to encounter.     Current Outpatient Medications on File Prior to Encounter   Medication Sig Dispense Refill   • DILTIAZem CD (DILTIAZEM CD) 120 MG CAPSULE SR 24 HR Take 120 mg by mouth every day.     • nicotine polacrilex (NICORETTE) 2 MG Gum Take 1 Each by mouth every 1 hour as needed for Smoking Cessation (For nicotine urge). (Patient not taking: Reported on 5/13/2021) 100 Each 3   • multivitamin (THERAGRAN) Tab Take 1 Tab by mouth every day. (Patient not taking: Reported on 5/24/2021) 90 Tab 3   • nicotine (NICODERM) 21 MG/24HR PATCH 24 HR Place 1 Patch on the skin every 24 hours. (Patient not taking: Reported on 5/13/2021) 30 Patch 3   • QUEtiapine (SEROQUEL) 200 MG Tab Take 200 mg by mouth every evening.     • pantoprazole (PROTONIX) 40 MG Tablet Delayed Response Take 40 mg by mouth 2 times a day.     • ondansetron (ZOFRAN ODT) 4 MG TABLET DISPERSIBLE Take 1 Tab by mouth every 8 hours as needed. (Patient not taking: Reported on 5/13/2021) 10 Tab 1   • folic acid (FOLVITE) 1 MG Tab Take 1 Tab by mouth every day. (Patient not taking: Reported on 5/24/2021) 30 Tab 3        ALLERGIES  Allergies   Allergen Reactions   • Haloperidol Unspecified     Twitching/involuntary movements        PHYSICAL EXAM  VITAL SIGNS: /89   Pulse 60 Comment: 35 to 90 " " Temp 37.1 °C (98.8 °F) (Temporal)   Resp 16   Ht 1.727 m (5' 7.99\")   Wt 61.2 kg (134 lb 14.7 oz)   SpO2 99%   BMI 20.52 kg/m²   Constitutional: Well developed, Well nourished, Non-toxic appearance. Vomitus on chin. Drowsy or intoxicated.  HENT: Normocephalic, Atraumatic, Bilateral external ears normal, Dry mucus membranes, No oral exudates.   Eyes: PERRLA, EOMI, Conjunctiva normal, No discharge.   Neck: No tenderness, Supple, No stridor.   Lymphatic: No lymphadenopathy noted.   Cardiovascular: Bradycardic. Irregular rhythm.   Thorax & Lungs: Clear to auscultation bilaterally, No respiratory distress, No wheezing, No crackles.   Abdomen: Soft, No tenderness, No masses, No pulsatile masses.   Skin: Warm, Dry, No erythema, No rash.   Extremities:, No edema No cyanosis.   Musculoskeletal: No tenderness to palpation or major deformities noted.  Intact distal pulses  Neurologic: Drowsy or intoxicated  Psychiatric: Drowsy or intoxicated      LABS  Results for orders placed or performed during the hospital encounter of 02/05/22   DIAGNOSTIC ALCOHOL   Result Value Ref Range    Diagnostic Alcohol 188.4 (H) 0.0 - 10.0 mg/dL   CBC WITH DIFFERENTIAL   Result Value Ref Range    WBC 4.5 (L) 4.8 - 10.8 K/uL    RBC 5.07 4.70 - 6.10 M/uL    Hemoglobin 14.8 14.0 - 18.0 g/dL    Hematocrit 45.5 42.0 - 52.0 %    MCV 89.7 81.4 - 97.8 fL    MCH 29.2 27.0 - 33.0 pg    MCHC 32.5 (L) 33.7 - 35.3 g/dL    RDW 49.2 35.9 - 50.0 fL    Platelet Count 86 (L) 164 - 446 K/uL    MPV 10.8 9.0 - 12.9 fL    Neutrophils-Polys 72.50 (H) 44.00 - 72.00 %    Lymphocytes 21.70 (L) 22.00 - 41.00 %    Monocytes 3.10 0.00 - 13.40 %    Eosinophils 0.90 0.00 - 6.90 %    Basophils 1.10 0.00 - 1.80 %    Immature Granulocytes 0.70 0.00 - 0.90 %    Nucleated RBC 0.00 /100 WBC    Neutrophils (Absolute) 3.27 1.82 - 7.42 K/uL    Lymphs (Absolute) 0.98 (L) 1.00 - 4.80 K/uL    Monos (Absolute) 0.14 0.00 - 0.85 K/uL    Eos (Absolute) 0.04 0.00 - 0.51 K/uL    Baso " (Absolute) 0.05 0.00 - 0.12 K/uL    Immature Granulocytes (abs) 0.03 0.00 - 0.11 K/uL    NRBC (Absolute) 0.00 K/uL   COMP METABOLIC PANEL   Result Value Ref Range    Sodium 147 (H) 135 - 145 mmol/L    Potassium 3.7 3.6 - 5.5 mmol/L    Chloride 111 96 - 112 mmol/L    Co2 15 (L) 20 - 33 mmol/L    Anion Gap 21.0 (H) 7.0 - 16.0    Glucose 70 65 - 99 mg/dL    Bun 13 8 - 22 mg/dL    Creatinine 0.68 0.50 - 1.40 mg/dL    Calcium 7.7 (L) 8.5 - 10.5 mg/dL    AST(SGOT) 58 (H) 12 - 45 U/L    ALT(SGPT) 42 2 - 50 U/L    Alkaline Phosphatase 78 30 - 99 U/L    Total Bilirubin 0.5 0.1 - 1.5 mg/dL    Albumin 4.1 3.2 - 4.9 g/dL    Total Protein 7.7 6.0 - 8.2 g/dL    Globulin 3.6 (H) 1.9 - 3.5 g/dL    A-G Ratio 1.1 g/dL   TROPONIN   Result Value Ref Range    Troponin T <6 6 - 19 ng/L   LIPASE   Result Value Ref Range    Lipase 34 11 - 82 U/L   ESTIMATED GFR   Result Value Ref Range    GFR If African American >60 >60 mL/min/1.73 m 2    GFR If Non African American >60 >60 mL/min/1.73 m 2   EKG (NOW)   Result Value Ref Range    Report       [facility] Emergency Dept.    Test Date:  2022  Pt Name:    JIMMY FERRER                 Department: ER  MRN:        [id number]                      Room:       RD 04  Gender:     Male                         Technician: [id number]  :        [id number]                   Requested By:ER TRIAGE PROTOCOL  Order #:    [id number]                    Reading MD: JOHN OCAMPO MD    Measurements  Intervals                                Axis  Rate:       59                           P:          86  CA:         160                          QRS:        67  QRSD:       102                          T:          76  QT:         480  QTc:        476    Interpretive Statements  SINUS BRADYCARDIA  SUPRAVENTRICULAR BIGEMINY  RSR' IN V1 OR V2, PROBABLY NORMAL VARIANT  BORDERLINE T ABNORMALITIES, ANT-LAT LEADS  Compared to ECG 09/10/2021 06:49:46  RSR' in V1 or V2 now present  Sinus rhythm no longer  present  T-wave abnormality still present  Electronically Signed On 2-5-2022 22:22:33  PST by ALAN IGLESIAS MD        All labs reviewed by me.    EKG  See Labs section. Interpreted by me.      RADIOLOGY  DX-CHEST-PORTABLE (1 VIEW)   Final Result      No acute cardiopulmonary abnormality identified.        The radiologist's interpretation of all radiological studies have been reviewed by me.    COURSE & MEDICAL DECISION MAKING  Pertinent Labs & Imaging studies reviewed. (See chart for details)    8:11 PM - Patient seen and examined at bedside. Ordered DX chest, lipase, CBC with differential, CMP, troponin, diagnostic alcohol to evaluate his symptoms. The differential diagnoses include but are not limited to: cardiac arrhythmia, alcohol intoxication    9:50 PM Paged cardiology     10:02 PM - I discussed the patient's case and the above findings with Dr. Fuentes (cardiology) who advised admission, monitoring, and discontinuing diltiazem     10:21 PM - I discussed the patient's case and the above findings with Dr. Aragon (hospitalist) who will consult the patient for hospitalization.     Decision Making:  This is a patient who had some dizziness and nausea and vomiting.  This could be secondary to significant bradycardia intermittently he appears to be in a sinus bradycardia with a rate of about in the 40s initially however he has significant sinus pauses with the heart rate going down probably in the 30s intermittently.  He is on Cardizem he cannot really tell me whether he has been compliant consistently with patient is going to be admitted the hospitalist service discussed with the cardiologist recommended discontinuing.Cardizem.      DISPOSITION:  Patient will be hospitalized by Dr. Aragon in fair  condition.     FINAL IMPRESSION  1. Bradycardia          I, Hemal Mclaughlin (Jonny), am scribing for, and in the presence of, Alan Iglesias M.D..    Electronically signed by: Hemal Mclaughlin (Jonny),  2/5/2022    IAlan M.D. personally performed the services described in this documentation, as scribed by Hemal Mclaughlin in my presence, and it is both accurate and complete.    The note accurately reflects work and decisions made by me.  Alan Iglesias M.D.  2/5/2022  11:49 PM

## 2022-02-06 NOTE — DISCHARGE SUMMARY
"Discharge Summary    CHIEF COMPLAINT ON ADMISSION  Chief Complaint   Patient presents with   • N/V   • Dizziness   • Weakness   • Irregular Heart Beat     PT called 911 due to dizzyness and N/V PT was found to have a HR ranging from 35 to 90.  PT with Hx of daily ETOH use and is A/O x 4 at this time still c/o dizzyness and N/V       Reason for Admission  Bradycardia     Admission Date  2/5/2022    CODE STATUS  Full Code    HPI & HOSPITAL COURSE   Inocencio Shabazz is a 62 y.o. male with a past medical hx of EtOH abuse, EtOH induced cirrhosis, Hep C, nicotine dependence who presented 2/5/2022 after he experienced N/V today and called 911.  Per reports patient was seen to have HR from 30-90 in triage upon arrival to the ED.  Patient reports that he drinks EtOH daily, and \"maybe too much EtOH today.\"  He is a poor historian when seen in the ED and rest of hx is obtained from reports.       Notable findings include:  Na 148, RR 26, HR 38, WBC 4.5, EtOH 188.4, AST 58, AG 21, platelets 86     Chest xray showing : no acute cardiopulmonary abnormalities     EKG showing sinus bradycardia with gin Blackmon, Cardiology consulted in ED.    Patient's cardizem  mg daily was held since admission with recovery of his bradycardia.  HR 82 NSR on monitor.    Patient was A&O x4 and wanting to go home.  Labs wnl given chronic cirrhosis of liver.  He was given detox IVFs with MVI, thiamine and folate.  Alcohol cessation recommended, nicotine cessation recommended.    Recommend to stop diltiazem and monitor with PCP. Most importantly stopping alcohol abuse.          Therefore, he is discharged in good and stable condition to home with close outpatient follow-up.    The patient recovered much more quickly than anticipated on admission.    Discharge Date  2/6/22    FOLLOW UP ITEMS POST DISCHARGE  [facility] and recheck on PAF.     DISCHARGE DIAGNOSES  Principal Problem (Resolved):    " Bradycardia POA: Yes  Active Problems:    Thrombocytopenia (HCC) POA: Yes    Hepatitis C POA: Yes    Cirrhosis of liver (HCC) POA: Yes    Alcohol abuse POA: Yes    Schizophrenia (HCC) POA: Yes    Paroxysmal atrial fibrillation (HCC) POA: Yes    Tobacco dependence POA: Yes  Resolved Problems:    Alcohol withdrawal (HCC) POA: Yes      FOLLOW UP  No future appointments.  No follow-up provider specified.    MEDICATIONS ON DISCHARGE     Medication List      START taking these medications      Instructions   folic acid 1 MG Tabs  Start taking on: February 7, 2022  Commonly known as: FOLVITE   Take 1 Tablet by mouth every day.  Dose: 1 mg     nicotine 14 MG/24HR Pt24  Commonly known as: NICODERM   Place 1 Patch on the skin every 24 hours.  Dose: 1 Patch     thiamine 100 MG tablet  Start taking on: February 7, 2022  Commonly known as: THIAMINE   Take 1 Tablet by mouth every day.  Dose: 100 mg        CONTINUE taking these medications      Instructions   pantoprazole 40 MG Tbec  Commonly known as: PROTONIX   Take 40 mg by mouth 2 times a day.  Dose: 40 mg     QUEtiapine 200 MG Tabs  Commonly known as: Seroquel   Take 200 mg by mouth every evening.  Dose: 200 mg        STOP taking these medications    dilTIAZem  MG Cp24  Generic drug: DILTIAZem CD            Allergies  Allergies   Allergen Reactions   • Haloperidol Unspecified     Twitching/involuntary movements        DIET  Orders Placed This Encounter   Procedures   • Diet Order Diet: Regular     Standing Status:   Standing     Number of Occurrences:   1     Order Specific Question:   Diet:     Answer:   Regular [1]       ACTIVITY  As tolerated.  Weight bearing as tolerated    CONSULTATIONS  cardiology    PROCEDURES  none    LABORATORY  Lab Results   Component Value Date    SODIUM 139 02/06/2022    POTASSIUM 3.8 02/06/2022    CHLORIDE 104 02/06/2022    CO2 16 (L) 02/06/2022    GLUCOSE 192 (H) 02/06/2022    BUN 15 02/06/2022    CREATININE 0.68 02/06/2022        Lab  Results   Component Value Date    WBC 3.9 (L) 02/06/2022    HEMOGLOBIN 12.7 (L) 02/06/2022    HEMATOCRIT 39.2 (L) 02/06/2022    PLATELETCT 60 (L) 02/06/2022        Total time of the discharge process exceeds 38 minutes.

## 2022-02-06 NOTE — PROGRESS NOTES
Called by Alan Iglesias because of sinus bradycardia.  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  In the ER heart rate slowed down to the 30s, BP normal. Laboratory reveals significant hypocalcemia 7.7, anion gap acidosis, elevated SGOT with prior elevated LFTs consistent with alcoholic hepatitis, alcohol level 188. EKG shows marked sinus arrhythmia average heart rate 59 bpm, QTc slightly prolonged at 476 ms, prior echocardiogram EF 55%    Suspect vagal mediated bradycardia due to nausea vomiting, acute alcohol intoxication and diltiazem.    Recommend evaluation by hospitalist, admission, monitoring, discontinue diltiazem, treat electrolyte abnormality, treat alcohol intoxication and withdrawal, lactate level, evaluate anion gap acidosis for other possible toxic ingestions.  No indication for pacemaker at this time.

## 2022-02-06 NOTE — PROGRESS NOTES
Pt discharged to home with self via cab. A&O x 4, on RA. Discharge instructions reviewed, and signed. IV and tele monitor removed. Pt vocalized understanding of AVS, plan of care, and medications. All questions answered at this time. Pt escorted out with [facility]ITUS Miles.

## 2022-02-06 NOTE — ASSESSMENT & PLAN NOTE
43py hx of smoking and current 1/2 ppd smoker  Smoking education discussed and assistance in smoking cessation offered >5 minutes  Nicotine patch ordered

## 2022-02-06 NOTE — DISCHARGE INSTRUCTIONS
Discharge Instructions    Discharged to home by car with self. Discharged via wheelchair, hospital escort: Yes.  Special equipment needed: Not Applicable    Be sure to schedule a follow-up appointment with your primary care doctor or any specialists as instructed.     Discharge Plan:   Diet Plan: Discussed  Activity Level: Discussed  Confirmed Follow up Appointment: Patient to Call and Schedule Appointment  Confirmed Symptoms Management: Discussed  Medication Reconciliation Updated: Yes  Influenza Vaccine Indication: Patient Refuses    I understand that a diet low in cholesterol, fat, and sodium is recommended for good health. Unless I have been given specific instructions below for another diet, I accept this instruction as my diet prescription.   Other diet: regular     Special Instructions: None    · Is patient discharged on Warfarin / Coumadin?   No     Depression / Suicide Risk    As you are discharged from this [facility] facility, it is important to learn how to keep safe from harming yourself.    Recognize the warning signs:  · Abrupt changes in personality, positive or negative- including increase in energy   · Giving away possessions  · Change in eating patterns- significant weight changes-  positive or negative  · Change in sleeping patterns- unable to sleep or sleeping all the time   · Unwillingness or inability to communicate  · Depression  · Unusual sadness, discouragement and loneliness  · Talk of wanting to die  · Neglect of personal appearance   · Rebelliousness- reckless behavior  · Withdrawal from people/activities they love  · Confusion- inability to concentrate     If you or a loved one observes any of these behaviors or has concerns about self-harm, here's what you can do:  · Talk about it- your feelings and reasons for harming yourself  · Remove any means that you might use to hurt yourself (examples: pills, rope, extension cords, firearm)  · Get professional help from the community (Mental  Health, Substance Abuse, psychological counseling)  · Do not be alone:Call your Safe Contact- someone whom you trust who will be there for you.  · Call your local CRISIS HOTLINE 336-9692 or 401-677-1043  · Call your local Children's Mobile Crisis Response Team [place] (482) 531-4840 or www.Crisp  · Call the toll free National Suicide Prevention Hotlines   · National Suicide Prevention Lifeline 775-181-LCYQ (9524)  · National Hope Line Network 800-SUICIDE (454-6276)          Alcohol Withdrawal Syndrome  Alcohol withdrawal syndrome is a group of symptoms that can develop when a person who drinks heavily and regularly stops drinking or drinks less. Alcohol withdrawal syndrome can be mild or severe, and it may even be life-threatening.  Alcohol withdrawal syndrome usually affects people who have alcohol use disorder, which may also be called alcoholism. Alcohol use disorder is when a person is unable to control his or her alcohol use, and drinking too much or too often causes problems at home, at work, or in relationships.  What are the causes?  Drinking heavily and drinking on a regular basis cause changes in brain chemistry. Over time, the body becomes dependent on alcohol. When alcohol use stops, the chemistry system in the brain becomes unbalanced and causes the symptoms of alcohol withdrawal.  What increases the risk?  Alcohol withdrawal syndrome is more likely to occur in people who drink more than the recommended limit of alcohol (2 drinks a day for men or 1 drink a day for non-pregnant women). It is also more likely to affect heavy drinkers who have been using alcohol for long periods of time. The more a person drinks and the longer he or she drinks, the greater the risk of alcohol withdrawal syndrome.  Severe withdrawal is more likely to develop in someone who:  · Had severe alcohol withdrawal in the past.  · Had a seizure during a previous episode of alcohol withdrawal.  · Is elderly.  · Uses  other drugs.  · Has a long-term (chronic) medical problem, such as heart, lung, or liver disease.  · Has depression.  · Does not get enough nutrients from his or her diet (malnutrition).  What are the signs or symptoms?  Symptoms of this condition can be mild to moderate, or they can be severe. Symptoms may develop a few hours (or up to a day) after a person changes his or her drinking patterns. During the 48 hours after he or she has stopped drinking, the following symptoms may go away or get better:  · Uncontrollable shaking (tremor).  · Sweating.  · Headache.  · Anxiety.  · Inability to relax (agitation).  · Trouble sleeping (insomnia).  · Irregular heartbeats (palpitations).  · Alcohol cravings.  · Seizure.  The following symptoms may get worse 24-48 hours after a person has decreased or stopped alcohol use, and they may gradually improve over a period of days or weeks:  · Nausea and vomiting.  · Fatigue.  · Sensitivity to light and sounds.  · Confusion and inability to think clearly.  · Loss of appetite.  · Mood swings, irritability, depression, and anxiety.  · Insomnia and nightmares.  The following symptoms are severe and life-threatening. When these symptoms occur together, they are called delirium tremens (DTs):  · High blood pressure.  · Increased heart rate.  · Trouble breathing.  · Seizures. These may go away along with other symptoms, or they may persist.  · Seeing, hearing, feeling, smelling, or tasting things that are not there (hallucinations). If you experience hallucinations, they usually begin 12-24 hours after a change in drinking patterns.  Delirium tremens requires immediate hospitalization.  How is this diagnosed?  This condition may be diagnosed based on:  · Your symptoms and medical history.  · Your history of alcohol use. Your health care provider may ask questions about your drinking behavior. It is important to be honest when you answer these questions.  · A psychological assessment.  · A  physical exam.  · Blood tests or urine tests to measure blood alcohol level and to rule out other causes of symptoms.  · MRI or CT scan. This may be done if you seem to have abnormal thinking or behaviors (altered mental status).  Diagnosis can be difficult. People going through withdrawal often avoid seeking medical care and are not thinking clearly. Friends and family members play an important role in recognizing symptoms and encouraging loved ones to get treatment.  How is this treated?  Most people with symptoms of withdrawal can be treated outside of a hospital setting (outpatient treatment), with close monitoring such as daily check-ins with a health care provider and counseling. You may need treatment at a hospital or treatment center (inpatient treatment) if:  · You have a history of delirium tremens or seizures.  · You have severe symptoms.  · You are addicted to other drugs.  · You cannot swallow medicine.  · You have a serious medical condition such as heart failure.  · You experienced withdrawal in the past but then you continued drinking alcohol.  · You are not likely to commit to an outpatient treatment schedule.  Treatment may involve:  · Monitoring your blood pressure, pulse, and breathing.  · IV fluids to keep you hydrated.  · Medicines to reduce withdrawal symptoms and discomfort (benzodiazepines).  · Medicine to reduce anxiety.  · Medicine to prevent or control seizures.  · Multivitamins and B vitamins.  · Having a health care provider check on you daily.  It is important to get treatment for alcohol withdrawal early. Getting treatment early can:  · Speed up your recovery from withdrawal symptoms.  · Make you more successful with long-term stoppage of alcohol use (sobriety).  If you need help to stop drinking, your health care provider may recommend a long-term treatment plan that includes:  · Medicines to help treat alcohol use disorder.  · Substance abuse counseling.  · Support groups.  Follow  these instructions at home:    · Take over-the-counter and prescription medicines (including vitamin supplements) only as told by your health care provider.  · Do not drink alcohol.  · Do not drive until your health care provider approves.  · Have someone you trust stay with you or be available if you need help with your symptoms or with not drinking.  · Drink enough fluid to keep your urine pale yellow.  · Consider joining an alcohol support group or treatment program. These can provide emotional support, advice, and guidance.  · Keep all follow-up visits as told by your health care provider. This is important.  Contact a health care provider if:  · Your symptoms get worse instead of better.  · You cannot eat or drink without vomiting.  · You are struggling with not drinking alcohol.  · You cannot stop drinking alcohol.  Get help right away if:  · You have an irregular heartbeat.  · You have chest pain.  · You have trouble breathing.  · You have a seizure for the first time.  · You hallucinate.  · You become very confused.  Summary  · Alcohol withdrawal is a group of symptoms that can develop when a person who drinks heavily and regularly stops drinking or drinks less.  · Symptoms of this condition can be mild to moderate, or they can be severe.  · Treatment may include hospitalization, medicine, and counseling.  This information is not intended to replace advice given to you by your health care provider. Make sure you discuss any questions you have with your health care provider.  Document Released: 09/27/2006 Document Revised: 11/30/2018 Document Reviewed: 08/24/2018  [employer] Patient Education © 2020 [employer]           Bradycardia, Adult  Bradycardia is a slower-than-normal heartbeat. A normal resting heart rate for an adult ranges from 60 to 100 beats per minute. With bradycardia, the resting heart rate is less than 60 beats per minute.  Bradycardia can prevent enough oxygen from reaching certain areas of  your body when you are active. It can be serious if it keeps enough oxygen from reaching your brain and other parts of your body. Bradycardia is not a problem for everyone. For some healthy adults, a slow resting heart rate is normal.  What are the causes?  This condition may be caused by:  · A problem with the heart, including:  ? A problem with the heart's electrical system, such as a heart block. With a heart block, electrical signals between the chambers of the heart are partially or completely blocked, so they are not able to work as they should.  ? A problem with the heart's natural pacemaker (sinus node).  ? Heart disease.  ? A heart attack.  ? Heart damage.  ? Lyme disease.  ? A heart infection.  ? A heart condition that is present at birth (congenital heart defect).  · Certain medicines that treat heart conditions.  · Certain conditions, such as hypothyroidism and obstructive sleep apnea.  · Problems with the balance of chemicals and other substances, like potassium, in the blood.  · Trauma.  · Radiation therapy.  What increases the risk?  You are more likely to develop this condition if you:  · Are age 65 or older.  · Have high blood pressure (hypertension), high cholesterol (hyperlipidemia), or diabetes.  · Drink heavily, use tobacco or nicotine products, or use drugs.  What are the signs or symptoms?  Symptoms of this condition include:  · Light-headedness.  · Feeling faint or fainting.  · Fatigue and weakness.  · Trouble with activity or exercise.  · Shortness of breath.  · Chest pain (angina).  · Drowsiness.  · Confusion.  · Dizziness.  How is this diagnosed?  This condition may be diagnosed based on:  · Your symptoms.  · Your medical history.  · A physical exam.  During the exam, your health care provider will listen to your heartbeat and check your pulse. To confirm the diagnosis, your health care provider may order tests, such as:  · Blood tests.  · An electrocardiogram (ECG). This test records the  heart's electrical activity. The test can show how fast your heart is beating and whether the heartbeat is steady.  · A test in which you wear a portable device (event recorder or Holter monitor) to record your heart's electrical activity while you go about your day.  · An exercise test.  How is this treated?  Treatment for this condition depends on the cause of the condition and how severe your symptoms are. Treatment may involve:  · Treatment of the underlying condition.  · Changing your medicines or how much medicine you take.  · Having a small, battery-operated device called a pacemaker implanted under the skin. When bradycardia occurs, this device can be used to increase your heart rate and help your heart beat in a regular rhythm.  Follow these instructions at home:  Lifestyle    · Manage any health conditions that contribute to bradycardia as told by your health care provider.  · Follow a heart-healthy diet. A nutrition specialist (dietitian) can help educate you about healthy food options and changes.  · Follow an exercise program that is approved by your health care provider.  · Maintain a healthy weight.  · Try to reduce or manage your stress, such as with yoga or meditation. If you need help reducing stress, ask your health care provider.  · Do not use any products that contain nicotine or tobacco, such as cigarettes, e-cigarettes, and chewing tobacco. If you need help quitting, ask your health care provider.  · Do not use illegal drugs.  · Limit alcohol intake to no more than 1 drink a day for nonpregnant women and 2 drinks a day for men. Be aware of how much alcohol is in your drink. In the [place] one drink equals one 12 oz bottle of beer (355 mL), one 5 oz glass of wine (148 mL), or one 1½ oz glass of hard liquor (44 mL).  General instructions  · Take over-the-counter and prescription medicines only as told by your health care provider.  · Keep all follow-up visits as told by your health care provider.  This is important.  How is this prevented?  In some cases, bradycardia may be prevented by:  · Treating underlying medical problems.  · Stopping behaviors or medicines that can trigger the condition.  Contact a health care provider if you:  · Feel light-headed or dizzy.  · Almost faint.  · Feel weak or are easily fatigued during physical activity.  · Experience confusion or have memory problems.  Get help right away if:  · You faint.  · You have:  ? An irregular heartbeat (palpitations).  ? Chest pain.  ? Trouble breathing.  Summary  · Bradycardia is a slower-than-normal heartbeat. With bradycardia, the resting heart rate is less than 60 beats per minute.  · Treatment for this condition depends on the cause.  · Manage any health conditions that contribute to bradycardia as told by your health care provider.  · Do not use any products that contain nicotine or tobacco, such as cigarettes, e-cigarettes, and chewing tobacco, and limit alcohol intake.  · Keep all follow-up visits as told by your health care provider. This is important.  This information is not intended to replace advice given to you by your health care provider. Make sure you discuss any questions you have with your health care provider.  Document Released: 09/09/2003 Document Revised: 07/01/2019 Document Reviewed: 05/29/2019  [employer] Patient Education © 2020 [employer]        Folic Acid, Vitamin B9 tablets  What is this medicine?  FOLIC ACID (FOE lik AS id) is a water-soluble, B complex vitamin. It is in many foods like liver, kidneys, yeast, and leafy, green vegetables. It is used to treat megaloblastic anemia and anemia from poor diet in pregnant women, babies, and children.  This medicine may be used for other purposes; ask your health care provider or pharmacist if you have questions.  COMMON BRAND NAME(S): Folacin, Folicet, Q-TABS  What should I tell my health care provider before I take this medicine?  They need to know if you have any of  these conditions:  · alcoholism or alcohol cirrhosis  · pernicious anemia  · vitamin B12 deficient anemia  · an unusual or allergic reaction to folic acid, other B vitamins, other medicines, foods, dyes, or preservatives  · pregnant or trying to get pregnant  · breast-feeding  How should I use this medicine?  Take this medicine by mouth with a glass of water. Follow the directions on your prescription label. Take your doses at regular intervals. Do not stop taking your medicine unless your doctor tells you to.  Talk to your pediatrician regarding the use of this medicine in children. While this drug may be prescribed for selected conditions, precautions do apply.  Overdosage: If you think you have taken too much of this medicine contact a poison control center or emergency room at once.  NOTE: This medicine is only for you. Do not share this medicine with others.  What if I miss a dose?  If you miss a dose, take it as soon as you can. If it is almost time for your next dose, take only that dose. Do not take double or extra doses.  What may interact with this medicine?  · chloramphenicol  · cholestyramine  · medicines for seizures  · methotrexate  · nitrofurantoin  · pyrimethamine  This list may not describe all possible interactions. Give your health care provider a list of all the medicines, herbs, non-prescription drugs, or dietary supplements you use. Also tell them if you smoke, drink alcohol, or use illegal drugs. Some items may interact with your medicine.  What should I watch for while using this medicine?  Visit your doctor or health care professional for regular check ups. Your doctor may order blood tests.  You need to eat a proper diet even while you are taking this vitamin. Taking vitamin supplements is not a substitute for a healthy diet. Ask your doctor or health care provider for good nutrition advice.  What side effects may I notice from receiving this medicine?  Side effects that you should report  to your doctor or health care professional as soon as possible:  · allergic reactions such as skin rash or itching, hives, swelling of the lips, mouth, tongue, or throat  · chest tightness or pain  · wheezing or shortness of breath  Side effects that usually do not require medical attention (report to your doctor or health care professional if they continue or are bothersome):  · bitter or bad taste  · confusion  · irritable  · loss of appetite  · nausea  · stomach gas  This list may not describe all possible side effects. Call your doctor for medical advice about side effects. You may report side effects to FDA at 7-959-QCD-7832.  Where should I keep my medicine?  Keep out of the reach of children.  Store at room temperature between 15 and 30 degrees C (59 and 86 degrees F). Protect from light. This medicine is quickly broken down and made inactive when exposed to heat or light. Throw away any unused medicine after the expiration date.  NOTE: This sheet is a summary. It may not cover all possible information. If you have questions about this medicine, talk to your doctor, pharmacist, or health care provider.  © 2020 Else[employer]/Gold Standard (2009-04-22 17:03:56)      Nicotine skin patches  What is this medicine?  NICOTINE (ARTURO oh teen) helps people stop smoking. The patches replace the nicotine found in cigarettes and help to decrease withdrawal effects. They are most effective when used in combination with a stop-smoking program.  This medicine may be used for other purposes; ask your health care provider or pharmacist if you have questions.  COMMON BRAND NAME(S): Habitrol, Nicoderm CQ, Nicotrol  What should I tell my health care provider before I take this medicine?  They need to know if you have any of these conditions:  · diabetes  · heart disease, angina, irregular heartbeat or previous heart attack  · high blood pressure  · lung disease, including asthma  · overactive thyroid  · pheochromocytoma  · seizures or  a history of seizures  · skin problems, like eczema  · stomach problems or ulcers  · an unusual or allergic reaction to nicotine, adhesives, other medicines, foods, dyes, or preservatives  · pregnant or trying to get pregnant  · breast-feeding  How should I use this medicine?  This medicine is for use on the skin. Follow the directions that come with the patches. Find an area of skin on your upper arm, chest, or back that is clean, dry, greaseless, undamaged and hairless. Wash hands with plain soap and water. Do not use anything that contains aloe, lanolin or glycerin as these may prevent the patch from sticking. Dry thoroughly. Remove the patch from the sealed pouch. Do not try to cut or trim the patch. Using your palm, press the patch firmly in place for 10 seconds to make sure that there is good contact with your skin. After applying the patch, wash your hands. Change the patch every day, keeping to a regular schedule. When you apply a new patch, use a new area of skin. Wait at least 1 week before using the same area again.  Talk to your pediatrician regarding the use of this medicine in children. Special care may be needed.  Overdosage: If you think you have taken too much of this medicine contact a poison control center or emergency room at once.  NOTE: This medicine is only for you. Do not share this medicine with others.  What if I miss a dose?  If you forget to replace a patch, use it as soon as you can. Only use one patch at a time and do not leave on the skin for longer than directed. If a patch falls off, you can replace it, but keep to your schedule and remove the patch at the right time.  What may interact with this medicine?  · medicines for asthma  · medicines for blood pressure  · medicines for mental depression  This list may not describe all possible interactions. Give your health care provider a list of all the medicines, herbs, non-prescription drugs, or dietary supplements you use. Also tell  them if you smoke, drink alcohol, or use illegal drugs. Some items may interact with your medicine.  What should I watch for while using this medicine?  You should begin using the nicotine patch the day you stop smoking. It is okay if you do not succeed at your attempt to quit and have a cigarette. You can still continue your quit attempt and keep using the product as directed. Just throw away your cigarettes and get back to your quit plan.  You can keep the patch in place during swimming, bathing, and showering. If your patch falls off during these activities, replace it.  When you first apply the patch, your skin may itch or burn. This should go away soon. When you remove a patch, the skin may look red, but this should only last for a few days. Call your doctor or health care professional if skin redness does not go away after 4 days, if your skin swells, or if you get a rash.  If you are a diabetic and you quit smoking, the effects of insulin may be increased and you may need to reduce your insulin dose. Check with your doctor or health care professional about how you should adjust your insulin dose.  If you are going to have a magnetic resonance imaging (MRI) procedure, tell your MRI technician if you have this patch on your body. It must be removed before a MRI.  What side effects may I notice from receiving this medicine?  Side effects that you should report to your doctor or health care professional as soon as possible:  · allergic reactions like skin rash, itching or hives, swelling of the face, lips, or tongue  · breathing problems  · changes in hearing  · changes in vision  · chest pain  · cold sweats  · confusion  · fast, irregular heartbeat  · feeling faint or lightheaded, falls  · headache  · increased saliva  · skin redness that lasts more than 4 days  · stomach pain  · signs and symptoms of nicotine overdose like nausea; vomiting; dizziness; weakness; and rapid heartbeat  Side effects that usually do  not require medical attention (report to your doctor or health care professional if they continue or are bothersome):  · diarrhea  · dry mouth  · hiccups  · irritability  · nervousness or restlessness  · trouble sleeping or vivid dreams  This list may not describe all possible side effects. Call your doctor for medical advice about side effects. You may report side effects to FDA at 7-138-FJR-6964.  Where should I keep my medicine?  Keep out of the reach of children.  Store at room temperature between 20 and 25 degrees C (68 and 77 degrees F). Protect from heat and light. Store in manufacturers packaging until ready to use. Throw away unused medicine after the expiration date. When you remove a patch, fold with sticky sides together; put in an empty opened pouch and throw away.  NOTE: This sheet is a summary. It may not cover all possible information. If you have questions about this medicine, talk to your doctor, pharmacist, or health care provider.  © 2020 Elsevier/Gold Standard (2015-11-16 15:46:21)      Thiamine, Vitamin B1 tablets  What is this medicine?  THIAMINE (THAHY uh min) is a vitamin B1. It is added to a healthy diet to prevent or to treat low vitamin B1 levels.  This vitamin may be used for other purposes; ask your health care provider or pharmacist if you have questions.  This medicine may be used for other purposes; ask your health care provider or pharmacist if you have questions.  What should I tell my health care provider before I take this medicine?  They need to know if you have any of the following conditions:  · Wernicke's disease  · an unusual or allergic reaction to B vitamins, other medicines, foods, dyes, or preservatives  · pregnant or trying to get pregnant  · breast-feeding  How should I use this medicine?  Take this medicine by mouth with a glass of water. Follow the directions on the package or prescription label. For best results take this medicine with food. Take your medicine at  regular intervals. Do not take your medicine more often than directed.  Talk to your pediatrician regarding the use of this medicine in children. While this medicine may be prescribed for selected conditions, precautions do apply.  Overdosage: If you think you have taken too much of this medicine contact a poison control center or emergency room at once.  NOTE: This medicine is only for you. Do not share this medicine with others.  What if I miss a dose?  If you miss a dose, take it as soon as you can. If it is almost time for your next dose, take only that dose. Do not take double or extra doses.  What may interact with this medicine?  Interactions are not expected.  This list may not describe all possible interactions. Give your health care provider a list of all the medicines, herbs, non-prescription drugs, or dietary supplements you use. Also tell them if you smoke, drink alcohol, or use illegal drugs. Some items may interact with your medicine.  What should I watch for while using this medicine?  Follow a healthy diet. Taking a vitamin supplement does not replace the need for a balanced diet. Some foods that have this vitamin naturally are yeast, beans, peas, nuts, pork, and beef. Limit alcohol, smoking and stress.  Too much of this vitamin can be unsafe. Talk to your doctor or health care provider about how much is right for you.  What side effects may I notice from receiving this medicine?  Side effects that you should report to your doctor or health care professional as soon as possible:  · allergic reactions like skin rash, itching or hives, swelling of the face, lips, or tongue  · chest tightness  · fast, irregular heartbeat  · irritable, restless  · nausea, vomiting  · sweating  · unusually bleeding or bruising  Side effects that usually do not require medical attention (report to your doctor or health care professional if they continue or are bothersome):  · sneezing  This list may not describe all  possible side effects. Call your doctor for medical advice about side effects. You may report side effects to FDA at 7-293-NVA-8937.  Where should I keep my medicine?  Keep out of the reach of children.  Store at room temperature between 15 and 30 degrees C (59 and 85 degrees F). Protect from heat and light. Throw away any unused medicine after the expiration date.  NOTE: This sheet is a summary. It may not cover all possible information. If you have questions about this medicine, talk to your doctor, pharmacist, or health care provider.  © 2020 Elsevier/Gold Standard (2009-09-03 12:50:29)

## 2022-02-06 NOTE — PROGRESS NOTES
Patient asking on when he would be released. This RN discussed with pt, pt wanting to leave, patient educated on heart rhythm per report and assessed patients CIWA. No s/sx of withdrawal at this time. MD Segura notified patient wanting to leave. Will continue to monitor.

## 2022-02-06 NOTE — ASSESSMENT & PLAN NOTE
Chest xray showing : no acute cardiopulmonary abnormalities    EKG showing sinus bradycardia with gin Blackmon, Cardiology consulted in ED    Admitted with telemetry

## 2022-02-11 ENCOUNTER — HOSPITAL ENCOUNTER (EMERGENCY)
Facility: MEDICAL CENTER | Age: 62
End: 2022-02-11
Attending: EMERGENCY MEDICINE
Payer: COMMERCIAL

## 2022-02-11 VITALS
BODY MASS INDEX: 20.38 KG/M2 | RESPIRATION RATE: 19 BRPM | WEIGHT: 134.48 LBS | OXYGEN SATURATION: 94 % | DIASTOLIC BLOOD PRESSURE: 71 MMHG | HEIGHT: 68 IN | HEART RATE: 93 BPM | SYSTOLIC BLOOD PRESSURE: 111 MMHG | TEMPERATURE: 98.5 F

## 2022-02-11 DIAGNOSIS — F10.10 ALCOHOL ABUSE: ICD-10-CM

## 2022-02-11 DIAGNOSIS — R11.0 NAUSEA: ICD-10-CM

## 2022-02-11 LAB
ALBUMIN SERPL BCP-MCNC: 4.7 G/DL (ref 3.2–4.9)
ALBUMIN/GLOB SERPL: 1 G/DL
ALP SERPL-CCNC: 84 U/L (ref 30–99)
ALT SERPL-CCNC: 69 U/L (ref 2–50)
ANION GAP SERPL CALC-SCNC: 22 MMOL/L (ref 7–16)
APTT PPP: 31 SEC (ref 24.7–36)
AST SERPL-CCNC: 108 U/L (ref 12–45)
BASOPHILS # BLD AUTO: 1 % (ref 0–1.8)
BASOPHILS # BLD: 0.03 K/UL (ref 0–0.12)
BILIRUB SERPL-MCNC: 0.6 MG/DL (ref 0.1–1.5)
BUN SERPL-MCNC: 13 MG/DL (ref 8–22)
CALCIUM SERPL-MCNC: 8.9 MG/DL (ref 8.5–10.5)
CHLORIDE SERPL-SCNC: 99 MMOL/L (ref 96–112)
CO2 SERPL-SCNC: 17 MMOL/L (ref 20–33)
CREAT SERPL-MCNC: 0.64 MG/DL (ref 0.5–1.4)
EKG IMPRESSION: NORMAL
EOSINOPHIL # BLD AUTO: 0.01 K/UL (ref 0–0.51)
EOSINOPHIL NFR BLD: 0.3 % (ref 0–6.9)
ERYTHROCYTE [DISTWIDTH] IN BLOOD BY AUTOMATED COUNT: 47.6 FL (ref 35.9–50)
ETHANOL BLD-MCNC: 144.7 MG/DL (ref 0–10)
FLUAV RNA SPEC QL NAA+PROBE: NEGATIVE
FLUBV RNA SPEC QL NAA+PROBE: NEGATIVE
GLOBULIN SER CALC-MCNC: 4.5 G/DL (ref 1.9–3.5)
GLUCOSE SERPL-MCNC: 116 MG/DL (ref 65–99)
HCT VFR BLD AUTO: 42.7 % (ref 42–52)
HGB BLD-MCNC: 14.3 G/DL (ref 14–18)
IMM GRANULOCYTES # BLD AUTO: 0 K/UL (ref 0–0.11)
IMM GRANULOCYTES NFR BLD AUTO: 0 % (ref 0–0.9)
INR PPP: 1.18 (ref 0.87–1.13)
LIPASE SERPL-CCNC: 57 U/L (ref 11–82)
LYMPHOCYTES # BLD AUTO: 0.84 K/UL (ref 1–4.8)
LYMPHOCYTES NFR BLD: 28.3 % (ref 22–41)
MCH RBC QN AUTO: 29.9 PG (ref 27–33)
MCHC RBC AUTO-ENTMCNC: 33.5 G/DL (ref 33.7–35.3)
MCV RBC AUTO: 89.1 FL (ref 81.4–97.8)
MONOCYTES # BLD AUTO: 0.19 K/UL (ref 0–0.85)
MONOCYTES NFR BLD AUTO: 6.4 % (ref 0–13.4)
NEUTROPHILS # BLD AUTO: 1.9 K/UL (ref 1.82–7.42)
NEUTROPHILS NFR BLD: 64 % (ref 44–72)
NRBC # BLD AUTO: 0 K/UL
NRBC BLD-RTO: 0 /100 WBC
PLATELET # BLD AUTO: 59 K/UL (ref 164–446)
PMV BLD AUTO: 11.4 FL (ref 9–12.9)
POTASSIUM SERPL-SCNC: 3.6 MMOL/L (ref 3.6–5.5)
PROT SERPL-MCNC: 9.2 G/DL (ref 6–8.2)
PROTHROMBIN TIME: 14.7 SEC (ref 12–14.6)
RBC # BLD AUTO: 4.79 M/UL (ref 4.7–6.1)
RSV RNA SPEC QL NAA+PROBE: NEGATIVE
SARS-COV-2 RNA RESP QL NAA+PROBE: NOTDETECTED
SODIUM SERPL-SCNC: 138 MMOL/L (ref 135–145)
SPECIMEN SOURCE: NORMAL
TROPONIN T SERPL-MCNC: <6 NG/L (ref 6–19)
WBC # BLD AUTO: 3 K/UL (ref 4.8–10.8)

## 2022-02-11 PROCEDURE — 85610 PROTHROMBIN TIME: CPT

## 2022-02-11 PROCEDURE — 80053 COMPREHEN METABOLIC PANEL: CPT

## 2022-02-11 PROCEDURE — 700111 HCHG RX REV CODE 636 W/ 250 OVERRIDE (IP)

## 2022-02-11 PROCEDURE — 93005 ELECTROCARDIOGRAM TRACING: CPT | Performed by: EMERGENCY MEDICINE

## 2022-02-11 PROCEDURE — 85730 THROMBOPLASTIN TIME PARTIAL: CPT

## 2022-02-11 PROCEDURE — 0241U HCHG SARS-COV-2 COVID-19 NFCT DS RESP RNA 4 TRGT MIC: CPT

## 2022-02-11 PROCEDURE — 85025 COMPLETE CBC W/AUTO DIFF WBC: CPT

## 2022-02-11 PROCEDURE — 82077 ASSAY SPEC XCP UR&BREATH IA: CPT

## 2022-02-11 PROCEDURE — 93005 ELECTROCARDIOGRAM TRACING: CPT

## 2022-02-11 PROCEDURE — 84484 ASSAY OF TROPONIN QUANT: CPT

## 2022-02-11 PROCEDURE — C9803 HOPD COVID-19 SPEC COLLECT: HCPCS | Performed by: EMERGENCY MEDICINE

## 2022-02-11 PROCEDURE — 99284 EMERGENCY DEPT VISIT MOD MDM: CPT

## 2022-02-11 PROCEDURE — 82962 GLUCOSE BLOOD TEST: CPT

## 2022-02-11 PROCEDURE — 83690 ASSAY OF LIPASE: CPT

## 2022-02-11 RX ORDER — ONDANSETRON 2 MG/ML
4 INJECTION INTRAMUSCULAR; INTRAVENOUS ONCE
Status: DISCONTINUED | OUTPATIENT
Start: 2022-02-11 | End: 2022-02-11

## 2022-02-11 RX ORDER — ONDANSETRON 4 MG/1
4 TABLET, ORALLY DISINTEGRATING ORAL ONCE
Status: COMPLETED | OUTPATIENT
Start: 2022-02-11 | End: 2022-02-11

## 2022-02-11 RX ORDER — FAMOTIDINE 40 MG/1
20 TABLET, FILM COATED ORAL DAILY
Qty: 60 TABLET | Refills: 0 | Status: SHIPPED | OUTPATIENT
Start: 2022-02-11 | End: 2023-04-10

## 2022-02-11 RX ORDER — ONDANSETRON 4 MG/1
4 TABLET, ORALLY DISINTEGRATING ORAL EVERY 6 HOURS PRN
Qty: 10 TABLET | Refills: 0 | Status: SHIPPED | OUTPATIENT
Start: 2022-02-11 | End: 2023-04-10

## 2022-02-11 RX ADMIN — ONDANSETRON 4 MG: 4 TABLET, ORALLY DISINTEGRATING ORAL at 17:25

## 2022-02-11 ASSESSMENT — LIFESTYLE VARIABLES: DO YOU DRINK ALCOHOL: YES

## 2022-02-11 ASSESSMENT — FIBROSIS 4 INDEX: FIB4 SCORE: 9.56

## 2022-02-12 NOTE — ED TRIAGE NOTES
"Pt to triage with complaints of \"I feel like I am going to die\". When asked to elaborate he could not. He does answer yes to lightheadedness, abdominal discomfort, nausea since yesterday. EKG requested for lightheadedness. FSBS 122 in triage.     Pt educated on ED process and asked to wait in lobby. Patient educated on importance of alerting staff to new or worsening symptoms or concerns.  "

## 2022-02-12 NOTE — ED NOTES
Pt signed and provided dc papers. Given x2 paper rx to fill and discussed f/u care. Pt ambulated out of the dept w/ steady gait, A&O x4

## 2022-02-12 NOTE — ED NOTES
Patient walked with a steady gate at this time to er [facility] area room 66. Placed patient into gown, on auto bp, on heart monitor, spo2, gave warm blanket, and call light. Ready to be seen. rn is at beside    Gave patient a urinal and vomit bag

## 2022-02-12 NOTE — ED PROVIDER NOTES
"ED Provider Note    CHIEF COMPLAINT  Chief Complaint   Patient presents with   • Malaise   • Nausea   • Lightheadedness       HPI  Inocencio Shabazz is a 62 y.o. male who presents to the emergency department with complaint of mid epigastric discomfort, nausea, generalized fatigue. Past medical history as documented below. [facility] He states that he continues to drink at least a pint of alcohol per day. He also smokes marijuana. He has known cirrhosis and also has a history of prior G.I. bleed. Currently only taking quetiapine at night. Previously on trazodone.    Currently living in apartment. No known sick contacts.      REVIEW OF SYSTEMS  See HPI for further details. All other systems are negative.     PAST MEDICAL HISTORY   has a past medical history of Alcohol abuse, Alcohol intoxication (HCC) (3/13/2014), Cirrhosis (HCC), GIB (gastrointestinal bleeding) (1/3/2016), Hepatitis C, Pancytopenia (HCC), and Psychiatric disorder.    SOCIAL HISTORY  Social History     Tobacco Use   • Smoking status: Current Every Day Smoker     Packs/day: 1.00     Years: 48.00     Pack years: 48.00     Types: Cigarettes   • Smokeless tobacco: Never Used   Vaping Use   • Vaping Use: Never used   Substance and Sexual Activity   • Alcohol use: Yes     Comment: \"couple of pints\" whiskey every day   • Drug use: Yes     Types: Inhaled     Comment: marijuana; not currently -  cocaine, meth   • Sexual activity: Not on file       SURGICAL HISTORY   has a past surgical history that includes gastroscopy (1/3/2016); gastroscopy (4/8/2016); gastroscopy (3/13/2019); and gastroscopy-endo (N/A, 11/13/2019).    CURRENT MEDICATIONS  Home Medications     Reviewed by Liz Gallagher R.N. (Registered Nurse) on 02/11/22 at 1618  Med List Status: Not Addressed   Medication Last Dose Status   folic acid (FOLVITE) 1 MG Tab  Active   nicotine (NICODERM) 14 MG/24HR PATCH 24 HR  Active   pantoprazole (PROTONIX) 40 MG " "Tablet Delayed Response  Active   QUEtiapine (SEROQUEL) 200 MG Tab  Active   thiamine (THIAMINE) 100 MG tablet  Active                ALLERGIES  Allergies   Allergen Reactions   • Haloperidol Unspecified     Twitching/involuntary movements        PHYSICAL EXAM  VITAL SIGNS: /71   Pulse 93   Temp 36.9 °C (98.5 °F) (Temporal)   Resp 19   Ht 1.727 m (5' 8\")   Wt 61 kg (134 lb 7.7 oz)   SpO2 94%   BMI 20.45 kg/m²  @LIN[[id number]::@   Pulse ox interpretation: I interpret this pulse ox as normal.  Constitutional: Alert in no apparent distress.  HENT: No signs of trauma, Bilateral external ears normal, Nose normal.   Eyes: Pupils are equal and reactive  Neck: Normal range of motion, No tenderness, Supple  Cardiovascular: Regular rate and rhythm, no murmurs.   Thorax & Lungs: Normal breath sounds, No respiratory distress, No wheezing, No chest tenderness.   Abdomen: Bowel sounds normal, Soft, No tenderness, hepatomegaly  Skin: Warm, Dry, No erythema, No rash.   Extremities: Intact distal pulses, No edema, No tenderness  Musculoskeletal: Good range of motion in all major joints. No tenderness to palpation or major deformities noted.   Neurologic: Alert , Normal motor function, Normal sensory function, No focal deficits noted.   Psychiatric: Affect normal, Judgment normal, Mood normal.       DIAGNOSTIC STUDIES / PROCEDURES      LABS  Results for orders placed or performed during the hospital encounter of 02/11/22   CBC WITH DIFFERENTIAL   Result Value Ref Range    WBC 3.0 (L) 4.8 - 10.8 K/uL    RBC 4.79 4.70 - 6.10 M/uL    Hemoglobin 14.3 14.0 - 18.0 g/dL    Hematocrit 42.7 42.0 - 52.0 %    MCV 89.1 81.4 - 97.8 fL    MCH 29.9 27.0 - 33.0 pg    MCHC 33.5 (L) 33.7 - 35.3 g/dL    RDW 47.6 35.9 - 50.0 fL    Platelet Count 59 (L) 164 - 446 K/uL    MPV 11.4 9.0 - 12.9 fL    Neutrophils-Polys 64.00 44.00 - 72.00 %    Lymphocytes 28.30 22.00 - 41.00 %    Monocytes 6.40 0.00 - 13.40 %    Eosinophils 0.30 0.00 - 6.90 %    " Basophils 1.00 0.00 - 1.80 %    Immature Granulocytes 0.00 0.00 - 0.90 %    Nucleated RBC 0.00 /100 WBC    Neutrophils (Absolute) 1.90 1.82 - 7.42 K/uL    Lymphs (Absolute) 0.84 (L) 1.00 - 4.80 K/uL    Monos (Absolute) 0.19 0.00 - 0.85 K/uL    Eos (Absolute) 0.01 0.00 - 0.51 K/uL    Baso (Absolute) 0.03 0.00 - 0.12 K/uL    Immature Granulocytes (abs) 0.00 0.00 - 0.11 K/uL    NRBC (Absolute) 0.00 K/uL   COMP METABOLIC PANEL   Result Value Ref Range    Sodium 138 135 - 145 mmol/L    Potassium 3.6 3.6 - 5.5 mmol/L    Chloride 99 96 - 112 mmol/L    Co2 17 (L) 20 - 33 mmol/L    Anion Gap 22.0 (H) 7.0 - 16.0    Glucose 116 (H) 65 - 99 mg/dL    Bun 13 8 - 22 mg/dL    Creatinine 0.64 0.50 - 1.40 mg/dL    Calcium 8.9 8.5 - 10.5 mg/dL    AST(SGOT) 108 (H) 12 - 45 U/L    ALT(SGPT) 69 (H) 2 - 50 U/L    Alkaline Phosphatase 84 30 - 99 U/L    Total Bilirubin 0.6 0.1 - 1.5 mg/dL    Albumin 4.7 3.2 - 4.9 g/dL    Total Protein 9.2 (H) 6.0 - 8.2 g/dL    Globulin 4.5 (H) 1.9 - 3.5 g/dL    A-G Ratio 1.0 g/dL   LIPASE   Result Value Ref Range    Lipase 57 11 - 82 U/L   DIAGNOSTIC ALCOHOL   Result Value Ref Range    Diagnostic Alcohol 144.7 (H) 0.0 - 10.0 mg/dL   COV-2, FLU A/B, AND RSV BY PCR (2-4 HOURS [employer]): Collect NP swab in VTM    Specimen: Nasopharyngeal; Respirate   Result Value Ref Range    Influenza virus A RNA Negative Negative    Influenza virus B, PCR Negative Negative    RSV, PCR Negative Negative    SARS-CoV-2 by PCR NotDetected     SARS-CoV-2 Source NP Swab    APTT   Result Value Ref Range    APTT 31.0 24.7 - 36.0 sec   Prothrombin Time   Result Value Ref Range    PT 14.7 (H) 12.0 - 14.6 sec    INR 1.18 (H) 0.87 - 1.13   TROPONIN   Result Value Ref Range    Troponin T <6 6 - 19 ng/L   ESTIMATED GFR   Result Value Ref Range    GFR If African American >60 >60 mL/min/1.73 m 2    GFR If Non African American >60 >60 mL/min/1.73 m 2   EKG   Result Value Ref Range    Report       [facility] Emergency  Dept.    Test Date:  2022  Pt Name:    JIMMY FERRER                 Department: ER  MRN:        [id number]                      Room:       [facility]  Gender:     Male                         Technician: [id number]  :        [id number]                   Requested By:ER TRIAGE PROTOCOL  Order #:    [id number]                    Reading MD: Venkatesh Iglesias    Measurements  Intervals                                Axis  Rate:       80                           P:          83  NH:         164                          QRS:        60  QRSD:       94                           T:          67  QT:         380  QTc:        439    Interpretive Statements  SINUS RHYTHM  RSR' IN V1 OR V2, PROBABLY NORMAL VARIANT  BORDERLINE T ABNORMALITIES, ANT-LAT LEADS  Compared to ECG 2022 20:00:05  Sinus bradycardia no longer present  Atrial premature complex(es) no longer present  T-wave abnormality still present  Electronically Signed On 2022 18:18:14 PST by Venkatesh lopez           RADIOLOGY  No orders to display           COURSE & MEDICAL DECISION MAKING  Pertinent Labs & Imaging studies reviewed. (See chart for details)    62-year-old male presented emergency room with the above presentation. Workup is largely benign. Patient with chronic alcohol abuse and elevated alcohol level today largely driving his anion gap per my interpretation. Patient is tolerating food and fluids well and states that he is feeling better after a sandwich. At this point all discharged home with Zofran and Pepcid. He may have a component of alcoholic gastritis or ulcerative process from his chronic alcohol use. At this point I do not believe any imaging will be required. I have a low concern from an ACS standpoint. EKG is unremarkable. Viral panel is negative for flu, RSV and COVID. Patient understanding return precautions here the ER and [facility].    The patient will return for worsening symptoms and is stable at the  time of discharge. The patient verbalizes understanding and will comply.    FINAL IMPRESSION  1. Nausea    2. Alcohol abuse            Electronically signed by: Venkatesh Iglesias M.D., 2/11/2022 5:04 PM

## 2022-02-14 LAB — GLUCOSE BLD-MCNC: 133 MG/DL (ref 65–99)

## 2022-03-15 ENCOUNTER — APPOINTMENT (OUTPATIENT)
Dept: RADIOLOGY | Facility: MEDICAL CENTER | Age: 62
End: 2022-03-15
Attending: EMERGENCY MEDICINE
Payer: MEDICARE

## 2022-03-15 ENCOUNTER — HOSPITAL ENCOUNTER (EMERGENCY)
Facility: MEDICAL CENTER | Age: 62
End: 2022-03-15
Attending: EMERGENCY MEDICINE
Payer: MEDICARE

## 2022-03-15 VITALS
RESPIRATION RATE: 18 BRPM | SYSTOLIC BLOOD PRESSURE: 111 MMHG | DIASTOLIC BLOOD PRESSURE: 64 MMHG | HEART RATE: 83 BPM | BODY MASS INDEX: 20.75 KG/M2 | OXYGEN SATURATION: 95 % | HEIGHT: 68 IN | WEIGHT: 136.91 LBS | TEMPERATURE: 98.5 F

## 2022-03-15 DIAGNOSIS — K83.8 BILIARY SLUDGE: ICD-10-CM

## 2022-03-15 DIAGNOSIS — E72.20 SERUM AMMONIA INCREASED (HCC): ICD-10-CM

## 2022-03-15 DIAGNOSIS — K70.30 ALCOHOLIC CIRRHOSIS OF LIVER WITHOUT ASCITES (HCC): ICD-10-CM

## 2022-03-15 DIAGNOSIS — R10.11 RUQ ABDOMINAL PAIN: ICD-10-CM

## 2022-03-15 LAB
ALBUMIN SERPL BCP-MCNC: 4.8 G/DL (ref 3.2–4.9)
ALBUMIN/GLOB SERPL: 1.1 G/DL
ALP SERPL-CCNC: 91 U/L (ref 30–99)
ALT SERPL-CCNC: 42 U/L (ref 2–50)
AMMONIA PLAS-SCNC: 67 UMOL/L (ref 11–45)
ANION GAP SERPL CALC-SCNC: 14 MMOL/L (ref 7–16)
AST SERPL-CCNC: 48 U/L (ref 12–45)
BASOPHILS # BLD AUTO: 0.8 % (ref 0–1.8)
BASOPHILS # BLD: 0.05 K/UL (ref 0–0.12)
BILIRUB SERPL-MCNC: 1 MG/DL (ref 0.1–1.5)
BUN SERPL-MCNC: 24 MG/DL (ref 8–22)
CALCIUM SERPL-MCNC: 10.3 MG/DL (ref 8.5–10.5)
CHLORIDE SERPL-SCNC: 99 MMOL/L (ref 96–112)
CO2 SERPL-SCNC: 26 MMOL/L (ref 20–33)
CREAT SERPL-MCNC: 0.67 MG/DL (ref 0.5–1.4)
EKG IMPRESSION: NORMAL
EOSINOPHIL # BLD AUTO: 0.05 K/UL (ref 0–0.51)
EOSINOPHIL NFR BLD: 0.8 % (ref 0–6.9)
ERYTHROCYTE [DISTWIDTH] IN BLOOD BY AUTOMATED COUNT: 48.5 FL (ref 35.9–50)
ETHANOL BLD-MCNC: <10.1 MG/DL (ref 0–10)
GFR SERPLBLD CREATININE-BSD FMLA CKD-EPI: 104 ML/MIN/1.73 M 2
GLOBULIN SER CALC-MCNC: 4.3 G/DL (ref 1.9–3.5)
GLUCOSE SERPL-MCNC: 143 MG/DL (ref 65–99)
HCT VFR BLD AUTO: 42.6 % (ref 42–52)
HGB BLD-MCNC: 14.1 G/DL (ref 14–18)
IMM GRANULOCYTES # BLD AUTO: 0.01 K/UL (ref 0–0.11)
IMM GRANULOCYTES NFR BLD AUTO: 0.2 % (ref 0–0.9)
LIPASE SERPL-CCNC: 46 U/L (ref 11–82)
LYMPHOCYTES # BLD AUTO: 1.29 K/UL (ref 1–4.8)
LYMPHOCYTES NFR BLD: 21.5 % (ref 22–41)
MCH RBC QN AUTO: 29.3 PG (ref 27–33)
MCHC RBC AUTO-ENTMCNC: 33.1 G/DL (ref 33.7–35.3)
MCV RBC AUTO: 88.4 FL (ref 81.4–97.8)
MONOCYTES # BLD AUTO: 0.83 K/UL (ref 0–0.85)
MONOCYTES NFR BLD AUTO: 13.8 % (ref 0–13.4)
NEUTROPHILS # BLD AUTO: 3.78 K/UL (ref 1.82–7.42)
NEUTROPHILS NFR BLD: 62.9 % (ref 44–72)
NRBC # BLD AUTO: 0 K/UL
NRBC BLD-RTO: 0 /100 WBC
PLATELET # BLD AUTO: 67 K/UL (ref 164–446)
PMV BLD AUTO: 11.9 FL (ref 9–12.9)
POTASSIUM SERPL-SCNC: 4 MMOL/L (ref 3.6–5.5)
PROT SERPL-MCNC: 9.1 G/DL (ref 6–8.2)
RBC # BLD AUTO: 4.82 M/UL (ref 4.7–6.1)
SODIUM SERPL-SCNC: 139 MMOL/L (ref 135–145)
WBC # BLD AUTO: 6 K/UL (ref 4.8–10.8)

## 2022-03-15 PROCEDURE — 93005 ELECTROCARDIOGRAM TRACING: CPT

## 2022-03-15 PROCEDURE — 36415 COLL VENOUS BLD VENIPUNCTURE: CPT

## 2022-03-15 PROCEDURE — 80053 COMPREHEN METABOLIC PANEL: CPT

## 2022-03-15 PROCEDURE — A9270 NON-COVERED ITEM OR SERVICE: HCPCS | Performed by: EMERGENCY MEDICINE

## 2022-03-15 PROCEDURE — 85025 COMPLETE CBC W/AUTO DIFF WBC: CPT

## 2022-03-15 PROCEDURE — 82140 ASSAY OF AMMONIA: CPT

## 2022-03-15 PROCEDURE — 93005 ELECTROCARDIOGRAM TRACING: CPT | Performed by: EMERGENCY MEDICINE

## 2022-03-15 PROCEDURE — 83690 ASSAY OF LIPASE: CPT

## 2022-03-15 PROCEDURE — 76705 ECHO EXAM OF ABDOMEN: CPT

## 2022-03-15 PROCEDURE — 99284 EMERGENCY DEPT VISIT MOD MDM: CPT

## 2022-03-15 PROCEDURE — 700102 HCHG RX REV CODE 250 W/ 637 OVERRIDE(OP): Performed by: EMERGENCY MEDICINE

## 2022-03-15 PROCEDURE — 82077 ASSAY SPEC XCP UR&BREATH IA: CPT

## 2022-03-15 RX ORDER — LACTULOSE 20 G/30ML
30 SOLUTION ORAL DAILY
Qty: 30 EACH | Refills: 0 | Status: SHIPPED | OUTPATIENT
Start: 2022-03-15 | End: 2022-04-14

## 2022-03-15 RX ORDER — OMEPRAZOLE 20 MG/1
40 CAPSULE, DELAYED RELEASE ORAL ONCE
Status: COMPLETED | OUTPATIENT
Start: 2022-03-15 | End: 2022-03-15

## 2022-03-15 RX ORDER — OMEPRAZOLE 20 MG/1
20 CAPSULE, DELAYED RELEASE ORAL DAILY
Qty: 30 CAPSULE | Refills: 0 | Status: SHIPPED | OUTPATIENT
Start: 2022-03-15 | End: 2023-04-10

## 2022-03-15 RX ADMIN — OMEPRAZOLE 40 MG: 20 CAPSULE, DELAYED RELEASE ORAL at 20:21

## 2022-03-15 ASSESSMENT — FIBROSIS 4 INDEX: FIB4 SCORE: 14.1

## 2022-03-15 NOTE — ED TRIAGE NOTES
Inocencio Shabazz  62 y.o.  Chief Complaint   Patient presents with   • RUQ Pain     Sudden onset sharp pain 9/10 starting after eating food, resolved without intervention   • N/V     Nausea with 1 episode of emesis prior to onset of abdominal pain, denies hematemesis   • Bloody Stools     Dark tarry stool x 1 episode today     BIB EMS to triage ambulatory with steady gait for above. A & O x 4, GCS 15. Mask in place.    Hx. ETOH abuse, drinks multiple pints of whiskey daily    EKG ordered in triage per protocol.    Triage process explained to patient, apologized for wait time, and returned to lobby.

## 2022-03-16 NOTE — DISCHARGE INSTRUCTIONS
You need to stop drinking.  Return the emergency department if you have new or different abdominal pain, fever, vomiting blood or blood in stool.  You should start taking the lactulose that will help with your confusion.  It will give you frequent stool and may give you diarrhea but that is how this works.  Follow-up with your doctor.

## 2022-03-16 NOTE — ED NOTES
Med student at bedside to do stool test. IV was established and blood work was sent on ice. US is at bedside now.

## 2022-03-16 NOTE — ED PROVIDER NOTES
ED Provider Note    Scribed for Nnamdi Dee M.D. by Hemal Mclaughlin. 3/15/2022, 7:24 PM.    Primary care provider: Nnamdi Ballesteros M.D.  Means of arrival: EMS  History obtained from: Patient  History limited by: None    CHIEF COMPLAINT  Chief Complaint   Patient presents with    RUQ Pain     Sudden onset sharp pain 9/10 starting after eating food, resolved without intervention    N/V     Nausea with 1 episode of emesis prior to onset of abdominal pain, denies hematemesis    Bloody Stools     Dark tarry stool x 1 episode today       HPI  Inocencio Shabazz is a 62 y.o. male who presents to the Emergency Department for evaluation of abdominal pain onset 4 hours prior to exam. Patient has an extensive medical history including alcohol abuse, alcohol-induced cirrhosis, and history of upper GI bleeds and varices. Patient states the abdominal pain started after eating a meal. States after he finished eating, he experienced a sharp pain first to the left abdomen and then to the right upper quadrant of his abdomen. He had nausea with some beige-colored emesis. Denies noticing any bile or blood in the vomit. He had a bowel movement today which was dark and tarry. States he drinks at least a couple of pints of whiskey per day. Last drink was yesterday. Denies prior history of ulcers. He thinks he still has his gallbladder. States he possibly has history of pancreatitis. Denies cough or chest pain.     PPE Note: I personally donned full PPE for all patient encounters during this visit, including being clean-shaven with an N95 respirator mask and eyewear.     Scribe remained outside the patient's room and did not have any contact with the patient for the duration of patient encounter.      REVIEW OF SYSTEMS  Pertinent positives include RUQ abdominal pain, nausea, vomiting, dark/tarry stools. Pertinent negatives include no bile or blood in vomit, cough, chest pain. All other systems negative.    PAST MEDICAL HISTORY    "has a past medical history of Alcohol abuse, Alcohol intoxication (HCC) (3/13/2014), Cirrhosis (HCC), GIB (gastrointestinal bleeding) (1/3/2016), Hepatitis C, Pancytopenia (HCC), and Psychiatric disorder.    SURGICAL HISTORY   has a past surgical history that includes gastroscopy (1/3/2016); gastroscopy (4/8/2016); gastroscopy (3/13/2019); and gastroscopy-endo (N/A, 11/13/2019).    SOCIAL HISTORY  Social History     Tobacco Use    Smoking status: Current Every Day Smoker     Packs/day: 1.00     Years: 48.00     Pack years: 48.00     Types: Cigarettes    Smokeless tobacco: Never Used   Vaping Use    Vaping Use: Never used   Substance Use Topics    Alcohol use: Yes     Comment: \"couple of pints\" whiskey every day    Drug use: Yes     Types: Inhaled     Comment: marijuana; not currently -  cocaine, meth      Social History     Substance and Sexual Activity   Drug Use Yes    Types: Inhaled    Comment: marijuana; not currently -  cocaine, meth       FAMILY HISTORY  History reviewed. No pertinent family history.    CURRENT MEDICATIONS  Home Medications       Reviewed by Cordelia Nunes R.N. (Registered Nurse) on 03/15/22 at 1530  Med List Status: Partial     Medication Last Dose Status   famotidine (PEPCID) 40 MG Tab  Active   folic acid (FOLVITE) 1 MG Tab  Active   nicotine (NICODERM) 14 MG/24HR PATCH 24 HR  Active   ondansetron (ZOFRAN ODT) 4 MG TABLET DISPERSIBLE  Active   pantoprazole (PROTONIX) 40 MG Tablet Delayed Response  Active   QUEtiapine (SEROQUEL) 200 MG Tab  Active   thiamine (THIAMINE) 100 MG tablet  Active                    ALLERGIES  Allergies   Allergen Reactions    Haloperidol Unspecified     Twitching/involuntary movements        PHYSICAL EXAM  VITAL SIGNS: /84   Pulse 71   Temp 36.8 °C (98.3 °F) (Temporal)   Resp 18   Ht 1.727 m (5' 8\") Comment: Simultaneous filing. User may not have seen previous data.  Wt 62.1 kg (136 lb 14.5 oz)   SpO2 97%   BMI 20.82 kg/m²   Constitutional: Well " developed, Well nourished, No acute distress.   HENT: Normocephalic, Atraumatic, mask in place.  Eyes: Conjunctiva normal, No discharge.   Cardiovascular: Normal heart rate, Normal rhythm, No murmurs, equal pulses.   Pulmonary: Normal breath sounds, No respiratory distress, No wheezing, No rales, No rhonchi.  Abdomen:Soft, Mild tenderness to right upper quadrant, No masses, no rebound, no guarding. Mild hepatomegaly.  Rectal: Performed by resident: GUAIAC negative.  Back: No CVA tenderness.   Musculoskeletal: No major deformities noted, No tenderness.   Skin: Warm, Dry, No erythema, No rash.   Neurologic: Alert & oriented x 3, Normal motor function,  No focal deficits noted.   Psychiatric: Affect normal, Judgment normal, Mood normal.     LABS  Results for orders placed or performed during the hospital encounter of 03/15/22   CBC WITH DIFFERENTIAL   Result Value Ref Range    WBC 6.0 4.8 - 10.8 K/uL    RBC 4.82 4.70 - 6.10 M/uL    Hemoglobin 14.1 14.0 - 18.0 g/dL    Hematocrit 42.6 42.0 - 52.0 %    MCV 88.4 81.4 - 97.8 fL    MCH 29.3 27.0 - 33.0 pg    MCHC 33.1 (L) 33.7 - 35.3 g/dL    RDW 48.5 35.9 - 50.0 fL    Platelet Count 67 (L) 164 - 446 K/uL    MPV 11.9 9.0 - 12.9 fL    Neutrophils-Polys 62.90 44.00 - 72.00 %    Lymphocytes 21.50 (L) 22.00 - 41.00 %    Monocytes 13.80 (H) 0.00 - 13.40 %    Eosinophils 0.80 0.00 - 6.90 %    Basophils 0.80 0.00 - 1.80 %    Immature Granulocytes 0.20 0.00 - 0.90 %    Nucleated RBC 0.00 /100 WBC    Neutrophils (Absolute) 3.78 1.82 - 7.42 K/uL    Lymphs (Absolute) 1.29 1.00 - 4.80 K/uL    Monos (Absolute) 0.83 0.00 - 0.85 K/uL    Eos (Absolute) 0.05 0.00 - 0.51 K/uL    Baso (Absolute) 0.05 0.00 - 0.12 K/uL    Immature Granulocytes (abs) 0.01 0.00 - 0.11 K/uL    NRBC (Absolute) 0.00 K/uL   COMP METABOLIC PANEL   Result Value Ref Range    Sodium 139 135 - 145 mmol/L    Potassium 4.0 3.6 - 5.5 mmol/L    Chloride 99 96 - 112 mmol/L    Co2 26 20 - 33 mmol/L    Anion Gap 14.0 7.0 - 16.0     Glucose 143 (H) 65 - 99 mg/dL    Bun 24 (H) 8 - 22 mg/dL    Creatinine 0.67 0.50 - 1.40 mg/dL    Calcium 10.3 8.5 - 10.5 mg/dL    AST(SGOT) 48 (H) 12 - 45 U/L    ALT(SGPT) 42 2 - 50 U/L    Alkaline Phosphatase 91 30 - 99 U/L    Total Bilirubin 1.0 0.1 - 1.5 mg/dL    Albumin 4.8 3.2 - 4.9 g/dL    Total Protein 9.1 (H) 6.0 - 8.2 g/dL    Globulin 4.3 (H) 1.9 - 3.5 g/dL    A-G Ratio 1.1 g/dL   LIPASE   Result Value Ref Range    Lipase 46 11 - 82 U/L   ESTIMATED GFR   Result Value Ref Range    GFR (CKD-EPI) 104 >60 mL/min/1.73 m 2   DIAGNOSTIC ALCOHOL   Result Value Ref Range    Diagnostic Alcohol <10.1 0.0 - 10.0 mg/dL   AMMONIA   Result Value Ref Range    Ammonia 67 (H) 11 - 45 umol/L   EKG   Result Value Ref Range    Report       [facility] Emergency Dept.    Test Date:  2022-03-15  Pt Name:    JIMMY FERRER                 Department: ER  MRN:        [id number]                      Room:  Gender:     Male                         Technician: [id number]  :        1960                   Requested By:ER TRIAGE PROTOCOL  Order #:    [id number]                    Reading MD: YEIMI ARAUZ MD    Measurements  Intervals                                Axis  Rate:       91                           P:          84  ID:         [id number]                          QRS:        62  QRSD:       82                           T:          64  QT:         344  QTc:        424    Interpretive Statements  SINUS RHYTHM, rate of 91, normal axis, No st elevation  Compared to ECG 2022 16:12:48  T-wave abnormality no longer present  Electronically Signed On 3- 19:40:58 PDT by YEIMI ARAUZ MD        All labs reviewed by me.    EKG  See Labs section. Interpreted by me.     RADIOLOGY  US-RUQ   Final Result      1.  Sludge is noted in the gallbladder. No gallstones are identified. Gallbladder wall thickness is within normal limits.      2.  No evidence of biliary ductal dilatation.      3.  Nodular liver  surface consistent with cirrhosis.        The radiologist's interpretation of all radiological studies have been reviewed by me.    COURSE & MEDICAL DECISION MAKING  Pertinent Labs & Imaging studies reviewed. (See chart for details)    7:24 PM - Patient seen and examined at bedside. Patient will be treated with prilosec 40 mg. Ordered US RUQ, ammonia, diagnostic alcohol, estimated GFR, CBC with differential, CMP, lipase, urinalysis, EKG to evaluate his symptoms. The differential diagnoses include but are not limited to: cholecystitis, ulcer, GI bleed     Patient was reevaluated he still has no abdominal pain.  Discussed with him that his ammonia level is slightly elevated he does feel he is mildly more confused.  But he is alert and oriented x3.  Discussed with him starting lactulose and that this may cause increased bowel movements and possibly diarrhea he understands this.  I will have him follow-up with his primary.  Ultrasound also showed cirrhosis and I discussed with the patient that he needs to stop drinking    Medical Decision Making: Patient presents with acute onset abdominal pain that lasted for several minutes after eating this was mostly located in the right upper quadrant.  Ultrasound was obtained and does not show any signs of acute cholecystitis but he does have some biliary sludge.  I do not see any signs of choledocholithiasis.  His labs are otherwise unremarkable and he has had no further abdominal pain.  He does have a slightly elevated ammonia level I think this is secondary to his cirrhosis.  He is still alert and oriented x3 therefore I think we can start him on lactulose and have him follow-up with his primary.  He was strongly encouraged to stop drinking.     The patient will return for new or worsening symptoms and is stable at the time of discharge.    The patient is referred to a primary physician for blood pressure management, diabetic screening, and for all other preventative health  concerns.        DISPOSITION:  Patient will be discharged home in stable condition.    FOLLOW UP:  [facility]  [address] [place] [address]  [phone]  Schedule an appointment as soon as possible for a visit in 2 days        OUTPATIENT MEDICATIONS:  Discharge Medication List as of 3/15/2022  9:38 PM        START taking these medications    Details   lactulose 20 GM/30ML Solution Take 30 mL by mouth every day for 30 days., Disp-30 Each, R-0, Print Rx Paper      omeprazole (PRILOSEC) 20 MG delayed-release capsule Take 1 Capsule by mouth every day., Disp-30 Capsule, R-0, Print Rx Paper                FINAL IMPRESSION  1. RUQ abdominal pain    2. Alcoholic cirrhosis of liver without ascites (HCC)    3. Serum ammonia increased (HCC)    4. Biliary sludge          Hemal ALMAZAN (Scribe), am scribing for, and in the presence of, Nnamdi Dee M.D.    Electronically signed by: Hemal Mclaughlin (Daneibe), 3/15/2022    INnamdi M.D. personally performed the services described in this documentation, as scribed by Hemal Mclaughlin in my presence, and it is both accurate and complete.    The note accurately reflects work and decisions made by me.  Nnamdi Dee M.D.  3/15/2022  10:39 PM

## 2022-05-12 ENCOUNTER — HOSPITAL ENCOUNTER (EMERGENCY)
Facility: MEDICAL CENTER | Age: 62
End: 2022-05-12
Attending: EMERGENCY MEDICINE
Payer: COMMERCIAL

## 2022-05-12 VITALS
TEMPERATURE: 98.3 F | RESPIRATION RATE: 18 BRPM | HEIGHT: 68 IN | WEIGHT: 136.9 LBS | SYSTOLIC BLOOD PRESSURE: 112 MMHG | BODY MASS INDEX: 20.75 KG/M2 | DIASTOLIC BLOOD PRESSURE: 73 MMHG | HEART RATE: 77 BPM | OXYGEN SATURATION: 98 %

## 2022-05-12 VITALS
RESPIRATION RATE: 16 BRPM | WEIGHT: 136 LBS | SYSTOLIC BLOOD PRESSURE: 104 MMHG | TEMPERATURE: 98.2 F | HEART RATE: 70 BPM | DIASTOLIC BLOOD PRESSURE: 66 MMHG | OXYGEN SATURATION: 96 % | BODY MASS INDEX: 20.68 KG/M2

## 2022-05-12 DIAGNOSIS — R53.1 WEAKNESS: ICD-10-CM

## 2022-05-12 DIAGNOSIS — D61.818 PANCYTOPENIA (HCC): ICD-10-CM

## 2022-05-12 DIAGNOSIS — E83.51 HYPOCALCEMIA: ICD-10-CM

## 2022-05-12 DIAGNOSIS — F10.10 ALCOHOL ABUSE: ICD-10-CM

## 2022-05-12 DIAGNOSIS — F10.920 ALCOHOLIC INTOXICATION WITHOUT COMPLICATION (HCC): ICD-10-CM

## 2022-05-12 LAB
ALBUMIN SERPL BCP-MCNC: 4 G/DL (ref 3.2–4.9)
ALBUMIN/GLOB SERPL: 1.1 G/DL
ALP SERPL-CCNC: 84 U/L (ref 30–99)
ALT SERPL-CCNC: 86 U/L (ref 2–50)
ANION GAP SERPL CALC-SCNC: 16 MMOL/L (ref 7–16)
AST SERPL-CCNC: 150 U/L (ref 12–45)
BASOPHILS # BLD AUTO: 0.9 % (ref 0–1.8)
BASOPHILS # BLD: 0.02 K/UL (ref 0–0.12)
BILIRUB SERPL-MCNC: 0.5 MG/DL (ref 0.1–1.5)
BUN SERPL-MCNC: 5 MG/DL (ref 8–22)
CALCIUM SERPL-MCNC: 8.1 MG/DL (ref 8.5–10.5)
CHLORIDE SERPL-SCNC: 108 MMOL/L (ref 96–112)
CO2 SERPL-SCNC: 22 MMOL/L (ref 20–33)
CREAT SERPL-MCNC: 0.52 MG/DL (ref 0.5–1.4)
EKG IMPRESSION: NORMAL
EOSINOPHIL # BLD AUTO: 0.06 K/UL (ref 0–0.51)
EOSINOPHIL NFR BLD: 2.8 % (ref 0–6.9)
ERYTHROCYTE [DISTWIDTH] IN BLOOD BY AUTOMATED COUNT: 46.8 FL (ref 35.9–50)
ETHANOL BLD-MCNC: 321.6 MG/DL
GFR SERPLBLD CREATININE-BSD FMLA CKD-EPI: 112 ML/MIN/1.73 M 2
GLOBULIN SER CALC-MCNC: 3.6 G/DL (ref 1.9–3.5)
GLUCOSE SERPL-MCNC: 83 MG/DL (ref 65–99)
HCT VFR BLD AUTO: 35.4 % (ref 42–52)
HGB BLD-MCNC: 11.8 G/DL (ref 14–18)
IMM GRANULOCYTES # BLD AUTO: 0 K/UL (ref 0–0.11)
IMM GRANULOCYTES NFR BLD AUTO: 0 % (ref 0–0.9)
LIPASE SERPL-CCNC: 78 U/L (ref 11–82)
LYMPHOCYTES # BLD AUTO: 0.7 K/UL (ref 1–4.8)
LYMPHOCYTES NFR BLD: 32.1 % (ref 22–41)
MCH RBC QN AUTO: 30.8 PG (ref 27–33)
MCHC RBC AUTO-ENTMCNC: 33.3 G/DL (ref 33.7–35.3)
MCV RBC AUTO: 92.4 FL (ref 81.4–97.8)
MONOCYTES # BLD AUTO: 0.12 K/UL (ref 0–0.85)
MONOCYTES NFR BLD AUTO: 5.5 % (ref 0–13.4)
NEUTROPHILS # BLD AUTO: 1.28 K/UL (ref 1.82–7.42)
NEUTROPHILS NFR BLD: 58.7 % (ref 44–72)
NRBC # BLD AUTO: 0 K/UL
NRBC BLD-RTO: 0 /100 WBC
PLATELET # BLD AUTO: 33 K/UL (ref 164–446)
PMV BLD AUTO: 12.9 FL (ref 9–12.9)
POTASSIUM SERPL-SCNC: 3.6 MMOL/L (ref 3.6–5.5)
PROT SERPL-MCNC: 7.6 G/DL (ref 6–8.2)
RBC # BLD AUTO: 3.83 M/UL (ref 4.7–6.1)
SODIUM SERPL-SCNC: 146 MMOL/L (ref 135–145)
WBC # BLD AUTO: 2.2 K/UL (ref 4.8–10.8)

## 2022-05-12 PROCEDURE — 96374 THER/PROPH/DIAG INJ IV PUSH: CPT

## 2022-05-12 PROCEDURE — 80053 COMPREHEN METABOLIC PANEL: CPT

## 2022-05-12 PROCEDURE — 85025 COMPLETE CBC W/AUTO DIFF WBC: CPT

## 2022-05-12 PROCEDURE — 36415 COLL VENOUS BLD VENIPUNCTURE: CPT

## 2022-05-12 PROCEDURE — 82077 ASSAY SPEC XCP UR&BREATH IA: CPT

## 2022-05-12 PROCEDURE — 83690 ASSAY OF LIPASE: CPT

## 2022-05-12 PROCEDURE — 99284 EMERGENCY DEPT VISIT MOD MDM: CPT

## 2022-05-12 PROCEDURE — 700111 HCHG RX REV CODE 636 W/ 250 OVERRIDE (IP): Performed by: EMERGENCY MEDICINE

## 2022-05-12 PROCEDURE — 93005 ELECTROCARDIOGRAM TRACING: CPT | Performed by: EMERGENCY MEDICINE

## 2022-05-12 RX ORDER — CALCIUM GLUCONATE 20 MG/ML
1 INJECTION, SOLUTION INTRAVENOUS ONCE
Status: COMPLETED | OUTPATIENT
Start: 2022-05-12 | End: 2022-05-12

## 2022-05-12 RX ADMIN — CALCIUM GLUCONATE 1 G: 20 INJECTION, SOLUTION INTRAVENOUS at 04:21

## 2022-05-12 ASSESSMENT — FIBROSIS 4 INDEX
FIB4 SCORE: 7.07
FIB4 SCORE: 31.37

## 2022-05-12 NOTE — ED PROVIDER NOTES
"ED Provider Note    CHIEF COMPLAINT  Chief Complaint   Patient presents with   • Failure to Thrive       HPI  Inocencio Shabazz is a 62 y.o. male who presents to the emergency department with complaint of feeling generally unwell. Past medical history as documented below. Patient cannot articulate any other complaints other than being drunk and not feeling well.     Chart review reveals the patient was seen two days ago with primary complaint of chest pain. Workup there was unremarkable patient ultimately discharged home. Patient seen in March and also further retrospectively in February by myself.    REVIEW OF SYSTEMS  See HPI for further details. All other systems are negative.     PAST MEDICAL HISTORY   has a past medical history of Alcohol abuse, Alcohol intoxication (HCC) (3/13/2014), Cirrhosis (HCC), GIB (gastrointestinal bleeding) (1/3/2016), Hepatitis C, Pancytopenia (HCC), and Psychiatric disorder.    SOCIAL HISTORY  Social History     Tobacco Use   • Smoking status: Current Every Day Smoker     Packs/day: 1.00     Years: 48.00     Pack years: 48.00     Types: Cigarettes   • Smokeless tobacco: Never Used   Vaping Use   • Vaping Use: Never used   Substance and Sexual Activity   • Alcohol use: Yes     Comment: \"couple of pints\" whiskey every day   • Drug use: Yes     Types: Inhaled     Comment: marijuana; not currently -  cocaine, meth   • Sexual activity: Not on file       SURGICAL HISTORY   has a past surgical history that includes gastroscopy (1/3/2016); gastroscopy (4/8/2016); gastroscopy (3/13/2019); and gastroscopy-endo (N/A, 11/13/2019).    CURRENT MEDICATIONS  Home Medications     Reviewed by Zoila Holcomb R.N. (Registered Nurse) on 05/12/22 at 0139  Med List Status: Not Addressed   Medication Last Dose Status   famotidine (PEPCID) 40 MG Tab  Active   folic acid (FOLVITE) 1 MG Tab  Active   nicotine (NICODERM) 14 MG/24HR PATCH 24 HR  Active   omeprazole (PRILOSEC) 20 MG delayed-release " "capsule  Active   ondansetron (ZOFRAN ODT) 4 MG TABLET DISPERSIBLE  Active   pantoprazole (PROTONIX) 40 MG Tablet Delayed Response  Active   QUEtiapine (SEROQUEL) 200 MG Tab  Active   thiamine (THIAMINE) 100 MG tablet  Active                ALLERGIES  Allergies   Allergen Reactions   • Haloperidol Unspecified     Twitching/involuntary movements        PHYSICAL EXAM  VITAL SIGNS: /80   Pulse 64   Temp 36.8 °C (98.3 °F) (Temporal)   Resp 16   Ht 1.727 m (5' 8\")   Wt 62.1 kg (136 lb 14.4 oz)   SpO2 94%   BMI 20.82 kg/m²  @[id number]::@   Pulse ox interpretation: I interpret this pulse ox as normal.  Constitutional: Alert in no apparent distress. Unkempt  HENT: No signs of trauma, Bilateral external ears normal, Nose normal.   Eyes: Pupils are equal and reactive  Neck: Normal range of motion, No tenderness, Supple  Cardiovascular: Regular rate and rhythm, no murmurs.   Thorax & Lungs: Normal breath sounds, No respiratory distress, No wheezing, No chest tenderness.   Abdomen: Bowel sounds normal, Soft, No tenderness  Skin: Warm, Dry, No erythema, No rash.   Extremities: Intact distal pulses  Musculoskeletal: Good range of motion in all major joints. No tenderness to palpation or major deformities noted.   Neurologic: Alert , Normal motor function, Normal sensory function, No focal deficits noted.   Psychiatric: uncooperative with questions       DIAGNOSTIC STUDIES / PROCEDURES      LABS  Results for orders placed or performed during the hospital encounter of 05/12/22   CBC WITH DIFFERENTIAL   Result Value Ref Range    WBC 2.2 (L) 4.8 - 10.8 K/uL    RBC 3.83 (L) 4.70 - 6.10 M/uL    Hemoglobin 11.8 (L) 14.0 - 18.0 g/dL    Hematocrit 35.4 (L) 42.0 - 52.0 %    MCV 92.4 81.4 - 97.8 fL    MCH 30.8 27.0 - 33.0 pg    MCHC 33.3 (L) 33.7 - 35.3 g/dL    RDW 46.8 35.9 - 50.0 fL    Platelet Count 33 (L) 164 - 446 K/uL    MPV 12.9 9.0 - 12.9 fL    Neutrophils-Polys 58.70 44.00 - 72.00 %    Lymphocytes 32.10 22.00 - 41.00 " %    Monocytes 5.50 0.00 - 13.40 %    Eosinophils 2.80 0.00 - 6.90 %    Basophils 0.90 0.00 - 1.80 %    Immature Granulocytes 0.00 0.00 - 0.90 %    Nucleated RBC 0.00 /100 WBC    Neutrophils (Absolute) 1.28 (L) 1.82 - 7.42 K/uL    Lymphs (Absolute) 0.70 (L) 1.00 - 4.80 K/uL    Monos (Absolute) 0.12 0.00 - 0.85 K/uL    Eos (Absolute) 0.06 0.00 - 0.51 K/uL    Baso (Absolute) 0.02 0.00 - 0.12 K/uL    Immature Granulocytes (abs) 0.00 0.00 - 0.11 K/uL    NRBC (Absolute) 0.00 K/uL   COMP METABOLIC PANEL   Result Value Ref Range    Sodium 146 (H) 135 - 145 mmol/L    Potassium 3.6 3.6 - 5.5 mmol/L    Chloride 108 96 - 112 mmol/L    Co2 22 20 - 33 mmol/L    Anion Gap 16.0 7.0 - 16.0    Glucose 83 65 - 99 mg/dL    Bun 5 (L) 8 - 22 mg/dL    Creatinine 0.52 0.50 - 1.40 mg/dL    Calcium 8.1 (L) 8.5 - 10.5 mg/dL    AST(SGOT) 150 (H) 12 - 45 U/L    ALT(SGPT) 86 (H) 2 - 50 U/L    Alkaline Phosphatase 84 30 - 99 U/L    Total Bilirubin 0.5 0.1 - 1.5 mg/dL    Albumin 4.0 3.2 - 4.9 g/dL    Total Protein 7.6 6.0 - 8.2 g/dL    Globulin 3.6 (H) 1.9 - 3.5 g/dL    A-G Ratio 1.1 g/dL   LIPASE   Result Value Ref Range    Lipase 78 11 - 82 U/L   DIAGNOSTIC ALCOHOL   Result Value Ref Range    Diagnostic Alcohol 321.6 (H) <10.1 mg/dL   ESTIMATED GFR   Result Value Ref Range    GFR (CKD-EPI) 112 >60 mL/min/1.73 m 2   EKG (NOW)   Result Value Ref Range    Report       [facility] Emergency Dept.    Test Date:  2022  Pt Name:    JIMMY FERRER                 Department: ER  MRN:        [id number]                      Room:       [facility]  Gender:     Male                         Technician: [id number]  :        1960                   Requested By:FREDY OCAMPO  Order #:    [id number]                    Reading MD:    Measurements  Intervals                                Axis  Rate:       59                           P:          3  ID:         [id number]                          QRS:        62  QRSD:       90                            T:          76  QT:         452  QTc:        448    Interpretive Statements  SINUS BRADYCARDIA  BASELINE WANDER IN LEAD(S) V3  Compared to ECG 03/15/2022 15:35:59  Sinus rhythm no longer present  ST (T wave) deviation no longer present           RADIOLOGY  No orders to display           COURSE & MEDICAL DECISION MAKING  Pertinent Labs & Imaging studies reviewed. (See chart for details)    62-year-old male presented emergency department with complaint of feeling generally unwell. Workup today as above. Similar findings compared to workup at [facility] a few days ago. Compared to my evaluation two months ago however he does have a relatively new mild pancytopenia. His 2021 lab however have seen similar findings. At this point he is again quite intoxicated. No other acute changes. Slightly hypercalcemic with corrective dosing provided here. He is understanding today's lab abnormalities and need to follow-up with PCP as well as to decrease his overall alcohol intake.     The patient will return for worsening symptoms and is stable at the time of discharge. The patient verbalizes understanding and will comply.    FINAL IMPRESSION  1. Pancytopenia (HCC)    2. Hypocalcemia    3. Weakness    4. Alcoholic intoxication without complication (HCC)            Electronically signed by: Venkatesh Iglesias M.D., 5/12/2022 3:15 AM

## 2022-05-12 NOTE — ED NOTES
Pt BIB [facility] with c/c of ETOH and a dull abd pain for a couple of days. Pt was just seen here earlier today and discharged this morning.

## 2022-05-12 NOTE — ED NOTES
Pt discharged home as ordered by erp. Pt instructed to follow up with his PCP and return here as needed. Pt verbalized understanding and left ambulating independently

## 2022-05-12 NOTE — ED TRIAGE NOTES
"Inocencio Shabazz  62 y.o.  Chief Complaint   Patient presents with   • Failure to Thrive     BIBA for above, pt reports drinking tonight. Per EMS, pt has called 6 times for various complaints. Pt reports general feelings of unwellness after drinking, states \"I just don't feel like I can take care of myself.\" Pt sticking fingers down throat in an attempt to vomit in triage. Placed back out in lobby.  "

## 2022-05-12 NOTE — ED PROVIDER NOTES
"ED Provider Note    ED Provider Note    Primary care provider: Nnamdi Ballesteros M.D.  Means of arrival: EMS  History obtained from: Patient    CHIEF COMPLAINT  Chief Complaint   Patient presents with   • ETOH Intoxication   • Abdominal Pain     Seen at 2:02 PM.   HPI  Inocencio Shabazz is a 62 y.o. male who presents to the Emergency Department because he does not feel well.  He cannot give me any specifics.  He denies any recent trauma, vomiting, numbness or focal weakness.  He does not appear to be the best historian.    I [facility], he looked [facility] me quizzically and [facility]    Therefore history seems to be limited, patient somewhat unreliable historian and appears under the influence of alcohol.    REVIEW OF SYSTEMS  See HPI,   Remainder of ROS negative.     PAST MEDICAL HISTORY   has a past medical history of Alcohol abuse, Alcohol intoxication (HCC) (3/13/2014), Cirrhosis (HCC), GIB (gastrointestinal bleeding) (1/3/2016), Hepatitis C, Pancytopenia (HCC), and Psychiatric disorder.    SURGICAL HISTORY   has a past surgical history that includes gastroscopy (1/3/2016); gastroscopy (4/8/2016); gastroscopy (3/13/2019); and gastroscopy-endo (N/A, 11/13/2019).    SOCIAL HISTORY  Social History     Tobacco Use   • Smoking status: Current Every Day Smoker     Packs/day: 1.00     Years: 48.00     Pack years: 48.00     Types: Cigarettes   • Smokeless tobacco: Never Used   Vaping Use   • Vaping Use: Never used   Substance Use Topics   • Alcohol use: Yes     Comment: \"couple of pints\" whiskey every day   • Drug use: Yes     Types: Inhaled     Comment: marijuana; not currently -  cocaine, meth      Social History     Substance and Sexual Activity   Drug Use Yes   • Types: Inhaled    Comment: marijuana; not currently -  cocaine, meth       FAMILY HISTORY  No family history on file.    CURRENT MEDICATIONS  Reviewed.  See Encounter Summary. "     ALLERGIES  Allergies   Allergen Reactions   • Haloperidol Unspecified     Twitching/involuntary movements        PHYSICAL EXAM  VITAL SIGNS: /66   Pulse 70   Temp 36.8 °C (98.2 °F) (Temporal)   Resp 16   Wt 61.7 kg (136 lb)   SpO2 96%   BMI 20.68 kg/m²   Constitutional: Awake, alert in no apparent distress.  Initially sleeping, did not awaken to voice, after gently shaking the patient he was awake and participating in the interview.  HENT: Normocephalic, atraumatic.  Bilateral external ears normal. Nose normal.   Eyes: Conjunctiva normal, non-icteric, EOMI.    Thorax & Lungs: Easy unlabored respirations, Clear to ascultation bilaterally.  Cardiovascular: Regular rate, Regular rhythm, No murmurs, rubs or gallops. Bilateral pulses symmetrical.   Abdomen:  Soft, nontender, nondistended, normal active bowel sounds.   :    Skin: Visualized skin is  Dry, No erythema, No rash.   Musculoskeletal:   No cyanosis, clubbing or edema. No leg asymmetry.   Neurologic: Alert, Grossly non-focal.   Psychiatric: Normal affect, Normal mood  Lymphatic:  No cervical LAD        RADIOLOGY  No orders to display         COURSE & MEDICAL DECISION MAKING  Pertinent Labs & Imaging studies reviewed. (See chart for details)    Differential diagnoses include but are not limited to: Alcoholism    2:02 PM - Medical record reviewed, history of chronic pancytopenia, alcoholism, frequent visits to the ER, homelessness.  Patient was discharged from our facility at 5:30 AM this morning.  Has been in this emergency department and [place] 3 times in the past week.  I have evaluated the patient in the past for generalized malaise.    Decision Making:  This is a pleasant 62 y.o. year old male who presents with really no chief complaint, he states that he does not feel well.  Laboratory evaluation this morning shows chronic neutropenia.  He had an elevated alcohol level at 321 this morning.  I explained to the patient that possibly the  reason he is not feeling well may be due to his underlying alcoholism.  He is nonfocal on examination, he is hemodynamically stable, he is not tachycardic, he is afebrile.  He denies any chest pain or shortness of breath.  I examined his abdomen when he was still sleeping and he did not have any rigidity or abdominal tenderness.  This is consistent with prior presentations.  [facility]  I see no indication for additional work-up at this time.    Discharge Medications:  Discharge Medication List as of 5/12/2022  2:57 PM          The patient was discharged home (see d/c instructions) was told to return immediately for any signs or symptoms listed, or any worsening at all.  The patient verbally agreed to the discharge precautions and follow-up plan which is documented in EPIC.        FINAL IMPRESSION  1. Alcohol abuse

## 2022-05-12 NOTE — ED NOTES
Calcium infusion complete. Reviewed discharge instructions, pt verbalized understanding of instructions. States he will schedule follow-up appointment. Denies further questions at this time. Pt ambulatory out of ER with steady gait.

## 2022-05-13 ENCOUNTER — APPOINTMENT (OUTPATIENT)
Dept: RADIOLOGY | Facility: MEDICAL CENTER | Age: 62
End: 2022-05-13
Attending: EMERGENCY MEDICINE
Payer: COMMERCIAL

## 2022-05-13 ENCOUNTER — HOSPITAL ENCOUNTER (EMERGENCY)
Facility: MEDICAL CENTER | Age: 62
End: 2022-05-13
Attending: EMERGENCY MEDICINE
Payer: COMMERCIAL

## 2022-05-13 VITALS
TEMPERATURE: 97.8 F | WEIGHT: 133.38 LBS | DIASTOLIC BLOOD PRESSURE: 82 MMHG | SYSTOLIC BLOOD PRESSURE: 139 MMHG | HEART RATE: 66 BPM | HEIGHT: 68 IN | RESPIRATION RATE: 18 BRPM | BODY MASS INDEX: 20.21 KG/M2 | OXYGEN SATURATION: 94 %

## 2022-05-13 VITALS
BODY MASS INDEX: 20.16 KG/M2 | TEMPERATURE: 98.6 F | DIASTOLIC BLOOD PRESSURE: 89 MMHG | WEIGHT: 133 LBS | HEART RATE: 87 BPM | SYSTOLIC BLOOD PRESSURE: 153 MMHG | HEIGHT: 68 IN | OXYGEN SATURATION: 98 % | RESPIRATION RATE: 16 BRPM

## 2022-05-13 DIAGNOSIS — R53.83 MALAISE AND FATIGUE: ICD-10-CM

## 2022-05-13 DIAGNOSIS — F10.20 ALCOHOLISM (HCC): ICD-10-CM

## 2022-05-13 DIAGNOSIS — R53.81 MALAISE AND FATIGUE: ICD-10-CM

## 2022-05-13 LAB
ALBUMIN SERPL BCP-MCNC: 4.1 G/DL (ref 3.2–4.9)
ALBUMIN/GLOB SERPL: 1.2 G/DL
ALP SERPL-CCNC: 89 U/L (ref 30–99)
ALT SERPL-CCNC: 74 U/L (ref 2–50)
ANION GAP SERPL CALC-SCNC: 12 MMOL/L (ref 7–16)
AST SERPL-CCNC: 113 U/L (ref 12–45)
BASOPHILS # BLD AUTO: 0.6 % (ref 0–1.8)
BASOPHILS # BLD: 0.01 K/UL (ref 0–0.12)
BILIRUB SERPL-MCNC: 0.8 MG/DL (ref 0.1–1.5)
BUN SERPL-MCNC: 8 MG/DL (ref 8–22)
CALCIUM SERPL-MCNC: 8.3 MG/DL (ref 8.5–10.5)
CHLORIDE SERPL-SCNC: 103 MMOL/L (ref 96–112)
CO2 SERPL-SCNC: 22 MMOL/L (ref 20–33)
CREAT SERPL-MCNC: 0.56 MG/DL (ref 0.5–1.4)
EKG IMPRESSION: NORMAL
EOSINOPHIL # BLD AUTO: 0.08 K/UL (ref 0–0.51)
EOSINOPHIL NFR BLD: 4.5 % (ref 0–6.9)
ERYTHROCYTE [DISTWIDTH] IN BLOOD BY AUTOMATED COUNT: 46.4 FL (ref 35.9–50)
GFR SERPLBLD CREATININE-BSD FMLA CKD-EPI: 110 ML/MIN/1.73 M 2
GLOBULIN SER CALC-MCNC: 3.4 G/DL (ref 1.9–3.5)
GLUCOSE SERPL-MCNC: 95 MG/DL (ref 65–99)
HCT VFR BLD AUTO: 36.4 % (ref 42–52)
HGB BLD-MCNC: 12.3 G/DL (ref 14–18)
IMM GRANULOCYTES # BLD AUTO: 0 K/UL (ref 0–0.11)
IMM GRANULOCYTES NFR BLD AUTO: 0 % (ref 0–0.9)
LYMPHOCYTES # BLD AUTO: 0.48 K/UL (ref 1–4.8)
LYMPHOCYTES NFR BLD: 27 % (ref 22–41)
MCH RBC QN AUTO: 30.7 PG (ref 27–33)
MCHC RBC AUTO-ENTMCNC: 33.8 G/DL (ref 33.7–35.3)
MCV RBC AUTO: 90.8 FL (ref 81.4–97.8)
MONOCYTES # BLD AUTO: 0.11 K/UL (ref 0–0.85)
MONOCYTES NFR BLD AUTO: 6.2 % (ref 0–13.4)
NEUTROPHILS # BLD AUTO: 1.1 K/UL (ref 1.82–7.42)
NEUTROPHILS NFR BLD: 61.7 % (ref 44–72)
NRBC # BLD AUTO: 0 K/UL
NRBC BLD-RTO: 0 /100 WBC
PLATELET # BLD AUTO: 21 K/UL (ref 164–446)
PMV BLD AUTO: 12.6 FL (ref 9–12.9)
POTASSIUM SERPL-SCNC: 3.8 MMOL/L (ref 3.6–5.5)
PROT SERPL-MCNC: 7.5 G/DL (ref 6–8.2)
RBC # BLD AUTO: 4.01 M/UL (ref 4.7–6.1)
SODIUM SERPL-SCNC: 137 MMOL/L (ref 135–145)
TROPONIN T SERPL-MCNC: <6 NG/L (ref 6–19)
WBC # BLD AUTO: 1.8 K/UL (ref 4.8–10.8)

## 2022-05-13 PROCEDURE — 99284 EMERGENCY DEPT VISIT MOD MDM: CPT

## 2022-05-13 PROCEDURE — 84484 ASSAY OF TROPONIN QUANT: CPT

## 2022-05-13 PROCEDURE — 36415 COLL VENOUS BLD VENIPUNCTURE: CPT

## 2022-05-13 PROCEDURE — 85025 COMPLETE CBC W/AUTO DIFF WBC: CPT

## 2022-05-13 PROCEDURE — 302449 STATCHG TRIAGE ONLY (STATISTIC)

## 2022-05-13 PROCEDURE — 80053 COMPREHEN METABOLIC PANEL: CPT

## 2022-05-13 PROCEDURE — 93005 ELECTROCARDIOGRAM TRACING: CPT

## 2022-05-13 PROCEDURE — 71045 X-RAY EXAM CHEST 1 VIEW: CPT

## 2022-05-13 PROCEDURE — 93005 ELECTROCARDIOGRAM TRACING: CPT | Performed by: EMERGENCY MEDICINE

## 2022-05-13 ASSESSMENT — FIBROSIS 4 INDEX
FIB4 SCORE: 31.37
FIB4 SCORE: 40.03

## 2022-05-13 NOTE — ED TRIAGE NOTES
"Chief Complaint   Patient presents with   • Body Aches     Since last [facility]. Denies fever, N/V. Admits to 2 days of chest pain.    • Chest Pain       Patient to triage ambulatory with a steady gait, AAOx4, Appropriate precautions in place.     Explained wait time and triage process. Placed back in lobby. Told to notify ED tech or RN of any changes, verbalized understanding.    BP (!) 144/89   Pulse (!) 105   Temp 36.6 °C (97.8 °F) (Temporal)   Resp 20   Ht 1.727 m (5' 8\")   Wt 60.5 kg (133 lb 6.1 oz)   SpO2 95%   BMI 20.28 kg/m²     "

## 2022-05-13 NOTE — ED NOTES
When RN going back into room for discharge instructions pt had left.  ERP aware.  All belongings with pt.  Pt ambulated out.

## 2022-05-13 NOTE — ED PROVIDER NOTES
ED Provider Note    Scribed for Hebert Cedillo M.D. by Tiffanie White. 5/13/2022, 1:54 PM.    Primary care provider: Nnamdi Ballesteros M.D.  Means of arrival: Walk-in  History obtained from: Patient  History limited by: None    CHIEF COMPLAINT  Chief Complaint   Patient presents with    Body Aches     Since [facility]. Denies fever, N/V. Admits to 2 days of chest pain.     Chest Pain       HPI  Inocencio Shabazz is a 62 y.o. male who presents to the Emergency Department for evaluation of body aches and chest pain. Patient was seen here yesterday for similar symptoms. He was discharged after being diagnosed with alcoholism. [facility]. He states that he is scared of dying which is why he keeps coming back to the hospital. Patient notes that he is currently living alone in an apartment. He denies history of lung problems or COPD. Patient is unable to provide any specifics. He denies any recent trauma, vomiting, numbness or focal weakness. He does not appear to be the best historian. No alleviating factors were reported.     REVIEW OF SYSTEMS  Pertinent positives include body aches and alcohol abuse.   All other systems reviewed and negative. See HPI for further details.     PAST MEDICAL HISTORY   has a past medical history of Alcohol abuse, Alcohol intoxication (HCC) (3/13/2014), Cirrhosis (HCC), GIB (gastrointestinal bleeding) (1/3/2016), Hepatitis C, Pancytopenia (HCC), and Psychiatric disorder.    SURGICAL HISTORY   has a past surgical history that includes gastroscopy (1/3/2016); gastroscopy (4/8/2016); gastroscopy (3/13/2019); and gastroscopy-endo (N/A, 11/13/2019).    SOCIAL HISTORY  Social History     Tobacco Use    Smoking status: Current Every Day Smoker     Packs/day: 1.00     Years: 48.00     Pack years: 48.00     Types: Cigarettes    Smokeless tobacco: Never Used   Vaping Use    Vaping Use: Never used   Substance Use Topics    Alcohol use: Yes      "Comment: \"couple of pints\" whiskey every day    Drug use: Yes     Types: Inhaled     Comment: marijuana; not currently -  cocaine, meth      Social History     Substance and Sexual Activity   Drug Use Yes    Types: Inhaled    Comment: marijuana; not currently -  cocaine, meth       FAMILY HISTORY  History reviewed. No pertinent family history.    CURRENT MEDICATIONS  Home Medications       Reviewed by Silvia Luna R.N. (Registered Nurse) on 05/13/22 at 1227  Med List Status: Partial     Medication Last Dose Status   famotidine (PEPCID) 40 MG Tab  Active   folic acid (FOLVITE) 1 MG Tab  Active   nicotine (NICODERM) 14 MG/24HR PATCH 24 HR  Active   omeprazole (PRILOSEC) 20 MG delayed-release capsule  Active   ondansetron (ZOFRAN ODT) 4 MG TABLET DISPERSIBLE  Active   pantoprazole (PROTONIX) 40 MG Tablet Delayed Response  Active   QUEtiapine (SEROQUEL) 200 MG Tab  Active   thiamine (THIAMINE) 100 MG tablet  Active                    ALLERGIES  Allergies   Allergen Reactions    Haloperidol Unspecified     Twitching/involuntary movements        PHYSICAL EXAM  VITAL SIGNS: BP (!) 144/89   Pulse (!) 105   Temp 36.6 °C (97.8 °F) (Temporal)   Resp 20   Ht 1.727 m (5' 8\")   Wt 60.5 kg (133 lb 6.1 oz)   SpO2 95%   BMI 20.28 kg/m²     Nursing note and vitals reviewed.  Constitutional: No distress. Slurred speech. Smells of alcohol.   HENT: Head is atraumatic. Oropharynx is moist.   Eyes: Conjunctivae are normal. Pupils are equal, round, and reactive to light.   Cardiovascular: Normal peripheral perfusion  Respiratory: No respiratory distress.   Musculoskeletal: Normal range of motion.   Neurological: Alert. No focal deficits noted.    Skin: No rash.   Psych: Appropriate for clinical situation     DIAGNOSTIC STUDIES / PROCEDURES    EKG   Report   Date Value Ref Range Status   05/13/2022       [facility] Emergency Dept.    Test Date:  2022-05-13  Pt Name:    JIMMY FRANCISCOFERRER                 Department: " ER  MRN:        [id number]                      Room:  Gender:     Male                         Technician: [id number]  :        1960                   Requested By:ER TRIAGE PROTOCOL  Order #:    [id number]                    Reading MD: MAURISIO BLANDON MD    Measurements  Intervals                                Axis  Rate:       84                           P:          76  UT:         156                          QRS:        68  QRSD:       92                           T:          68  QT:         368  QTc:        436    Interpretive Statements  SINUS RHYTHM  RSR' IN V1 OR V2, PROBABLY NORMAL VARIANT  Compared to ECG 2022 03:03:39  RSR' in V1 or V2 now present  Sinus bradycardia no longer present  Electronically Signed On 2022 13:52:34 PDT by MAURISIO BLANDON MD                 LABS  Results for orders placed or performed during the hospital encounter of 22   CBC with Differential   Result Value Ref Range    WBC 1.8 (LL) 4.8 - 10.8 K/uL    RBC 4.01 (L) 4.70 - 6.10 M/uL    Hemoglobin 12.3 (L) 14.0 - 18.0 g/dL    Hematocrit 36.4 (L) 42.0 - 52.0 %    MCV 90.8 81.4 - 97.8 fL    MCH 30.7 27.0 - 33.0 pg    MCHC 33.8 33.7 - 35.3 g/dL    RDW 46.4 35.9 - 50.0 fL    Platelet Count 21 (L) 164 - 446 K/uL    MPV 12.6 9.0 - 12.9 fL    Neutrophils-Polys 61.70 44.00 - 72.00 %    Lymphocytes 27.00 22.00 - 41.00 %    Monocytes 6.20 0.00 - 13.40 %    Eosinophils 4.50 0.00 - 6.90 %    Basophils 0.60 0.00 - 1.80 %    Immature Granulocytes 0.00 0.00 - 0.90 %    Nucleated RBC 0.00 /100 WBC    Neutrophils (Absolute) 1.10 (L) 1.82 - 7.42 K/uL    Lymphs (Absolute) 0.48 (L) 1.00 - 4.80 K/uL    Monos (Absolute) 0.11 0.00 - 0.85 K/uL    Eos (Absolute) 0.08 0.00 - 0.51 K/uL    Baso (Absolute) 0.01 0.00 - 0.12 K/uL    Immature Granulocytes (abs) 0.00 0.00 - 0.11 K/uL    NRBC (Absolute) 0.00 K/uL   Complete Metabolic Panel (CMP)   Result Value Ref Range    Sodium 137 135 - 145 mmol/L    Potassium 3.8 3.6 - 5.5 mmol/L     Chloride 103 96 - 112 mmol/L    Co2 22 20 - 33 mmol/L    Anion Gap 12.0 7.0 - 16.0    Glucose 95 65 - 99 mg/dL    Bun 8 8 - 22 mg/dL    Creatinine 0.56 0.50 - 1.40 mg/dL    Calcium 8.3 (L) 8.5 - 10.5 mg/dL    AST(SGOT) 113 (H) 12 - 45 U/L    ALT(SGPT) 74 (H) 2 - 50 U/L    Alkaline Phosphatase 89 30 - 99 U/L    Total Bilirubin 0.8 0.1 - 1.5 mg/dL    Albumin 4.1 3.2 - 4.9 g/dL    Total Protein 7.5 6.0 - 8.2 g/dL    Globulin 3.4 1.9 - 3.5 g/dL    A-G Ratio 1.2 g/dL   Troponin   Result Value Ref Range    Troponin T <6 6 - 19 ng/L   ESTIMATED GFR   Result Value Ref Range    GFR (CKD-EPI) 110 >60 mL/min/1.73 m 2   EKG   Result Value Ref Range    Report       [facility] Emergency Dept.    Test Date:  2022  Pt Name:    JIMMY FERRER                 Department: ER  MRN:        [id number]                      Room:  Gender:     Male                         Technician: [id number]  :        1960                   Requested By:ER TRIAGE PROTOCOL  Order #:    [id number]                    Reading MD: MAURISIO BLANDON MD    Measurements  Intervals                                Axis  Rate:       84                           P:          76  NC:         156                          QRS:        68  QRSD:       92                           T:          68  QT:         368  QTc:        436    Interpretive Statements  SINUS RHYTHM  RSR' IN V1 OR V2, PROBABLY NORMAL VARIANT  Compared to ECG 2022 03:03:39  RSR' in V1 or V2 now present  Sinus bradycardia no longer present  Electronically Signed On 2022 13:52:34 PDT by MAURISIO BLANDON MD       All labs reviewed by me.    RADIOLOGY  DX-CHEST-PORTABLE (1 VIEW)   Final Result      1.  No acute cardiac or pulmonary abnormalities are identified.        The radiologist's interpretation of all radiological studies have been reviewed by me.    COURSE & MEDICAL DECISION MAKING  Nursing notes, VS, PMSFHx reviewed in chart.    Review of past medical records shows  "the patient was seen here yesterday. He has alcoholism. [facility]      1:54 PM - Patient seen and examined at bedside. Ordered EKG, DX chest, CBC w/diff, CMP, and troponin to evaluate his symptoms. Discussed plan of care with patient. I discussed plan for discharge and follow up as outlined below. The patient is stable for discharge at this time and will return for any new or worsening symptoms. Patient verbalizes understanding and support with my plan for discharge.     Patient presents today with chronic alcoholism.  Laboratory studies show chronic leukopenia.  Likely due to his alcoholism.  Patient has [facility] and [facility] today.  Has fairly vague complaints.  Just generally does not feel well.  This certainly may be due to his alcohol abuse.  I encouraged the patient to seek treatment for his alcoholism.  Otherwise there are no \"red flags\".      DISPOSITION:  Patient will be discharged home in stable condition.    FOLLOW UP:  Nnamdi Ballesteros M.D.  [address] [place] [id number]  [phone]    Schedule an appointment as soon as possible for a visit       [facility], Emergency Dept  [address] [place] [id number] [phone]    If symptoms worsen      The patient was discharged home with an information sheet on 5/13/22 and told to return immediately for any signs or symptoms listed.  The patient agreed to the discharge precautions and follow-up plan which is documented in EPIC.    FINAL IMPRESSION  1. Alcoholism (HCC)    2. Malaise and fatigue          Tiffanie ALMAZAN), am scribing for, and in the presence of, Hebert Cedillo M.D..    Electronically signed by: Tiffanie Frausto), 5/13/2022    Hebert ALMAZAN M.D. personally performed the services described in this documentation, as scribed by Tiffanie White in my presence, and it is both accurate and complete. C.    The note accurately reflects work " and decisions made by me.  Hebert Cedillo M.D.  5/13/2022  2:46 PM

## 2022-05-14 NOTE — ED TRIAGE NOTES
Pt BIB EMS  Chief Complaint   Patient presents with   • Abdominal Pain     Pt was here an hour ago and discharged by Dr. Cedillo   states he just doesn't feel good.  Has been to multiple hospitals in the last 2 days

## 2022-05-14 NOTE — ED PROVIDER NOTES
ED Physician Note      Mr. Shabazz left the emergency department before I was able to evaluate him.  I did not see this patient.

## 2022-06-14 ENCOUNTER — HOSPITAL ENCOUNTER (EMERGENCY)
Facility: MEDICAL CENTER | Age: 62
End: 2022-06-14
Payer: COMMERCIAL

## 2022-06-14 VITALS
HEART RATE: 87 BPM | HEIGHT: 68 IN | SYSTOLIC BLOOD PRESSURE: 119 MMHG | WEIGHT: 131.17 LBS | DIASTOLIC BLOOD PRESSURE: 85 MMHG | BODY MASS INDEX: 19.88 KG/M2 | TEMPERATURE: 98.2 F | OXYGEN SATURATION: 93 % | RESPIRATION RATE: 18 BRPM

## 2022-06-14 PROCEDURE — 302449 STATCHG TRIAGE ONLY (STATISTIC)

## 2022-06-14 ASSESSMENT — FIBROSIS 4 INDEX: FIB4 SCORE: 40.03

## 2022-06-15 ENCOUNTER — HOSPITAL ENCOUNTER (EMERGENCY)
Facility: MEDICAL CENTER | Age: 62
End: 2022-06-15
Attending: EMERGENCY MEDICINE
Payer: MEDICARE

## 2022-06-15 VITALS
OXYGEN SATURATION: 93 % | RESPIRATION RATE: 21 BRPM | SYSTOLIC BLOOD PRESSURE: 133 MMHG | DIASTOLIC BLOOD PRESSURE: 76 MMHG | HEART RATE: 86 BPM | BODY MASS INDEX: 19.85 KG/M2 | TEMPERATURE: 98 F | WEIGHT: 131 LBS | HEIGHT: 68 IN

## 2022-06-15 DIAGNOSIS — R07.9 CHEST PAIN, UNSPECIFIED TYPE: ICD-10-CM

## 2022-06-15 LAB — EKG IMPRESSION: NORMAL

## 2022-06-15 PROCEDURE — 700111 HCHG RX REV CODE 636 W/ 250 OVERRIDE (IP): Performed by: EMERGENCY MEDICINE

## 2022-06-15 PROCEDURE — 93005 ELECTROCARDIOGRAM TRACING: CPT | Performed by: EMERGENCY MEDICINE

## 2022-06-15 PROCEDURE — 99284 EMERGENCY DEPT VISIT MOD MDM: CPT

## 2022-06-15 RX ORDER — ONDANSETRON 4 MG/1
4 TABLET, ORALLY DISINTEGRATING ORAL ONCE
Status: COMPLETED | OUTPATIENT
Start: 2022-06-15 | End: 2022-06-15

## 2022-06-15 RX ADMIN — ONDANSETRON 4 MG: 4 TABLET, ORALLY DISINTEGRATING ORAL at 18:29

## 2022-06-15 ASSESSMENT — FIBROSIS 4 INDEX: FIB4 SCORE: 40.03

## 2022-06-15 NOTE — ED NOTES
Patient called multiple times, not in lounge, not in senior lounge, not in lobby bathroom, pt dismissed per EPIC

## 2022-06-15 NOTE — ED NOTES
No response for revitals. Patient was seen walking out of the ED lobby and did not notify staff if he was leaving or not. Will call again in 15 minutes. If no response from patient, patient will be discharged from the system.

## 2022-06-15 NOTE — ED NOTES
Patient called multiple times, not in lounge, not in senior lounge, not in lobby bathroom, will call again in 5 minutes.

## 2022-06-15 NOTE — ED TRIAGE NOTES
"Pt was BIBA from home for   Chief Complaint   Patient presents with   • ETOH Intoxication     Pt reports he drank a half a pint of ETOH tonight.      61 yo male to triage for above complaint. Pt reports to EMS that he called because he \"drank too much\". When pt asked what brings him in today in triage he reports \"I feel like I am dying\". When asked to elaborate pt reports \"some people may think I drink too much but I just don't feel well\". GCS=15. Pt able to ambulate with steady gait.     Pt is alert and oriented, speaking in full sentences, follows commands and responds appropriately to questions. NAD. Resp are even and unlabored.      Pt placed in lobby. Pt educated on triage process. Pt encouraged to alert staff for any changes.     Patient and staff wearing appropriate PPE    /85   Pulse 87   Temp 36.8 °C (98.2 °F) (Temporal)   Resp 18   Ht 1.727 m (5' 8\")   Wt 59.5 kg (131 lb 2.8 oz)   SpO2 93% Comment: Room air  BMI 19.94 kg/m²   \  "

## 2022-06-16 NOTE — ED PROVIDER NOTES
"ED Provider Note    CHIEF COMPLAINT  Chief Complaint   Patient presents with   • Chest Pain     BIB [facility] after patient had about 1 minute of right sided chest pain at home today.       HPI  Inocencio Shabazz is a 62 y.o. male who presents to the emergency department with primary just complaint of discomfort to his upper abdomen and lower chest. He states that the discomfort lasted less than one minute. He has had similar discomfort and has had multitude of ER workups. Now feeling well with no primary complaint other than slight nausea. Denies shortness of breath. No diaphoresis. No vomiting. No other recent illness.    REVIEW OF SYSTEMS  See HPI for further details. All other systems are negative.     PAST MEDICAL HISTORY   has a past medical history of Alcohol abuse, Alcohol intoxication (HCC) (3/13/2014), Cirrhosis (HCC), GIB (gastrointestinal bleeding) (1/3/2016), Hepatitis C, Pancytopenia (HCC), and Psychiatric disorder.    SOCIAL HISTORY  Social History     Tobacco Use   • Smoking status: Current Every Day Smoker     Packs/day: 1.00     Years: 48.00     Pack years: 48.00     Types: Cigarettes   • Smokeless tobacco: Never Used   Vaping Use   • Vaping Use: Never used   Substance and Sexual Activity   • Alcohol use: Yes     Comment: \"couple of pints\" whiskey every day   • Drug use: Yes     Types: Inhaled     Comment: marijuana; not currently -  cocaine, meth   • Sexual activity: Not on file       SURGICAL HISTORY   has a past surgical history that includes gastroscopy (1/3/2016); gastroscopy (4/8/2016); gastroscopy (3/13/2019); and gastroscopy-endo (N/A, 11/13/2019).    CURRENT MEDICATIONS  Home Medications    **Home medications have not yet been reviewed for this encounter**         ALLERGIES  Allergies   Allergen Reactions   • Haloperidol Unspecified     Twitching/involuntary movements        PHYSICAL EXAM  VITAL SIGNS: /76   Pulse 86   Temp 36.7 °C (98 °F)   Resp (!) 21   Ht 1.727 m (5' 8\")   Wt " 59.4 kg (131 lb)   SpO2 93%   BMI 19.92 kg/m²  @LIN[[id number]::@  Pulse ox interpretation: I interpret this pulse ox as normal.  Constitutional: Alert in no apparent distress.  HENT: Normocephalic, Atraumatic, Bilateral external ears normal. Nose normal.   Eyes: Pupils are equal and reactive.   Heart: Regular rate and rythm, no murmurs.    Lungs: Clear to auscultation bilaterally.  Skin: Warm, Dry, No erythema, No rash.   Neurologic: Alert, Grossly non-focal.   Psychiatric: Affect normal, Judgment normal, Mood normal, Appears appropriate and not intoxicated.       Results for orders placed or performed during the hospital encounter of 06/15/22   EKG   Result Value Ref Range    Report       [facility] Emergency Dept.    Test Date:  2022-06-15  Pt Name:    JIMMY FERRER                 Department: ER  MRN:        [id number]                      Room:       [facility]  Gender:     Male                         Technician: [id number]  :        1960                   Requested By:FREDY OCAMPO  Order #:    [id number]                    Reading MD:    Measurements  Intervals                                Axis  Rate:       78                           P:          76  IN:         148                          QRS:        66  QRSD:       92                           T:          67  QT:         388  QTc:        442    Interpretive Statements  SINUS RHYTHM  SUPRAVENTRICULAR BIGEMINY  Compared to ECG 2022 12:29:54  Atrial premature complex(es) now present           COURSE & MEDICAL DECISION MAKING  Pertinent Labs & Imaging studies reviewed. (See chart for details)    62-year-old male presented to the emergency department again for chest pain. Multiple prior workup for the same. Chart review reveals that he had an echocardiogram completed in 2021 just over one year ago which was completely normal. He has had ER cardiac workup's as recent as last month all of which were negative. At this point the patient  is pain-free. No new symptoms. At this point I do not believe that any further ER or inpatient ACS rule out will be required. He will be referred to outpatient cardiology in clinic for follow-up.     The patient will return for worsening symptoms and is stable at the time of discharge. The patient verbalizes understanding and will comply.    FINAL IMPRESSION  1. Chest pain, unspecified type               Electronically signed by: Venkatesh Iglesias M.D., 6/15/2022 6:46 PM

## 2022-09-09 ENCOUNTER — HOSPITAL ENCOUNTER (EMERGENCY)
Facility: MEDICAL CENTER | Age: 62
End: 2022-09-09
Attending: EMERGENCY MEDICINE
Payer: COMMERCIAL

## 2022-09-09 VITALS
OXYGEN SATURATION: 94 % | SYSTOLIC BLOOD PRESSURE: 119 MMHG | BODY MASS INDEX: 20.18 KG/M2 | WEIGHT: 133.16 LBS | HEIGHT: 68 IN | HEART RATE: 90 BPM | RESPIRATION RATE: 16 BRPM | DIASTOLIC BLOOD PRESSURE: 80 MMHG | TEMPERATURE: 98.6 F

## 2022-09-09 DIAGNOSIS — R25.2 LEG CRAMPS: ICD-10-CM

## 2022-09-09 DIAGNOSIS — R25.2 HAND CRAMPS: ICD-10-CM

## 2022-09-09 PROCEDURE — 99282 EMERGENCY DEPT VISIT SF MDM: CPT

## 2022-09-09 RX ORDER — VITAMIN B COMPLEX
1 TABLET ORAL 3 TIMES DAILY
Qty: 90 TABLET | Refills: 0 | Status: SHIPPED | OUTPATIENT
Start: 2022-09-09 | End: 2022-09-09 | Stop reason: SDUPTHER

## 2022-09-09 RX ORDER — VITAMIN B COMPLEX
1 TABLET ORAL 3 TIMES DAILY
Qty: 90 TABLET | Refills: 0 | Status: SHIPPED | OUTPATIENT
Start: 2022-09-09 | End: 2023-04-10

## 2022-09-09 ASSESSMENT — FIBROSIS 4 INDEX: FIB4 SCORE: 40.03

## 2022-09-09 NOTE — DISCHARGE INSTRUCTIONS
Try stretching of the calf and hands when you have an acute leg cramp.  Leave sheets and blankets on your bed loose over your legs and not tucked in tightly.  Avoid dehydration.  Continue to abstain from alcohol.  Try vitamin B complex to prevent cramps.  If this does not work after a week [facility].

## 2022-09-09 NOTE — ED TRIAGE NOTES
Inocencio [place] Shabazz  62 y.o. male  Chief Complaint   Patient presents with    Leg Cramps     X 1 year. Seen at [facility] a few days ago without resolution of symptoms    Hand Pain     Hand cramps x 1 year        Pt ambulatory to triage with steady gait for above complaint.     Pt is GCS 15, speaking in full sentences, follows commands and responds appropriately to questions. Resp are even and unlabored.     Pt placed in lobby. Pt educated on triage process. Pt encouraged to alert staff for any changes.     This RN masked and in appropriate PPE during encounter.     Vitals:    09/09/22 1507   BP: 119/81   Pulse: (!) 102   Resp: 19   Temp: 36.6 °C (97.9 °F)   SpO2: 93%

## 2022-09-09 NOTE — ED NOTES
Paper prescription provided to pt. Discharge instructions discussed with pt, verbalizes understanding. All belongings with pt when leaving unit. Pt amb to lobby with steady gait.

## 2022-09-09 NOTE — ED PROVIDER NOTES
"ED Provider Note    CHIEF COMPLAINT  Chief Complaint   Patient presents with    Leg Cramps     X 1 year. Seen at [facility] a few days ago without resolution of symptoms    Hand Pain     Hand cramps x 1 year        HPI  Inocencio Shabazz is a 62 y.o. male who presents for more than a year of calf and hand cramps that come and go.  He is not currently having 1 now.  He went to [facility] yesterday for the same reason although they worked him up for abdominal pain.  CBC CMP troponin were normal yesterday.  An ultrasound of the abdomen for the right upper quadrant and a chest x-ray which were normal.  Patient drinks alcohol to excess daily but has had no drinks in 2 days and denies current withdrawal.    REVIEW OF SYSTEMS  Pertinent positives include: Leg cramps, hand cramps, resolved abdominal pain, alcohol use.  Pertinent negatives include: Vomiting, diarrhea, diabetes.  Leg swelling, DVT or PE history    PAST MEDICAL HISTORY  Past Medical History:   Diagnosis Date    Alcohol abuse     Alcohol intoxication (HCC) 3/13/2014    Cirrhosis (HCC)     GIB (gastrointestinal bleeding) 1/3/2016    Hepatitis C     Pancytopenia (HCC)     Psychiatric disorder     linh, bipolar       SOCIAL HISTORY  Social History     Tobacco Use    Smoking status: Every Day     Packs/day: 1.00     Years: 48.00     Pack years: 48.00     Types: Cigarettes    Smokeless tobacco: Never   Vaping Use    Vaping Use: Never used   Substance Use Topics    Alcohol use: Yes     Comment: \"couple of pints\" whiskey every day    Drug use: Yes     Types: Inhaled     Comment: marijuana; not currently -  cocaine, meth     .    CURRENT MEDICATIONS  Home Medications       Reviewed by Jewel Perez R.N. (Registered Nurse) on 09/09/22 at 1540  Med List Status: Partial     Medication Last Dose Status   famotidine (PEPCID) 40 MG Tab  Active   folic acid (FOLVITE) 1 MG Tab  Active   nicotine (NICODERM) 14 MG/24HR PATCH 24 HR  Active   omeprazole (PRILOSEC) 20 MG " "delayed-release capsule  Active   ondansetron (ZOFRAN ODT) 4 MG TABLET DISPERSIBLE  Active   pantoprazole (PROTONIX) 40 MG Tablet Delayed Response  Active   QUEtiapine (SEROQUEL) 200 MG Tab  Active   thiamine (THIAMINE) 100 MG tablet  Active                    ALLERGIES  Allergies   Allergen Reactions    Haloperidol Unspecified     Twitching/involuntary movements        PHYSICAL EXAM  VITAL SIGNS: /80   Pulse 90   Temp 37 °C (98.6 °F) (Temporal)   Resp 16   Ht 1.727 m (5' 8\")   Wt 60.4 kg (133 lb 2.5 oz)   SpO2 94%   BMI 20.25 kg/m² . Reviewed and afebrile  Constitutional :  Well developed, Well nourished, well-appearing.   HNT: atraumatic, wearing a mask.   Ears: external ears normal.  Eyes: pupils reactive without eye discharge nor conjunctival hyperemia.  Cardiovascular: Regular rhythm, No murmurs, No rubs, No gallops.  No cyanosis.   Respiratory: No rales, rhonchi, wheeze, cough  Abdomen:  Soft, nontender  Skin: Warm, dry, no erythema, no rash.   Musculoskeletal: no limb deformities.  No edema or tenderness of the calves.    RADIOLOGY:  Hest x-ray and ultrasound results reviewed from [place] yesterday    LABORATORY:  Labs from [place] reviewed as discussed in HPI    COURSE & MEDICAL DECISION MAKING  This patient presents with chronic leg cramps.  There is no evidence of DVT.  There was no electrolyte disorder yesterday on evaluation.  There is no utility to repeat labs.    PLAN:  Current Discharge Medication List        START taking these medications    Details   B Complex Vitamins (VITAMIN-B COMPLEX) Tab Take 1 Tablet by mouth in the morning, at noon, and at bedtime.  Qty: 90 Tablet, Refills: 0    Associated Diagnoses: Leg cramps; Hand cramps             Stretching  Leg cramps handout given    Nnamdi Ballesteros M.D.  [address] [facility] [id number] [phone]    Schedule an appointment as soon as possible for a visit         CONDITION:  Good.    FINAL IMPRESSION:  1. Leg cramps    2. Hand " cramps          Electronically signed by: Thiago Johnson M.D., 9/9/2022

## 2022-09-09 NOTE — ED NOTES
Discharge instructions discussed with pt, verbalizes understanding. All belongings with pt when leaving unit. Pt amb to lobby with steady gait.

## 2022-09-09 NOTE — ED NOTES
Pt self ambulated to treatment area with out difficulty. Pt is sitting upright in bed with no needs at this time. Chart up for ERP.

## 2022-11-07 NOTE — ED NOTES
C/o right upper quadrant pain x 4 days with nausea but no vomiting sent from [facility]. Pt reports pain is worse when taking a deep breath.   MD at bedside.

## 2022-12-14 VITALS
HEART RATE: 64 BPM | DIASTOLIC BLOOD PRESSURE: 77 MMHG | OXYGEN SATURATION: 94 % | HEIGHT: 66 IN | WEIGHT: 125 LBS | SYSTOLIC BLOOD PRESSURE: 130 MMHG | RESPIRATION RATE: 14 BRPM | BODY MASS INDEX: 20.09 KG/M2 | TEMPERATURE: 98 F

## 2022-12-14 PROCEDURE — 302449 STATCHG TRIAGE ONLY (STATISTIC)

## 2022-12-14 ASSESSMENT — FIBROSIS 4 INDEX: FIB4 SCORE: 23.92

## 2022-12-15 ENCOUNTER — HOSPITAL ENCOUNTER (EMERGENCY)
Facility: MEDICAL CENTER | Age: 62
End: 2022-12-15
Payer: COMMERCIAL

## 2022-12-15 VITALS
WEIGHT: 127.21 LBS | SYSTOLIC BLOOD PRESSURE: 164 MMHG | OXYGEN SATURATION: 94 % | BODY MASS INDEX: 20.44 KG/M2 | DIASTOLIC BLOOD PRESSURE: 104 MMHG | RESPIRATION RATE: 16 BRPM | HEART RATE: 123 BPM | TEMPERATURE: 100.3 F | HEIGHT: 66 IN

## 2022-12-15 LAB — EKG IMPRESSION: NORMAL

## 2022-12-15 PROCEDURE — 302449 STATCHG TRIAGE ONLY (STATISTIC)

## 2022-12-15 PROCEDURE — 93005 ELECTROCARDIOGRAM TRACING: CPT

## 2022-12-15 ASSESSMENT — FIBROSIS 4 INDEX: FIB4 SCORE: 23.92

## 2022-12-15 NOTE — ED NOTES
Attempted to call pt back to room from lobby. Pt not in lobby or bathroom. Will attempt to call for pt again in 10 minutes.

## 2022-12-15 NOTE — ED NOTES
Called patient name in lobby, triage hallway, and ED lobby restroom with no answer. Will dismiss patient appropriately from the system.

## 2022-12-15 NOTE — ED TRIAGE NOTES
"Pt ambulated to triage with   Chief Complaint   Patient presents with    Cramping     To bilateral feet, has been going on for years, pt reports \"I just don't feel well.\"  Reports N/V yesterday.       Pt here with vague complaints.  Reports LE \"yellow\", first noticed 2 days ago.  No c/o pain currently.  's-130's.  Pt drinks daily ad couple of pints of Whiskey, last drink a day ago.  Called for EKG.   Pt Informed regarding triage process and verbalized understanding to inform triage tech or RN for any changes in condition. Placed in lobby.     "

## 2022-12-15 NOTE — ED TRIAGE NOTES
"Chief Complaint   Patient presents with    Fatigue     X 3 days, states \"I feel like I'm dying and my body can't go on\" when asked if he had CP or SOB pt denies and states \"I feel goofy\"     Alcohol Intoxication     Pt endorsing drinking 2 pints of whiskey today       61 yo M to triage for above complaint. Pt attempting to make himself vomit in triage, continually sticking fingers down his throat.      Pt placed in lobby. Pt educated on triage process. Pt encouraged to alert staff for any changes.     Patient and staff wearing appropriate PPE    /79   Pulse 67   Temp 36.7 °C (98 °F) (Temporal)   Resp 16   Ht 1.676 m (5' 6\")   Wt 56.7 kg (125 lb)   SpO2 94%   BMI 20.18 kg/m²     "

## 2023-03-07 ENCOUNTER — HOSPITAL ENCOUNTER (EMERGENCY)
Facility: MEDICAL CENTER | Age: 63
End: 2023-03-07
Attending: EMERGENCY MEDICINE
Payer: MEDICARE

## 2023-03-07 VITALS
SYSTOLIC BLOOD PRESSURE: 138 MMHG | RESPIRATION RATE: 17 BRPM | DIASTOLIC BLOOD PRESSURE: 99 MMHG | WEIGHT: 140 LBS | BODY MASS INDEX: 22.5 KG/M2 | HEART RATE: 82 BPM | TEMPERATURE: 98.9 F | HEIGHT: 66 IN | OXYGEN SATURATION: 93 %

## 2023-03-07 DIAGNOSIS — F10.921 ALCOHOL INTOXICATION WITH DELIRIUM (HCC): ICD-10-CM

## 2023-03-07 DIAGNOSIS — Z95.0 CARDIAC PACEMAKER IN SITU: ICD-10-CM

## 2023-03-07 DIAGNOSIS — F10.10 ALCOHOL ABUSE: ICD-10-CM

## 2023-03-07 DIAGNOSIS — I47.19 ATRIAL TACHYCARDIA (HCC): ICD-10-CM

## 2023-03-07 LAB
ANION GAP SERPL CALC-SCNC: 13 MMOL/L (ref 7–16)
BASOPHILS # BLD AUTO: 2.4 % (ref 0–1.8)
BASOPHILS # BLD: 0.09 K/UL (ref 0–0.12)
BUN SERPL-MCNC: 11 MG/DL (ref 8–22)
CALCIUM SERPL-MCNC: 8.7 MG/DL (ref 8.5–10.5)
CHLORIDE SERPL-SCNC: 107 MMOL/L (ref 96–112)
CO2 SERPL-SCNC: 24 MMOL/L (ref 20–33)
CREAT SERPL-MCNC: 0.53 MG/DL (ref 0.5–1.4)
EKG IMPRESSION: NORMAL
EOSINOPHIL # BLD AUTO: 0.09 K/UL (ref 0–0.51)
EOSINOPHIL NFR BLD: 2.4 % (ref 0–6.9)
ERYTHROCYTE [DISTWIDTH] IN BLOOD BY AUTOMATED COUNT: 47.8 FL (ref 35.9–50)
GFR SERPLBLD CREATININE-BSD FMLA CKD-EPI: 111 ML/MIN/1.73 M 2
GLUCOSE SERPL-MCNC: 91 MG/DL (ref 65–99)
HCT VFR BLD AUTO: 41 % (ref 42–52)
HGB BLD-MCNC: 13.4 G/DL (ref 14–18)
IMM GRANULOCYTES # BLD AUTO: 0.02 K/UL (ref 0–0.11)
IMM GRANULOCYTES NFR BLD AUTO: 0.5 % (ref 0–0.9)
LIPASE SERPL-CCNC: 59 U/L (ref 11–82)
LYMPHOCYTES # BLD AUTO: 0.99 K/UL (ref 1–4.8)
LYMPHOCYTES NFR BLD: 25.9 % (ref 22–41)
MAGNESIUM SERPL-MCNC: 1.7 MG/DL (ref 1.5–2.5)
MCH RBC QN AUTO: 28.6 PG (ref 27–33)
MCHC RBC AUTO-ENTMCNC: 32.7 G/DL (ref 33.7–35.3)
MCV RBC AUTO: 87.4 FL (ref 81.4–97.8)
MONOCYTES # BLD AUTO: 0.51 K/UL (ref 0–0.85)
MONOCYTES NFR BLD AUTO: 13.4 % (ref 0–13.4)
NEUTROPHILS # BLD AUTO: 2.12 K/UL (ref 1.82–7.42)
NEUTROPHILS NFR BLD: 55.4 % (ref 44–72)
NRBC # BLD AUTO: 0 K/UL
NRBC BLD-RTO: 0 /100 WBC
PHOSPHATE SERPL-MCNC: 3.2 MG/DL (ref 2.5–4.5)
PLATELET # BLD AUTO: 82 K/UL (ref 164–446)
PMV BLD AUTO: 11 FL (ref 9–12.9)
POTASSIUM SERPL-SCNC: 3.8 MMOL/L (ref 3.6–5.5)
RBC # BLD AUTO: 4.69 M/UL (ref 4.7–6.1)
SODIUM SERPL-SCNC: 144 MMOL/L (ref 135–145)
TROPONIN T SERPL-MCNC: 6 NG/L (ref 6–19)
WBC # BLD AUTO: 3.8 K/UL (ref 4.8–10.8)

## 2023-03-07 PROCEDURE — 85025 COMPLETE CBC W/AUTO DIFF WBC: CPT

## 2023-03-07 PROCEDURE — 84100 ASSAY OF PHOSPHORUS: CPT

## 2023-03-07 PROCEDURE — 700102 HCHG RX REV CODE 250 W/ 637 OVERRIDE(OP): Performed by: EMERGENCY MEDICINE

## 2023-03-07 PROCEDURE — 99285 EMERGENCY DEPT VISIT HI MDM: CPT

## 2023-03-07 PROCEDURE — 83690 ASSAY OF LIPASE: CPT

## 2023-03-07 PROCEDURE — 83735 ASSAY OF MAGNESIUM: CPT

## 2023-03-07 PROCEDURE — 84484 ASSAY OF TROPONIN QUANT: CPT

## 2023-03-07 PROCEDURE — 96366 THER/PROPH/DIAG IV INF ADDON: CPT

## 2023-03-07 PROCEDURE — 700101 HCHG RX REV CODE 250: Performed by: EMERGENCY MEDICINE

## 2023-03-07 PROCEDURE — 93005 ELECTROCARDIOGRAM TRACING: CPT

## 2023-03-07 PROCEDURE — 96365 THER/PROPH/DIAG IV INF INIT: CPT

## 2023-03-07 PROCEDURE — 80048 BASIC METABOLIC PNL TOTAL CA: CPT

## 2023-03-07 PROCEDURE — 36415 COLL VENOUS BLD VENIPUNCTURE: CPT

## 2023-03-07 PROCEDURE — A9270 NON-COVERED ITEM OR SERVICE: HCPCS | Performed by: EMERGENCY MEDICINE

## 2023-03-07 RX ORDER — ONDANSETRON 2 MG/ML
4 INJECTION INTRAMUSCULAR; INTRAVENOUS ONCE
Status: DISCONTINUED | OUTPATIENT
Start: 2023-03-07 | End: 2023-03-07 | Stop reason: HOSPADM

## 2023-03-07 RX ADMIN — THIAMINE HYDROCHLORIDE: 100 INJECTION, SOLUTION INTRAMUSCULAR; INTRAVENOUS at 12:22

## 2023-03-07 RX ADMIN — LIDOCAINE HYDROCHLORIDE 30 ML: 20 SOLUTION OROPHARYNGEAL at 08:24

## 2023-03-07 ASSESSMENT — FIBROSIS 4 INDEX: FIB4 SCORE: 24.29

## 2023-03-07 NOTE — ED NOTES
Pt provided with discharge teaching and information was discussed. Pt questions answered. Pt verbalized understanding of when to return if symptoms worsen. Pt ambulated out of the ED.       Annie Esquivel (DO), Family Medicine  [address] [place] [address]  Phone: [phone]  Fax: [phone]

## 2023-03-07 NOTE — ED NOTES
Interrogation complete. Per [employer] tech, arrhythmias were atrial driven, not ventricular. Arrhythmias were likely atrial tachycardia. ERP notified. [employer] tech Jarvis can be contacted at [phone].

## 2023-03-07 NOTE — ED PROVIDER NOTES
"  ER Provider Note    Scribed for Hebert Cedillo M.d. by Janet Moran. 3/7/2023  8:14 AM    Primary Care Provider: Nnamdi Ballesteros M.D.    CHIEF COMPLAINT  Chief Complaint   Patient presents with    Alcohol Intoxication     Patient reports drinking 1/2 pint of whiskey and feels like \"I'm going to die.\" Patient ambulatory GCS 15    Abdominal Pain     Started upon arrival to the ER. Patient grabbing his stomach stating \"its going to kill me.\" Hx chronic pancreatitis      EXTERNAL RECORDS REVIEWED  Outpatient Notes The patient has been here on several occasions and has never had a Lipase level above 79     HPI/ROS  LIMITATION TO HISTORY   Select: Intoxication  OUTSIDE HISTORIAN(S):  EMS provided the patient's history    Inocencio Sahbazz is a 63 y.o. male who presents to the ED complaining of alcohol intoxication. Per nursing staff, patient has abdominal pain. History limited secondary to alcohol intoxication.     PAST MEDICAL HISTORY  Past Medical History:   Diagnosis Date    Alcohol abuse     Alcohol intoxication (HCC) 3/13/2014    Cirrhosis (HCC)     GIB (gastrointestinal bleeding) 1/3/2016    Hepatitis C     Pancytopenia (HCC)     Psychiatric disorder     susu melton       SURGICAL HISTORY  Past Surgical History:   Procedure Laterality Date    GASTROSCOPY-ENDO N/A 11/13/2019    Procedure: GASTROSCOPY with banding;  Surgeon: Tamela Smith M.D.;  Location: ENDOSCOPY [facility];  Service: Gastroenterology    GASTROSCOPY  3/13/2019    Procedure: GASTROSCOPY;  Surgeon: Abdi Ba M.D.;  Location: SURGERY [facility];  Service: Gastroenterology    GASTROSCOPY  4/8/2016    Procedure: GASTROSCOPY;  Surgeon: Braeden Tobin M.D.;  Location: SURGERY [facility];  Service:     GASTROSCOPY  1/3/2016    Procedure: GASTROSCOPY WITH BANDING;  Surgeon: Alfredo Antonio M.D.;  Location: SURGERY [facility];  Service:        FAMILY HISTORY  History reviewed. No pertinent family " "history.    SOCIAL HISTORY   reports that he has been smoking cigarettes. He has a 48.00 pack-year smoking history. He has never used smokeless tobacco. He reports current alcohol use. He reports current drug use. Drug: Inhaled.    CURRENT MEDICATIONS  Previous Medications    B COMPLEX VITAMINS (VITAMIN-B COMPLEX) TAB    Take 1 Tablet by mouth in the morning, at noon, and at bedtime.    FAMOTIDINE (PEPCID) 40 MG TAB    Take 0.5 Tablets by mouth every day.    FOLIC ACID (FOLVITE) 1 MG TAB    Take 1 Tablet by mouth every day.    NICOTINE (NICODERM) 14 MG/24HR PATCH 24 HR    Place 1 Patch on the skin every 24 hours.    OMEPRAZOLE (PRILOSEC) 20 MG DELAYED-RELEASE CAPSULE    Take 1 Capsule by mouth every day.    ONDANSETRON (ZOFRAN ODT) 4 MG TABLET DISPERSIBLE    Take 1 Tablet by mouth every 6 hours as needed.    PANTOPRAZOLE (PROTONIX) 40 MG TABLET DELAYED RESPONSE    Take 40 mg by mouth 2 times a day.    QUETIAPINE (SEROQUEL) 200 MG TAB    Take 200 mg by mouth every evening.    THIAMINE (THIAMINE) 100 MG TABLET    Take 1 Tablet by mouth every day.       ALLERGIES  Haloperidol    PHYSICAL EXAM  BP (!) 147/91   Pulse 71   Temp 36.7 °C (98.1 °F)   Resp 12   Ht 1.676 m (5' 6\")   Wt 63.5 kg (140 lb)   SpO2 90%   BMI 22.60 kg/m²     Nursing note and vitals reviewed.  Constitutional: Well-developed and well-nourished. No distress. Smells of alcohol.  HENT: Head is normocephalic and atraumatic. Oropharynx is clear and moist without exudate or erythema.   Eyes: Pupils are equal, round, and reactive to light. Conjunctiva are normal.   Cardiovascular: Normal rate and regular rhythm. No murmur heard. Normal radial pulses.  Pulmonary/Chest: Breath sounds normal. No wheezes or rales.   Abdominal: Soft and non-tender. No distention  Benign abdominal exam.  Musculoskeletal: Extremities exhibit normal range of motion without edema or tenderness.   Neurological: No focal deficits noted.  Skin: Skin is warm and dry. No rash. "   Psychiatric: Normal mood and affect. Appropriate for clinical situation     COURSE & MEDICAL DECISION MAKING     ED Observation Status? Yes; I am placing the patient in to an observation status due to a diagnostic uncertainty as well as therapeutic intensity. Patient placed in observation status at 8:14 AM, 3/7/2023.     Observation plan is as follows: Patient will be observed in the emergency department for sobriety.  Somewhat hypoxic on room air due to respiratory effort with his intoxication.  Placed on supplemental oxygen and observed for any hypoxia or apnea.    Upon Reevaluation, the patient's condition has: Improved; and will be discharged.    Patient discharged from ED Observation status at 2:05 PM  (Time) 3/7/2023  (Date).     INITIAL ASSESSMENT, COURSE AND PLAN  Care Narrative: Patient presents to the emergency department with alcohol intoxication.  No history of trauma.  Observed in the emergency department.  Noted to have an episode of wide-complex tachycardia.  Pacemaker interrogation revealed that this was atrial driven.  No ventricular complexes.  Likely an atrial tachycardia.  Patient was observed in the emergency department till he achieved clinical sobriety.  Able to ambulate with a stable unassisted gait.  Discharged in improved condition.    8:16 AM - Patient seen and examined at bedside. The patient presents today with alcohol intoxication. The patient will be medicated with GI cocktail 30 mL.     11:52 AM - Per nursing staff, patient had a 20 second episode of ventricular tachycardic.      Discussion of management with other [facility] or appropriate source(s): Pacemaker technician     Escalation of care considered, and ultimately not performed: IV fluids, blood analysis, diagnostic imaging, and acute inpatient care management, however at this time, the patient is most appropriate for outpatient management.    Barriers to care at this time, including but not limited to: Patient does not have  established PCP, Patient is homeless, Patient lacks transportation , Patient had difficult affording medications, and Patient lacks financial resources.     The patient will return for new or worsening symptoms and is stable at the time of discharge.    The patient is referred to a primary physician for blood pressure management, diabetic screening, and for all other preventative health concerns.      DISPOSITION:  Patient will be discharged home in stable condition.    FOLLOW UP:  Nnamdi Ballesteros M.D.  [address] [place] [id number]  [phone]    Schedule an appointment as soon as possible for a visit       [facility], Emergency Dept  [address] [facility] [address] [phone]    If symptoms worsen      OUTPATIENT MEDICATIONS:  New Prescriptions    No medications on file       FINAL DIANGOSIS  1. Alcohol intoxication with delirium (HCC)    2. Alcohol abuse    3. Atrial tachycardia (HCC)    4. Cardiac pacemaker in situ       Janet ALMAZAN (Jonny), am scribing for, and in the presence of, Hebert Cedillo M.D..    Electronically signed by: Janet Moran (Jonny), 3/7/2023    IHebert M.D. personally performed the services described in this documentation, as scribed by Janet Moran in my presence, and it is both accurate and complete.      The note accurately reflects work and decisions made by me.  Hebert Cedillo M.D.  3/7/2023  2:05 PM

## 2023-03-07 NOTE — ED TRIAGE NOTES
"Chief Complaint   Patient presents with    Alcohol Intoxication     Patient reports drinking 1/2 pint of whiskey and feels like \"I'm going to die.\" Patient ambulatory GCS 15    Abdominal Pain     Started upon arrival to the ER. Patient grabbing his stomach stating \"its going to kill me.\" Hx chronic pancreatitis      [facility]   "

## 2023-03-07 NOTE — ED NOTES
Report from TITUS Jimenez. Pt care assumed. Pt resting in room at this time.  Pt is breathing and on the monitor.

## 2023-03-07 NOTE — ED NOTES
Monitor alarming, upon entering room pt found to be in a wide complex tachycardia  Pt denied chest pain and shortness of breath but reported dizziness, vitals taken and stable. Pt has unlabored breathing  Observed a 28 beat run of wide complex tachycardia  ERP notified, EKG, lab work and medicated per MAR

## 2023-03-07 NOTE — ED NOTES
Pt resting in bed. Breathing unlabored and continues to sleep. Blankets provided, oxygen removed. VSS.

## 2023-04-10 ENCOUNTER — APPOINTMENT (OUTPATIENT)
Dept: RADIOLOGY | Facility: MEDICAL CENTER | Age: 63
DRG: 894 | End: 2023-04-10
Attending: EMERGENCY MEDICINE
Payer: COMMERCIAL

## 2023-04-10 ENCOUNTER — HOSPITAL ENCOUNTER (INPATIENT)
Facility: MEDICAL CENTER | Age: 63
LOS: 3 days | DRG: 894 | End: 2023-04-13
Attending: EMERGENCY MEDICINE | Admitting: FAMILY MEDICINE
Payer: COMMERCIAL

## 2023-04-10 DIAGNOSIS — F10.921 ALCOHOL INTOXICATION WITH DELIRIUM (HCC): ICD-10-CM

## 2023-04-10 DIAGNOSIS — I85.11 SECONDARY ESOPHAGEAL VARICES WITH BLEEDING (HCC): ICD-10-CM

## 2023-04-10 DIAGNOSIS — K70.30 ALCOHOLIC CIRRHOSIS OF LIVER WITHOUT ASCITES (HCC): ICD-10-CM

## 2023-04-10 DIAGNOSIS — K92.0 HEMATEMESIS WITH NAUSEA: ICD-10-CM

## 2023-04-10 PROBLEM — E87.0 HYPERNATREMIA: Status: ACTIVE | Noted: 2023-04-10

## 2023-04-10 PROBLEM — F99 PSYCHIATRIC ILLNESS: Status: ACTIVE | Noted: 2023-04-10

## 2023-04-10 PROBLEM — Z95.0 PACEMAKER: Status: ACTIVE | Noted: 2023-04-10

## 2023-04-10 PROBLEM — Z72.0 TOBACCO USE: Status: ACTIVE | Noted: 2023-04-10

## 2023-04-10 LAB
ABO GROUP BLD: NORMAL
ALBUMIN SERPL BCP-MCNC: 4.2 G/DL (ref 3.2–4.9)
ALBUMIN/GLOB SERPL: 0.9 G/DL
ALP SERPL-CCNC: 91 U/L (ref 30–99)
ALT SERPL-CCNC: 78 U/L (ref 2–50)
ANION GAP SERPL CALC-SCNC: 18 MMOL/L (ref 7–16)
APTT PPP: 30.3 SEC (ref 24.7–36)
AST SERPL-CCNC: 95 U/L (ref 12–45)
BASOPHILS # BLD AUTO: 1.3 % (ref 0–1.8)
BASOPHILS # BLD: 0.06 K/UL (ref 0–0.12)
BILIRUB SERPL-MCNC: 0.5 MG/DL (ref 0.1–1.5)
BLD GP AB SCN SERPL QL: NORMAL
BUN SERPL-MCNC: 11 MG/DL (ref 8–22)
CALCIUM ALBUM COR SERPL-MCNC: 8.1 MG/DL (ref 8.5–10.5)
CALCIUM SERPL-MCNC: 8.3 MG/DL (ref 8.5–10.5)
CHLORIDE SERPL-SCNC: 110 MMOL/L (ref 96–112)
CO2 SERPL-SCNC: 21 MMOL/L (ref 20–33)
CREAT SERPL-MCNC: 0.64 MG/DL (ref 0.5–1.4)
EOSINOPHIL # BLD AUTO: 0.11 K/UL (ref 0–0.51)
EOSINOPHIL NFR BLD: 2.4 % (ref 0–6.9)
ERYTHROCYTE [DISTWIDTH] IN BLOOD BY AUTOMATED COUNT: 49.6 FL (ref 35.9–50)
ETHANOL BLD-MCNC: 306.7 MG/DL
GFR SERPLBLD CREATININE-BSD FMLA CKD-EPI: 105 ML/MIN/1.73 M 2
GLOBULIN SER CALC-MCNC: 4.5 G/DL (ref 1.9–3.5)
GLUCOSE SERPL-MCNC: 117 MG/DL (ref 65–99)
HCT VFR BLD AUTO: 41.1 % (ref 42–52)
HGB BLD-MCNC: 13.6 G/DL (ref 14–18)
IMM GRANULOCYTES # BLD AUTO: 0.02 K/UL (ref 0–0.11)
IMM GRANULOCYTES NFR BLD AUTO: 0.4 % (ref 0–0.9)
INR PPP: 1.25 (ref 0.87–1.13)
LIPASE SERPL-CCNC: 78 U/L (ref 11–82)
LYMPHOCYTES # BLD AUTO: 1.63 K/UL (ref 1–4.8)
LYMPHOCYTES NFR BLD: 35.1 % (ref 22–41)
MCH RBC QN AUTO: 29 PG (ref 27–33)
MCHC RBC AUTO-ENTMCNC: 33.1 G/DL (ref 33.7–35.3)
MCV RBC AUTO: 87.6 FL (ref 81.4–97.8)
MONOCYTES # BLD AUTO: 0.35 K/UL (ref 0–0.85)
MONOCYTES NFR BLD AUTO: 7.5 % (ref 0–13.4)
NEUTROPHILS # BLD AUTO: 2.48 K/UL (ref 1.82–7.42)
NEUTROPHILS NFR BLD: 53.3 % (ref 44–72)
NRBC # BLD AUTO: 0 K/UL
NRBC BLD-RTO: 0 /100 WBC
PLATELET # BLD AUTO: 69 K/UL (ref 164–446)
PMV BLD AUTO: 12.1 FL (ref 9–12.9)
POTASSIUM SERPL-SCNC: 3.6 MMOL/L (ref 3.6–5.5)
PROT SERPL-MCNC: 8.7 G/DL (ref 6–8.2)
PROTHROMBIN TIME: 15.5 SEC (ref 12–14.6)
RBC # BLD AUTO: 4.69 M/UL (ref 4.7–6.1)
RH BLD: NORMAL
SODIUM SERPL-SCNC: 149 MMOL/L (ref 135–145)
WBC # BLD AUTO: 4.7 K/UL (ref 4.8–10.8)

## 2023-04-10 PROCEDURE — 80053 COMPREHEN METABOLIC PANEL: CPT

## 2023-04-10 PROCEDURE — 71045 X-RAY EXAM CHEST 1 VIEW: CPT

## 2023-04-10 PROCEDURE — 36415 COLL VENOUS BLD VENIPUNCTURE: CPT

## 2023-04-10 PROCEDURE — C9113 INJ PANTOPRAZOLE SODIUM, VIA: HCPCS | Performed by: EMERGENCY MEDICINE

## 2023-04-10 PROCEDURE — 700105 HCHG RX REV CODE 258: Performed by: EMERGENCY MEDICINE

## 2023-04-10 PROCEDURE — 770020 HCHG ROOM/CARE - TELE (206)

## 2023-04-10 PROCEDURE — 96365 THER/PROPH/DIAG IV INF INIT: CPT

## 2023-04-10 PROCEDURE — 85025 COMPLETE CBC W/AUTO DIFF WBC: CPT

## 2023-04-10 PROCEDURE — 700111 HCHG RX REV CODE 636 W/ 250 OVERRIDE (IP): Mod: JA | Performed by: EMERGENCY MEDICINE

## 2023-04-10 PROCEDURE — 99285 EMERGENCY DEPT VISIT HI MDM: CPT

## 2023-04-10 PROCEDURE — 86900 BLOOD TYPING SEROLOGIC ABO: CPT

## 2023-04-10 PROCEDURE — 82077 ASSAY SPEC XCP UR&BREATH IA: CPT

## 2023-04-10 PROCEDURE — 86901 BLOOD TYPING SEROLOGIC RH(D): CPT

## 2023-04-10 PROCEDURE — 83690 ASSAY OF LIPASE: CPT

## 2023-04-10 PROCEDURE — 700105 HCHG RX REV CODE 258

## 2023-04-10 PROCEDURE — 96375 TX/PRO/DX INJ NEW DRUG ADDON: CPT

## 2023-04-10 PROCEDURE — 96366 THER/PROPH/DIAG IV INF ADDON: CPT

## 2023-04-10 PROCEDURE — 700102 HCHG RX REV CODE 250 W/ 637 OVERRIDE(OP)

## 2023-04-10 PROCEDURE — 86850 RBC ANTIBODY SCREEN: CPT

## 2023-04-10 PROCEDURE — 85610 PROTHROMBIN TIME: CPT

## 2023-04-10 PROCEDURE — 85730 THROMBOPLASTIN TIME PARTIAL: CPT

## 2023-04-10 PROCEDURE — A9270 NON-COVERED ITEM OR SERVICE: HCPCS

## 2023-04-10 RX ORDER — METOPROLOL SUCCINATE 25 MG/1
25 TABLET, EXTENDED RELEASE ORAL DAILY
Status: DISCONTINUED | OUTPATIENT
Start: 2023-04-11 | End: 2023-04-13 | Stop reason: HOSPADM

## 2023-04-10 RX ORDER — OCTREOTIDE ACETATE 100 UG/ML
50 INJECTION, SOLUTION INTRAVENOUS; SUBCUTANEOUS ONCE
Status: COMPLETED | OUTPATIENT
Start: 2023-04-10 | End: 2023-04-10

## 2023-04-10 RX ORDER — SODIUM CHLORIDE, SODIUM LACTATE, POTASSIUM CHLORIDE, CALCIUM CHLORIDE 600; 310; 30; 20 MG/100ML; MG/100ML; MG/100ML; MG/100ML
INJECTION, SOLUTION INTRAVENOUS CONTINUOUS
Status: DISCONTINUED | OUTPATIENT
Start: 2023-04-10 | End: 2023-04-13 | Stop reason: HOSPADM

## 2023-04-10 RX ORDER — LORAZEPAM 2 MG/1
2 TABLET ORAL
Status: DISCONTINUED | OUTPATIENT
Start: 2023-04-10 | End: 2023-04-13 | Stop reason: HOSPADM

## 2023-04-10 RX ORDER — BISACODYL 10 MG
10 SUPPOSITORY, RECTAL RECTAL
Status: DISCONTINUED | OUTPATIENT
Start: 2023-04-10 | End: 2023-04-13 | Stop reason: HOSPADM

## 2023-04-10 RX ORDER — FOLIC ACID 1 MG/1
1 TABLET ORAL DAILY
Status: DISCONTINUED | OUTPATIENT
Start: 2023-04-11 | End: 2023-04-13 | Stop reason: HOSPADM

## 2023-04-10 RX ORDER — LORAZEPAM 2 MG/ML
1.5 INJECTION INTRAMUSCULAR
Status: DISCONTINUED | OUTPATIENT
Start: 2023-04-10 | End: 2023-04-13 | Stop reason: HOSPADM

## 2023-04-10 RX ORDER — AMOXICILLIN 250 MG
2 CAPSULE ORAL 2 TIMES DAILY
Status: DISCONTINUED | OUTPATIENT
Start: 2023-04-10 | End: 2023-04-13 | Stop reason: HOSPADM

## 2023-04-10 RX ORDER — LANOLIN ALCOHOL/MO/W.PET/CERES
1 CREAM (GRAM) TOPICAL DAILY
Status: ON HOLD | COMMUNITY
End: 2023-10-30

## 2023-04-10 RX ORDER — LORAZEPAM 2 MG/1
4 TABLET ORAL
Status: DISCONTINUED | OUTPATIENT
Start: 2023-04-10 | End: 2023-04-13 | Stop reason: HOSPADM

## 2023-04-10 RX ORDER — POLYETHYLENE GLYCOL 3350 17 G/17G
1 POWDER, FOR SOLUTION ORAL
Status: DISCONTINUED | OUTPATIENT
Start: 2023-04-10 | End: 2023-04-13 | Stop reason: HOSPADM

## 2023-04-10 RX ORDER — ONDANSETRON 4 MG/1
4 TABLET, FILM COATED ORAL 3 TIMES DAILY PRN
Status: ON HOLD | COMMUNITY
End: 2023-09-28

## 2023-04-10 RX ORDER — ONDANSETRON 2 MG/ML
4 INJECTION INTRAMUSCULAR; INTRAVENOUS EVERY 4 HOURS PRN
Status: DISCONTINUED | OUTPATIENT
Start: 2023-04-10 | End: 2023-04-13 | Stop reason: HOSPADM

## 2023-04-10 RX ORDER — SODIUM CHLORIDE 9 MG/ML
1000 INJECTION, SOLUTION INTRAVENOUS ONCE
Status: COMPLETED | OUTPATIENT
Start: 2023-04-10 | End: 2023-04-10

## 2023-04-10 RX ORDER — LORAZEPAM 0.5 MG/1
0.5 TABLET ORAL EVERY 4 HOURS PRN
Status: DISCONTINUED | OUTPATIENT
Start: 2023-04-10 | End: 2023-04-13 | Stop reason: HOSPADM

## 2023-04-10 RX ORDER — LORAZEPAM 1 MG/1
1 TABLET ORAL EVERY 4 HOURS PRN
Status: DISCONTINUED | OUTPATIENT
Start: 2023-04-10 | End: 2023-04-13 | Stop reason: HOSPADM

## 2023-04-10 RX ORDER — LORAZEPAM 2 MG/ML
0.5 INJECTION INTRAMUSCULAR EVERY 4 HOURS PRN
Status: DISCONTINUED | OUTPATIENT
Start: 2023-04-10 | End: 2023-04-13 | Stop reason: HOSPADM

## 2023-04-10 RX ORDER — LORAZEPAM 2 MG/ML
2 INJECTION INTRAMUSCULAR
Status: DISCONTINUED | OUTPATIENT
Start: 2023-04-10 | End: 2023-04-13 | Stop reason: HOSPADM

## 2023-04-10 RX ORDER — NICOTINE 21 MG/24HR
21 PATCH, TRANSDERMAL 24 HOURS TRANSDERMAL
Status: DISCONTINUED | OUTPATIENT
Start: 2023-04-11 | End: 2023-04-13 | Stop reason: HOSPADM

## 2023-04-10 RX ORDER — LORAZEPAM 2 MG/ML
1 INJECTION INTRAMUSCULAR
Status: DISCONTINUED | OUTPATIENT
Start: 2023-04-10 | End: 2023-04-13 | Stop reason: HOSPADM

## 2023-04-10 RX ORDER — GAUZE BANDAGE 2" X 2"
100 BANDAGE TOPICAL DAILY
Status: DISCONTINUED | OUTPATIENT
Start: 2023-04-11 | End: 2023-04-13 | Stop reason: HOSPADM

## 2023-04-10 RX ORDER — METOPROLOL SUCCINATE 50 MG/1
25 TABLET, EXTENDED RELEASE ORAL DAILY
Status: ON HOLD | COMMUNITY
End: 2023-08-14

## 2023-04-10 RX ADMIN — SODIUM CHLORIDE, POTASSIUM CHLORIDE, SODIUM LACTATE AND CALCIUM CHLORIDE: 600; 310; 30; 20 INJECTION, SOLUTION INTRAVENOUS at 21:06

## 2023-04-10 RX ADMIN — OCTREOTIDE ACETATE 50 MCG: 100 INJECTION, SOLUTION INTRAVENOUS; SUBCUTANEOUS at 18:06

## 2023-04-10 RX ADMIN — SODIUM CHLORIDE 80 MG: 9 INJECTION, SOLUTION INTRAVENOUS at 18:08

## 2023-04-10 RX ADMIN — SODIUM CHLORIDE 1000 ML: 9 INJECTION, SOLUTION INTRAVENOUS at 18:07

## 2023-04-10 RX ADMIN — OCTREOTIDE ACETATE 50 MCG/HR: 200 INJECTION, SOLUTION INTRAVENOUS; SUBCUTANEOUS at 18:30

## 2023-04-10 RX ADMIN — LORAZEPAM 0.5 MG: 0.5 TABLET ORAL at 21:03

## 2023-04-10 ASSESSMENT — LIFESTYLE VARIABLES
AVERAGE NUMBER OF DAYS PER WEEK YOU HAVE A DRINK CONTAINING ALCOHOL: 5
HAVE PEOPLE ANNOYED YOU BY CRITICIZING YOUR DRINKING: YES
HEADACHE, FULLNESS IN HEAD: MODERATE
HOW MANY TIMES IN THE PAST YEAR HAVE YOU HAD 5 OR MORE DRINKS IN A DAY: 5
EVER HAD A DRINK FIRST THING IN THE MORNING TO STEADY YOUR NERVES TO GET RID OF A HANGOVER: NO
TOTAL SCORE: 0
ON A TYPICAL DAY WHEN YOU DRINK ALCOHOL HOW MANY DRINKS DO YOU HAVE: 20
ALCOHOL_USE: NO
EVER FELT BAD OR GUILTY ABOUT YOUR DRINKING: YES
HAVE YOU EVER FELT YOU SHOULD CUT DOWN ON YOUR DRINKING: YES
CONSUMPTION TOTAL: POSITIVE
ON A TYPICAL DAY WHEN YOU DRINK ALCOHOL HOW MANY DRINKS DO YOU HAVE: 5
AGITATION: NORMAL ACTIVITY
CONSUMPTION TOTAL: POSITIVE
TOTAL SCORE: 4
DOES PATIENT WANT TO STOP DRINKING: CANNOT ASSESS
TOTAL SCORE: 0
EVER FELT BAD OR GUILTY ABOUT YOUR DRINKING: NO
PAROXYSMAL SWEATS: NO SWEAT VISIBLE
TOTAL SCORE: 4
HAVE YOU EVER FELT YOU SHOULD CUT DOWN ON YOUR DRINKING: NO
TOTAL SCORE: 5
NAUSEA AND VOMITING: MILD NAUSEA WITH NO VOMITING
TREMOR: NO TREMOR
HOW MANY TIMES IN THE PAST YEAR HAVE YOU HAD 5 OR MORE DRINKS IN A DAY: 370
ANXIETY: MILDLY ANXIOUS
VISUAL DISTURBANCES: NOT PRESENT
HAVE PEOPLE ANNOYED YOU BY CRITICIZING YOUR DRINKING: NO
DO YOU DRINK ALCOHOL: YES
TOTAL SCORE: 0
EVER HAD A DRINK FIRST THING IN THE MORNING TO STEADY YOUR NERVES TO GET RID OF A HANGOVER: YES
AVERAGE NUMBER OF DAYS PER WEEK YOU HAVE A DRINK CONTAINING ALCOHOL: 7
DOES PATIENT WANT TO STOP DRINKING: NO
TOTAL SCORE: 4
ORIENTATION AND CLOUDING OF SENSORIUM: ORIENTED AND CAN DO SERIAL ADDITIONS
AUDITORY DISTURBANCES: NOT PRESENT

## 2023-04-10 ASSESSMENT — FIBROSIS 4 INDEX
FIB4 SCORE: 8.29
FIB4 SCORE: 10.13

## 2023-04-10 ASSESSMENT — PAIN DESCRIPTION - PAIN TYPE: TYPE: ACUTE PAIN

## 2023-04-11 PROBLEM — F10.139 ALCOHOL ABUSE WITH WITHDRAWAL (HCC): Status: ACTIVE | Noted: 2023-04-11

## 2023-04-11 LAB
ALBUMIN SERPL BCP-MCNC: 3.4 G/DL (ref 3.2–4.9)
ALBUMIN/GLOB SERPL: 0.9 G/DL
ALP SERPL-CCNC: 74 U/L (ref 30–99)
ALT SERPL-CCNC: 76 U/L (ref 2–50)
ANION GAP SERPL CALC-SCNC: 14 MMOL/L (ref 7–16)
AST SERPL-CCNC: 103 U/L (ref 12–45)
BILIRUB SERPL-MCNC: 0.7 MG/DL (ref 0.1–1.5)
BUN SERPL-MCNC: 10 MG/DL (ref 8–22)
CALCIUM ALBUM COR SERPL-MCNC: 8.1 MG/DL (ref 8.5–10.5)
CALCIUM SERPL-MCNC: 7.6 MG/DL (ref 8.5–10.5)
CHLORIDE SERPL-SCNC: 110 MMOL/L (ref 96–112)
CO2 SERPL-SCNC: 19 MMOL/L (ref 20–33)
CREAT SERPL-MCNC: 0.53 MG/DL (ref 0.5–1.4)
ERYTHROCYTE [DISTWIDTH] IN BLOOD BY AUTOMATED COUNT: 50.7 FL (ref 35.9–50)
GFR SERPLBLD CREATININE-BSD FMLA CKD-EPI: 111 ML/MIN/1.73 M 2
GLOBULIN SER CALC-MCNC: 3.8 G/DL (ref 1.9–3.5)
GLUCOSE SERPL-MCNC: 84 MG/DL (ref 65–99)
HCT VFR BLD AUTO: 34.4 % (ref 42–52)
HGB BLD-MCNC: 11.1 G/DL (ref 14–18)
MAGNESIUM SERPL-MCNC: 1.4 MG/DL (ref 1.5–2.5)
MCH RBC QN AUTO: 28.8 PG (ref 27–33)
MCHC RBC AUTO-ENTMCNC: 32.3 G/DL (ref 33.7–35.3)
MCV RBC AUTO: 89.4 FL (ref 81.4–97.8)
PHOSPHATE SERPL-MCNC: 3.3 MG/DL (ref 2.5–4.5)
PLATELET # BLD AUTO: 44 K/UL (ref 164–446)
PMV BLD AUTO: 12.9 FL (ref 9–12.9)
POTASSIUM SERPL-SCNC: 4.2 MMOL/L (ref 3.6–5.5)
PROT SERPL-MCNC: 7.2 G/DL (ref 6–8.2)
RBC # BLD AUTO: 3.85 M/UL (ref 4.7–6.1)
SODIUM SERPL-SCNC: 143 MMOL/L (ref 135–145)
WBC # BLD AUTO: 2.8 K/UL (ref 4.8–10.8)

## 2023-04-11 PROCEDURE — 700102 HCHG RX REV CODE 250 W/ 637 OVERRIDE(OP): Performed by: FAMILY MEDICINE

## 2023-04-11 PROCEDURE — 84100 ASSAY OF PHOSPHORUS: CPT

## 2023-04-11 PROCEDURE — 770006 HCHG ROOM/CARE - MED/SURG/GYN SEMI*

## 2023-04-11 PROCEDURE — 83735 ASSAY OF MAGNESIUM: CPT

## 2023-04-11 PROCEDURE — 700105 HCHG RX REV CODE 258

## 2023-04-11 PROCEDURE — 36415 COLL VENOUS BLD VENIPUNCTURE: CPT

## 2023-04-11 PROCEDURE — A9270 NON-COVERED ITEM OR SERVICE: HCPCS

## 2023-04-11 PROCEDURE — A9270 NON-COVERED ITEM OR SERVICE: HCPCS | Performed by: FAMILY MEDICINE

## 2023-04-11 PROCEDURE — HZ2ZZZZ DETOXIFICATION SERVICES FOR SUBSTANCE ABUSE TREATMENT: ICD-10-PCS

## 2023-04-11 PROCEDURE — 700102 HCHG RX REV CODE 250 W/ 637 OVERRIDE(OP)

## 2023-04-11 PROCEDURE — 99233 SBSQ HOSP IP/OBS HIGH 50: CPT | Performed by: FAMILY MEDICINE

## 2023-04-11 PROCEDURE — 85027 COMPLETE CBC AUTOMATED: CPT

## 2023-04-11 PROCEDURE — 700111 HCHG RX REV CODE 636 W/ 250 OVERRIDE (IP): Performed by: FAMILY MEDICINE

## 2023-04-11 PROCEDURE — C9113 INJ PANTOPRAZOLE SODIUM, VIA: HCPCS | Performed by: FAMILY MEDICINE

## 2023-04-11 PROCEDURE — 80053 COMPREHEN METABOLIC PANEL: CPT

## 2023-04-11 RX ORDER — PANTOPRAZOLE SODIUM 40 MG/10ML
40 INJECTION, POWDER, LYOPHILIZED, FOR SOLUTION INTRAVENOUS 2 TIMES DAILY
Status: DISCONTINUED | OUTPATIENT
Start: 2023-04-11 | End: 2023-04-13 | Stop reason: HOSPADM

## 2023-04-11 RX ORDER — MAGNESIUM SULFATE HEPTAHYDRATE 40 MG/ML
2 INJECTION, SOLUTION INTRAVENOUS ONCE
Status: COMPLETED | OUTPATIENT
Start: 2023-04-11 | End: 2023-04-11

## 2023-04-11 RX ORDER — QUETIAPINE FUMARATE 100 MG/1
200 TABLET, FILM COATED ORAL
Status: DISCONTINUED | OUTPATIENT
Start: 2023-04-11 | End: 2023-04-13 | Stop reason: HOSPADM

## 2023-04-11 RX ADMIN — LORAZEPAM 0.5 MG: 0.5 TABLET ORAL at 04:43

## 2023-04-11 RX ADMIN — METOPROLOL SUCCINATE 25 MG: 25 TABLET, EXTENDED RELEASE ORAL at 05:48

## 2023-04-11 RX ADMIN — SODIUM CHLORIDE, POTASSIUM CHLORIDE, SODIUM LACTATE AND CALCIUM CHLORIDE: 600; 310; 30; 20 INJECTION, SOLUTION INTRAVENOUS at 08:44

## 2023-04-11 RX ADMIN — PANTOPRAZOLE SODIUM 40 MG: 40 INJECTION, POWDER, FOR SOLUTION INTRAVENOUS at 11:11

## 2023-04-11 RX ADMIN — THERA TABS 1 TABLET: TAB at 05:48

## 2023-04-11 RX ADMIN — SENNOSIDES AND DOCUSATE SODIUM 2 TABLET: 50; 8.6 TABLET ORAL at 05:48

## 2023-04-11 RX ADMIN — NICOTINE TRANSDERMAL SYSTEM 21 MG: 21 PATCH, EXTENDED RELEASE TRANSDERMAL at 05:50

## 2023-04-11 RX ADMIN — Medication 100 MG: at 05:48

## 2023-04-11 RX ADMIN — LORAZEPAM 0.5 MG: 0.5 TABLET ORAL at 09:30

## 2023-04-11 RX ADMIN — QUETIAPINE FUMARATE 200 MG: 100 TABLET ORAL at 20:38

## 2023-04-11 RX ADMIN — LORAZEPAM 0.5 MG: 0.5 TABLET ORAL at 17:31

## 2023-04-11 RX ADMIN — FOLIC ACID 1 MG: 1 TABLET ORAL at 05:48

## 2023-04-11 RX ADMIN — MAGNESIUM SULFATE HEPTAHYDRATE 2 G: 40 INJECTION, SOLUTION INTRAVENOUS at 12:06

## 2023-04-11 RX ADMIN — LORAZEPAM 0.5 MG: 0.5 TABLET ORAL at 13:26

## 2023-04-11 RX ADMIN — PANTOPRAZOLE SODIUM 40 MG: 40 INJECTION, POWDER, FOR SOLUTION INTRAVENOUS at 17:31

## 2023-04-11 ASSESSMENT — LIFESTYLE VARIABLES
ORIENTATION AND CLOUDING OF SENSORIUM: ORIENTED AND CAN DO SERIAL ADDITIONS
TREMOR: TREMOR NOT VISIBLE BUT CAN BE FELT, FINGERTIP TO FINGERTIP
ANXIETY: NO ANXIETY (AT EASE)
AUDITORY DISTURBANCES: NOT PRESENT
VISUAL DISTURBANCES: NOT PRESENT
HEADACHE, FULLNESS IN HEAD: MILD
AUDITORY DISTURBANCES: NOT PRESENT
HEADACHE, FULLNESS IN HEAD: MODERATE
AGITATION: NORMAL ACTIVITY
ORIENTATION AND CLOUDING OF SENSORIUM: ORIENTED AND CAN DO SERIAL ADDITIONS
ORIENTATION AND CLOUDING OF SENSORIUM: ORIENTED AND CAN DO SERIAL ADDITIONS
HEADACHE, FULLNESS IN HEAD: SEVERE
TOTAL SCORE: 4
ORIENTATION AND CLOUDING OF SENSORIUM: ORIENTED AND CAN DO SERIAL ADDITIONS
TOTAL SCORE: 1
TREMOR: TREMOR NOT VISIBLE BUT CAN BE FELT, FINGERTIP TO FINGERTIP
PAROXYSMAL SWEATS: NO SWEAT VISIBLE
NAUSEA AND VOMITING: *
ANXIETY: MILDLY ANXIOUS
NAUSEA AND VOMITING: MILD NAUSEA WITH NO VOMITING
PAROXYSMAL SWEATS: BARELY PERCEPTIBLE SWEATING. PALMS MOIST
AUDITORY DISTURBANCES: NOT PRESENT
VISUAL DISTURBANCES: NOT PRESENT
VISUAL DISTURBANCES: NOT PRESENT
TOTAL SCORE: 5
PAROXYSMAL SWEATS: NO SWEAT VISIBLE
AGITATION: NORMAL ACTIVITY
NAUSEA AND VOMITING: MILD NAUSEA WITH NO VOMITING
ORIENTATION AND CLOUDING OF SENSORIUM: ORIENTED AND CAN DO SERIAL ADDITIONS
ORIENTATION AND CLOUDING OF SENSORIUM: ORIENTED AND CAN DO SERIAL ADDITIONS
VISUAL DISTURBANCES: NOT PRESENT
TOTAL SCORE: 3
NAUSEA AND VOMITING: NO NAUSEA AND NO VOMITING
ANXIETY: MILDLY ANXIOUS
PAROXYSMAL SWEATS: BARELY PERCEPTIBLE SWEATING. PALMS MOIST
VISUAL DISTURBANCES: NOT PRESENT
PAROXYSMAL SWEATS: NO SWEAT VISIBLE
AUDITORY DISTURBANCES: NOT PRESENT
HEADACHE, FULLNESS IN HEAD: MILD
ORIENTATION AND CLOUDING OF SENSORIUM: ORIENTED AND CAN DO SERIAL ADDITIONS
ANXIETY: MILDLY ANXIOUS
TOTAL SCORE: 7
AUDITORY DISTURBANCES: NOT PRESENT
HEADACHE, FULLNESS IN HEAD: NOT PRESENT
TOTAL SCORE: 6
TREMOR: *
AGITATION: NORMAL ACTIVITY
HEADACHE, FULLNESS IN HEAD: VERY MILD
AGITATION: NORMAL ACTIVITY
NAUSEA AND VOMITING: NO NAUSEA AND NO VOMITING
VISUAL DISTURBANCES: NOT PRESENT
AGITATION: NORMAL ACTIVITY
AGITATION: NORMAL ACTIVITY
PAROXYSMAL SWEATS: NO SWEAT VISIBLE
TREMOR: NO TREMOR
NAUSEA AND VOMITING: NO NAUSEA AND NO VOMITING
TREMOR: *
TOTAL SCORE: 7
ANXIETY: MILDLY ANXIOUS
NAUSEA AND VOMITING: NO NAUSEA AND NO VOMITING
HEADACHE, FULLNESS IN HEAD: VERY MILD
AUDITORY DISTURBANCES: NOT PRESENT
PAROXYSMAL SWEATS: NO SWEAT VISIBLE
AGITATION: NORMAL ACTIVITY
AUDITORY DISTURBANCES: NOT PRESENT
ANXIETY: NO ANXIETY (AT EASE)
VISUAL DISTURBANCES: NOT PRESENT
ANXIETY: NO ANXIETY (AT EASE)
TREMOR: NO TREMOR
TREMOR: *

## 2023-04-11 ASSESSMENT — COGNITIVE AND FUNCTIONAL STATUS - GENERAL
STANDING UP FROM CHAIR USING ARMS: A LITTLE
SUGGESTED CMS G CODE MODIFIER MOBILITY: CK
DRESSING REGULAR LOWER BODY CLOTHING: A LITTLE
MOVING TO AND FROM BED TO CHAIR: A LITTLE
DAILY ACTIVITIY SCORE: 21
TURNING FROM BACK TO SIDE WHILE IN FLAT BAD: A LITTLE
CLIMB 3 TO 5 STEPS WITH RAILING: A LITTLE
TOILETING: A LITTLE
MOVING FROM LYING ON BACK TO SITTING ON SIDE OF FLAT BED: A LITTLE
HELP NEEDED FOR BATHING: A LITTLE
SUGGESTED CMS G CODE MODIFIER DAILY ACTIVITY: CJ
MOBILITY SCORE: 18
WALKING IN HOSPITAL ROOM: A LITTLE

## 2023-04-11 ASSESSMENT — ENCOUNTER SYMPTOMS
FLANK PAIN: 0
SPEECH CHANGE: 0
DIZZINESS: 1
ABDOMINAL PAIN: 0
DIARRHEA: 0
WEAKNESS: 1
SORE THROAT: 0
BLOOD IN STOOL: 0
PALPITATIONS: 0
WHEEZING: 0
DIAPHORESIS: 0
BACK PAIN: 0
NERVOUS/ANXIOUS: 0
HEARTBURN: 0
FOCAL WEAKNESS: 0
NECK PAIN: 0
CHILLS: 0
TREMORS: 1
NAUSEA: 1
HEADACHES: 0
VOMITING: 0
SHORTNESS OF BREATH: 0
FEVER: 0
BLURRED VISION: 0
MYALGIAS: 0
COUGH: 1
SENSORY CHANGE: 0

## 2023-04-11 NOTE — ASSESSMENT & PLAN NOTE
Continue ciwa  Improving w/d sx  Cessation strategies discussed and recommended  Thiamine + Folic

## 2023-04-11 NOTE — PROGRESS NOTES
Telephone report given to TITUS Wiley for the transfer of the patient to [place] [address]. Patient taken to [place] [address] with all belongings on 1 L nasal cannula.

## 2023-04-11 NOTE — ED PROVIDER NOTES
ER Provider Note    Scribed for Jong Gastelum D.O. by Sherine Payton. 4/10/2023  5:23 PM    Primary Care Provider: Nnamdi Ballesteros M.D.    CHIEF COMPLAINT  Chief Complaint   Patient presents with    Alcohol Intoxication    Blood in Vomit       HPI/ROS  LIMITATION TO HISTORY   Alcohol Intoxication  OUTSIDE HISTORIAN(S):  EMS note; reports of alcohol intoxication    Inocencio Shabazz is a 63 y.o. male who presents to the Emergency Department for for evaluation of alcohol intoxication onset today. Per EMS, patient was brought into the ED for alcohol intoxication with associated pain. Upon arrival, patient pointed at his abdomen when asked about pain. He then stuck his fingers in his mouth and had an episode of bloody emesis. No alleviating factors were reported.      ROS as per HPI.    PAST MEDICAL HISTORY  Past Medical History:   Diagnosis Date    Alcohol abuse     Alcohol intoxication (HCC) 03/13/2014    Cirrhosis (HCC)     GIB (gastrointestinal bleeding) 01/03/2016    Hepatitis C     Pacemaker     left chest    Pancytopenia (HCC)     Psychiatric disorder     linh, bipolar       SURGICAL HISTORY  Past Surgical History:   Procedure Laterality Date    GASTROSCOPY-ENDO N/A 11/13/2019    Procedure: GASTROSCOPY with banding;  Surgeon: Tamela Smith M.D.;  Location: ENDOSCOPY [facility];  Service: Gastroenterology    GASTROSCOPY  3/13/2019    Procedure: GASTROSCOPY;  Surgeon: Abdi Ba M.D.;  Location: SURGERY [facility];  Service: Gastroenterology    GASTROSCOPY  4/8/2016    Procedure: GASTROSCOPY;  Surgeon: Braeden Tobin M.D.;  Location: SURGERY [facility];  Service:     GASTROSCOPY  1/3/2016    Procedure: GASTROSCOPY WITH BANDING;  Surgeon: Alfredo Antonio M.D.;  Location: SURGERY [facility];  Service:        FAMILY HISTORY  None noted.     SOCIAL HISTORY   reports that he has been smoking cigarettes. He has a 48.00 pack-year smoking history. He has never used smokeless tobacco. He  "reports current alcohol use. He reports current drug use. Drug: Inhaled.    CURRENT MEDICATIONS  Previous Medications    DICLOFENAC SODIUM (VOLTAREN) 1 % GEL    Apply 4 g topically 4 times a day as needed (Joint or Arthritis Pain).    FOLIC ACID (FOLVITE) 1 MG TAB    Take 1 Tablet by mouth every day.    MAGNESIUM OXIDE 420 (252 MG) MG TAB    Take 1 Tablet by mouth every day.    METOPROLOL SR (TOPROL XL) 50 MG TABLET SR 24 HR    Take 25 mg by mouth every day. 0.5 tablet = 25 mg.    ONDANSETRON (ZOFRAN) 4 MG TAB TABLET    Take 4 mg by mouth 3 times a day as needed for Nausea/Vomiting.    PANTOPRAZOLE (PROTONIX) 40 MG TABLET DELAYED RESPONSE    Take 40 mg by mouth 2 times daily, before breakfast and dinner.    QUETIAPINE (SEROQUEL) 200 MG TAB    Take 200 mg by mouth at bedtime.       ALLERGIES  Haloperidol    PHYSICAL EXAM  /80   Pulse (!) 106   Temp 36.4 °C (97.6 °F) (Temporal)   Resp 16   Ht 1.676 m (5' 6\")   Wt 63.5 kg (140 lb)   SpO2 88%   BMI 22.60 kg/m²     General: No acute distress.  HENT: Normocephalic, Mucus membranes are moist.   Chest: Lungs have even and unlabored respirations, Clear to auscultation.   Cardiovascular: Regular rate and regular rhythm, No peripheral cyanosis.  Abdomen: Non distended.  Neuro: Sleeping. Easily aroused. Speech slurred   Psychiatric: Difficulty focusing on conversation.     EXTERNAL RECORDS REVIEWED  EGD in 2019. Had esophageal varices that were banded.    INITIAL ASSESSMENT  Patient has a history of esophageal varices. Nurse notes that patient induced vomiting and had about 100 cc of chin blood. Given patient's history of esophageal varices, there is concern for esophageal bleeding. So, patient will be started on Octreotide push and drip as well as Protonix. His vital signs are normal and he is not tachycardic. There is no indication at this time for blood products. IV fluids will be given.      ED Observation Status? No; Patient does not meet criteria for ED " Observation.     DIAGNOSTIC STUDIES    Labs:   Results for orders placed or performed during the hospital encounter of 04/10/23   COD (ADULT)   Result Value Ref Range    ABO Grouping Only AB     Rh Grouping Only POS     Antibody Screen-Cod NEG    CBC WITH DIFFERENTIAL   Result Value Ref Range    WBC 4.7 (L) 4.8 - 10.8 K/uL    RBC 4.69 (L) 4.70 - 6.10 M/uL    Hemoglobin 13.6 (L) 14.0 - 18.0 g/dL    Hematocrit 41.1 (L) 42.0 - 52.0 %    MCV 87.6 81.4 - 97.8 fL    MCH 29.0 27.0 - 33.0 pg    MCHC 33.1 (L) 33.7 - 35.3 g/dL    RDW 49.6 35.9 - 50.0 fL    Platelet Count 69 (L) 164 - 446 K/uL    MPV 12.1 9.0 - 12.9 fL    Neutrophils-Polys 53.30 44.00 - 72.00 %    Lymphocytes 35.10 22.00 - 41.00 %    Monocytes 7.50 0.00 - 13.40 %    Eosinophils 2.40 0.00 - 6.90 %    Basophils 1.30 0.00 - 1.80 %    Immature Granulocytes 0.40 0.00 - 0.90 %    Nucleated RBC 0.00 /100 WBC    Neutrophils (Absolute) 2.48 1.82 - 7.42 K/uL    Lymphs (Absolute) 1.63 1.00 - 4.80 K/uL    Monos (Absolute) 0.35 0.00 - 0.85 K/uL    Eos (Absolute) 0.11 0.00 - 0.51 K/uL    Baso (Absolute) 0.06 0.00 - 0.12 K/uL    Immature Granulocytes (abs) 0.02 0.00 - 0.11 K/uL    NRBC (Absolute) 0.00 K/uL   COMP METABOLIC PANEL   Result Value Ref Range    Sodium 149 (H) 135 - 145 mmol/L    Potassium 3.6 3.6 - 5.5 mmol/L    Chloride 110 96 - 112 mmol/L    Co2 21 20 - 33 mmol/L    Anion Gap 18.0 (H) 7.0 - 16.0    Glucose 117 (H) 65 - 99 mg/dL    Bun 11 8 - 22 mg/dL    Creatinine 0.64 0.50 - 1.40 mg/dL    Calcium 8.3 (L) 8.5 - 10.5 mg/dL    AST(SGOT) 95 (H) 12 - 45 U/L    ALT(SGPT) 78 (H) 2 - 50 U/L    Alkaline Phosphatase 91 30 - 99 U/L    Total Bilirubin 0.5 0.1 - 1.5 mg/dL    Albumin 4.2 3.2 - 4.9 g/dL    Total Protein 8.7 (H) 6.0 - 8.2 g/dL    Globulin 4.5 (H) 1.9 - 3.5 g/dL    A-G Ratio 0.9 g/dL   LIPASE   Result Value Ref Range    Lipase 78 11 - 82 U/L   PROTHROMBIN TIME   Result Value Ref Range    PT 15.5 (H) 12.0 - 14.6 sec    INR 1.25 (H) 0.87 - 1.13   APTT   Result  Value Ref Range    APTT 30.3 24.7 - 36.0 sec   DIAGNOSTIC ALCOHOL   Result Value Ref Range    Diagnostic Alcohol 306.7 (H) <10.1 mg/dL   ESTIMATED GFR   Result Value Ref Range    GFR (CKD-EPI) 105 >60 mL/min/1.73 m 2   CORRECTED CALCIUM   Result Value Ref Range    Correct Calcium 8.1 (L) 8.5 - 10.5 mg/dL        Radiology:   The attending emergency physician has independently interpreted the diagnostic imaging associated with this visit and am waiting the final reading from the radiologist.   Preliminary interpretation is as follows: No acute disease  Radiologist interpretation:   DX-CHEST-PORTABLE (1 VIEW)   Final Result      1.  There is no acute cardiopulmonary process.          COURSE & MEDICAL DECISION MAKING     COURSE AND PLAN  5:23 PM - Patient seen and examined at bedside. Discussed plan of care, including scans and labs. Patient agrees to the plan of care. The patient will be medicated with Protonix 80 mg and Octreotide injection 50 mg as well as Octreotide 1,250 mcg in  mL. Ordered for DX-chest, Diagnostic alcohol, COD, CBC with diff, CMP, Lipase, and APTT to evaluate his symptoms.     5:41 PM - Patient will be treated with NS infusion 1,000 mL.     6:28 PM - I discussed the patient's case and the above findings with [facility] Hospitalist who kindly agreed to evaluate the patient.     ED Summary: This patient is a known alcoholic, presents with alcohol intoxication feels like he is going to die.  He did have a vomiting episode that had some chin blood It was not a significant volume.  He is not tachycardic, he is not hypotensive, and there has been no further bleeding.  IV fluids were given evaluation for GI bleed was initiated.  The patient's records show that he has cirrhosis and has EGD proof of esophageal varices.  Patient was started on IV Protonix, IV Sandostatin and IV Sandostatin drip was initiated.  He was observed in the ED for extended period of time he remains he hemodynamically stable.  The  patient will be admitted for further evaluation and treatment.  His alcohol level significant elevated consistent with alcohol intoxication.    Decision tools and prescription drugs considered including, but not limited to:     HYDRATION: Based on the patient's presentation of Acute Vomiting the patient was given IV fluids. IV Hydration was used because oral hydration was not as rapid as required. Upon recheck following hydration, the patient was mildly improved.    ADDITIONAL PROBLEM LIST  Alcohol intoxication  Esophageal varices  GI bleed     DISPOSITION AND DISCUSSIONS  I have discussed management of the patient with the following physicians and LAISHA's:   [facility] Hospitalist     Barriers to care at this time, including but not limited to: Alcoholism    The patient remains critically ill.  Critical care time = 35 minutes in directly providing and coordinating critical care and extensive data review.  No time overlap and excludes procedures.    DISPOSITION:  Patient will be hospitalized by [facility] hospitalist in critical condition.    FINAL DIAGNOSIS  1. Hematemesis with nausea    2. Secondary esophageal varices with bleeding (HCC)    3. Alcohol intoxication with delirium (HCC)    4. Alcoholic cirrhosis of liver without ascites (HCC)         Sherine ALMAZAN (Daneibe), am scribing for, and in the presence of, Jong Gastelum D.O..    Electronically signed by: Sherine Payton (Scribe), 4/10/2023    IJong D.O. personally performed the services described in this documentation, as scribed by Sherine Payton in my presence, and it is both accurate and complete.    The note accurately reflects work and decisions made by me.  Jong Gastelum D.O.  4/10/2023  7:22 PM

## 2023-04-11 NOTE — PROGRESS NOTES
Caught pt forcing himself to vomit by putting fingers down throat. Stated he doesn't like the feeling of being nauseous and that is why he's doing it. Refusing PRN medication for nausea. Pt vomited 250 ml prior to my arrival. No blood seen in emesis. Clear with slight mucous present. Informed HERMELINDA Sanabria

## 2023-04-11 NOTE — ASSESSMENT & PLAN NOTE
None in 48 hours but plts continue to decrease now  He remains very high risk for recurrence of bleed last banding 5 years ago  Hbg stable overnight  I spoke with GI, they will see him today  Following  i will continue serial h/h and transfuse if hbg less 7  I will continue iv protonix

## 2023-04-11 NOTE — ASSESSMENT & PLAN NOTE
Na on presentation of 149, prior to 1L NS bolus in ED  -LR @100ml/hr (maintenance)  -Repeat CMP in AM.

## 2023-04-11 NOTE — H&P
"[facility] HISTORY AND PHYSICAL     PATIENT ID:  NAME:  Inocencio Shabazz  MRN:               [id number]  YOB: 1960    Date of Admission: 4/10/2023     Attending: Ursula Regan MD     Resident: Raina Taylor, PGY-1  Alan Harp, PGY-3    Primary Care Physician:  Nnamdi Ballesteros MD    CC:  \"I'm dying\" - alcohol intoxication    HPI: Inocencio Shabazz is a 63 y.o. male with past medical history of alcohol abuse (2 ED visits for this in the last 3 months), sick sinus syndrome with pacemaker put in October 2022, schizophrenia, bipolar, hepatitis C, cirrhosis, GI bleeding with esophageal varices (banding x5 in 2019), who presented with alcohol intoxication and associated pain in abdomen after episode of bloody emesis (self-induced), approx 150ml of blood.      In the ED, patient was started on octreotide and Protonix drips, and IV fluids.  Vital signs were stable.  Hb was stable at 13.6, WBCs low at 4.7, platelets low at 69 (chronic).  Sodium high at 149 (prior to NS bolus), anion gap high at 18, other electrolytes WNL.  AST: 95 (67 in October 22), ALT elevated at 78 (41 in October 2022), alcohol at 306.7.  INR: 1.25.  Type and screen acquired: Blood type: AB+, antibody negative.    Pt is not the best historian, his primary concern is getting ice chips.  He states he has felt poor in the past with drinking but never this bad.  He does wish to quit as he feels like he is dying.  He has gone through withdrawal in the past, has never been told he's had a seizure but does not deny seizures due to \"shaking\" (likely tremors).    He is complaining that his legs are going numb, this is a new sx for him.  He denies any weakness.      Pt states he has not been taking his psych medication (Seroquel) as he is out of it, he has seen a psychiatrist in the past.      REVIEW OF SYSTEMS:   Ten systems reviewed and were negative except as noted in the HPI.                PAST MEDICAL HISTORY:  Past Medical History:   Diagnosis " Date    Alcohol abuse     Alcohol intoxication (HCC) 03/13/2014    Cirrhosis (HCC)     GIB (gastrointestinal bleeding) 01/03/2016    Hepatitis C     Pacemaker     left chest    Pancytopenia (HCC)     Psychiatric disorder     susu melton       PAST SURGICAL HISTORY:  Past Surgical History:   Procedure Laterality Date    GASTROSCOPY-ENDO N/A 11/13/2019    Procedure: GASTROSCOPY with banding;  Surgeon: Tamela Smith M.D.;  Location: ENDOSCOPY [facility]  Service: Gastroenterology    GASTROSCOPY  3/13/2019    Procedure: GASTROSCOPY;  Surgeon: Abdi Ba M.D.;  Location: SURGERY [facility];  Service: Gastroenterology    GASTROSCOPY  4/8/2016    Procedure: GASTROSCOPY;  Surgeon: Braeden Tobin M.D.;  Location: SURGERY [facility];  Service:     GASTROSCOPY  1/3/2016    Procedure: GASTROSCOPY WITH BANDING;  Surgeon: Alfredo Antonio M.D.;  Location: SURGERY [facility];  Service:        FAMILY HISTORY:  No family history on file.    SOCIAL HISTORY:   Smoking: a pack a day for 40+ years  Etoh use: 2 pints of whiskey most day  Drug use: Daily marijuana use, denies injection drug use or any other drug use.      DIET:   Orders Placed This Encounter   Procedures    Diet NPO Restrict to: Ice Chips     Standing Status:   Standing     Number of Occurrences:   1     Order Specific Question:   Diet NPO Restrict to:     Answer:   Ice Chips [2]       ALLERGIES:  Allergies   Allergen Reactions    Haloperidol Unspecified     Twitching/involuntary movements        OUTPATIENT MEDICATIONS:    Current Facility-Administered Medications:     octreotide (Sandostatin) 1,250 mcg in  mL Infusion, 50 mcg/hr, Intravenous, Continuous, Jong Gastelum D.O., Last Rate: 10 mL/hr at 04/10/23 1830, 50 mcg/hr at 04/10/23 1830    senna-docusate (PERICOLACE or SENOKOT S) 8.6-50 MG per tablet 2 Tablet, 2 Tablet, Oral, BID **AND** polyethylene glycol/lytes (MIRALAX) PACKET 1 Packet, 1 Packet, Oral, QDAY PRN **AND**  magnesium hydroxide (MILK OF MAGNESIA) suspension 30 mL, 30 mL, Oral, QDAY PRN **AND** bisacodyl (DULCOLAX) suppository 10 mg, 10 mg, Rectal, QDAY PRN, POORNIMA Owen.O.    lactated ringers infusion, , Intravenous, Continuous, Raina Taylor D.O., Last Rate: 100 mL/hr at 04/10/23 2106, New Bag at 04/10/23 2106    [START ON 4/11/2023] nicotine (NICODERM) 21 MG/24HR 21 mg, 21 mg, Transdermal, Daily-0600 **AND** Nicotine Replacement Patient Education Materials, , , Once **AND** nicotine polacrilex (NICORETTE) 2 MG piece 2 mg, 2 mg, Oral, Q HOUR PRN, Raina Taylor D.O.    LORazepam (ATIVAN) tablet 0.5 mg, 0.5 mg, Oral, Q4HRS PRN, Raina Taylor D.O., 0.5 mg at 04/10/23 2103    LORazepam (ATIVAN) tablet 1 mg, 1 mg, Oral, Q4HRS PRN **OR** LORazepam (ATIVAN) injection 0.5 mg, 0.5 mg, Intravenous, Q4HRS PRN, Raina Taylor D.O.    LORazepam (ATIVAN) tablet 2 mg, 2 mg, Oral, Q2HRS PRN **OR** LORazepam (ATIVAN) injection 1 mg, 1 mg, Intravenous, Q2HRS PRN, Raina Taylor D.O.    LORazepam (ATIVAN) tablet 3 mg, 3 mg, Oral, Q HOUR PRN **OR** LORazepam (ATIVAN) injection 1.5 mg, 1.5 mg, Intravenous, Q HOUR PRN, Raina Taylor D.O.    LORazepam (ATIVAN) tablet 4 mg, 4 mg, Oral, Q15 MIN PRN **OR** LORazepam (ATIVAN) injection 2 mg, 2 mg, Intravenous, Q15 MIN PRN, Raina Taylor D.O.    [START ON 4/11/2023] thiamine (Vitamin B-1) tablet 100 mg, 100 mg, Oral, DAILY **AND** [START ON 4/11/2023] multivitamin tablet 1 Tablet, 1 Tablet, Oral, DAILY **AND** [START ON 4/11/2023] folic acid (FOLVITE) tablet 1 mg, 1 mg, Oral, DAILY, RY OwenO.    [START ON 4/11/2023] metoprolol SR (TOPROL XL) tablet 25 mg, 25 mg, Oral, DAILY, RY OwenO.    ondansetron (ZOFRAN) syringe/vial injection 4 mg, 4 mg, Intravenous, Q4HRS PRN, Raina Taylor D.O.    PHYSICAL EXAM:  Vitals:    04/10/23 1720 04/10/23 1731 04/10/23 1901 04/10/23 1943   BP: (!) 145/77  111/76 111/73   Pulse: 84 70 90 85   Resp: (!)  "38 (!)    Temp:    36.5 °C (97.7 °F)   TempSrc:    Temporal   SpO2: 88% 96% 98% 95%   Weight:    64.3 kg (141 lb 12.1 oz)   Height:    1.676 m (5' 6\")   , Temp (24hrs), Av.5 °C (97.7 °F), Min:36.4 °C (97.6 °F), Max:36.5 °C (97.7 °F)  , Pulse Oximetry: 95 %, O2 (LPM): 1, O2 Delivery Device: Nasal Cannula    General: Pt lying in bed, in no acute distress, slightly confused and distractable.    Skin:  Pink, warm and dry.  No rashes  HEENT: NC/AT. EOMI. Normal external appearance of mouth, nose and ears.     Neck:  Supple   Lungs:  Symmetrical.  CTAB with no adventitious breath sounds.  Good air movement   Cardiovascular:  pacemaker in place over L pectoral.  Normal S1/S2, RRR without M/R/G.  Abdomen:  BS+, Soft, NT/ND. No masses noted.  Hepatomegaly, nontender.  Extremities:  thin extremities, no obvious deformities.  No leg swelling.    CNS:  A&Ox4, follows commands, Strength 5/5 in all extremities.         LAB TESTS:   Recent Labs     04/10/23  1703   WBC 4.7*   RBC 4.69*   HEMOGLOBIN 13.6*   HEMATOCRIT 41.1*   MCV 87.6   MCH 29.0   RDW 49.6   PLATELETCT 69*   MPV 12.1   NEUTSPOLYS 53.30   LYMPHOCYTES 35.10   MONOCYTES 7.50   EOSINOPHILS 2.40   BASOPHILS 1.30         Recent Labs     04/10/23  1703   SODIUM 149*   POTASSIUM 3.6   CHLORIDE 110   CO2 21   BUN 11   CREATININE 0.64   CALCIUM 8.3*   ALBUMIN 4.2       CULTURES:   Results       ** No results found for the last 168 hours. **            IMAGES:  DX-CHEST-PORTABLE (1 VIEW)   Final Result      1.  There is no acute cardiopulmonary process.          CONSULTS:   None in ED - plan for likely GI consult tomorrow    ASSESSMENT/PLAN:Inocencio Shabazz is a 63 y.o. male with past medical history of alcohol abuse (2 ED visits for this in the last 3 months), sick sinus syndrome with pacemaker put in 2022, schizophrenia, bipolar, hepatitis C, cirrhosis, GI bleeding with esophageal varices (banding x5 in 2019), admitted for presumed resumption of esophageal " variceal bleeding and alcohol withdrawal.      Hematemesis  Hx of Esophageal Varices s/p banding x5 in 2019  Noted on admission when pt self-induced vomiting - noted 150ml.     Hb and VS WNL and stable.    -Octreotide loading and drip started in ED - continue  -Continue PPI drip  -diet: NPO with sips  -LR at mainenance: 100ml/hr  -Recommend GI consult in AM for EGD, or urgently if pt becomes unstable  -CTM Hb and VS . -repeat CBC in AM.    -Hold Lovenox.  SCDs for DVT ppx.     Alcohol abuse  Pt drinks 2 pints of whiskey most days.  Pt feels especially poor on admission - reason for presentation.    Hx of cirrhosis and esophageal varices requiring banding.  MELD score: 8 (99% est survival)  Hepatomegaly noted on exam. AST (98), ALT (78) within pt's baseline range.  Has gone through withdrawals before unable to confirm any seizure hx - notes shaking   Alcohol on admission: 306.    -CIWA protocol  - referral for resources and DC planning   -Pt has pacemaker for hx of sick sinus, d/t this and in setting of alcohol withdrawal - Telemetry monitoring.      Psychiatric illness  Schizophrenia and bipolar noted in chart  Pt has seen psych in past, prescribed Seroquel but has not been taking it due to being out  -Hold Seroquel while CIWA protocol is active, consider restarting on DC.      Pacemaker  Present over left pectoral, noted for hx of sick sinus syndrome and paroxysmal A-fib  -Home metoprolol restarted (pt likely not taking)  -Telemetry monitoring    Hypernatremia  Na on presentation of 149, prior to 1L NS bolus in ED  -LR @100ml/hr (maintenance)  -Repeat CMP in AM.       Thrombocytopenia (HCC)  Chronic, plts 69 on admission.  Likely secondary to alcohol use, stable.    -CTM  -Per guidelines, can consider transfusion under 50,000    Tobacco use  40+ pack year hx  No Dx of COPD  -Nicotine replacement    Core Measures:  Fluids: LR@100ml/hr   Lines: PIV  Abx: None  Diet: NPO with ice chips.    PPX: SCDs - Lovenox held  d/t GI bleeding    DISPO: Inpt on tele for alcohol withdrawal and hematemesis monitoring.    CODE STATUS: Full Code    Raina Taylor  PGY1  [facility]

## 2023-04-11 NOTE — ASSESSMENT & PLAN NOTE
Pt drinks 2 pints of whiskey most days.  Pt feels especially poor on admission - reason for presentation.    Hx of cirrhosis and esophageal varices requiring banding.  MELD score: 8 (99% est survival)  Hepatomegaly noted on exam. AST (98), ALT (78) within pt's baseline range.  Has gone through withdrawals before unable to confirm any seizure hx - notes shaking   Alcohol on admission: 306.    -[facility] protocol  - referral for resources and DC planning   -Pt has pacemaker for hx of sick sinus, d/t this and in setting of alcohol withdrawal - Telemetry monitoring.

## 2023-04-11 NOTE — PROGRESS NOTES
Tele monitor report  Rhythm: Sinus regular/tach W/ frequent PVCs  HR: 65 - 110  0.14 / 0.08 / 0.31    No strip available

## 2023-04-11 NOTE — ASSESSMENT & PLAN NOTE
Present over left pectoral, noted for hx of sick sinus syndrome and paroxysmal A-fib  -Home metoprolol restarted (pt likely not taking)  -Telemetry monitoring

## 2023-04-11 NOTE — PROGRESS NOTES
4 Eyes Skin Assessment Completed by TITUS Rodriguez and TITUS Ratliff.    Head WDL  Ears WDL  Nose WDL  Mouth WDL  Neck WDL  Breast/Chest Pacemaker  Shoulder Blades WDL  Spine WDL  (R) Arm/Elbow/Hand WDL  (L) Arm/Elbow/Hand WDL  Abdomen WDL  Groin WDL  Scrotum/Coccyx/Buttocks WDL  (R) Leg WDL  (L) Leg WDL  (R) Heel/Foot/Toe Discoloration  (L) Heel/Foot/Toe Discoloration          Devices In Places Tele Box, Blood Pressure Cuff, and Pulse Ox, nasal cannula      Interventions In Place NC W/Ear Foams    Possible Skin Injury No    Pictures Uploaded Into Epic N/A  Wound Consult Placed N/A  RN Wound Prevention Protocol Ordered No

## 2023-04-11 NOTE — PROGRESS NOTES
Hospital Medicine Daily Progress Note    Date of Service  4/11/2023    Chief Complaint  Inocencio Shabazz is a 63 y.o. male admitted 4/10/2023 with alcohol withdrawal    Hospital Course  Admitted with known history of alcohol abuse, with acute withdrawal, hematemesis.  Patient with known history of liver cirrhosis, hepatitis C.  He was placed on CIWA protocol, started on an octreotide drip.    Interval Problem Update  Alcohol - CIWA 7  Hematemesis -appears to be only 1 episode, no recurrence here  Afib -currently paced  Low magnesium    I have discussed this patient's plan of care and discharge plan at IDT rounds today with Case Management, Nursing, Nursing leadership, and other members of the IDT team.    Consultants/Specialty  None    Code Status  Full Code    Disposition  Patient is not medically cleared for discharge.   Anticipate discharge to to home with close outpatient follow-up.  I have placed the appropriate orders for post-discharge needs.    Review of Systems  Review of Systems   Constitutional:  Positive for malaise/fatigue. Negative for chills, diaphoresis and fever.   HENT:  Negative for congestion, hearing loss and sore throat.    Eyes:  Negative for blurred vision.   Respiratory:  Positive for cough. Negative for shortness of breath and wheezing.    Cardiovascular:  Negative for chest pain, palpitations and leg swelling.   Gastrointestinal:  Positive for nausea. Negative for abdominal pain, blood in stool, diarrhea, heartburn, melena and vomiting.   Genitourinary:  Negative for dysuria, flank pain and hematuria.   Musculoskeletal:  Negative for back pain, joint pain, myalgias and neck pain.   Skin:  Negative for rash.   Neurological:  Positive for dizziness, tremors and weakness. Negative for sensory change, speech change, focal weakness and headaches.   Psychiatric/Behavioral:  The patient is not nervous/anxious.       Physical Exam  Temp:  [36.2 °C (97.1 °F)-37.1 °C (98.8 °F)] 36.9 °C (98.4  °F)  Pulse:  [] 65  Resp:  [14-38] 14  BP: (111-145)/(68-82) 139/68  SpO2:  [88 %-98 %] 90 %    Physical Exam  Vitals and nursing note reviewed.   HENT:      Head: Normocephalic and atraumatic.      Nose: No congestion.      Mouth/Throat:      Mouth: Mucous membranes are moist.   Eyes:      Extraocular Movements: Extraocular movements intact.      Conjunctiva/sclera: Conjunctivae normal.   Cardiovascular:      Rate and Rhythm: Normal rate and regular rhythm.   Pulmonary:      Effort: Pulmonary effort is normal.      Breath sounds: Normal breath sounds.   Abdominal:      General: There is no distension.      Tenderness: There is no abdominal tenderness. There is no guarding or rebound.   Musculoskeletal:      Cervical back: No tenderness.      Right lower leg: No edema.      Left lower leg: No edema.   Skin:     General: Skin is warm and dry.   Neurological:      General: No focal deficit present.      Mental Status: He is alert and oriented to person, place, and time.      Cranial Nerves: No cranial nerve deficit.      Motor: Tremor present.       Fluids    Intake/Output Summary (Last 24 hours) at 4/11/2023 1000  Last data filed at 4/11/2023 0428  Gross per 24 hour   Intake 1000 ml   Output 250 ml   Net 750 ml       Laboratory  Recent Labs     04/10/23  1703 04/11/23  0119   WBC 4.7* 2.8*   RBC 4.69* 3.85*   HEMOGLOBIN 13.6* 11.1*   HEMATOCRIT 41.1* 34.4*   MCV 87.6 89.4   MCH 29.0 28.8   MCHC 33.1* 32.3*   RDW 49.6 50.7*   PLATELETCT 69* 44*   MPV 12.1 12.9     Recent Labs     04/10/23  1703 04/11/23  0119   SODIUM 149* 143   POTASSIUM 3.6 4.2   CHLORIDE 110 110   CO2 21 19*   GLUCOSE 117* 84   BUN 11 10   CREATININE 0.64 0.53   CALCIUM 8.3* 7.6*     Recent Labs     04/10/23  1703   APTT 30.3   INR 1.25*               Imaging  DX-CHEST-PORTABLE (1 VIEW)   Final Result      1.  There is no acute cardiopulmonary process.           Assessment/Plan  * Alcohol abuse with withdrawal (HCC)- (present on  admission)  Assessment & Plan  CIWA protocol  Thiamine + Folic  Check vit b12 and TSH    Hypernatremia- (present on admission)  Assessment & Plan  Follow CMP    Pacemaker- (present on admission)  Assessment & Plan  Secondary to SSS    Hematemesis- (present on admission)  Assessment & Plan  Start IV Protonix  Start clear liquids  Stop octreotide    Pancytopenia (HCC)- (present on admission)  Assessment & Plan  follow CBC    Hypomagnesemia- (present on admission)  Assessment & Plan  IV Mg 2 g  Follow level    Paroxysmal atrial fibrillation (HCC)- (present on admission)  Assessment & Plan  Toprol  Poor candidate for anticoagulation secondary to GIB, liver cirrhosis, and noncompliance    Cirrhosis of liver (HCC)- (present on admission)  Assessment & Plan  Alcohol vs Hep C    Hepatitis C- (present on admission)  Assessment & Plan  Follow up with gastroenterology as outpatient    Tobacco use- (present on admission)  Assessment & Plan  Nicotine replacement protocol    Psychiatric illness- (present on admission)  Assessment & Plan  Resume Seroquel         VTE prophylaxis: SCDs/TEDs    I have performed a physical exam and reviewed and updated ROS and Plan today (4/11/2023). In review of yesterday's note (4/10/2023), there are no changes except as documented above.

## 2023-04-11 NOTE — ED TRIAGE NOTES
"Patient arrived via EMS from home for Chief complaints of \"Im dying.\" And Alcohol intoxications. He could not express any specific complaint to ED RN but during check it pt did self induce vomiting and emesis was chin blood. IV established and blood sent to lab. Pt educated to STOP using his fingers to induce vomiting.   "

## 2023-04-11 NOTE — H&P
[facility] MEDICINE SENIOR ADMIT NOTE    PATIENT ID:  NAME:  Inocencio Shabazz  MRN:               [id number]  YOB: 1960    Date of Admission: 4/10/2023     Attending: Ursula Regan MD    Resident: Alan Harp DO    Primary Care Physician:  Nnamdi Ballesteros MD    CC:  EtOH intoxication    HPI: Inocencio Shabazz is a 63 y.o. male with past medical history of EtOH abuse, schizophrenia, hepatitis C, paroxysmal A fib, thrombocytopenia, esophageal varices, sick sinus syndrome (s/p permanent pacemaker placement) who presented with EtOH intoxication. He initially noted that he felt like he was going to die, then later brought up that he desires to withdraw from alcohol and stop drinking. He came to ED for help because he did not know where else to go. In the ED he had an episode of self-induced vomiting and there was a small amount of blood in the vomit.     ERCourse:  In the ED, patient had small amount of blood in the vomit. His vital signs were significant for requiring 1 liter of supplemental oxygen to maintain saturations. He had Hgb 13.6, platelets of 69,000. Anion gap was elevated at 18. Liver enzymes were elevated. EtOH level was elevated. INR was elevated.     REVIEW OF SYSTEMS:   Ten systems reviewed and were negative except as noted in the HPI.                PAST MEDICAL HISTORY:  Past Medical History:   Diagnosis Date    Alcohol abuse     Alcohol intoxication (HCC) 3/13/2014    Cirrhosis (HCC)     GIB (gastrointestinal bleeding) 1/3/2016    Hepatitis C     Pancytopenia (HCC)     Psychiatric disorder     schitzo, bipolar       PAST SURGICAL HISTORY:  Past Surgical History:   Procedure Laterality Date    GASTROSCOPY-ENDO N/A 11/13/2019    Procedure: GASTROSCOPY with banding;  Surgeon: Tamela Smith M.D.;  Location: ENDOSCOPY [facility];  Service: Gastroenterology    GASTROSCOPY  3/13/2019    Procedure: GASTROSCOPY;  Surgeon: Abdi Ba M.D.;  Location: SURGERY [facility];  Service:  "Gastroenterology    GASTROSCOPY  2016    Procedure: GASTROSCOPY;  Surgeon: Braeden Tobin M.D.;  Location: SURGERY [facility];  Service:     GASTROSCOPY  1/3/2016    Procedure: GASTROSCOPY WITH BANDING;  Surgeon: Alfredo Antonio M.D.;  Location: SURGERY [facility];  Service:        FAMILY HISTORY:  No family history on file.    SOCIAL HISTORY:   Tobacco use: yes  Etoh use: whiskey  Drug use: THC      ALLERGIES:  Allergies   Allergen Reactions    Haloperidol Unspecified     Twitching/involuntary movements        OUTPATIENT MEDICATIONS:  No current facility-administered medications on file prior to encounter.     Current Outpatient Medications on File Prior to Encounter   Medication Sig Dispense Refill    B Complex Vitamins (VITAMIN-B COMPLEX) Tab Take 1 Tablet by mouth in the morning, at noon, and at bedtime. 90 Tablet 0    omeprazole (PRILOSEC) 20 MG delayed-release capsule Take 1 Capsule by mouth every day. 30 Capsule 0    ondansetron (ZOFRAN ODT) 4 MG TABLET DISPERSIBLE Take 1 Tablet by mouth every 6 hours as needed. 10 Tablet 0    famotidine (PEPCID) 40 MG Tab Take 0.5 Tablets by mouth every day. 60 Tablet 0    thiamine (THIAMINE) 100 MG tablet Take 1 Tablet by mouth every day. 30 Tablet 3    folic acid (FOLVITE) 1 MG Tab Take 1 Tablet by mouth every day. 30 Tablet 3    nicotine (NICODERM) 14 MG/24HR PATCH 24 HR Place 1 Patch on the skin every 24 hours. 30 Patch 3    QUEtiapine (SEROQUEL) 200 MG Tab Take 200 mg by mouth every evening.      pantoprazole (PROTONIX) 40 MG Tablet Delayed Response Take 40 mg by mouth 2 times a day.         PHYSICAL EXAM:  Vitals:    04/10/23 1657 04/10/23 1658 04/10/23 1720 04/10/23 1731   BP:  121/80 (!) 145/77    Pulse:  (!) 106 84 70   Resp:  16 (!) 38 (!) 23   Temp:  36.4 °C (97.6 °F)     TempSrc:  Temporal     SpO2:  92% 88% 96%   Weight: 63.5 kg (140 lb)      Height: 1.676 m (5' 6\")      , Temp (24hrs), Av.4 °C (97.6 °F), Min:36.4 °C (97.6 °F), Max:36.4 °C " (97.6 °F)  , Pulse Oximetry: 96 %, O2 (LPM): 1, O2 Delivery Device: Nasal Cannula    General: Pt resting in NAD, cooperative   Skin:  Pink, warm and dry.  No rashes  HEENT: NC/AT. PERRL. EOMI. Dry mucous membranes.  Neck:  Supple without lymphadenopathy or rigidity.  Lungs:  Symmetrical.  CTAB with no W/R/R.  Cardiovascular:  pacemaker in place, regular rate  Abdomen:  Abdomen is soft NT/ND.  Extremities: No gross deformities noted. Able to move all extremities  CNS:  Muscle tone is normal. Cranial nerves II-XII grossly intact.      LAB TESTS:   Results for orders placed or performed during the hospital encounter of 04/10/23   CBC WITH DIFFERENTIAL   Result Value Ref Range    WBC 4.7 (L) 4.8 - 10.8 K/uL    RBC 4.69 (L) 4.70 - 6.10 M/uL    Hemoglobin 13.6 (L) 14.0 - 18.0 g/dL    Hematocrit 41.1 (L) 42.0 - 52.0 %    MCV 87.6 81.4 - 97.8 fL    MCH 29.0 27.0 - 33.0 pg    MCHC 33.1 (L) 33.7 - 35.3 g/dL    RDW 49.6 35.9 - 50.0 fL    Platelet Count 69 (L) 164 - 446 K/uL    MPV 12.1 9.0 - 12.9 fL    Neutrophils-Polys 53.30 44.00 - 72.00 %    Lymphocytes 35.10 22.00 - 41.00 %    Monocytes 7.50 0.00 - 13.40 %    Eosinophils 2.40 0.00 - 6.90 %    Basophils 1.30 0.00 - 1.80 %    Immature Granulocytes 0.40 0.00 - 0.90 %    Nucleated RBC 0.00 /100 WBC    Neutrophils (Absolute) 2.48 1.82 - 7.42 K/uL    Lymphs (Absolute) 1.63 1.00 - 4.80 K/uL    Monos (Absolute) 0.35 0.00 - 0.85 K/uL    Eos (Absolute) 0.11 0.00 - 0.51 K/uL    Baso (Absolute) 0.06 0.00 - 0.12 K/uL    Immature Granulocytes (abs) 0.02 0.00 - 0.11 K/uL    NRBC (Absolute) 0.00 K/uL   COMP METABOLIC PANEL   Result Value Ref Range    Sodium 149 (H) 135 - 145 mmol/L    Potassium 3.6 3.6 - 5.5 mmol/L    Chloride 110 96 - 112 mmol/L    Co2 21 20 - 33 mmol/L    Anion Gap 18.0 (H) 7.0 - 16.0    Glucose 117 (H) 65 - 99 mg/dL    Bun 11 8 - 22 mg/dL    Creatinine 0.64 0.50 - 1.40 mg/dL    Calcium 8.3 (L) 8.5 - 10.5 mg/dL    AST(SGOT) 95 (H) 12 - 45 U/L    ALT(SGPT) 78 (H) 2 - 50  U/L    Alkaline Phosphatase 91 30 - 99 U/L    Total Bilirubin 0.5 0.1 - 1.5 mg/dL    Albumin 4.2 3.2 - 4.9 g/dL    Total Protein 8.7 (H) 6.0 - 8.2 g/dL    Globulin 4.5 (H) 1.9 - 3.5 g/dL    A-G Ratio 0.9 g/dL   LIPASE   Result Value Ref Range    Lipase 78 11 - 82 U/L   PROTHROMBIN TIME   Result Value Ref Range    PT 15.5 (H) 12.0 - 14.6 sec    INR 1.25 (H) 0.87 - 1.13   APTT   Result Value Ref Range    APTT 30.3 24.7 - 36.0 sec   DIAGNOSTIC ALCOHOL   Result Value Ref Range    Diagnostic Alcohol 306.7 (H) <10.1 mg/dL   ESTIMATED GFR   Result Value Ref Range    GFR (CKD-EPI) 105 >60 mL/min/1.73 m 2   CORRECTED CALCIUM   Result Value Ref Range    Correct Calcium 8.1 (L) 8.5 - 10.5 mg/dL       CULTURES:   Results       ** No results found for the last 168 hours. **            IMAGES:  DX-CHEST-PORTABLE (1 VIEW)   Final Result      1.  There is no acute cardiopulmonary process.          CONSULTS:   none    ASSESSMENT/PLAN: 63 y.o. male admitted for EtOH withdrawal.    # EtOH abuse  Patient desiring withdrawal and to stop drinking. He notes he usually drinks 2 pints of whiskey per day. He thinks he may have had seizures in the past.   - CIWA monitoring  - SW consult as patient desiring help to maintain sobriety    # hematemesis  Patient self-induced vomiting in the ED with some blood in vomit. Has apparently had banding procedure around 2019/2020.  - octreotide and PPI  - consider GI consult  - holding DVT ppx    # elevated liver enzymes  # elevated INR  # hx hepatitis C  Liver enzyme elevation likely secondary to EtOH use. MELD 8-9.  - recommend EtOH cessation    # elevated anion gap  # hypernatremia  Likely secondary to dehydration  - maintenance IVF  - repeat AM sodium    # pancytopenia  This is a chronic problem. Unclear etiology. He received G-CSF during hospitalization at [place] in Feb 2022 due to suspected bone marrow suppression.   - repeat morning CBC    # SSS (s/p permanent pacemaker)  # paroxysmal a fib  -  continue home metoprolol 25 mg daily as recommended by [facility]    # schizophrenia  Has been out of his quetiapine recently.   - restart as indicated    # weakness  - PT/ OT consult    # dispo- inpatient for EtOH withdrawal

## 2023-04-11 NOTE — ASSESSMENT & PLAN NOTE
Continue toprol and tolerated  Rate controlled  Poor candidate for anticoagulation secondary to GIB, liver cirrhosis, and noncompliance

## 2023-04-11 NOTE — CARE PLAN
The patient is Watcher - Medium risk of patient condition declining or worsening    Shift Goals  Clinical Goals: Safety, monitor for bleeding, pain control  Patient Goals: Comfort, discharge  Family Goals: Not at bedside    Progress made toward(s) clinical / shift goals:    Problem: Knowledge Deficit - Standard  Goal: Patient and family/care givers will demonstrate understanding of plan of care, disease process/condition, diagnostic tests and medications  Outcome: Progressing     Problem: Seizure Precautions  Goal: Implementation of seizure precautions  Outcome: Progressing     Problem: Pain - Standard  Goal: Alleviation of pain or a reduction in pain to the patient’s comfort goal  Outcome: Progressing       Patient has tolerated a clear liquid diet with minimal nausea today. Seizure precautions in place and patient remains free from pain.

## 2023-04-11 NOTE — ASSESSMENT & PLAN NOTE
Schizophrenia and bipolar noted in chart  Pt has seen psych in past, prescribed Seroquel but has not been taking it due to being out  -Hold Seroquel while CIWA protocol is active, consider restarting on DC.

## 2023-04-11 NOTE — ASSESSMENT & PLAN NOTE
Chronic, plts 69 on admission.  Likely secondary to alcohol use, stable.    -CTM  -Per guidelines, can consider transfusion under 50,000

## 2023-04-11 NOTE — ASSESSMENT & PLAN NOTE
Hx of Esophageal Varices s/p banding x5 in 2019  Noted on admission when pt self-induced vomiting - noted 150ml.     Hb and VS WNL and stable.    -Octreotide loading and drip started in ED - continue  -Continue PPI drip  -diet: NPO with sips  -LR at mainenance: 100ml/hr  -Recommend GI consult in AM for EGD, or urgently if pt becomes unstable  -CTM Hb and VS . -repeat CBC in AM.    -Hold Lovenox.  SCDs for DVT ppx.

## 2023-04-11 NOTE — PROGRESS NOTES
Received report from night shift RN. Assumed patient care. Patient resting comfortably in bed. A&O x4. NAD. CIWA assessment complete. POC discussed with patient and questions addressed.

## 2023-04-11 NOTE — ED NOTES
"Med rec completed per patient at bedside and patient's pharmacy, [facility] ([phone] ext. [phone]).  Allergies reviewed with patient.  No outpatient antibiotics within the last 30 days.    Per [facility], folic acid and magnesium oxide were last dispensed on 10/31/2023 for 90 days supplies. Patient states he has been out of these for \"a couple of weeks.\"    Per [facility], pantoprazole was last filled on 1/8/2023 for a 30 day supply, and quetiapine was last filled on 1/13/2023 for a 30 day supply. Patient claims that he is still using these medications at home.    Per [facility], metoprolol succinate was last dispensed on 11/30/2023 for a 90 day supply. Patient is UNABLE to verify when he last used the metoprolol at home.  "

## 2023-04-11 NOTE — CARE PLAN
The patient is Watcher - Medium risk of patient condition declining or worsening    Shift Goals  Clinical Goals: CIWA, IV fluids  Patient Goals: rest  Family Goals: not at bedside    Progress made toward(s) clinical / shift goals:    Problem: Optimal Care for Alcohol Withdrawal  Goal: Optimal Care for the alcohol withdrawal patient  Outcome: Progressing  Expected end date: 4/12/2023     Problem: Seizure Precautions  Goal: Implementation of seizure precautions  Outcome: Progressing  Expected end date: 4/12/2023     Problem: Lifestyle Changes  Goal: Patient's ability to identify lifestyle changes and available resources to help reduce recurrence of condition will improve  Outcome: Progressing  Expected end date: 4/12/2023

## 2023-04-11 NOTE — ED NOTES
Break RN:  Pt have 150 ml emesis dark blood present. Medications infusing per mar.   Pt updated to poc advised of NPO at this time.

## 2023-04-12 LAB
ALBUMIN SERPL BCP-MCNC: 3.5 G/DL (ref 3.2–4.9)
ALBUMIN/GLOB SERPL: 0.9 G/DL
ALP SERPL-CCNC: 77 U/L (ref 30–99)
ALT SERPL-CCNC: 69 U/L (ref 2–50)
ANION GAP SERPL CALC-SCNC: 15 MMOL/L (ref 7–16)
AST SERPL-CCNC: 90 U/L (ref 12–45)
BILIRUB SERPL-MCNC: 1.8 MG/DL (ref 0.1–1.5)
BUN SERPL-MCNC: 9 MG/DL (ref 8–22)
CALCIUM ALBUM COR SERPL-MCNC: 8.2 MG/DL (ref 8.5–10.5)
CALCIUM SERPL-MCNC: 7.8 MG/DL (ref 8.5–10.5)
CHLORIDE SERPL-SCNC: 100 MMOL/L (ref 96–112)
CO2 SERPL-SCNC: 21 MMOL/L (ref 20–33)
CREAT SERPL-MCNC: 0.44 MG/DL (ref 0.5–1.4)
ERYTHROCYTE [DISTWIDTH] IN BLOOD BY AUTOMATED COUNT: 43.7 FL (ref 35.9–50)
GFR SERPLBLD CREATININE-BSD FMLA CKD-EPI: 117 ML/MIN/1.73 M 2
GLOBULIN SER CALC-MCNC: 3.8 G/DL (ref 1.9–3.5)
GLUCOSE SERPL-MCNC: 109 MG/DL (ref 65–99)
HCT VFR BLD AUTO: 32.2 % (ref 42–52)
HCT VFR BLD AUTO: 33.2 % (ref 42–52)
HGB BLD-MCNC: 10.8 G/DL (ref 14–18)
HGB BLD-MCNC: 11.1 G/DL (ref 14–18)
MAGNESIUM SERPL-MCNC: 1.6 MG/DL (ref 1.5–2.5)
MCH RBC QN AUTO: 28.6 PG (ref 27–33)
MCHC RBC AUTO-ENTMCNC: 33.5 G/DL (ref 33.7–35.3)
MCV RBC AUTO: 85.2 FL (ref 81.4–97.8)
PHOSPHATE SERPL-MCNC: 2.5 MG/DL (ref 2.5–4.5)
PLATELET # BLD AUTO: 31 K/UL (ref 164–446)
PMV BLD AUTO: 13.1 FL (ref 9–12.9)
POTASSIUM SERPL-SCNC: 3.8 MMOL/L (ref 3.6–5.5)
PROT SERPL-MCNC: 7.3 G/DL (ref 6–8.2)
RBC # BLD AUTO: 3.78 M/UL (ref 4.7–6.1)
SODIUM SERPL-SCNC: 136 MMOL/L (ref 135–145)
TSH SERPL DL<=0.005 MIU/L-ACNC: 0.41 UIU/ML (ref 0.38–5.33)
VIT B12 SERPL-MCNC: 828 PG/ML (ref 211–911)
WBC # BLD AUTO: 1.4 K/UL (ref 4.8–10.8)

## 2023-04-12 PROCEDURE — 85018 HEMOGLOBIN: CPT

## 2023-04-12 PROCEDURE — 700102 HCHG RX REV CODE 250 W/ 637 OVERRIDE(OP)

## 2023-04-12 PROCEDURE — 700102 HCHG RX REV CODE 250 W/ 637 OVERRIDE(OP): Performed by: HOSPITALIST

## 2023-04-12 PROCEDURE — 85027 COMPLETE CBC AUTOMATED: CPT

## 2023-04-12 PROCEDURE — 84100 ASSAY OF PHOSPHORUS: CPT

## 2023-04-12 PROCEDURE — C9113 INJ PANTOPRAZOLE SODIUM, VIA: HCPCS | Performed by: FAMILY MEDICINE

## 2023-04-12 PROCEDURE — 36415 COLL VENOUS BLD VENIPUNCTURE: CPT

## 2023-04-12 PROCEDURE — A9270 NON-COVERED ITEM OR SERVICE: HCPCS

## 2023-04-12 PROCEDURE — 700105 HCHG RX REV CODE 258: Performed by: FAMILY MEDICINE

## 2023-04-12 PROCEDURE — 84443 ASSAY THYROID STIM HORMONE: CPT

## 2023-04-12 PROCEDURE — 85014 HEMATOCRIT: CPT

## 2023-04-12 PROCEDURE — 80053 COMPREHEN METABOLIC PANEL: CPT

## 2023-04-12 PROCEDURE — 770006 HCHG ROOM/CARE - MED/SURG/GYN SEMI*

## 2023-04-12 PROCEDURE — 700111 HCHG RX REV CODE 636 W/ 250 OVERRIDE (IP): Performed by: FAMILY MEDICINE

## 2023-04-12 PROCEDURE — 700111 HCHG RX REV CODE 636 W/ 250 OVERRIDE (IP): Performed by: HOSPITALIST

## 2023-04-12 PROCEDURE — A9270 NON-COVERED ITEM OR SERVICE: HCPCS | Performed by: FAMILY MEDICINE

## 2023-04-12 PROCEDURE — 83735 ASSAY OF MAGNESIUM: CPT

## 2023-04-12 PROCEDURE — 99233 SBSQ HOSP IP/OBS HIGH 50: CPT | Mod: 25 | Performed by: HOSPITALIST

## 2023-04-12 PROCEDURE — 99406 BEHAV CHNG SMOKING 3-10 MIN: CPT | Performed by: HOSPITALIST

## 2023-04-12 PROCEDURE — 99222 1ST HOSP IP/OBS MODERATE 55: CPT | Performed by: INTERNAL MEDICINE

## 2023-04-12 PROCEDURE — 82607 VITAMIN B-12: CPT

## 2023-04-12 PROCEDURE — 700102 HCHG RX REV CODE 250 W/ 637 OVERRIDE(OP): Performed by: FAMILY MEDICINE

## 2023-04-12 PROCEDURE — A9270 NON-COVERED ITEM OR SERVICE: HCPCS | Performed by: HOSPITALIST

## 2023-04-12 RX ORDER — MAGNESIUM SULFATE HEPTAHYDRATE 40 MG/ML
2 INJECTION, SOLUTION INTRAVENOUS ONCE
Status: COMPLETED | OUTPATIENT
Start: 2023-04-12 | End: 2023-04-12

## 2023-04-12 RX ORDER — PHYTONADIONE 5 MG/1
5 TABLET ORAL ONCE
Status: COMPLETED | OUTPATIENT
Start: 2023-04-12 | End: 2023-04-12

## 2023-04-12 RX ADMIN — FOLIC ACID 1 MG: 1 TABLET ORAL at 06:28

## 2023-04-12 RX ADMIN — NICOTINE TRANSDERMAL SYSTEM 21 MG: 21 PATCH, EXTENDED RELEASE TRANSDERMAL at 06:29

## 2023-04-12 RX ADMIN — PANTOPRAZOLE SODIUM 40 MG: 40 INJECTION, POWDER, FOR SOLUTION INTRAVENOUS at 06:25

## 2023-04-12 RX ADMIN — PHYTONADIONE 5 MG: 5 TABLET ORAL at 16:12

## 2023-04-12 RX ADMIN — SENNOSIDES AND DOCUSATE SODIUM 2 TABLET: 50; 8.6 TABLET ORAL at 06:28

## 2023-04-12 RX ADMIN — SENNOSIDES AND DOCUSATE SODIUM 2 TABLET: 50; 8.6 TABLET ORAL at 18:22

## 2023-04-12 RX ADMIN — LORAZEPAM 0.5 MG: 0.5 TABLET ORAL at 02:39

## 2023-04-12 RX ADMIN — THERA TABS 1 TABLET: TAB at 06:28

## 2023-04-12 RX ADMIN — Medication 100 MG: at 06:28

## 2023-04-12 RX ADMIN — SODIUM CHLORIDE, POTASSIUM CHLORIDE, SODIUM LACTATE AND CALCIUM CHLORIDE: 600; 310; 30; 20 INJECTION, SOLUTION INTRAVENOUS at 03:49

## 2023-04-12 RX ADMIN — MAGNESIUM SULFATE HEPTAHYDRATE 2 G: 40 INJECTION, SOLUTION INTRAVENOUS at 08:40

## 2023-04-12 RX ADMIN — QUETIAPINE FUMARATE 200 MG: 100 TABLET ORAL at 21:02

## 2023-04-12 RX ADMIN — PANTOPRAZOLE SODIUM 40 MG: 40 INJECTION, POWDER, FOR SOLUTION INTRAVENOUS at 18:22

## 2023-04-12 ASSESSMENT — LIFESTYLE VARIABLES
TREMOR: TREMOR NOT VISIBLE BUT CAN BE FELT, FINGERTIP TO FINGERTIP
NAUSEA AND VOMITING: NO NAUSEA AND NO VOMITING
PAROXYSMAL SWEATS: NO SWEAT VISIBLE
AGITATION: NORMAL ACTIVITY
ORIENTATION AND CLOUDING OF SENSORIUM: ORIENTED AND CAN DO SERIAL ADDITIONS
ANXIETY: NO ANXIETY (AT EASE)
TOTAL SCORE: 1
HEADACHE, FULLNESS IN HEAD: NOT PRESENT
VISUAL DISTURBANCES: NOT PRESENT
VISUAL DISTURBANCES: NOT PRESENT
TREMOR: NO TREMOR
TREMOR: *
TOTAL SCORE: 2
NAUSEA AND VOMITING: NO NAUSEA AND NO VOMITING
ANXIETY: NO ANXIETY (AT EASE)
NAUSEA AND VOMITING: MILD NAUSEA WITH NO VOMITING
HEADACHE, FULLNESS IN HEAD: NOT PRESENT
AUDITORY DISTURBANCES: NOT PRESENT
AGITATION: NORMAL ACTIVITY
VISUAL DISTURBANCES: NOT PRESENT
TREMOR: *
ORIENTATION AND CLOUDING OF SENSORIUM: ORIENTED AND CAN DO SERIAL ADDITIONS
ANXIETY: NO ANXIETY (AT EASE)
HEADACHE, FULLNESS IN HEAD: MILD
AGITATION: NORMAL ACTIVITY
NAUSEA AND VOMITING: NO NAUSEA AND NO VOMITING
PAROXYSMAL SWEATS: NO SWEAT VISIBLE
HEADACHE, FULLNESS IN HEAD: MILD
VISUAL DISTURBANCES: NOT PRESENT
AGITATION: NORMAL ACTIVITY
ORIENTATION AND CLOUDING OF SENSORIUM: ORIENTED AND CAN DO SERIAL ADDITIONS
SUBSTANCE_ABUSE: 0
PAROXYSMAL SWEATS: NO SWEAT VISIBLE
AUDITORY DISTURBANCES: NOT PRESENT
TOTAL SCORE: 5
ANXIETY: NO ANXIETY (AT EASE)
ORIENTATION AND CLOUDING OF SENSORIUM: ORIENTED AND CAN DO SERIAL ADDITIONS
AUDITORY DISTURBANCES: NOT PRESENT
NAUSEA AND VOMITING: NO NAUSEA AND NO VOMITING
NAUSEA AND VOMITING: NO NAUSEA AND NO VOMITING
AUDITORY DISTURBANCES: NOT PRESENT
ORIENTATION AND CLOUDING OF SENSORIUM: ORIENTED AND CAN DO SERIAL ADDITIONS
TOTAL SCORE: 1
TREMOR: TREMOR NOT VISIBLE BUT CAN BE FELT, FINGERTIP TO FINGERTIP
TOTAL SCORE: 2
VISUAL DISTURBANCES: NOT PRESENT
VISUAL DISTURBANCES: NOT PRESENT
TREMOR: NO TREMOR
PAROXYSMAL SWEATS: NO SWEAT VISIBLE
AUDITORY DISTURBANCES: NOT PRESENT
AGITATION: NORMAL ACTIVITY
AUDITORY DISTURBANCES: NOT PRESENT
AGITATION: NORMAL ACTIVITY
TOTAL SCORE: 2
ORIENTATION AND CLOUDING OF SENSORIUM: ORIENTED AND CAN DO SERIAL ADDITIONS
HEADACHE, FULLNESS IN HEAD: VERY MILD
ANXIETY: MILDLY ANXIOUS
HEADACHE, FULLNESS IN HEAD: NOT PRESENT
ANXIETY: NO ANXIETY (AT EASE)

## 2023-04-12 ASSESSMENT — ENCOUNTER SYMPTOMS
FLANK PAIN: 0
SORE THROAT: 0
COUGH: 0
DIAPHORESIS: 0
PSYCHIATRIC NEGATIVE: 1
CARDIOVASCULAR NEGATIVE: 1
MUSCULOSKELETAL NEGATIVE: 1
VOMITING: 0
HEARTBURN: 0
SENSORY CHANGE: 0
BLOOD IN STOOL: 0
DIARRHEA: 0
SHORTNESS OF BREATH: 0
NERVOUS/ANXIOUS: 0
CHILLS: 0
BRUISES/BLEEDS EASILY: 0
DEPRESSION: 0
RESPIRATORY NEGATIVE: 1
BLURRED VISION: 0
TREMORS: 0
SPEECH CHANGE: 0
FOCAL WEAKNESS: 0
EYES NEGATIVE: 1
MYALGIAS: 0
WEAKNESS: 1
DIZZINESS: 0
ABDOMINAL PAIN: 0
NAUSEA: 1
HEADACHES: 0
NECK PAIN: 0
WHEEZING: 0
BACK PAIN: 0
WEIGHT LOSS: 0
PALPITATIONS: 0
FEVER: 0

## 2023-04-12 NOTE — CARE PLAN
The patient is Stable - Low risk of patient condition declining or worsening    Shift Goals  Clinical Goals: CIWA, Monitor mg/na Labs  Patient Goals: comfort  Family Goals: randy    Progress made toward(s) clinical / shift goals:    Problem: Risk for Aspiration  Goal: Patient's risk for aspiration will be absent or decrease  Outcome: Progressing     Problem: Knowledge Deficit - Standard  Goal: Patient and family/care givers will demonstrate understanding of plan of care, disease process/condition, diagnostic tests and medications  Outcome: Progressing       Patient is not progressing towards the following goals:

## 2023-04-12 NOTE — ASSESSMENT & PLAN NOTE
Acute on chronic  Remains severe and high risk for bleed, i will continue to check serial cbc  Related to etoh toxicity and cirrhosis  S/p vitamin K on 4/12  Will transfuse plts if less 20, continue to follow closely

## 2023-04-12 NOTE — PROGRESS NOTES
[facility] Medicine Daily Progress Note    Date of Service  4/12/2023    Chief Complaint  Inocencio Shabazz is a 63 y.o. male admitted 4/10/2023 with alcohol withdrawal    Hospital Course  Patient is a 63 year old male with history of alcohol abuse ( with acute withdrawal), hematemesis, liver cirrhosis, hepatitis C, SSS (with Pacer placed in 2022), schizophrenia, bipolar disorder and esophageal varices (banded x 5 in 2019). He presented to [facility] on 4/10/23 with acute alcohol intoxication and hematemesis.  He was placed on CIWA protocol, and on an octreotide drip.    Interval Problem Update  Axox3, ciwa score decreasing, he reports minimal abdominal pain 2/10. He denies melena or hematochezia but no bm overnight, no further hematemesis but some ongoing nausea. No chest pain. ROS otherwise negative.     I have discussed this patient's plan of care and discharge plan at IDT rounds today with Case Management, Nursing, Nursing leadership, and other members of the IDT team.    Consultants/Specialty  GI    Code Status  Full Code    Disposition  Patient is not medically cleared for discharge.   Anticipate discharge to to home with close outpatient follow-up.  I have placed the appropriate orders for post-discharge needs.    Review of Systems  Review of Systems   Constitutional:  Positive for malaise/fatigue. Negative for chills, diaphoresis, fever and weight loss.   HENT: Negative.  Negative for congestion, hearing loss and sore throat.    Eyes: Negative.  Negative for blurred vision.   Respiratory: Negative.  Negative for cough, shortness of breath and wheezing.    Cardiovascular: Negative.  Negative for chest pain, palpitations and leg swelling.   Gastrointestinal:  Positive for nausea. Negative for abdominal pain, blood in stool, diarrhea, heartburn, melena and vomiting.   Genitourinary: Negative.  Negative for dysuria, flank pain and hematuria.   Musculoskeletal: Negative.  Negative for back pain, joint pain, myalgias  and neck pain.   Skin: Negative.  Negative for itching and rash.   Neurological:  Positive for weakness. Negative for dizziness, tremors, sensory change, speech change, focal weakness and headaches.   Endo/Heme/Allergies: Negative.  Does not bruise/bleed easily.   Psychiatric/Behavioral: Negative.  Negative for depression, substance abuse and suicidal ideas. The patient is not nervous/anxious.    All other systems reviewed and are negative.     Physical Exam  Temp:  [36.3 °C (97.3 °F)-36.3 °C (97.4 °F)] 36.3 °C (97.3 °F)  Pulse:  [69-85] 85  Resp:  [15-18] 18  BP: (100-147)/(68-96) 116/79  SpO2:  [93 %-98 %] 98 %    Physical Exam  Vitals and nursing note reviewed.   HENT:      Head: Normocephalic and atraumatic.      Nose: No congestion.      Mouth/Throat:      Mouth: Mucous membranes are moist.   Eyes:      Extraocular Movements: Extraocular movements intact.      Conjunctiva/sclera: Conjunctivae normal.   Cardiovascular:      Rate and Rhythm: Normal rate and regular rhythm.   Pulmonary:      Effort: Pulmonary effort is normal.      Breath sounds: Normal breath sounds.   Abdominal:      General: There is no distension.      Tenderness: There is no abdominal tenderness. There is no guarding or rebound.   Musculoskeletal:      Cervical back: No tenderness.      Right lower leg: No edema.      Left lower leg: No edema.   Skin:     General: Skin is warm and dry.   Neurological:      General: No focal deficit present.      Mental Status: He is alert and oriented to person, place, and time.      Cranial Nerves: No cranial nerve deficit.      Motor: No tremor.       Fluids    Intake/Output Summary (Last 24 hours) at 4/12/2023 1435  Last data filed at 4/12/2023 1026  Gross per 24 hour   Intake 420 ml   Output 1900 ml   Net -1480 ml       Laboratory  Recent Labs     04/10/23  1703 04/11/23  0119 04/12/23  0357   WBC 4.7* 2.8* 1.4*   RBC 4.69* 3.85* 3.78*   HEMOGLOBIN 13.6* 11.1* 10.8*   HEMATOCRIT 41.1* 34.4* 32.2*   MCV  87.6 89.4 85.2   MCH 29.0 28.8 28.6   MCHC 33.1* 32.3* 33.5*   RDW 49.6 50.7* 43.7   PLATELETCT 69* 44* 31*   MPV 12.1 12.9 13.1*     Recent Labs     04/10/23  1703 04/11/23  0119 04/12/23  0225   SODIUM 149* 143 136   POTASSIUM 3.6 4.2 3.8   CHLORIDE 110 110 100   CO2 21 19* 21   GLUCOSE 117* 84 109*   BUN 11 10 9   CREATININE 0.64 0.53 0.44*   CALCIUM 8.3* 7.6* 7.8*     Recent Labs     04/10/23  1703   APTT 30.3   INR 1.25*               Imaging  DX-CHEST-PORTABLE (1 VIEW)   Final Result      1.  There is no acute cardiopulmonary process.           Assessment/Plan  * Alcohol abuse with withdrawal (HCC)- (present on admission)  Assessment & Plan  Continue ciwa  Improving w/d sx  Cessation strategies discussed and recommended  Thiamine + Folic      Tobacco use- (present on admission)  Assessment & Plan  Cessation discussed with strategies > 3 min    Hypernatremia- (present on admission)  Assessment & Plan  resolved    Pacemaker- (present on admission)  Assessment & Plan  Secondary to SSS    Psychiatric illness- (present on admission)  Assessment & Plan  Resume Seroquel    Hematemesis- (present on admission)  Assessment & Plan  None in 24 hour   Very high risk for recurrence, he has known varices, last banding 5 years ago  Hbg slowly decreasing  I spoke with GI, they will eval and consider egd  Following  i will continue serial h/h and transfuse if hbg less 7  Iv protonix    Pancytopenia (HCC)- (present on admission)  Assessment & Plan  Acute on chronic related to etoh toxicity  Following  See above    Hypomagnesemia- (present on admission)  Assessment & Plan  Replaced but still low  Will give additional iv mag  following    Paroxysmal atrial fibrillation (HCC)- (present on admission)  Assessment & Plan  Continue toprol and tolerated  Rate controlled  Poor candidate for anticoagulation secondary to GIB, liver cirrhosis, and noncompliance    Schizophrenia (HCC)- (present on admission)  Assessment & Plan  No evidence  of acute exacerbation  Continue supportive care    Cirrhosis of liver (HCC)- (present on admission)  Assessment & Plan  Alcohol vs Hep C    Hepatitis C- (present on admission)  Assessment & Plan  He plans to follow up with GI as outpatient    Thrombocytopenia (HCC)- (present on admission)  Assessment & Plan  Acute on chronic  Severe  Related to etoh toxicity and cirrhosis  Increases risk of fatal bleed  inr also elevated so will give vitamin K  Will transfuse plts if less 20, continue to follow closely         VTE prophylaxis: SCDs/TEDs    I have performed a physical exam and reviewed and updated ROS and Plan today (4/12/2023). In review of yesterday's note (4/11/2023), there are no changes except as documented above.

## 2023-04-12 NOTE — PROGRESS NOTES
Pt A&Ox4, on 1L NC, VSS. CIWA scores 6 and 7; ativan given.   Pt ambulating w/ 1 person assist, voiding. No c/o pain.   Tolerating CLD, no n/v   Pt in bed w/ bed alarm on, call light in reach.

## 2023-04-12 NOTE — DIETARY
"Nutrition services: Day 2 of admit.  Inocencio Shabazz is a 63 y.o. male with admitting DX of alcohol abuse with withdrawal.  Consult received for Failure to thrive and poor po.       Assessment:  Height: 167.6 cm (5' 6\")  Weight: 64.3 kg (141 lb 12.1 oz)  Body mass index is 22.88 kg/m²., BMI classification: Normal  Diet/Intake: Clear Liquid no red.     Evaluation:   RD able to visit pt at bed side. Pt reports normal appetite and no weight changes prior to admission. Per chart review pt wt is trending up the past few months.   Pt eating % of clear liquids so far. Clear liquids do not provide adequate nutrition, RD was going to send Boost breeze however pt on clear liquids no reds. Unable to send supplements at this time. Advance diet as medically feasible.   Per NFPE pt with moderate muscle and fat loss in the temporal, orbital and clavicle region.   Labs: Glu 109  Meds: bowel regimen, Vit B1, Multivitamin, Folic acid.   +BM PTA    Malnutrition Risk: Per ASPEN guidelines, pt meets criteria for severe malnutrition in the context of acute illness related to excessive alcohol use as evidence by moderate muscle and fat wasting in multiple regions.     Recommendations/Plan:  Diet advancement per MD.    Encourage intake of >50%.  Document intake of all PO as % taken in ADL's to provide interdisciplinary communication across all shifts.   Monitor weight.  Nutrition rep will continue to see patient for ongoing meal and snack preferences.       RD following.       "

## 2023-04-12 NOTE — PROGRESS NOTES
Assumed care of patient and received report.  Patient is oriented x 4, on 1 L nasal canula, and reports pain level of 0.  CIWA:  Patient positive for tremor in hands, score 1.

## 2023-04-13 VITALS
OXYGEN SATURATION: 98 % | BODY MASS INDEX: 22.78 KG/M2 | SYSTOLIC BLOOD PRESSURE: 102 MMHG | HEART RATE: 74 BPM | HEIGHT: 66 IN | WEIGHT: 141.76 LBS | RESPIRATION RATE: 18 BRPM | DIASTOLIC BLOOD PRESSURE: 72 MMHG | TEMPERATURE: 98.4 F

## 2023-04-13 LAB
ALBUMIN SERPL BCP-MCNC: 3.5 G/DL (ref 3.2–4.9)
ALBUMIN/GLOB SERPL: 1 G/DL
ALP SERPL-CCNC: 80 U/L (ref 30–99)
ALT SERPL-CCNC: 60 U/L (ref 2–50)
ANION GAP SERPL CALC-SCNC: 12 MMOL/L (ref 7–16)
AST SERPL-CCNC: 78 U/L (ref 12–45)
BASOPHILS # BLD AUTO: 0.5 % (ref 0–1.8)
BASOPHILS # BLD: 0.01 K/UL (ref 0–0.12)
BILIRUB SERPL-MCNC: 1.4 MG/DL (ref 0.1–1.5)
BUN SERPL-MCNC: 5 MG/DL (ref 8–22)
CALCIUM ALBUM COR SERPL-MCNC: 8.8 MG/DL (ref 8.5–10.5)
CALCIUM SERPL-MCNC: 8.4 MG/DL (ref 8.5–10.5)
CHLORIDE SERPL-SCNC: 106 MMOL/L (ref 96–112)
CO2 SERPL-SCNC: 21 MMOL/L (ref 20–33)
CREAT SERPL-MCNC: 0.55 MG/DL (ref 0.5–1.4)
EOSINOPHIL # BLD AUTO: 0.1 K/UL (ref 0–0.51)
EOSINOPHIL NFR BLD: 4.9 % (ref 0–6.9)
ERYTHROCYTE [DISTWIDTH] IN BLOOD BY AUTOMATED COUNT: 45.1 FL (ref 35.9–50)
GFR SERPLBLD CREATININE-BSD FMLA CKD-EPI: 110 ML/MIN/1.73 M 2
GLOBULIN SER CALC-MCNC: 3.6 G/DL (ref 1.9–3.5)
GLUCOSE SERPL-MCNC: 113 MG/DL (ref 65–99)
HCT VFR BLD AUTO: 34.7 % (ref 42–52)
HGB BLD-MCNC: 11.6 G/DL (ref 14–18)
IMM GRANULOCYTES # BLD AUTO: 0.01 K/UL (ref 0–0.11)
IMM GRANULOCYTES NFR BLD AUTO: 0.5 % (ref 0–0.9)
INR PPP: 1.41 (ref 0.87–1.13)
LYMPHOCYTES # BLD AUTO: 0.49 K/UL (ref 1–4.8)
LYMPHOCYTES NFR BLD: 23.8 % (ref 22–41)
MAGNESIUM SERPL-MCNC: 1.5 MG/DL (ref 1.5–2.5)
MCH RBC QN AUTO: 28.8 PG (ref 27–33)
MCHC RBC AUTO-ENTMCNC: 33.4 G/DL (ref 33.7–35.3)
MCV RBC AUTO: 86.1 FL (ref 81.4–97.8)
MONOCYTES # BLD AUTO: 0.23 K/UL (ref 0–0.85)
MONOCYTES NFR BLD AUTO: 11.2 % (ref 0–13.4)
NEUTROPHILS # BLD AUTO: 1.22 K/UL (ref 1.82–7.42)
NEUTROPHILS NFR BLD: 59.1 % (ref 44–72)
NRBC # BLD AUTO: 0 K/UL
NRBC BLD-RTO: 0 /100 WBC
PLATELET # BLD AUTO: 25 K/UL (ref 164–446)
POTASSIUM SERPL-SCNC: 3.4 MMOL/L (ref 3.6–5.5)
PROT SERPL-MCNC: 7.1 G/DL (ref 6–8.2)
PROTHROMBIN TIME: 17 SEC (ref 12–14.6)
RBC # BLD AUTO: 4.03 M/UL (ref 4.7–6.1)
SODIUM SERPL-SCNC: 139 MMOL/L (ref 135–145)
WBC # BLD AUTO: 2.1 K/UL (ref 4.8–10.8)

## 2023-04-13 PROCEDURE — 80053 COMPREHEN METABOLIC PANEL: CPT

## 2023-04-13 PROCEDURE — 83735 ASSAY OF MAGNESIUM: CPT

## 2023-04-13 PROCEDURE — 97162 PT EVAL MOD COMPLEX 30 MIN: CPT

## 2023-04-13 PROCEDURE — C9113 INJ PANTOPRAZOLE SODIUM, VIA: HCPCS | Performed by: FAMILY MEDICINE

## 2023-04-13 PROCEDURE — A9270 NON-COVERED ITEM OR SERVICE: HCPCS | Performed by: HOSPITALIST

## 2023-04-13 PROCEDURE — 85610 PROTHROMBIN TIME: CPT

## 2023-04-13 PROCEDURE — 700111 HCHG RX REV CODE 636 W/ 250 OVERRIDE (IP): Performed by: HOSPITALIST

## 2023-04-13 PROCEDURE — 36415 COLL VENOUS BLD VENIPUNCTURE: CPT

## 2023-04-13 PROCEDURE — 700105 HCHG RX REV CODE 258: Performed by: FAMILY MEDICINE

## 2023-04-13 PROCEDURE — 700102 HCHG RX REV CODE 250 W/ 637 OVERRIDE(OP)

## 2023-04-13 PROCEDURE — A9270 NON-COVERED ITEM OR SERVICE: HCPCS

## 2023-04-13 PROCEDURE — 700111 HCHG RX REV CODE 636 W/ 250 OVERRIDE (IP): Performed by: FAMILY MEDICINE

## 2023-04-13 PROCEDURE — 700102 HCHG RX REV CODE 250 W/ 637 OVERRIDE(OP): Performed by: HOSPITALIST

## 2023-04-13 PROCEDURE — 85025 COMPLETE CBC W/AUTO DIFF WBC: CPT

## 2023-04-13 PROCEDURE — 99233 SBSQ HOSP IP/OBS HIGH 50: CPT | Performed by: HOSPITALIST

## 2023-04-13 RX ORDER — POTASSIUM CHLORIDE 20 MEQ/1
20 TABLET, EXTENDED RELEASE ORAL ONCE
Status: COMPLETED | OUTPATIENT
Start: 2023-04-13 | End: 2023-04-13

## 2023-04-13 RX ORDER — MAGNESIUM SULFATE HEPTAHYDRATE 40 MG/ML
2 INJECTION, SOLUTION INTRAVENOUS ONCE
Status: COMPLETED | OUTPATIENT
Start: 2023-04-13 | End: 2023-04-13

## 2023-04-13 RX ADMIN — FOLIC ACID 1 MG: 1 TABLET ORAL at 04:52

## 2023-04-13 RX ADMIN — POTASSIUM CHLORIDE 20 MEQ: 1500 TABLET, EXTENDED RELEASE ORAL at 08:32

## 2023-04-13 RX ADMIN — PANTOPRAZOLE SODIUM 40 MG: 40 INJECTION, POWDER, FOR SOLUTION INTRAVENOUS at 04:52

## 2023-04-13 RX ADMIN — SENNOSIDES AND DOCUSATE SODIUM 2 TABLET: 50; 8.6 TABLET ORAL at 04:52

## 2023-04-13 RX ADMIN — THERA TABS 1 TABLET: TAB at 04:52

## 2023-04-13 RX ADMIN — Medication 100 MG: at 04:52

## 2023-04-13 RX ADMIN — MAGNESIUM SULFATE HEPTAHYDRATE 2 G: 40 INJECTION, SOLUTION INTRAVENOUS at 08:32

## 2023-04-13 RX ADMIN — SODIUM CHLORIDE, POTASSIUM CHLORIDE, SODIUM LACTATE AND CALCIUM CHLORIDE: 600; 310; 30; 20 INJECTION, SOLUTION INTRAVENOUS at 04:51

## 2023-04-13 RX ADMIN — NICOTINE TRANSDERMAL SYSTEM 21 MG: 21 PATCH, EXTENDED RELEASE TRANSDERMAL at 04:52

## 2023-04-13 ASSESSMENT — ENCOUNTER SYMPTOMS
SHORTNESS OF BREATH: 0
WEAKNESS: 1
DEPRESSION: 0
DIARRHEA: 0
SPEECH CHANGE: 0
HEMOPTYSIS: 0
PSYCHIATRIC NEGATIVE: 1
DIZZINESS: 0
TREMORS: 0
PALPITATIONS: 0
BRUISES/BLEEDS EASILY: 0
BLURRED VISION: 0
HEARTBURN: 0
MUSCULOSKELETAL NEGATIVE: 1
MYALGIAS: 0
HEADACHES: 0
PHOTOPHOBIA: 0
ABDOMINAL PAIN: 0
NECK PAIN: 0
COUGH: 0
FOCAL WEAKNESS: 0
DIAPHORESIS: 0
WEIGHT LOSS: 1
CARDIOVASCULAR NEGATIVE: 1
BACK PAIN: 0
FLANK PAIN: 0
NAUSEA: 0
SORE THROAT: 0
BLOOD IN STOOL: 0
WHEEZING: 0
WEIGHT LOSS: 0
NEUROLOGICAL NEGATIVE: 1
NERVOUS/ANXIOUS: 0
EYES NEGATIVE: 1
VOMITING: 0
RESPIRATORY NEGATIVE: 1
CHILLS: 0
SENSORY CHANGE: 0
CONSTIPATION: 0
FEVER: 0

## 2023-04-13 ASSESSMENT — LIFESTYLE VARIABLES
VISUAL DISTURBANCES: NOT PRESENT
VISUAL DISTURBANCES: NOT PRESENT
AGITATION: NORMAL ACTIVITY
VISUAL DISTURBANCES: NOT PRESENT
HEADACHE, FULLNESS IN HEAD: NOT PRESENT
ANXIETY: NO ANXIETY (AT EASE)
NAUSEA AND VOMITING: NO NAUSEA AND NO VOMITING
TOTAL SCORE: 0
ORIENTATION AND CLOUDING OF SENSORIUM: ORIENTED AND CAN DO SERIAL ADDITIONS
AUDITORY DISTURBANCES: NOT PRESENT
SUBSTANCE_ABUSE: 0
ANXIETY: NO ANXIETY (AT EASE)
TREMOR: NO TREMOR
PAROXYSMAL SWEATS: NO SWEAT VISIBLE
AUDITORY DISTURBANCES: NOT PRESENT
AUDITORY DISTURBANCES: NOT PRESENT
HEADACHE, FULLNESS IN HEAD: MILD
AGITATION: NORMAL ACTIVITY
NAUSEA AND VOMITING: NO NAUSEA AND NO VOMITING
HEADACHE, FULLNESS IN HEAD: MILD
PAROXYSMAL SWEATS: NO SWEAT VISIBLE
ORIENTATION AND CLOUDING OF SENSORIUM: ORIENTED AND CAN DO SERIAL ADDITIONS
ANXIETY: NO ANXIETY (AT EASE)
PAROXYSMAL SWEATS: NO SWEAT VISIBLE
TREMOR: NO TREMOR
NAUSEA AND VOMITING: NO NAUSEA AND NO VOMITING
AGITATION: NORMAL ACTIVITY
TOTAL SCORE: 2
TREMOR: NO TREMOR

## 2023-04-13 ASSESSMENT — COGNITIVE AND FUNCTIONAL STATUS - GENERAL
MOBILITY SCORE: 24
SUGGESTED CMS G CODE MODIFIER MOBILITY: CH

## 2023-04-13 ASSESSMENT — GAIT ASSESSMENTS
GAIT LEVEL OF ASSIST: STANDBY ASSIST
DEVIATION: NO DEVIATION
DISTANCE (FEET): 490

## 2023-04-13 NOTE — THERAPY
"Physical Therapy   Initial Evaluation     Patient Name: Inocencio Shabazz  Age:  63 y.o., Sex:  male  Medical Record #: [id number]  Today's Date: 4/13/2023          Assessment    Patient is 63 y.o. male with alcohol intoxication and associated pain in abdomen after episode of bloody emesis. Past medical history of alcohol abuse, sick sinus syndrome with pacemaker 10/22, schizophrenia, bipolar, hepatitis C, cirrhosis, GI bleeding with esophageal varices.     Pt is agreeable to participation in therapy session and tolerates session well. Pt demonstrates adequate safety awareness during functional mobility including bed mobility, transfers to chair/standing and ambulation in hallway without device. No pain, SOB or LOB during therapy session.     Pt does not require further acute PT services at this time, is appropriate for DC home independently when medically cleared. No DME needs.         Plan    Physical Therapy Initial Treatment Plan   Duration: (P) Evaluation only    DC Equipment Recommendations: (P) None  Discharge Recommendations: (P) Anticipate that the patient will have no further physical therapy needs after discharge from the hospital       Subjective    \"Can I go for a walk?\"     Objective       04/13/23 1155   Charge Group   PT Evaluation PT Evaluation Mod   Total Time Spent   PT Total Time Yes   PT Evaluation Time Spent (Mins) 15    Services   Is patient using  services for this encounter? No   Initial Contact Note    Initial Contact Note Order Received and Verified, Evaluation Only - Patient Does Not Require Further Acute Physical Therapy at this Time.  However, May Benefit from Post Acute Therapy for Higher Level Functional Deficits.   Vitals   O2 Delivery Device None - Room Air   Pain 0 - 10 Group   Therapist Pain Assessment Nurse Notified;0;Post Activity Pain Same as Prior to Activity   Prior Living Situation   Prior Services Home-Independent   Housing / Facility 1 Story Apartment " / Condo   Equipment Owned None   Lives with - Patient's Self Care Capacity Alone and Able to Care For Self   Prior Level of Functional Mobility   Bed Mobility Independent   Transfer Status Independent   Ambulation Independent   Ambulation Distance community   Assistive Devices Used None   Stairs Independent   History of Falls   History of Falls No   Cognition    Cognition / Consciousness WDL   Level of Consciousness Alert   Comments cooperative, pleasant   Strength Upper Body   Upper Body Strength  WDL   Strength Lower Body   Lower Body Strength  WDL   Coordination Upper Body   Coordination WDL   Coordination Lower Body    Coordination Lower Body  WDL   Balance Assessment   Sitting Balance (Static) Good   Sitting Balance (Dynamic) Good   Standing Balance (Static) Fair +   Standing Balance (Dynamic) Fair +   Weight Shift Sitting Good   Weight Shift Standing Fair   Bed Mobility    Supine to Sit Supervised   Sit to Supine Supervised   Gait Analysis   Gait Level Of Assist Standby Assist   Assistive Device None   Distance (Feet) 490   # of Times Distance was Traveled 1   Deviation No deviation   Functional Mobility   Sit to Stand Supervised   Mobility supine to sit, ambulation to toilet, in hallway w/o AD   How much difficulty does the patient currently have...   Turning over in bed (including adjusting bedclothes, sheets and blankets)? 4   Sitting down on and standing up from a chair with arms (e.g., wheelchair, bedside commode, etc.) 4   Moving from lying on back to sitting on the side of the bed? 4   How much help from another person does the patient currently need...   Moving to and from a bed to a chair (including a wheelchair)? 4   Need to walk in a hospital room? 4   Climbing 3-5 steps with a railing? 4   6 clicks Mobility Score 24   Activity Tolerance   Sitting in Chair 3 min   Sitting Edge of Bed 1 min   Standing 7 min   Education Group   Education Provided Role of Physical Therapist;Gait Training   Role of  Physical Therapist Patient Response Patient;Acceptance;Explanation;Verbal Demonstration   Gait Training Patient Response Patient;Acceptance;Explanation;Verbal Demonstration;Action Demonstration   Physical Therapy Initial Treatment Plan    Duration Evaluation only   Problem List    Problems None   Anticipated Discharge Equipment and Recommendations   DC Equipment Recommendations None   Discharge Recommendations Anticipate that the patient will have no further physical therapy needs after discharge from the hospital   Interdisciplinary Plan of Care Collaboration   IDT Collaboration with  Nursing   Patient Position at End of Therapy Seated;Call Light within Reach;Tray Table within Reach;Phone within Reach   Collaboration Comments RN updated   Session Information   Date / Session Number  4/13 -eval only       GLO OspinaT

## 2023-04-13 NOTE — CARE PLAN
Patient A&Ox4, on 1L NC, VSS. Tolerating CLD, no c/o n/v.   Ambulating, voiding, 1x bm today.     The patient is Stable - Low risk of patient condition declining or worsening    Shift Goals  Clinical Goals: CIWA, Monitor mg/na Labs  Patient Goals: comfort  Family Goals: randy    Progress made toward(s) clinical / shift goals:    Problem: Knowledge Deficit - Standard  Goal: Patient and family/care givers will demonstrate understanding of plan of care, disease process/condition, diagnostic tests and medications  Outcome: Progressing  Note: Plan of care discussed with patient      Problem: Optimal Care for Alcohol Withdrawal  Goal: Optimal Care for the alcohol withdrawal patient  Outcome: Progressing  Note: Last CIWA score 2      Problem: Seizure Precautions  Goal: Implementation of seizure precautions  Outcome: Progressing  Note: Bed rails padded      Problem: Pain - Standard  Goal: Alleviation of pain or a reduction in pain to the patient’s comfort goal  Outcome: Progressing       Patient is not progressing towards the following goals:

## 2023-04-13 NOTE — DISCHARGE SUMMARY
Discharge Summary    CHIEF COMPLAINT ON ADMISSION  Chief Complaint   Patient presents with    Alcohol Intoxication    Blood in Vomit       Reason for Admission  ems     Admission Date  4/10/2023    CODE STATUS  Full Code    HPI & HOSPITAL COURSE  Patient is a 63 year old male with history of alcohol abuse ( with acute withdrawal), hematemesis, liver cirrhosis, hepatitis C, SSS (with Pacer placed in 2022), schizophrenia, bipolar disorder and esophageal varices (banded x 5 in 2019). He was admitted to [facility] on 4/10/23 with acute alcohol intoxication and hematemesis.  He was placed on CIWA protocol, and on an octreotide drip. His withdrawal symptoms resolved and he had no further emesis. His hbg was stable. He was awaiting a GI consult but wanted to leave AMA, we did have a discussion including death especially with his critically low plt levels. He expressed good verbal understanding of the risks and agreed to stay but then apparently eloped when his nurse was in another room and left AMA. His long term prognosis is guarded, especially if he resumes alcohol use.      Interval Problem Update  Axox3,     Therefore, he is discharged in guarded and stable condition against medcial advice.    The patient met 2-midnight criteria for an inpatient stay at the time of discharge.    Discharge Date  4/13/2023    FOLLOW UP ITEMS POST DISCHARGE    DISCHARGE DIAGNOSES  Principal Problem:    Alcohol abuse with withdrawal (HCC) POA: Yes  Active Problems:    Thrombocytopenia (HCC) POA: Yes    Hepatitis C POA: Yes    Cirrhosis of liver (HCC) POA: Yes    Schizophrenia (HCC) POA: Yes    Paroxysmal atrial fibrillation (HCC) POA: Yes    Hypokalemia POA: Yes    Hypomagnesemia POA: Yes    Pancytopenia (HCC) POA: Yes    Hematemesis POA: Yes    Psychiatric illness POA: Yes    Pacemaker POA: Yes    Hypernatremia POA: Yes    Tobacco use POA: Yes  Resolved Problems:    * No resolved hospital problems. *      FOLLOW UP  No future  appointments.  No follow-up provider specified.    MEDICATIONS ON DISCHARGE     Medication List        ASK your doctor about these medications        Instructions   diclofenac sodium 1 % Gel  Commonly known as: Voltaren   Apply 4 g topically 4 times a day as needed (Joint or Arthritis Pain).  Dose: 4 g     folic acid 1 MG Tabs  Commonly known as: FOLVITE   Take 1 Tablet by mouth every day.  Dose: 1 mg     Magnesium Oxide 420 (252 Mg) MG Tabs   Take 1 Tablet by mouth every day.  Dose: 1 Tablet     metoprolol SR 50 MG Tb24  Commonly known as: TOPROL XL   Take 25 mg by mouth every day. 0.5 tablet = 25 mg.  Dose: 25 mg     ondansetron 4 MG Tabs tablet  Commonly known as: ZOFRAN   Take 4 mg by mouth 3 times a day as needed for Nausea/Vomiting.  Dose: 4 mg     pantoprazole 40 MG Tbec  Commonly known as: PROTONIX   Take 40 mg by mouth 2 times daily, before breakfast and dinner.  Dose: 40 mg     QUEtiapine 200 MG Tabs  Commonly known as: Seroquel   Take 200 mg by mouth at bedtime.  Dose: 200 mg              Allergies  Allergies   Allergen Reactions    Haloperidol Unspecified     Twitching/involuntary movements        DIET  Orders Placed This Encounter   Procedures    Diet Order Diet: Clear Liquid; Miscellaneous modifications: (optional): No Red     Standing Status:   Standing     Number of Occurrences:   1     Order Specific Question:   Diet:     Answer:   Clear Liquid [10]     Order Specific Question:   Miscellaneous modifications: (optional)     Answer:   No Red [61]       ACTIVITY    CONSULTATIONS  GI    PROCEDURES  DX-CHEST-PORTABLE (1 VIEW)   Final Result      1.  There is no acute cardiopulmonary process.            LABORATORY  Lab Results   Component Value Date    SODIUM 139 04/13/2023    POTASSIUM 3.4 (L) 04/13/2023    CHLORIDE 106 04/13/2023    CO2 21 04/13/2023    GLUCOSE 113 (H) 04/13/2023    BUN 5 (L) 04/13/2023    CREATININE 0.55 04/13/2023        Lab Results   Component Value Date    WBC 2.1 (L) 04/13/2023     HEMOGLOBIN 11.6 (L) 04/13/2023    HEMATOCRIT 34.7 (L) 04/13/2023    PLATELETCT 25 (L) 04/13/2023        Total time of the discharge process exceeds 45 minutes.

## 2023-04-13 NOTE — CARE PLAN
The patient is Stable - Low risk of patient condition declining or worsening    Shift Goals  Clinical Goals: CIWA, LABS  Patient Goals: Discharge  Family Goals: randy    Progress made toward(s) clinical / shift goals:    Problem: Knowledge Deficit - Standard  Goal: Patient and family/care givers will demonstrate understanding of plan of care, disease process/condition, diagnostic tests and medications  Outcome: Progressing     Problem: Pain - Standard  Goal: Alleviation of pain or a reduction in pain to the patient’s comfort goal  Outcome: Progressing       Patient is not progressing towards the following goals:

## 2023-04-13 NOTE — PROGRESS NOTES
Patient eloped around 1500. He removed his two PIV's and left them on the counter. Charge nurse and MD made aware.     Earlier in shift around 1330 patient expressed wanting to leave AMA. MD came to bedside and spoke with patient, who then agreed to stay and sat back in his chair and was calm and cooperative with care.

## 2023-04-13 NOTE — CONSULTS
"Gastroenterology Initial Consult Note               Author:  Tamela Smith M.D. Date & Time Created: 4/12/2023 5:24 PM       Patient ID:  Name:             Inocencio Shabazz  YOB: 1960  Age:                 63 y.o.  male  MRN:               [id number]      Referring Provider:  Dr. Maria        Presenting Chief Complaint:  \"I'm dying\"      History of Present Illness:     During check in to ER 4/10/23, patient self induced vomiting and was noted to have chin red emesis.  He continued to try and induce vomiting at Check IN.  Brought in for alcohol intoxication.  Patient was not aware he was doing this and has not had any further vomiting that he can recall.    64yo male has had numerous admissions for similar complaints.  Per chart review, he has chronic hepatitis C, genotype 3A who has not been treated in a history of alcoholic cirrhosis.  He was following with GI consultants as an outpatient.  He has had several EGDs     November 2019: with Dr. Harvey where he was found to have esophageal varices and was banded x5.     March 2019: With Dr. Ba, portal hypertensive gastropathy with superimposed gastritis, small 2 cm hiatal hernia with possible early Camerons lesions, erythema without erosion, and grade 1 esophageal varix not banded     April 2016: With Dr. Tobin, gastric body ulcer bleed injected with epinephrine and hemoclipped and erosive gastritis     January 2016: With Dr. Antonio, grade 2 distal esophageal varices banded x6, possible Sanchez's esophagus but not a candidate for surveillance, moderate to severe portal hypertensive gastropathy     Otherwise the patient is doing fine without complaints of fever/ chills/ weight loss/ appetite change/ dysphagia/ odynophagia/ heartburn/ nausea/ vomiting/ bloating/ constipation/ melena/ hematochezia or abdominal pain.      Review of Systems:  Review of Systems   Constitutional:  Positive for weight loss. Negative for chills and fever. "   HENT:  Negative for nosebleeds.    Eyes:  Negative for photophobia.   Respiratory:  Negative for cough and hemoptysis.    Cardiovascular:  Negative for chest pain and leg swelling.   Gastrointestinal:  Negative for abdominal pain, constipation and diarrhea.   Genitourinary: Negative.  Negative for dysuria.   Musculoskeletal: Negative.    Skin:  Negative for itching and rash.   Neurological: Negative.            Past Medical History:  Past Medical History:   Diagnosis Date    Alcohol abuse     Alcohol intoxication (HCC) 03/13/2014    Cirrhosis (HCC)     GIB (gastrointestinal bleeding) 01/03/2016    Hepatitis C     Pacemaker     left chest    Pancytopenia (HCC)     Psychiatric disorder     schitzo, bipolar     Active Hospital Problems    Diagnosis     Alcohol abuse with withdrawal (HCC) [F10.139]     Hematemesis [K92.0]     Psychiatric illness [F99]     Pacemaker [Z95.0]     Hypernatremia [E87.0]     Tobacco use [Z72.0]     Pancytopenia (HCC) [D61.818]     Hypomagnesemia [E83.42]     Paroxysmal atrial fibrillation (HCC) [I48.0]     Schizophrenia (HCC) [F20.9]     Cirrhosis of liver (HCC) [K74.60]     Hepatitis C [B19.20]     Thrombocytopenia (HCC) [D69.6]          Past Surgical History:  Past Surgical History:   Procedure Laterality Date    GASTROSCOPY-ENDO N/A 11/13/2019    Procedure: GASTROSCOPY with banding;  Surgeon: Tamela Smith M.D.;  Location: [facility];  Service: Gastroenterology    GASTROSCOPY  3/13/2019    Procedure: GASTROSCOPY;  Surgeon: Abdi Ba M.D.;  Location: SURGERY [facility];  Service: Gastroenterology    GASTROSCOPY  4/8/2016    Procedure: GASTROSCOPY;  Surgeon: Braeden Tobin M.D.;  Location: SURGERY [facility];  Service:     GASTROSCOPY  1/3/2016    Procedure: GASTROSCOPY WITH BANDING;  Surgeon: Alfredo Antonio M.D.;  Location: SURGERY [facility];  Service:            Hospital Medications:  Current Facility-Administered Medications   Medication Dose  Frequency Provider Last Rate Last Admin    pantoprazole (Protonix) injection 40 mg  40 mg BID Buzz Verduzco M.D.   40 mg at 04/12/23 0625    QUEtiapine (Seroquel) tablet 200 mg  200 mg QHS Buzz Verduzco M.D.   200 mg at 04/11/23 2038    senna-docusate (PERICOLACE or SENOKOT S) 8.6-50 MG per tablet 2 Tablet  2 Tablet BID Rainahodan Taylor D.O.   2 Tablet at 04/12/23 0628    And    polyethylene glycol/lytes (MIRALAX) PACKET 1 Packet  1 Packet QDAY PRN Raina RY OzunaO.        And    magnesium hydroxide (MILK OF MAGNESIA) suspension 30 mL  30 mL QDAY PRN Raina TAIWO Taylor D.O.        And    bisacodyl (DULCOLAX) suppository 10 mg  10 mg QDAY PRN Rainahodan Taylor D.O.        lactated ringers infusion   Continuous Buzz Verduzco M.D. 50 mL/hr at 04/12/23 0349 New Bag at 04/12/23 0349    nicotine (NICODERM) 21 MG/24HR 21 mg  21 mg Daily-0600 RainaPOORNIMA Galvan.O.   21 mg at 04/12/23 0629    And    nicotine polacrilex (NICORETTE) 2 MG piece 2 mg  2 mg Q HOUR PRN Raina TAIWO Taylor D.O.        LORazepam (ATIVAN) tablet 0.5 mg  0.5 mg Q4HRS PRN Raina TAIWO Taylor D.O.   0.5 mg at 04/12/23 0239    LORazepam (ATIVAN) tablet 1 mg  1 mg Q4HRS PRN Rainahodan Taylor D.O.        Or    LORazepam (ATIVAN) injection 0.5 mg  0.5 mg Q4HRS PRN Raina TAIWO Taylor D.O.        LORazepam (ATIVAN) tablet 2 mg  2 mg Q2HRS PRN Raina TAIWO Taylor D.O.        Or    LORazepam (ATIVAN) injection 1 mg  1 mg Q2HRS PRN Raina TAIWO Taylor D.O.        LORazepam (ATIVAN) tablet 3 mg  3 mg Q HOUR PRN Raina R Brandon, D.O.        Or    LORazepam (ATIVAN) injection 1.5 mg  1.5 mg Q HOUR PRN Raina R Brandon, D.O.        LORazepam (ATIVAN) tablet 4 mg  4 mg Q15 MIN PRN Raina R Brandon, D.O.        Or    LORazepam (ATIVAN) injection 2 mg  2 mg Q15 MIN PRN Raina TAIWO Taylor, D.O.        thiamine (Vitamin B-1) tablet 100 mg  100 mg DAILY Raina R Brandon, D.O.   100 mg at 04/12/23 0628    And    multivitamin tablet 1 Tablet  1 Tablet DAILY Raina R  "MAGED Taylor   1 Tablet at 04/12/23 0628    And    folic acid (FOLVITE) tablet 1 mg  1 mg DAILY Raina Taylor D.O.   1 mg at 04/12/23 0628    metoprolol SR (TOPROL XL) tablet 25 mg  25 mg DAILY Laila Maria M.D.   25 mg at 04/11/23 0548    ondansetron (ZOFRAN) syringe/vial injection 4 mg  4 mg Q4HRS PRN Raina Taylor D.O.       Last reviewed on 4/10/2023  7:09 PM by Levy Downs       Current Outpatient Medications:  Medications Prior to Admission   Medication Sig Dispense Refill Last Dose    diclofenac sodium (VOLTAREN) 1 % Gel Apply 4 g topically 4 times a day as needed (Joint or Arthritis Pain).   \"COUPLE WEEKS AGO\" at PRN    ondansetron (ZOFRAN) 4 MG Tab tablet Take 4 mg by mouth 3 times a day as needed for Nausea/Vomiting.   PRN at PRN    Magnesium Oxide 420 (252 Mg) MG Tab Take 1 Tablet by mouth every day.   \"COUPLE WEEKS AGO\" at OUT    metoprolol SR (TOPROL XL) 50 MG TABLET SR 24 HR Take 25 mg by mouth every day. 0.5 tablet = 25 mg.   UNK. at UNK.    folic acid (FOLVITE) 1 MG Tab Take 1 Tablet by mouth every day. 30 Tablet 3 \"COUPLE WEEKS AGO\" at OUT    QUEtiapine (SEROQUEL) 200 MG Tab Take 200 mg by mouth at bedtime.   4/7/2023 at HS    pantoprazole (PROTONIX) 40 MG Tablet Delayed Response Take 40 mg by mouth 2 times daily, before breakfast and dinner.   4/9/2023 at AM         Medication Allergies:  Allergies   Allergen Reactions    Haloperidol Unspecified     Twitching/involuntary movements          Family Medical History:  No family history on file.      Social History:  Social History     Socioeconomic History    Marital status: Single     Spouse name: Not on file    Number of children: Not on file    Years of education: Not on file    Highest education level: Not on file   Occupational History    Not on file   Tobacco Use    Smoking status: Every Day     Packs/day: 1.00     Years: 48.00     Pack years: 48.00     Types: Cigarettes    Smokeless tobacco: Never   Vaping Use    Vaping Use: " "Never used   Substance and Sexual Activity    Alcohol use: Yes     Comment: \"couple of pints\" whiskey every day    Drug use: Yes     Types: Inhaled     Comment: marijuana; not currently -  cocaine, meth    Sexual activity: Not on file   Other Topics Concern    Not on file   Social History Narrative    Not on file     Social Determinants of Health     Financial Resource Strain: Not on file   Food Insecurity: Not on file   Transportation Needs: Not on file   Physical Activity: Not on file   Stress: Not on file   Social Connections: Not on file   Intimate Partner Violence: Not on file   Housing Stability: Not on file         Vital signs:  Weight/BMI: Body mass index is 22.88 kg/m².  /80   Pulse (!) 107   Temp 36.8 °C (98.3 °F) (Temporal)   Resp 18   Ht 1.676 m (5' 6\")   Wt 64.3 kg (141 lb 12.1 oz)   SpO2 97%   Vitals:    04/12/23 0353 04/12/23 0623 04/12/23 0740 04/12/23 1601   BP: 100/68 109/74 116/79 132/80   Pulse: 82 75 85 (!) 107   Resp: 18  18 18   Temp: 36.3 °C (97.3 °F)  36.3 °C (97.3 °F) 36.8 °C (98.3 °F)   TempSrc: Temporal  Temporal Temporal   SpO2: 93% 95% 98% 97%   Weight:       Height:         Oxygen Therapy:  Pulse Oximetry: 97 %, O2 (LPM): 0.5, O2 Delivery Device: None - Room Air    Intake/Output Summary (Last 24 hours) at 4/12/2023 1724  Last data filed at 4/12/2023 1026  Gross per 24 hour   Intake 420 ml   Output 1200 ml   Net -780 ml         Physical Exam:  Physical Exam  Constitutional:       General: He is not in acute distress.     Appearance: He is not ill-appearing, toxic-appearing or diaphoretic.   HENT:      Head: Normocephalic and atraumatic.      Nose: Nose normal.      Mouth/Throat:      Mouth: Mucous membranes are moist.   Eyes:      Pupils: Pupils are equal, round, and reactive to light.   Cardiovascular:      Rate and Rhythm: Tachycardia present.      Heart sounds: Normal heart sounds.   Pulmonary:      Effort: Pulmonary effort is normal.      Breath sounds: Normal breath " sounds.   Abdominal:      General: Bowel sounds are normal.      Tenderness: There is no abdominal tenderness.      Comments: Hepatomegaly with left lobe palpable, spleen tip not appreciated, ascites not appreciated   Musculoskeletal:         General: No swelling. Normal range of motion.      Cervical back: Normal range of motion and neck supple.   Skin:     General: Skin is warm and dry.      Comments: Palmar erythema, no spider angiomata on chest noted   Neurological:      Mental Status: He is alert and oriented to person, place, and time.               Labs:  Recent Labs     04/10/23  1703 04/11/23  0119 04/12/23  0225   SODIUM 149* 143 136   POTASSIUM 3.6 4.2 3.8   CHLORIDE 110 110 100   CO2 21 19* 21   BUN 11 10 9   CREATININE 0.64 0.53 0.44*   MAGNESIUM  --  1.4* 1.6   PHOSPHORUS  --  3.3 2.5   CALCIUM 8.3* 7.6* 7.8*     Recent Labs     04/10/23  1703 04/11/23  0119 04/12/23  0225   ALTSGPT 78* 76* 69*   ASTSGOT 95* 103* 90*   ALKPHOSPHAT 91 74 77   TBILIRUBIN 0.5 0.7 1.8*   LIPASE 78  --   --    GLUCOSE 117* 84 109*     Recent Labs     04/10/23  1703 04/11/23  0119 04/12/23  0225 04/12/23  0357   WBC 4.7* 2.8*  --  1.4*   NEUTSPOLYS 53.30  --   --   --    LYMPHOCYTES 35.10  --   --   --    MONOCYTES 7.50  --   --   --    EOSINOPHILS 2.40  --   --   --    BASOPHILS 1.30  --   --   --    ASTSGOT 95* 103* 90*  --    ALTSGPT 78* 76* 69*  --    ALKPHOSPHAT 91 74 77  --    TBILIRUBIN 0.5 0.7 1.8*  --      Recent Labs     04/10/23  1703 04/11/23  0119 04/12/23  0357   RBC 4.69* 3.85* 3.78*   HEMOGLOBIN 13.6* 11.1* 10.8*   HEMATOCRIT 41.1* 34.4* 32.2*   PLATELETCT 69* 44* 31*   PROTHROMBTM 15.5*  --   --    APTT 30.3  --   --    INR 1.25*  --   --      Recent Results (from the past 24 hour(s))   Comp Metabolic Panel    Collection Time: 04/12/23  2:25 AM   Result Value Ref Range    Sodium 136 135 - 145 mmol/L    Potassium 3.8 3.6 - 5.5 mmol/L    Chloride 100 96 - 112 mmol/L    Co2 21 20 - 33 mmol/L    Anion Gap 15.0  7.0 - 16.0    Glucose 109 (H) 65 - 99 mg/dL    Bun 9 8 - 22 mg/dL    Creatinine 0.44 (L) 0.50 - 1.40 mg/dL    Calcium 7.8 (L) 8.5 - 10.5 mg/dL    AST(SGOT) 90 (H) 12 - 45 U/L    ALT(SGPT) 69 (H) 2 - 50 U/L    Alkaline Phosphatase 77 30 - 99 U/L    Total Bilirubin 1.8 (H) 0.1 - 1.5 mg/dL    Albumin 3.5 3.2 - 4.9 g/dL    Total Protein 7.3 6.0 - 8.2 g/dL    Globulin 3.8 (H) 1.9 - 3.5 g/dL    A-G Ratio 0.9 g/dL   PHOSPHORUS    Collection Time: 04/12/23  2:25 AM   Result Value Ref Range    Phosphorus 2.5 2.5 - 4.5 mg/dL   VITAMIN B12    Collection Time: 04/12/23  2:25 AM   Result Value Ref Range    Vitamin B12 -True Cobalamin 828 211 - 911 pg/mL   TSH    Collection Time: 04/12/23  2:25 AM   Result Value Ref Range    TSH 0.410 0.380 - 5.330 uIU/mL   MAGNESIUM    Collection Time: 04/12/23  2:25 AM   Result Value Ref Range    Magnesium 1.6 1.5 - 2.5 mg/dL   CORRECTED CALCIUM    Collection Time: 04/12/23  2:25 AM   Result Value Ref Range    Correct Calcium 8.2 (L) 8.5 - 10.5 mg/dL   ESTIMATED GFR    Collection Time: 04/12/23  2:25 AM   Result Value Ref Range    GFR (CKD-EPI) 117 >60 mL/min/1.73 m 2   CBC WITHOUT DIFFERENTIAL    Collection Time: 04/12/23  3:57 AM   Result Value Ref Range    WBC 1.4 (LL) 4.8 - 10.8 K/uL    RBC 3.78 (L) 4.70 - 6.10 M/uL    Hemoglobin 10.8 (L) 14.0 - 18.0 g/dL    Hematocrit 32.2 (L) 42.0 - 52.0 %    MCV 85.2 81.4 - 97.8 fL    MCH 28.6 27.0 - 33.0 pg    MCHC 33.5 (L) 33.7 - 35.3 g/dL    RDW 43.7 35.9 - 50.0 fL    Platelet Count 31 (L) 164 - 446 K/uL    MPV 13.1 (H) 9.0 - 12.9 fL         Radiology Review:  DX-CHEST-PORTABLE (1 VIEW)   Final Result      1.  There is no acute cardiopulmonary process.            MDM (Data Review):   -Records reviewed and summarized in current documentation  -I personally reviewed and interpreted the laboratory results  -I personally reviewed the radiology images    Assessment/Recommendations:  Impression:  Hematemesis, low volume  Alcohol abuse with intoxication on  admission  Self induced vomiting  Anemia, acute blood loss + baseline anemia  History of esophageal varices with previous EBL  Cirrhosis, HCV + alcohol:  MELD 12  Thrombocytopenia, plt 25  Leukopenia  Abnormal liver tests  Elevated INR  Chronic HCV.  [facility].  SSS, s/p PPM 2022  Schizophrenia  Bipolar disorder    Recs:   If patient staying for other medical reasons could do the following but no EGD at this time.  States [facility]  Abdominal US and AFP:  HCC screening, eval for ascites.  [facility]  No plan for EGD with platelets 25 unless emergent  Could give octreotide to decrease splanchnic pressure for 72 hrs but no longer vomiting  Agree with pantoprazole IV BID if vomiting  He is on a beta blocker and would consider cardevilol, nadolol or propranolol for secondary prophylaxis for esophageal varices if we cannot perform EGD.  [facility]  CIWA protocol in place    Nothing to add.  Signing off but happy to see again if actively bleeding.                  Tamela Smith M.D.          Core Quality Measures   Reviewed items:  Labs, Medications and Radiology reports reviewed

## 2023-04-13 NOTE — PROGRESS NOTES
[facility] Medicine Daily Progress Note    Date of Service  4/13/2023    Chief Complaint  Inocencio Shabazz is a 63 y.o. male admitted 4/10/2023 with alcohol withdrawal    Hospital Course  Patient is a 63 year old male with history of alcohol abuse ( with acute withdrawal), hematemesis, liver cirrhosis, hepatitis C, SSS (with Pacer placed in 2022), schizophrenia, bipolar disorder and esophageal varices (banded x 5 in 2019). He presented to [facility] on 4/10/23 with acute alcohol intoxication and hematemesis.  He was placed on CIWA protocol, and on an octreotide drip.    Interval Problem Update  Axox3, eager for d/c but did say he understood my concerns about his low plts and bleeding risk and he agreed to stay. He states he has no pain, no emesis. No further significant w/d symptoms. Vitals are stable, plts decreasing, still awaiting gi eval. ROS otherwise negative    I have discussed this patient's plan of care and discharge plan at IDT rounds today with Case Management, Nursing, Nursing leadership, and other members of the IDT team.    Consultants/Specialty  [facility]    Code Status  Full Code    Disposition  Patient is not medically cleared for discharge.   Anticipate discharge to to home with close outpatient follow-up.  I have placed the appropriate orders for post-discharge needs.    Review of Systems  Review of Systems   Constitutional:  Positive for malaise/fatigue. Negative for chills, diaphoresis, fever and weight loss.   HENT: Negative.  Negative for congestion, hearing loss and sore throat.    Eyes: Negative.  Negative for blurred vision.   Respiratory: Negative.  Negative for cough, shortness of breath and wheezing.    Cardiovascular: Negative.  Negative for chest pain, palpitations and leg swelling.   Gastrointestinal:  Negative for abdominal pain, blood in stool, diarrhea, heartburn, melena, nausea and vomiting.   Genitourinary: Negative.  Negative for dysuria, flank pain and hematuria.    Musculoskeletal: Negative.  Negative for back pain, joint pain, myalgias and neck pain.   Skin: Negative.  Negative for itching and rash.   Neurological:  Positive for weakness. Negative for dizziness, tremors, sensory change, speech change, focal weakness and headaches.   Endo/Heme/Allergies: Negative.  Does not bruise/bleed easily.   Psychiatric/Behavioral: Negative.  Negative for depression, substance abuse and suicidal ideas. The patient is not nervous/anxious.    All other systems reviewed and are negative.     Physical Exam  Temp:  [36.1 °C (97 °F)-37.3 °C (99.1 °F)] 36.9 °C (98.4 °F)  Pulse:  [] 74  Resp:  [16-18] 18  BP: (102-132)/(61-82) 102/72  SpO2:  [95 %-98 %] 98 %    Physical Exam  Vitals and nursing note reviewed.   HENT:      Head: Normocephalic and atraumatic.      Nose: No congestion.      Mouth/Throat:      Mouth: Mucous membranes are moist.   Eyes:      Extraocular Movements: Extraocular movements intact.      Conjunctiva/sclera: Conjunctivae normal.   Cardiovascular:      Rate and Rhythm: Normal rate and regular rhythm.   Pulmonary:      Effort: Pulmonary effort is normal.      Breath sounds: Normal breath sounds.   Abdominal:      General: There is no distension.      Tenderness: There is no abdominal tenderness. There is no guarding or rebound.   Musculoskeletal:      Cervical back: No tenderness.      Right lower leg: No edema.      Left lower leg: No edema.   Skin:     General: Skin is warm and dry.   Neurological:      General: No focal deficit present.      Mental Status: He is alert and oriented to person, place, and time.      Cranial Nerves: No cranial nerve deficit.      Motor: No tremor.       Fluids    Intake/Output Summary (Last 24 hours) at 4/13/2023 1500  Last data filed at 4/13/2023 1000  Gross per 24 hour   Intake 300 ml   Output 1550 ml   Net -1250 ml       Laboratory  Recent Labs     04/10/23  1703 04/11/23  0119 04/12/23  0357 04/12/23  1719 04/13/23  0101   WBC 4.7*  2.8* 1.4*  --  2.1*   RBC 4.69* 3.85* 3.78*  --  4.03*   HEMOGLOBIN 13.6* 11.1* 10.8* 11.1* 11.6*   HEMATOCRIT 41.1* 34.4* 32.2* 33.2* 34.7*   MCV 87.6 89.4 85.2  --  86.1   MCH 29.0 28.8 28.6  --  28.8   MCHC 33.1* 32.3* 33.5*  --  33.4*   RDW 49.6 50.7* 43.7  --  45.1   PLATELETCT 69* 44* 31*  --  25*   MPV 12.1 12.9 13.1*  --   --      Recent Labs     04/11/23  0119 04/12/23  0225 04/13/23  0101   SODIUM 143 136 139   POTASSIUM 4.2 3.8 3.4*   CHLORIDE 110 100 106   CO2 19* 21 21   GLUCOSE 84 109* 113*   BUN 10 9 5*   CREATININE 0.53 0.44* 0.55   CALCIUM 7.6* 7.8* 8.4*     Recent Labs     04/10/23  1703 04/13/23  0101   APTT 30.3  --    INR 1.25* 1.41*               Imaging  DX-CHEST-PORTABLE (1 VIEW)   Final Result      1.  There is no acute cardiopulmonary process.           Assessment/Plan  * Alcohol abuse with withdrawal (HCC)- (present on admission)  Assessment & Plan  Continue ciwa  Improving w/d sx  Cessation strategies discussed and recommended  Thiamine + Folic      Tobacco use- (present on admission)  Assessment & Plan  Cessation discussed with strategies > 3 min    Hypernatremia- (present on admission)  Assessment & Plan  resolved    Pacemaker- (present on admission)  Assessment & Plan  Secondary to SSS    Psychiatric illness- (present on admission)  Assessment & Plan  Resume Seroquel    Hematemesis- (present on admission)  Assessment & Plan  None in 48 hours but plts continue to decrease now  He remains very high risk for recurrence of bleed last banding 5 years ago  Hbg stable overnight  I spoke with GI, they will see him today  Following  i will continue serial h/h and transfuse if hbg less 7  I will continue iv protonix    Pancytopenia (HCC)- (present on admission)  Assessment & Plan  Acute on chronic related to etoh toxicity  Following  See above    Hypomagnesemia- (present on admission)  Assessment & Plan  Replaced but still low  Will continue to give iv mag  i will continue to  follow      Hypokalemia- (present on admission)  Assessment & Plan  Low mag contributing  Replacing  i will continue to follow this    Paroxysmal atrial fibrillation (HCC)- (present on admission)  Assessment & Plan  Continue toprol and tolerated  Rate controlled  Poor candidate for anticoagulation secondary to GIB, liver cirrhosis, and noncompliance    Schizophrenia (HCC)- (present on admission)  Assessment & Plan  No evidence of acute exacerbation  Continue supportive care    Cirrhosis of liver (HCC)- (present on admission)  Assessment & Plan  Alcohol vs Hep C    Hepatitis C- (present on admission)  Assessment & Plan  He plans to follow up with GI as outpatient    Thrombocytopenia (HCC)- (present on admission)  Assessment & Plan  Acute on chronic  Remains severe and high risk for bleed, i will continue to check serial cbc  Related to etoh toxicity and cirrhosis  S/p vitamin K on 4/12  Will transfuse plts if less 20, continue to follow closely         VTE prophylaxis: SCDs/TEDs    I have performed a physical exam and reviewed and updated ROS and Plan today (4/13/2023). In review of yesterday's note (4/12/2023), there are no changes except as documented above.

## 2023-05-17 ENCOUNTER — HOSPITAL ENCOUNTER (INPATIENT)
Facility: MEDICAL CENTER | Age: 63
LOS: 1 days | DRG: 309 | End: 2023-05-19
Attending: STUDENT IN AN ORGANIZED HEALTH CARE EDUCATION/TRAINING PROGRAM | Admitting: STUDENT IN AN ORGANIZED HEALTH CARE EDUCATION/TRAINING PROGRAM
Payer: COMMERCIAL

## 2023-05-17 ENCOUNTER — APPOINTMENT (OUTPATIENT)
Dept: RADIOLOGY | Facility: MEDICAL CENTER | Age: 63
DRG: 309 | End: 2023-05-17
Attending: STUDENT IN AN ORGANIZED HEALTH CARE EDUCATION/TRAINING PROGRAM
Payer: COMMERCIAL

## 2023-05-17 DIAGNOSIS — E87.6 HYPOKALEMIA: ICD-10-CM

## 2023-05-17 DIAGNOSIS — F10.920 ALCOHOLIC INTOXICATION WITHOUT COMPLICATION (HCC): ICD-10-CM

## 2023-05-17 DIAGNOSIS — I48.91 ATRIAL FIBRILLATION WITH RVR (HCC): ICD-10-CM

## 2023-05-17 DIAGNOSIS — D61.818 PANCYTOPENIA (HCC): ICD-10-CM

## 2023-05-17 LAB — EKG IMPRESSION: NORMAL

## 2023-05-17 PROCEDURE — 93005 ELECTROCARDIOGRAM TRACING: CPT

## 2023-05-17 PROCEDURE — 80053 COMPREHEN METABOLIC PANEL: CPT

## 2023-05-17 PROCEDURE — 84484 ASSAY OF TROPONIN QUANT: CPT

## 2023-05-17 PROCEDURE — 85025 COMPLETE CBC W/AUTO DIFF WBC: CPT

## 2023-05-17 PROCEDURE — 82077 ASSAY SPEC XCP UR&BREATH IA: CPT

## 2023-05-17 PROCEDURE — 83690 ASSAY OF LIPASE: CPT

## 2023-05-17 PROCEDURE — 93005 ELECTROCARDIOGRAM TRACING: CPT | Performed by: STUDENT IN AN ORGANIZED HEALTH CARE EDUCATION/TRAINING PROGRAM

## 2023-05-17 PROCEDURE — 99285 EMERGENCY DEPT VISIT HI MDM: CPT

## 2023-05-17 PROCEDURE — 700105 HCHG RX REV CODE 258: Mod: UD | Performed by: STUDENT IN AN ORGANIZED HEALTH CARE EDUCATION/TRAINING PROGRAM

## 2023-05-17 PROCEDURE — 36415 COLL VENOUS BLD VENIPUNCTURE: CPT

## 2023-05-17 RX ORDER — DILTIAZEM HYDROCHLORIDE 5 MG/ML
0.25 INJECTION INTRAVENOUS ONCE
Status: COMPLETED | OUTPATIENT
Start: 2023-05-18 | End: 2023-05-18

## 2023-05-17 RX ORDER — SODIUM CHLORIDE 9 MG/ML
1000 INJECTION, SOLUTION INTRAVENOUS ONCE
Status: COMPLETED | OUTPATIENT
Start: 2023-05-18 | End: 2023-05-18

## 2023-05-17 RX ADMIN — SODIUM CHLORIDE 1000 ML: 9 INJECTION, SOLUTION INTRAVENOUS at 23:52

## 2023-05-17 ASSESSMENT — FIBROSIS 4 INDEX: FIB4 SCORE: 26.18

## 2023-05-18 PROBLEM — Z71.89 ACP (ADVANCE CARE PLANNING): Status: ACTIVE | Noted: 2023-05-18

## 2023-05-18 PROBLEM — Z71.89 ACP (ADVANCE CARE PLANNING): Status: RESOLVED | Noted: 2023-05-18 | Resolved: 2023-05-18

## 2023-05-18 PROBLEM — I48.91 ATRIAL FIBRILLATION WITH RVR (HCC): Status: ACTIVE | Noted: 2023-05-18

## 2023-05-18 LAB
ALBUMIN SERPL BCP-MCNC: 3.7 G/DL (ref 3.2–4.9)
ALBUMIN/GLOB SERPL: 0.9 G/DL
ALP SERPL-CCNC: 80 U/L (ref 30–99)
ALT SERPL-CCNC: 89 U/L (ref 2–50)
ANION GAP SERPL CALC-SCNC: 16 MMOL/L (ref 7–16)
AST SERPL-CCNC: 139 U/L (ref 12–45)
BASOPHILS # BLD AUTO: 0.7 % (ref 0–1.8)
BASOPHILS # BLD: 0.03 K/UL (ref 0–0.12)
BILIRUB SERPL-MCNC: 0.6 MG/DL (ref 0.1–1.5)
BUN SERPL-MCNC: 10 MG/DL (ref 8–22)
CALCIUM ALBUM COR SERPL-MCNC: 8.2 MG/DL (ref 8.5–10.5)
CALCIUM SERPL-MCNC: 8 MG/DL (ref 8.5–10.5)
CHLORIDE SERPL-SCNC: 106 MMOL/L (ref 96–112)
CO2 SERPL-SCNC: 21 MMOL/L (ref 20–33)
CREAT SERPL-MCNC: 0.57 MG/DL (ref 0.5–1.4)
EKG IMPRESSION: NORMAL
EOSINOPHIL # BLD AUTO: 0.08 K/UL (ref 0–0.51)
EOSINOPHIL NFR BLD: 2 % (ref 0–6.9)
ERYTHROCYTE [DISTWIDTH] IN BLOOD BY AUTOMATED COUNT: 51.3 FL (ref 35.9–50)
ETHANOL BLD-MCNC: 256.2 MG/DL
GFR SERPLBLD CREATININE-BSD FMLA CKD-EPI: 109 ML/MIN/1.73 M 2
GLOBULIN SER CALC-MCNC: 3.9 G/DL (ref 1.9–3.5)
GLUCOSE SERPL-MCNC: 112 MG/DL (ref 65–99)
HCT VFR BLD AUTO: 37.2 % (ref 42–52)
HGB BLD-MCNC: 12.3 G/DL (ref 14–18)
IMM GRANULOCYTES # BLD AUTO: 0 K/UL (ref 0–0.11)
IMM GRANULOCYTES NFR BLD AUTO: 0 % (ref 0–0.9)
LIPASE SERPL-CCNC: 74 U/L (ref 11–82)
LYMPHOCYTES # BLD AUTO: 1.15 K/UL (ref 1–4.8)
LYMPHOCYTES NFR BLD: 28.4 % (ref 22–41)
MCH RBC QN AUTO: 29.7 PG (ref 27–33)
MCHC RBC AUTO-ENTMCNC: 33.1 G/DL (ref 33.7–35.3)
MCV RBC AUTO: 89.9 FL (ref 81.4–97.8)
MONOCYTES # BLD AUTO: 0.5 K/UL (ref 0–0.85)
MONOCYTES NFR BLD AUTO: 12.3 % (ref 0–13.4)
NEUTROPHILS # BLD AUTO: 2.29 K/UL (ref 1.82–7.42)
NEUTROPHILS NFR BLD: 56.6 % (ref 44–72)
NRBC # BLD AUTO: 0 K/UL
NRBC BLD-RTO: 0 /100 WBC
PLATELET # BLD AUTO: 51 K/UL (ref 164–446)
PMV BLD AUTO: 12.3 FL (ref 9–12.9)
POTASSIUM SERPL-SCNC: 3.3 MMOL/L (ref 3.6–5.5)
PROT SERPL-MCNC: 7.6 G/DL (ref 6–8.2)
RBC # BLD AUTO: 4.14 M/UL (ref 4.7–6.1)
SODIUM SERPL-SCNC: 143 MMOL/L (ref 135–145)
TROPONIN T SERPL-MCNC: 10 NG/L (ref 6–19)
WBC # BLD AUTO: 4.1 K/UL (ref 4.8–10.8)

## 2023-05-18 PROCEDURE — 700102 HCHG RX REV CODE 250 W/ 637 OVERRIDE(OP)

## 2023-05-18 PROCEDURE — 97166 OT EVAL MOD COMPLEX 45 MIN: CPT

## 2023-05-18 PROCEDURE — 700102 HCHG RX REV CODE 250 W/ 637 OVERRIDE(OP): Performed by: HOSPITALIST

## 2023-05-18 PROCEDURE — 99223 1ST HOSP IP/OBS HIGH 75: CPT | Mod: AI,25 | Performed by: STUDENT IN AN ORGANIZED HEALTH CARE EDUCATION/TRAINING PROGRAM

## 2023-05-18 PROCEDURE — 99497 ADVNCD CARE PLAN 30 MIN: CPT | Mod: 25 | Performed by: STUDENT IN AN ORGANIZED HEALTH CARE EDUCATION/TRAINING PROGRAM

## 2023-05-18 PROCEDURE — A9270 NON-COVERED ITEM OR SERVICE: HCPCS | Performed by: STUDENT IN AN ORGANIZED HEALTH CARE EDUCATION/TRAINING PROGRAM

## 2023-05-18 PROCEDURE — 71045 X-RAY EXAM CHEST 1 VIEW: CPT

## 2023-05-18 PROCEDURE — 93010 ELECTROCARDIOGRAM REPORT: CPT | Performed by: INTERNAL MEDICINE

## 2023-05-18 PROCEDURE — 700111 HCHG RX REV CODE 636 W/ 250 OVERRIDE (IP)

## 2023-05-18 PROCEDURE — 700102 HCHG RX REV CODE 250 W/ 637 OVERRIDE(OP): Performed by: STUDENT IN AN ORGANIZED HEALTH CARE EDUCATION/TRAINING PROGRAM

## 2023-05-18 PROCEDURE — A9270 NON-COVERED ITEM OR SERVICE: HCPCS | Performed by: HOSPITALIST

## 2023-05-18 PROCEDURE — 700105 HCHG RX REV CODE 258: Performed by: STUDENT IN AN ORGANIZED HEALTH CARE EDUCATION/TRAINING PROGRAM

## 2023-05-18 PROCEDURE — 97162 PT EVAL MOD COMPLEX 30 MIN: CPT

## 2023-05-18 PROCEDURE — 93005 ELECTROCARDIOGRAM TRACING: CPT | Performed by: STUDENT IN AN ORGANIZED HEALTH CARE EDUCATION/TRAINING PROGRAM

## 2023-05-18 PROCEDURE — 96374 THER/PROPH/DIAG INJ IV PUSH: CPT

## 2023-05-18 PROCEDURE — A9270 NON-COVERED ITEM OR SERVICE: HCPCS

## 2023-05-18 PROCEDURE — 770020 HCHG ROOM/CARE - TELE (206)

## 2023-05-18 PROCEDURE — 700105 HCHG RX REV CODE 258: Mod: UD | Performed by: STUDENT IN AN ORGANIZED HEALTH CARE EDUCATION/TRAINING PROGRAM

## 2023-05-18 PROCEDURE — 700111 HCHG RX REV CODE 636 W/ 250 OVERRIDE (IP): Performed by: STUDENT IN AN ORGANIZED HEALTH CARE EDUCATION/TRAINING PROGRAM

## 2023-05-18 PROCEDURE — 99406 BEHAV CHNG SMOKING 3-10 MIN: CPT | Performed by: STUDENT IN AN ORGANIZED HEALTH CARE EDUCATION/TRAINING PROGRAM

## 2023-05-18 RX ORDER — FOLIC ACID 1 MG/1
1 TABLET ORAL DAILY
Status: DISCONTINUED | OUTPATIENT
Start: 2023-05-18 | End: 2023-05-19 | Stop reason: HOSPADM

## 2023-05-18 RX ORDER — GAUZE BANDAGE 2" X 2"
100 BANDAGE TOPICAL DAILY
Status: DISCONTINUED | OUTPATIENT
Start: 2023-05-18 | End: 2023-05-19 | Stop reason: HOSPADM

## 2023-05-18 RX ORDER — POTASSIUM CHLORIDE 20 MEQ/1
40 TABLET, EXTENDED RELEASE ORAL
Status: COMPLETED | OUTPATIENT
Start: 2023-05-18 | End: 2023-05-18

## 2023-05-18 RX ORDER — LORAZEPAM 2 MG/ML
2 INJECTION INTRAMUSCULAR
Status: DISCONTINUED | OUTPATIENT
Start: 2023-05-18 | End: 2023-05-19 | Stop reason: HOSPADM

## 2023-05-18 RX ORDER — DILTIAZEM HYDROCHLORIDE 5 MG/ML
0.25 INJECTION INTRAVENOUS ONCE
Status: COMPLETED | OUTPATIENT
Start: 2023-05-18 | End: 2023-05-18

## 2023-05-18 RX ORDER — LORAZEPAM 2 MG/ML
1 INJECTION INTRAMUSCULAR
Status: DISCONTINUED | OUTPATIENT
Start: 2023-05-18 | End: 2023-05-19 | Stop reason: HOSPADM

## 2023-05-18 RX ORDER — METOPROLOL TARTRATE 1 MG/ML
5 INJECTION, SOLUTION INTRAVENOUS
Status: DISCONTINUED | OUTPATIENT
Start: 2023-05-18 | End: 2023-05-19 | Stop reason: HOSPADM

## 2023-05-18 RX ORDER — PROCHLORPERAZINE EDISYLATE 5 MG/ML
10 INJECTION INTRAMUSCULAR; INTRAVENOUS EVERY 6 HOURS PRN
Status: DISCONTINUED | OUTPATIENT
Start: 2023-05-18 | End: 2023-05-19 | Stop reason: HOSPADM

## 2023-05-18 RX ORDER — SODIUM CHLORIDE 9 MG/ML
INJECTION, SOLUTION INTRAVENOUS CONTINUOUS
Status: DISCONTINUED | OUTPATIENT
Start: 2023-05-18 | End: 2023-05-18

## 2023-05-18 RX ORDER — LORAZEPAM 2 MG/1
4 TABLET ORAL
Status: DISCONTINUED | OUTPATIENT
Start: 2023-05-18 | End: 2023-05-19 | Stop reason: HOSPADM

## 2023-05-18 RX ORDER — ONDANSETRON 2 MG/ML
4 INJECTION INTRAMUSCULAR; INTRAVENOUS EVERY 4 HOURS PRN
Status: DISCONTINUED | OUTPATIENT
Start: 2023-05-18 | End: 2023-05-19 | Stop reason: HOSPADM

## 2023-05-18 RX ORDER — SODIUM CHLORIDE 9 MG/ML
1000 INJECTION, SOLUTION INTRAVENOUS ONCE
Status: COMPLETED | OUTPATIENT
Start: 2023-05-18 | End: 2023-05-18

## 2023-05-18 RX ORDER — LORAZEPAM 2 MG/1
2 TABLET ORAL
Status: DISCONTINUED | OUTPATIENT
Start: 2023-05-18 | End: 2023-05-19 | Stop reason: HOSPADM

## 2023-05-18 RX ORDER — ACETAMINOPHEN 325 MG/1
650 TABLET ORAL EVERY 6 HOURS PRN
Status: DISCONTINUED | OUTPATIENT
Start: 2023-05-18 | End: 2023-05-19 | Stop reason: HOSPADM

## 2023-05-18 RX ORDER — LORAZEPAM 2 MG/ML
0.5 INJECTION INTRAMUSCULAR EVERY 4 HOURS PRN
Status: DISCONTINUED | OUTPATIENT
Start: 2023-05-18 | End: 2023-05-19 | Stop reason: HOSPADM

## 2023-05-18 RX ORDER — LORAZEPAM 0.5 MG/1
0.5 TABLET ORAL EVERY 4 HOURS PRN
Status: DISCONTINUED | OUTPATIENT
Start: 2023-05-18 | End: 2023-05-19 | Stop reason: HOSPADM

## 2023-05-18 RX ORDER — LORAZEPAM 2 MG/ML
1.5 INJECTION INTRAMUSCULAR
Status: DISCONTINUED | OUTPATIENT
Start: 2023-05-18 | End: 2023-05-19 | Stop reason: HOSPADM

## 2023-05-18 RX ORDER — NICOTINE 21 MG/24HR
21 PATCH, TRANSDERMAL 24 HOURS TRANSDERMAL
Status: DISCONTINUED | OUTPATIENT
Start: 2023-05-18 | End: 2023-05-19 | Stop reason: HOSPADM

## 2023-05-18 RX ORDER — QUETIAPINE FUMARATE 100 MG/1
200 TABLET, FILM COATED ORAL
Status: DISCONTINUED | OUTPATIENT
Start: 2023-05-18 | End: 2023-05-19 | Stop reason: HOSPADM

## 2023-05-18 RX ORDER — LORAZEPAM 1 MG/1
1 TABLET ORAL EVERY 4 HOURS PRN
Status: DISCONTINUED | OUTPATIENT
Start: 2023-05-18 | End: 2023-05-19 | Stop reason: HOSPADM

## 2023-05-18 RX ORDER — METOPROLOL SUCCINATE 25 MG/1
25 TABLET, EXTENDED RELEASE ORAL DAILY
Status: DISCONTINUED | OUTPATIENT
Start: 2023-05-18 | End: 2023-05-19 | Stop reason: HOSPADM

## 2023-05-18 RX ADMIN — ONDANSETRON 4 MG: 2 INJECTION INTRAMUSCULAR; INTRAVENOUS at 05:37

## 2023-05-18 RX ADMIN — METOPROLOL SUCCINATE 25 MG: 25 TABLET, EXTENDED RELEASE ORAL at 05:40

## 2023-05-18 RX ADMIN — DILTIAZEM HYDROCHLORIDE 15.9 MG: 5 INJECTION INTRAVENOUS at 00:09

## 2023-05-18 RX ADMIN — SODIUM CHLORIDE 1000 ML: 9 INJECTION, SOLUTION INTRAVENOUS at 02:04

## 2023-05-18 RX ADMIN — QUETIAPINE FUMARATE 200 MG: 100 TABLET ORAL at 20:03

## 2023-05-18 RX ADMIN — Medication 100 MG: at 05:38

## 2023-05-18 RX ADMIN — LORAZEPAM 0.5 MG: 0.5 TABLET ORAL at 20:08

## 2023-05-18 RX ADMIN — POTASSIUM CHLORIDE 40 MEQ: 1500 TABLET, EXTENDED RELEASE ORAL at 06:39

## 2023-05-18 RX ADMIN — FOLIC ACID 1 MG: 1 TABLET ORAL at 05:38

## 2023-05-18 RX ADMIN — DILTIAZEM HYDROCHLORIDE 15.9 MG: 5 INJECTION INTRAVENOUS at 00:08

## 2023-05-18 RX ADMIN — SODIUM CHLORIDE: 9 INJECTION, SOLUTION INTRAVENOUS at 04:09

## 2023-05-18 RX ADMIN — ACETAMINOPHEN 650 MG: 325 TABLET, FILM COATED ORAL at 20:03

## 2023-05-18 RX ADMIN — NICOTINE TRANSDERMAL SYSTEM 21 MG: 21 PATCH, EXTENDED RELEASE TRANSDERMAL at 13:33

## 2023-05-18 RX ADMIN — POTASSIUM CHLORIDE 40 MEQ: 1500 TABLET, EXTENDED RELEASE ORAL at 05:38

## 2023-05-18 RX ADMIN — THERA TABS 1 TABLET: TAB at 05:37

## 2023-05-18 ASSESSMENT — COGNITIVE AND FUNCTIONAL STATUS - GENERAL
MOBILITY SCORE: 24
MOBILITY SCORE: 24
SUGGESTED CMS G CODE MODIFIER MOBILITY: CH
SUGGESTED CMS G CODE MODIFIER MOBILITY: CH
DAILY ACTIVITIY SCORE: 24
SUGGESTED CMS G CODE MODIFIER DAILY ACTIVITY: CH
SUGGESTED CMS G CODE MODIFIER DAILY ACTIVITY: CH
DAILY ACTIVITIY SCORE: 24

## 2023-05-18 ASSESSMENT — LIFESTYLE VARIABLES
PAROXYSMAL SWEATS: NO SWEAT VISIBLE
TOTAL SCORE: 4
VISUAL DISTURBANCES: NOT PRESENT
DOES PATIENT WANT TO TALK TO SOMEONE ABOUT QUITTING: NO
TREMOR: NO TREMOR
PAROXYSMAL SWEATS: NO SWEAT VISIBLE
VISUAL DISTURBANCES: NOT PRESENT
ANXIETY: NO ANXIETY (AT EASE)
TOTAL SCORE: 4
TOTAL SCORE: 6
HOW MANY TIMES IN THE PAST YEAR HAVE YOU HAD 5 OR MORE DRINKS IN A DAY: 365
AGITATION: NORMAL ACTIVITY
TREMOR: NO TREMOR
AGITATION: NORMAL ACTIVITY
ORIENTATION AND CLOUDING OF SENSORIUM: ORIENTED AND CAN DO SERIAL ADDITIONS
PAROXYSMAL SWEATS: NO SWEAT VISIBLE
ANXIETY: NO ANXIETY (AT EASE)
ALCOHOL_USE: YES
HEADACHE, FULLNESS IN HEAD: VERY MILD
TOTAL SCORE: 1
VISUAL DISTURBANCES: NOT PRESENT
AGITATION: NORMAL ACTIVITY
TOTAL SCORE: 1
ANXIETY: NO ANXIETY (AT EASE)
ANXIETY: NO ANXIETY (AT EASE)
EVER FELT BAD OR GUILTY ABOUT YOUR DRINKING: YES
CONSUMPTION TOTAL: POSITIVE
VISUAL DISTURBANCES: NOT PRESENT
TOTAL SCORE: 4
HEADACHE, FULLNESS IN HEAD: NOT PRESENT
AGITATION: NORMAL ACTIVITY
ORIENTATION AND CLOUDING OF SENSORIUM: ORIENTED AND CAN DO SERIAL ADDITIONS
TOTAL SCORE: 5
AUDITORY DISTURBANCES: NOT PRESENT
HEADACHE, FULLNESS IN HEAD: NOT PRESENT
HEADACHE, FULLNESS IN HEAD: NOT PRESENT
NAUSEA AND VOMITING: *
NAUSEA AND VOMITING: MILD NAUSEA WITH NO VOMITING
ON A TYPICAL DAY WHEN YOU DRINK ALCOHOL HOW MANY DRINKS DO YOU HAVE: 10
EVER HAD A DRINK FIRST THING IN THE MORNING TO STEADY YOUR NERVES TO GET RID OF A HANGOVER: YES
ORIENTATION AND CLOUDING OF SENSORIUM: ORIENTED AND CAN DO SERIAL ADDITIONS
AUDITORY DISTURBANCES: NOT PRESENT
ANXIETY: NO ANXIETY (AT EASE)
TREMOR: NO TREMOR
HEADACHE, FULLNESS IN HEAD: NOT PRESENT
ORIENTATION AND CLOUDING OF SENSORIUM: ORIENTED AND CAN DO SERIAL ADDITIONS
NAUSEA AND VOMITING: *
AUDITORY DISTURBANCES: NOT PRESENT
PAROXYSMAL SWEATS: NO SWEAT VISIBLE
AUDITORY DISTURBANCES: NOT PRESENT
TREMOR: NO TREMOR
AGITATION: NORMAL ACTIVITY
AUDITORY DISTURBANCES: NOT PRESENT
TREMOR: NO TREMOR
DOES PATIENT WANT TO STOP DRINKING: YES
PAROXYSMAL SWEATS: NO SWEAT VISIBLE
HAVE YOU EVER FELT YOU SHOULD CUT DOWN ON YOUR DRINKING: YES
HAVE PEOPLE ANNOYED YOU BY CRITICIZING YOUR DRINKING: YES
NAUSEA AND VOMITING: MILD NAUSEA WITH NO VOMITING
VISUAL DISTURBANCES: NOT PRESENT
NAUSEA AND VOMITING: MILD NAUSEA WITH NO VOMITING
AVERAGE NUMBER OF DAYS PER WEEK YOU HAVE A DRINK CONTAINING ALCOHOL: 10
ORIENTATION AND CLOUDING OF SENSORIUM: ORIENTED AND CAN DO SERIAL ADDITIONS
TOTAL SCORE: 1

## 2023-05-18 ASSESSMENT — PATIENT HEALTH QUESTIONNAIRE - PHQ9
2. FEELING DOWN, DEPRESSED, IRRITABLE, OR HOPELESS: NOT AT ALL
1. LITTLE INTEREST OR PLEASURE IN DOING THINGS: NOT AT ALL
SUM OF ALL RESPONSES TO PHQ9 QUESTIONS 1 AND 2: 0
2. FEELING DOWN, DEPRESSED, IRRITABLE, OR HOPELESS: NOT AT ALL
SUM OF ALL RESPONSES TO PHQ9 QUESTIONS 1 AND 2: 0
1. LITTLE INTEREST OR PLEASURE IN DOING THINGS: NOT AT ALL

## 2023-05-18 ASSESSMENT — FIBROSIS 4 INDEX: FIB4 SCORE: 18.78

## 2023-05-18 ASSESSMENT — GAIT ASSESSMENTS
GAIT LEVEL OF ASSIST: SUPERVISED
DISTANCE (FEET): 200

## 2023-05-18 ASSESSMENT — ENCOUNTER SYMPTOMS
WEAKNESS: 1
PSYCHIATRIC NEGATIVE: 1
RESPIRATORY NEGATIVE: 1
MUSCULOSKELETAL NEGATIVE: 1
EYES NEGATIVE: 1
GASTROINTESTINAL NEGATIVE: 1

## 2023-05-18 ASSESSMENT — PAIN DESCRIPTION - PAIN TYPE
TYPE: ACUTE PAIN

## 2023-05-18 ASSESSMENT — ACTIVITIES OF DAILY LIVING (ADL): TOILETING: INDEPENDENT

## 2023-05-18 NOTE — ED TRIAGE NOTES
Chief Complaint   Patient presents with    Chest Pain     x2 days; pt in A-Fib with RVR on arrival     Pt brought in by EMS from home for above complaint after [facility]. Pt states he takes Metoprolol normally but does not remember if he took it today. Pt also has a pacemaker. Pt endorses drinking alcohol daily, including today. Pt received 500ml NS bolus en route and FSBS was 133 per EMS.

## 2023-05-18 NOTE — THERAPY
Physical Therapy   Initial Evaluation     Patient Name: Inocencio Shabazz  Age:  63 y.o., Sex:  male  Medical Record #: [id number]  Today's Date: 5/18/2023          Assessment  Patient is 63 y.o. male admitted w/ RVR/Afib on admit.  Hx of cirrhosis, hep C, esophageal varices, sick sinus syndrome, pacer, schizophrenia.  He lives in a second story apartment, alone, where he was indep w/ mobility.  Today, he presents essentially at or very close to his prior level.  No acute PT needs.  Plan    Physical Therapy Initial Treatment Plan   Duration: Discharge Needs Only    DC Equipment Recommendations: None  Discharge Recommendations: Anticipate that the patient will have no further physical therapy needs after discharge from the hospital         Objective       05/18/23 1052   Prior Living Situation   Housing / Facility 2 Story Apartment / Condo   Steps Into Home 12   Steps In Home 0   Equipment Owned None   Lives with - Patient's Self Care Capacity Alone and Able to Care For Self   Prior Level of Functional Mobility   Bed Mobility Independent   Transfer Status Independent   Ambulation Independent   Assistive Devices Used None   Stairs Independent   Cognition    Level of Consciousness Alert   Strength Lower Body   Comments no deviations   Balance Assessment   Sitting Balance (Static) Fair +   Sitting Balance (Dynamic) Fair +   Standing Balance (Static) Fair +   Standing Balance (Dynamic) Fair +   Weight Shift Sitting Good   Weight Shift Standing Good   Bed Mobility    Supine to Sit Supervised   Sit to Supine Supervised   Gait Analysis   Gait Level Of Assist Supervised   Assistive Device None   Distance (Feet) 200   # of Stairs Climbed 12   Level of Assist with Stairs Supervised   Functional Mobility   Sit to Stand Supervised   Bed, Chair, Wheelchair Transfer Supervised   Physical Therapy Initial Treatment Plan    Duration Discharge Needs Only   Anticipated Discharge Equipment and Recommendations   DC Equipment  Recommendations None   Discharge Recommendations Anticipate that the patient will have no further physical therapy needs after discharge from the hospital

## 2023-05-18 NOTE — PROGRESS NOTES
"[facility] Medicine Daily Progress Note    Date of Service  5/18/2023    Chief Complaint  Inocencio Shabazz is a 63 y.o. male admitted 5/17/2023 with weakness.    Hospital Course  Mr. Shabazz is a 63 y.o. male who presented 5/17/2023 with chest discomfort and an overall generalized weakness.     Patient has a history of chronic alcohol use, liver cirrhosis, esophageal varices hepatitis C, sick sinus syndrome status post pacemaker in October 2022, schizophrenia.  He has had multiple admissions for alcohol intoxication.     Patient states that he has been drinking every day.  Last drink this afternoon.  He began to feel a chest discomfort and an overall weakness, prompting him to come into the ED for evaluation.     In the ED, patient found to have tachycardia.  Pertinent labs include pancytopenia, hypokalemia, transaminitis, alcohol level 256.2.  Initial troponin negative.  EKG showing atrial fibrillation with RVR.    Interval Problem Update  Mr. Shabazz was seen and evaluated on the tele floor. He was admitted this morning by Dr. Mendez. He has a left sided pacemaker. He states, \"I drink too much\". He has an apartment.     I have discussed this patient's plan of care and discharge plan at IDT rounds today with Case Management, Nursing, Nursing leadership, and other members of the IDT team.    Consultants/Specialty  none    Code Status  Full Code    Disposition  The patient is not medically cleared for discharge to home or a post-acute facility.  Anticipate discharge to: home with close outpatient follow-up    I have placed the appropriate orders for post-discharge needs.    Review of Systems  ROS     Physical Exam  Temp:  [36.5 °C (97.7 °F)-36.8 °C (98.2 °F)] 36.5 °C (97.7 °F)  Pulse:  [] 78  Resp:  [16-20] 19  BP: (101-121)/(57-82) 113/82  SpO2:  [89 %-96 %] 91 %    Physical Exam  Vitals and nursing note reviewed.   Constitutional:       Appearance: He is ill-appearing.      Comments: Thin, disheveled "   Cardiovascular:      Comments: Left sided pacemaker  Pulmonary:      Effort: Pulmonary effort is normal.      Breath sounds: Normal breath sounds.         Fluids    Intake/Output Summary (Last 24 hours) at 5/18/2023 0738  Last data filed at 5/18/2023 0343  Gross per 24 hour   Intake 2000 ml   Output --   Net 2000 ml       Laboratory  Recent Labs     05/17/23  2350   WBC 4.1*   RBC 4.14*   HEMOGLOBIN 12.3*   HEMATOCRIT 37.2*   MCV 89.9   MCH 29.7   MCHC 33.1*   RDW 51.3*   PLATELETCT 51*   MPV 12.3     Recent Labs     05/17/23  2350   SODIUM 143   POTASSIUM 3.3*   CHLORIDE 106   CO2 21   GLUCOSE 112*   BUN 10   CREATININE 0.57   CALCIUM 8.0*                   Imaging  DX-CHEST-PORTABLE (1 VIEW)   Final Result         1.  No acute cardiopulmonary disease.      EC-ECHOCARDIOGRAM COMPLETE W/O CONT    (Results Pending)        Assessment/Plan  * Atrial fibrillation with RVR (HCC)- (present on admission)  Assessment & Plan  Patient found to have atrial fibrillation with RVR seen on EKG.  Likely induced by excessive alcohol abuse.  Patient will need to be given Lopressor IV for rate control and continue home medication of metoprolol.  Echocardiogram ordered for a.m.  Monitor closely for bradycardia, and hypotension from Lopressor administration.  Not candidate for anticoagulation as patient has thrombocytopenia (platelets 51) and active EtOH abuse.    Alcohol withdrawal (HCC)- (present on admission)  Assessment & Plan  Noted to have excess alcohol abuse.  Alcohol level 256 in ED  CIWA protocol  Folic acid thiamine    Tobacco use- (present on admission)  Assessment & Plan  Cessation discussed  Nicotine patch offered      Pacemaker- (present on admission)  Assessment & Plan  Sick sinus syndrome placed 2022    Pancytopenia (HCC)- (present on admission)  Assessment & Plan  Found to have pancytopenia from excessive alcohol use.  At baseline.  Serial CBC    Hypokalemia- (present on admission)  Assessment &  Plan  Replace    Cirrhosis (HCC)- (present on admission)  Assessment & Plan  Hx of    Thrombocytopenia (HCC)- (present on admission)  Assessment & Plan  Platelets 51  Likely due to cirrhosis and bone marrow toxicity from EtOH         VTE prophylaxis: SCDs/TEDs    I have performed a physical exam and reviewed and updated ROS and Plan today (5/18/2023). In review of yesterday's note (5/17/2023), there are no changes except as documented above.

## 2023-05-18 NOTE — ED NOTES
Bedside report given to TITUS Jasso. Pt being taken upstairs at this time by RN via chase on cardiac monitor. Pt is awake and alert, talking to staff, in no apparent distress at time of transfer. Pt's paperwork and belongings sent upstairs with pt and RN.

## 2023-05-18 NOTE — ED PROVIDER NOTES
"ED Provider Note    CHIEF COMPLAINT  Chief Complaint   Patient presents with    Chest Pain     x2 days; pt in A-Fib with RVR on arrival       EXTERNAL RECORDS REVIEWED  Inpatient Notes Patient was discharged from the hospital on 13 April . He was admitted for alcohol intoxication and hematemesis. Patient chronically thrombocytopenic.    HPI/ROS  LIMITATION TO HISTORY   Behavior  OUTSIDE HISTORIAN(S):  EMS Report included below    Inocencio Shabazz is a 63 y.o. male who presents via EMS for chest discomfort.  Patient is very poor historian and his only complaint to me is \" I feel like I am going to die\".  He does endorse daily alcohol use his last drink was about 6 hours ago.  He denies any known history of atrial fibrillation and does not take any blood thinners.  He does state that he is prescribed metoprolol but did not take it today.  He denies any recent illness.  Remainder of history is significantly limited.    Patient with a significant past medical history. He has a history of alcohol abuse ( with acute withdrawal), hematemesis, liver cirrhosis, hepatitis C, SSS (with Pacer placed in 2022), schizophrenia, bipolar disorder and esophageal varices (banded x 5 in 2019).     PAST MEDICAL HISTORY   has a past medical history of Alcohol abuse, Bipolar disorder (HCC), Cirrhosis (HCC), GIB (gastrointestinal bleeding) (01/03/2016), Hepatitis C, Pacemaker, Pancytopenia (HCC), and Schizophrenia (HCC).    SURGICAL HISTORY   has a past surgical history that includes gastroscopy (1/3/2016); gastroscopy (4/8/2016); gastroscopy (3/13/2019); and gastroscopy-endo (N/A, 11/13/2019).    FAMILY HISTORY  History reviewed. No pertinent family history.    SOCIAL HISTORY  Social History     Tobacco Use    Smoking status: Every Day     Packs/day: 1.00     Years: 48.00     Pack years: 48.00     Types: Cigarettes    Smokeless tobacco: Never   Vaping Use    Vaping Use: Never used   Substance and Sexual Activity    Alcohol use: Yes     " "Comment: \"couple of pints\" whiskey every day    Drug use: Yes     Types: Inhaled     Comment: currently smokes marijuana; past hx of cocaine and meth    Sexual activity: Not on file       CURRENT MEDICATIONS  Home Medications       Reviewed by Loreta Chung R.N. (Registered Nurse) on 05/18/23 at 0525  Med List Status: Complete     Medication Last Dose Status   diclofenac sodium (VOLTAREN) 1 % Gel  Active   folic acid (FOLVITE) 1 MG Tab 5/16/23 Active   Magnesium Oxide 420 (252 Mg) MG Tab 5/16/23 Active   metoprolol SR (TOPROL XL) 50 MG TABLET SR 24 HR 5/16/23 Active   ondansetron (ZOFRAN) 4 MG Tab tablet  Active   pantoprazole (PROTONIX) 40 MG Tablet Delayed Response 5/16/23 Active   QUEtiapine (SEROQUEL) 200 MG Tab 5/16/23 Active                    ALLERGIES  Allergies   Allergen Reactions    Haloperidol Unspecified     Twitching/involuntary movements        PHYSICAL EXAM  VITAL SIGNS: /82   Pulse 78   Temp 36.5 °C (97.7 °F) (Temporal)   Resp 19   Ht 1.727 m (5' 8\")   Wt 63.4 kg (139 lb 12.4 oz)   SpO2 91%   BMI 21.25 kg/m²    Constitutional: Awake and alert . Chronically ill but non toxic  HENT: Normal inspection  Dry mucous membranes  Eyes: Normal inspection  Neck: Grossly normal range of motion.  Cardiovascular: Tachycardic heart rate,  Irregular rhythm.  Symmetric peripheral pulses.   Thorax & Lungs: No respiratory distress, No wheezing, No rales, No rhonchi  Abdomen: Soft, non-distended, nontender to palpation in all 4 quadrants, no mass  Skin: No obvious rash.  Extremities: Warm, well perfused. No clubbing, cyanosis, edema   Neurologic: Grossly normal   Psychiatric: Normal for situation      DIAGNOSTIC STUDIES / PROCEDURES  EKG  I have independently interpreted this EKG  Tachycardic rate, irregular rhythm.  Narrow QRS.  He has slight ST deviations likely due to A fib with RVR    LABS  Results for orders placed or performed during the hospital encounter of 05/17/23   CBC WITH DIFFERENTIAL "   Result Value Ref Range    WBC 4.1 (L) 4.8 - 10.8 K/uL    RBC 4.14 (L) 4.70 - 6.10 M/uL    Hemoglobin 12.3 (L) 14.0 - 18.0 g/dL    Hematocrit 37.2 (L) 42.0 - 52.0 %    MCV 89.9 81.4 - 97.8 fL    MCH 29.7 27.0 - 33.0 pg    MCHC 33.1 (L) 33.7 - 35.3 g/dL    RDW 51.3 (H) 35.9 - 50.0 fL    Platelet Count 51 (L) 164 - 446 K/uL    MPV 12.3 9.0 - 12.9 fL    Neutrophils-Polys 56.60 44.00 - 72.00 %    Lymphocytes 28.40 22.00 - 41.00 %    Monocytes 12.30 0.00 - 13.40 %    Eosinophils 2.00 0.00 - 6.90 %    Basophils 0.70 0.00 - 1.80 %    Immature Granulocytes 0.00 0.00 - 0.90 %    Nucleated RBC 0.00 /100 WBC    Neutrophils (Absolute) 2.29 1.82 - 7.42 K/uL    Lymphs (Absolute) 1.15 1.00 - 4.80 K/uL    Monos (Absolute) 0.50 0.00 - 0.85 K/uL    Eos (Absolute) 0.08 0.00 - 0.51 K/uL    Baso (Absolute) 0.03 0.00 - 0.12 K/uL    Immature Granulocytes (abs) 0.00 0.00 - 0.11 K/uL    NRBC (Absolute) 0.00 K/uL   COMP METABOLIC PANEL   Result Value Ref Range    Sodium 143 135 - 145 mmol/L    Potassium 3.3 (L) 3.6 - 5.5 mmol/L    Chloride 106 96 - 112 mmol/L    Co2 21 20 - 33 mmol/L    Anion Gap 16.0 7.0 - 16.0    Glucose 112 (H) 65 - 99 mg/dL    Bun 10 8 - 22 mg/dL    Creatinine 0.57 0.50 - 1.40 mg/dL    Calcium 8.0 (L) 8.5 - 10.5 mg/dL    AST(SGOT) 139 (H) 12 - 45 U/L    ALT(SGPT) 89 (H) 2 - 50 U/L    Alkaline Phosphatase 80 30 - 99 U/L    Total Bilirubin 0.6 0.1 - 1.5 mg/dL    Albumin 3.7 3.2 - 4.9 g/dL    Total Protein 7.6 6.0 - 8.2 g/dL    Globulin 3.9 (H) 1.9 - 3.5 g/dL    A-G Ratio 0.9 g/dL   LIPASE   Result Value Ref Range    Lipase 74 11 - 82 U/L   TROPONIN   Result Value Ref Range    Troponin T 10 6 - 19 ng/L   DIAGNOSTIC ALCOHOL   Result Value Ref Range    Diagnostic Alcohol 256.2 (H) <10.1 mg/dL   ESTIMATED GFR   Result Value Ref Range    GFR (CKD-EPI) 109 >60 mL/min/1.73 m 2   CORRECTED CALCIUM   Result Value Ref Range    Correct Calcium 8.2 (L) 8.5 - 10.5 mg/dL   EKG   Result Value Ref Range    Report       [facility] Emergency Dept[facility]    Test Date:  2023  Pt Name:    JIMMY FERRER                 Department: ER  MRN:        [id number]                      Room:       BL 14  Gender:     Male                         Technician: [id number]  :        1960                   Requested By:ER TRIAGE PROTOCOL  Order #:    [id number]                    Reading MD:    Measurements  Intervals                                Axis  Rate:       158                          P:  SC:                                      QRS:        72  QRSD:       75                           T:          59  QT:         301  QTc:        488    Interpretive Statements  Atrial fibrillation with rapid V-rate  ST depression, probably rate related  Compared to ECG 2023 11:55:13  ST (T wave) deviation now present  Atrial-paced complex(es) or rhythm no longer present  Ventricular-paced complex(es) or rhythm no longer present     EKG   Result Value Ref Range    Report       [facility]    Test Date:  2023  Pt Name:    JIMMY FERRER                 Department: 183  MRN:        [id number]                      Room:       T825  Gender:     Male                         Technician: [id number]  :        1960                   Requested By:GURMEET HERNANDEZ  Order #:    [id number]                    Reading MD: Husam Medrano MD    Measurements  Intervals                                Axis  Rate:       64                           P:          12  SC:         163                          QRS:        69  QRSD:       68                           T:  QT:         491  QTc:        507    Interpretive Statements  Sinus rhythm  RSR' in V1 or V2, probably normal variant  Borderline repolarization abnormality  Prolonged QT interval  Electronically Signed On 2023 9:15:16 PDT by Husam Medrano MD           RADIOLOGY  I have independently interpreted the diagnostic imaging associated with this visit and am waiting the final reading from the radiologist.    My preliminary interpretation is as follows: No infiltrate on XR  Radiologist interpretation:   DX-CHEST-PORTABLE (1 VIEW)   Final Result         1.  No acute cardiopulmonary disease.      EC-ECHOCARDIOGRAM COMPLETE W/O CONT    (Results Pending)         COURSE & MEDICAL DECISION MAKING    ED Observation Status? Yes; I am placing the patient in to an observation status due to a diagnostic uncertainty as well as therapeutic intensity. Patient placed in observation status at 11:55 PM, 5/17/2023.     Observation plan is as follows: IV, Labs, Fluids, Rate control    Upon Reevaluation, the patient's condition has: not improved; and will be escalated to hospitalization.    Patient discharged from ED Observation status at 1.51 AM 5/18    INITIAL ASSESSMENT, COURSE AND PLAN  Care Narrative: This is a 63-year-old male with a history of alcohol use who presents with palpitations.  He was tachycardic on arrival, hemodynamically stable.  He is neurologically intact but appears to be intoxicated.  EKG confirms atrial fibrillation with RVR.  I do not think that the patient has a known prior history of this.  He was rate controlled with 2 x bolus IV diltiazem.  Patient is not a candidate for anticoagulation in the setting of thrombocytopenia and previous GI bleed.  His labs are notable for pancytopenia (chronic), hypokalemia, alcohol elevated at 256.  Troponin is normal.  Most likely patient developed A-fib in the setting of alcohol use.  He is also clinically dehydrated and was resuscitated with 2 fluid losses.  He has no chest pain, EKG nonischemic and troponin is normal and doubt ACS. He has no signs on exam to suggest volume overload or acute valvular emergency. Patient be admitted to hospital for further work-up and management.    HYDRATION: Based on the patient's presentation of Tachycardia the patient was given IV fluids. IV Hydration was used because oral hydration was not adequate alone. Upon recheck following hydration,  the patient was improved.      Critical care time : Inocencio presents with an acute critical illness and in my judgement had significant potential for imminent deterioration. I provided 35 minutes of Critical Care time to this patient, exclusive of any separately billable procedures. This included bedside direction of care, frequent re-evaluations, time spent coordinating ongoing consultant care and time of medical documentation.      DISPOSITION AND DISCUSSIONS  I have discussed management of the patient with the following physicians and LAISHA's:  Dr. Mendez    Discussion of management with other [facility] or appropriate source(s): None       Decision tools and prescription drugs considered including, but not limited to:  CHADS VASC 1 .    FINAL DIAGNOSIS  1. Pancytopenia (HCC) Acute   2. Alcoholic intoxication without complication (HCC) Acute   3. Atrial fibrillation with RVR (HCC) Acute   4. Hypokalemia Acute          Electronically signed by: Susana Mosqueda M.D., 5/17/2023 11:49 PM

## 2023-05-18 NOTE — ED NOTES
Pt alternating between sleeping in bed and waking up asking for food. Explained to pt that he cannot eat anything until all his test results come back and the ERP says so, demonstrated understanding. Will continue to monitor pt while awaiting further orders.

## 2023-05-18 NOTE — ASSESSMENT & PLAN NOTE
Patient found to have atrial fibrillation with RVR seen on EKG.  Likely induced by excessive alcohol abuse.  Patient will need to be given Lopressor IV for rate control and continue home medication of metoprolol.  Echocardiogram ordered for a.m.  Monitor closely for bradycardia, and hypotension from Lopressor administration.  Not candidate for anticoagulation as patient has thrombocytopenia (platelets 51) and active EtOH abuse.

## 2023-05-18 NOTE — THERAPY
Occupational Therapy   Initial Evaluation     Patient Name: Inocencio Shabazz  Age:  63 y.o., Sex:  male  Medical Record #: [id number]  Today's Date: 5/18/2023     Precautions: Fall Risk    Assessment    Patient is 63 y.o. male admitted with chest pain and generalized weakness, pmhx includes chronic eto use, liver cirrhosis, esophageal varices, hep C, sick sinus syndrome s/p pacemaker 10/22 and schizophrenia. Pt presents to OT eval able to manage all self-care tasks without assist, he remains at his baseline of functional independence. Anticipate DC home once medically cleared.     Plan    Occupational Therapy Initial Treatment Plan   Duration: Discharge Needs Only    DC Equipment Recommendations: None  Discharge Recommendations: Anticipate that the patient will have no further occupational therapy needs after discharge from the hospital      Objective       05/18/23 1115   Prior Living Situation   Prior Services None   Housing / Facility 2 Story Apartment / Condo   Steps Into Home 12   Steps In Home 0   Bathroom Set up Bathtub / Shower Combination   Equipment Owned None   Lives with - Patient's Self Care Capacity Alone and Able to Care For Self   Prior Level of ADL Function   Self Feeding Independent   Grooming / Hygiene Independent   Bathing Independent   Dressing Independent   Toileting Independent   Prior Level of IADL Function   Medication Management Independent   Laundry Independent   Kitchen Mobility Independent   Finances Independent   Home Management Independent   Shopping Independent   Prior Level Of Mobility Independent Without Device in Community   Driving / Transportation Walks   Precautions   Precautions Fall Risk   Cognition    Level of Consciousness Alert   Active ROM Upper Body   Active ROM Upper Body  WDL   Strength Upper Body   Upper Body Strength  WDL   Balance Assessment   Sitting Balance (Static) Fair +   Sitting Balance (Dynamic) Fair +   Standing Balance (Static) Fair +   Standing Balance  (Dynamic) Fair +   Weight Shift Sitting Good   Weight Shift Standing Good   Bed Mobility    Supine to Sit Supervised   Sit to Supine Supervised   Scooting Supervised   ADL Assessment   Eating Modified Independent   Grooming Standing;Supervision   Upper Body Dressing Supervision   Lower Body Dressing Supervision   Toileting Supervision   How much help from another person does the patient currently need...   Putting on and taking off regular lower body clothing? 4   Bathing (including washing, rinsing, and drying)? 4   Toileting, which includes using a toilet, bedpan, or urinal? 4   Putting on and taking off regular upper body clothing? 4   Taking care of personal grooming such as brushing teeth? 4   Eating meals? 4   6 Clicks Daily Activity Score 24   Functional Mobility   Sit to Stand Supervised   Bed, Chair, Wheelchair Transfer Supervised   Mobility no AD   Activity Tolerance   Sitting in Chair functional   Sitting Edge of Bed functional   Standing functional   Education Group   Education Provided Activities of Daily Living;Role of Occupational Therapist;Home Safety   Role of Occupational Therapist Patient Response Patient;Acceptance;Explanation;Action Demonstration;Verbal Demonstration   Home Safety Patient Response Patient;Acceptance;Explanation;Verbal Demonstration;Action Demonstration   ADL Patient Response Patient;Eager;Acceptance;Explanation;Verbal Demonstration;Action Demonstration   Occupational Therapy Initial Treatment Plan    Duration Discharge Needs Only   Problem List   Problem List None   Anticipated Discharge Equipment and Recommendations   DC Equipment Recommendations None   Discharge Recommendations Anticipate that the patient will have no further occupational therapy needs after discharge from the hospital

## 2023-05-18 NOTE — PROGRESS NOTES
Assumed care of patient at bedside report from night shift RN. Updated on POC. Patient currently A & O x 4; on room air without complaints of acute pain. Will continue CIWA protocols per order. Call light within reach. Whiteboard updated. Fall precautions in place. Bed locked and in lowest position. All questions answered. No other needs indicated at this time.

## 2023-05-18 NOTE — PROGRESS NOTES
4 Eyes Skin Assessment Completed by TITUS Pringle and TITUS Pleitez.    Head WDL  Ears WDL  Nose WDL  Mouth WDL  Neck WDL  Breast/Chest WDL  Shoulder Blades WDL  Spine WDL  (R) Arm/Elbow/Hand Redness and Blanching  (L) Arm/Elbow/Hand Redness and Blanching  Abdomen WDL  Groin WDL  Scrotum/Coccyx/Buttocks Redness, Blanching, and Discoloration  (R) Leg WDL  (L) Leg WDL  (R) Heel/Foot/Toe Redness and Blanching  (L) Heel/Foot/Toe Redness and Blanching          Devices In Places Tele Box, Blood Pressure Cuff, and Pulse Ox      Interventions In Place Pillows, Q2 Turns, and Pressure Redistribution Mattress    Possible Skin Injury No    Pictures Uploaded Into Epic N/A  Wound Consult Placed N/A  RN Wound Prevention Protocol Ordered No

## 2023-05-18 NOTE — PROGRESS NOTES
Report received from Tiffanie QURESHI. Updated on POC.  Assumed care of patient upon arrival to unit. Patient currently A & O x 4; on RA; up self with stand by; without complaints of acute pain. Pt placed on monitor, monitor room notified, Afib 140-160. Patient oriented to unit and to call light system. Call light within reach. Pt educated to fall risk. Fall precautions in place. Seizure precautions in place. Pt provided with personal grooming items. Bed locked and in lowest position. All questions answered. No other needs indicated at this time.

## 2023-05-18 NOTE — ASSESSMENT & PLAN NOTE
I discussed advance care planning with the patient for at least 16 minutes, including diagnosis, prognosis, plan of care, risks and benefits of any therapies that could be offered, as well as alternatives including palliation and hospice, as appropriate.     Full code

## 2023-05-18 NOTE — H&P
Hospital Medicine History & Physical Note    Date of Service  5/18/2023    Primary Care Physician  Nnamdi Ballesteros M.D.    Consultants  None    Code Status  Full Code    Chief Complaint  Chief Complaint   Patient presents with    Chest Pain     x2 days; pt in A-Fib with RVR on arrival       History of Presenting Illness  Inocencio Shabazz is a 63 y.o. male who presented 5/17/2023 with chest discomfort and an overall generalized weakness.    Patient has a history of chronic alcohol use, liver cirrhosis, esophageal varices hepatitis C, sick sinus syndrome status post pacemaker in October 2022, schizophrenia.  He has had multiple admissions for alcohol intoxication.    Patient states that he has been drinking every day.  Last drink this afternoon.  He began to feel a chest discomfort and an overall weakness, prompting him to come into the ED for evaluation.    In the ED, patient found to have tachycardia.  Pertinent labs include pancytopenia, hypokalemia, transaminitis, alcohol level 256.2.  Initial troponin negative.  EKG showing atrial fibrillation with RVR.    I discussed the plan of care with patient.    Review of Systems  Review of Systems   Constitutional:  Positive for malaise/fatigue.   HENT: Negative.     Eyes: Negative.    Respiratory: Negative.     Cardiovascular:  Positive for chest pain.   Gastrointestinal: Negative.    Genitourinary: Negative.    Musculoskeletal: Negative.    Skin: Negative.    Neurological:  Positive for weakness.   Endo/Heme/Allergies: Negative.    Psychiatric/Behavioral: Negative.         Past Medical History   has a past medical history of Alcohol abuse, Bipolar disorder (HCC), Cirrhosis (HCC), GIB (gastrointestinal bleeding) (01/03/2016), Hepatitis C, Pacemaker, Pancytopenia (HCC), and Schizophrenia (HCC).    Surgical History   has a past surgical history that includes gastroscopy (1/3/2016); gastroscopy (4/8/2016); gastroscopy (3/13/2019); and gastroscopy-endo (N/A, 11/13/2019).      Family History   Family history reviewed with patient. There is no family history that is pertinent to the chief complaint.     Social History   reports that he has been smoking cigarettes. He has a 48.00 pack-year smoking history. He has never used smokeless tobacco. He reports current alcohol use. He reports current drug use. Drug: Inhaled.    Allergies  Allergies   Allergen Reactions    Haloperidol Unspecified     Twitching/involuntary movements        Medications  Prior to Admission Medications   Prescriptions Last Dose Informant Patient Reported? Taking?   Magnesium Oxide 420 (252 Mg) MG Tab  Patient, Patient's Home Pharmacy Yes No   Sig: Take 1 Tablet by mouth every day.   QUEtiapine (SEROQUEL) 200 MG Tab  Patient, Patient's Home Pharmacy Yes No   Sig: Take 200 mg by mouth at bedtime.   diclofenac sodium (VOLTAREN) 1 % Gel  Patient, Patient's Home Pharmacy Yes No   Sig: Apply 4 g topically 4 times a day as needed (Joint or Arthritis Pain).   folic acid (FOLVITE) 1 MG Tab  Patient, Patient's Home Pharmacy No No   Sig: Take 1 Tablet by mouth every day.   metoprolol SR (TOPROL XL) 50 MG TABLET SR 24 HR  Patient, Patient's Home Pharmacy Yes No   Sig: Take 25 mg by mouth every day. 0.5 tablet = 25 mg.   ondansetron (ZOFRAN) 4 MG Tab tablet  Patient, Patient's Home Pharmacy Yes No   Sig: Take 4 mg by mouth 3 times a day as needed for Nausea/Vomiting.   pantoprazole (PROTONIX) 40 MG Tablet Delayed Response  Patient, Patient's Home Pharmacy Yes No   Sig: Take 40 mg by mouth 2 times daily, before breakfast and dinner.      Facility-Administered Medications: None       Physical Exam  Temp:  [36.8 °C (98.2 °F)] 36.8 °C (98.2 °F)  Pulse:  [] 86  Resp:  [16-20] 16  BP: (101-107)/(57-78) 101/57  SpO2:  [93 %-96 %] 95 %  Blood Pressure : 101/57   Temperature: 36.8 °C (98.2 °F)   Pulse: 86   Respiration: 16   Pulse Oximetry: 95 %       Physical Exam  Constitutional:       Appearance: Normal appearance.       Comments: Poor historian, talks in tangents   HENT:      Head: Normocephalic.      Nose: Nose normal.      Mouth/Throat:      Mouth: Mucous membranes are moist.   Eyes:      Pupils: Pupils are equal, round, and reactive to light.   Cardiovascular:      Rate and Rhythm: Tachycardia present. Rhythm irregular.      Pulses: Normal pulses.   Pulmonary:      Effort: Pulmonary effort is normal.      Breath sounds: Normal breath sounds.   Abdominal:      General: Abdomen is flat. Bowel sounds are normal.      Palpations: Abdomen is soft.   Musculoskeletal:      Comments: No tremors appreciated in upper extremities   Skin:     General: Skin is warm.   Neurological:      General: No focal deficit present.      Mental Status: He is alert and oriented to person, place, and time.         Laboratory:  Recent Labs     05/17/23  2350   WBC 4.1*   RBC 4.14*   HEMOGLOBIN 12.3*   HEMATOCRIT 37.2*   MCV 89.9   MCH 29.7   MCHC 33.1*   RDW 51.3*   PLATELETCT 51*   MPV 12.3     Recent Labs     05/17/23  2350   SODIUM 143   POTASSIUM 3.3*   CHLORIDE 106   CO2 21   GLUCOSE 112*   BUN 10   CREATININE 0.57   CALCIUM 8.0*     Recent Labs     05/17/23  2350   ALTSGPT 89*   ASTSGOT 139*   ALKPHOSPHAT 80   TBILIRUBIN 0.6   LIPASE 74   GLUCOSE 112*         No results for input(s): NTPROBNP in the last 72 hours.      Recent Labs     05/17/23  2350   TROPONINT 10       Imaging:  DX-CHEST-PORTABLE (1 VIEW)   Final Result         1.  No acute cardiopulmonary disease.          EKG:  I have personally reviewed the images and compared with prior images.    Assessment/Plan:  Justification for Admission Status  I anticipate this patient will require at least two midnights for appropriate medical management, necessitating inpatient admission because patient has alcohol intoxication and new onset atrial fibrillation with RVR    Patient will need a Med/Surg bed on EMERGENCY service .  The need is secondary to atrial fibrillation.    * Atrial fibrillation with  RVR (HCC)- (present on admission)  Assessment & Plan  Spoke with ERP.  Patient found to have atrial fibrillation with RVR seen on EKG.  Likely induced by excessive alcohol abuse.  Patient will need to be given Lopressor IV for rate control and continue home medication of metoprolol.  Echocardiogram ordered for a.m.  Monitor closely for bradycardia, and hypotension from Lopressor administration.  Not candidate for anticoagulation as patient has thrombocytopenia and is unlikely to be compliant with anticoagulant medication upon discharge    ACP (advance care planning)  Assessment & Plan  I discussed advance care planning with the patient for at least 16 minutes, including diagnosis, prognosis, plan of care, risks and benefits of any therapies that could be offered, as well as alternatives including palliation and hospice, as appropriate.     Full code      Tobacco use- (present on admission)  Assessment & Plan  I counseled the patient for 4 minutes regarding smoking cessation using methods such as nicotine replacement therapy with patches and gum and medications such as Wellbutrin or Chantix.  I also discussed with patient breathing techniques and meditation as well as regular physical activity, which will assist patient with smoking cessation      Alcohol withdrawal (HCC)  Assessment & Plan  Noted to have excess alcohol abuse.  Alcohol level 256 in ED  [facility] protocol  Folic acid thiamine    Pancytopenia (HCC)- (present on admission)  Assessment & Plan  Found to have pancytopenia from excessive alcohol use.  At baseline.  Serial CBC    Hypokalemia- (present on admission)  Assessment & Plan  Replace        VTE prophylaxis: pharmacologic prophylaxis contraindicated due to pancytopenia

## 2023-05-19 VITALS
HEART RATE: 61 BPM | OXYGEN SATURATION: 94 % | HEIGHT: 68 IN | SYSTOLIC BLOOD PRESSURE: 120 MMHG | BODY MASS INDEX: 21.18 KG/M2 | RESPIRATION RATE: 17 BRPM | TEMPERATURE: 97.9 F | WEIGHT: 139.77 LBS | DIASTOLIC BLOOD PRESSURE: 75 MMHG

## 2023-05-19 PROCEDURE — 700102 HCHG RX REV CODE 250 W/ 637 OVERRIDE(OP)

## 2023-05-19 PROCEDURE — A9270 NON-COVERED ITEM OR SERVICE: HCPCS | Performed by: STUDENT IN AN ORGANIZED HEALTH CARE EDUCATION/TRAINING PROGRAM

## 2023-05-19 PROCEDURE — A9270 NON-COVERED ITEM OR SERVICE: HCPCS | Performed by: HOSPITALIST

## 2023-05-19 PROCEDURE — 99239 HOSP IP/OBS DSCHRG MGMT >30: CPT | Performed by: HOSPITALIST

## 2023-05-19 PROCEDURE — 700102 HCHG RX REV CODE 250 W/ 637 OVERRIDE(OP): Performed by: HOSPITALIST

## 2023-05-19 PROCEDURE — 700102 HCHG RX REV CODE 250 W/ 637 OVERRIDE(OP): Performed by: STUDENT IN AN ORGANIZED HEALTH CARE EDUCATION/TRAINING PROGRAM

## 2023-05-19 PROCEDURE — A9270 NON-COVERED ITEM OR SERVICE: HCPCS

## 2023-05-19 RX ADMIN — Medication 100 MG: at 04:16

## 2023-05-19 RX ADMIN — METOPROLOL SUCCINATE 25 MG: 25 TABLET, EXTENDED RELEASE ORAL at 04:16

## 2023-05-19 RX ADMIN — THERA TABS 1 TABLET: TAB at 04:16

## 2023-05-19 RX ADMIN — FOLIC ACID 1 MG: 1 TABLET ORAL at 04:16

## 2023-05-19 RX ADMIN — NICOTINE TRANSDERMAL SYSTEM 21 MG: 21 PATCH, EXTENDED RELEASE TRANSDERMAL at 04:17

## 2023-05-19 NOTE — PROGRESS NOTES
Patient being discharged. Pt educated on discharge instructions and new prescriptions, verbalized understanding. Follow up appointment made with PCP. PIV removed, monitor checked in. Patient going Home via DC lounge.

## 2023-05-19 NOTE — DISCHARGE INSTRUCTIONS
Discharge Instructions per Abdi Merritt M.D.    No alcohol please    DIET: heart-healthy    ACTIVITY: as tolerated    DIAGNOSIS: alcohol withdrawal    Return to ER if symptoms worsen

## 2023-05-19 NOTE — PROGRESS NOTES
Monitor Summary:    SR 62-72    40 second run of accelerated junctional     .16/.08/.40

## 2023-05-19 NOTE — CARE PLAN
The patient is Watcher - Medium risk of patient condition declining or worsening    Shift Goals  Clinical Goals: CIWA  Patient Goals: eat, rest  Family Goals: n/a    Progress made toward(s) clinical / shift goals:    Problem: Knowledge Deficit - Standard  Goal: Patient and family/care givers will demonstrate understanding of plan of care, disease process/condition, diagnostic tests and medications  Outcome: Progressing  Note: White board updated with POC and care team information during bedside report.      Problem: Fall Risk  Goal: Patient will remain free from falls  5/18/2023 2113 by Loreta Chung R.N.  Outcome: Progressing  Note: Fall precautions in place. Bed in lowest position. Non-skid socks in place. Personal possessions within reach. Mobility sign on door. Bed-alarm on. Call light within reach. Pt educated regarding fall prevention and states understanding.    5/18/2023 2113 by Loreta Chung R.N.  Note: Fall precautions in place. Bed in lowest position. Non-skid socks in place. Personal possessions within reach. Mobility sign on door. Bed-alarm on. Call light within reach. Pt educated regarding fall prevention and states understanding.       Problem: Optimal Care for Alcohol Withdrawal  Goal: Optimal Care for the alcohol withdrawal patient  5/18/2023 2113 by Loreta Chung R.N.  Outcome: Progressing  Note: CIWA protocol in place  5/18/2023 2113 by [email or web address]  Note: CIWA protocol in place     Problem: Seizure Precautions  Goal: Implementation of seizure precautions  5/18/2023 2113 by Loreta Chung R.N.  Outcome: Progressing  Note: Precautions in place  5/18/2023 2113 by [email or web address]  Note: Precautions in place     Problem: Psychosocial  Goal: Patient's level of anxiety will decrease  5/18/2023 2113 by Loreta Chung R.N.  Outcome: Progressing  Flowsheets (Taken 5/18/2023 2113)  Decrease Anxiety Level: Implemented stimuli reduction, calming techniques  Note: CIWA  5/18/2023 2113 by  Loreta Chung R.N.  Note: [facility]  Goal: Spiritual and cultural needs incorporated into hospitalization  Flowsheets (Taken 5/18/2023 2113)  Was spiritual care education provided to the patient?: Declined     Problem: Risk for Aspiration  Goal: Patient's risk for aspiration will be absent or decrease  5/18/2023 2113 by Loreta Chung R.N.  Outcome: Progressing  Note: Precautions in place  5/18/2023 2113 by Loreta Chung R.N.  Note: Precautions in place     Problem: Pain - Standard  Goal: Alleviation of pain or a reduction in pain to the patient’s comfort goal  5/18/2023 2113 by Loreta Chung R.N.  Outcome: Progressing  Note: Medicated per MAR  5/18/2023 2113 by Loreta Chung RSydniN.  Note: Medicated per MAR       Patient is not progressing towards the following goals:      Problem: Lifestyle Changes  Goal: Patient's ability to identify lifestyle changes and available resources to help reduce recurrence of condition will improve  5/18/2023 2113 by Loreta Chung R.N.  Outcome: Not Progressing  Note: Unreceptive to conversation  5/18/2023 2113 by Loreta Chung R.N.  Note: Unreceptive to conversation

## 2023-05-19 NOTE — DISCHARGE SUMMARY
Discharge Summary    CHIEF COMPLAINT ON ADMISSION  Chief Complaint   Patient presents with    Chest Pain     x2 days; pt in A-Fib with RVR on arrival       Reason for Admission  EMS     Admission Date  5/17/2023    CODE STATUS  Full Code    HPI & HOSPITAL COURSE  This is a 63 y.o. male here with chest pain and weakness.  Mr. Shabazz has a history of chronic alcohol use, liver cirrhosis, esophageal varices hepatitis C, sick sinus syndrome status post pacemaker in October 2022, schizophrenia.  He has had multiple admissions for alcohol intoxication.     Patient states that he has been drinking heavily every day.  Last drink this afternoon.  He began to feel a chest discomfort and an overall weakness, prompting him to come into the ED for evaluation.     In the ED, patient found to have tachycardia.  Pertinent labs include pancytopenia, hypokalemia, transaminitis, alcohol level 256.2.  Initial troponin negative.  EKG showing atrial fibrillation with RVR.    He was treated with metoprolol and monitor on telemetry.  He converted to a sinus rhythm.  He is certainly not a candidate for anticoagulation as his platelet count is 51.  He underwent detox and was given Ativan for this.    5/19/2023: Patient seen and evaluated on the telemetry floor.  He has no further detox symptoms.  He is in a sinus rhythm.  He will be discharged home in stable condition.  He will [facility] in the [facility] where he seeks his medical care.  We had a long discussion about alcohol cessation.  He is 63 years old still drinking very heavily which is not a sustainable model for health.  We had long discussion about alcohol and smoking cessation.  States he will follow-up with [facility].    Therefore, he is discharged in good and stable condition to home with close outpatient follow-up.    The patient met 2-midnight criteria for an inpatient stay at the time of discharge.    Discharge Date  5/19    FOLLOW UP ITEMS POST  DISCHARGE  [facility]    DISCHARGE DIAGNOSES  Principal Problem:    Atrial fibrillation with RVR (HCC) (POA: Yes)  Active Problems:    Alcohol withdrawal (HCC) (POA: Yes)    Thrombocytopenia (HCC) (POA: Yes)    Cirrhosis (HCC) (POA: Yes)    Hypokalemia (POA: Yes)    Pancytopenia (HCC) (POA: Yes)    Pacemaker (POA: Yes)    Tobacco use (POA: Yes)  Resolved Problems:    ACP (advance care planning) (POA: Unknown)      FOLLOW UP  No future appointments.  Nnamdi Ballesteros M.D.  [address] [facility] [address]  [phone]    Schedule an appointment as soon as possible for a visit        MEDICATIONS ON DISCHARGE     Medication List        CONTINUE taking these medications        Instructions   diclofenac sodium 1 % Gel  Commonly known as: Voltaren   Apply 4 g topically 4 times a day as needed (Joint or Arthritis Pain).  Dose: 4 g     folic acid 1 MG Tabs  Commonly known as: FOLVITE   Take 1 Tablet by mouth every day.  Dose: 1 mg     Magnesium Oxide 420 (252 Mg) MG Tabs   Take 1 Tablet by mouth every day.  Dose: 1 Tablet     metoprolol SR 50 MG Tb24  Commonly known as: TOPROL XL   Take 25 mg by mouth every day. 0.5 tablet = 25 mg.  Dose: 25 mg     ondansetron 4 MG Tabs tablet  Commonly known as: ZOFRAN   Take 4 mg by mouth 3 times a day as needed for Nausea/Vomiting.  Dose: 4 mg     pantoprazole 40 MG Tbec  Commonly known as: PROTONIX   Take 40 mg by mouth 2 times daily, before breakfast and dinner.  Dose: 40 mg     QUEtiapine 200 MG Tabs  Commonly known as: Seroquel   Take 200 mg by mouth at bedtime.  Dose: 200 mg              Allergies  Allergies   Allergen Reactions    Haloperidol Unspecified     Twitching/involuntary movements        DIET  Orders Placed This Encounter   Procedures    Diet Order Diet: Cardiac     Standing Status:   Standing     Number of Occurrences:   1     Order Specific Question:   Diet:     Answer:   Cardiac [6]       ACTIVITY  As  tolerated.      CONSULTATIONS  none    PROCEDURES  none    LABORATORY  Lab Results   Component Value Date    SODIUM 143 05/17/2023    POTASSIUM 3.3 (L) 05/17/2023    CHLORIDE 106 05/17/2023    CO2 21 05/17/2023    GLUCOSE 112 (H) 05/17/2023    BUN 10 05/17/2023    CREATININE 0.57 05/17/2023    TSH 0.4    Lab Results   Component Value Date    WBC 4.1 (L) 05/17/2023    HEMOGLOBIN 12.3 (L) 05/17/2023    HEMATOCRIT 37.2 (L) 05/17/2023    PLATELETCT 51 (L) 05/17/2023      DX-CHEST-PORTABLE (1 VIEW)   Final Result         1.  No acute cardiopulmonary disease.            Total time of the discharge process exceeds 34 minutes.

## 2023-05-19 NOTE — PROGRESS NOTES
Monitor Summary:   Rhythm: SB-SR  Rate: 59-80  Measurement: .14/.07/.39  Ectopy: r pac r pvc PSVT up to 160 for five beats

## 2023-08-10 ENCOUNTER — ANESTHESIA (OUTPATIENT)
Dept: SURGERY | Facility: MEDICAL CENTER | Age: 63
DRG: 378 | End: 2023-08-10
Payer: COMMERCIAL

## 2023-08-10 ENCOUNTER — HOSPITAL ENCOUNTER (INPATIENT)
Facility: MEDICAL CENTER | Age: 63
LOS: 4 days | DRG: 378 | End: 2023-08-14
Attending: EMERGENCY MEDICINE | Admitting: HOSPITALIST
Payer: COMMERCIAL

## 2023-08-10 ENCOUNTER — ANESTHESIA EVENT (OUTPATIENT)
Dept: SURGERY | Facility: MEDICAL CENTER | Age: 63
DRG: 378 | End: 2023-08-10
Payer: COMMERCIAL

## 2023-08-10 DIAGNOSIS — K92.2 UPPER GI BLEED: ICD-10-CM

## 2023-08-10 DIAGNOSIS — D50.0 BLOOD LOSS ANEMIA: ICD-10-CM

## 2023-08-10 PROBLEM — E87.5 HYPERKALEMIA: Status: ACTIVE | Noted: 2023-08-10

## 2023-08-10 PROBLEM — D68.9 COAGULOPATHY (HCC): Status: ACTIVE | Noted: 2023-08-10

## 2023-08-10 LAB
ABO GROUP BLD: NORMAL
ALBUMIN SERPL BCP-MCNC: 3 G/DL (ref 3.2–4.9)
ALBUMIN/GLOB SERPL: 0.9 G/DL
ALP SERPL-CCNC: 61 U/L (ref 30–99)
ALT SERPL-CCNC: 52 U/L (ref 2–50)
AMMONIA PLAS-SCNC: 39 UMOL/L (ref 11–45)
ANION GAP SERPL CALC-SCNC: 7 MMOL/L (ref 7–16)
AST SERPL-CCNC: 67 U/L (ref 12–45)
BARCODED ABORH UBTYP: 8400
BARCODED ABORH UBTYP: 9500
BARCODED PRD CODE UBPRD: NORMAL
BARCODED PRD CODE UBPRD: NORMAL
BARCODED UNIT NUM UBUNT: NORMAL
BARCODED UNIT NUM UBUNT: NORMAL
BASOPHILS # BLD AUTO: 0.4 % (ref 0–1.8)
BASOPHILS # BLD: 0.01 K/UL (ref 0–0.12)
BILIRUB SERPL-MCNC: 1.9 MG/DL (ref 0.1–1.5)
BLD GP AB SCN SERPL QL: NORMAL
BUN SERPL-MCNC: 21 MG/DL (ref 8–22)
CALCIUM ALBUM COR SERPL-MCNC: 8.4 MG/DL (ref 8.5–10.5)
CALCIUM SERPL-MCNC: 7.6 MG/DL (ref 8.5–10.5)
CHLORIDE SERPL-SCNC: 103 MMOL/L (ref 96–112)
CO2 SERPL-SCNC: 26 MMOL/L (ref 20–33)
COMPONENT R 8504R: NORMAL
COMPONENT R 8504R: NORMAL
CREAT SERPL-MCNC: 0.5 MG/DL (ref 0.5–1.4)
EOSINOPHIL # BLD AUTO: 0.03 K/UL (ref 0–0.51)
EOSINOPHIL NFR BLD: 1.3 % (ref 0–6.9)
ERYTHROCYTE [DISTWIDTH] IN BLOOD BY AUTOMATED COUNT: 49.8 FL (ref 35.9–50)
GFR SERPLBLD CREATININE-BSD FMLA CKD-EPI: 113 ML/MIN/1.73 M 2
GLOBULIN SER CALC-MCNC: 3.2 G/DL (ref 1.9–3.5)
GLUCOSE SERPL-MCNC: 150 MG/DL (ref 65–99)
HCT VFR BLD AUTO: 23.8 % (ref 42–52)
HGB BLD-MCNC: 6.2 G/DL (ref 14–18)
HGB BLD-MCNC: 7.4 G/DL (ref 14–18)
IMM GRANULOCYTES # BLD AUTO: 0 K/UL (ref 0–0.11)
IMM GRANULOCYTES NFR BLD AUTO: 0 % (ref 0–0.9)
INR PPP: 1.87 (ref 0.87–1.13)
LIPASE SERPL-CCNC: 30 U/L (ref 11–82)
LYMPHOCYTES # BLD AUTO: 0.51 K/UL (ref 1–4.8)
LYMPHOCYTES NFR BLD: 22.2 % (ref 22–41)
MAGNESIUM SERPL-MCNC: 1.8 MG/DL (ref 1.5–2.5)
MCH RBC QN AUTO: 28.2 PG (ref 27–33)
MCHC RBC AUTO-ENTMCNC: 31.1 G/DL (ref 32.3–36.5)
MCV RBC AUTO: 90.8 FL (ref 81.4–97.8)
MONOCYTES # BLD AUTO: 0.27 K/UL (ref 0–0.85)
MONOCYTES NFR BLD AUTO: 11.7 % (ref 0–13.4)
NEUTROPHILS # BLD AUTO: 1.48 K/UL (ref 1.82–7.42)
NEUTROPHILS NFR BLD: 64.4 % (ref 44–72)
NRBC # BLD AUTO: 0 K/UL
NRBC BLD-RTO: 0 /100 WBC (ref 0–0.2)
PHOSPHATE SERPL-MCNC: 2.5 MG/DL (ref 2.5–4.5)
PLATELET # BLD AUTO: 39 K/UL (ref 164–446)
PLATELETS.RETICULATED NFR BLD AUTO: 13.5 % (ref 0.6–13.1)
PMV BLD AUTO: 12.4 FL (ref 9–12.9)
POTASSIUM SERPL-SCNC: 5.8 MMOL/L (ref 3.6–5.5)
PRODUCT TYPE UPROD: NORMAL
PRODUCT TYPE UPROD: NORMAL
PROT SERPL-MCNC: 6.2 G/DL (ref 6–8.2)
PROTHROMBIN TIME: 21 SEC (ref 12–14.6)
RBC # BLD AUTO: 2.62 M/UL (ref 4.7–6.1)
RH BLD: NORMAL
SODIUM SERPL-SCNC: 136 MMOL/L (ref 135–145)
UNIT STATUS USTAT: NORMAL
UNIT STATUS USTAT: NORMAL
WBC # BLD AUTO: 2.3 K/UL (ref 4.8–10.8)

## 2023-08-10 PROCEDURE — 700101 HCHG RX REV CODE 250: Performed by: ANESTHESIOLOGY

## 2023-08-10 PROCEDURE — 160009 HCHG ANES TIME/MIN: Performed by: SPECIALIST

## 2023-08-10 PROCEDURE — 86923 COMPATIBILITY TEST ELECTRIC: CPT

## 2023-08-10 PROCEDURE — 93005 ELECTROCARDIOGRAM TRACING: CPT | Performed by: HOSPITALIST

## 2023-08-10 PROCEDURE — 700102 HCHG RX REV CODE 250 W/ 637 OVERRIDE(OP): Performed by: HOSPITALIST

## 2023-08-10 PROCEDURE — 99223 1ST HOSP IP/OBS HIGH 75: CPT | Mod: 25 | Performed by: SPECIALIST

## 2023-08-10 PROCEDURE — 700111 HCHG RX REV CODE 636 W/ 250 OVERRIDE (IP): Mod: UD | Performed by: EMERGENCY MEDICINE

## 2023-08-10 PROCEDURE — 770000 HCHG ROOM/CARE - INTERMEDIATE ICU *

## 2023-08-10 PROCEDURE — 700105 HCHG RX REV CODE 258: Mod: UD | Performed by: EMERGENCY MEDICINE

## 2023-08-10 PROCEDURE — 99285 EMERGENCY DEPT VISIT HI MDM: CPT

## 2023-08-10 PROCEDURE — 700105 HCHG RX REV CODE 258: Mod: JZ | Performed by: ANESTHESIOLOGY

## 2023-08-10 PROCEDURE — 85025 COMPLETE CBC W/AUTO DIFF WBC: CPT

## 2023-08-10 PROCEDURE — 160048 HCHG OR STATISTICAL LEVEL 1-5: Performed by: SPECIALIST

## 2023-08-10 PROCEDURE — 96375 TX/PRO/DX INJ NEW DRUG ADDON: CPT

## 2023-08-10 PROCEDURE — 83735 ASSAY OF MAGNESIUM: CPT

## 2023-08-10 PROCEDURE — 85055 RETICULATED PLATELET ASSAY: CPT

## 2023-08-10 PROCEDURE — C9113 INJ PANTOPRAZOLE SODIUM, VIA: HCPCS | Mod: UD | Performed by: EMERGENCY MEDICINE

## 2023-08-10 PROCEDURE — 83690 ASSAY OF LIPASE: CPT

## 2023-08-10 PROCEDURE — 85610 PROTHROMBIN TIME: CPT

## 2023-08-10 PROCEDURE — 86850 RBC ANTIBODY SCREEN: CPT

## 2023-08-10 PROCEDURE — 84100 ASSAY OF PHOSPHORUS: CPT

## 2023-08-10 PROCEDURE — P9016 RBC LEUKOCYTES REDUCED: HCPCS

## 2023-08-10 PROCEDURE — 36415 COLL VENOUS BLD VENIPUNCTURE: CPT

## 2023-08-10 PROCEDURE — 700111 HCHG RX REV CODE 636 W/ 250 OVERRIDE (IP): Mod: JZ | Performed by: HOSPITALIST

## 2023-08-10 PROCEDURE — 160002 HCHG RECOVERY MINUTES (STAT): Performed by: SPECIALIST

## 2023-08-10 PROCEDURE — 700111 HCHG RX REV CODE 636 W/ 250 OVERRIDE (IP): Performed by: ANESTHESIOLOGY

## 2023-08-10 PROCEDURE — 86900 BLOOD TYPING SEROLOGIC ABO: CPT

## 2023-08-10 PROCEDURE — 700105 HCHG RX REV CODE 258: Performed by: HOSPITALIST

## 2023-08-10 PROCEDURE — 96365 THER/PROPH/DIAG IV INF INIT: CPT

## 2023-08-10 PROCEDURE — 82140 ASSAY OF AMMONIA: CPT

## 2023-08-10 PROCEDURE — 85018 HEMOGLOBIN: CPT

## 2023-08-10 PROCEDURE — 36430 TRANSFUSION BLD/BLD COMPNT: CPT

## 2023-08-10 PROCEDURE — 80053 COMPREHEN METABOLIC PANEL: CPT

## 2023-08-10 PROCEDURE — HZ2ZZZZ DETOXIFICATION SERVICES FOR SUBSTANCE ABUSE TREATMENT: ICD-10-PCS | Performed by: HOSPITALIST

## 2023-08-10 PROCEDURE — 160035 HCHG PACU - 1ST 60 MINS PHASE I: Performed by: SPECIALIST

## 2023-08-10 PROCEDURE — 160203 HCHG ENDO MINUTES - 1ST 30 MINS LEVEL 4: Performed by: SPECIALIST

## 2023-08-10 PROCEDURE — 0W3P8ZZ CONTROL BLEEDING IN GASTROINTESTINAL TRACT, VIA NATURAL OR ARTIFICIAL OPENING ENDOSCOPIC: ICD-10-PCS | Performed by: SPECIALIST

## 2023-08-10 PROCEDURE — 30233N1 TRANSFUSION OF NONAUTOLOGOUS RED BLOOD CELLS INTO PERIPHERAL VEIN, PERCUTANEOUS APPROACH: ICD-10-PCS | Performed by: HOSPITALIST

## 2023-08-10 PROCEDURE — 43255 EGD CONTROL BLEEDING ANY: CPT | Performed by: SPECIALIST

## 2023-08-10 PROCEDURE — 99223 1ST HOSP IP/OBS HIGH 75: CPT | Performed by: HOSPITALIST

## 2023-08-10 PROCEDURE — A9270 NON-COVERED ITEM OR SERVICE: HCPCS | Performed by: HOSPITALIST

## 2023-08-10 PROCEDURE — 86901 BLOOD TYPING SEROLOGIC RH(D): CPT

## 2023-08-10 RX ORDER — DIPHENHYDRAMINE HYDROCHLORIDE 50 MG/ML
12.5 INJECTION INTRAMUSCULAR; INTRAVENOUS
Status: DISCONTINUED | OUTPATIENT
Start: 2023-08-10 | End: 2023-08-10 | Stop reason: HOSPADM

## 2023-08-10 RX ORDER — QUETIAPINE FUMARATE 100 MG/1
100 TABLET, FILM COATED ORAL NIGHTLY
Status: DISCONTINUED | OUTPATIENT
Start: 2023-08-10 | End: 2023-08-14 | Stop reason: HOSPADM

## 2023-08-10 RX ORDER — SODIUM CHLORIDE, SODIUM LACTATE, POTASSIUM CHLORIDE, CALCIUM CHLORIDE 600; 310; 30; 20 MG/100ML; MG/100ML; MG/100ML; MG/100ML
INJECTION, SOLUTION INTRAVENOUS
Status: DISCONTINUED | OUTPATIENT
Start: 2023-08-10 | End: 2023-08-10 | Stop reason: SURG

## 2023-08-10 RX ORDER — LANOLIN ALCOHOL/MO/W.PET/CERES
100 CREAM (GRAM) TOPICAL DAILY
COMMUNITY
End: 2023-10-26

## 2023-08-10 RX ORDER — ONDANSETRON 2 MG/ML
INJECTION INTRAMUSCULAR; INTRAVENOUS PRN
Status: DISCONTINUED | OUTPATIENT
Start: 2023-08-10 | End: 2023-08-10 | Stop reason: SURG

## 2023-08-10 RX ORDER — HALOPERIDOL 5 MG/ML
1 INJECTION INTRAMUSCULAR
Status: DISCONTINUED | OUTPATIENT
Start: 2023-08-10 | End: 2023-08-10 | Stop reason: HOSPADM

## 2023-08-10 RX ORDER — OXYCODONE HCL 5 MG/5 ML
5 SOLUTION, ORAL ORAL
Status: DISCONTINUED | OUTPATIENT
Start: 2023-08-10 | End: 2023-08-10 | Stop reason: HOSPADM

## 2023-08-10 RX ORDER — OXYCODONE HCL 5 MG/5 ML
10 SOLUTION, ORAL ORAL
Status: DISCONTINUED | OUTPATIENT
Start: 2023-08-10 | End: 2023-08-10 | Stop reason: HOSPADM

## 2023-08-10 RX ORDER — ROCURONIUM BROMIDE 10 MG/ML
INJECTION, SOLUTION INTRAVENOUS PRN
Status: DISCONTINUED | OUTPATIENT
Start: 2023-08-10 | End: 2023-08-10 | Stop reason: SURG

## 2023-08-10 RX ORDER — SODIUM CHLORIDE, SODIUM LACTATE, POTASSIUM CHLORIDE, CALCIUM CHLORIDE 600; 310; 30; 20 MG/100ML; MG/100ML; MG/100ML; MG/100ML
INJECTION, SOLUTION INTRAVENOUS CONTINUOUS
Status: DISCONTINUED | OUTPATIENT
Start: 2023-08-10 | End: 2023-08-10 | Stop reason: HOSPADM

## 2023-08-10 RX ORDER — PHENYLEPHRINE HCL IN 0.9% NACL 0.5 MG/5ML
SYRINGE (ML) INTRAVENOUS PRN
Status: DISCONTINUED | OUTPATIENT
Start: 2023-08-10 | End: 2023-08-10 | Stop reason: SURG

## 2023-08-10 RX ORDER — LIDOCAINE HYDROCHLORIDE 20 MG/ML
INJECTION, SOLUTION EPIDURAL; INFILTRATION; INTRACAUDAL; PERINEURAL PRN
Status: DISCONTINUED | OUTPATIENT
Start: 2023-08-10 | End: 2023-08-10 | Stop reason: SURG

## 2023-08-10 RX ORDER — OCTREOTIDE ACETATE 100 UG/ML
50 INJECTION, SOLUTION INTRAVENOUS; SUBCUTANEOUS ONCE
Status: COMPLETED | OUTPATIENT
Start: 2023-08-10 | End: 2023-08-10

## 2023-08-10 RX ORDER — MEPERIDINE HYDROCHLORIDE 25 MG/ML
6.25 INJECTION INTRAMUSCULAR; INTRAVENOUS; SUBCUTANEOUS
Status: DISCONTINUED | OUTPATIENT
Start: 2023-08-10 | End: 2023-08-10 | Stop reason: HOSPADM

## 2023-08-10 RX ORDER — MIDAZOLAM HYDROCHLORIDE 1 MG/ML
INJECTION INTRAMUSCULAR; INTRAVENOUS PRN
Status: DISCONTINUED | OUTPATIENT
Start: 2023-08-10 | End: 2023-08-10 | Stop reason: SURG

## 2023-08-10 RX ORDER — CEFTRIAXONE 2 G/1
2000 INJECTION, POWDER, FOR SOLUTION INTRAMUSCULAR; INTRAVENOUS ONCE
Status: COMPLETED | OUTPATIENT
Start: 2023-08-10 | End: 2023-08-10

## 2023-08-10 RX ORDER — ONDANSETRON 2 MG/ML
4 INJECTION INTRAMUSCULAR; INTRAVENOUS
Status: DISCONTINUED | OUTPATIENT
Start: 2023-08-10 | End: 2023-08-10 | Stop reason: HOSPADM

## 2023-08-10 RX ADMIN — MIDAZOLAM 2 MG: 1 INJECTION, SOLUTION INTRAMUSCULAR; INTRAVENOUS at 10:54

## 2023-08-10 RX ADMIN — ONDANSETRON 4 MG: 2 INJECTION INTRAMUSCULAR; INTRAVENOUS at 11:14

## 2023-08-10 RX ADMIN — LIDOCAINE HYDROCHLORIDE 100 MG: 20 INJECTION, SOLUTION EPIDURAL; INFILTRATION; INTRACAUDAL at 10:57

## 2023-08-10 RX ADMIN — Medication 100 MCG: at 11:05

## 2023-08-10 RX ADMIN — OCTREOTIDE ACETATE 50 MCG/HR: 200 INJECTION, SOLUTION INTRAVENOUS; SUBCUTANEOUS at 09:08

## 2023-08-10 RX ADMIN — CEFTRIAXONE SODIUM 2000 MG: 2 INJECTION, POWDER, FOR SOLUTION INTRAMUSCULAR; INTRAVENOUS at 08:50

## 2023-08-10 RX ADMIN — FENTANYL CITRATE 50 MCG: 50 INJECTION, SOLUTION INTRAMUSCULAR; INTRAVENOUS at 10:57

## 2023-08-10 RX ADMIN — OCTREOTIDE ACETATE 50 MCG: 100 INJECTION, SOLUTION INTRAVENOUS; SUBCUTANEOUS at 09:03

## 2023-08-10 RX ADMIN — PHYTONADIONE 10 MG: 10 INJECTION, EMULSION INTRAMUSCULAR; INTRAVENOUS; SUBCUTANEOUS at 16:41

## 2023-08-10 RX ADMIN — Medication 200 MCG: at 11:00

## 2023-08-10 RX ADMIN — PROPOFOL 120 MG: 10 INJECTION, EMULSION INTRAVENOUS at 10:57

## 2023-08-10 RX ADMIN — SUGAMMADEX 200 MG: 100 INJECTION, SOLUTION INTRAVENOUS at 11:14

## 2023-08-10 RX ADMIN — SODIUM CHLORIDE, POTASSIUM CHLORIDE, SODIUM LACTATE AND CALCIUM CHLORIDE: 600; 310; 30; 20 INJECTION, SOLUTION INTRAVENOUS at 10:54

## 2023-08-10 RX ADMIN — QUETIAPINE FUMARATE 100 MG: 100 TABLET ORAL at 20:28

## 2023-08-10 RX ADMIN — ROCURONIUM BROMIDE 50 MG: 50 INJECTION, SOLUTION INTRAVENOUS at 10:57

## 2023-08-10 RX ADMIN — PANTOPRAZOLE SODIUM 80 MG: 40 INJECTION, POWDER, FOR SOLUTION INTRAVENOUS at 08:50

## 2023-08-10 ASSESSMENT — PAIN DESCRIPTION - PAIN TYPE
TYPE: ACUTE PAIN
TYPE: ACUTE PAIN;SURGICAL PAIN
TYPE: SURGICAL PAIN
TYPE: ACUTE PAIN
TYPE: SURGICAL PAIN

## 2023-08-10 ASSESSMENT — LIFESTYLE VARIABLES
TOTAL SCORE: 2
ON A TYPICAL DAY WHEN YOU DRINK ALCOHOL HOW MANY DRINKS DO YOU HAVE: 5
CONSUMPTION TOTAL: POSITIVE
EVER FELT BAD OR GUILTY ABOUT YOUR DRINKING: YES
EVER HAD A DRINK FIRST THING IN THE MORNING TO STEADY YOUR NERVES TO GET RID OF A HANGOVER: NO
TOTAL SCORE: 2
TOTAL SCORE: 2
DOES PATIENT WANT TO STOP DRINKING: YES
DOES PATIENT WANT TO TALK TO SOMEONE ABOUT QUITTING: NO
ALCOHOL_USE: YES
HAVE YOU EVER FELT YOU SHOULD CUT DOWN ON YOUR DRINKING: YES
AVERAGE NUMBER OF DAYS PER WEEK YOU HAVE A DRINK CONTAINING ALCOHOL: 7
HAVE PEOPLE ANNOYED YOU BY CRITICIZING YOUR DRINKING: NO
HOW MANY TIMES IN THE PAST YEAR HAVE YOU HAD 5 OR MORE DRINKS IN A DAY: 365

## 2023-08-10 ASSESSMENT — FIBROSIS 4 INDEX
FIB4 SCORE: 15.49
FIB4 SCORE: 15.49
FIB4 SCORE: 18.78

## 2023-08-10 ASSESSMENT — COGNITIVE AND FUNCTIONAL STATUS - GENERAL
DRESSING REGULAR LOWER BODY CLOTHING: A LITTLE
DAILY ACTIVITIY SCORE: 23
MOBILITY SCORE: 19
MOVING TO AND FROM BED TO CHAIR: A LITTLE
SUGGESTED CMS G CODE MODIFIER MOBILITY: CK
STANDING UP FROM CHAIR USING ARMS: A LITTLE
WALKING IN HOSPITAL ROOM: A LITTLE
MOVING FROM LYING ON BACK TO SITTING ON SIDE OF FLAT BED: A LITTLE
CLIMB 3 TO 5 STEPS WITH RAILING: A LITTLE
SUGGESTED CMS G CODE MODIFIER DAILY ACTIVITY: CI

## 2023-08-10 NOTE — ASSESSMENT & PLAN NOTE
Hx of  He is supposed to be on Seroquel 200 mg nightly though it is not clear if he is taking it.    3/12 on Seroquel 100 mg nightly and adjust accordingly.

## 2023-08-10 NOTE — ASSESSMENT & PLAN NOTE
Hematemesis with acute blood loss anemia.  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  Prilosec BID and 72 hours octreotide. Stop rocephin.  8/12 s/p egd, pod#2, cont ppi bid  8/13 hgb trend down, f/u in am  Advance diet to regular  Improving lft, monitor  Cont ppi bid     SCDs

## 2023-08-10 NOTE — ASSESSMENT & PLAN NOTE
Clearly not a candidate for anticoagulation due to upper GI bleed  Restart metoprolol if he develops tachycardia

## 2023-08-10 NOTE — H&P
Hospital Medicine History & Physical Note    Date of Service  8/10/2023    Primary Care Physician  [facility]    Consultants  GI Dr. Yañez    Code Status  Prior    Chief Complaint  Chief Complaint   Patient presents with    Blood in Vomit     [facility]. Hx of esophageal varices. H[facility] 12.8 [facility].        History of Presenting Illness  Inocencio Shabazz is a 63 y.o. male who presented 8/10/2023 with vomiting blood.  Mr. Collins has a past medical history of schizophrenia on Seroquel, alcohol induced cirrhosis as well as hepatitis C with admissions in the past.  Upper GI bleed as well as alcohol intoxication and withdrawal.  He was brought in with hematemesis and a drop in his hemoglobin last time from 12 in May of this year now to 7.4.  Emergency room he has a platelet count of 39 and an INR 1.8.  He went emergently to the operating room for EGD and had findings of 1 column of varix without bleeding in the esophagus as well as portal hypertensive gastropathy and 2 AVMs in the bulb of the duodenum status post APC.  Currently patient is a poor historian will be monitored closely in the IMCU.  I am not able to ascertain how much alcohol he has been drinking and he is at very high risk of detox.    I discussed the plan of care with Pari CALERO.    Review of Systems  Review of Systems   Unable to perform ROS: Mental acuity       Past Medical History   has a past medical history of Alcohol abuse, Bipolar disorder (HCC), Cirrhosis (HCC), GIB (gastrointestinal bleeding) (01/03/2016), Hepatitis C, Pacemaker, Pancytopenia (HCC), and Schizophrenia (HCC).    Surgical History   has a past surgical history that includes gastroscopy (1/3/2016); gastroscopy (4/8/2016); gastroscopy (3/13/2019); and gastroscopy-endo (N/A, 11/13/2019).     Family History  family history is not on file.   Family history reviewed with patient. There is no family history that is  pertinent to the chief complaint.     Social History   reports that he has been smoking cigarettes. He has a 48.00 pack-year smoking history. He has never used smokeless tobacco. He reports current alcohol use. He reports current drug use. Drug: Inhaled.    Allergies  Allergies   Allergen Reactions    Haloperidol Unspecified     Twitching/involuntary movements        Medications  Prior to Admission Medications   Prescriptions Last Dose Informant Patient Reported? Taking?   Magnesium Oxide 420 (252 Mg) MG Tab UNK at [facility] Yes No   Sig: Take 1 Tablet by mouth every day.   QUEtiapine (SEROQUEL) 200 MG Tab UNK at [facility] Yes No   Sig: Take 200 mg by mouth at bedtime.   diclofenac sodium (VOLTAREN) 1 % Gel UNK at [facility] Yes No   Sig: Apply 4 g topically 4 times a day.   folic acid (FOLVITE) 1 MG Tab UNK at [facility] No No   Sig: Take 1 Tablet by mouth every day.   metoprolol SR (TOPROL XL) 50 MG TABLET SR 24 HR UNK at [facility] Yes No   Sig: Take 25 mg by mouth every day. 0.5 tablet = 25 mg.   ondansetron (ZOFRAN) 4 MG Tab tablet UNK at [facility] Yes No   Sig: Take 4 mg by mouth 3 times a day as needed for Nausea/Vomiting.   pantoprazole (PROTONIX) 40 MG Tablet Delayed Response UNK at [facility] Yes No   Sig: Take 40 mg by mouth 2 times daily, before breakfast and dinner.   thiamine (THIAMINE) 100 MG tablet UNK at [facility] Yes Yes   Sig: Take 100 mg by mouth every day.      Facility-Administered Medications: None       Physical Exam  Temp:  [36.8 °C (98.2 °F)] 36.8 °C (98.2 °F)  Pulse:  [100] 100  Resp:  [13-19] 13  BP: (113)/(74-75) 113/74  SpO2:  [96 %-98 %] 98 %  Blood Pressure : 113/74   Temperature: 36.8 °C (98.2 °F)   Pulse: 100   Respiration: 13   Pulse Oximetry: 98 %       Physical Exam  Vitals and nursing note reviewed.   Constitutional:       Appearance: He is ill-appearing and toxic-appearing.   HENT:      Mouth/Throat:       Mouth: Mucous membranes are dry.      Comments: Poor dentition  Cardiovascular:      Rate and Rhythm: Normal rate and regular rhythm.   Pulmonary:      Effort: Pulmonary effort is normal.      Breath sounds: Normal breath sounds.   Abdominal:      General: There is no distension.      Tenderness: There is no abdominal tenderness.   Musculoskeletal:      Right lower leg: No edema.      Left lower leg: No edema.   Skin:     General: Skin is dry.   Neurological:      General: No focal deficit present.   Psychiatric:         Behavior: Behavior normal.         Laboratory:  Recent Labs     08/10/23  0722   WBC 2.3*   RBC 2.62*   HEMOGLOBIN 7.4*   HEMATOCRIT 23.8*   MCV 90.8   MCH 28.2   MCHC 31.1*   RDW 49.8   PLATELETCT 39*   MPV 12.4     Recent Labs     08/10/23  0722   SODIUM 136   POTASSIUM 5.8*   CHLORIDE 103   CO2 26   GLUCOSE 150*   BUN 21   CREATININE 0.50   CALCIUM 7.6*     Recent Labs     08/10/23  0722   ALTSGPT 52*   ASTSGOT 67*   ALKPHOSPHAT 61   TBILIRUBIN 1.9*   LIPASE 30   GLUCOSE 150*         No results for input(s): NTPROBNP in the last 72 hours.      No results for input(s): TROPONINT in the last 72 hours.    Imaging:  No orders to display           Assessment/Plan:  Justification for Admission Status  I anticipate this patient will require at least two midnights for appropriate medical management, necessitating inpatient admission because acute GI bleed    Patient will need a Intermediate Care (Adult and Pediatrics) bed on MEDICAL service .  The need is secondary to as above.    * Upper GI bleed- (present on admission)  Assessment & Plan  Hematemesis with acute blood loss anemia.  He underwent EGD immediately upon presentation revealing 2 AVMs in the duodenum and portal hypertensive gastropathy  Admit IMCU with IV Protonix and octreotide drip and empiric Rocephin      Acute blood loss anemia- (present on admission)  Assessment & Plan  Hemoglobin is dropped from 12 to 7.4 due to upper GI bleed  Serial  hemoglobins with standing orders to transfuse for hemoglobin less than 7    Coagulopathy (HCC)- (present on admission)  Assessment & Plan  INR is elevated at 1.87  Due to poor liver synthetic function.  10 mg IV vitamin K ordered  Repeat INR in the morning    Alcohol abuse- (present on admission)  Assessment & Plan  Long history of severe alcohol abuse with severe detox in the past.  He will be monitored in the IMCU and may require CIWA protocol or perhaps an IV Precedex drip based on his detox symptoms.  Follow his magnesium, phosphorus, and potassium and replace accordingly    Hyperkalemia- (present on admission)  Assessment & Plan  Potassium 5.8  IV fluids and check BMP in the morning.    Pancytopenia (HCC)- (present on admission)  Assessment & Plan  Chronic condition  Likely due to cirrhosis and bone marrow induced toxicity from alcohol    Paroxysmal atrial fibrillation (HCC)- (present on admission)  Assessment & Plan  Clearly not a candidate for anticoagulation due to upper GI bleed  Restart metoprolol as indicated.  Continuous tele monitoring    Schizophrenia (HCC)- (present on admission)  Assessment & Plan  Hx of  He is supposed to be on Seroquel 200 mg nightly though it is not clear if he is taking it.  Start Seroquel 100 mg nightly and adjust accordingly.    Cirrhosis (HCC)- (present on admission)  Assessment & Plan  Alcoholic cirrhosis with portal htn and coagulopathy      Portal hypertension (HCC)- (present on admission)  Assessment & Plan  With gastric varices  Octreotide drip    Hepatitis C- (present on admission)  Assessment & Plan  Hx of        VTE prophylaxis: SCDs/TEDs

## 2023-08-10 NOTE — ANESTHESIA POSTPROCEDURE EVALUATION
Patient: Inocencio Shabazz    Procedure Summary     Date: 08/10/23 Room / [facility] ROOM 26 / SURGERY SAME DAY [facility]    Anesthesia Start: 1054 Anesthesia Stop: 1120    Procedures:       GASTROSCOPY (Esophagus)      GASTROSCOPY, WITH ARGON PLASMA COAGULATION (Esophagus) Diagnosis: (Duodenal AVMS, Portal Hypertensive Gastropathy)    Surgeons: Joleen Yañez M.D. Responsible Provider: Zaina Vaz M.D.    Anesthesia Type: general ASA Status: 3          Final Anesthesia Type: general  Last vitals  BP   Blood Pressure : 100/73    Temp   37.6 °C (99.6 °F)    Pulse   88   Resp   14    SpO2   100 %      Anesthesia Post Evaluation    Patient location during evaluation: PACU  Patient participation: complete - patient participated  Level of consciousness: awake and alert    Airway patency: patent  Anesthetic complications: no  Cardiovascular status: hemodynamically stable  Respiratory status: acceptable  Hydration status: euvolemic    PONV: none          No notable events documented.     Nurse Pain Score: 0 (NPRS)

## 2023-08-10 NOTE — ED NOTES
Pharmacy Medication Reconciliation      ~Medication reconciliation updated and complete [facility]  ~[facility] :  [facility]

## 2023-08-10 NOTE — CONSULTS
"GASTROENTEROLOGY CONSULTATION    PATIENT NAME: Inocencio Shabazz  : 1960  CSN: [id number]  MRN:  [id number]     CONSULTATION DATE:  8/10/2023    PRIMARY CARE PROVIDER:  Nnamdi Ballesteros M.D.      REASON FOR CONSULT:  hematemesis    HISTORY OF PRESENT ILLNESS:  Inocencio Shabazz is a 63 y.o. male with a past medical history including alcohol abuse, cirrhosis, portal hypertensive gastropathy, esophageal varices ,hepatitis C, schizophrenia who was transferred from [facility] to [facility] on 8/10/2023 for hematemesis.  Patient is a poor historian and not able to give full history.  He states that he began having acute onset hematemesis \"a couple hours ago\".  He states that he has a history of GI bleeding but \"nothing like this\".  [facility], repeat hemoglobin on arrival 7.7.  Patient with notable pancytopenia likely secondary to alcohol use.  WBC 12.3, WBC 2.62, hemoglobin 7.4, MCV 90.8, platelets 39.  BUN normal 21.  , ALT 73, alk phos 69, total bilirubin 0.8.  Lipase 75.  Mild tachycardia with a pulse of 100, blood pressure 113/74.  No active hematemesis in the past 2 hours upon consultation per ERP.  Patient reports he had some water \"in the other room\".  Otherwise has remained NPO.  Last alcohol use was reportedly yesterday patient.  Patient reports marijuana use but denies any other illicit drug use.  Patient to admitted to the hospital.     PAST MEDICAL HISTORY:  Past Medical History:   Diagnosis Date    Alcohol abuse     Bipolar disorder (HCC)     Cirrhosis (HCC)     GIB (gastrointestinal bleeding) 2016    Hepatitis C     Pacemaker     left chest    Pancytopenia (HCC)     Schizophrenia (HCC)        PAST SURGICAL HISTORY:  Past Surgical History:   Procedure Laterality Date    GASTROSCOPY-ENDO N/A 2019    Procedure: GASTROSCOPY with banding;  Surgeon: Tamela Smith M.D.;  Location: ENDOSCOPY [facility];  Service: Gastroenterology    GASTROSCOPY  " "3/13/2019    Procedure: GASTROSCOPY;  Surgeon: Abdi Ba M.D.;  Location: SURGERY [facility];  Service: Gastroenterology    GASTROSCOPY  4/8/2016    Procedure: GASTROSCOPY;  Surgeon: Braeden Tobin M.D.;  Location: SURGERY [facility];  Service:     GASTROSCOPY  1/3/2016    Procedure: GASTROSCOPY WITH BANDING;  Surgeon: Alfredo Antonio M.D.;  Location: SURGERY [facility];  Service:         CURRENT MEDS:  Current Facility-Administered Medications   Medication Dose Route Frequency Provider Last Rate Last Admin    octreotide (SandoSTATIN) 1,250 mcg in  mL Infusion  50 mcg/hr Intravenous Continuous Uriel Wilder M.D.   Continue to Floor at 08/10/23 1025        ALLERGIES:  Allergies   Allergen Reactions    Haloperidol Unspecified     Twitching/involuntary movements        SOCIAL HISTORY:  Social History     Socioeconomic History    Marital status: Single     Spouse name: Not on file    Number of children: Not on file    Years of education: Not on file    Highest education level: Not on file   Occupational History    Not on file   Tobacco Use    Smoking status: Every Day     Packs/day: 1.00     Years: 48.00     Pack years: 48.00     Types: Cigarettes    Smokeless tobacco: Never   Vaping Use    Vaping Use: Never used   Substance and Sexual Activity    Alcohol use: Yes     Comment: \"couple of pints\" whiskey every day    Drug use: Yes     Types: Inhaled     Comment: currently smokes marijuana; past hx of cocaine and meth    Sexual activity: Not on file   Other Topics Concern    Not on file   Social History Narrative    Not on file     Social Determinants of Health     Financial Resource Strain: Not on file   Food Insecurity: Not on file   Transportation Needs: Not on file   Physical Activity: Not on file   Stress: Not on file   Social Connections: Not on file   Intimate Partner Violence: Not on file   Housing Stability: Not on file       FAMILY HISTORY:  History reviewed. No pertinent family " "history.     REVIEW OF SYSTEMS:  General ROS: Negative for - chills, fever, night sweats or weight loss.  HEENT ROS: Negative  Respiratory ROS: Negative for - cough or shortness of breath.  Cardiovascular ROS:  Negative for - chest pain or palpitations.  Gastrointestinal ROS: As per the history of present illness.  Genito-Urinary ROS: Negative  Musculoskeletal ROS: Negative.  Neurological ROS: Negative  Skin ROS: negative  Hematology ROS: negative  Endocrinology ROS: Negative        PHYSICAL EXAM:  VITALS: /77   Pulse 96   Temp 36.8 °C (98.2 °F)   Resp 20   Ht 1.727 m (5' 8\")   Wt 63 kg (139 lb)   SpO2 96%   BMI 21.13 kg/m²   GEN:  Inocencio Shabazz is a 63 y.o. male in no acute distress.  HEENT: Mucous membranes pink and moist.  Sclera anicteric.    NECK:    Neck supple without lymphadenopathy or thyromegaly.  LUNGS: Clear to auscultation posteriorly.  HEART: Regular rate and rhythm. S1 and S2 normal. No murmurs, gallops  ABD:  + BS nt/nd -hsm  RECTAL: Not done at this time.  EXT:  Without cyanosis, deformity or pitting edema.  SKIN:  Pink, warm, dry.  NEURO: Grossly intact, A/OR.    LABS:  Recent Labs     08/10/23  0722   WBC 2.3*   MCV 90.8     Recent Labs     08/10/23  0722   GLUCOSE 150*   BUN 21   CO2 26     Lab Results   Component Value Date    INR 1.41 (H) 04/13/2023    INR 1.25 (H) 04/10/2023    INR 1.18 (H) 02/11/2022     No components found for: ALT, AST, GGT, ALKPHOS  No results found for: BILINEO      @mobifriends([id number])@     @mobifriends([id number])@       IMPRESSION/PLAN:  Hematemesis  Anemia secondary to GI bleed  Liver disease secondary to alcohol abuse  Alcohol abuse - watch for withdrawal  Elevated potassium  Elevated LFT's    Transfuse.   Octreotide  PPI  Watch vitals and trend H/H  Get INR  TEG  [facility]    Urgent EGD  NPO       Joleen Yañez M.D.  Gastroenterology      "

## 2023-08-10 NOTE — PROGRESS NOTES
Patient arrived from PACU into room 635. Report given by PACU RN Carmella. Patient is aox4. Patient is on NC2L no resp. Distress noted. Patient has octreotide running at 10mL/hr. Patient has 2 bags of personal belongings with him. Patient states he has no pain at this time.   CHG bath given.   4 eyes done with second RN.   Bed in lowest locked position, call bell at bedside. Continuity of care.

## 2023-08-10 NOTE — ANESTHESIA PREPROCEDURE EVALUATION
Case: [id number] Date/Time: 08/10/23 1045    Procedure: GASTROSCOPY    Pre-op diagnosis: Hematemesis    Location: [facility] ROOM 26 / SURGERY SAME DAY [facility]    Surgeons: Joleen Yañez M.D.      62yo male with bleeding esophageal varices for EGD  Alcohol abuse, cirrhosis, hep C, afib, portal hypertension    Relevant Problems   CARDIAC   (positive) Atrial fibrillation with RVR (HCC)   (positive) Bleeding esophageal varices (HCC)   (positive) Pacemaker   (positive) Paroxysmal atrial fibrillation (HCC)   (positive) Portal hypertension (CMS-HCC)      GI   (positive) Gastric cardia ulcer         (positive) Cirrhosis (HCC)   (positive) Hepatitis C       Physical Exam    Airway   Mallampati: II  TM distance: >3 FB  Neck ROM: full       Cardiovascular - normal exam  Rhythm: regular  Rate: normal  (-) murmur     Dental - normal exam  (+) upper dentures, lower dentures  Comments: Edentulous upper, missing lower front teeth    Very poor dentition   Pulmonary - normal exam  Breath sounds clear to auscultation     Abdominal    Neurological - normal exam                 Anesthesia Plan    ASA 3   ASA physical status 3 criteria: alcohol and/or substance dependence or abuse and implanted pacemaker    Plan - general       Airway plan will be ETT          Induction: intravenous    Postoperative Plan: Postoperative administration of opioids is intended.    Pertinent diagnostic labs and testing reviewed    Informed Consent:    Anesthetic plan and risks discussed with patient.    Use of blood products discussed with: patient whom consented to blood products.

## 2023-08-10 NOTE — PROGRESS NOTES
4 Eyes Skin Assessment Completed by TITUS Seaman and TITUS Steven.    Head WDL  Ears WDL  Nose WDL  Mouth WDL  Neck WDL  Breast/Chest WDL  Shoulder Blades WDL  Spine Redness and Blanching  (R) Arm/Elbow/Hand WDL  (L) Arm/Elbow/Hand WDL  Abdomen WDL  Groin WDL  Scrotum/Coccyx/Buttocks Redness and Blanching  (R) Leg  scar  (L) Leg WDL  (R) Heel/Foot/Toe WDL  (L) Heel/Foot/Toe WDL          Devices In Places ECG, Blood Pressure Cuff, Pulse Ox, and Nasal Cannula      Interventions In Place Gray Ear Foams, Pillows, and Low Air Loss Mattress    Possible Skin Injury No    Pictures Uploaded Into Epic Yes  Wound Consult Placed N/A  RN Wound Prevention Protocol Ordered No

## 2023-08-10 NOTE — OR NURSING
1119 Patient arrived from OR to PACU 3. Connected to monitor and report received from anesthesia and RN. VSS. 4 L 02 via mask.  Breaths calm, even, unlabored.     1135: Pt waking up; on room air. Denies pain and nausea.     1207: Attempted to give report to [facility] RN; RN will call back.     1235: Report to TITUS eSaman via phone    1244: Pt transported to [facility] 635 via gurney; attached to continuous tele monitoring.Transported by ACLS RN. Pt on 2 L O2. All personal belongings with pt.

## 2023-08-10 NOTE — ASSESSMENT & PLAN NOTE
INR is elevated at 1.87  Due to poor liver synthetic function.  10 mg IV vitamin K given  Repeat INR 1.73

## 2023-08-10 NOTE — PROGRESS NOTES
"This is a 63-year-old male with a past medical history including alcohol abuse, cirrhosis, portal hypertensive gastropathy, esophageal varices hepatitis C, schizophrenia who was transferred from [facility] to [facility] on 8/10/2023 for hematemesis.  Patient is a poor historian and not able to give full history.  He states that he began having acute onset hematemesis \"a couple hours ago\".  He states that he has a history of GI bleeding but \"nothing like this\".  [facility], repeat hemoglobin on arrival 7.7.  Patient with notable pancytopenia likely secondary to alcohol use.  WBC 12.3, WBC 2.62, hemoglobin 7.4, MCV 90.8, platelets 39.  BUN normal 21.  , ALT 73, alk phos 69, total bilirubin 0.8.  Lipase 75.  Mild tachycardia with a pulse of 100, blood pressure 113/74.  No active hematemesis in the past 2 hours upon consultation per ERP.  Patient reports he had some water \"in the other room\".  Otherwise has remained NPO.  Last alcohol use was reportedly yesterday patient.  Patient reports marijuana use but denies any other illicit drug use.  Patient to admitted to the hospital.   Updated Dr. Yañez-full consult to follow.      Plan:   Maintain NPO status.   Plan for EGD        "

## 2023-08-10 NOTE — ASSESSMENT & PLAN NOTE
Hemoglobin is dropped from 12 to 7.4 due to upper GI bleed  Serial hemoglobins with standing orders to transfuse for hemoglobin less than 7  3/12 h/h stable, monitor  F/u cbc in am    3/13 hgb 7.7, f/u in am

## 2023-08-10 NOTE — ED PROVIDER NOTES
ER Provider Note    Scribed for Uriel Wilder M.d. by Fraheen Magana. 8/10/2023  8:16 AM    Primary Care Provider: Nnamdi Ballesteros M.D.    CHIEF COMPLAINT  Chief Complaint   Patient presents with    Blood in Vomit     [facility]. Hx of esophageal varices. Hgb [facility] 12.8 [facility] 9[facility].      EXTERNAL RECORDS REVIEWED  Inpatient Notes [facility] and was [facility] to psych. He received Octreotide, Pantoprazole and Vitamin K. His hemoglobin went from 12.8 to 9.1.     HPI/ROS  LIMITATION TO HISTORY   Select: : None  OUTSIDE HISTORIAN(S):  None noted    Inocencio Shabazz is a 63 y.o. male with a history of Esophageal Varices and alcohol abuse who presents to the ED complaining of hematemesis onset 2 hours ago. Per RN note, [facility]. Patient [facility]. No fever. Patient reports he last had alcohol yesterday.     PAST MEDICAL HISTORY  Past Medical History:   Diagnosis Date    Alcohol abuse     Bipolar disorder (HCC)     Cirrhosis (HCC)     GIB (gastrointestinal bleeding) 01/03/2016    Hepatitis C     Pacemaker     left chest    Pancytopenia (HCC)     Schizophrenia (HCC)        SURGICAL HISTORY  Past Surgical History:   Procedure Laterality Date    HI UPPER GI ENDOSCOPY,DIAGNOSIS N/A 8/10/2023    Procedure: GASTROSCOPY;  Surgeon: Joleen Yañez M.D.;  Location: SURGERY SAME DAY [facility];  Service: Gastroenterology    HI UPPER GI ENDOSCOPY,CTRL BLEED N/A 8/10/2023    Procedure: GASTROSCOPY, WITH ARGON PLASMA COAGULATION;  Surgeon: Joleen Yañez M.D.;  Location: SURGERY SAME DAY [facility];  Service: Gastroenterology    GASTROSCOPY-ENDO N/A 11/13/2019    Procedure: GASTROSCOPY with banding;  Surgeon: Tamela Smith M.D.;  Location: ENDOSCOPY [facility];  Service: Gastroenterology    GASTROSCOPY  3/13/2019    Procedure:  "GASTROSCOPY;  Surgeon: Abdi Ba M.D.;  Location: SURGERY [facility];  Service: Gastroenterology    GASTROSCOPY  4/8/2016    Procedure: GASTROSCOPY;  Surgeon: Braeden Tobin M.D.;  Location: SURGERY [facility];  Service:     GASTROSCOPY  1/3/2016    Procedure: GASTROSCOPY WITH BANDING;  Surgeon: Alfredo Antonio M.D.;  Location: SURGERY [facility];  Service:        FAMILY HISTORY  History reviewed. No pertinent family history.    SOCIAL HISTORY   reports that he has been smoking cigarettes. He has a 48.00 pack-year smoking history. He has never used smokeless tobacco. He reports current alcohol use. He reports current drug use. Drug: Inhaled.    CURRENT MEDICATIONS  Current Discharge Medication List        CONTINUE these medications which have NOT CHANGED    Details   thiamine (THIAMINE) 100 MG tablet Take 100 mg by mouth every day.      diclofenac sodium (VOLTAREN) 1 % Gel Apply 4 g topically 4 times a day.      ondansetron (ZOFRAN) 4 MG Tab tablet Take 4 mg by mouth 3 times a day as needed for Nausea/Vomiting.      Magnesium Oxide 420 (252 Mg) MG Tab Take 1 Tablet by mouth every day.      metoprolol SR (TOPROL XL) 50 MG TABLET SR 24 HR Take 25 mg by mouth every day. 0.5 tablet = 25 mg.      folic acid (FOLVITE) 1 MG Tab Take 1 Tablet by mouth every day.  Qty: 30 Tablet, Refills: 3    Associated Diagnoses: Alcohol withdrawal syndrome without complication (HCC)      QUEtiapine (SEROQUEL) 200 MG Tab Take 200 mg by mouth at bedtime.      pantoprazole (PROTONIX) 40 MG Tablet Delayed Response Take 40 mg by mouth 2 times daily, before breakfast and dinner.             ALLERGIES  Haloperidol    PHYSICAL EXAM  /75   Pulse 100   Temp 36.8 °C (98.2 °F)   Resp 19   Ht 1.727 m (5' 8\")   Wt 63 kg (139 lb)   SpO2 96%   BMI 21.13 kg/m²   Constitutional: Alert in no apparent distress.  HENT: No signs of trauma, Bilateral external ears normal, Nose normal. Uvula midline.   Eyes: Pupils are equal and " reactive, Conjunctiva normal, Non-icteric.   Neck: Normal range of motion, No tenderness, Supple, No stridor.   Lymphatic: No lymphadenopathy noted.   Cardiovascular: Tachycardic rate and rhythm, no murmurs.   Thorax & Lungs: Normal breath sounds, No respiratory distress, No wheezing, No chest tenderness.   Abdomen: Bowel sounds normal, Soft, No tenderness, No peritoneal signs, No masses, No pulsatile masses.   Skin: Warm, Dry, No erythema, No rash.   Back: No bony tenderness, No CVA tenderness.   Extremities: Intact distal pulses, No edema, No tenderness, No cyanosis.  Musculoskeletal: Good range of motion in all major joints. No tenderness to palpation or major deformities noted.   Neurologic: Alert , Normal motor function, Normal sensory function, No focal deficits noted.   Psychiatric: Affect normal, Judgment normal, Mood normal.     DIAGNOSTIC STUDIES    Labs:   Results for orders placed or performed during the hospital encounter of 08/10/23   CBC WITH DIFFERENTIAL   Result Value Ref Range    WBC 2.3 (L) 4.8 - 10.8 K/uL    RBC 2.62 (L) 4.70 - 6.10 M/uL    Hemoglobin 7.4 (L) 14.0 - 18.0 g/dL    Hematocrit 23.8 (L) 42.0 - 52.0 %    MCV 90.8 81.4 - 97.8 fL    MCH 28.2 27.0 - 33.0 pg    MCHC 31.1 (L) 32.3 - 36.5 g/dL    RDW 49.8 35.9 - 50.0 fL    Platelet Count 39 (L) 164 - 446 K/uL    MPV 12.4 9.0 - 12.9 fL    Neutrophils-Polys 64.40 44.00 - 72.00 %    Lymphocytes 22.20 22.00 - 41.00 %    Monocytes 11.70 0.00 - 13.40 %    Eosinophils 1.30 0.00 - 6.90 %    Basophils 0.40 0.00 - 1.80 %    Immature Granulocytes 0.00 0.00 - 0.90 %    Nucleated RBC 0.00 0.00 - 0.20 /100 WBC    Neutrophils (Absolute) 1.48 (L) 1.82 - 7.42 K/uL    Lymphs (Absolute) 0.51 (L) 1.00 - 4.80 K/uL    Monos (Absolute) 0.27 0.00 - 0.85 K/uL    Eos (Absolute) 0.03 0.00 - 0.51 K/uL    Baso (Absolute) 0.01 0.00 - 0.12 K/uL    Immature Granulocytes (abs) 0.00 0.00 - 0.11 K/uL    NRBC (Absolute) 0.00 K/uL   COMP METABOLIC PANEL   Result Value Ref Range     Sodium 136 135 - 145 mmol/L    Potassium 5.8 (H) 3.6 - 5.5 mmol/L    Chloride 103 96 - 112 mmol/L    Co2 26 20 - 33 mmol/L    Anion Gap 7.0 7.0 - 16.0    Glucose 150 (H) 65 - 99 mg/dL    Bun 21 8 - 22 mg/dL    Creatinine 0.50 0.50 - 1.40 mg/dL    Calcium 7.6 (L) 8.5 - 10.5 mg/dL    Correct Calcium 8.4 (L) 8.5 - 10.5 mg/dL    AST(SGOT) 67 (H) 12 - 45 U/L    ALT(SGPT) 52 (H) 2 - 50 U/L    Alkaline Phosphatase 61 30 - 99 U/L    Total Bilirubin 1.9 (H) 0.1 - 1.5 mg/dL    Albumin 3.0 (L) 3.2 - 4.9 g/dL    Total Protein 6.2 6.0 - 8.2 g/dL    Globulin 3.2 1.9 - 3.5 g/dL    A-G Ratio 0.9 g/dL   LIPASE   Result Value Ref Range    Lipase 30 11 - 82 U/L   COD (ADULT)   Result Value Ref Range    ABO Grouping Only AB     Rh Grouping Only POS     Antibody Screen-Cod NEG    IMMATURE PLT FRACTION   Result Value Ref Range    Imm. Plt Fraction 13.5 (H) 0.6 - 13.1 %   ESTIMATED GFR   Result Value Ref Range    GFR (CKD-EPI) 113 >60 mL/min/1.73 m 2   Prothrombin Time   Result Value Ref Range    PT 21.0 (H) 12.0 - 14.6 sec    INR 1.87 (H) 0.87 - 1.13   HGB   Result Value Ref Range    Hemoglobin 6.2 (L) 14.0 - 18.0 g/dL     COURSE & MEDICAL DECISION MAKING     ED Observation Status? No; Patient does not meet criteria for ED Observation.     INITIAL ASSESSMENT, COURSE AND PLAN  Care Narrative: 63 y.o. male presents with history of EtOH abuse with hematemesis.  Patient has prior history of esophageal varices.    9:16 AM - Patient seen and examined at bedside for a GI bleed. Discussed plan of care, including labs and medications. Patient agrees to the plan of care. The patient will be medicated with Rocephin 2,000 mg, Sandostatin 1250 mcg, Sandostatin 50 mcg and Protonix 80 mg. Ordered for CBC with differential, CMP, Lipase, COD and Immature PLT Fraction to evaluate his symptoms.     9:24 AM I discussed the patient's case and the above findings with Shabbir SHEN (GI) who recommends ICU and NPO status.     10:56 AM I discussed the patient's  case and the above findings with Dr. Merritt (hospitalist) who will accept the patient for hospitalization.     2x large bore IVs placed. Type & screen sent.  Tachycardic, normotensive  Given empiric PPI for PUD.  Given empiric octreotide for variceal bleed.  Given ceftriaxone for SBP prophylaxis.  Unclear etiology at this time.   CBC w/ acute blood loss anemia, w/ thrombocytopenia.  BMP w/o uremia, see abovve electrolyte abnormalities.  LFTs w/ hyperbilirubinemia, + transaminases.    GI consulted, recommend NPO status..  Patient admitted to Dr. Merritt.      CRITICAL CARE  The very real possibilty of a deterioration of this patient's condition required the highest level of my preparedness for sudden, emergent intervention.  I provided critical care services, which included medication orders, frequent reevaluations of the patient's condition and response to treatment, ordering and reviewing test results, and discussing the case with various consultants.  The critical care time associated with the care of the patient was 40 minutes. Review chart for interventions. This time is exclusive of any other billable procedures.         DISPOSITION AND DISCUSSIONS  I have discussed management of the patient with the following physicians and LAISHA's:  Dr. Merritt, hosptialist & Shabbir SHEN (GI)    Discussion of management with other \[facility] [place] [facility]\"" or appropriate source(s): None     Barriers to care at this time, including but not limited to:  None noted .     DISPOSITION:  Patient will be hospitalized by Dr. Merritt in critical condition.    FINAL DIANGOSIS  1. Upper GI bleed    2. Blood loss anemia          The note accurately reflects work and decisions made by me.  Uriel Wilder M.D.  8/10/2023  5:19 PM

## 2023-08-10 NOTE — ANESTHESIA TIME REPORT
Anesthesia Start and Stop Event Times     Date Time Event    8/10/2023 1039 Ready for Procedure     1054 Anesthesia Start     1120 Anesthesia Stop        Responsible Staff  08/10/23    Name Role Begin End    Zaina Vaz M.D. Anesth 1054 1120        Overtime Reason:  no overtime (within assigned shift)    Comments:

## 2023-08-10 NOTE — ANESTHESIA PROCEDURE NOTES
Airway    Date/Time: 8/10/2023 10:58 AM    Performed by: Zaina Vaz M.D.  Authorized by: Zaina Vaz M.D.    Location:  OR  Urgency:  Elective  Difficult Airway: No    Indications for Airway Management:  Anesthesia      Spontaneous Ventilation: absent    Sedation Level:  Deep  Preoxygenated: Yes    Patient Position:  Sniffing  Mask Difficulty Assessment:  0 - not attempted  Final Airway Type:  Endotracheal airway  Final Endotracheal Airway:  ETT  Cuffed: Yes    Technique Used for Successful ETT Placement:  Direct laryngoscopy    Insertion Site:  Oral  Blade Type:  Adelia  Laryngoscope Blade/Videolaryngoscope Blade Size:  3  ETT Size (mm):  7.0  Measured from:  Teeth  ETT to Teeth (cm):  21  Placement Verified by: auscultation and capnometry    Cormack-Lehane Classification:  Grade I - full view of glottis  Number of Attempts at Approach:  1  Number of Other Approaches Attempted:  0

## 2023-08-10 NOTE — ASSESSMENT & PLAN NOTE
Chronic condition  Likely due to cirrhosis and bone marrow induced toxicity from alcohol    8/13 will need outpt f/u and bm bx if stable

## 2023-08-10 NOTE — PROCEDURES
OPERATIVE REPORT    PATIENT:   Inocencio Shabazz   1960       PREOPERATIVE DIAGNOSES/INDICATIONS: hematemesis    POSTOPERATIVE DIAGNOSIS: one column, Grade 1 varix, portal hypertensive gastropathy and two AVM's    PROCEDURE:  ESOPHAGOGASTRODUODENOSCOPY with APC    PHYSICIAN:  Joleen Yañez MD    ANESTHESIA:  Per anesthesiologist.    LOCATION: [facility]    CONSENT:  OBTAINED. The risks, benefits and alternatives of the procedure were discussed in details. The risks include and are not limited to bleeding, infection, perforation, missed lesions, and sedations risks (cardiopulmonary compromise and allergic reaction to medications).    DESCRIPTION: The patient presented to the procedure room.  After routine checkup was performed, patient was brought into the endoscopy suite.  Patient was placed on his left lateral decubitus position. A bite block was placed in patient's mouth. Patient was sedated by anesthesia.  Vital signs were monitored throughout the procedure.  Oxygenation support was provided throughout the procedure. Upper endoscope was inserted into patient's mouth and advanced to the second portion of the duodenum under direct visualization.      Once the site was reached and examined, the upper endoscope was withdrawn.  Retroflexion was made within the stomach.  The stomach was decompressed, scope was withdrawn and the procedure was terminated.    The patient tolerated the procedure well.  There were no immediate complications.    OPERATIVE FINDINGS:    1. Esophagus: one column of Grade 1 varix in distal esophagus. No blood  2. Stomach: significant portal hypertensive gastropathy, but not bleeding. Some oozing with passage of scope  3. Duodenum: two AVM's in bulb of duodenum. S/p APC. No blood seen    IMPRESSION/RECOMMENDATIONS:  Grade 1 varix,  one column  Portal hypertensive gastropathy  2 AVMs treated      Follow h/h  Can keep octreotide on over night as it may have been gastropathy bleeding    This  note has been transcribed with digital voice recognition software and although it has been reviewed may contain grammatical or word errors

## 2023-08-10 NOTE — ED TRIAGE NOTES
Chief Complaint   Patient presents with    Blood in Vomit     [facility]. Hx of esophageal varices. Hgb dropped from 12.8 to 9.[facility].      Patient given [facility]ix [facility]

## 2023-08-10 NOTE — PROGRESS NOTES
[facility] Acceptance Note    Called by Dr. Uriel Wilder for admission to IMCU for acute upper GI bleeding on octreotide and PPI with a drop in Hb from 12 to 7.  Will admit to IMCU under the care of the hospitalist.      Destiny Lerma MD  Pulmonary and Critical Care Medicine

## 2023-08-10 NOTE — ASSESSMENT & PLAN NOTE
Long history of severe alcohol abuse with severe detox in the past.  He will be monitored on the medical floor and may require CIWA protocol based on whether he develops detox symptoms.  Magnesium, phosphorus, and potassium were replaced accordingly    8/13 axox3, h/o tremors, no DT  - monitor  nh3 47

## 2023-08-11 LAB
ANION GAP SERPL CALC-SCNC: 7 MMOL/L (ref 7–16)
BASOPHILS # BLD AUTO: 0 % (ref 0–1.8)
BASOPHILS # BLD: 0 K/UL (ref 0–0.12)
BUN SERPL-MCNC: 11 MG/DL (ref 8–22)
CALCIUM SERPL-MCNC: 8.1 MG/DL (ref 8.5–10.5)
CHLORIDE SERPL-SCNC: 107 MMOL/L (ref 96–112)
CO2 SERPL-SCNC: 24 MMOL/L (ref 20–33)
CREAT SERPL-MCNC: 0.56 MG/DL (ref 0.5–1.4)
EKG IMPRESSION: NORMAL
EOSINOPHIL # BLD AUTO: 0.06 K/UL (ref 0–0.51)
EOSINOPHIL NFR BLD: 5 % (ref 0–6.9)
ERYTHROCYTE [DISTWIDTH] IN BLOOD BY AUTOMATED COUNT: 50.7 FL (ref 35.9–50)
GFR SERPLBLD CREATININE-BSD FMLA CKD-EPI: 109 ML/MIN/1.73 M 2
GLUCOSE SERPL-MCNC: 152 MG/DL (ref 65–99)
HCT VFR BLD AUTO: 19.6 % (ref 42–52)
HGB BLD-MCNC: 6.4 G/DL (ref 14–18)
HGB BLD-MCNC: 8 G/DL (ref 14–18)
HGB BLD-MCNC: 8.2 G/DL (ref 14–18)
HGB BLD-MCNC: 8.4 G/DL (ref 14–18)
IMM GRANULOCYTES # BLD AUTO: 0 K/UL (ref 0–0.11)
IMM GRANULOCYTES NFR BLD AUTO: 0 % (ref 0–0.9)
INR PPP: 1.73 (ref 0.87–1.13)
LYMPHOCYTES # BLD AUTO: 0.45 K/UL (ref 1–4.8)
LYMPHOCYTES NFR BLD: 37.8 % (ref 22–41)
MAGNESIUM SERPL-MCNC: 1.7 MG/DL (ref 1.5–2.5)
MCH RBC QN AUTO: 28.4 PG (ref 27–33)
MCHC RBC AUTO-ENTMCNC: 32.7 G/DL (ref 32.3–36.5)
MCV RBC AUTO: 87.1 FL (ref 81.4–97.8)
MONOCYTES # BLD AUTO: 0.14 K/UL (ref 0–0.85)
MONOCYTES NFR BLD AUTO: 11.8 % (ref 0–13.4)
NEUTROPHILS # BLD AUTO: 0.54 K/UL (ref 1.82–7.42)
NEUTROPHILS NFR BLD: 45.4 % (ref 44–72)
NRBC # BLD AUTO: 0 K/UL
NRBC BLD-RTO: 0 /100 WBC (ref 0–0.2)
PHOSPHATE SERPL-MCNC: 2 MG/DL (ref 2.5–4.5)
PLATELET # BLD AUTO: 28 K/UL (ref 164–446)
PLATELETS.RETICULATED NFR BLD AUTO: 13.6 % (ref 0.6–13.1)
PMV BLD AUTO: 13.6 FL (ref 9–12.9)
POTASSIUM SERPL-SCNC: 4 MMOL/L (ref 3.6–5.5)
PROTHROMBIN TIME: 19.8 SEC (ref 12–14.6)
RBC # BLD AUTO: 2.25 M/UL (ref 4.7–6.1)
SODIUM SERPL-SCNC: 138 MMOL/L (ref 135–145)
WBC # BLD AUTO: 1.2 K/UL (ref 4.8–10.8)

## 2023-08-11 PROCEDURE — P9016 RBC LEUKOCYTES REDUCED: HCPCS

## 2023-08-11 PROCEDURE — 85025 COMPLETE CBC W/AUTO DIFF WBC: CPT

## 2023-08-11 PROCEDURE — 80048 BASIC METABOLIC PNL TOTAL CA: CPT

## 2023-08-11 PROCEDURE — 99233 SBSQ HOSP IP/OBS HIGH 50: CPT | Performed by: HOSPITALIST

## 2023-08-11 PROCEDURE — 36415 COLL VENOUS BLD VENIPUNCTURE: CPT

## 2023-08-11 PROCEDURE — 700105 HCHG RX REV CODE 258: Performed by: HOSPITALIST

## 2023-08-11 PROCEDURE — 83735 ASSAY OF MAGNESIUM: CPT

## 2023-08-11 PROCEDURE — 85610 PROTHROMBIN TIME: CPT

## 2023-08-11 PROCEDURE — A9270 NON-COVERED ITEM OR SERVICE: HCPCS | Performed by: HOSPITALIST

## 2023-08-11 PROCEDURE — 36430 TRANSFUSION BLD/BLD COMPNT: CPT

## 2023-08-11 PROCEDURE — 700101 HCHG RX REV CODE 250: Performed by: HOSPITALIST

## 2023-08-11 PROCEDURE — 86923 COMPATIBILITY TEST ELECTRIC: CPT

## 2023-08-11 PROCEDURE — 99232 SBSQ HOSP IP/OBS MODERATE 35: CPT | Performed by: NURSE PRACTITIONER

## 2023-08-11 PROCEDURE — 93010 ELECTROCARDIOGRAM REPORT: CPT | Performed by: INTERNAL MEDICINE

## 2023-08-11 PROCEDURE — 700102 HCHG RX REV CODE 250 W/ 637 OVERRIDE(OP): Performed by: HOSPITALIST

## 2023-08-11 PROCEDURE — 85055 RETICULATED PLATELET ASSAY: CPT

## 2023-08-11 PROCEDURE — 700111 HCHG RX REV CODE 636 W/ 250 OVERRIDE (IP): Mod: JZ | Performed by: HOSPITALIST

## 2023-08-11 PROCEDURE — 85018 HEMOGLOBIN: CPT | Mod: 91

## 2023-08-11 PROCEDURE — 770001 HCHG ROOM/CARE - MED/SURG/GYN PRIV*

## 2023-08-11 PROCEDURE — 700105 HCHG RX REV CODE 258: Mod: JZ | Performed by: EMERGENCY MEDICINE

## 2023-08-11 PROCEDURE — 700111 HCHG RX REV CODE 636 W/ 250 OVERRIDE (IP): Mod: JA | Performed by: EMERGENCY MEDICINE

## 2023-08-11 PROCEDURE — 84100 ASSAY OF PHOSPHORUS: CPT

## 2023-08-11 RX ORDER — MAGNESIUM SULFATE HEPTAHYDRATE 40 MG/ML
2 INJECTION, SOLUTION INTRAVENOUS ONCE
Status: COMPLETED | OUTPATIENT
Start: 2023-08-11 | End: 2023-08-11

## 2023-08-11 RX ORDER — SODIUM CHLORIDE 9 MG/ML
INJECTION, SOLUTION INTRAVENOUS CONTINUOUS
Status: DISCONTINUED | OUTPATIENT
Start: 2023-08-11 | End: 2023-08-11

## 2023-08-11 RX ORDER — OMEPRAZOLE 20 MG/1
20 CAPSULE, DELAYED RELEASE ORAL 2 TIMES DAILY
Status: DISCONTINUED | OUTPATIENT
Start: 2023-08-11 | End: 2023-08-14 | Stop reason: HOSPADM

## 2023-08-11 RX ADMIN — OMEPRAZOLE 20 MG: 20 CAPSULE, DELAYED RELEASE ORAL at 17:50

## 2023-08-11 RX ADMIN — SODIUM PHOSPHATE, MONOBASIC, MONOHYDRATE AND SODIUM PHOSPHATE, DIBASIC, ANHYDROUS 15 MMOL: 276; 142 INJECTION, SOLUTION INTRAVENOUS at 10:41

## 2023-08-11 RX ADMIN — MAGNESIUM SULFATE HEPTAHYDRATE 2 G: 2 INJECTION, SOLUTION INTRAVENOUS at 10:23

## 2023-08-11 RX ADMIN — QUETIAPINE FUMARATE 100 MG: 100 TABLET ORAL at 20:40

## 2023-08-11 RX ADMIN — OCTREOTIDE ACETATE 50 MCG/HR: 200 INJECTION, SOLUTION INTRAVENOUS; SUBCUTANEOUS at 11:42

## 2023-08-11 ASSESSMENT — ENCOUNTER SYMPTOMS
FLANK PAIN: 0
SORE THROAT: 0
NAUSEA: 0
MYALGIAS: 0
FEVER: 0
BLOOD IN STOOL: 0
CHILLS: 0
ROS GI COMMENTS: ANXIOUS TO EAT
SHORTNESS OF BREATH: 0
MEMORY LOSS: 1
DIARRHEA: 0
WEAKNESS: 0
HEADACHES: 0
CONSTIPATION: 0
FALLS: 0
ABDOMINAL PAIN: 0
VOMITING: 0
COUGH: 0
NERVOUS/ANXIOUS: 0

## 2023-08-11 ASSESSMENT — PATIENT HEALTH QUESTIONNAIRE - PHQ9
2. FEELING DOWN, DEPRESSED, IRRITABLE, OR HOPELESS: NOT AT ALL
SUM OF ALL RESPONSES TO PHQ9 QUESTIONS 1 AND 2: 0
1. LITTLE INTEREST OR PLEASURE IN DOING THINGS: NOT AT ALL
SUM OF ALL RESPONSES TO PHQ9 QUESTIONS 1 AND 2: 0
1. LITTLE INTEREST OR PLEASURE IN DOING THINGS: NOT AT ALL

## 2023-08-11 ASSESSMENT — PAIN DESCRIPTION - PAIN TYPE
TYPE: ACUTE PAIN

## 2023-08-11 ASSESSMENT — LIFESTYLE VARIABLES: SUBSTANCE_ABUSE: 1

## 2023-08-11 ASSESSMENT — FIBROSIS 4 INDEX: FIB4 SCORE: 21.57

## 2023-08-11 NOTE — PROGRESS NOTES
1240- Received report from Urszula QURESHI    1345-Patient arrived to unit via transport. Patient waiting in wheel chair then moved to room. Patient A&O x4 and on room air. Patient reports 0/10 pain. Patient resting in bed with call light within reach. Bed in lowest position and all needs met at this time.

## 2023-08-11 NOTE — PROGRESS NOTES
"Gastroenterology Progress Note               Author:  ROWENA Crowe Date & Time Created: 8/11/2023 12:05 PM       Patient ID:  Name:             Inocencio Shabazz  YOB: 1960  Age:                 63 y.o.  male  MRN:               [id number]        Medical Decision Making, by Problem:  Active Hospital Problems    Diagnosis     Upper GI bleed [K92.2]     Hyperkalemia [E87.5]     Coagulopathy (HCC) [D68.9]     Pancytopenia (HCC) [D61.818]     Acute blood loss anemia [D62]     Paroxysmal atrial fibrillation (HCC) [I48.0]     Schizophrenia (HCC) [F20.9]     Cirrhosis (HCC) [K74.60]     Alcohol abuse [F10.10]     Portal hypertension (HCC) [K76.6]     Hepatitis C [B19.20]            Presenting Chief Complaint:  hematemesis     HISTORY OF PRESENT ILLNESS:  Inocencio Shabazz is a 63 y.o. male with a past medical history including alcohol abuse, cirrhosis, portal hypertensive gastropathy, esophageal varices ,hepatitis C, schizophrenia who was transferred from [facility] to [facility] on 8/10/2023 for hematemesis.  Patient is a poor historian and not able to give full history.  He states that he began having acute onset hematemesis \"a couple hours ago\".  He states that he has a history of GI bleeding but \"nothing like this\".  [facility], repeat hemoglobin on [facility] 7.7.  Patient with notable pancytopenia likely secondary to alcohol use.  WBC 12.3, WBC 2.62, hemoglobin 7.4, MCV 90.8, platelets 39.  BUN normal 21.  , ALT 73, alk phos 69, total bilirubin 0.8.  Lipase 75.  Mild tachycardia with a pulse of 100, blood pressure 113/74.  No active hematemesis in the past 2 hours upon consultation per ERP.  Patient reports he had some water \"in the other room\".  Otherwise has remained NPO.  Last alcohol use was reportedly yesterday patient.  Patient reports marijuana use but denies any other illicit drug use.  Patient to admitted to the hospital.       Interval " History:  8/11/2023: Patient postprocedure day #1 s/p EGD.  Patient resting quietly but arouses appropriately.  Discussed findings with patient and encouraged alcohol cessation.  Patient states he is tolerating oral intake without difficulty.        Hospital Medications:  Current Facility-Administered Medications   Medication Dose Frequency Provider Last Rate Last Admin    NS infusion   Continuous Checo Mendez M.D.        MD Alert...ICU Electrolyte Replacement per Pharmacy   PHARMACY TO DOSE Abdi Merritt M.D.        sodium phosphate 15 mmol in dextrose 5% 250 mL ivpb  15 mmol Once Abdi Merritt M.D. 41.7 mL/hr at 08/11/23 1041 15 mmol at 08/11/23 1041    magnesium sulfate IVPB premix 2 g  2 g Once Abdi Merritt M.D. 25 mL/hr at 08/11/23 1023 2 g at 08/11/23 1023    octreotide (SandoSTATIN) 1,250 mcg in  mL Infusion  50 mcg/hr Continuous Uriel Wilder M.D. 10 mL/hr at 08/11/23 1142 50 mcg/hr at 08/11/23 1142    QUEtiapine (SEROquel) tablet 100 mg  100 mg Nightly Abdi Merritt M.D.   100 mg at 08/10/23 2028   Last reviewed on 8/10/2023  4:19 PM by Jackie Shaver R.N.       Review of Systems:  Review of Systems   Constitutional:  Positive for malaise/fatigue. Negative for chills and fever.   HENT:  Negative for congestion and sore throat.    Respiratory:  Negative for cough and shortness of breath.    Cardiovascular:  Negative for chest pain and leg swelling.   Gastrointestinal:  Negative for abdominal pain, blood in stool, constipation, diarrhea, melena, nausea and vomiting.   Genitourinary:  Negative for dysuria and flank pain.   Musculoskeletal:  Negative for falls and myalgias.   Neurological:  Negative for weakness and headaches.   Psychiatric/Behavioral:  Positive for substance abuse. The patient is not nervous/anxious.    All other systems reviewed and are negative.        Vital signs:  Weight/BMI: Body mass index is 19.04 kg/m².  /72   Pulse 76   Temp 36.6 °C (97.9 °F) (Temporal)  "  Resp 19   Ht 1.727 m (5' 8\")   Wt 56.8 kg (125 lb 3.5 oz)   SpO2 97%   Vitals:    08/11/23 0630 08/11/23 0800 08/11/23 1000 08/11/23 1200   BP: 109/75 109/70 116/73 121/72   Pulse: 82 80 85 76   Resp:  17 16 19   Temp:   36.3 °C (97.3 °F) 36.6 °C (97.9 °F)   TempSrc:    Temporal   SpO2: 95% 96% 97% 97%   Weight:       Height:         Oxygen Therapy:  Pulse Oximetry: 97 %, O2 (LPM): 0, O2 Delivery Device: None - Room Air    Intake/Output Summary (Last 24 hours) at 8/11/2023 1205  Last data filed at 8/11/2023 0800  Gross per 24 hour   Intake 1710 ml   Output --   Net 1710 ml         Physical Exam:  Physical Exam  Vitals and nursing note reviewed.   Constitutional:       General: He is not in acute distress.     Comments: Appears older than stated age   HENT:      Mouth/Throat:      Mouth: Mucous membranes are moist.      Pharynx: Oropharynx is clear. No oropharyngeal exudate.   Eyes:      General: No scleral icterus.     Extraocular Movements: Extraocular movements intact.      Conjunctiva/sclera: Conjunctivae normal.   Cardiovascular:      Rate and Rhythm: Normal rate and regular rhythm.      Pulses: Normal pulses.      Heart sounds: Normal heart sounds. No murmur heard.  Pulmonary:      Effort: Pulmonary effort is normal. No respiratory distress.      Breath sounds: Normal breath sounds.   Abdominal:      General: Abdomen is flat. Bowel sounds are normal. There is no distension.      Palpations: Abdomen is soft.      Tenderness: There is no abdominal tenderness. There is no guarding.   Musculoskeletal:      Right lower leg: No edema.      Left lower leg: No edema.   Skin:     General: Skin is warm and dry.      Capillary Refill: Capillary refill takes less than 2 seconds.      Coloration: Skin is not jaundiced or pale.   Neurological:      General: No focal deficit present.      Mental Status: He is alert, oriented to person, place, and time and easily aroused. Mental status is at baseline.      Motor: No " weakness.   Psychiatric:         Mood and Affect: Mood normal.         Behavior: Behavior normal. Behavior is cooperative.         Thought Content: Thought content normal.         Judgment: Judgment normal.           Labs:  Recent Labs     08/10/23  0722 08/10/23  1649 08/11/23 0148   SODIUM 136  --  138   POTASSIUM 5.8*  --  4.0   CHLORIDE 103  --  107   CO2 26  --  24   BUN 21  --  11   CREATININE 0.50  --  0.56   MAGNESIUM  --  1.8 1.7   PHOSPHORUS  --  2.5 2.0*   CALCIUM 7.6*  --  8.1*     Recent Labs     08/10/23  0722 08/11/23  0148   ALTSGPT 52*  --    ASTSGOT 67*  --    ALKPHOSPHAT 61  --    TBILIRUBIN 1.9*  --    LIPASE 30  --    GLUCOSE 150* 152*     Recent Labs     08/10/23  0722 08/11/23  0148   WBC 2.3* 1.2*   NEUTSPOLYS 64.40 45.40   LYMPHOCYTES 22.20 37.80   MONOCYTES 11.70 11.80   EOSINOPHILS 1.30 5.00   BASOPHILS 0.40 0.00   ASTSGOT 67*  --    ALTSGPT 52*  --    ALKPHOSPHAT 61  --    TBILIRUBIN 1.9*  --      Recent Labs     08/10/23  0722 08/10/23  1324 08/10/23  1649 08/11/23  0148 08/11/23  1124   RBC 2.62*  --   --  2.25*  --    HEMOGLOBIN 7.4*  --  6.2* 6.4* 8.0*   HEMATOCRIT 23.8*  --   --  19.6*  --    PLATELETCT 39*  --   --  28*  --    PROTHROMBTM  --  21.0*  --  19.8*  --    INR  --  1.87*  --  1.73*  --      Recent Results (from the past 24 hour(s))   Prothrombin Time    Collection Time: 08/10/23  1:24 PM   Result Value Ref Range    PT 21.0 (H) 12.0 - 14.6 sec    INR 1.87 (H) 0.87 - 1.13   AMMONIA    Collection Time: 08/10/23  4:49 PM   Result Value Ref Range    Ammonia 39 11 - 45 umol/L   MAGNESIUM    Collection Time: 08/10/23  4:49 PM   Result Value Ref Range    Magnesium 1.8 1.5 - 2.5 mg/dL   PHOSPHORUS    Collection Time: 08/10/23  4:49 PM   Result Value Ref Range    Phosphorus 2.5 2.5 - 4.5 mg/dL   HGB    Collection Time: 08/10/23  4:49 PM   Result Value Ref Range    Hemoglobin 6.2 (L) 14.0 - 18.0 g/dL   EKG    Collection Time: 08/10/23  6:42 PM   Result Value Ref Range    Report        [facility]    Test Date:  2023-08-10  Pt Name:    JIMMY FERRER                 Department: [facility]  MRN:        [id number]                      Room:       [facility] [place]  Gender:     Male                         Technician: BLAKE  :        1960                   Requested By:ANDRES ECHEVERRIA  Order #:    [id number]                    Reading MD: Eusebio Chery MD    Measurements  Intervals                                Axis  Rate:       123                          P:          82  NY:         134                          QRS:        -89  QRSD:       126                          T:          79  QT:         410  QTc:        587    Interpretive Statements  Ventricular-paced complexes  THEN AV PACING FOLLOWED BY SINUS  Electronically Signed On 2023 11:29:16 PDT by Eusebio Chery MD     MAGNESIUM    Collection Time: 23  1:48 AM   Result Value Ref Range    Magnesium 1.7 1.5 - 2.5 mg/dL   PHOSPHORUS    Collection Time: 23  1:48 AM   Result Value Ref Range    Phosphorus 2.0 (L) 2.5 - 4.5 mg/dL   Basic Metabolic Panel    Collection Time: 23  1:48 AM   Result Value Ref Range    Sodium 138 135 - 145 mmol/L    Potassium 4.0 3.6 - 5.5 mmol/L    Chloride 107 96 - 112 mmol/L    Co2 24 20 - 33 mmol/L    Glucose 152 (H) 65 - 99 mg/dL    Bun 11 8 - 22 mg/dL    Creatinine 0.56 0.50 - 1.40 mg/dL    Calcium 8.1 (L) 8.5 - 10.5 mg/dL    Anion Gap 7.0 7.0 - 16.0   Prothrombin Time    Collection Time: 23  1:48 AM   Result Value Ref Range    PT 19.8 (H) 12.0 - 14.6 sec    INR 1.73 (H) 0.87 - 1.13   CBC WITH DIFFERENTIAL    Collection Time: 23  1:48 AM   Result Value Ref Range    WBC 1.2 (LL) 4.8 - 10.8 K/uL    RBC 2.25 (L) 4.70 - 6.10 M/uL    Hemoglobin 6.4 (L) 14.0 - 18.0 g/dL    Hematocrit 19.6 (L) 42.0 - 52.0 %    MCV 87.1 81.4 - 97.8 fL    MCH 28.4 27.0 - 33.0 pg    MCHC 32.7 32.3 - 36.5 g/dL    RDW 50.7 (H) 35.9 - 50.0 fL    Platelet Count 28 (L) 164 - 446 K/uL    MPV 13.6 (H) 9.0 - 12.9 fL     Neutrophils-Polys 45.40 44.00 - 72.00 %    Lymphocytes 37.80 22.00 - 41.00 %    Monocytes 11.80 0.00 - 13.40 %    Eosinophils 5.00 0.00 - 6.90 %    Basophils 0.00 0.00 - 1.80 %    Immature Granulocytes 0.00 0.00 - 0.90 %    Nucleated RBC 0.00 0.00 - 0.20 /100 WBC    Neutrophils (Absolute) 0.54 (L) 1.82 - 7.42 K/uL    Lymphs (Absolute) 0.45 (L) 1.00 - 4.80 K/uL    Monos (Absolute) 0.14 0.00 - 0.85 K/uL    Eos (Absolute) 0.06 0.00 - 0.51 K/uL    Baso (Absolute) 0.00 0.00 - 0.12 K/uL    Immature Granulocytes (abs) 0.00 0.00 - 0.11 K/uL    NRBC (Absolute) 0.00 K/uL   ESTIMATED GFR    Collection Time: 08/11/23  1:48 AM   Result Value Ref Range    GFR (CKD-EPI) 109 >60 mL/min/1.73 m 2   IMMATURE PLT FRACTION    Collection Time: 08/11/23  1:48 AM   Result Value Ref Range    Imm. Plt Fraction 13.6 (H) 0.6 - 13.1 %   HGB    Collection Time: 08/11/23 11:24 AM   Result Value Ref Range    Hemoglobin 8.0 (L) 14.0 - 18.0 g/dL       Radiology Review:  No orders to display         MDM (Data Review):   -Records reviewed and summarized in current documentation  -I personally reviewed and interpreted the laboratory results  -I personally reviewed the radiology images    Assessment/Recommendations:  IMPRESSION/PLAN:  Esophageal varix  Portal hypertensive gastropathy  2 AVMs-treated with APC  Hematemesis-resolved  Anemia secondary to GI bleed  Liver disease secondary to alcohol abuse  Alcohol abuse - watch for withdrawal  Elevated potassium  Elevated LFT's    MDM:  This is a 63-year-old male with a past medical history as above who presented to [facility] on 8/10/2023 for upper GI bleeding.  He underwent EGD day of arrival and was found to have a grade 1 esophageal varix, portal hypertensive gastropathy and 2 AVMs that were treated with APC.  Postprocedurally, patient resting quietly but arouses appropriately.  He denies nausea, vomiting, abdominal pain, hematemesis, melena, hematochezia.  He states that he is  tolerating oral intake without difficulty.  Alcohol cessation discussed with patient.  Hemoglobin continues to drop postprocedurally, patient required PRBC transfusion.  Hemoglobin this morning 8.  BUN normal.  Patient has an ongoing pancytopenia at baseline likely secondary to chronic alcohol use.  Suspect that patient having some oozing/bleeding secondary to his portal hypertensive gastropathy.  Unfortunately, there is no endoscopic treatment for this.  May continue octreotide up to a total of of 72 hours given the patient's bleeding may have been from gastropathy bleeding.    Plan:  Monitor H/H and for signs of rebleeding  Alcohol cessation encouraged  CIWA protocol per primary team    No further interventions from acute GI team.  GI to sign off.  Please reconsult for any further questions or concerns.      Core Quality Measures   Reviewed items::  Labs, Medications and Radiology reports reviewed

## 2023-08-11 NOTE — PROGRESS NOTES
Patient had a rhythm change , on the monitor it was coming up as vtach but the monitors said looked like wide complex a fib. This is the first time I saw this change all shift. EKG STAT ordered. MD made aware

## 2023-08-11 NOTE — CARE PLAN
The patient is Watcher - Medium risk of patient condition declining or worsening    Shift Goals  Clinical Goals: monitor H+H, advance and tolerate diet, no bleeding  Patient Goals: rest and go home  Family Goals: randy    Progress made toward(s) clinical / shift goals:    Patient has no pain at this time. Patient is tolerating blood transfusion. Patient is aware about safety and uses call bell appropriately.        Problem: Pain - Standard  Goal: Alleviation of pain or a reduction in pain to the patient’s comfort goal  Outcome: Progressing       Patient is not progressing towards the following goals:  Patient hemoglobin dropped again and is now at 6.4 which warrants blood transfusion. Patient tolerated transfusion so far. Patient is weak and needs assistance walking due to his low Hg.     Problem: Knowledge Deficit - Standard  Goal: Patient and family/care givers will demonstrate understanding of plan of care, disease process/condition, diagnostic tests and medications  8/10/2023 1950 by Urszula Arroyo R.N.  Outcome: Not Progressing  8/10/2023 1949 by Urszula Arroyo R.N.  Outcome: Progressing     Problem: Fall Risk  Goal: Patient will remain free from falls  Outcome: Not Progressing     Problem: Risk for Fluid Volume Deficit Related to Bleeding  Goal: Fluid volume balance will be maintained  Outcome: Not Progressing  Goal: Patient will show no signs and symptoms of excessive bleeding  Outcome: Not Progressing     Problem: Risk for Bleeding  Goal: Patient will take measures to prevent bleeding and recognizes signs of bleeding that need to be reported immediately to a health care professional  Outcome: Not Progressing  Goal: Patient will not experience bleeding as evidenced by normal blood pressure, stable hematocrit and hemoglobin levels and desired ranges for coagulation profiles  Outcome: Not Progressing

## 2023-08-11 NOTE — PROGRESS NOTES
Patient is going to be downgraded to medical, S530-1. Report given to TITUS Gregorio, room is currently dirty. Will put in for transport once room is cleaned.     Patients Hg came back at 8.0 so no need for transfusion at this time.  Next H+H draw is at 1900     1340 patient picked up by transport. No tele monitor needed. Patient went via wheelchair. Patient is aox4. Room air, no resp. Distress noted.   All personal belongings left with patient, nothing left in room.

## 2023-08-11 NOTE — CARE PLAN
The patient is Stable - Low risk of patient condition declining or worsening    Shift Goals  Clinical Goals: monitor h+h, Monitor color of stool, advance diet  Patient Goals: advance diet, go home  Family Goals: randy    Progress made toward(s) clinical / shift goals:    Patient is aox4. Patient has periods of forgetfulness. Patient has no complaints of pain. Patient is on clear liquid diet and tolerating well. No emesis.   Problem: Knowledge Deficit - Standard  Goal: Patient and family/care givers will demonstrate understanding of plan of care, disease process/condition, diagnostic tests and medications  Outcome: Progressing     Problem: Pain - Standard  Goal: Alleviation of pain or a reduction in pain to the patient’s comfort goal  Outcome: Progressing     Problem: Fall Risk  Goal: Patient will remain free from falls  Outcome: Progressing       Patient is not progressing towards the following goals:  Patient hemoglobin is dropping, even though EGD showed no signs of active bleeding. Patient received 2 units of PBRCs with no reaction. Monitoring hemoglobins q8hr. Patients WBC went down. Electrolyte imbalance as well as phosphorous is 2.0.     Problem: Risk for Fluid Volume Deficit Related to Bleeding  Goal: Fluid volume balance will be maintained  Outcome: Not Progressing  Goal: Patient will show no signs and symptoms of excessive bleeding  Outcome: Not Progressing     Problem: Risk for Bleeding  Goal: Patient will take measures to prevent bleeding and recognizes signs of bleeding that need to be reported immediately to a health care professional  Outcome: Not Progressing  Goal: Patient will not experience bleeding as evidenced by normal blood pressure, stable hematocrit and hemoglobin levels and desired ranges for coagulation profiles  Outcome: Not Progressing

## 2023-08-11 NOTE — PROGRESS NOTES
Hospital Medicine Daily Progress Note    Date of Service  8/11/2023    Chief Complaint  Inocencio Shabazz is a 63 y.o. male admitted 8/10/2023 with vomiting blood    Hospital Course  Mr. Collins has a past medical history of schizophrenia on Seroquel, alcohol induced cirrhosis as well as hepatitis C with admissions in the past.  Upper GI bleed as well as alcohol intoxication and withdrawal.  He was brought in with hematemesis and a drop in his hemoglobin last time from 12 in May of this year now to 7.4.  Emergency room he has a platelet count of 39 and an INR 1.8.  He went emergently to the operating room for EGD and had findings of 1 column of varix without bleeding in the esophagus as well as portal hypertensive gastropathy and 2 AVMs in the bulb of the duodenum status post APC.  Currently patient is a poor historian will be monitored closely in the IMCU.  I am not able to ascertain how much alcohol he has been drinking and he is at very high risk of detox.       Interval Problem Update  8/11: Mr. Shabazz was evaluated and examined in the IMCU. Hemoglobin this morning is 6.4 and he will be transfused. Q 8 hour Hb levels with standing orders to transfuse for Hb less than 7. He cannot convey to me how much he drinks. He is not tremulous though is a marginal historian.     I have discussed this patient's plan of care and discharge plan at IDT rounds today with Case Management, Nursing, Nursing leadership, and other members of the IDT team.    Consultants/Specialty  GI    Code Status  Full Code    Disposition  The patient is not medically cleared for discharge to home or a post-acute facility.  Anticipate discharge to: home with close outpatient follow-up    I have placed the appropriate orders for post-discharge needs.    Review of Systems  Review of Systems   Respiratory:  Negative for shortness of breath.    Cardiovascular:  Negative for chest pain and leg swelling.   Gastrointestinal:         Anxious to eat    Psychiatric/Behavioral:  Positive for memory loss.    All other systems reviewed and are negative.       Physical Exam  Temp:  [36.1 °C (96.9 °F)-37.6 °C (99.6 °F)] 36.2 °C (97.1 °F)  Pulse:  [] 82  Resp:  [13-24] 17  BP: ()/(55-77) 109/75  SpO2:  [89 %-100 %] 95 %    Physical Exam  Vitals and nursing note reviewed.   Constitutional:       General: He is not in acute distress.     Appearance: He is ill-appearing. He is not toxic-appearing.   Cardiovascular:      Rate and Rhythm: Normal rate and regular rhythm.   Pulmonary:      Effort: Pulmonary effort is normal.      Breath sounds: Normal breath sounds.   Abdominal:      General: There is no distension.      Tenderness: There is no abdominal tenderness.   Skin:     General: Skin is warm and dry.      Coloration: Skin is pale.   Neurological:      Comments: He is a moderate historian   Psychiatric:      Comments: Odd and flat affect         Fluids    Intake/Output Summary (Last 24 hours) at 8/11/2023 0740  Last data filed at 8/11/2023 0600  Gross per 24 hour   Intake 2160 ml   Output --   Net 2160 ml       Laboratory  Recent Labs     08/10/23  0722 08/10/23  1649 08/11/23  0148   WBC 2.3*  --  1.2*   RBC 2.62*  --  2.25*   HEMOGLOBIN 7.4* 6.2* 6.4*   HEMATOCRIT 23.8*  --  19.6*   MCV 90.8  --  87.1   MCH 28.2  --  28.4   MCHC 31.1*  --  32.7   RDW 49.8  --  50.7*   PLATELETCT 39*  --  28*   MPV 12.4  --  13.6*     Recent Labs     08/10/23  0722 08/11/23  0148   SODIUM 136 138   POTASSIUM 5.8* 4.0   CHLORIDE 103 107   CO2 26 24   GLUCOSE 150* 152*   BUN 21 11   CREATININE 0.50 0.56   CALCIUM 7.6* 8.1*     Recent Labs     08/10/23  1324 08/11/23  0148   INR 1.87* 1.73*               Imaging  No orders to display        Assessment/Plan  * Upper GI bleed- (present on admission)  Assessment & Plan  Hematemesis with acute blood loss anemia.  He underwent EGD immediately upon presentation revealing 2 AVMs in the duodenum and portal hypertensive  gastropathy  Admitted to [facility] with IV Protonix and octreotide drip and empiric Rocephin.  Prilosec BID and 72 hours octreotide. Stop rocephin.        Acute blood loss anemia- (present on admission)  Assessment & Plan  Hemoglobin is dropped from 12 to 7.4 due to upper GI bleed  Serial hemoglobins with standing orders to transfuse for hemoglobin less than 7    Coagulopathy (HCC)- (present on admission)  Assessment & Plan  INR is elevated at 1.87  Due to poor liver synthetic function.  10 mg IV vitamin K given  Repeat INR 1.73    Alcohol abuse- (present on admission)  Assessment & Plan  Long history of severe alcohol abuse with severe detox in the past.  He will be monitored on the medical floor and may require CIWA protocol based on whether he develops detox symptoms.  Magnesium, phosphorus, and potassium were replaced accordingly    Hyperkalemia- (present on admission)  Assessment & Plan  Potassium 5.8  IV fluids given and repeat is 4    Pancytopenia (HCC)- (present on admission)  Assessment & Plan  Chronic condition  Likely due to cirrhosis and bone marrow induced toxicity from alcohol    Paroxysmal atrial fibrillation (HCC)- (present on admission)  Assessment & Plan  Clearly not a candidate for anticoagulation due to upper GI bleed  Restart metoprolol if he develops tachycardia      Schizophrenia (HCC)- (present on admission)  Assessment & Plan  Hx of  He is supposed to be on Seroquel 200 mg nightly though it is not clear if he is taking it.  Start Seroquel 100 mg nightly and adjust accordingly.    Cirrhosis (HCC)- (present on admission)  Assessment & Plan  Alcoholic cirrhosis with portal htn and coagulopathy      Portal hypertension (HCC)- (present on admission)  Assessment & Plan  With gastric varices  Octreotide drip 72 hours    Hepatitis C- (present on admission)  Assessment & Plan  Hx of         VTE prophylaxis:   SCDs/TEDs      I have performed a physical exam and reviewed and updated ROS and Plan today  (8/11/2023). In review of yesterday's note (8/10/2023), there are no changes except as documented above.

## 2023-08-11 NOTE — CARE PLAN
The patient is Watcher - Medium risk of patient condition declining or worsening    Shift Goals  Clinical Goals: Stable H&H, hemodynamic stability  Patient Goals: Rest  Family Goals: FABRIZIO    Progress made toward(s) clinical / shift goals:     Patient is not progressing towards the following goals:      Problem: Knowledge Deficit - Standard  Goal: Patient and family/care givers will demonstrate understanding of plan of care, disease process/condition, diagnostic tests and medications  Outcome: Progressing     Problem: Pain - Standard  Goal: Alleviation of pain or a reduction in pain to the patient’s comfort goal  Outcome: Progressing

## 2023-08-11 NOTE — CARE PLAN
The patient is Stable - Low risk of patient condition declining or worsening    Shift Goals  Clinical Goals: monitor h+h, Monitor color of stool, advance diet  Patient Goals: advance diet, go home  Family Goals: randy    Progress made toward(s) clinical / shift goals:        Problem: Knowledge Deficit - Standard  Goal: Patient and family/care givers will demonstrate understanding of plan of care, disease process/condition, diagnostic tests and medications  Outcome: Progressing  Note: Pt demonstrates proper use of call light and calls for assistance as needed.     Problem: Pain - Standard  Goal: Alleviation of pain or a reduction in pain to the patient’s comfort goal  Outcome: Progressing  Flowsheets (Taken 8/11/2023 3039)  Pain Rating Scale (NPRS): 0  Note: Pt reports no pain     Problem: Fall Risk  Goal: Patient will remain free from falls  Outcome: Progressing  Note: Pt has been free of falls, fall risk alert outside of room and fall risk bracelet placed.        Patient is not progressing towards the following goals:

## 2023-08-12 LAB
HGB BLD-MCNC: 7.9 G/DL (ref 14–18)
HGB BLD-MCNC: 8.6 G/DL (ref 14–18)

## 2023-08-12 PROCEDURE — 99233 SBSQ HOSP IP/OBS HIGH 50: CPT | Performed by: INTERNAL MEDICINE

## 2023-08-12 PROCEDURE — 700105 HCHG RX REV CODE 258: Mod: JZ | Performed by: HOSPITALIST

## 2023-08-12 PROCEDURE — 700102 HCHG RX REV CODE 250 W/ 637 OVERRIDE(OP): Performed by: HOSPITALIST

## 2023-08-12 PROCEDURE — 700102 HCHG RX REV CODE 250 W/ 637 OVERRIDE(OP): Performed by: INTERNAL MEDICINE

## 2023-08-12 PROCEDURE — 700111 HCHG RX REV CODE 636 W/ 250 OVERRIDE (IP): Mod: JA | Performed by: HOSPITALIST

## 2023-08-12 PROCEDURE — A9270 NON-COVERED ITEM OR SERVICE: HCPCS | Performed by: INTERNAL MEDICINE

## 2023-08-12 PROCEDURE — 85018 HEMOGLOBIN: CPT

## 2023-08-12 PROCEDURE — A9270 NON-COVERED ITEM OR SERVICE: HCPCS | Performed by: HOSPITALIST

## 2023-08-12 PROCEDURE — 770001 HCHG ROOM/CARE - MED/SURG/GYN PRIV*

## 2023-08-12 PROCEDURE — 36415 COLL VENOUS BLD VENIPUNCTURE: CPT

## 2023-08-12 RX ORDER — NICOTINE 21 MG/24HR
21 PATCH, TRANSDERMAL 24 HOURS TRANSDERMAL
Status: DISCONTINUED | OUTPATIENT
Start: 2023-08-12 | End: 2023-08-14 | Stop reason: HOSPADM

## 2023-08-12 RX ORDER — LORAZEPAM 0.5 MG/1
0.5 TABLET ORAL EVERY 4 HOURS PRN
Status: DISCONTINUED | OUTPATIENT
Start: 2023-08-12 | End: 2023-08-14 | Stop reason: HOSPADM

## 2023-08-12 RX ADMIN — OMEPRAZOLE 20 MG: 20 CAPSULE, DELAYED RELEASE ORAL at 06:12

## 2023-08-12 RX ADMIN — OCTREOTIDE ACETATE 50 MCG/HR: 200 INJECTION, SOLUTION INTRAVENOUS; SUBCUTANEOUS at 15:07

## 2023-08-12 RX ADMIN — NICOTINE TRANSDERMAL SYSTEM 21 MG: 21 PATCH, EXTENDED RELEASE TRANSDERMAL at 15:06

## 2023-08-12 RX ADMIN — QUETIAPINE FUMARATE 100 MG: 100 TABLET ORAL at 20:30

## 2023-08-12 RX ADMIN — OMEPRAZOLE 20 MG: 20 CAPSULE, DELAYED RELEASE ORAL at 17:04

## 2023-08-12 ASSESSMENT — ENCOUNTER SYMPTOMS
ROS GI COMMENTS: ANXIOUS TO EAT
WEAKNESS: 1
FEVER: 0
MEMORY LOSS: 1
SHORTNESS OF BREATH: 0
CHILLS: 0
MYALGIAS: 1
NAUSEA: 0
VOMITING: 0
ABDOMINAL PAIN: 0

## 2023-08-12 ASSESSMENT — PAIN DESCRIPTION - PAIN TYPE: TYPE: ACUTE PAIN

## 2023-08-12 ASSESSMENT — FIBROSIS 4 INDEX: FIB4 SCORE: 21.57

## 2023-08-12 NOTE — CARE PLAN
1612 8/11-Patient received alert and oriented*4, plan of care commenced.   4 eyes skin check done.neutropenic precaution put in place.    The patient is Stable - Low risk of patient condition declining or worsening    Shift Goals  Clinical Goals: monitor h+h, Monitor color of stool, advance diet  Patient Goals: advance diet, go home  Family Goals: randy    Progress made toward(s) clinical / shift goals:      Problem: Risk for Bleeding  Goal: Patient will take measures to prevent bleeding and recognizes signs of bleeding that need to be reported immediately to a health care professional  Outcome: Progressing     Problem: Pain - Standard  Goal: Alleviation of pain or a reduction in pain to the patient’s comfort goal  Outcome: Progressing       Patient is not progressing towards the following goals:

## 2023-08-12 NOTE — PROGRESS NOTES
4 Eyes Skin Assessment Completed by TITUS suarez and TITUS sutton.    Head WDL  Ears WDL  Nose WDL  Mouth WDL  Neck WDL  Breast/Chest WDL  Shoulder Blades WDL  Spine WDL  (R) Arm/Elbow/Hand WDL  (L) Arm/Elbow/Hand WDL  Abdomen WDL  Groin WDL  Scrotum/Coccyx/Buttocks WDL  (R) Leg WDL  (L) Leg WDL  (R) Heel/Foot/Toe WDL  (L) Heel/Foot/Toe WDL          Devices In Places Blood Pressure Cuff      Interventions In Place Pillows    Possible Skin Injury No    Pictures Uploaded Into Epic N/A  Wound Consult Placed N/A  RN Wound Prevention Protocol Ordered No

## 2023-08-12 NOTE — PROGRESS NOTES
Hospital Medicine Daily Progress Note    Date of Service  8/12/2023    Chief Complaint  Inocencio Shabazz is a 63 y.o. male admitted 8/10/2023 with vomiting blood    Hospital Course  Mr. Collins has a past medical history of schizophrenia on Seroquel, alcohol induced cirrhosis as well as hepatitis C with admissions in the past.  Upper GI bleed as well as alcohol intoxication and withdrawal.  He was brought in with hematemesis and a drop in his hemoglobin last time from 12 in May of this year now to 7.4.  Emergency room he has a platelet count of 39 and an INR 1.8.  He went emergently to the operating room for EGD and had findings of 1 column of varix without bleeding in the esophagus as well as portal hypertensive gastropathy and 2 AVMs in the bulb of the duodenum status post APC.  Currently patient is a poor historian will be monitored closely in the IMCU.  I am not able to ascertain how much alcohol he has been drinking and he is at very high risk of detox.       Interval Problem Update  8/11: Mr. Shabazz was evaluated and examined in the IMCU. Hemoglobin this morning is 6.4 and he will be transfused. Q 8 hour Hb levels with standing orders to transfuse for Hb less than 7. He cannot convey to me how much he drinks. He is not tremulous though is a marginal historian.     8/12 hemoglobin of 8 today  Denies any melena, hematemesis or hemoptysis  We will continue to monitor closely  Denies headache or tremors  Monitor for need for CIWA protocol initiation    I have discussed this patient's plan of care and discharge plan at IDT rounds today with Case Management, Nursing, Nursing leadership, and other members of the IDT team.    Consultants/Specialty  GI    Code Status  Full Code    Disposition  The patient is not medically cleared for discharge to home or a post-acute facility.      I have placed the appropriate orders for post-discharge needs.    Review of Systems  Review of Systems   Constitutional:  Negative for  chills and fever.   Respiratory:  Negative for shortness of breath.    Cardiovascular:  Negative for chest pain and leg swelling.   Gastrointestinal:  Negative for abdominal pain, nausea and vomiting.        Anxious to eat   Musculoskeletal:  Positive for myalgias.   Neurological:  Positive for weakness.   Psychiatric/Behavioral:  Positive for memory loss.    All other systems reviewed and are negative.       Physical Exam  Temp:  [36.1 °C (97 °F)-36.8 °C (98.2 °F)] 36.1 °C (97 °F)  Pulse:  [63-93] 81  Resp:  [16-18] 17  BP: ()/(55-78) 113/69  SpO2:  [98 %-100 %] 100 %    Physical Exam  Vitals and nursing note reviewed.   Constitutional:       General: He is not in acute distress.     Appearance: He is ill-appearing. He is not diaphoretic.   HENT:      Mouth/Throat:      Mouth: Mucous membranes are moist.   Eyes:      General: No scleral icterus.     Extraocular Movements: Extraocular movements intact.      Pupils: Pupils are equal, round, and reactive to light.   Cardiovascular:      Rate and Rhythm: Normal rate and regular rhythm.   Pulmonary:      Effort: Pulmonary effort is normal.      Breath sounds: Normal breath sounds.   Abdominal:      General: There is no distension.      Palpations: Abdomen is soft.      Tenderness: There is no abdominal tenderness.   Musculoskeletal:         General: No swelling or tenderness.   Skin:     General: Skin is warm and dry.      Coloration: Skin is pale.   Neurological:      Sensory: No sensory deficit.      Motor: Weakness present.      Coordination: Coordination normal.      Comments: He is a moderate historian   Psychiatric:         Behavior: Behavior normal.      Comments: Odd and flat affect         Fluids    Intake/Output Summary (Last 24 hours) at 8/12/2023 1334  Last data filed at 8/12/2023 1100  Gross per 24 hour   Intake 1440 ml   Output 1052 ml   Net 388 ml       Laboratory  Recent Labs     08/10/23  0722 08/10/23  1649 08/11/23  0148 08/11/23  1124  08/11/23  1632 08/11/23  2305 08/12/23  0847   WBC 2.3*  --  1.2*  --   --   --   --    RBC 2.62*  --  2.25*  --   --   --   --    HEMOGLOBIN 7.4*   < > 6.4*   < > 8.4* 8.2* 8.6*   HEMATOCRIT 23.8*  --  19.6*  --   --   --   --    MCV 90.8  --  87.1  --   --   --   --    MCH 28.2  --  28.4  --   --   --   --    MCHC 31.1*  --  32.7  --   --   --   --    RDW 49.8  --  50.7*  --   --   --   --    PLATELETCT 39*  --  28*  --   --   --   --    MPV 12.4  --  13.6*  --   --   --   --     < > = values in this interval not displayed.     Recent Labs     08/10/23  0722 08/11/23  0148   SODIUM 136 138   POTASSIUM 5.8* 4.0   CHLORIDE 103 107   CO2 26 24   GLUCOSE 150* 152*   BUN 21 11   CREATININE 0.50 0.56   CALCIUM 7.6* 8.1*     Recent Labs     08/10/23  1324 08/11/23  0148   INR 1.87* 1.73*               Imaging  No orders to display        Assessment/Plan  * Upper GI bleed/portal gastropathy, par Afib not on anticoag, EtOH/Hep C- (present on admission)  Assessment & Plan  Hematemesis with acute blood loss anemia.  He underwent EGD immediately upon presentation revealing 2 AVMs in the duodenum and portal hypertensive gastropathy  3/11 Admitted to [facility] with IV Protonix and octreotide drip and empiric Rocephin.  Prilosec BID and 72 hours octreotide. Stop rocephin.  3/12 s/p egd, pod#2, cont ppi bid        Coagulopathy (HCC)- (present on admission)  Assessment & Plan  INR is elevated at 1.87  Due to poor liver synthetic function.  10 mg IV vitamin K given  Repeat INR 1.73    Hyperkalemia- (present on admission)  Assessment & Plan  Potassium 5.8  IV fluids given and repeat is 4    Pancytopenia (HCC)- (present on admission)  Assessment & Plan  Chronic condition  Likely due to cirrhosis and bone marrow induced toxicity from alcohol    Acute blood loss anemia- (present on admission)  Assessment & Plan  Hemoglobin is dropped from 12 to 7.4 due to upper GI bleed  Serial hemoglobins with standing orders to transfuse for hemoglobin  less than 7  3/12 h/h stable, monitor  F/u cbc in am    Paroxysmal atrial fibrillation (HCC)- (present on admission)  Assessment & Plan  Clearly not a candidate for anticoagulation due to upper GI bleed  Restart metoprolol if he develops tachycardia      Schizophrenia (HCC)- (present on admission)  Assessment & Plan  Hx of  He is supposed to be on Seroquel 200 mg nightly though it is not clear if he is taking it.    3/12 on Seroquel 100 mg nightly and adjust accordingly.    Alcohol abuse- (present on admission)  Assessment & Plan  Long history of severe alcohol abuse with severe detox in the past.  He will be monitored on the medical floor and may require CIWA protocol based on whether he develops detox symptoms.  Magnesium, phosphorus, and potassium were replaced accordingly    Cirrhosis (HCC)- (present on admission)  Assessment & Plan  Alcoholic cirrhosis with portal htn and coagulopathy      Portal hypertension (HCC)- (present on admission)  Assessment & Plan  With gastric varices  Octreotide drip 72 hours  Pod#2    Hepatitis C- (present on admission)  Assessment & Plan  Hx of         VTE prophylaxis:   SCDs/TEDs      I have performed a physical exam and reviewed and updated ROS and Plan today (8/12/2023). In review of yesterday's note (8/11/2023), there are no changes except as documented above.

## 2023-08-13 LAB
ALBUMIN SERPL BCP-MCNC: 3.5 G/DL (ref 3.2–4.9)
ALBUMIN/GLOB SERPL: 1.2 G/DL
ALP SERPL-CCNC: 70 U/L (ref 30–99)
ALT SERPL-CCNC: 164 U/L (ref 2–50)
AMMONIA PLAS-SCNC: 47 UMOL/L (ref 11–45)
ANION GAP SERPL CALC-SCNC: 9 MMOL/L (ref 7–16)
AST SERPL-CCNC: 164 U/L (ref 12–45)
BASOPHILS # BLD AUTO: 0.8 % (ref 0–1.8)
BASOPHILS # BLD: 0.01 K/UL (ref 0–0.12)
BILIRUB SERPL-MCNC: 0.7 MG/DL (ref 0.1–1.5)
BUN SERPL-MCNC: 5 MG/DL (ref 8–22)
CALCIUM ALBUM COR SERPL-MCNC: 9 MG/DL (ref 8.5–10.5)
CALCIUM SERPL-MCNC: 8.6 MG/DL (ref 8.5–10.5)
CHLORIDE SERPL-SCNC: 106 MMOL/L (ref 96–112)
CO2 SERPL-SCNC: 23 MMOL/L (ref 20–33)
CREAT SERPL-MCNC: 0.62 MG/DL (ref 0.5–1.4)
EOSINOPHIL # BLD AUTO: 0.07 K/UL (ref 0–0.51)
EOSINOPHIL NFR BLD: 5.4 % (ref 0–6.9)
ERYTHROCYTE [DISTWIDTH] IN BLOOD BY AUTOMATED COUNT: 48.9 FL (ref 35.9–50)
GFR SERPLBLD CREATININE-BSD FMLA CKD-EPI: 106 ML/MIN/1.73 M 2
GLOBULIN SER CALC-MCNC: 3 G/DL (ref 1.9–3.5)
GLUCOSE SERPL-MCNC: 104 MG/DL (ref 65–99)
HCT VFR BLD AUTO: 23.7 % (ref 42–52)
HGB BLD-MCNC: 7.7 G/DL (ref 14–18)
IMM GRANULOCYTES # BLD AUTO: 0 K/UL (ref 0–0.11)
IMM GRANULOCYTES NFR BLD AUTO: 0 % (ref 0–0.9)
LYMPHOCYTES # BLD AUTO: 0.42 K/UL (ref 1–4.8)
LYMPHOCYTES NFR BLD: 32.3 % (ref 22–41)
MCH RBC QN AUTO: 28.4 PG (ref 27–33)
MCHC RBC AUTO-ENTMCNC: 32.5 G/DL (ref 32.3–36.5)
MCV RBC AUTO: 87.5 FL (ref 81.4–97.8)
MONOCYTES # BLD AUTO: 0.14 K/UL (ref 0–0.85)
MONOCYTES NFR BLD AUTO: 10.8 % (ref 0–13.4)
NEUTROPHILS # BLD AUTO: 0.66 K/UL (ref 1.82–7.42)
NEUTROPHILS NFR BLD: 50.7 % (ref 44–72)
NRBC # BLD AUTO: 0 K/UL
NRBC BLD-RTO: 0 /100 WBC (ref 0–0.2)
PLATELET # BLD AUTO: 31 K/UL (ref 164–446)
PLATELETS.RETICULATED NFR BLD AUTO: 14.2 % (ref 0.6–13.1)
PMV BLD AUTO: 13.5 FL (ref 9–12.9)
POTASSIUM SERPL-SCNC: 4.3 MMOL/L (ref 3.6–5.5)
PROT SERPL-MCNC: 6.5 G/DL (ref 6–8.2)
RBC # BLD AUTO: 2.71 M/UL (ref 4.7–6.1)
SODIUM SERPL-SCNC: 138 MMOL/L (ref 135–145)
WBC # BLD AUTO: 1.3 K/UL (ref 4.8–10.8)

## 2023-08-13 PROCEDURE — 80053 COMPREHEN METABOLIC PANEL: CPT

## 2023-08-13 PROCEDURE — 700102 HCHG RX REV CODE 250 W/ 637 OVERRIDE(OP): Performed by: INTERNAL MEDICINE

## 2023-08-13 PROCEDURE — 99233 SBSQ HOSP IP/OBS HIGH 50: CPT | Performed by: INTERNAL MEDICINE

## 2023-08-13 PROCEDURE — 85055 RETICULATED PLATELET ASSAY: CPT

## 2023-08-13 PROCEDURE — 85025 COMPLETE CBC W/AUTO DIFF WBC: CPT

## 2023-08-13 PROCEDURE — A9270 NON-COVERED ITEM OR SERVICE: HCPCS | Performed by: INTERNAL MEDICINE

## 2023-08-13 PROCEDURE — A9270 NON-COVERED ITEM OR SERVICE: HCPCS | Performed by: HOSPITALIST

## 2023-08-13 PROCEDURE — 82140 ASSAY OF AMMONIA: CPT

## 2023-08-13 PROCEDURE — 700102 HCHG RX REV CODE 250 W/ 637 OVERRIDE(OP): Performed by: HOSPITALIST

## 2023-08-13 PROCEDURE — 770001 HCHG ROOM/CARE - MED/SURG/GYN PRIV*

## 2023-08-13 RX ADMIN — NICOTINE TRANSDERMAL SYSTEM 21 MG: 21 PATCH, EXTENDED RELEASE TRANSDERMAL at 05:37

## 2023-08-13 RX ADMIN — LORAZEPAM 0.5 MG: 0.5 TABLET ORAL at 22:48

## 2023-08-13 RX ADMIN — OMEPRAZOLE 20 MG: 20 CAPSULE, DELAYED RELEASE ORAL at 17:37

## 2023-08-13 RX ADMIN — QUETIAPINE FUMARATE 100 MG: 100 TABLET ORAL at 21:58

## 2023-08-13 RX ADMIN — OMEPRAZOLE 20 MG: 20 CAPSULE, DELAYED RELEASE ORAL at 05:36

## 2023-08-13 ASSESSMENT — COGNITIVE AND FUNCTIONAL STATUS - GENERAL
DAILY ACTIVITIY SCORE: 23
TOILETING: A LITTLE
SUGGESTED CMS G CODE MODIFIER MOBILITY: CJ
CLIMB 3 TO 5 STEPS WITH RAILING: A LITTLE
SUGGESTED CMS G CODE MODIFIER DAILY ACTIVITY: CI
MOBILITY SCORE: 22
WALKING IN HOSPITAL ROOM: A LITTLE

## 2023-08-13 ASSESSMENT — ENCOUNTER SYMPTOMS
ROS GI COMMENTS: ANXIOUS TO EAT
WEAKNESS: 1
DIAPHORESIS: 0
MEMORY LOSS: 1
ABDOMINAL PAIN: 0
TREMORS: 0
TINGLING: 0
BLURRED VISION: 0
NAUSEA: 0
CHILLS: 0
DEPRESSION: 0
SHORTNESS OF BREATH: 0
MYALGIAS: 1
FEVER: 0
DIZZINESS: 0
HEARTBURN: 0

## 2023-08-13 ASSESSMENT — LIFESTYLE VARIABLES: SUBSTANCE_ABUSE: 1

## 2023-08-13 ASSESSMENT — PAIN DESCRIPTION - PAIN TYPE: TYPE: ACUTE PAIN

## 2023-08-13 NOTE — PROGRESS NOTES
Lab called with critical result of WBC 1.3 at 0355. Critical lab result read back to lab.   Dr. martins notified of critical lab result at 0355.  Critical lab result read back by Dr. martins.

## 2023-08-13 NOTE — CARE PLAN
Problem: Knowledge Deficit - Standard  Goal: Patient and family/care givers will demonstrate understanding of plan of care, disease process/condition, diagnostic tests and medications  Outcome: Progressing     Problem: Pain - Standard  Goal: Alleviation of pain or a reduction in pain to the patient’s comfort goal  Outcome: Progressing     Problem: Fall Risk  Goal: Patient will remain free from falls  Outcome: Progressing     Problem: Risk for Fluid Volume Deficit Related to Bleeding  Goal: Fluid volume balance will be maintained  Outcome: Progressing  Goal: Patient will show no signs and symptoms of excessive bleeding  Outcome: Progressing     Problem: Risk for Bleeding  Goal: Patient will take measures to prevent bleeding and recognizes signs of bleeding that need to be reported immediately to a health care professional  Outcome: Progressing  Goal: Patient will not experience bleeding as evidenced by normal blood pressure, stable hematocrit and hemoglobin levels and desired ranges for coagulation profiles  Outcome: Progressing   The patient is Stable - Low risk of patient condition declining or worsening    Shift Goals  Clinical Goals: monitor HH  Patient Goals: advance diet, go home  Family Goals: randy    Progress made toward(s) clinical / shift goals:  pt progressing toward goals    Patient is not progressing towards the following goals:

## 2023-08-13 NOTE — CARE PLAN
The patient is Stable - Low risk of patient condition declining or worsening    Shift Goals  Clinical Goals: monitor h+h, Monitor color of stool, advance diet  Patient Goals: advance diet, go home  Family Goals: randy    Progress made toward(s) clinical / shift goals:  HH stable     Patient is not progressing towards the following goals:

## 2023-08-13 NOTE — PROGRESS NOTES
Hospital Medicine Daily Progress Note    Date of Service  8/13/2023    Chief Complaint  Inocencio Shabazz is a 63 y.o. male admitted 8/10/2023 with vomiting blood    Hospital Course  Mr. Collins has a past medical history of schizophrenia on Seroquel, alcohol induced cirrhosis as well as hepatitis C with admissions in the past.  Upper GI bleed as well as alcohol intoxication and withdrawal.  He was brought in with hematemesis and a drop in his hemoglobin last time from 12 in May of this year now to 7.4.  Emergency room he has a platelet count of 39 and an INR 1.8.  He went emergently to the operating room for EGD and had findings of 1 column of varix without bleeding in the esophagus as well as portal hypertensive gastropathy and 2 AVMs in the bulb of the duodenum status post APC.  Currently patient is a poor historian will be monitored closely in the IMCU.  I am not able to ascertain how much alcohol he has been drinking and he is at very high risk of detox.       Interval Problem Update  8/11: Mr. Shabazz was evaluated and examined in the IMCU. Hemoglobin this morning is 6.4 and he will be transfused. Q 8 hour Hb levels with standing orders to transfuse for Hb less than 7. He cannot convey to me how much he drinks. He is not tremulous though is a marginal historian.     8/12 hemoglobin of 8 today  Denies any melena, hematemesis or hemoptysis  We will continue to monitor closely  Denies headache or tremors  Monitor for need for CIWA protocol initiation    8/13 no new complaints, neg tremors  Afebrile, wbc 1.3  Hgb 8.6 to 7.7, bp stable  Neg melena    I have discussed this patient's plan of care and discharge plan at IDT rounds today with Case Management, Nursing, Nursing leadership, and other members of the IDT team.    Consultants/Specialty  GI    Code Status  Full Code    Disposition  The patient is not medically cleared for discharge to home or a post-acute facility.      I have placed the appropriate orders  for post-discharge needs.    Review of Systems  Review of Systems   Constitutional:  Negative for chills, diaphoresis, fever and malaise/fatigue.   Eyes:  Negative for blurred vision.   Respiratory:  Negative for shortness of breath.    Cardiovascular:  Negative for chest pain and leg swelling.   Gastrointestinal:  Negative for abdominal pain, heartburn and nausea.        Anxious to eat   Musculoskeletal:  Positive for myalgias.   Neurological:  Positive for weakness. Negative for dizziness, tingling and tremors.   Psychiatric/Behavioral:  Positive for memory loss and substance abuse. Negative for depression.    All other systems reviewed and are negative.       Physical Exam  Temp:  [36.5 °C (97.7 °F)-37.1 °C (98.7 °F)] 36.6 °C (97.8 °F)  Pulse:  [65-80] 76  Resp:  [17-18] 18  BP: ()/(60-66) 95/62  SpO2:  [98 %-100 %] 98 %    Physical Exam  Vitals and nursing note reviewed.   Constitutional:       General: He is not in acute distress.     Appearance: He is ill-appearing.   HENT:      Mouth/Throat:      Mouth: Mucous membranes are moist.   Eyes:      General: No scleral icterus.     Extraocular Movements: Extraocular movements intact.      Pupils: Pupils are equal, round, and reactive to light.   Cardiovascular:      Rate and Rhythm: Normal rate and regular rhythm.   Pulmonary:      Effort: Pulmonary effort is normal.      Breath sounds: Normal breath sounds.   Abdominal:      General: There is no distension.      Palpations: Abdomen is soft. There is no mass.      Tenderness: There is no abdominal tenderness.   Musculoskeletal:         General: No swelling or tenderness.   Skin:     General: Skin is warm and dry.      Coloration: Skin is pale.      Findings: No erythema or rash.   Neurological:      Sensory: No sensory deficit.      Motor: Weakness present.      Coordination: Coordination abnormal.      Comments: He is a moderate historian   Psychiatric:         Mood and Affect: Mood normal.         Behavior:  Behavior normal.      Comments: Odd and flat affect         Fluids    Intake/Output Summary (Last 24 hours) at 8/13/2023 1432  Last data filed at 8/13/2023 1000  Gross per 24 hour   Intake 2020 ml   Output 1800 ml   Net 220 ml       Laboratory  Recent Labs     08/11/23  0148 08/11/23  1124 08/12/23  0847 08/12/23  2139 08/13/23  0323   WBC 1.2*  --   --   --  1.3*   RBC 2.25*  --   --   --  2.71*   HEMOGLOBIN 6.4*   < > 8.6* 7.9* 7.7*   HEMATOCRIT 19.6*  --   --   --  23.7*   MCV 87.1  --   --   --  87.5   MCH 28.4  --   --   --  28.4   MCHC 32.7  --   --   --  32.5   RDW 50.7*  --   --   --  48.9   PLATELETCT 28*  --   --   --  31*   MPV 13.6*  --   --   --  13.5*    < > = values in this interval not displayed.     Recent Labs     08/11/23 0148 08/13/23 0323   SODIUM 138 138   POTASSIUM 4.0 4.3   CHLORIDE 107 106   CO2 24 23   GLUCOSE 152* 104*   BUN 11 5*   CREATININE 0.56 0.62   CALCIUM 8.1* 8.6     Recent Labs     08/11/23 0148   INR 1.73*               Imaging  No orders to display        Assessment/Plan  * Upper GI bleed/portal gastropathy, par Afib not on anticoag, EtOH/Hep C- (present on admission)  Assessment & Plan  Hematemesis with acute blood loss anemia.  He underwent EGD immediately upon presentation revealing 2 AVMs in the duodenum and portal hypertensive gastropathy  3/11 Admitted to [facility] with IV Protonix and octreotide drip and empiric Rocephin.  Prilosec BID and 72 hours octreotide. Stop rocephin.  8/12 s/p egd, pod#2, cont ppi bid  8/13 hgb trend down, f/u in am  Advance diet to regular  Improving lft, monitor  Cont ppi bid      Coagulopathy (HCC)- (present on admission)  Assessment & Plan  INR is elevated at 1.87  Due to poor liver synthetic function.  10 mg IV vitamin K given  Repeat INR 1.73    Hyperkalemia- (present on admission)  Assessment & Plan  Potassium 5.8  IV fluids given and repeat is 4    Pancytopenia (HCC)- (present on admission)  Assessment & Plan  Chronic condition  Likely due  to cirrhosis and bone marrow induced toxicity from alcohol    8/13 will need outpt f/u and bm bx if stable    Acute blood loss anemia- (present on admission)  Assessment & Plan  Hemoglobin is dropped from 12 to 7.4 due to upper GI bleed  Serial hemoglobins with standing orders to transfuse for hemoglobin less than 7  3/12 h/h stable, monitor  F/u cbc in am    3/13 hgb 7.7, f/u in am    Paroxysmal atrial fibrillation (HCC)- (present on admission)  Assessment & Plan  Clearly not a candidate for anticoagulation due to upper GI bleed  Restart metoprolol if he develops tachycardia      Schizophrenia (HCC)- (present on admission)  Assessment & Plan  Hx of  He is supposed to be on Seroquel 200 mg nightly though it is not clear if he is taking it.    3/12 on Seroquel 100 mg nightly and adjust accordingly.    Alcohol abuse- (present on admission)  Assessment & Plan  Long history of severe alcohol abuse with severe detox in the past.  He will be monitored on the medical floor and may require CIWA protocol based on whether he develops detox symptoms.  Magnesium, phosphorus, and potassium were replaced accordingly    8/13 axox3, h/o tremors, no DT  - monitor  nh3 47    Cirrhosis (HCC)- (present on admission)  Assessment & Plan  Alcoholic cirrhosis with portal htn and coagulopathy      Portal hypertension (HCC)- (present on admission)  Assessment & Plan  8/12 With gastric varices  Octreotide drip 72 hours  Pod#2    8/13 off octreotide today  Pod#3    Hepatitis C- (present on admission)  Assessment & Plan  Hx of         VTE prophylaxis:   SCDs/TEDs      I have performed a physical exam and reviewed and updated ROS and Plan today (8/13/2023). In review of yesterday's note (8/12/2023), there are no changes except as documented above.

## 2023-08-14 ENCOUNTER — PATIENT OUTREACH (OUTPATIENT)
Dept: SCHEDULING | Facility: IMAGING CENTER | Age: 63
End: 2023-08-14

## 2023-08-14 ENCOUNTER — PHARMACY VISIT (OUTPATIENT)
Dept: PHARMACY | Facility: MEDICAL CENTER | Age: 63
End: 2023-08-14
Payer: MEDICARE

## 2023-08-14 VITALS
HEIGHT: 68 IN | RESPIRATION RATE: 18 BRPM | HEART RATE: 82 BPM | SYSTOLIC BLOOD PRESSURE: 111 MMHG | TEMPERATURE: 97.7 F | DIASTOLIC BLOOD PRESSURE: 58 MMHG | WEIGHT: 132.28 LBS | BODY MASS INDEX: 20.05 KG/M2 | OXYGEN SATURATION: 97 %

## 2023-08-14 LAB
ALBUMIN SERPL BCP-MCNC: 3.6 G/DL (ref 3.2–4.9)
ALBUMIN/GLOB SERPL: 1 G/DL
ALP SERPL-CCNC: 89 U/L (ref 30–99)
ALT SERPL-CCNC: 142 U/L (ref 2–50)
ANION GAP SERPL CALC-SCNC: 11 MMOL/L (ref 7–16)
AST SERPL-CCNC: 113 U/L (ref 12–45)
BASOPHILS # BLD AUTO: 1.1 % (ref 0–1.8)
BASOPHILS # BLD: 0.02 K/UL (ref 0–0.12)
BILIRUB SERPL-MCNC: 0.6 MG/DL (ref 0.1–1.5)
BUN SERPL-MCNC: 7 MG/DL (ref 8–22)
CALCIUM ALBUM COR SERPL-MCNC: 9.1 MG/DL (ref 8.5–10.5)
CALCIUM SERPL-MCNC: 8.8 MG/DL (ref 8.5–10.5)
CHLORIDE SERPL-SCNC: 105 MMOL/L (ref 96–112)
CO2 SERPL-SCNC: 22 MMOL/L (ref 20–33)
CREAT SERPL-MCNC: 0.74 MG/DL (ref 0.5–1.4)
EOSINOPHIL # BLD AUTO: 0.09 K/UL (ref 0–0.51)
EOSINOPHIL NFR BLD: 4.9 % (ref 0–6.9)
ERYTHROCYTE [DISTWIDTH] IN BLOOD BY AUTOMATED COUNT: 51.2 FL (ref 35.9–50)
GFR SERPLBLD CREATININE-BSD FMLA CKD-EPI: 100 ML/MIN/1.73 M 2
GLOBULIN SER CALC-MCNC: 3.7 G/DL (ref 1.9–3.5)
GLUCOSE SERPL-MCNC: 108 MG/DL (ref 65–99)
HCT VFR BLD AUTO: 27.4 % (ref 42–52)
HGB BLD-MCNC: 8.8 G/DL (ref 14–18)
IMM GRANULOCYTES # BLD AUTO: 0 K/UL (ref 0–0.11)
IMM GRANULOCYTES NFR BLD AUTO: 0 % (ref 0–0.9)
LYMPHOCYTES # BLD AUTO: 0.47 K/UL (ref 1–4.8)
LYMPHOCYTES NFR BLD: 25.5 % (ref 22–41)
MCH RBC QN AUTO: 28.9 PG (ref 27–33)
MCHC RBC AUTO-ENTMCNC: 32.1 G/DL (ref 32.3–36.5)
MCV RBC AUTO: 90.1 FL (ref 81.4–97.8)
MONOCYTES # BLD AUTO: 0.2 K/UL (ref 0–0.85)
MONOCYTES NFR BLD AUTO: 10.9 % (ref 0–13.4)
NEUTROPHILS # BLD AUTO: 1.06 K/UL (ref 1.82–7.42)
NEUTROPHILS NFR BLD: 57.6 % (ref 44–72)
NRBC # BLD AUTO: 0 K/UL
NRBC BLD-RTO: 0 /100 WBC (ref 0–0.2)
PLATELET # BLD AUTO: 33 K/UL (ref 164–446)
PLATELETS.RETICULATED NFR BLD AUTO: 14.8 % (ref 0.6–13.1)
PMV BLD AUTO: 13.5 FL (ref 9–12.9)
POTASSIUM SERPL-SCNC: 4.2 MMOL/L (ref 3.6–5.5)
PROT SERPL-MCNC: 7.3 G/DL (ref 6–8.2)
RBC # BLD AUTO: 3.04 M/UL (ref 4.7–6.1)
SODIUM SERPL-SCNC: 138 MMOL/L (ref 135–145)
WBC # BLD AUTO: 1.8 K/UL (ref 4.8–10.8)

## 2023-08-14 PROCEDURE — A9270 NON-COVERED ITEM OR SERVICE: HCPCS | Performed by: HOSPITALIST

## 2023-08-14 PROCEDURE — 80053 COMPREHEN METABOLIC PANEL: CPT

## 2023-08-14 PROCEDURE — 99239 HOSP IP/OBS DSCHRG MGMT >30: CPT | Performed by: INTERNAL MEDICINE

## 2023-08-14 PROCEDURE — A9270 NON-COVERED ITEM OR SERVICE: HCPCS | Performed by: INTERNAL MEDICINE

## 2023-08-14 PROCEDURE — 700102 HCHG RX REV CODE 250 W/ 637 OVERRIDE(OP): Performed by: HOSPITALIST

## 2023-08-14 PROCEDURE — 85025 COMPLETE CBC W/AUTO DIFF WBC: CPT

## 2023-08-14 PROCEDURE — RXMED WILLOW AMBULATORY MEDICATION CHARGE: Performed by: INTERNAL MEDICINE

## 2023-08-14 PROCEDURE — 85055 RETICULATED PLATELET ASSAY: CPT

## 2023-08-14 PROCEDURE — 700102 HCHG RX REV CODE 250 W/ 637 OVERRIDE(OP): Performed by: INTERNAL MEDICINE

## 2023-08-14 RX ORDER — OMEPRAZOLE 20 MG/1
20 CAPSULE, DELAYED RELEASE ORAL 2 TIMES DAILY
Qty: 60 CAPSULE | Refills: 0 | Status: SHIPPED | OUTPATIENT
Start: 2023-08-14 | End: 2023-10-26

## 2023-08-14 RX ORDER — QUETIAPINE FUMARATE 100 MG/1
100 TABLET, FILM COATED ORAL NIGHTLY
Qty: 60 TABLET | Refills: 3 | Status: SHIPPED | OUTPATIENT
Start: 2023-08-14 | End: 2023-10-26

## 2023-08-14 RX ADMIN — OMEPRAZOLE 20 MG: 20 CAPSULE, DELAYED RELEASE ORAL at 05:08

## 2023-08-14 RX ADMIN — NICOTINE TRANSDERMAL SYSTEM 21 MG: 21 PATCH, EXTENDED RELEASE TRANSDERMAL at 05:08

## 2023-08-14 NOTE — DISCHARGE SUMMARY
Discharge Summary    CHIEF COMPLAINT ON ADMISSION  Chief Complaint   Patient presents with    Blood in Vomit     [facility]. Hx of esophageal varices. Hgb [facility] 12.8 [facility].        Reason for Admission  EMS     Admission Date  8/10/2023    CODE STATUS  Prior    HPI & HOSPITAL COURSE  This is a 63 y.o. male here with past medical history of schizophrenia on Seroquel, alcohol induced cirrhosis as well as hepatitis C with admissions in the past.  Upper GI bleed as well as alcohol intoxication and withdrawal.  He was brought in with hematemesis and a drop in his hemoglobin last time from 12 in May of this year now to 7.4.  Emergency room he has a platelet count of 39 and an INR 1.8.  He went emergently to the operating room for EGD and had findings of 1 column of varix without bleeding in the esophagus as well as portal hypertensive gastropathy and 2 AVMs in the bulb of the duodenum status post APC.  Currently patient is a poor historian will be monitored closely in the IMCU.  I am not able to ascertain how much alcohol he has been drinking and he is at very high risk of detox.    Patient was admitted for acute GI bleed and monitoring of alcohol withdrawal.  He did have EGD completed with findings as noted below.    Grade 1 varix,  one column  Portal hypertensive gastropathy  2 AVMs treated    As per GI recommendation is to continue octreotide for 72 hours and continue PPI twice daily p.o.  Patient was advised alcohol cessation.  During this admission H&H has now improved.  No further signs of bleeding.  Patient is tolerating oral diet.  He will continue on PPI twice daily and outpatient follow-up with GI.    He did not exhibit any signs of alcohol withdrawal.  He is now cleared for discharge to home.    Therefore, he is discharged in good and stable condition to home with close outpatient follow-up.    The patient met 2-midnight criteria for an inpatient stay  at the time of discharge.    Discharge Date  8/14/23    FOLLOW UP ITEMS POST DISCHARGE  Primary care provider in 1 week  GI as scheduled    DISCHARGE DIAGNOSES  Principal Problem:    Upper GI bleed/portal gastropathy, par Afib not on anticoag, EtOH/Hep C (POA: Yes)  Active Problems:    Hepatitis C (POA: Yes)    Portal hypertension (HCC) (POA: Yes)    Cirrhosis (HCC) (POA: Yes)    Alcohol abuse (POA: Yes)    Schizophrenia (HCC) (POA: Yes)    Paroxysmal atrial fibrillation (HCC) (POA: Yes)    Acute blood loss anemia (POA: Yes)    Pancytopenia (HCC) (POA: Yes)    Hyperkalemia (POA: Yes)    Coagulopathy (HCC) (POA: Yes)  Resolved Problems:    * No resolved hospital problems. *      FOLLOW UP  Future Appointments   Date Time Provider Department Center   8/14/2023  1:30 PM Ranjit Stevenson M.D. [facility] M.D.  [address] [facility] [address]  [phone]    Follow up  Please call the [facility] to schedule an appointment with a primary care provider for a hospital follow up. Thank you.      MEDICATIONS ON DISCHARGE     Medication List        START taking these medications        Instructions   omeprazole 20 MG delayed-release capsule  Commonly known as: PriLOSEC   Take 1 Capsule by mouth 2 times a day.  Dose: 20 mg            CHANGE how you take these medications        Instructions   QUEtiapine 100 MG Tabs  What changed:   medication strength  how much to take  when to take this  Commonly known as: SEROquel   Take 1 Tablet by mouth every evening.  Dose: 100 mg            CONTINUE taking these medications        Instructions   diclofenac sodium 1 % Gel  Commonly known as: Voltaren   Apply 4 g topically 4 times a day.  Dose: 4 g     folic acid 1 MG Tabs  Commonly known as: Folvite   Take 1 Tablet by mouth every day.  Dose: 1 mg     Magnesium Oxide -Mg Supplement 420 (252 Mg) MG Tabs   Take 1 Tablet by mouth every day.  Dose: 1 Tablet     ondansetron 4 MG Tabs tablet  Commonly known as: Zofran    Take 4 mg by mouth 3 times a day as needed for Nausea/Vomiting.  Dose: 4 mg     thiamine 100 MG tablet  Commonly known as: Thiamine   Take 100 mg by mouth every day.  Dose: 100 mg            STOP taking these medications      metoprolol SR 50 MG Tb24  Commonly known as: Toprol XL     pantoprazole 40 MG Tbec  Commonly known as: Protonix              Allergies  Allergies   Allergen Reactions    Haloperidol Unspecified     Twitching/involuntary movements        DIET  No orders of the defined types were placed in this encounter.      ACTIVITY  As tolerated.  Weight bearing as tolerated    CONSULTATIONS  Critical care/GI    PROCEDURES  EGD    LABORATORY  Lab Results   Component Value Date    SODIUM 138 08/14/2023    POTASSIUM 4.2 08/14/2023    CHLORIDE 105 08/14/2023    CO2 22 08/14/2023    GLUCOSE 108 (H) 08/14/2023    BUN 7 (L) 08/14/2023    CREATININE 0.74 08/14/2023        Lab Results   Component Value Date    WBC 1.8 (LL) 08/14/2023    HEMOGLOBIN 8.8 (L) 08/14/2023    HEMATOCRIT 27.4 (L) 08/14/2023    PLATELETCT 33 (L) 08/14/2023        Total time of the discharge process exceeds 42 minutes.

## 2023-08-14 NOTE — DISCHARGE INSTRUCTIONS
Gastrointestinal Bleeding  Gastrointestinal (GI) bleeding is bleeding anywhere along the digestive tract. The digestive tract carries food from your mouth until it leaves your body as waste, or poop. You may have blood in your poop or have black poop. If you vomit, there may be blood in it too.  This condition can be mild, serious, or even life-threatening. If you have a lot of bleeding, you may need to stay in the hospital.  What are the causes?  This condition may be caused by:  Irritation and swelling of the esophagus (esophagitis). The esophagus is part of the body that moves food from your mouth to your stomach.  Swollen veins in the butt (hemorrhoids).  Tears in the opening of the butt. These are often caused by passing hard poop.  Pouches that form on the colon (diverticulosis).  Irritation and swelling of pouches that have formed in your colon (diverticulitis).  Growths (polyps) or cancer.  Irritation of the stomach lining (gastritis).  Sores (ulcers) in the stomach.  What increases the risk?  You are more likely to develop this condition if:  You have a certain type of infection in your stomach called Helicobacter pylori infection.  You take certain medicines.  You smoke.  You drink alcohol.  What are the signs or symptoms?  Common symptoms of this condition include:  Vomiting material that has bright red blood in it. It may look like coffee grounds.  Changes in your poop. The poop:  May have red blood in it.  May be black, look like tar, and smell stronger than normal.  May be red.  Pain or cramping in the belly (abdomen).  How is this treated?  Treatment for this condition depends on the cause of the bleeding. For example:  Your doctor may treat the bleeding during diagnosis. Common ways of diagnosing this condition is endoscopy or colonoscopy.  Medicines can be used to:  Help control irritation, swelling, or infection.  Reduce acid in your stomach.  Certain problems can be treated  with:  Creams.  Medicines that are put in the butt (suppositories).  Warm baths.  Surgery is sometimes needed.  If you lose a lot of blood, you may need a blood transfusion.  If there is a lot of bleeding, you will need to stay in the hospital. If bleeding is mild, you may be allowed to go home.  Follow these instructions at home:    Take over-the-counter and prescription medicines only as told by your doctor.  Eat foods that have a lot of fiber in them. These foods include beans, whole grains, and fresh fruits and vegetables. You can also try eating 1-3 prunes each day.  Drink enough fluid to keep your pee (urine) pale yellow.  Keep all follow-up visits.  Contact a doctor if:  Your symptoms do not get better with treatment.  Get help right away if:  Your bleeding does not stop.  You feel dizzy or you pass out (faint).  You feel weak.  You have very bad cramps in your back or belly.  You pass large clumps of blood (clots) in your poop.  Your symptoms are getting worse.  You have chest pain or fast heartbeats.  These symptoms may be an emergency. Get help right away. Call 911.  Do not wait to see if the symptoms will go away.  Do not drive yourself to the hospital.  Summary  Gastrointestinal (GI) bleeding is bleeding anywhere along the digestive tract. The digestive tract carries food from your mouth until it leaves your body as waste, or poop.  This bleeding can be caused by many things. Treatment depends on the cause of the bleeding.  Take medicines only as told by your doctor.  Keep all follow-up visits.  This information is not intended to replace advice given to you by your health care provider. Make sure you discuss any questions you have with your health care provider.  Document Revised: 07/22/2022 Document Reviewed: 07/22/2022  Elsevier Patient Education © 2023 [employer]

## 2023-08-14 NOTE — PROGRESS NOTES
Patient tolerated breakfast. No nausea, vomiting or pain.    2 PIV d/c tips were intact. Pt waiting for discharge lounge at this time.

## 2023-08-14 NOTE — PROGRESS NOTES
Received bedside report from nightshift RN, pt care assumed. VSS, pt assessment complete. Pt A&Ox4, no c/o pain at this time. POC discussed with pt and verbalizes no questions. Pt denies any additional needs at this time. Bed locked and in lowest position, bed alarm on. Pt educated on fall risk and verbalized understanding, call light within reach, hourly rounding initiated.

## 2023-08-14 NOTE — PROGRESS NOTES
"Patient arrived to [facility]. MT paper work, meds, and follow up appointments reviewed with patient. Questions addressed. Patient states he will walk home.      1156-Patient back to [facility] as he states \"ER has his keys for apartment.\" No keys found at [facility] ED safe keeping. [facility] and belongings. Patient states he will walk there and no further assistance needed.   "

## 2023-08-14 NOTE — PROGRESS NOTES
Lab called with critical result of WBC at 1.8. Critical lab result read back to Lab.   Dr. martins notified of critical lab result at 0450.

## 2023-08-14 NOTE — CARE PLAN
Problem: Knowledge Deficit - Standard  Goal: Patient and family/care givers will demonstrate understanding of plan of care, disease process/condition, diagnostic tests and medications  Outcome: Progressing     Problem: Pain - Standard  Goal: Alleviation of pain or a reduction in pain to the patient’s comfort goal  Outcome: Progressing     Problem: Fall Risk  Goal: Patient will remain free from falls  Outcome: Progressing     Problem: Risk for Fluid Volume Deficit Related to Bleeding  Goal: Fluid volume balance will be maintained  Outcome: Progressing  Goal: Patient will show no signs and symptoms of excessive bleeding  Outcome: Progressing     Problem: Risk for Bleeding  Goal: Patient will take measures to prevent bleeding and recognizes signs of bleeding that need to be reported immediately to a health care professional  Outcome: Progressing  Goal: Patient will not experience bleeding as evidenced by normal blood pressure, stable hematocrit and hemoglobin levels and desired ranges for coagulation profiles  Outcome: Progressing   The patient is Stable - Low risk of patient condition declining or worsening    Shift Goals  Clinical Goals: yaquelin NOVA  Patient Goals: advance diet, go home  Family Goals: randy    Progress made toward(s) clinical / shift goals:  pt will remain free from harm throughout shift    Patient is not progressing towards the following goals:

## 2023-08-14 NOTE — PROGRESS NOTES
Bedside report received. Assumed care of patient this morning. Assessment completed      Patient is A&O x 4, pt calls for assistance appropriately  Reports 0 /10 pain  Pt is in room air    Mobility upself    Voiding +  Flatus +      Plan of care reviewed with the patient. Bed is locked and in the lowest position. Call light is within reach. Patient encouraged to voice needs and concerns, all needs met at this time. Hourly rounding in place.

## 2023-09-26 ENCOUNTER — APPOINTMENT (OUTPATIENT)
Dept: RADIOLOGY | Facility: MEDICAL CENTER | Age: 63
DRG: 897 | End: 2023-09-26
Attending: STUDENT IN AN ORGANIZED HEALTH CARE EDUCATION/TRAINING PROGRAM
Payer: COMMERCIAL

## 2023-09-26 ENCOUNTER — HOSPITAL ENCOUNTER (INPATIENT)
Facility: MEDICAL CENTER | Age: 63
LOS: 2 days | DRG: 897 | End: 2023-09-28
Attending: STUDENT IN AN ORGANIZED HEALTH CARE EDUCATION/TRAINING PROGRAM | Admitting: STUDENT IN AN ORGANIZED HEALTH CARE EDUCATION/TRAINING PROGRAM
Payer: COMMERCIAL

## 2023-09-26 DIAGNOSIS — D61.818 PANCYTOPENIA (HCC): ICD-10-CM

## 2023-09-26 DIAGNOSIS — R65.20 SEPSIS WITH ACUTE ORGAN DYSFUNCTION WITHOUT SEPTIC SHOCK, DUE TO UNSPECIFIED ORGANISM, UNSPECIFIED TYPE: ICD-10-CM

## 2023-09-26 DIAGNOSIS — K70.30 ALCOHOLIC CIRRHOSIS, UNSPECIFIED WHETHER ASCITES PRESENT (HCC): ICD-10-CM

## 2023-09-26 DIAGNOSIS — D70.9 NEUTROPENIA, UNSPECIFIED TYPE (HCC): ICD-10-CM

## 2023-09-26 DIAGNOSIS — A41.9 SEPSIS WITH ACUTE ORGAN DYSFUNCTION WITHOUT SEPTIC SHOCK, DUE TO UNSPECIFIED ORGANISM, UNSPECIFIED TYPE: ICD-10-CM

## 2023-09-26 PROBLEM — F10.930 ALCOHOL WITHDRAWAL WITHOUT PERCEPTUAL DISTURBANCES, UNCOMPLICATED (HCC): Status: ACTIVE | Noted: 2023-09-26

## 2023-09-26 LAB
ABO GROUP BLD: NORMAL
ALBUMIN SERPL BCP-MCNC: 4.3 G/DL (ref 3.2–4.9)
ALBUMIN/GLOB SERPL: 1 G/DL
ALP SERPL-CCNC: 83 U/L (ref 30–99)
ALT SERPL-CCNC: 39 U/L (ref 2–50)
ANION GAP SERPL CALC-SCNC: 14 MMOL/L (ref 7–16)
APTT PPP: 32.6 SEC (ref 24.7–36)
AST SERPL-CCNC: 64 U/L (ref 12–45)
BASOPHILS # BLD AUTO: 1.3 % (ref 0–1.8)
BASOPHILS # BLD: 0.02 K/UL (ref 0–0.12)
BILIRUB SERPL-MCNC: 0.8 MG/DL (ref 0.1–1.5)
BLD GP AB SCN SERPL QL: NORMAL
BUN SERPL-MCNC: 13 MG/DL (ref 8–22)
CALCIUM ALBUM COR SERPL-MCNC: 9.2 MG/DL (ref 8.5–10.5)
CALCIUM SERPL-MCNC: 9.4 MG/DL (ref 8.5–10.5)
CHLORIDE SERPL-SCNC: 101 MMOL/L (ref 96–112)
CO2 SERPL-SCNC: 23 MMOL/L (ref 20–33)
CREAT SERPL-MCNC: 0.65 MG/DL (ref 0.5–1.4)
EKG IMPRESSION: NORMAL
EOSINOPHIL # BLD AUTO: 0.01 K/UL (ref 0–0.51)
EOSINOPHIL NFR BLD: 0.6 % (ref 0–6.9)
ERYTHROCYTE [DISTWIDTH] IN BLOOD BY AUTOMATED COUNT: 49.7 FL (ref 35.9–50)
FIBRINOGEN PPP-MCNC: 221 MG/DL (ref 215–460)
FLUAV RNA SPEC QL NAA+PROBE: NEGATIVE
FLUBV RNA SPEC QL NAA+PROBE: NEGATIVE
GFR SERPLBLD CREATININE-BSD FMLA CKD-EPI: 104 ML/MIN/1.73 M 2
GLOBULIN SER CALC-MCNC: 4.3 G/DL (ref 1.9–3.5)
GLUCOSE SERPL-MCNC: 158 MG/DL (ref 65–99)
HCT VFR BLD AUTO: 31.8 % (ref 42–52)
HGB BLD-MCNC: 10 G/DL (ref 14–18)
IMM GRANULOCYTES # BLD AUTO: 0.01 K/UL (ref 0–0.11)
IMM GRANULOCYTES NFR BLD AUTO: 0.6 % (ref 0–0.9)
INR PPP: 1.21 (ref 0.87–1.13)
LACTATE SERPL-SCNC: 1.3 MMOL/L (ref 0.5–2)
LACTATE SERPL-SCNC: 2.1 MMOL/L (ref 0.5–2)
LDH SERPL L TO P-CCNC: 193 U/L (ref 107–266)
LYMPHOCYTES # BLD AUTO: 0.36 K/UL (ref 1–4.8)
LYMPHOCYTES NFR BLD: 23.4 % (ref 22–41)
MAGNESIUM SERPL-MCNC: 1.6 MG/DL (ref 1.5–2.5)
MCH RBC QN AUTO: 25.3 PG (ref 27–33)
MCHC RBC AUTO-ENTMCNC: 31.4 G/DL (ref 32.3–36.5)
MCV RBC AUTO: 80.3 FL (ref 81.4–97.8)
MONOCYTES # BLD AUTO: 0.34 K/UL (ref 0–0.85)
MONOCYTES NFR BLD AUTO: 22.1 % (ref 0–13.4)
NEUTROPHILS # BLD AUTO: 0.8 K/UL (ref 1.82–7.42)
NEUTROPHILS NFR BLD: 52 % (ref 44–72)
NRBC # BLD AUTO: 0 K/UL
NRBC BLD-RTO: 0 /100 WBC (ref 0–0.2)
PLATELET # BLD AUTO: 39 K/UL (ref 164–446)
PLATELETS.RETICULATED NFR BLD AUTO: 11.9 % (ref 0.6–13.1)
POTASSIUM SERPL-SCNC: 3.4 MMOL/L (ref 3.6–5.5)
PROCALCITONIN SERPL-MCNC: 0.06 NG/ML
PROT SERPL-MCNC: 8.6 G/DL (ref 6–8.2)
PROTHROMBIN TIME: 15.4 SEC (ref 12–14.6)
RBC # BLD AUTO: 3.96 M/UL (ref 4.7–6.1)
RH BLD: NORMAL
RSV RNA SPEC QL NAA+PROBE: NEGATIVE
SARS-COV-2 RNA RESP QL NAA+PROBE: NOTDETECTED
SODIUM SERPL-SCNC: 138 MMOL/L (ref 135–145)
SPECIMEN SOURCE: NORMAL
TROPONIN T SERPL-MCNC: 7 NG/L (ref 6–19)
TROPONIN T SERPL-MCNC: <6 NG/L (ref 6–19)
URATE SERPL-MCNC: 4.4 MG/DL (ref 2.5–8.3)
WBC # BLD AUTO: 1.5 K/UL (ref 4.8–10.8)

## 2023-09-26 PROCEDURE — HZ2ZZZZ DETOXIFICATION SERVICES FOR SUBSTANCE ABUSE TREATMENT: ICD-10-PCS | Performed by: STUDENT IN AN ORGANIZED HEALTH CARE EDUCATION/TRAINING PROGRAM

## 2023-09-26 PROCEDURE — 770006 HCHG ROOM/CARE - MED/SURG/GYN SEMI*

## 2023-09-26 PROCEDURE — 71045 X-RAY EXAM CHEST 1 VIEW: CPT

## 2023-09-26 PROCEDURE — 85025 COMPLETE CBC W/AUTO DIFF WBC: CPT

## 2023-09-26 PROCEDURE — A9270 NON-COVERED ITEM OR SERVICE: HCPCS | Mod: JZ | Performed by: STUDENT IN AN ORGANIZED HEALTH CARE EDUCATION/TRAINING PROGRAM

## 2023-09-26 PROCEDURE — 86901 BLOOD TYPING SEROLOGIC RH(D): CPT

## 2023-09-26 PROCEDURE — 87040 BLOOD CULTURE FOR BACTERIA: CPT

## 2023-09-26 PROCEDURE — 80053 COMPREHEN METABOLIC PANEL: CPT

## 2023-09-26 PROCEDURE — 93005 ELECTROCARDIOGRAM TRACING: CPT

## 2023-09-26 PROCEDURE — 99285 EMERGENCY DEPT VISIT HI MDM: CPT

## 2023-09-26 PROCEDURE — 85730 THROMBOPLASTIN TIME PARTIAL: CPT

## 2023-09-26 PROCEDURE — 84550 ASSAY OF BLOOD/URIC ACID: CPT

## 2023-09-26 PROCEDURE — 84145 PROCALCITONIN (PCT): CPT

## 2023-09-26 PROCEDURE — 86900 BLOOD TYPING SEROLOGIC ABO: CPT

## 2023-09-26 PROCEDURE — 0241U HCHG SARS-COV-2 COVID-19 NFCT DS RESP RNA 4 TRGT MIC: CPT

## 2023-09-26 PROCEDURE — 36415 COLL VENOUS BLD VENIPUNCTURE: CPT

## 2023-09-26 PROCEDURE — 84484 ASSAY OF TROPONIN QUANT: CPT

## 2023-09-26 PROCEDURE — 96374 THER/PROPH/DIAG INJ IV PUSH: CPT

## 2023-09-26 PROCEDURE — C9803 HOPD COVID-19 SPEC COLLECT: HCPCS | Performed by: STUDENT IN AN ORGANIZED HEALTH CARE EDUCATION/TRAINING PROGRAM

## 2023-09-26 PROCEDURE — 700111 HCHG RX REV CODE 636 W/ 250 OVERRIDE (IP): Mod: JZ,UD | Performed by: STUDENT IN AN ORGANIZED HEALTH CARE EDUCATION/TRAINING PROGRAM

## 2023-09-26 PROCEDURE — 85384 FIBRINOGEN ACTIVITY: CPT

## 2023-09-26 PROCEDURE — 99223 1ST HOSP IP/OBS HIGH 75: CPT | Mod: 25 | Performed by: STUDENT IN AN ORGANIZED HEALTH CARE EDUCATION/TRAINING PROGRAM

## 2023-09-26 PROCEDURE — 83605 ASSAY OF LACTIC ACID: CPT

## 2023-09-26 PROCEDURE — 83735 ASSAY OF MAGNESIUM: CPT

## 2023-09-26 PROCEDURE — 83615 LACTATE (LD) (LDH) ENZYME: CPT

## 2023-09-26 PROCEDURE — 99406 BEHAV CHNG SMOKING 3-10 MIN: CPT

## 2023-09-26 PROCEDURE — 86850 RBC ANTIBODY SCREEN: CPT

## 2023-09-26 PROCEDURE — 85610 PROTHROMBIN TIME: CPT

## 2023-09-26 PROCEDURE — 85055 RETICULATED PLATELET ASSAY: CPT

## 2023-09-26 PROCEDURE — 93005 ELECTROCARDIOGRAM TRACING: CPT | Performed by: STUDENT IN AN ORGANIZED HEALTH CARE EDUCATION/TRAINING PROGRAM

## 2023-09-26 PROCEDURE — 99406 BEHAV CHNG SMOKING 3-10 MIN: CPT | Performed by: STUDENT IN AN ORGANIZED HEALTH CARE EDUCATION/TRAINING PROGRAM

## 2023-09-26 PROCEDURE — 700105 HCHG RX REV CODE 258: Mod: UD | Performed by: STUDENT IN AN ORGANIZED HEALTH CARE EDUCATION/TRAINING PROGRAM

## 2023-09-26 PROCEDURE — 700102 HCHG RX REV CODE 250 W/ 637 OVERRIDE(OP): Mod: JZ | Performed by: STUDENT IN AN ORGANIZED HEALTH CARE EDUCATION/TRAINING PROGRAM

## 2023-09-26 PROCEDURE — 700105 HCHG RX REV CODE 258: Performed by: STUDENT IN AN ORGANIZED HEALTH CARE EDUCATION/TRAINING PROGRAM

## 2023-09-26 RX ORDER — ACETAMINOPHEN 325 MG/1
650 TABLET ORAL EVERY 4 HOURS PRN
Status: DISCONTINUED | OUTPATIENT
Start: 2023-09-26 | End: 2023-09-28 | Stop reason: HOSPADM

## 2023-09-26 RX ORDER — QUETIAPINE FUMARATE 100 MG/1
100 TABLET, FILM COATED ORAL NIGHTLY
Status: DISCONTINUED | OUTPATIENT
Start: 2023-09-26 | End: 2023-09-28 | Stop reason: HOSPADM

## 2023-09-26 RX ORDER — GAUZE BANDAGE 2" X 2"
100 BANDAGE TOPICAL DAILY
Status: DISCONTINUED | OUTPATIENT
Start: 2023-09-27 | End: 2023-09-28 | Stop reason: HOSPADM

## 2023-09-26 RX ORDER — LORAZEPAM 0.5 MG/1
0.5 TABLET ORAL EVERY 4 HOURS PRN
Status: DISCONTINUED | OUTPATIENT
Start: 2023-09-26 | End: 2023-09-28 | Stop reason: HOSPADM

## 2023-09-26 RX ORDER — SODIUM CHLORIDE 9 MG/ML
INJECTION, SOLUTION INTRAVENOUS CONTINUOUS
Status: ACTIVE | OUTPATIENT
Start: 2023-09-26 | End: 2023-09-27

## 2023-09-26 RX ORDER — LORAZEPAM 1 MG/1
2 TABLET ORAL
Status: DISCONTINUED | OUTPATIENT
Start: 2023-09-26 | End: 2023-09-28 | Stop reason: HOSPADM

## 2023-09-26 RX ORDER — CEFEPIME HYDROCHLORIDE 2 G/1
2 INJECTION, POWDER, FOR SOLUTION INTRAVENOUS ONCE
Status: COMPLETED | OUTPATIENT
Start: 2023-09-26 | End: 2023-09-26

## 2023-09-26 RX ORDER — LORAZEPAM 1 MG/1
3 TABLET ORAL
Status: DISCONTINUED | OUTPATIENT
Start: 2023-09-26 | End: 2023-09-28 | Stop reason: HOSPADM

## 2023-09-26 RX ORDER — ENOXAPARIN SODIUM 100 MG/ML
40 INJECTION SUBCUTANEOUS DAILY
Status: DISCONTINUED | OUTPATIENT
Start: 2023-09-26 | End: 2023-09-26

## 2023-09-26 RX ORDER — ONDANSETRON 4 MG/1
4 TABLET, ORALLY DISINTEGRATING ORAL EVERY 4 HOURS PRN
Status: DISCONTINUED | OUTPATIENT
Start: 2023-09-26 | End: 2023-09-28 | Stop reason: HOSPADM

## 2023-09-26 RX ORDER — LORAZEPAM 2 MG/ML
1 INJECTION INTRAMUSCULAR
Status: DISCONTINUED | OUTPATIENT
Start: 2023-09-26 | End: 2023-09-28 | Stop reason: HOSPADM

## 2023-09-26 RX ORDER — LORAZEPAM 2 MG/ML
0.5 INJECTION INTRAMUSCULAR EVERY 4 HOURS PRN
Status: DISCONTINUED | OUTPATIENT
Start: 2023-09-26 | End: 2023-09-28 | Stop reason: HOSPADM

## 2023-09-26 RX ORDER — LORAZEPAM 2 MG/ML
1.5 INJECTION INTRAMUSCULAR
Status: DISCONTINUED | OUTPATIENT
Start: 2023-09-26 | End: 2023-09-28 | Stop reason: HOSPADM

## 2023-09-26 RX ORDER — LORAZEPAM 1 MG/1
4 TABLET ORAL
Status: DISCONTINUED | OUTPATIENT
Start: 2023-09-26 | End: 2023-09-28 | Stop reason: HOSPADM

## 2023-09-26 RX ORDER — POTASSIUM CHLORIDE 20 MEQ/1
40 TABLET, EXTENDED RELEASE ORAL
Status: COMPLETED | OUTPATIENT
Start: 2023-09-26 | End: 2023-09-27

## 2023-09-26 RX ORDER — SODIUM CHLORIDE, SODIUM LACTATE, POTASSIUM CHLORIDE, AND CALCIUM CHLORIDE .6; .31; .03; .02 G/100ML; G/100ML; G/100ML; G/100ML
1000 INJECTION, SOLUTION INTRAVENOUS
Status: COMPLETED | OUTPATIENT
Start: 2023-09-26 | End: 2023-09-26

## 2023-09-26 RX ORDER — FOLIC ACID 1 MG/1
1 TABLET ORAL DAILY
Status: DISCONTINUED | OUTPATIENT
Start: 2023-09-27 | End: 2023-09-28 | Stop reason: HOSPADM

## 2023-09-26 RX ORDER — ONDANSETRON 2 MG/ML
4 INJECTION INTRAMUSCULAR; INTRAVENOUS EVERY 4 HOURS PRN
Status: DISCONTINUED | OUTPATIENT
Start: 2023-09-26 | End: 2023-09-28 | Stop reason: HOSPADM

## 2023-09-26 RX ORDER — LORAZEPAM 1 MG/1
1 TABLET ORAL EVERY 4 HOURS PRN
Status: DISCONTINUED | OUTPATIENT
Start: 2023-09-26 | End: 2023-09-28 | Stop reason: HOSPADM

## 2023-09-26 RX ORDER — METOPROLOL SUCCINATE 25 MG/1
25 TABLET, EXTENDED RELEASE ORAL DAILY
COMMUNITY
End: 2023-10-26

## 2023-09-26 RX ORDER — LORAZEPAM 2 MG/ML
2 INJECTION INTRAMUSCULAR
Status: DISCONTINUED | OUTPATIENT
Start: 2023-09-26 | End: 2023-09-28 | Stop reason: HOSPADM

## 2023-09-26 RX ADMIN — SODIUM CHLORIDE: 9 INJECTION, SOLUTION INTRAVENOUS at 23:47

## 2023-09-26 RX ADMIN — POTASSIUM CHLORIDE 40 MEQ: 1500 TABLET, EXTENDED RELEASE ORAL at 23:41

## 2023-09-26 RX ADMIN — ACETAMINOPHEN 650 MG: 325 TABLET, FILM COATED ORAL at 23:41

## 2023-09-26 RX ADMIN — CEFEPIME 2 G: 2 INJECTION, POWDER, FOR SOLUTION INTRAVENOUS at 20:16

## 2023-09-26 RX ADMIN — SODIUM CHLORIDE, POTASSIUM CHLORIDE, SODIUM LACTATE AND CALCIUM CHLORIDE 1000 ML: 600; 310; 30; 20 INJECTION, SOLUTION INTRAVENOUS at 19:35

## 2023-09-26 ASSESSMENT — COGNITIVE AND FUNCTIONAL STATUS - GENERAL
DAILY ACTIVITIY SCORE: 24
SUGGESTED CMS G CODE MODIFIER DAILY ACTIVITY: CH
MOBILITY SCORE: 24
SUGGESTED CMS G CODE MODIFIER MOBILITY: CH

## 2023-09-26 ASSESSMENT — ENCOUNTER SYMPTOMS
PSYCHIATRIC NEGATIVE: 1
MUSCULOSKELETAL NEGATIVE: 1
WEAKNESS: 1
RESPIRATORY NEGATIVE: 1
EYES NEGATIVE: 1
CARDIOVASCULAR NEGATIVE: 1
GASTROINTESTINAL NEGATIVE: 1

## 2023-09-26 ASSESSMENT — LIFESTYLE VARIABLES
CONSUMPTION TOTAL: INCOMPLETE
AVERAGE NUMBER OF DAYS PER WEEK YOU HAVE A DRINK CONTAINING ALCOHOL: 7
VISUAL DISTURBANCES: NOT PRESENT
PAROXYSMAL SWEATS: NO SWEAT VISIBLE
DOES PATIENT WANT TO STOP DRINKING: NO
HAVE PEOPLE ANNOYED YOU BY CRITICIZING YOUR DRINKING: NO
HAVE YOU EVER FELT YOU SHOULD CUT DOWN ON YOUR DRINKING: YES
TOTAL SCORE: VERY MILD ITCHING, PINS AND NEEDLES SENSATION, BURNING OR NUMBNESS
TREMOR: TREMOR NOT VISIBLE BUT CAN BE FELT, FINGERTIP TO FINGERTIP
HOW MANY TIMES IN THE PAST YEAR HAVE YOU HAD 5 OR MORE DRINKS IN A DAY: 70
HEADACHE, FULLNESS IN HEAD: NOT PRESENT
AUDITORY DISTURBANCES: NOT PRESENT
AGITATION: NORMAL ACTIVITY
DOES PATIENT WANT TO TALK TO SOMEONE ABOUT QUITTING: YES
EVER FELT BAD OR GUILTY ABOUT YOUR DRINKING: YES
NAUSEA AND VOMITING: NO NAUSEA AND NO VOMITING
ALCOHOL_USE: YES
TOTAL SCORE: 2
EVER HAD A DRINK FIRST THING IN THE MORNING TO STEADY YOUR NERVES TO GET RID OF A HANGOVER: NO
TOTAL SCORE: 2
ORIENTATION AND CLOUDING OF SENSORIUM: ORIENTED AND CAN DO SERIAL ADDITIONS
ANXIETY: NO ANXIETY (AT EASE)

## 2023-09-26 ASSESSMENT — FIBROSIS 4 INDEX
FIB4 SCORE: 18.68
FIB4 SCORE: 17.08

## 2023-09-26 ASSESSMENT — PAIN DESCRIPTION - PAIN TYPE: TYPE: ACUTE PAIN

## 2023-09-27 LAB
ANION GAP SERPL CALC-SCNC: 7 MMOL/L (ref 7–16)
BUN SERPL-MCNC: 9 MG/DL (ref 8–22)
CALCIUM SERPL-MCNC: 9.1 MG/DL (ref 8.5–10.5)
CHLORIDE SERPL-SCNC: 104 MMOL/L (ref 96–112)
CO2 SERPL-SCNC: 25 MMOL/L (ref 20–33)
CREAT SERPL-MCNC: 0.48 MG/DL (ref 0.5–1.4)
ERYTHROCYTE [DISTWIDTH] IN BLOOD BY AUTOMATED COUNT: 48.8 FL (ref 35.9–50)
GFR SERPLBLD CREATININE-BSD FMLA CKD-EPI: 114 ML/MIN/1.73 M 2
GLUCOSE SERPL-MCNC: 102 MG/DL (ref 65–99)
HCT VFR BLD AUTO: 27.9 % (ref 42–52)
HGB BLD-MCNC: 8.7 G/DL (ref 14–18)
MCH RBC QN AUTO: 24.9 PG (ref 27–33)
MCHC RBC AUTO-ENTMCNC: 31.2 G/DL (ref 32.3–36.5)
MCV RBC AUTO: 79.7 FL (ref 81.4–97.8)
PLATELET # BLD AUTO: 26 K/UL (ref 164–446)
PLATELETS.RETICULATED NFR BLD AUTO: 10.5 % (ref 0.6–13.1)
POTASSIUM SERPL-SCNC: 4.1 MMOL/L (ref 3.6–5.5)
RBC # BLD AUTO: 3.5 M/UL (ref 4.7–6.1)
SODIUM SERPL-SCNC: 136 MMOL/L (ref 135–145)
WBC # BLD AUTO: 0.9 K/UL (ref 4.8–10.8)

## 2023-09-27 PROCEDURE — A9270 NON-COVERED ITEM OR SERVICE: HCPCS | Mod: JZ | Performed by: STUDENT IN AN ORGANIZED HEALTH CARE EDUCATION/TRAINING PROGRAM

## 2023-09-27 PROCEDURE — 36415 COLL VENOUS BLD VENIPUNCTURE: CPT

## 2023-09-27 PROCEDURE — 85027 COMPLETE CBC AUTOMATED: CPT

## 2023-09-27 PROCEDURE — 85055 RETICULATED PLATELET ASSAY: CPT

## 2023-09-27 PROCEDURE — 700105 HCHG RX REV CODE 258: Performed by: STUDENT IN AN ORGANIZED HEALTH CARE EDUCATION/TRAINING PROGRAM

## 2023-09-27 PROCEDURE — 99233 SBSQ HOSP IP/OBS HIGH 50: CPT | Performed by: STUDENT IN AN ORGANIZED HEALTH CARE EDUCATION/TRAINING PROGRAM

## 2023-09-27 PROCEDURE — 80048 BASIC METABOLIC PNL TOTAL CA: CPT

## 2023-09-27 PROCEDURE — 700102 HCHG RX REV CODE 250 W/ 637 OVERRIDE(OP): Mod: JZ | Performed by: STUDENT IN AN ORGANIZED HEALTH CARE EDUCATION/TRAINING PROGRAM

## 2023-09-27 PROCEDURE — 700111 HCHG RX REV CODE 636 W/ 250 OVERRIDE (IP): Mod: JZ | Performed by: STUDENT IN AN ORGANIZED HEALTH CARE EDUCATION/TRAINING PROGRAM

## 2023-09-27 PROCEDURE — 770006 HCHG ROOM/CARE - MED/SURG/GYN SEMI*

## 2023-09-27 RX ORDER — SODIUM CHLORIDE 9 MG/ML
INJECTION, SOLUTION INTRAVENOUS CONTINUOUS
Status: DISCONTINUED | OUTPATIENT
Start: 2023-09-27 | End: 2023-09-28

## 2023-09-27 RX ORDER — MAGNESIUM SULFATE HEPTAHYDRATE 40 MG/ML
2 INJECTION, SOLUTION INTRAVENOUS ONCE
Status: COMPLETED | OUTPATIENT
Start: 2023-09-27 | End: 2023-09-27

## 2023-09-27 RX ADMIN — FOLIC ACID 1 MG: 1 TABLET ORAL at 05:20

## 2023-09-27 RX ADMIN — QUETIAPINE FUMARATE 100 MG: 100 TABLET ORAL at 20:47

## 2023-09-27 RX ADMIN — SODIUM CHLORIDE: 9 INJECTION, SOLUTION INTRAVENOUS at 14:20

## 2023-09-27 RX ADMIN — MAGNESIUM SULFATE HEPTAHYDRATE 2 G: 2 INJECTION, SOLUTION INTRAVENOUS at 14:25

## 2023-09-27 RX ADMIN — QUETIAPINE FUMARATE 100 MG: 100 TABLET ORAL at 00:57

## 2023-09-27 RX ADMIN — SODIUM CHLORIDE: 9 INJECTION, SOLUTION INTRAVENOUS at 10:13

## 2023-09-27 RX ADMIN — Medication 100 MG: at 05:19

## 2023-09-27 RX ADMIN — POTASSIUM CHLORIDE 40 MEQ: 1500 TABLET, EXTENDED RELEASE ORAL at 00:57

## 2023-09-27 ASSESSMENT — LIFESTYLE VARIABLES
EVER FELT BAD OR GUILTY ABOUT YOUR DRINKING: YES
VISUAL DISTURBANCES: NOT PRESENT
HOW MANY TIMES IN THE PAST YEAR HAVE YOU HAD 5 OR MORE DRINKS IN A DAY: 5
DOES PATIENT WANT TO TALK TO SOMEONE ABOUT QUITTING: YES
ALCOHOL_USE: YES
AUDITORY DISTURBANCES: NOT PRESENT
ORIENTATION AND CLOUDING OF SENSORIUM: ORIENTED AND CAN DO SERIAL ADDITIONS
CONSUMPTION TOTAL: POSITIVE
HAVE PEOPLE ANNOYED YOU BY CRITICIZING YOUR DRINKING: NO
AGITATION: NORMAL ACTIVITY
PAROXYSMAL SWEATS: NO SWEAT VISIBLE
AGITATION: NORMAL ACTIVITY
AUDITORY DISTURBANCES: NOT PRESENT
AVERAGE NUMBER OF DAYS PER WEEK YOU HAVE A DRINK CONTAINING ALCOHOL: 7
ANXIETY: NO ANXIETY (AT EASE)
TREMOR: NO TREMOR
HAVE YOU EVER FELT YOU SHOULD CUT DOWN ON YOUR DRINKING: YES
DOES PATIENT WANT TO STOP DRINKING: NO
TOTAL SCORE: VERY MILD ITCHING, PINS AND NEEDLES SENSATION, BURNING OR NUMBNESS
TOTAL SCORE: 2
TOTAL SCORE: 1
SUBSTANCE_ABUSE: 1
TREMOR: NO TREMOR
HEADACHE, FULLNESS IN HEAD: NOT PRESENT
TOTAL SCORE: 0
ORIENTATION AND CLOUDING OF SENSORIUM: ORIENTED AND CAN DO SERIAL ADDITIONS
ANXIETY: NO ANXIETY (AT EASE)
VISUAL DISTURBANCES: NOT PRESENT
HEADACHE, FULLNESS IN HEAD: NOT PRESENT
TOTAL SCORE: 2
EVER HAD A DRINK FIRST THING IN THE MORNING TO STEADY YOUR NERVES TO GET RID OF A HANGOVER: NO
PAROXYSMAL SWEATS: NO SWEAT VISIBLE
NAUSEA AND VOMITING: NO NAUSEA AND NO VOMITING
TOTAL SCORE: 2
NAUSEA AND VOMITING: NO NAUSEA AND NO VOMITING
ON A TYPICAL DAY WHEN YOU DRINK ALCOHOL HOW MANY DRINKS DO YOU HAVE: 2

## 2023-09-27 ASSESSMENT — ENCOUNTER SYMPTOMS
NERVOUS/ANXIOUS: 1
WEIGHT LOSS: 1
HEADACHES: 1
VOMITING: 0
ABDOMINAL PAIN: 0
SORE THROAT: 0
MYALGIAS: 1
DIZZINESS: 0
DIARRHEA: 0
NAUSEA: 0
SHORTNESS OF BREATH: 0
BLURRED VISION: 0

## 2023-09-27 ASSESSMENT — PAIN DESCRIPTION - PAIN TYPE
TYPE: ACUTE PAIN
TYPE: ACUTE PAIN

## 2023-09-27 ASSESSMENT — PATIENT HEALTH QUESTIONNAIRE - PHQ9
2. FEELING DOWN, DEPRESSED, IRRITABLE, OR HOPELESS: NOT AT ALL
1. LITTLE INTEREST OR PLEASURE IN DOING THINGS: NOT AT ALL
SUM OF ALL RESPONSES TO PHQ9 QUESTIONS 1 AND 2: 0

## 2023-09-27 NOTE — ASSESSMENT & PLAN NOTE
Chronic pancytopenia likely from chronic ETOH use and cirrhosis   WBC 0.9, Hgb 8.7, Plt 26. No signs of bleeding  Monitor for signs of infection/bleeding   Repeat CBC in am

## 2023-09-27 NOTE — PROGRESS NOTES
"Hospital Medicine Daily Progress Note    Date of Service  9/27/2023    Chief Complaint  Inocencio Shabazz is a 63 y.o. male admitted 9/26/2023 with alcohol withdrawal     Hospital Course  Inocencio Shabazz is a 63 y.o. male who presented 9/26/2023 with generalized weakness.  Patient states that his last alcoholic beverage was about a day and a half ago.  For the last 3 days he has reported generalized weakness.  Somewhat unreliable historian.  States that \" he feels that something is not right.\"  Denies fever chills nausea vomiting chest pain diaphoresis shortness of breath.  He decided to come to ER for evaluation.     Patient has a history of chronic alcohol use.  Recently admitted for GI bleed.  EGD showing grade 1 varices 1 column, portal hypertensive gastropathy. AV malformations were treated.  Patient was given octreotide and discharged with PPI twice daily.     In the ER, patient found to have normal vital signs.  Pertinent labs include white blood cell count 1.5, hemoglobin 10.0, platelet 39, potassium 3.4, glucose 158, AST 64, lactic acid 2.1.  Chest x-ray showing no acute cardiopulmonary abnormality. He symptoms likely due to ETOH withdrawal and he was admitted for further evaluation and treatment     Interval Problem Update  Pt seen and examined, c/o cramping in his hand. Feeling slightly better. Withdrawn   - vitals stable. BP is improved.   - labs reviewed. Severe pancytopenia. Mg 1.6 on admit, given hand cramping 2mg IV mag ordered. Repeat labs in AM   - continue CIWA protocol     I have discussed this patient's plan of care and discharge plan at IDT rounds today with Case Management, Nursing, Nursing leadership, and other members of the IDT team.    Consultants/Specialty  NA    Code Status  Full Code    Disposition  The patient is not medically cleared for discharge to home or a post-acute facility.  Anticipate discharge to: home with close outpatient follow-up    I have placed the appropriate " orders for post-discharge needs.    Review of Systems  Review of Systems   Constitutional:  Positive for malaise/fatigue and weight loss.   HENT:  Negative for sore throat.    Eyes:  Negative for blurred vision.   Respiratory:  Negative for shortness of breath.    Cardiovascular:  Negative for chest pain.   Gastrointestinal:  Negative for abdominal pain, diarrhea, nausea and vomiting.   Genitourinary:  Negative for dysuria.   Musculoskeletal:  Positive for myalgias.   Neurological:  Positive for headaches. Negative for dizziness.   Psychiatric/Behavioral:  Positive for substance abuse. The patient is nervous/anxious.         Physical Exam  Temp:  [35.9 °C (96.6 °F)-36.7 °C (98 °F)] 36.4 °C (97.5 °F)  Pulse:  [] 86  Resp:  [16-24] 18  BP: (129-160)/(76-93) 129/77  SpO2:  [93 %-98 %] 98 %    Physical Exam  Vitals and nursing note reviewed.   Constitutional:       Comments: Thin male, no acute distress   HENT:      Head: Normocephalic and atraumatic.      Mouth/Throat:      Mouth: Mucous membranes are dry.   Eyes:      Conjunctiva/sclera: Conjunctivae normal.   Cardiovascular:      Rate and Rhythm: Normal rate and regular rhythm.      Heart sounds: Normal heart sounds.   Pulmonary:      Effort: Pulmonary effort is normal.      Breath sounds: Normal breath sounds.   Abdominal:      General: Bowel sounds are normal.      Palpations: Abdomen is soft.   Musculoskeletal:         General: Normal range of motion.      Cervical back: Neck supple.   Skin:     General: Skin is warm.   Neurological:      General: No focal deficit present.      Mental Status: He is alert and oriented to person, place, and time. Mental status is at baseline.   Psychiatric:      Comments: Flat and withdrawn. Mild tremors in b/l UE         Fluids    Intake/Output Summary (Last 24 hours) at 9/27/2023 1554  Last data filed at 9/27/2023 1053  Gross per 24 hour   Intake 680 ml   Output --   Net 680 ml       Laboratory  Recent Labs      09/26/23 1809 09/27/23 0452   WBC 1.5* 0.9*   RBC 3.96* 3.50*   HEMOGLOBIN 10.0* 8.7*   HEMATOCRIT 31.8* 27.9*   MCV 80.3* 79.7*   MCH 25.3* 24.9*   MCHC 31.4* 31.2*   RDW 49.7 48.8   PLATELETCT 39* 26*     Recent Labs     09/26/23 1809 09/27/23 0452   SODIUM 138 136   POTASSIUM 3.4* 4.1   CHLORIDE 101 104   CO2 23 25   GLUCOSE 158* 102*   BUN 13 9   CREATININE 0.65 0.48*   CALCIUM 9.4 9.1     Recent Labs     09/26/23 1809   APTT 32.6   INR 1.21*               Imaging  DX-CHEST-PORTABLE (1 VIEW)   Final Result      No acute cardiopulmonary disease evident.           Assessment/Plan  * Alcohol withdrawal (HCC)- (present on admission)  Assessment & Plan  Presents with vague symptoms, not feeling well, ~2 days since last alcohol drink. Pt drinks heavily with long hx of ETOH abuse. High suspicion for EOTH withdrawal and admission   Patient will be started on CIWA protocol, folic acid, thiamine.  Monitor for respiratory depression from Ativan.   Blood cultures thus far negative. Procalcitonin negative. No, specific findings of infection   Alcohol cessation education provided     Tobacco abuse  Assessment & Plan  4 minutes spent on tobacco cessation.  He smokes about a pack of cigarettes a day.  He was offered nicotine replacement therapy such as chewing gum/nicotine patch.  At this time he declines and just wants tea.    Pancytopenia (HCC)- (present on admission)  Assessment & Plan  Chronic pancytopenia likely from chronic ETOH use and cirrhosis   WBC 0.9, Hgb 8.7, Plt 26. No signs of bleeding  Monitor for signs of infection/bleeding   Repeat CBC in am      Hypokalemia- (present on admission)  Assessment & Plan  Replace  Monitor BMP and replace electrolytes as needed    Schizophrenia (HCC)- (present on admission)  Assessment & Plan  Continue home medications seroquel 100 mg at bedtime   Currently stable          VTE prophylaxis:   SCDs/TEDs      I have performed a physical exam and reviewed and updated ROS and Plan  today (9/27/2023). In review of yesterday's note (9/26/2023), there are no changes except as documented above.    Greater than 51 minutes spent prepping to see patient (e.g. review of tests) obtaining and/or reviewing separately obtained history. Performing a medically appropriate examination and/ evaluation.  Counseling and educating the patient/family/caregiver.  Ordering medications, tests, or procedures.  Referring and communicating with other health care professionals.  Documenting clinical information in EPIC.  Independently interpreting results and communicating results to patient/family/caregiver.  Care coordination.

## 2023-09-27 NOTE — ED NOTES
Pt ambulated to [facility] 20 from lobby with steady gait.  On monitor, call light in reach. Chart up for ERP.

## 2023-09-27 NOTE — ED NOTES
Assumed care of pt, received bedside report from TITUS Shanks   Cardiac and spO2 monitor on, call light within reach.

## 2023-09-27 NOTE — DIETARY
Nutrition services: Day 1 of admit.  Inocencio Shabazz is a 63 y.o. male with admitting DX of alcohol withdrawal.   Consult received for Low BMI.     Assessment:  Weight: 56.6 kg (124 lb 12.5 oz)  Body mass index is 18.97 kg/m²., BMI classification: Lower end of normal.   Diet/Intake: Regular    Evaluation:   RD able to visit pt at bed side.   Pt reports normal intake prior to admission. Pt eating 2-3 meals. Pt eating % of meals so far per ADLs.   Pt unable to state UBW. Per chart review pt was 139 lbs Aug 2023. Current wt via bed scale is 124 lbs. Pt with a -10.79% wt loss in 1 month. Wt loss is severe.   Per NFPE pt is with severe muscle wasting in the temporal and masseter region.   Labs: Glu 102  Meds reviewed.   +BM PTA    Malnutrition Risk: Per ASPEN guidelines, pt meets criteria for severe malnutrition in the context of social/environmental circumstances related to alcohol abuse as evidence by pt with a -10.79% wt loss in 1 month with severe muscle wasting in multiple regions.     Recommendations/Plan:  Boost or Ensure TID.    Encourage intake of >50%.   Document intake of all PO as % taken in ADL's to provide interdisciplinary communication across all shifts.   Monitor weight.  Nutrition rep will continue to see patient for ongoing meal and snack preferences.       RD to monitor per department policy.

## 2023-09-27 NOTE — HOSPITAL COURSE
"Inocencio Shabazz is a 63 y.o. male who presented 9/26/2023 with generalized weakness.  Patient states that his last alcoholic beverage was about a day and a half ago.  For the last 3 days he has reported generalized weakness.  Somewhat unreliable historian.  States that \" he feels that something is not right.\"  Denies fever chills nausea vomiting chest pain diaphoresis shortness of breath.  He decided to come to ER for evaluation.     Patient has a history of chronic alcohol use.  Recently admitted for GI bleed.  EGD showing grade 1 varices 1 column, portal hypertensive gastropathy. AV malformations were treated.  Patient was given octreotide and discharged with PPI twice daily.     In the ER, patient found to have normal vital signs.  Pertinent labs include white blood cell count 1.5, hemoglobin 10.0, platelet 39, potassium 3.4, glucose 158, AST 64, lactic acid 2.1.  Chest x-ray showing no acute cardiopulmonary abnormality. He symptoms likely due to ETOH withdrawal and he was admitted for further evaluation and treatment   "

## 2023-09-27 NOTE — ASSESSMENT & PLAN NOTE
4 minutes spent on tobacco cessation.  He smokes about a pack of cigarettes a day.  He was offered nicotine replacement therapy such as chewing gum/nicotine patch.  At this time he declines and just wants tea.

## 2023-09-27 NOTE — CARE PLAN
Received from ED A&Ox4 CIWA score of 2,  no any episodes of agitation so far. With complaints of left leg spasms gave Tylenol as needed, able to sleep afterwards. Will continue to monitor.    The patient is Stable - Low risk of patient condition declining or worsening    Shift Goals  Clinical Goals: safety  Patient Goals: rest and sleep  Family Goals: not present    Progress made toward(s) clinical / shift goals:      Problem: Optimal Care for Alcohol Withdrawal  Goal: Optimal Care for the alcohol withdrawal patient  Outcome: Progressing     Problem: Pain - Standard  Goal: Alleviation of pain or a reduction in pain to the patient’s comfort goal  Outcome: Progressing     Problem: Hemodynamics  Goal: Patient's hemodynamics, fluid balance and neurologic status will be stable or improve  Outcome: Progressing     Problem: Self Care  Goal: Patient will have the ability to perform ADLs independently or with assistance (bathe, groom, dress, toilet and feed)  Outcome: Progressing     Problem: Psychosocial  Goal: Patient's level of anxiety will decrease  Outcome: Progressing  Goal: Spiritual and cultural needs incorporated into hospitalization  Outcome: Progressing       Patient is not progressing towards the following goals:

## 2023-09-27 NOTE — PROGRESS NOTES
4 Eyes Skin Assessment Completed by TITUS Rodriguez and TITUS Streeter.    Head WDL  Ears WDL  Nose WDL  Mouth WDL  Neck WDL  Breast/Chest WDL  Shoulder Blades WDL  Spine WDL  (R) Arm/Elbow/Hand WDL  (L) Arm/Elbow/Hand WDL  Abdomen WDL  Groin WDL  Scrotum/Coccyx/Buttocks WDL  (R) Leg WDL  (L) Leg WDL  (R) Heel/Foot/Toe Blanching, scaly - heel  (L) Heel/Foot/Toe Blanching, scaly - heel      Devices In Places N/A  Interventions In Place N/A  Possible Skin Injury No  Pictures Uploaded Into Epic N/A  Wound Consult Placed N/A  RN Wound Prevention Protocol Ordered No

## 2023-09-27 NOTE — H&P
"Hospital Medicine History & Physical Note    Date of Service  9/26/2023    Primary Care Physician  Nnamdi Ballesteros M.D.    Consultants  None    Code Status  Full Code    Chief Complaint  Chief Complaint   Patient presents with    Body Aches     Pt c/o body aches off/on     Spasms    Headache    Rapid Heart Beat    Other     Pt states \"I don't know what is wrong with me.... something aint right\"     Chest Pain       History of Presenting Illness  Inocencio Shabazz is a 63 y.o. male who presented 9/26/2023 with generalized weakness.  Patient states that his last alcoholic beverage was about a day and a half ago.  For the last 3 days he has reported generalized weakness.  Somewhat unreliable historian.  States that \" he feels that something is not right.\"  Denies fever chills nausea vomiting chest pain diaphoresis shortness of breath.  He decided to come to ER for evaluation.    Patient has a history of chronic alcohol use.  Recently admitted for GI bleed.  EGD showing grade 1 varices 1 column, portal hypertensive gastropathy.  2 AV malformations were treated.  Patient was given octreotide and discharged with PPI twice daily.    In the ER, patient found to have normal vital signs.  Pertinent labs include white blood cell count 1.5, hemoglobin 10.0, platelet 39, potassium 3.4, glucose 158, AST 64, lactic acid 2.1.  Chest x-ray showing no acute cardiopulmonary abnormality.    I discussed the plan of care with patient.    Review of Systems  Review of Systems   Constitutional:  Positive for malaise/fatigue.   HENT: Negative.     Eyes: Negative.    Respiratory: Negative.     Cardiovascular: Negative.    Gastrointestinal: Negative.    Genitourinary: Negative.    Musculoskeletal: Negative.    Skin: Negative.    Neurological:  Positive for weakness.   Endo/Heme/Allergies: Negative.    Psychiatric/Behavioral: Negative.         Past Medical History   has a past medical history of Alcohol abuse, Bipolar disorder (HCC), Cirrhosis " (HCC), GIB (gastrointestinal bleeding) (01/03/2016), Hepatitis C, Pacemaker, Pancytopenia (HCC), and Schizophrenia (HCC).    Surgical History   has a past surgical history that includes gastroscopy (1/3/2016); gastroscopy (4/8/2016); gastroscopy (3/13/2019); gastroscopy-endo (N/A, 11/13/2019); pr upper gi endoscopy,diagnosis (N/A, 8/10/2023); and pr upper gi endoscopy,ctrl bleed (N/A, 8/10/2023).     Family History   Family history reviewed with patient. There is no family history that is pertinent to the chief complaint.     Social History   reports that he has been smoking cigarettes. He has a 48.0 pack-year smoking history. He has never used smokeless tobacco. He reports current alcohol use. He reports current drug use. Drug: Inhaled.    Allergies  Allergies   Allergen Reactions    Haloperidol Unspecified     Twitching/involuntary movements        Medications  Prior to Admission Medications   Prescriptions Last Dose Informant Patient Reported? Taking?   Magnesium Oxide 420 (252 Mg) MG Tab  Other Facility Yes No   Sig: Take 1 Tablet by mouth every day.   QUEtiapine (SEROQUEL) 100 MG Tab   No No   Sig: Take 1 Tablet by mouth every evening.   diclofenac sodium (VOLTAREN) 1 % Gel  Other Facility Yes No   Sig: Apply 4 g topically 4 times a day.   folic acid (FOLVITE) 1 MG Tab  Other Facility No No   Sig: Take 1 Tablet by mouth every day.   omeprazole (PRILOSEC) 20 MG delayed-release capsule   No No   Sig: Take 1 Capsule by mouth 2 times a day.   ondansetron (ZOFRAN) 4 MG Tab tablet  Other Facility Yes No   Sig: Take 4 mg by mouth 3 times a day as needed for Nausea/Vomiting.   thiamine (THIAMINE) 100 MG tablet  Other Facility Yes No   Sig: Take 100 mg by mouth every day.      Facility-Administered Medications: None       Physical Exam  Temp:  [36.6 °C (97.8 °F)] 36.6 °C (97.8 °F)  Pulse:  [] 90  Resp:  [16-24] 19  BP: (145-160)/(79-93) 145/82  SpO2:  [93 %-97 %] 95 %  Blood Pressure : (!) 145/82   Temperature:  "36.6 °C (97.8 °F)   Pulse: 90   Respiration: 19   Pulse Oximetry: 95 %       Physical Exam  Constitutional:       Appearance: Normal appearance. He is normal weight.   HENT:      Head: Normocephalic.      Nose: Nose normal.      Mouth/Throat:      Mouth: Mucous membranes are moist.   Eyes:      Pupils: Pupils are equal, round, and reactive to light.   Cardiovascular:      Rate and Rhythm: Normal rate and regular rhythm.      Pulses: Normal pulses.   Pulmonary:      Effort: Pulmonary effort is normal.      Breath sounds: Normal breath sounds.   Abdominal:      General: Abdomen is flat. Bowel sounds are normal.      Palpations: Abdomen is soft.   Musculoskeletal:      Cervical back: Neck supple.      Comments: Mild tremors noted bilaterally    Skin:     General: Skin is warm.   Neurological:      General: No focal deficit present.      Mental Status: He is alert and oriented to person, place, and time. Mental status is at baseline.   Psychiatric:         Mood and Affect: Mood normal.         Behavior: Behavior normal.         Thought Content: Thought content normal.         Laboratory:  Recent Labs     09/26/23  1809   WBC 1.5*   RBC 3.96*   HEMOGLOBIN 10.0*   HEMATOCRIT 31.8*   MCV 80.3*   MCH 25.3*   MCHC 31.4*   RDW 49.7   PLATELETCT 39*     Recent Labs     09/26/23  1809   SODIUM 138   POTASSIUM 3.4*   CHLORIDE 101   CO2 23   GLUCOSE 158*   BUN 13   CREATININE 0.65   CALCIUM 9.4     Recent Labs     09/26/23  1809   ALTSGPT 39   ASTSGOT 64*   ALKPHOSPHAT 83   TBILIRUBIN 0.8   GLUCOSE 158*     Recent Labs     09/26/23  1809   APTT 32.6   INR 1.21*     No results for input(s): \"NTPROBNP\" in the last 72 hours.      Recent Labs     09/26/23  1809   TROPONINT <6       Imaging:  DX-CHEST-PORTABLE (1 VIEW)   Final Result      No acute cardiopulmonary disease evident.          no X-Ray or EKG requiring interpretation    Assessment/Plan:  Justification for Admission Status  I anticipate this patient will require at least " "two midnights for appropriate medical management, necessitating inpatient admission because patient has alcohol withdrawals    Patient will need a Telemetry bed on EMERGENCY service .  The need is secondary to alcohol withdrawals.    * Alcohol withdrawal (HCC)- (present on admission)  Assessment & Plan  Spoke with ERP.  Patient presents for generalized weakness and just \"not feeling right\".  Patient has longstanding history of alcohol abuse.  Suspect alcohol withdrawal.  Patient will be started on CIWA protocol, folic acid, thiamine.  Monitor for respiratory depression from Ativan. Blood cultures in process. Procalcitonin negative. Hold abx for now.    Tobacco abuse  Assessment & Plan  4 minutes spent on tobacco cessation.  He smokes about a pack of cigarettes a day.  He was offered nicotine replacement therapy such as chewing gum/nicotine patch.  At this time he declines and just wants tea.    Hypokalemia- (present on admission)  Assessment & Plan  Replace    Schizophrenia (HCC)- (present on admission)  Assessment & Plan  Continue home medications        VTE prophylaxis: pharmacologic prophylaxis contraindicated due to lovenox  "

## 2023-09-27 NOTE — ASSESSMENT & PLAN NOTE
Presents with vague symptoms, not feeling well, ~2 days since last alcohol drink. Pt drinks heavily with long hx of ETOH abuse. High suspicion for EOTH withdrawal and admission   Patient will be started on CIWA protocol, folic acid, thiamine.  Monitor for respiratory depression from Ativan.   Blood cultures thus far negative. Procalcitonin negative. No, specific findings of infection   Alcohol cessation education provided

## 2023-09-27 NOTE — PROGRESS NOTES
Pt is A&Ox4, denies pain. VSS on RA. Reinforced the use of call light when assistance needed. Call light/personal belongings within reach,, safety measures in place.

## 2023-09-28 VITALS
SYSTOLIC BLOOD PRESSURE: 124 MMHG | DIASTOLIC BLOOD PRESSURE: 82 MMHG | WEIGHT: 124.78 LBS | TEMPERATURE: 97.8 F | OXYGEN SATURATION: 97 % | HEART RATE: 81 BPM | RESPIRATION RATE: 18 BRPM | BODY MASS INDEX: 18.97 KG/M2

## 2023-09-28 LAB
ALBUMIN SERPL BCP-MCNC: 3.6 G/DL (ref 3.2–4.9)
ALBUMIN/GLOB SERPL: 1 G/DL
ALP SERPL-CCNC: 80 U/L (ref 30–99)
ALT SERPL-CCNC: 29 U/L (ref 2–50)
ANION GAP SERPL CALC-SCNC: 10 MMOL/L (ref 7–16)
AST SERPL-CCNC: 49 U/L (ref 12–45)
BASOPHILS # BLD AUTO: 1.8 % (ref 0–1.8)
BASOPHILS # BLD: 0.02 K/UL (ref 0–0.12)
BILIRUB SERPL-MCNC: 0.7 MG/DL (ref 0.1–1.5)
BUN SERPL-MCNC: 9 MG/DL (ref 8–22)
CALCIUM ALBUM COR SERPL-MCNC: 8.6 MG/DL (ref 8.5–10.5)
CALCIUM SERPL-MCNC: 8.3 MG/DL (ref 8.5–10.5)
CHLORIDE SERPL-SCNC: 105 MMOL/L (ref 96–112)
CO2 SERPL-SCNC: 23 MMOL/L (ref 20–33)
CREAT SERPL-MCNC: 0.59 MG/DL (ref 0.5–1.4)
EOSINOPHIL # BLD AUTO: 0.11 K/UL (ref 0–0.51)
EOSINOPHIL NFR BLD: 10.6 % (ref 0–6.9)
ERYTHROCYTE [DISTWIDTH] IN BLOOD BY AUTOMATED COUNT: 48.5 FL (ref 35.9–50)
GFR SERPLBLD CREATININE-BSD FMLA CKD-EPI: 107 ML/MIN/1.73 M 2
GLOBULIN SER CALC-MCNC: 3.7 G/DL (ref 1.9–3.5)
GLUCOSE SERPL-MCNC: 90 MG/DL (ref 65–99)
HCT VFR BLD AUTO: 29.1 % (ref 42–52)
HGB BLD-MCNC: 8.7 G/DL (ref 14–18)
HYPOCHROMIA BLD QL SMEAR: ABNORMAL
LYMPHOCYTES # BLD AUTO: 0.3 K/UL (ref 1–4.8)
LYMPHOCYTES NFR BLD: 30.1 % (ref 22–41)
MANUAL DIFF BLD: NORMAL
MCH RBC QN AUTO: 24 PG (ref 27–33)
MCHC RBC AUTO-ENTMCNC: 29.9 G/DL (ref 32.3–36.5)
MCV RBC AUTO: 80.2 FL (ref 81.4–97.8)
MONOCYTES # BLD AUTO: 0.12 K/UL (ref 0–0.85)
MONOCYTES NFR BLD AUTO: 12.4 % (ref 0–13.4)
MORPHOLOGY BLD-IMP: NORMAL
MYELOCYTES NFR BLD MANUAL: 0.9 %
NEUTROPHILS # BLD AUTO: 0.44 K/UL (ref 1.82–7.42)
NEUTROPHILS NFR BLD: 43.3 % (ref 44–72)
NEUTS BAND NFR BLD MANUAL: 0.9 % (ref 0–10)
NRBC # BLD AUTO: 0 K/UL
NRBC BLD-RTO: 0 /100 WBC (ref 0–0.2)
OVALOCYTES BLD QL SMEAR: NORMAL
PLATELET # BLD AUTO: 25 K/UL (ref 164–446)
PLATELET BLD QL SMEAR: NORMAL
PLATELETS.RETICULATED NFR BLD AUTO: 13.6 % (ref 0.6–13.1)
POIKILOCYTOSIS BLD QL SMEAR: NORMAL
POTASSIUM SERPL-SCNC: 4.1 MMOL/L (ref 3.6–5.5)
PROT SERPL-MCNC: 7.3 G/DL (ref 6–8.2)
RBC # BLD AUTO: 3.63 M/UL (ref 4.7–6.1)
RBC BLD AUTO: PRESENT
SODIUM SERPL-SCNC: 138 MMOL/L (ref 135–145)
WBC # BLD AUTO: 1 K/UL (ref 4.8–10.8)

## 2023-09-28 PROCEDURE — 85055 RETICULATED PLATELET ASSAY: CPT

## 2023-09-28 PROCEDURE — 700105 HCHG RX REV CODE 258: Performed by: STUDENT IN AN ORGANIZED HEALTH CARE EDUCATION/TRAINING PROGRAM

## 2023-09-28 PROCEDURE — 85007 BL SMEAR W/DIFF WBC COUNT: CPT

## 2023-09-28 PROCEDURE — 700102 HCHG RX REV CODE 250 W/ 637 OVERRIDE(OP): Performed by: STUDENT IN AN ORGANIZED HEALTH CARE EDUCATION/TRAINING PROGRAM

## 2023-09-28 PROCEDURE — 85025 COMPLETE CBC W/AUTO DIFF WBC: CPT

## 2023-09-28 PROCEDURE — A9270 NON-COVERED ITEM OR SERVICE: HCPCS | Performed by: STUDENT IN AN ORGANIZED HEALTH CARE EDUCATION/TRAINING PROGRAM

## 2023-09-28 PROCEDURE — 36415 COLL VENOUS BLD VENIPUNCTURE: CPT

## 2023-09-28 PROCEDURE — 99239 HOSP IP/OBS DSCHRG MGMT >30: CPT | Performed by: STUDENT IN AN ORGANIZED HEALTH CARE EDUCATION/TRAINING PROGRAM

## 2023-09-28 PROCEDURE — 80053 COMPREHEN METABOLIC PANEL: CPT

## 2023-09-28 RX ADMIN — Medication 100 MG: at 05:11

## 2023-09-28 RX ADMIN — SODIUM CHLORIDE: 9 INJECTION, SOLUTION INTRAVENOUS at 01:20

## 2023-09-28 RX ADMIN — FOLIC ACID 1 MG: 1 TABLET ORAL at 05:11

## 2023-09-28 ASSESSMENT — LIFESTYLE VARIABLES
ORIENTATION AND CLOUDING OF SENSORIUM: ORIENTED AND CAN DO SERIAL ADDITIONS
HEADACHE, FULLNESS IN HEAD: NOT PRESENT
NAUSEA AND VOMITING: NO NAUSEA AND NO VOMITING
PAROXYSMAL SWEATS: NO SWEAT VISIBLE
AGITATION: NORMAL ACTIVITY
ORIENTATION AND CLOUDING OF SENSORIUM: ORIENTED AND CAN DO SERIAL ADDITIONS
AUDITORY DISTURBANCES: NOT PRESENT
ANXIETY: NO ANXIETY (AT EASE)
PAROXYSMAL SWEATS: NO SWEAT VISIBLE
TOTAL SCORE: 0
AGITATION: NORMAL ACTIVITY
HEADACHE, FULLNESS IN HEAD: NOT PRESENT
TREMOR: NO TREMOR
TOTAL SCORE: 0
VISUAL DISTURBANCES: NOT PRESENT
ANXIETY: NO ANXIETY (AT EASE)
VISUAL DISTURBANCES: NOT PRESENT
NAUSEA AND VOMITING: NO NAUSEA AND NO VOMITING
TREMOR: NO TREMOR
AUDITORY DISTURBANCES: NOT PRESENT

## 2023-09-28 ASSESSMENT — PAIN DESCRIPTION - PAIN TYPE: TYPE: ACUTE PAIN

## 2023-09-28 NOTE — DISCHARGE PLANNING
Care Transition Team Assessment    CM met with patient at bedside.  He is AOX4.  He lives by himself and states he has not local support but has a sister listed as an emergency contact.  His home is on the second story and has 16 steps to get to landing.  He receives SS $1348 and food stamps $23.00 per month.  He does not have transportation and I have offered to arrange a ride for him.  He wants to walk home as it is 'only a mile'.  Informed to contact us if he changes his mind.      Information Source  Orientation Level: Oriented X4  Information Given By: Patient  Informant's Name: Inocencio  Who is responsible for making decisions for patient? : Patient    Readmission Evaluation  Is this a readmission?: No    Elopement Risk  Legal Hold: No  Ambulatory or Self Mobile in Wheelchair: Yes  Disoriented: No  Psychiatric Symptoms: None  History of Wandering: No  Elopement this Admit: No  Vocalizing Wanting to Leave: No  Displays Behaviors, Body Language Wanting to Leave: No-Not at Risk for Elopement  Elopement Risk: Not at Risk for Elopement    Interdisciplinary Discharge Planning  Lives with - Patient's Self Care Capacity: Alone and Able to Care For Self  Support Systems: Friends / Neighbors  Housing / Facility: [address]  Durable Medical Equipment: Not Applicable    Discharge Preparedness  What is your plan after discharge?: Other (comment) (Pt has a sister local but states he has no support)  What are your discharge supports?:  (per patient no one)  Prior Functional Level: Ambulatory, Independent with Activities of Daily Living  Difficulity with ADLs: None  Difficulity with IADLs: None    Functional Assesment  Prior Functional Level: Ambulatory, Independent with Activities of Daily Living    Finances  Financial Barriers to Discharge: No  Prescription Coverage: Yes    Vision / Hearing Impairment  Right Eye Vision: Impaired  Left Eye Vision: Impaired         Advance Directive  Advance Directive?: Living Will    Domestic  Abuse  Have you ever been the victim of abuse or violence?: No  Physical Abuse or Sexual Abuse: No  Verbal Abuse or Emotional Abuse: No  Possible Abuse/Neglect Reported to:: Not Applicable    Psychological Assessment  History of Substance Abuse: Alcohol  Date Last Used - Alcohol: 3 days before admission  History of Psychiatric Problems: Yes  Newly Diagnosed Illness: No    Discharge Risks or Barriers  Discharge risks or barriers?: Transportation, Substance abuse, Mental health, Lives alone, no community support  Patient risk factors: Lack of outside supports, Lives alone and no community support, Mental health, Substance abuse    Anticipated Discharge Information  Discharge Disposition: Discharged to home/self care (01)

## 2023-09-28 NOTE — CARE PLAN
The patient is Stable - Low risk of patient condition declining or worsening    Shift Goals  Clinical Goals: pt will remain safe and free from falls/injury throughout shift  Patient Goals: Rest and sleep  Family Goals: not present    Progress made toward(s) clinical / shift goals:  pt admitted for CIWA, protocols in place. Room near RN station. Safety precautions in place. Bed in low position, treaded socks on, personal possessions in reach, call light in reach and hourly rounding in place.     Patient is not progressing towards the following goals:

## 2023-09-28 NOTE — PROGRESS NOTES
Cori from Lab called with critical result of WBC 1.0 at 0730. Critical lab result read back to Cori.   Dr. Ramos notified of critical lab result at 0740.  Critical lab result read back by  WBC 1.0.

## 2023-09-28 NOTE — PROGRESS NOTES
Pt alert/oriented x4, denies pain/discomfort. Tolerating IVF. CIWA = 0, no meds required. Pt resting in bed watching television. Call light within reach, personal belongings available, bed in lowest position, treaded socks on, and hourly rounding in place.

## 2023-09-28 NOTE — PROGRESS NOTES
Cori from Lab called with critical result of ANC at 0810. Critical lab result read back to Cori.   Dr. Ramos notified of critical lab result at 1018.  Critical lab result read back by Dr. Ramos.

## 2023-09-28 NOTE — DISCHARGE INSTRUCTIONS
It is imperative that you stop drinking alcohol, you have significant liver disease and bone marrow suppression and are at high risk for bleeding, infection and death if you continue to use alcohol   Eat a healthy diet and stay hydrated with water   Follow up with your Primary care doctor in the next 1-2 weeks for hospital follow up  Referral to a hematologist was made to follow up your low blood counts

## 2023-09-28 NOTE — DISCHARGE SUMMARY
"Discharge Summary    CHIEF COMPLAINT ON ADMISSION  Chief Complaint   Patient presents with    Body Aches     Pt c/o body aches off/on     Spasms    Headache    Rapid Heart Beat    Other     Pt states \"I don't know what is wrong with me.... something aint right\"     Chest Pain       Reason for Admission  flu like symptoms     Admission Date  9/26/2023    CODE STATUS  Full Code    HPI & HOSPITAL COURSE    Inocencio Shabazz is a 63 y.o. male with a past medical history of schizo affective disorder chronic alcohol use, cirrhosis, chronic pancytopenia and recent admission for GI bleed secondary to esophageal varices and portal hypertensive gastropathy who presented 9/26/2023 with generalized weakness.  Patient states that his last alcoholic beverage was about a day and a half prior to admission..  For the last 3 days he has reported generalized weakness. \" he feels that something is not right.\"  He denies abdominal pain nausea or vomiting    In the ER, patient was initially tachycardic and hypertensive with heart rate 133, /93. Pertinent labs include white blood cell count 1.5, hemoglobin 10.0, platelet 39, potassium 3.4, glucose 158, AST 64, lactic acid 2.1.  Chest x-ray showing no acute cardiopulmonary abnormality.  Was felt that his symptoms were likely related to alcohol withdrawal given timeline from his last drink.  He was treated with IV fluids electrolytes were replaced and he was started on and monitored on CIWA protocol.  Patient required minimal Ativan his symptoms seem to improve.  His vitals stabilized with normal blood pressure and heart rate.  His electrolytes were replaced and his IV fluids were discontinued.  He was tolerating a regular diet.  He was ambulatory independently.  He continued to have persistent severe pancytopenia.  He was educated on importance of complete alcohol cessation we discussed risks of ongoing alcohol use including recurrent gastrointestinal bleeding, worsening " pancytopenia with severe infection and possible death.  He verbalized understanding  At time of discharge patient is hemodynamically stable and at his baseline      Therefore, he is discharged in fair and stable condition to home with close outpatient follow-up.    The patient met 2-midnight criteria for an inpatient stay at the time of discharge.    Discharge Date  9/20/2023    FOLLOW UP ITEMS POST DISCHARGE  Pancytopenia, referral to hematology was made  Alcohol use  Cirrhosis  Schizoaffective disorder    DISCHARGE DIAGNOSES  Principal Problem:    Alcohol withdrawal (HCC) (POA: Yes)  Active Problems:    Schizophrenia (HCC) (POA: Yes)    Hypokalemia (POA: Yes)    Pancytopenia (HCC) (POA: Yes)    Tobacco abuse (POA: Unknown)    Alcohol withdrawal without perceptual disturbances, uncomplicated (HCC) (POA: Yes)  Resolved Problems:    * No resolved hospital problems. *      FOLLOW UP  No future appointments.  Nnamdi Ballesteros M.D.  [address] [facility] [address]  [place] [address]  [phone]    Schedule an appointment as soon as possible for a visit in 1 week(s)  hospital follow up      MEDICATIONS ON DISCHARGE     Medication List        CONTINUE taking these medications        Instructions   diclofenac sodium 1 % Gel  Commonly known as: Voltaren   Apply 4 g topically 4 times a day.  Dose: 4 g     folic acid 1 MG Tabs  Commonly known as: Folvite   Take 1 Tablet by mouth every day.  Dose: 1 mg     Magnesium Oxide -Mg Supplement 420 (252 Mg) MG Tabs   Take 1 Tablet by mouth every day.  Dose: 1 Tablet     metoprolol SR 25 MG Tb24  Commonly known as: Toprol XL   Take 25 mg by mouth every day. Patient not sure what dose he is taking  Dose: 25 mg     omeprazole 20 MG delayed-release capsule  Commonly known as: PriLOSEC   Take 1 Capsule by mouth 2 times a day.  Dose: 20 mg     QUEtiapine 100 MG Tabs  Commonly known as: SEROquel   Take 1 Tablet by mouth every evening.  Dose: 100 mg     thiamine 100 MG tablet  Commonly known as: Thiamine    Take 100 mg by mouth every day.  Dose: 100 mg            STOP taking these medications      ondansetron 4 MG Tabs tablet  Commonly known as: Zofran              Allergies  Allergies   Allergen Reactions    Haloperidol Unspecified     Twitching/involuntary movements        DIET  Orders Placed This Encounter   Procedures    Diet Order Diet: Regular     Standing Status:   Standing     Number of Occurrences:   1     Order Specific Question:   Diet:     Answer:   Regular [1]       ACTIVITY  As tolerated.      CONSULTATIONS  N/A    PROCEDURES  N/A    LABORATORY  Lab Results   Component Value Date    SODIUM 138 09/28/2023    POTASSIUM 4.1 09/28/2023    CHLORIDE 105 09/28/2023    CO2 23 09/28/2023    GLUCOSE 90 09/28/2023    BUN 9 09/28/2023    CREATININE 0.59 09/28/2023        Lab Results   Component Value Date    WBC 1.0 (LL) 09/28/2023    HEMOGLOBIN 8.7 (L) 09/28/2023    HEMATOCRIT 29.1 (L) 09/28/2023    PLATELETCT 25 (L) 09/28/2023        Total time of the discharge process exceeds 35 minutes.

## 2023-09-28 NOTE — CARE PLAN
The patient is Stable - Low risk of patient condition declining or worsening    Shift Goals  Clinical Goals: Safety  Patient Goals: Rest and sleep  Family Goals: not present    Progress made toward(s) clinical / shift goals:  Pt c/o right hand fingers cramping, Dr. Ramos notified. IV Magnesium administered, pt stated he doesn't feel fingers cramping anymore. Pt denies any pain at this time. Continues with IVF. Ambulatory w/no assistance. Safety measures in place, pt remains free from injuries during shift.    Patient is not progressing towards the following goals:

## 2023-10-10 ENCOUNTER — HOSPITAL ENCOUNTER (EMERGENCY)
Facility: MEDICAL CENTER | Age: 63
End: 2023-10-11
Attending: EMERGENCY MEDICINE
Payer: MEDICARE

## 2023-10-10 VITALS
WEIGHT: 130 LBS | HEIGHT: 65 IN | HEART RATE: 89 BPM | OXYGEN SATURATION: 92 % | BODY MASS INDEX: 21.66 KG/M2 | RESPIRATION RATE: 19 BRPM | DIASTOLIC BLOOD PRESSURE: 96 MMHG | TEMPERATURE: 98.3 F | SYSTOLIC BLOOD PRESSURE: 157 MMHG

## 2023-10-10 DIAGNOSIS — F10.920 ALCOHOLIC INTOXICATION WITHOUT COMPLICATION (HCC): ICD-10-CM

## 2023-10-10 PROCEDURE — 99284 EMERGENCY DEPT VISIT MOD MDM: CPT

## 2023-10-10 ASSESSMENT — FIBROSIS 4 INDEX: FIB4 SCORE: 23.66

## 2023-10-10 ASSESSMENT — PAIN DESCRIPTION - PAIN TYPE: TYPE: ACUTE PAIN

## 2023-10-11 ENCOUNTER — PHARMACY VISIT (OUTPATIENT)
Dept: PHARMACY | Facility: MEDICAL CENTER | Age: 63
End: 2023-10-11
Payer: MEDICARE

## 2023-10-11 ENCOUNTER — HOSPITAL ENCOUNTER (EMERGENCY)
Facility: MEDICAL CENTER | Age: 63
End: 2023-10-11
Attending: EMERGENCY MEDICINE
Payer: MEDICARE

## 2023-10-11 VITALS
SYSTOLIC BLOOD PRESSURE: 123 MMHG | BODY MASS INDEX: 21.66 KG/M2 | RESPIRATION RATE: 18 BRPM | OXYGEN SATURATION: 96 % | WEIGHT: 130 LBS | HEIGHT: 65 IN | DIASTOLIC BLOOD PRESSURE: 78 MMHG | TEMPERATURE: 98.8 F | HEART RATE: 76 BPM

## 2023-10-11 DIAGNOSIS — R25.2 MUSCLE CRAMPS: ICD-10-CM

## 2023-10-11 DIAGNOSIS — D61.818 PANCYTOPENIA (HCC): ICD-10-CM

## 2023-10-11 DIAGNOSIS — E86.0 DEHYDRATION: ICD-10-CM

## 2023-10-11 DIAGNOSIS — F10.930 ALCOHOL WITHDRAWAL SYNDROME WITHOUT COMPLICATION (HCC): ICD-10-CM

## 2023-10-11 DIAGNOSIS — R44.1 VISUAL HALLUCINATIONS: ICD-10-CM

## 2023-10-11 LAB
ALBUMIN SERPL BCP-MCNC: 4.3 G/DL (ref 3.2–4.9)
ALBUMIN/GLOB SERPL: 1 G/DL
ALP SERPL-CCNC: 88 U/L (ref 30–99)
ALT SERPL-CCNC: 37 U/L (ref 2–50)
ANION GAP SERPL CALC-SCNC: 13 MMOL/L (ref 7–16)
AST SERPL-CCNC: 72 U/L (ref 12–45)
BASOPHILS # BLD AUTO: 0.7 % (ref 0–1.8)
BASOPHILS # BLD: 0.01 K/UL (ref 0–0.12)
BILIRUB SERPL-MCNC: 0.9 MG/DL (ref 0.1–1.5)
BUN SERPL-MCNC: 10 MG/DL (ref 8–22)
CALCIUM ALBUM COR SERPL-MCNC: 9.6 MG/DL (ref 8.5–10.5)
CALCIUM SERPL-MCNC: 9.8 MG/DL (ref 8.5–10.5)
CHLORIDE SERPL-SCNC: 98 MMOL/L (ref 96–112)
CO2 SERPL-SCNC: 26 MMOL/L (ref 20–33)
CREAT SERPL-MCNC: 0.68 MG/DL (ref 0.5–1.4)
EOSINOPHIL # BLD AUTO: 0.05 K/UL (ref 0–0.51)
EOSINOPHIL NFR BLD: 3.3 % (ref 0–6.9)
ERYTHROCYTE [DISTWIDTH] IN BLOOD BY AUTOMATED COUNT: 53.7 FL (ref 35.9–50)
ETHANOL BLD-MCNC: <10.1 MG/DL
GFR SERPLBLD CREATININE-BSD FMLA CKD-EPI: 103 ML/MIN/1.73 M 2
GLOBULIN SER CALC-MCNC: 4.2 G/DL (ref 1.9–3.5)
GLUCOSE SERPL-MCNC: 103 MG/DL (ref 65–99)
HCT VFR BLD AUTO: 30.6 % (ref 42–52)
HGB BLD-MCNC: 9.6 G/DL (ref 14–18)
IMM GRANULOCYTES # BLD AUTO: 0.01 K/UL (ref 0–0.11)
IMM GRANULOCYTES NFR BLD AUTO: 0.7 % (ref 0–0.9)
LYMPHOCYTES # BLD AUTO: 0.31 K/UL (ref 1–4.8)
LYMPHOCYTES NFR BLD: 20.7 % (ref 22–41)
MAGNESIUM SERPL-MCNC: 1.5 MG/DL (ref 1.5–2.5)
MCH RBC QN AUTO: 24.9 PG (ref 27–33)
MCHC RBC AUTO-ENTMCNC: 31.4 G/DL (ref 32.3–36.5)
MCV RBC AUTO: 79.3 FL (ref 81.4–97.8)
MONOCYTES # BLD AUTO: 0.21 K/UL (ref 0–0.85)
MONOCYTES NFR BLD AUTO: 14 % (ref 0–13.4)
NEUTROPHILS # BLD AUTO: 0.91 K/UL (ref 1.82–7.42)
NEUTROPHILS NFR BLD: 60.6 % (ref 44–72)
NRBC # BLD AUTO: 0 K/UL
NRBC BLD-RTO: 0 /100 WBC (ref 0–0.2)
PLATELET # BLD AUTO: 31 K/UL (ref 164–446)
PLATELETS.RETICULATED NFR BLD AUTO: 14.1 % (ref 0.6–13.1)
POTASSIUM SERPL-SCNC: 3.9 MMOL/L (ref 3.6–5.5)
PROT SERPL-MCNC: 8.5 G/DL (ref 6–8.2)
RBC # BLD AUTO: 3.86 M/UL (ref 4.7–6.1)
SODIUM SERPL-SCNC: 137 MMOL/L (ref 135–145)
WBC # BLD AUTO: 1.5 K/UL (ref 4.8–10.8)

## 2023-10-11 PROCEDURE — 85025 COMPLETE CBC W/AUTO DIFF WBC: CPT

## 2023-10-11 PROCEDURE — 83735 ASSAY OF MAGNESIUM: CPT

## 2023-10-11 PROCEDURE — 700102 HCHG RX REV CODE 250 W/ 637 OVERRIDE(OP): Mod: UD | Performed by: EMERGENCY MEDICINE

## 2023-10-11 PROCEDURE — RXMED WILLOW AMBULATORY MEDICATION CHARGE: Performed by: EMERGENCY MEDICINE

## 2023-10-11 PROCEDURE — 99284 EMERGENCY DEPT VISIT MOD MDM: CPT

## 2023-10-11 PROCEDURE — 36415 COLL VENOUS BLD VENIPUNCTURE: CPT

## 2023-10-11 PROCEDURE — 80053 COMPREHEN METABOLIC PANEL: CPT

## 2023-10-11 PROCEDURE — 85055 RETICULATED PLATELET ASSAY: CPT

## 2023-10-11 PROCEDURE — 700105 HCHG RX REV CODE 258: Mod: UD | Performed by: EMERGENCY MEDICINE

## 2023-10-11 PROCEDURE — 82077 ASSAY SPEC XCP UR&BREATH IA: CPT

## 2023-10-11 PROCEDURE — A9270 NON-COVERED ITEM OR SERVICE: HCPCS | Mod: UD | Performed by: EMERGENCY MEDICINE

## 2023-10-11 RX ORDER — SODIUM CHLORIDE 9 MG/ML
1000 INJECTION, SOLUTION INTRAVENOUS ONCE
Status: COMPLETED | OUTPATIENT
Start: 2023-10-11 | End: 2023-10-11

## 2023-10-11 RX ORDER — GAUZE BANDAGE 2" X 2"
100 BANDAGE TOPICAL ONCE
Status: COMPLETED | OUTPATIENT
Start: 2023-10-11 | End: 2023-10-11

## 2023-10-11 RX ORDER — FOLIC ACID 1 MG/1
1 TABLET ORAL ONCE
Status: COMPLETED | OUTPATIENT
Start: 2023-10-11 | End: 2023-10-11

## 2023-10-11 RX ORDER — GABAPENTIN 300 MG/1
300 CAPSULE ORAL 3 TIMES DAILY
Qty: 15 CAPSULE | Refills: 0 | Status: SHIPPED | OUTPATIENT
Start: 2023-10-11 | End: 2023-10-16

## 2023-10-11 RX ORDER — GABAPENTIN 300 MG/1
300 CAPSULE ORAL 3 TIMES DAILY
Qty: 15 CAPSULE | Refills: 0 | Status: ON HOLD | OUTPATIENT
Start: 2023-10-11 | End: 2023-10-30

## 2023-10-11 RX ORDER — GABAPENTIN 300 MG/1
300 CAPSULE ORAL 3 TIMES DAILY
Qty: 15 CAPSULE | Refills: 0 | Status: SHIPPED | OUTPATIENT
Start: 2023-10-11 | End: 2023-10-11 | Stop reason: SDUPTHER

## 2023-10-11 RX ORDER — CHLORDIAZEPOXIDE HYDROCHLORIDE 25 MG/1
25 CAPSULE, GELATIN COATED ORAL ONCE
Status: COMPLETED | OUTPATIENT
Start: 2023-10-11 | End: 2023-10-11

## 2023-10-11 RX ADMIN — THERA TABS 1 TABLET: TAB at 14:40

## 2023-10-11 RX ADMIN — FOLIC ACID 1 MG: 1 TABLET ORAL at 14:30

## 2023-10-11 RX ADMIN — CHLORDIAZEPOXIDE HYDROCHLORIDE 25 MG: 25 CAPSULE ORAL at 14:45

## 2023-10-11 RX ADMIN — SODIUM CHLORIDE 1000 ML: 9 INJECTION, SOLUTION INTRAVENOUS at 16:01

## 2023-10-11 RX ADMIN — Medication 100 MG: at 14:41

## 2023-10-11 RX ADMIN — SODIUM CHLORIDE 1000 ML: 9 INJECTION, SOLUTION INTRAVENOUS at 14:48

## 2023-10-11 ASSESSMENT — LIFESTYLE VARIABLES
TOTAL SCORE: 4
HAVE PEOPLE ANNOYED YOU BY CRITICIZING YOUR DRINKING: YES
HOW MANY TIMES IN THE PAST YEAR HAVE YOU HAD 5 OR MORE DRINKS IN A DAY: 1
EVER FELT BAD OR GUILTY ABOUT YOUR DRINKING: YES
HAVE YOU EVER FELT YOU SHOULD CUT DOWN ON YOUR DRINKING: YES
TOTAL SCORE: 4
DO YOU DRINK ALCOHOL: YES
AVERAGE NUMBER OF DAYS PER WEEK YOU HAVE A DRINK CONTAINING ALCOHOL: 1
TOTAL SCORE: 4
ON A TYPICAL DAY WHEN YOU DRINK ALCOHOL HOW MANY DRINKS DO YOU HAVE: 1
EVER HAD A DRINK FIRST THING IN THE MORNING TO STEADY YOUR NERVES TO GET RID OF A HANGOVER: YES
CONSUMPTION TOTAL: POSITIVE

## 2023-10-11 ASSESSMENT — PAIN DESCRIPTION - PAIN TYPE: TYPE: ACUTE PAIN

## 2023-10-11 ASSESSMENT — FIBROSIS 4 INDEX: FIB4 SCORE: 23.66

## 2023-10-11 NOTE — ED PROVIDER NOTES
ER Provider Note    Scribed for Nnamdi Dee M.d. by Conrad Kennedy. 10/11/2023  2:12 PM    Primary Care Provider: Nnamdi Ballesteros M.D.    CHIEF COMPLAINT  Chief Complaint   Patient presents with    Cramping     Pt states bilateral hand and feet cramping today   States chronic ETOH use, denies any today    Hallucinations     Pt states 'hearing a lot of dreaming noises', started a few days ago   Denies SI/HI  Hx schizophrenia      EXTERNAL RECORDS REVIEWED  Outpatient Notes shows that the patient was seen here yesterday for ETOH intoxication    HPI/ROS  LIMITATION TO HISTORY   Select: : None  OUTSIDE HISTORIAN(S):  None    Inocencio Shabazz is a 63 y.o. male with a history of schizophrenia who presents to the ED complaining of muscle cramps onset prior to arrival. Patient reports that he has been having cramping to both of his hands and feet today. Patient has a history of chronic alcohol abuse, but is unable to recall the last time he drank. He states that he has never gotten withdrawal seizures from alcohol before. He also complains of visual hallucinations. He states that he sees shadows but denies any auditory hallucinations at this time. He has associated nausea. Denies any fevers, chills, vomiting, suicidal ideation, or homicidal ideation. No alleviating or exacerbating factors reported. Drug allergies to Haloperidol.     PAST MEDICAL HISTORY  Past Medical History:   Diagnosis Date    Alcohol abuse     Bipolar disorder (HCC)     Cirrhosis (HCC)     GIB (gastrointestinal bleeding) 01/03/2016    Hepatitis C     Pacemaker     left chest    Pancytopenia (HCC)     Schizophrenia (HCC)        SURGICAL HISTORY  Past Surgical History:   Procedure Laterality Date    DE UPPER GI ENDOSCOPY,DIAGNOSIS N/A 8/10/2023    Procedure: GASTROSCOPY;  Surgeon: Joleen Yañez M.D.;  Location: SURGERY SAME DAY [facility];  Service: Gastroenterology    DE UPPER GI ENDOSCOPY,CTRL BLEED N/A 8/10/2023    Procedure:  GASTROSCOPY, WITH ARGON PLASMA COAGULATION;  Surgeon: Joleen Yañez M.D.;  Location: SURGERY SAME DAY [facility];  Service: Gastroenterology    GASTROSCOPY-ENDO N/A 11/13/2019    Procedure: GASTROSCOPY with banding;  Surgeon: Tamela Smith M.D.;  Location: ENDOSCOPY [facility];  Service: Gastroenterology    GASTROSCOPY  3/13/2019    Procedure: GASTROSCOPY;  Surgeon: Abdi Ba M.D.;  Location: SURGERY [facility];  Service: Gastroenterology    GASTROSCOPY  4/8/2016    Procedure: GASTROSCOPY;  Surgeon: Braeden Tobin M.D.;  Location: SURGERY [facility];  Service:     GASTROSCOPY  1/3/2016    Procedure: GASTROSCOPY WITH BANDING;  Surgeon: Alfredo Antonio M.D.;  Location: SURGERY [facility];  Service:        FAMILY HISTORY  No family history noted.    SOCIAL HISTORY   reports that he has been smoking cigarettes. He has a 48.0 pack-year smoking history. He has never used smokeless tobacco. He reports current alcohol use. He reports current drug use. Drug: Inhaled.    CURRENT MEDICATIONS     Details   metoprolol SR (TOPROL XL) 25 MG TABLET SR 24 HR Take 25 mg by mouth every day. Patient not sure what dose he is taking      QUEtiapine (SEROQUEL) 100 MG Tab Take 1 Tablet by mouth every evening.  Qty: 60 Tablet, Refills: 3    Associated Diagnoses: Upper GI bleed      omeprazole (PRILOSEC) 20 MG delayed-release capsule Take 1 Capsule by mouth 2 times a day.  Qty: 60 Capsule, Refills: 0    Associated Diagnoses: Upper GI bleed      thiamine (THIAMINE) 100 MG tablet Take 100 mg by mouth every day.      diclofenac sodium (VOLTAREN) 1 % Gel Apply 4 g topically 4 times a day.      Magnesium Oxide 420 (252 Mg) MG Tab Take 1 Tablet by mouth every day.      folic acid (FOLVITE) 1 MG Tab Take 1 Tablet by mouth every day.  Qty: 30 Tablet, Refills: 3    Associated Diagnoses: Alcohol withdrawal syndrome without complication (HCC)             ALLERGIES  Haloperidol    PHYSICAL EXAM  BP (!) 151/94   Pulse  "(!) 114   Temp 37.1 °C (98.7 °F) (Oral)   Resp 18   Ht 1.651 m (5' 5\")   Wt 59 kg (130 lb)   SpO2 97%   BMI 21.63 kg/m²   Constitutional: Well developed, Well nourished, Mild distress.   HENT: Normocephalic, Atraumatic, Oropharynx are dry, No oral exudates.   Eyes: Conjunctiva normal, No discharge.   Neck: Supple, No stridor   Cardiovascular: Tachycardic, Normal rhythm, No murmurs, equal pulses.   Pulmonary: Normal breath sounds, No respiratory distress, No wheezing, No rales, No rhonchi.  Chest: No chest wall tenderness or deformity.   Abdomen:Soft, No tenderness, No masses, no rebound, no guarding.   Back: No CVA tenderness.   Musculoskeletal: No major deformities noted, No tenderness.   Skin: Warm, Dry, No erythema, No rash.   Neurologic: Alert & oriented x 3, Tremor to the tongue and hands. No obvious cramping to the hands or feet.   Psychiatric: Affect normal, Judgment normal, Mood normal.       DIAGNOSTIC STUDIES    Labs:   Results for orders placed or performed during the hospital encounter of 10/11/23   CBC WITH DIFFERENTIAL   Result Value Ref Range    WBC 1.5 (LL) 4.8 - 10.8 K/uL    RBC 3.86 (L) 4.70 - 6.10 M/uL    Hemoglobin 9.6 (L) 14.0 - 18.0 g/dL    Hematocrit 30.6 (L) 42.0 - 52.0 %    MCV 79.3 (L) 81.4 - 97.8 fL    MCH 24.9 (L) 27.0 - 33.0 pg    MCHC 31.4 (L) 32.3 - 36.5 g/dL    RDW 53.7 (H) 35.9 - 50.0 fL    Platelet Count 31 (L) 164 - 446 K/uL    Neutrophils-Polys 60.60 44.00 - 72.00 %    Lymphocytes 20.70 (L) 22.00 - 41.00 %    Monocytes 14.00 (H) 0.00 - 13.40 %    Eosinophils 3.30 0.00 - 6.90 %    Basophils 0.70 0.00 - 1.80 %    Immature Granulocytes 0.70 0.00 - 0.90 %    Nucleated RBC 0.00 0.00 - 0.20 /100 WBC    Neutrophils (Absolute) 0.91 (L) 1.82 - 7.42 K/uL    Lymphs (Absolute) 0.31 (L) 1.00 - 4.80 K/uL    Monos (Absolute) 0.21 0.00 - 0.85 K/uL    Eos (Absolute) 0.05 0.00 - 0.51 K/uL    Baso (Absolute) 0.01 0.00 - 0.12 K/uL    Immature Granulocytes (abs) 0.01 0.00 - 0.11 K/uL    NRBC " (Absolute) 0.00 K/uL   COMP METABOLIC PANEL   Result Value Ref Range    Sodium 137 135 - 145 mmol/L    Potassium 3.9 3.6 - 5.5 mmol/L    Chloride 98 96 - 112 mmol/L    Co2 26 20 - 33 mmol/L    Anion Gap 13.0 7.0 - 16.0    Glucose 103 (H) 65 - 99 mg/dL    Bun 10 8 - 22 mg/dL    Creatinine 0.68 0.50 - 1.40 mg/dL    Calcium 9.8 8.5 - 10.5 mg/dL    Correct Calcium 9.6 8.5 - 10.5 mg/dL    AST(SGOT) 72 (H) 12 - 45 U/L    ALT(SGPT) 37 2 - 50 U/L    Alkaline Phosphatase 88 30 - 99 U/L    Total Bilirubin 0.9 0.1 - 1.5 mg/dL    Albumin 4.3 3.2 - 4.9 g/dL    Total Protein 8.5 (H) 6.0 - 8.2 g/dL    Globulin 4.2 (H) 1.9 - 3.5 g/dL    A-G Ratio 1.0 g/dL   DIAGNOSTIC ALCOHOL   Result Value Ref Range    Diagnostic Alcohol <10.1 <10.1 mg/dL   MAGNESIUM   Result Value Ref Range    Magnesium 1.5 1.5 - 2.5 mg/dL   IMMATURE PLT FRACTION   Result Value Ref Range    Imm. Plt Fraction 14.1 (H) 0.6 - 13.1 %   ESTIMATED GFR   Result Value Ref Range    GFR (CKD-EPI) 103 >60 mL/min/1.73 m 2        COURSE & MEDICAL DECISION MAKING     ED Observation Status?no    INITIAL ASSESSMENT, COURSE AND PLAN  Care Narrative:     2:12 PM - Patient was seen and evaluated at bedside. Patient presents to the ED for evaluation of acute cramping to his hands and feet. After my exam, I discussed with the patient the plan of care, which includes treating the patient with medication for their symptoms, as well as obtaining lab work for further evaluation. Patient understands and verbalizes agreement to plan of care. Patient will be treated with Vitamin B-1, folic acid, multivitamin, and SN infusion 1000 mL for his symptoms. Ordered diagnostic alcohol, CBC with diff, and CMP to evaluate. Differential diagnosis include but are not limited to: ETOH intoxication vs  dehydration vs alcohol withdrawals vs electrolyze abnormality     4:26 PM - Patient was reevaluated at bedside. Patient reports feeling improved after medication treatment. He is no longer shaking.  Discussed lab results with the patient. He has no further questions or concerns at this time. I then informed the patient of my plan for discharge, which includes strict return precautions for any new or worsening symptoms. Patient understands and verbalizes agreement to plan of care. Patient is comfortable going home at this time.        HYDRATION: Based on the patient's presentation of [facility] the patient was given IV fluids. IV Hydration was used because oral hydration was not adequate alone. Upon recheck following hydration, the patient was improved.     PROBLEM LIST  Problem #1 hand cramping and foot cramping I think this is likely secondary to dehydration.  Patient looks clinically dehydrated he was given IV fluids and with that the cramping is improved.  I also given oral vitamins.  Patient does not show any significant electrolyte abnormalities.    Problem #2 pancytopenia I suspect this is secondary to myelosuppression from his alcohol abuse.  Discussed with the patient reducing his alcohol intake.    Problem #3 alcohol withdrawal patient showed mild alcohol withdrawal he was given Librium with that his symptoms have improved.  Does not show any signs of delirium tremens.  I think the hallucinations are likely secondary to his underlying schizophrenia.  We will give the patient gabapentin to help with alcohol withdrawal and cravings.    DISPOSITION AND DISCUSSIONS  I have discussed management of the patient with the following physicians and LAISHA's:  None    Discussion of management with other [facility] or appropriate source(s): None     Escalation of care considered, and ultimately not performed: diagnostic imaging.  Do not think the patient needs any imaging no obvious trauma.    Barriers to care at this time, including but not limited to:  None .     Decision tools and prescription drugs considered including, but not limited to:  gabapentin to help treat his symptoms .    Patient will be discharged home.    FOLLOW  UP:  Nnamdi Ballesteros M.D.  [address] [facility] [address]  [phone]    Schedule an appointment as soon as possible for a visit in 1 week        OUTPATIENT MEDICATIONS:  Discharge Medication List as of 10/11/2023  4:42 PM           FINAL DIAGNOSIS  1. Pancytopenia (HCC)    2. Alcohol withdrawal syndrome without complication (HCC)    3. Visual hallucinations    4. Dehydration    5. Muscle cramps         IConrad (Scribe), am scribing for, and in the presence of, RAFITA Aguirre*.    Electronically signed by: Conrad Kennedy (Scribe), 10/11/2023    INnamdi M.* personally performed the services described in this documentation, as scribed by Conrad Kennedy in my presence, and it is both accurate and complete.      The note accurately reflects work and decisions made by me.  Nnamdi Dee M.D.  10/11/2023  5:40 PM

## 2023-10-11 NOTE — DISCHARGE INSTRUCTIONS
Try to cut back on your drinking.  It is causing suppression of your blood cells.  Return the emergency department if you have worsening cramping, severe shaking, seizure or suicidal ideation.

## 2023-10-11 NOTE — ED TRIAGE NOTES
".  Chief Complaint   Patient presents with    Alcohol Intoxication       63 yr patient BIB [facility]  to triage for above complaint. Per EMS was found to be wondering     Pt placed in lobby. Pt educated on triage process. Pt encouraged to alert staff for any changes.     Patient and staff wearing appropriate PPE    /88   Pulse (!) 122   Temp 36.9 °C (98.4 °F) (Temporal)   Resp 16   Ht 1.651 m (5' 5\")   Wt 59 kg (130 lb)   SpO2 94%   BMI 21.63 kg/m²     "

## 2023-10-11 NOTE — ED PROVIDER NOTES
"ED Provider Note    CHIEF COMPLAINT  Chief Complaint   Patient presents with    Alcohol Intoxication       EXTERNAL RECORDS REVIEWED  Inpatient Notes Patient admitted in September for alcohol withdrawal.    HPI/ROS  LIMITATION TO HISTORY   Select: : None  OUTSIDE HISTORIAN(S):  none    Inocencio Shabazz is a 63 y.o. male who presents saying he drink copious amounts of alcohol and is feeling like he is very drunk and hungry.  He denies abdominal pain.  Denies any chest pain.  He says of drinking heavily and is feeling not great afterwards.  He denies any vomiting up blood.  He denies any chest pain or shortness of breath at this time.    PAST MEDICAL HISTORY   has a past medical history of Alcohol abuse, Bipolar disorder (HCC), Cirrhosis (HCC), GIB (gastrointestinal bleeding) (01/03/2016), Hepatitis C, Pacemaker, Pancytopenia (HCC), and Schizophrenia (HCC).    SURGICAL HISTORY   has a past surgical history that includes gastroscopy (1/3/2016); gastroscopy (4/8/2016); gastroscopy (3/13/2019); gastroscopy-endo (N/A, 11/13/2019); upper gi endoscopy,diagnosis (N/A, 8/10/2023); and upper gi endoscopy,ctrl bleed (N/A, 8/10/2023).    FAMILY HISTORY  No family history on file.    SOCIAL HISTORY  Social History     Tobacco Use    Smoking status: Every Day     Current packs/day: 1.00     Average packs/day: 1 pack/day for 48.0 years (48.0 ttl pk-yrs)     Types: Cigarettes    Smokeless tobacco: Never   Vaping Use    Vaping Use: Never used   Substance and Sexual Activity    Alcohol use: Yes     Comment: \"couple of pints\" whiskey every day    Drug use: Yes     Types: Inhaled     Comment: currently smokes marijuana; past hx of cocaine and meth    Sexual activity: Not on file       CURRENT MEDICATIONS  Home Medications       Reviewed by Husam Prieto R.N. (Registered Nurse) on 10/10/23 at 2002  Med List Status: Not Addressed     Medication Last Dose Status   diclofenac sodium (VOLTAREN) 1 % Gel  Active   folic acid (FOLVITE) 1 MG " "Tab  Active   Magnesium Oxide 420 (252 Mg) MG Tab  Active   metoprolol SR (TOPROL XL) 25 MG TABLET SR 24 HR  Active   omeprazole (PRILOSEC) 20 MG delayed-release capsule  Active   QUEtiapine (SEROQUEL) 100 MG Tab  Active   thiamine (THIAMINE) 100 MG tablet  Active                    ALLERGIES  Allergies   Allergen Reactions    Haloperidol Unspecified     Twitching/involuntary movements        PHYSICAL EXAM  VITAL SIGNS: /88   Pulse 72   Temp 36.9 °C (98.4 °F) (Temporal)   Resp 16   Ht 1.651 m (5' 5\")   Wt 59 kg (130 lb)   SpO2 92%   BMI 21.63 kg/m²    Constitutional: Slurring words and disheveled  HENT:  Atraumatic, Normocephalic. Bilateral external ears normal, moist mucus membranes, pharynx normal,   Eyes: No discharge, no scleral icterus.   Neck: no JVD  Cardiovascular: Normal heart rate, Normal rhythm.  Symmetric peripheral pulses.   Thorax & Lungs: No respiratory distress, No wheezing, No rales, No rhonchi, No chest tenderness.   Abdomen: Bowel sounds normal, soft, non-distended, nontender, no masses.  Skin: No rash.   Back: No tenderness, No CVA tenderness.   Extremities: No clubbing, cyanosis, edema, no Homans or cords   Neurologic: Grossly normal cranial nerves, no gross motor or sensory abnormalities, intoxicated appearing.  Psychiatric: Intoxicated appearing.    COURSE & MEDICAL DECISION MAKING    ED Observation Status? Yes; I am placing the patient in to an observation status due to a diagnostic uncertainty as well as therapeutic intensity. Patient placed in observation status at 9:39 PM, 10/10/2023.     Observation plan is as follows: Serial reexaminations to assure he is sober    Upon Reevaluation, the patient's condition has: Improved; and will be discharged.    Patient discharged from ED Observation status at 10:50 PM (Time) 10/10/2023 (Date).     INITIAL ASSESSMENT, COURSE AND PLAN  Care Narrative: Patient presents with intoxication.  At this time the patient was initially found to be " tachycardic in the 120s.  He does appear slightly dehydrated.  We will give him fluids p.o. because he can tolerate these and then reassess.    He continues be intoxicated however his heart has come down.  He denies any discomfort at this time.  We will give him for snacks and then reassess.    10:50 PM - Patient feeling improved. Cleared for discharge once clinically sober. Discussed return precautions and plan for at home care. Patient verbalizes understanding and agreement to this plan of care.       DISPOSITION AND DISCUSSIONS    Discussion of management with other [facility] or appropriate source(s): None     Escalation of care considered, and ultimately not performed:the patient was evaluated by myself, after discussion I have recommended the patient to be discharged    Barriers to care at this time, including but not limited to: None    DISPOSITION:  Patient will be discharged home in stable condition.    FOLLOW UP:  Nnamdi Ballesteros M.D.  [address] [facility] [address]  [phone]    In 2 days      FINAL DIAGNOSIS  1. Alcoholic intoxication without complication (HCC)       , Eusebio Moeller (Jonny), am scribing for, and in the presence of, Jose Antoine M.D..    Electronically signed by: Eusebio Moeller (Jonny), 10/10/2023    IJose M.D. personally performed the services described in this documentation, as scribed by Eusebio Moeller in my presence, and it is both accurate and complete.     Electronically signed by: Jose Antoine M.D., 10/10/2023 9:48 PM

## 2023-10-11 NOTE — ED NOTES
"Pt refused to sign DC paperwork, pt stated \"I don't want go\", Primary RN explained that pt had been medically discharged, pt walked with steady gait to ER lobby for DC  "

## 2023-10-11 NOTE — ED TRIAGE NOTES
"Chief Complaint   Patient presents with    Cramping     Pt states bilateral hand and feet cramping today   States chronic ETOH use, denies any today    Hallucinations     Pt states 'hearing a lot of dreaming noises', started a few days ago   Denies SI/HI  Hx schizophrenia      BP (!) 151/94   Pulse (!) 114   Temp 37.1 °C (98.7 °F) (Oral)   Resp 18   Ht 1.651 m (5' 5\")   Wt 59 kg (130 lb)   SpO2 97%   BMI 21.63 kg/m²     Pt made aware of triage process, placed back into lobby, educated pt to tell staff of any worsening of symptoms     "

## 2023-10-11 NOTE — ED NOTES
Discharge teaching and paperwork provided regarding alcohol abuse and provided list of resources and all questions/concerns answered. VSS,  assessment stable and PIV removed. Given information regarding home care and reasons to follow up with ED or primary MD. Patient provided gabapentin Rx. Patient discharged to the care of self and ambulated out of the ED.

## 2023-10-26 ENCOUNTER — HOSPITAL ENCOUNTER (INPATIENT)
Facility: MEDICAL CENTER | Age: 63
LOS: 4 days | DRG: 433 | End: 2023-10-30
Attending: EMERGENCY MEDICINE | Admitting: STUDENT IN AN ORGANIZED HEALTH CARE EDUCATION/TRAINING PROGRAM
Payer: MEDICARE

## 2023-10-26 DIAGNOSIS — F10.10 ALCOHOL ABUSE: ICD-10-CM

## 2023-10-26 DIAGNOSIS — R42 LIGHTHEADEDNESS: ICD-10-CM

## 2023-10-26 DIAGNOSIS — R11.0 NAUSEA: ICD-10-CM

## 2023-10-26 PROBLEM — D64.9 SYMPTOMATIC ANEMIA: Status: ACTIVE | Noted: 2023-10-26

## 2023-10-26 PROBLEM — I85.10 SECONDARY ESOPHAGEAL VARICES (HCC): Status: ACTIVE | Noted: 2023-10-26

## 2023-10-26 LAB
ABO GROUP BLD: NORMAL
ALBUMIN SERPL BCP-MCNC: 3.5 G/DL (ref 3.2–4.9)
ALBUMIN/GLOB SERPL: 0.9 G/DL
ALP SERPL-CCNC: 79 U/L (ref 30–99)
ALT SERPL-CCNC: 33 U/L (ref 2–50)
ANION GAP SERPL CALC-SCNC: 13 MMOL/L (ref 7–16)
ANISOCYTOSIS BLD QL SMEAR: ABNORMAL
AST SERPL-CCNC: 62 U/L (ref 12–45)
BARCODED ABORH UBTYP: 2800
BARCODED PRD CODE UBPRD: NORMAL
BARCODED UNIT NUM UBUNT: NORMAL
BASOPHILS # BLD AUTO: 1.6 % (ref 0–1.8)
BASOPHILS # BLD: 0.02 K/UL (ref 0–0.12)
BILIRUB SERPL-MCNC: 0.6 MG/DL (ref 0.1–1.5)
BLD GP AB SCN SERPL QL: NORMAL
BUN SERPL-MCNC: 6 MG/DL (ref 8–22)
CALCIUM ALBUM COR SERPL-MCNC: 8.5 MG/DL (ref 8.5–10.5)
CALCIUM SERPL-MCNC: 8.1 MG/DL (ref 8.5–10.5)
CHLORIDE SERPL-SCNC: 106 MMOL/L (ref 96–112)
CK SERPL-CCNC: 138 U/L (ref 0–154)
CO2 SERPL-SCNC: 20 MMOL/L (ref 20–33)
COMMENT 1642: NORMAL
COMPONENT R 8504R: NORMAL
CREAT SERPL-MCNC: 0.51 MG/DL (ref 0.5–1.4)
EOSINOPHIL # BLD AUTO: 0.03 K/UL (ref 0–0.51)
EOSINOPHIL NFR BLD: 2.4 % (ref 0–6.9)
ERYTHROCYTE [DISTWIDTH] IN BLOOD BY AUTOMATED COUNT: 58.7 FL (ref 35.9–50)
ETHANOL BLD-MCNC: 17 MG/DL
GFR SERPLBLD CREATININE-BSD FMLA CKD-EPI: 112 ML/MIN/1.73 M 2
GLOBULIN SER CALC-MCNC: 3.9 G/DL (ref 1.9–3.5)
GLUCOSE SERPL-MCNC: 68 MG/DL (ref 65–99)
HCT VFR BLD AUTO: 23.8 % (ref 42–52)
HGB BLD-MCNC: 7 G/DL (ref 14–18)
HYPOCHROMIA BLD QL SMEAR: ABNORMAL
IMM GRANULOCYTES # BLD AUTO: 0.01 K/UL (ref 0–0.11)
IMM GRANULOCYTES NFR BLD AUTO: 0.8 % (ref 0–0.9)
LYMPHOCYTES # BLD AUTO: 0.34 K/UL (ref 1–4.8)
LYMPHOCYTES NFR BLD: 26.8 % (ref 22–41)
MAGNESIUM SERPL-MCNC: 1.7 MG/DL (ref 1.5–2.5)
MCH RBC QN AUTO: 23.4 PG (ref 27–33)
MCHC RBC AUTO-ENTMCNC: 29.4 G/DL (ref 32.3–36.5)
MCV RBC AUTO: 79.6 FL (ref 81.4–97.8)
MICROCYTES BLD QL SMEAR: ABNORMAL
MONOCYTES # BLD AUTO: 0.17 K/UL (ref 0–0.85)
MONOCYTES NFR BLD AUTO: 13.4 % (ref 0–13.4)
MORPHOLOGY BLD-IMP: NORMAL
NEUTROPHILS # BLD AUTO: 0.7 K/UL (ref 1.82–7.42)
NEUTROPHILS NFR BLD: 55 % (ref 44–72)
NRBC # BLD AUTO: 0 K/UL
NRBC BLD-RTO: 0 /100 WBC (ref 0–0.2)
OVALOCYTES BLD QL SMEAR: NORMAL
PHOSPHATE SERPL-MCNC: 3.5 MG/DL (ref 2.5–4.5)
PLATELET # BLD AUTO: 42 K/UL (ref 164–446)
PLATELET BLD QL SMEAR: NORMAL
PLATELETS.RETICULATED NFR BLD AUTO: 9.2 % (ref 0.6–13.1)
POIKILOCYTOSIS BLD QL SMEAR: NORMAL
POTASSIUM SERPL-SCNC: 4.3 MMOL/L (ref 3.6–5.5)
PRODUCT TYPE UPROD: NORMAL
PROT SERPL-MCNC: 7.4 G/DL (ref 6–8.2)
RBC # BLD AUTO: 2.99 M/UL (ref 4.7–6.1)
RBC BLD AUTO: PRESENT
RH BLD: NORMAL
SODIUM SERPL-SCNC: 139 MMOL/L (ref 135–145)
UNIT STATUS USTAT: NORMAL
WBC # BLD AUTO: 1.3 K/UL (ref 4.8–10.8)

## 2023-10-26 PROCEDURE — 84100 ASSAY OF PHOSPHORUS: CPT

## 2023-10-26 PROCEDURE — 83735 ASSAY OF MAGNESIUM: CPT

## 2023-10-26 PROCEDURE — HZ2ZZZZ DETOXIFICATION SERVICES FOR SUBSTANCE ABUSE TREATMENT: ICD-10-PCS | Performed by: STUDENT IN AN ORGANIZED HEALTH CARE EDUCATION/TRAINING PROGRAM

## 2023-10-26 PROCEDURE — 80053 COMPREHEN METABOLIC PANEL: CPT

## 2023-10-26 PROCEDURE — 700111 HCHG RX REV CODE 636 W/ 250 OVERRIDE (IP): Performed by: STUDENT IN AN ORGANIZED HEALTH CARE EDUCATION/TRAINING PROGRAM

## 2023-10-26 PROCEDURE — 82550 ASSAY OF CK (CPK): CPT

## 2023-10-26 PROCEDURE — 86901 BLOOD TYPING SEROLOGIC RH(D): CPT

## 2023-10-26 PROCEDURE — 86923 COMPATIBILITY TEST ELECTRIC: CPT

## 2023-10-26 PROCEDURE — 36430 TRANSFUSION BLD/BLD COMPNT: CPT

## 2023-10-26 PROCEDURE — A9270 NON-COVERED ITEM OR SERVICE: HCPCS | Mod: UD | Performed by: EMERGENCY MEDICINE

## 2023-10-26 PROCEDURE — 99285 EMERGENCY DEPT VISIT HI MDM: CPT

## 2023-10-26 PROCEDURE — 99223 1ST HOSP IP/OBS HIGH 75: CPT | Mod: AI | Performed by: STUDENT IN AN ORGANIZED HEALTH CARE EDUCATION/TRAINING PROGRAM

## 2023-10-26 PROCEDURE — 85055 RETICULATED PLATELET ASSAY: CPT

## 2023-10-26 PROCEDURE — 700102 HCHG RX REV CODE 250 W/ 637 OVERRIDE(OP): Mod: UD | Performed by: EMERGENCY MEDICINE

## 2023-10-26 PROCEDURE — A9270 NON-COVERED ITEM OR SERVICE: HCPCS | Performed by: STUDENT IN AN ORGANIZED HEALTH CARE EDUCATION/TRAINING PROGRAM

## 2023-10-26 PROCEDURE — C9113 INJ PANTOPRAZOLE SODIUM, VIA: HCPCS | Performed by: STUDENT IN AN ORGANIZED HEALTH CARE EDUCATION/TRAINING PROGRAM

## 2023-10-26 PROCEDURE — 700105 HCHG RX REV CODE 258: Performed by: STUDENT IN AN ORGANIZED HEALTH CARE EDUCATION/TRAINING PROGRAM

## 2023-10-26 PROCEDURE — 770020 HCHG ROOM/CARE - TELE (206)

## 2023-10-26 PROCEDURE — P9016 RBC LEUKOCYTES REDUCED: HCPCS

## 2023-10-26 PROCEDURE — 86900 BLOOD TYPING SEROLOGIC ABO: CPT

## 2023-10-26 PROCEDURE — 94760 N-INVAS EAR/PLS OXIMETRY 1: CPT

## 2023-10-26 PROCEDURE — 82077 ASSAY SPEC XCP UR&BREATH IA: CPT

## 2023-10-26 PROCEDURE — 36415 COLL VENOUS BLD VENIPUNCTURE: CPT

## 2023-10-26 PROCEDURE — 86850 RBC ANTIBODY SCREEN: CPT

## 2023-10-26 PROCEDURE — 700102 HCHG RX REV CODE 250 W/ 637 OVERRIDE(OP): Performed by: STUDENT IN AN ORGANIZED HEALTH CARE EDUCATION/TRAINING PROGRAM

## 2023-10-26 PROCEDURE — 82728 ASSAY OF FERRITIN: CPT

## 2023-10-26 PROCEDURE — 30233N1 TRANSFUSION OF NONAUTOLOGOUS RED BLOOD CELLS INTO PERIPHERAL VEIN, PERCUTANEOUS APPROACH: ICD-10-PCS | Performed by: EMERGENCY MEDICINE

## 2023-10-26 PROCEDURE — 85025 COMPLETE CBC W/AUTO DIFF WBC: CPT

## 2023-10-26 RX ORDER — METOPROLOL SUCCINATE 50 MG/1
25 TABLET, EXTENDED RELEASE ORAL DAILY
Status: ON HOLD | COMMUNITY
End: 2023-10-30

## 2023-10-26 RX ORDER — LORAZEPAM 1 MG/1
1 TABLET ORAL ONCE
Status: COMPLETED | OUTPATIENT
Start: 2023-10-26 | End: 2023-10-26

## 2023-10-26 RX ORDER — PERMETHRIN 50 MG/G
1 CREAM TOPICAL ONCE
Status: ON HOLD | COMMUNITY
End: 2023-10-30

## 2023-10-26 RX ORDER — LORAZEPAM 2 MG/ML
1.5 INJECTION INTRAMUSCULAR
Status: DISCONTINUED | OUTPATIENT
Start: 2023-10-26 | End: 2023-10-30 | Stop reason: HOSPADM

## 2023-10-26 RX ORDER — POLYETHYLENE GLYCOL 3350 17 G/17G
1 POWDER, FOR SOLUTION ORAL
Status: DISCONTINUED | OUTPATIENT
Start: 2023-10-26 | End: 2023-10-30 | Stop reason: HOSPADM

## 2023-10-26 RX ORDER — BISACODYL 10 MG
10 SUPPOSITORY, RECTAL RECTAL
Status: DISCONTINUED | OUTPATIENT
Start: 2023-10-26 | End: 2023-10-30 | Stop reason: HOSPADM

## 2023-10-26 RX ORDER — QUETIAPINE FUMARATE 100 MG/1
100 TABLET, FILM COATED ORAL NIGHTLY
Status: DISCONTINUED | OUTPATIENT
Start: 2023-10-26 | End: 2023-10-30 | Stop reason: HOSPADM

## 2023-10-26 RX ORDER — LORAZEPAM 2 MG/1
4 TABLET ORAL
Status: DISCONTINUED | OUTPATIENT
Start: 2023-10-26 | End: 2023-10-30 | Stop reason: HOSPADM

## 2023-10-26 RX ORDER — QUETIAPINE FUMARATE 200 MG/1
200 TABLET, FILM COATED ORAL NIGHTLY
COMMUNITY

## 2023-10-26 RX ORDER — GAUZE BANDAGE 2" X 2"
100 BANDAGE TOPICAL DAILY
Status: DISCONTINUED | OUTPATIENT
Start: 2023-10-27 | End: 2023-10-27

## 2023-10-26 RX ORDER — QUETIAPINE FUMARATE 200 MG/1
200 TABLET, FILM COATED ORAL NIGHTLY
Status: SHIPPED | COMMUNITY
Start: 2023-05-31 | End: 2023-10-26

## 2023-10-26 RX ORDER — GAUZE BANDAGE 2" X 2"
100 BANDAGE TOPICAL DAILY
Status: DISCONTINUED | OUTPATIENT
Start: 2023-10-26 | End: 2023-10-26

## 2023-10-26 RX ORDER — AMOXICILLIN 250 MG
2 CAPSULE ORAL 2 TIMES DAILY
Status: DISCONTINUED | OUTPATIENT
Start: 2023-10-26 | End: 2023-10-30 | Stop reason: HOSPADM

## 2023-10-26 RX ORDER — FOLIC ACID 1 MG/1
1 TABLET ORAL DAILY
Status: DISCONTINUED | OUTPATIENT
Start: 2023-10-26 | End: 2023-10-26

## 2023-10-26 RX ORDER — LORAZEPAM 2 MG/ML
2 INJECTION INTRAMUSCULAR
Status: DISCONTINUED | OUTPATIENT
Start: 2023-10-26 | End: 2023-10-30 | Stop reason: HOSPADM

## 2023-10-26 RX ORDER — FOLIC ACID 1 MG/1
1 TABLET ORAL DAILY
Status: DISCONTINUED | OUTPATIENT
Start: 2023-10-27 | End: 2023-10-27

## 2023-10-26 RX ORDER — LORAZEPAM 1 MG/1
0.5 TABLET ORAL EVERY 4 HOURS PRN
Status: DISCONTINUED | OUTPATIENT
Start: 2023-10-26 | End: 2023-10-30 | Stop reason: HOSPADM

## 2023-10-26 RX ORDER — OMEPRAZOLE 20 MG/1
20 CAPSULE, DELAYED RELEASE ORAL DAILY
COMMUNITY

## 2023-10-26 RX ORDER — LORAZEPAM 2 MG/1
2 TABLET ORAL
Status: DISCONTINUED | OUTPATIENT
Start: 2023-10-26 | End: 2023-10-30 | Stop reason: HOSPADM

## 2023-10-26 RX ORDER — LORAZEPAM 2 MG/ML
1 INJECTION INTRAMUSCULAR
Status: DISCONTINUED | OUTPATIENT
Start: 2023-10-26 | End: 2023-10-30 | Stop reason: HOSPADM

## 2023-10-26 RX ORDER — LORAZEPAM 1 MG/1
1 TABLET ORAL EVERY 4 HOURS PRN
Status: DISCONTINUED | OUTPATIENT
Start: 2023-10-26 | End: 2023-10-30 | Stop reason: HOSPADM

## 2023-10-26 RX ORDER — PANTOPRAZOLE SODIUM 40 MG/10ML
40 INJECTION, POWDER, LYOPHILIZED, FOR SOLUTION INTRAVENOUS 2 TIMES DAILY
Status: DISCONTINUED | OUTPATIENT
Start: 2023-10-26 | End: 2023-10-30 | Stop reason: HOSPADM

## 2023-10-26 RX ORDER — SODIUM CHLORIDE 9 MG/ML
INJECTION, SOLUTION INTRAVENOUS CONTINUOUS
Status: ACTIVE | OUTPATIENT
Start: 2023-10-26 | End: 2023-10-27

## 2023-10-26 RX ORDER — LANOLIN ALCOHOL/MO/W.PET/CERES
100 CREAM (GRAM) TOPICAL DAILY
Status: ON HOLD | COMMUNITY
End: 2023-10-30

## 2023-10-26 RX ORDER — ACETAMINOPHEN 325 MG/1
650 TABLET ORAL EVERY 6 HOURS PRN
Status: DISCONTINUED | OUTPATIENT
Start: 2023-10-26 | End: 2023-10-27

## 2023-10-26 RX ORDER — LORAZEPAM 2 MG/ML
0.5 INJECTION INTRAMUSCULAR EVERY 4 HOURS PRN
Status: DISCONTINUED | OUTPATIENT
Start: 2023-10-26 | End: 2023-10-30 | Stop reason: HOSPADM

## 2023-10-26 RX ADMIN — DOCUSATE SODIUM 50 MG AND SENNOSIDES 8.6 MG 2 TABLET: 8.6; 5 TABLET, FILM COATED ORAL at 18:11

## 2023-10-26 RX ADMIN — LORAZEPAM 1 MG: 1 TABLET ORAL at 14:03

## 2023-10-26 RX ADMIN — SODIUM CHLORIDE: 9 INJECTION, SOLUTION INTRAVENOUS at 18:13

## 2023-10-26 RX ADMIN — PANTOPRAZOLE SODIUM 40 MG: 40 INJECTION, POWDER, FOR SOLUTION INTRAVENOUS at 21:16

## 2023-10-26 RX ADMIN — Medication 100 MG: at 14:04

## 2023-10-26 RX ADMIN — THERA TABS 1 TABLET: TAB at 14:04

## 2023-10-26 RX ADMIN — FOLIC ACID 1 MG: 1 TABLET ORAL at 14:15

## 2023-10-26 RX ADMIN — QUETIAPINE FUMARATE 100 MG: 100 TABLET ORAL at 21:16

## 2023-10-26 ASSESSMENT — LIFESTYLE VARIABLES
TREMOR: NO TREMOR
ALCOHOL_USE: YES
TREMOR: NO TREMOR
TOTAL SCORE: 0
CONSUMPTION TOTAL: POSITIVE
TOTAL SCORE: 2
HOW MANY TIMES IN THE PAST YEAR HAVE YOU HAD 5 OR MORE DRINKS IN A DAY: 5
VISUAL DISTURBANCES: NOT PRESENT
HEADACHE, FULLNESS IN HEAD: NOT PRESENT
TOTAL SCORE: 0
HEADACHE, FULLNESS IN HEAD: NOT PRESENT
EVER HAD A DRINK FIRST THING IN THE MORNING TO STEADY YOUR NERVES TO GET RID OF A HANGOVER: NO
PAROXYSMAL SWEATS: NO SWEAT VISIBLE
EVER FELT BAD OR GUILTY ABOUT YOUR DRINKING: YES
AUDITORY DISTURBANCES: NOT PRESENT
PAROXYSMAL SWEATS: NO SWEAT VISIBLE
ON A TYPICAL DAY WHEN YOU DRINK ALCOHOL HOW MANY DRINKS DO YOU HAVE: 4
HAVE YOU EVER FELT YOU SHOULD CUT DOWN ON YOUR DRINKING: YES
ORIENTATION AND CLOUDING OF SENSORIUM: ORIENTED AND CAN DO SERIAL ADDITIONS
DOES PATIENT WANT TO TALK TO SOMEONE ABOUT QUITTING: YES
NAUSEA AND VOMITING: NO NAUSEA AND NO VOMITING
DOES PATIENT WANT TO STOP DRINKING: YES
AVERAGE NUMBER OF DAYS PER WEEK YOU HAVE A DRINK CONTAINING ALCOHOL: 7
ORIENTATION AND CLOUDING OF SENSORIUM: ORIENTED AND CAN DO SERIAL ADDITIONS
TOTAL SCORE: 2
AUDITORY DISTURBANCES: NOT PRESENT
NAUSEA AND VOMITING: NO NAUSEA AND NO VOMITING
TOTAL SCORE: 2
VISUAL DISTURBANCES: NOT PRESENT
AGITATION: NORMAL ACTIVITY
ANXIETY: NO ANXIETY (AT EASE)
ANXIETY: NO ANXIETY (AT EASE)
AGITATION: NORMAL ACTIVITY
HAVE PEOPLE ANNOYED YOU BY CRITICIZING YOUR DRINKING: NO

## 2023-10-26 ASSESSMENT — ENCOUNTER SYMPTOMS
FEVER: 0
SPUTUM PRODUCTION: 0
TREMORS: 1
VOMITING: 0
COUGH: 0
DIZZINESS: 1
BLOOD IN STOOL: 0
NAUSEA: 1

## 2023-10-26 ASSESSMENT — FIBROSIS 4 INDEX
FIB4 SCORE: 16.7
FIB4 SCORE: 24.82

## 2023-10-26 ASSESSMENT — COGNITIVE AND FUNCTIONAL STATUS - GENERAL
MOBILITY SCORE: 24
SUGGESTED CMS G CODE MODIFIER MOBILITY: CH
SUGGESTED CMS G CODE MODIFIER DAILY ACTIVITY: CH
DAILY ACTIVITIY SCORE: 24

## 2023-10-26 ASSESSMENT — PAIN DESCRIPTION - PAIN TYPE
TYPE: ACUTE PAIN
TYPE: CHRONIC PAIN

## 2023-10-26 NOTE — ASSESSMENT & PLAN NOTE
Admit inpatient to telemetry    At this point there is no concern of acute GI bleeding, patient denying melena, hematemesis  He does have a history of esophageal varices, AVM    1 unit PRBC ordered  Monitor H&H closely  Added Protonix drip  Continue IV fluid  Checking occult blood stool    Need serial monitoring of H&H.

## 2023-10-26 NOTE — H&P
Hospital Medicine History & Physical Note    Date of Service  10/26/2023    Primary Care Physician  Nnamdi Ballesteros M.D.        Code Status  Full Code    Chief Complaint  Chief Complaint   Patient presents with    Cramping     Cramping in bilateral legs x2 days intermittently     Lightheadedness     x2 days intermittently    Nausea     Intermittent nausea x2 days but pt denies vomiting or diarrhea       History of Presenting Illness  Inocencio Shabazz is a 63 y.o. male with past medical history of chronic alcohol abuse, cirrhosis, esophageal varices, seizure disorder who presented 10/26/2023 with multiple symptoms, reports of dizziness, lightheadedness, generalized fatigue, muscle cramping for last 2 to 3 days.  He denies vomiting, hematemesis, melena.  No fever, chest pain, shortness of breath, no weakness/numbness on exam.Reports he is been drinking alcohol every day and last drink was yesterday.    S/p  EGD on 8/10/2023  for hematemesis, noted to have, esophageal varices portal hypertensive gastropathy, 2 AVM.  Required electrocauterization for bleeding.        On arrival to ED his vitals remained stable.  Labs reviewed and noted with severe pancytopenia, white count 1.3, absolute neutrophil count 0.7, hemoglobin 7, platelet count 42, chemistry unremarkable.  Elevated alcohol level 17.    1 unit PRBC has been ordered    I spoke and discussed the case with the ER physician, Dr. Bonilla.  Patient will be admitted to the hospital for further evaluation and management for symptomatic anemia.  Acute alcohol withdrawal.  Patient receiving PRBC transfusion for anemia.  Need close H&H monitoring, IV Protonix.  On CIWA protocol for Withdrawal.    Need close monitoring of blood counts, electrolytes and renal function due to potential of organ dysfunction due to alcohol withdrawal.  High risk of deterioration . Need to be monitor closely under telemetry for DTs..           I discussed the plan of care with  patient.    Review of Systems  Review of Systems   Constitutional:  Positive for malaise/fatigue. Negative for fever.   Respiratory:  Negative for cough and sputum production.    Gastrointestinal:  Positive for nausea. Negative for blood in stool, melena and vomiting.   Neurological:  Positive for dizziness and tremors.       Past Medical History   has a past medical history of Alcohol abuse, Bipolar disorder (HCC), Cirrhosis (HCC), GIB (gastrointestinal bleeding) (01/03/2016), Hepatitis C, Pacemaker, Pancytopenia (HCC), and Schizophrenia (HCC).    Surgical History   has a past surgical history that includes gastroscopy (1/3/2016); gastroscopy (4/8/2016); gastroscopy (3/13/2019); gastroscopy-endo (N/A, 11/13/2019); pr upper gi endoscopy,diagnosis (N/A, 8/10/2023); and pr upper gi endoscopy,ctrl bleed (N/A, 8/10/2023).     Family History  family history is not on file.   Family history reviewed with patient. There is no family history that is pertinent to the chief complaint.     Social History   reports that he has been smoking cigarettes. He has a 48.0 pack-year smoking history. He has never used smokeless tobacco. He reports current alcohol use. He reports current drug use. Drug: Inhaled.    Allergies  Allergies   Allergen Reactions    Haloperidol Unspecified     Twitching/involuntary movements        Medications  Prior to Admission Medications   Prescriptions Last Dose Informant Patient Reported? Taking?   Magnesium Oxide 420 (252 Mg) MG Tab  Other Facility Yes No   Sig: Take 1 Tablet by mouth every day.   QUEtiapine (SEROQUEL) 100 MG Tab   No No   Sig: Take 1 Tablet by mouth every evening.   diclofenac sodium (VOLTAREN) 1 % Gel  Other Facility Yes No   Sig: Apply 4 g topically 4 times a day.   folic acid (FOLVITE) 1 MG Tab  Other Facility No No   Sig: Take 1 Tablet by mouth every day.   gabapentin (NEURONTIN) 300 MG Cap   No No   Sig: Take 1 Capsule by mouth in the morning, at noon, and at bedtime.   metoprolol  "SR (TOPROL XL) 25 MG TABLET SR 24 HR   Yes No   Sig: Take 25 mg by mouth every day. Patient not sure what dose he is taking   omeprazole (PRILOSEC) 20 MG delayed-release capsule   No No   Sig: Take 1 Capsule by mouth 2 times a day.      Facility-Administered Medications: None       Physical Exam  Temp:  [37 °C (98.6 °F)] 37 °C (98.6 °F)  Pulse:  [] 89  Resp:  [16] 16  BP: (123-136)/(70-86) 123/70  SpO2:  [93 %-98 %] 93 %  Blood Pressure : 123/70   Temperature: 37 °C (98.6 °F)   Pulse: 89   Respiration: 16   Pulse Oximetry: 93 %       Physical Exam  Constitutional:       Appearance: He is ill-appearing.   HENT:      Mouth/Throat:      Mouth: Mucous membranes are dry.   Cardiovascular:      Rate and Rhythm: Normal rate and regular rhythm.      Heart sounds: No murmur heard.  Pulmonary:      Effort: Pulmonary effort is normal. No respiratory distress.      Breath sounds: Normal breath sounds.   Abdominal:      General: Abdomen is flat. There is no distension.   Musculoskeletal:      Right lower leg: No edema.      Left lower leg: No edema.   Skin:     General: Skin is warm.   Neurological:      General: No focal deficit present.      Mental Status: He is oriented to person, place, and time.      Comments: Noted generalized tremor on exam   Psychiatric:         Mood and Affect: Mood normal.         Laboratory:  Recent Labs     10/26/23  1405   WBC 1.3*   RBC 2.99*   HEMOGLOBIN 7.0*   HEMATOCRIT 23.8*   MCV 79.6*   MCH 23.4*   MCHC 29.4*   RDW 58.7*   PLATELETCT 42*     Recent Labs     10/26/23  1405   SODIUM 139   POTASSIUM 4.3   CHLORIDE 106   CO2 20   GLUCOSE 68   BUN 6*   CREATININE 0.51   CALCIUM 8.1*     Recent Labs     10/26/23  1405   ALTSGPT 33   ASTSGOT 62*   ALKPHOSPHAT 79   TBILIRUBIN 0.6   GLUCOSE 68         No results for input(s): \"NTPROBNP\" in the last 72 hours.      No results for input(s): \"TROPONINT\" in the last 72 hours.    Imaging:  No orders to display       no X-Ray or EKG requiring " interpretation    Assessment/Plan:  Justification for Admission Status  I anticipate this patient will require at least two midnights for appropriate medical management, necessitating inpatient admission   for further evaluation and management for symptomatic anemia.  Acute alcohol withdrawal.  Patient receiving PRBC transfusion for anemia.  Need close H&H monitoring, IV Protonix.  On CIWA protocol for Withdrawal.    Need close monitoring of blood counts, electrolytes and renal function due to potential of organ dysfunction due to alcohol withdrawal.  High risk of deterioration . Need to be monitor closely under telemetry for DTs..         * Symptomatic anemia- (present on admission)  Assessment & Plan    Admit inpatient to telemetry    At this point there is no concern of acute GI bleeding, patient denying melena, hematemesis  He does have a history of esophageal varices, AVM    1 unit PRBC ordered  Monitor H&H closely  Added Protonix drip  Continue IV fluid  Checking occult blood stool    Need serial monitoring of H&H.    Secondary esophageal varices (HCC)  Assessment & Plan  S/p  EGD on 8/10/2023  for hematemesis, noted to have, esophageal varices portal hypertensive gastropathy, 2 AVM.  Required electrocauterization for bleeding.    On PPI  Monitor H&H closely  Currently no concern of GI bleeding        Alcohol abuse with withdrawal (HCC)- (present on admission)  Assessment & Plan  Noted generalized tremor on exam  Initiate CIWA protocol  IV fluid   Mg, P, CK,   Folate/thiamine  Watch for DT  Keep head of bed 45°   Aspiration precautions, seizure precautions        Pancytopenia (HCC)- (present on admission)  Assessment & Plan  Insetting of alcohol abuse  Continue monitor      Schizophrenia (HCC)- (present on admission)  Assessment & Plan  Continue Seroquel    Cirrhosis (HCC)- (present on admission)  Assessment & Plan  History of cirrhosis               VTE prophylaxis: SCDs/TEDs    I independently reviewed  pertinent clinical lab tests since admission and ordered additional follow up clinical lab tests.  Admission order was placed.  Labs ordered for follow-up.  I independently reviewed pertinent radiographic images and the radiologist's reports since admission and ordered additional follow up radiographic studies.  The details of the available patient records in [facility] (including laboratory tests, culture data, medications, imaging, and other pertinent diagnostic tests) and that information was utilized as warranted in today's medical decision making for this patient.  I personally evaluated the patient condition at bedside.     Care interventions include:   Review of interval medical and surgical history, current medications and outpatient medication reconciliation, interval imaging studies and radiologist interpretation, and interval laboratory values.

## 2023-10-26 NOTE — ED NOTES
Pt amb to green 23 with steady gait. Agree with triage. Pt unsure of when he had his last drink. +1 edema to LLE. Denies CP/SOB. ERP at bedside.

## 2023-10-26 NOTE — ASSESSMENT & PLAN NOTE
Noted generalized tremor on exam  Initiate CIWA protocol  IV fluid   Mg, P, CK,   Folate/thiamine  Watch for DT  Keep head of bed 45°   Aspiration precautions, seizure precautions

## 2023-10-26 NOTE — ED PROVIDER NOTES
ER Provider Note    Scribed for Magdy Bonilla M.D. by Davie Melo. 10/26/2023   1:42 PM    Primary Care Provider: Nnamdi Ballesteros M.D.    CHIEF COMPLAINT  Chief Complaint   Patient presents with    Cramping     Cramping in bilateral legs x2 days intermittently     Lightheadedness     x2 days intermittently    Nausea     Intermittent nausea x2 days but pt denies vomiting or diarrhea     EXTERNAL RECORDS REVIEWED  Outpatient Notes The patient had his thyroid checked in April of this year and it was normal and Other The patient was last seen in the ED on 10/11/23. He has a history of Schizophrenia, Alcohol abuse, and Pancytopenia.       HPI/ROS  LIMITATION TO HISTORY   Select: : None    OUTSIDE HISTORIAN(S):  None    Inocencio Shabazz is a 63 y.o. male who presents to the ED for evaluation of bilateral lower extremity cramping onset two days ago. The patient states the cramping is most significant in his calf and occurs intermittently throughout the day. He denies any fever, nausea, vomiting, diarrhea, chest pain, or shortness of breath. He admits to drinking a couple pints of whiskey a day and notes his last drink was yesterday. However, he denies any symptoms of alcohol withdraw. He states he is compliant with his current medications. He also admits to smoking marijuana, but denies any other recreational drug use.     PAST MEDICAL HISTORY  Past Medical History:   Diagnosis Date    Alcohol abuse     Bipolar disorder (HCC)     Cirrhosis (HCC)     GIB (gastrointestinal bleeding) 01/03/2016    Hepatitis C     Pacemaker     left chest    Pancytopenia (HCC)     Schizophrenia (HCC)        SURGICAL HISTORY  Past Surgical History:   Procedure Laterality Date    PA UPPER GI ENDOSCOPY,DIAGNOSIS N/A 8/10/2023    Procedure: GASTROSCOPY;  Surgeon: Joleen Yañez M.D.;  Location: SURGERY SAME DAY [facility];  Service: Gastroenterology    PA UPPER GI ENDOSCOPY,CTRL BLEED N/A 8/10/2023    Procedure: GASTROSCOPY, WITH ARGON  PLASMA COAGULATION;  Surgeon: Joleen Yañez M.D.;  Location: SURGERY SAME DAY [facility];  Service: Gastroenterology    GASTROSCOPY-ENDO N/A 11/13/2019    Procedure: GASTROSCOPY with banding;  Surgeon: Tamela Smith M.D.;  Location: ENDOSCOPY [facility];  Service: Gastroenterology    GASTROSCOPY  3/13/2019    Procedure: GASTROSCOPY;  Surgeon: Abdi Ba M.D.;  Location: SURGERY [facility];  Service: Gastroenterology    GASTROSCOPY  4/8/2016    Procedure: GASTROSCOPY;  Surgeon: Braeden Tobin M.D.;  Location: SURGERY [facility];  Service:     GASTROSCOPY  1/3/2016    Procedure: GASTROSCOPY WITH BANDING;  Surgeon: Alfredo Antonio M.D.;  Location: SURGERY [facility];  Service:        FAMILY HISTORY  History reviewed. No pertinent family history.    SOCIAL HISTORY   reports that he has been smoking cigarettes. He has a 48.0 pack-year smoking history. He has never used smokeless tobacco. He reports current alcohol use. He reports current drug use. Drug: Inhaled.    CURRENT MEDICATIONS  Previous Medications    DICLOFENAC SODIUM (VOLTAREN) 1 % GEL    Apply 4 g topically 4 times a day.    FOLIC ACID (FOLVITE) 1 MG TAB    Take 1 Tablet by mouth every day.    GABAPENTIN (NEURONTIN) 300 MG CAP    Take 1 Capsule by mouth in the morning, at noon, and at bedtime.    MAGNESIUM OXIDE 420 (252 MG) MG TAB    Take 1 Tablet by mouth every day.    METOPROLOL SR (TOPROL XL) 25 MG TABLET SR 24 HR    Take 25 mg by mouth every day. Patient not sure what dose he is taking    OMEPRAZOLE (PRILOSEC) 20 MG DELAYED-RELEASE CAPSULE    Take 1 Capsule by mouth 2 times a day.    QUETIAPINE (SEROQUEL) 100 MG TAB    Take 1 Tablet by mouth every evening.    THIAMINE (THIAMINE) 100 MG TABLET    Take 100 mg by mouth every day.       ALLERGIES  Allergies   Allergen Reactions    Haloperidol Unspecified     Twitching/involuntary movements         PHYSICAL EXAM  /86   Pulse (!) 115   Temp 37 °C (98.6 °F) (Temporal)    "Resp 16   Ht 1.727 m (5' 8\")   Wt 61.4 kg (135 lb 5.8 oz)   SpO2 98%   BMI 20.58 kg/m²    Constitutional: Well developed, Well nourished, Mild distress,    HENT: Normocephalic, Atraumatic   Eyes: PERRLA, EOMI, Conjunctiva normal, No discharge.   Neck: No tenderness, Supple, No stridor.   Cardiovascular: Normal heart rate, Normal rhythm.   Thorax & Lungs: Clear to auscultation bilaterally, No respiratory distress, No wheezing, No crackles.   Abdomen: Soft, No tenderness, No masses, No pulsatile masses.   Skin: Warm, Dry, No erythema, No rash.    Musculoskeletal: Questionable cramping in lower extremities,No major deformities noted.  Intact distal pulses  Neurologic: Awake, alert. Moves all extremities spontaneously.  Psychiatric:Odd affect, Poor eye contact, Judgment normal, Mood normal.        DIAGNOSTIC STUDIES    Labs:   Results for orders placed or performed during the hospital encounter of 10/26/23   CBC WITH DIFFERENTIAL   Result Value Ref Range    WBC 1.3 (LL) 4.8 - 10.8 K/uL    RBC 2.99 (L) 4.70 - 6.10 M/uL    Hemoglobin 7.0 (L) 14.0 - 18.0 g/dL    Hematocrit 23.8 (L) 42.0 - 52.0 %    MCV 79.6 (L) 81.4 - 97.8 fL    MCH 23.4 (L) 27.0 - 33.0 pg    MCHC 29.4 (L) 32.3 - 36.5 g/dL    RDW 58.7 (H) 35.9 - 50.0 fL    Platelet Count 42 (L) 164 - 446 K/uL    Neutrophils-Polys 55.00 44.00 - 72.00 %    Lymphocytes 26.80 22.00 - 41.00 %    Monocytes 13.40 0.00 - 13.40 %    Eosinophils 2.40 0.00 - 6.90 %    Basophils 1.60 0.00 - 1.80 %    Immature Granulocytes 0.80 0.00 - 0.90 %    Nucleated RBC 0.00 0.00 - 0.20 /100 WBC    Neutrophils (Absolute) 0.70 (L) 1.82 - 7.42 K/uL    Lymphs (Absolute) 0.34 (L) 1.00 - 4.80 K/uL    Monos (Absolute) 0.17 0.00 - 0.85 K/uL    Eos (Absolute) 0.03 0.00 - 0.51 K/uL    Baso (Absolute) 0.02 0.00 - 0.12 K/uL    Immature Granulocytes (abs) 0.01 0.00 - 0.11 K/uL    NRBC (Absolute) 0.00 K/uL    Hypochromia 1+     Anisocytosis 1+     Microcytosis 1+    COMP METABOLIC PANEL   Result Value Ref " Range    Sodium 139 135 - 145 mmol/L    Potassium 4.3 3.6 - 5.5 mmol/L    Chloride 106 96 - 112 mmol/L    Co2 20 20 - 33 mmol/L    Anion Gap 13.0 7.0 - 16.0    Glucose 68 65 - 99 mg/dL    Bun 6 (L) 8 - 22 mg/dL    Creatinine 0.51 0.50 - 1.40 mg/dL    Calcium 8.1 (L) 8.5 - 10.5 mg/dL    Correct Calcium 8.5 8.5 - 10.5 mg/dL    AST(SGOT) 62 (H) 12 - 45 U/L    ALT(SGPT) 33 2 - 50 U/L    Alkaline Phosphatase 79 30 - 99 U/L    Total Bilirubin 0.6 0.1 - 1.5 mg/dL    Albumin 3.5 3.2 - 4.9 g/dL    Total Protein 7.4 6.0 - 8.2 g/dL    Globulin 3.9 (H) 1.9 - 3.5 g/dL    A-G Ratio 0.9 g/dL   DIAGNOSTIC ALCOHOL   Result Value Ref Range    Diagnostic Alcohol 17.0 (H) <10.1 mg/dL   ESTIMATED GFR   Result Value Ref Range    GFR (CKD-EPI) 112 >60 mL/min/1.73 m 2   PLATELET ESTIMATE   Result Value Ref Range    Plt Estimation Decreased    MORPHOLOGY   Result Value Ref Range    RBC Morphology Present     Poikilocytosis 1+     Ovalocytes 1+    PERIPHERAL SMEAR REVIEW   Result Value Ref Range    Peripheral Smear Review see below    IMMATURE PLT FRACTION   Result Value Ref Range    Imm. Plt Fraction 9.2 0.6 - 13.1 %   DIFFERENTIAL COMMENT   Result Value Ref Range    Comments-Diff see below          COURSE & MEDICAL DECISION MAKING     ED Observation Status? Yes; I am placing the patient in to an observation status due to a diagnostic uncertainty as well as therapeutic intensity. Patient placed in observation status at 1:48 PM, 10/26/2023.     Observation plan is as follows: Monitor for symptom management and diagnostic results.     Upon Reevaluation, the patient's condition has: not improved; and will be escalated to hospitalization.    Patient discharged from ED Observation status at 3:52 PM, 10/26/2023.    INITIAL ASSESSMENT, COURSE AND PLAN  Differential diagnoses include but not limited to: Alcohol withdraw, Metabolic, Schizophrenia.      Care Narrative: Patient coming in secondary to not feeling well.  The patient is a very poor  historian likely due to the patient's schizophrenia and chronic psychiatric medicines.  Laboratory tests were obtained and the patient had a drop in his hemoglobin from 9 6-7.0.  The patient denies any bloody vomit.  Patient does not know about any black or bloody bowel movements.  I will transfuse the patient a unit of blood, observe the patient in the hospital.  Discussed case with Dr. Brown for hospitalization    1:42 PM - Patient was evaluated at bedside. Ordered for CBC with differential, CMP, and Diagnostic alcohol to evaluate. The patient will be medicated with Thiamine tablet 100 mg, Multivitamin one tablet, Ativan 1 mg tablet, and Folic acid 1 mg tablet for his symptoms. Patient verbalizes understanding and support with my plan of care.     I have explained to the patient the risks and benefits of transfusion of blood products.  This includes, as appropriate, the risk of mild allergic reaction, hemolytic reaction, transfusion-associated lung injury, febrile reactions, circulatory or iron overload, and infection.    We discussed possible alternatives and their risks, including directed donation, autologous transfusion, and no transfusion, including IV or oral iron supplementation, as appropriate.  I believe the patient understands the risks and benefits and was able to express understanding.    DISPOSITION AND DISCUSSIONS    I have discussed management of the patient with the following physicians and LAISHA's:  Dr. Brown    FINAL DIAGNOSIS  1. Lightheadedness    2. Nausea    3. Alcohol abuse         Dvaie ALMAZAN (Jonny), am scribing for, and in the presence of, Magdy Bonilla M.D..    Electronically signed by: Davie Melo (Jonny), 10/26/2023    Magdy ALMAZAN M.D. personally performed the services described in this documentation, as scribed by Davie Melo in my presence, and it is both accurate and complete.      The note accurately reflects work and decisions made by me.  Magdy Bonilla  M.D.  10/26/2023  3:53 PM

## 2023-10-26 NOTE — ED TRIAGE NOTES
"Chief Complaint   Patient presents with    Cramping     Cramping in bilateral legs x2 days intermittently     Lightheadedness     x2 days intermittently    Nausea     Intermittent nausea x2 days but pt denies vomiting or diarrhea     Pt ambulatory to triage for above complaint. Pt very vague in his complaints and is not elaborating but just keeps saying \"I just don't feel good.\" Pt does report drinking a couple pints of whiskey a day but denies drug use other than marijuana. Pt calm and cooperative in triage.     Pt is alert/oriented and follows commands. Pt speaking in full sentences and responds appropriately to questions. No acute distress noted in triage and respirations are even and unlabored.     Pt placed in lobby and educated on triage process. Pt encouraged to alert staff for any changes in condition.  "

## 2023-10-26 NOTE — ASSESSMENT & PLAN NOTE
S/p  EGD on 8/10/2023  for hematemesis, noted to have, esophageal varices portal hypertensive gastropathy, 2 AVM.  Required electrocauterization for bleeding.    On PPI  Monitor H&H closely  Currently no concern of GI bleeding

## 2023-10-26 NOTE — ED NOTES
Med Rec UPDATED and COMPLETE per PATIENT AND [facility]  Allergies Reviewed  Antibiotcs in past 30 days:NO  Anticoagulant in past 14 days:NO  Preferred [facility]    Pt was able to name medications he takes  Pt dosed off in the middle of med rec interview  Unable to verify if pt is using Voltaren Gel or Permethrin Cream    Verified current medication's strengths and directions with [facility]

## 2023-10-27 ENCOUNTER — APPOINTMENT (OUTPATIENT)
Dept: RADIOLOGY | Facility: MEDICAL CENTER | Age: 63
DRG: 433 | End: 2023-10-27
Payer: MEDICARE

## 2023-10-27 PROBLEM — I47.20 V-TACH (HCC): Status: ACTIVE | Noted: 2023-10-27

## 2023-10-27 LAB
ALBUMIN SERPL BCP-MCNC: 3.1 G/DL (ref 3.2–4.9)
ALBUMIN/GLOB SERPL: 0.8 G/DL
ALP SERPL-CCNC: 83 U/L (ref 30–99)
ALT SERPL-CCNC: 26 U/L (ref 2–50)
ANION GAP SERPL CALC-SCNC: 11 MMOL/L (ref 7–16)
ANISOCYTOSIS BLD QL SMEAR: ABNORMAL
AST SERPL-CCNC: 53 U/L (ref 12–45)
BASOPHILS # BLD AUTO: 1.7 % (ref 0–1.8)
BASOPHILS # BLD AUTO: 2.7 % (ref 0–1.8)
BASOPHILS # BLD: 0.02 K/UL (ref 0–0.12)
BASOPHILS # BLD: 0.03 K/UL (ref 0–0.12)
BILIRUB SERPL-MCNC: 1.4 MG/DL (ref 0.1–1.5)
BUN SERPL-MCNC: 7 MG/DL (ref 8–22)
CALCIUM ALBUM COR SERPL-MCNC: 8.9 MG/DL (ref 8.5–10.5)
CALCIUM SERPL-MCNC: 8.2 MG/DL (ref 8.5–10.5)
CHLORIDE SERPL-SCNC: 104 MMOL/L (ref 96–112)
CO2 SERPL-SCNC: 20 MMOL/L (ref 20–33)
CREAT SERPL-MCNC: 0.51 MG/DL (ref 0.5–1.4)
EKG IMPRESSION: NORMAL
EKG IMPRESSION: NORMAL
EOSINOPHIL # BLD AUTO: 0.05 K/UL (ref 0–0.51)
EOSINOPHIL # BLD AUTO: 0.09 K/UL (ref 0–0.51)
EOSINOPHIL NFR BLD: 4.5 % (ref 0–6.9)
EOSINOPHIL NFR BLD: 7.6 % (ref 0–6.9)
ERYTHROCYTE [DISTWIDTH] IN BLOOD BY AUTOMATED COUNT: 53.3 FL (ref 35.9–50)
ERYTHROCYTE [DISTWIDTH] IN BLOOD BY AUTOMATED COUNT: 54.5 FL (ref 35.9–50)
FERRITIN SERPL-MCNC: 19.1 NG/ML (ref 22–322)
GFR SERPLBLD CREATININE-BSD FMLA CKD-EPI: 112 ML/MIN/1.73 M 2
GLOBULIN SER CALC-MCNC: 4 G/DL (ref 1.9–3.5)
GLUCOSE SERPL-MCNC: 127 MG/DL (ref 65–99)
HCT VFR BLD AUTO: 26.4 % (ref 42–52)
HCT VFR BLD AUTO: 27.7 % (ref 42–52)
HGB BLD-MCNC: 8.1 G/DL (ref 14–18)
HGB BLD-MCNC: 8.7 G/DL (ref 14–18)
HYPOCHROMIA BLD QL SMEAR: ABNORMAL
IMM GRANULOCYTES # BLD AUTO: 0.01 K/UL (ref 0–0.11)
IMM GRANULOCYTES NFR BLD AUTO: 0.8 % (ref 0–0.9)
IRON SATN MFR SERPL: 45 % (ref 15–55)
IRON SERPL-MCNC: 161 UG/DL (ref 50–180)
LYMPHOCYTES # BLD AUTO: 0.2 K/UL (ref 1–4.8)
LYMPHOCYTES # BLD AUTO: 0.24 K/UL (ref 1–4.8)
LYMPHOCYTES NFR BLD: 16.8 % (ref 22–41)
LYMPHOCYTES NFR BLD: 24.1 % (ref 22–41)
MAGNESIUM SERPL-MCNC: 1.9 MG/DL (ref 1.5–2.5)
MANUAL DIFF BLD: NORMAL
MCH RBC QN AUTO: 24.3 PG (ref 27–33)
MCH RBC QN AUTO: 24.4 PG (ref 27–33)
MCHC RBC AUTO-ENTMCNC: 30.7 G/DL (ref 32.3–36.5)
MCHC RBC AUTO-ENTMCNC: 31.4 G/DL (ref 32.3–36.5)
MCV RBC AUTO: 77.6 FL (ref 81.4–97.8)
MCV RBC AUTO: 79 FL (ref 81.4–97.8)
MICROCYTES BLD QL SMEAR: ABNORMAL
MONOCYTES # BLD AUTO: 0.08 K/UL (ref 0–0.85)
MONOCYTES # BLD AUTO: 0.16 K/UL (ref 0–0.85)
MONOCYTES NFR BLD AUTO: 13.4 % (ref 0–13.4)
MONOCYTES NFR BLD AUTO: 8 % (ref 0–13.4)
MORPHOLOGY BLD-IMP: NORMAL
NEUTROPHILS # BLD AUTO: 0.61 K/UL (ref 1.82–7.42)
NEUTROPHILS # BLD AUTO: 0.71 K/UL (ref 1.82–7.42)
NEUTROPHILS NFR BLD: 59.7 % (ref 44–72)
NEUTROPHILS NFR BLD: 60.7 % (ref 44–72)
NRBC # BLD AUTO: 0 K/UL
NRBC # BLD AUTO: 0 K/UL
NRBC BLD-RTO: 0 /100 WBC (ref 0–0.2)
NRBC BLD-RTO: 0 /100 WBC (ref 0–0.2)
OVALOCYTES BLD QL SMEAR: NORMAL
PHOSPHATE SERPL-MCNC: 3 MG/DL (ref 2.5–4.5)
PLATELET # BLD AUTO: 29 K/UL (ref 164–446)
PLATELET # BLD AUTO: 31 K/UL (ref 164–446)
PLATELET BLD QL SMEAR: NORMAL
PLATELETS.RETICULATED NFR BLD AUTO: 9.1 % (ref 0.6–13.1)
PLATELETS.RETICULATED NFR BLD AUTO: 9.7 % (ref 0.6–13.1)
POIKILOCYTOSIS BLD QL SMEAR: NORMAL
POTASSIUM SERPL-SCNC: 3.6 MMOL/L (ref 3.6–5.5)
PROT SERPL-MCNC: 7.1 G/DL (ref 6–8.2)
RBC # BLD AUTO: 3.34 M/UL (ref 4.7–6.1)
RBC # BLD AUTO: 3.57 M/UL (ref 4.7–6.1)
RBC BLD AUTO: PRESENT
SCCMEC + MECA PNL NOSE NAA+PROBE: NEGATIVE
SODIUM SERPL-SCNC: 135 MMOL/L (ref 135–145)
TIBC SERPL-MCNC: 357 UG/DL (ref 250–450)
TROPONIN T SERPL-MCNC: 6 NG/L (ref 6–19)
UIBC SERPL-MCNC: 196 UG/DL (ref 110–370)
WBC # BLD AUTO: 1 K/UL (ref 4.8–10.8)
WBC # BLD AUTO: 1.2 K/UL (ref 4.8–10.8)

## 2023-10-27 PROCEDURE — 84100 ASSAY OF PHOSPHORUS: CPT

## 2023-10-27 PROCEDURE — 85007 BL SMEAR W/DIFF WBC COUNT: CPT

## 2023-10-27 PROCEDURE — 76705 ECHO EXAM OF ABDOMEN: CPT

## 2023-10-27 PROCEDURE — 700101 HCHG RX REV CODE 250: Performed by: STUDENT IN AN ORGANIZED HEALTH CARE EDUCATION/TRAINING PROGRAM

## 2023-10-27 PROCEDURE — 85025 COMPLETE CBC W/AUTO DIFF WBC: CPT

## 2023-10-27 PROCEDURE — 99233 SBSQ HOSP IP/OBS HIGH 50: CPT | Mod: GC | Performed by: INTERNAL MEDICINE

## 2023-10-27 PROCEDURE — 87641 MR-STAPH DNA AMP PROBE: CPT

## 2023-10-27 PROCEDURE — 93010 ELECTROCARDIOGRAM REPORT: CPT | Mod: 76 | Performed by: INTERNAL MEDICINE

## 2023-10-27 PROCEDURE — 700102 HCHG RX REV CODE 250 W/ 637 OVERRIDE(OP): Performed by: STUDENT IN AN ORGANIZED HEALTH CARE EDUCATION/TRAINING PROGRAM

## 2023-10-27 PROCEDURE — A9270 NON-COVERED ITEM OR SERVICE: HCPCS

## 2023-10-27 PROCEDURE — 700102 HCHG RX REV CODE 250 W/ 637 OVERRIDE(OP)

## 2023-10-27 PROCEDURE — 700111 HCHG RX REV CODE 636 W/ 250 OVERRIDE (IP): Mod: JZ

## 2023-10-27 PROCEDURE — 83540 ASSAY OF IRON: CPT

## 2023-10-27 PROCEDURE — 700111 HCHG RX REV CODE 636 W/ 250 OVERRIDE (IP): Performed by: STUDENT IN AN ORGANIZED HEALTH CARE EDUCATION/TRAINING PROGRAM

## 2023-10-27 PROCEDURE — 83550 IRON BINDING TEST: CPT

## 2023-10-27 PROCEDURE — 85027 COMPLETE CBC AUTOMATED: CPT

## 2023-10-27 PROCEDURE — 770020 HCHG ROOM/CARE - TELE (206)

## 2023-10-27 PROCEDURE — 93005 ELECTROCARDIOGRAM TRACING: CPT

## 2023-10-27 PROCEDURE — 700105 HCHG RX REV CODE 258: Performed by: STUDENT IN AN ORGANIZED HEALTH CARE EDUCATION/TRAINING PROGRAM

## 2023-10-27 PROCEDURE — 36415 COLL VENOUS BLD VENIPUNCTURE: CPT

## 2023-10-27 PROCEDURE — 83735 ASSAY OF MAGNESIUM: CPT

## 2023-10-27 PROCEDURE — 97162 PT EVAL MOD COMPLEX 30 MIN: CPT

## 2023-10-27 PROCEDURE — A9270 NON-COVERED ITEM OR SERVICE: HCPCS | Performed by: STUDENT IN AN ORGANIZED HEALTH CARE EDUCATION/TRAINING PROGRAM

## 2023-10-27 PROCEDURE — 85055 RETICULATED PLATELET ASSAY: CPT

## 2023-10-27 PROCEDURE — 84484 ASSAY OF TROPONIN QUANT: CPT

## 2023-10-27 PROCEDURE — C9113 INJ PANTOPRAZOLE SODIUM, VIA: HCPCS | Performed by: STUDENT IN AN ORGANIZED HEALTH CARE EDUCATION/TRAINING PROGRAM

## 2023-10-27 PROCEDURE — 93010 ELECTROCARDIOGRAM REPORT: CPT | Performed by: INTERNAL MEDICINE

## 2023-10-27 PROCEDURE — 80053 COMPREHEN METABOLIC PANEL: CPT

## 2023-10-27 RX ORDER — POTASSIUM CHLORIDE 20 MEQ/1
40 TABLET, EXTENDED RELEASE ORAL ONCE
Status: COMPLETED | OUTPATIENT
Start: 2023-10-27 | End: 2023-10-27

## 2023-10-27 RX ORDER — GAUZE BANDAGE 2" X 2"
100 BANDAGE TOPICAL 3 TIMES DAILY
Status: DISCONTINUED | OUTPATIENT
Start: 2023-10-27 | End: 2023-10-30 | Stop reason: HOSPADM

## 2023-10-27 RX ORDER — MAGNESIUM SULFATE HEPTAHYDRATE 40 MG/ML
2 INJECTION, SOLUTION INTRAVENOUS ONCE
Status: COMPLETED | OUTPATIENT
Start: 2023-10-27 | End: 2023-10-27

## 2023-10-27 RX ORDER — FOLIC ACID 1 MG/1
1 TABLET ORAL DAILY
Status: COMPLETED | OUTPATIENT
Start: 2023-10-28 | End: 2023-10-30

## 2023-10-27 RX ADMIN — FOLIC ACID: 5 INJECTION, SOLUTION INTRAMUSCULAR; INTRAVENOUS; SUBCUTANEOUS at 00:48

## 2023-10-27 RX ADMIN — PANTOPRAZOLE SODIUM 40 MG: 40 INJECTION, POWDER, FOR SOLUTION INTRAVENOUS at 05:43

## 2023-10-27 RX ADMIN — PANTOPRAZOLE SODIUM 40 MG: 40 INJECTION, POWDER, FOR SOLUTION INTRAVENOUS at 16:12

## 2023-10-27 RX ADMIN — Medication 100 MG: at 20:58

## 2023-10-27 RX ADMIN — POTASSIUM CHLORIDE 40 MEQ: 1500 TABLET, EXTENDED RELEASE ORAL at 15:35

## 2023-10-27 RX ADMIN — FOLIC ACID 1 MG: 1 TABLET ORAL at 05:41

## 2023-10-27 RX ADMIN — DOCUSATE SODIUM 50 MG AND SENNOSIDES 8.6 MG 2 TABLET: 8.6; 5 TABLET, FILM COATED ORAL at 16:10

## 2023-10-27 RX ADMIN — SODIUM CHLORIDE: 9 INJECTION, SOLUTION INTRAVENOUS at 05:44

## 2023-10-27 RX ADMIN — QUETIAPINE FUMARATE 100 MG: 100 TABLET ORAL at 20:58

## 2023-10-27 RX ADMIN — Medication 100 MG: at 15:36

## 2023-10-27 RX ADMIN — MAGNESIUM SULFATE HEPTAHYDRATE 2 G: 2 INJECTION, SOLUTION INTRAVENOUS at 15:41

## 2023-10-27 RX ADMIN — THERA TABS 1 TABLET: TAB at 05:41

## 2023-10-27 RX ADMIN — Medication 100 MG: at 05:41

## 2023-10-27 ASSESSMENT — LIFESTYLE VARIABLES
PAROXYSMAL SWEATS: NO SWEAT VISIBLE
TOTAL SCORE: 0
TREMOR: NO TREMOR
ANXIETY: NO ANXIETY (AT EASE)
TREMOR: NO TREMOR
VISUAL DISTURBANCES: NOT PRESENT
HEADACHE, FULLNESS IN HEAD: NOT PRESENT
ORIENTATION AND CLOUDING OF SENSORIUM: ORIENTED AND CAN DO SERIAL ADDITIONS
AGITATION: NORMAL ACTIVITY
ORIENTATION AND CLOUDING OF SENSORIUM: ORIENTED AND CAN DO SERIAL ADDITIONS
NAUSEA AND VOMITING: NO NAUSEA AND NO VOMITING
TOTAL SCORE: 3
HEADACHE, FULLNESS IN HEAD: NOT PRESENT
TREMOR: *
HEADACHE, FULLNESS IN HEAD: NOT PRESENT
TOTAL SCORE: 0
AGITATION: NORMAL ACTIVITY
NAUSEA AND VOMITING: NO NAUSEA AND NO VOMITING
AUDITORY DISTURBANCES: NOT PRESENT
VISUAL DISTURBANCES: NOT PRESENT
VISUAL DISTURBANCES: NOT PRESENT
PAROXYSMAL SWEATS: NO SWEAT VISIBLE
AGITATION: NORMAL ACTIVITY
NAUSEA AND VOMITING: NO NAUSEA AND NO VOMITING
ANXIETY: MILDLY ANXIOUS
TOTAL SCORE: 0
AUDITORY DISTURBANCES: NOT PRESENT
AUDITORY DISTURBANCES: NOT PRESENT
VISUAL DISTURBANCES: NOT PRESENT
ORIENTATION AND CLOUDING OF SENSORIUM: ORIENTED AND CAN DO SERIAL ADDITIONS
NAUSEA AND VOMITING: NO NAUSEA AND NO VOMITING
ANXIETY: NO ANXIETY (AT EASE)
HEADACHE, FULLNESS IN HEAD: NOT PRESENT
PAROXYSMAL SWEATS: NO SWEAT VISIBLE
PAROXYSMAL SWEATS: NO SWEAT VISIBLE
AGITATION: NORMAL ACTIVITY
TREMOR: NO TREMOR
ORIENTATION AND CLOUDING OF SENSORIUM: ORIENTED AND CAN DO SERIAL ADDITIONS
ANXIETY: NO ANXIETY (AT EASE)
AUDITORY DISTURBANCES: NOT PRESENT

## 2023-10-27 ASSESSMENT — ENCOUNTER SYMPTOMS
CONSTITUTIONAL NEGATIVE: 1
PSYCHIATRIC NEGATIVE: 1
RESPIRATORY NEGATIVE: 1
NEUROLOGICAL NEGATIVE: 1
CARDIOVASCULAR NEGATIVE: 1
MYALGIAS: 1
GASTROINTESTINAL NEGATIVE: 1
EYES NEGATIVE: 1

## 2023-10-27 ASSESSMENT — COGNITIVE AND FUNCTIONAL STATUS - GENERAL
CLIMB 3 TO 5 STEPS WITH RAILING: A LITTLE
MOBILITY SCORE: 21
WALKING IN HOSPITAL ROOM: A LITTLE
STANDING UP FROM CHAIR USING ARMS: A LITTLE
SUGGESTED CMS G CODE MODIFIER MOBILITY: CJ

## 2023-10-27 ASSESSMENT — GAIT ASSESSMENTS
GAIT LEVEL OF ASSIST: STANDBY ASSIST
DISTANCE (FEET): 80
DEVIATION: BRADYKINETIC

## 2023-10-27 ASSESSMENT — FIBROSIS 4 INDEX: FIB4 SCORE: 21.79

## 2023-10-27 ASSESSMENT — PAIN DESCRIPTION - PAIN TYPE: TYPE: ACUTE PAIN

## 2023-10-27 NOTE — CARE PLAN
The patient is Watcher - Medium risk of patient condition declining or worsening         Progress made toward(s) clinical / shift goals:      Blood transfused this shift per orders. HBG 8.1. Improvement from 7.0 post PRBC infusion.   Rally bag infused per orders.     Patient has remained NPO since midnight.     Problem: Knowledge Deficit - Standard  Goal: Patient and family/care givers will demonstrate understanding of plan of care, disease process/condition, diagnostic tests and medications  Outcome: Progressing     Problem: Optimal Care for Alcohol Withdrawal  Goal: Optimal Care for the alcohol withdrawal patient  Outcome: Progressing  Note: CIWA conducted per protocol. Patient scoring 0 throughout this shift. Will continue to assess and monitor and medicate as needed.      Problem: Seizure Precautions  Goal: Implementation of seizure precautions  Outcome: Progressing     Problem: Psychosocial  Goal: Patient's level of anxiety will decrease  Outcome: Progressing  Flowsheets (Taken 10/27/2023 0426)  Decrease Anxiety Level:   Collaborated with patient to identify and develop coping strategies   Implemented stimuli reduction, calming techniques   Pharmacologic management per MD order   Collaborated with Behavorial Health Team  Patient Behaviors:   Drowsy   Fatigue     Problem: Pain - Standard  Goal: Alleviation of pain or a reduction in pain to the patient’s comfort goal  Outcome: Progressing     Problem: Fall Risk  Goal: Patient will remain free from falls  Outcome: Progressing  Note: Bed in lowest position. Bed alarm in place and on. Frequent rounding utilized and urinal bedside to promote safety.        Patient is not progressing towards the following goals:

## 2023-10-27 NOTE — ASSESSMENT & PLAN NOTE
Cirrhosis noted on RUQ US in 2019. Previously HCV RNA+, states he did not receive treatment. No ascites or signs of encephalopathy.   - RUQ US identifying cirrhosis with traces ascites  - HCV RNA, will f/u genotype if + with outpatient treatment  - Ammonia wnl

## 2023-10-27 NOTE — ASSESSMENT & PLAN NOTE
Secondary to chronic alcohol use, pancytopenic on admission stable over past several years.  -Repeat iron panel 10/29 morning, to consider repletion. Iron currently wnl and ferritin low.  -Monitor for signs of neutropenic fever, obtain blood cultures and initiate zosyn IV if fever >101 of 100.4 for greater than 1 hour. MRSA nares negative.

## 2023-10-27 NOTE — PROGRESS NOTES
Blood Transfusion    One unit of PRBCs infusing per MD order.   Patient tolerating well thus far. VSS.   Infusion started at 100 ml/h and increased to 150 ml/h post 15 minute check.   Will continue to monitor.

## 2023-10-27 NOTE — PROGRESS NOTES
Notified from monitor tech that patient has 10 beats of vtach, no complaints from patient at this time. MD notified. No orders rec'd at this time.

## 2023-10-27 NOTE — PROGRESS NOTES
Telemetry Monitor Report:     Rhythm: SR  V-Paced  HR range: 65-92    20/06/38

## 2023-10-27 NOTE — PROGRESS NOTES
Patient SR and in and out of wide complexes per monitor tech.   MD notified.   EKG ordered.

## 2023-10-27 NOTE — PROGRESS NOTES
Monitor check called x2 during shift change for Vtach. This RN in contact with monitor room.   Patient asymptomatic and monitor leads replaced to ensure accuracy.   Currently patient is SR. Paced.   MD notified of events.

## 2023-10-27 NOTE — PROGRESS NOTES
[facility] Internal Medicine Daily Progress Note    Date of Service  10/27/2023    [facility] Team: R IM Purple Team   Attending: Moose Chakraborty M.d.  Senior Resident: Dr. Mendez  Intern:  Dr. Liang  Contact Number: [phone]    Chief Complaint  Inocencio Shabazz is a 63 y.o. male admitted 10/26/2023 with past medical history of chronic alcohol abuse, cirrhosis, esophageal varices, seizure disorder who presented 10/26/2023 with multiple symptoms, reports of dizziness, lightheadedness, generalized fatigue, muscle cramping for last 2 to 3 days. Admitted to hospital for symptomatic anemia of 7.0, receiving 1u PRBC and currently stable.    Hospital Course  Inocencio Shabazz is a 63 y.o. male with past medical history of chronic alcohol abuse (last drink 10/25), cirrhosis, esophageal varices, seizure disorder who presented 10/26/2023 with multiple symptoms, reports of dizziness, lightheadedness, generalized fatigue, muscle cramping for last 2 to 3 days. He was admitted to the hospital for symptomatic anemia 7.0, received 1u PRBC in ED and hemoglobin remains stable. No recent history of acute blood loss or GI bleed. Monitored for alcohol withdrawal with CIWA protocol. Previously HCV+ as of 2019 and patient states he did not receive treatment.    Interval Problem Update    - Patient experiencing cramping 7/10 pain BLE with episodes of tachycardia.  - Asymptomatic 10 and 30 run of VTACH. No chest pain. EKG with sinus tach 111 no ST changes. Trending troponin.  Optimized K and Mg.   - HCV RNA+ in past, patient does not believe that he received treatment for this. Pending HCV RNA.    I have discussed this patient's plan of care and discharge plan at IDT rounds today with Case Management, Nursing, Nursing leadership, and other members of the IDT team.    Consultants/Specialty  N/a    Code Status  Full Code    Disposition  The patient is not medically cleared for discharge to home or a post-acute facility.    I have placed the  appropriate orders for post-discharge needs.    Review of Systems  Review of Systems   Constitutional: Negative.    HENT: Negative.     Eyes: Negative.    Respiratory: Negative.     Cardiovascular: Negative.    Gastrointestinal: Negative.    Genitourinary: Negative.    Musculoskeletal:  Positive for myalgias.   Skin: Negative.    Neurological: Negative.    Psychiatric/Behavioral: Negative.          Physical Exam  Temp:  [36.7 °C (98.1 °F)-37.1 °C (98.8 °F)] 36.8 °C (98.2 °F)  Pulse:  [] 120  Resp:  [17-22] 22  BP: (124-155)/(69-97) 149/95  SpO2:  [95 %-99 %] 98 %    Physical Exam  Vitals reviewed.   HENT:      Nose: Nose normal.      Mouth/Throat:      Mouth: Mucous membranes are moist.      Pharynx: Oropharynx is clear.   Eyes:      General: No scleral icterus.     Extraocular Movements: Extraocular movements intact.      Conjunctiva/sclera: Conjunctivae normal.      Pupils: Pupils are equal, round, and reactive to light.   Cardiovascular:      Rate and Rhythm: Regular rhythm. Tachycardia present.      Pulses: Normal pulses.      Heart sounds: Normal heart sounds. No murmur heard.     No gallop.   Pulmonary:      Effort: Pulmonary effort is normal. No respiratory distress.      Breath sounds: Normal breath sounds. No wheezing or rales.   Abdominal:      General: Abdomen is flat. Bowel sounds are normal. There is no distension.      Palpations: Abdomen is soft.      Tenderness: There is no abdominal tenderness.   Musculoskeletal:         General: Normal range of motion.   Skin:     General: Skin is warm.      Capillary Refill: Capillary refill takes less than 2 seconds.   Neurological:      General: No focal deficit present.      Mental Status: He is alert and oriented to person, place, and time. Mental status is at baseline.      Cranial Nerves: No cranial nerve deficit.      Comments: Mild intention tremor         Fluids    Intake/Output Summary (Last 24 hours) at 10/27/2023 1413  Last data filed at  10/27/2023 1104  Gross per 24 hour   Intake 1482.32 ml   Output 900 ml   Net 582.32 ml       Laboratory  Recent Labs     10/26/23  1405 10/27/23  0435 10/27/23  1142   WBC 1.3* 1.0* 1.2*   RBC 2.99* 3.34* 3.57*   HEMOGLOBIN 7.0* 8.1* 8.7*   HEMATOCRIT 23.8* 26.4* 27.7*   MCV 79.6* 79.0* 77.6*   MCH 23.4* 24.3* 24.4*   MCHC 29.4* 30.7* 31.4*   RDW 58.7* 54.5* 53.3*   PLATELETCT 42* 31* 29*     Recent Labs     10/26/23  1405 10/27/23  0435   SODIUM 139 135   POTASSIUM 4.3 3.6   CHLORIDE 106 104   CO2 20 20   GLUCOSE 68 127*   BUN 6* 7*   CREATININE 0.51 0.51   CALCIUM 8.1* 8.2*                   Imaging  US-RUQ    (Results Pending)   EC-ECHOCARDIOGRAM COMPLETE W/O CONT    (Results Pending)        Assessment/Plan  Problem Representation:    * Symptomatic anemia- (present on admission)  Assessment & Plan  2/2 chronic alcohol use. Hgb stable following 1u PRBC transfusion 10/26. No signs of active blood loss.  - Pending iron panel 10/29 night  - daily cbc  - transfuse <7 hgb  - pantoprazole 40mg IV BID  - NS 75 cc/hr    V-tach (HCC)  Assessment & Plan  Asymptomatic vtach 10/27 with 10 beat then 30 beat run. EKG with NSR tachycardia of 111. No ST changes.  - Optimize K, phos, Mg  - continuous tele  - cardiology consult if asymptomatic > 30 seconds vtach  - trending troponins    Secondary esophageal varices (HCC)  Assessment & Plan  EGD 8/10, varices with 2 AVM. Electrocauterized.   - Continue to monitor, no reported hematemesis    Alcohol abuse with withdrawal (HCC)- (present on admission)  Assessment & Plan  Reported history of alcohol withdrawal seizure in past. Last drink 10/25, alcohol level elevated on admission to 17. Currently does not appear to be in active withdrawal. Watch for DT as in timeframe of initiating withdrawal.  - CIWA  - Thiamine 100mg TID, folate, MVM  - Daily phos, Mg, K replete as needed, monitor for refeeding syndrome  - CPK pending due to cramps  -Keep head of bed 45°  -Aspiration precautions,  seizure precautions      Pancytopenia (HCC)- (present on admission)  Assessment & Plan  Secondary to chronic alcohol use, pancytopenic on admission stable over past several years.  -Repeat iron panel 10/29 morning, to consider repletion. Iron currently wnl and ferritin low.  -Monitor for signs of neutropenic fever, obtain blood cultures and initiate zosyn IV if fever >101 of 100.4 for greater than 1 hour. MRSA nares pending and if + will cover for MRSA.    Schizophrenia (HCC)- (present on admission)  Assessment & Plan  Continue home seroquel at night      Cirrhosis (HCC)- (present on admission)  Assessment & Plan  Cirrhosis noted on RUQ US in 2019. Previously HCV RNA+, states he did not receive treatment. No ascites or signs of encephalopathy.  - RUQ US pending  - HCV RNA, will f/u genotype if + with outpatient treatment  - PT/INR pending will calculate MELD  - Ammonia pending    Hepatitis C- (present on admission)  Assessment & Plan  Previously HCV RNA +, never treated according to pt  Pending HCV RNA and genotype if possible. Continue treatment outpatient.         VTE prophylaxis: SCDs/TEDs    I have performed a physical exam and reviewed and updated ROS and Plan today (10/27/2023). In review of yesterday's note (10/26/2023), there are no changes except as documented above.

## 2023-10-27 NOTE — PROGRESS NOTES
Monitor check, 30 beats of Vtach, no complaints from patient at this time, VSTODD CHARLES notified, EKG ordered.

## 2023-10-27 NOTE — DISCHARGE PLANNING
Case Management Discharge Planning    Admission Date: 10/26/2023  GMLOS: 3.4  ALOS: 1    6-Clicks ADL Score: 24  6-Clicks Mobility Score: 21      Anticipated Discharge Dispo: Discharge Disposition: Discharged to home/self care (01)    DME Needed: No    Action(s) Taken: Updated Provider/Nurse on Discharge Plan  Patient discussed during IDT rounds, patient is not medically cleared for discharge. SW completed chart review. CM consult for readmission, patient came for cramping, lightheadedness and nausea. Patient on CIWA protocol for withdrawal. Six clicks 24/24. SW to follow for any discharge needs.     Escalations Completed: None    Medically Clear: No    Next Steps: Medical clearance     Barriers to Discharge: Medical clearance    Is the patient up for discharge tomorrow: No

## 2023-10-27 NOTE — HOSPITAL COURSE
Inocencio Shabazz is a 63 y.o. male with past medical history of chronic alcohol abuse (last drink 10/25), cirrhosis, esophageal varices, seizure disorder who presented 10/26/2023 with multiple symptoms, reports of dizziness, lightheadedness, generalized fatigue, muscle cramping for last 2 to 3 days. He was admitted to the hospital for symptomatic anemia 7.0, received 1u PRBC in ED and hemoglobin remains stable. No recent history of acute blood loss or GI bleed. Monitored for alcohol withdrawal with CIWA protocol. Previously HCV+ as of 2019 and patient states he did not receive treatment.

## 2023-10-27 NOTE — THERAPY
Physical Therapy   Initial Evaluation     Patient Name: Inocencio Shabazz  Age:  63 y.o., Sex:  male  Medical Record #: [id number]  Today's Date: 10/27/2023     Precautions  Precautions: (P) Fall Risk;Swallow Precautions;Other (See Comments)  Comments: (P) Seizure precautions    Assessment  Patient is 63 y.o. male who presented 10/26/2023 with multiple symptoms, reports of dizziness, lightheadedness, generalized fatigue, muscle cramping for last 2 to 3 days.   Found to have symptomatic anemia, alcohol abuse with withdrawal, secondary esophageal varices, pancytopenia.     PMH: chronic alcohol abuse, cirrhosis, esophageal varices, seizure disorder, bipolar disorder, Hep C, pacemaker, schizophrenia     Patient seen for PT evaluation. Was in bed, agreeable to participate. Was able to demonstrate functional mobility tasks as detailed below. Will continue to benefit from PT services in-house to address mobility and ensure safe DC home. Anticipate no PT needs upon DC.     Recommend OOB to chair for meals and ambulate in the hallway multiple times during the day, with supervision from nursing.     Plan    Physical Therapy Initial Treatment Plan   Treatment Plan : (P) Gait Training, Stair Training, Therapeutic Activities  Treatment Frequency: (P) 2 Times per Week  Duration: (P) Until Therapy Goals Met    DC Equipment Recommendations: (P) None  Discharge Recommendations: (P) Anticipate that the patient will have no further physical therapy needs after discharge from the hospital     Objective     10/27/23 1224   Charge Group   PT Evaluation PT Evaluation Mod   Total Time Spent   PT Evaluation Time Spent (Mins) 13   Initial Contact Note    Initial Contact Note Order Received and Verified, Physical Therapy Evaluation in Progress with Full Report to Follow.   Precautions   Precautions Fall Risk;Swallow Precautions;Other (See Comments)   Comments Seizure precautions   Vitals   O2 Delivery Device None - Room Air   Pain   Pain  Scales 0 to 10 Scale    Intervention Declines   Prior Living Situation   Prior Services Home-Independent   Housing / Facility 2 Story Apartment / Condo  (2nd floor apartment)   Steps Into Home 16   Steps In Home 0   Rail Both Rail (Steps into Home)   Elevator No   Equipment Owned Crutches   Lives with - Patient's Self Care Capacity Alone and Able to Care For Self   Prior Level of Functional Mobility   Bed Mobility Independent   Transfer Status Independent   Ambulation Independent   Ambulation Distance Community   Assistive Devices Used None   Stairs Independent   History of Falls   History of Falls No   Cognition    Level of Consciousness Alert   Passive ROM Lower Body   Passive ROM Lower Body WDL   Active ROM Lower Body    Active ROM Lower Body  WDL   Strength Lower Body   Lower Body Strength  WDL   Other Treatments   Other Treatments Provided Patient educated about importance of daily mobility with nursing, OOB to chair for meals. Discussed DC recommendations, patient believes that he will be able to manage at home without concerns   Balance Assessment   Sitting Balance (Static) Good   Sitting Balance (Dynamic) Good   Standing Balance (Static) Fair +   Standing Balance (Dynamic) Fair   Comments No AD in standing   Bed Mobility    Supine to Sit Independent   Sit to Supine Independent   Scooting Independent   Rolling Independent   Comments HOB flat   Gait Analysis   Gait Level Of Assist Standby Assist   Assistive Device None   Distance (Feet) 80   Deviation Bradykinetic   # of Stairs Climbed   (Patient able to perform 10 high rise marches without loss of balance and steady pace)   Level of Assist with Stairs Standby Assist   Comments Patient ambulated in the room and in the hallway without loss of balance & steady pace   Functional Mobility   Sit to Stand Supervised   Bed, Chair, Wheelchair Transfer Refused  (Encouraged to get OOB to chair for lunch)   How much difficulty does the patient currently have...   Turning  over in bed (including adjusting bedclothes, sheets and blankets)? 4   Sitting down on and standing up from a chair with arms (e.g., wheelchair, bedside commode, etc.) 4   Moving from lying on back to sitting on the side of the bed? 4   How much help from another person does the patient currently need...   Moving to and from a bed to a chair (including a wheelchair)? 3   Need to walk in a hospital room? 3   Climbing 3-5 steps with a railing? 3   6 clicks Mobility Score 21   Patient / Family Goals    Patient / Family Goal #1 To return home   Short Term Goals    Short Term Goal # 1 Patient will perform chair transfers with supervision in 6 visits   Short Term Goal # 2 Patient will ambulate > 200 feet with supervision in 6 visits   Short Term Goal # 3 Patient will negotiate 15 steps with B/L rails with supervision in 6 visits   Education Group   Education Provided Role of Physical Therapist   Role of Physical Therapist Patient Response Patient;Acceptance;Explanation;Verbal Demonstration   Physical Therapy Initial Treatment Plan    Treatment Plan  Gait Training;Stair Training;Therapeutic Activities   Treatment Frequency 2 Times per Week   Duration Until Therapy Goals Met   Problem List    Problems Impaired Transfers;Impaired Ambulation;Impaired Balance   Anticipated Discharge Equipment and Recommendations   DC Equipment Recommendations None   Discharge Recommendations Anticipate that the patient will have no further physical therapy needs after discharge from the hospital   Interdisciplinary Plan of Care Collaboration   IDT Collaboration with  Nursing   Patient Position at End of Therapy In Bed;Bed Alarm On;Call Light within Reach;Tray Table within Reach;Phone within Reach   Session Information   Date / Session Number  10/27-1(1/2, 11/2)

## 2023-10-27 NOTE — ASSESSMENT & PLAN NOTE
2/2 chronic alcohol use. Hgb stable following 1u PRBC transfusion 10/26. No signs of active blood loss.8.7 > 7.6 10/28.  - daily cbc  - transfuse <7 hgb  - pantoprazole 40mg IV BID  - Iron panel notable for likely iron deficiency anemia possibly in the setting of chronic bleeding.  - IV iron to be administered per pharmacy protocol.

## 2023-10-27 NOTE — ASSESSMENT & PLAN NOTE
Previously HCV RNA +, never treated according to pt  Pending HCV RNA and genotype if possible.  Will need outpatient follow-up with infectious disease.

## 2023-10-27 NOTE — PROGRESS NOTES
"MD notified of patients temporal & oral temperature are 99.8. Awaiting orders.     Per MD (Azul), \"Swab pt for MRSA, If temperature reaches 100.4, will obtain blood cultures & start empiric zosyn.\"   "

## 2023-10-27 NOTE — ASSESSMENT & PLAN NOTE
Asymptomatic vtach 10/27 with 10 beat then 30 beat run. EKG with NSR tachycardia of 111. No ST changes.  - Optimize K, phos, Mg  - continuous tele  - troponin wnl  - no caffeine  - corrected calcium currently wnl  - Continue carvedilol 3.125mg BID  -Given new episode of sustained ventricular tachycardia, cardiology consulted.  Patient currently asymptomatic.

## 2023-10-27 NOTE — ASSESSMENT & PLAN NOTE
Reported history of alcohol withdrawal seizure in past. Last drink 10/25, alcohol level elevated on admission to . Watch for DT as in timeframe of initiating withdrawal.  - CIWA, continue until 2 consecutive measurements at 0.  - Thiamine 100mg TID, folate, MVM  - Daily phos, Mg, K replete as needed, monitor for refeeding syndrome  - Keep head of bed 45°  - Aspiration precautions, seizure precautions

## 2023-10-27 NOTE — PROGRESS NOTES
4 Eyes Skin Assessment Completed by Marvel RN and TITUS Garcia.    Head WDL  Ears WDL  Nose WDL  Mouth WDL  Neck WDL  Breast/Chest WDL  Shoulder Blades WDL  Spine WDL  (R) Arm/Elbow/Hand WDL  (L) Arm/Elbow/Hand WDL  Abdomen WDL  Groin WDL  Scrotum/Coccyx/Buttocks WDL  (R) Leg WDL  (L) Leg WDL  (R) Heel/Foot/Toe WDL  (L) Heel/Foot/Toe WDL          Devices In Places Tele Box      Interventions In Place N/A    Possible Skin Injury No    Pictures Uploaded Into Epic N/A  Wound Consult Placed N/A  RN Wound Prevention Protocol Ordered No

## 2023-10-28 ENCOUNTER — APPOINTMENT (OUTPATIENT)
Dept: CARDIOLOGY | Facility: MEDICAL CENTER | Age: 63
DRG: 433 | End: 2023-10-28
Payer: MEDICARE

## 2023-10-28 LAB
ALBUMIN SERPL BCP-MCNC: 3.1 G/DL (ref 3.2–4.9)
ALBUMIN/GLOB SERPL: 0.8 G/DL
ALP SERPL-CCNC: 81 U/L (ref 30–99)
ALT SERPL-CCNC: 24 U/L (ref 2–50)
AMMONIA PLAS-SCNC: 32 UMOL/L (ref 11–45)
ANION GAP SERPL CALC-SCNC: 12 MMOL/L (ref 7–16)
ANION GAP SERPL CALC-SCNC: 12 MMOL/L (ref 7–16)
APPEARANCE UR: CLEAR
AST SERPL-CCNC: 55 U/L (ref 12–45)
BASOPHILS # BLD AUTO: 0.8 % (ref 0–1.8)
BASOPHILS # BLD: 0.01 K/UL (ref 0–0.12)
BILIRUB SERPL-MCNC: 0.8 MG/DL (ref 0.1–1.5)
BILIRUB UR QL STRIP.AUTO: NEGATIVE
BUN SERPL-MCNC: 4 MG/DL (ref 8–22)
BUN SERPL-MCNC: 4 MG/DL (ref 8–22)
CALCIUM ALBUM COR SERPL-MCNC: 8.7 MG/DL (ref 8.5–10.5)
CALCIUM SERPL-MCNC: 8 MG/DL (ref 8.5–10.5)
CALCIUM SERPL-MCNC: 8 MG/DL (ref 8.5–10.5)
CHLORIDE SERPL-SCNC: 107 MMOL/L (ref 96–112)
CHLORIDE SERPL-SCNC: 107 MMOL/L (ref 96–112)
CO2 SERPL-SCNC: 18 MMOL/L (ref 20–33)
CO2 SERPL-SCNC: 18 MMOL/L (ref 20–33)
COLOR UR: YELLOW
CREAT SERPL-MCNC: 0.61 MG/DL (ref 0.5–1.4)
CREAT SERPL-MCNC: 0.61 MG/DL (ref 0.5–1.4)
EOSINOPHIL # BLD AUTO: 0.15 K/UL (ref 0–0.51)
EOSINOPHIL NFR BLD: 11.6 % (ref 0–6.9)
ERYTHROCYTE [DISTWIDTH] IN BLOOD BY AUTOMATED COUNT: 54.9 FL (ref 35.9–50)
GFR SERPLBLD CREATININE-BSD FMLA CKD-EPI: 106 ML/MIN/1.73 M 2
GLOBULIN SER CALC-MCNC: 3.8 G/DL (ref 1.9–3.5)
GLUCOSE SERPL-MCNC: 105 MG/DL (ref 65–99)
GLUCOSE SERPL-MCNC: 105 MG/DL (ref 65–99)
GLUCOSE UR STRIP.AUTO-MCNC: NEGATIVE MG/DL
HCT VFR BLD AUTO: 25.7 % (ref 42–52)
HGB BLD-MCNC: 7.6 G/DL (ref 14–18)
IMM GRANULOCYTES # BLD AUTO: 0 K/UL (ref 0–0.11)
IMM GRANULOCYTES NFR BLD AUTO: 0 % (ref 0–0.9)
INR PPP: 1.42 (ref 0.87–1.13)
IRON SATN MFR SERPL: 7 % (ref 15–55)
IRON SERPL-MCNC: 23 UG/DL (ref 50–180)
KETONES UR STRIP.AUTO-MCNC: NEGATIVE MG/DL
LEUKOCYTE ESTERASE UR QL STRIP.AUTO: NEGATIVE
LV EJECT FRACT  99904: 55
LV EJECT FRACT MOD 2C 99903: 53.55
LV EJECT FRACT MOD 4C 99902: 53.47
LV EJECT FRACT MOD BP 99901: 53.83
LYMPHOCYTES # BLD AUTO: 0.23 K/UL (ref 1–4.8)
LYMPHOCYTES NFR BLD: 17.8 % (ref 22–41)
MAGNESIUM SERPL-MCNC: 1.8 MG/DL (ref 1.5–2.5)
MCH RBC QN AUTO: 23.4 PG (ref 27–33)
MCHC RBC AUTO-ENTMCNC: 29.6 G/DL (ref 32.3–36.5)
MCV RBC AUTO: 79.1 FL (ref 81.4–97.8)
MICRO URNS: NORMAL
MONOCYTES # BLD AUTO: 0.2 K/UL (ref 0–0.85)
MONOCYTES NFR BLD AUTO: 15.5 % (ref 0–13.4)
NEUTROPHILS # BLD AUTO: 0.7 K/UL (ref 1.82–7.42)
NEUTROPHILS NFR BLD: 54.3 % (ref 44–72)
NITRITE UR QL STRIP.AUTO: NEGATIVE
NRBC # BLD AUTO: 0 K/UL
NRBC BLD-RTO: 0 /100 WBC (ref 0–0.2)
PH UR STRIP.AUTO: 8 [PH] (ref 5–8)
PHOSPHATE SERPL-MCNC: 3.1 MG/DL (ref 2.5–4.5)
PLATELET # BLD AUTO: 28 K/UL (ref 164–446)
PLATELETS.RETICULATED NFR BLD AUTO: 10.4 % (ref 0.6–13.1)
POTASSIUM SERPL-SCNC: 3.8 MMOL/L (ref 3.6–5.5)
POTASSIUM SERPL-SCNC: 3.8 MMOL/L (ref 3.6–5.5)
PROT SERPL-MCNC: 6.9 G/DL (ref 6–8.2)
PROT UR QL STRIP: NEGATIVE MG/DL
PROTHROMBIN TIME: 17.5 SEC (ref 12–14.6)
RBC # BLD AUTO: 3.25 M/UL (ref 4.7–6.1)
RBC UR QL AUTO: NEGATIVE
SODIUM SERPL-SCNC: 137 MMOL/L (ref 135–145)
SODIUM SERPL-SCNC: 137 MMOL/L (ref 135–145)
SP GR UR STRIP.AUTO: 1.01
TIBC SERPL-MCNC: 343 UG/DL (ref 250–450)
UIBC SERPL-MCNC: 320 UG/DL (ref 110–370)
UROBILINOGEN UR STRIP.AUTO-MCNC: 1 MG/DL
WBC # BLD AUTO: 1.3 K/UL (ref 4.8–10.8)

## 2023-10-28 PROCEDURE — 85610 PROTHROMBIN TIME: CPT

## 2023-10-28 PROCEDURE — A9270 NON-COVERED ITEM OR SERVICE: HCPCS | Performed by: STUDENT IN AN ORGANIZED HEALTH CARE EDUCATION/TRAINING PROGRAM

## 2023-10-28 PROCEDURE — 85025 COMPLETE CBC W/AUTO DIFF WBC: CPT

## 2023-10-28 PROCEDURE — 700102 HCHG RX REV CODE 250 W/ 637 OVERRIDE(OP)

## 2023-10-28 PROCEDURE — 80053 COMPREHEN METABOLIC PANEL: CPT

## 2023-10-28 PROCEDURE — 87522 HEPATITIS C REVRS TRNSCRPJ: CPT

## 2023-10-28 PROCEDURE — 700111 HCHG RX REV CODE 636 W/ 250 OVERRIDE (IP): Mod: JZ

## 2023-10-28 PROCEDURE — 83550 IRON BINDING TEST: CPT

## 2023-10-28 PROCEDURE — 700102 HCHG RX REV CODE 250 W/ 637 OVERRIDE(OP): Performed by: STUDENT IN AN ORGANIZED HEALTH CARE EDUCATION/TRAINING PROGRAM

## 2023-10-28 PROCEDURE — 84100 ASSAY OF PHOSPHORUS: CPT

## 2023-10-28 PROCEDURE — 36415 COLL VENOUS BLD VENIPUNCTURE: CPT

## 2023-10-28 PROCEDURE — C9113 INJ PANTOPRAZOLE SODIUM, VIA: HCPCS | Performed by: STUDENT IN AN ORGANIZED HEALTH CARE EDUCATION/TRAINING PROGRAM

## 2023-10-28 PROCEDURE — 82140 ASSAY OF AMMONIA: CPT

## 2023-10-28 PROCEDURE — A9270 NON-COVERED ITEM OR SERVICE: HCPCS

## 2023-10-28 PROCEDURE — 700105 HCHG RX REV CODE 258

## 2023-10-28 PROCEDURE — 81003 URINALYSIS AUTO W/O SCOPE: CPT

## 2023-10-28 PROCEDURE — 83735 ASSAY OF MAGNESIUM: CPT

## 2023-10-28 PROCEDURE — 700111 HCHG RX REV CODE 636 W/ 250 OVERRIDE (IP): Performed by: STUDENT IN AN ORGANIZED HEALTH CARE EDUCATION/TRAINING PROGRAM

## 2023-10-28 PROCEDURE — 93306 TTE W/DOPPLER COMPLETE: CPT

## 2023-10-28 PROCEDURE — 85055 RETICULATED PLATELET ASSAY: CPT

## 2023-10-28 PROCEDURE — 83540 ASSAY OF IRON: CPT

## 2023-10-28 PROCEDURE — 770020 HCHG ROOM/CARE - TELE (206)

## 2023-10-28 PROCEDURE — 93306 TTE W/DOPPLER COMPLETE: CPT | Mod: 26 | Performed by: INTERNAL MEDICINE

## 2023-10-28 PROCEDURE — 82550 ASSAY OF CK (CPK): CPT

## 2023-10-28 PROCEDURE — 99233 SBSQ HOSP IP/OBS HIGH 50: CPT | Mod: GC | Performed by: INTERNAL MEDICINE

## 2023-10-28 RX ORDER — PROPRANOLOL HYDROCHLORIDE 10 MG/1
10 TABLET ORAL EVERY 8 HOURS
Status: DISCONTINUED | OUTPATIENT
Start: 2023-10-28 | End: 2023-10-28

## 2023-10-28 RX ORDER — MAGNESIUM SULFATE HEPTAHYDRATE 40 MG/ML
2 INJECTION, SOLUTION INTRAVENOUS ONCE
Status: COMPLETED | OUTPATIENT
Start: 2023-10-28 | End: 2023-10-28

## 2023-10-28 RX ORDER — ATORVASTATIN CALCIUM 40 MG/1
40 TABLET, FILM COATED ORAL EVERY EVENING
Status: DISCONTINUED | OUTPATIENT
Start: 2023-10-28 | End: 2023-10-28

## 2023-10-28 RX ORDER — POTASSIUM CHLORIDE 20 MEQ/1
20 TABLET, EXTENDED RELEASE ORAL DAILY
Status: DISCONTINUED | OUTPATIENT
Start: 2023-10-28 | End: 2023-10-30 | Stop reason: HOSPADM

## 2023-10-28 RX ORDER — SPIRONOLACTONE 25 MG/1
25 TABLET ORAL
Status: DISCONTINUED | OUTPATIENT
Start: 2023-10-28 | End: 2023-10-28

## 2023-10-28 RX ORDER — CARVEDILOL 6.25 MG/1
3.12 TABLET ORAL 2 TIMES DAILY WITH MEALS
Status: DISCONTINUED | OUTPATIENT
Start: 2023-10-28 | End: 2023-10-29

## 2023-10-28 RX ADMIN — CARVEDILOL 3.12 MG: 6.25 TABLET, FILM COATED ORAL at 10:39

## 2023-10-28 RX ADMIN — PANTOPRAZOLE SODIUM 40 MG: 40 INJECTION, POWDER, FOR SOLUTION INTRAVENOUS at 17:14

## 2023-10-28 RX ADMIN — LORAZEPAM 0.5 MG: 1 TABLET ORAL at 05:12

## 2023-10-28 RX ADMIN — Medication 100 MG: at 08:49

## 2023-10-28 RX ADMIN — CALCIUM CHLORIDE 1000 MG: 100 INJECTION, SOLUTION INTRAVENOUS at 02:35

## 2023-10-28 RX ADMIN — MAGNESIUM SULFATE HEPTAHYDRATE 2 G: 2 INJECTION, SOLUTION INTRAVENOUS at 03:11

## 2023-10-28 RX ADMIN — LORAZEPAM 0.5 MG: 1 TABLET ORAL at 01:01

## 2023-10-28 RX ADMIN — CARVEDILOL 3.12 MG: 6.25 TABLET, FILM COATED ORAL at 17:15

## 2023-10-28 RX ADMIN — QUETIAPINE FUMARATE 100 MG: 100 TABLET ORAL at 21:05

## 2023-10-28 RX ADMIN — Medication 100 MG: at 21:05

## 2023-10-28 RX ADMIN — PANTOPRAZOLE SODIUM 40 MG: 40 INJECTION, POWDER, FOR SOLUTION INTRAVENOUS at 05:11

## 2023-10-28 RX ADMIN — FOLIC ACID 1 MG: 1 TABLET ORAL at 05:11

## 2023-10-28 RX ADMIN — Medication 100 MG: at 15:23

## 2023-10-28 RX ADMIN — POTASSIUM CHLORIDE 20 MEQ: 1500 TABLET, EXTENDED RELEASE ORAL at 06:35

## 2023-10-28 RX ADMIN — NICOTINE 7 MG: 7 PATCH, EXTENDED RELEASE TRANSDERMAL at 05:12

## 2023-10-28 RX ADMIN — THERA TABS 1 TABLET: TAB at 05:11

## 2023-10-28 ASSESSMENT — LIFESTYLE VARIABLES
AUDITORY DISTURBANCES: NOT PRESENT
ORIENTATION AND CLOUDING OF SENSORIUM: ORIENTED AND CAN DO SERIAL ADDITIONS
NAUSEA AND VOMITING: NO NAUSEA AND NO VOMITING
TREMOR: *
PAROXYSMAL SWEATS: BARELY PERCEPTIBLE SWEATING. PALMS MOIST
ANXIETY: MILDLY ANXIOUS
TOTAL SCORE: 5
TREMOR: *
PAROXYSMAL SWEATS: NO SWEAT VISIBLE
AUDITORY DISTURBANCES: NOT PRESENT
TREMOR: *
AUDITORY DISTURBANCES: NOT PRESENT
TREMOR: *
ANXIETY: MILDLY ANXIOUS
ORIENTATION AND CLOUDING OF SENSORIUM: ORIENTED AND CAN DO SERIAL ADDITIONS
AGITATION: NORMAL ACTIVITY
NAUSEA AND VOMITING: NO NAUSEA AND NO VOMITING
PAROXYSMAL SWEATS: NO SWEAT VISIBLE
VISUAL DISTURBANCES: VERY MILD SENSITIVITY
NAUSEA AND VOMITING: NO NAUSEA AND NO VOMITING
ANXIETY: MILDLY ANXIOUS
HEADACHE, FULLNESS IN HEAD: VERY MILD
AUDITORY DISTURBANCES: NOT PRESENT
AGITATION: NORMAL ACTIVITY
PAROXYSMAL SWEATS: BARELY PERCEPTIBLE SWEATING. PALMS MOIST
HEADACHE, FULLNESS IN HEAD: NOT PRESENT
TREMOR: *
AGITATION: NORMAL ACTIVITY
TOTAL SCORE: 4
AUDITORY DISTURBANCES: NOT PRESENT
VISUAL DISTURBANCES: NOT PRESENT
NAUSEA AND VOMITING: NO NAUSEA AND NO VOMITING
HEADACHE, FULLNESS IN HEAD: NOT PRESENT
HEADACHE, FULLNESS IN HEAD: NOT PRESENT
VISUAL DISTURBANCES: VERY MILD SENSITIVITY
ORIENTATION AND CLOUDING OF SENSORIUM: ORIENTED AND CAN DO SERIAL ADDITIONS
TOTAL SCORE: 2
ORIENTATION AND CLOUDING OF SENSORIUM: ORIENTED AND CAN DO SERIAL ADDITIONS
AGITATION: NORMAL ACTIVITY
PAROXYSMAL SWEATS: BARELY PERCEPTIBLE SWEATING. PALMS MOIST
AGITATION: NORMAL ACTIVITY
VISUAL DISTURBANCES: NOT PRESENT
ORIENTATION AND CLOUDING OF SENSORIUM: ORIENTED AND CAN DO SERIAL ADDITIONS
AGITATION: NORMAL ACTIVITY
NAUSEA AND VOMITING: NO NAUSEA AND NO VOMITING
ANXIETY: NO ANXIETY (AT EASE)
TOTAL SCORE: 4
VISUAL DISTURBANCES: NOT PRESENT
ANXIETY: MILDLY ANXIOUS
ANXIETY: MILDLY ANXIOUS
VISUAL DISTURBANCES: NOT PRESENT
TREMOR: *
ORIENTATION AND CLOUDING OF SENSORIUM: ORIENTED AND CAN DO SERIAL ADDITIONS
NAUSEA AND VOMITING: NO NAUSEA AND NO VOMITING
PAROXYSMAL SWEATS: BARELY PERCEPTIBLE SWEATING. PALMS MOIST
HEADACHE, FULLNESS IN HEAD: NOT PRESENT
AUDITORY DISTURBANCES: NOT PRESENT
HEADACHE, FULLNESS IN HEAD: NOT PRESENT
TOTAL SCORE: 3
TOTAL SCORE: 6

## 2023-10-28 ASSESSMENT — ENCOUNTER SYMPTOMS
RESPIRATORY NEGATIVE: 1
MYALGIAS: 1
CARDIOVASCULAR NEGATIVE: 1
CONSTITUTIONAL NEGATIVE: 1
EYES NEGATIVE: 1
NEUROLOGICAL NEGATIVE: 1
PSYCHIATRIC NEGATIVE: 1
GASTROINTESTINAL NEGATIVE: 1

## 2023-10-28 ASSESSMENT — PAIN DESCRIPTION - PAIN TYPE
TYPE: ACUTE PAIN
TYPE: ACUTE PAIN

## 2023-10-28 ASSESSMENT — FIBROSIS 4 INDEX: FIB4 SCORE: 23.3

## 2023-10-28 NOTE — CARE PLAN
The patient is Watcher - Medium risk of patient condition declining or worsening    Shift Goals  Clinical Goals: physiologic stability, monitor labs, safety, CIWA,  Patient Goals: nicotine patch, go home  Family Goals: FABRIZIO    Progress made toward(s) clinical / shift goals:      Patient hbg remains stable with current 8.7  Telemetry remains in place. 100% paced. However, Vtach noted again tonight. MD aware. Labs ordered and drawn. Supplemental magnesium and calcium infused per orders via peripheral IV.     Problem: Optimal Care for Alcohol Withdrawal  Goal: Optimal Care for the alcohol withdrawal patient  Outcome: Progressing  Note: CIWA conducted q4h per protocol throughout this shift. Patient beginning to score tonight and medicated per scale.   Oral folic acid and thiamine given along with multivitamin.      Problem: Seizure Precautions  Goal: Implementation of seizure precautions  Outcome: Progressing     Problem: Psychosocial  Goal: Patient's level of anxiety will decrease  Outcome: Progressing     Problem: Pain - Standard  Goal: Alleviation of pain or a reduction in pain to the patient’s comfort goal  Outcome: Progressing     Problem: Fall Risk  Goal: Patient will remain free from falls  Outcome: Progressing  Note: Patient bed in lowest position. Bed alarm in place and on. Patient using call light intermittently, however, remains impulsive for bathroom use for bowel movements. Urinal is bedside and patient using.   Frequent rounding in place, along with reminders of fall prevention.        Patient is not progressing towards the following goals:

## 2023-10-28 NOTE — CARE PLAN
The patient is Watcher - Medium risk of patient condition declining or worsening    Shift Goals  Clinical Goals: Monitor CIWA, s/s of bleeding, safety  Patient Goals: To feel better and rest  Family Goals: NA    Progress made toward(s) clinical / shift goals:      Problem: Knowledge Deficit - Standard  Goal: Patient and family/care givers will demonstrate understanding of plan of care, disease process/condition, diagnostic tests and medications  Description: Target End Date:  1-3 days or as soon as patient condition allows    Document in Patient Education    1.  Patient and family/caregiver oriented to unit, equipment, visitation policy and means for communicating concern  2.  Complete/review Learning Assessment  3.  Assess knowledge level of disease process/condition, treatment plan, diagnostic tests and medications  4.  Explain disease process/condition, treatment plan, diagnostic tests and medications  Outcome: Progressing     Problem: Optimal Care for Alcohol Withdrawal  Goal: Optimal Care for the alcohol withdrawal patient  Description: Target End Date:  1 to 3 days    1.  Alcohol history screening done on admission  2.  CIWA-AR score assessment (includes assessment of nausea/vomiting, tremor, sweats, anxiety, agitation, tactile, visual and auditory disturbances, headache and orientation/sensorium).  Document on CIWA flowsheet.  3.  Follow CIWA-AR score protocol  4.  Frequent reorientation  5.  Monitor for fluid and electrolyte imbalance.  6.  Assess for respiratory depression due to sedation (pulse ox)  7.  Consider thiamine, multivitamins, folic acid and magnesium sulfate per provider order  8.  Collaborate with Social Workers/Case Management  9.  Collaborate with mental health  Outcome: Progressing     Problem: Seizure Precautions  Goal: Implementation of seizure precautions  Description: Target End Date:  Prior to discharge or change in level of care    1.  Padded side rails up at all times  2.  Suction  equipment and oxygen delivery system at bedside  3.  Continuous pulse oximeter in use  4.  Implement fall precautions, bed alarm on, bed in lowest position  5.  IV access (per order)  6.  Provide low stimulus environment, avoid exposure to triggers  7.  Instruct patient to use call light/seizure button if having warning signs of impending seizure  Outcome: Progressing     Problem: Lifestyle Changes  Goal: Patient's ability to identify lifestyle changes and available resources to help reduce recurrence of condition will improve  Description: Target End Date:  1 to 3 days    1.  Discuss recommended lifestyle changes  2.  Identify available resources and support systems  3.  Consider referral to substance abuse program  Outcome: Progressing     Problem: Psychosocial  Goal: Patient's level of anxiety will decrease  Description: Target End Date:  1-3 days or as soon as patient condition allows    1.  Collaborate with patient and family/caregiver to identify triggers and develop strategies to cope with anxiety  2.  Implement stimuli reduction, calming techniques  3.  Pharmacologic management per provider order  4.  Encourage patient/family/care giver participation  5.  Collaborate with interdisciplinary team including Psychologist or Behavioral Health Team as needed  Outcome: Progressing  Goal: Spiritual and cultural needs incorporated into hospitalization  Description: Target End Date:  End of day 1    1.  Encourage verbalization of feelings, concerns, expectations and needs  2.  Collaborate with Case Management/  3.  Collaborate with Pastoral Care to meet spiritual needs  Outcome: Progressing     Problem: Risk for Aspiration  Goal: Patient's risk for aspiration will be absent or decrease  Description: Target End Date:  Prior to discharge or change in level of care    1.   Complete dysphagia screening on admission  2.   NPO until dysphagia screening complete or medically cleared  3.   Collaborate with  Speech Therapy, Clinical Dietitian and interdisciplinary team  4.   Implement aspiration precautions  5.   Assist patient up to chair for meals  6.   Elevate head of bed 90 degrees if patient is unable to get out of bed  7.   Encourage small bites  8.   Ensure foods/liquids are of appropriate consistency  9.   Assess for any signs/symptoms of aspiration  10. Assess breath sounds and vital signs after oral intake  Outcome: Progressing     Problem: Pain - Standard  Goal: Alleviation of pain or a reduction in pain to the patient’s comfort goal  Description: Target End Date:  Prior to discharge or change in level of care    Document on Vitals flowsheet    1.  Document pain using the appropriate pain scale per order or unit policy  2.  Educate and implement non-pharmacologic comfort measures (i.e. relaxation, distraction, massage, cold/heat therapy, etc.)  3.  Pain management medications as ordered  4.  Reassess pain after pain med administration per policy  5.  If opiods administered assess patient's response to pain medication is appropriate per POSS sedation scale  6.  Follow pain management plan developed in collaboration with patient and interdisciplinary team (including palliative care or pain specialists if applicable)  Outcome: Progressing     Problem: Fall Risk  Goal: Patient will remain free from falls  Description: Target End Date:  Prior to discharge or change in level of care    Document interventions on the Gallegos Rolando Fall Risk Assessment    1.  Assess for fall risk factors  2.  Implement fall precautions  Outcome: Progressing       Patient is not progressing towards the following goals:NA

## 2023-10-28 NOTE — PROGRESS NOTES
[facility] Internal Medicine Daily Progress Note    Date of Service  10/28/2023    R Team: R IM Purple Team   Attending: Moose Chakraborty M.d.  Senior Resident: Dr. Mendez  Intern:  Dr. Liang  Contact Number: [phone]    Chief Complaint  Inocencio Shabazz is a 63 y.o. male admitted 10/26/2023 with past medical history of chronic alcohol abuse, cirrhosis, esophageal varices, seizure disorder who presented 10/26/2023 with multiple symptoms, reports of dizziness, lightheadedness, generalized fatigue, muscle cramping for last 2 to 3 days. Admitted to hospital for symptomatic anemia of 7.0, receiving 1u PRBC and currently stable.    Hospital Course  Inocencio Shabazz is a 63 y.o. male with past medical history of chronic alcohol abuse (last drink 10/25), cirrhosis, esophageal varices, seizure disorder who presented 10/26/2023 with multiple symptoms, reports of dizziness, lightheadedness, generalized fatigue, muscle cramping for last 2 to 3 days. He was admitted to the hospital for symptomatic anemia 7.0, received 1u PRBC in ED and hemoglobin remains stable. No recent history of acute blood loss or GI bleed. Monitored for alcohol withdrawal with CIWA protocol. Previously HCV+ as of 2019 and patient states he did not receive treatment.    Interval Problem Update  Runs of vtach not exceeding 30 seconds continuous and remaining asymptomatic. Continuing to replete electrolytes as needed. Patient reportedly drinking significant amount of caffeine. He has no complaints this AM, remains sleepy throughout medical interview. Would not like us to contact his family. CIWA 3-6 received 0.5mg ativan x2.     I have discussed this patient's plan of care and discharge plan at IDT rounds today with Case Management, Nursing, Nursing leadership, and other members of the IDT team.    Consultants/Specialty  N/a    Code Status  Full Code    Disposition  The patient is not medically cleared for discharge to home or a post-acute  facility.      I have placed the appropriate orders for post-discharge needs.    Review of Systems  Review of Systems   Constitutional: Negative.    HENT: Negative.     Eyes: Negative.    Respiratory: Negative.     Cardiovascular: Negative.    Gastrointestinal: Negative.    Genitourinary: Negative.    Musculoskeletal:  Positive for myalgias.   Skin: Negative.    Neurological: Negative.    Psychiatric/Behavioral: Negative.          Physical Exam  Temp:  [36.6 °C (97.8 °F)-37.7 °C (99.8 °F)] 36.6 °C (97.8 °F)  Pulse:  [] 94  Resp:  [16-20] 20  BP: (116-141)/(74-91) 124/74  SpO2:  [90 %-98 %] 97 %    Physical Exam  Vitals reviewed.   HENT:      Nose: Nose normal.      Mouth/Throat:      Mouth: Mucous membranes are moist.      Pharynx: Oropharynx is clear.   Eyes:      General: No scleral icterus.     Extraocular Movements: Extraocular movements intact.      Conjunctiva/sclera: Conjunctivae normal.      Pupils: Pupils are equal, round, and reactive to light.   Cardiovascular:      Rate and Rhythm: Regular rhythm. Tachycardia present.      Pulses: Normal pulses.      Heart sounds: Normal heart sounds. No murmur heard.     No gallop.   Pulmonary:      Effort: Pulmonary effort is normal. No respiratory distress.      Breath sounds: Normal breath sounds. No wheezing or rales.   Abdominal:      General: Abdomen is flat. Bowel sounds are normal. There is no distension.      Palpations: Abdomen is soft.      Tenderness: There is no abdominal tenderness.   Musculoskeletal:         General: Normal range of motion.   Skin:     General: Skin is warm.      Capillary Refill: Capillary refill takes less than 2 seconds.   Neurological:      General: No focal deficit present.      Mental Status: He is alert and oriented to person, place, and time. Mental status is at baseline.      Cranial Nerves: No cranial nerve deficit.      Comments: Mild intention tremor           Fluids    Intake/Output Summary (Last 24 hours) at 10/28/2023  1412  Last data filed at 10/28/2023 0800  Gross per 24 hour   Intake 3359.29 ml   Output 2350 ml   Net 1009.29 ml         Laboratory  Recent Labs     10/26/23  1405 10/27/23  0435 10/27/23  1142   WBC 1.3* 1.0* 1.2*   RBC 2.99* 3.34* 3.57*   HEMOGLOBIN 7.0* 8.1* 8.7*   HEMATOCRIT 23.8* 26.4* 27.7*   MCV 79.6* 79.0* 77.6*   MCH 23.4* 24.3* 24.4*   MCHC 29.4* 30.7* 31.4*   RDW 58.7* 54.5* 53.3*   PLATELETCT 42* 31* 29*       Recent Labs     10/26/23  1405 10/27/23  0435 10/28/23  0042   SODIUM 139 135 137  137   POTASSIUM 4.3 3.6 3.8  3.8   CHLORIDE 106 104 107  107   CO2 20 20 18*  18*   GLUCOSE 68 127* 105*  105*   BUN 6* 7* 4*  4*   CREATININE 0.51 0.51 0.61  0.61   CALCIUM 8.1* 8.2* 8.0*  8.0*       Recent Labs     10/28/23  0042   INR 1.42*               Imaging  US-RUQ   Final Result      1.  Cirrhosis. No solid hepatic mass lesions detected.   2.  Thickened gallbladder wall, likely related to cirrhosis. Gallbladder sludge without gallstones. Acute cholecystitis is unlikely.   3.  Trace ascites.      EC-ECHOCARDIOGRAM COMPLETE W/O CONT    (Results Pending)        Assessment/Plan  Problem Representation:    * Symptomatic anemia- (present on admission)  Assessment & Plan  2/2 chronic alcohol use. Hgb stable following 1u PRBC transfusion 10/26. No signs of active blood loss.8.7 > 7.6 10/28.  - Pending iron panel 10/29 night  - daily cbc  - transfuse <7 hgb  - pantoprazole 40mg IV BID  - NS 75 cc/hr    V-tach (HCC)  Assessment & Plan  Asymptomatic vtach 10/27 with 10 beat then 30 beat run. EKG with NSR tachycardia of 111. No ST changes.  - Optimize K, phos, Mg  - continuous tele  - cardiology consult if asymptomatic > 30 seconds vtach. Currently max 20 seconds.  - troponin wnl  - no caffeine  - corrected calcium currently wnl  - Initiated carvedilol 3.125mg BID 10/28    Secondary esophageal varices (HCC)  Assessment & Plan  EGD 8/10, varices with 2 AVM. Electrocauterized.   - Continue to monitor, no reported  hematemesis    Alcohol abuse with withdrawal (HCC)- (present on admission)  Assessment & Plan  Reported history of alcohol withdrawal seizure in past. Last drink 10/25, alcohol level elevated on admission to 17. Watch for DT as in timeframe of initiating withdrawal.  - CIWA  - Thiamine 100mg TID, folate, MVM  - Daily phos, Mg, K replete as needed, monitor for refeeding syndrome  -Keep head of bed 45°  -Aspiration precautions, seizure precautions      Pancytopenia (HCC)- (present on admission)  Assessment & Plan  Secondary to chronic alcohol use, pancytopenic on admission stable over past several years.  -Repeat iron panel 10/29 morning, to consider repletion. Iron currently wnl and ferritin low.  -Monitor for signs of neutropenic fever, obtain blood cultures and initiate zosyn IV if fever >101 of 100.4 for greater than 1 hour. MRSA nares negative.    Schizophrenia (HCC)- (present on admission)  Assessment & Plan  Continue home seroquel at night      Cirrhosis (HCC)- (present on admission)  Assessment & Plan  Cirrhosis noted on RUQ US in 2019. Previously HCV RNA+, states he did not receive treatment. No ascites or signs of encephalopathy. MELD 12.  - RUQ US identifying cirrhosis with traces ascites  - HCV RNA, will f/u genotype if + with outpatient treatment  - Ammonia wnl    Hepatitis C- (present on admission)  Assessment & Plan  Previously HCV RNA +, never treated according to pt  Pending HCV RNA and genotype if possible. Continue treatment outpatient.         VTE prophylaxis: SCDs/TEDs    I have performed a physical exam and reviewed and updated ROS and Plan today (10/28/2023). In review of yesterday's note (10/27/2023), there are no changes except as documented above.

## 2023-10-28 NOTE — PROGRESS NOTES
Lab called with critical result of WBC 1.3 at 1540. Critical lab result read back to lab.   Dr. Liang notified of critical lab result at 1543.  Critical lab result read back by Dr. Liang.

## 2023-10-28 NOTE — PROGRESS NOTES
Telemetry Monitor Report    Rhythm: Paced/ SR/ ST  Ectopy: PVC  10 beats Vtach 30 beats Vtach  HR range: 98 - 131    .15/.07/.32

## 2023-10-28 NOTE — PROGRESS NOTES
Monitor room notified this RN again of several beats of Vtach. MD notified again.   STAT bmp and Mag labs ordered.   Patient remains asymptomatic with no complaints.

## 2023-10-28 NOTE — PROGRESS NOTES
This note is intended for the purposes of medical student education and feedback only.   Please refer to the documentation by this patient's assigned medical practitioner for details of care and plans.    Reason for admission: Inocencio Shabazz is a 63 y.o. male w/ PMH of Schizophrenia, alcohol use disorder, cirrhosis, esophageal varices treated w/ electrocauterization, and pancytopenia who was admitted with symptomatic anemia and acute alcohol withdrawal.    Hospital Course:      10/26/23:  Inocencio Shabazz presented to the ED om 10/26/2023 with multiple symptoms, reports of dizziness, lightheadedness, generalized fatigue, muscle cramping for last 2 to 3 days.  He denies vomiting, hematemesis, melena. No fever, chest pain, shortness of breath, no weakness/numbness on exam.Reports he is been drinking alcohol every day and last drink was yesterday.  On arrival to ED his vitals remained stable.  Labs reviewed and noted with severe pancytopenia, white count 1.3, absolute neutrophil count 0.7, hemoglobin 7, platelet count 42, chemistry unremarkable.  Ferritin 19.1. Elevated alcohol level 17.  Acute alcohol withdrawal - on CIWA protocol.  Patient receiving PRBC transfusion for anemia.   Need close H&H monitoring, IV Protonix.    10/27/23:  -Absolute neutrophil, lymphocyte, and platelet counts extremely low and trended down overnight (neutrophils 0.7 to 0.6, lymphocytes 0.34 to 0.24, platelets 42 to 31)  -Hgb increased post PRBC transfusion (7.0 to 8.1)  -Last liver US was 2019 and showed nodules consistent w/ cirrhosis; will reorder  -No INR to calculate MELD score; ordered  -Pt clinically stable; no jaundice, abdominal distension, abdominal TTP, confusion (A&Ox4), or lower extremity edema  -On CIWA protocol to monitor for alcohol withdrawal sx    SUBJECTIVE    Interval Problem Update  -Pt had PVCs and Vtach overnight; EKG at 1:30a this morning showed PVCs and 10 and then 30 beats Vtach  -Pt was hypersomnolent this morning  "- repeatedly fell asleep during interview and exam  -Reports no muscle pain but belly pain when using the bathroom (on exam, belly was soft, non-TTP in all 4 quadrants  -Remaining ROS was limited due to hypersomnolence  -Pt reports feeling very sleepy and getting ~3 hours of sleep last night  -MELD score: 12, 6% estimated 3-month mortality  -Bicarb 18, remaining electrolytes and ammonia wnl    Code Status  FULL     Disposition  Patient is not medically stable and cannot be discharged home or to a post-acute care facility.    Review of Systems  General: reports sleepiness, states he only slept 3 hrs last night  Psych: denies hallucinations  Neuro: denies headache, dizziness, confusion  HEENT: denies congestion  Cardio: denies chest pain, leg swelling  Pulm: denies cough, SOB  GI: reports belly pain when using restroom; denies diarrhea, constipation, hematochezia, melena  : denies polyuria, hematuria  MSK: denies muscle pain, weakness  Skin: denies rashes, itching, burning    OBJECTIVE     Physical Exam:  /74   Pulse 94   Temp 36.6 °C (97.8 °F) (Temporal)   Resp 20   Ht 1.727 m (5' 8\")   Wt 56.7 kg (125 lb)   SpO2 97%     General: Appears slightly malnourished, hypersomnolent & repeatedly fell asleep during exam/interview.  HEENT: Normocephalic, atraumatic. EOMI, PERRLA. Mucous membranes moist.   Cardio: Regular rate & rhythm. No murmurs, rubs, or gallops. Pacemaker sound appreciated.  Pulmonary: Lungs CTAB. No wheezes, rales, or rhonchi.  Abdomen: Normoactive bowel sounds. Abdomen is soft and nondistended. No TTP in all four quadrants. Liver palpated ~3 cm below costal margin.  MSK: Normal ROM. Extremities well-perfused. Capillary refill <2 seconds.  Neuro: A&Ox4. CN II-XII grossly intact.   Skin: No rash, lesions, or skin ulcers. No jaundice, ecchymoses, or petechiae.   Psych: Tired mood. Abnormal affect, potentially due to poor dentition.    Ins/Outs:    Intake/Output Summary (Last 24 hours) at " 10/28/2023 1411  Last data filed at 10/28/2023 0800  Gross per 24 hour   Intake 3359.29 ml   Output 2350 ml   Net 1009.29 ml       Lab Results:  Recent Labs     10/26/23  1405 10/27/23  0435 10/27/23  1142   WBC 1.3* 1.0* 1.2*   RBC 2.99* 3.34* 3.57*   HEMOGLOBIN 7.0* 8.1* 8.7*   HEMATOCRIT 23.8* 26.4* 27.7*   MCV 79.6* 79.0* 77.6*   MCH 23.4* 24.3* 24.4*   MCHC 29.4* 30.7* 31.4*   RDW 58.7* 54.5* 53.3*   PLATELETCT 42* 31* 29*     Recent Labs     10/26/23  1405 10/27/23  0435 10/28/23  0042   SODIUM 139 135 137  137   POTASSIUM 4.3 3.6 3.8  3.8   CHLORIDE 106 104 107  107   CO2 20 20 18*  18*   GLUCOSE 68 127* 105*  105*   BUN 6* 7* 4*  4*   CREATININE 0.51 0.51 0.61  0.61   CALCIUM 8.1* 8.2* 8.0*  8.0*     Recent Labs     10/28/23  0042   INR 1.42*     Recent Labs     10/26/23  1405 10/27/23  0435 10/28/23  0042 10/28/23  0129   ASTSGOT 62* 53* 55*  --    ALTSGPT 33 26 24  --    TBILIRUBIN 0.6 1.4 0.8  --    ALKPHOSPHAT 79 83 81  --    GLOBULIN 3.9* 4.0* 3.8*  --    INR  --   --  1.42*  --    AMMONIA  --   --   --  32       Imaging Results:  US-RUQ   Final Result      1.  Cirrhosis. No solid hepatic mass lesions detected.   2.  Thickened gallbladder wall, likely related to cirrhosis. Gallbladder sludge without gallstones. Acute cholecystitis is unlikely.   3.  Trace ascites.      EC-ECHOCARDIOGRAM COMPLETE W/O CONT    (Results Pending)       Telemetry Monitor Report     Rhythm: Paced/ SR/ ST  Ectopy: PVC  10 beats Vtach 30 beats Vtach  HR range: 98 - 131     .15/.07/.32                 Current Medications    Current Facility-Administered Medications:     potassium chloride SA (Kdur) tablet 20 mEq, 20 mEq, Oral, DAILY, Anthony Liang M.D., 20 mEq at 10/28/23 0635    carvedilol (Coreg) tablet 3.125 mg, 3.125 mg, Oral, BID WITH MEALS, Mandy Mendez DSAYDA, 3.125 mg at 10/28/23 1039    [COMPLETED] Rally Bag IV (D5LR 1000 mL + Thiamine 100 mg + Folic Acid 1 mg + Magnesium Sulfate 1 g) infusion, ,  Intravenous, Once, Last Rate: 250 mL/hr at 10/27/23 0048, New Bag at 10/27/23 0048 **AND** thiamine (Vitamin B-1) tablet 100 mg, 100 mg, Oral, TID, 100 mg at 10/28/23 0849 **AND** multivitamin tablet 1 Tablet, 1 Tablet, Oral, DAILY, 1 Tablet at 10/28/23 0511 **AND** folic acid (Folvite) tablet 1 mg, 1 mg, Oral, DAILY, Anthony Liang M.D., 1 mg at 10/28/23 0511    nicotine (Nicoderm) 7 MG/24HR 7 mg, 7 mg, Transdermal, Daily-0600, Nina Lakhani M.D., 7 mg at 10/28/23 0512    QUEtiapine (SEROquel) tablet 100 mg, 100 mg, Oral, Nightly, Fermin Brown M.D., 100 mg at 10/27/23 2058    senna-docusate (Pericolace Or Senokot S) 8.6-50 MG per tablet 2 Tablet, 2 Tablet, Oral, BID, 2 Tablet at 10/27/23 1610 **AND** polyethylene glycol/lytes (Miralax) PACKET 1 Packet, 1 Packet, Oral, QDAY PRN **AND** magnesium hydroxide (Milk Of Magnesia) suspension 30 mL, 30 mL, Oral, QDAY PRN **AND** bisacodyl (Dulcolax) suppository 10 mg, 10 mg, Rectal, QDAY PRN, Fermin Brown M.D.    pantoprazole (Protonix) injection 40 mg, 40 mg, Intravenous, BID, Fermin Brown M.D., 40 mg at 10/28/23 0511    LORazepam (Ativan) tablet 0.5 mg, 0.5 mg, Oral, Q4HRS PRN, Fermin Brown M.D., 0.5 mg at 10/28/23 0512    LORazepam (Ativan) tablet 1 mg, 1 mg, Oral, Q4HRS PRN **OR** LORazepam (Ativan) injection 0.5 mg, 0.5 mg, Intravenous, Q4HRS PRN, Fermin Brown M.D.    LORazepam (Ativan) tablet 2 mg, 2 mg, Oral, Q2HRS PRN **OR** LORazepam (Ativan) injection 1 mg, 1 mg, Intravenous, Q2HRS PRN, Fermin Brown M.D.    LORazepam (Ativan) tablet 3 mg, 3 mg, Oral, Q HOUR PRN **OR** LORazepam (Ativan) injection 1.5 mg, 1.5 mg, Intravenous, Q HOUR PRN, Fermin Brown M.D.    LORazepam (Ativan) tablet 4 mg, 4 mg, Oral, Q15 MIN PRN **OR** LORazepam (Ativan) injection 2 mg, 2 mg, Intravenous, Q15 MIN PRN, Fermin Brown M.D.    Current Facility-Administered Medications:     potassium chloride SA (Kdur) tablet 20 mEq, 20 mEq, Oral, DAILY, Anthony Liang M.D., 20 mEq at 10/28/23  0635    carvedilol (Coreg) tablet 3.125 mg, 3.125 mg, Oral, BID WITH MEALS, Piyushchristopher Mendez, D.O., 3.125 mg at 10/28/23 1039    [COMPLETED] Rally Bag IV (D5LR 1000 mL + Thiamine 100 mg + Folic Acid 1 mg + Magnesium Sulfate 1 g) infusion, , Intravenous, Once, Last Rate: 250 mL/hr at 10/27/23 0048, New Bag at 10/27/23 0048 **AND** thiamine (Vitamin B-1) tablet 100 mg, 100 mg, Oral, TID, 100 mg at 10/28/23 0849 **AND** multivitamin tablet 1 Tablet, 1 Tablet, Oral, DAILY, 1 Tablet at 10/28/23 0511 **AND** folic acid (Folvite) tablet 1 mg, 1 mg, Oral, DAILY, Anthony Liang M.D., 1 mg at 10/28/23 0511    nicotine (Nicoderm) 7 MG/24HR 7 mg, 7 mg, Transdermal, Daily-0600, Nina Lakhani M.D., 7 mg at 10/28/23 0512    QUEtiapine (SEROquel) tablet 100 mg, 100 mg, Oral, Nightly, Fermin Brown M.D., 100 mg at 10/27/23 2058    senna-docusate (Pericolace Or Senokot S) 8.6-50 MG per tablet 2 Tablet, 2 Tablet, Oral, BID, 2 Tablet at 10/27/23 1610 **AND** polyethylene glycol/lytes (Miralax) PACKET 1 Packet, 1 Packet, Oral, QDAY PRN **AND** magnesium hydroxide (Milk Of Magnesia) suspension 30 mL, 30 mL, Oral, QDAY PRN **AND** bisacodyl (Dulcolax) suppository 10 mg, 10 mg, Rectal, QDAY PRN, Fermin Brown M.D.    pantoprazole (Protonix) injection 40 mg, 40 mg, Intravenous, BID, Fermin Brown M.D., 40 mg at 10/28/23 0511    LORazepam (Ativan) tablet 0.5 mg, 0.5 mg, Oral, Q4HRS PRN, Fermin Brown M.D., 0.5 mg at 10/28/23 0512    LORazepam (Ativan) tablet 1 mg, 1 mg, Oral, Q4HRS PRN **OR** LORazepam (Ativan) injection 0.5 mg, 0.5 mg, Intravenous, Q4HRS PRN, Fermin Brown M.D.    LORazepam (Ativan) tablet 2 mg, 2 mg, Oral, Q2HRS PRN **OR** LORazepam (Ativan) injection 1 mg, 1 mg, Intravenous, Q2HRS PRN, Fermin Brown M.D.    LORazepam (Ativan) tablet 3 mg, 3 mg, Oral, Q HOUR PRN **OR** LORazepam (Ativan) injection 1.5 mg, 1.5 mg, Intravenous, Q HOUR PRN, Fermin Brown M.D.    LORazepam (Ativan) tablet 4 mg, 4 mg, Oral, Q15 MIN PRN **OR**  "LORazepam (Ativan) injection 2 mg, 2 mg, Intravenous, Q15 MIN PRN, Fermin Brown M.D.    ASSESSMENT/PLAN  Inocencio Shabazz is a 63 y.o. male w/ PMH of Schizophrenia, alcohol use disorder, cirrhosis, esophageal varices treated w/ electrocauterization, and pancytopenia who was admitted with symptomatic anemia and acute alcohol withdrawal.    #Vtach  -Had vtach run of 30 beats early this morning  -Waiting on Echo/consult from cardiology  -Elevated globulin (3.8)  -Nurse reports pt drinking caffeine \"all day\"  -ROSINA vs Caffeine Intoxication vs Infection  Plan:  Start pt on carvedilol  Eliminate caffeine from pt's diet  Continue monitoring for sx such SOB, increased somnolence  Daily CMP to monitor electrolytes  Monitor for fever or other signs/sx of infection    #Alcohol Withdrawal  -INR 1.42, Bicarb 18, BUN 4, Calcium 8, Iron 23, % Sat 7  -Liver US showed nodules and mild edema consistent w/ cirrhosis  Plan:  Continue CIWA protocol  Daily CMP  Continue mag/phos     #Pancytopenia  -Platelets trending down (29)  -WBC 1.2 - yesterday, waiting on CBC from today  -Globulins elevated at 3.8  Plan:  Contact precautions to avoid infection  Continue monitoring for neutropenic fever  Daily CBC w/ diff          Medical Student: Dixie Howard, MS3  Resident: Dr. Anthony Liang, PGY-1  Attending: Dr. Moose Chakraborty, R Med  "

## 2023-10-28 NOTE — PROGRESS NOTES
Monitor check called. 4 beats Vtach per monitor twice. And another 7 beats.   Asymptomatic. No complaints from patient at this time.   MD notified.

## 2023-10-29 LAB
ALBUMIN SERPL BCP-MCNC: 3 G/DL (ref 3.2–4.9)
ALBUMIN/GLOB SERPL: 0.8 G/DL
ALP SERPL-CCNC: 89 U/L (ref 30–99)
ALT SERPL-CCNC: 24 U/L (ref 2–50)
ANION GAP SERPL CALC-SCNC: 9 MMOL/L (ref 7–16)
AST SERPL-CCNC: 50 U/L (ref 12–45)
BASOPHILS # BLD AUTO: 2 % (ref 0–1.8)
BASOPHILS # BLD: 0.03 K/UL (ref 0–0.12)
BILIRUB SERPL-MCNC: 0.5 MG/DL (ref 0.1–1.5)
BUN SERPL-MCNC: 8 MG/DL (ref 8–22)
CALCIUM ALBUM COR SERPL-MCNC: 9.1 MG/DL (ref 8.5–10.5)
CALCIUM SERPL-MCNC: 8.3 MG/DL (ref 8.5–10.5)
CHLORIDE SERPL-SCNC: 107 MMOL/L (ref 96–112)
CK SERPL-CCNC: 58 U/L (ref 0–154)
CO2 SERPL-SCNC: 19 MMOL/L (ref 20–33)
CREAT SERPL-MCNC: 0.67 MG/DL (ref 0.5–1.4)
EOSINOPHIL # BLD AUTO: 0.15 K/UL (ref 0–0.51)
EOSINOPHIL NFR BLD: 9.8 % (ref 0–6.9)
ERYTHROCYTE [DISTWIDTH] IN BLOOD BY AUTOMATED COUNT: 54.7 FL (ref 35.9–50)
FERRITIN SERPL-MCNC: 23.4 NG/ML (ref 22–322)
GFR SERPLBLD CREATININE-BSD FMLA CKD-EPI: 103 ML/MIN/1.73 M 2
GLOBULIN SER CALC-MCNC: 4 G/DL (ref 1.9–3.5)
GLUCOSE SERPL-MCNC: 100 MG/DL (ref 65–99)
HCT VFR BLD AUTO: 27 % (ref 42–52)
HGB BLD-MCNC: 8.2 G/DL (ref 14–18)
IMM GRANULOCYTES # BLD AUTO: 0.01 K/UL (ref 0–0.11)
IMM GRANULOCYTES NFR BLD AUTO: 0.7 % (ref 0–0.9)
IRON SATN MFR SERPL: 6 % (ref 15–55)
IRON SERPL-MCNC: 21 UG/DL (ref 50–180)
LYMPHOCYTES # BLD AUTO: 0.41 K/UL (ref 1–4.8)
LYMPHOCYTES NFR BLD: 26.8 % (ref 22–41)
MAGNESIUM SERPL-MCNC: 1.7 MG/DL (ref 1.5–2.5)
MCH RBC QN AUTO: 23.6 PG (ref 27–33)
MCHC RBC AUTO-ENTMCNC: 30.4 G/DL (ref 32.3–36.5)
MCV RBC AUTO: 77.6 FL (ref 81.4–97.8)
MONOCYTES # BLD AUTO: 0.18 K/UL (ref 0–0.85)
MONOCYTES NFR BLD AUTO: 11.8 % (ref 0–13.4)
NEUTROPHILS # BLD AUTO: 0.75 K/UL (ref 1.82–7.42)
NEUTROPHILS NFR BLD: 48.9 % (ref 44–72)
NRBC # BLD AUTO: 0 K/UL
NRBC BLD-RTO: 0 /100 WBC (ref 0–0.2)
PHOSPHATE SERPL-MCNC: 4.1 MG/DL (ref 2.5–4.5)
PLATELET # BLD AUTO: 27 K/UL (ref 164–446)
PLATELETS.RETICULATED NFR BLD AUTO: 11 % (ref 0.6–13.1)
POTASSIUM SERPL-SCNC: 4.3 MMOL/L (ref 3.6–5.5)
PROT SERPL-MCNC: 7 G/DL (ref 6–8.2)
RBC # BLD AUTO: 3.48 M/UL (ref 4.7–6.1)
SODIUM SERPL-SCNC: 135 MMOL/L (ref 135–145)
TIBC SERPL-MCNC: 347 UG/DL (ref 250–450)
TROPONIN T SERPL-MCNC: <6 NG/L (ref 6–19)
UIBC SERPL-MCNC: 326 UG/DL (ref 110–370)
WBC # BLD AUTO: 1.5 K/UL (ref 4.8–10.8)

## 2023-10-29 PROCEDURE — 700102 HCHG RX REV CODE 250 W/ 637 OVERRIDE(OP)

## 2023-10-29 PROCEDURE — 700111 HCHG RX REV CODE 636 W/ 250 OVERRIDE (IP): Mod: JZ | Performed by: STUDENT IN AN ORGANIZED HEALTH CARE EDUCATION/TRAINING PROGRAM

## 2023-10-29 PROCEDURE — 700102 HCHG RX REV CODE 250 W/ 637 OVERRIDE(OP): Performed by: STUDENT IN AN ORGANIZED HEALTH CARE EDUCATION/TRAINING PROGRAM

## 2023-10-29 PROCEDURE — 84100 ASSAY OF PHOSPHORUS: CPT

## 2023-10-29 PROCEDURE — 770020 HCHG ROOM/CARE - TELE (206)

## 2023-10-29 PROCEDURE — 99222 1ST HOSP IP/OBS MODERATE 55: CPT | Performed by: INTERNAL MEDICINE

## 2023-10-29 PROCEDURE — 83735 ASSAY OF MAGNESIUM: CPT

## 2023-10-29 PROCEDURE — A9270 NON-COVERED ITEM OR SERVICE: HCPCS | Performed by: STUDENT IN AN ORGANIZED HEALTH CARE EDUCATION/TRAINING PROGRAM

## 2023-10-29 PROCEDURE — 85025 COMPLETE CBC W/AUTO DIFF WBC: CPT

## 2023-10-29 PROCEDURE — 80053 COMPREHEN METABOLIC PANEL: CPT

## 2023-10-29 PROCEDURE — 84484 ASSAY OF TROPONIN QUANT: CPT

## 2023-10-29 PROCEDURE — 700105 HCHG RX REV CODE 258: Performed by: STUDENT IN AN ORGANIZED HEALTH CARE EDUCATION/TRAINING PROGRAM

## 2023-10-29 PROCEDURE — A9270 NON-COVERED ITEM OR SERVICE: HCPCS

## 2023-10-29 PROCEDURE — 82728 ASSAY OF FERRITIN: CPT

## 2023-10-29 PROCEDURE — 99233 SBSQ HOSP IP/OBS HIGH 50: CPT | Mod: GC | Performed by: INTERNAL MEDICINE

## 2023-10-29 PROCEDURE — 85055 RETICULATED PLATELET ASSAY: CPT

## 2023-10-29 PROCEDURE — 83540 ASSAY OF IRON: CPT

## 2023-10-29 PROCEDURE — 700111 HCHG RX REV CODE 636 W/ 250 OVERRIDE (IP): Performed by: STUDENT IN AN ORGANIZED HEALTH CARE EDUCATION/TRAINING PROGRAM

## 2023-10-29 PROCEDURE — 83550 IRON BINDING TEST: CPT

## 2023-10-29 PROCEDURE — C9113 INJ PANTOPRAZOLE SODIUM, VIA: HCPCS | Performed by: STUDENT IN AN ORGANIZED HEALTH CARE EDUCATION/TRAINING PROGRAM

## 2023-10-29 RX ORDER — ACETAMINOPHEN 325 MG/1
650 TABLET ORAL EVERY 4 HOURS PRN
Status: DISCONTINUED | OUTPATIENT
Start: 2023-10-29 | End: 2023-10-30 | Stop reason: HOSPADM

## 2023-10-29 RX ORDER — CARVEDILOL 6.25 MG/1
6.25 TABLET ORAL 2 TIMES DAILY WITH MEALS
Status: DISCONTINUED | OUTPATIENT
Start: 2023-10-30 | End: 2023-10-30 | Stop reason: HOSPADM

## 2023-10-29 RX ORDER — MAGNESIUM SULFATE HEPTAHYDRATE 40 MG/ML
2 INJECTION, SOLUTION INTRAVENOUS ONCE
Status: COMPLETED | OUTPATIENT
Start: 2023-10-29 | End: 2023-10-29

## 2023-10-29 RX ADMIN — THERA TABS 1 TABLET: TAB at 05:10

## 2023-10-29 RX ADMIN — FOLIC ACID 1 MG: 1 TABLET ORAL at 05:10

## 2023-10-29 RX ADMIN — NICOTINE 7 MG: 7 PATCH, EXTENDED RELEASE TRANSDERMAL at 05:10

## 2023-10-29 RX ADMIN — Medication 100 MG: at 10:05

## 2023-10-29 RX ADMIN — MAGNESIUM SULFATE HEPTAHYDRATE 2 G: 2 INJECTION, SOLUTION INTRAVENOUS at 07:45

## 2023-10-29 RX ADMIN — SODIUM CHLORIDE 250 MG: 9 INJECTION, SOLUTION INTRAVENOUS at 17:40

## 2023-10-29 RX ADMIN — PANTOPRAZOLE SODIUM 40 MG: 40 INJECTION, POWDER, FOR SOLUTION INTRAVENOUS at 17:31

## 2023-10-29 RX ADMIN — CARVEDILOL 3.12 MG: 6.25 TABLET, FILM COATED ORAL at 17:29

## 2023-10-29 RX ADMIN — Medication 100 MG: at 15:44

## 2023-10-29 RX ADMIN — Medication 100 MG: at 20:26

## 2023-10-29 RX ADMIN — QUETIAPINE FUMARATE 100 MG: 100 TABLET ORAL at 20:26

## 2023-10-29 RX ADMIN — ACETAMINOPHEN 650 MG: 325 TABLET, FILM COATED ORAL at 18:33

## 2023-10-29 RX ADMIN — POTASSIUM CHLORIDE 20 MEQ: 1500 TABLET, EXTENDED RELEASE ORAL at 05:10

## 2023-10-29 RX ADMIN — CARVEDILOL 3.12 MG: 6.25 TABLET, FILM COATED ORAL at 07:43

## 2023-10-29 RX ADMIN — PANTOPRAZOLE SODIUM 40 MG: 40 INJECTION, POWDER, FOR SOLUTION INTRAVENOUS at 05:10

## 2023-10-29 ASSESSMENT — LIFESTYLE VARIABLES
AGITATION: NORMAL ACTIVITY
TREMOR: TREMOR NOT VISIBLE BUT CAN BE FELT, FINGERTIP TO FINGERTIP
TOTAL SCORE: 2
HEADACHE, FULLNESS IN HEAD: NOT PRESENT
AUDITORY DISTURBANCES: NOT PRESENT
AUDITORY DISTURBANCES: NOT PRESENT
HEADACHE, FULLNESS IN HEAD: NOT PRESENT
VISUAL DISTURBANCES: NOT PRESENT
AGITATION: NORMAL ACTIVITY
ORIENTATION AND CLOUDING OF SENSORIUM: ORIENTED AND CAN DO SERIAL ADDITIONS
PAROXYSMAL SWEATS: NO SWEAT VISIBLE
TOTAL SCORE: 1
HEADACHE, FULLNESS IN HEAD: MILD
NAUSEA AND VOMITING: NO NAUSEA AND NO VOMITING
ANXIETY: NO ANXIETY (AT EASE)
AUDITORY DISTURBANCES: NOT PRESENT
AUDITORY DISTURBANCES: NOT PRESENT
ANXIETY: NO ANXIETY (AT EASE)
TOTAL SCORE: 3
AGITATION: NORMAL ACTIVITY
PAROXYSMAL SWEATS: BARELY PERCEPTIBLE SWEATING. PALMS MOIST
AUDITORY DISTURBANCES: NOT PRESENT
AGITATION: NORMAL ACTIVITY
ANXIETY: NO ANXIETY (AT EASE)
TOTAL SCORE: 0
TOTAL SCORE: 2
NAUSEA AND VOMITING: NO NAUSEA AND NO VOMITING
TREMOR: TREMOR NOT VISIBLE BUT CAN BE FELT, FINGERTIP TO FINGERTIP
PAROXYSMAL SWEATS: BARELY PERCEPTIBLE SWEATING. PALMS MOIST
ORIENTATION AND CLOUDING OF SENSORIUM: ORIENTED AND CAN DO SERIAL ADDITIONS
HEADACHE, FULLNESS IN HEAD: NOT PRESENT
HEADACHE, FULLNESS IN HEAD: NOT PRESENT
ANXIETY: NO ANXIETY (AT EASE)
ANXIETY: NO ANXIETY (AT EASE)
VISUAL DISTURBANCES: NOT PRESENT
VISUAL DISTURBANCES: NOT PRESENT
AUDITORY DISTURBANCES: NOT PRESENT
ORIENTATION AND CLOUDING OF SENSORIUM: ORIENTED AND CAN DO SERIAL ADDITIONS
TREMOR: TREMOR NOT VISIBLE BUT CAN BE FELT, FINGERTIP TO FINGERTIP
NAUSEA AND VOMITING: NO NAUSEA AND NO VOMITING
NAUSEA AND VOMITING: NO NAUSEA AND NO VOMITING
VISUAL DISTURBANCES: NOT PRESENT
VISUAL DISTURBANCES: NOT PRESENT
PAROXYSMAL SWEATS: NO SWEAT VISIBLE
TOTAL SCORE: 1
TREMOR: TREMOR NOT VISIBLE BUT CAN BE FELT, FINGERTIP TO FINGERTIP
AGITATION: NORMAL ACTIVITY
PAROXYSMAL SWEATS: NO SWEAT VISIBLE
PAROXYSMAL SWEATS: NO SWEAT VISIBLE
NAUSEA AND VOMITING: NO NAUSEA AND NO VOMITING
ORIENTATION AND CLOUDING OF SENSORIUM: ORIENTED AND CAN DO SERIAL ADDITIONS
NAUSEA AND VOMITING: NO NAUSEA AND NO VOMITING
ANXIETY: NO ANXIETY (AT EASE)
HEADACHE, FULLNESS IN HEAD: NOT PRESENT
AGITATION: NORMAL ACTIVITY
ORIENTATION AND CLOUDING OF SENSORIUM: ORIENTED AND CAN DO SERIAL ADDITIONS
TREMOR: TREMOR NOT VISIBLE BUT CAN BE FELT, FINGERTIP TO FINGERTIP
TREMOR: NO TREMOR
VISUAL DISTURBANCES: NOT PRESENT
ORIENTATION AND CLOUDING OF SENSORIUM: ORIENTED AND CAN DO SERIAL ADDITIONS

## 2023-10-29 ASSESSMENT — ENCOUNTER SYMPTOMS
SORE THROAT: 0
DIARRHEA: 0
ABDOMINAL PAIN: 0
CONSTIPATION: 0
COUGH: 0
NAUSEA: 0
WHEEZING: 0
BRUISES/BLEEDS EASILY: 0
DEPRESSION: 0
HEMOPTYSIS: 0
BLOOD IN STOOL: 0
DIAPHORESIS: 0
FEVER: 0
TREMORS: 0
NERVOUS/ANXIOUS: 0
HEMATOCHEZIA: 0
CHILLS: 0
PALPITATIONS: 0
VOMITING: 0
ORTHOPNEA: 0
HEADACHES: 0
WEAKNESS: 0
SHORTNESS OF BREATH: 0
MYALGIAS: 0
CLAUDICATION: 0

## 2023-10-29 ASSESSMENT — PAIN DESCRIPTION - PAIN TYPE
TYPE: ACUTE PAIN

## 2023-10-29 ASSESSMENT — FIBROSIS 4 INDEX: FIB4 SCORE: 23.69

## 2023-10-29 NOTE — PROGRESS NOTES
Telemetry Monitor Report    Rhythm: SR/ST  Paced  Ectopy: PVC  HX: 1 minute Vtach 30 beats Vtach    HR range:     .14/.05/.33

## 2023-10-29 NOTE — PROGRESS NOTES
[place] from Lab called with critical result of WBC 1.5 at 0734. Critical lab result read back to [place].   Dr. Mendez notified of critical lab result at 0737.  Critical lab result read back by Dr. Mendez.

## 2023-10-29 NOTE — CARE PLAN
The patient is Watcher - Medium risk of patient condition declining or worsening    Shift Goals  Clinical Goals: CIWA, monitor labs, VS, and s/s of bleeding  Patient Goals: To go home  Family Goals: NA    Progress made toward(s) clinical / shift goals:      Problem: Knowledge Deficit - Standard  Goal: Patient and family/care givers will demonstrate understanding of plan of care, disease process/condition, diagnostic tests and medications  Description: Target End Date:  1-3 days or as soon as patient condition allows    Document in Patient Education    1.  Patient and family/caregiver oriented to unit, equipment, visitation policy and means for communicating concern  2.  Complete/review Learning Assessment  3.  Assess knowledge level of disease process/condition, treatment plan, diagnostic tests and medications  4.  Explain disease process/condition, treatment plan, diagnostic tests and medications  Outcome: Progressing     Problem: Optimal Care for Alcohol Withdrawal  Goal: Optimal Care for the alcohol withdrawal patient  Description: Target End Date:  1 to 3 days    1.  Alcohol history screening done on admission  2.  CIWA-AR score assessment (includes assessment of nausea/vomiting, tremor, sweats, anxiety, agitation, tactile, visual and auditory disturbances, headache and orientation/sensorium).  Document on CIWA flowsheet.  3.  Follow CIWA-AR score protocol  4.  Frequent reorientation  5.  Monitor for fluid and electrolyte imbalance.  6.  Assess for respiratory depression due to sedation (pulse ox)  7.  Consider thiamine, multivitamins, folic acid and magnesium sulfate per provider order  8.  Collaborate with Social Workers/Case Management  9.  Collaborate with mental health  Outcome: Progressing     Problem: Seizure Precautions  Goal: Implementation of seizure precautions  Description: Target End Date:  Prior to discharge or change in level of care    1.  Padded side rails up at all times  2.  Suction equipment  and oxygen delivery system at bedside  3.  Continuous pulse oximeter in use  4.  Implement fall precautions, bed alarm on, bed in lowest position  5.  IV access (per order)  6.  Provide low stimulus environment, avoid exposure to triggers  7.  Instruct patient to use call light/seizure button if having warning signs of impending seizure  Outcome: Progressing     Problem: Lifestyle Changes  Goal: Patient's ability to identify lifestyle changes and available resources to help reduce recurrence of condition will improve  Description: Target End Date:  1 to 3 days    1.  Discuss recommended lifestyle changes  2.  Identify available resources and support systems  3.  Consider referral to substance abuse program  Outcome: Progressing     Problem: Psychosocial  Goal: Patient's level of anxiety will decrease  Description: Target End Date:  1-3 days or as soon as patient condition allows    1.  Collaborate with patient and family/caregiver to identify triggers and develop strategies to cope with anxiety  2.  Implement stimuli reduction, calming techniques  3.  Pharmacologic management per provider order  4.  Encourage patient/family/care giver participation  5.  Collaborate with interdisciplinary team including Psychologist or Behavioral Health Team as needed  Outcome: Progressing  Goal: Spiritual and cultural needs incorporated into hospitalization  Description: Target End Date:  End of day 1    1.  Encourage verbalization of feelings, concerns, expectations and needs  2.  Collaborate with Case Management/  3.  Collaborate with Pastoral Care to meet spiritual needs  Outcome: Progressing     Problem: Risk for Aspiration  Goal: Patient's risk for aspiration will be absent or decrease  Description: Target End Date:  Prior to discharge or change in level of care    1.   Complete dysphagia screening on admission  2.   NPO until dysphagia screening complete or medically cleared  3.   Collaborate with Speech  Therapy, Clinical Dietitian and interdisciplinary team  4.   Implement aspiration precautions  5.   Assist patient up to chair for meals  6.   Elevate head of bed 90 degrees if patient is unable to get out of bed  7.   Encourage small bites  8.   Ensure foods/liquids are of appropriate consistency  9.   Assess for any signs/symptoms of aspiration  10. Assess breath sounds and vital signs after oral intake  Outcome: Progressing     Problem: Pain - Standard  Goal: Alleviation of pain or a reduction in pain to the patient’s comfort goal  Description: Target End Date:  Prior to discharge or change in level of care    Document on Vitals flowsheet    1.  Document pain using the appropriate pain scale per order or unit policy  2.  Educate and implement non-pharmacologic comfort measures (i.e. relaxation, distraction, massage, cold/heat therapy, etc.)  3.  Pain management medications as ordered  4.  Reassess pain after pain med administration per policy  5.  If opiods administered assess patient's response to pain medication is appropriate per POSS sedation scale  6.  Follow pain management plan developed in collaboration with patient and interdisciplinary team (including palliative care or pain specialists if applicable)  Outcome: Progressing     Problem: Fall Risk  Goal: Patient will remain free from falls  Description: Target End Date:  Prior to discharge or change in level of care    Document interventions on the Galleg[facility] Rolando Fall Risk Assessment    1.  Assess for fall risk factors  2.  Implement fall precautions  Outcome: Progressing       Patient is not progressing towards the following goals: NA

## 2023-10-29 NOTE — PROGRESS NOTES
Daily Progress Note:     Date of Service: 10/29/2023  Primary Team: [facility]   Attending: Moose Chakraborty M.D.   Senior Resident: Mandy Mendez D.O.  Contact:  [phone]    Patient Identifier:   Mr. Shabazz is a 63-year-old male with past medical history of alcohol abuse, cirrhosis, esophageal varices, and seizure disorder who was initially seen on 10/26 for new onset dizziness, lightheadedness and generalized fatigue as well as ongoing muscle cramping for 2 to 3 days found to be with symptomatic anemia requiring 1 unit of packed red blood cells.  During this hospitalization, patient has required total of 2 units of packed red blood cells.  Patient was treated with CIWA protocol for alcohol withdrawal and continued monitoring for his hemoglobin.  Hospitalization complicated by bouts of V. tach per telemetry monitoring.  Now pending cardiology evaluation.    Subjective:  Notes that he is doing well, eating breakfast at this time.  No significant complaints or concerns.  Notes that he is able to urinate without issues.  Notes that his last bowel movement was this morning.  No major nursing concerns at this time.     Interval Update:  - New run of V. tach lasting for about 1 minute, patient asymptomatic.  - Cardiology consulted, appreciate recommendations.   - CIWA well within normal limits, last measurements below 2.    Consultants/Specialty:  Cardiology    Review of Systems:  Review of Systems   Constitutional:  Negative for chills, diaphoresis, fever and malaise/fatigue.   HENT:  Negative for congestion and sore throat.    Respiratory:  Negative for cough, hemoptysis, shortness of breath and wheezing.    Cardiovascular:  Negative for chest pain, palpitations, orthopnea and claudication.   Gastrointestinal:  Negative for abdominal pain, blood in stool, constipation, diarrhea, melena, nausea and vomiting.   Genitourinary:  Negative for dysuria, frequency and hematuria.   Musculoskeletal:  Negative for  joint pain and myalgias.   Skin:  Negative for itching and rash.   Neurological:  Negative for tremors, weakness and headaches.   Endo/Heme/Allergies:  Does not bruise/bleed easily.   Psychiatric/Behavioral:  Negative for depression. The patient is not nervous/anxious.      Objective:   Vitals:   Temp:  [36.4 °C (97.6 °F)-37.2 °C (98.9 °F)] 36.4 °C (97.6 °F)  Pulse:  [83-96] 92  Resp:  [16-20] 18  BP: (107-134)/(69-89) 113/69  SpO2:  [95 %-98 %] 95 %    Physical Exam  Constitutional:       General: He is not in acute distress.     Appearance: He is not diaphoretic.   HENT:      Head: Normocephalic and atraumatic.      Right Ear: Ear canal and external ear normal.      Left Ear: Ear canal and external ear normal.      Nose: Nose normal.      Mouth/Throat:      Mouth: Mucous membranes are moist.      Pharynx: Oropharynx is clear.   Eyes:      General: No scleral icterus.     Extraocular Movements: Extraocular movements intact.      Pupils: Pupils are equal, round, and reactive to light.   Cardiovascular:      Rate and Rhythm: Normal rate and regular rhythm.      Pulses: Normal pulses.      Heart sounds: Normal heart sounds. No murmur heard.  Pulmonary:      Effort: Pulmonary effort is normal. No respiratory distress.      Breath sounds: Normal breath sounds. No wheezing or rhonchi.   Abdominal:      General: Abdomen is flat. Bowel sounds are normal. There is no distension.      Palpations: Abdomen is soft.      Tenderness: There is no abdominal tenderness. There is no right CVA tenderness, left CVA tenderness or rebound.   Musculoskeletal:         General: No tenderness. Normal range of motion.      Cervical back: Normal range of motion and neck supple. No rigidity.      Right lower leg: No edema.      Left lower leg: No edema.   Skin:     General: Skin is warm and dry.      Capillary Refill: Capillary refill takes less than 2 seconds.      Coloration: Skin is not jaundiced.      Findings: No rash.   Neurological:      " General: No focal deficit present.      Mental Status: He is alert and oriented to person, place, and time. Mental status is at baseline.      Deep Tendon Reflexes: Reflexes normal.   Psychiatric:         Mood and Affect: Mood normal.         Behavior: Behavior normal.     Labs:   Lab Results   Component Value Date/Time    WBC 1.3 (LL) 10/28/2023 02:49 PM    RBC 3.25 (L) 10/28/2023 02:49 PM    HEMOGLOBIN 7.6 (L) 10/28/2023 02:49 PM    HEMATOCRIT 25.7 (L) 10/28/2023 02:49 PM    MCV 79.1 (L) 10/28/2023 02:49 PM    MCH 23.4 (L) 10/28/2023 02:49 PM    MCHC 29.6 (L) 10/28/2023 02:49 PM    MPV 13.5 (H) 08/14/2023 03:49 AM    NEUTSPOLYS 54.30 10/28/2023 02:49 PM    LYMPHOCYTES 17.80 (L) 10/28/2023 02:49 PM    MONOCYTES 15.50 (H) 10/28/2023 02:49 PM    EOSINOPHILS 11.60 (H) 10/28/2023 02:49 PM    BASOPHILS 0.80 10/28/2023 02:49 PM    HYPOCHROMIA 1+ 10/27/2023 04:35 AM    ANISOCYTOSIS 1+ 10/27/2023 04:35 AM        Lab Results   Component Value Date/Time    SODIUM 135 10/29/2023 01:21 AM    POTASSIUM 4.3 10/29/2023 01:21 AM    CHLORIDE 107 10/29/2023 01:21 AM    CO2 19 (L) 10/29/2023 01:21 AM    GLUCOSE 100 (H) 10/29/2023 01:21 AM    BUN 8 10/29/2023 01:21 AM    CREATININE 0.67 10/29/2023 01:21 AM    BUNCREATRAT 15.0 10/06/2022 04:42 AM        No results found for: \"EDTAP72J\", \"GDMKWP277P\", \"IYJYB606S\", \"ARTHCO3\", \"ARTBE\", \"GAPBG\", \"EYK5LSX1\"    Lab Results   Component Value Date/Time    PROTHROMBTM 17.5 (H) 10/28/2023 12:42 AM    INR 1.42 (H) 10/28/2023 12:42 AM        Imaging:   No new imaging performed in the last 24 hours.    Assessment and Plan:    * Symptomatic anemia- (present on admission)  Assessment & Plan  2/2 chronic alcohol use. Hgb stable following 1u PRBC transfusion 10/26. No signs of active blood loss.8.7 > 7.6 10/28.  - daily cbc  - transfuse <7 hgb  - pantoprazole 40mg IV BID  - Iron panel notable for likely iron deficiency anemia possibly in the setting of chronic bleeding.  - IV iron to be administered " per pharmacy protocol.    V-tach (HCC)  Assessment & Plan  Asymptomatic vtach 10/27 with 10 beat then 30 beat run. EKG with NSR tachycardia of 111. No ST changes.  - Optimize K, phos, Mg  - continuous tele  - troponin wnl  - no caffeine  - corrected calcium currently wnl  - Continue carvedilol 3.125mg BID  -Given new episode of sustained ventricular tachycardia, cardiology consulted.  Patient currently asymptomatic.    Secondary esophageal varices (HCC)  Assessment & Plan  EGD 8/10, varices with 2 AVM. Electrocauterized.   - Continue to monitor, no reported hematemesis    Alcohol abuse with withdrawal (HCC)- (present on admission)  Assessment & Plan  Reported history of alcohol withdrawal seizure in past. Last drink 10/25, alcohol level elevated on admission to 17. Watch for DT as in timeframe of initiating withdrawal.  - CIWA, continue until 2 consecutive measurements at 0.  - Thiamine 100mg TID, folate, MVM  - Daily phos, Mg, K replete as needed, monitor for refeeding syndrome  - Keep head of bed 45°  - Aspiration precautions, seizure precautions      Pancytopenia (HCC)- (present on admission)  Assessment & Plan  Secondary to chronic alcohol use, pancytopenic on admission stable over past several years.  -Repeat iron panel 10/29 morning, to consider repletion. Iron currently wnl and ferritin low.  -Monitor for signs of neutropenic fever, obtain blood cultures and initiate zosyn IV if fever >101 of 100.4 for greater than 1 hour. MRSA nares negative.    Schizophrenia (HCC)- (present on admission)  Assessment & Plan  Continue home seroquel at night      Cirrhosis (HCC)- (present on admission)  Assessment & Plan  Cirrhosis noted on RUQ US in 2019. Previously HCV RNA+, states he did not receive treatment. No ascites or signs of encephalopathy.   - RUQ US identifying cirrhosis with traces ascites  - HCV RNA, will f/u genotype if + with outpatient treatment  - Ammonia wnl    Hepatitis C- (present on  admission)  Assessment & Plan  Previously HCV RNA +, never treated according to pt  Pending HCV RNA and genotype if possible.  Will need outpatient follow-up with infectious disease.    Code Status: FULL  DVT prophylaxis: Anticoagulation held given symptomatic anemia and concern for GI bleed.  Diet: Regular diet without caffeine.  GI: Bowel protocol in place.  Disposition: Pending IV iron infusion and cardiology evaluation for recurrent ventricular tachycardia.    Mandy Mendez DO, MPH  PGY-3 Internal Medicine

## 2023-10-29 NOTE — CARE PLAN
"The patient is Stable - Low risk of patient condition declining or worsening    Shift Goals  Clinical Goals: CIWA, monitor labs, safety, send urine sample, monitor s/s bleeding  Patient Goals: \"get out of here\"  Family Goals: FABRIZIO    Progress made toward(s) clinical / shift goals:      Urine sent per orders this shift using clean, new urinal to collect sample.   No s/s of bleeding this shift.   Labs improving slightly. Awaiting h&h lab results     Patient more stable this shift on telemetry compared to previous shifts with SR/ST throughout the shift.     Problem: Knowledge Deficit - Standard  Goal: Patient and family/care givers will demonstrate understanding of plan of care, disease process/condition, diagnostic tests and medications  Outcome: Progressing  Note: Patient updated on plan of care, including continued monitoring of labs, monitoring of heart rate and rhythm, and diet restrictions of no caffeine. Patient stated understanding to plan of care.      Problem: Optimal Care for Alcohol Withdrawal  Goal: Optimal Care for the alcohol withdrawal patient  Outcome: Progressing  Note: CIWA continued q4h per orders and protocol. Patient scoring 2 and less throughout this shift/night.   Electrolytes stabilizing per labs.   Continued vitamin therapy via oral route      Problem: Seizure Precautions  Goal: Implementation of seizure precautions  Outcome: Progressing     Problem: Psychosocial  Goal: Patient's level of anxiety will decrease  Outcome: Progressing  Goal: Spiritual and cultural needs incorporated into hospitalization  Outcome: Progressing     Problem: Pain - Standard  Goal: Alleviation of pain or a reduction in pain to the patient’s comfort goal  Outcome: Progressing  Note: Patient has no more complaints of pain, including leg cramps, which have resolved per patient.      Problem: Fall Risk  Goal: Patient will remain free from falls  Outcome: Progressing  Note: Bed in lowest position. Bed alarm in place and on. " Call light within reach. However, patient non-compliant with call light and impulsive with out of bed for bathroom use for BM. Urinal is bedside and patient using throughout this shift. Patient reeducated on call light use.   Frequent rounding in place to promote patient safety.        Patient is not progressing towards the following goals:

## 2023-10-29 NOTE — CONSULTS
Cardiology Initial Consult Note    Date of note:    10/29/2023      Consulting Physician: Moose Chakraborty M.D.    Name:   Inocencio Shabazz   YOB: 1960  Age:   63 y.o.  male   MRN:   [id number]    Reason for Consultation: Wide-complex tachycardia    HPI:  Inocencio Shabazz is a 63 y.o. male with a history of hepatitis C, alcohol abuse, cirrhosis secondary to alcohol with esophageal varices, history of pacemaker, atrial fibrillation seen on pacemaker interrogation, schizophrenia, history of pancytopenia, who presented to the emergency room on 10/26/23 with multiple complaints of cramping, lightheadedness and nausea.      He was noted to be anemic on admission and was transfused 1 unit of packed red blood cell.    He also has a history of pancytopenia and white count has been getting worse.    He had an EGD on 8/10/2023 for hematemesis and noted to have esophageal varices that required electrocauterization for bleeding.    He is normally on metoprolol succinate 25 mg p.o. daily  He has now been placed on carvedilol 3.125 mg p.o. twice daily  Only, patient really does not have any complaints.  He reports he has never had any syncopal event.  No chest discomfort.  No shortness of breath.  Occasionally has had some lightheadedness but it comes and goes and not really associated with any activity.  Denies any palpitations although later on he reports sometimes can feel a hard harder heartbeat.    Problem list:  Principal Problem:    Symptomatic anemia (POA: Yes)  Active Problems:    Hepatitis C (POA: Yes)    Cirrhosis (HCC) (POA: Yes)    Schizophrenia (HCC) (POA: Yes)    Pancytopenia (HCC) (POA: Yes)    Alcohol abuse with withdrawal (HCC) (POA: Yes)    Secondary esophageal varices (HCC) (POA: Unknown)    V-tach (HCC) (POA: Unknown)  Resolved Problems:    * No resolved hospital problems. *    Past Medical History:   Diagnosis Date    Alcohol abuse     Bipolar disorder (HCC)     Cirrhosis (HCC)     GIB  (gastrointestinal bleeding) 01/03/2016    Hepatitis C     Pacemaker     left chest    Pancytopenia (HCC)     Schizophrenia (HCC)      Past Surgical History:   Procedure Laterality Date    SD UPPER GI ENDOSCOPY,DIAGNOSIS N/A 8/10/2023    Procedure: GASTROSCOPY;  Surgeon: Joleen Yañez M.D.;  Location: SURGERY SAME DAY [facility];  Service: Gastroenterology    SD UPPER GI ENDOSCOPY,CTRL BLEED N/A 8/10/2023    Procedure: GASTROSCOPY, WITH ARGON PLASMA COAGULATION;  Surgeon: Joleen Yañez M.D.;  Location: SURGERY SAME DAY [facility];  Service: Gastroenterology    GASTROSCOPY-ENDO N/A 11/13/2019    Procedure: GASTROSCOPY with banding;  Surgeon: Tamela Smith M.D.;  Location: ENDOSCOPY [facility];  Service: Gastroenterology    GASTROSCOPY  3/13/2019    Procedure: GASTROSCOPY;  Surgeon: Abdi Ba M.D.;  Location: SURGERY [facility];  Service: Gastroenterology    GASTROSCOPY  4/8/2016    Procedure: GASTROSCOPY;  Surgeon: Braeden Tobin M.D.;  Location: SURGERY [facility];  Service:     GASTROSCOPY  1/3/2016    Procedure: GASTROSCOPY WITH BANDING;  Surgeon: Alfredo Antonio M.D.;  Location: SURGERY [facility];  Service:      Medications Prior to Admission   Medication Sig Dispense Refill Last Dose    metoprolol SR (TOPROL XL) 50 MG TABLET SR 24 HR Take 25 mg by mouth every day. 25 MG = 1/2 TAB   UNK at UNK    omeprazole (PRILOSEC) 20 MG delayed-release capsule Take 20 mg by mouth every day.   10/25/2023 at AM    QUEtiapine (SEROQUEL) 200 MG Tab Take 200 mg by mouth every evening.   10/25/2023 at PM    thiamine (THIAMINE) 100 MG tablet Take 100 mg by mouth every day.   10/25/2023 at AM    permethrin (ELIMITE) 5 % Cream Apply 1 Application topically one time. Apply topically from head to toe, leave on overnight, and wash off in the morning.   UNK at UNK    gabapentin (NEURONTIN) 300 MG Cap Take 1 Capsule by mouth in the morning, at noon, and at bedtime. 15 Capsule 0 UNK at UNK    diclofenac  sodium (VOLTAREN) 1 % Gel Apply 4 g topically 4 times a day.   UNK at UNK    Magnesium Oxide 420 (252 Mg) MG Tab Take 1 Tablet by mouth every day.   UNK at UNK    folic acid (FOLVITE) 1 MG Tab Take 1 Tablet by mouth every day. 30 Tablet 3 UNK at UNK     Current Facility-Administered Medications   Medication Dose Route Frequency Provider Last Rate Last Admin    ferric gluconate complex (Ferrlecit) 250 mg in  mL IVPB  250 mg Intravenous BID Ariesfchristopher Mendez, D.O.        potassium chloride SA (Kdur) tablet 20 mEq  20 mEq Oral DAILY Anthony Liang M.D.   20 mEq at 10/29/23 0510    carvedilol (Coreg) tablet 3.125 mg  3.125 mg Oral BID WITH MEALS Mandy Mendez, D.O.   3.125 mg at 10/29/23 0743    thiamine (Vitamin B-1) tablet 100 mg  100 mg Oral TID Anthony Liang M.D.   100 mg at 10/29/23 1005    And    multivitamin tablet 1 Tablet  1 Tablet Oral DAILY Anthony Liang M.D.   1 Tablet at 10/29/23 0510    And    folic acid (Folvite) tablet 1 mg  1 mg Oral DAILY Anthony Liang M.D.   1 mg at 10/29/23 0510    nicotine (Nicoderm) 7 MG/24HR 7 mg  7 mg Transdermal Daily-0600 Nina Lakhani M.D.   7 mg at 10/29/23 0510    QUEtiapine (SEROquel) tablet 100 mg  100 mg Oral Nightly Fermin Brown M.D.   100 mg at 10/28/23 2105    senna-docusate (Pericolace Or Senokot S) 8.6-50 MG per tablet 2 Tablet  2 Tablet Oral BID Fermin Brown M.D.   2 Tablet at 10/27/23 1610    And    polyethylene glycol/lytes (Miralax) PACKET 1 Packet  1 Packet Oral QDAY PRN Fermin Brown M.D.        And    magnesium hydroxide (Milk Of Magnesia) suspension 30 mL  30 mL Oral QDAY PRN Fermin Brown M.D.        And    bisacodyl (Dulcolax) suppository 10 mg  10 mg Rectal QDAY PRN Fermin Brown M.D.        pantoprazole (Protonix) injection 40 mg  40 mg Intravenous BID Fermin Brown M.D.   40 mg at 10/29/23 0510    LORazepam (Ativan) tablet 0.5 mg  0.5 mg Oral Q4HRS PRN Fermin Brown M.D.   0.5 mg at 10/28/23 0512    LORazepam (Ativan) tablet 1 mg  1 mg  "Oral Q4HRS PRN Fermin Brown M.D.        Or    LORazepam (Ativan) injection 0.5 mg  0.5 mg Intravenous Q4HRS PRN Fermin Brown M.D.        LORazepam (Ativan) tablet 2 mg  2 mg Oral Q2HRS PRN Fermin Brown M.D.        Or    LORazepam (Ativan) injection 1 mg  1 mg Intravenous Q2HRS PRN Fermin Brown M.D.        LORazepam (Ativan) tablet 3 mg  3 mg Oral Q HOUR PRN Fermin Brown M.D.        Or    LORazepam (Ativan) injection 1.5 mg  1.5 mg Intravenous Q HOUR PRN Fermin Brown M.D.        LORazepam (Ativan) tablet 4 mg  4 mg Oral Q15 MIN PRN Fermin Brown M.D.        Or    LORazepam (Ativan) injection 2 mg  2 mg Intravenous Q15 MIN PRN Fermin Brown M.D.         Allergies   Allergen Reactions    Haloperidol Unspecified     Twitching/involuntary movements      History reviewed. No pertinent family history.    Social History     Socioeconomic History    Marital status: Single     Spouse name: Not on file    Number of children: Not on file    Years of education: Not on file    Highest education level: Not on file   Occupational History    Not on file   Tobacco Use    Smoking status: Every Day     Current packs/day: 1.00     Average packs/day: 1 pack/day for 48.0 years (48.0 ttl pk-yrs)     Types: Cigarettes    Smokeless tobacco: Never   Vaping Use    Vaping Use: Never used   Substance and Sexual Activity    Alcohol use: Yes     Comment: \"couple of pints whiskey every day\"    Drug use: Yes     Types: Inhaled     Comment: currently smokes marijuana; past hx of cocaine and meth    Sexual activity: Not on file   Other Topics Concern    Not on file   Social History Narrative    Not on file     Social Determinants of Health     Financial Resource Strain: Not on file   Food Insecurity: Not on file   Transportation Needs: Not on file   Physical Activity: Not on file   Stress: Not on file   Social Connections: Not on file   Intimate Partner Violence: Not on file   Housing Stability: Not on file       Intake/Output Summary (Last 24 hours) at " "10/29/2023 1410  Last data filed at 10/29/2023 1344  Gross per 24 hour   Intake 1520 ml   Output 1725 ml   Net -205 ml       Review of Systems   Constitutional: Negative for diaphoresis and fever.   Respiratory:  Negative for cough, hemoptysis and wheezing.    Gastrointestinal:  Negative for hematochezia and melena.   Genitourinary:  Negative for hematuria.        Physical Exam  Body mass index is 18.87 kg/m².  /81   Pulse 96   Temp 37 °C (98.6 °F) (Temporal)   Resp 17   Ht 1.727 m (5' 8\")   Wt 56.3 kg (124 lb 1.9 oz)   SpO2 98%   Vitals:    10/29/23 0432 10/29/23 0743 10/29/23 0744 10/29/23 1147   BP: 113/69 116/77 116/77 127/81   Pulse: 92 91 88 96   Resp: 18  17 17   Temp: 36.4 °C (97.6 °F)  37 °C (98.6 °F) 37 °C (98.6 °F)   TempSrc: Temporal  Temporal Temporal   SpO2: 95%  98% 98%   Weight: 56.3 kg (124 lb 1.9 oz)      Height:         Oxygen Therapy:  Pulse Oximetry: 98 %, O2 (LPM): 0, O2 Delivery Device: None - Room Air    Physical Exam  Constitutional:       Appearance: He is underweight.   Eyes:      Extraocular Movements: Extraocular movements intact.      Conjunctiva/sclera: Conjunctivae normal.   Neck:      Vascular: No carotid bruit or JVD.   Cardiovascular:      Rate and Rhythm: Normal rate and regular rhythm.      Pulses:           Radial pulses are 1+ on the right side and 1+ on the left side.        Posterior tibial pulses are 1+ on the right side and 1+ on the left side.      Heart sounds: Normal heart sounds. No murmur heard.     No friction rub. No gallop.   Pulmonary:      Effort: Pulmonary effort is normal. No respiratory distress.      Breath sounds: Normal breath sounds. No wheezing or rales.   Abdominal:      General: Bowel sounds are normal.      Palpations: Abdomen is soft.   Musculoskeletal:      Cervical back: Neck supple.      Right lower leg: No edema.      Left lower leg: No edema.   Skin:     General: Skin is warm and dry.   Neurological:      Mental Status: He is alert and " "oriented to person, place, and time. Mental status is at baseline.   Psychiatric:         Mood and Affect: Mood normal.        Labs (personally reviewed and notable for):   Lab Results   Component Value Date/Time    SODIUM 135 10/29/2023 01:21 AM    POTASSIUM 4.3 10/29/2023 01:21 AM    CHLORIDE 107 10/29/2023 01:21 AM    CO2 19 (L) 10/29/2023 01:21 AM    GLUCOSE 100 (H) 10/29/2023 01:21 AM    BUN 8 10/29/2023 01:21 AM    CREATININE 0.67 10/29/2023 01:21 AM    BUNCREATRAT 15.0 10/06/2022 04:42 AM      Lab Results   Component Value Date/Time    WBC 1.5 (LL) 10/29/2023 07:07 AM    RBC 3.48 (L) 10/29/2023 07:07 AM    HEMOGLOBIN 8.2 (L) 10/29/2023 07:07 AM    HEMATOCRIT 27.0 (L) 10/29/2023 07:07 AM    MCV 77.6 (L) 10/29/2023 07:07 AM    MCH 23.6 (L) 10/29/2023 07:07 AM    MCHC 30.4 (L) 10/29/2023 07:07 AM    MPV 13.5 (H) 08/14/2023 03:49 AM    NEUTSPOLYS 48.90 10/29/2023 07:07 AM    LYMPHOCYTES 26.80 10/29/2023 07:07 AM    MONOCYTES 11.80 10/29/2023 07:07 AM    EOSINOPHILS 9.80 (H) 10/29/2023 07:07 AM    BASOPHILS 2.00 (H) 10/29/2023 07:07 AM    HYPOCHROMIA 1+ 10/27/2023 04:35 AM    ANISOCYTOSIS 1+ 10/27/2023 04:35 AM    INR 1.42 (H) 10/28/2023 12:42 AM      Lab Results   Component Value Date/Time    CHOLSTRLTOT 91 (L) 02/06/2022 02:59 AM    LDL 51 02/06/2022 02:59 AM    HDL 28 (A) 02/06/2022 02:59 AM    TRIGLYCERIDE 61 02/06/2022 02:59 AM       Lab Results   Component Value Date/Time    TROPONINT 6 10/27/2023 1514     No results found for: \"NTPROBNP\"  No results found for: \"HBA1C\"    Cardiac Imaging and Procedures Review:    EKG dated 10/27/23 at 1436: My personal interpretation is sinus tachycardia, 111 bpm, otherwise normal, compared to prior ECG rate is a little bit faster    Echo dated 10/28/23: My personal interpretation is low normal normal left and normal right ventricular systolic function.  Visually estimated LVEF of 50-55%.  Normal aortic root size.  Mild tricuspid regurgitation, RV systolic pressure estimated " at 25 mm capital Hg.    Telemetry (last 24 hours): Mostly sinus with premature atrial beats, the episode of 30 seconds of wide-complex tachycardia occurred on 10/27/23 at 1408.  It looks somewhat irregular in the fast as it is is around 110s to 120s.  Possibility idioventricular rhythm.    Radiology test Review:  EC-ECHOCARDIOGRAM COMPLETE W/O CONT   Final Result      US-RUQ   Final Result      1.  Cirrhosis. No solid hepatic mass lesions detected.   2.  Thickened gallbladder wall, likely related to cirrhosis. Gallbladder sludge without gallstones. Acute cholecystitis is unlikely.   3.  Trace ascites.        Pacemaker interrogation: In discussion with the [email or web address] rep, patient had no episodes of VT noted.  Specifically on 10/27/23, no VT was detected although tacky arrhythmia rates go only as low as 150.  However if there was nonsustained VT the pacemaker would have picked this up.  Of note, patient did have some episodes of supraventricular tachycardia, the longest lasted 2 minutes and 12 seconds on 10/28 at 1237 rate of 159 bpm.  Device function and testing.    Impression and Medical Decision Makin.  Wide-complex tachycardia longest 30 minutes, rate 1 10-1 20 captured on monitor.  Pacemaker interrogation does not show any evidence of ventricular tachycardia.  It did capture some supraventricular tachycardia.  With normal LVEF, suspect this was more of a aberrancy noted on monitor.  No further work-up needs to be done for this.  For the supraventricular tachycardia, patient is on carvedilol, can try and titrate up to 6.25 mg p.o. twice daily.  - No further work-up needed for wide-complex tachycardia as pacemaker interrogation did not suggest this to be VT and in the setting of LVEF, no further work-up.  - Increase carvedilol to 6.25 mg p.o. twice daily for paroxysmal supraventricular tachycardia  -Patient can follow-up with his outpatient cardiologist upon discharge in a couple weeks.      Thank you for  allowing me to participate in the care of this patient, cardiology will sign off at this time.  Please call with any questions.  Please contact me with any questions.      Petrona Tobin M.D.  Cardiologist, [facility] [place]     This note was generated using voice recognition software which has a small chance of producing errors of grammar and possibly content. I have made every reasonable attempt to find and correct any obvious errors, but expect that some may not be found prior to finalization of this note.

## 2023-10-29 NOTE — PROGRESS NOTES
Monitor summary:     Rhythm : SR-ST (Paced)  Rate :   Ectopy : (R-O) PVCs, 1 min VT at approx 0630    ND=.14  QRS=.05  QT=.33        Per telemetry strip summary from monitor room.

## 2023-10-29 NOTE — PROGRESS NOTES
RN notified by Nuclear [facility] preparations for pt stress test tomorrow. Pt must be NPO from 0400 (4 hours prior to exam), and have no coffee or tea, including decaf, or chocolate prior to exam. Nursing communication order added. Nuc med also requesting more recent troponin levels drawn x2, at least 2 hours apart. MD Mendez notified and orders received for Trop draw x2, one at 1700 and at 2100.    1830 - Per MD Mendez, pt will no longer be going for stress test tomorrow. Not recommended by cardiology at this time. Associated orders cancelled.

## 2023-10-30 ENCOUNTER — PHARMACY VISIT (OUTPATIENT)
Dept: PHARMACY | Facility: MEDICAL CENTER | Age: 63
End: 2023-10-30
Payer: COMMERCIAL

## 2023-10-30 VITALS
RESPIRATION RATE: 18 BRPM | SYSTOLIC BLOOD PRESSURE: 106 MMHG | HEART RATE: 89 BPM | TEMPERATURE: 98.1 F | HEIGHT: 68 IN | WEIGHT: 124.78 LBS | BODY MASS INDEX: 18.91 KG/M2 | OXYGEN SATURATION: 98 % | DIASTOLIC BLOOD PRESSURE: 67 MMHG

## 2023-10-30 LAB
ALBUMIN SERPL BCP-MCNC: 3.4 G/DL (ref 3.2–4.9)
ALBUMIN/GLOB SERPL: 0.9 G/DL
ALP SERPL-CCNC: 83 U/L (ref 30–99)
ALT SERPL-CCNC: 21 U/L (ref 2–50)
ANION GAP SERPL CALC-SCNC: 8 MMOL/L (ref 7–16)
AST SERPL-CCNC: 35 U/L (ref 12–45)
BASOPHILS # BLD AUTO: 0.7 % (ref 0–1.8)
BASOPHILS # BLD: 0.01 K/UL (ref 0–0.12)
BILIRUB SERPL-MCNC: 0.4 MG/DL (ref 0.1–1.5)
BUN SERPL-MCNC: 12 MG/DL (ref 8–22)
CALCIUM ALBUM COR SERPL-MCNC: 8.9 MG/DL (ref 8.5–10.5)
CALCIUM SERPL-MCNC: 8.4 MG/DL (ref 8.5–10.5)
CHLORIDE SERPL-SCNC: 107 MMOL/L (ref 96–112)
CO2 SERPL-SCNC: 20 MMOL/L (ref 20–33)
CREAT SERPL-MCNC: 0.56 MG/DL (ref 0.5–1.4)
EOSINOPHIL # BLD AUTO: 0.11 K/UL (ref 0–0.51)
EOSINOPHIL NFR BLD: 8.1 % (ref 0–6.9)
ERYTHROCYTE [DISTWIDTH] IN BLOOD BY AUTOMATED COUNT: 57.1 FL (ref 35.9–50)
GFR SERPLBLD CREATININE-BSD FMLA CKD-EPI: 109 ML/MIN/1.73 M 2
GLOBULIN SER CALC-MCNC: 3.6 G/DL (ref 1.9–3.5)
GLUCOSE SERPL-MCNC: 102 MG/DL (ref 65–99)
HCT VFR BLD AUTO: 26.1 % (ref 42–52)
HGB BLD-MCNC: 7.8 G/DL (ref 14–18)
IMM GRANULOCYTES # BLD AUTO: 0 K/UL (ref 0–0.11)
IMM GRANULOCYTES NFR BLD AUTO: 0 % (ref 0–0.9)
LYMPHOCYTES # BLD AUTO: 0.35 K/UL (ref 1–4.8)
LYMPHOCYTES NFR BLD: 25.7 % (ref 22–41)
MAGNESIUM SERPL-MCNC: 1.9 MG/DL (ref 1.5–2.5)
MCH RBC QN AUTO: 23.6 PG (ref 27–33)
MCHC RBC AUTO-ENTMCNC: 29.9 G/DL (ref 32.3–36.5)
MCV RBC AUTO: 79.1 FL (ref 81.4–97.8)
MONOCYTES # BLD AUTO: 0.2 K/UL (ref 0–0.85)
MONOCYTES NFR BLD AUTO: 14.7 % (ref 0–13.4)
NEUTROPHILS # BLD AUTO: 0.69 K/UL (ref 1.82–7.42)
NEUTROPHILS NFR BLD: 50.8 % (ref 44–72)
NRBC # BLD AUTO: 0 K/UL
NRBC BLD-RTO: 0 /100 WBC (ref 0–0.2)
PHOSPHATE SERPL-MCNC: 4.2 MG/DL (ref 2.5–4.5)
PLATELET # BLD AUTO: 26 K/UL (ref 164–446)
PLATELETS.RETICULATED NFR BLD AUTO: 12 % (ref 0.6–13.1)
POTASSIUM SERPL-SCNC: 4 MMOL/L (ref 3.6–5.5)
PROT SERPL-MCNC: 7 G/DL (ref 6–8.2)
RBC # BLD AUTO: 3.3 M/UL (ref 4.7–6.1)
SODIUM SERPL-SCNC: 135 MMOL/L (ref 135–145)
WBC # BLD AUTO: 1.4 K/UL (ref 4.8–10.8)

## 2023-10-30 PROCEDURE — A9270 NON-COVERED ITEM OR SERVICE: HCPCS | Performed by: STUDENT IN AN ORGANIZED HEALTH CARE EDUCATION/TRAINING PROGRAM

## 2023-10-30 PROCEDURE — 80053 COMPREHEN METABOLIC PANEL: CPT

## 2023-10-30 PROCEDURE — 85025 COMPLETE CBC W/AUTO DIFF WBC: CPT

## 2023-10-30 PROCEDURE — RXMED WILLOW AMBULATORY MEDICATION CHARGE

## 2023-10-30 PROCEDURE — 97166 OT EVAL MOD COMPLEX 45 MIN: CPT

## 2023-10-30 PROCEDURE — 700102 HCHG RX REV CODE 250 W/ 637 OVERRIDE(OP)

## 2023-10-30 PROCEDURE — 700105 HCHG RX REV CODE 258: Performed by: STUDENT IN AN ORGANIZED HEALTH CARE EDUCATION/TRAINING PROGRAM

## 2023-10-30 PROCEDURE — A9270 NON-COVERED ITEM OR SERVICE: HCPCS

## 2023-10-30 PROCEDURE — 85055 RETICULATED PLATELET ASSAY: CPT

## 2023-10-30 PROCEDURE — 700111 HCHG RX REV CODE 636 W/ 250 OVERRIDE (IP): Performed by: STUDENT IN AN ORGANIZED HEALTH CARE EDUCATION/TRAINING PROGRAM

## 2023-10-30 PROCEDURE — 99239 HOSP IP/OBS DSCHRG MGMT >30: CPT | Mod: GC | Performed by: INTERNAL MEDICINE

## 2023-10-30 PROCEDURE — 700102 HCHG RX REV CODE 250 W/ 637 OVERRIDE(OP): Performed by: STUDENT IN AN ORGANIZED HEALTH CARE EDUCATION/TRAINING PROGRAM

## 2023-10-30 PROCEDURE — 700111 HCHG RX REV CODE 636 W/ 250 OVERRIDE (IP): Mod: JZ

## 2023-10-30 PROCEDURE — 84100 ASSAY OF PHOSPHORUS: CPT

## 2023-10-30 PROCEDURE — C9113 INJ PANTOPRAZOLE SODIUM, VIA: HCPCS | Performed by: STUDENT IN AN ORGANIZED HEALTH CARE EDUCATION/TRAINING PROGRAM

## 2023-10-30 PROCEDURE — 83735 ASSAY OF MAGNESIUM: CPT

## 2023-10-30 PROCEDURE — 700111 HCHG RX REV CODE 636 W/ 250 OVERRIDE (IP): Mod: JZ | Performed by: STUDENT IN AN ORGANIZED HEALTH CARE EDUCATION/TRAINING PROGRAM

## 2023-10-30 RX ORDER — FERROUS SULFATE 325(65) MG
325 TABLET ORAL
Qty: 16 TABLET | Refills: 0 | Status: SHIPPED | OUTPATIENT
Start: 2023-10-30

## 2023-10-30 RX ORDER — LANOLIN ALCOHOL/MO/W.PET/CERES
100 CREAM (GRAM) TOPICAL DAILY
Qty: 30 TABLET | Refills: 0 | Status: SHIPPED | OUTPATIENT
Start: 2023-10-30

## 2023-10-30 RX ORDER — MAGNESIUM SULFATE HEPTAHYDRATE 40 MG/ML
2 INJECTION, SOLUTION INTRAVENOUS ONCE
Status: COMPLETED | OUTPATIENT
Start: 2023-10-30 | End: 2023-10-30

## 2023-10-30 RX ORDER — CARVEDILOL 6.25 MG/1
6.25 TABLET ORAL 2 TIMES DAILY WITH MEALS
Qty: 60 TABLET | Refills: 0 | Status: SHIPPED | OUTPATIENT
Start: 2023-10-30

## 2023-10-30 RX ADMIN — MAGNESIUM SULFATE HEPTAHYDRATE 2 G: 2 INJECTION, SOLUTION INTRAVENOUS at 08:05

## 2023-10-30 RX ADMIN — NICOTINE 7 MG: 7 PATCH, EXTENDED RELEASE TRANSDERMAL at 05:12

## 2023-10-30 RX ADMIN — PANTOPRAZOLE SODIUM 40 MG: 40 INJECTION, POWDER, FOR SOLUTION INTRAVENOUS at 05:09

## 2023-10-30 RX ADMIN — THERA TABS 1 TABLET: TAB at 05:07

## 2023-10-30 RX ADMIN — Medication 100 MG: at 08:03

## 2023-10-30 RX ADMIN — FOLIC ACID 1 MG: 1 TABLET ORAL at 05:08

## 2023-10-30 RX ADMIN — POTASSIUM CHLORIDE 20 MEQ: 1500 TABLET, EXTENDED RELEASE ORAL at 05:08

## 2023-10-30 RX ADMIN — ACETAMINOPHEN 650 MG: 325 TABLET, FILM COATED ORAL at 00:54

## 2023-10-30 RX ADMIN — CARVEDILOL 6.25 MG: 6.25 TABLET, FILM COATED ORAL at 08:03

## 2023-10-30 RX ADMIN — SODIUM CHLORIDE 250 MG: 9 INJECTION, SOLUTION INTRAVENOUS at 05:16

## 2023-10-30 ASSESSMENT — LIFESTYLE VARIABLES
AGITATION: NORMAL ACTIVITY
TOTAL SCORE: 0
VISUAL DISTURBANCES: NOT PRESENT
AGITATION: NORMAL ACTIVITY
PAROXYSMAL SWEATS: NO SWEAT VISIBLE
TREMOR: NO TREMOR
NAUSEA AND VOMITING: NO NAUSEA AND NO VOMITING
HEADACHE, FULLNESS IN HEAD: NOT PRESENT
TREMOR: NO TREMOR
NAUSEA AND VOMITING: NO NAUSEA AND NO VOMITING
ANXIETY: NO ANXIETY (AT EASE)
AUDITORY DISTURBANCES: NOT PRESENT
TOTAL SCORE: 0
HEADACHE, FULLNESS IN HEAD: NOT PRESENT
ANXIETY: NO ANXIETY (AT EASE)
ORIENTATION AND CLOUDING OF SENSORIUM: ORIENTED AND CAN DO SERIAL ADDITIONS
VISUAL DISTURBANCES: NOT PRESENT
PAROXYSMAL SWEATS: NO SWEAT VISIBLE
ORIENTATION AND CLOUDING OF SENSORIUM: ORIENTED AND CAN DO SERIAL ADDITIONS
AUDITORY DISTURBANCES: NOT PRESENT

## 2023-10-30 ASSESSMENT — COGNITIVE AND FUNCTIONAL STATUS - GENERAL
DAILY ACTIVITIY SCORE: 24
SUGGESTED CMS G CODE MODIFIER DAILY ACTIVITY: CH

## 2023-10-30 ASSESSMENT — FIBROSIS 4 INDEX: FIB4 SCORE: 19.09

## 2023-10-30 ASSESSMENT — ACTIVITIES OF DAILY LIVING (ADL): TOILETING: INDEPENDENT

## 2023-10-30 NOTE — THERAPY
Occupational Therapy   Initial Evaluation     Patient Name: Inocencio Shabazz  Age:  63 y.o., Sex:  male  Medical Record #: [id number]  Today's Date: 10/30/2023    Precautions: Other (See Comments)  Comments: seizure precautions    Assessment    Patient is 63 y.o. male admitted with c/o lightheadedness, nausea, and generalized fatigue. Pmhx includes hep C, alcohol abuse, cirrhosis 2/2 etoh with esophageal varices, history of pacemaker, atrial fibrillation, schizophrenia.Pt presents able to demonstrate self-care ADLs at his independent baseline. Anticipate no additional OT needs upon DC.     Plan    Occupational Therapy Initial Treatment Plan   Duration: Evaluation only    DC Equipment Recommendations: None  Discharge Recommendations: Anticipate that the patient will have no further occupational therapy needs after discharge from the hospital      Objective     10/30/23 1146   Prior Living Situation   Prior Services None   Housing / Facility 1 Story Apartment / Condo  (on second floor)   Bathroom Set up Bathtub / Shower Combination   Equipment Owned None   Lives with - Patient's Self Care Capacity Alone and Able to Care For Self   Prior Level of ADL Function   Self Feeding Independent   Grooming / Hygiene Independent   Bathing Independent   Dressing Independent   Toileting Independent   Precautions   Precautions Other (See Comments)   Comments seizure precautions   Pain 0 - 10 Group   Therapist Pain Assessment Post Activity Pain Same as Prior to Activity;Nurse Notified;0   Cognition    Cognition / Consciousness X   Level of Consciousness Alert   Comments agreeable but not very conversant with therapist   Active ROM Upper Body   Active ROM Upper Body  WDL   Strength Upper Body   Upper Body Strength  WDL   Upper Body Muscle Tone   Upper Body Muscle Tone  WDL   Coordination Upper Body   Coordination WDL   Balance Assessment   Sitting Balance (Static) Fair +   Sitting Balance (Dynamic) Fair +   Standing Balance (Static)  Fair +   Standing Balance (Dynamic) Fair +   Weight Shift Sitting Good   Weight Shift Standing Good   Comments no AD   Bed Mobility    Supine to Sit Supervised   Scooting Supervised   ADL Assessment   Eating Modified Independent   Grooming Supervision;Standing   Upper Body Dressing Supervision   Lower Body Dressing Supervision   Toileting Supervision   How much help from another person does the patient currently need...   Putting on and taking off regular lower body clothing? 4   Bathing (including washing, rinsing, and drying)? 4   Toileting, which includes using a toilet, bedpan, or urinal? 4   Putting on and taking off regular upper body clothing? 4   Taking care of personal grooming such as brushing teeth? 4   Eating meals? 4   6 Clicks Daily Activity Score 24   Functional Mobility   Sit to Stand Supervised   Bed, Chair, Wheelchair Transfer Supervised   Toilet Transfers Supervised   Transfer Method Stand Step   Mobility no AD   Activity Tolerance   Comments functional for self-care ADLs   Education Group   Education Provided Role of Occupational Therapist;Activities of Daily Living   Role of Occupational Therapist Patient Response Acceptance;Patient;Explanation;Verbal Demonstration   ADL Patient Response Acceptance;Patient;Explanation;Verbal Demonstration   Occupational Therapy Initial Treatment Plan    Duration Evaluation only   Problem List   Problem List None   Anticipated Discharge Equipment and Recommendations   DC Equipment Recommendations None   Discharge Recommendations Anticipate that the patient will have no further occupational therapy needs after discharge from the hospital

## 2023-10-30 NOTE — PROGRESS NOTES
Monitor summary:     Rhythm : SR-ST (paced)  Rate :   Ectopy : (R) PVCs    GA=.15  QRS=.10  QT=.39        Per telemetry strip summary from monitor room.

## 2023-10-30 NOTE — PROGRESS NOTES
Telemetry    Rhythm: Normal Sinus Rhythm/V Paced/Sinus Tachycardia  Ectopy: PVCs/PACs  HR:   [place]: 0.15  QRS: 0.10  QT: 0.39

## 2023-10-30 NOTE — DISCHARGE SUMMARY
[facility] Internal Medicine Discharge Summary    Attending: Palmer  Senior Resident: Dr. Mendez  Intern:  Dr. Liang  Contact Number: [phone]    CHIEF COMPLAINT ON ADMISSION  Chief Complaint   Patient presents with    Cramping     Cramping in bilateral legs x2 days intermittently     Lightheadedness     x2 days intermittently    Nausea     Intermittent nausea x2 days but pt denies vomiting or diarrhea       Reason for Admission  leg pain     Admission Date  10/26/2023    CODE STATUS  Full Code    HPI & HOSPITAL COURSE  Inocencio Shabazz is a 63 y.o. male with past medical history of chronic alcohol abuse (last drink 10/25), cirrhosis, esophageal varices, seizure disorder who presented 10/26/2023 with multiple symptoms, reports of dizziness, lightheadedness, generalized fatigue, muscle cramping for last 2 to 3 days. He was admitted to the hospital for symptomatic anemia 7.0, received 1u PRBC in ED and hemoglobin remains stable. No recent history of acute blood loss or GI bleed. Monitored for alcohol withdrawal with CIWA protocol. Previously HCV+ as of 2019 and patient states he did not receive treatment.  Hepatitis C virus RNA obtained and currently pending on day of discharge.  During hospitalization hemoglobin remained stable not requiring any further blood transfusions.  Patient found to be iron deficient, likely secondary to chronic bleed and alcohol use resulting in sideroblastic anemia.  Did receive IV iron gluconate for 2 doses prior to day of discharge.  Will prescribe oral iron, thiamine, Coreg 6.25 mg twice daily to continue on discharge.  Hospitalization complicated by ventricular tachycardia lasting 1 minute.  Cardiology consulted and given pacemaker states that patient had episodes of supraventricular tachycardia and recommend no further work-up.  Strongly recommended alcohol cessation and educated patient that this is led to his pancytopenia and electrolyte abnormalities resulting in muscle cramps.  On  day of discharge patient hemodynamically stable and without signs of alcohol withdrawal and stable to return to home.  Therefore, he is discharged in fair and stable condition to home with close outpatient follow-up.    The patient met 2-midnight criteria for an inpatient stay at the time of discharge.    Discharge Date  10/30/2023    Physical Exam on Day of Discharge  Physical Exam  Constitutional:       General: He is not in acute distress.     Appearance: He is not toxic-appearing.      Comments: Malnourished     HENT:      Mouth/Throat:      Mouth: Mucous membranes are moist.      Pharynx: Oropharynx is clear.   Eyes:      Extraocular Movements: Extraocular movements intact.      Conjunctiva/sclera: Conjunctivae normal.      Pupils: Pupils are equal, round, and reactive to light.   Cardiovascular:      Pulses: Normal pulses.      Heart sounds: No murmur heard.     No gallop.   Pulmonary:      Effort: Pulmonary effort is normal. No respiratory distress.      Breath sounds: No wheezing or rales.   Abdominal:      General: Abdomen is flat. Bowel sounds are normal. There is no distension.      Palpations: Abdomen is soft.      Tenderness: There is no abdominal tenderness. There is no guarding.   Musculoskeletal:         General: No swelling. Normal range of motion.      Right lower leg: No edema.      Left lower leg: No edema.   Neurological:      General: No focal deficit present.      Mental Status: He is alert and oriented to person, place, and time. Mental status is at baseline.      Cranial Nerves: No cranial nerve deficit.      Sensory: No sensory deficit.      Motor: No weakness.      Gait: Gait normal.         FOLLOW UP ITEMS POST DISCHARGE  - PCP to follow results of HCV RNA. If continued positive obtain HCV genotype and recommend treatment.  -PCP further encourage alcohol cessation  -Recommend cardiology follow-up through primary care provider.  Patient now on carvedilol 6.25 mg twice daily.    DISCHARGE  DIAGNOSES  Principal Problem:    Symptomatic anemia (POA: Yes)  Active Problems:    Hepatitis C (POA: Yes)    Cirrhosis (HCC) (POA: Yes)    Schizophrenia (HCC) (POA: Yes)    Pancytopenia (HCC) (POA: Yes)    Alcohol abuse with withdrawal (HCC) (POA: Yes)    Secondary esophageal varices (HCC) (POA: Unknown)    V-tach (HCC) (POA: Unknown)  Resolved Problems:    * No resolved hospital problems. *      FOLLOW UP  No future appointments.  Nnamdi Ballesteros M.D.  [address] [place] [address]  [phone]    Schedule an appointment as soon as possible for a visit in 2 week(s)  Follow-up after hospital stay      MEDICATIONS ON DISCHARGE     Medication List        START taking these medications        Instructions   carvedilol 6.25 MG Tabs  Commonly known as: Coreg   Take 1 Tablet by mouth 2 times a day with meals.  Dose: 6.25 mg     FeroSul 325 (65 Fe) MG tablet  Generic drug: ferrous sulfate   Take 1 Tablet by mouth every 48 hours.  Dose: 325 mg            CONTINUE taking these medications        Instructions   omeprazole 20 MG delayed-release capsule  Commonly known as: PriLOSEC   Take 20 mg by mouth every day.  Dose: 20 mg     QUEtiapine 200 MG Tabs  Commonly known as: SEROquel   Take 200 mg by mouth every evening.  Dose: 200 mg     thiamine 100 MG tablet  Commonly known as: Thiamine   Take 1 Tablet by mouth every day.  Dose: 100 mg            STOP taking these medications      diclofenac sodium 1 % Gel  Commonly known as: Voltaren     folic acid 1 MG Tabs  Commonly known as: Folvite     gabapentin 300 MG Caps  Commonly known as: Neurontin     Magnesium Oxide -Mg Supplement 420 (252 Mg) MG Tabs     metoprolol SR 50 MG Tb24  Commonly known as: Toprol XL     permethrin 5 % Crea  Commonly known as: Elimite              Allergies  Allergies   Allergen Reactions    Haloperidol Unspecified     Twitching/involuntary movements        DIET  Orders Placed This Encounter   Procedures    Diet Order Diet: Regular (No Caffeine)      Standing Status:   Standing     Number of Occurrences:   1     Order Specific Question:   Diet:     Answer:   Regular [1]     Comments:   No Caffeine       ACTIVITY  As tolerated.  Weight bearing as tolerated    CONSULTATIONS  Cardiology    PROCEDURES  TTE 10/27/23  Unchanged echocardiogram from April 2021.  LVEF 54% with normal ventricular chamber size and thickness.  Mild tricuspid regurgitation.  RVSP 25 mmHg.    LABORATORY  Lab Results   Component Value Date    SODIUM 135 10/30/2023    POTASSIUM 4.0 10/30/2023    CHLORIDE 107 10/30/2023    CO2 20 10/30/2023    GLUCOSE 102 (H) 10/30/2023    BUN 12 10/30/2023    CREATININE 0.56 10/30/2023        Lab Results   Component Value Date    WBC 1.4 (LL) 10/30/2023    HEMOGLOBIN 7.8 (L) 10/30/2023    HEMATOCRIT 26.1 (L) 10/30/2023    PLATELETCT 26 (L) 10/30/2023        Total time of the discharge process exceeds 45 minutes.

## 2023-10-30 NOTE — DISCHARGE INSTRUCTIONS
You were hospitalized for symptomatic anemia resulting in fatigue and muscle cramps. This low blood count is a result of chronic alcohol use and recommend alcohol cessation. You received 1 unit of blood to improve your counts and remained stable throughout your hospitalization. Your electrolytes were low resulting in muscle cramps and monitored and supplemented. For fast heart rate continue taking carvedilol 6.25mg morning and night. Take iron tablet every other day to improve blood counts. Please follow-up with your primary care provider in 1-4 weeks and have them refer you to cardiology. If you experience worsening or new changes please return to emergency department for evaluation.

## 2023-10-30 NOTE — PROGRESS NOTES
Pt is A&O x4. He is a standby assist when he ambulates. He has a steady gait. Pt denies pain overnight. He is on RA. No signs of dyspnea. Pacemaker in place and 100% paced. CIWA scores documented and pt scored 0 overnight. Pt showed no signs of active bleeding. Hgb = 8.2. Pt complained of ringing in his ears. Healthcare provider notified.

## 2023-10-30 NOTE — PROGRESS NOTES
"This note is intended for the purposes of medical student education and feedback only.   Please refer to the documentation by this patient's assigned medical practitioner for details of care and plans.    Reason for admission: Inocencio Shabazz is a 63 y.o. male w/ PMH of Schizophrenia, alcohol use disorder, cirrhosis, esophageal varices treated w/ electrocauterization, and pancytopenia who was admitted with symptomatic anemia and acute alcohol withdrawal.      SUBJECTIVE    Interval Problem Update  -states he feels tired today, difficulty sleeping in hospital  -denies any chest pain, palpitations, SOB, or muscle cramps  -iron of 21 and %sat of 6; started on ferric gluconate 250mg in 100mL NS  -most common cause of sideroblastic anemia is alcohol    Code Status  FULL    Disposition  Medically stable for discharge today    Review of Systems  General: reports feeling tired  Neuro: denies headache, dizziness, vision changes  Cardio: denies chest pain, palpitations  Lungs: denies SOB  GI: denies abdominal pain, hematochezia, melena  : denies polyuria, dysuria, hematuria  MSK: denies muscle cramps, pain  Skin: denies rashes, itchiness      OBJECTIVE     Physical Exam:  /76   Pulse 90   Temp 36.8 °C (98.2 °F) (Temporal)   Resp 18   Ht 1.727 m (5' 8\")   Wt 56.6 kg (124 lb 12.5 oz)   SpO2 96%     General: Tired, underweight, appears to be in NAD.  HEENT: Normocephalic, atraumatic. EOMI, PERRLA. Mucous membranes dry.  Cardio: Normal S1 and S2. Regular rate and rhythm. No murmurs, rubs, or gallops. Pacemaker sound appreciated.  Pulmonary: Lungs are CTAB. No wheezes, rales, or rhonchi.  Abdomen: Normoactive bowel sounds. Abdomen is soft and nondistended. No tenderness to palpation in all four quadrants. Liver palpated 3 cm below costal margin.  MSK: Normal ROM. Extremities well-perfused.  Neuro: A&Ox4. CN II-XII grossly intact.   Skin: No rash, lesions, or skin ulcers. No jaundice, ecchymoses, or petechiae. "   Psych: Appropriate mood and affect for baseline.    Ins/Outs:    Intake/Output Summary (Last 24 hours) at 10/30/2023 0835  Last data filed at 10/30/2023 0200  Gross per 24 hour   Intake 400 ml   Output 2150 ml   Net -1750 ml       Lab Results:  Recent Labs     10/28/23  1449 10/29/23  0707 10/30/23  0147   WBC 1.3* 1.5* 1.4*   RBC 3.25* 3.48* 3.30*   HEMOGLOBIN 7.6* 8.2* 7.8*   HEMATOCRIT 25.7* 27.0* 26.1*   MCV 79.1* 77.6* 79.1*   MCH 23.4* 23.6* 23.6*   MCHC 29.6* 30.4* 29.9*   RDW 54.9* 54.7* 57.1*   PLATELETCT 28* 27* 26*     Recent Labs     10/28/23  0042 10/29/23  0121 10/30/23  0147   SODIUM 137  137 135 135   POTASSIUM 3.8  3.8 4.3 4.0   CHLORIDE 107  107 107 107   CO2 18*  18* 19* 20   GLUCOSE 105*  105* 100* 102*   BUN 4*  4* 8 12   CREATININE 0.61  0.61 0.67 0.56   CALCIUM 8.0*  8.0* 8.3* 8.4*     Recent Labs     10/28/23  0042   INR 1.42*     Recent Labs     10/28/23  0042 10/28/23  0129 10/29/23  0121 10/30/23  0147   ASTSGOT 55*  --  50* 35   ALTSGPT 24  --  24 21   TBILIRUBIN 0.8  --  0.5 0.4   ALKPHOSPHAT 81  --  89 83   GLOBULIN 3.8*  --  4.0* 3.6*   INR 1.42*  --   --   --    AMMONIA  --  32  --   --        Imaging Results:  EC-ECHOCARDIOGRAM COMPLETE W/O CONT   Final Result      US-RUQ   Final Result      1.  Cirrhosis. No solid hepatic mass lesions detected.   2.  Thickened gallbladder wall, likely related to cirrhosis. Gallbladder sludge without gallstones. Acute cholecystitis is unlikely.   3.  Trace ascites.            Current Medications    Current Facility-Administered Medications:     magnesium sulfate IVPB premix 2 g, 2 g, Intravenous, Once, Anthony Liang M.D., Last Rate: 25 mL/hr at 10/30/23 0805, 2 g at 10/30/23 0805    ferric gluconate complex (Ferrlecit) 250 mg in  mL IVPB, 250 mg, Intravenous, BID, Mandy Mendez D.O., Stopped at 10/30/23 0616    carvedilol (Coreg) tablet 6.25 mg, 6.25 mg, Oral, BID WITH MEALS, Mandy Mendez, D.O., 6.25 mg at 10/30/23 0803     acetaminophen (Tylenol) tablet 650 mg, 650 mg, Oral, Q4HRS PRN, Mandy Mendez, D.O., 650 mg at 10/30/23 0054    potassium chloride SA (Kdur) tablet 20 mEq, 20 mEq, Oral, DAILY, Anthony Liang M.D., 20 mEq at 10/30/23 0508    [COMPLETED] Rally Bag IV (D5LR 1000 mL + Thiamine 100 mg + Folic Acid 1 mg + Magnesium Sulfate 1 g) infusion, , Intravenous, Once, Last Rate: 250 mL/hr at 10/27/23 0048, New Bag at 10/27/23 0048 **AND** thiamine (Vitamin B-1) tablet 100 mg, 100 mg, Oral, TID, 100 mg at 10/30/23 0803 **AND** [COMPLETED] multivitamin tablet 1 Tablet, 1 Tablet, Oral, DAILY, 1 Tablet at 10/30/23 0507 **AND** [COMPLETED] folic acid (Folvite) tablet 1 mg, 1 mg, Oral, DAILY, Anthony Liang M.D., 1 mg at 10/30/23 0508    nicotine (Nicoderm) 7 MG/24HR 7 mg, 7 mg, Transdermal, Daily-0600, Nina Lakhani M.D., 7 mg at 10/30/23 0512    QUEtiapine (SEROquel) tablet 100 mg, 100 mg, Oral, Nightly, Fermin Brown M.D., 100 mg at 10/29/23 2026    senna-docusate (Pericolace Or Senokot S) 8.6-50 MG per tablet 2 Tablet, 2 Tablet, Oral, BID, 2 Tablet at 10/27/23 1610 **AND** polyethylene glycol/lytes (Miralax) PACKET 1 Packet, 1 Packet, Oral, QDAY PRN **AND** magnesium hydroxide (Milk Of Magnesia) suspension 30 mL, 30 mL, Oral, QDAY PRN **AND** bisacodyl (Dulcolax) suppository 10 mg, 10 mg, Rectal, QDAY PRN, Fermin Brown M.D.    pantoprazole (Protonix) injection 40 mg, 40 mg, Intravenous, BID, Fermin Brown M.D., 40 mg at 10/30/23 0509    LORazepam (Ativan) tablet 0.5 mg, 0.5 mg, Oral, Q4HRS PRN, Fermin Brown M.D., 0.5 mg at 10/28/23 0512    LORazepam (Ativan) tablet 1 mg, 1 mg, Oral, Q4HRS PRN **OR** LORazepam (Ativan) injection 0.5 mg, 0.5 mg, Intravenous, Q4HRS PRN, Fermin Brown M.D.    LORazepam (Ativan) tablet 2 mg, 2 mg, Oral, Q2HRS PRN **OR** LORazepam (Ativan) injection 1 mg, 1 mg, Intravenous, Q2HRS PRN, Fermin Brown M.D.    LORazepam (Ativan) tablet 3 mg, 3 mg, Oral, Q HOUR PRN **OR** LORazepam (Ativan) injection 1.5  "mg, 1.5 mg, Intravenous, Q HOUR PRN, Fermin Brown M.D.    LORazepam (Ativan) tablet 4 mg, 4 mg, Oral, Q15 MIN PRN **OR** LORazepam (Ativan) injection 2 mg, 2 mg, Intravenous, Q15 MIN PRN, Fermin Brown M.D.    ASSESSMENT/PLAN  Inocencio Shabazz is a 63 y.o. male w/ PMH of Schizophrenia, alcohol use disorder, cirrhosis, esophageal varices treated w/ electrocauterization, and pancytopenia who was admitted with symptomatic anemia and acute alcohol withdrawal.    #Vtach  -consulted cardio: \"No further work-up needed for wide-complex tachycardia as pacemaker interrogation did not suggest this to be VT and in the setting of LVEF.\"  Plan:  Increase carvedilol to 6.25 mg p.o. twice daily for paroxysmal supraventricular tachycardia; continue upon discharge  F/u w/ outpatient cardiologist upon discharge in two weeks.    #Alcohol Withdrawal  -INR 1.42, Bicarb 18, BUN 4, Calcium 8, Iron 23, % Sat 7  -Liver US showed nodules and mild edema consistent w/ cirrhosis  -CIWA 3-6 received 0.5mg ativan x2; pt outside withdrawal window  Plan:  D/C Ativan and CIWA protocol  D/C Gabapentin  Continue thiamine for 2 days outpatient     #Pancytopenia  -All counts from RBCs to WBCs to platelets are significantly below a normal level; however pt is asymptomatic and his numbers have improved slightly since admission suggestion this is his baseline  -Pt has iron deficiency anemia based on low iron and low ferritin levels post-Alcohol withdrawal  Plan:  Strongly counseled pt to avoid alcohol ingestion  D/C on oral iron supplementation for iron deficiency anemia  Continued f/u and monitoring w/ PCP      Medical Student: Dixie Howard, MS3  Resident: Dr. Anthony Liang, PGY-1  Attending: Dr. Moose Chakraborty   "

## 2023-10-31 LAB
HCV RNA SERPL NAA+PROBE-ACNC: ABNORMAL IU/ML
HCV RNA SERPL NAA+PROBE-LOG IU: 5.18 LOG IU/ML
HCV RNA SERPL QL NAA+PROBE: DETECTED

## 2023-11-20 NOTE — ASSESSMENT & PLAN NOTE
Rest your wrist, ice 20 minutes on, 20 minutes off, Naprosyn twice daily as needed for pain. You may use the wrist splint as needed for support. If your symptoms do not improve please follow-up with orthopedics. There are no fractures on your x-ray. Viral load 1.1 million and change

## 2024-01-23 ENCOUNTER — HOSPITAL ENCOUNTER (EMERGENCY)
Facility: MEDICAL CENTER | Age: 64
End: 2024-01-23
Attending: STUDENT IN AN ORGANIZED HEALTH CARE EDUCATION/TRAINING PROGRAM
Payer: MEDICARE

## 2024-01-23 VITALS
OXYGEN SATURATION: 98 % | BODY MASS INDEX: 19.55 KG/M2 | HEIGHT: 68 IN | DIASTOLIC BLOOD PRESSURE: 70 MMHG | WEIGHT: 128.97 LBS | HEART RATE: 70 BPM | TEMPERATURE: 97.6 F | SYSTOLIC BLOOD PRESSURE: 114 MMHG | RESPIRATION RATE: 18 BRPM

## 2024-01-23 DIAGNOSIS — F10.920 ALCOHOLIC INTOXICATION WITHOUT COMPLICATION (HCC): ICD-10-CM

## 2024-01-23 DIAGNOSIS — F41.9 ANXIETY: ICD-10-CM

## 2024-01-23 DIAGNOSIS — F12.90 MARIJUANA USE: ICD-10-CM

## 2024-01-23 DIAGNOSIS — R45.89 SENSE OF IMPENDING DOOM: ICD-10-CM

## 2024-01-23 LAB
EKG IMPRESSION: NORMAL
POC BREATHALIZER: 0.26 PERCENT (ref 0–0.01)

## 2024-01-23 PROCEDURE — A9270 NON-COVERED ITEM OR SERVICE: HCPCS | Performed by: STUDENT IN AN ORGANIZED HEALTH CARE EDUCATION/TRAINING PROGRAM

## 2024-01-23 PROCEDURE — 700102 HCHG RX REV CODE 250 W/ 637 OVERRIDE(OP): Performed by: STUDENT IN AN ORGANIZED HEALTH CARE EDUCATION/TRAINING PROGRAM

## 2024-01-23 PROCEDURE — 93005 ELECTROCARDIOGRAM TRACING: CPT | Performed by: STUDENT IN AN ORGANIZED HEALTH CARE EDUCATION/TRAINING PROGRAM

## 2024-01-23 PROCEDURE — 302970 POC BREATHALIZER: Performed by: STUDENT IN AN ORGANIZED HEALTH CARE EDUCATION/TRAINING PROGRAM

## 2024-01-23 PROCEDURE — 99284 EMERGENCY DEPT VISIT MOD MDM: CPT

## 2024-01-23 RX ORDER — QUETIAPINE FUMARATE 25 MG/1
50 TABLET, FILM COATED ORAL ONCE
Status: COMPLETED | OUTPATIENT
Start: 2024-01-23 | End: 2024-01-23

## 2024-01-23 RX ADMIN — QUETIAPINE FUMARATE 50 MG: 25 TABLET ORAL at 03:46

## 2024-01-23 ASSESSMENT — FIBROSIS 4 INDEX: FIB4 SCORE: 19.09

## 2024-01-23 ASSESSMENT — PAIN DESCRIPTION - PAIN TYPE: TYPE: ACUTE PAIN

## 2024-01-23 NOTE — ED NOTES
Bedside report received from TITUS ramon. Pt resting on gurney w/ eyes closed. NAD noted. Equal chest rise and fall. Pt on 1L O2 NC.

## 2024-01-23 NOTE — ED TRIAGE NOTES
".  Chief Complaint   Patient presents with    Anxiety    Alcohol Intoxication       63 yr patient BIB [facility] from [facility] to triage for above complaint. Per patient \"I feel like I'm dying\"     Pt placed in lobby. Pt educated on triage process. Pt encouraged to alert staff for any changes.     Patient and staff wearing appropriate PPE    /88   Pulse (!) 108   Temp 36.2 °C (97.1 °F) (Temporal)   Resp 14   Ht 1.727 m (5' 8\")   Wt 58.5 kg (128 lb 15.5 oz)   SpO2 97%   BMI 19.61 kg/m²     " None

## 2024-01-23 NOTE — ED NOTES
"Pt ambulatory to green 29 with this RN. Pt states no chief medical complaint however states \"I feel like I'm dying\". Pt does not endorse additional symptoms. Chart up for ERP.   "

## 2024-01-23 NOTE — ED NOTES
"Pt discharged to home w/ steady gait. Pt A&Ox4 on RA. Pt in possession of belongings. Pt provided discharge education and information pertaining to medications and follow up appointments. Pt received copy of discharge instructions and verbalized understanding. /70   Pulse 70   Temp 36.4 °C (97.6 °F) (Temporal)   Resp 18   Ht 1.727 m (5' 8\")   Wt 58.5 kg (128 lb 15.5 oz)   SpO2 98%   BMI 19.61 kg/m²   "

## 2024-01-23 NOTE — ED NOTES
Report given to EDWARD RN. Safety measures in place. Call light within reach. Care relinquished. Pt on 1L NC.

## 2024-01-23 NOTE — ED PROVIDER NOTES
CHIEF COMPLAINT  Chief Complaint   Patient presents with    Anxiety    Alcohol Intoxication       LIMITATION TO HISTORY   Select: Alcohol intoxication    HPI    Inocencio Shabazz is a 63 y.o. male who presents to the Emergency Department for evaluation of anxiety, patient has been drinking alcohol and smoking marijuana this evening patient reports he drinks approximate 1 pint of vodka every day which she is been drinking today, reports feeling anxious like he feels like he is going to die denies any other symptoms such as chest pain or shortness of breath    OUTSIDE HISTORIAN(S):  Select:    EXTERNAL RECORDS REVIEWED  Select:   Patient seen and examined with Dr. Liang.  I agree with the examination and assessment/plan as listed in resident note except as noted in my comments below.      Additional attending comments:      Pancytopenia,coagulopathy,alcohol withdrawals,Schizophrenia,Portal hypertensive gastropathy,recent 2 AVMs-treated with APC,History of esophageal varices,Cirrhosis (HCV +ve and alcohol),wide complex tachycardia  Feels ready for discharge and would like to be discharge  Discussed the risks/benefits and options of meds with Pt in detail, answered all questions/concerns.  Need close out pt FU with PCP with BP/Pulse log and meds list.    PCP to consider risks/benefits and further adjustment of meds.  Highly recommended Pt to come to ED for any new or worsening s/s.  D/W CM and RN.  FU with GI,Psych and Card        I agree with discharge documentation which reflects my direct input.  Overall personal time spent on discharge of this patient exceeds 35 minutes.                 [facility] Discharge Summary     Attending: Palmer  Senior Resident: Dr. Mendez  Intern:  Dr. Liang  Contact Number: [phone]     CHIEF COMPLAINT ON ADMISSION       Chief Complaint   Patient presents with    Cramping       Cramping in bilateral legs x2 days intermittently     Lightheadedness       x2 days  intermittently    Nausea       Intermittent nausea x2 days but pt denies vomiting or diarrhea         Reason for Admission  leg pain      Admission Date  10/26/2023     CODE STATUS  Full Code     HPI & HOSPITAL COURSE  Inocencio Shabazz is a 63 y.o. male with past medical history of chronic alcohol abuse (last drink 10/25), cirrhosis, esophageal varices, seizure disorder who presented 10/26/2023 with multiple symptoms, reports of dizziness, lightheadedness, generalized fatigue, muscle cramping for last 2 to 3 days. He was admitted to the hospital for symptomatic anemia 7.0, received 1u PRBC in ED and hemoglobin remains stable. No recent history of acute blood loss or GI bleed. Monitored for alcohol withdrawal with CIWA protocol. Previously HCV+ as of 2019 and patient states he did not receive treatment.  Hepatitis C virus RNA obtained and currently pending on day of discharge.  During hospitalization hemoglobin remained stable not requiring any further blood transfusions.  Patient found to be iron deficient, likely secondary to chronic bleed and alcohol use resulting in sideroblastic anemia.  Did receive IV iron gluconate for 2 doses prior to day of discharge.  Will prescribe oral iron, thiamine, Coreg 6.25 mg twice daily to continue on discharge.  Hospitalization complicated by ventricular tachycardia lasting 1 minute.  Cardiology consulted and given pacemaker states that patient had episodes of supraventricular tachycardia and recommend no further work-up.  Strongly recommended alcohol cessation and educated patient that this is led to his pancytopenia and electrolyte abnormalities resulting in muscle cramps.  On day of discharge patient hemodynamically stable and without signs of alcohol withdrawal and stable to return to home.  Therefore, he is discharged in fair and stable condition to home with close outpatient follow-up.     The patient met 2-midnight criteria for an inpatient stay at the time of  discharge.     Discharge Date  10/30/2023            PAST MEDICAL HISTORY  Past Medical History:   Diagnosis Date    Alcohol abuse     Bipolar disorder (HCC)     Cirrhosis (HCC)     GIB (gastrointestinal bleeding) 01/03/2016    Hepatitis C     Pacemaker     left chest    Pancytopenia (HCC)     Schizophrenia (HCC)      .    SURGICAL HISTORY  Past Surgical History:   Procedure Laterality Date    CA UPPER GI ENDOSCOPY,DIAGNOSIS N/A 8/10/2023    Procedure: GASTROSCOPY;  Surgeon: Joleen Yañez M.D.;  Location: SURGERY SAME DAY [facility];  Service: Gastroenterology    CA UPPER GI ENDOSCOPY,CTRL BLEED N/A 8/10/2023    Procedure: GASTROSCOPY, WITH ARGON PLASMA COAGULATION;  Surgeon: Joleen Yañez M.D.;  Location: SURGERY SAME DAY [facility];  Service: Gastroenterology    GASTROSCOPY-ENDO N/A 11/13/2019    Procedure: GASTROSCOPY with banding;  Surgeon: Tamela Smith M.D.;  Location: ENDOSCOPY [facility];  Service: Gastroenterology    GASTROSCOPY  3/13/2019    Procedure: GASTROSCOPY;  Surgeon: Abdi Ba M.D.;  Location: SURGERY [facility];  Service: Gastroenterology    GASTROSCOPY  4/8/2016    Procedure: GASTROSCOPY;  Surgeon: Braeden Tobin M.D.;  Location: SURGERY [facility];  Service:     GASTROSCOPY  1/3/2016    Procedure: GASTROSCOPY WITH BANDING;  Surgeon: Alfredo Antonio M.D.;  Location: SURGERY [facility];  Service:          FAMILY HISTORY  No family history on file.       SOCIAL HISTORY  Social History     Socioeconomic History    Marital status: Single     Spouse name: Not on file    Number of children: Not on file    Years of education: Not on file    Highest education level: Not on file   Occupational History    Not on file   Tobacco Use    Smoking status: Every Day     Current packs/day: 1.00     Average packs/day: 1 pack/day for 48.0 years (48.0 ttl pk-yrs)     Types: Cigarettes    Smokeless tobacco: Never   Vaping Use    Vaping Use: Never used   Substance and Sexual  "Activity    Alcohol use: Yes     Comment: \"couple of pints whiskey every day\"    Drug use: Yes     Types: Inhaled     Comment: currently smokes marijuana; past hx of cocaine and meth    Sexual activity: Not on file   Other Topics Concern    Not on file   Social History Narrative    Not on file     Social Determinants of Health     Financial Resource Strain: Not on file   Food Insecurity: Not on file   Transportation Needs: Not on file   Physical Activity: Not on file   Stress: Not on file   Social Connections: Not on file   Intimate Partner Violence: Not on file   Housing Stability: Not on file         CURRENT MEDICATIONS  No current facility-administered medications on file prior to encounter.     Current Outpatient Medications on File Prior to Encounter   Medication Sig Dispense Refill    carvedilol (COREG) 6.25 MG Tab Take 1 Tablet by mouth 2 times a day with meals. 60 Tablet 0    ferrous sulfate 325 (65 Fe) MG tablet Take 1 Tablet by mouth every 48 hours. 16 Tablet 0    thiamine (THIAMINE) 100 MG tablet Take 1 Tablet by mouth every day. 30 Tablet 0    omeprazole (PRILOSEC) 20 MG delayed-release capsule Take 20 mg by mouth every day.      QUEtiapine (SEROQUEL) 200 MG Tab Take 200 mg by mouth every evening.             ALLERGIES  Allergies   Allergen Reactions    Haloperidol Unspecified     Twitching/involuntary movements        PHYSICAL EXAM  VITAL SIGNS:/88   Pulse 63   Temp 36.2 °C (97.1 °F) (Temporal)   Resp 14   Ht 1.727 m (5' 8\")   Wt 58.5 kg (128 lb 15.5 oz)   SpO2 93%   BMI 19.61 kg/m²       GENERAL: Awake and alert  HEAD: Normocephalic and atraumatic  NECK: Normal range of motion, without meningismus  EYES: Pupils Equal, Round, Reactive to Light, extraocular movements intact, conjunctiva white  ENT: Mucous membranes moist, oropharynx clear  PULMONARY: Normal effort, clear to auscultation  CARDIOVASCULAR: No murmurs, clicks or rubs, peripheral pulses 2+  ABDOMINAL: Soft, non-tender, no " guarding or rigidity present, no pulsatile masses  BACK: no midline tenderness, no costovertebral tenderness  NEUROLOGICAL: Alert oriented to person place and time grossly non-focal neurological examination, speech is slurred, gait normal  EXTREMITIES: No edema, normal to inspection  SKIN: Warm and dry.  PSYCHIATRIC: Affect is anxious denies suicidal ideation or homicidal ideation    DIAGNOSTIC STUDIES / PROCEDURES  EKG  EKG Interpretation: I independently reviewed the below EKG and did not see signs of a STEMI  Rate of 60  Normal sinus rhythm  Poor R wave progression  Paired to prior ECG sinus tachycardia is no longer          LABS  Labs Reviewed   POC BREATHALIZER - Abnormal; Notable for the following components:       Result Value    POC Breathalizer 0.255 (*)     All other components within normal limits       COURSE & MEDICAL DECISION MAKING    ED COURSE:        INTERVENTIONS BY ME:  Medications   QUEtiapine (SEROquel) tablet 50 mg (50 mg Oral Given 1/23/24 0346)       Response on recheck:  Reevaluation 3:45 AM patient asleep resting comfortably reassessed patient is no longer reporting anxiety or stating that he feels like he is going to die  4:40 AM patient resting comfortably continues to deny since obtaining he is asleep discussed plan of care with nurse we will plan on discharging patient when she is clinically sober    INITIAL ASSESSMENT, COURSE AND PLAN  Care Narrative:   Patient presenting clinically intoxicated on alcohol and marijuana with anxiety and sense of doom no other somatic symptoms EKG without any ischemic changes do not see any medical etiology to his symptoms do suspect alcohol and psychiatric etiology of symptoms he is not having suicidal ideation no indication for hold no psychosis today.  Plan of care is to observe patient until clinically sober with plan to discharge patient patient received quetiapine which is prescribed for him with positive effect..            ADDITIONAL PROBLEM  LIST    DISPOSITION AND DISCUSSIONS    Discussion of management with other [facility] or appropriate source(s): None     Escalation of care considered, and ultimately not performed:IV fluids, blood analysis, and diagnostic imaging considered obtaining troponin CBC CMP and chest radiograph    Barriers to care at this time, including but not limited to: Patient is homeless.       FINAL DIAGNOSIS  1. Alcoholic intoxication without complication (HCC)    2. Marijuana use    3. Anxiety    4. Sense of impending doom             Electronically signed by: Luis E Wood DO ,4:43 AM 01/23/24

## 2024-01-23 NOTE — ED NOTES
Patient remains comfortable on gurney, no identifiable needs at this time. Equal chest rise and fall bilaterally, pt connected to cardiac monitor. Pending MTF. Safety measures in place, call light within reach.

## 2024-02-02 ENCOUNTER — APPOINTMENT (OUTPATIENT)
Dept: RADIOLOGY | Facility: MEDICAL CENTER | Age: 64
End: 2024-02-02
Attending: EMERGENCY MEDICINE
Payer: MEDICARE

## 2024-02-02 ENCOUNTER — HOSPITAL ENCOUNTER (EMERGENCY)
Facility: MEDICAL CENTER | Age: 64
End: 2024-02-02
Attending: EMERGENCY MEDICINE
Payer: MEDICARE

## 2024-02-02 VITALS
RESPIRATION RATE: 16 BRPM | OXYGEN SATURATION: 97 % | BODY MASS INDEX: 19.38 KG/M2 | HEART RATE: 79 BPM | HEIGHT: 68 IN | TEMPERATURE: 97.6 F | DIASTOLIC BLOOD PRESSURE: 85 MMHG | SYSTOLIC BLOOD PRESSURE: 142 MMHG | WEIGHT: 127.87 LBS

## 2024-02-02 DIAGNOSIS — S82.042A CLOSED DISPLACED COMMINUTED FRACTURE OF LEFT PATELLA, INITIAL ENCOUNTER: ICD-10-CM
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
## 2024-02-02 DIAGNOSIS — F10.929 ALCOHOLIC INTOXICATION WITH COMPLICATION (HCC): ICD-10-CM

## 2024-02-02 LAB
ALBUMIN SERPL BCP-MCNC: 3.6 G/DL (ref 3.2–4.9)
ALBUMIN/GLOB SERPL: 0.9 G/DL
ALP SERPL-CCNC: 78 U/L (ref 30–99)
ALT SERPL-CCNC: 53 U/L (ref 2–50)
ANION GAP SERPL CALC-SCNC: 17 MMOL/L (ref 7–16)
AST SERPL-CCNC: 116 U/L (ref 12–45)
BASOPHILS # BLD AUTO: 1.6 % (ref 0–1.8)
BASOPHILS # BLD: 0.02 K/UL (ref 0–0.12)
BILIRUB SERPL-MCNC: 0.7 MG/DL (ref 0.1–1.5)
BUN SERPL-MCNC: 9 MG/DL (ref 8–22)
CALCIUM ALBUM COR SERPL-MCNC: 8.5 MG/DL (ref 8.5–10.5)
CALCIUM SERPL-MCNC: 8.2 MG/DL (ref 8.5–10.5)
CHLORIDE SERPL-SCNC: 104 MMOL/L (ref 96–112)
CO2 SERPL-SCNC: 20 MMOL/L (ref 20–33)
CREAT SERPL-MCNC: 0.5 MG/DL (ref 0.5–1.4)
EKG IMPRESSION: NORMAL
EOSINOPHIL # BLD AUTO: 0.03 K/UL (ref 0–0.51)
EOSINOPHIL NFR BLD: 2.4 % (ref 0–6.9)
ERYTHROCYTE [DISTWIDTH] IN BLOOD BY AUTOMATED COUNT: 51.1 FL (ref 35.9–50)
ETHANOL BLD-MCNC: 141 MG/DL
FLUAV RNA SPEC QL NAA+PROBE: NEGATIVE
FLUBV RNA SPEC QL NAA+PROBE: NEGATIVE
GFR SERPLBLD CREATININE-BSD FMLA CKD-EPI: 112 ML/MIN/1.73 M 2
GLOBULIN SER CALC-MCNC: 3.9 G/DL (ref 1.9–3.5)
GLUCOSE SERPL-MCNC: 88 MG/DL (ref 65–99)
HCT VFR BLD AUTO: 34.9 % (ref 42–52)
HGB BLD-MCNC: 11.6 G/DL (ref 14–18)
IMM GRANULOCYTES # BLD AUTO: 0 K/UL (ref 0–0.11)
IMM GRANULOCYTES NFR BLD AUTO: 0 % (ref 0–0.9)
LIPASE SERPL-CCNC: 92 U/L (ref 11–82)
LYMPHOCYTES # BLD AUTO: 0.41 K/UL (ref 1–4.8)
LYMPHOCYTES NFR BLD: 33.3 % (ref 22–41)
MCH RBC QN AUTO: 29.1 PG (ref 27–33)
MCHC RBC AUTO-ENTMCNC: 33.2 G/DL (ref 32.3–36.5)
MCV RBC AUTO: 87.7 FL (ref 81.4–97.8)
MONOCYTES # BLD AUTO: 0.2 K/UL (ref 0–0.85)
MONOCYTES NFR BLD AUTO: 16.3 % (ref 0–13.4)
NEUTROPHILS # BLD AUTO: 0.57 K/UL (ref 1.82–7.42)
NEUTROPHILS NFR BLD: 46.4 % (ref 44–72)
NRBC # BLD AUTO: 0 K/UL
NRBC BLD-RTO: 0 /100 WBC (ref 0–0.2)
PLATELET # BLD AUTO: 39 K/UL (ref 164–446)
PLATELETS.RETICULATED NFR BLD AUTO: 7.4 % (ref 0.6–13.1)
PMV BLD AUTO: 9.5 FL (ref 9–12.9)
POTASSIUM SERPL-SCNC: 3.5 MMOL/L (ref 3.6–5.5)
PROT SERPL-MCNC: 7.5 G/DL (ref 6–8.2)
RBC # BLD AUTO: 3.98 M/UL (ref 4.7–6.1)
RSV RNA SPEC QL NAA+PROBE: NEGATIVE
SARS-COV-2 RNA RESP QL NAA+PROBE: NOTDETECTED
SODIUM SERPL-SCNC: 141 MMOL/L (ref 135–145)
TROPONIN T SERPL-MCNC: 9 NG/L (ref 6–19)
WBC # BLD AUTO: 1.2 K/UL (ref 4.8–10.8)

## 2024-02-02 PROCEDURE — 85055 RETICULATED PLATELET ASSAY: CPT

## 2024-02-02 PROCEDURE — 71045 X-RAY EXAM CHEST 1 VIEW: CPT

## 2024-02-02 PROCEDURE — 82077 ASSAY SPEC XCP UR&BREATH IA: CPT

## 2024-02-02 PROCEDURE — 84484 ASSAY OF TROPONIN QUANT: CPT

## 2024-02-02 PROCEDURE — 85025 COMPLETE CBC W/AUTO DIFF WBC: CPT

## 2024-02-02 PROCEDURE — 700102 HCHG RX REV CODE 250 W/ 637 OVERRIDE(OP): Performed by: EMERGENCY MEDICINE

## 2024-02-02 PROCEDURE — 80053 COMPREHEN METABOLIC PANEL: CPT

## 2024-02-02 PROCEDURE — 93005 ELECTROCARDIOGRAM TRACING: CPT | Performed by: EMERGENCY MEDICINE

## 2024-02-02 PROCEDURE — 73700 CT LOWER EXTREMITY W/O DYE: CPT | Mod: LT

## 2024-02-02 PROCEDURE — 73562 X-RAY EXAM OF KNEE 3: CPT | Mod: LT

## 2024-02-02 PROCEDURE — 0241U HCHG SARS-COV-2 COVID-19 NFCT DS RESP RNA 4 TRGT ED POC: CPT

## 2024-02-02 PROCEDURE — 93005 ELECTROCARDIOGRAM TRACING: CPT

## 2024-02-02 PROCEDURE — 83690 ASSAY OF LIPASE: CPT

## 2024-02-02 PROCEDURE — A9270 NON-COVERED ITEM OR SERVICE: HCPCS | Performed by: EMERGENCY MEDICINE

## 2024-02-02 PROCEDURE — 99285 EMERGENCY DEPT VISIT HI MDM: CPT

## 2024-02-02 PROCEDURE — 36415 COLL VENOUS BLD VENIPUNCTURE: CPT

## 2024-02-02 RX ORDER — ACETAMINOPHEN 325 MG/1
650 TABLET ORAL ONCE
Status: COMPLETED | OUTPATIENT
Start: 2024-02-02 | End: 2024-02-02

## 2024-02-02 RX ADMIN — ACETAMINOPHEN 650 MG: 325 TABLET ORAL at 16:22

## 2024-02-02 ASSESSMENT — HEART SCORE
ECG: NORMAL
RISK FACTORS: NO KNOWN RISK FACTORS
HISTORY: SLIGHTLY SUSPICIOUS
TROPONIN: LESS THAN OR EQUAL TO NORMAL LIMIT
AGE: 65+
HEART SCORE: 2

## 2024-02-02 ASSESSMENT — FIBROSIS 4 INDEX: FIB4 SCORE: 19.39

## 2024-02-02 NOTE — ED NOTES
ED SEBAS ATTEMPTED TO PLACE KNEE IMMOBILIZER ON PT AND PT REFUSED SAYING HE IS IN TO MUCH PAIN TO WEAR IT AT THIS TIME.

## 2024-02-02 NOTE — ED TRIAGE NOTES
"Chief Complaint   Patient presents with    Other     Pt walked in from triage. Pt states \"I feel like I am dying.\" Pt discharged from [facility] today. Currently c/o L knee pain and cough.    /79   Pulse (!) 102   Temp 36.7 °C (98.1 °F) (Temporal)   Resp 14   Ht 1.727 m (5' 8\")   Wt 58 kg (127 lb 13.9 oz)   SpO2 96%   BMI 19.44 kg/m²     When attempting to clarify pt's chief complaint pt yelled \"you don't care about me how long is the wait.\" Pt educated on not raising voice. Unable to obtain concise chief complaint throughout discharge. Pt appears in pain, however speaking in full sentences and AAOx4 GCS 15. Pt denies CP, SOB. Endorses dizziness. EKG ordered.   "

## 2024-02-02 NOTE — ED NOTES
"PT AMBULATORY TO ROOM WITH A STEADY GAIT. PATIENT STATES LEFT KNEE PAIN AFTER HE TRIPPED AND HURT HIS KNEE. PT ALSO STATES HE IS \"JUST NOT FEELING WELL\". [facility]     PT IS RESTING IN BED. BREATHING IS EVEN AND UNLABORED, SKIN IS WARM AND DRY, VSS, NAD, MONITORING ONGOING. PT IS PENDING MD EVALUATION.     " yes...

## 2024-02-02 NOTE — ED PROVIDER NOTES
"ED Provider Note    CHIEF COMPLAINT  Chief Complaint   Patient presents with    Other     Pt walked in from triage. Pt states \"I feel like I am dying.\" [facility] Currently c/o L knee pain and cough.        EXTERNAL RECORDS REVIEWED  Outpatient Notes patient was seen at [facility] 1/23/2024 for body aches his exam was unremarkable and he was discharged home with a diagnosis of alcohol abuse    HPI/ROS  LIMITATION TO HISTORY   Select: Behavior  OUTSIDE HISTORIAN(S):  none    Inocencio Shabazz is a 64 y.o. male who presents by walking here stating that he feels like he is dying and his left knee hurts.  He states that he is coughing but nothing comes up.  He is very vague and will not tell me a lot about what is wrong with him.  He denies any falls or head injury.  He has no chest pain.  He denies any abdominal pain or vomiting.  He admits to drinking today.    PAST MEDICAL HISTORY   has a past medical history of Alcohol abuse, Bipolar disorder (HCC), Cirrhosis (HCC), GIB (gastrointestinal bleeding) (01/03/2016), Hepatitis C, Pacemaker, Pancytopenia (HCC), and Schizophrenia (HCC).    SURGICAL HISTORY   has a past surgical history that includes gastroscopy (1/3/2016); gastroscopy (4/8/2016); gastroscopy (3/13/2019); gastroscopy-endo (N/A, 11/13/2019); upper gi endoscopy,diagnosis (N/A, 8/10/2023); and upper gi endoscopy,ctrl bleed (N/A, 8/10/2023).    FAMILY HISTORY  No family history on file.    SOCIAL HISTORY  Social History     Tobacco Use    Smoking status: Every Day     Current packs/day: 1.00     Average packs/day: 1 pack/day for 48.0 years (48.0 ttl pk-yrs)     Types: Cigarettes    Smokeless tobacco: Never   Vaping Use    Vaping Use: Never used   Substance and Sexual Activity    Alcohol use: Yes     Comment: \"couple of pints whiskey every day\"    Drug use: Yes     Types: Inhaled     Comment: currently smokes marijuana; past hx of cocaine and meth    Sexual " "activity: Not on file       CURRENT MEDICATIONS  Home Medications       Reviewed by Magdy Fitzgerald R.N. (Registered Nurse) on 02/02/24 at 1330  Med List Status: Partial     Medication Last Dose Status   carvedilol (COREG) 6.25 MG Tab  Active   ferrous sulfate 325 (65 Fe) MG tablet  Active   omeprazole (PRILOSEC) 20 MG delayed-release capsule  Active   QUEtiapine (SEROQUEL) 200 MG Tab  Active   thiamine (THIAMINE) 100 MG tablet  Active                    ALLERGIES  Allergies   Allergen Reactions    Haloperidol Unspecified     Twitching/involuntary movements        PHYSICAL EXAM  VITAL SIGNS: /79   Pulse (!) 102   Temp 36.7 °C (98.1 °F) (Temporal)   Resp 14   Ht 1.727 m (5' 8\")   Wt 58 kg (127 lb 13.9 oz)   SpO2 96%   BMI 19.44 kg/m²      Constitutional: Well developed, Well nourished, No acute distress, Non-toxic appearance.  Unkempt  HEENT: Normocephalic, Atraumatic,  external ears normal, pharynx pink,  Mucous  Membranes moist, No rhinorrhea or mucosal edema  Eyes: PERRL, EOMI, Conjunctiva normal, No discharge.   Neck: Normal range of motion, No tenderness, Supple, No stridor.   Lymphatic: No lymphadenopathy    Cardiovascular: Regular Rate and Rhythm, No murmurs,  rubs, or gallops.   Thorax & Lungs: Lungs clear to auscultation bilaterally, No respiratory distress, No wheezes, rhales or rhonchi, No chest wall tenderness.   Abdomen: Bowel sounds normal, Soft, non tender, non distended,  No pulsatile masses., no rebound guarding or peritoneal signs.   Skin: Warm, Dry, No erythema, No rash,   Back:  No CVA tenderness,  No spinal tenderness, bony crepitance step offs or instability.   Extremities: Equal, intact distal pulses, No cyanosis, clubbing or edema,  No tenderness.   Musculoskeletal: Patient has a large effusion and a superficial fresh abrasion to his left knee with decreased range of motion and pain over palpation of the patella.  Neurologic: Alert & oriented No focal deficits noted.  Psychiatric: " Affect normal, Judgment normal, Mood normal.      DIAGNOSTIC STUDIES / PROCEDURES  EKG  I have independently interpreted this EKG  See below    LABS  Results for orders placed or performed during the hospital encounter of 02/02/24   CBC WITH DIFFERENTIAL   Result Value Ref Range    WBC 1.2 (LL) 4.8 - 10.8 K/uL    RBC 3.98 (L) 4.70 - 6.10 M/uL    Hemoglobin 11.6 (L) 14.0 - 18.0 g/dL    Hematocrit 34.9 (L) 42.0 - 52.0 %    MCV 87.7 81.4 - 97.8 fL    MCH 29.1 27.0 - 33.0 pg    MCHC 33.2 32.3 - 36.5 g/dL    RDW 51.1 (H) 35.9 - 50.0 fL    Platelet Count 39 (L) 164 - 446 K/uL    MPV 9.5 9.0 - 12.9 fL    Neutrophils-Polys 46.40 44.00 - 72.00 %    Lymphocytes 33.30 22.00 - 41.00 %    Monocytes 16.30 (H) 0.00 - 13.40 %    Eosinophils 2.40 0.00 - 6.90 %    Basophils 1.60 0.00 - 1.80 %    Immature Granulocytes 0.00 0.00 - 0.90 %    Nucleated RBC 0.00 0.00 - 0.20 /100 WBC    Neutrophils (Absolute) 0.57 (L) 1.82 - 7.42 K/uL    Lymphs (Absolute) 0.41 (L) 1.00 - 4.80 K/uL    Monos (Absolute) 0.20 0.00 - 0.85 K/uL    Eos (Absolute) 0.03 0.00 - 0.51 K/uL    Baso (Absolute) 0.02 0.00 - 0.12 K/uL    Immature Granulocytes (abs) 0.00 0.00 - 0.11 K/uL    NRBC (Absolute) 0.00 K/uL   COMP METABOLIC PANEL   Result Value Ref Range    Sodium 141 135 - 145 mmol/L    Potassium 3.5 (L) 3.6 - 5.5 mmol/L    Chloride 104 96 - 112 mmol/L    Co2 20 20 - 33 mmol/L    Anion Gap 17.0 (H) 7.0 - 16.0    Glucose 88 65 - 99 mg/dL    Bun 9 8 - 22 mg/dL    Creatinine 0.50 0.50 - 1.40 mg/dL    Calcium 8.2 (L) 8.5 - 10.5 mg/dL    Correct Calcium 8.5 8.5 - 10.5 mg/dL    AST(SGOT) 116 (H) 12 - 45 U/L    ALT(SGPT) 53 (H) 2 - 50 U/L    Alkaline Phosphatase 78 30 - 99 U/L    Total Bilirubin 0.7 0.1 - 1.5 mg/dL    Albumin 3.6 3.2 - 4.9 g/dL    Total Protein 7.5 6.0 - 8.2 g/dL    Globulin 3.9 (H) 1.9 - 3.5 g/dL    A-G Ratio 0.9 g/dL   LIPASE   Result Value Ref Range    Lipase 92 (H) 11 - 82 U/L   TROPONIN   Result Value Ref Range    Troponin T 9 6 - 19 ng/L    DIAGNOSTIC ALCOHOL   Result Value Ref Range    Diagnostic Alcohol 141.0 (H) <10.1 mg/dL   IMMATURE PLT FRACTION   Result Value Ref Range    Imm. Plt Fraction 7.4 0.6 - 13.1 %   ESTIMATED GFR   Result Value Ref Range    GFR (CKD-EPI) 112 >60 mL/min/1.73 m 2   EKG (NOW)   Result Value Ref Range    Report       [facility] Emergency Dept.    Test Date:  2024  Pt Name:    JIMMY FERRER                 Department: ER  MRN:        [id number]                      Room:  Gender:     Male                         Technician: [id number]  :        1960                   Requested By:ER TRIAGE PROTOCOL  Order #:    [id number]                    Reading MD: STEVE BENTLEY MD    Measurements  Intervals                                Axis  Rate:       69                           P:          76  AL:         155                          QRS:        74  QRSD:       96                           T:          77  QT:         413  QTc:        443    Interpretive Statements  Sinus rhythm  Compared to ECG 2024 03:23:38  Poor R-wave progression no longer present  Electronically Signed On 2024 16:58:18 PST by STEVE BENTLEY MD     POC CoV-2, FLU A/B, RSV by PCR   Result Value Ref Range    POC Influenza A RNA, PCR Negative Negative    POC Influenza B RNA, PCR Negative Negative    POC RSV, by PCR Negative Negative    POC SARS-CoV-2, PCR NotDetected         RADIOLOGY  I have independently interpreted the diagnostic imaging associated with this visit and am waiting the final reading from the radiologist.   My preliminary interpretation is as follows: Chest x-ray no infiltrate or pleural effusion  Radiologist interpretation:   DX-KNEE 3 VIEWS LEFT   Final Result      1.  Multi partite patella. A superimposed fracture cannot be excluded given the presence of a moderate joint effusion.   2.  Otherwise no fracture or dislocation of the knee.      DX-CHEST-PORTABLE (1 VIEW)   Final Result      No acute  cardiopulmonary abnormality.      CT-KNEE W/O PLUS RECONS LEFT    (Results Pending)         COURSE & MEDICAL DECISION MAKING    ED Observation Status? No; Patient does not meet criteria for ED Observation.     INITIAL ASSESSMENT, COURSE AND PLAN  Care Narrative:   Inocencio Shabazz is a 64 y.o. male who presents by walking here stating that he feels like he is dying and his left knee hurts.  He states that he is coughing but nothing comes up.  He is very vague and will not tell me a lot about what is wrong with him.  He denies any falls or head injury.  He has no chest pain.  He denies any abdominal pain or vomiting.  He admits to drinking today.  He states that he thinks he might of fallen today.  Sickle exam his heart is regular and rhythm lungs are clear abdomen is soft he is somnolent but arousable.  He has swelling and an effusion to his left knee with an abrasion to the dorsum of the knee without active swelling.  He has trouble lifting it up.        ADDITIONAL PROBLEM LIST  Bradycardia  Hepatitis C  Gastritis  Acquired pancytopenia  Alcohol dependence  Bleeding esophageal varices  THC dependence  Cardiac pacemaker  Disorganized schizophrenia  Edema  GERD  Paroxysmal atrial fibrillation  Tobacco use disorder  Peripheral neuropathy  DISPOSITION AND DISCUSSIONS    Patient's COVID flu and RSV is negative.  Chest x-ray shows no infiltrate or pleural effusion.  EKG shows normal sinus rhythm rate of 69 with no acute ST changes or evidence of ischemia.    Patient's x-ray of the left knee shows multi partite patella and a moderate joint effusion.  I ordered a knee immobilizer for the patient's patella fracture.    Patient's white count is low at 1.2 which is around his baseline.  Hemoglobin low at 11.6 platelet count low at 39.  He has no immature granulocytes.  COVID flu RSV is negative.  Lipase is slightly elevated at 92.  He is intoxicated with an alcohol level of 141.  Troponin is normal at 9.  This gives him a  heart score of 2.       I spoke with Dr. Blank orthopedics who states that his fracture does not look surgical he would like a CT of the knee which I did order.  He would like the patient placed in a knee immobilizer and given crutches and he can follow-up with Dr. Blank as an outpatient.  I gave the patient an ice pack and Tylenol for his pain.  I advised that he ice and elevate his leg.  I explained to him I cannot give him any narcotic medications because of his alcohol intoxication.  The patient will be discharged home in stable condition.  The rest of his ER workup EKG and blood work are unremarkable.      I have discussed management of the patient with the following physicians and LAISHA's: Orthopedist Dr. Blank who would like a CT scan of the left knee.  He recommends knee immobilizer crutches and outpatient follow-up.     Discussion of management with other \[facility]\"" or appropriate source(s): None     Escalation of care considered, and ultimately not performed:acute inpatient care management, however at this time, the patient is most appropriate for outpatient management    Barriers to care at this time, including but not limited to:  Patient is a chronic alcoholic. .     Decision tools and prescription drugs considered including, but not limited to: HEART Score 2 .  The patient will return for new or worsening symptoms and is stable at the time of discharge.    The patient is referred to a primary physician for blood pressure management, diabetic screening, and for all other preventative health concerns.      DISPOSITION:  Patient will be discharged home in stable condition.    FOLLOW UP:  Nnamdi Ballesteros M.D.  [address] [address]  [phone]    Call in 1 day  for recheck    Duncan Blank M.D.  [address]  [address]  [facility] [id number] [phone]    Call in 1 day  to establish care for evaluation or knee      OUTPATIENT MEDICATIONS:  New Prescriptions    No medications on file    Over-the-counter Tylenol ice packs and elevation    FINAL DIAGNOSIS  1. Alcoholic intoxication with complication (HCC)    2. Fall, initial encounter    3. Closed displaced comminuted fracture of left patella, initial encounter           Electronically signed by: Cecille Brown M.D., 2/2/2024 2:24 PM

## 2024-02-03 NOTE — ED NOTES
PT GIVEN ICE PACK FOR KNEE PAIN. MD TEXTED AS PT WOULD LIKE ADDITIONAL PAIN MEDICATION.    MD STATES NO ADDITIONAL PAIN MEDS AS HE IS TO INTOXICATED.

## 2024-02-03 NOTE — ED NOTES
KNEE IMMOBILIZER PLACED ON PATIENT AND PT GIVEN WALKER AS HE DID NOT WANT TO USE CRUTCHES.    PT DISCHARGE TO HOME. PT AMBULATORY WITH A STEADY GAIT WITH WALKER. PT GIVEN D/C INSTRUCTIONS AND PT VERBALIZED AN UNDERSTANDING. PT GIVEN ORTHO FOLLOW-UP INSTRUCTIONS.

## 2024-02-16 ENCOUNTER — HOSPITAL ENCOUNTER (EMERGENCY)
Facility: MEDICAL CENTER | Age: 64
End: 2024-02-16
Payer: COMMERCIAL

## 2024-02-16 VITALS
RESPIRATION RATE: 18 BRPM | HEIGHT: 68 IN | BODY MASS INDEX: 19.58 KG/M2 | WEIGHT: 129.19 LBS | SYSTOLIC BLOOD PRESSURE: 115 MMHG | OXYGEN SATURATION: 95 % | DIASTOLIC BLOOD PRESSURE: 84 MMHG | TEMPERATURE: 97.2 F | HEART RATE: 95 BPM

## 2024-02-16 PROCEDURE — 302449 STATCHG TRIAGE ONLY (STATISTIC)

## 2024-02-16 ASSESSMENT — FIBROSIS 4 INDEX: FIB4 SCORE: 26.96

## 2024-02-16 NOTE — ED TRIAGE NOTES
".  Chief Complaint   Patient presents with    Knee Pain     Pt. [facility] left knee pain states he fell earlier, pt +ETOH, able to ambulate safely and independently  at this time     ./84   Pulse 95   Temp 36.2 °C (97.2 °F) (Temporal)   Resp 18   Ht 1.727 m (5' 8\")   Wt 58.6 kg (129 lb 3 oz)   SpO2 95%   BMI 19.64 kg/m²     .Pt is alert and oriented, speaking in full sentences, follows commands and responds appropriately to questions Resp are even and unlabored.  Skin pink warm and dry     Pt placed in lobby. Pt educated on triage process. Pt encouraged to alert staff for any changes.'  "

## 2024-02-19 NOTE — ED TRIAGE NOTES
Chief Complaint   Patient presents with   • Chest Pain     BIB [facility] after patient had about 1 minute of right sided chest pain at home today.        and Affect: Mood normal.        POCT COVID-19 Rapid, NAAT      Component  Ref Range & Units 2/20/24 1208   SARS-COV-2, RdRp gene  Negative Negative          Patient Active Problem List   Diagnosis    Incontinence of urine    Chronic back pain    Spinal stenosis    GERD (gastroesophageal reflux disease)    History of breast cancer    Chronic venous insufficiency    Rheumatoid arthritis involving multiple sites with positive rheumatoid factor (HCC)    H/O Sjogren's disease (HCC)    History of Clostridioides difficile infection      Allergies   Allergen Reactions    Clindamycin/Lincomycin      C-Diff    Sulfa Antibiotics Hives    Polysporin [Bacitracin-Polymyxin B]     Parabens      Lip [place]   Only use [place] bee     Current Outpatient Medications on File Prior to Visit   Medication Sig Dispense Refill    amitriptyline (ELAVIL) 25 MG tablet TAKE 1 TABLET NIGHTLY 90 tablet 3    furosemide (LASIX) 40 MG tablet TAKE 1 TABLET ONCE DAILY 90 tablet 1    clotrimazole-betamethasone (LOTRISONE) 1-0.05 % cream Apply topically 2 times daily. 30 g 5    Upadacitinib ER (RINVOQ) 15 MG TB24 Take 15 tablets by mouth      hydroxychloroquine (PLAQUENIL) 200 MG tablet       famotidine (PEPCID) 10 MG tablet Take 1 tablet by mouth 2 times daily      ANDRAE, ZINGIBER OFFICINALIS, PO Take 1 tablet by mouth daily      cephALEXin (KEFLEX) 500 MG capsule Take 1 capsule by mouth 4 times daily (Patient not taking: Reported on 2/20/2024) 40 capsule 0    gabapentin (NEURONTIN) 300 MG capsule TAKE 1 CAPSULE BY MOUTH TWO TIMES A  capsule 2     No current facility-administered medications on file prior to visit.      Social History     Tobacco Use    Smoking status: Never     Passive exposure: Never    Smokeless tobacco: Never   Substance Use Topics    Alcohol use: Never        CARE TEAM  Patient Care Team:  Jillian Madrid MD as PCP - General (Family Medicine)  Jillian Madrid MD as PCP - Empaneled Provider    Electronically signed by Yenny Mcdonald

## 2024-02-21 ENCOUNTER — HOSPITAL ENCOUNTER (EMERGENCY)
Facility: MEDICAL CENTER | Age: 64
End: 2024-02-21
Attending: EMERGENCY MEDICINE
Payer: MEDICARE

## 2024-02-21 ENCOUNTER — APPOINTMENT (OUTPATIENT)
Dept: RADIOLOGY | Facility: MEDICAL CENTER | Age: 64
End: 2024-02-21
Attending: EMERGENCY MEDICINE
Payer: MEDICARE

## 2024-02-21 VITALS
RESPIRATION RATE: 16 BRPM | BODY MASS INDEX: 19.58 KG/M2 | HEART RATE: 96 BPM | TEMPERATURE: 97.6 F | DIASTOLIC BLOOD PRESSURE: 76 MMHG | HEIGHT: 68 IN | OXYGEN SATURATION: 95 % | SYSTOLIC BLOOD PRESSURE: 141 MMHG | WEIGHT: 129.19 LBS

## 2024-02-21 DIAGNOSIS — S82.042A CLOSED DISPLACED COMMINUTED FRACTURE OF LEFT PATELLA, INITIAL ENCOUNTER: ICD-10-CM

## 2024-02-21 DIAGNOSIS — F10.920 ALCOHOLIC INTOXICATION WITHOUT COMPLICATION (HCC): ICD-10-CM

## 2024-02-21 LAB — POC BREATHALIZER: 0.17 PERCENT (ref 0–0.01)

## 2024-02-21 PROCEDURE — 99284 EMERGENCY DEPT VISIT MOD MDM: CPT

## 2024-02-21 PROCEDURE — 73564 X-RAY EXAM KNEE 4 OR MORE: CPT | Mod: LT

## 2024-02-21 PROCEDURE — 302970 POC BREATHALIZER: Performed by: EMERGENCY MEDICINE

## 2024-02-21 ASSESSMENT — FIBROSIS 4 INDEX: FIB4 SCORE: 26.96

## 2024-02-21 ASSESSMENT — LIFESTYLE VARIABLES: DO YOU DRINK ALCOHOL: NO

## 2024-02-21 NOTE — ED PROVIDER NOTES
"ED Provider Note    CHIEF COMPLAINT  Chief Complaint   Patient presents with    Alcohol Intoxication     Pt BIBA from his residence for ETOH        EXTERNAL RECORDS REVIEWED  Reviewed the CT scan that was completed on 2/2/2024 that resulted and a patellar fracture.    HPI/ROS      Inocencio Shabazz is a 64 y.o. male who presents with complaint of left knee pain.  The patient states he has a broken knee is not follow-up with orthopedist.  He was seen here on 2/2/2024 had a CT scan of the knee that revealed evidence of a patella fracture.  The patient was giving crutches, knee immobilizer supposed to follow-up with Dr. Blank.  He states he has not followed up in the swelling is gone down but has had increasing pain.  Patient denies loss of sensation or strength of his lower extremity.  In addition, he states he has been doing alcohol excessively and states he is intoxicated currently.    PAST MEDICAL HISTORY   has a past medical history of Alcohol abuse, Bipolar disorder (HCC), Cirrhosis (HCC), GIB (gastrointestinal bleeding) (01/03/2016), Hepatitis C, Pacemaker, Pancytopenia (HCC), and Schizophrenia (HCC).    SURGICAL HISTORY   has a past surgical history that includes gastroscopy (1/3/2016); gastroscopy (4/8/2016); gastroscopy (3/13/2019); gastroscopy-endo (N/A, 11/13/2019); upper gi endoscopy,diagnosis (N/A, 8/10/2023); and upper gi endoscopy,ctrl bleed (N/A, 8/10/2023).    FAMILY HISTORY  History reviewed. No pertinent family history.    SOCIAL HISTORY  Social History     Tobacco Use    Smoking status: Every Day     Current packs/day: 1.00     Average packs/day: 1 pack/day for 48.0 years (48.0 ttl pk-yrs)     Types: Cigarettes    Smokeless tobacco: Never   Vaping Use    Vaping Use: Never used   Substance and Sexual Activity    Alcohol use: Yes     Comment: \"couple of pints whiskey every day\"    Drug use: Yes     Types: Inhaled     Comment: currently smokes marijuana; past hx of cocaine and meth    Sexual " "activity: Not on file       CURRENT MEDICATIONS  Home Medications       Reviewed by Laura Quinn R.N. (Registered Nurse) on 02/21/24 at 0533  Med List Status: Partial     Medication Last Dose Status   carvedilol (COREG) 6.25 MG Tab  Active   ferrous sulfate 325 (65 Fe) MG tablet  Active   omeprazole (PRILOSEC) 20 MG delayed-release capsule  Active   QUEtiapine (SEROQUEL) 200 MG Tab  Active   thiamine (THIAMINE) 100 MG tablet  Active                    ALLERGIES  Allergies   Allergen Reactions    Haloperidol Unspecified     Twitching/involuntary movements        PHYSICAL EXAM  VITAL SIGNS: BP (!) 141/76   Pulse 96   Temp 36.4 °C (97.6 °F) (Temporal)   Resp 16   Ht 1.727 m (5' 8\")   Wt 58.6 kg (129 lb 3 oz)   SpO2 95%   BMI 19.64 kg/m²      Nursing notes and vitals reviewed.  Constitutional: Well developed, Well nourished, No acute distress, Non-toxic appearance.   Eyes: PERRLA, EOMI, Conjunctiva normal, No discharge.   Cardiovascular: Normal heart rate, Normal rhythm, No murmurs, No rubs, No gallops.   Thorax & Lungs: No respiratory distress, No rales, No rhonchi, No wheezing, No chest tenderness.   Abdomen: Bowel sounds normal, Soft, No tenderness, No guarding, No rebound, No masses, No pulsatile masses.   Skin: Warm, Dry, No erythema, No rash.   Extremities: No deformity, joint effusion to the left knee, slight tenderness, full range of motion in extension and flexion of the knee, normal ambulation, distal pulses are brisk   neurologic: Alert & oriented x 3 somnolent but answers to verbal stimulus initially, [place] sensation intact upper lower extremities,  Psychiatric: Affect normal for clinical presentation.      DIAGNOSTIC STUDIES / PROCEDURES        LABS  Results for orders placed or performed during the hospital encounter of 02/21/24   POC BREATHALIZER   Result Value Ref Range    POC Breathalizer 0.169 (A) 0.00 - 0.01 Percent         RADIOLOGY  I have independently interpreted the diagnostic " imaging associated with this visit and am waiting the final reading from the radiologist.   My preliminary interpretation is as follows: Patellar fracture still persistent  Radiologist interpretation:   DX-KNEE COMPLETE 4+ LEFT   Final Result         1. Stable ununited patellar fracture.   2. Decreased, now small joint effusion.            COURSE & MEDICAL DECISION MAKING    ED Observation Status? No; Patient does not meet criteria for ED Observation.     INITIAL ASSESSMENT, COURSE AND PLAN  Care Narrative: This is a 64-year-old gentleman present alcohol intoxication and chronic knee fracture.  Patient is amatory without difficulty on examination.  He does have a splint and crutches at home.  I have given a formal referral for him to follow-up with Dr. Blank, the orthopedics that he was initially given for consultation.  The patient is intoxicated at time to metabolize his alcohol.  Reevaluation had a conversation the patient concerning the need to follow-up with orthopedics, wear his splint, use crutches as needed.  The patient does not have emergent surgical condition here in the emergency department.  We have given him discharge instructions, referred him formally to orthopedics and of asked him to follow-up at his earliest convenience.  Strict return precautions have been given.        ADDITIONAL PROBLEM LIST  Chronic alcohol abuse, patient does not want rehabilitation measures for his alcohol and has no evidence of alcohol withdrawal.  DISPOSITION AND DISCUSSIONS    Decision tools and prescription drugs considered including, but not limited to: Patient does not have a catastrophic emergency medicine condition requiring blood work, more evaluation, observation.  He will be following up with orthopedics.    FINAL DIAGNOSIS  1. Closed displaced comminuted fracture of left patella, initial encounter    2. Alcoholic intoxication without complication (HCC)      DISPOSITION:  Patient will be discharged home in  stable condition.    FOLLOW UP:  Duncan Blank M.D.  [address]  [address]  [facility] [id number] [phone]          [facility], Emergency Dept  [address] [facility] [address] [phone]    If symptoms worsen      Electronically signed by: Tin Oliver D.O., 2/21/2024 6:05 AM

## 2024-02-21 NOTE — DISCHARGE INSTRUCTIONS
Please make an appointment with Dr. Blank for your patella fracture.  Please refrain from drinking alcohol.

## 2024-02-21 NOTE — ED NOTES
All discharge instructions given to pt.  Pt advised not to drink or drive .   Pt verbalized understanding of all discharge instructions.  All questions answered. All personal belongings sent with pt. Pt ambulatory to [facility].

## 2024-02-21 NOTE — ED TRIAGE NOTES
"Chief Complaint   Patient presents with    Alcohol Intoxication     Pt BIBA from his residence for ETOH      /88   Pulse 100   Temp 36.9 °C (98.5 °F) (Temporal)   Resp 18   Ht 1.727 m (5' 8\")   Wt 58.6 kg (129 lb 3 oz)   SpO2 92%   BMI 19.64 kg/m²     Pt BIBA to triage for above cc  Pt called EMS for alcohol intoxication, pt has multiple visits to ED for same cc, hx of chronic alcohol abuse, recent visit here for same    Pt to [facility], educated on rooming process   "

## 2024-03-11 ENCOUNTER — HOSPITAL ENCOUNTER (EMERGENCY)
Facility: MEDICAL CENTER | Age: 64
End: 2024-03-11
Attending: EMERGENCY MEDICINE
Payer: COMMERCIAL

## 2024-03-11 VITALS
DIASTOLIC BLOOD PRESSURE: 89 MMHG | BODY MASS INDEX: 19.01 KG/M2 | RESPIRATION RATE: 16 BRPM | WEIGHT: 125 LBS | SYSTOLIC BLOOD PRESSURE: 162 MMHG | OXYGEN SATURATION: 96 % | TEMPERATURE: 98.6 F | HEART RATE: 80 BPM

## 2024-03-11 VITALS
TEMPERATURE: 98.2 F | HEART RATE: 84 BPM | OXYGEN SATURATION: 92 % | RESPIRATION RATE: 16 BRPM | SYSTOLIC BLOOD PRESSURE: 104 MMHG | DIASTOLIC BLOOD PRESSURE: 61 MMHG

## 2024-03-11 DIAGNOSIS — D72.819 LEUKOPENIA, UNSPECIFIED TYPE: ICD-10-CM

## 2024-03-11 DIAGNOSIS — R53.83 OTHER FATIGUE: ICD-10-CM

## 2024-03-11 DIAGNOSIS — F10.920 ALCOHOLIC INTOXICATION WITHOUT COMPLICATION (HCC): ICD-10-CM

## 2024-03-11 DIAGNOSIS — H93.12 TINNITUS OF LEFT EAR: ICD-10-CM

## 2024-03-11 LAB
ALBUMIN SERPL BCP-MCNC: 3.8 G/DL (ref 3.2–4.9)
ALBUMIN/GLOB SERPL: 1 G/DL
ALP SERPL-CCNC: 83 U/L (ref 30–99)
ALT SERPL-CCNC: 39 U/L (ref 2–50)
ANION GAP SERPL CALC-SCNC: 14 MMOL/L (ref 7–16)
AST SERPL-CCNC: 90 U/L (ref 12–45)
BASOPHILS # BLD AUTO: 1.9 % (ref 0–1.8)
BASOPHILS # BLD: 0.03 K/UL (ref 0–0.12)
BILIRUB SERPL-MCNC: 0.8 MG/DL (ref 0.1–1.5)
BUN SERPL-MCNC: 9 MG/DL (ref 8–22)
CALCIUM ALBUM COR SERPL-MCNC: 9.2 MG/DL (ref 8.5–10.5)
CALCIUM SERPL-MCNC: 9 MG/DL (ref 8.5–10.5)
CHLORIDE SERPL-SCNC: 103 MMOL/L (ref 96–112)
CO2 SERPL-SCNC: 21 MMOL/L (ref 20–33)
CREAT SERPL-MCNC: 0.57 MG/DL (ref 0.5–1.4)
EOSINOPHIL # BLD AUTO: 0.01 K/UL (ref 0–0.51)
EOSINOPHIL NFR BLD: 0.6 % (ref 0–6.9)
ERYTHROCYTE [DISTWIDTH] IN BLOOD BY AUTOMATED COUNT: 50.6 FL (ref 35.9–50)
GFR SERPLBLD CREATININE-BSD FMLA CKD-EPI: 108 ML/MIN/1.73 M 2
GLOBULIN SER CALC-MCNC: 3.9 G/DL (ref 1.9–3.5)
GLUCOSE SERPL-MCNC: 171 MG/DL (ref 65–99)
HCT VFR BLD AUTO: 35.8 % (ref 42–52)
HGB BLD-MCNC: 11.9 G/DL (ref 14–18)
HIV 1+2 AB+HIV1 P24 AG SERPL QL IA: NORMAL
IMM GRANULOCYTES # BLD AUTO: 0.01 K/UL (ref 0–0.11)
IMM GRANULOCYTES NFR BLD AUTO: 0.6 % (ref 0–0.9)
LYMPHOCYTES # BLD AUTO: 0.35 K/UL (ref 1–4.8)
LYMPHOCYTES NFR BLD: 21.9 % (ref 22–41)
MCH RBC QN AUTO: 29.8 PG (ref 27–33)
MCHC RBC AUTO-ENTMCNC: 33.2 G/DL (ref 32.3–36.5)
MCV RBC AUTO: 89.5 FL (ref 81.4–97.8)
MONOCYTES # BLD AUTO: 0.14 K/UL (ref 0–0.85)
MONOCYTES NFR BLD AUTO: 8.8 % (ref 0–13.4)
NEUTROPHILS # BLD AUTO: 1.06 K/UL (ref 1.82–7.42)
NEUTROPHILS NFR BLD: 66.2 % (ref 44–72)
NRBC # BLD AUTO: 0 K/UL
NRBC BLD-RTO: 0 /100 WBC (ref 0–0.2)
PLATELET # BLD AUTO: 28 K/UL (ref 164–446)
PLATELETS.RETICULATED NFR BLD AUTO: 11.3 % (ref 0.6–13.1)
PMV BLD AUTO: 12.4 FL (ref 9–12.9)
POTASSIUM SERPL-SCNC: 3.8 MMOL/L (ref 3.6–5.5)
PROT SERPL-MCNC: 7.7 G/DL (ref 6–8.2)
RBC # BLD AUTO: 4 M/UL (ref 4.7–6.1)
SODIUM SERPL-SCNC: 138 MMOL/L (ref 135–145)
T PALLIDUM AB SER QL IA: NORMAL
WBC # BLD AUTO: 1.6 K/UL (ref 4.8–10.8)

## 2024-03-11 PROCEDURE — 36415 COLL VENOUS BLD VENIPUNCTURE: CPT

## 2024-03-11 PROCEDURE — 99283 EMERGENCY DEPT VISIT LOW MDM: CPT

## 2024-03-11 PROCEDURE — 87389 HIV-1 AG W/HIV-1&-2 AB AG IA: CPT

## 2024-03-11 PROCEDURE — 87491 CHLMYD TRACH DNA AMP PROBE: CPT

## 2024-03-11 PROCEDURE — 85025 COMPLETE CBC W/AUTO DIFF WBC: CPT

## 2024-03-11 PROCEDURE — 80053 COMPREHEN METABOLIC PANEL: CPT

## 2024-03-11 PROCEDURE — 87591 N.GONORRHOEAE DNA AMP PROB: CPT

## 2024-03-11 PROCEDURE — 86780 TREPONEMA PALLIDUM: CPT

## 2024-03-11 PROCEDURE — 85055 RETICULATED PLATELET ASSAY: CPT

## 2024-03-11 PROCEDURE — 99284 EMERGENCY DEPT VISIT MOD MDM: CPT

## 2024-03-11 ASSESSMENT — FIBROSIS 4 INDEX: FIB4 SCORE: 26.96

## 2024-03-11 NOTE — ED TRIAGE NOTES
Chief Complaint   Patient presents with    Alcohol Intoxication     Brought by EMS from home due to alcohol intoxication    AO x 4  Denied any other complaints  Pt easily fall asleep during conversation     Blood glucose en route: 107 mg/dL  Pt was recently seen in the ER with the same complaints    Vitals:    03/11/24 0221   BP: 117/77   Pulse: 84   Resp: 17   Temp: 36.8 °C (98.2 °F)   SpO2: 88%

## 2024-03-11 NOTE — ED PROVIDER NOTES
"ED Provider Note    CHIEF COMPLAINT  Chief Complaint   Patient presents with    Other     Pt states he doesn't feel good and he cannot sleep. L ear tinnitus. Pt admits to drinking excess ETOH.        EXTERNAL RECORDS REVIEWED  External ED Note from [facility] emergency department in January for body aches, he was given Tylenol had unremarkable evaluation, patient also seen here at [facility] earlier today for alcohol intoxication sobered appropriately without issues    HPI/ROS  LIMITATION TO HISTORY   Select: : None  OUTSIDE HISTORIAN(S):  none    Inocencio Shabazz is a 64 y.o. male who presents with ringing in his left ear, he also reports generalized fatigue but difficulty sleeping.  He reports he has ringing in his ear off and on for several years.  No headaches, no issues with his right ear, no pain.  He also reports that he just \"does not feel good\".  He feels fatigued but he feels like he is having a hard time sleeping.  He reports no chest pain, shortness of breath, palpitations.  No fevers or chills or cough or congestion.  No abdominal pain, nausea or vomiting.  He does endorse drinking heavily.    PAST MEDICAL HISTORY   has a past medical history of Alcohol abuse, Bipolar disorder (HCC), Cirrhosis (HCC), GIB (gastrointestinal bleeding) (01/03/2016), Hepatitis C, Pacemaker, Pancytopenia (HCC), and Schizophrenia (HCC).    SURGICAL HISTORY   has a past surgical history that includes gastroscopy (1/3/2016); gastroscopy (4/8/2016); gastroscopy (3/13/2019); gastroscopy-endo (N/A, 11/13/2019); upper gi endoscopy,diagnosis (N/A, 8/10/2023); and upper gi endoscopy,ctrl bleed (N/A, 8/10/2023).    FAMILY HISTORY  No family history on file.    SOCIAL HISTORY  Social History     Tobacco Use    Smoking status: Every Day     Current packs/day: 1.00     Average packs/day: 1 pack/day for 48.0 years (48.0 ttl pk-yrs)     Types: Cigarettes    Smokeless tobacco: Never   Vaping Use    Vaping Use: Never used   Substance and " "Sexual Activity    Alcohol use: Yes     Comment: \"couple of pints whiskey every day\"    Drug use: Yes     Types: Inhaled     Comment: currently smokes marijuana; past hx of cocaine and meth    Sexual activity: Not on file       CURRENT MEDICATIONS  Home Medications    **Home medications have not yet been reviewed for this encounter**         ALLERGIES  Allergies   Allergen Reactions    Haloperidol Unspecified     Twitching/involuntary movements        PHYSICAL EXAM  VITAL SIGNS: BP (!) 131/98   Pulse 98   Temp 36.8 °C (98.2 °F) (Temporal)   Resp 18   Wt 56.7 kg (125 lb)   SpO2 96%   BMI 19.01 kg/m²      Pulse ox interpretation: I interpret this pulse ox as normal.  Constitutional: Alert in no apparent distress.  Disheveled  HENT: No signs of trauma, Bilateral external ears normal, Nose normal.  Bilateral ear canals are clear and unremarkable TMs are unremarkable, mastoids are nontender  Eyes: Pupils are equal and reactive, Conjunctiva normal, Non-icteric.   Neck: Normal range of motion, No tenderness, Supple, No stridor.   Cardiovascular: Regular rate and rhythm, no murmurs.   Thorax & Lungs: Normal breath sounds, No respiratory distress, No wheezing, No chest tenderness.   Abdomen: Bowel sounds normal, Soft, No tenderness, No masses, No pulsatile masses. No peritoneal signs.  Skin: Warm, Dry, No erythema, No rash.   Back: No bony tenderness, No CVA tenderness.   Extremities: Intact distal pulses, No edema, No tenderness, No cyanosis,  Negative Henrique's sign.   Musculoskeletal: No major deformities noted.   Neurologic: Alert , Normal motor function, Normal sensory function, No focal deficits noted.   Psychiatric: Affect normal, Judgment normal, Mood normal.               DIAGNOSTIC STUDIES / PROCEDURES  Labs Reviewed   CBC WITH DIFFERENTIAL - Abnormal; Notable for the following components:       Result Value    WBC 1.6 (*)     RBC 4.00 (*)     Hemoglobin 11.9 (*)     Hematocrit 35.8 (*)     RDW 50.6 (*)     " Platelet Count 28 (*)     Lymphocytes 21.90 (*)     Basophils 1.90 (*)     Neutrophils (Absolute) 1.06 (*)     Lymphs (Absolute) 0.35 (*)     All other components within normal limits   COMP METABOLIC PANEL - Abnormal; Notable for the following components:    Glucose 171 (*)     AST(SGOT) 90 (*)     Globulin 3.9 (*)     All other components within normal limits   CHLAMYDIA/GC, PCR (URINE)   T.PALLIDUM AB WILLI (SCREENING)   HIV AG/AB COMBO ASSAY SCREENING   IMMATURE PLT FRACTION   ESTIMATED GFR         COURSE & MEDICAL DECISION MAKING      INITIAL ASSESSMENT, COURSE AND PLAN  Care Narrative: 445 PM  Patient is evaluated the bedside and chart is reviewed, differential diagnosis is considered as below.  Order for diagnostic labs    Patient is reevaluated, updated on results, he does note he feels improved after eating       PROBLEM LIST    # Left ear tinnitus.  At this point this does seem to be more of a chronic issue for the patient, no acute pathology is detected, there is no finding of ear infection tympanic membrane problem otitis externa or cerumen impaction.    # Malaise.  I think secondary to his alcohol abuse.  There is no finding of electrolyte or metabolic derangement.  He is not severely anemic, is not febrile and reports no infectious symptoms.  He does have a leukopenia although this appears to be stable in review of his prior labs.      DISPOSITION AND DISCUSSIONS    Barriers to care at this time, including but not limited to: Patient lacks financial resources.     Decision tools and prescription drugs considered including, but not limited to: Medication modification reviewed and no new medications indicated .    The patient will return for new or worsening symptoms and is stable at the time of discharge.    The patient is referred to a primary physician for blood pressure management, diabetic screening, and for all other preventative health concerns.        DISPOSITION:  Patient will be discharged home  in stable condition.    FOLLOW UP:  Nnamdi Ballesteros M.D.  [address] [facility] [address]  [phone]            OUTPATIENT MEDICATIONS:  New Prescriptions    No medications on file           FINAL DIAGNOSIS  1. Tinnitus of left ear    2. Other fatigue    3. Leukopenia, unspecified type           Electronically signed by: Rohith Lerma M.D., 3/11/2024 4:41 PM

## 2024-03-11 NOTE — ED NOTES
Pt discharged to home with steady gait.  Pt alert and oriented times 4 on room air.  Pt in possession of belongings.  Pt refused discharge paperwork and unwilling to listen to discharge education. Encouraged to follow up with PCP. /61   Pulse 84   Temp 36.8 °C (98.2 °F) (Temporal)   Resp 16   SpO2 92%

## 2024-03-11 NOTE — ED TRIAGE NOTES
Chief Complaint   Patient presents with    Other     Pt states he doesn't feel good and he cannot sleep. L ear tinnitus. Pt admits to drinking excess ETOH.       Pt states years ago he was hepatitis positive but unaware of specific hepatitis type.    BP (!) 131/98   Pulse 98   Temp 36.8 °C (98.2 °F) (Temporal)   Resp 18   SpO2 96%

## 2024-03-11 NOTE — ED NOTES
"Pt became agitated. Yelling \"Get me the fuck out of here.\" RN asked if pt would like to leave, pt responds yes. RN told pt to wait until the ERP was contacted.  ERP notified, per ERP pt ok to leave.   "

## 2024-03-11 NOTE — ED PROVIDER NOTES
ER Provider Note    Scribed for Dr. Jose Antoine M.D. by Vane Colvin. 3/11/2024  2:44 AM    Primary Care Provider: Nnamdi Ballesteros M.D.    CHIEF COMPLAINT  Chief Complaint   Patient presents with    Alcohol Intoxication     Brought by EMS from home due to alcohol intoxication    AO x 4  Denied any other complaints  Pt easily fall asleep during conversation       EXTERNAL RECORDS REVIEWED  Outpatient Notes The patient was seen 2 days ago for acute stress reaction.     HPI/ROS  LIMITATION TO HISTORY   Select: Intoxication    Inocencio Shabazz is a 64 y.o. male who presents to the ED via EMS for evaluation of alcohol intoxication onset earlier today. The patient was brought in by EMS from his home due to alcohol intoxication. The patient is awake upon examination, but his answers to questions are limited. The patient easily falls asleep during conversation. Unable to get history from the patient.     PAST MEDICAL HISTORY  Past Medical History:   Diagnosis Date    Alcohol abuse     Bipolar disorder (HCC)     Cirrhosis (HCC)     GIB (gastrointestinal bleeding) 01/03/2016    Hepatitis C     Pacemaker     left chest    Pancytopenia (HCC)     Schizophrenia (HCC)      SURGICAL HISTORY  Past Surgical History:   Procedure Laterality Date    [place] UPPER GI ENDOSCOPY,DIAGNOSIS N/A 8/10/2023    Procedure: GASTROSCOPY;  Surgeon: Joleen Yañez M.D.;  Location: SURGERY SAME DAY [facility];  Service: Gastroenterology    [place] UPPER GI ENDOSCOPY,CTRL BLEED N/A 8/10/2023    Procedure: GASTROSCOPY, WITH ARGON PLASMA COAGULATION;  Surgeon: Joleen Yañez M.D.;  Location: SURGERY SAME DAY [facility];  Service: Gastroenterology    GASTROSCOPY-ENDO N/A 11/13/2019    Procedure: GASTROSCOPY with banding;  Surgeon: Tamela Smith M.D.;  Location: ENDOSCOPY [facility];  Service: Gastroenterology    GASTROSCOPY  3/13/2019    Procedure: GASTROSCOPY;  Surgeon: Abdi Ba M.D.;  Location: SURGERY [facility];   Service: Gastroenterology    GASTROSCOPY  4/8/2016    Procedure: GASTROSCOPY;  Surgeon: Braeden Tobin M.D.;  Location: SURGERY [facility];  Service:     GASTROSCOPY  1/3/2016    Procedure: GASTROSCOPY WITH BANDING;  Surgeon: Alfredo Antonio M.D.;  Location: SURGERY [facility];  Service:      FAMILY HISTORY  History reviewed. No pertinent family history.    SOCIAL HISTORY   reports that he has been smoking cigarettes. He has a 48 pack-year smoking history. He has never used smokeless tobacco. He reports current alcohol use. He reports current drug use. Drug: Inhaled.    CURRENT MEDICATIONS  Discharge Medication List as of 3/11/2024  5:59 AM        CONTINUE these medications which have NOT CHANGED    Details   carvedilol (COREG) 6.25 MG Tab Take 1 Tablet by mouth 2 times a day with meals., Disp-60 Tablet, R-0, Normal      ferrous sulfate 325 (65 Fe) MG tablet Take 1 Tablet by mouth every 48 hours., Disp-16 Tablet, R-0, Normal      thiamine (THIAMINE) 100 MG tablet Take 1 Tablet by mouth every day., Disp-30 Tablet, R-0, Normal      omeprazole (PRILOSEC) 20 MG delayed-release capsule Take 20 mg by mouth every day., Historical Med      QUEtiapine (SEROQUEL) 200 MG Tab Take 200 mg by mouth every evening., Historical Med           ALLERGIES  Haloperidol    PHYSICAL EXAM  /77   Pulse 84   Temp 36.8 °C (98.2 °F) (Temporal)   Resp 17   SpO2 88%     Constitutional: Alert in no apparent distress.  HENT: No signs of trauma, Bilateral external ears normal, Nose normal.   Eyes: Pupils are equal and reactive, Conjunctiva normal, Non-icteric. Horizontal nystagmus. Looking both ways. Blood shot eyes.   Neck: Normal range of motion, No tenderness, Supple,   Cardiovascular: Regular rate and rhythm, no murmurs.   Thorax & Lungs: Normal breath sounds, No respiratory distress, No wheezing, No chest tenderness.   Abdomen: Bowel sounds normal, Soft, No tenderness, No masses, No pulsatile masses. No peritoneal  signs.  Skin: Warm, Dry, No erythema, No rash.   Back: No bony tenderness, No CVA tenderness.   Extremities: No edema, No tenderness, No cyanosis, no tenderness  Neurologic: Slurring words.    Psychiatric: Affect normal       COURSE & MEDICAL DECISION MAKING    ED Observation Status? no    INITIAL ASSESSMENT AND PLAN  Care Narrative:       2:44 AM - Patient seen and evaluated at bedside. This is 64 year old man who presents via EMS for alcohol intoxication. The patient is a poor historian and provides limited answers. Discussed plan of care, including monitoring the patient until he is stable for discharge. Patient agrees to plan of care.     5:01 AM - The patient was reevaluated at bedside. He still appears intoxicated. Will continue to monitor.         ADDITIONAL PROBLEM LIST AND DISPOSITION  Patient with alcohol intoxication.  At this time there is no evidence of trauma.  There is no need for imaging the patient's brain.  After a prolonged period of observation the patient is now clinically sober.  He would like to leave.  He is safe to do this.  There is no SI or HI.  Will discharge home with strict return precautions and follow-up.               DISPOSITION AND DISCUSSIONS  I have discussed management of the patient with the following physicians and LAISHA's: none    Discussion of management with other QHP or appropriate source(s): None     Escalation of care considered, and ultimately not performed: acute inpatient care management, however at this time, the patient is most appropriate for outpatient management.  However sobered appropriately and did not require admission.    Barriers to care at this time, including but not limited to:  None known .     Decision tools and prescription drugs considered including, but not limited to:  Resources given. .        FINAL IMPRESSION   1. Alcoholic intoxication without complication (HCC)      I, Vane Colvin (Scribe), am scribing for, and in the presence  ofJose M.D..    Electronically signed by: Vane Colvin (Scribe), 3/11/2024    I, Jose Antoine M.D. personally performed the services described in this documentation, as scribed by Vane Colvin in my presence, and it is both accurate and complete.    The note accurately reflects work and decisions made by me.  Jose Antoine M.D.  3/11/2024  6:05 AM

## 2024-03-12 LAB
C TRACH DNA SPEC QL NAA+PROBE: NEGATIVE
N GONORRHOEA DNA SPEC QL NAA+PROBE: NEGATIVE
SPECIMEN SOURCE: NORMAL

## 2024-03-15 ENCOUNTER — APPOINTMENT (OUTPATIENT)
Dept: RADIOLOGY | Facility: MEDICAL CENTER | Age: 64
End: 2024-03-15
Attending: EMERGENCY MEDICINE
Payer: MEDICARE

## 2024-03-15 ENCOUNTER — HOSPITAL ENCOUNTER (EMERGENCY)
Facility: MEDICAL CENTER | Age: 64
End: 2024-03-15
Attending: EMERGENCY MEDICINE
Payer: MEDICARE

## 2024-03-15 VITALS
WEIGHT: 126.98 LBS | SYSTOLIC BLOOD PRESSURE: 115 MMHG | OXYGEN SATURATION: 96 % | HEART RATE: 82 BPM | RESPIRATION RATE: 16 BRPM | HEIGHT: 68 IN | DIASTOLIC BLOOD PRESSURE: 71 MMHG | BODY MASS INDEX: 19.25 KG/M2 | TEMPERATURE: 97.2 F

## 2024-03-15 DIAGNOSIS — B34.9 VIRAL SYNDROME: ICD-10-CM

## 2024-03-15 DIAGNOSIS — F10.930 ALCOHOL WITHDRAWAL SYNDROME WITHOUT COMPLICATION (HCC): ICD-10-CM

## 2024-03-15 LAB
BASOPHILS # BLD AUTO: 0 % (ref 0–1.8)
BASOPHILS # BLD: 0 K/UL (ref 0–0.12)
EKG IMPRESSION: NORMAL
EOSINOPHIL # BLD AUTO: 0.08 K/UL (ref 0–0.51)
EOSINOPHIL NFR BLD: 6.9 % (ref 0–6.9)
ERYTHROCYTE [DISTWIDTH] IN BLOOD BY AUTOMATED COUNT: 51.3 FL (ref 35.9–50)
FLUAV RNA SPEC QL NAA+PROBE: NEGATIVE
FLUBV RNA SPEC QL NAA+PROBE: NEGATIVE
HCT VFR BLD AUTO: 32.6 % (ref 42–52)
HGB BLD-MCNC: 10.8 G/DL (ref 14–18)
LYMPHOCYTES # BLD AUTO: 0.36 K/UL (ref 1–4.8)
LYMPHOCYTES NFR BLD: 32.8 % (ref 22–41)
MANUAL DIFF BLD: NORMAL
MCH RBC QN AUTO: 30.3 PG (ref 27–33)
MCHC RBC AUTO-ENTMCNC: 33.1 G/DL (ref 32.3–36.5)
MCV RBC AUTO: 91.3 FL (ref 81.4–97.8)
MONOCYTES # BLD AUTO: 0.08 K/UL (ref 0–0.85)
MONOCYTES NFR BLD AUTO: 6.9 % (ref 0–13.4)
MORPHOLOGY BLD-IMP: NORMAL
NEUTROPHILS # BLD AUTO: 0.59 K/UL (ref 1.82–7.42)
NEUTROPHILS NFR BLD: 53.4 % (ref 44–72)
NRBC # BLD AUTO: 0 K/UL
NRBC BLD-RTO: 0 /100 WBC (ref 0–0.2)
PLATELET # BLD AUTO: 15 K/UL (ref 164–446)
PLATELET BLD QL SMEAR: NORMAL
PLATELETS.RETICULATED NFR BLD AUTO: 11.4 % (ref 0.6–13.1)
RBC # BLD AUTO: 3.57 M/UL (ref 4.7–6.1)
RBC BLD AUTO: NORMAL
RSV RNA SPEC QL NAA+PROBE: NEGATIVE
SARS-COV-2 RNA RESP QL NAA+PROBE: NOTDETECTED
TSH SERPL DL<=0.005 MIU/L-ACNC: 0.57 UIU/ML (ref 0.38–5.33)
WBC # BLD AUTO: 1.1 K/UL (ref 4.8–10.8)

## 2024-03-15 PROCEDURE — 85055 RETICULATED PLATELET ASSAY: CPT

## 2024-03-15 PROCEDURE — 99284 EMERGENCY DEPT VISIT MOD MDM: CPT

## 2024-03-15 PROCEDURE — 71045 X-RAY EXAM CHEST 1 VIEW: CPT

## 2024-03-15 PROCEDURE — 93005 ELECTROCARDIOGRAM TRACING: CPT | Performed by: EMERGENCY MEDICINE

## 2024-03-15 PROCEDURE — 0241U HCHG SARS-COV-2 COVID-19 NFCT DS RESP RNA 4 TRGT ED POC: CPT

## 2024-03-15 PROCEDURE — A9270 NON-COVERED ITEM OR SERVICE: HCPCS | Performed by: EMERGENCY MEDICINE

## 2024-03-15 PROCEDURE — 36415 COLL VENOUS BLD VENIPUNCTURE: CPT

## 2024-03-15 PROCEDURE — 85027 COMPLETE CBC AUTOMATED: CPT

## 2024-03-15 PROCEDURE — 93005 ELECTROCARDIOGRAM TRACING: CPT

## 2024-03-15 PROCEDURE — 85007 BL SMEAR W/DIFF WBC COUNT: CPT

## 2024-03-15 PROCEDURE — 84443 ASSAY THYROID STIM HORMONE: CPT

## 2024-03-15 PROCEDURE — 700102 HCHG RX REV CODE 250 W/ 637 OVERRIDE(OP): Performed by: EMERGENCY MEDICINE

## 2024-03-15 RX ORDER — ONDANSETRON 4 MG/1
4 TABLET, ORALLY DISINTEGRATING ORAL EVERY 6 HOURS PRN
Qty: 10 TABLET | Refills: 0 | Status: SHIPPED | OUTPATIENT
Start: 2024-03-15

## 2024-03-15 RX ORDER — DIAZEPAM 5 MG/1
5 TABLET ORAL ONCE
Status: COMPLETED | OUTPATIENT
Start: 2024-03-15 | End: 2024-03-15

## 2024-03-15 RX ADMIN — DIAZEPAM 5 MG: 5 TABLET ORAL at 15:47

## 2024-03-15 ASSESSMENT — FIBROSIS 4 INDEX: FIB4 SCORE: 33.97

## 2024-03-15 NOTE — ED TRIAGE NOTES
.Inocencio Shabazz  .  Chief Complaint   Patient presents with    Dizziness     X 2 days    Rapid Heart Beat     Patient ambulatory to triage with above complaints. Denies any chest/SOB. [facility]. Aao x 4. KING. Patient also c/o fatigue.     Patient to lobby and instructed to inform staff of any needs.

## 2024-03-15 NOTE — ED PROVIDER NOTES
"ED Provider Note    CHIEF COMPLAINT  Chief Complaint   Patient presents with    Dizziness     X 2 days    Rapid Heart Beat       EXTERNAL RECORDS REVIEWED  Most recent visit to [facility] 1/23/2024, for vague complaints of bodyaches and simply not feeling well  Patient with multiple visits to our emergency department, longstanding history of pancytopenia secondary to alcohol.    HPI/ROS      Inocencio Shabazz is a 64 y.o. male who presents with chief complaint of dizziness and rapid heartbeat.  Patient with an extensive history of alcohol abuse, seen regularly in the emergency department for this.  Patient reports that he has not drank for around the last 3 days.  He reports some associated body aches, minimal cough and some nasal congestion.  He states that he feels shaky.  He denies any nausea or vomiting.  Denies any bloody or black stool.  Patient denies any associated chest pain or shortness of breath.  He reports that he felt like his heart rate has increased since stopping drinking.    PAST MEDICAL HISTORY   has a past medical history of Alcohol abuse, Bipolar disorder (HCC), Cirrhosis (HCC), GIB (gastrointestinal bleeding) (01/03/2016), Hepatitis C, Pacemaker, Pancytopenia (HCC), and Schizophrenia (HCC).    SURGICAL HISTORY   has a past surgical history that includes gastroscopy (1/3/2016); gastroscopy (4/8/2016); gastroscopy (3/13/2019); gastroscopy-endo (N/A, 11/13/2019); upper gi endoscopy,diagnosis (N/A, 8/10/2023); and upper gi endoscopy,ctrl bleed (N/A, 8/10/2023).    FAMILY HISTORY  History reviewed. No pertinent family history.    SOCIAL HISTORY  Social History     Tobacco Use    Smoking status: Every Day     Current packs/day: 1.00     Average packs/day: 1 pack/day for 48.0 years (48.0 ttl pk-yrs)     Types: Cigarettes    Smokeless tobacco: Never   Vaping Use    Vaping Use: Never used   Substance and Sexual Activity    Alcohol use: Yes     Comment: \"couple of pints " "whiskey every day\"    Drug use: Yes     Types: Inhaled     Comment: currently smokes marijuana; past hx of cocaine and meth    Sexual activity: Not on file       CURRENT MEDICATIONS  Home Medications       Reviewed by Jordan Rosales R.N. (Registered Nurse) on 03/15/24 at 1219  Med List Status: Partial     Medication Last Dose Status   carvedilol (COREG) 6.25 MG Tab  Active   ferrous sulfate 325 (65 Fe) MG tablet  Active   omeprazole (PRILOSEC) 20 MG delayed-release capsule  Active   QUEtiapine (SEROQUEL) 200 MG Tab  Active   thiamine (THIAMINE) 100 MG tablet  Active                    ALLERGIES  Allergies   Allergen Reactions    Haloperidol Unspecified     Twitching/involuntary movements        PHYSICAL EXAM  VITAL SIGNS: /77   Pulse 96   Temp 36.7 °C (98.1 °F) (Temporal)   Resp 16   Ht 1.727 m (5' 8\")   Wt 57.6 kg (126 lb 15.8 oz)   SpO2 99%   BMI 19.31 kg/m²    Physical Exam  Constitutional:       Appearance: Normal appearance.   HENT:      Head: Normocephalic.      Right Ear: Tympanic membrane normal.      Left Ear: Tympanic membrane normal.      Nose: Nose normal.      Mouth/Throat:      Mouth: Mucous membranes are moist.   Eyes:      Extraocular Movements: Extraocular movements intact.      Pupils: Pupils are equal, round, and reactive to light.   Cardiovascular:      Rate and Rhythm: Regular rhythm. Tachycardia present.      Comments: Borderline tachycardia  Pulmonary:      Effort: Pulmonary effort is normal. No respiratory distress.      Breath sounds: Normal breath sounds. No stridor. No wheezing or rales.   Chest:      Chest wall: No tenderness.   Abdominal:      General: Abdomen is flat. There is no distension.      Palpations: Abdomen is soft. There is no mass.      Tenderness: There is no abdominal tenderness.   Musculoskeletal:      Cervical back: Normal range of motion.   Skin:     General: Skin is warm.      Capillary Refill: Capillary refill takes less than 2 seconds.   Neurological: "      General: No focal deficit present.      Mental Status: He is alert and oriented to person, place, and time.      Comments: High-frequency low amplitude tremor   Psychiatric:         Mood and Affect: Mood normal.           DIAGNOSTIC STUDIES / PROCEDURES  EKG  I have independently interpreted this EKG    EKG with sinus tachycardia, occasional  PVCs.  Normal axis normal intervals  otherwise.  Electronically Signed On 03- 14:49:03 PDT by Juanita Newberry MD    LABS  Results for orders placed or performed during the hospital encounter of 03/15/24   CBC WITH DIFFERENTIAL   Result Value Ref Range    WBC 1.1 (LL) 4.8 - 10.8 K/uL    RBC 3.57 (L) 4.70 - 6.10 M/uL    Hemoglobin 10.8 (L) 14.0 - 18.0 g/dL    Hematocrit 32.6 (L) 42.0 - 52.0 %    MCV 91.3 81.4 - 97.8 fL    MCH 30.3 27.0 - 33.0 pg    MCHC 33.1 32.3 - 36.5 g/dL    RDW 51.3 (H) 35.9 - 50.0 fL    Platelet Count 15 (LL) 164 - 446 K/uL    Neutrophils-Polys 53.40 44.00 - 72.00 %    Lymphocytes 32.80 22.00 - 41.00 %    Monocytes 6.90 0.00 - 13.40 %    Eosinophils 6.90 0.00 - 6.90 %    Basophils 0.00 0.00 - 1.80 %    Nucleated RBC 0.00 0.00 - 0.20 /100 WBC    Neutrophils (Absolute) 0.59 (L) 1.82 - 7.42 K/uL    Lymphs (Absolute) 0.36 (L) 1.00 - 4.80 K/uL    Monos (Absolute) 0.08 0.00 - 0.85 K/uL    Eos (Absolute) 0.08 0.00 - 0.51 K/uL    Baso (Absolute) 0.00 0.00 - 0.12 K/uL    NRBC (Absolute) 0.00 K/uL   TSH WITH REFLEX TO FT4   Result Value Ref Range    TSH 0.570 0.380 - 5.330 uIU/mL   DIFFERENTIAL MANUAL   Result Value Ref Range    Manual Diff Status PERFORMED    PERIPHERAL SMEAR REVIEW   Result Value Ref Range    Peripheral Smear Review see below    PLATELET ESTIMATE   Result Value Ref Range    Plt Estimation Significantly D    MORPHOLOGY   Result Value Ref Range    RBC Morphology Normal    IMMATURE PLT FRACTION   Result Value Ref Range    Imm. Plt Fraction 11.4 0.6 - 13.1 %   EKG (NOW)   Result Value Ref Range    Report       [facility]  Emergency Dept.    Test Date:  2024-03-15  Pt Name:    JIMMY FERRER                 Department: ER  MRN:        [id number]                      Room:  Gender:     Male                         Technician: [id number]  :        [id number]                   Requested By:ER TRIAGE PROTOCOL  Order #:    [id number]                    Reading MD: Juanita Newberry MD    Measurements  Intervals                                Axis  Rate:       123                          P:          75  NJ:         153                          QRS:        72  QRSD:       76                           T:          63  QT:         312  QTc:        447    Interpretive Statements  EKG with sinus tachycardia, multiple PVCs.  Normal axis normal intervals  otherwise.  Electronically Signed On 03- 14:49:03 PDT by Juanita Newberry MD     POC CoV-2, FLU A/B, RSV by PCR   Result Value Ref Range    POC Influenza A RNA, PCR Negative Negative    POC Influenza B RNA, PCR Negative Negative    POC RSV, by PCR Negative Negative    POC SARS-CoV-2, PCR NotDetected          RADIOLOGY  I have independently interpreted the diagnostic imaging associated with this visit and am waiting the final reading from the radiologist.   My preliminary interpretation is as follows: Unremarkable chest x-ray  Radiologist interpretation:   DX-CHEST-PORTABLE (1 VIEW)   Final Result      1.  No acute cardiac or pulmonary abnormalities are identified.            COURSE & MEDICAL DECISION MAKING        INITIAL ASSESSMENT, COURSE AND PLAN  Care Narrative: Patient here with mild alcohol withdrawal.  He reports he will likely go home and continue drinking and therefore I do not believe that a taper would be beneficial to this patient or IV phenobarb.  Will give dose of Valium here to help with patient's symptoms.  He has some mild viral symptoms but otherwise his exam is very reassuring outside of some findings of mild alcohol withdrawal.  Given patient's longstanding history of alcoholism  will check x-ray to ensure he does not have an associated aspiration pneumonia, will check COVID, flu RSV testing.  Triage orders included basic labs which are all pending.  Patient's chemistry was hemolyzed, his CBC returned with ongoing chronic pancytopenia.  His platelets have dropped but are still greater than 10, therefore in this nonbleeding patient I do not believe that emergent transfusion is indicated.  Patient without any complaints of hemoptysis, black or bloody stool, or bleeding otherwise at this point.  Patient does not appear septic.  He had a recent HIV test that was negative.  Patient with multiple visits to the emergency department with identical complaints.  On review of patient's records he has been dealing with this pancytopenia for over a year at this point.  Patient discharged         DISPOSITION AND DISCUSSIONS    Escalation of care considered, and ultimately not performed: There is no evidence of bleeding at this point, patient's pancytopenia is chronic, therefore transfusion of platelets was deferred      FINAL DIAGNOSIS  1. Alcohol withdrawal syndrome without complication (HCC)  ondansetron (ZOFRAN ODT) 4 MG TABLET DISPERSIBLE      2. Viral syndrome

## 2024-03-15 NOTE — ED NOTES
Patient vital signs rechecked as documented. Patient denies any new needs at this time. Patient updated on wait times, thanked for patience. Pt informed to alert triage staff of any needs and/or changes in condition; patient verbalized understanding.

## 2024-09-11 ENCOUNTER — APPOINTMENT (OUTPATIENT)
Dept: RADIOLOGY | Facility: MEDICAL CENTER | Age: 64
End: 2024-09-11
Attending: EMERGENCY MEDICINE
Payer: MEDICARE

## 2024-09-11 ENCOUNTER — HOSPITAL ENCOUNTER (EMERGENCY)
Facility: MEDICAL CENTER | Age: 64
End: 2024-09-11
Attending: EMERGENCY MEDICINE
Payer: MEDICARE

## 2024-09-11 VITALS
WEIGHT: 130 LBS | HEIGHT: 68 IN | SYSTOLIC BLOOD PRESSURE: 144 MMHG | DIASTOLIC BLOOD PRESSURE: 90 MMHG | HEART RATE: 98 BPM | TEMPERATURE: 96.5 F | OXYGEN SATURATION: 90 % | BODY MASS INDEX: 19.7 KG/M2 | RESPIRATION RATE: 14 BRPM

## 2024-09-11 DIAGNOSIS — R41.82 ALTERED MENTAL STATUS, UNSPECIFIED ALTERED MENTAL STATUS TYPE: ICD-10-CM

## 2024-09-11 LAB
ALBUMIN SERPL BCP-MCNC: 4.1 G/DL (ref 3.2–4.9)
ALBUMIN/GLOB SERPL: 0.9 G/DL
ALP SERPL-CCNC: 90 U/L (ref 30–99)
ALT SERPL-CCNC: 70 U/L (ref 2–50)
ANION GAP SERPL CALC-SCNC: 23 MMOL/L (ref 7–16)
APTT PPP: 31.9 SEC (ref 24.7–36)
AST SERPL-CCNC: 120 U/L (ref 12–45)
BASOPHILS # BLD AUTO: 1 % (ref 0–1.8)
BASOPHILS # BLD: 0.03 K/UL (ref 0–0.12)
BILIRUB SERPL-MCNC: 1 MG/DL (ref 0.1–1.5)
BUN SERPL-MCNC: 9 MG/DL (ref 8–22)
CALCIUM ALBUM COR SERPL-MCNC: 8.4 MG/DL (ref 8.5–10.5)
CALCIUM SERPL-MCNC: 8.5 MG/DL (ref 8.5–10.5)
CHLORIDE SERPL-SCNC: 102 MMOL/L (ref 96–112)
CO2 SERPL-SCNC: 18 MMOL/L (ref 20–33)
CREAT SERPL-MCNC: 0.52 MG/DL (ref 0.5–1.4)
EKG IMPRESSION: NORMAL
EOSINOPHIL # BLD AUTO: 0.06 K/UL (ref 0–0.51)
EOSINOPHIL NFR BLD: 2 % (ref 0–6.9)
ERYTHROCYTE [DISTWIDTH] IN BLOOD BY AUTOMATED COUNT: 47.9 FL (ref 35.9–50)
GFR SERPLBLD CREATININE-BSD FMLA CKD-EPI: 111 ML/MIN/1.73 M 2
GLOBULIN SER CALC-MCNC: 4.7 G/DL (ref 1.9–3.5)
GLUCOSE SERPL-MCNC: 86 MG/DL (ref 65–99)
HCT VFR BLD AUTO: 42.8 % (ref 42–52)
HGB BLD-MCNC: 14.3 G/DL (ref 14–18)
IMM GRANULOCYTES # BLD AUTO: 0.01 K/UL (ref 0–0.11)
IMM GRANULOCYTES NFR BLD AUTO: 0.3 % (ref 0–0.9)
INR PPP: 1.27 (ref 0.87–1.13)
LYMPHOCYTES # BLD AUTO: 1.14 K/UL (ref 1–4.8)
LYMPHOCYTES NFR BLD: 37.9 % (ref 22–41)
MAGNESIUM SERPL-MCNC: 1.6 MG/DL (ref 1.5–2.5)
MCH RBC QN AUTO: 29.9 PG (ref 27–33)
MCHC RBC AUTO-ENTMCNC: 33.4 G/DL (ref 32.3–36.5)
MCV RBC AUTO: 89.4 FL (ref 81.4–97.8)
MONOCYTES # BLD AUTO: 0.26 K/UL (ref 0–0.85)
MONOCYTES NFR BLD AUTO: 8.6 % (ref 0–13.4)
NEUTROPHILS # BLD AUTO: 1.51 K/UL (ref 1.82–7.42)
NEUTROPHILS NFR BLD: 50.2 % (ref 44–72)
NRBC # BLD AUTO: 0 K/UL
NRBC BLD-RTO: 0 /100 WBC (ref 0–0.2)
PHOSPHATE SERPL-MCNC: 3.1 MG/DL (ref 2.5–4.5)
PLATELET # BLD AUTO: 44 K/UL (ref 164–446)
PLATELETS.RETICULATED NFR BLD AUTO: 10 % (ref 0.6–13.1)
PMV BLD AUTO: 12.4 FL (ref 9–12.9)
POTASSIUM SERPL-SCNC: 3.7 MMOL/L (ref 3.6–5.5)
PROT SERPL-MCNC: 8.8 G/DL (ref 6–8.2)
PROTHROMBIN TIME: 16.1 SEC (ref 12–14.6)
RBC # BLD AUTO: 4.79 M/UL (ref 4.7–6.1)
SODIUM SERPL-SCNC: 143 MMOL/L (ref 135–145)
TROPONIN T SERPL-MCNC: <6 NG/L (ref 6–19)
WBC # BLD AUTO: 3 K/UL (ref 4.8–10.8)

## 2024-09-11 PROCEDURE — 85610 PROTHROMBIN TIME: CPT

## 2024-09-11 PROCEDURE — 96365 THER/PROPH/DIAG IV INF INIT: CPT

## 2024-09-11 PROCEDURE — 84484 ASSAY OF TROPONIN QUANT: CPT

## 2024-09-11 PROCEDURE — 93005 ELECTROCARDIOGRAM TRACING: CPT | Performed by: EMERGENCY MEDICINE

## 2024-09-11 PROCEDURE — 36415 COLL VENOUS BLD VENIPUNCTURE: CPT

## 2024-09-11 PROCEDURE — 700111 HCHG RX REV CODE 636 W/ 250 OVERRIDE (IP): Performed by: EMERGENCY MEDICINE

## 2024-09-11 PROCEDURE — 83735 ASSAY OF MAGNESIUM: CPT

## 2024-09-11 PROCEDURE — 96366 THER/PROPH/DIAG IV INF ADDON: CPT

## 2024-09-11 PROCEDURE — 700101 HCHG RX REV CODE 250: Performed by: EMERGENCY MEDICINE

## 2024-09-11 PROCEDURE — 93005 ELECTROCARDIOGRAM TRACING: CPT

## 2024-09-11 PROCEDURE — 99284 EMERGENCY DEPT VISIT MOD MDM: CPT

## 2024-09-11 PROCEDURE — 80053 COMPREHEN METABOLIC PANEL: CPT

## 2024-09-11 PROCEDURE — 84100 ASSAY OF PHOSPHORUS: CPT

## 2024-09-11 PROCEDURE — 85055 RETICULATED PLATELET ASSAY: CPT

## 2024-09-11 PROCEDURE — 85025 COMPLETE CBC W/AUTO DIFF WBC: CPT

## 2024-09-11 PROCEDURE — 700105 HCHG RX REV CODE 258: Performed by: EMERGENCY MEDICINE

## 2024-09-11 PROCEDURE — 71045 X-RAY EXAM CHEST 1 VIEW: CPT

## 2024-09-11 PROCEDURE — 85730 THROMBOPLASTIN TIME PARTIAL: CPT

## 2024-09-11 RX ADMIN — FOLIC ACID: 5 INJECTION, SOLUTION INTRAMUSCULAR; INTRAVENOUS; SUBCUTANEOUS at 06:31

## 2024-09-11 ASSESSMENT — FIBROSIS 4 INDEX: FIB4 SCORE: 18.23

## 2024-09-11 NOTE — ED TRIAGE NOTES
"Chief Complaint   Patient presents with    ALOC     Pt arrives w/ uncertain complaint. Denies any pain, but arrives confused and unsure why he is here. A&O x2, HR in 140's, unsure about ETOH, denies falls      /85   Pulse (!) 146   Temp 35.8 °C (96.5 °F) (Temporal)   Resp 18   Ht 1.727 m (5' 8\")   Wt 59 kg (130 lb)   SpO2 96%   BMI 19.77 kg/m²     Pt walked into triage w/ uncertain cc  Pt unsure why he is here and is A&O x2  Reports possibly drinking night before last, denies pain, denies falls  But pt states ''Something is off, I feel like I am dying, I feel like I am going to die'' in triage    Unsure if it ETOH vs?    EKG completed UA as HR in 140's  AMS  "

## 2024-09-11 NOTE — DISCHARGE INSTRUCTIONS
Stay well-hydrated.  Refrain from alcohol.  Follow-up with your primary care provider in 48 hours.  Return as needed.

## 2024-09-11 NOTE — ED NOTES
Pt ambulates to restroom, then around RN station with steady gait. Discharge instructions reviewed with patient. Patient verbalizes understanding of follow up care, medication management, and reasons to return to ER. PIV removed with no complications. Pt provided with bus pass per request. Pt ambulates to lobby with steady gait.

## 2024-09-11 NOTE — ED PROVIDER NOTES
"ED Provider Note    CHIEF COMPLAINT  Chief Complaint   Patient presents with    ALOC     Pt arrives w/ uncertain complaint. Denies any pain, but arrives confused and unsure why he is here. A&O x2, HR in 140's, unsure about ETOH, denies falls        EXTERNAL RECORDS REVIEWED  Other reviewed a note from [facility] on 27 August when he presented to [facility] with abdominal pain and alcohol intoxication.  It was noted that time the patient does have a history of atrial fibrillation    HPI/ROS    Inocencio Shabazz is a 64 y.o. male who presents stating he is not feeling well.  He states that he aches all over.  He does admit to alcohol abuse.  He does not know his medical history and is a poor historian.  In reviewing his records he does have a known history of alcohol abuse, cirrhosis, schizophrenia, and atrial fibrillation.  He states he only takes vitamins.  He has not had any vomiting or diarrhea.  He does admit to tobacco abuse.    PAST MEDICAL HISTORY   has a past medical history of Alcohol abuse, Bipolar disorder (HCC), Cirrhosis (HCC), GIB (gastrointestinal bleeding) (01/03/2016), Hepatitis C, Pacemaker, Pancytopenia (HCC), and Schizophrenia (HCC).    SURGICAL HISTORY   has a past surgical history that includes gastroscopy (1/3/2016); gastroscopy (4/8/2016); gastroscopy (3/13/2019); gastroscopy-endo (N/A, 11/13/2019); upper gi endoscopy,diagnosis (N/A, 8/10/2023); and upper gi endoscopy,ctrl bleed (N/A, 8/10/2023).    FAMILY HISTORY  History reviewed. No pertinent family history.    SOCIAL HISTORY  Social History     Tobacco Use    Smoking status: Every Day     Current packs/day: 1.00     Average packs/day: 1 pack/day for 48.0 years (48.0 ttl pk-yrs)     Types: Cigarettes    Smokeless tobacco: Never   Vaping Use    Vaping status: Never Used   Substance and Sexual Activity    Alcohol use: Yes     Comment: \"couple of pints whiskey every day\"    Drug use: Yes     Types: Inhaled     Comment: marijuana " "sometimes    Sexual activity: Not on file       CURRENT MEDICATIONS  Home Medications       Reviewed by Laura Quinn R.N. (Registered Nurse) on 09/11/24 at 0536  Med List Status: Unable to Obtain     Medication Last Dose Status   carvedilol (COREG) 6.25 MG Tab  Active   ferrous sulfate 325 (65 Fe) MG tablet  Active   omeprazole (PRILOSEC) 20 MG delayed-release capsule  Active   ondansetron (ZOFRAN ODT) 4 MG TABLET DISPERSIBLE  Active   QUEtiapine (SEROQUEL) 200 MG Tab  Active   thiamine (THIAMINE) 100 MG tablet  Active                    ALLERGIES  Allergies   Allergen Reactions    Haloperidol Unspecified     Twitching/involuntary movements        PHYSICAL EXAM  VITAL SIGNS: /85   Pulse (!) 146   Temp 35.8 °C (96.5 °F) (Temporal)   Resp 18   Ht 1.727 m (5' 8\")   Wt 59 kg (130 lb)   SpO2 96%   BMI 19.77 kg/m²    In general the patient appears unkempt    HEENT unremarkable except for poor dentition    Pulmonary the patient's lungs are slightly diminished throughout    Cardiovascular S1-S2 with an irregular rhythm and tachycardic rate    GI abdomen soft    Skin no rashes, pallor, no jaundice    Extremities no distal edema    Neurologic examination is grossly intact    EKG/LABS  Results for orders placed or performed during the hospital encounter of 09/11/24   CBC w/ Differential   Result Value Ref Range    WBC 3.0 (L) 4.8 - 10.8 K/uL    RBC 4.79 4.70 - 6.10 M/uL    Hemoglobin 14.3 14.0 - 18.0 g/dL    Hematocrit 42.8 42.0 - 52.0 %    MCV 89.4 81.4 - 97.8 fL    MCH 29.9 27.0 - 33.0 pg    MCHC 33.4 32.3 - 36.5 g/dL    RDW 47.9 35.9 - 50.0 fL    Platelet Count 44 (L) 164 - 446 K/uL    MPV 12.4 9.0 - 12.9 fL    Neutrophils-Polys 50.20 44.00 - 72.00 %    Lymphocytes 37.90 22.00 - 41.00 %    Monocytes 8.60 0.00 - 13.40 %    Eosinophils 2.00 0.00 - 6.90 %    Basophils 1.00 0.00 - 1.80 %    Immature Granulocytes 0.30 0.00 - 0.90 %    Nucleated RBC 0.00 0.00 - 0.20 /100 WBC    Neutrophils (Absolute) 1.51 " (L) 1.82 - 7.42 K/uL    Lymphs (Absolute) 1.14 1.00 - 4.80 K/uL    Monos (Absolute) 0.26 0.00 - 0.85 K/uL    Eos (Absolute) 0.06 0.00 - 0.51 K/uL    Baso (Absolute) 0.03 0.00 - 0.12 K/uL    Immature Granulocytes (abs) 0.01 0.00 - 0.11 K/uL    NRBC (Absolute) 0.00 K/uL   Complete Metabolic Panel (CMP)   Result Value Ref Range    Sodium 143 135 - 145 mmol/L    Potassium 3.7 3.6 - 5.5 mmol/L    Chloride 102 96 - 112 mmol/L    Co2 18 (L) 20 - 33 mmol/L    Anion Gap 23.0 (H) 7.0 - 16.0    Glucose 86 65 - 99 mg/dL    Bun 9 8 - 22 mg/dL    Creatinine 0.52 0.50 - 1.40 mg/dL    Calcium 8.5 8.5 - 10.5 mg/dL    Correct Calcium 8.4 (L) 8.5 - 10.5 mg/dL    AST(SGOT) 120 (H) 12 - 45 U/L    ALT(SGPT) 70 (H) 2 - 50 U/L    Alkaline Phosphatase 90 30 - 99 U/L    Total Bilirubin 1.0 0.1 - 1.5 mg/dL    Albumin 4.1 3.2 - 4.9 g/dL    Total Protein 8.8 (H) 6.0 - 8.2 g/dL    Globulin 4.7 (H) 1.9 - 3.5 g/dL    A-G Ratio 0.9 g/dL   Troponin - STAT Once   Result Value Ref Range    Troponin T <6 6 - 19 ng/L   Magnesium   Result Value Ref Range    Magnesium 1.6 1.5 - 2.5 mg/dL   Phosphorus   Result Value Ref Range    Phosphorus 3.1 2.5 - 4.5 mg/dL   PT/INR   Result Value Ref Range    PT 16.1 (H) 12.0 - 14.6 sec    INR 1.27 (H) 0.87 - 1.13   APTT   Result Value Ref Range    APTT 31.9 24.7 - 36.0 sec   IMMATURE PLT FRACTION   Result Value Ref Range    Imm. Plt Fraction 10.0 0.6 - 13.1 %   ESTIMATED GFR   Result Value Ref Range    GFR (CKD-EPI) 111 >60 mL/min/1.73 m 2   EKG   Result Value Ref Range    Report       [facility]    Test Date:  2024  Pt Name:    JIMMY FERRER                 Department: ER  MRN:        [id number]                      Room:  Gender:     Male                         Technician: [id number]  :        1960                   Requested By:ER TRIAGE PROTOCOL  Order #:    [id number]                    Reading MD: INDERJIT MACKENZIE MD    Measurements  Intervals                                 Axis  Rate:       115                          P:          83  FL:         153                          QRS:        79  QRSD:       119                          T:          59  QT:         324  QTc:        448    Interpretive Statements  Twelve-lead EKG shows a sinus tachycardia with a ventricular rate of 115,  normal QRS, J-point elevation in V3, normal T waves, overall no acute  ischemic change  Electronically Signed On 09- 08:40:34 PDT by JUSTIN GOINS MD         I have independently interpreted this EKG    X-rays reviewed there is no evidence of a focal process such as pneumonia    DX-CHEST-PORTABLE (1 VIEW)   Final Result      No acute process.          COURSE & MEDICAL DECISION MAKING    This is 64-year-old gentleman who presents to the emergency department stating he is not feel well.  He states he feels confused.  He was a poor historian upon arrival.  In reviewing his records he does have a history of alcohol abuse as well as atrial fibrillation.  Initially was called to the bedside his heart rate was in the 140s but subsequently he has had decent rate control in the 100 region with no treatment needed.  Secondary suspected alcohol abuse and malnutrition I did load the patient with an IV rally bag.  Laboratory and Alysis has been reviewed and does not show any concerning abnormalities.  He has a slight elevation in his AST consistent with his alcohol abuse.  He has been observed for approximately 3 hours.  He feels significantly better at the time of discharge.  He is ambulatory.  His vital signs are currently stable.  He states he has his medication at home and has been compliant.  I like the patient to recheck with his primary care provider in 48 hours and return as needed.    I did have a good discussion with the patient regarding the need for smoking cessation    FINAL DIAGNOSIS  1.  Altered mental status  2.  Paroxysmal atrial fibrillation  3.  Alcohol abuse  4. Justin Goins M.D.  spent greater than 3 minutes with the patient explaining the importance of smoking cessation.       Disposition  The patient will be discharged in stable condition     Electronically signed by: Justin Goins M.D., 9/11/2024 5:48 AM

## 2024-09-11 NOTE — ED NOTES
Bedside report received from off going RN: Syl, assumed care of patient.  Pt sleeping. Active chest rise and fall noted. Respirations equal and unlabored. Call light within reach, all needs addressed at this time.       Fall risk interventions in place: Place socks on patient, Place fall risk sign on patient's door, Give patient urinal if applicable, Keep floor surfaces clean and dry, and Bed Alarm in use (all applicable per [place] Fall risk assessment)   Continuous monitoring: Cardiac Leads, Pulse Ox, or Blood Pressure  IVF/IV medications: Not Applicable   Oxygen: How many liters 2L, Traced the line to wall oxygen, and No oxygen tank in room  Bedside sitter: Not Applicable   Isolation: Not Applicable

## 2024-09-27 ENCOUNTER — HOSPITAL ENCOUNTER (INPATIENT)
Facility: MEDICAL CENTER | Age: 64
LOS: 3 days | End: 2024-09-30
Attending: STUDENT IN AN ORGANIZED HEALTH CARE EDUCATION/TRAINING PROGRAM | Admitting: STUDENT IN AN ORGANIZED HEALTH CARE EDUCATION/TRAINING PROGRAM
Payer: COMMERCIAL

## 2024-09-27 ENCOUNTER — ANESTHESIA EVENT (OUTPATIENT)
Dept: SURGERY | Facility: MEDICAL CENTER | Age: 64
End: 2024-09-27
Payer: COMMERCIAL

## 2024-09-27 ENCOUNTER — ANESTHESIA (OUTPATIENT)
Dept: SURGERY | Facility: MEDICAL CENTER | Age: 64
End: 2024-09-27
Payer: COMMERCIAL

## 2024-09-27 DIAGNOSIS — K70.30 ALCOHOLIC CIRRHOSIS, UNSPECIFIED WHETHER ASCITES PRESENT (HCC): ICD-10-CM

## 2024-09-27 DIAGNOSIS — D64.9 ACUTE ANEMIA: ICD-10-CM

## 2024-09-27 DIAGNOSIS — E87.6 HYPOKALEMIA: ICD-10-CM

## 2024-09-27 DIAGNOSIS — R00.0 WIDE-COMPLEX TACHYCARDIA: ICD-10-CM

## 2024-09-27 DIAGNOSIS — D61.818 PANCYTOPENIA (HCC): ICD-10-CM

## 2024-09-27 DIAGNOSIS — I85.10 SECONDARY ESOPHAGEAL VARICES WITHOUT BLEEDING (HCC): ICD-10-CM

## 2024-09-27 DIAGNOSIS — R10.9 ABDOMINAL PAIN, UNSPECIFIED ABDOMINAL LOCATION: ICD-10-CM

## 2024-09-27 DIAGNOSIS — R13.10 DYSPHAGIA, UNSPECIFIED TYPE: ICD-10-CM

## 2024-09-27 DIAGNOSIS — K92.2 UPPER GI BLEED: ICD-10-CM

## 2024-09-27 DIAGNOSIS — I47.10 SVT (SUPRAVENTRICULAR TACHYCARDIA) (HCC): ICD-10-CM

## 2024-09-27 PROBLEM — D70.9 NEUTROPENIA (HCC): Status: ACTIVE | Noted: 2024-09-27

## 2024-09-27 PROBLEM — F10.139 ALCOHOL ABUSE WITH WITHDRAWAL (HCC): Status: RESOLVED | Noted: 2023-04-11 | Resolved: 2024-09-27

## 2024-09-27 LAB
ABO GROUP BLD: NORMAL
ALBUMIN SERPL BCP-MCNC: 3.1 G/DL (ref 3.2–4.9)
ALBUMIN SERPL BCP-MCNC: 3.5 G/DL (ref 3.2–4.9)
ALBUMIN/GLOB SERPL: 1 G/DL
ALBUMIN/GLOB SERPL: 1 G/DL
ALP SERPL-CCNC: 59 U/L (ref 30–99)
ALP SERPL-CCNC: 65 U/L (ref 30–99)
ALT SERPL-CCNC: 47 U/L (ref 2–50)
ALT SERPL-CCNC: 51 U/L (ref 2–50)
ANION GAP SERPL CALC-SCNC: 12 MMOL/L (ref 7–16)
ANION GAP SERPL CALC-SCNC: 15 MMOL/L (ref 7–16)
APTT PPP: 29.9 SEC (ref 24.7–36)
AST SERPL-CCNC: 55 U/L (ref 12–45)
AST SERPL-CCNC: 63 U/L (ref 12–45)
BARCODED ABORH UBTYP: 5100
BARCODED ABORH UBTYP: 8400
BARCODED ABORH UBTYP: 8400
BARCODED PRD CODE UBPRD: NORMAL
BARCODED UNIT NUM UBUNT: NORMAL
BASOPHILS # BLD AUTO: 1.3 % (ref 0–1.8)
BASOPHILS # BLD AUTO: 1.6 % (ref 0–1.8)
BASOPHILS # BLD AUTO: 2.8 % (ref 0–1.8)
BASOPHILS # BLD: 0.02 K/UL (ref 0–0.12)
BASOPHILS # BLD: 0.04 K/UL (ref 0–0.12)
BASOPHILS # BLD: 0.05 K/UL (ref 0–0.12)
BILIRUB SERPL-MCNC: 1.8 MG/DL (ref 0.1–1.5)
BILIRUB SERPL-MCNC: 2.1 MG/DL (ref 0.1–1.5)
BLD GP AB SCN SERPL QL: NORMAL
BUN SERPL-MCNC: 21 MG/DL (ref 8–22)
BUN SERPL-MCNC: 26 MG/DL (ref 8–22)
CALCIUM ALBUM COR SERPL-MCNC: 8.5 MG/DL (ref 8.5–10.5)
CALCIUM ALBUM COR SERPL-MCNC: 8.7 MG/DL (ref 8.5–10.5)
CALCIUM SERPL-MCNC: 7.8 MG/DL (ref 8.5–10.5)
CALCIUM SERPL-MCNC: 8.3 MG/DL (ref 8.5–10.5)
CHLORIDE SERPL-SCNC: 103 MMOL/L (ref 96–112)
CHLORIDE SERPL-SCNC: 106 MMOL/L (ref 96–112)
CO2 SERPL-SCNC: 22 MMOL/L (ref 20–33)
CO2 SERPL-SCNC: 22 MMOL/L (ref 20–33)
COMPONENT P 8504P: NORMAL
COMPONENT R 8504R: NORMAL
COMPONENT R 8504R: NORMAL
CREAT SERPL-MCNC: 0.53 MG/DL (ref 0.5–1.4)
CREAT SERPL-MCNC: 0.65 MG/DL (ref 0.5–1.4)
EKG IMPRESSION: NORMAL
EOSINOPHIL # BLD AUTO: 0.06 K/UL (ref 0–0.51)
EOSINOPHIL # BLD AUTO: 0.06 K/UL (ref 0–0.51)
EOSINOPHIL # BLD AUTO: 0.08 K/UL (ref 0–0.51)
EOSINOPHIL NFR BLD: 2.3 % (ref 0–6.9)
EOSINOPHIL NFR BLD: 3.7 % (ref 0–6.9)
EOSINOPHIL NFR BLD: 5.1 % (ref 0–6.9)
ERYTHROCYTE [DISTWIDTH] IN BLOOD BY AUTOMATED COUNT: 48.8 FL (ref 35.9–50)
ERYTHROCYTE [DISTWIDTH] IN BLOOD BY AUTOMATED COUNT: 49.7 FL (ref 35.9–50)
ERYTHROCYTE [DISTWIDTH] IN BLOOD BY AUTOMATED COUNT: 49.8 FL (ref 35.9–50)
FERRITIN SERPL-MCNC: 80.8 NG/ML (ref 22–322)
GFR SERPLBLD CREATININE-BSD FMLA CKD-EPI: 103 ML/MIN/1.73 M 2
GFR SERPLBLD CREATININE-BSD FMLA CKD-EPI: 110 ML/MIN/1.73 M 2
GLOBULIN SER CALC-MCNC: 3.2 G/DL (ref 1.9–3.5)
GLOBULIN SER CALC-MCNC: 3.4 G/DL (ref 1.9–3.5)
GLUCOSE SERPL-MCNC: 152 MG/DL (ref 65–99)
GLUCOSE SERPL-MCNC: 178 MG/DL (ref 65–99)
HAV IGM SERPL QL IA: NONREACTIVE
HBV CORE IGM SER QL: NONREACTIVE
HBV SURFACE AG SER QL: NONREACTIVE
HCT VFR BLD AUTO: 23.8 % (ref 42–52)
HCT VFR BLD AUTO: 24.6 % (ref 42–52)
HCT VFR BLD AUTO: 24.7 % (ref 42–52)
HCV AB SER QL: REACTIVE
HGB BLD-MCNC: 7.8 G/DL (ref 14–18)
HGB BLD-MCNC: 8.1 G/DL (ref 14–18)
HGB BLD-MCNC: 8.2 G/DL (ref 14–18)
IMM GRANULOCYTES # BLD AUTO: 0 K/UL (ref 0–0.11)
IMM GRANULOCYTES # BLD AUTO: 0.01 K/UL (ref 0–0.11)
IMM GRANULOCYTES NFR BLD AUTO: 0 % (ref 0–0.9)
IMM GRANULOCYTES NFR BLD AUTO: 0.6 % (ref 0–0.9)
INR PPP: 1.64 (ref 0.87–1.13)
IRON SATN MFR SERPL: ABNORMAL % (ref 15–55)
IRON SERPL-MCNC: 299 UG/DL (ref 50–180)
LIPASE SERPL-CCNC: 29 U/L (ref 11–82)
LYMPHOCYTES # BLD AUTO: 0.27 K/UL (ref 1–4.8)
LYMPHOCYTES # BLD AUTO: 0.42 K/UL (ref 1–4.8)
LYMPHOCYTES # BLD AUTO: 0.48 K/UL (ref 1–4.8)
LYMPHOCYTES NFR BLD: 15.7 % (ref 22–41)
LYMPHOCYTES NFR BLD: 18.6 % (ref 22–41)
LYMPHOCYTES NFR BLD: 26.6 % (ref 22–41)
MAGNESIUM SERPL-MCNC: 2.5 MG/DL (ref 1.5–2.5)
MANUAL DIFF BLD: NORMAL
MCH RBC QN AUTO: 30.1 PG (ref 27–33)
MCH RBC QN AUTO: 30.5 PG (ref 27–33)
MCH RBC QN AUTO: 30.6 PG (ref 27–33)
MCHC RBC AUTO-ENTMCNC: 32.8 G/DL (ref 32.3–36.5)
MCHC RBC AUTO-ENTMCNC: 32.8 G/DL (ref 32.3–36.5)
MCHC RBC AUTO-ENTMCNC: 33.3 G/DL (ref 32.3–36.5)
MCV RBC AUTO: 91.4 FL (ref 81.4–97.8)
MCV RBC AUTO: 91.8 FL (ref 81.4–97.8)
MCV RBC AUTO: 93.3 FL (ref 81.4–97.8)
METAMYELOCYTES NFR BLD MANUAL: 0.9 %
MONOCYTES # BLD AUTO: 0.03 K/UL (ref 0–0.85)
MONOCYTES # BLD AUTO: 0.22 K/UL (ref 0–0.85)
MONOCYTES # BLD AUTO: 0.36 K/UL (ref 0–0.85)
MONOCYTES NFR BLD AUTO: 1.9 % (ref 0–13.4)
MONOCYTES NFR BLD AUTO: 13.9 % (ref 0–13.4)
MONOCYTES NFR BLD AUTO: 14 % (ref 0–13.4)
MORPHOLOGY BLD-IMP: NORMAL
NEUTROPHILS # BLD AUTO: 0.83 K/UL (ref 1.82–7.42)
NEUTROPHILS # BLD AUTO: 1.28 K/UL (ref 1.82–7.42)
NEUTROPHILS # BLD AUTO: 1.64 K/UL (ref 1.82–7.42)
NEUTROPHILS NFR BLD: 52.5 % (ref 44–72)
NEUTROPHILS NFR BLD: 63.5 % (ref 44–72)
NEUTROPHILS NFR BLD: 74.1 % (ref 44–72)
NEUTS BAND NFR BLD MANUAL: 0.9 % (ref 0–10)
NRBC # BLD AUTO: 0 K/UL
NRBC BLD-RTO: 0 /100 WBC (ref 0–0.2)
OVALOCYTES BLD QL SMEAR: NORMAL
PLATELET # BLD AUTO: 39 K/UL (ref 164–446)
PLATELET # BLD AUTO: 41 K/UL (ref 164–446)
PLATELET # BLD AUTO: 60 K/UL (ref 164–446)
PLATELET BLD QL SMEAR: NORMAL
PLATELETS.RETICULATED NFR BLD AUTO: 10 % (ref 0.6–13.1)
PLATELETS.RETICULATED NFR BLD AUTO: 10.5 % (ref 0.6–13.1)
PLATELETS.RETICULATED NFR BLD AUTO: 9.7 % (ref 0.6–13.1)
PMV BLD AUTO: 12.6 FL (ref 9–12.9)
PMV BLD AUTO: 12.7 FL (ref 9–12.9)
PMV BLD AUTO: 12.7 FL (ref 9–12.9)
POIKILOCYTOSIS BLD QL SMEAR: NORMAL
POTASSIUM SERPL-SCNC: 3.2 MMOL/L (ref 3.6–5.5)
POTASSIUM SERPL-SCNC: 4.3 MMOL/L (ref 3.6–5.5)
PRODUCT TYPE UPROD: NORMAL
PROT SERPL-MCNC: 6.3 G/DL (ref 6–8.2)
PROT SERPL-MCNC: 6.9 G/DL (ref 6–8.2)
PROTHROMBIN TIME: 19.5 SEC (ref 12–14.6)
RBC # BLD AUTO: 2.55 M/UL (ref 4.7–6.1)
RBC # BLD AUTO: 2.69 M/UL (ref 4.7–6.1)
RBC # BLD AUTO: 2.69 M/UL (ref 4.7–6.1)
RBC BLD AUTO: PRESENT
RH BLD: NORMAL
SODIUM SERPL-SCNC: 140 MMOL/L (ref 135–145)
SODIUM SERPL-SCNC: 140 MMOL/L (ref 135–145)
TARGETS BLD QL SMEAR: NORMAL
TIBC SERPL-MCNC: ABNORMAL UG/DL (ref 250–450)
TROPONIN T SERPL-MCNC: <6 NG/L (ref 6–19)
UIBC SERPL-MCNC: <17 UG/DL (ref 110–370)
UNIT STATUS USTAT: NORMAL
WBC # BLD AUTO: 1.6 K/UL (ref 4.8–10.8)
WBC # BLD AUTO: 1.7 K/UL (ref 4.8–10.8)
WBC # BLD AUTO: 2.6 K/UL (ref 4.8–10.8)

## 2024-09-27 PROCEDURE — P9034 PLATELETS, PHERESIS: HCPCS

## 2024-09-27 PROCEDURE — 700111 HCHG RX REV CODE 636 W/ 250 OVERRIDE (IP): Mod: JZ | Performed by: ANESTHESIOLOGY

## 2024-09-27 PROCEDURE — 96367 TX/PROPH/DG ADDL SEQ IV INF: CPT

## 2024-09-27 PROCEDURE — 87522 HEPATITIS C REVRS TRNSCRPJ: CPT

## 2024-09-27 PROCEDURE — 99285 EMERGENCY DEPT VISIT HI MDM: CPT

## 2024-09-27 PROCEDURE — 700111 HCHG RX REV CODE 636 W/ 250 OVERRIDE (IP): Performed by: ANESTHESIOLOGY

## 2024-09-27 PROCEDURE — 84484 ASSAY OF TROPONIN QUANT: CPT

## 2024-09-27 PROCEDURE — 160048 HCHG OR STATISTICAL LEVEL 1-5: Performed by: INTERNAL MEDICINE

## 2024-09-27 PROCEDURE — 36415 COLL VENOUS BLD VENIPUNCTURE: CPT

## 2024-09-27 PROCEDURE — 770020 HCHG ROOM/CARE - TELE (206)

## 2024-09-27 PROCEDURE — 96366 THER/PROPH/DIAG IV INF ADDON: CPT

## 2024-09-27 PROCEDURE — 85025 COMPLETE CBC W/AUTO DIFF WBC: CPT

## 2024-09-27 PROCEDURE — 700102 HCHG RX REV CODE 250 W/ 637 OVERRIDE(OP)

## 2024-09-27 PROCEDURE — 82728 ASSAY OF FERRITIN: CPT

## 2024-09-27 PROCEDURE — 36430 TRANSFUSION BLD/BLD COMPNT: CPT

## 2024-09-27 PROCEDURE — A9270 NON-COVERED ITEM OR SERVICE: HCPCS | Performed by: STUDENT IN AN ORGANIZED HEALTH CARE EDUCATION/TRAINING PROGRAM

## 2024-09-27 PROCEDURE — 700101 HCHG RX REV CODE 250: Performed by: ANESTHESIOLOGY

## 2024-09-27 PROCEDURE — 96375 TX/PRO/DX INJ NEW DRUG ADDON: CPT

## 2024-09-27 PROCEDURE — 43244 EGD VARICES LIGATION: CPT | Performed by: INTERNAL MEDICINE

## 2024-09-27 PROCEDURE — 85610 PROTHROMBIN TIME: CPT

## 2024-09-27 PROCEDURE — 700101 HCHG RX REV CODE 250

## 2024-09-27 PROCEDURE — 700102 HCHG RX REV CODE 250 W/ 637 OVERRIDE(OP): Performed by: STUDENT IN AN ORGANIZED HEALTH CARE EDUCATION/TRAINING PROGRAM

## 2024-09-27 PROCEDURE — 99222 1ST HOSP IP/OBS MODERATE 55: CPT | Performed by: SPECIALIST

## 2024-09-27 PROCEDURE — 80074 ACUTE HEPATITIS PANEL: CPT

## 2024-09-27 PROCEDURE — 30233N1 TRANSFUSION OF NONAUTOLOGOUS RED BLOOD CELLS INTO PERIPHERAL VEIN, PERCUTANEOUS APPROACH: ICD-10-PCS | Performed by: STUDENT IN AN ORGANIZED HEALTH CARE EDUCATION/TRAINING PROGRAM

## 2024-09-27 PROCEDURE — 700105 HCHG RX REV CODE 258: Performed by: ANESTHESIOLOGY

## 2024-09-27 PROCEDURE — 160009 HCHG ANES TIME/MIN: Performed by: INTERNAL MEDICINE

## 2024-09-27 PROCEDURE — 0W3P8ZZ CONTROL BLEEDING IN GASTROINTESTINAL TRACT, VIA NATURAL OR ARTIFICIAL OPENING ENDOSCOPIC: ICD-10-PCS | Performed by: INTERNAL MEDICINE

## 2024-09-27 PROCEDURE — 160035 HCHG PACU - 1ST 60 MINS PHASE I: Performed by: INTERNAL MEDICINE

## 2024-09-27 PROCEDURE — 86901 BLOOD TYPING SEROLOGIC RH(D): CPT

## 2024-09-27 PROCEDURE — 700105 HCHG RX REV CODE 258: Performed by: STUDENT IN AN ORGANIZED HEALTH CARE EDUCATION/TRAINING PROGRAM

## 2024-09-27 PROCEDURE — 86923 COMPATIBILITY TEST ELECTRIC: CPT

## 2024-09-27 PROCEDURE — 85027 COMPLETE CBC AUTOMATED: CPT

## 2024-09-27 PROCEDURE — 86900 BLOOD TYPING SEROLOGIC ABO: CPT

## 2024-09-27 PROCEDURE — 93005 ELECTROCARDIOGRAM TRACING: CPT | Performed by: STUDENT IN AN ORGANIZED HEALTH CARE EDUCATION/TRAINING PROGRAM

## 2024-09-27 PROCEDURE — 83735 ASSAY OF MAGNESIUM: CPT

## 2024-09-27 PROCEDURE — 85007 BL SMEAR W/DIFF WBC COUNT: CPT

## 2024-09-27 PROCEDURE — 85055 RETICULATED PLATELET ASSAY: CPT | Mod: 91

## 2024-09-27 PROCEDURE — 30233R1 TRANSFUSION OF NONAUTOLOGOUS PLATELETS INTO PERIPHERAL VEIN, PERCUTANEOUS APPROACH: ICD-10-PCS | Performed by: STUDENT IN AN ORGANIZED HEALTH CARE EDUCATION/TRAINING PROGRAM

## 2024-09-27 PROCEDURE — 99291 CRITICAL CARE FIRST HOUR: CPT | Mod: GC | Performed by: STUDENT IN AN ORGANIZED HEALTH CARE EDUCATION/TRAINING PROGRAM

## 2024-09-27 PROCEDURE — 83540 ASSAY OF IRON: CPT

## 2024-09-27 PROCEDURE — 99291 CRITICAL CARE FIRST HOUR: CPT | Performed by: STUDENT IN AN ORGANIZED HEALTH CARE EDUCATION/TRAINING PROGRAM

## 2024-09-27 PROCEDURE — 700111 HCHG RX REV CODE 636 W/ 250 OVERRIDE (IP): Performed by: STUDENT IN AN ORGANIZED HEALTH CARE EDUCATION/TRAINING PROGRAM

## 2024-09-27 PROCEDURE — 86850 RBC ANTIBODY SCREEN: CPT

## 2024-09-27 PROCEDURE — 96365 THER/PROPH/DIAG IV INF INIT: CPT

## 2024-09-27 PROCEDURE — 83550 IRON BINDING TEST: CPT

## 2024-09-27 PROCEDURE — 80053 COMPREHEN METABOLIC PANEL: CPT | Mod: 91

## 2024-09-27 PROCEDURE — 83690 ASSAY OF LIPASE: CPT

## 2024-09-27 PROCEDURE — 160203 HCHG ENDO MINUTES - 1ST 30 MINS LEVEL 4: Performed by: INTERNAL MEDICINE

## 2024-09-27 PROCEDURE — 700105 HCHG RX REV CODE 258

## 2024-09-27 PROCEDURE — 85730 THROMBOPLASTIN TIME PARTIAL: CPT

## 2024-09-27 PROCEDURE — 700111 HCHG RX REV CODE 636 W/ 250 OVERRIDE (IP)

## 2024-09-27 PROCEDURE — A9270 NON-COVERED ITEM OR SERVICE: HCPCS

## 2024-09-27 PROCEDURE — P9016 RBC LEUKOCYTES REDUCED: HCPCS

## 2024-09-27 PROCEDURE — 160002 HCHG RECOVERY MINUTES (STAT): Performed by: INTERNAL MEDICINE

## 2024-09-27 RX ORDER — FOLIC ACID 1 MG/1
1 TABLET ORAL DAILY
Status: DISCONTINUED | OUTPATIENT
Start: 2024-09-27 | End: 2024-09-27

## 2024-09-27 RX ORDER — FOLIC ACID 1 MG/1
1 TABLET ORAL DAILY
COMMUNITY

## 2024-09-27 RX ORDER — PANTOPRAZOLE SODIUM 40 MG/1
40 TABLET, DELAYED RELEASE ORAL DAILY
Status: ON HOLD | COMMUNITY
End: 2024-09-30

## 2024-09-27 RX ORDER — ALBUTEROL SULFATE 5 MG/ML
2.5 SOLUTION RESPIRATORY (INHALATION)
Status: DISCONTINUED | OUTPATIENT
Start: 2024-09-27 | End: 2024-09-27 | Stop reason: HOSPADM

## 2024-09-27 RX ORDER — CEFTRIAXONE 2 G/1
2000 INJECTION, POWDER, FOR SOLUTION INTRAMUSCULAR; INTRAVENOUS ONCE
Status: COMPLETED | OUTPATIENT
Start: 2024-09-27 | End: 2024-09-27

## 2024-09-27 RX ORDER — ONDANSETRON 2 MG/ML
4 INJECTION INTRAMUSCULAR; INTRAVENOUS
Status: DISCONTINUED | OUTPATIENT
Start: 2024-09-27 | End: 2024-09-27 | Stop reason: HOSPADM

## 2024-09-27 RX ORDER — SODIUM CHLORIDE, SODIUM LACTATE, POTASSIUM CHLORIDE, CALCIUM CHLORIDE 600; 310; 30; 20 MG/100ML; MG/100ML; MG/100ML; MG/100ML
INJECTION, SOLUTION INTRAVENOUS CONTINUOUS
Status: DISCONTINUED | OUTPATIENT
Start: 2024-09-27 | End: 2024-09-27 | Stop reason: HOSPADM

## 2024-09-27 RX ORDER — IBUPROFEN 200 MG
200-400 TABLET ORAL EVERY 6 HOURS PRN
Status: ON HOLD | COMMUNITY
End: 2024-09-30

## 2024-09-27 RX ORDER — SODIUM CHLORIDE, SODIUM LACTATE, POTASSIUM CHLORIDE, CALCIUM CHLORIDE 600; 310; 30; 20 MG/100ML; MG/100ML; MG/100ML; MG/100ML
INJECTION, SOLUTION INTRAVENOUS CONTINUOUS
Status: DISCONTINUED | OUTPATIENT
Start: 2024-09-27 | End: 2024-09-27

## 2024-09-27 RX ORDER — PANTOPRAZOLE SODIUM 40 MG/10ML
40 INJECTION, POWDER, LYOPHILIZED, FOR SOLUTION INTRAVENOUS 2 TIMES DAILY
Status: DISCONTINUED | OUTPATIENT
Start: 2024-09-27 | End: 2024-09-27

## 2024-09-27 RX ORDER — LORAZEPAM 2 MG/ML
2 INJECTION INTRAMUSCULAR
Status: DISCONTINUED | OUTPATIENT
Start: 2024-09-27 | End: 2024-09-27

## 2024-09-27 RX ORDER — LORAZEPAM 2 MG/1
4 TABLET ORAL
Status: DISCONTINUED | OUTPATIENT
Start: 2024-09-27 | End: 2024-09-27

## 2024-09-27 RX ORDER — SODIUM CHLORIDE, SODIUM LACTATE, POTASSIUM CHLORIDE, CALCIUM CHLORIDE 600; 310; 30; 20 MG/100ML; MG/100ML; MG/100ML; MG/100ML
INJECTION, SOLUTION INTRAVENOUS ONCE
Status: COMPLETED | OUTPATIENT
Start: 2024-09-27 | End: 2024-09-27

## 2024-09-27 RX ORDER — LORAZEPAM 2 MG/ML
1 INJECTION INTRAMUSCULAR
Status: DISCONTINUED | OUTPATIENT
Start: 2024-09-27 | End: 2024-09-27

## 2024-09-27 RX ORDER — NICOTINE 21 MG/24HR
21 PATCH, TRANSDERMAL 24 HOURS TRANSDERMAL
Status: DISCONTINUED | OUTPATIENT
Start: 2024-09-27 | End: 2024-09-30 | Stop reason: HOSPADM

## 2024-09-27 RX ORDER — VITAMIN B COMPLEX
1000 TABLET ORAL DAILY
COMMUNITY

## 2024-09-27 RX ORDER — DIPHENHYDRAMINE HYDROCHLORIDE 50 MG/ML
12.5 INJECTION INTRAMUSCULAR; INTRAVENOUS
Status: DISCONTINUED | OUTPATIENT
Start: 2024-09-27 | End: 2024-09-27 | Stop reason: HOSPADM

## 2024-09-27 RX ORDER — M-VIT,TX,IRON,MINS/CALC/FOLIC 27MG-0.4MG
1 TABLET ORAL DAILY
COMMUNITY

## 2024-09-27 RX ORDER — ACETAMINOPHEN 500 MG
500-1000 TABLET ORAL EVERY 6 HOURS PRN
Status: ON HOLD | COMMUNITY
End: 2024-09-30

## 2024-09-27 RX ORDER — SODIUM CHLORIDE, SODIUM LACTATE, POTASSIUM CHLORIDE, CALCIUM CHLORIDE 600; 310; 30; 20 MG/100ML; MG/100ML; MG/100ML; MG/100ML
INJECTION, SOLUTION INTRAVENOUS
Status: DISCONTINUED | OUTPATIENT
Start: 2024-09-27 | End: 2024-09-27 | Stop reason: SURG

## 2024-09-27 RX ORDER — FOLIC ACID 1 MG/1
1 TABLET ORAL DAILY
Status: DISCONTINUED | OUTPATIENT
Start: 2024-09-28 | End: 2024-09-27

## 2024-09-27 RX ORDER — LORAZEPAM 2 MG/ML
0.5 INJECTION INTRAMUSCULAR EVERY 4 HOURS PRN
Status: DISCONTINUED | OUTPATIENT
Start: 2024-09-27 | End: 2024-09-27

## 2024-09-27 RX ORDER — HYDROMORPHONE HYDROCHLORIDE 1 MG/ML
0.4 INJECTION, SOLUTION INTRAMUSCULAR; INTRAVENOUS; SUBCUTANEOUS
Status: DISCONTINUED | OUTPATIENT
Start: 2024-09-27 | End: 2024-09-27 | Stop reason: HOSPADM

## 2024-09-27 RX ORDER — SODIUM CHLORIDE, SODIUM LACTATE, POTASSIUM CHLORIDE, CALCIUM CHLORIDE 600; 310; 30; 20 MG/100ML; MG/100ML; MG/100ML; MG/100ML
1000 INJECTION, SOLUTION INTRAVENOUS ONCE
Status: DISCONTINUED | OUTPATIENT
Start: 2024-09-27 | End: 2024-09-27

## 2024-09-27 RX ORDER — LORAZEPAM 2 MG/1
2 TABLET ORAL
Status: DISCONTINUED | OUTPATIENT
Start: 2024-09-27 | End: 2024-09-27

## 2024-09-27 RX ORDER — MIDAZOLAM HYDROCHLORIDE 1 MG/ML
1 INJECTION INTRAMUSCULAR; INTRAVENOUS
Status: DISCONTINUED | OUTPATIENT
Start: 2024-09-27 | End: 2024-09-27 | Stop reason: HOSPADM

## 2024-09-27 RX ORDER — HYDRALAZINE HYDROCHLORIDE 20 MG/ML
5 INJECTION INTRAMUSCULAR; INTRAVENOUS
Status: DISCONTINUED | OUTPATIENT
Start: 2024-09-27 | End: 2024-09-27 | Stop reason: HOSPADM

## 2024-09-27 RX ORDER — HYDROMORPHONE HYDROCHLORIDE 1 MG/ML
0.2 INJECTION, SOLUTION INTRAMUSCULAR; INTRAVENOUS; SUBCUTANEOUS
Status: DISCONTINUED | OUTPATIENT
Start: 2024-09-27 | End: 2024-09-27 | Stop reason: HOSPADM

## 2024-09-27 RX ORDER — HYDROMORPHONE HYDROCHLORIDE 1 MG/ML
0.1 INJECTION, SOLUTION INTRAMUSCULAR; INTRAVENOUS; SUBCUTANEOUS
Status: DISCONTINUED | OUTPATIENT
Start: 2024-09-27 | End: 2024-09-27 | Stop reason: HOSPADM

## 2024-09-27 RX ORDER — PANTOPRAZOLE SODIUM 40 MG/10ML
40 INJECTION, POWDER, LYOPHILIZED, FOR SOLUTION INTRAVENOUS 2 TIMES DAILY
Status: DISCONTINUED | OUTPATIENT
Start: 2024-09-27 | End: 2024-09-30 | Stop reason: HOSPADM

## 2024-09-27 RX ORDER — MEPERIDINE HYDROCHLORIDE 25 MG/ML
12.5 INJECTION INTRAMUSCULAR; INTRAVENOUS; SUBCUTANEOUS
Status: DISCONTINUED | OUTPATIENT
Start: 2024-09-27 | End: 2024-09-27 | Stop reason: HOSPADM

## 2024-09-27 RX ORDER — DEXTROSE MONOHYDRATE 50 MG/ML
INJECTION, SOLUTION INTRAVENOUS CONTINUOUS
Status: DISCONTINUED | OUTPATIENT
Start: 2024-09-27 | End: 2024-09-28

## 2024-09-27 RX ORDER — GAUZE BANDAGE 2" X 2"
100 BANDAGE TOPICAL DAILY
Status: DISCONTINUED | OUTPATIENT
Start: 2024-10-01 | End: 2024-09-30 | Stop reason: HOSPADM

## 2024-09-27 RX ORDER — FOLIC ACID 1 MG/1
1 TABLET ORAL DAILY
Status: DISCONTINUED | OUTPATIENT
Start: 2024-09-28 | End: 2024-09-30 | Stop reason: HOSPADM

## 2024-09-27 RX ORDER — OCTREOTIDE ACETATE 100 UG/ML
50 INJECTION, SOLUTION INTRAVENOUS; SUBCUTANEOUS ONCE
Status: COMPLETED | OUTPATIENT
Start: 2024-09-27 | End: 2024-09-27

## 2024-09-27 RX ORDER — METOPROLOL TARTRATE 1 MG/ML
1 INJECTION, SOLUTION INTRAVENOUS
Status: DISCONTINUED | OUTPATIENT
Start: 2024-09-27 | End: 2024-09-27 | Stop reason: HOSPADM

## 2024-09-27 RX ORDER — LABETALOL HYDROCHLORIDE 5 MG/ML
5 INJECTION, SOLUTION INTRAVENOUS
Status: DISCONTINUED | OUTPATIENT
Start: 2024-09-27 | End: 2024-09-27 | Stop reason: HOSPADM

## 2024-09-27 RX ORDER — POTASSIUM CHLORIDE 1500 MG/1
40 TABLET, EXTENDED RELEASE ORAL ONCE
Status: ACTIVE | OUTPATIENT
Start: 2024-09-27 | End: 2024-09-28

## 2024-09-27 RX ORDER — EPHEDRINE SULFATE 50 MG/ML
5 INJECTION, SOLUTION INTRAVENOUS
Status: DISCONTINUED | OUTPATIENT
Start: 2024-09-27 | End: 2024-09-27 | Stop reason: HOSPADM

## 2024-09-27 RX ORDER — GAUZE BANDAGE 2" X 2"
100 BANDAGE TOPICAL DAILY
Status: DISCONTINUED | OUTPATIENT
Start: 2024-09-28 | End: 2024-09-27

## 2024-09-27 RX ORDER — ONDANSETRON 2 MG/ML
4 INJECTION INTRAMUSCULAR; INTRAVENOUS ONCE
Status: COMPLETED | OUTPATIENT
Start: 2024-09-27 | End: 2024-09-27

## 2024-09-27 RX ORDER — GAUZE BANDAGE 2" X 2"
100 BANDAGE TOPICAL DAILY
COMMUNITY

## 2024-09-27 RX ORDER — NICOTINE 21 MG/24HR
1 PATCH, TRANSDERMAL 24 HOURS TRANSDERMAL EVERY 24 HOURS
COMMUNITY

## 2024-09-27 RX ORDER — ONDANSETRON 2 MG/ML
INJECTION INTRAMUSCULAR; INTRAVENOUS PRN
Status: DISCONTINUED | OUTPATIENT
Start: 2024-09-27 | End: 2024-09-27 | Stop reason: SURG

## 2024-09-27 RX ORDER — PROCHLORPERAZINE EDISYLATE 5 MG/ML
10 INJECTION INTRAMUSCULAR; INTRAVENOUS ONCE
Status: COMPLETED | OUTPATIENT
Start: 2024-09-27 | End: 2024-09-27

## 2024-09-27 RX ORDER — LIDOCAINE HYDROCHLORIDE 20 MG/ML
INJECTION, SOLUTION EPIDURAL; INFILTRATION; INTRACAUDAL; PERINEURAL PRN
Status: DISCONTINUED | OUTPATIENT
Start: 2024-09-27 | End: 2024-09-27 | Stop reason: SURG

## 2024-09-27 RX ORDER — PANTOPRAZOLE SODIUM 40 MG/10ML
80 INJECTION, POWDER, LYOPHILIZED, FOR SOLUTION INTRAVENOUS ONCE
Status: COMPLETED | OUTPATIENT
Start: 2024-09-27 | End: 2024-09-27

## 2024-09-27 RX ORDER — ROCURONIUM BROMIDE 10 MG/ML
INJECTION, SOLUTION INTRAVENOUS PRN
Status: DISCONTINUED | OUTPATIENT
Start: 2024-09-27 | End: 2024-09-27 | Stop reason: SURG

## 2024-09-27 RX ORDER — QUETIAPINE FUMARATE 200 MG/1
200 TABLET, FILM COATED ORAL NIGHTLY
Status: DISCONTINUED | OUTPATIENT
Start: 2024-09-27 | End: 2024-09-30 | Stop reason: HOSPADM

## 2024-09-27 RX ORDER — LORAZEPAM 1 MG/1
0.5 TABLET ORAL EVERY 4 HOURS PRN
Status: DISCONTINUED | OUTPATIENT
Start: 2024-09-27 | End: 2024-09-27

## 2024-09-27 RX ORDER — LORAZEPAM 1 MG/1
1 TABLET ORAL EVERY 4 HOURS PRN
Status: DISCONTINUED | OUTPATIENT
Start: 2024-09-27 | End: 2024-09-27

## 2024-09-27 RX ORDER — LORAZEPAM 2 MG/ML
1.5 INJECTION INTRAMUSCULAR
Status: DISCONTINUED | OUTPATIENT
Start: 2024-09-27 | End: 2024-09-27

## 2024-09-27 RX ORDER — MAGNESIUM SULFATE HEPTAHYDRATE 40 MG/ML
2 INJECTION, SOLUTION INTRAVENOUS ONCE
Status: COMPLETED | OUTPATIENT
Start: 2024-09-27 | End: 2024-09-27

## 2024-09-27 RX ADMIN — ONDANSETRON 4 MG: 2 INJECTION INTRAMUSCULAR; INTRAVENOUS at 09:33

## 2024-09-27 RX ADMIN — MAGNESIUM SULFATE HEPTAHYDRATE 2 G: 2 INJECTION, SOLUTION INTRAVENOUS at 03:33

## 2024-09-27 RX ADMIN — ROCURONIUM BROMIDE 50 MG: 50 INJECTION, SOLUTION INTRAVENOUS at 09:28

## 2024-09-27 RX ADMIN — FENTANYL CITRATE 25 MCG: 50 INJECTION, SOLUTION INTRAMUSCULAR; INTRAVENOUS at 12:06

## 2024-09-27 RX ADMIN — SODIUM CHLORIDE, POTASSIUM CHLORIDE, SODIUM LACTATE AND CALCIUM CHLORIDE: 600; 310; 30; 20 INJECTION, SOLUTION INTRAVENOUS at 01:00

## 2024-09-27 RX ADMIN — OCTREOTIDE ACETATE 50 MCG: 100 INJECTION, SOLUTION INTRAVENOUS; SUBCUTANEOUS at 02:22

## 2024-09-27 RX ADMIN — QUETIAPINE FUMARATE 200 MG: 200 TABLET ORAL at 20:45

## 2024-09-27 RX ADMIN — PANTOPRAZOLE SODIUM 80 MG: 40 INJECTION, POWDER, FOR SOLUTION INTRAVENOUS at 01:12

## 2024-09-27 RX ADMIN — SODIUM CHLORIDE, POTASSIUM CHLORIDE, SODIUM LACTATE AND CALCIUM CHLORIDE: 600; 310; 30; 20 INJECTION, SOLUTION INTRAVENOUS at 09:23

## 2024-09-27 RX ADMIN — CEFTRIAXONE SODIUM 2000 MG: 2 INJECTION, POWDER, FOR SOLUTION INTRAMUSCULAR; INTRAVENOUS at 05:00

## 2024-09-27 RX ADMIN — AMIODARONE HYDROCHLORIDE 1 MG/MIN: 1.8 INJECTION, SOLUTION INTRAVENOUS at 01:07

## 2024-09-27 RX ADMIN — PROCHLORPERAZINE EDISYLATE 10 MG: 5 INJECTION INTRAMUSCULAR; INTRAVENOUS at 04:14

## 2024-09-27 RX ADMIN — NICOTINE TRANSDERMAL SYSTEM 21 MG: 21 PATCH, EXTENDED RELEASE TRANSDERMAL at 17:28

## 2024-09-27 RX ADMIN — SUGAMMADEX 200 MG: 100 INJECTION, SOLUTION INTRAVENOUS at 09:39

## 2024-09-27 RX ADMIN — THIAMINE HYDROCHLORIDE 500 MG: 100 INJECTION, SOLUTION INTRAMUSCULAR; INTRAVENOUS at 15:18

## 2024-09-27 RX ADMIN — OCTREOTIDE ACETATE 50 MCG/HR: 200 INJECTION, SOLUTION INTRAVENOUS; SUBCUTANEOUS at 04:35

## 2024-09-27 RX ADMIN — LIDOCAINE HYDROCHLORIDE 100 MG: 20 INJECTION, SOLUTION EPIDURAL; INFILTRATION; INTRACAUDAL at 09:28

## 2024-09-27 RX ADMIN — THIAMINE HYDROCHLORIDE 500 MG: 100 INJECTION, SOLUTION INTRAMUSCULAR; INTRAVENOUS at 20:44

## 2024-09-27 RX ADMIN — FOLIC ACID: 5 INJECTION, SOLUTION INTRAMUSCULAR; INTRAVENOUS; SUBCUTANEOUS at 06:00

## 2024-09-27 RX ADMIN — PROPOFOL 100 MG: 10 INJECTION, EMULSION INTRAVENOUS at 09:28

## 2024-09-27 RX ADMIN — ONDANSETRON 4 MG: 2 INJECTION INTRAMUSCULAR; INTRAVENOUS at 01:01

## 2024-09-27 RX ADMIN — DEXTROSE MONOHYDRATE: 50 INJECTION, SOLUTION INTRAVENOUS at 01:07

## 2024-09-27 ASSESSMENT — LIFESTYLE VARIABLES
NAUSEA AND VOMITING: MILD NAUSEA WITH NO VOMITING
PAROXYSMAL SWEATS: NO SWEAT VISIBLE
TREMOR: NO TREMOR
VISUAL DISTURBANCES: NOT PRESENT
AUDITORY DISTURBANCES: NOT PRESENT
ANXIETY: NO ANXIETY (AT EASE)
HEADACHE, FULLNESS IN HEAD: NOT PRESENT
ORIENTATION AND CLOUDING OF SENSORIUM: ORIENTED AND CAN DO SERIAL ADDITIONS
TOTAL SCORE: 1
AGITATION: NORMAL ACTIVITY

## 2024-09-27 ASSESSMENT — PAIN DESCRIPTION - PAIN TYPE
TYPE: SURGICAL PAIN
TYPE: ACUTE PAIN;SURGICAL PAIN
TYPE: ACUTE PAIN
TYPE: SURGICAL PAIN
TYPE: ACUTE PAIN;SURGICAL PAIN
TYPE: SURGICAL PAIN
TYPE: ACUTE PAIN;SURGICAL PAIN
TYPE: ACUTE PAIN
TYPE: ACUTE PAIN;SURGICAL PAIN
TYPE: SURGICAL PAIN;ACUTE PAIN

## 2024-09-27 ASSESSMENT — SOCIAL DETERMINANTS OF HEALTH (SDOH)

## 2024-09-27 ASSESSMENT — ENCOUNTER SYMPTOMS
NAUSEA: 1
NEUROLOGICAL NEGATIVE: 1
ABDOMINAL PAIN: 0
CONSTIPATION: 0
CARDIOVASCULAR NEGATIVE: 1
VOMITING: 1
BLOOD IN STOOL: 0
MYALGIAS: 1
CONSTITUTIONAL NEGATIVE: 1
DIARRHEA: 0
RESPIRATORY NEGATIVE: 1
PSYCHIATRIC NEGATIVE: 1
EYES NEGATIVE: 1

## 2024-09-27 ASSESSMENT — COGNITIVE AND FUNCTIONAL STATUS - GENERAL
SUGGESTED CMS G CODE MODIFIER DAILY ACTIVITY: CH
SUGGESTED CMS G CODE MODIFIER MOBILITY: CH
MOBILITY SCORE: 24
DAILY ACTIVITIY SCORE: 24

## 2024-09-27 ASSESSMENT — FIBROSIS 4 INDEX
FIB4 SCORE: 21.51
FIB4 SCORE: 8.82

## 2024-09-27 ASSESSMENT — PAIN SCALES - GENERAL: PAIN_LEVEL: 0

## 2024-09-27 ASSESSMENT — PAIN SCALES - WONG BAKER: WONGBAKER_NUMERICALRESPONSE: HURTS A LITTLE MORE

## 2024-09-27 NOTE — ASSESSMENT & PLAN NOTE
Multivitamin  Folic acid  Thiamine IV, then PO  Falls prec  Seizure prec  Aspiration prec  SLP  Hold blood thinners  Telemetry

## 2024-09-27 NOTE — ASSESSMENT & PLAN NOTE
Likely secondary to portal hypertension and bone marrow suppression from EtOH.  Reviewed prior CBCs  Monitor CBC

## 2024-09-27 NOTE — ED NOTES
Med rec complete per patient/home pharmacy  Allergies reviewed.   Outpatient antibiotics in the last 30 days? No   Anticoagulants taken in the last 14 days? No    Pharmacy patient utilizes: [facility] pharmacy    Keila Sierra CPhT

## 2024-09-27 NOTE — ASSESSMENT & PLAN NOTE
Documented hx. Hep C. Cirrhosis noted on U/s abd from 10/2023.  Discussed with GI  Outpatient evaluation for treatment

## 2024-09-27 NOTE — PROGRESS NOTES
Patient admitted to floor from PACU. VSS at time of arrival. Patient placed on telemetry monitoring. Oriented to room and call system. Call bell in reach and bed alarm in place. Home medications sent down to pharmacy.

## 2024-09-27 NOTE — ANESTHESIA TIME REPORT
Anesthesia Start and Stop Event Times       Date Time Event    9/27/2024 0918 Ready for Procedure     0923 Anesthesia Start     0946 Anesthesia Stop          Responsible Staff  09/27/24      Name Role Begin End    Isacc Domingo M.D. Anesth 0923 0946          Overtime Reason:  no overtime (within assigned shift)    Comments:

## 2024-09-27 NOTE — PROGRESS NOTES
4 Eyes Skin Assessment Completed by TITUS Weir and TITUS Canela.    Head WDL  Ears WDL  Nose WDL  Mouth WDL  Neck WDL  Breast/Chest WDL  Shoulder Blades WDL  Spine WDL  (R) Arm/Elbow/Hand WDL  (L) Arm/Elbow/Hand WDL  Abdomen WDL  Groin WDL  Scrotum/Coccyx/Buttocks WDL  (R) Leg WDL  (L) Leg WDL  (R) Heel/Foot/Toe Blanching  (L) Heel/Foot/Toe Blanching          Devices In Places Tele Box, Blood Pressure Cuff, Pulse Ox, and Nasal Cannula      Interventions In Place Pillows    Possible Skin Injury No    Pictures Uploaded Into Epic N/A  Wound Consult Placed N/A  RN Wound Prevention Protocol Ordered No

## 2024-09-27 NOTE — ED PROVIDER NOTES
"ED Provider Note    CHIEF COMPLAINT  Chief Complaint   Patient presents with    Blood in Vomit     Reports large amount x2 days, BRB, -thinners, +ETOH daily, reports 1/5th vodka daily        EXTERNAL RECORDS REVIEWED  Inpatient Notes gastroenterology note on 8/11/2023 regarding a patient with a history of chronic alcohol use, cirrhosis, portal hypertensive gastropathy, esophageal varices, hepatitis C, schizophrenia presenting with hematemesis, acute blood loss anemia, thrombocytopenia.    HPI/ROS  LIMITATION TO HISTORY   Select: : None  OUTSIDE HISTORIAN(S):  EMS reports hematemesis    Inocencio Shabazz is a 64 y.o. male who presents with large-volume hematemesis twice today.  Patient denies remembering if he had black or bloody stools.  Patient reports muscle cramping.  Patient denies chest pain or shortness of breath.  Patient denies abdominal pain.  Patient reports that his emesis was bloody and it was similar to previous episodes.    PAST MEDICAL HISTORY   has a past medical history of Alcohol abuse, Bipolar disorder (HCC), Cirrhosis (HCC), GIB (gastrointestinal bleeding) (01/03/2016), Hepatitis C, Pacemaker, Pancytopenia (HCC), and Schizophrenia (HCC).    SURGICAL HISTORY   has a past surgical history that includes gastroscopy (1/3/2016); gastroscopy (4/8/2016); gastroscopy (3/13/2019); gastroscopy-endo (N/A, 11/13/2019); upper gi endoscopy,diagnosis (N/A, 8/10/2023); and upper gi endoscopy,ctrl bleed (N/A, 8/10/2023).    FAMILY HISTORY  History reviewed. No pertinent family history.    SOCIAL HISTORY  Social History     Tobacco Use    Smoking status: Every Day     Current packs/day: 1.00     Average packs/day: 1 pack/day for 72.7 years (72.7 ttl pk-yrs)     Types: Cigarettes     Start date: 2000    Smokeless tobacco: Never   Vaping Use    Vaping status: Never Used   Substance and Sexual Activity    Alcohol use: Yes     Comment: \"couple of pints whiskey every day\"    Drug use: Yes     Types: Inhaled     " "Comment: marijuana sometimes    Sexual activity: Not on file       CURRENT MEDICATIONS  Home Medications       Reviewed by Paulina Pedro R.N. (Registered Nurse) on 09/27/24 at 0036  Med List Status: Not Addressed     Medication Last Dose Status   carvedilol (COREG) 6.25 MG Tab  Active   ferrous sulfate 325 (65 Fe) MG tablet  Active   omeprazole (PRILOSEC) 20 MG delayed-release capsule  Active   ondansetron (ZOFRAN ODT) 4 MG TABLET DISPERSIBLE  Active   QUEtiapine (SEROQUEL) 200 MG Tab  Active   thiamine (THIAMINE) 100 MG tablet  Active                    ALLERGIES  Allergies   Allergen Reactions    Haloperidol Unspecified     Twitching/involuntary movements        PHYSICAL EXAM  VITAL SIGNS: BP 98/65   Pulse 74   Temp 36.7 °C (98.1 °F) (Temporal)   Resp 13   Ht 1.727 m (5' 8\")   Wt 62.1 kg (137 lb)   SpO2 88%   BMI 20.83 kg/m²    Vitals and nursing note reviewed.   Constitutional:       Comments: Patient is lying in bed supine, pleasant, conversant, speaking in complete sentences   HENT:      Head: Normocephalic and atraumatic.   Eyes:      Extraocular Movements: Extraocular movements intact.      Conjunctiva/sclera: Conjunctivae normal.      Pupils: Pupils are equal, round, and reactive to light.   Cardiovascular:      Pulses: Normal pulses.      Comments:   Pulmonary:      Effort: Pulmonary effort is normal. No respiratory distress.   Abdominal:      Comments: Abdomen is soft, epigastric tenderness to palpation, non-distended, non-rigid, no rebound, guarding, masses, no McBurney's point tenderness, no peritoneal signs, negative Rovsing sign, negative Pettit sign.    Musculoskeletal:         General: No swelling. Normal range of motion.      Cervical back: Normal range of motion. No rigidity.   Skin:     General: Skin is warm and dry.      Capillary Refill: Capillary refill takes less than 2 seconds.   Neurological:      Mental Status: Alert.       EKG/LABS  Wide-complex tachycardia  I have " independently interpreted this EKG        COURSE & MEDICAL DECISION MAKING    ASSESSMENT, COURSE AND PLAN  Care Narrative: I am concerned for ventricular tachycardia.  Patient has wide-complex tachycardia.  Patient's blood pressures are soft as well.  Amiodarone started.  Patient also likely suffering from some hypovolemia given hematemesis.  Patient is critical and being watched very closely, octreotide, pantoprazole, IV fluids ongoing at this time.  Will transfuse if patient has low H&H.  CBC to evaluate for acute anemia and leukocytosis.  CMP to evaluate for acute electrolyte abnormality, acute kidney injury, acute liver failure or dysfunction.  Coags to evaluate for acute liver failure.    Electronically signed by: Dank Diaz M.D., 9/27/2024 1:01 AM    Patient intermittently hypotensive with a shock index, 1 unit PRBCs, 1 L IV fluids ongoing.  Patient's tachycardia was investigated through pacemaker and he was having significant time periods of SVT and also some small runs of ventricular tachycardia.  Patient given IV amiodarone but pressures started declining so amiodarone was stopped after 70 mg.  Patient's tachycardia resolved.  Patient admitted to hospital medicine floor.  Gastroenterology was consulted and they do not believe the patient needs emergent intervention at this time and will see the patient in the morning.    This dictation has been created using voice recognition software. I am continuously working with the software to minimize the number of voice recognition errors and I have made every attempt to manually correct the errors within my dictation. However errors  related to this voice recognition software may still exist and should be interpreted within the appropriate context.     Electronically signed by: Dank Diaz M.D., 9/27/2024 3:50 AM      CRITICAL CARE  The very real possibilty of a deterioration of this patient's condition required the highest level of my  preparedness for sudden, emergent intervention.  I provided critical care services, which included medication orders, frequent reevaluations of the patient's condition and response to treatment, ordering and reviewing test results, and discussing the case with various consultants.  The critical care time associated with the care of the patient was 41 minutes. Review chart for interventions. This time is exclusive of any other billable procedures.     Heart score - 2    Hydration: Based on the patient's presentation of Hypotension the patient was given IV fluids. IV Hydration was used because oral hydration was not adequate alone. Upon recheck following hydration, the patient was improved.        DISPOSITION AND DISCUSSIONS  I have discussed management of the patient with the following physicians and LAISHA's:  Dr Brennan      FINAL DIAGNOSIS  1. SVT (supraventricular tachycardia) (HCC)    2. Upper GI bleed    3. Acute anemia    4. Hypokalemia    5. Pancytopenia (HCC)         Electronically signed by: Dank Diaz M.D., 9/27/2024 12:58 AM

## 2024-09-27 NOTE — CONSULTS
GASTROENTEROLOGY CONSULTATION    PATIENT NAME: Inocencio Shabazz  : 1960  CSN: [id number]  MRN:  [id number]     CONSULTATION DATE:  2024    PRIMARY CARE PROVIDER:  Nnamdi Ballesteros M.D.      REASON FOR CONSULT:  hematemesis  Consult requested by Dank Diaz MD    HISTORY OF PRESENT ILLNESS:  Inocencio Shabazz is a 64 y.o. male       64yo male has had numerous admissions for similar complaints  and today with with large-volume hematemesis twice today.  Patient does not remember if he had black or bloody stools.  Patient reports muscle cramping.  Patient denies chest pain or shortness of breath.  Patient denies abdominal pain Pt admits to drinking but he says he has decreased recently. He had some runs of SVT in ER He was given amiodarone and he is stable.  Troponin normal.  Per chart review, he has chronic hepatitis C, genotype 3A who has not been treated in a history of alcoholic cirrhosis.  He was following with GI consultants as an outpatient.  He has had several EGD's     2023: Grade 1 varix,  one column, Portal hypertensive gastropathy, 2 AVMs treated     2019: with Dr. Harvey where he was found to have esophageal varices and was banded x5.     2019: With Dr. Ba, portal hypertensive gastropathy with superimposed gastritis, small 2 cm hiatal hernia with possible early Camerons lesions, erythema without erosion, and grade 1 esophageal varix not banded     2016: With Dr. Tobin, gastric body ulcer bleed injected with epinephrine and hemoclipped and erosive gastritis     2016: With Dr. Antonio, grade 2 distal esophageal varices banded x6, possible Sanchez's esophagus but not a candidate for surveillance, moderate to severe portal hypertensive gastropathy     PAST MEDICAL HISTORY:  Past Medical History:   Diagnosis Date    Alcohol abuse     Bipolar disorder (HCC)     Cirrhosis (HCC)     GIB (gastrointestinal bleeding) 2016    Hepatitis C     Pacemaker      left chest    Pancytopenia (HCC)     Schizophrenia (HCC)        PAST SURGICAL HISTORY:  Past Surgical History:   Procedure Laterality Date    [place] UPPER GI ENDOSCOPY,DIAGNOSIS N/A 8/10/2023    Procedure: GASTROSCOPY;  Surgeon: Joleen Yañez M.D.;  Location: SURGERY SAME DAY [facility];  Service: Gastroenterology    [place] UPPER GI ENDOSCOPY,CTRL BLEED N/A 8/10/2023    Procedure: GASTROSCOPY, WITH ARGON PLASMA COAGULATION;  Surgeon: Joleen Yañez M.D.;  Location: SURGERY SAME DAY [facility];  Service: Gastroenterology    GASTROSCOPY-ENDO N/A 11/13/2019    Procedure: GASTROSCOPY with banding;  Surgeon: Tamela Smith M.D.;  Location: ENDOSCOPY [facility];  Service: Gastroenterology    GASTROSCOPY  3/13/2019    Procedure: GASTROSCOPY;  Surgeon: Abdi Ba M.D.;  Location: SURGERY [facility];  Service: Gastroenterology    GASTROSCOPY  4/8/2016    Procedure: GASTROSCOPY;  Surgeon: Braeden Tobin M.D.;  Location: SURGERY [facility];  Service:     GASTROSCOPY  1/3/2016    Procedure: GASTROSCOPY WITH BANDING;  Surgeon: Alfredo Antonio M.D.;  Location: SURGERY [facility];  Service:         CURRENT MEDS:  Current Facility-Administered Medications   Medication Dose Route Frequency Provider Last Rate Last Admin    dextrose 5% infusion   Intravenous Continuous Dank Diaz M.D.   Stopped at 09/27/24 0419    octreotide (SandoSTATIN) 1,250 mcg in  mL Infusion  50 mcg/hr Intravenous Continuous Veronica Potts M.D. 10 mL/hr at 09/27/24 0435 50 mcg/hr at 09/27/24 0435    thiamine (B-1) 500 mg in dextrose 5% 100 mL IVPB  500 mg Intravenous TID Veronica Potts M.D.        [START ON 10/1/2024] thiamine (Vitamin B-1) tablet 100 mg  100 mg Oral DAILY Veronica Potts M.D.        And    [START ON 9/28/2024] multivitamin tablet 1 Tablet  1 Tablet Oral DAILY Veronica Potts M.D.        And    [START ON 9/28/2024] folic acid (Folvite) tablet 1 mg  1 mg Oral DAILY Veronica Potts,  "M.D.        potassium chloride SA (Kdur) tablet 40 mEq  40 mEq Oral Once Veronica Potts M.D.        QUEtiapine (SEROquel) tablet 200 mg  200 mg Oral Nightly Veronica Potts M.D.        pantoprazole (Protonix) injection 40 mg  40 mg Intravenous BID Veronica Potts M.D.         Current Outpatient Medications   Medication Sig Dispense Refill    folic acid (FOLVITE) 1 MG Tab Take 1 mg by mouth every day.      therapeutic multivitamin-minerals (THERAGRAN-M) Tab Take 1 Tablet by mouth every day.      nicotine (NICODERM) 21 MG/24HR PATCH 24 HR Place 1 Patch on the skin every 24 hours.      pantoprazole (PROTONIX) 40 MG Tablet Delayed Response Take 40 mg by mouth every day.      thiamine (VITAMIN B-1) 100 MG Tab Take 100 mg by mouth every day.      vitamin D3 (CHOLECALCIFEROL) 1000 Unit (25 mcg) Tab Take 1,000 Units by mouth every day.      acetaminophen (TYLENOL) 500 MG Tab Take 500-1,000 mg by mouth every 6 hours as needed.      ibuprofen (MOTRIN) 200 MG Tab Take 200-400 mg by mouth every 6 hours as needed.      QUEtiapine (SEROQUEL) 200 MG Tab Take 200 mg by mouth every evening.          ALLERGIES:  Allergies   Allergen Reactions    Haloperidol Unspecified     Twitching/involuntary movements        SOCIAL HISTORY:  Social History     Socioeconomic History    Marital status: Single     Spouse name: Not on file    Number of children: Not on file    Years of education: Not on file    Highest education level: Not on file   Occupational History    Not on file   Tobacco Use    Smoking status: Every Day     Current packs/day: 1.00     Average packs/day: 1 pack/day for 72.7 years (72.7 ttl pk-yrs)     Types: Cigarettes     Start date: 2000    Smokeless tobacco: Never   Vaping Use    Vaping status: Never Used   Substance and Sexual Activity    Alcohol use: Yes     Comment: \"couple of pints whiskey every day\"    Drug use: Yes     Types: Inhaled     Comment: marijuana sometimes    Sexual activity: Not on file   Other " "Topics Concern    Not on file   Social History Narrative    Not on file     Social Determinants of Health     Financial Resource Strain: Not on file   Food Insecurity: Not on file   Transportation Needs: Not on file   Physical Activity: Not on file   Stress: Not on file   Social Connections: Not on file   Intimate Partner Violence: Not on file   Housing Stability: Not on file       FAMILY HISTORY:  History reviewed. No pertinent family history.     REVIEW OF SYSTEMS:  General ROS: Negative for - chills, fever, night sweats or weight loss.  HEENT ROS: Negative  Respiratory ROS: Negative for - cough or shortness of breath.  Cardiovascular ROS:  Negative for - chest pain or palpitations.  Gastrointestinal ROS: As per the history of present illness.  Genito-Urinary ROS: Negative  Musculoskeletal ROS: Negative.  Neurological ROS: Negative  Skin ROS: negative  Hematology ROS: negative  Endocrinology ROS: Negative        PHYSICAL EXAM:  VITALS: /65   Pulse 74   Temp 36.7 °C (98.1 °F) (Temporal)   Resp 16   Ht 1.727 m (5' 8\")   Wt 62.1 kg (137 lb)   SpO2 97%   BMI 20.83 kg/m²   GEN:  Inocencio Shabazz is a 64 y.o. male in no acute distress, fatigued  HEENT: Mucous membranes pink and moist.  Sclera anicteric.    NECK:    Neck supple without lymphadenopathy or thyromegaly.  LUNGS: Clear to auscultation posteriorly.  HEART: Regular rate and rhythm. S1 and S2 normal. No murmurs, gallops  ABD:  + BS nt/nd -shm  RECTAL: Not done at this time.  EXT:  Without cyanosis, deformity or pitting edema.  SKIN:  Pink, warm, dry.  NEURO: Grossly intact, A/OR.    LABS:  Recent Labs     09/27/24  0040   WBC 2.6*   MCV 91.8     Recent Labs     09/27/24  0040 09/27/24  0621   GLUCOSE 152* 178*   BUN 26* 21   CO2 22 22     Lab Results   Component Value Date    INR 1.64 (H) 09/27/2024    INR 1.27 (H) 09/11/2024    INR 1.42 (H) 10/28/2023     No components found for: \"ALT\", \"AST\", \"GGT\", \"ALKPHOS\"  No results found for: " "\"BILINEO\"      @LASTIMGCAT(OL5816)@     @LASTIMGCAT(IU2446)@       IMPRESSION/PLAN:  Hematemesis  Cirrhosis  Alcohol abuse disorder  Hep C - not treated  Anemia - due to GI blood loss.  SVT    EGD  NPO  PPI  Octreotide  Antibiotics         Joleen Yañez M.D.  Gastroenterology      "

## 2024-09-27 NOTE — ED TRIAGE NOTES
"Chief Complaint   Patient presents with    Blood in Vomit     Reports large amount x2 days, BRB, -thinners, +ETOH daily, reports 1/5th vodka daily      BIB [facility] from home for above complaint. Reports that his last drink was apx 2 days ago.     Pt received 500ml NS PTA    /69   Pulse (!) 113   Temp 37.2 °C (98.9 °F) (Temporal)   Resp 15   Ht 1.727 m (5' 8\")   Wt 62.1 kg (137 lb)   SpO2 95%   BMI 20.83 kg/m²           "

## 2024-09-27 NOTE — ANESTHESIA POSTPROCEDURE EVALUATION
Patient: Inocencio Shabazz    Procedure Summary       Date: 09/27/24 Room / Location: [facility] ROOM 26 / SURGERY SAME DAY [facility]    Anesthesia Start: 0923 Anesthesia Stop: 0946    Procedure: GASTROSCOPY, WITH VARICEAL BANDING (Esophagus) Diagnosis: (Gastric Ulcer, Esophageal Varices)    Surgeons: Rafa Esposito M.D. Responsible Provider: Isacc Domingo M.D.    Anesthesia Type: general ASA Status: 3            Final Anesthesia Type: general  Last vitals  BP   Blood Pressure : (!) 142/81    Temp   36.2 °C (97.2 °F)    Pulse   89   Resp   17    SpO2   95 %      Anesthesia Post Evaluation    Patient location during evaluation: PACU  Patient participation: complete - patient participated  Level of consciousness: awake and alert  Pain score: 0    Airway patency: patent  Anesthetic complications: no  Cardiovascular status: hemodynamically stable  Respiratory status: acceptable  Hydration status: euvolemic    PONV: none          No notable events documented.     Nurse Pain Score: 0 (NPRS)

## 2024-09-27 NOTE — ED NOTES
Pt appears to be resting in gurney, RR even and unlabored, NAD. Pt blood transfusion in process, noted to be hypotensive on the monitor. Amio drip paused per ERP. Pt appears to be in NSR on the monitor

## 2024-09-27 NOTE — ASSESSMENT & PLAN NOTE
Hx cirrhosis, secondary to alcohol use disorder. Also documented past medical history of Hep c.  -IV PPI  -Octreotide  -GI to see pt in AM, consulted by ER.  -Consider beta-blocker reinitiation once bleeding stabilized.

## 2024-09-27 NOTE — OR NURSING
0943: Pt arrived from OR to PACU 1. Connected to monitor. Report received from anesthesia & RN. VSS. Oxygen at 6L via mask. Breaths calm, even, and unlabored.  No signs of pain.     0955: tolerated sip of water, Dr. Esposito at bedside   Per dr esposito may have some pain from the banding.     1206 prn pain medication given     1235 report to Buddy MOODY     1315 report from buddy moody     1331 report given to Carmella MOODY notified pt has personal meds in belongings bag.     1340 patient transported to room via gurney with RN transport monitor , 1L NC, pt belongings with patient.

## 2024-09-27 NOTE — OR NURSING
1235 - Handoff from TITUS Leon. Pt sleeping. NAD. VSS.    1250 - Pt tolerating water.    1315 - hadoff to Carolyn QURESHI

## 2024-09-27 NOTE — ED NOTES
Pt ambulated to the restroom with little assistance and reported that he had dark tarry stool. Pt denied any BRB. Pt assisted back to bed and medicated per MAR

## 2024-09-27 NOTE — ASSESSMENT & PLAN NOTE
Anemia secondary to hematemesis. Hx. Multiple gastroscopies  -Iron labs  -Q6 H+H  -Transfuse hb <7  -Telemetry  -Octreotide  -IV PPI  -Falls precautions  -PT/OT  -Hold blood thinners.

## 2024-09-27 NOTE — ASSESSMENT & PLAN NOTE
Hx. Cirrhosis, previous gastroscopies.  -Octreotide  -IV PPI  -IVF  -Hold blood thinners  -SCDs for DVT prophylaxis  -Transfuse <7.  -Q6h HnHs  -Aspiration precautions  -GI consult in AM.

## 2024-09-27 NOTE — CARE PLAN
The patient is Stable - Low risk of patient condition declining or worsening    Shift Goals  Clinical Goals: VSS, rest, monitor HH  Patient Goals: comfort, rest    Progress made toward(s) clinical / shift goals:    Problem: Pain - Standard  Goal: Alleviation of pain or a reduction in pain to the patient’s comfort goal  Outcome: Progressing     Problem: Knowledge Deficit - Standard  Goal: Patient and family/care givers will demonstrate understanding of plan of care, disease process/condition, diagnostic tests and medications  Outcome: Progressing     Problem: Optimal Care for Alcohol Withdrawal  Goal: Optimal Care for the alcohol withdrawal patient  Outcome: Progressing     Problem: Seizure Precautions  Goal: Implementation of seizure precautions  Outcome: Progressing     Problem: Lifestyle Changes  Goal: Patient's ability to identify lifestyle changes and available resources to help reduce recurrence of condition will improve  Outcome: Progressing     Problem: Psychosocial  Goal: Patient's level of anxiety will decrease  Outcome: Progressing  Goal: Spiritual and cultural needs incorporated into hospitalization  Outcome: Progressing     Problem: Risk for Aspiration  Goal: Patient's risk for aspiration will be absent or decrease  Outcome: Progressing       Patient is not progressing towards the following goals:

## 2024-09-27 NOTE — ANESTHESIA PROCEDURE NOTES
Airway    Date/Time: 9/27/2024 9:28 AM    Performed by: Isacc Domingo M.D.  Authorized by: Isacc Domingo M.D.    Location:  OR  Urgency:  Elective  Indications for Airway Management:  Anesthesia      Spontaneous Ventilation: absent    Sedation Level:  Deep  Preoxygenated: Yes    Patient Position:  Sniffing  Mask Difficulty Assessment:  1 - vent by mask  Final Airway Type:  Endotracheal airway  Final Endotracheal Airway:  ETT  Cuffed: Yes    Technique Used for Successful ETT Placement:  Direct laryngoscopy    Insertion Site:  Oral  Blade Type:  Adelia  Laryngoscope Blade/Videolaryngoscope Blade Size:  3  ETT Size (mm):  7.5  Measured from:  Teeth  ETT to Teeth (cm):  23  Placement Verified by: auscultation and capnometry    Cormack-Lehane Classification:  Grade I - full view of glottis  Number of Attempts at Approach:  1

## 2024-09-27 NOTE — ED NOTES
Report received. Bedside rounding completed. VSS on RA on monitor. Pt wakes to voice. Medications infusing per MAR. Waiting for bed assignment. Call light in place.

## 2024-09-27 NOTE — PROCEDURES
OPERATIVE REPORT    PATIENT:   Inocencio Shabazz   1960       PREOPERATIVE DIAGNOSES/INDICATIONS: Upper GI bleeding. Hematemesis.     POSTOPERATIVE DIAGNOSIS:   S/p banding x 4 for moderate size of EVs.     PROCEDURE:  ESOPHAGOGASTRODUODENOSCOPY    PHYSICIAN:  Rafa Esposito MD. MPH.    ANESTHESIA:  Per anesthesiologist or ICU/ED team.     LOCATION: [facility]    CONSENT:  OBTAINED. The risks, benefits and alternatives of the procedure were discussed in details. The risks include and are not limited to bleeding, infection, perforation, missed lesions, and sedations risks (cardiopulmonary compromise and allergic reaction to medications).    DESCRIPTION: Scope team at bedside  Patient was placed on his/her left lateral decubitus position. A bite block was placed in patient's mouth. Patient was sedated by anesthesia.  Vital signs were monitored throughout the procedure.  Oxygenation support was provided throughout the procedure. Upper endoscope was inserted into patient's mouth and advanced to the second portion of the duodenum under direct visualization.      Once the site was reached and examined, the upper endoscope was withdrawn.  Retroflexion was made within the stomach.  The stomach was decompressed, scope was withdrawn and the procedure was terminated.    The patient tolerated the procedure well.  There were no immediate complications.    Estimate procedure related bleeding: less than 1cc.     OPERATIVE FINDINGS:    1. Esophagus: Moderate size EVs x 3 columns, some red tamara sign, no active bleeding. GERD grade B.   2. Stomach: Mild portal hypertensive gastropathy.   3. Duodenum: Grossly normal.     RECOMMENDATIONS:  PPI, complete today's octreotide.   Okay to continue cirrhotic bleeding antibiotics.   Okay to resume clear liquid.   Inpatient GI team will continue to follow up.       This note has been transcribed with digital voice recognition software and although it has been reviewed may contain grammatical  or word errors

## 2024-09-27 NOTE — ASSESSMENT & PLAN NOTE
Hx. Of cirrhosis noted on imaging. Complication of alcohol use disorder, history of hep c.  Counseled on importance of completely abstaining from alcohol  Patient currently euvolemic

## 2024-09-27 NOTE — H&P
[facility] Internal Medicine History & Physical Note    Date of Service  9/27/2024    Attending: Nely Cooper M.d.  Senior Resident: Dr. Potts  Contact Number: [phone]    Primary Care Physician  Nnamdi Ballesteros M.D.    Consultants  GI    Code Status  Full Code    Chief Complaint  Chief Complaint   Patient presents with    Blood in Vomit     Reports large amount x2 days, BRB, -thinners, +ETOH daily, reports 1/5th vodka daily        History of Presenting Illness (HPI):   Inocencio Shabazz is a 64 y.o. male who presented 9/27/2024 with hematemesis. Pt has been having large-volume hematemesis over the day, but does not remember abnormal bowel habits. Pt denies fevers, chills, abdominal pain, and reports that primarily he is having hematemesis with muscle cramping. When seen at bedside, patient is somnolent likely due to volume depletion, fatigue. He denies any other significant complaints, and requests to be allowed to sleep. Pt has an extensive alcohol use history, using hard liquor. Pt also occasionally uses marijuana. In the past, gastroscopies have been performed, with history of cirrhosis and variceal bleeding. Pt also has a PMH of documented Hep C, schizophrenia.    In the ER, vitals revealed soft Bps. Pt developed episodes of VT, received amio, which results in a decrease in blood pressures. Amio was then held. Labs included Hb 8.1 (down from 2 weeks ago 14), WBC 2.6, Plt 60, K+ 3.2, AST/ALT 63/51, PT/INR 19.5/1.64. Previous RUQ U/s in 10/2023 revealed a cirrhotic liver and thickening GB wall. Echo 2023 revealed normal LV size, wall thickness, mild TR, and RVSP 25. GI was consulted by ERP, will see pt in AM. Pt admitted for management of UGIB.    I discussed the plan of care with patient and Dr. Cooper .    Review of Systems  Review of Systems   Constitutional: Negative.    HENT: Negative.     Eyes: Negative.    Respiratory: Negative.     Cardiovascular: Negative.    Gastrointestinal:  Positive for nausea and  vomiting. Negative for abdominal pain, blood in stool, constipation, diarrhea and melena.   Genitourinary: Negative.    Musculoskeletal:  Positive for myalgias.   Skin: Negative.    Neurological: Negative.    Endo/Heme/Allergies: Negative.    Psychiatric/Behavioral: Negative.         Past Medical History   has a past medical history of Alcohol abuse, Bipolar disorder (HCC), Cirrhosis (HCC), GIB (gastrointestinal bleeding) (01/03/2016), Hepatitis C, Pacemaker, Pancytopenia (HCC), and Schizophrenia (HCC).    Surgical History   has a past surgical history that includes gastroscopy (1/3/2016); gastroscopy (4/8/2016); gastroscopy (3/13/2019); gastroscopy-endo (N/A, 11/13/2019); pr upper gi endoscopy,diagnosis (N/A, 8/10/2023); and pr upper gi endoscopy,ctrl bleed (N/A, 8/10/2023).     Family History  family history is not on file.   Family history reviewed with patient.     Social History  Tobacco: 72 pack year smoker  Alcohol: Hard liquor daily  Recreational drugs (illegal or prescription): Occasional marijuana use.    Allergies  Allergies   Allergen Reactions    Haloperidol Unspecified     Twitching/involuntary movements        Medications  Prior to Admission Medications   Prescriptions Last Dose Informant Patient Reported? Taking?   QUEtiapine (SEROQUEL) 200 MG Tab  Patient, Patient's Home Pharmacy Yes No   Sig: Take 200 mg by mouth every evening.   carvedilol (COREG) 6.25 MG Tab   No No   Sig: Take 1 Tablet by mouth 2 times a day with meals.   ferrous sulfate 325 (65 Fe) MG tablet   No No   Sig: Take 1 Tablet by mouth every 48 hours.   omeprazole (PRILOSEC) 20 MG delayed-release capsule  Patient Yes No   Sig: Take 20 mg by mouth every day.   ondansetron (ZOFRAN ODT) 4 MG TABLET DISPERSIBLE   No No   Sig: Take 1 Tablet by mouth every 6 hours as needed for Nausea/Vomiting.   thiamine (THIAMINE) 100 MG tablet   No No   Sig: Take 1 Tablet by mouth every day.      Facility-Administered Medications: None       Physical  Exam  Temp:  [36.1 °C (97 °F)-37.2 °C (98.9 °F)] 36.7 °C (98.1 °F)  Pulse:  [] 80  Resp:  [12-15] 13  BP: ()/(53-76) 119/76  SpO2:  [88 %-96 %] 94 %  Blood Pressure : 119/76   Temperature: 36.7 °C (98.1 °F)   Pulse: 80   Respiration: 13   Pulse Oximetry: 94 %       Physical Exam  Constitutional:       Appearance: He is normal weight. He is ill-appearing, toxic-appearing and diaphoretic.   HENT:      Right Ear: External ear normal.      Left Ear: External ear normal.      Nose: Nose normal.      Mouth/Throat:      Mouth: Mucous membranes are moist.   Eyes:      Extraocular Movements: Extraocular movements intact.      Conjunctiva/sclera: Conjunctivae normal.   Cardiovascular:      Rate and Rhythm: Normal rate and regular rhythm.      Pulses: Normal pulses.      Heart sounds: Normal heart sounds.   Pulmonary:      Effort: Pulmonary effort is normal.      Breath sounds: Normal breath sounds.   Abdominal:      General: There is no distension.      Palpations: Abdomen is soft. There is no mass.      Tenderness: There is no abdominal tenderness. There is no right CVA tenderness, left CVA tenderness, guarding or rebound.      Hernia: No hernia is present.   Musculoskeletal:         General: Normal range of motion.      Cervical back: Normal range of motion and neck supple.   Skin:     General: Skin is warm.   Neurological:      General: No focal deficit present.      Mental Status: He is alert and oriented to person, place, and time.   Psychiatric:         Mood and Affect: Mood normal.         Behavior: Behavior normal.         Thought Content: Thought content normal.         Judgment: Judgment normal.         Laboratory:  Recent Labs     09/27/24  0040   WBC 2.6*   RBC 2.69*   HEMOGLOBIN 8.1*   HEMATOCRIT 24.7*   MCV 91.8   MCH 30.1   MCHC 32.8   RDW 49.7   PLATELETCT 60*   MPV 12.7     Recent Labs     09/27/24  0040   SODIUM 140   POTASSIUM 3.2*   CHLORIDE 103   CO2 22   GLUCOSE 152*   BUN 26*   CREATININE  "0.65   CALCIUM 8.3*     Recent Labs     09/27/24 0040   ALTSGPT 51*   ASTSGOT 63*   ALKPHOSPHAT 65   TBILIRUBIN 1.8*   LIPASE 29   GLUCOSE 152*     Recent Labs     09/27/24 0040   APTT 29.9   INR 1.64*     No results for input(s): \"NTPROBNP\" in the last 72 hours.      Recent Labs     09/27/24 0040   TROPONINT <6       Imaging:  No orders to display       X-Ray:  I have personally reviewed the images and compared with prior images.    Assessment/Plan:  Problem Representation:   I anticipate this patient will require at least two midnights for appropriate medical management, necessitating inpatient admission because Allcohol withdrawal, UGIB    Patient will need a Telemetry bed on MEDICAL service .  The need is secondary to Severe anemia.    * Upper GI bleed/portal gastropathy, par Afib not on anticoag, EtOH/Hep C- (present on admission)  Assessment & Plan  Hx. Cirrhosis, previous gastroscopies.  -Octreotide  -IV PPI  -IVF  -Hold blood thinners  -SCDs for DVT prophylaxis  -Transfuse <7.  -Q6h HnHs  -Aspiration precautions  -GI consult in AM.    V-tach (HCC)- (present on admission)  Assessment & Plan  Patient was started on IV amiodarone for SVT and some runs of VT, however, pressures started declining so amiodarone was stopped after 70 mg.  -Telemetry  -Start rate control if symptoms begin again    Secondary esophageal varices (HCC)- (present on admission)  Assessment & Plan  Hx cirrhosis, secondary to alcohol use disorder. Also documented past medical history of Hep c.  -IV PPI  -Octreotide  -GI to see pt in AM, consulted by ER.  -Consider beta-blocker reinitiation once bleeding stabilized.    Symptomatic anemia- (present on admission)  Assessment & Plan  Anemia secondary to hematemesis. Hx. Multiple gastroscopies  -Iron labs  -Q6 H+H  -Transfuse hb <7  -Telemetry  -Octreotide  -IV PPI  -Falls precautions  -PT/OT  -Hold blood thinners.    Pacemaker- (present on admission)  Assessment & Plan  Hx. PPM " placement  -Telemetry  -Monitor for bradycardia, arrhythmias      Hematemesis- (present on admission)  Assessment & Plan  See plan for Upper GIB.    Pancytopenia (HCC)- (present on admission)  Assessment & Plan  Likely secondary to extensive alcohol use history.  - on alcohol cessation  -Consider Heme-onc consult.    Hypokalemia- (present on admission)  Assessment & Plan  Replete PRN.    Paroxysmal atrial fibrillation (HCC)- (present on admission)  Assessment & Plan  Not on anticoagulation. Rate controlled with Coreg 6.25 BID at home  -Hold rate control to prevent hypotension  -Telemetry.    Schizophrenia (HCC)- (present on admission)  Assessment & Plan  Hx. Schizophrenia, managed w/ seroquel.  -Continue home meds    Alcohol abuse- (present on admission)  Assessment & Plan  Multivitamin  Folic acid  Thiamine IV, then PO  Falls prec  Seizure prec  Aspiration prec  SLP  Hold blood thinners  Telemetry  Consider CIWA protocol; pt's last drink was 2 days ago.    Cirrhosis (HCC)- (present on admission)  Assessment & Plan  Hx. Of cirrhosis noted on imaging. Complication of alcohol use disorder, history of hep c.  -Consult GI for management  -Hepatitis C panel  -Alcohol cessation counseling  -Management of varices    Hepatitis C- (present on admission)  Assessment & Plan  Documented hx. Hep C. Cirrhosis noted on U/s abd from 10/2023.  -Hepatitis panel  -Consult GI in AM.        VTE prophylaxis: SCDs/TEDs

## 2024-09-27 NOTE — ANESTHESIA PREPROCEDURE EVALUATION
Case: [id number] Date/Time: 09/27/24 0936    Procedure: GASTROSCOPY    Pre-op diagnosis: hematemesis    Location: [facility] ROOM 26 / SURGERY SAME DAY [facility]    Surgeons: Rafa Esposito M.D.            Relevant Problems   CARDIAC   (positive) Atrial fibrillation with RVR (HCC)   (positive) Bleeding esophageal varices (HCC)   (positive) Pacemaker   (positive) Paroxysmal atrial fibrillation (HCC)   (positive) Portal hypertension (HCC)   (positive) Secondary esophageal varices (HCC)      GI   (positive) Gastric cardia ulcer         (positive) Cirrhosis (HCC)   (positive) Hepatitis C       Physical Exam    Airway   Mallampati: II  TM distance: >3 FB  Neck ROM: full       Cardiovascular - normal exam  Rhythm: regular  Rate: normal  (-) murmur     Dental - normal exam           Pulmonary - normal exam  Breath sounds clear to auscultation     Abdominal    Neurological - normal exam                   Anesthesia Plan    ASA 3   ASA physical status 3 criteria: implanted pacemaker and alcohol and/or substance dependence or abuse    Plan - general       Airway plan will be ETT          Induction: intravenous    Postoperative Plan: Postoperative administration of opioids is intended.    Pertinent diagnostic labs and testing reviewed    Informed Consent:    Anesthetic plan and risks discussed with patient.    Use of blood products discussed with: patient whom consented to blood products.

## 2024-09-28 ENCOUNTER — APPOINTMENT (OUTPATIENT)
Dept: CARDIOLOGY | Facility: MEDICAL CENTER | Age: 64
End: 2024-09-28
Attending: HOSPITALIST
Payer: COMMERCIAL

## 2024-09-28 PROBLEM — K92.0 HEMATEMESIS: Status: RESOLVED | Noted: 2023-04-10 | Resolved: 2024-09-28

## 2024-09-28 LAB
ALBUMIN SERPL BCP-MCNC: 3.2 G/DL (ref 3.2–4.9)
ALP SERPL-CCNC: 62 U/L (ref 30–99)
ALT SERPL-CCNC: 98 U/L (ref 2–50)
ANION GAP SERPL CALC-SCNC: 6 MMOL/L (ref 7–16)
ANISOCYTOSIS BLD QL SMEAR: ABNORMAL
AST SERPL-CCNC: 161 U/L (ref 12–45)
BASOPHILS # BLD AUTO: 0 % (ref 0–1.8)
BASOPHILS # BLD AUTO: 1.1 % (ref 0–1.8)
BASOPHILS # BLD AUTO: 1.7 % (ref 0–1.8)
BASOPHILS # BLD AUTO: 1.7 % (ref 0–1.8)
BASOPHILS # BLD: 0 K/UL (ref 0–0.12)
BASOPHILS # BLD: 0.02 K/UL (ref 0–0.12)
BASOPHILS # BLD: 0.02 K/UL (ref 0–0.12)
BASOPHILS # BLD: 0.03 K/UL (ref 0–0.12)
BILIRUB CONJ SERPL-MCNC: 0.6 MG/DL (ref 0.1–0.5)
BILIRUB INDIRECT SERPL-MCNC: 1.1 MG/DL (ref 0–1)
BILIRUB SERPL-MCNC: 1.7 MG/DL (ref 0.1–1.5)
BUN SERPL-MCNC: 8 MG/DL (ref 8–22)
CALCIUM SERPL-MCNC: 7.8 MG/DL (ref 8.5–10.5)
CHLORIDE SERPL-SCNC: 108 MMOL/L (ref 96–112)
CO2 SERPL-SCNC: 21 MMOL/L (ref 20–33)
CREAT SERPL-MCNC: 0.62 MG/DL (ref 0.5–1.4)
EKG IMPRESSION: NORMAL
EOSINOPHIL # BLD AUTO: 0.07 K/UL (ref 0–0.51)
EOSINOPHIL # BLD AUTO: 0.11 K/UL (ref 0–0.51)
EOSINOPHIL # BLD AUTO: 0.12 K/UL (ref 0–0.51)
EOSINOPHIL # BLD AUTO: 0.14 K/UL (ref 0–0.51)
EOSINOPHIL NFR BLD: 6 % (ref 0–6.9)
EOSINOPHIL NFR BLD: 6.1 % (ref 0–6.9)
EOSINOPHIL NFR BLD: 7.9 % (ref 0–6.9)
EOSINOPHIL NFR BLD: 8.6 % (ref 0–6.9)
ERYTHROCYTE [DISTWIDTH] IN BLOOD BY AUTOMATED COUNT: 49.7 FL (ref 35.9–50)
ERYTHROCYTE [DISTWIDTH] IN BLOOD BY AUTOMATED COUNT: 49.8 FL (ref 35.9–50)
ERYTHROCYTE [DISTWIDTH] IN BLOOD BY AUTOMATED COUNT: 51.2 FL (ref 35.9–50)
ERYTHROCYTE [DISTWIDTH] IN BLOOD BY AUTOMATED COUNT: 51.8 FL (ref 35.9–50)
GFR SERPLBLD CREATININE-BSD FMLA CKD-EPI: 105 ML/MIN/1.73 M 2
GLUCOSE SERPL-MCNC: 187 MG/DL (ref 65–99)
HCT VFR BLD AUTO: 22.2 % (ref 42–52)
HCT VFR BLD AUTO: 23.6 % (ref 42–52)
HCT VFR BLD AUTO: 25 % (ref 42–52)
HCT VFR BLD AUTO: 26.4 % (ref 42–52)
HGB BLD-MCNC: 7.2 G/DL (ref 14–18)
HGB BLD-MCNC: 7.9 G/DL (ref 14–18)
HGB BLD-MCNC: 8.4 G/DL (ref 14–18)
HGB BLD-MCNC: 8.6 G/DL (ref 14–18)
IMM GRANULOCYTES # BLD AUTO: 0.01 K/UL (ref 0–0.11)
IMM GRANULOCYTES NFR BLD AUTO: 0.5 % (ref 0–0.9)
INR PPP: 1.37 (ref 0.87–1.13)
LV EJECT FRACT  99904: 60
LV EJECT FRACT MOD 2C 99903: 54.77
LV EJECT FRACT MOD 4C 99902: 54.78
LV EJECT FRACT MOD BP 99901: 54.51
LYMPHOCYTES # BLD AUTO: 0.26 K/UL (ref 1–4.8)
LYMPHOCYTES # BLD AUTO: 0.26 K/UL (ref 1–4.8)
LYMPHOCYTES # BLD AUTO: 0.28 K/UL (ref 1–4.8)
LYMPHOCYTES # BLD AUTO: 0.34 K/UL (ref 1–4.8)
LYMPHOCYTES NFR BLD: 16.4 % (ref 22–41)
LYMPHOCYTES NFR BLD: 18.4 % (ref 22–41)
LYMPHOCYTES NFR BLD: 18.5 % (ref 22–41)
LYMPHOCYTES NFR BLD: 21.8 % (ref 22–41)
MAGNESIUM SERPL-MCNC: 1.9 MG/DL (ref 1.5–2.5)
MANUAL DIFF BLD: NORMAL
MCH RBC QN AUTO: 29.7 PG (ref 27–33)
MCH RBC QN AUTO: 29.8 PG (ref 27–33)
MCH RBC QN AUTO: 29.9 PG (ref 27–33)
MCH RBC QN AUTO: 30.5 PG (ref 27–33)
MCHC RBC AUTO-ENTMCNC: 32.4 G/DL (ref 32.3–36.5)
MCHC RBC AUTO-ENTMCNC: 32.6 G/DL (ref 32.3–36.5)
MCHC RBC AUTO-ENTMCNC: 33.5 G/DL (ref 32.3–36.5)
MCHC RBC AUTO-ENTMCNC: 33.6 G/DL (ref 32.3–36.5)
MCV RBC AUTO: 89.4 FL (ref 81.4–97.8)
MCV RBC AUTO: 90.9 FL (ref 81.4–97.8)
MCV RBC AUTO: 91 FL (ref 81.4–97.8)
MCV RBC AUTO: 91.7 FL (ref 81.4–97.8)
MICROCYTES BLD QL SMEAR: ABNORMAL
MONOCYTES # BLD AUTO: 0.08 K/UL (ref 0–0.85)
MONOCYTES # BLD AUTO: 0.09 K/UL (ref 0–0.85)
MONOCYTES # BLD AUTO: 0.12 K/UL (ref 0–0.85)
MONOCYTES # BLD AUTO: 0.18 K/UL (ref 0–0.85)
MONOCYTES NFR BLD AUTO: 5.3 % (ref 0–13.4)
MONOCYTES NFR BLD AUTO: 7.8 % (ref 0–13.4)
MONOCYTES NFR BLD AUTO: 7.8 % (ref 0–13.4)
MONOCYTES NFR BLD AUTO: 9.8 % (ref 0–13.4)
MORPHOLOGY BLD-IMP: NORMAL
NEUTROPHILS # BLD AUTO: 0.75 K/UL (ref 1.82–7.42)
NEUTROPHILS # BLD AUTO: 1.03 K/UL (ref 1.82–7.42)
NEUTROPHILS # BLD AUTO: 1.05 K/UL (ref 1.82–7.42)
NEUTROPHILS # BLD AUTO: 1.18 K/UL (ref 1.82–7.42)
NEUTROPHILS NFR BLD: 62.6 % (ref 44–72)
NEUTROPHILS NFR BLD: 64.1 % (ref 44–72)
NEUTROPHILS NFR BLD: 65.5 % (ref 44–72)
NEUTROPHILS NFR BLD: 67.5 % (ref 44–72)
NEUTS BAND NFR BLD MANUAL: 0.9 % (ref 0–10)
NRBC # BLD AUTO: 0 K/UL
NRBC BLD-RTO: 0 /100 WBC (ref 0–0.2)
OVALOCYTES BLD QL SMEAR: NORMAL
PLATELET # BLD AUTO: 37 K/UL (ref 164–446)
PLATELET # BLD AUTO: 55 K/UL (ref 164–446)
PLATELET # BLD AUTO: 56 K/UL (ref 164–446)
PLATELET # BLD AUTO: 56 K/UL (ref 164–446)
PLATELET BLD QL SMEAR: NORMAL
PLATELETS.RETICULATED NFR BLD AUTO: 6.3 % (ref 0.6–13.1)
PLATELETS.RETICULATED NFR BLD AUTO: 7.6 % (ref 0.6–13.1)
PLATELETS.RETICULATED NFR BLD AUTO: 8.6 % (ref 0.6–13.1)
PLATELETS.RETICULATED NFR BLD AUTO: 8.8 % (ref 0.6–13.1)
PMV BLD AUTO: 10.8 FL (ref 9–12.9)
PMV BLD AUTO: 11.5 FL (ref 9–12.9)
PMV BLD AUTO: 11.8 FL (ref 9–12.9)
PMV BLD AUTO: 12.6 FL (ref 9–12.9)
POIKILOCYTOSIS BLD QL SMEAR: NORMAL
POTASSIUM SERPL-SCNC: 3.8 MMOL/L (ref 3.6–5.5)
PROT SERPL-MCNC: 6.4 G/DL (ref 6–8.2)
PROTHROMBIN TIME: 16.9 SEC (ref 12–14.6)
RBC # BLD AUTO: 2.42 M/UL (ref 4.7–6.1)
RBC # BLD AUTO: 2.64 M/UL (ref 4.7–6.1)
RBC # BLD AUTO: 2.75 M/UL (ref 4.7–6.1)
RBC # BLD AUTO: 2.9 M/UL (ref 4.7–6.1)
RBC BLD AUTO: NORMAL
RBC BLD AUTO: PRESENT
RBC BLD AUTO: PRESENT
SODIUM SERPL-SCNC: 135 MMOL/L (ref 135–145)
WBC # BLD AUTO: 1.2 K/UL (ref 4.8–10.8)
WBC # BLD AUTO: 1.5 K/UL (ref 4.8–10.8)
WBC # BLD AUTO: 1.6 K/UL (ref 4.8–10.8)
WBC # BLD AUTO: 1.8 K/UL (ref 4.8–10.8)

## 2024-09-28 PROCEDURE — 700102 HCHG RX REV CODE 250 W/ 637 OVERRIDE(OP): Performed by: NURSE PRACTITIONER

## 2024-09-28 PROCEDURE — 700102 HCHG RX REV CODE 250 W/ 637 OVERRIDE(OP): Mod: JZ | Performed by: HOSPITALIST

## 2024-09-28 PROCEDURE — 93010 ELECTROCARDIOGRAM REPORT: CPT | Performed by: INTERNAL MEDICINE

## 2024-09-28 PROCEDURE — 700105 HCHG RX REV CODE 258

## 2024-09-28 PROCEDURE — 99232 SBSQ HOSP IP/OBS MODERATE 35: CPT | Performed by: NURSE PRACTITIONER

## 2024-09-28 PROCEDURE — 36415 COLL VENOUS BLD VENIPUNCTURE: CPT

## 2024-09-28 PROCEDURE — 93306 TTE W/DOPPLER COMPLETE: CPT | Mod: 26 | Performed by: INTERNAL MEDICINE

## 2024-09-28 PROCEDURE — A9270 NON-COVERED ITEM OR SERVICE: HCPCS

## 2024-09-28 PROCEDURE — 700102 HCHG RX REV CODE 250 W/ 637 OVERRIDE(OP)

## 2024-09-28 PROCEDURE — 85055 RETICULATED PLATELET ASSAY: CPT

## 2024-09-28 PROCEDURE — 83735 ASSAY OF MAGNESIUM: CPT

## 2024-09-28 PROCEDURE — A9270 NON-COVERED ITEM OR SERVICE: HCPCS | Performed by: NURSE PRACTITIONER

## 2024-09-28 PROCEDURE — 93306 TTE W/DOPPLER COMPLETE: CPT

## 2024-09-28 PROCEDURE — 80076 HEPATIC FUNCTION PANEL: CPT

## 2024-09-28 PROCEDURE — A9270 NON-COVERED ITEM OR SERVICE: HCPCS | Mod: JZ | Performed by: HOSPITALIST

## 2024-09-28 PROCEDURE — 770020 HCHG ROOM/CARE - TELE (206)

## 2024-09-28 PROCEDURE — 700111 HCHG RX REV CODE 636 W/ 250 OVERRIDE (IP)

## 2024-09-28 PROCEDURE — 700102 HCHG RX REV CODE 250 W/ 637 OVERRIDE(OP): Performed by: STUDENT IN AN ORGANIZED HEALTH CARE EDUCATION/TRAINING PROGRAM

## 2024-09-28 PROCEDURE — 85025 COMPLETE CBC W/AUTO DIFF WBC: CPT

## 2024-09-28 PROCEDURE — 92610 EVALUATE SWALLOWING FUNCTION: CPT

## 2024-09-28 PROCEDURE — 86923 COMPATIBILITY TEST ELECTRIC: CPT

## 2024-09-28 PROCEDURE — 85610 PROTHROMBIN TIME: CPT

## 2024-09-28 PROCEDURE — 36430 TRANSFUSION BLD/BLD COMPNT: CPT

## 2024-09-28 PROCEDURE — 85007 BL SMEAR W/DIFF WBC COUNT: CPT | Mod: 91

## 2024-09-28 PROCEDURE — 85027 COMPLETE CBC AUTOMATED: CPT

## 2024-09-28 PROCEDURE — 93005 ELECTROCARDIOGRAM TRACING: CPT

## 2024-09-28 PROCEDURE — 99233 SBSQ HOSP IP/OBS HIGH 50: CPT | Performed by: HOSPITALIST

## 2024-09-28 PROCEDURE — 99222 1ST HOSP IP/OBS MODERATE 55: CPT | Performed by: INTERNAL MEDICINE

## 2024-09-28 PROCEDURE — 80048 BASIC METABOLIC PNL TOTAL CA: CPT

## 2024-09-28 PROCEDURE — 99291 CRITICAL CARE FIRST HOUR: CPT | Performed by: STUDENT IN AN ORGANIZED HEALTH CARE EDUCATION/TRAINING PROGRAM

## 2024-09-28 PROCEDURE — A9270 NON-COVERED ITEM OR SERVICE: HCPCS | Performed by: STUDENT IN AN ORGANIZED HEALTH CARE EDUCATION/TRAINING PROGRAM

## 2024-09-28 PROCEDURE — P9016 RBC LEUKOCYTES REDUCED: HCPCS

## 2024-09-28 RX ORDER — CARVEDILOL 6.25 MG/1
6.25 TABLET ORAL 2 TIMES DAILY WITH MEALS
Status: DISCONTINUED | OUTPATIENT
Start: 2024-09-29 | End: 2024-09-30 | Stop reason: HOSPADM

## 2024-09-28 RX ORDER — MAGNESIUM SULFATE HEPTAHYDRATE 40 MG/ML
2 INJECTION, SOLUTION INTRAVENOUS ONCE
Status: DISCONTINUED | OUTPATIENT
Start: 2024-09-28 | End: 2024-09-28

## 2024-09-28 RX ORDER — MAGNESIUM SULFATE HEPTAHYDRATE 40 MG/ML
2 INJECTION, SOLUTION INTRAVENOUS ONCE
Status: COMPLETED | OUTPATIENT
Start: 2024-09-28 | End: 2024-09-28

## 2024-09-28 RX ORDER — CARVEDILOL 3.12 MG/1
3.12 TABLET ORAL 2 TIMES DAILY WITH MEALS
Status: DISCONTINUED | OUTPATIENT
Start: 2024-09-28 | End: 2024-09-28

## 2024-09-28 RX ORDER — POTASSIUM CHLORIDE 1500 MG/1
20 TABLET, EXTENDED RELEASE ORAL ONCE
Status: COMPLETED | OUTPATIENT
Start: 2024-09-28 | End: 2024-09-28

## 2024-09-28 RX ORDER — LACTULOSE 10 G/15ML
30 SOLUTION ORAL 3 TIMES DAILY
Status: DISCONTINUED | OUTPATIENT
Start: 2024-09-28 | End: 2024-09-29

## 2024-09-28 RX ADMIN — MAGNESIUM SULFATE HEPTAHYDRATE 2 G: 2 INJECTION, SOLUTION INTRAVENOUS at 01:39

## 2024-09-28 RX ADMIN — LACTULOSE 30 ML: 10 SOLUTION ORAL at 11:20

## 2024-09-28 RX ADMIN — CARVEDILOL 3.12 MG: 3.12 TABLET, FILM COATED ORAL at 17:00

## 2024-09-28 RX ADMIN — THIAMINE HYDROCHLORIDE 500 MG: 100 INJECTION, SOLUTION INTRAMUSCULAR; INTRAVENOUS at 08:47

## 2024-09-28 RX ADMIN — QUETIAPINE FUMARATE 200 MG: 200 TABLET ORAL at 21:02

## 2024-09-28 RX ADMIN — THERA TABS 1 TABLET: TAB at 05:14

## 2024-09-28 RX ADMIN — NICOTINE TRANSDERMAL SYSTEM 21 MG: 21 PATCH, EXTENDED RELEASE TRANSDERMAL at 05:07

## 2024-09-28 RX ADMIN — PANTOPRAZOLE SODIUM 40 MG: 40 INJECTION, POWDER, FOR SOLUTION INTRAVENOUS at 17:00

## 2024-09-28 RX ADMIN — LACTULOSE 30 ML: 10 SOLUTION ORAL at 17:00

## 2024-09-28 RX ADMIN — POTASSIUM CHLORIDE 20 MEQ: 1500 TABLET, EXTENDED RELEASE ORAL at 08:43

## 2024-09-28 RX ADMIN — MAGNESIUM SULFATE HEPTAHYDRATE 2 G: 2 INJECTION, SOLUTION INTRAVENOUS at 01:06

## 2024-09-28 RX ADMIN — FOLIC ACID 1 MG: 1 TABLET ORAL at 05:07

## 2024-09-28 RX ADMIN — PANTOPRAZOLE SODIUM 40 MG: 40 INJECTION, POWDER, FOR SOLUTION INTRAVENOUS at 05:08

## 2024-09-28 RX ADMIN — THIAMINE HYDROCHLORIDE 500 MG: 100 INJECTION, SOLUTION INTRAMUSCULAR; INTRAVENOUS at 14:51

## 2024-09-28 RX ADMIN — THIAMINE HYDROCHLORIDE 500 MG: 100 INJECTION, SOLUTION INTRAMUSCULAR; INTRAVENOUS at 21:10

## 2024-09-28 ASSESSMENT — ENCOUNTER SYMPTOMS
BLURRED VISION: 0
ABDOMINAL PAIN: 0
CONSTIPATION: 0
FEVER: 0
BACK PAIN: 0
CHILLS: 0
DIZZINESS: 0
COUGH: 0
WEAKNESS: 0
BLOOD IN STOOL: 0
HEARTBURN: 0
VOMITING: 0
DEPRESSION: 0
SHORTNESS OF BREATH: 0
NAUSEA: 0
DIARRHEA: 0

## 2024-09-28 ASSESSMENT — LIFESTYLE VARIABLES
NAUSEA AND VOMITING: NO NAUSEA AND NO VOMITING
HEADACHE, FULLNESS IN HEAD: NOT PRESENT
PAROXYSMAL SWEATS: NO SWEAT VISIBLE
TREMOR: NO TREMOR
TOTAL SCORE: 3
AGITATION: *
AUDITORY DISTURBANCES: NOT PRESENT
ORIENTATION AND CLOUDING OF SENSORIUM: ORIENTED AND CAN DO SERIAL ADDITIONS
ANXIETY: MILDLY ANXIOUS
VISUAL DISTURBANCES: NOT PRESENT

## 2024-09-28 ASSESSMENT — PAIN DESCRIPTION - PAIN TYPE
TYPE: ACUTE PAIN

## 2024-09-28 ASSESSMENT — FIBROSIS 4 INDEX: FIB4 SCORE: 14.31

## 2024-09-28 NOTE — PROGRESS NOTES
Monitor Summary   SR/ST:   Ectopy: none  .17/.09/.37    Events: 49bt Vtach, 13 second run SVT

## 2024-09-28 NOTE — PROGRESS NOTES
Received bedside report from off going RN. Assumed patient care. VSS at this time on RA. Patient on telemetry monitoring, in NSR on monitor. Patient resting in bed, eyes closed, awakens to voice. No complaints of CP, SOB, or dizziness. Call light in reach and fall precautions in place. Hourly rounding initiated.

## 2024-09-28 NOTE — PROGRESS NOTES
..Gastroenterology Progress Note               Author:  Virginia Ayala, DNP,  APRN Date & Time Created: 9/28/2024 8:14 AM       Patient ID:  Name:             Inocencio Shabazz  YOB: 1960  Age:                 64 y.o.  male  MRN:               [id number]        Medical Decision Making, by Problem:  Active Hospital Problems    Diagnosis     Neutropenia (HCC) [D70.9]     V-tach (HCC) [I47.20]     Symptomatic anemia [D64.9]     Secondary esophageal varices (HCC) [I85.10]     Upper GI bleed/portal gastropathy, par Afib not on anticoag, EtOH/Hep C [K92.2]     Pacemaker [Z95.0]     Hematemesis [K92.0]     Pancytopenia (HCC) [D61.818]     Acute blood loss anemia (ABLA) [D62]     Hypokalemia [E87.6]     Paroxysmal atrial fibrillation (HCC) [I48.0]     Bleeding esophageal varices (HCC) [I85.01]     Schizophrenia (HCC) [F20.9]     Cirrhosis (HCC) [K74.60]     Alcohol abuse [F10.10]     Portal hypertensive gastropathy (HCC) [K76.6, K31.89]     Hepatitis C [B19.20]     Thrombocytopenia (HCC) [D69.6]      Presenting Chief Complaint:  hematemesis     HISTORY OF PRESENT ILLNESS:  Inocencio Shabazz is a 64 y.o. male with numerous admissions for similar complaints who presents with two episodes of large-volume hematemesis. Patient does not remember if he had black or bloody stools.  Patient reports muscle cramping.  Patient denies abdominal pain, chest pain or shortness of breath.  He admits to drinking but he says he has decreased recently. He had some runs of SVT in ER  He was given amiodarone and stabilized. Troponin normal.  Per chart review, he has chronic hepatitis C, genotype 3A who has not been treated and alcohol associated cirrhosis.  He was following with GI consultants as an outpatient.     Previous EGDs:   August 2023: with Dr. Yañez grade 1 varix, one column, portal hypertensive gastropathy, 2 AVMs treated  November 2019: with Dr. Smith where he was found to have esophageal varices and was  "banded x5.  March 2019: With Dr. Ba, portal hypertensive gastropathy with superimposed gastritis, small 2 cm hiatal hernia with possible early Camerons lesions, erythema without erosion, and grade 1 esophageal varix not banded  April 2016: With Dr. Tobin, gastric body ulcer bleed injected with epinephrine and hemoclipped and erosive gastritis  January 2016: With Dr. Antonio, grade 2 distal esophageal varices banded x6, possible Sanchez's esophagus but not a candidate for surveillance, moderate to severe portal hypertensive gastropathy    Interval History:  9/28/2024: Patient seen.  Reports feeling very tired.  Also describing \"film\" over his eyes.  No more bleeding. Nonsustained v. Tach overnight. Pancytopenia, WBC 1.5, hgb 7.9, plt 56    Hospital Medications:  Current Facility-Administered Medications   Medication Dose Frequency Provider Last Rate Last Admin    potassium chloride SA (Kdur) tablet 20 mEq  20 mEq Once Mo Amador M.D.        dextrose 5% infusion   Continuous Dank Diaz M.D.   Stopped at 09/27/24 0419    octreotide (SandoSTATIN) 1,250 mcg in  mL Infusion  50 mcg/hr Continuous Veronica Potts M.D. 10 mL/hr at 09/27/24 0923 50 mcg/hr at 09/27/24 0923    thiamine (B-1) 500 mg in dextrose 5% 100 mL IVPB  500 mg TID Veronica Potts M.D. 200 mL/hr at 09/27/24 2044 500 mg at 09/27/24 2044    [START ON 10/1/2024] thiamine (Vitamin B-1) tablet 100 mg  100 mg DAILY Veronica Potts M.D.        And    multivitamin tablet 1 Tablet  1 Tablet DAILY Veronica Potts M.D.   1 Tablet at 09/28/24 0514    And    folic acid (Folvite) tablet 1 mg  1 mg DAILY Veronica Potts M.D.   1 mg at 09/28/24 0507    QUEtiapine (SEROquel) tablet 200 mg  200 mg Nightly Veronica Potts M.D.   200 mg at 09/27/24 2045    pantoprazole (Protonix) injection 40 mg  40 mg BID Veronica Potts M.D.   40 mg at 09/28/24 0508    nicotine (Nicoderm) 21 MG/24HR 21 mg  21 mg Daily-0600 Abdelrahman MENDEZ" "MARCUS Marsh   21 mg at 09/28/24 0507    And    nicotine polacrilex (Nicorette) 2 MG piece 2 mg  2 mg Q HOUR PRN Abdelrahman Marsh M.D.       Last reviewed on 9/27/2024  6:14 AM by Keila Sierra T       Review of Systems:  Review of Systems   Constitutional:  Positive for malaise/fatigue. Negative for chills and fever.   HENT:  Negative for hearing loss.    Eyes:  Negative for blurred vision.   Respiratory:  Negative for cough and shortness of breath.    Cardiovascular:  Negative for chest pain and leg swelling.   Gastrointestinal:  Negative for abdominal pain, blood in stool, constipation, diarrhea, heartburn, melena, nausea and vomiting.   Genitourinary:  Negative for dysuria.   Musculoskeletal:  Negative for back pain.   Skin:  Negative for rash.   Neurological:  Negative for dizziness and weakness.   Psychiatric/Behavioral:  Negative for depression.    All other systems reviewed and are negative.        Vital signs:  Weight/BMI: Body mass index is 19.91 kg/m².  /67   Pulse 86   Temp 36.6 °C (97.8 °F)   Resp 18   Ht 1.727 m (5' 8\")   Wt 59.4 kg (130 lb 15.3 oz)   SpO2 94%   Vitals:    09/28/24 0400 09/28/24 0445 09/28/24 0745 09/28/24 0755   BP:  102/55 110/67    Pulse:  89 83 86   Resp:  18 20 18   Temp:  37.1 °C (98.8 °F) 37.2 °C (99 °F) 36.6 °C (97.8 °F)   TempSrc:  Temporal Temporal    SpO2:  94% 94%    Weight: 59.4 kg (130 lb 15.3 oz)      Height:         Oxygen Therapy:  Pulse Oximetry: 94 %, O2 (LPM): 0, O2 Delivery Device: None - Room Air    Intake/Output Summary (Last 24 hours) at 9/28/2024 0814  Last data filed at 9/28/2024 0445  Gross per 24 hour   Intake 1374 ml   Output 1725 ml   Net -351 ml         Physical Exam:  Physical Exam  Vitals and nursing note reviewed.   Constitutional:       General: He is not in acute distress.     Appearance: Normal appearance. He is ill-appearing.   HENT:      Head: Normocephalic and atraumatic.      Right Ear: External ear normal.      Left Ear: External ear " normal.      Nose: Nose normal.      Mouth/Throat:      Mouth: Mucous membranes are moist.      Pharynx: Oropharynx is clear.      Comments: Poor dentition  Mumbles when speaking  Eyes:      General: No scleral icterus.  Cardiovascular:      Rate and Rhythm: Regular rhythm. Tachycardia present.      Pulses: Normal pulses.      Heart sounds: Normal heart sounds.   Pulmonary:      Effort: Pulmonary effort is normal.      Breath sounds: Rales present.   Abdominal:      General: Abdomen is flat. Bowel sounds are normal. There is no distension.      Palpations: Abdomen is soft.      Tenderness: There is abdominal tenderness.   Musculoskeletal:         General: Normal range of motion.      Cervical back: Normal range of motion and neck supple.   Skin:     General: Skin is warm.      Capillary Refill: Capillary refill takes less than 2 seconds.      Coloration: Skin is pale.   Neurological:      Mental Status: He is alert and oriented to person, place, and time.      Motor: Weakness present.   Psychiatric:         Mood and Affect: Mood normal.         Behavior: Behavior normal.      Comments: Lethargic         Labs:  Recent Labs     09/27/24  0040 09/27/24 0621 09/28/24 0040   SODIUM 140 140 135   POTASSIUM 3.2* 4.3 3.8   CHLORIDE 103 106 108   CO2 22 22 21   BUN 26* 21 8   CREATININE 0.65 0.53 0.62   MAGNESIUM 2.5  --  1.9   CALCIUM 8.3* 7.8* 7.8*     Recent Labs     09/27/24  0040 09/27/24  0621 09/28/24  0040   ALTSGPT 51* 47  --    ASTSGOT 63* 55*  --    ALKPHOSPHAT 65 59  --    TBILIRUBIN 1.8* 2.1*  --    LIPASE 29  --   --    GLUCOSE 152* 178* 187*     Recent Labs     09/27/24  0040 09/27/24  0621 09/27/24  1501 09/27/24  2127 09/28/24  0040 09/28/24  0635   WBC 2.6*  --    < > 1.6* 1.2* 1.5*   NEUTSPOLYS 63.50  --    < > 52.50 62.60 67.50   LYMPHOCYTES 18.60*  --    < > 26.60 21.80* 18.40*   MONOCYTES 14.00*  --    < > 13.90* 7.80 5.30   EOSINOPHILS 2.30  --    < > 5.10 6.10 7.90*   BASOPHILS 1.60  --    < > 1.30  1.70 0.00   ASTSGOT 63* 55*  --   --   --   --    ALTSGPT 51* 47  --   --   --   --    ALKPHOSPHAT 65 59  --   --   --   --    TBILIRUBIN 1.8* 2.1*  --   --   --   --     < > = values in this interval not displayed.     Recent Labs     09/27/24  0040 09/27/24  1501 09/27/24  2127 09/28/24  0040 09/28/24  0635   RBC 2.69*   < > 2.55* 2.42* 2.64*   HEMOGLOBIN 8.1*   < > 7.8* 7.2* 7.9*   HEMATOCRIT 24.7*   < > 23.8* 22.2* 23.6*   PLATELETCT 60*   < > 39* 37* 56*   PROTHROMBTM 19.5*  --   --   --   --    APTT 29.9  --   --   --   --    INR 1.64*  --   --   --   --    IRON 299*  --   --   --   --    FERRITIN 80.8  --   --   --   --    TOTIRONBC see below  --   --   --   --     < > = values in this interval not displayed.     Recent Results (from the past 24 hour(s))   CBC with Differential    Collection Time: 09/27/24  3:01 PM   Result Value Ref Range    WBC 1.7 (LL) 4.8 - 10.8 K/uL    RBC 2.69 (L) 4.70 - 6.10 M/uL    Hemoglobin 8.2 (L) 14.0 - 18.0 g/dL    Hematocrit 24.6 (L) 42.0 - 52.0 %    MCV 91.4 81.4 - 97.8 fL    MCH 30.5 27.0 - 33.0 pg    MCHC 33.3 32.3 - 36.5 g/dL    RDW 48.8 35.9 - 50.0 fL    Platelet Count 41 (L) 164 - 446 K/uL    MPV 12.7 9.0 - 12.9 fL    Neutrophils-Polys 74.10 (H) 44.00 - 72.00 %    Lymphocytes 15.70 (L) 22.00 - 41.00 %    Monocytes 1.90 0.00 - 13.40 %    Eosinophils 3.70 0.00 - 6.90 %    Basophils 2.80 (H) 0.00 - 1.80 %    Nucleated RBC 0.00 0.00 - 0.20 /100 WBC    Neutrophils (Absolute) 1.28 (L) 1.82 - 7.42 K/uL    Lymphs (Absolute) 0.27 (L) 1.00 - 4.80 K/uL    Monos (Absolute) 0.03 0.00 - 0.85 K/uL    Eos (Absolute) 0.06 0.00 - 0.51 K/uL    Baso (Absolute) 0.05 0.00 - 0.12 K/uL    NRBC (Absolute) 0.00 K/uL   DIFFERENTIAL MANUAL    Collection Time: 09/27/24  3:01 PM   Result Value Ref Range    Bands-Stabs 0.90 0.00 - 10.00 %    Metamyelocytes 0.90 %    Manual Diff Status PERFORMED    PERIPHERAL SMEAR REVIEW    Collection Time: 09/27/24  3:01 PM   Result Value Ref Range    Peripheral Smear  Review see below    PLATELET ESTIMATE    Collection Time: 09/27/24  3:01 PM   Result Value Ref Range    Plt Estimation Decreased    MORPHOLOGY    Collection Time: 09/27/24  3:01 PM   Result Value Ref Range    RBC Morphology Present     Poikilocytosis 1+     Ovalocytes 1+     Target Cells 1+    IMMATURE PLT FRACTION    Collection Time: 09/27/24  3:01 PM   Result Value Ref Range    Imm. Plt Fraction 10.5 0.6 - 13.1 %   CBC with Differential    Collection Time: 09/27/24  9:27 PM   Result Value Ref Range    WBC 1.6 (LL) 4.8 - 10.8 K/uL    RBC 2.55 (L) 4.70 - 6.10 M/uL    Hemoglobin 7.8 (L) 14.0 - 18.0 g/dL    Hematocrit 23.8 (L) 42.0 - 52.0 %    MCV 93.3 81.4 - 97.8 fL    MCH 30.6 27.0 - 33.0 pg    MCHC 32.8 32.3 - 36.5 g/dL    RDW 49.8 35.9 - 50.0 fL    Platelet Count 39 (L) 164 - 446 K/uL    MPV 12.6 9.0 - 12.9 fL    Neutrophils-Polys 52.50 44.00 - 72.00 %    Lymphocytes 26.60 22.00 - 41.00 %    Monocytes 13.90 (H) 0.00 - 13.40 %    Eosinophils 5.10 0.00 - 6.90 %    Basophils 1.30 0.00 - 1.80 %    Immature Granulocytes 0.60 0.00 - 0.90 %    Nucleated RBC 0.00 0.00 - 0.20 /100 WBC    Neutrophils (Absolute) 0.83 (L) 1.82 - 7.42 K/uL    Lymphs (Absolute) 0.42 (L) 1.00 - 4.80 K/uL    Monos (Absolute) 0.22 0.00 - 0.85 K/uL    Eos (Absolute) 0.08 0.00 - 0.51 K/uL    Baso (Absolute) 0.02 0.00 - 0.12 K/uL    Immature Granulocytes (abs) 0.01 0.00 - 0.11 K/uL    NRBC (Absolute) 0.00 K/uL   IMMATURE PLT FRACTION    Collection Time: 09/27/24  9:27 PM   Result Value Ref Range    Imm. Plt Fraction 9.7 0.6 - 13.1 %   PLATELETS REQUEST    Collection Time: 09/27/24 11:12 PM   Result Value Ref Range    Component P       Plts,Pheresis       [id number]   transfused   09/27/24 23:53    Product Type Platelets  Pheresis LR     Dispense Status transfused     Unit Number (Barcoded) [id number]     Product Code (Barcoded) Z6130J10     Blood Type (Barcoded) 5100    CBC with Differential    Collection Time: 09/28/24 12:40 AM   Result Value  Ref Range    WBC 1.2 (LL) 4.8 - 10.8 K/uL    RBC 2.42 (L) 4.70 - 6.10 M/uL    Hemoglobin 7.2 (L) 14.0 - 18.0 g/dL    Hematocrit 22.2 (L) 42.0 - 52.0 %    MCV 91.7 81.4 - 97.8 fL    MCH 29.8 27.0 - 33.0 pg    MCHC 32.4 32.3 - 36.5 g/dL    RDW 49.7 35.9 - 50.0 fL    Platelet Count 37 (L) 164 - 446 K/uL    MPV 12.6 9.0 - 12.9 fL    Neutrophils-Polys 62.60 44.00 - 72.00 %    Lymphocytes 21.80 (L) 22.00 - 41.00 %    Monocytes 7.80 0.00 - 13.40 %    Eosinophils 6.10 0.00 - 6.90 %    Basophils 1.70 0.00 - 1.80 %    Nucleated RBC 0.00 0.00 - 0.20 /100 WBC    Neutrophils (Absolute) 0.75 (L) 1.82 - 7.42 K/uL    Lymphs (Absolute) 0.26 (L) 1.00 - 4.80 K/uL    Monos (Absolute) 0.09 0.00 - 0.85 K/uL    Eos (Absolute) 0.07 0.00 - 0.51 K/uL    Baso (Absolute) 0.02 0.00 - 0.12 K/uL    NRBC (Absolute) 0.00 K/uL    Anisocytosis 1+     Microcytosis 1+    Basic Metabolic Panel (BMP)    Collection Time: 09/28/24 12:40 AM   Result Value Ref Range    Sodium 135 135 - 145 mmol/L    Potassium 3.8 3.6 - 5.5 mmol/L    Chloride 108 96 - 112 mmol/L    Co2 21 20 - 33 mmol/L    Glucose 187 (H) 65 - 99 mg/dL    Bun 8 8 - 22 mg/dL    Creatinine 0.62 0.50 - 1.40 mg/dL    Calcium 7.8 (L) 8.5 - 10.5 mg/dL    Anion Gap 6.0 (L) 7.0 - 16.0   MAGNESIUM    Collection Time: 09/28/24 12:40 AM   Result Value Ref Range    Magnesium 1.9 1.5 - 2.5 mg/dL   ESTIMATED GFR    Collection Time: 09/28/24 12:40 AM   Result Value Ref Range    GFR (CKD-EPI) 105 >60 mL/min/1.73 m 2   DIFFERENTIAL MANUAL    Collection Time: 09/28/24 12:40 AM   Result Value Ref Range    Manual Diff Status PERFORMED    PERIPHERAL SMEAR REVIEW    Collection Time: 09/28/24 12:40 AM   Result Value Ref Range    Peripheral Smear Review see below    PLATELET ESTIMATE    Collection Time: 09/28/24 12:40 AM   Result Value Ref Range    Plt Estimation Decreased    MORPHOLOGY    Collection Time: 09/28/24 12:40 AM   Result Value Ref Range    RBC Morphology Present    IMMATURE PLT FRACTION    Collection  Time: 24 12:40 AM   Result Value Ref Range    Imm. Plt Fraction 8.6 0.6 - 13.1 %   EKG    Collection Time: 24  1:36 AM   Result Value Ref Range    Report       [facility]    Test Date:  2024  Pt Name:    JIMMY FERRER                 Department: 171  MRN:        [id number]                      Room:       [facility]  Gender:     Male                         Technician: LEYLA  :        1960                   Requested By:BELEM MARTIN  Order #:    [id number]                    Reading MD: Dank Giles MD    Measurements  Intervals                                Axis  Rate:       91                           P:          65  FL:         155                          QRS:        68  QRSD:       99                           T:          66  QT:         382  QTc:        471    Interpretive Statements  Sinus rhythm  Compared to ECG 2024 00:43:21  Ventricular pacing not present  Electronically Signed On 2024 07:47:12 PDT by Dank Giles MD     CBC with Differential    Collection Time: 24  6:35 AM   Result Value Ref Range    WBC 1.5 (LL) 4.8 - 10.8 K/uL    RBC 2.64 (L) 4.70 - 6.10 M/uL    Hemoglobin 7.9 (L) 14.0 - 18.0 g/dL    Hematocrit 23.6 (L) 42.0 - 52.0 %    MCV 89.4 81.4 - 97.8 fL    MCH 29.9 27.0 - 33.0 pg    MCHC 33.5 32.3 - 36.5 g/dL    RDW 49.8 35.9 - 50.0 fL    Platelet Count 56 (L) 164 - 446 K/uL    MPV 11.8 9.0 - 12.9 fL    Neutrophils-Polys 67.50 44.00 - 72.00 %    Lymphocytes 18.40 (L) 22.00 - 41.00 %    Monocytes 5.30 0.00 - 13.40 %    Eosinophils 7.90 (H) 0.00 - 6.90 %    Basophils 0.00 0.00 - 1.80 %    Nucleated RBC 0.00 0.00 - 0.20 /100 WBC    Neutrophils (Absolute) 1.03 (L) 1.82 - 7.42 K/uL    Lymphs (Absolute) 0.28 (L) 1.00 - 4.80 K/uL    Monos (Absolute) 0.08 0.00 - 0.85 K/uL    Eos (Absolute) 0.12 0.00 - 0.51 K/uL    Baso (Absolute) 0.00 0.00 - 0.12 K/uL    NRBC (Absolute) 0.00 K/uL   DIFFERENTIAL MANUAL    Collection Time: 24  6:35 AM   Result  Value Ref Range    Bands-Stabs 0.90 0.00 - 10.00 %    Manual Diff Status PERFORMED    PERIPHERAL SMEAR REVIEW    Collection Time: 09/28/24  6:35 AM   Result Value Ref Range    Peripheral Smear Review see below    PLATELET ESTIMATE    Collection Time: 09/28/24  6:35 AM   Result Value Ref Range    Plt Estimation Decreased    MORPHOLOGY    Collection Time: 09/28/24  6:35 AM   Result Value Ref Range    RBC Morphology Present     Poikilocytosis 1+     Ovalocytes 1+    IMMATURE PLT FRACTION    Collection Time: 09/28/24  6:35 AM   Result Value Ref Range    Imm. Plt Fraction 6.3 0.6 - 13.1 %       Radiology Review:  EC-ECHOCARDIOGRAM COMPLETE W/O CONT    (Results Pending)     MDM (Data Review):   -Records reviewed and summarized in current documentation  -I personally reviewed and interpreted the laboratory results  -I personally reviewed the radiology images    Assessment/Recommendations:  Hematemesis  Cirrhosis  Alcohol abuse disorder  Hep C - not treated  Anemia - due to GI blood loss.  SVT    Recommendations:  OK to discontinue octreotide  Continue PPI  Follow a.m. liver panel  Monitor for further bleeding    GI team will round on patient in the morning.    Discussed with patient, Dr. Julian Amador, Dr. Esposito    ..iVrginia Ayala, ESTER,  APRN    Core Quality Measures   Reviewed items::  Labs, Medications and Radiology reports reviewed

## 2024-09-28 NOTE — PROGRESS NOTES
Bedside report recieved and patient care assumed. Pt is resting in bed, A&O 4, with no complaints of pain. Tele box on, all fall precautions are in place, belongings at bedisde table. Pt was updated on POC, no questions or concerns. Patient educated on the use of call light for assistance.

## 2024-09-28 NOTE — PROGRESS NOTES
I was called to evaluate patient at bedside for nonsustained V. tach.  He has been having intermittent runs of V. tach with the longest one running 49 seconds.  I personally reviewed the rhythm strips.  Patient is currently here for variceal bleed and anemia.  He is on octreotide drip.     On exam somnolent but responds to questions and follows commands.  His blood pressure is borderline with systolic in the 90s-low 100s.    Assessment and plan:  Nonsustained ventricular tachycardia  Alcoholic cirrhosis with esophageal bleed s/p banding  Thrombocytopenia  Symptomatic anemia requiring transfusion    I recommended 1 unit platelet transfusion, 1 unit PRBC transfusion and magnesium transfusion  V. tach may be due to octreotide versus hypovolemic hypotension  If symptoms persist will stop octreotide and possibly start on antiarrhythmics  Unable to do metoprolol at this time due to his borderline blood pressure      Patient is critically ill.   The patient continues to have: Symptomatic ventricular tachycardia, anemia, esophageal bleed  If untreated there is a high chance of deterioration And eventually death.   The critical care that I am providing today is: As above  The critical that has been undertaken is medically complex.   There has been no overlap in critical care time.   Critical Care Time not including procedures: 31 minutes

## 2024-09-28 NOTE — ASSESSMENT & PLAN NOTE
Likely secondary to portal hypertension and hypersplenism as well as bone marrow suppression from alcoholism  Monitor CBC

## 2024-09-28 NOTE — HOSPITAL COURSE
64-year-old male with history of hepatitis C schizophrenia alcohol dependence liver cirrhosis presented with hematemesis.  He was noted to have wide-complex tachycardia received amiodarone in the emergency department however subsequently became hypotensive and amiodarone was held.  He received PRBC transfusion evaluated by GI and underwent EGD on 9/27/2024 revealing esophageal varices that were banded.  He was on octreotide drip antibiotics and admitted to telemetry.

## 2024-09-28 NOTE — ASSESSMENT & PLAN NOTE
EGD 9/27/2024 moderate-sized esophageal varices x 3 and mild portal gastropathy status post banding  Hemoglobin is stable  Continue Protonix  Reinforced importance of alcohol cessation  Will need [facility]

## 2024-09-28 NOTE — PROGRESS NOTES
Notified on call provider of critical WBC count of 1.6 and platelet count of 36. Provider expresses understanding. Orders to transfuse platelets and place patient on protective precautions.

## 2024-09-28 NOTE — CARE PLAN
The patient is Unstable - High likelihood or risk of patient condition declining or worsening    Shift Goals  Clinical Goals: octreotide, hemodynamic stability  Patient Goals: food  Family Goals: randy    Progress made toward(s) clinical / shift goals:    Problem: Pain - Standard  Goal: Alleviation of pain or a reduction in pain to the patient’s comfort goal  Outcome: Progressing     Problem: Knowledge Deficit - Standard  Goal: Patient and family/care givers will demonstrate understanding of plan of care, disease process/condition, diagnostic tests and medications  Outcome: Progressing     Problem: Optimal Care for Alcohol Withdrawal  Goal: Optimal Care for the alcohol withdrawal patient  Outcome: Progressing       Patient is not progressing towards the following goals:

## 2024-09-28 NOTE — PROGRESS NOTES
Nursing communication to call [employer] to interrogate pacemaker received. [employer] repIta, called and stated that pacemaker was interrogated on 9/27/24 in ED when patient was admitted. Report of interrogation faxed to Tele 7. Dr. Julian Amador notified.

## 2024-09-28 NOTE — THERAPY
"Speech Language Pathology   Clinical Swallow Evaluation     Patient Name: Inocencio Shabazz  AGE:  64 y.o., SEX:  male  Medical Record #: [id number]  Date of Service: 9/28/2024      History of Present Illness  63 YO male admitted 9/27 due to hematemesis.     PMHx: alcohol abuse, bipolar disorder, cirrhosis, GIB, hep C, pacemaker, pancytopenia, schizophrenia. No SLP hx at [facility].     CMHx: UGIB/portal gastropathy, v-tach, 2/2 esophageal varices, symptomatic anemia, pacemaker, hematemesis, pancytopenia, hypokalemia, paroxysmal afib, schizophrenia, alcohol abuse, cirrhosis, hep C.    CXR 9/11 \"No acute process.\"    EGD 9/27/24 \"1. Esophagus: Moderate size EVs x 3 columns, some red tamara sign, no active bleeding. GERD grade B. 2. Stomach: Mild portal hypertensive gastropathy. 3. Duodenum: Grossly normal.\" With recs for PPI and continued clear liquid diet.       General Information:  Vitals  O2 (LPM): 0  O2 Delivery Device: None - Room Air  Level of Consciousness: Awake  Patient Behaviors: Withdrawn  Orientation: Self  Follows Directives: Yes    Prior Living Situation & Level of Function:  Communication: wfl  Swallowing: Reports soft diet at baseline due to dentition     Oral Mechanism Evaluation:  Dentition: Poor, Scattered dentition   Facial Symmetry: Equal  Labial Observations: WFL   Lingual Observations: Midline  Motor Speech: dysarthria present, query result of missing dentition vs lethargy vs baseline       Laryngeal Function:  Secretion Management: Adequate  Voice Quality: Hoarse  Cough: Perceptually WNL     Subjective  Pt resting upon entrance but easily awakened and participated with minimal encouragement.    Assessment  Current Method of Nutrition: Oral diet (clear liquid diet)  Positioning: Goetz's (60-90 degrees)  Bolus Administration: Patient  O2 (LPM): 0 O2 Delivery Device: None - Room Air  Factor(s) Affecting Performance: None  Tracheostomy : No     Swallowing Trials:  Swallowing Trials  Thin Liquid (TN0): " "WFL  Liquidised (LQ3): Impaired  Limited trails provided due to clear liquid diet restrictions     Comments: Adequate oral acceptance, containment and transit with no oral residue after the swallow. Timely swallow initiation and clear vocal quality appreciated. Pt c/o globus sensation with liquidized, which can be concerning for pharyngeal inefficiency and/or esophageal dysphagia.     RN reporting pt tolerated meds whole with liquid wash without s/sx of aspiration or c/o globus sensation.     Clinical Impressions  Pt presenting with s/sx concerning for oral pharyngoesophageal dysphagia, characterized by c/o globus sensation and baseline diet of soft solids due to state of dentition. Given hx of alcohol abuse, admission for concern for UGIB, and s/sx of pharyngoesophageal dysphagia, recommend MBSS w/ esophageal sweep prior to diet advancement (once cleared to ADAT by GI)    Recommendations  Diet Consistency: Clear liquid diet. Once cleared to ADAT per GI, recommend MBSS prior to diet advancement  Instrumentation: VFSS (MBSS)  Medication: Whole with liquid  Supervision: Independent  Positioning: Fully upright and midline during oral intake  Risk Management : Small bites/sips, Alternate bites and sips, Reduce environmental distractions, Physical mobility, as tolerated  Oral Care: BID         SLP Treatment Plan  Treatment Plan: Dysphagia Treatment, Patient/Family/Caregiver Training  SLP Frequency: 3x Per Week  Estimated Duration: Until Therapy Goals Met      Anticipated Discharge Needs  Discharge Recommendations: Anticipate that the patient will have no further speech therapy needs after discharge from the hospital   Therapy Recommendations Upon DC: Not Indicated        Patient / Family Goals  Patient / Family Goal #1: \"Food feels stuck in my chest\"  Short Term Goals  Short Term Goal # 1: Pt will consume a CLD with no s/sx of aspiration and min cues.  Short Term Goal # 2: Pt will participate in MBSS for objective " assessment of oropharyngeal swallow function and completion of esophageal sweep to determine least restrictive diet and further explore c/o globus sensation with min cues.,      Dio Vigil, SLP

## 2024-09-28 NOTE — PROGRESS NOTES
Hospitalist Note    Assuming care from my colleague Dr. Cooper, who admitted this patient this morning.    I reviewed the patient's presenting documentation.    Inocencio Shabazz is a pleasant 64-year-old gentleman with a history of alcoholic cirrhosis and documented esophageal varices with portal hypertensive gastropathy if previously treated AVMs as well as multiple variceal banding in 2016, 2019, and 2023.  He has a history of hepatitis C infection.      Patient is currently on a octreotide drip.  Awaiting EGD.  He had runs of SVT versus V. tach and was started on amiodarone, which was stopped due to hypotension.    Patient states that he continues to drink on a daily basis although he reports that he has cut down significantly.    Vital signs are stable.    Patient notes somnolent but arousable.  He is currently alert and orient x 3.  Heart is regular rate and rhythm.  Lungs are clear to auscultation.  Abdomen is soft, nontender, and nondistended.  Bowel sounds are present.    He is currently on room air.    Most recent hemoglobin was 8.1.  Previous hemoglobin was 14.3 on 9/11/2024.  White count of 2.6.  ANC of 1064.  Platelets of 60.    Assessment and plan:  -Acute upper GI bleeding suspected due to esophageal varices  -Portal hypertensive gastropathy  -Alcoholic cirrhosis  -Acute blood loss anemia  -Neutropenia  -Thrombocytopenia    Continue the patient n.p.o. status to proceed with EGD  Continue pantoprazole 40 mg IV twice daily.  Continue octreotide drip      I discussed the case with gastroenterology, HERMELINDA Banks.        Patient is critically ill.   The patient continues to have: Acute blood loss anemia due to upper GI bleeding suspected from ruptured varices  The vital organ system that is affected is the: Hematologic and cardiovascular  If untreated there is a high chance of deterioration into: Hemorrhagic shock, cardiac arrest, and eventually death.  The critical care that I am providing  today is: Extensive data reviewed and close monitoring of patient's vital signs and assessment at bedside.  I have continued the patient on IV octreotide drip.  Time spent discussing the case with gastroenterology.  The critical that has been undertaken is medically complex.   There has been no overlap in critical care time.   Critical Care Time not including procedures: 33 minutes

## 2024-09-28 NOTE — PROGRESS NOTES
Hospital Medicine Daily Progress Note    Date of Service  9/28/2024    Chief Complaint  Inocencio Shabazz is a 64 y.o. male admitted 9/27/2024 with hematemesis    Hospital Course  64-year-old male with history of hepatitis C schizophrenia alcohol dependence liver cirrhosis presented with hematemesis.  He was noted to have wide-complex tachycardia received amiodarone in the emergency department however subsequently became hypotensive and amiodarone was held.  He received PRBC transfusion evaluated by GI and underwent EGD on 9/27/2024 revealing esophageal varices that were banded.  He was on octreotide drip antibiotics and admitted to telemetry.    Interval Problem Update    I reviewed hospitalization course  Patient with wide-complex tachycardia overnight I ordered echocardiogram and consulted cardiology  Potassium 3.8 magnesium 1.9 I ordered repletion  Reviewed CBC WBC 1.5 hemoglobin 7.9 platelets 56 patient received PRBC and platelet transfusion  He had bowel movement with no melena or rectal bleed  He is afebrile on room air  I discussed with GI discontinued octreotide  Reviewed EGD findings  Total time spent reviewing hospitalization course lab results examining patient discussing with consultant nursing staff case management and pharmacist 55 minutes    I have discussed this patient's plan of care and discharge plan at IDT rounds today with Case Management, Nursing, Nursing leadership, and other members of the IDT team.    Consultants/Specialty  cardiology and GI    Code Status  Full Code    Disposition  <MEDICALLYCLEARED>  I have placed the appropriate orders for post-discharge needs.    Review of Systems  Review of Systems   Constitutional:  Positive for malaise/fatigue. Negative for fever.   Respiratory:  Negative for shortness of breath.    Cardiovascular:  Negative for chest pain.   Gastrointestinal:  Negative for abdominal pain.        Physical Exam  Temp:  [36.3 °C (97.3 °F)-37.3 °C (99.1 °F)] 36.3 °C  (97.3 °F)  Pulse:  [] 81  Resp:  [14-20] 20  BP: ()/(42-86) 101/55  SpO2:  [91 %-98 %] 94 %    Physical Exam  Constitutional:       Appearance: He is ill-appearing.   HENT:      Head: Atraumatic.   Cardiovascular:      Rate and Rhythm: Normal rate and regular rhythm.      Heart sounds: No murmur heard.     No gallop.   Pulmonary:      Breath sounds: No wheezing or rales.   Chest:      Chest wall: No tenderness.   Abdominal:      Palpations: Abdomen is soft.      Tenderness: There is no abdominal tenderness. There is no guarding.   Musculoskeletal:         General: No swelling.   Skin:     General: Skin is warm and dry.   Neurological:      General: No focal deficit present.      Mental Status: He is alert.      Comments: Oriented x 2     Total time spent reviewing imaging and lab results examining patient    Fluids    Intake/Output Summary (Last 24 hours) at 9/28/2024 1212  Last data filed at 9/28/2024 1100  Gross per 24 hour   Intake 1304 ml   Output 2375 ml   Net -1071 ml        Laboratory  Recent Labs     09/27/24 2127 09/28/24  0040 09/28/24  0635   WBC 1.6* 1.2* 1.5*   RBC 2.55* 2.42* 2.64*   HEMOGLOBIN 7.8* 7.2* 7.9*   HEMATOCRIT 23.8* 22.2* 23.6*   MCV 93.3 91.7 89.4   MCH 30.6 29.8 29.9   MCHC 32.8 32.4 33.5   RDW 49.8 49.7 49.8   PLATELETCT 39* 37* 56*   MPV 12.6 12.6 11.8     Recent Labs     09/27/24  0040 09/27/24  0621 09/28/24  0040   SODIUM 140 140 135   POTASSIUM 3.2* 4.3 3.8   CHLORIDE 103 106 108   CO2 22 22 21   GLUCOSE 152* 178* 187*   BUN 26* 21 8   CREATININE 0.65 0.53 0.62   CALCIUM 8.3* 7.8* 7.8*     Recent Labs     09/27/24  0040   APTT 29.9   INR 1.64*               Imaging  EC-ECHOCARDIOGRAM COMPLETE W/O CONT    (Results Pending)        Assessment/Plan  V-tach (HCC)- (present on admission)  Assessment & Plan  ?  SVT with aberrant conduction  We will try to interrogate pacemaker  Monitor and replete electrolytes  Check echocardiogram  I consulted cardiology  Continue telemetry  monitoring  Resume carvedilol with holding parameters if blood pressure tolerates    Pacemaker- (present on admission)  Assessment & Plan  Hx. PPM placement        Pancytopenia (HCC)- (present on admission)  Assessment & Plan  Likely secondary to portal hypertension and bone marrow suppression from EtOH.  Reviewed prior CBCs  Monitor CBC    Acute blood loss anemia (ABLA)- (present on admission)  Assessment & Plan  Monitor hemoglobin and transfuse if less than 7    Hypokalemia- (present on admission)  Assessment & Plan  Replete and monitor    Paroxysmal atrial fibrillation (HCC)- (present on admission)  Assessment & Plan  Resume carvedilol if blood pressure remains stable  Anticoagulation contraindicated    Bleeding esophageal varices (HCC)- (present on admission)  Assessment & Plan  EGD 9/27/2024 moderate-sized esophageal varices x 3 and mild portal gastropathy status post banding  Monitor hemoglobin and transfuse if active bleeding or less than 7  Continue Protonix  Reinforced importance of alcohol cessation      Schizophrenia (HCC)- (present on admission)  Assessment & Plan  Hx. Schizophrenia, managed w/ seroquel.  -Continue home meds    Alcohol abuse- (present on admission)  Assessment & Plan  Continue thiamine and folic acid  I ordered lorazepam per CIWA protocol    Cirrhosis (HCC)- (present on admission)  Assessment & Plan  Hx. Of cirrhosis noted on imaging. Complication of alcohol use disorder, history of hep c.  Counseled on importance of completely abstaining from alcohol  Patient currently euvolemic  Started on lactulose      Hepatitis C- (present on admission)  Assessment & Plan  Documented hx. Hep C. Cirrhosis noted on U/s abd from 10/2023.  Discussed with GI  Outpatient evaluation for treatment    Thrombocytopenia (HCC)- (present on admission)  Assessment & Plan  Likely secondary to portal hypertension and hypersplenism as well as bone marrow suppression from alcoholism  Monitor CBC           VTE  prophylaxis:   SCDs/TEDs      I have performed a physical exam and reviewed and updated ROS and Plan today (9/28/2024). In review of yesterday's note (9/27/2024), there are no changes except as documented above.

## 2024-09-28 NOTE — CARE PLAN
The patient is Watcher - Medium risk of patient condition declining or worsening    Shift Goals  Clinical Goals: VSS, monotor labs, hemodynamic stability  Patient Goals: rest, comfort, diet  Family Goals: FABRIZIO    Progress made toward(s) clinical / shift goals:    Problem: Pain - Standard  Goal: Alleviation of pain or a reduction in pain to the patient’s comfort goal  Outcome: Progressing     Problem: Knowledge Deficit - Standard  Goal: Patient and family/care givers will demonstrate understanding of plan of care, disease process/condition, diagnostic tests and medications  Outcome: Progressing     Problem: Optimal Care for Alcohol Withdrawal  Goal: Optimal Care for the alcohol withdrawal patient  Outcome: Progressing     Problem: Seizure Precautions  Goal: Implementation of seizure precautions  Outcome: Progressing     Problem: Lifestyle Changes  Goal: Patient's ability to identify lifestyle changes and available resources to help reduce recurrence of condition will improve  Outcome: Progressing     Problem: Psychosocial  Goal: Patient's level of anxiety will decrease  Outcome: Progressing  Goal: Spiritual and cultural needs incorporated into hospitalization  Outcome: Progressing     Problem: Risk for Aspiration  Goal: Patient's risk for aspiration will be absent or decrease  Outcome: Progressing       Patient is not progressing towards the following goals:

## 2024-09-29 PROBLEM — E87.6 HYPOKALEMIA: Status: RESOLVED | Noted: 2019-03-15 | Resolved: 2024-09-29

## 2024-09-29 PROBLEM — R00.0 WIDE-COMPLEX TACHYCARDIA: Status: ACTIVE | Noted: 2023-10-27

## 2024-09-29 LAB
ALBUMIN SERPL BCP-MCNC: 3.2 G/DL (ref 3.2–4.9)
ALBUMIN/GLOB SERPL: 1 G/DL
ALP SERPL-CCNC: 68 U/L (ref 30–99)
ALT SERPL-CCNC: 103 U/L (ref 2–50)
ANION GAP SERPL CALC-SCNC: 9 MMOL/L (ref 7–16)
AST SERPL-CCNC: 153 U/L (ref 12–45)
BASOPHILS # BLD AUTO: 0.5 % (ref 0–1.8)
BASOPHILS # BLD AUTO: 0.5 % (ref 0–1.8)
BASOPHILS # BLD AUTO: 1.7 % (ref 0–1.8)
BASOPHILS # BLD: 0.01 K/UL (ref 0–0.12)
BASOPHILS # BLD: 0.01 K/UL (ref 0–0.12)
BASOPHILS # BLD: 0.03 K/UL (ref 0–0.12)
BILIRUB SERPL-MCNC: 0.9 MG/DL (ref 0.1–1.5)
BUN SERPL-MCNC: 4 MG/DL (ref 8–22)
CALCIUM ALBUM COR SERPL-MCNC: 9 MG/DL (ref 8.5–10.5)
CALCIUM SERPL-MCNC: 8.4 MG/DL (ref 8.5–10.5)
CHLORIDE SERPL-SCNC: 111 MMOL/L (ref 96–112)
CO2 SERPL-SCNC: 21 MMOL/L (ref 20–33)
CREAT SERPL-MCNC: 0.6 MG/DL (ref 0.5–1.4)
EOSINOPHIL # BLD AUTO: 0.09 K/UL (ref 0–0.51)
EOSINOPHIL # BLD AUTO: 0.1 K/UL (ref 0–0.51)
EOSINOPHIL # BLD AUTO: 0.1 K/UL (ref 0–0.51)
EOSINOPHIL NFR BLD: 5.2 % (ref 0–6.9)
EOSINOPHIL NFR BLD: 5.4 % (ref 0–6.9)
EOSINOPHIL NFR BLD: 5.5 % (ref 0–6.9)
ERYTHROCYTE [DISTWIDTH] IN BLOOD BY AUTOMATED COUNT: 51.2 FL (ref 35.9–50)
ERYTHROCYTE [DISTWIDTH] IN BLOOD BY AUTOMATED COUNT: 51.7 FL (ref 35.9–50)
ERYTHROCYTE [DISTWIDTH] IN BLOOD BY AUTOMATED COUNT: 52 FL (ref 35.9–50)
GFR SERPLBLD CREATININE-BSD FMLA CKD-EPI: 106 ML/MIN/1.73 M 2
GLOBULIN SER CALC-MCNC: 3.2 G/DL (ref 1.9–3.5)
GLUCOSE SERPL-MCNC: 135 MG/DL (ref 65–99)
HCT VFR BLD AUTO: 24.1 % (ref 42–52)
HCT VFR BLD AUTO: 27 % (ref 42–52)
HCT VFR BLD AUTO: 28.2 % (ref 42–52)
HGB BLD-MCNC: 8.1 G/DL (ref 14–18)
HGB BLD-MCNC: 8.6 G/DL (ref 14–18)
HGB BLD-MCNC: 9.2 G/DL (ref 14–18)
IMM GRANULOCYTES # BLD AUTO: 0 K/UL (ref 0–0.11)
IMM GRANULOCYTES # BLD AUTO: 0.01 K/UL (ref 0–0.11)
IMM GRANULOCYTES NFR BLD AUTO: 0 % (ref 0–0.9)
IMM GRANULOCYTES NFR BLD AUTO: 0.5 % (ref 0–0.9)
LYMPHOCYTES # BLD AUTO: 0.28 K/UL (ref 1–4.8)
LYMPHOCYTES # BLD AUTO: 0.43 K/UL (ref 1–4.8)
LYMPHOCYTES # BLD AUTO: 0.45 K/UL (ref 1–4.8)
LYMPHOCYTES NFR BLD: 15.1 % (ref 22–41)
LYMPHOCYTES NFR BLD: 23.5 % (ref 22–41)
LYMPHOCYTES NFR BLD: 26.7 % (ref 22–41)
MAGNESIUM SERPL-MCNC: 1.8 MG/DL (ref 1.5–2.5)
MANUAL DIFF BLD: NORMAL
MCH RBC QN AUTO: 29.2 PG (ref 27–33)
MCH RBC QN AUTO: 30.1 PG (ref 27–33)
MCH RBC QN AUTO: 30.3 PG (ref 27–33)
MCHC RBC AUTO-ENTMCNC: 31.9 G/DL (ref 32.3–36.5)
MCHC RBC AUTO-ENTMCNC: 32.6 G/DL (ref 32.3–36.5)
MCHC RBC AUTO-ENTMCNC: 33.6 G/DL (ref 32.3–36.5)
MCV RBC AUTO: 90.3 FL (ref 81.4–97.8)
MCV RBC AUTO: 91.5 FL (ref 81.4–97.8)
MCV RBC AUTO: 92.2 FL (ref 81.4–97.8)
MONOCYTES # BLD AUTO: 0.09 K/UL (ref 0–0.85)
MONOCYTES # BLD AUTO: 0.16 K/UL (ref 0–0.85)
MONOCYTES # BLD AUTO: 0.23 K/UL (ref 0–0.85)
MONOCYTES NFR BLD AUTO: 12.6 % (ref 0–13.4)
MONOCYTES NFR BLD AUTO: 5.2 % (ref 0–13.4)
MONOCYTES NFR BLD AUTO: 8.6 % (ref 0–13.4)
MORPHOLOGY BLD-IMP: NORMAL
NEUTROPHILS # BLD AUTO: 1.04 K/UL (ref 1.82–7.42)
NEUTROPHILS # BLD AUTO: 1.06 K/UL (ref 1.82–7.42)
NEUTROPHILS # BLD AUTO: 1.3 K/UL (ref 1.82–7.42)
NEUTROPHILS NFR BLD: 57.9 % (ref 44–72)
NEUTROPHILS NFR BLD: 61.2 % (ref 44–72)
NEUTROPHILS NFR BLD: 69.9 % (ref 44–72)
NRBC # BLD AUTO: 0 K/UL
NRBC BLD-RTO: 0 /100 WBC (ref 0–0.2)
PHOSPHATE SERPL-MCNC: 2.6 MG/DL (ref 2.5–4.5)
PLATELET # BLD AUTO: 52 K/UL (ref 164–446)
PLATELET # BLD AUTO: 54 K/UL (ref 164–446)
PLATELET # BLD AUTO: 55 K/UL (ref 164–446)
PLATELET BLD QL SMEAR: NORMAL
PLATELETS.RETICULATED NFR BLD AUTO: 10.2 % (ref 0.6–13.1)
PLATELETS.RETICULATED NFR BLD AUTO: 12.5 % (ref 0.6–13.1)
PLATELETS.RETICULATED NFR BLD AUTO: 6.8 % (ref 0.6–13.1)
PMV BLD AUTO: 11.6 FL (ref 9–12.9)
PMV BLD AUTO: 11.9 FL (ref 9–12.9)
PMV BLD AUTO: 12.4 FL (ref 9–12.9)
POTASSIUM SERPL-SCNC: 4 MMOL/L (ref 3.6–5.5)
PROT SERPL-MCNC: 6.4 G/DL (ref 6–8.2)
RBC # BLD AUTO: 2.67 M/UL (ref 4.7–6.1)
RBC # BLD AUTO: 2.95 M/UL (ref 4.7–6.1)
RBC # BLD AUTO: 3.06 M/UL (ref 4.7–6.1)
RBC BLD AUTO: NORMAL
SODIUM SERPL-SCNC: 141 MMOL/L (ref 135–145)
WBC # BLD AUTO: 1.7 K/UL (ref 4.8–10.8)
WBC # BLD AUTO: 1.8 K/UL (ref 4.8–10.8)
WBC # BLD AUTO: 1.9 K/UL (ref 4.8–10.8)

## 2024-09-29 PROCEDURE — 700102 HCHG RX REV CODE 250 W/ 637 OVERRIDE(OP): Performed by: NURSE PRACTITIONER

## 2024-09-29 PROCEDURE — 99232 SBSQ HOSP IP/OBS MODERATE 35: CPT | Performed by: HOSPITALIST

## 2024-09-29 PROCEDURE — 85025 COMPLETE CBC W/AUTO DIFF WBC: CPT

## 2024-09-29 PROCEDURE — 84100 ASSAY OF PHOSPHORUS: CPT

## 2024-09-29 PROCEDURE — 700105 HCHG RX REV CODE 258

## 2024-09-29 PROCEDURE — 80053 COMPREHEN METABOLIC PANEL: CPT

## 2024-09-29 PROCEDURE — A9270 NON-COVERED ITEM OR SERVICE: HCPCS | Performed by: HOSPITALIST

## 2024-09-29 PROCEDURE — 700111 HCHG RX REV CODE 636 W/ 250 OVERRIDE (IP)

## 2024-09-29 PROCEDURE — 700102 HCHG RX REV CODE 250 W/ 637 OVERRIDE(OP)

## 2024-09-29 PROCEDURE — 700102 HCHG RX REV CODE 250 W/ 637 OVERRIDE(OP): Performed by: INTERNAL MEDICINE

## 2024-09-29 PROCEDURE — A9270 NON-COVERED ITEM OR SERVICE: HCPCS | Performed by: NURSE PRACTITIONER

## 2024-09-29 PROCEDURE — 770020 HCHG ROOM/CARE - TELE (206)

## 2024-09-29 PROCEDURE — 700102 HCHG RX REV CODE 250 W/ 637 OVERRIDE(OP): Performed by: HOSPITALIST

## 2024-09-29 PROCEDURE — A9270 NON-COVERED ITEM OR SERVICE: HCPCS

## 2024-09-29 PROCEDURE — A9270 NON-COVERED ITEM OR SERVICE: HCPCS | Performed by: INTERNAL MEDICINE

## 2024-09-29 PROCEDURE — 85007 BL SMEAR W/DIFF WBC COUNT: CPT

## 2024-09-29 PROCEDURE — 85055 RETICULATED PLATELET ASSAY: CPT | Mod: 91

## 2024-09-29 PROCEDURE — 700102 HCHG RX REV CODE 250 W/ 637 OVERRIDE(OP): Performed by: STUDENT IN AN ORGANIZED HEALTH CARE EDUCATION/TRAINING PROGRAM

## 2024-09-29 PROCEDURE — A9270 NON-COVERED ITEM OR SERVICE: HCPCS | Performed by: STUDENT IN AN ORGANIZED HEALTH CARE EDUCATION/TRAINING PROGRAM

## 2024-09-29 PROCEDURE — 99232 SBSQ HOSP IP/OBS MODERATE 35: CPT | Performed by: INTERNAL MEDICINE

## 2024-09-29 PROCEDURE — 83735 ASSAY OF MAGNESIUM: CPT

## 2024-09-29 PROCEDURE — 85027 COMPLETE CBC AUTOMATED: CPT

## 2024-09-29 PROCEDURE — 99232 SBSQ HOSP IP/OBS MODERATE 35: CPT | Performed by: NURSE PRACTITIONER

## 2024-09-29 RX ORDER — LACTULOSE 10 G/15ML
30 SOLUTION ORAL EVERY EVENING
Status: DISCONTINUED | OUTPATIENT
Start: 2024-09-29 | End: 2024-09-30 | Stop reason: HOSPADM

## 2024-09-29 RX ADMIN — THERA TABS 1 TABLET: TAB at 04:50

## 2024-09-29 RX ADMIN — QUETIAPINE FUMARATE 200 MG: 200 TABLET ORAL at 20:57

## 2024-09-29 RX ADMIN — CARVEDILOL 6.25 MG: 6.25 TABLET, FILM COATED ORAL at 07:56

## 2024-09-29 RX ADMIN — PANTOPRAZOLE SODIUM 40 MG: 40 INJECTION, POWDER, FOR SOLUTION INTRAVENOUS at 16:35

## 2024-09-29 RX ADMIN — PANTOPRAZOLE SODIUM 40 MG: 40 INJECTION, POWDER, FOR SOLUTION INTRAVENOUS at 04:49

## 2024-09-29 RX ADMIN — THIAMINE HYDROCHLORIDE 500 MG: 100 INJECTION, SOLUTION INTRAMUSCULAR; INTRAVENOUS at 08:08

## 2024-09-29 RX ADMIN — CARVEDILOL 6.25 MG: 6.25 TABLET, FILM COATED ORAL at 16:35

## 2024-09-29 RX ADMIN — THIAMINE HYDROCHLORIDE 500 MG: 100 INJECTION, SOLUTION INTRAMUSCULAR; INTRAVENOUS at 20:59

## 2024-09-29 RX ADMIN — NICOTINE TRANSDERMAL SYSTEM 21 MG: 21 PATCH, EXTENDED RELEASE TRANSDERMAL at 04:51

## 2024-09-29 RX ADMIN — LACTULOSE 30 ML: 10 SOLUTION ORAL at 17:09

## 2024-09-29 RX ADMIN — LACTULOSE 30 ML: 10 SOLUTION ORAL at 11:58

## 2024-09-29 RX ADMIN — FOLIC ACID 1 MG: 1 TABLET ORAL at 04:50

## 2024-09-29 RX ADMIN — THIAMINE HYDROCHLORIDE 500 MG: 100 INJECTION, SOLUTION INTRAMUSCULAR; INTRAVENOUS at 14:50

## 2024-09-29 ASSESSMENT — ENCOUNTER SYMPTOMS
CONSTIPATION: 0
COUGH: 0
DEPRESSION: 0
ABDOMINAL PAIN: 0
BLURRED VISION: 0
CHILLS: 0
NAUSEA: 0
CHOKING: 0
FEVER: 0
WEAKNESS: 0
DIZZINESS: 0
HEARTBURN: 0
VOMITING: 0
SHORTNESS OF BREATH: 0
DIZZINESS: 1
BLOOD IN STOOL: 0
DIARRHEA: 0
BACK PAIN: 0

## 2024-09-29 ASSESSMENT — FIBROSIS 4 INDEX: FIB4 SCORE: 19.13

## 2024-09-29 ASSESSMENT — PAIN DESCRIPTION - PAIN TYPE
TYPE: ACUTE PAIN
TYPE: ACUTE PAIN

## 2024-09-29 NOTE — CARE PLAN
The patient is Stable - Low risk of patient condition declining or worsening    Shift Goals  Clinical Goals: hemodynamic stability, VSS, monitor labs  Patient Goals: rest  Family Goals: randy    Progress made toward(s) clinical / shift goals:      Problem: Optimal Care for Alcohol Withdrawal  Goal: Optimal Care for the alcohol withdrawal patient  Outcome: Progressing     Problem: Lifestyle Changes  Goal: Patient's ability to identify lifestyle changes and available resources to help reduce recurrence of condition will improve  Outcome: Progressing     Problem: Psychosocial  Goal: Patient's level of anxiety will decrease  Outcome: Progressing  Flowsheets (Taken 9/28/2024 2000)  Patient Behaviors:   Flat Affect   Withdrawn  Note: Calming techniques  Stimuli reduction  Encourage movement         Patient is not progressing towards the following goals:

## 2024-09-29 NOTE — CONSULTS
Cardiology Initial Consult Note    Reason for Consult:  Asked by Dr Mo Amador M.D. to see this patient with wide complex tachycardia. Concern for VT.  Patient's PCP: Nnamdi Ballesteros M.D.    CC:   Chief Complaint   Patient presents with    Blood in Vomit     Reports large amount x2 days, BRB, -thinners, +ETOH daily, reports 1/5th vodka daily        HPI:   This is a 64-year-old man with past medical history significant for cirrhosis complicated by esophageal varices, hepatitis C, schizophrenia, alcohol abuse, PPM in situ, known history of PAF as well as SVT who was admitted to the hospital with hematemesis.    Patient was admitted to the hospital and was seen by the gastroenterology team for evaluation of upper GI bleed.  Underwent EGD where he was found to have moderate-sized esophageal varices with no active bleeding.  He was continued on PPI.  Patient was also transfused 1 unit of platelet and 1 unit PRBC.    During his hospital course, he was noted to have findings of wide-complex tachycardia with rates ranging between 110 to 120 bpm.  There was concern for possible ventricular tachycardia so he was given amiodarone.  However amiodarone was subsequently discontinued due to intermittent episodes of hypotension.    Patient is not being seen in cardiac consultation due to concern for ventricular tachycardia.      Medications / Drug list prior to admission:  No current facility-administered medications on file prior to encounter.     Current Outpatient Medications on File Prior to Encounter   Medication Sig Dispense Refill    folic acid (FOLVITE) 1 MG Tab Take 1 mg by mouth every day.      therapeutic multivitamin-minerals (THERAGRAN-M) Tab Take 1 Tablet by mouth every day.      nicotine (NICODERM) 21 MG/24HR PATCH 24 HR Place 1 Patch on the skin every 24 hours.      pantoprazole (PROTONIX) 40 MG Tablet Delayed Response Take 40 mg by mouth every day.      thiamine (VITAMIN B-1) 100 MG Tab Take 100 mg by mouth  every day.      vitamin D3 (CHOLECALCIFEROL) 1000 Unit (25 mcg) Tab Take 1,000 Units by mouth every day.      acetaminophen (TYLENOL) 500 MG Tab Take 500-1,000 mg by mouth every 6 hours as needed.      ibuprofen (MOTRIN) 200 MG Tab Take 200-400 mg by mouth every 6 hours as needed.      QUEtiapine (SEROQUEL) 200 MG Tab Take 200 mg by mouth every evening.         Current list of administered Medications:    Current Facility-Administered Medications:     lactulose 20 GM/30ML solution 30 mL, 30 mL, Oral, TID, Virginia Ayala, DNP,  APRN, 30 mL at 09/28/24 1700    carvedilol (Coreg) tablet 3.125 mg, 3.125 mg, Oral, BID WITH MEALS, Mo Amador M.D., 3.125 mg at 09/28/24 1700    thiamine (B-1) 500 mg in dextrose 5% 100 mL IVPB, 500 mg, Intravenous, TID, Veronica Potts M.D., Last Rate: 200 mL/hr at 09/28/24 1451, 500 mg at 09/28/24 1451    [COMPLETED] Rally Bag IV (D5LR 1000 mL + Thiamine 100 mg + Folic Acid 1 mg + Magnesium Sulfate 1 g) infusion, , Intravenous, Once, Stopped at 09/27/24 0800 **AND** [START ON 10/1/2024] thiamine (Vitamin B-1) tablet 100 mg, 100 mg, Oral, DAILY **AND** multivitamin tablet 1 Tablet, 1 Tablet, Oral, DAILY, 1 Tablet at 09/28/24 0514 **AND** folic acid (Folvite) tablet 1 mg, 1 mg, Oral, DAILY, Veronica Potts M.D., 1 mg at 09/28/24 0507    QUEtiapine (SEROquel) tablet 200 mg, 200 mg, Oral, Nightly, Veronica Potts M.D., 200 mg at 09/27/24 2045    pantoprazole (Protonix) injection 40 mg, 40 mg, Intravenous, BID, Veronica Potts M.D., 40 mg at 09/28/24 1700    nicotine (Nicoderm) 21 MG/24HR 21 mg, 21 mg, Transdermal, Daily-0600, 21 mg at 09/28/24 0507 **AND** Nicotine Replacement Patient Education Materials, , , Once **AND** nicotine polacrilex (Nicorette) 2 MG piece 2 mg, 2 mg, Oral, Q HOUR PRN, Abdelrahman Marsh M.D.    Past Medical History:   Diagnosis Date    Alcohol abuse     Bipolar disorder (HCC)     Cirrhosis (HCC)     GIB (gastrointestinal bleeding) 01/03/2016  "   Hepatitis C     Pacemaker     left chest    Pancytopenia (HCC)     Schizophrenia (HCC)        Past Surgical History:   Procedure Laterality Date    NM UPPER GI ENDOSCOPY,LIGAT VARIX N/A 9/27/2024    Procedure: GASTROSCOPY, WITH VARICEAL BANDING;  Surgeon: Rafa Esposito M.D.;  Location: SURGERY SAME DAY [facility];  Service: Gastroenterology    NM UPPER GI ENDOSCOPY,DIAGNOSIS N/A 8/10/2023    Procedure: GASTROSCOPY;  Surgeon: Joleen Yañez M.D.;  Location: SURGERY SAME DAY [facility];  Service: Gastroenterology    NM UPPER GI ENDOSCOPY,CTRL BLEED N/A 8/10/2023    Procedure: GASTROSCOPY, WITH ARGON PLASMA COAGULATION;  Surgeon: Joleen Yañez M.D.;  Location: SURGERY SAME DAY [facility];  Service: Gastroenterology    GASTROSCOPY-ENDO N/A 11/13/2019    Procedure: GASTROSCOPY with banding;  Surgeon: Tamela Smith M.D.;  Location: ENDOSCOPY [facility];  Service: Gastroenterology    GASTROSCOPY  3/13/2019    Procedure: GASTROSCOPY;  Surgeon: Abdi Ba M.D.;  Location: SURGERY [facility];  Service: Gastroenterology    GASTROSCOPY  4/8/2016    Procedure: GASTROSCOPY;  Surgeon: Braeden Tobin M.D.;  Location: SURGERY [facility];  Service:     GASTROSCOPY  1/3/2016    Procedure: GASTROSCOPY WITH BANDING;  Surgeon: Alfredo Antonio M.D.;  Location: SURGERY [facility];  Service:        History reviewed. No pertinent family history.  Patient family history was personally reviewed, no pertinent family history to current presentation    Social History     Tobacco Use    Smoking status: Every Day     Current packs/day: 1.00     Average packs/day: 1 pack/day for 72.7 years (72.7 ttl pk-yrs)     Types: Cigarettes     Start date: 2000    Smokeless tobacco: Never   Vaping Use    Vaping status: Never Used   Substance Use Topics    Alcohol use: Yes     Comment: \"couple of pints whiskey every day\"    Drug use: Yes     Types: Inhaled     Comment: marijuana sometimes       ALLERGIES:  Allergies "   Allergen Reactions    Haloperidol Unspecified     Twitching/involuntary movements        Review of systems:  A complete review of symptoms was reviewed with the patient. This is reviewed in H&P and PMH. ALL OTHERS reviewed and negative    Physical exam:  Patient Vitals for the past 24 hrs:   BP Temp Temp src Pulse Resp SpO2 Weight   09/28/24 1550 113/69 36.7 °C (98.1 °F) Temporal 93 20 93 % --   09/28/24 1147 101/55 36.3 °C (97.3 °F) Temporal 81 20 94 % --   09/28/24 0755 -- 36.6 °C (97.8 °F) -- 86 18 -- --   09/28/24 0745 110/67 37.2 °C (99 °F) Temporal 83 20 94 % --   09/28/24 0445 102/55 37.1 °C (98.8 °F) Temporal 89 18 94 % --   09/28/24 0400 -- -- -- -- -- -- 59.4 kg (130 lb 15.3 oz)   09/28/24 0257 (!) 83/49 37.3 °C (99.1 °F) Temporal 98 16 93 % --   09/28/24 0236 (!) 83/50 37.1 °C (98.8 °F) -- 95 17 95 % --   09/28/24 0130 110/65 36.6 °C (97.8 °F) Temporal 93 14 96 % --   09/28/24 0100 98/60 36.7 °C (98.1 °F) Temporal (!) 101 16 94 % --   09/28/24 0000 105/68 36.4 °C (97.5 °F) Temporal (!) 115 14 95 % --   09/27/24 2343 101/66 36.7 °C (98.1 °F) Temporal (!) 124 16 96 % --   09/27/24 2300 93/42 -- -- (!) 127 -- -- --   09/27/24 2000 -- -- -- -- 15 -- --   09/27/24 1917 122/76 36.8 °C (98.2 °F) Temporal 80 16 96 % --     General: Not in acute distress  EYES: No jaundice  HEENT: OP clear   Neck:  No carotid bruits, No JVD appreciated  CVS:  RRR, Normal S1, S2. No murmurs, rubs or gallops  Resp: Normal respiratory effort, lungs CTA bilaterally. No rales or rhonchi  Abdomen: Soft, non-distended, non-tender to palpation  Skin: No obvious rashes, no cyanosis  Neurological: Alert and oriented x3, moves all extremities, no focal neurologic deficits  Psychiatric: Appropriate affect  Extremities:   Extremities warm, 2+ bilateral radial pulses.  2+ bilateral dp pulses, no lower extremity edema bilaterally, no clubbing      Data:  Laboratory studies personally reviewed by me:  Recent Results (from the past 24 hour(s))    CBC with Differential    Collection Time: 09/27/24  9:27 PM   Result Value Ref Range    WBC 1.6 (LL) 4.8 - 10.8 K/uL    RBC 2.55 (L) 4.70 - 6.10 M/uL    Hemoglobin 7.8 (L) 14.0 - 18.0 g/dL    Hematocrit 23.8 (L) 42.0 - 52.0 %    MCV 93.3 81.4 - 97.8 fL    MCH 30.6 27.0 - 33.0 pg    MCHC 32.8 32.3 - 36.5 g/dL    RDW 49.8 35.9 - 50.0 fL    Platelet Count 39 (L) 164 - 446 K/uL    MPV 12.6 9.0 - 12.9 fL    Neutrophils-Polys 52.50 44.00 - 72.00 %    Lymphocytes 26.60 22.00 - 41.00 %    Monocytes 13.90 (H) 0.00 - 13.40 %    Eosinophils 5.10 0.00 - 6.90 %    Basophils 1.30 0.00 - 1.80 %    Immature Granulocytes 0.60 0.00 - 0.90 %    Nucleated RBC 0.00 0.00 - 0.20 /100 WBC    Neutrophils (Absolute) 0.83 (L) 1.82 - 7.42 K/uL    Lymphs (Absolute) 0.42 (L) 1.00 - 4.80 K/uL    Monos (Absolute) 0.22 0.00 - 0.85 K/uL    Eos (Absolute) 0.08 0.00 - 0.51 K/uL    Baso (Absolute) 0.02 0.00 - 0.12 K/uL    Immature Granulocytes (abs) 0.01 0.00 - 0.11 K/uL    NRBC (Absolute) 0.00 K/uL   IMMATURE PLT FRACTION    Collection Time: 09/27/24  9:27 PM   Result Value Ref Range    Imm. Plt Fraction 9.7 0.6 - 13.1 %   PLATELETS REQUEST    Collection Time: 09/27/24 11:12 PM   Result Value Ref Range    Component P       Plts,Pheresis       [id number]   transfused   09/27/24 23:53    Product Type Platelets  Pheresis LR     Dispense Status transfused     Unit Number (Barcoded) [id number]     Product Code (Barcoded) S7715Q60     Blood Type (Barcoded) 5100    CBC with Differential    Collection Time: 09/28/24 12:40 AM   Result Value Ref Range    WBC 1.2 (LL) 4.8 - 10.8 K/uL    RBC 2.42 (L) 4.70 - 6.10 M/uL    Hemoglobin 7.2 (L) 14.0 - 18.0 g/dL    Hematocrit 22.2 (L) 42.0 - 52.0 %    MCV 91.7 81.4 - 97.8 fL    MCH 29.8 27.0 - 33.0 pg    MCHC 32.4 32.3 - 36.5 g/dL    RDW 49.7 35.9 - 50.0 fL    Platelet Count 37 (L) 164 - 446 K/uL    MPV 12.6 9.0 - 12.9 fL    Neutrophils-Polys 62.60 44.00 - 72.00 %    Lymphocytes 21.80 (L) 22.00 - 41.00 %     Monocytes 7.80 0.00 - 13.40 %    Eosinophils 6.10 0.00 - 6.90 %    Basophils 1.70 0.00 - 1.80 %    Nucleated RBC 0.00 0.00 - 0.20 /100 WBC    Neutrophils (Absolute) 0.75 (L) 1.82 - 7.42 K/uL    Lymphs (Absolute) 0.26 (L) 1.00 - 4.80 K/uL    Monos (Absolute) 0.09 0.00 - 0.85 K/uL    Eos (Absolute) 0.07 0.00 - 0.51 K/uL    Baso (Absolute) 0.02 0.00 - 0.12 K/uL    NRBC (Absolute) 0.00 K/uL    Anisocytosis 1+     Microcytosis 1+    Basic Metabolic Panel (BMP)    Collection Time: 24 12:40 AM   Result Value Ref Range    Sodium 135 135 - 145 mmol/L    Potassium 3.8 3.6 - 5.5 mmol/L    Chloride 108 96 - 112 mmol/L    Co2 21 20 - 33 mmol/L    Glucose 187 (H) 65 - 99 mg/dL    Bun 8 8 - 22 mg/dL    Creatinine 0.62 0.50 - 1.40 mg/dL    Calcium 7.8 (L) 8.5 - 10.5 mg/dL    Anion Gap 6.0 (L) 7.0 - 16.0   MAGNESIUM    Collection Time: 24 12:40 AM   Result Value Ref Range    Magnesium 1.9 1.5 - 2.5 mg/dL   ESTIMATED GFR    Collection Time: 24 12:40 AM   Result Value Ref Range    GFR (CKD-EPI) 105 >60 mL/min/1.73 m 2   DIFFERENTIAL MANUAL    Collection Time: 24 12:40 AM   Result Value Ref Range    Manual Diff Status PERFORMED    PERIPHERAL SMEAR REVIEW    Collection Time: 24 12:40 AM   Result Value Ref Range    Peripheral Smear Review see below    PLATELET ESTIMATE    Collection Time: 24 12:40 AM   Result Value Ref Range    Plt Estimation Decreased    MORPHOLOGY    Collection Time: 24 12:40 AM   Result Value Ref Range    RBC Morphology Present    IMMATURE PLT FRACTION    Collection Time: 24 12:40 AM   Result Value Ref Range    Imm. Plt Fraction 8.6 0.6 - 13.1 %   EKG    Collection Time: 24  1:36 AM   Result Value Ref Range    Report       [facility]    Test Date:  2024  Pt Name:    JIMMY FERRER                 Department: 171  MRN:        [id number]                      Room:       T719  Gender:     Male                         Technician: LEYLA  :        [id number]                    Requested By:BELEM MARTIN  Order #:    [id number]                    Reading MD: Dank Giles MD    Measurements  Intervals                                Axis  Rate:       91                           P:          65  AR:         155                          QRS:        68  QRSD:       99                           T:          66  QT:         382  QTc:        471    Interpretive Statements  Sinus rhythm  Compared to ECG 09/27/2024 00:43:21  Ventricular pacing not present  Electronically Signed On 09- 07:47:12 PDT by Dank Giles MD     CBC with Differential    Collection Time: 09/28/24  6:35 AM   Result Value Ref Range    WBC 1.5 (LL) 4.8 - 10.8 K/uL    RBC 2.64 (L) 4.70 - 6.10 M/uL    Hemoglobin 7.9 (L) 14.0 - 18.0 g/dL    Hematocrit 23.6 (L) 42.0 - 52.0 %    MCV 89.4 81.4 - 97.8 fL    MCH 29.9 27.0 - 33.0 pg    MCHC 33.5 32.3 - 36.5 g/dL    RDW 49.8 35.9 - 50.0 fL    Platelet Count 56 (L) 164 - 446 K/uL    MPV 11.8 9.0 - 12.9 fL    Neutrophils-Polys 67.50 44.00 - 72.00 %    Lymphocytes 18.40 (L) 22.00 - 41.00 %    Monocytes 5.30 0.00 - 13.40 %    Eosinophils 7.90 (H) 0.00 - 6.90 %    Basophils 0.00 0.00 - 1.80 %    Nucleated RBC 0.00 0.00 - 0.20 /100 WBC    Neutrophils (Absolute) 1.03 (L) 1.82 - 7.42 K/uL    Lymphs (Absolute) 0.28 (L) 1.00 - 4.80 K/uL    Monos (Absolute) 0.08 0.00 - 0.85 K/uL    Eos (Absolute) 0.12 0.00 - 0.51 K/uL    Baso (Absolute) 0.00 0.00 - 0.12 K/uL    NRBC (Absolute) 0.00 K/uL   DIFFERENTIAL MANUAL    Collection Time: 09/28/24  6:35 AM   Result Value Ref Range    Bands-Stabs 0.90 0.00 - 10.00 %    Manual Diff Status PERFORMED    PERIPHERAL SMEAR REVIEW    Collection Time: 09/28/24  6:35 AM   Result Value Ref Range    Peripheral Smear Review see below    PLATELET ESTIMATE    Collection Time: 09/28/24  6:35 AM   Result Value Ref Range    Plt Estimation Decreased    MORPHOLOGY    Collection Time: 09/28/24  6:35 AM   Result Value Ref Range    RBC Morphology  Present     Poikilocytosis 1+     Ovalocytes 1+    IMMATURE PLT FRACTION    Collection Time: 09/28/24  6:35 AM   Result Value Ref Range    Imm. Plt Fraction 6.3 0.6 - 13.1 %   CBC with Differential    Collection Time: 09/28/24 12:03 PM   Result Value Ref Range    WBC 1.6 (LL) 4.8 - 10.8 K/uL    RBC 2.75 (L) 4.70 - 6.10 M/uL    Hemoglobin 8.4 (L) 14.0 - 18.0 g/dL    Hematocrit 25.0 (L) 42.0 - 52.0 %    MCV 90.9 81.4 - 97.8 fL    MCH 30.5 27.0 - 33.0 pg    MCHC 33.6 32.3 - 36.5 g/dL    RDW 51.2 (H) 35.9 - 50.0 fL    Platelet Count 56 (L) 164 - 446 K/uL    MPV 11.5 9.0 - 12.9 fL    Neutrophils-Polys 65.50 44.00 - 72.00 %    Lymphocytes 16.40 (L) 22.00 - 41.00 %    Monocytes 7.80 0.00 - 13.40 %    Eosinophils 8.60 (H) 0.00 - 6.90 %    Basophils 1.70 0.00 - 1.80 %    Nucleated RBC 0.00 0.00 - 0.20 /100 WBC    Neutrophils (Absolute) 1.05 (L) 1.82 - 7.42 K/uL    Lymphs (Absolute) 0.26 (L) 1.00 - 4.80 K/uL    Monos (Absolute) 0.12 0.00 - 0.85 K/uL    Eos (Absolute) 0.14 0.00 - 0.51 K/uL    Baso (Absolute) 0.03 0.00 - 0.12 K/uL    NRBC (Absolute) 0.00 K/uL   HEPATIC FUNCTION PANEL    Collection Time: 09/28/24 12:03 PM   Result Value Ref Range    Alkaline Phosphatase 62 30 - 99 U/L    AST(SGOT) 161 (H) 12 - 45 U/L    ALT(SGPT) 98 (H) 2 - 50 U/L    Total Bilirubin 1.7 (H) 0.1 - 1.5 mg/dL    Direct Bilirubin 0.6 (H) 0.1 - 0.5 mg/dL    Indirect Bilirubin 1.1 (H) 0.0 - 1.0 mg/dL    Albumin 3.2 3.2 - 4.9 g/dL    Total Protein 6.4 6.0 - 8.2 g/dL   Prothrombin Time    Collection Time: 09/28/24 12:03 PM   Result Value Ref Range    PT 16.9 (H) 12.0 - 14.6 sec    INR 1.37 (H) 0.87 - 1.13   DIFFERENTIAL MANUAL    Collection Time: 09/28/24 12:03 PM   Result Value Ref Range    Manual Diff Status PERFORMED    PERIPHERAL SMEAR REVIEW    Collection Time: 09/28/24 12:03 PM   Result Value Ref Range    Peripheral Smear Review see below    PLATELET ESTIMATE    Collection Time: 09/28/24 12:03 PM   Result Value Ref Range    Plt Estimation  Decreased    MORPHOLOGY    Collection Time: 09/28/24 12:03 PM   Result Value Ref Range    RBC Morphology Normal    IMMATURE PLT FRACTION    Collection Time: 09/28/24 12:03 PM   Result Value Ref Range    Imm. Plt Fraction 7.6 0.6 - 13.1 %       Imaging:  EC-ECHOCARDIOGRAM COMPLETE W/O CONT                 EKG tracings personally reviewed by me sinus rhythm with intermittent V paced rhythm.    Echo 10/28/2023:  Normal left ventricular chamber size.  Normal left ventricular wall thickness.  Left ventricular systolic function is  normal.  Mild tricuspid regurgitation.  Estimated right ventricular systolic pressure is 25 mmHg.    Telemetry strips:  Review of telemetry strips shows findings of regular, wide-complex tachycardia with a rate in the 120s bpm    All pertinent features of laboratory and imaging reviewed including primary images where applicable      Principal Problem:    Upper GI bleed/portal gastropathy, par Afib not on anticoag, EtOH/Hep C (POA: Yes)  Active Problems:    Thrombocytopenia (HCC) (POA: Yes)    Hepatitis C (POA: Yes)    Portal hypertensive gastropathy (HCC) (POA: Yes)    Cirrhosis (HCC) (POA: Yes)    Alcohol abuse (POA: Yes)    Schizophrenia (HCC) (POA: Yes)    Bleeding esophageal varices (HCC) (POA: Yes)    Paroxysmal atrial fibrillation (HCC) (POA: Yes)    Hypokalemia (POA: Yes)    Acute blood loss anemia (ABLA) (POA: Yes)    Pancytopenia (HCC) (POA: Yes)    Pacemaker (POA: Yes)    Symptomatic anemia (POA: Yes)    Secondary esophageal varices (HCC) (POA: Yes)    V-tach (HCC) (POA: Yes)    Neutropenia (HCC) (POA: Yes)  Resolved Problems:    Hematemesis (POA: Yes)    Alcohol abuse with withdrawal (HCC) (POA: Yes)      Assessment / Plan:  Wide-complex tachycardia  PPM in situ  History of PAF and SVT  Upper GI bleed  Liver cirrhosis  Alcohol abuse    Recommendations:  After extensive review of telemetry monitoring strips, this is unlikely to be ventricular tachycardia.  Ventricular rates are in the  120s slow for VT. Rather, this may represent ventricular pacing with atrial tracking of underlying atrial arrhythmia given known history of PAF as well as SVT  We will interrogate PPM for further evaluation and to rule out VT  Already on carvedilol.  Recommend increasing to 6.25 mg twice daily for atrial arrhythmia suppression.  Has a history of PAF which was seen previously on PPM interrogation.  Not on anticoagulation due to high bleeding risk and clinical presentation with upper GI bleed.  Will continue to follow. Further recommendations pending device interrogation.      I personally discussed his case with  Dr Mo Amador M.D.    No future appointments.    It is my pleasure to participate in the care of Mr. Shabazz.  Please do not hesitate to contact me with questions or concerns.    Derek Gold MD  Cardiologist, [facility]    9/28/2024    Please note that this dictation was created using voice recognition software. I have made every reasonable attempt to correct obvious errors, but it is possible there are errors of grammar and possibly content that I did not discover before finalizing the note.

## 2024-09-29 NOTE — PROGRESS NOTES
Monitor Summary:     Rhythm: SR  Rate: 78-91  Ectopy: (R)PVC  Measurements: .18/.07/.35                 12 Hour Chart Check

## 2024-09-29 NOTE — PROGRESS NOTES
Received bedside report from Day RN and assumed patient care. Patient sitting up in bed with no signs of distress: A/Ox4, RA, 0/10 pain, Telemetry box in place. Bed locked/lowest position with call bell an possessions within reach. Patient denies additional needs at this time.

## 2024-09-29 NOTE — CARE PLAN
The patient is Stable - Low risk of patient condition declining or worsening    Shift Goals  Clinical Goals: safety, vss, tele, labs  Patient Goals: rest  Family Goals: na    Progress made toward(s) clinical / shift goals:    Problem: Pain - Standard  Goal: Alleviation of pain or a reduction in pain to the patient’s comfort goal  Outcome: Progressing     Problem: Knowledge Deficit - Standard  Goal: Patient and family/care givers will demonstrate understanding of plan of care, disease process/condition, diagnostic tests and medications  Outcome: Progressing     Problem: Psychosocial  Goal: Patient's level of anxiety will decrease  Outcome: Progressing     Problem: Risk for Aspiration  Goal: Patient's risk for aspiration will be absent or decrease  Outcome: Progressing       Pt. Participating in care more this afternoon. Pt. Happier that he is able to have regular food at this time. Pt. Calls appropriately, has had no complaints of pain. Continues to ask questions in order to get home soon.

## 2024-09-29 NOTE — PROGRESS NOTES
Cardiology Follow Up Progress Note    Date of Service  9/29/2024    Attending Physician  Mo Amador M.D.    Chief Complaint     WCT ruled out, device interrogated this morning showed SVT       HPI  Inocencio Shabazz is a 64 y.o. male admitted 9/27/2024 with upper GI bleed.  EGD revealed esophageal varices with no active bleed.  Required blood transfusion.  On PPI.    PMH: Pacemaker in situ (Biotronik), prior PAF, prior SVT alcohol use disorder, cirrhosis, esophageal varices, hepatitis C      Interim Events    No overnight cardiac events  Telemetry-SR  Device interrogation showed SVT  SBP appropriate    Review of Systems  Review of Systems   Respiratory:  Negative for cough, choking and shortness of breath.        Vital signs in last 24 hours  Temp:  [36.3 °C (97.3 °F)-36.7 °C (98.1 °F)] 36.4 °C (97.5 °F)  Pulse:  [76-94] 76  Resp:  [14-20] 17  BP: (100-134)/(42-82) 113/63  SpO2:  [93 %-97 %] 96 %    Physical Exam  Physical Exam  Cardiovascular:      Rate and Rhythm: Normal rate and regular rhythm.      Pulses: Normal pulses.   Pulmonary:      Effort: Pulmonary effort is normal.   Skin:     General: Skin is warm.   Neurological:      Mental Status: Mental status is at baseline.   Psychiatric:         Mood and Affect: Mood normal.         Lab Review  Lab Results   Component Value Date/Time    WBC 1.7 (LL) 09/29/2024 05:42 AM    RBC 2.67 (L) 09/29/2024 05:42 AM    HEMOGLOBIN 8.1 (L) 09/29/2024 05:42 AM    HEMATOCRIT 24.1 (L) 09/29/2024 05:42 AM    MCV 90.3 09/29/2024 05:42 AM    MCH 30.3 09/29/2024 05:42 AM    MCHC 33.6 09/29/2024 05:42 AM    MPV 11.9 09/29/2024 05:42 AM      Lab Results   Component Value Date/Time    SODIUM 141 09/29/2024 12:13 AM    POTASSIUM 4.0 09/29/2024 12:13 AM    CHLORIDE 111 09/29/2024 12:13 AM    CO2 21 09/29/2024 12:13 AM    GLUCOSE 135 (H) 09/29/2024 12:13 AM    BUN 4 (L) 09/29/2024 12:13 AM    CREATININE 0.60 09/29/2024 12:13 AM    BUNCREATRAT 20.0 08/27/2024 03:55 PM      Lab  "Results   Component Value Date/Time    ASTSGOT 153 (H) 09/29/2024 12:13 AM    ALTSGPT 103 (H) 09/29/2024 12:13 AM     Lab Results   Component Value Date/Time    CHOLSTRLTOT 91 (L) 02/06/2022 02:59 AM    LDL 51 02/06/2022 02:59 AM    HDL 28 (A) 02/06/2022 02:59 AM    TRIGLYCERIDE 61 02/06/2022 02:59 AM    TROPONINT <6 09/27/2024 12:40 AM       No results for input(s): \"NTPROBNP\" in the last 72 hours.    Cardiac Imaging and Procedures Review  EKG: Sinus rhythm      Echocardiogram:    Normal left ventricular systolic function.  The left ventricular ejection fraction is visually estimated to be 60%.  Normal right ventricular size and systolic function.  Mildly dilated left atrium.  Mild mitral annular calcification.           Imaging  Chest X-Ray: No acute process           Assessment/Plan    # Wide-complex tachycardia ruled out.  # Device interrogated showed evidence of SVT.  # No evidence of atrial fibrillation.  # Prior history of PAF and SVT.  # Upper GI bleed   # alcohol use disorder  # Liver cirrhosis    -Device interrogated suspected SVT.  -No evidence of atrial fibrillation.  -Device was adjusted now is more sensitive to mode switching and heart rate variability reduced to record ectopy <150 bpm.  -Already on carvedilol 6.25 twice daily.  -Preserved LV function.    Patient is to follow-up with his own cardiologist at the [facility].    No further cardiac recommendations.    I personally spent a total of 15 minutes which includes face-to-face time and non-face-to-face time spent on preparing to see the patient, reviewing hospital notes and tests, obtaining history from the patient, performing a medically appropriate exam, counseling and educating the patient, ordering medications/tests/procedures/referrals as clinically indicated, and documenting information in the electronic medical record.       Thank you for allowing me to participate in the care of this patient.      Please contact me with any " questions.    DAISHA Carrasco.   Cardiologist, [facility]  [phone]

## 2024-09-29 NOTE — PROGRESS NOTES
..Gastroenterology Progress Note               Author:  Virginia Ayala, DNP,  APRN Date & Time Created: 9/29/2024 9:14 AM       Patient ID:  Name:             Inocencio Shabazz  YOB: 1960  Age:                 64 y.o.  male  MRN:               [id number]        Medical Decision Making, by Problem:  Active Hospital Problems    Diagnosis     Neutropenia (HCC) [D70.9]     V-tach (HCC) [I47.20]     Symptomatic anemia [D64.9]     Secondary esophageal varices (HCC) [I85.10]     Upper GI bleed/portal gastropathy, par Afib not on anticoag, EtOH/Hep C [K92.2]     Pacemaker [Z95.0]     Pancytopenia (HCC) [D61.818]     Acute blood loss anemia (ABLA) [D62]     Hypokalemia [E87.6]     Paroxysmal atrial fibrillation (HCC) [I48.0]     Bleeding esophageal varices (HCC) [I85.01]     Schizophrenia (HCC) [F20.9]     Cirrhosis (HCC) [K74.60]     Alcohol abuse [F10.10]     Portal hypertensive gastropathy (HCC) [K76.6, K31.89]     Hepatitis C [B19.20]     Thrombocytopenia (HCC) [D69.6]      Presenting Chief Complaint:  hematemesis     HISTORY OF PRESENT ILLNESS:  Inocencio Shabazz is a 64 y.o. male with numerous admissions for similar complaints who presents with two episodes of large-volume hematemesis. Patient does not remember if he had black or bloody stools.  Patient reports muscle cramping.  Patient denies abdominal pain, chest pain or shortness of breath.  He admits to drinking but he says he has decreased recently. He had some runs of SVT in ER  He was given amiodarone and stabilized. Troponin normal.  Per chart review, he has chronic hepatitis C, genotype 3A who has not been treated and alcohol associated cirrhosis.  He was following with GI consultants as an outpatient.     Previous EGDs:   August 2023: with Dr. Yañez grade 1 varix, one column, portal hypertensive gastropathy, 2 AVMs treated  November 2019: with Dr. Smith where he was found to have esophageal varices and was banded x5.  March 2019:  "With Dr. Ba, portal hypertensive gastropathy with superimposed gastritis, small 2 cm hiatal hernia with possible early Camerons lesions, erythema without erosion, and grade 1 esophageal varix not banded  April 2016: With Dr. Tobin, gastric body ulcer bleed injected with epinephrine and hemoclipped and erosive gastritis  January 2016: With Dr. Antonio, grade 2 distal esophageal varices banded x6, possible Sanchez's esophagus but not a candidate for surveillance, moderate to severe portal hypertensive gastropathy    Interval History:  9/28/2024: Patient seen.  Reports feeling very tired.  Also describing \"film\" over his eyes.  No more bleeding. Nonsustained v. Tach overnight. Pancytopenia, WBC 1.5, hgb 7.9, plt 56    9/29/2024: Patient seen, feels better. Eating well, so signs of bleeding, large brown BM today. Evaluated by cardiology, pending interrogation of PPM. Hgb 8.1    Hospital Medications:  Current Facility-Administered Medications   Medication Dose Frequency Provider Last Rate Last Admin    lactulose 20 GM/30ML solution 30 mL  30 mL TID Virginia Ayala, DNP,  APRN   30 mL at 09/28/24 1700    carvedilol (Coreg) tablet 6.25 mg  6.25 mg BID WITH MEALS Derek BUI M.D.   6.25 mg at 09/29/24 0756    thiamine (B-1) 500 mg in dextrose 5% 100 mL IVPB  500 mg TID Veronica Potts M.D. 200 mL/hr at 09/29/24 0808 500 mg at 09/29/24 0808    [START ON 10/1/2024] thiamine (Vitamin B-1) tablet 100 mg  100 mg DAILY Veronica Potts M.D.        And    multivitamin tablet 1 Tablet  1 Tablet DAILY Veronica Potts M.D.   1 Tablet at 09/29/24 0450    And    folic acid (Folvite) tablet 1 mg  1 mg DAILY Veronica Potts M.D.   1 mg at 09/29/24 0450    QUEtiapine (SEROquel) tablet 200 mg  200 mg Nightly Veronica Potts M.D.   200 mg at 09/28/24 2102    pantoprazole (Protonix) injection 40 mg  40 mg BID Veronica Potts M.D.   40 mg at 09/29/24 0449    nicotine (Nicoderm) 21 MG/24HR 21 mg  21 mg " "Daily-0600 Abdelrahman Marsh M.D.   21 mg at 09/29/24 0451    And    nicotine polacrilex (Nicorette) 2 MG piece 2 mg  2 mg Q HOUR PRN Abdelrahman Marsh M.D.       Last reviewed on 9/27/2024  6:14 AM by Keila Sierra PhT       Review of Systems:  Review of Systems   Constitutional:  Positive for malaise/fatigue. Negative for chills and fever.   HENT:  Negative for hearing loss.    Eyes:  Negative for blurred vision.   Respiratory:  Negative for cough and shortness of breath.    Cardiovascular:  Negative for chest pain and leg swelling.   Gastrointestinal:  Negative for abdominal pain, blood in stool, constipation, diarrhea, heartburn, melena, nausea and vomiting.   Genitourinary:  Negative for dysuria.   Musculoskeletal:  Negative for back pain.   Skin:  Negative for rash.   Neurological:  Negative for dizziness and weakness.   Psychiatric/Behavioral:  Negative for depression.    All other systems reviewed and are negative.        Vital signs:  Weight/BMI: Body mass index is 20.01 kg/m².  /64   Pulse 76   Temp 36.7 °C (98 °F) (Temporal)   Resp 17   Ht 1.727 m (5' 8\")   Wt 59.7 kg (131 lb 9.8 oz)   SpO2 96%   Vitals:    09/28/24 2259 09/29/24 0339 09/29/24 0400 09/29/24 0725   BP: 127/77 100/63  106/64   Pulse: 94 78  76   Resp: 16 14  17   Temp: 36.6 °C (97.9 °F) 36.5 °C (97.7 °F)  36.7 °C (98 °F)   TempSrc: Temporal Temporal  Temporal   SpO2: 97% 96%  96%   Weight:   59.7 kg (131 lb 9.8 oz)    Height:         Oxygen Therapy:  Pulse Oximetry: 96 %, O2 (LPM): 0, O2 Delivery Device: None - Room Air    Intake/Output Summary (Last 24 hours) at 9/29/2024 0914  Last data filed at 9/29/2024 0757  Gross per 24 hour   Intake 240 ml   Output 2050 ml   Net -1810 ml       Physical Exam  Vitals and nursing note reviewed.   Constitutional:       General: He is not in acute distress.     Appearance: Normal appearance. He is ill-appearing.   HENT:      Head: Normocephalic and atraumatic.      Right Ear: External ear normal.      " Left Ear: External ear normal.      Nose: Nose normal.      Mouth/Throat:      Mouth: Mucous membranes are moist.      Pharynx: Oropharynx is clear.      Comments: Poor dentition  Mumbles when speaking  Eyes:      General: No scleral icterus.  Cardiovascular:      Rate and Rhythm: Regular rhythm. Tachycardia present.      Pulses: Normal pulses.      Heart sounds: Normal heart sounds.   Pulmonary:      Effort: Pulmonary effort is normal.      Breath sounds: Rales present.   Abdominal:      General: Abdomen is flat. Bowel sounds are normal. There is no distension.      Palpations: Abdomen is soft.      Tenderness: There is abdominal tenderness.   Musculoskeletal:         General: Normal range of motion.      Cervical back: Normal range of motion and neck supple.   Skin:     General: Skin is warm.      Capillary Refill: Capillary refill takes less than 2 seconds.      Coloration: Skin is pale.   Neurological:      Mental Status: He is alert and oriented to person, place, and time.      Motor: Weakness present.   Psychiatric:         Mood and Affect: Mood normal.         Behavior: Behavior normal.      Comments: Lethargic         Labs:  Recent Labs     09/27/24  0040 09/27/24  0621 09/28/24  0040 09/29/24  0013   SODIUM 140 140 135 141   POTASSIUM 3.2* 4.3 3.8 4.0   CHLORIDE 103 106 108 111   CO2 22 22 21 21   BUN 26* 21 8 4*   CREATININE 0.65 0.53 0.62 0.60   MAGNESIUM 2.5  --  1.9 1.8   PHOSPHORUS  --   --   --  2.6   CALCIUM 8.3* 7.8* 7.8* 8.4*     Recent Labs     09/27/24  0040 09/27/24  0621 09/28/24  0040 09/28/24  1203 09/29/24  0013   ALTSGPT 51* 47  --  98* 103*   ASTSGOT 63* 55*  --  161* 153*   ALKPHOSPHAT 65 59  --  62 68   TBILIRUBIN 1.8* 2.1*  --  1.7* 0.9   DBILIRUBIN  --   --   --  0.6*  --    LIPASE 29  --   --   --   --    GLUCOSE 152* 178* 187*  --  135*     Recent Labs     09/27/24  0621 09/27/24  1501 09/28/24  1203 09/28/24  1808 09/29/24  0013 09/29/24  0542   WBC  --    < > 1.6* 1.8* 1.8* 1.7*    NEUTSPOLYS  --    < > 65.50 64.10 57.90 61.20   LYMPHOCYTES  --    < > 16.40* 18.50* 23.50 26.70   MONOCYTES  --    < > 7.80 9.80 12.60 5.20   EOSINOPHILS  --    < > 8.60* 6.00 5.50 5.20   BASOPHILS  --    < > 1.70 1.10 0.50 1.70   ASTSGOT 55*  --  161*  --  153*  --    ALTSGPT 47  --  98*  --  103*  --    ALKPHOSPHAT 59  --  62  --  68  --    TBILIRUBIN 2.1*  --  1.7*  --  0.9  --     < > = values in this interval not displayed.     Recent Labs     09/27/24  0040 09/27/24  1501 09/28/24  1203 09/28/24  1808 09/29/24  0013 09/29/24  0542   RBC 2.69*   < > 2.75* 2.90* 2.95* 2.67*   HEMOGLOBIN 8.1*   < > 8.4* 8.6* 8.6* 8.1*   HEMATOCRIT 24.7*   < > 25.0* 26.4* 27.0* 24.1*   PLATELETCT 60*   < > 56* 55* 52* 55*   PROTHROMBTM 19.5*  --  16.9*  --   --   --    APTT 29.9  --   --   --   --   --    INR 1.64*  --  1.37*  --   --   --    IRON 299*  --   --   --   --   --    FERRITIN 80.8  --   --   --   --   --    TOTIRONBC see below  --   --   --   --   --     < > = values in this interval not displayed.     Recent Results (from the past 24 hour(s))   CBC with Differential    Collection Time: 09/28/24 12:03 PM   Result Value Ref Range    WBC 1.6 (LL) 4.8 - 10.8 K/uL    RBC 2.75 (L) 4.70 - 6.10 M/uL    Hemoglobin 8.4 (L) 14.0 - 18.0 g/dL    Hematocrit 25.0 (L) 42.0 - 52.0 %    MCV 90.9 81.4 - 97.8 fL    MCH 30.5 27.0 - 33.0 pg    MCHC 33.6 32.3 - 36.5 g/dL    RDW 51.2 (H) 35.9 - 50.0 fL    Platelet Count 56 (L) 164 - 446 K/uL    MPV 11.5 9.0 - 12.9 fL    Neutrophils-Polys 65.50 44.00 - 72.00 %    Lymphocytes 16.40 (L) 22.00 - 41.00 %    Monocytes 7.80 0.00 - 13.40 %    Eosinophils 8.60 (H) 0.00 - 6.90 %    Basophils 1.70 0.00 - 1.80 %    Nucleated RBC 0.00 0.00 - 0.20 /100 WBC    Neutrophils (Absolute) 1.05 (L) 1.82 - 7.42 K/uL    Lymphs (Absolute) 0.26 (L) 1.00 - 4.80 K/uL    Monos (Absolute) 0.12 0.00 - 0.85 K/uL    Eos (Absolute) 0.14 0.00 - 0.51 K/uL    Baso (Absolute) 0.03 0.00 - 0.12 K/uL    NRBC (Absolute) 0.00 K/uL    HEPATIC FUNCTION PANEL    Collection Time: 09/28/24 12:03 PM   Result Value Ref Range    Alkaline Phosphatase 62 30 - 99 U/L    AST(SGOT) 161 (H) 12 - 45 U/L    ALT(SGPT) 98 (H) 2 - 50 U/L    Total Bilirubin 1.7 (H) 0.1 - 1.5 mg/dL    Direct Bilirubin 0.6 (H) 0.1 - 0.5 mg/dL    Indirect Bilirubin 1.1 (H) 0.0 - 1.0 mg/dL    Albumin 3.2 3.2 - 4.9 g/dL    Total Protein 6.4 6.0 - 8.2 g/dL   Prothrombin Time    Collection Time: 09/28/24 12:03 PM   Result Value Ref Range    PT 16.9 (H) 12.0 - 14.6 sec    INR 1.37 (H) 0.87 - 1.13   DIFFERENTIAL MANUAL    Collection Time: 09/28/24 12:03 PM   Result Value Ref Range    Manual Diff Status PERFORMED    PERIPHERAL SMEAR REVIEW    Collection Time: 09/28/24 12:03 PM   Result Value Ref Range    Peripheral Smear Review see below    PLATELET ESTIMATE    Collection Time: 09/28/24 12:03 PM   Result Value Ref Range    Plt Estimation Decreased    MORPHOLOGY    Collection Time: 09/28/24 12:03 PM   Result Value Ref Range    RBC Morphology Normal    IMMATURE PLT FRACTION    Collection Time: 09/28/24 12:03 PM   Result Value Ref Range    Imm. Plt Fraction 7.6 0.6 - 13.1 %   EC-ECHOCARDIOGRAM COMPLETE W/O CONT    Collection Time: 09/28/24  1:38 PM   Result Value Ref Range    Eject.Frac. MOD BP 54.51     Eject.Frac. MOD 4C 54.78     Eject.Frac. MOD 2C 54.77     Left Ventrical Ejection Fraction 60    CBC with Differential    Collection Time: 09/28/24  6:08 PM   Result Value Ref Range    WBC 1.8 (LL) 4.8 - 10.8 K/uL    RBC 2.90 (L) 4.70 - 6.10 M/uL    Hemoglobin 8.6 (L) 14.0 - 18.0 g/dL    Hematocrit 26.4 (L) 42.0 - 52.0 %    MCV 91.0 81.4 - 97.8 fL    MCH 29.7 27.0 - 33.0 pg    MCHC 32.6 32.3 - 36.5 g/dL    RDW 51.8 (H) 35.9 - 50.0 fL    Platelet Count 55 (L) 164 - 446 K/uL    MPV 10.8 9.0 - 12.9 fL    Neutrophils-Polys 64.10 44.00 - 72.00 %    Lymphocytes 18.50 (L) 22.00 - 41.00 %    Monocytes 9.80 0.00 - 13.40 %    Eosinophils 6.00 0.00 - 6.90 %    Basophils 1.10 0.00 - 1.80 %    Immature  Granulocytes 0.50 0.00 - 0.90 %    Nucleated RBC 0.00 0.00 - 0.20 /100 WBC    Neutrophils (Absolute) 1.18 (L) 1.82 - 7.42 K/uL    Lymphs (Absolute) 0.34 (L) 1.00 - 4.80 K/uL    Monos (Absolute) 0.18 0.00 - 0.85 K/uL    Eos (Absolute) 0.11 0.00 - 0.51 K/uL    Baso (Absolute) 0.02 0.00 - 0.12 K/uL    Immature Granulocytes (abs) 0.01 0.00 - 0.11 K/uL    NRBC (Absolute) 0.00 K/uL   IMMATURE PLT FRACTION    Collection Time: 09/28/24  6:08 PM   Result Value Ref Range    Imm. Plt Fraction 8.8 0.6 - 13.1 %   CBC with Differential    Collection Time: 09/29/24 12:13 AM   Result Value Ref Range    WBC 1.8 (LL) 4.8 - 10.8 K/uL    RBC 2.95 (L) 4.70 - 6.10 M/uL    Hemoglobin 8.6 (L) 14.0 - 18.0 g/dL    Hematocrit 27.0 (L) 42.0 - 52.0 %    MCV 91.5 81.4 - 97.8 fL    MCH 29.2 27.0 - 33.0 pg    MCHC 31.9 (L) 32.3 - 36.5 g/dL    RDW 51.7 (H) 35.9 - 50.0 fL    Platelet Count 52 (L) 164 - 446 K/uL    MPV 11.6 9.0 - 12.9 fL    Neutrophils-Polys 57.90 44.00 - 72.00 %    Lymphocytes 23.50 22.00 - 41.00 %    Monocytes 12.60 0.00 - 13.40 %    Eosinophils 5.50 0.00 - 6.90 %    Basophils 0.50 0.00 - 1.80 %    Immature Granulocytes 0.00 0.00 - 0.90 %    Nucleated RBC 0.00 0.00 - 0.20 /100 WBC    Neutrophils (Absolute) 1.06 (L) 1.82 - 7.42 K/uL    Lymphs (Absolute) 0.43 (L) 1.00 - 4.80 K/uL    Monos (Absolute) 0.23 0.00 - 0.85 K/uL    Eos (Absolute) 0.10 0.00 - 0.51 K/uL    Baso (Absolute) 0.01 0.00 - 0.12 K/uL    Immature Granulocytes (abs) 0.00 0.00 - 0.11 K/uL    NRBC (Absolute) 0.00 K/uL   MAGNESIUM    Collection Time: 09/29/24 12:13 AM   Result Value Ref Range    Magnesium 1.8 1.5 - 2.5 mg/dL   PHOSPHORUS    Collection Time: 09/29/24 12:13 AM   Result Value Ref Range    Phosphorus 2.6 2.5 - 4.5 mg/dL   Comp Metabolic Panel    Collection Time: 09/29/24 12:13 AM   Result Value Ref Range    Sodium 141 135 - 145 mmol/L    Potassium 4.0 3.6 - 5.5 mmol/L    Chloride 111 96 - 112 mmol/L    Co2 21 20 - 33 mmol/L    Anion Gap 9.0 7.0 - 16.0     Glucose 135 (H) 65 - 99 mg/dL    Bun 4 (L) 8 - 22 mg/dL    Creatinine 0.60 0.50 - 1.40 mg/dL    Calcium 8.4 (L) 8.5 - 10.5 mg/dL    Correct Calcium 9.0 8.5 - 10.5 mg/dL    AST(SGOT) 153 (H) 12 - 45 U/L    ALT(SGPT) 103 (H) 2 - 50 U/L    Alkaline Phosphatase 68 30 - 99 U/L    Total Bilirubin 0.9 0.1 - 1.5 mg/dL    Albumin 3.2 3.2 - 4.9 g/dL    Total Protein 6.4 6.0 - 8.2 g/dL    Globulin 3.2 1.9 - 3.5 g/dL    A-G Ratio 1.0 g/dL   IMMATURE PLT FRACTION    Collection Time: 09/29/24 12:13 AM   Result Value Ref Range    Imm. Plt Fraction 10.2 0.6 - 13.1 %   ESTIMATED GFR    Collection Time: 09/29/24 12:13 AM   Result Value Ref Range    GFR (CKD-EPI) 106 >60 mL/min/1.73 m 2   CBC with Differential    Collection Time: 09/29/24  5:42 AM   Result Value Ref Range    WBC 1.7 (LL) 4.8 - 10.8 K/uL    RBC 2.67 (L) 4.70 - 6.10 M/uL    Hemoglobin 8.1 (L) 14.0 - 18.0 g/dL    Hematocrit 24.1 (L) 42.0 - 52.0 %    MCV 90.3 81.4 - 97.8 fL    MCH 30.3 27.0 - 33.0 pg    MCHC 33.6 32.3 - 36.5 g/dL    RDW 51.2 (H) 35.9 - 50.0 fL    Platelet Count 55 (L) 164 - 446 K/uL    MPV 11.9 9.0 - 12.9 fL    Neutrophils-Polys 61.20 44.00 - 72.00 %    Lymphocytes 26.70 22.00 - 41.00 %    Monocytes 5.20 0.00 - 13.40 %    Eosinophils 5.20 0.00 - 6.90 %    Basophils 1.70 0.00 - 1.80 %    Nucleated RBC 0.00 0.00 - 0.20 /100 WBC    Neutrophils (Absolute) 1.04 (L) 1.82 - 7.42 K/uL    Lymphs (Absolute) 0.45 (L) 1.00 - 4.80 K/uL    Monos (Absolute) 0.09 0.00 - 0.85 K/uL    Eos (Absolute) 0.09 0.00 - 0.51 K/uL    Baso (Absolute) 0.03 0.00 - 0.12 K/uL    NRBC (Absolute) 0.00 K/uL   DIFFERENTIAL MANUAL    Collection Time: 09/29/24  5:42 AM   Result Value Ref Range    Manual Diff Status PERFORMED    PERIPHERAL SMEAR REVIEW    Collection Time: 09/29/24  5:42 AM   Result Value Ref Range    Peripheral Smear Review see below    PLATELET ESTIMATE    Collection Time: 09/29/24  5:42 AM   Result Value Ref Range    Plt Estimation Decreased    MORPHOLOGY    Collection Time:  09/29/24  5:42 AM   Result Value Ref Range    RBC Morphology Normal    IMMATURE PLT FRACTION    Collection Time: 09/29/24  5:42 AM   Result Value Ref Range    Imm. Plt Fraction 6.8 0.6 - 13.1 %       Radiology Review:  EC-ECHOCARDIOGRAM COMPLETE W/O CONT   Final Result        MDM (Data Review):   -Records reviewed and summarized in current documentation  -I personally reviewed and interpreted the laboratory results  -I personally reviewed the radiology images    Assessment/Recommendations:  Hematemesis  Cirrhosis  Alcohol abuse disorder  Hep C - not treated  Anemia - due to GI blood loss.  SVT    Recommendations:  Continue PPI  [facility]     Gi team signing off. Please don't hesitate to reconsult us with any questions or concerns.     Discussed with patient, Dr. Julian Amador, Dr. Esposito    ..Virginia Ayala, ESTER,  APRN    Core Quality Measures   Reviewed items::  Labs, Medications and Radiology reports reviewed

## 2024-09-29 NOTE — PROGRESS NOTES
Hospital Medicine Daily Progress Note    Date of Service  9/29/2024    Chief Complaint  Inocencio Shabazz is a 64 y.o. male admitted 9/27/2024 with hematemesis    Hospital Course  64-year-old male with history of hepatitis C schizophrenia alcohol dependence liver cirrhosis presented with hematemesis.  He was noted to have wide-complex tachycardia received amiodarone in the emergency department however subsequently became hypotensive and amiodarone was held.  He received PRBC transfusion evaluated by GI and underwent EGD on 9/27/2024 revealing esophageal varices that were banded.  He was on octreotide drip antibiotics and admitted to telemetry.    Interval Problem Update    Patient is afebrile on room air  Generalized weakness and dizziness with mobilization order orthostatics negative  PT OT eval's ordered  I discussed with cardiology pacemaker interrogation revealed SVT  I discussed with GI  I reviewed CBC WBC 19 hemoglobin 9.2 platelets 54  I reviewed chemistry panel electrolytes stable BUN 4 creatinine 0.6 magnesium 1.8    I have discussed this patient's plan of care and discharge plan at IDT rounds today with Case Management, Nursing, Nursing leadership, and other members of the IDT team.    Consultants/Specialty  cardiology and GI    Code Status  Full Code    Disposition  The patient is not medically cleared for discharge to home or a post-acute facility.      I have placed the appropriate orders for post-discharge needs.    Review of Systems  Review of Systems   Constitutional:  Positive for malaise/fatigue.   Respiratory:  Negative for shortness of breath.    Cardiovascular:  Negative for chest pain.   Gastrointestinal:  Negative for blood in stool and melena.   Neurological:  Positive for dizziness.        Physical Exam  Temp:  [36.4 °C (97.5 °F)-36.7 °C (98.1 °F)] 36.4 °C (97.5 °F)  Pulse:  [76-94] 76  Resp:  [14-20] 17  BP: (100-134)/(42-82) 113/63  SpO2:  [93 %-97 %] 96 %    Physical  Exam  Constitutional:       Appearance: He is ill-appearing.   Cardiovascular:      Rate and Rhythm: Normal rate and regular rhythm.      Heart sounds: No murmur heard.  Pulmonary:      Breath sounds: Normal breath sounds. No rales.   Chest:      Chest wall: No tenderness.   Abdominal:      Palpations: Abdomen is soft.      Tenderness: There is no abdominal tenderness. There is no guarding.   Musculoskeletal:         General: No swelling.   Neurological:      General: No focal deficit present.      Mental Status: He is alert.   Psychiatric:         Mood and Affect: Mood normal.     Total time spent reviewing imaging and lab results examining patient    Fluids    Intake/Output Summary (Last 24 hours) at 9/29/2024 1512  Last data filed at 9/29/2024 0757  Gross per 24 hour   Intake 240 ml   Output 1400 ml   Net -1160 ml        Laboratory  Recent Labs     09/29/24  0013 09/29/24  0542 09/29/24  1158   WBC 1.8* 1.7* 1.9*   RBC 2.95* 2.67* 3.06*   HEMOGLOBIN 8.6* 8.1* 9.2*   HEMATOCRIT 27.0* 24.1* 28.2*   MCV 91.5 90.3 92.2   MCH 29.2 30.3 30.1   MCHC 31.9* 33.6 32.6   RDW 51.7* 51.2* 52.0*   PLATELETCT 52* 55* 54*   MPV 11.6 11.9 12.4     Recent Labs     09/27/24  0621 09/28/24  0040 09/29/24  0013   SODIUM 140 135 141   POTASSIUM 4.3 3.8 4.0   CHLORIDE 106 108 111   CO2 22 21 21   GLUCOSE 178* 187* 135*   BUN 21 8 4*   CREATININE 0.53 0.62 0.60   CALCIUM 7.8* 7.8* 8.4*     Recent Labs     09/27/24  0040 09/28/24  1203   APTT 29.9  --    INR 1.64* 1.37*               Imaging  EC-ECHOCARDIOGRAM COMPLETE W/O CONT   Final Result           Assessment/Plan  V-tach (HCC)- (present on admission)  Assessment & Plan  Ruled out      Pacemaker- (present on admission)  Assessment & Plan  Hx. PPM placement  Device interrogated in setting adjusted by cardiology      Pancytopenia (HCC)- (present on admission)  Assessment & Plan  Likely secondary to portal hypertension and bone marrow suppression from EtOH.  Reviewed prior CBCs  Monitor  CBC    Acute blood loss anemia (ABLA)- (present on admission)  Assessment & Plan  Monitor hemoglobin and transfuse if less than 7    Paroxysmal atrial fibrillation (HCC)- (present on admission)  Assessment & Plan  Continue carvedilol  Anticoagulation contraindicated    Bleeding esophageal varices (HCC)- (present on admission)  Assessment & Plan  EGD 9/27/2024 moderate-sized esophageal varices x 3 and mild portal gastropathy status post banding  Hemoglobin is stable  Continue Protonix  Reinforced importance of alcohol cessation  [facility]      Schizophrenia (HCC)- (present on admission)  Assessment & Plan  Hx. Schizophrenia, managed w/ seroquel.  -Continue home meds    Alcohol abuse- (present on admission)  Assessment & Plan  Continue thiamine and folic acid  lorazepam per CIWA protocol    Cirrhosis (HCC)- (present on admission)  Assessment & Plan  Hx. Of cirrhosis noted on imaging. Complication of alcohol use disorder, history of hep c.  Counseled on importance of completely abstaining from alcohol  Patient currently euvolemic        Hepatitis C- (present on admission)  Assessment & Plan  Documented hx. Hep C. Cirrhosis noted on U/s abd from 10/2023.  Discussed with GI  Outpatient evaluation for treatment    Thrombocytopenia (HCC)- (present on admission)  Assessment & Plan  Likely secondary to portal hypertension and hypersplenism as well as bone marrow suppression from alcoholism  Monitor CBC           VTE prophylaxis:   SCDs/TEDs      I have performed a physical exam and reviewed and updated ROS and Plan today (9/29/2024). In review of yesterday's note (9/28/2024), there are no changes except as documented above.

## 2024-09-29 NOTE — PROGRESS NOTES
Monitor Summary  Rhythm: SR/ST  Rate:   Ectopy: (R) PVC  Measurements: .17/.08/.38  ---12 hr Chart Review---

## 2024-09-30 ENCOUNTER — PHARMACY VISIT (OUTPATIENT)
Dept: PHARMACY | Facility: MEDICAL CENTER | Age: 64
End: 2024-09-30
Payer: COMMERCIAL

## 2024-09-30 VITALS
SYSTOLIC BLOOD PRESSURE: 100 MMHG | DIASTOLIC BLOOD PRESSURE: 64 MMHG | WEIGHT: 127.65 LBS | HEART RATE: 71 BPM | RESPIRATION RATE: 18 BRPM | BODY MASS INDEX: 19.35 KG/M2 | OXYGEN SATURATION: 97 % | HEIGHT: 68 IN | TEMPERATURE: 98.1 F

## 2024-09-30 LAB
HCV RNA SERPL NAA+PROBE-ACNC: ABNORMAL IU/ML
HCV RNA SERPL NAA+PROBE-LOG IU: 5.45 LOG IU/ML
HCV RNA SERPL QL NAA+PROBE: DETECTED

## 2024-09-30 PROCEDURE — 700111 HCHG RX REV CODE 636 W/ 250 OVERRIDE (IP)

## 2024-09-30 PROCEDURE — 700102 HCHG RX REV CODE 250 W/ 637 OVERRIDE(OP): Performed by: INTERNAL MEDICINE

## 2024-09-30 PROCEDURE — A9270 NON-COVERED ITEM OR SERVICE: HCPCS | Performed by: STUDENT IN AN ORGANIZED HEALTH CARE EDUCATION/TRAINING PROGRAM

## 2024-09-30 PROCEDURE — 700102 HCHG RX REV CODE 250 W/ 637 OVERRIDE(OP): Performed by: STUDENT IN AN ORGANIZED HEALTH CARE EDUCATION/TRAINING PROGRAM

## 2024-09-30 PROCEDURE — 99239 HOSP IP/OBS DSCHRG MGMT >30: CPT | Performed by: HOSPITALIST

## 2024-09-30 PROCEDURE — A9270 NON-COVERED ITEM OR SERVICE: HCPCS | Performed by: INTERNAL MEDICINE

## 2024-09-30 PROCEDURE — 700102 HCHG RX REV CODE 250 W/ 637 OVERRIDE(OP)

## 2024-09-30 PROCEDURE — RXMED WILLOW AMBULATORY MEDICATION CHARGE: Performed by: HOSPITALIST

## 2024-09-30 PROCEDURE — A9270 NON-COVERED ITEM OR SERVICE: HCPCS

## 2024-09-30 RX ORDER — ACETAMINOPHEN 500 MG
500 TABLET ORAL EVERY 6 HOURS PRN
Qty: 30 TABLET | Refills: 0 | Status: SHIPPED | OUTPATIENT
Start: 2024-09-30

## 2024-09-30 RX ORDER — LACTULOSE 10 G/15ML
30 SOLUTION ORAL EVERY EVENING
Qty: 900 ML | Refills: 1 | Status: SHIPPED | OUTPATIENT
Start: 2024-09-30

## 2024-09-30 RX ORDER — CARVEDILOL 6.25 MG/1
6.25 TABLET ORAL 2 TIMES DAILY WITH MEALS
Qty: 60 TABLET | Refills: 1 | Status: SHIPPED | OUTPATIENT
Start: 2024-09-30

## 2024-09-30 RX ORDER — PANTOPRAZOLE SODIUM 40 MG/1
40 TABLET, DELAYED RELEASE ORAL 2 TIMES DAILY
Qty: 60 TABLET | Refills: 1 | Status: SHIPPED | OUTPATIENT
Start: 2024-09-30

## 2024-09-30 RX ADMIN — CARVEDILOL 6.25 MG: 6.25 TABLET, FILM COATED ORAL at 08:00

## 2024-09-30 RX ADMIN — FOLIC ACID 1 MG: 1 TABLET ORAL at 05:20

## 2024-09-30 RX ADMIN — NICOTINE TRANSDERMAL SYSTEM 21 MG: 21 PATCH, EXTENDED RELEASE TRANSDERMAL at 05:20

## 2024-09-30 RX ADMIN — PANTOPRAZOLE SODIUM 40 MG: 40 INJECTION, POWDER, FOR SOLUTION INTRAVENOUS at 05:20

## 2024-09-30 RX ADMIN — THERA TABS 1 TABLET: TAB at 05:20

## 2024-09-30 ASSESSMENT — PAIN DESCRIPTION - PAIN TYPE
TYPE: ACUTE PAIN
TYPE: ACUTE PAIN

## 2024-09-30 ASSESSMENT — FIBROSIS 4 INDEX: FIB4 SCORE: 18.43

## 2024-09-30 NOTE — DISCHARGE PLANNING
Care Transition Team Assessment     Patient, Inocencio Shabazz, is 64-year-old admitted for blood in vomit. Patient is alert and oriented x4; information was given by patient. Please see pt's H&P for prior medical history. Patient reports living in a second floor apartment; [address] [place] [address] [address]. Pt confirmed contacts on facesheet. Patient does have PCP, Dr. Jacques. Patient does not have transportation once medically cleared for discharge. Patient reports no concerns with food insecurity. Patient receives 1,390/mo from [employer]. Confirmed medical insurance is [facility] and Medicare. Patient is independent. Patient has no history of use with SA. Patient reports no history with MH.     Identified DC needs at this time:    Transport.    Information Source  Orientation Level: Oriented X4  Information Given By: Patient  Who is responsible for making decisions for patient? : Patient    Readmission Evaluation  Is this a readmission?: No    Elopement Risk  Legal Hold: No  Ambulatory or Self Mobile in Wheelchair: Yes  Disoriented: No  Psychiatric Symptoms: None  History of Wandering: No  Elopement this Admit: No  Vocalizing Wanting to Leave: No  Displays Behaviors, Body Language Wanting to Leave: No-Not at Risk for Elopement  Elopement Risk: Not at Risk for Elopement    Interdisciplinary Discharge Planning  Primary Care Physician: Dr. Jacques  Patient or legal guardian wants to designate a caregiver: No  Support Systems: Family Member(s)  Housing / Facility: 2 Story Apartment / Condo (Second floor)    Discharge Preparedness  What are your discharge supports?: Sibling  Prior Functional Level: Ambulatory, Independent with Activities of Daily Living, Independent with Medication Management    Functional Assesment  Prior Functional Level: Ambulatory, Independent with Activities of Daily Living, Independent with Medication Management    Domestic Abuse  Have you ever been the victim of abuse or violence?: No  Possible  Abuse/Neglect Reported to:: Not Applicable    Psychological Assessment  History of Substance Abuse: Alcohol, Marijuana  Date Last Used - Alcohol: 09/26/24  Date Last Used - Marijuana: PTA  Substance Abuse Comments: Has resources  History of Psychiatric Problems: No    Anticipated Discharge Information  Discharge Disposition: Discharged to home/self care (01)

## 2024-09-30 NOTE — PROGRESS NOTES
Monitor Summary  Rhythm: SR  Rate: 67-92  Ectopy: (P) Bts  Measurements: .19/.06/.37  ---12 hr Chart Review---

## 2024-09-30 NOTE — PROGRESS NOTES
Received report from day RN and assumed patient care. Patient resting in bed with no distress; even and unlabored breathing, 0/10 pain. Pt is A/Ox4, RA, Telemetry box in place, bed locked/lowest position, bed alarm on. Call bell and possessions within reach. Discussed POC and patient verbalized understanding. Pt. Denies additional needs at this time.

## 2024-09-30 NOTE — PROGRESS NOTES
Bedside report received, assumed care of patient at change of shift. Chart, labs, and orders reviewed. Pt resting in bed, breathing even and unlabored, denies pain. A&O x4. Tele monitoring in place. Fall precautions including bed alarm in place and education provided. Call light within reach, bed locked and in lowest position, denies other needs at this time.

## 2024-09-30 NOTE — CARE PLAN
The patient is Stable - Low risk of patient condition declining or worsening    Shift Goals  Clinical Goals: Safety, VSS, labs, vitamins  Patient Goals: rest  Family Goals: FABRIZIO    Progress made toward(s) clinical / shift goals:      Problem: Knowledge Deficit - Standard  Goal: Patient and family/care givers will demonstrate understanding of plan of care, disease process/condition, diagnostic tests and medications  Outcome: Progressing     Problem: Lifestyle Changes  Goal: Patient's ability to identify lifestyle changes and available resources to help reduce recurrence of condition will improve  Outcome: Progressing     Problem: Psychosocial  Goal: Spiritual and cultural needs incorporated into hospitalization  Outcome: Progressing       Patient is not progressing towards the following goals:

## 2024-09-30 NOTE — DISCHARGE SUMMARY
Discharge Summary    CHIEF COMPLAINT ON ADMISSION  Chief Complaint   Patient presents with    Blood in Vomit     Reports large amount x2 days, BRB, -thinners, +ETOH daily, reports 1/5th vodka daily        Reason for Admission  ems     Admission Date  9/27/2024    CODE STATUS  Full code    HPI & HOSPITAL COURSE     64-year-old male with history of hepatitis C schizophrenia alcohol dependence liver cirrhosis presented with hematemesis.  He was noted to have wide-complex tachycardia received amiodarone in the emergency department however subsequently became hypotensive and amiodarone was held.  He received PRBC transfusion evaluated by GI and underwent EGD on 9/27/2024 revealing esophageal varices that were banded.  He was on octreotide drip antibiotics and admitted to telemetry.  The patient was evaluated by cardiology for recurrent wide-complex tachycardia his pacemaker was interrogated with no ventricular tachycardia noted and his pacemaker was adjusted.    Patient is clinically improved he is tolerating his diet he was evaluated by speech therapy.  Patient was counseled on the importance of alcohol cessation and need [facility] and discuss treatment for his hepatitis C        Therefore, he is discharged in good and stable condition to home with close outpatient follow-up.    The patient met 2-midnight criteria for an inpatient stay at the time of discharge.    Discharge Date  9/30/2024    FOLLOW UP ITEMS POST DISCHARGE  Follow-up with PCP with repeat CBC and chemistry panel  Outpatient GI evaluation for repeat EGD and consideration of hep C treatment  Outpatient follow-up with cardiology    [facility] instructed to follow-up with his primary care and obtain referrals    DISCHARGE DIAGNOSES  Principal Problem:    Upper GI bleed/portal gastropathy, par Afib not on anticoag, EtOH/Hep C (POA: Yes)  Active Problems:    Thrombocytopenia (HCC) (POA: Yes)    Hepatitis  C (POA: Yes)    Portal hypertensive gastropathy (HCC) (POA: Yes)    Cirrhosis (HCC) (POA: Yes)    Alcohol abuse (POA: Yes)    Schizophrenia (HCC) (POA: Yes)    Bleeding esophageal varices (HCC) (POA: Yes)    Paroxysmal atrial fibrillation (HCC) (POA: Yes)    Acute blood loss anemia (ABLA) (POA: Yes)    Pancytopenia (HCC) (POA: Yes)    Pacemaker (POA: Yes)    Symptomatic anemia (POA: Yes)    Secondary esophageal varices (HCC) (POA: Yes)    Wide-complex tachycardia (POA: Yes)    Neutropenia (HCC) (POA: Yes)  Resolved Problems:    Hypokalemia (POA: Yes)    Hematemesis (POA: Yes)    Alcohol abuse with withdrawal (HCC) (POA: Yes)      FOLLOW UP  No future appointments.  Nnamdi Ballesteros M.D.  [address] [facility] [place] [facility] [id number] [phone]    Follow up  Call for next available appointment      MEDICATIONS ON DISCHARGE     Medication List        START taking these medications        Instructions   carvedilol 6.25 MG Tabs  Commonly known as: Coreg   Take 1 Tablet by mouth 2 times a day with meals.  Dose: 6.25 mg     lactulose 10 GM/15ML Soln   Take 30 mL by mouth every evening.  Dose: 30 mL            CHANGE how you take these medications        Instructions   acetaminophen 500 MG Tabs  What changed:   how much to take  reasons to take this  Commonly known as: Tylenol   Take 1 Tablet by mouth every 6 hours as needed for Mild Pain or Moderate Pain.  Dose: 500 mg     pantoprazole 40 MG Tbec  What changed: when to take this  Commonly known as: Protonix   Take 1 Tablet by mouth 2 times a day.  Dose: 40 mg            CONTINUE taking these medications        Instructions   folic acid 1 MG Tabs  Commonly known as: Folvite   Take 1 mg by mouth every day.  Dose: 1 mg     nicotine 21 MG/24HR Pt24  Commonly known as: Nicoderm   Place 1 Patch on the skin every 24 hours.  Dose: 1 Patch     QUEtiapine 200 MG Tabs  Commonly known as: SEROquel   Take 200 mg by mouth every evening.  Dose: 200 mg     therapeutic multivitamin-minerals Tabs    Take 1 Tablet by mouth every day.  Dose: 1 Tablet     thiamine 100 MG Tabs  Commonly known as: Vitamin B-1   Take 100 mg by mouth every day.  Dose: 100 mg     vitamin D3 1000 Unit (25 mcg) Tabs  Commonly known as: Cholecalciferol   Take 1,000 Units by mouth every day.  Dose: 1,000 Units            STOP taking these medications      ibuprofen 200 MG Tabs  Commonly known as: Motrin              Allergies  Allergies   Allergen Reactions    Haloperidol Unspecified     Twitching/involuntary movements        DIET  No orders of the defined types were placed in this encounter.      ACTIVITY  As tolerated.  Weight bearing as tolerated    CONSULTATIONS  GI, cardiology    PROCEDURES  Pacemaker interrogation    PERATIVE REPORT     PATIENT:   Inocencio Shabazz   1960         PREOPERATIVE DIAGNOSES/INDICATIONS: Upper GI bleeding. Hematemesis.      POSTOPERATIVE DIAGNOSIS:   S/p banding x 4 for moderate size of EVs.      PROCEDURE:  ESOPHAGOGASTRODUODENOSCOPY     PHYSICIAN:  Rafa Esposito MD. MPH.    LABORATORY  Lab Results   Component Value Date    SODIUM 141 09/29/2024    POTASSIUM 4.0 09/29/2024    CHLORIDE 111 09/29/2024    CO2 21 09/29/2024    GLUCOSE 135 (H) 09/29/2024    BUN 4 (L) 09/29/2024    CREATININE 0.60 09/29/2024        Lab Results   Component Value Date    WBC 1.9 (LL) 09/29/2024    HEMOGLOBIN 9.2 (L) 09/29/2024    HEMATOCRIT 28.2 (L) 09/29/2024    PLATELETCT 54 (L) 09/29/2024        Total time of the discharge process exceeds 35 minutes.

## 2024-09-30 NOTE — PROGRESS NOTES
Inocencio Shabazz has been provided discharge instructions, to include follow up care, home medications, and activity/diet reviewed. Understanding verbalized. No IV present. Pt did receive home medications from floor RN. Arm band removed. Medications to be given from pharmacy. Pt has cab voucher, will call to arrange ride. Pt out of DC at this time with personal belongings.

## 2024-09-30 NOTE — THERAPY
"Speech Language Pathology   Daily Treatment     Patient Name: Inocencio Shabazz  AGE:  64 y.o., SEX:  male  Medical Record #: [id number]  Date of Service: 9/30/2024      Precautions:  Precautions: Swallow Precautions         Subjective  Per EMR, pt's diet advanced to GI soft/thin liquids. Pt received awake, completed breakfast tray at bedside. Pt denied any difficulties or globus sensation with breakfast.       Assessment  Pt seen this date for dysphagia management. PO of thin liquids, liquidized, and solids presented. Pt able to self-feed without difficulty. Functional oral bolus manipulation and mastication. No overt s/sx of airway invasion across all trials. No vocal wetness. Pt denied globus sensation with Po. Pt endorsed \"sometimes\" experiences food feeling \"stuck,\" however, it clears with liquids. Provided thorough education regarding importance of adherence to aspiration and reflux precautions, including upright position during and post oral intake. Discussed option for an outpatient MBSS to further assess globus sensation; pt denied any concerns at this time.       Clinical Impressions  Patient with improved tolerance to oral intake this date. Pt tolerating advanced diet, denied any esophageal symptoms. Recommend continuation of oral diet. Pt may benefit from an MBSS to further evaluation esophageal phase of the swallow; however, can be done outpatient as suspect chronic per pt reports and history of GERD. Per EGD completed 9/27/24 \"1. Esophagus: Moderate size EVs x 3 columns, some red tamara sign, no active bleeding. GERD grade B. 2. Stomach: Mild portal hypertensive gastropathy. 3. Duodenum: Grossly normal.\" SLP to follow to ensure diet tolerance and monitor for changes.      Recommendations  Diet Consistency: GI Soft/Thin liquids  Instrumentation:  (Outpatient MBSS as needed)  Medication: Whole with liquid  Supervision: Independent  Positioning: Fully upright and midline during oral intake, Meals sitting " "upright in a chair, as tolerated, Remain upright for 30-45 minutes after oral intake  Risk Management : Small bites/sips, Alternate bites and sips, Slow rate of intake  Oral Care: BID      SLP Treatment Plan  Treatment Plan: Dysphagia Treatment, Patient/Family/Caregiver Training  SLP Frequency: 3x Per Week  Estimated Duration: Until Therapy Goals Met      Anticipated Discharge Needs  Discharge Recommendations: Anticipate that the patient will have no further speech therapy needs after discharge from the hospital  Therapy Recommendations Upon DC: Not Indicated      Patient / Family Goals  Patient / Family Goal #1: \"Food feels stuck in my chest\"  Goal #1 Outcome: Progressing as expected  Short Term Goals  Short Term Goal # 1: Pt will consume a CLD with no s/sx of aspiration and min cues.  Goal Outcome # 1: Progressing as expected  Short Term Goal # 2: Pt will participate in MBSS for objective assessment of oropharyngeal swallow function and completion of esophageal sweep to determine least restrictive diet and further explore c/o globus sensation with min cues.,  Goal Outcome # 2 :  (not targeted)      MAURO Baig  "

## 2024-12-22 ENCOUNTER — HOSPITAL ENCOUNTER (EMERGENCY)
Facility: MEDICAL CENTER | Age: 64
End: 2024-12-22
Payer: COMMERCIAL

## 2024-12-22 VITALS
TEMPERATURE: 97.4 F | BODY MASS INDEX: 20.62 KG/M2 | SYSTOLIC BLOOD PRESSURE: 149 MMHG | DIASTOLIC BLOOD PRESSURE: 94 MMHG | OXYGEN SATURATION: 96 % | RESPIRATION RATE: 18 BRPM | WEIGHT: 136.02 LBS | HEIGHT: 68 IN | HEART RATE: 71 BPM

## 2024-12-22 LAB
ALBUMIN SERPL BCP-MCNC: 3.9 G/DL (ref 3.2–4.9)
ALBUMIN/GLOB SERPL: 0.9 G/DL
ALP SERPL-CCNC: 107 U/L (ref 30–99)
ALT SERPL-CCNC: 34 U/L (ref 2–50)
ANION GAP SERPL CALC-SCNC: 16 MMOL/L (ref 7–16)
AST SERPL-CCNC: 64 U/L (ref 12–45)
BASOPHILS # BLD AUTO: 1.1 % (ref 0–1.8)
BASOPHILS # BLD: 0.03 K/UL (ref 0–0.12)
BILIRUB SERPL-MCNC: 0.6 MG/DL (ref 0.1–1.5)
BUN SERPL-MCNC: 8 MG/DL (ref 8–22)
CALCIUM ALBUM COR SERPL-MCNC: 8.3 MG/DL (ref 8.5–10.5)
CALCIUM SERPL-MCNC: 8.2 MG/DL (ref 8.5–10.5)
CHLORIDE SERPL-SCNC: 107 MMOL/L (ref 96–112)
CO2 SERPL-SCNC: 21 MMOL/L (ref 20–33)
CREAT SERPL-MCNC: 0.67 MG/DL (ref 0.5–1.4)
EKG IMPRESSION: NORMAL
EOSINOPHIL # BLD AUTO: 0.06 K/UL (ref 0–0.51)
EOSINOPHIL NFR BLD: 2.2 % (ref 0–6.9)
ERYTHROCYTE [DISTWIDTH] IN BLOOD BY AUTOMATED COUNT: 59.7 FL (ref 35.9–50)
GFR SERPLBLD CREATININE-BSD FMLA CKD-EPI: 102 ML/MIN/1.73 M 2
GLOBULIN SER CALC-MCNC: 4.5 G/DL (ref 1.9–3.5)
GLUCOSE SERPL-MCNC: 78 MG/DL (ref 65–99)
HCT VFR BLD AUTO: 39.7 % (ref 42–52)
HGB BLD-MCNC: 12.2 G/DL (ref 14–18)
IMM GRANULOCYTES # BLD AUTO: 0.01 K/UL (ref 0–0.11)
IMM GRANULOCYTES NFR BLD AUTO: 0.4 % (ref 0–0.9)
LYMPHOCYTES # BLD AUTO: 0.68 K/UL (ref 1–4.8)
LYMPHOCYTES NFR BLD: 24.6 % (ref 22–41)
MCH RBC QN AUTO: 27.1 PG (ref 27–33)
MCHC RBC AUTO-ENTMCNC: 30.7 G/DL (ref 32.3–36.5)
MCV RBC AUTO: 88 FL (ref 81.4–97.8)
MONOCYTES # BLD AUTO: 0.25 K/UL (ref 0–0.85)
MONOCYTES NFR BLD AUTO: 9.1 % (ref 0–13.4)
NEUTROPHILS # BLD AUTO: 1.73 K/UL (ref 1.82–7.42)
NEUTROPHILS NFR BLD: 62.6 % (ref 44–72)
NRBC # BLD AUTO: 0 K/UL
NRBC BLD-RTO: 0 /100 WBC (ref 0–0.2)
NT-PROBNP SERPL IA-MCNC: 83 PG/ML (ref 0–125)
PLATELET # BLD AUTO: 51 K/UL (ref 164–446)
PLATELETS.RETICULATED NFR BLD AUTO: 6.8 % (ref 0.6–13.1)
PMV BLD AUTO: 10.8 FL (ref 9–12.9)
POTASSIUM SERPL-SCNC: 3.9 MMOL/L (ref 3.6–5.5)
PROT SERPL-MCNC: 8.4 G/DL (ref 6–8.2)
RBC # BLD AUTO: 4.51 M/UL (ref 4.7–6.1)
SODIUM SERPL-SCNC: 144 MMOL/L (ref 135–145)
TROPONIN T SERPL-MCNC: 8 NG/L (ref 6–19)
WBC # BLD AUTO: 2.8 K/UL (ref 4.8–10.8)

## 2024-12-22 PROCEDURE — 85055 RETICULATED PLATELET ASSAY: CPT

## 2024-12-22 PROCEDURE — 84484 ASSAY OF TROPONIN QUANT: CPT

## 2024-12-22 PROCEDURE — 302449 STATCHG TRIAGE ONLY (STATISTIC)

## 2024-12-22 PROCEDURE — 93005 ELECTROCARDIOGRAM TRACING: CPT | Mod: TC

## 2024-12-22 PROCEDURE — 83880 ASSAY OF NATRIURETIC PEPTIDE: CPT

## 2024-12-22 PROCEDURE — 85025 COMPLETE CBC W/AUTO DIFF WBC: CPT

## 2024-12-22 PROCEDURE — 80053 COMPREHEN METABOLIC PANEL: CPT

## 2024-12-22 ASSESSMENT — PAIN DESCRIPTION - PAIN TYPE: TYPE: ACUTE PAIN

## 2024-12-22 ASSESSMENT — FIBROSIS 4 INDEX: FIB4 SCORE: 18.43

## 2024-12-23 NOTE — ED NOTES
Inocencio Shabazz paged 3 times to triage, no answer. Patient not in lobby, bathroom nor outside. Pt LWBS after triage. Pt called three times to room. Pt not found in ED lobby, ED bathroom, Phleb hallway, outside in parking lot, triage rooms, or family room. Pt called to comeback to ED by Charge RN. Charge RN left Pt message.

## 2024-12-23 NOTE — ED NOTES
Pt called a second time for room assignment. ED staff unsure if pt left. No response from the lobby or bathroom. Pt to be called again in 5 minutes or dismissed.

## 2024-12-23 NOTE — ED TRIAGE NOTES
Chief Complaint   Patient presents with    Nausea    Leg Swelling     Pt states he has had bilateral leg swelling for about a month. On assessment he has 2+ pitting edema to his bilateral lower extremities. He also states that he feels nauseous like he wants to vomit but can't.      Vitals:    12/22/24 1834   BP: (!) 140/89   Pulse: 94   Resp: 16   Temp: 35.9 °C (96.6 °F)   SpO2: 97%

## 2024-12-23 NOTE — ED NOTES
PT was RV@[id number]. Apologized for wait times and thanked them for his patience. Advised them to please let us know if anything changes in condition.

## 2024-12-23 NOTE — ED NOTES
Pt called for room assignment for third time without response. Pt to be dismissed LWBS without notifying hospital staff.

## 2025-01-18 ENCOUNTER — HOSPITAL ENCOUNTER (INPATIENT)
Facility: MEDICAL CENTER | Age: 65
LOS: 2 days | DRG: 433 | End: 2025-01-20
Attending: STUDENT IN AN ORGANIZED HEALTH CARE EDUCATION/TRAINING PROGRAM | Admitting: INTERNAL MEDICINE
Payer: COMMERCIAL

## 2025-01-18 ENCOUNTER — HOSPITAL ENCOUNTER (OUTPATIENT)
Dept: RADIOLOGY | Facility: MEDICAL CENTER | Age: 65
End: 2025-01-18

## 2025-01-18 ENCOUNTER — APPOINTMENT (OUTPATIENT)
Dept: RADIOLOGY | Facility: MEDICAL CENTER | Age: 65
DRG: 433 | End: 2025-01-18
Attending: HOSPITALIST
Payer: COMMERCIAL

## 2025-01-18 ENCOUNTER — APPOINTMENT (OUTPATIENT)
Dept: RADIOLOGY | Facility: MEDICAL CENTER | Age: 65
DRG: 433 | End: 2025-01-18
Attending: INTERNAL MEDICINE
Payer: COMMERCIAL

## 2025-01-18 DIAGNOSIS — I85.10 SECONDARY ESOPHAGEAL VARICES WITHOUT BLEEDING (HCC): ICD-10-CM

## 2025-01-18 DIAGNOSIS — F10.10 ALCOHOL ABUSE: ICD-10-CM

## 2025-01-18 DIAGNOSIS — R00.0 WIDE-COMPLEX TACHYCARDIA: ICD-10-CM

## 2025-01-18 PROBLEM — K72.00 ACUTE LIVER FAILURE WITHOUT HEPATIC COMA: Status: ACTIVE | Noted: 2025-01-18

## 2025-01-18 PROBLEM — K92.2 GI BLEED: Status: ACTIVE | Noted: 2025-01-18

## 2025-01-18 PROBLEM — K76.82 HEPATIC ENCEPHALOPATHY (HCC): Status: ACTIVE | Noted: 2025-01-18

## 2025-01-18 LAB
ABO GROUP BLD: NORMAL
ALBUMIN SERPL BCP-MCNC: 3.7 G/DL (ref 3.2–4.9)
ALBUMIN/GLOB SERPL: 0.9 G/DL
ALP SERPL-CCNC: 95 U/L (ref 30–99)
ALT SERPL-CCNC: 935 U/L (ref 2–50)
AMMONIA PLAS-SCNC: 60 UMOL/L (ref 11–45)
AMPHET UR QL SCN: NEGATIVE
ANION GAP SERPL CALC-SCNC: 16 MMOL/L (ref 7–16)
APAP SERPL-MCNC: 8 UG/ML (ref 10–30)
AST SERPL-CCNC: 2960 U/L (ref 12–45)
BARBITURATES UR QL SCN: NEGATIVE
BARCODED ABORH UBTYP: 6200
BARCODED ABORH UBTYP: 6200
BARCODED PRD CODE UBPRD: NORMAL
BARCODED PRD CODE UBPRD: NORMAL
BARCODED UNIT NUM UBUNT: NORMAL
BARCODED UNIT NUM UBUNT: NORMAL
BASOPHILS # BLD AUTO: 0.5 % (ref 0–1.8)
BASOPHILS # BLD: 0.01 K/UL (ref 0–0.12)
BENZODIAZ UR QL SCN: POSITIVE
BILIRUB SERPL-MCNC: 1.9 MG/DL (ref 0.1–1.5)
BLD GP AB SCN SERPL QL: NORMAL
BUN SERPL-MCNC: 9 MG/DL (ref 8–22)
BZE UR QL SCN: NEGATIVE
CALCIUM ALBUM COR SERPL-MCNC: 8.6 MG/DL (ref 8.5–10.5)
CALCIUM SERPL-MCNC: 8.4 MG/DL (ref 8.5–10.5)
CANNABINOIDS UR QL SCN: POSITIVE
CHLORIDE SERPL-SCNC: 98 MMOL/L (ref 96–112)
CK SERPL-CCNC: 134 U/L (ref 0–154)
CO2 SERPL-SCNC: 22 MMOL/L (ref 20–33)
COMPONENT P 8504P: NORMAL
COMPONENT P 8504P: NORMAL
CREAT SERPL-MCNC: 0.42 MG/DL (ref 0.5–1.4)
EOSINOPHIL # BLD AUTO: 0.01 K/UL (ref 0–0.51)
EOSINOPHIL NFR BLD: 0.5 % (ref 0–6.9)
ERYTHROCYTE [DISTWIDTH] IN BLOOD BY AUTOMATED COUNT: 51.9 FL (ref 35.9–50)
ERYTHROCYTE [DISTWIDTH] IN BLOOD BY AUTOMATED COUNT: 53.9 FL (ref 35.9–50)
ERYTHROCYTE [DISTWIDTH] IN BLOOD BY AUTOMATED COUNT: 53.9 FL (ref 35.9–50)
ETHANOL BLD-MCNC: 46.2 MG/DL
FENTANYL UR QL: NEGATIVE
FIBRINOGEN PPP-MCNC: 177 MG/DL (ref 215–460)
GFR SERPLBLD CREATININE-BSD FMLA CKD-EPI: 118 ML/MIN/1.73 M 2
GLOBULIN SER CALC-MCNC: 4.2 G/DL (ref 1.9–3.5)
GLUCOSE SERPL-MCNC: 76 MG/DL (ref 65–99)
HAV IGM SERPL QL IA: ABNORMAL
HBV CORE IGM SER QL: ABNORMAL
HBV SURFACE AG SER QL: ABNORMAL
HCT VFR BLD AUTO: 31.2 % (ref 42–52)
HCT VFR BLD AUTO: 31.5 % (ref 42–52)
HCT VFR BLD AUTO: 33.6 % (ref 42–52)
HCV AB SER QL: REACTIVE
HGB BLD-MCNC: 10.3 G/DL (ref 14–18)
HGB BLD-MCNC: 10.3 G/DL (ref 14–18)
HGB BLD-MCNC: 11.3 G/DL (ref 14–18)
IMM GRANULOCYTES # BLD AUTO: 0.01 K/UL (ref 0–0.11)
IMM GRANULOCYTES NFR BLD AUTO: 0.5 % (ref 0–0.9)
INR PPP: 1.75 (ref 0.87–1.13)
LIPASE SERPL-CCNC: 63 U/L (ref 11–82)
LYMPHOCYTES # BLD AUTO: 0.25 K/UL (ref 1–4.8)
LYMPHOCYTES NFR BLD: 13.2 % (ref 22–41)
MCH RBC QN AUTO: 27.8 PG (ref 27–33)
MCH RBC QN AUTO: 28.2 PG (ref 27–33)
MCH RBC QN AUTO: 28.7 PG (ref 27–33)
MCHC RBC AUTO-ENTMCNC: 32.7 G/DL (ref 32.3–36.5)
MCHC RBC AUTO-ENTMCNC: 33 G/DL (ref 32.3–36.5)
MCHC RBC AUTO-ENTMCNC: 33.6 G/DL (ref 32.3–36.5)
MCV RBC AUTO: 84.1 FL (ref 81.4–97.8)
MCV RBC AUTO: 85.3 FL (ref 81.4–97.8)
MCV RBC AUTO: 86.3 FL (ref 81.4–97.8)
METHADONE UR QL SCN: NEGATIVE
MONOCYTES # BLD AUTO: 0.13 K/UL (ref 0–0.85)
MONOCYTES NFR BLD AUTO: 6.9 % (ref 0–13.4)
NEUTROPHILS # BLD AUTO: 1.48 K/UL (ref 1.82–7.42)
NEUTROPHILS NFR BLD: 78.4 % (ref 44–72)
NRBC # BLD AUTO: 0 K/UL
NRBC BLD-RTO: 0 /100 WBC (ref 0–0.2)
OPIATES UR QL SCN: NEGATIVE
OXYCODONE UR QL SCN: NEGATIVE
PCP UR QL SCN: NEGATIVE
PLATELET # BLD AUTO: 23 K/UL (ref 164–446)
PLATELET # BLD AUTO: 36 K/UL (ref 164–446)
PLATELET # BLD AUTO: 8 K/UL (ref 164–446)
PLATELETS.RETICULATED NFR BLD AUTO: 13.3 % (ref 0.6–13.1)
PLATELETS.RETICULATED NFR BLD AUTO: 7.4 % (ref 0.6–13.1)
PLATELETS.RETICULATED NFR BLD AUTO: 8.7 % (ref 0.6–13.1)
PMV BLD AUTO: 10.2 FL (ref 9–12.9)
POTASSIUM SERPL-SCNC: 3.9 MMOL/L (ref 3.6–5.5)
PRODUCT TYPE UPROD: NORMAL
PRODUCT TYPE UPROD: NORMAL
PROPOXYPH UR QL SCN: NEGATIVE
PROT SERPL-MCNC: 7.9 G/DL (ref 6–8.2)
PROTHROMBIN TIME: 20.5 SEC (ref 12–14.6)
RBC # BLD AUTO: 3.65 M/UL (ref 4.7–6.1)
RBC # BLD AUTO: 3.71 M/UL (ref 4.7–6.1)
RBC # BLD AUTO: 3.94 M/UL (ref 4.7–6.1)
RH BLD: NORMAL
SALICYLATES SERPL-MCNC: <1 MG/DL (ref 15–25)
SODIUM SERPL-SCNC: 136 MMOL/L (ref 135–145)
UNIT STATUS USTAT: NORMAL
UNIT STATUS USTAT: NORMAL
WBC # BLD AUTO: 1.9 K/UL (ref 4.8–10.8)
WBC # BLD AUTO: 2 K/UL (ref 4.8–10.8)
WBC # BLD AUTO: 2.2 K/UL (ref 4.8–10.8)

## 2025-01-18 PROCEDURE — 99291 CRITICAL CARE FIRST HOUR: CPT | Performed by: HOSPITALIST

## 2025-01-18 PROCEDURE — 700105 HCHG RX REV CODE 258: Performed by: INTERNAL MEDICINE

## 2025-01-18 PROCEDURE — 36415 COLL VENOUS BLD VENIPUNCTURE: CPT

## 2025-01-18 PROCEDURE — 36430 TRANSFUSION BLD/BLD COMPNT: CPT

## 2025-01-18 PROCEDURE — 770020 HCHG ROOM/CARE - TELE (206)

## 2025-01-18 PROCEDURE — 86850 RBC ANTIBODY SCREEN: CPT

## 2025-01-18 PROCEDURE — 80053 COMPREHEN METABOLIC PANEL: CPT

## 2025-01-18 PROCEDURE — 700105 HCHG RX REV CODE 258: Performed by: HOSPITALIST

## 2025-01-18 PROCEDURE — 85025 COMPLETE CBC W/AUTO DIFF WBC: CPT

## 2025-01-18 PROCEDURE — 82077 ASSAY SPEC XCP UR&BREATH IA: CPT

## 2025-01-18 PROCEDURE — 82550 ASSAY OF CK (CPK): CPT

## 2025-01-18 PROCEDURE — A9270 NON-COVERED ITEM OR SERVICE: HCPCS | Performed by: INTERNAL MEDICINE

## 2025-01-18 PROCEDURE — P9034 PLATELETS, PHERESIS: HCPCS

## 2025-01-18 PROCEDURE — 87522 HEPATITIS C REVRS TRNSCRPJ: CPT

## 2025-01-18 PROCEDURE — 86900 BLOOD TYPING SEROLOGIC ABO: CPT

## 2025-01-18 PROCEDURE — 30233R1 TRANSFUSION OF NONAUTOLOGOUS PLATELETS INTO PERIPHERAL VEIN, PERCUTANEOUS APPROACH: ICD-10-PCS | Performed by: INTERNAL MEDICINE

## 2025-01-18 PROCEDURE — 700101 HCHG RX REV CODE 250: Mod: JZ | Performed by: HOSPITALIST

## 2025-01-18 PROCEDURE — 85610 PROTHROMBIN TIME: CPT

## 2025-01-18 PROCEDURE — 85027 COMPLETE CBC AUTOMATED: CPT

## 2025-01-18 PROCEDURE — 80074 ACUTE HEPATITIS PANEL: CPT

## 2025-01-18 PROCEDURE — 82140 ASSAY OF AMMONIA: CPT

## 2025-01-18 PROCEDURE — 99406 BEHAV CHNG SMOKING 3-10 MIN: CPT | Performed by: INTERNAL MEDICINE

## 2025-01-18 PROCEDURE — HZ2ZZZZ DETOXIFICATION SERVICES FOR SUBSTANCE ABUSE TREATMENT: ICD-10-PCS | Performed by: INTERNAL MEDICINE

## 2025-01-18 PROCEDURE — 80307 DRUG TEST PRSMV CHEM ANLYZR: CPT

## 2025-01-18 PROCEDURE — 85055 RETICULATED PLATELET ASSAY: CPT | Mod: 91

## 2025-01-18 PROCEDURE — 80179 DRUG ASSAY SALICYLATE: CPT

## 2025-01-18 PROCEDURE — 83690 ASSAY OF LIPASE: CPT

## 2025-01-18 PROCEDURE — 99222 1ST HOSP IP/OBS MODERATE 55: CPT | Performed by: INTERNAL MEDICINE

## 2025-01-18 PROCEDURE — 700102 HCHG RX REV CODE 250 W/ 637 OVERRIDE(OP): Performed by: INTERNAL MEDICINE

## 2025-01-18 PROCEDURE — 86901 BLOOD TYPING SEROLOGIC RH(D): CPT

## 2025-01-18 PROCEDURE — 85384 FIBRINOGEN ACTIVITY: CPT

## 2025-01-18 PROCEDURE — 700111 HCHG RX REV CODE 636 W/ 250 OVERRIDE (IP): Mod: JA | Performed by: INTERNAL MEDICINE

## 2025-01-18 PROCEDURE — 99223 1ST HOSP IP/OBS HIGH 75: CPT | Mod: 25 | Performed by: INTERNAL MEDICINE

## 2025-01-18 PROCEDURE — 76705 ECHO EXAM OF ABDOMEN: CPT

## 2025-01-18 PROCEDURE — 80143 DRUG ASSAY ACETAMINOPHEN: CPT

## 2025-01-18 RX ORDER — MAGNESIUM OXIDE 400 MG/1
400 TABLET ORAL DAILY
COMMUNITY

## 2025-01-18 RX ORDER — AMOXICILLIN 250 MG
2 CAPSULE ORAL EVERY EVENING
Status: DISCONTINUED | OUTPATIENT
Start: 2025-01-18 | End: 2025-01-20 | Stop reason: HOSPADM

## 2025-01-18 RX ORDER — LORAZEPAM 2 MG/ML
2 INJECTION INTRAMUSCULAR
Status: DISCONTINUED | OUTPATIENT
Start: 2025-01-18 | End: 2025-01-20 | Stop reason: HOSPADM

## 2025-01-18 RX ORDER — ONDANSETRON 2 MG/ML
4 INJECTION INTRAMUSCULAR; INTRAVENOUS EVERY 4 HOURS PRN
Status: DISCONTINUED | OUTPATIENT
Start: 2025-01-18 | End: 2025-01-20 | Stop reason: HOSPADM

## 2025-01-18 RX ORDER — OXYCODONE HYDROCHLORIDE 10 MG/1
10 TABLET ORAL
Status: DISCONTINUED | OUTPATIENT
Start: 2025-01-18 | End: 2025-01-20 | Stop reason: HOSPADM

## 2025-01-18 RX ORDER — LACTULOSE 10 G/15ML
30 SOLUTION ORAL EVERY EVENING
Status: DISCONTINUED | OUTPATIENT
Start: 2025-01-18 | End: 2025-01-20 | Stop reason: HOSPADM

## 2025-01-18 RX ORDER — LORAZEPAM 2 MG/ML
1.5 INJECTION INTRAMUSCULAR
Status: DISCONTINUED | OUTPATIENT
Start: 2025-01-18 | End: 2025-01-20 | Stop reason: HOSPADM

## 2025-01-18 RX ORDER — LORAZEPAM 2 MG/ML
0.5 INJECTION INTRAMUSCULAR EVERY 4 HOURS PRN
Status: DISCONTINUED | OUTPATIENT
Start: 2025-01-18 | End: 2025-01-20 | Stop reason: HOSPADM

## 2025-01-18 RX ORDER — PANTOPRAZOLE SODIUM 40 MG/1
40 TABLET, DELAYED RELEASE ORAL 2 TIMES DAILY
Status: DISCONTINUED | OUTPATIENT
Start: 2025-01-18 | End: 2025-01-18

## 2025-01-18 RX ORDER — LORAZEPAM 2 MG/ML
1 INJECTION INTRAMUSCULAR
Status: DISCONTINUED | OUTPATIENT
Start: 2025-01-18 | End: 2025-01-20 | Stop reason: HOSPADM

## 2025-01-18 RX ORDER — ONDANSETRON 4 MG/1
4 TABLET, ORALLY DISINTEGRATING ORAL EVERY 4 HOURS PRN
Status: DISCONTINUED | OUTPATIENT
Start: 2025-01-18 | End: 2025-01-20 | Stop reason: HOSPADM

## 2025-01-18 RX ORDER — OXYCODONE HYDROCHLORIDE 5 MG/1
5 TABLET ORAL
Status: DISCONTINUED | OUTPATIENT
Start: 2025-01-18 | End: 2025-01-20 | Stop reason: HOSPADM

## 2025-01-18 RX ORDER — METRONIDAZOLE 500 MG/100ML
500 INJECTION, SOLUTION INTRAVENOUS EVERY 12 HOURS
Status: DISCONTINUED | OUTPATIENT
Start: 2025-01-18 | End: 2025-01-18

## 2025-01-18 RX ORDER — GAUZE BANDAGE 2" X 2"
100 BANDAGE TOPICAL DAILY
Status: DISCONTINUED | OUTPATIENT
Start: 2025-01-18 | End: 2025-01-20 | Stop reason: HOSPADM

## 2025-01-18 RX ORDER — PANTOPRAZOLE SODIUM 40 MG/10ML
40 INJECTION, POWDER, LYOPHILIZED, FOR SOLUTION INTRAVENOUS 2 TIMES DAILY
Status: DISCONTINUED | OUTPATIENT
Start: 2025-01-18 | End: 2025-01-20 | Stop reason: HOSPADM

## 2025-01-18 RX ORDER — LORAZEPAM 2 MG/1
2 TABLET ORAL
Status: DISCONTINUED | OUTPATIENT
Start: 2025-01-18 | End: 2025-01-20 | Stop reason: HOSPADM

## 2025-01-18 RX ORDER — CARVEDILOL 6.25 MG/1
6.25 TABLET ORAL 2 TIMES DAILY WITH MEALS
Status: DISCONTINUED | OUTPATIENT
Start: 2025-01-18 | End: 2025-01-20 | Stop reason: HOSPADM

## 2025-01-18 RX ORDER — POLYETHYLENE GLYCOL 3350 17 G/17G
1 POWDER, FOR SOLUTION ORAL
Status: DISCONTINUED | OUTPATIENT
Start: 2025-01-18 | End: 2025-01-20 | Stop reason: HOSPADM

## 2025-01-18 RX ORDER — DILTIAZEM HYDROCHLORIDE 240 MG/1
240 CAPSULE, COATED, EXTENDED RELEASE ORAL DAILY
COMMUNITY

## 2025-01-18 RX ORDER — OCTREOTIDE ACETATE 100 UG/ML
50 INJECTION, SOLUTION INTRAVENOUS; SUBCUTANEOUS ONCE
Status: COMPLETED | OUTPATIENT
Start: 2025-01-18 | End: 2025-01-18

## 2025-01-18 RX ORDER — QUETIAPINE FUMARATE 50 MG/1
50 TABLET, FILM COATED ORAL 2 TIMES DAILY
COMMUNITY
Start: 2024-11-16

## 2025-01-18 RX ORDER — LORAZEPAM 2 MG/1
4 TABLET ORAL
Status: DISCONTINUED | OUTPATIENT
Start: 2025-01-18 | End: 2025-01-20 | Stop reason: HOSPADM

## 2025-01-18 RX ORDER — HYDRALAZINE HYDROCHLORIDE 20 MG/ML
10 INJECTION INTRAMUSCULAR; INTRAVENOUS EVERY 4 HOURS PRN
Status: DISCONTINUED | OUTPATIENT
Start: 2025-01-18 | End: 2025-01-20 | Stop reason: HOSPADM

## 2025-01-18 RX ORDER — LORAZEPAM 0.5 MG/1
0.5 TABLET ORAL EVERY 4 HOURS PRN
Status: DISCONTINUED | OUTPATIENT
Start: 2025-01-18 | End: 2025-01-20 | Stop reason: HOSPADM

## 2025-01-18 RX ORDER — MORPHINE SULFATE 4 MG/ML
4 INJECTION INTRAVENOUS
Status: DISCONTINUED | OUTPATIENT
Start: 2025-01-18 | End: 2025-01-20 | Stop reason: HOSPADM

## 2025-01-18 RX ORDER — LORAZEPAM 1 MG/1
1 TABLET ORAL EVERY 4 HOURS PRN
Status: DISCONTINUED | OUTPATIENT
Start: 2025-01-18 | End: 2025-01-20 | Stop reason: HOSPADM

## 2025-01-18 RX ORDER — POTASSIUM CHLORIDE 750 MG/1
10 TABLET, EXTENDED RELEASE ORAL DAILY
Status: ON HOLD | COMMUNITY
Start: 2024-11-16 | End: 2025-01-20

## 2025-01-18 RX ORDER — NICOTINE 21 MG/24HR
21 PATCH, TRANSDERMAL 24 HOURS TRANSDERMAL
Status: DISCONTINUED | OUTPATIENT
Start: 2025-01-18 | End: 2025-01-20 | Stop reason: HOSPADM

## 2025-01-18 RX ORDER — SODIUM CHLORIDE 9 MG/ML
INJECTION, SOLUTION INTRAVENOUS CONTINUOUS
Status: DISCONTINUED | OUTPATIENT
Start: 2025-01-18 | End: 2025-01-19

## 2025-01-18 RX ORDER — FOLIC ACID 1 MG/1
1 TABLET ORAL DAILY
Status: DISCONTINUED | OUTPATIENT
Start: 2025-01-18 | End: 2025-01-20 | Stop reason: HOSPADM

## 2025-01-18 RX ADMIN — SODIUM CHLORIDE: 9 INJECTION, SOLUTION INTRAVENOUS at 21:55

## 2025-01-18 RX ADMIN — Medication 100 MG: at 06:35

## 2025-01-18 RX ADMIN — PANTOPRAZOLE SODIUM 40 MG: 40 INJECTION, POWDER, FOR SOLUTION INTRAVENOUS at 18:17

## 2025-01-18 RX ADMIN — OCTREOTIDE ACETATE 50 MCG/HR: 200 INJECTION, SOLUTION INTRAVENOUS; SUBCUTANEOUS at 10:55

## 2025-01-18 RX ADMIN — CARVEDILOL 6.25 MG: 6.25 TABLET, FILM COATED ORAL at 16:46

## 2025-01-18 RX ADMIN — ACETYLCYSTEINE 6000 MG: 200 SOLUTION ORAL; RESPIRATORY (INHALATION) at 16:09

## 2025-01-18 RX ADMIN — CARVEDILOL 6.25 MG: 6.25 TABLET, FILM COATED ORAL at 07:54

## 2025-01-18 RX ADMIN — CEFTRIAXONE SODIUM 2000 MG: 10 INJECTION, POWDER, FOR SOLUTION INTRAVENOUS at 07:54

## 2025-01-18 RX ADMIN — SODIUM CHLORIDE: 9 INJECTION, SOLUTION INTRAVENOUS at 07:59

## 2025-01-18 RX ADMIN — LACTULOSE 30 ML: 10 SOLUTION ORAL at 18:17

## 2025-01-18 RX ADMIN — OCTREOTIDE ACETATE 50 MCG: 100 INJECTION, SOLUTION INTRAVENOUS; SUBCUTANEOUS at 10:50

## 2025-01-18 RX ADMIN — LORAZEPAM 0.5 MG: 0.5 TABLET ORAL at 23:36

## 2025-01-18 RX ADMIN — OXYCODONE 5 MG: 5 TABLET ORAL at 10:17

## 2025-01-18 RX ADMIN — PANTOPRAZOLE SODIUM 40 MG: 40 INJECTION, POWDER, FOR SOLUTION INTRAVENOUS at 06:37

## 2025-01-18 RX ADMIN — ONDANSETRON 4 MG: 2 INJECTION INTRAMUSCULAR; INTRAVENOUS at 18:03

## 2025-01-18 RX ADMIN — LORAZEPAM 0.5 MG: 0.5 TABLET ORAL at 16:46

## 2025-01-18 RX ADMIN — FOLIC ACID 1 MG: 1 TABLET ORAL at 06:35

## 2025-01-18 RX ADMIN — NICOTINE TRANSDERMAL SYSTEM 21 MG: 21 PATCH, EXTENDED RELEASE TRANSDERMAL at 06:35

## 2025-01-18 RX ADMIN — METRONIDAZOLE 500 MG: 500 INJECTION, SOLUTION INTRAVENOUS at 06:45

## 2025-01-18 RX ADMIN — ACETYLCYSTEINE 12000 MG: 200 SOLUTION ORAL; RESPIRATORY (INHALATION) at 10:56

## 2025-01-18 RX ADMIN — ONDANSETRON 4 MG: 2 INJECTION INTRAMUSCULAR; INTRAVENOUS at 11:02

## 2025-01-18 SDOH — ECONOMIC STABILITY: TRANSPORTATION INSECURITY
IN THE PAST 12 MONTHS, HAS LACK OF RELIABLE TRANSPORTATION KEPT YOU FROM MEDICAL APPOINTMENTS, MEETINGS, WORK OR FROM GETTING THINGS NEEDED FOR DAILY LIVING?: PATIENT DECLINED

## 2025-01-18 SDOH — ECONOMIC STABILITY: TRANSPORTATION INSECURITY
IN THE PAST 12 MONTHS, HAS THE LACK OF TRANSPORTATION KEPT YOU FROM MEDICAL APPOINTMENTS OR FROM GETTING MEDICATIONS?: PATIENT DECLINED

## 2025-01-18 ASSESSMENT — LIFESTYLE VARIABLES
TOTAL SCORE: 1
HAVE PEOPLE ANNOYED YOU BY CRITICIZING YOUR DRINKING: NO
PAROXYSMAL SWEATS: *
AUDITORY DISTURBANCES: NOT PRESENT
TREMOR: *
ON A TYPICAL DAY WHEN YOU DRINK ALCOHOL HOW MANY DRINKS DO YOU HAVE: 10
ANXIETY: NO ANXIETY (AT EASE)
HEADACHE, FULLNESS IN HEAD: NOT PRESENT
NAUSEA AND VOMITING: NO NAUSEA AND NO VOMITING
ANXIETY: NO ANXIETY (AT EASE)
SUBSTANCE_ABUSE: 1
PAROXYSMAL SWEATS: NO SWEAT VISIBLE
AUDITORY DISTURBANCES: NOT PRESENT
ORIENTATION AND CLOUDING OF SENSORIUM: ORIENTED AND CAN DO SERIAL ADDITIONS
HEADACHE, FULLNESS IN HEAD: VERY MILD
TOTAL SCORE: 3
TREMOR: *
NAUSEA AND VOMITING: *
TOTAL SCORE: 2
ORIENTATION AND CLOUDING OF SENSORIUM: ORIENTED AND CAN DO SERIAL ADDITIONS
SUBSTANCE_ABUSE: 0
TOTAL SCORE: 7
TOTAL SCORE: 4
VISUAL DISTURBANCES: NOT PRESENT
AGITATION: NORMAL ACTIVITY
HEADACHE, FULLNESS IN HEAD: NOT PRESENT
ORIENTATION AND CLOUDING OF SENSORIUM: ORIENTED AND CAN DO SERIAL ADDITIONS
PAROXYSMAL SWEATS: NO SWEAT VISIBLE
AGITATION: NORMAL ACTIVITY
NAUSEA AND VOMITING: NO NAUSEA AND NO VOMITING
NAUSEA AND VOMITING: *
DOES PATIENT WANT TO TALK TO SOMEONE ABOUT QUITTING: YES
TREMOR: *
ORIENTATION AND CLOUDING OF SENSORIUM: ORIENTED AND CAN DO SERIAL ADDITIONS
ALCOHOL_USE: YES
PAROXYSMAL SWEATS: NO SWEAT VISIBLE
NAUSEA AND VOMITING: MILD NAUSEA WITH NO VOMITING
AGITATION: NORMAL ACTIVITY
EVER FELT BAD OR GUILTY ABOUT YOUR DRINKING: YES
PAROXYSMAL SWEATS: NO SWEAT VISIBLE
ANXIETY: NO ANXIETY (AT EASE)
HEADACHE, FULLNESS IN HEAD: NOT PRESENT
ANXIETY: NO ANXIETY (AT EASE)
HOW MANY TIMES IN THE PAST YEAR HAVE YOU HAD 5 OR MORE DRINKS IN A DAY: 100
ORIENTATION AND CLOUDING OF SENSORIUM: ORIENTED AND CAN DO SERIAL ADDITIONS
VISUAL DISTURBANCES: NOT PRESENT
VISUAL DISTURBANCES: NOT PRESENT
TREMOR: NO TREMOR
TOTAL SCORE: 3
TREMOR: *
VISUAL DISTURBANCES: NOT PRESENT
HEADACHE, FULLNESS IN HEAD: NOT PRESENT
HEADACHE, FULLNESS IN HEAD: NOT PRESENT
TOTAL SCORE: 2
TOTAL SCORE: 6
HAVE YOU EVER FELT YOU SHOULD CUT DOWN ON YOUR DRINKING: YES
AUDITORY DISTURBANCES: NOT PRESENT
AVERAGE NUMBER OF DAYS PER WEEK YOU HAVE A DRINK CONTAINING ALCOHOL: 7
AUDITORY DISTURBANCES: NOT PRESENT
VISUAL DISTURBANCES: NOT PRESENT
EVER HAD A DRINK FIRST THING IN THE MORNING TO STEADY YOUR NERVES TO GET RID OF A HANGOVER: YES
ANXIETY: NO ANXIETY (AT EASE)
TREMOR: *
CONSUMPTION TOTAL: POSITIVE
VISUAL DISTURBANCES: NOT PRESENT
AGITATION: NORMAL ACTIVITY
TOTAL SCORE: 3
ANXIETY: NO ANXIETY (AT EASE)
AUDITORY DISTURBANCES: NOT PRESENT
DOES PATIENT WANT TO STOP DRINKING: YES
AGITATION: NORMAL ACTIVITY
AGITATION: NORMAL ACTIVITY
ORIENTATION AND CLOUDING OF SENSORIUM: ORIENTED AND CAN DO SERIAL ADDITIONS
NAUSEA AND VOMITING: NO NAUSEA AND NO VOMITING
AUDITORY DISTURBANCES: NOT PRESENT
PAROXYSMAL SWEATS: NO SWEAT VISIBLE

## 2025-01-18 ASSESSMENT — ENCOUNTER SYMPTOMS
HALLUCINATIONS: 0
HEMOPTYSIS: 0
DIARRHEA: 0
BLURRED VISION: 0
TREMORS: 0
ABDOMINAL PAIN: 1
ORTHOPNEA: 0
SPEECH CHANGE: 0
NERVOUS/ANXIOUS: 0
VOMITING: 1
NAUSEA: 1
HEADACHES: 0
POLYDIPSIA: 0
PHOTOPHOBIA: 0
BRUISES/BLEEDS EASILY: 0
BACK PAIN: 0
FEVER: 0
HEARTBURN: 0
PALPITATIONS: 0
COUGH: 0
SHORTNESS OF BREATH: 0
FLANK PAIN: 0
SPUTUM PRODUCTION: 0
DOUBLE VISION: 0
CHILLS: 0
WEIGHT LOSS: 0
NECK PAIN: 0
FOCAL WEAKNESS: 0

## 2025-01-18 ASSESSMENT — COGNITIVE AND FUNCTIONAL STATUS - GENERAL
CLIMB 3 TO 5 STEPS WITH RAILING: A LOT
MOBILITY SCORE: 16
STANDING UP FROM CHAIR USING ARMS: A LITTLE
DRESSING REGULAR LOWER BODY CLOTHING: A LITTLE
TURNING FROM BACK TO SIDE WHILE IN FLAT BAD: A LITTLE
TOILETING: A LITTLE
HELP NEEDED FOR BATHING: A LITTLE
PERSONAL GROOMING: A LITTLE
SUGGESTED CMS G CODE MODIFIER DAILY ACTIVITY: CK
DAILY ACTIVITIY SCORE: 19
DRESSING REGULAR UPPER BODY CLOTHING: A LITTLE
SUGGESTED CMS G CODE MODIFIER MOBILITY: CK
MOVING TO AND FROM BED TO CHAIR: A LITTLE
WALKING IN HOSPITAL ROOM: A LITTLE
MOVING FROM LYING ON BACK TO SITTING ON SIDE OF FLAT BED: A LOT

## 2025-01-18 ASSESSMENT — PATIENT HEALTH QUESTIONNAIRE - PHQ9
SUM OF ALL RESPONSES TO PHQ9 QUESTIONS 1 AND 2: 0
2. FEELING DOWN, DEPRESSED, IRRITABLE, OR HOPELESS: NOT AT ALL
1. LITTLE INTEREST OR PLEASURE IN DOING THINGS: NOT AT ALL

## 2025-01-18 ASSESSMENT — SOCIAL DETERMINANTS OF HEALTH (SDOH)
WITHIN THE PAST 12 MONTHS, THE FOOD YOU BOUGHT JUST DIDN'T LAST AND YOU DIDN'T HAVE MONEY TO GET MORE: PATIENT DECLINED
WITHIN THE LAST YEAR, HAVE YOU BEEN AFRAID OF YOUR PARTNER OR EX-PARTNER?: NO
WITHIN THE PAST 12 MONTHS, YOU WORRIED THAT YOUR FOOD WOULD RUN OUT BEFORE YOU GOT THE MONEY TO BUY MORE: PATIENT DECLINED
IN THE PAST 12 MONTHS, HAS THE ELECTRIC, GAS, OIL, OR WATER COMPANY THREATENED TO SHUT OFF SERVICE IN YOUR HOME?: PATIENT DECLINED
WITHIN THE LAST YEAR, HAVE TO BEEN RAPED OR FORCED TO HAVE ANY KIND OF SEXUAL ACTIVITY BY YOUR PARTNER OR EX-PARTNER?: NO
WITHIN THE LAST YEAR, HAVE YOU BEEN HUMILIATED OR EMOTIONALLY ABUSED IN OTHER WAYS BY YOUR PARTNER OR EX-PARTNER?: NO
WITHIN THE LAST YEAR, HAVE YOU BEEN KICKED, HIT, SLAPPED, OR OTHERWISE PHYSICALLY HURT BY YOUR PARTNER OR EX-PARTNER?: NO

## 2025-01-18 ASSESSMENT — PAIN DESCRIPTION - PAIN TYPE
TYPE: ACUTE PAIN

## 2025-01-18 ASSESSMENT — FIBROSIS 4 INDEX: FIB4 SCORE: 14.2

## 2025-01-18 NOTE — PROGRESS NOTES
Bedside report received from off going RN: Cori, assumed care of patient.     Fall Risk Score:  Low risk  Fall risk interventions in place: Provide patient/family education based on risk assessment, Educate patient/family to call staff for assistance when getting out of bed, Place fall precaution signage outside patient door, Place patient in room close to nursing station, Utilize bed/chair fall alarm, Notify charge of high risk for huddle, and Bed alarm connected correctly  Bed type: Regular (Brandon Score less than 17 interventions in place)  Patient on cardiac monitor: Yes  IVF/IV medications: Infusion per MAR (List Med(s)) NS  Oxygen: Room Air  Bedside sitter: Not Applicable   Isolation: Not applicable

## 2025-01-18 NOTE — H&P
Hospital Medicine History & Physical Note    Date of Service  1/18/2025    Primary Care Physician  Nnamdi Ballesteros M.D.        Code Status  Full Code    Chief Complaint  Right upper quadrant abdominal pain    History of Presenting Illness  64-year-old male with history of alcohol abuse, alcoholic liver cirrhosis, hepatitis C, sick sinus syndrome, PPM in place, paroxysmal A-fib, pancytopenia, disorganized schizophrenia, GERD, bleeding esophageal varices, gastritis, peripheral neuropathy, smoking, [facility] with concern for liver failure, for possible need for GI consult.    He presented at outside hospital with complaint of right upper quadrant pain and alcohol intoxication.  History is limited as he is poor historian.    CT of the abdomen and pelvis with contrast: Small subcapsular splenic collection likely hematoma, age-indeterminate.  Possibly seen on prior CT, increased in size since 9/12/2024.  Cecum and proximal ascending colon wall thickening chronic colitis with portal enteropathy.  Appendix is not visualized.  Distended gallbladder.  Distended urinary bladder  Cirrhosis  Pertinent labwork:  AST 3431, , T. bili 2.2, alk phos 92, alcohol level 148.  AST/ALT was 100/50 several days ago.  PLT 11 and WBC 1.5; chronic.     Upon evaluation at bedside he has mild alcohol withdrawal symptoms and remains poor historian.  He wants to be full code however.  Upon questioning, patient has been having abdominal pain nausea and vomiting in the last 3 days.  Occasionally he would have some blood in vomit.  He denies melena.  He has been drinking heavily, last drink last night.        I discussed the plan of care with patient, bedside RN, and   .    Review of Systems  Review of Systems   Constitutional:  Negative for chills, fever and weight loss.   HENT:  Negative for ear pain, hearing loss and tinnitus.    Eyes:  Negative for blurred vision, double vision and photophobia.   Respiratory:   Negative for cough, hemoptysis and sputum production.    Cardiovascular:  Negative for chest pain, palpitations and orthopnea.   Gastrointestinal:  Positive for abdominal pain, nausea and vomiting. Negative for diarrhea and heartburn.   Genitourinary:  Negative for dysuria, flank pain, frequency and hematuria.   Musculoskeletal:  Negative for back pain, joint pain and neck pain.   Skin:  Negative for itching and rash.   Neurological:  Negative for tremors, speech change, focal weakness and headaches.   Endo/Heme/Allergies:  Negative for environmental allergies and polydipsia. Does not bruise/bleed easily.   Psychiatric/Behavioral:  Negative for hallucinations and substance abuse. The patient is not nervous/anxious.        Past Medical History   has a past medical history of Alcohol abuse, Bipolar disorder (HCC), Cirrhosis (HCC), GIB (gastrointestinal bleeding) (01/03/2016), Hepatitis C, Pacemaker, Pancytopenia (HCC), and Schizophrenia (HCC).    Surgical History   has a past surgical history that includes gastroscopy (1/3/2016); gastroscopy (4/8/2016); gastroscopy (3/13/2019); gastroscopy-endo (N/A, 11/13/2019); pr upper gi endoscopy,diagnosis (N/A, 8/10/2023); pr upper gi endoscopy,ctrl bleed (N/A, 8/10/2023); and pr upper gi endoscopy,ligat varix (N/A, 9/27/2024).     Family History  family history is not on file.   Family history reviewed with patient. There is no family history that is pertinent to the chief complaint.     Social History   reports that he has been smoking cigarettes. He started smoking about 25 years ago. He has a 73 pack-year smoking history. He has never used smokeless tobacco. He reports current alcohol use. He reports current drug use. Drug: Inhaled.    Allergies  Allergies   Allergen Reactions    Haloperidol Unspecified     Twitching/involuntary movements        Medications  Prior to Admission Medications   Prescriptions Last Dose Informant Patient Reported? Taking?   QUEtiapine (SEROQUEL)  200 MG Tab  Patient Yes No   Sig: Take 200 mg by mouth every evening.   acetaminophen (TYLENOL) 500 MG Tab   No No   Sig: Take 1 Tablet by mouth every 6 hours as needed for Mild Pain or Moderate Pain.   carvedilol (COREG) 6.25 MG Tab   No No   Sig: Take 1 Tablet by mouth 2 times a day with meals.   folic acid (FOLVITE) 1 MG Tab  Patient Yes No   Sig: Take 1 mg by mouth every day.   lactulose 20 GM/30ML Solution   No No   Sig: Take 30 mL by mouth every evening.   nicotine (NICODERM) 21 MG/24HR PATCH 24 HR  Patient Yes No   Sig: Place 1 Patch on the skin every 24 hours.   pantoprazole (PROTONIX) 40 MG Tablet Delayed Response   No No   Sig: Take 1 Tablet by mouth 2 times a day.   therapeutic multivitamin-minerals (THERAGRAN-M) Tab  Patient Yes No   Sig: Take 1 Tablet by mouth every day.   thiamine (VITAMIN B-1) 100 MG Tab  Patient Yes No   Sig: Take 100 mg by mouth every day.   vitamin D3 (CHOLECALCIFEROL) 1000 Unit (25 mcg) Tab  Patient Yes No   Sig: Take 1,000 Units by mouth every day.      Facility-Administered Medications: None       Physical Exam                             Physical Exam  Vitals and nursing note reviewed.   Constitutional:       General: He is not in acute distress.     Appearance: Normal appearance.   HENT:      Head: Normocephalic and atraumatic.      Nose: Nose normal.      Mouth/Throat:      Mouth: Mucous membranes are dry.   Eyes:      Extraocular Movements: Extraocular movements intact.      Pupils: Pupils are equal, round, and reactive to light.   Cardiovascular:      Rate and Rhythm: Normal rate and regular rhythm.   Pulmonary:      Effort: Pulmonary effort is normal.      Breath sounds: Normal breath sounds.   Abdominal:      General: Abdomen is flat. There is no distension.      Tenderness: There is abdominal tenderness in the right upper quadrant and epigastric area. There is no guarding or rebound.   Musculoskeletal:         General: No swelling or deformity. Normal range of motion.     "  Cervical back: Normal range of motion and neck supple.   Skin:     General: Skin is warm and dry.   Neurological:      General: No focal deficit present.      Mental Status: He is alert.      Motor: Tremor present.   Psychiatric:         Mood and Affect: Affect is blunt and flat.         Behavior: Behavior normal.         Laboratory:          No results for input(s): \"ALTSGPT\", \"ASTSGOT\", \"ALKPHOSPHAT\", \"TBILIRUBIN\", \"DBILIRUBIN\", \"GAMMAGT\", \"AMYLASE\", \"LIPASE\", \"ALB\", \"PREALBUMIN\", \"GLUCOSE\" in the last 72 hours.      No results for input(s): \"NTPROBNP\" in the last 72 hours.      No results for input(s): \"TROPONINT\" in the last 72 hours.    Imaging:  US-RUQ    (Results Pending)         Assessment/Plan:  Justification for Admission Status  I anticipate this patient will require at least two midnights for appropriate medical management, necessitating inpatient admission because severe alcoholic hepatitis    Patient will need a Telemetry bed on MEDICAL service .  The need is secondary to severe alcoholic hepatitis.    * Acute liver failure without hepatic coma- (present on admission)  Assessment & Plan  Presentation consistent with severe alcoholic hepatitis on top of alcoholic liver cirrhosis and chronic hepatitis C infection.    Discrimination score 37 and he would benefit from prednisolone.  However, cholecystitis needs to be ruled out first.  Pending right upper quadrant ultrasound  Ammonia elevated at 60, started lactulose.  Platelets count 8, ordered platelets transfusion  Trend liver panel    Hepatic encephalopathy (HCC)  Assessment & Plan  Ammonia 60  Ordered lactulose    Possible GI bleed  Assessment & Plan  Patient reported vomiting with blood clots  He has history of variceal bleeding  Will cover with IV Protonix and octreotide bolus and infusion  Trend H&H  Consider GI consult    Neutropenia (HCC)- (present on admission)  Assessment & Plan  ANC 1480    Alcohol withdrawal (HCC)- (present on " admission)  Assessment & Plan  Ordered CIWA protocol  Fall, aspiration, seizure precautions    Tobacco dependence- (present on admission)  Assessment & Plan  Patient Mr. Shabazz has been smoking cigarettes.  We discussed smoking cessation for 4 minutes.  He agreed with harmful effect of smoking onto his organism.  Ordered nicotine patch    Pancytopenia (HCC)- (present on admission)  Assessment & Plan  Repeat CBC daily    Paroxysmal atrial fibrillation (HCC)- (present on admission)  Assessment & Plan  Continue carvedilol    Schizophrenia (HCC)- (present on admission)  Assessment & Plan  Denies suicidal ideations    Thrombocytopenia (HCC)- (present on admission)  Assessment & Plan  Platelets count 8.  Ordered platelets transfusion  Repeat CBC        VTE prophylaxis: SCDs/TEDs

## 2025-01-18 NOTE — ASSESSMENT & PLAN NOTE
Presentation consistent with severe alcoholic hepatitis on top of alcoholic liver cirrhosis and chronic hepatitis C infection.    Continue N-acetylcysteine infusion per GI recommendations  Transaminases trending down  Only trace ascites on ultrasound

## 2025-01-18 NOTE — ASSESSMENT & PLAN NOTE
Continue carvedilol with holding parameters  Patient not candidate for anticoagulation given comorbidities

## 2025-01-18 NOTE — PROGRESS NOTES
[facility] Patient was transported via ambulance to floor. Patient arrived to floor with EMS and ambulated from [facility] to hospital bed. Pt was found A&Ox4, and connected to tele box. Patient sating on room air and denies any acute pain at this time. Bed alarm and strip alarm connected and in the lowest lock position. Patient has personal phone and call light within reach. No further needs at this time.

## 2025-01-18 NOTE — CONSULTS
Date of Consultation:  1/18/2025    Patient: : Inocencio Shabazz  MRN: [id number]    Referring Physician:  Dr Julian Chao     GI:Dhruv Brennan M.D.     Reason for Consultation: abdominal pain, abnormal LFTs    History of Present Illness:   64-year-old male known to the GI service having last been evaluated in September 2024.  Patient carries a diagnosis of alcohol related cirrhosis, hepatitis C.  Patient has a pacemaker.  Patient presents to outside hospital and was transferred here.  Complaining of abdominal pain.  Patient's LFTs significantly elevated.  Platelet count 8.  Patient getting a right upper quadrant ultrasound.  GI involvement requested for management of hepatitis and to evaluate for possible GI bleeding.  Patient is being treated actively for alcohol withdrawal.  He had a positive blood alcohol level on presentation.    On discussion with the patient he cannot tell me much other than he is not tolerating food very well.  He is throwing up undigested food.  He is eating a regular diet at this point.  He was unclear as to why he was in the hospital.  He is tangential and unable to answer ROS.      Prior endoscopic history:  9/27/24: EGD:  Esophagus: Moderate size EVs x 3 columns, some red tamara sign, no active bleeding. GERD grade B.2. Stomach: Mild portal hypertensive gastropathy.   3. Duodenum: Grossly normal.     8/2023: Grade 1 varix,  one column, Portal hypertensive gastropathy, 2 AVMs treated     November 2019: with Dr. Harvey where he was found to have esophageal varices and was banded x5.     March 2019: With Dr. Ba, portal hypertensive gastropathy with superimposed gastritis, small 2 cm hiatal hernia with possible early Camerons lesions, erythema without erosion, and grade 1 esophageal varix not banded     April 2016: With Dr. Tobin, gastric body ulcer bleed injected with epinephrine and hemoclipped and erosive gastritis     January 2016: With Dr. Antonio, grade 2 distal esophageal varices  "banded x6, possible Sanchez's esophagus  Past Medical History:   Diagnosis Date    GIB (gastrointestinal bleeding) 01/03/2016    Alcohol abuse     Bipolar disorder (HCC)     Cirrhosis (HCC)     Hepatitis C     Pacemaker     left chest    Pancytopenia (HCC)     Schizophrenia (HCC)        Past Surgical History:   Procedure Laterality Date    FL UPPER GI ENDOSCOPY,LIGAT VARIX N/A 9/27/2024    Procedure: GASTROSCOPY, WITH VARICEAL BANDING;  Surgeon: Rafa Esposito M.D.;  Location: SURGERY SAME DAY [facility];  Service: Gastroenterology    FL UPPER GI ENDOSCOPY,DIAGNOSIS N/A 8/10/2023    Procedure: GASTROSCOPY;  Surgeon: Joleen Yañez M.D.;  Location: SURGERY SAME DAY [facility];  Service: Gastroenterology    FL UPPER GI ENDOSCOPY,CTRL BLEED N/A 8/10/2023    Procedure: GASTROSCOPY, WITH ARGON PLASMA COAGULATION;  Surgeon: Joleen Yañez M.D.;  Location: SURGERY SAME DAY [facility];  Service: Gastroenterology    GASTROSCOPY-ENDO N/A 11/13/2019    Procedure: GASTROSCOPY with banding;  Surgeon: Tamela Smith M.D.;  Location: ENDOSCOPY [facility];  Service: Gastroenterology    GASTROSCOPY  3/13/2019    Procedure: GASTROSCOPY;  Surgeon: Abdi Ba M.D.;  Location: SURGERY [facility];  Service: Gastroenterology    GASTROSCOPY  4/8/2016    Procedure: GASTROSCOPY;  Surgeon: Braeden Tobin M.D.;  Location: SURGERY [facility];  Service:     GASTROSCOPY  1/3/2016    Procedure: GASTROSCOPY WITH BANDING;  Surgeon: Alfredo Antonio M.D.;  Location: SURGERY [facility];  Service:        No family history on file.    Social History     Socioeconomic History    Marital status: Single   Tobacco Use    Smoking status: Every Day     Current packs/day: 1.00     Average packs/day: 1 pack/day for 73.0 years (73.0 ttl pk-yrs)     Types: Cigarettes     Start date: 2000    Smokeless tobacco: Never   Vaping Use    Vaping status: Never Used   Substance and Sexual Activity    Alcohol use: Yes     Comment: \"couple of " "pints whiskey every day\"    Drug use: Yes     Types: Inhaled     Comment: marijuana sometimes     Social Drivers of Health     Intimate Partner Violence: Not At Risk (1/18/2025)    Humiliation, Afraid, Rape, and Kick questionnaire     Fear of Current or Ex-Partner: No     Emotionally Abused: No     Physically Abused: No     Sexually Abused: No       Current Meds (name, sig, last dose):     Current Facility-Administered Medications:     NS    Notify provider if pain remains uncontrolled **AND** Use the Numeric Rating Scale (NRS), Hamlin-Baker Faces (WBF), or FLACC on regular floors and Critical-Care Pain Observation Tool (CPOT) on ICUs/Trauma to assess pain **AND** Pulse Ox **AND** Pharmacy Consult Request **AND** If patient difficult to arouse and/or has respiratory depression (respiratory rate of 10 or less), stop any opiates that are currently infusing and call a Rapid Response.    oxyCODONE immediate-release **OR** oxyCODONE immediate-release **OR** morphine injection    senna-docusate **AND** polyethylene glycol/lytes    hydrALAZINE    ondansetron    ondansetron    nicotine **AND** Nicotine Replacement Patient Education Materials **AND** nicotine polacrilex    lactulose    thiamine    folic acid    carvedilol    pantoprazole    cefTRIAXone (ROCEPHIN) IV    metroNIDAZOLE (FLAGYL) IV    LORazepam    LORazepam **OR** LORazepam    LORazepam **OR** LORazepam    LORazepam **OR** LORazepam    LORazepam **OR** LORazepam    octreotide (SandoSTATIN) 1,250 mcg in  mL Infusion    octreotide    acetylcysteine (Acetadote) IV bag 1 **FOLLOWED BY** acetylcysteine (Acetadote) IV bag 2      ROS  Attempted ROS but patient unable to cooperate.      Physical Exam:  Vitals:    01/18/25 0530 01/18/25 0600 01/18/25 0700 01/18/25 0730   BP:  (!) 151/93  (!) 150/97   Pulse: 84 82  98   Resp: 17 17 18   Temp: 37.1 °C (98.8 °F) 37.1 °C (98.8 °F)  36.4 °C (97.6 °F)   TempSrc: Temporal Temporal  Temporal   SpO2: 95% 94%  96%   Weight: " "61 kg (134 lb 7.7 oz)      Height:   1.727 m (5' 8\")        Physical Exam  Vitals reviewed.   Constitutional:       Appearance: He is ill-appearing. He is not toxic-appearing.   Eyes:      General: No scleral icterus.  Cardiovascular:      Rate and Rhythm: Normal rate and regular rhythm.   Pulmonary:      Effort: Pulmonary effort is normal.   Abdominal:      General: There is distension.      Tenderness: There is abdominal tenderness. There is no guarding or rebound.   Skin:     General: Skin is warm.   Neurological:      Mental Status: He is alert. He is disoriented.   Psychiatric:      Comments: Impaired judgment, abnormal affect           Labs:  Recent Labs     01/18/25  0604   SODIUM 136   POTASSIUM 3.9   CHLORIDE 98   CO2 22   BUN 9   CREATININE 0.42*   CALCIUM 8.4*     Recent Labs     01/18/25  0604   ALTSGPT 935*   ASTSGOT 2960*   ALKPHOSPHAT 95   TBILIRUBIN 1.9*   LIPASE 63   GLUCOSE 76     Recent Labs     01/18/25  0604   WBC 1.9*   NEUTSPOLYS 78.40*   LYMPHOCYTES 13.20*   MONOCYTES 6.90   EOSINOPHILS 0.50   BASOPHILS 0.50   ASTSGOT 2960*   ALTSGPT 935*   ALKPHOSPHAT 95   TBILIRUBIN 1.9*     Recent Labs     01/18/25  0604   RBC 3.94*   HEMOGLOBIN 11.3*   HEMATOCRIT 33.6*   PLATELETCT 8*   PROTHROMBTM 20.5*   INR 1.75*     Recent Results (from the past 24 hours)   CBC WITH DIFFERENTIAL    Collection Time: 01/18/25  6:04 AM   Result Value Ref Range    WBC 1.9 (LL) 4.8 - 10.8 K/uL    RBC 3.94 (L) 4.70 - 6.10 M/uL    Hemoglobin 11.3 (L) 14.0 - 18.0 g/dL    Hematocrit 33.6 (L) 42.0 - 52.0 %    MCV 85.3 81.4 - 97.8 fL    MCH 28.7 27.0 - 33.0 pg    MCHC 33.6 32.3 - 36.5 g/dL    RDW 53.9 (H) 35.9 - 50.0 fL    Platelet Count 8 (LL) 164 - 446 K/uL    Neutrophils-Polys 78.40 (H) 44.00 - 72.00 %    Lymphocytes 13.20 (L) 22.00 - 41.00 %    Monocytes 6.90 0.00 - 13.40 %    Eosinophils 0.50 0.00 - 6.90 %    Basophils 0.50 0.00 - 1.80 %    Immature Granulocytes 0.50 0.00 - 0.90 %    Nucleated RBC 0.00 0.00 - 0.20 /100 WBC "    Neutrophils (Absolute) 1.48 (L) 1.82 - 7.42 K/uL    Lymphs (Absolute) 0.25 (L) 1.00 - 4.80 K/uL    Monos (Absolute) 0.13 0.00 - 0.85 K/uL    Eos (Absolute) 0.01 0.00 - 0.51 K/uL    Baso (Absolute) 0.01 0.00 - 0.12 K/uL    Immature Granulocytes (abs) 0.01 0.00 - 0.11 K/uL    NRBC (Absolute) 0.00 K/uL   Comp Metabolic Panel    Collection Time: 01/18/25  6:04 AM   Result Value Ref Range    Sodium 136 135 - 145 mmol/L    Potassium 3.9 3.6 - 5.5 mmol/L    Chloride 98 96 - 112 mmol/L    Co2 22 20 - 33 mmol/L    Anion Gap 16.0 7.0 - 16.0    Glucose 76 65 - 99 mg/dL    Bun 9 8 - 22 mg/dL    Creatinine 0.42 (L) 0.50 - 1.40 mg/dL    Calcium 8.4 (L) 8.5 - 10.5 mg/dL    Correct Calcium 8.6 8.5 - 10.5 mg/dL    AST(SGOT) 2960 (HH) 12 - 45 U/L    ALT(SGPT) 935 (H) 2 - 50 U/L    Alkaline Phosphatase 95 30 - 99 U/L    Total Bilirubin 1.9 (H) 0.1 - 1.5 mg/dL    Albumin 3.7 3.2 - 4.9 g/dL    Total Protein 7.9 6.0 - 8.2 g/dL    Globulin 4.2 (H) 1.9 - 3.5 g/dL    A-G Ratio 0.9 g/dL   Prothrombin Time    Collection Time: 01/18/25  6:04 AM   Result Value Ref Range    PT 20.5 (H) 12.0 - 14.6 sec    INR 1.75 (H) 0.87 - 1.13   AMMONIA    Collection Time: 01/18/25  6:04 AM   Result Value Ref Range    Ammonia 60 (H) 11 - 45 umol/L   DIAGNOSTIC ALCOHOL    Collection Time: 01/18/25  6:04 AM   Result Value Ref Range    Diagnostic Alcohol 46.2 (H) <10.1 mg/dL   CREATINE KINASE    Collection Time: 01/18/25  6:04 AM   Result Value Ref Range    CPK Total 134 0 - 154 U/L   ACETAMINOPHEN    Collection Time: 01/18/25  6:04 AM   Result Value Ref Range    Acetaminophen -Tylenol 8.0 (L) 10.0 - 30.0 ug/mL   Salicylate    Collection Time: 01/18/25  6:04 AM   Result Value Ref Range    Salicylates, Quant. <1.0 (L) 15.0 - 25.0 mg/dL   COD (Adult)    Collection Time: 01/18/25  6:04 AM   Result Value Ref Range    ABO Grouping Only AB     Rh Grouping Only POS     Antibody Screen-Cod NEG    LIPASE    Collection Time: 01/18/25  6:04 AM   Result Value Ref Range     Lipase 63 11 - 82 U/L   IMMATURE PLT FRACTION    Collection Time: 01/18/25  6:04 AM   Result Value Ref Range    Imm. Plt Fraction 13.3 (H) 0.6 - 13.1 %   ESTIMATED GFR    Collection Time: 01/18/25  6:04 AM   Result Value Ref Range    GFR (CKD-EPI) 118 >60 mL/min/1.73 m 2   FIBRINOGEN    Collection Time: 01/18/25  6:04 AM   Result Value Ref Range    Fibrinogen 177 (L) 215 - 460 mg/dL   URINE DRUG SCREEN    Collection Time: 01/18/25  8:45 AM   Result Value Ref Range    Amphetamines Urine Negative Negative    Barbiturates Negative Negative    Benzodiazepines Positive (A) Negative    Cocaine Metabolite Negative Negative    Fentanyl, Urine Negative Negative    Methadone Negative Negative    Opiates Negative Negative    Oxycodone Negative Negative    Phencyclidine -Pcp Negative Negative    Propoxyphene Negative Negative    Cannabinoid Metab Positive (A) Negative         Imaging:  US-RUQ  Narrative: 1/18/2025 7:46 AM    HISTORY/REASON FOR EXAM:  Pain  Abdominal pain    TECHNIQUE/EXAM DESCRIPTION AND NUMBER OF VIEWS:  Real-time sonography of the liver and biliary tree.    COMPARISON: 10/7/2023 ultrasound    FINDINGS:    The liver is echogenic consistent with fibrofatty changes. The liver is normal in contour. There is no evidence of solid mass lesion. The liver measures 19.00 cm.    Gallbladder: The gallbladder is full of bile. Sludge is noted in the gallbladder with a few echogenic shadowing stones. The gallbladder wall thickness measures 5.00 mm. This mild thickening was also present on 2023 ultrasound.    The common duct measures 2.40 mm. No intrahepatic bile duct dilatation is evident.    Pancreas: Normal visible extent.    Aorta: Normal and the visible extension. Intrahepatic IVC is patent.    The portal vein is patent with hepatopetal flow. The MPV measures 1.48 cm. Intrahepatic IVC is patent.    The right kidney measures 9.98 cm. No hydronephrosis or suspicious mass. Millimeter benign right renal cyst    Trace  ascites around the liver.  Impression: 1. Echogenic liver consistent with fibrofatty change.    2. Sludge and small gallstones. Gallbladder wall thickening at 5 mm is noted which could be related to cholecystitis although this may be related to reactive changes. Gallbladder wall thickening was noted on 10/27/2023 ultrasound also. Clinical   correlation recommended.    3. No bile duct dilatation.        MDM Data Review:  -Records reviewed and summarized in current documentation  -I personally reviewed and interpreted the laboratory results  -I personally reviewed the radiology images  -I have personally reviewed medications    Hospital Problem List:  Active Hospital Problems    Diagnosis     Acute liver failure without hepatic coma [K72.00]     Possible GI bleed [K92.2]     Hepatic encephalopathy (HCC) [K76.82]     Neutropenia (HCC) [D70.9]     Alcohol withdrawal (HCC) [F10.939]     Tobacco dependence [F17.200]     Pancytopenia (HCC) [D61.818]     Paroxysmal atrial fibrillation (HCC) [I48.0]     Schizophrenia (HCC) [F20.9]     Thrombocytopenia (HCC) [D69.6]        Impressions:  64-year-old male with alcohol-related cirrhosis presents with acute severe hepatitis.  Also report of melena.  Patient cannot provide a useful history.      The hepatitis is beyond what we would expect for alcohol related hepatitis. Patient's blood alcohol level was positive.    He is having abdominal pain.    No signs of bleeding at this point but report of melena.  Patient's platelet count is 11.  Endoscopy contraindicated at this point.    Recommendations:  Recommend ascites eval with US and tap for consideration of spontaneous bacterial peritonitis if significant fluid present.  Transfuse to keep platelets above 25.  Agree with PPI and octreotide.  Would initiate N-acetylcysteine as patient may have been exposed to Tylenol.  Check viral hepatitis panel.  GI will continue to follow.

## 2025-01-18 NOTE — PROGRESS NOTES
[facility] DIRECT ADMISSION REPORT  Transferring facility: [facility]  Transferring physician: Dr. Alan Jara    Chief complaint: Acute hepatic failure, alcohol use  Pertinent history & patient course: Patient is a 64-year-old male with a past medical history of cirrhosis, hepatitis C, atrial fibrillation, alcohol use disorder, nicotine use disorder, chronic pancytopenia, that [place] [facility] [place]-sided abdominal pain.  Per ED physician patient frequently presents to the ED there for alcohol use disorder.  Patient found to have significant elevation of liver enzymes concerning for acute hepatic failure.  No GI there until Tuesday.  Patient is also tremulous, likely undergoing alcohol withdrawal, received Ativan in the ED.  Pertinent imaging & lab results: 89 18 134/91 98.7F 93%-RA. CT abdomen pelvis showing 1 cm small subcapsular hematoma, mild ascites, distended gallbladder. AST 3431, , T. bili 2.2, alk phos 92, alcohol level 148.  AST/ALT was 100/50 several days ago.  PLT 11 and WBC 1.5; chronic.  Consultants called prior to transfer and pertinent input from consultants: None  Code Status: Full code per transferring provider, I personally verified with the transferring provider patient's code status and the transferring provider has confirmed this with the patient.  Reason for Transfer: GI consultation  Further work up or recommendations requested prior to transfer: None    Patient accepted for transfer: Yes  Accepting [facility] Facility: [facility] - Nursing to notify the Triage Coordinator in the RTOC via Voalte or Phone ext. [phone] when patient arrives to the unit. The Triage Coordinator will assign the admitting provider.    Consultants to be called upon arrival: GI  Admission status: Inpatient.   Floor requested: Telemetry  If ICU transfer, name of intensivist case discussed with and pertinent input from critical care: N/A    The admitting provider is the point of contact for  questions or concerns regarding patient's care.

## 2025-01-18 NOTE — PROGRESS NOTES
4 Eyes Skin Assessment Completed by TITUS Salvador and TITUS Mcdermott.    Head WDL  Ears Redness and Blanching  Nose WDL  Mouth WDL  Neck WDL  Breast/Chest WDL  Shoulder Blades WDL  Spine WDL  (R) Arm/Elbow/Hand WDL  (L) Arm/Elbow/Hand WDL  Abdomen Distended  Groin WDL  Scrotum/Coccyx/Buttocks Dark spot on R buttock  (R) Leg Edema  (L) Leg Edema  (R) Heel/Foot/Toe Redness, Blanching, and Boggy  (L) Heel/Foot/Toe Redness, Blanching, and Boggy, nonblanching red spot on third toe          Devices In Places Tele Box, Blood Pressure Cuff, and Pulse Ox      Interventions In Place Pillows and Pressure Redistribution Mattress    Possible Skin Injury Yes    Pictures Uploaded Into Epic Yes  Wound Consult Placed Yes  RN Wound Prevention Protocol Ordered No

## 2025-01-18 NOTE — PROGRESS NOTES
Hospital Medicine Daily Progress Note    Date of Service  1/18/2025    Chief Complaint  Inocencio Shabazz is a 64 y.o. male admitted 1/18/2025 with abdominal pain    Hospital Course  64-year-old male with history of alcohol abuse, alcoholic liver cirrhosis, hepatitis C, sick sinus syndrome, PPM in place, paroxysmal A-fib, pancytopenia, disorganized schizophrenia, GERD, bleeding esophageal varices, gastritis, peripheral neuropathy, smoking, [facility] a [facility] with concern for liver failure, for possible need for GI consult.     He presented at outside hospital with complaint of right upper quadrant pain and alcohol intoxication.  History is limited as he is poor historian.     CT of the abdomen and pelvis with contrast: Small subcapsular splenic collection likely hematoma, age-indeterminate.  Possibly seen on prior CT, increased in size since 9/12/2024.  Cecum and proximal ascending colon wall thickening chronic colitis with portal enteropathy.  Appendix is not visualized.  Distended gallbladder.  Distended urinary bladder  Cirrhosis  Pertinent labwork:  AST 3431, , T. bili 2.2, alk phos 92, alcohol level 148.  AST/ALT was 100/50 several days ago.  PLT 11 and WBC 1.5; chronic.     Interval Problem Update    Patient admitted with abdominal pain vomiting he reports having melena at home prior to presentation  [facility]  I reviewed CMP AST 2960  total bilirubin 1.9  I reviewed CBC WBC 1.9 hemoglobin 11.3 platelets 8  Platelet transfusion ordered discussed with nursing staff to repeat platelet counts after transfusion  I consulted GI Dr. Brennan recommending N-acetylcysteine infusion given elevated transaminases which I ordered and discussed with pharmacist  Patient on octreotide drip and IV Protonix  I reviewed right upper quadrant ultrasound with echogenic liver and sludge and small stones in the gallbladder no bile duct dilation        I have discussed this  patient's plan of care and discharge plan at IDT rounds today with Case Management, Nursing, Nursing leadership, and other members of the IDT team.    Consultants/Specialty  GI    Code Status  Full Code    Disposition  The patient is not medically cleared for discharge to home or a post-acute facility.      I have placed the appropriate orders for post-discharge needs.    Review of Systems  Review of Systems   Constitutional:  Positive for malaise/fatigue.   Respiratory:  Negative for shortness of breath.    Cardiovascular:  Negative for chest pain.   Gastrointestinal:  Positive for abdominal pain, melena, nausea and vomiting.   Psychiatric/Behavioral:  Positive for substance abuse.         Physical Exam  Temp:  [36.2 °C (97.2 °F)-37.1 °C (98.8 °F)] 37.1 °C (98.8 °F)  Pulse:  [] 104  Resp:  [17-20] 20  BP: (139-151)/(84-97) 150/94  SpO2:  [91 %-96 %] 91 %    Physical Exam  Constitutional:       Appearance: He is ill-appearing.   Cardiovascular:      Rate and Rhythm: Regular rhythm. Tachycardia present.      Heart sounds: No murmur heard.  Pulmonary:      Breath sounds: Decreased breath sounds present.   Chest:      Chest wall: No tenderness.   Abdominal:      General: There is distension.      Palpations: Abdomen is soft.      Tenderness: There is abdominal tenderness. There is no guarding or rebound.   Musculoskeletal:         General: Swelling present.   Skin:     General: Skin is warm and dry.   Neurological:      General: No focal deficit present.         Fluids    Intake/Output Summary (Last 24 hours) at 1/18/2025 1347  Last data filed at 1/18/2025 1250  Gross per 24 hour   Intake 845 ml   Output --   Net 845 ml        Laboratory  Recent Labs     01/18/25  0604   WBC 1.9*   RBC 3.94*   HEMOGLOBIN 11.3*   HEMATOCRIT 33.6*   MCV 85.3   MCH 28.7   MCHC 33.6   RDW 53.9*   PLATELETCT 8*     Recent Labs     01/18/25  0604   SODIUM 136   POTASSIUM 3.9   CHLORIDE 98   CO2 22   GLUCOSE 76   BUN 9   CREATININE  0.42*   CALCIUM 8.4*     Recent Labs     01/18/25  0604   INR 1.75*               Imaging  US-RUQ   Final Result         1. Echogenic liver consistent with fibrofatty change.      2. Sludge and small gallstones. Gallbladder wall thickening at 5 mm is noted which could be related to cholecystitis although this may be related to reactive changes. Gallbladder wall thickening was noted on 10/27/2023 ultrasound also. Clinical    correlation recommended.      3. No bile duct dilatation.      US-ABDOMEN COMPLETE SURVEY    (Results Pending)        Assessment/Plan  * Acute liver failure without hepatic coma- (present on admission)  Assessment & Plan  Presentation consistent with severe alcoholic hepatitis on top of alcoholic liver cirrhosis and chronic hepatitis C infection.    Discussed with GI recommendation is to start patient on N-acetylcysteine infusion which was ordered  Monitor LFTs  Limited ultrasound to assess for ascites and diagnostic paracentesis    Hepatic encephalopathy (HCC)  Assessment & Plan  Ammonia 60  Continue lactulose    Possible GI bleed  Assessment & Plan  Patient with history of esophageal varices reports having melena prior to presentation  Continue IV octreotide drip and IV Protonix 40 twice daily  Continue ceftriaxone for SBP prophylaxis  Monitor hemoglobin and transfuse if less than 8  Monitor platelets and transfuse to keep greater than 25 per GI recommendations  Consulted GI    Neutropenia (HCC)- (present on admission)  Assessment & Plan  ANC 1480    Alcohol withdrawal (HCC)- (present on admission)  Assessment & Plan  Lorazepam per [facility] protocol  Thiamine supplements    Tobacco dependence- (present on admission)  Assessment & Plan  Counseled on cessation    Pancytopenia (HCC)- (present on admission)  Assessment & Plan  Likely secondary to liver cirrhosis and bone marrow toxicity from EtOH  Monitor CBC transfuse to keep hemoglobin greater than 8 and platelets greater than 25    Acute blood loss  anemia (ABLA)- (present on admission)  Assessment & Plan  Monitor hemoglobin and transfuse if less than 8    Paroxysmal atrial fibrillation (HCC)- (present on admission)  Assessment & Plan  Continue carvedilol with holding parameters    Schizophrenia (HCC)- (present on admission)  Assessment & Plan  Denies suicidal ideations    Hepatitis C- (present on admission)  Assessment & Plan  Reinforced importance of alcohol cessation and need for outpatient follow-up    Thrombocytopenia (HCC)- (present on admission)  Assessment & Plan  Platelets count 8.    Receiving platelet transfusion repeat CBC posttransfusion             >Patient is critically ill.   >The patient is having : Liver failure and GI bleed  >The vital organ system that is effected is the: Hepatic cardiovascular  >If untreated there is a high chance of deterioration into: Death  >The critical care that I am providing today is: NAC infusion octreotide drip IV Protonix antibiotics platelet transfusion  >The critical care that has been undertaken is medically complex.   >There has been no overlap in critical care time.   Critical care time not including procedures, no overlap: 40 minutes        VTE prophylaxis:   SCDs/TEDs      I have performed a physical exam and reviewed and updated ROS and Plan today (1/18/2025). In review of yesterday's note (1/17/2025), there are no changes except as documented above.

## 2025-01-18 NOTE — ASSESSMENT & PLAN NOTE
Improved posttransfusion.  No clinical signs of active bleeding at this time with stable hemoglobin continue to monitor   no known allergies

## 2025-01-18 NOTE — PROGRESS NOTES
Pharmacy Medication Reconciliation      ~Medication reconciliation updated and complete per patient pharmacy  ~Allergies have been verified and updated   ~No oral ABX within the last 30 days  ~Patient home pharmacy :  [facility] [phone]      ~Anticoagulants (rivaroxaban, apixaban, edoxaban, dabigatran, warfarin, enoxaparin) taken in the last 14 days? No

## 2025-01-18 NOTE — ASSESSMENT & PLAN NOTE
Patient with history of esophageal varices reports having melena prior to presentation  Clinically does not appear to be having variceal bleed will discontinue octreotide and ceftriaxone  Continue IV Protonix and monitor CBC

## 2025-01-18 NOTE — HOSPITAL COURSE
64-year-old male with history of alcohol abuse, alcoholic liver cirrhosis, hepatitis C, sick sinus syndrome, PPM in place, paroxysmal A-fib, pancytopenia, disorganized schizophrenia, GERD, bleeding esophageal varices, gastritis, peripheral neuropathy, smoking, [facility] with concern for liver failure, for possible need for GI consult.     He presented at outside hospital with complaint of right upper quadrant pain and alcohol intoxication.  History is limited as he is poor historian.     CT of the abdomen and pelvis with contrast: Small subcapsular splenic collection likely hematoma, age-indeterminate.  Possibly seen on prior CT, increased in size since 9/12/2024.  Cecum and proximal ascending colon wall thickening chronic colitis with portal enteropathy.  Appendix is not visualized.  Distended gallbladder.  Distended urinary bladder  Cirrhosis  Pertinent labwork:  AST 3431, , T. bili 2.2, alk phos 92, alcohol level 148.  AST/ALT was 100/50 several days ago.  PLT 11 and WBC 1.5; chronic.

## 2025-01-19 PROBLEM — E87.8 ELECTROLYTE ABNORMALITY: Status: ACTIVE | Noted: 2025-01-19

## 2025-01-19 LAB
ALBUMIN SERPL BCP-MCNC: 3.4 G/DL (ref 3.2–4.9)
ALBUMIN/GLOB SERPL: 0.9 G/DL
ALP SERPL-CCNC: 86 U/L (ref 30–99)
ALT SERPL-CCNC: 796 U/L (ref 2–50)
ANION GAP SERPL CALC-SCNC: 11 MMOL/L (ref 7–16)
AST SERPL-CCNC: 1761 U/L (ref 12–45)
BASOPHILS # BLD AUTO: 0.6 % (ref 0–1.8)
BASOPHILS # BLD: 0.01 K/UL (ref 0–0.12)
BILIRUB SERPL-MCNC: 2 MG/DL (ref 0.1–1.5)
BUN SERPL-MCNC: 7 MG/DL (ref 8–22)
CALCIUM ALBUM COR SERPL-MCNC: 8.8 MG/DL (ref 8.5–10.5)
CALCIUM SERPL-MCNC: 8.3 MG/DL (ref 8.5–10.5)
CHLORIDE SERPL-SCNC: 94 MMOL/L (ref 96–112)
CO2 SERPL-SCNC: 23 MMOL/L (ref 20–33)
CREAT SERPL-MCNC: 0.5 MG/DL (ref 0.5–1.4)
EOSINOPHIL # BLD AUTO: 0.03 K/UL (ref 0–0.51)
EOSINOPHIL NFR BLD: 1.8 % (ref 0–6.9)
ERYTHROCYTE [DISTWIDTH] IN BLOOD BY AUTOMATED COUNT: 51.8 FL (ref 35.9–50)
GFR SERPLBLD CREATININE-BSD FMLA CKD-EPI: 112 ML/MIN/1.73 M 2
GLOBULIN SER CALC-MCNC: 4 G/DL (ref 1.9–3.5)
GLUCOSE SERPL-MCNC: 188 MG/DL (ref 65–99)
HCT VFR BLD AUTO: 31.9 % (ref 42–52)
HGB BLD-MCNC: 10.4 G/DL (ref 14–18)
HGB BLD-MCNC: 10.4 G/DL (ref 14–18)
IMM GRANULOCYTES # BLD AUTO: 0.01 K/UL (ref 0–0.11)
IMM GRANULOCYTES NFR BLD AUTO: 0.6 % (ref 0–0.9)
LYMPHOCYTES # BLD AUTO: 0.32 K/UL (ref 1–4.8)
LYMPHOCYTES NFR BLD: 19.5 % (ref 22–41)
MAGNESIUM SERPL-MCNC: 1.5 MG/DL (ref 1.5–2.5)
MCH RBC QN AUTO: 27.9 PG (ref 27–33)
MCHC RBC AUTO-ENTMCNC: 32.6 G/DL (ref 32.3–36.5)
MCV RBC AUTO: 85.5 FL (ref 81.4–97.8)
MONOCYTES # BLD AUTO: 0.2 K/UL (ref 0–0.85)
MONOCYTES NFR BLD AUTO: 12.2 % (ref 0–13.4)
NEUTROPHILS # BLD AUTO: 1.07 K/UL (ref 1.82–7.42)
NEUTROPHILS NFR BLD: 65.3 % (ref 44–72)
NRBC # BLD AUTO: 0 K/UL
NRBC BLD-RTO: 0 /100 WBC (ref 0–0.2)
PHOSPHATE SERPL-MCNC: 1.5 MG/DL (ref 2.5–4.5)
PLATELET # BLD AUTO: 33 K/UL (ref 164–446)
PLATELETS.RETICULATED NFR BLD AUTO: 6.1 % (ref 0.6–13.1)
PMV BLD AUTO: 11.2 FL (ref 9–12.9)
POTASSIUM SERPL-SCNC: 3.4 MMOL/L (ref 3.6–5.5)
PROT SERPL-MCNC: 7.4 G/DL (ref 6–8.2)
RBC # BLD AUTO: 3.73 M/UL (ref 4.7–6.1)
SODIUM SERPL-SCNC: 128 MMOL/L (ref 135–145)
WBC # BLD AUTO: 1.6 K/UL (ref 4.8–10.8)

## 2025-01-19 PROCEDURE — 700105 HCHG RX REV CODE 258: Performed by: INTERNAL MEDICINE

## 2025-01-19 PROCEDURE — 700102 HCHG RX REV CODE 250 W/ 637 OVERRIDE(OP): Performed by: INTERNAL MEDICINE

## 2025-01-19 PROCEDURE — 85055 RETICULATED PLATELET ASSAY: CPT

## 2025-01-19 PROCEDURE — 36415 COLL VENOUS BLD VENIPUNCTURE: CPT

## 2025-01-19 PROCEDURE — 85018 HEMOGLOBIN: CPT

## 2025-01-19 PROCEDURE — 700105 HCHG RX REV CODE 258: Performed by: HOSPITALIST

## 2025-01-19 PROCEDURE — 700101 HCHG RX REV CODE 250

## 2025-01-19 PROCEDURE — 84100 ASSAY OF PHOSPHORUS: CPT

## 2025-01-19 PROCEDURE — 770020 HCHG ROOM/CARE - TELE (206)

## 2025-01-19 PROCEDURE — 700111 HCHG RX REV CODE 636 W/ 250 OVERRIDE (IP)

## 2025-01-19 PROCEDURE — 700101 HCHG RX REV CODE 250: Mod: JZ | Performed by: HOSPITALIST

## 2025-01-19 PROCEDURE — 700111 HCHG RX REV CODE 636 W/ 250 OVERRIDE (IP): Performed by: INTERNAL MEDICINE

## 2025-01-19 PROCEDURE — 99232 SBSQ HOSP IP/OBS MODERATE 35: CPT | Performed by: NURSE PRACTITIONER

## 2025-01-19 PROCEDURE — 99233 SBSQ HOSP IP/OBS HIGH 50: CPT | Performed by: HOSPITALIST

## 2025-01-19 PROCEDURE — 83735 ASSAY OF MAGNESIUM: CPT

## 2025-01-19 PROCEDURE — 85025 COMPLETE CBC W/AUTO DIFF WBC: CPT

## 2025-01-19 PROCEDURE — 80053 COMPREHEN METABOLIC PANEL: CPT

## 2025-01-19 PROCEDURE — 700105 HCHG RX REV CODE 258

## 2025-01-19 PROCEDURE — A9270 NON-COVERED ITEM OR SERVICE: HCPCS | Performed by: INTERNAL MEDICINE

## 2025-01-19 RX ORDER — MAGNESIUM SULFATE HEPTAHYDRATE 40 MG/ML
4 INJECTION, SOLUTION INTRAVENOUS ONCE
Status: COMPLETED | OUTPATIENT
Start: 2025-01-19 | End: 2025-01-19

## 2025-01-19 RX ADMIN — ACETYLCYSTEINE 6000 MG: 200 SOLUTION ORAL; RESPIRATORY (INHALATION) at 06:24

## 2025-01-19 RX ADMIN — OXYCODONE 5 MG: 5 TABLET ORAL at 12:30

## 2025-01-19 RX ADMIN — NICOTINE POLACRILEX 2 MG: 2 GUM, CHEWING BUCCAL at 18:21

## 2025-01-19 RX ADMIN — PANTOPRAZOLE SODIUM 40 MG: 40 INJECTION, POWDER, FOR SOLUTION INTRAVENOUS at 04:05

## 2025-01-19 RX ADMIN — Medication 100 MG: at 04:10

## 2025-01-19 RX ADMIN — CEFTRIAXONE SODIUM 2000 MG: 10 INJECTION, POWDER, FOR SOLUTION INTRAVENOUS at 04:09

## 2025-01-19 RX ADMIN — PANTOPRAZOLE SODIUM 40 MG: 40 INJECTION, POWDER, FOR SOLUTION INTRAVENOUS at 17:08

## 2025-01-19 RX ADMIN — ONDANSETRON 4 MG: 2 INJECTION INTRAMUSCULAR; INTRAVENOUS at 19:54

## 2025-01-19 RX ADMIN — LACTULOSE 30 ML: 10 SOLUTION ORAL at 17:08

## 2025-01-19 RX ADMIN — SODIUM CHLORIDE: 9 INJECTION, SOLUTION INTRAVENOUS at 11:04

## 2025-01-19 RX ADMIN — CARVEDILOL 6.25 MG: 6.25 TABLET, FILM COATED ORAL at 17:08

## 2025-01-19 RX ADMIN — NICOTINE TRANSDERMAL SYSTEM 21 MG: 21 PATCH, EXTENDED RELEASE TRANSDERMAL at 04:11

## 2025-01-19 RX ADMIN — FOLIC ACID 1 MG: 1 TABLET ORAL at 04:10

## 2025-01-19 RX ADMIN — CARVEDILOL 6.25 MG: 6.25 TABLET, FILM COATED ORAL at 08:05

## 2025-01-19 RX ADMIN — ONDANSETRON 4 MG: 2 INJECTION INTRAMUSCULAR; INTRAVENOUS at 13:38

## 2025-01-19 RX ADMIN — MAGNESIUM SULFATE HEPTAHYDRATE 4 G: 4 INJECTION, SOLUTION INTRAVENOUS at 04:15

## 2025-01-19 RX ADMIN — ACETYLCYSTEINE 6000 MG: 200 SOLUTION ORAL; RESPIRATORY (INHALATION) at 22:00

## 2025-01-19 RX ADMIN — POTASSIUM PHOSPHATE, MONOBASIC AND POTASSIUM PHOSPHATE, DIBASIC 30 MMOL: 224; 236 INJECTION, SOLUTION, CONCENTRATE INTRAVENOUS at 04:46

## 2025-01-19 RX ADMIN — NICOTINE POLACRILEX 2 MG: 2 GUM, CHEWING BUCCAL at 11:53

## 2025-01-19 ASSESSMENT — PAIN DESCRIPTION - PAIN TYPE
TYPE: ACUTE PAIN

## 2025-01-19 ASSESSMENT — LIFESTYLE VARIABLES
HEADACHE, FULLNESS IN HEAD: NOT PRESENT
NAUSEA AND VOMITING: *
VISUAL DISTURBANCES: NOT PRESENT
ANXIETY: NO ANXIETY (AT EASE)
TREMOR: TREMOR NOT VISIBLE BUT CAN BE FELT, FINGERTIP TO FINGERTIP
AUDITORY DISTURBANCES: NOT PRESENT
PAROXYSMAL SWEATS: NO SWEAT VISIBLE
TOTAL SCORE: 2
HEADACHE, FULLNESS IN HEAD: VERY MILD
PAROXYSMAL SWEATS: NO SWEAT VISIBLE
TREMOR: TREMOR NOT VISIBLE BUT CAN BE FELT, FINGERTIP TO FINGERTIP
PAROXYSMAL SWEATS: NO SWEAT VISIBLE
ORIENTATION AND CLOUDING OF SENSORIUM: ORIENTED AND CAN DO SERIAL ADDITIONS
HEADACHE, FULLNESS IN HEAD: NOT PRESENT
VISUAL DISTURBANCES: NOT PRESENT
PAROXYSMAL SWEATS: NO SWEAT VISIBLE
NAUSEA AND VOMITING: NO NAUSEA AND NO VOMITING
AUDITORY DISTURBANCES: NOT PRESENT
AGITATION: NORMAL ACTIVITY
ANXIETY: NO ANXIETY (AT EASE)
ORIENTATION AND CLOUDING OF SENSORIUM: ORIENTED AND CAN DO SERIAL ADDITIONS
ANXIETY: NO ANXIETY (AT EASE)
AUDITORY DISTURBANCES: NOT PRESENT
AUDITORY DISTURBANCES: NOT PRESENT
TOTAL SCORE: 3
TOTAL SCORE: 1
ANXIETY: NO ANXIETY (AT EASE)
AGITATION: NORMAL ACTIVITY
VISUAL DISTURBANCES: NOT PRESENT
AGITATION: NORMAL ACTIVITY
SUBSTANCE_ABUSE: 1
AGITATION: NORMAL ACTIVITY
TREMOR: TREMOR NOT VISIBLE BUT CAN BE FELT, FINGERTIP TO FINGERTIP
NAUSEA AND VOMITING: NO NAUSEA AND NO VOMITING
PAROXYSMAL SWEATS: NO SWEAT VISIBLE
TOTAL SCORE: 1
ANXIETY: NO ANXIETY (AT EASE)
AGITATION: NORMAL ACTIVITY
NAUSEA AND VOMITING: NO NAUSEA AND NO VOMITING
TOTAL SCORE: 1
AUDITORY DISTURBANCES: NOT PRESENT
VISUAL DISTURBANCES: NOT PRESENT
ORIENTATION AND CLOUDING OF SENSORIUM: ORIENTED AND CAN DO SERIAL ADDITIONS
HEADACHE, FULLNESS IN HEAD: NOT PRESENT
ORIENTATION AND CLOUDING OF SENSORIUM: ORIENTED AND CAN DO SERIAL ADDITIONS
TREMOR: TREMOR NOT VISIBLE BUT CAN BE FELT, FINGERTIP TO FINGERTIP
TREMOR: TREMOR NOT VISIBLE BUT CAN BE FELT, FINGERTIP TO FINGERTIP
HEADACHE, FULLNESS IN HEAD: NOT PRESENT
VISUAL DISTURBANCES: NOT PRESENT
ORIENTATION AND CLOUDING OF SENSORIUM: ORIENTED AND CAN DO SERIAL ADDITIONS
NAUSEA AND VOMITING: NO NAUSEA AND NO VOMITING

## 2025-01-19 ASSESSMENT — ENCOUNTER SYMPTOMS
SHORTNESS OF BREATH: 0
BLOOD IN STOOL: 0
VOMITING: 0
DEPRESSION: 0
ABDOMINAL PAIN: 0
CONSTIPATION: 0
BLURRED VISION: 0
ABDOMINAL PAIN: 1
MEMORY LOSS: 1
DIARRHEA: 0
CHILLS: 0
FEVER: 0
WEAKNESS: 1
HEARTBURN: 0
DIZZINESS: 0
BACK PAIN: 0
COUGH: 1
NAUSEA: 0

## 2025-01-19 ASSESSMENT — FIBROSIS 4 INDEX: FIB4 SCORE: 124.83

## 2025-01-19 NOTE — PROGRESS NOTES
Hospital Medicine Daily Progress Note    Date of Service  1/19/2025    Chief Complaint  Inocencio Shabazz is a 64 y.o. male admitted 1/18/2025 with abdominal pain    Hospital Course  64-year-old male with history of alcohol abuse, alcoholic liver cirrhosis, hepatitis C, sick sinus syndrome, PPM in place, paroxysmal A-fib, pancytopenia, disorganized schizophrenia, GERD, bleeding esophageal varices, gastritis, peripheral neuropathy, smoking, presents [facility] direct [facility] with concern for liver failure, for possible need for GI consult.     He presented at outside hospital with complaint of right upper quadrant pain and alcohol intoxication.  History is limited as he is poor historian.     CT of the abdomen and pelvis with contrast: Small subcapsular splenic collection likely hematoma, age-indeterminate.  Possibly seen on prior CT, increased in size since 9/12/2024.  Cecum and proximal ascending colon wall thickening chronic colitis with portal enteropathy.  Appendix is not visualized.  Distended gallbladder.  Distended urinary bladder  Cirrhosis  Pertinent labwork:  AST 3431, , T. bili 2.2, alk phos 92, alcohol level 148.  AST/ALT was 100/50 several days ago.  PLT 11 and WBC 1.5; chronic.     Interval Problem Update    Patient is afebrile on room air  I reviewed chemistry panel and CBC  Magnesium 1.5 phosphorus 1.5 potassium 3.4 magnesium sulfate and K-Phos ordered  On N-acetylcysteine infusion I discussed with GI recommend continuing for 24 more hours  I reviewed abdominal ultrasound with no significant ascites will discontinue ceftriaxone and octreotide  Repeat hemoglobin stable 10.4    Total time spent reviewing imaging and lab results examining patient discussing with consultant nursing staff case management pharmacist and updating patient on plan of care 53 minutes      I have discussed this patient's plan of care and discharge plan at IDT rounds today with Case Management, Nursing, Nursing  leadership, and other members of the IDT team.    Consultants/Specialty  GI    Code Status  Full Code    Disposition  The patient is not medically cleared for discharge to home or a post-acute facility.      I have placed the appropriate orders for post-discharge needs.    Review of Systems  Review of Systems   Constitutional:  Positive for malaise/fatigue. Negative for fever.   Respiratory:  Negative for shortness of breath.    Cardiovascular:  Negative for chest pain.   Gastrointestinal:  Positive for melena. Negative for abdominal pain, nausea and vomiting.   Psychiatric/Behavioral:  Positive for substance abuse.         Physical Exam  Temp:  [36.4 °C (97.5 °F)-37.3 °C (99.1 °F)] 37.3 °C (99.1 °F)  Pulse:  [81-96] 88  Resp:  [16-20] 18  BP: (132-160)/() 132/88  SpO2:  [93 %-97 %] 94 %    Physical Exam  Constitutional:       Appearance: He is ill-appearing.   Cardiovascular:      Rate and Rhythm: Normal rate and regular rhythm.      Heart sounds: No murmur heard.  Pulmonary:      Breath sounds: No rales.   Chest:      Chest wall: No tenderness.   Abdominal:      Palpations: Abdomen is soft.      Tenderness: There is no abdominal tenderness. There is no guarding.   Musculoskeletal:         General: Swelling present.   Neurological:      General: No focal deficit present.      Mental Status: He is alert.   Psychiatric:         Mood and Affect: Mood normal.         Fluids    Intake/Output Summary (Last 24 hours) at 1/19/2025 1400  Last data filed at 1/19/2025 0900  Gross per 24 hour   Intake 3166.28 ml   Output 4630 ml   Net -1463.72 ml        Laboratory  Recent Labs     01/18/25  1356 01/18/25 2024 01/19/25  0042 01/19/25  0557   WBC 2.2* 2.0* 1.6*  --    RBC 3.65* 3.71* 3.73*  --    HEMOGLOBIN 10.3* 10.3* 10.4* 10.4*   HEMATOCRIT 31.5* 31.2* 31.9*  --    MCV 86.3 84.1 85.5  --    MCH 28.2 27.8 27.9  --    MCHC 32.7 33.0 32.6  --    RDW 53.9* 51.9* 51.8*  --    PLATELETCT 23* 36* 33*  --    MPV  --  10.2 11.2   --      Recent Labs     01/18/25  0604 01/19/25  0042   SODIUM 136 128*   POTASSIUM 3.9 3.4*   CHLORIDE 98 94*   CO2 22 23   GLUCOSE 76 188*   BUN 9 7*   CREATININE 0.42* 0.50   CALCIUM 8.4* 8.3*     Recent Labs     01/18/25  0604   INR 1.75*               Imaging  US-ABDOMEN LTD (SOFT TISSUE)   Final Result      Trace right upper quadrant fluid. There is no ascites amenable to paracentesis.      US-RUQ   Final Result         1. Echogenic liver consistent with fibrofatty change.      2. Sludge and small gallstones. Gallbladder wall thickening at 5 mm is noted which could be related to cholecystitis although this may be related to reactive changes. Gallbladder wall thickening was noted on 10/27/2023 ultrasound also. Clinical    correlation recommended.      3. No bile duct dilatation.           Assessment/Plan  * Acute liver failure without hepatic coma- (present on admission)  Assessment & Plan  Presentation consistent with severe alcoholic hepatitis on top of alcoholic liver cirrhosis and chronic hepatitis C infection.    Continue N-acetylcysteine infusion per GI recommendations  Transaminases trending down  Only trace ascites on ultrasound    Electrolyte abnormality  Assessment & Plan  Hypokalemia  Hypophosphatemia  Hypomagnesemia    Repletion ordered  Ordered repeat chemistry panel    Hepatic encephalopathy (HCC)  Assessment & Plan  Improved continue lactulose    Possible GI bleed  Assessment & Plan  Patient with history of esophageal varices reports having melena prior to presentation  Clinically does not appear to be having variceal bleed will discontinue octreotide and ceftriaxone  Continue IV Protonix and monitor CBC    Alcohol withdrawal (HCC)- (present on admission)  Assessment & Plan  Lorazepam per [facility] protocol  Thiamine supplements    Tobacco dependence- (present on admission)  Assessment & Plan  Counseled on cessation    Pancytopenia (HCC)- (present on admission)  Assessment & Plan  Likely secondary to  liver cirrhosis and bone marrow toxicity from EtOH  Monitor CBC    Acute blood loss anemia (ABLA)- (present on admission)  Assessment & Plan  Hemoglobin stable overnight continue to monitor    Paroxysmal atrial fibrillation (HCC)- (present on admission)  Assessment & Plan  Continue carvedilol with holding parameters  Patient not candidate for anticoagulation given comorbidities    Schizophrenia (HCC)- (present on admission)  Assessment & Plan  Denies suicidal ideations    Hepatitis C- (present on admission)  Assessment & Plan  Reinforced importance of alcohol cessation and need for outpatient follow-up    Thrombocytopenia (HCC)- (present on admission)  Assessment & Plan  Improved posttransfusion.  No clinical signs of active bleeding at this time with stable hemoglobin continue to monitor                    VTE prophylaxis:   SCDs/TEDs      I have performed a physical exam and reviewed and updated ROS and Plan today (1/19/2025). In review of yesterday's note (1/18/2025), there are no changes except as documented above.         (4) walks frequently

## 2025-01-19 NOTE — PROGRESS NOTES
Bedside report received from off going RN/tech: Cori, assumed care of patient.     Fall Risk Score: HIGH RISK  Fall risk interventions in place: Place yellow fall risk ID band on patient, Provide patient/family education based on risk assessment, Educate patient/family to call staff for assistance when getting out of bed, Place fall precaution signage outside patient door, Place patient in room close to nursing station, Utilize bed/chair fall alarm, and Bed alarm connected correctly  Bed type: Regular (Brandon Score less than 17 interventions in place)  Patient on cardiac monitor: Yes  IVF/IV medications: Infusion per MAR (List Med(s)) NS, Octreotide , Kphos, Mucomyst   Oxygen: Room Air  Bedside sitter: Not Applicable   Isolation: Not applicable

## 2025-01-19 NOTE — PROGRESS NOTES
..Gastroenterology Progress Note               Author:  Virginia Ayala, DNP,  APRN Date & Time Created: 1/19/2025 8:42 AM       Patient ID:  Name:             Inocencio Shabazz  YOB: 1960  Age:                 64 y.o.  male  MRN:               [id number]        Medical Decision Making, by Problem:  Active Hospital Problems    Diagnosis     Acute liver failure without hepatic coma [K72.00]     Possible GI bleed [K92.2]     Hepatic encephalopathy (HCC) [K76.82]     Neutropenia (HCC) [D70.9]     Alcohol withdrawal (HCC) [F10.939]     Tobacco dependence [F17.200]     Pancytopenia (HCC) [D61.818]     Acute blood loss anemia (ABLA) [D62]     Paroxysmal atrial fibrillation (HCC) [I48.0]     Schizophrenia (HCC) [F20.9]     Hepatitis C [B19.20]     Thrombocytopenia (HCC) [D69.6]      Presenting Chief Complaint:  abdominal pain, abnormal LFTs     History of Present Illness:   64-year-old male known to the GI service having last been evaluated in September 2024.  Patient carries a diagnosis of alcohol related cirrhosis, hepatitis C.  Patient has a pacemaker.  Patient presents to outside hospital and was transferred here.  Complaining of abdominal pain.  Patient's LFTs significantly elevated.  Platelet count 8.  Patient getting a right upper quadrant ultrasound.  GI involvement requested for management of hepatitis and to evaluate for possible GI bleeding.  Patient is being treated actively for alcohol withdrawal.  He had a positive blood alcohol level on presentation.     On discussion with the patient he cannot tell me much other than he is not tolerating food very well.  He is throwing up undigested food.  He is eating a regular diet at this point.  He was unclear as to why he was in the hospital.  He is tangential and unable to answer ROS.      Prior endoscopic history:  9/27/24: EGD:  Esophagus: Moderate size EVs x 3 columns, some red tamara sign, no active bleeding. GERD grade B.2. Stomach: Mild  portal hypertensive gastropathy.   3. Duodenum: Grossly normal.     8/2023: Grade 1 varix,  one column, Portal hypertensive gastropathy, 2 AVMs treated     November 2019: with Dr. Harvey where he was found to have esophageal varices and was banded x5.     March 2019: With Dr. Ba, portal hypertensive gastropathy with superimposed gastritis, small 2 cm hiatal hernia with possible early Camerons lesions, erythema without erosion, and grade 1 esophageal varix not banded     April 2016: With Dr. Tobin, gastric body ulcer bleed injected with epinephrine and hemoclipped and erosive gastritis     January 2016: With Dr. Antonio, grade 2 distal esophageal varices banded x6, possible Sanchez's esophagus    Interval History:  1/19/2025: More alert and oriented today. Endorses abdominal pain but has good appetite. 2 brown BM overnight. Reports he wants to stop drinking. Pancytopenia worsening. AST down to 1761, , T. Bili 2.0>1.9. Phos 1.5.    Hospital Medications:  Current Facility-Administered Medications   Medication Dose Frequency Provider Last Rate Last Admin    potassium phosphate IVPB 30 mmol in 500 mL D5W (premix)  30 mmol Once ABHISHEK MorseAJ LUIS 83.3 mL/hr at 01/19/25 0446 30 mmol at 01/19/25 0446    NS infusion   Continuous Cyril Hernandez M.D. 75 mL/hr at 01/18/25 2155 New Bag at 01/18/25 2155    Pharmacy Consult Request ...Pain Management Review 1 Each  1 Each PHARMACY TO DOSE Cyril Hernandez M.D.        oxyCODONE immediate-release (Roxicodone) tablet 5 mg  5 mg Q3HRS PRN Cyril Hernandez M.D.   5 mg at 01/18/25 1017    Or    oxyCODONE immediate release (Roxicodone) tablet 10 mg  10 mg Q3HRS PRN Cyril Hernandez M.D.        Or    morphine 4 MG/ML injection 4 mg  4 mg Q3HRS PRN Cyril Hernandez M.D.        senna-docusate (Pericolace Or Senokot S) 8.6-50 MG per tablet 2 Tablet  2 Tablet Q EVENING Cyril Hernandez M.D.        And    polyethylene glycol/lytes (Miralax) Packet 1 Packet  1 Packet  QDAY PRN Cyril Hernandez M.D.        hydrALAZINE (Apresoline) injection 10 mg  10 mg Q4HRS PRN Cyril Hernandez M.D.        ondansetron (Zofran) syringe/vial injection 4 mg  4 mg Q4HRS PRN Cyril Hernandez M.D.   4 mg at 01/18/25 1803    ondansetron (Zofran ODT) dispertab 4 mg  4 mg Q4HRS PRN Cyril Hernandez M.D.        nicotine (Nicoderm) 21 MG/24HR 21 mg  21 mg Daily-0600 Cyril Hernandez M.D.   21 mg at 01/19/25 0411    And    nicotine polacrilex (Nicorette) 2 MG piece 2 mg  2 mg Q HOUR PRN Cyril Hernandez M.D.        lactulose 20 GM/30ML solution 30 mL  30 mL Q EVENING Cyril Hernandez M.D.   30 mL at 01/18/25 1817    thiamine (Vitamin B-1) tablet 100 mg  100 mg DAILY Cyril Hernandez M.D.   100 mg at 01/19/25 0410    folic acid (Folvite) tablet 1 mg  1 mg DAILY Cyril Hernandez M.D.   1 mg at 01/19/25 0410    carvedilol (Coreg) tablet 6.25 mg  6.25 mg BID WITH MEALS Cyril Hernandez M.D.   6.25 mg at 01/19/25 0805    pantoprazole (Protonix) injection 40 mg  40 mg BID Cyril Hernandez M.D.   40 mg at 01/19/25 0405    cefTRIAXone (Rocephin) syringe 2,000 mg  2,000 mg Q24HRS Cyril Hernandez M.D.   2,000 mg at 01/19/25 0409    LORazepam (Ativan) tablet 0.5 mg  0.5 mg Q4HRS PRN Cyril Hernandez M.D.   0.5 mg at 01/18/25 2336    LORazepam (Ativan) tablet 1 mg  1 mg Q4HRS PRN Cyril Hernandez M.D.        Or    LORazepam (Ativan) injection 0.5 mg  0.5 mg Q4HRS PRN Cyril Hernandez M.D.        LORazepam (Ativan) tablet 2 mg  2 mg Q2HRS PRN Cyril Hernandez M.D.        Or    LORazepam (Ativan) injection 1 mg  1 mg Q2HRS PRN Cyril Hernandez M.D.        LORazepam (Ativan) tablet 3 mg  3 mg Q HOUR PRN Cyril Hernandez M.D.        Or    LORazepam (Ativan) injection 1.5 mg  1.5 mg Q HOUR PRN Cyril Hernandez M.D.        LORazepam (Ativan) tablet 4 mg  4 mg Q15 MIN PRN Cyril Hernandez M.D.        Or    LORazepam (Ativan) injection 2 mg  2 mg Q15 MIN PRN Cyril Hernandez M.D.        octreotide  "(SandoSTATIN) 1,250 mcg in  mL Infusion  50 mcg/hr Usha Hernandez M.D. 10 mL/hr at 01/18/25 1055 50 mcg/hr at 01/18/25 1055    acetylcysteine (Mucomyst) 6,000 mg in dextrose 5% 1,000 mL infusion  100 mg/kg Q16H Mo Amador M.D. 63 mL/hr at 01/19/25 0624 6,000 mg at 01/19/25 0624   Last reviewed on 1/18/2025  9:06 AM by Elysia Figueroa, T     Review of Systems:  Review of Systems   Constitutional:  Positive for malaise/fatigue. Negative for chills and fever.   HENT:  Negative for hearing loss.    Eyes:  Negative for blurred vision.   Respiratory:  Positive for cough. Negative for shortness of breath.    Cardiovascular:  Negative for chest pain and leg swelling.   Gastrointestinal:  Positive for abdominal pain. Negative for blood in stool, constipation, diarrhea, heartburn, melena, nausea and vomiting.   Genitourinary:  Negative for dysuria.   Musculoskeletal:  Negative for back pain.   Skin:  Negative for rash.   Neurological:  Positive for weakness. Negative for dizziness.   Psychiatric/Behavioral:  Positive for memory loss and substance abuse. Negative for depression.    All other systems reviewed and are negative.    Vital signs:  Weight/BMI: Body mass index is 19.98 kg/m².  BP (!) 146/99   Pulse 86   Temp 37 °C (98.6 °F) (Temporal)   Resp 18   Ht 1.727 m (5' 8\")   Wt 59.6 kg (131 lb 6.3 oz)   SpO2 97%   Vitals:    01/18/25 2300 01/19/25 0005 01/19/25 0301 01/19/25 0731   BP: (!) 154/95 (!) 149/97 (!) 143/89 (!) 146/99   Pulse: 85 88 81 86   Resp: 18 18 18 18   Temp:  37.1 °C (98.8 °F)  37 °C (98.6 °F)   TempSrc:  Temporal Temporal Temporal   SpO2:  95% 96% 97%   Weight:   59.6 kg (131 lb 6.3 oz)    Height:         Oxygen Therapy:  Pulse Oximetry: 97 %, O2 (LPM): 0, O2 Delivery Device: None - Room Air    Intake/Output Summary (Last 24 hours) at 1/19/2025 0842  Last data filed at 1/19/2025 0832  Gross per 24 hour   Intake 3811.28 ml   Output 5530 ml   Net -1718.72 ml "     Physical Exam  Vitals and nursing note reviewed.   Constitutional:       General: He is not in acute distress.     Appearance: Normal appearance. He is ill-appearing.   HENT:      Head: Normocephalic and atraumatic.      Right Ear: External ear normal.      Left Ear: External ear normal.      Nose: Nose normal.      Mouth/Throat:      Mouth: Mucous membranes are moist.      Pharynx: Oropharynx is clear.   Eyes:      General: No scleral icterus.  Cardiovascular:      Rate and Rhythm: Normal rate and regular rhythm.      Pulses: Normal pulses.      Heart sounds: Normal heart sounds.   Pulmonary:      Effort: Pulmonary effort is normal.      Breath sounds: Normal breath sounds.   Abdominal:      General: Bowel sounds are normal. There is distension.      Tenderness: There is abdominal tenderness.   Musculoskeletal:         General: Normal range of motion.      Cervical back: Normal range of motion and neck supple.   Skin:     General: Skin is warm.      Capillary Refill: Capillary refill takes less than 2 seconds.      Coloration: Skin is pale.   Neurological:      Mental Status: He is alert. Mental status is at baseline.      Motor: Weakness present.   Psychiatric:         Mood and Affect: Mood normal.         Behavior: Behavior normal.         Labs:  Recent Labs     01/18/25  0604 01/19/25  0042   SODIUM 136 128*   POTASSIUM 3.9 3.4*   CHLORIDE 98 94*   CO2 22 23   BUN 9 7*   CREATININE 0.42* 0.50   MAGNESIUM  --  1.5   PHOSPHORUS  --  1.5*   CALCIUM 8.4* 8.3*     Recent Labs     01/18/25  0604 01/19/25  0042   ALTSGPT 935* 796*   ASTSGOT 2960* 1761*   ALKPHOSPHAT 95 86   TBILIRUBIN 1.9* 2.0*   LIPASE 63  --    GLUCOSE 76 188*     Recent Labs     01/18/25  0604 01/18/25  1356 01/18/25  2024 01/19/25  0042   WBC 1.9* 2.2* 2.0* 1.6*   NEUTSPOLYS 78.40*  --   --  65.30   LYMPHOCYTES 13.20*  --   --  19.50*   MONOCYTES 6.90  --   --  12.20   EOSINOPHILS 0.50  --   --  1.80   BASOPHILS 0.50  --   --  0.60   ASTSGOT  2960*  --   --  1761*   ALTSGPT 935*  --   --  796*   ALKPHOSPHAT 95  --   --  86   TBILIRUBIN 1.9*  --   --  2.0*     Recent Labs     01/18/25  0604 01/18/25  1356 01/18/25 2024 01/19/25  0042 01/19/25  0557   RBC 3.94* 3.65* 3.71* 3.73*  --    HEMOGLOBIN 11.3* 10.3* 10.3* 10.4* 10.4*   HEMATOCRIT 33.6* 31.5* 31.2* 31.9*  --    PLATELETCT 8* 23* 36* 33*  --    PROTHROMBTM 20.5*  --   --   --   --    INR 1.75*  --   --   --   --      Recent Results (from the past 24 hours)   URINE DRUG SCREEN    Collection Time: 01/18/25  8:45 AM   Result Value Ref Range    Amphetamines Urine Negative Negative    Barbiturates Negative Negative    Benzodiazepines Positive (A) Negative    Cocaine Metabolite Negative Negative    Fentanyl, Urine Negative Negative    Methadone Negative Negative    Opiates Negative Negative    Oxycodone Negative Negative    Phencyclidine -Pcp Negative Negative    Propoxyphene Negative Negative    Cannabinoid Metab Positive (A) Negative   HEPATITIS PANEL ACUTE(4 COMPONENTS)    Collection Time: 01/18/25  1:56 PM   Result Value Ref Range    Hepatitis B Surface Antigen Non-Reactive Non-Reactive    Hepatitis B Cors Ab,IgM Non-Reactive Non-Reactive    Hepatitis A Virus Ab, IgM Non-Reactive Non-Reactive    Hepatitis C Antibody Reactive (A) Non-Reactive   CBC WITHOUT DIFFERENTIAL    Collection Time: 01/18/25  1:56 PM   Result Value Ref Range    WBC 2.2 (L) 4.8 - 10.8 K/uL    RBC 3.65 (L) 4.70 - 6.10 M/uL    Hemoglobin 10.3 (L) 14.0 - 18.0 g/dL    Hematocrit 31.5 (L) 42.0 - 52.0 %    MCV 86.3 81.4 - 97.8 fL    MCH 28.2 27.0 - 33.0 pg    MCHC 32.7 32.3 - 36.5 g/dL    RDW 53.9 (H) 35.9 - 50.0 fL    Platelet Count 23 (L) 164 - 446 K/uL   IMMATURE PLT FRACTION    Collection Time: 01/18/25  1:56 PM   Result Value Ref Range    Imm. Plt Fraction 8.7 0.6 - 13.1 %   PLATELETS REQUEST    Collection Time: 01/18/25  3:53 PM   Result Value Ref Range    Component P       P71                 Plts,Pheresis       [id number]    transfused   01/18/25   15:54      Product Type Platelets Pheresis LR     Dispense Status transfused     Unit Number (Barcoded) [id number]     Product Code (Barcoded) E3539Q55     Blood Type (Barcoded) 6200    CBC WITHOUT DIFFERENTIAL    Collection Time: 01/18/25  8:24 PM   Result Value Ref Range    WBC 2.0 (L) 4.8 - 10.8 K/uL    RBC 3.71 (L) 4.70 - 6.10 M/uL    Hemoglobin 10.3 (L) 14.0 - 18.0 g/dL    Hematocrit 31.2 (L) 42.0 - 52.0 %    MCV 84.1 81.4 - 97.8 fL    MCH 27.8 27.0 - 33.0 pg    MCHC 33.0 32.3 - 36.5 g/dL    RDW 51.9 (H) 35.9 - 50.0 fL    Platelet Count 36 (L) 164 - 446 K/uL    MPV 10.2 9.0 - 12.9 fL   IMMATURE PLT FRACTION    Collection Time: 01/18/25  8:24 PM   Result Value Ref Range    Imm. Plt Fraction 7.4 0.6 - 13.1 %   CBC with Differential    Collection Time: 01/19/25 12:42 AM   Result Value Ref Range    WBC 1.6 (LL) 4.8 - 10.8 K/uL    RBC 3.73 (L) 4.70 - 6.10 M/uL    Hemoglobin 10.4 (L) 14.0 - 18.0 g/dL    Hematocrit 31.9 (L) 42.0 - 52.0 %    MCV 85.5 81.4 - 97.8 fL    MCH 27.9 27.0 - 33.0 pg    MCHC 32.6 32.3 - 36.5 g/dL    RDW 51.8 (H) 35.9 - 50.0 fL    Platelet Count 33 (L) 164 - 446 K/uL    MPV 11.2 9.0 - 12.9 fL    Neutrophils-Polys 65.30 44.00 - 72.00 %    Lymphocytes 19.50 (L) 22.00 - 41.00 %    Monocytes 12.20 0.00 - 13.40 %    Eosinophils 1.80 0.00 - 6.90 %    Basophils 0.60 0.00 - 1.80 %    Immature Granulocytes 0.60 0.00 - 0.90 %    Nucleated RBC 0.00 0.00 - 0.20 /100 WBC    Neutrophils (Absolute) 1.07 (L) 1.82 - 7.42 K/uL    Lymphs (Absolute) 0.32 (L) 1.00 - 4.80 K/uL    Monos (Absolute) 0.20 0.00 - 0.85 K/uL    Eos (Absolute) 0.03 0.00 - 0.51 K/uL    Baso (Absolute) 0.01 0.00 - 0.12 K/uL    Immature Granulocytes (abs) 0.01 0.00 - 0.11 K/uL    NRBC (Absolute) 0.00 K/uL   Comp Metabolic Panel (CMP)    Collection Time: 01/19/25 12:42 AM   Result Value Ref Range    Sodium 128 (L) 135 - 145 mmol/L    Potassium 3.4 (L) 3.6 - 5.5 mmol/L    Chloride 94 (L) 96 - 112 mmol/L    Co2 23 20 -  33 mmol/L    Anion Gap 11.0 7.0 - 16.0    Glucose 188 (H) 65 - 99 mg/dL    Bun 7 (L) 8 - 22 mg/dL    Creatinine 0.50 0.50 - 1.40 mg/dL    Calcium 8.3 (L) 8.5 - 10.5 mg/dL    Correct Calcium 8.8 8.5 - 10.5 mg/dL    AST(SGOT) 1761 (HH) 12 - 45 U/L    ALT(SGPT) 796 (H) 2 - 50 U/L    Alkaline Phosphatase 86 30 - 99 U/L    Total Bilirubin 2.0 (H) 0.1 - 1.5 mg/dL    Albumin 3.4 3.2 - 4.9 g/dL    Total Protein 7.4 6.0 - 8.2 g/dL    Globulin 4.0 (H) 1.9 - 3.5 g/dL    A-G Ratio 0.9 g/dL   Magnesium    Collection Time: 01/19/25 12:42 AM   Result Value Ref Range    Magnesium 1.5 1.5 - 2.5 mg/dL   Phosphorus    Collection Time: 01/19/25 12:42 AM   Result Value Ref Range    Phosphorus 1.5 (L) 2.5 - 4.5 mg/dL   IMMATURE PLT FRACTION    Collection Time: 01/19/25 12:42 AM   Result Value Ref Range    Imm. Plt Fraction 6.1 0.6 - 13.1 %   ESTIMATED GFR    Collection Time: 01/19/25 12:42 AM   Result Value Ref Range    GFR (CKD-EPI) 112 >60 mL/min/1.73 m 2   HGB (Hemoglobin) for 48 hours    Collection Time: 01/19/25  5:57 AM   Result Value Ref Range    Hemoglobin 10.4 (L) 14.0 - 18.0 g/dL       Radiology Review:  US-ABDOMEN LTD (SOFT TISSUE)   Final Result      Trace right upper quadrant fluid. There is no ascites amenable to paracentesis.      US-RUQ   Final Result         1. Echogenic liver consistent with fibrofatty change.      2. Sludge and small gallstones. Gallbladder wall thickening at 5 mm is noted which could be related to cholecystitis although this may be related to reactive changes. Gallbladder wall thickening was noted on 10/27/2023 ultrasound also. Clinical    correlation recommended.      3. No bile duct dilatation.            MDM (Data Review):   -Records reviewed and summarized in current documentation  -I personally reviewed and interpreted the laboratory results  -I personally reviewed the radiology images    Assessment/Recommendations:  64-year-old male with alcohol-related cirrhosis presents with acute severe  hepatitis.  Also report of melena.  Patient cannot provide a useful history.       The hepatitis is beyond what we would expect for alcohol related hepatitis. Patient's blood alcohol level was positive.  He is having abdominal pain.   No signs of bleeding at this point but report of melena.  Patient's platelet count is 11.  Endoscopy contraindicated at this point.    RUQ US consistent positive for sludge and small gallstones with gallbladder wall thickening. ?abdominal pain    Recommendations:  Hepatitis C - prev RNA 5.45, new PCR pending  Not enough ascites to tap  Literature recommends against routine platelet transfusions in the setting of portal hypertension unless actively bleeding  Given no evidence of GI bleeding, ok to discontinue octreotide and ceftriaxone  Continue NAC  AM CMP and INR  ? Anupam for gallstones seen on imaging given that patient is a poor surgical candidate, [facility]    GI will follow in am.    Discussed with patient, RN, Dr. Julian Amador, Dr. Smith    ..Virgniia Ayala, DNP,  APRN    Core Quality Measures   Reviewed items::  Labs, Medications and Radiology reports reviewed

## 2025-01-19 NOTE — PROGRESS NOTES
Bedside report received from off going RN/tech: Lars, assumed care of patient.           Fall Risk Score: HIGH RISK  Fall risk interventions in place: Place yellow fall risk ID band on patient, Provide patient/family education based on risk assessment, Educate patient/family to call staff for assistance when getting out of bed, Place fall precaution signage outside patient door, Utilize bed/chair fall alarm, Notify charge of high risk for huddle, and Bed alarm connected correctly  Bed type: Regular (Brandon Score less than 17 interventions in place)  Patient on cardiac monitor: Yes  IVF/IV medications: Infusion per MAR (List Med(s)) ns octreotide  Oxygen: Room Air  Bedside sitter: Not Applicable   Isolation: Not applicable

## 2025-01-19 NOTE — ASSESSMENT & PLAN NOTE
Hypokalemia  Hypophosphatemia  Hypomagnesemia    Repletion ordered  Ordered repeat chemistry panel

## 2025-01-19 NOTE — CARE PLAN
The patient is Watcher - Medium risk of patient condition declining or worsening    Shift Goals  Clinical Goals: monitor labs, NV, and comfort  Patient Goals: feel better    Progress made toward(s) clinical / shift goals:    Problem: Optimal Care for Alcohol Withdrawal  Goal: Optimal Care for the alcohol withdrawal patient  Outcome: Progressing  Note: CIWA documented in flow sheets       Patient is not progressing towards the following goals:

## 2025-01-20 VITALS
OXYGEN SATURATION: 98 % | TEMPERATURE: 97.5 F | BODY MASS INDEX: 19.58 KG/M2 | HEART RATE: 77 BPM | SYSTOLIC BLOOD PRESSURE: 154 MMHG | WEIGHT: 129.19 LBS | DIASTOLIC BLOOD PRESSURE: 104 MMHG | RESPIRATION RATE: 20 BRPM | HEIGHT: 68 IN

## 2025-01-20 PROBLEM — K92.2 GI BLEED: Status: RESOLVED | Noted: 2025-01-18 | Resolved: 2025-01-20

## 2025-01-20 PROBLEM — E87.8 ELECTROLYTE ABNORMALITY: Status: RESOLVED | Noted: 2025-01-19 | Resolved: 2025-01-20

## 2025-01-20 PROBLEM — K76.82 HEPATIC ENCEPHALOPATHY (HCC): Status: RESOLVED | Noted: 2025-01-18 | Resolved: 2025-01-20

## 2025-01-20 PROBLEM — F10.939 ALCOHOL WITHDRAWAL (HCC): Status: RESOLVED | Noted: 2022-02-05 | Resolved: 2025-01-20

## 2025-01-20 PROBLEM — D62 ACUTE BLOOD LOSS ANEMIA (ABLA): Status: RESOLVED | Noted: 2019-11-13 | Resolved: 2025-01-20

## 2025-01-20 LAB
ALBUMIN SERPL BCP-MCNC: 3.5 G/DL (ref 3.2–4.9)
ALBUMIN/GLOB SERPL: 0.9 G/DL
ALP SERPL-CCNC: 85 U/L (ref 30–99)
ALT SERPL-CCNC: 523 U/L (ref 2–50)
ANION GAP SERPL CALC-SCNC: 14 MMOL/L (ref 7–16)
AST SERPL-CCNC: 696 U/L (ref 12–45)
BILIRUB SERPL-MCNC: 2 MG/DL (ref 0.1–1.5)
BUN SERPL-MCNC: 6 MG/DL (ref 8–22)
CALCIUM ALBUM COR SERPL-MCNC: 8.5 MG/DL (ref 8.5–10.5)
CALCIUM SERPL-MCNC: 8.1 MG/DL (ref 8.5–10.5)
CHLORIDE SERPL-SCNC: 97 MMOL/L (ref 96–112)
CO2 SERPL-SCNC: 20 MMOL/L (ref 20–33)
CREAT SERPL-MCNC: 0.49 MG/DL (ref 0.5–1.4)
ERYTHROCYTE [DISTWIDTH] IN BLOOD BY AUTOMATED COUNT: 52.2 FL (ref 35.9–50)
GFR SERPLBLD CREATININE-BSD FMLA CKD-EPI: 112 ML/MIN/1.73 M 2
GLOBULIN SER CALC-MCNC: 3.8 G/DL (ref 1.9–3.5)
GLUCOSE SERPL-MCNC: 119 MG/DL (ref 65–99)
HCT VFR BLD AUTO: 32.3 % (ref 42–52)
HGB BLD-MCNC: 10.6 G/DL (ref 14–18)
INR PPP: 1.74 (ref 0.87–1.13)
MAGNESIUM SERPL-MCNC: 1.8 MG/DL (ref 1.5–2.5)
MCH RBC QN AUTO: 28.6 PG (ref 27–33)
MCHC RBC AUTO-ENTMCNC: 32.8 G/DL (ref 32.3–36.5)
MCV RBC AUTO: 87.1 FL (ref 81.4–97.8)
PHOSPHATE SERPL-MCNC: 2.1 MG/DL (ref 2.5–4.5)
PLATELET # BLD AUTO: 33 K/UL (ref 164–446)
PLATELETS.RETICULATED NFR BLD AUTO: 6.5 % (ref 0.6–13.1)
PMV BLD AUTO: 11.1 FL (ref 9–12.9)
POTASSIUM SERPL-SCNC: 3.6 MMOL/L (ref 3.6–5.5)
PROT SERPL-MCNC: 7.3 G/DL (ref 6–8.2)
PROTHROMBIN TIME: 20.4 SEC (ref 12–14.6)
RBC # BLD AUTO: 3.71 M/UL (ref 4.7–6.1)
SODIUM SERPL-SCNC: 131 MMOL/L (ref 135–145)
WBC # BLD AUTO: 1.7 K/UL (ref 4.8–10.8)

## 2025-01-20 PROCEDURE — A9270 NON-COVERED ITEM OR SERVICE: HCPCS | Performed by: INTERNAL MEDICINE

## 2025-01-20 PROCEDURE — 99232 SBSQ HOSP IP/OBS MODERATE 35: CPT | Performed by: NURSE PRACTITIONER

## 2025-01-20 PROCEDURE — A9270 NON-COVERED ITEM OR SERVICE: HCPCS | Performed by: HOSPITALIST

## 2025-01-20 PROCEDURE — 700102 HCHG RX REV CODE 250 W/ 637 OVERRIDE(OP): Performed by: HOSPITALIST

## 2025-01-20 PROCEDURE — 700111 HCHG RX REV CODE 636 W/ 250 OVERRIDE (IP): Mod: JZ | Performed by: HOSPITALIST

## 2025-01-20 PROCEDURE — 84100 ASSAY OF PHOSPHORUS: CPT

## 2025-01-20 PROCEDURE — 99239 HOSP IP/OBS DSCHRG MGMT >30: CPT | Performed by: HOSPITALIST

## 2025-01-20 PROCEDURE — 85055 RETICULATED PLATELET ASSAY: CPT

## 2025-01-20 PROCEDURE — 80053 COMPREHEN METABOLIC PANEL: CPT

## 2025-01-20 PROCEDURE — 90662 IIV NO PRSV INCREASED AG IM: CPT | Performed by: HOSPITALIST

## 2025-01-20 PROCEDURE — 36415 COLL VENOUS BLD VENIPUNCTURE: CPT

## 2025-01-20 PROCEDURE — 700111 HCHG RX REV CODE 636 W/ 250 OVERRIDE (IP): Performed by: INTERNAL MEDICINE

## 2025-01-20 PROCEDURE — 90471 IMMUNIZATION ADMIN: CPT

## 2025-01-20 PROCEDURE — 700105 HCHG RX REV CODE 258: Performed by: HOSPITALIST

## 2025-01-20 PROCEDURE — 97162 PT EVAL MOD COMPLEX 30 MIN: CPT

## 2025-01-20 PROCEDURE — 700102 HCHG RX REV CODE 250 W/ 637 OVERRIDE(OP): Performed by: INTERNAL MEDICINE

## 2025-01-20 PROCEDURE — 700105 HCHG RX REV CODE 258: Performed by: INTERNAL MEDICINE

## 2025-01-20 PROCEDURE — 85027 COMPLETE CBC AUTOMATED: CPT

## 2025-01-20 PROCEDURE — 83735 ASSAY OF MAGNESIUM: CPT

## 2025-01-20 PROCEDURE — 85610 PROTHROMBIN TIME: CPT

## 2025-01-20 RX ORDER — CARVEDILOL 6.25 MG/1
6.25 TABLET ORAL 2 TIMES DAILY WITH MEALS
Qty: 60 TABLET | Refills: 1 | Status: SHIPPED | OUTPATIENT
Start: 2025-01-20

## 2025-01-20 RX ORDER — MAGNESIUM SULFATE HEPTAHYDRATE 40 MG/ML
2 INJECTION, SOLUTION INTRAVENOUS ONCE
Status: COMPLETED | OUTPATIENT
Start: 2025-01-20 | End: 2025-01-20

## 2025-01-20 RX ORDER — ONDANSETRON 4 MG/1
4 TABLET, ORALLY DISINTEGRATING ORAL EVERY 6 HOURS PRN
Qty: 15 TABLET | Refills: 0 | Status: SHIPPED | OUTPATIENT
Start: 2025-01-20

## 2025-01-20 RX ORDER — PANTOPRAZOLE SODIUM 40 MG/1
40 TABLET, DELAYED RELEASE ORAL DAILY
Qty: 30 TABLET | Refills: 0 | Status: SHIPPED | OUTPATIENT
Start: 2025-01-20

## 2025-01-20 RX ORDER — POTASSIUM CHLORIDE 1500 MG/1
20 TABLET, EXTENDED RELEASE ORAL ONCE
Status: COMPLETED | OUTPATIENT
Start: 2025-01-20 | End: 2025-01-20

## 2025-01-20 RX ADMIN — MAGNESIUM SULFATE HEPTAHYDRATE 2 G: 2 INJECTION, SOLUTION INTRAVENOUS at 07:57

## 2025-01-20 RX ADMIN — DIBASIC SODIUM PHOSPHATE, MONOBASIC POTASSIUM PHOSPHATE AND MONOBASIC SODIUM PHOSPHATE 500 MG: 852; 155; 130 TABLET ORAL at 12:06

## 2025-01-20 RX ADMIN — POTASSIUM CHLORIDE 20 MEQ: 1500 TABLET, EXTENDED RELEASE ORAL at 07:50

## 2025-01-20 RX ADMIN — CARVEDILOL 6.25 MG: 6.25 TABLET, FILM COATED ORAL at 07:50

## 2025-01-20 RX ADMIN — PHYTONADIONE 10 MG: 10 INJECTION, EMULSION INTRAMUSCULAR; INTRAVENOUS; SUBCUTANEOUS at 09:51

## 2025-01-20 RX ADMIN — DIBASIC SODIUM PHOSPHATE, MONOBASIC POTASSIUM PHOSPHATE AND MONOBASIC SODIUM PHOSPHATE 500 MG: 852; 155; 130 TABLET ORAL at 07:50

## 2025-01-20 RX ADMIN — Medication 100 MG: at 05:45

## 2025-01-20 RX ADMIN — ONDANSETRON 4 MG: 2 INJECTION INTRAMUSCULAR; INTRAVENOUS at 09:27

## 2025-01-20 RX ADMIN — NICOTINE TRANSDERMAL SYSTEM 21 MG: 21 PATCH, EXTENDED RELEASE TRANSDERMAL at 05:45

## 2025-01-20 RX ADMIN — CEFTRIAXONE SODIUM 2000 MG: 10 INJECTION, POWDER, FOR SOLUTION INTRAVENOUS at 05:45

## 2025-01-20 RX ADMIN — INFLUENZA A VIRUS A/VICTORIA/4897/2022 IVR-238 (H1N1) ANTIGEN (FORMALDEHYDE INACTIVATED), INFLUENZA A VIRUS A/CALIFORNIA/122/2022 SAN-022 (H3N2) ANTIGEN (FORMALDEHYDE INACTIVATED), AND INFLUENZA B VIRUS B/MICHIGAN/01/2021 ANTIGEN (FORMALDEHYDE INACTIVATED) 0.5 ML: 60; 60; 60 INJECTION, SUSPENSION INTRAMUSCULAR at 10:16

## 2025-01-20 RX ADMIN — PANTOPRAZOLE SODIUM 40 MG: 40 INJECTION, POWDER, FOR SOLUTION INTRAVENOUS at 05:45

## 2025-01-20 RX ADMIN — FOLIC ACID 1 MG: 1 TABLET ORAL at 05:45

## 2025-01-20 ASSESSMENT — LIFESTYLE VARIABLES
HEADACHE, FULLNESS IN HEAD: NOT PRESENT
TREMOR: TREMOR NOT VISIBLE BUT CAN BE FELT, FINGERTIP TO FINGERTIP
VISUAL DISTURBANCES: NOT PRESENT
AUDITORY DISTURBANCES: NOT PRESENT
ANXIETY: NO ANXIETY (AT EASE)
ANXIETY: NO ANXIETY (AT EASE)
TOTAL SCORE: 1
ORIENTATION AND CLOUDING OF SENSORIUM: ORIENTED AND CAN DO SERIAL ADDITIONS
AGITATION: NORMAL ACTIVITY
VISUAL DISTURBANCES: NOT PRESENT
AUDITORY DISTURBANCES: NOT PRESENT
NAUSEA AND VOMITING: NO NAUSEA AND NO VOMITING
SUBSTANCE_ABUSE: 1
PAROXYSMAL SWEATS: NO SWEAT VISIBLE
TREMOR: TREMOR NOT VISIBLE BUT CAN BE FELT, FINGERTIP TO FINGERTIP
PAROXYSMAL SWEATS: NO SWEAT VISIBLE
NAUSEA AND VOMITING: NO NAUSEA AND NO VOMITING
HEADACHE, FULLNESS IN HEAD: NOT PRESENT
TOTAL SCORE: 1
ORIENTATION AND CLOUDING OF SENSORIUM: ORIENTED AND CAN DO SERIAL ADDITIONS
AGITATION: NORMAL ACTIVITY

## 2025-01-20 ASSESSMENT — ENCOUNTER SYMPTOMS
HEARTBURN: 0
FEVER: 0
VOMITING: 0
NAUSEA: 0
WEAKNESS: 1
ABDOMINAL PAIN: 1
DIARRHEA: 0
BLURRED VISION: 0
DIZZINESS: 0
CHILLS: 0
BACK PAIN: 0
MEMORY LOSS: 1
SHORTNESS OF BREATH: 0
COUGH: 0
DEPRESSION: 0
BLOOD IN STOOL: 0
CONSTIPATION: 0

## 2025-01-20 ASSESSMENT — GAIT ASSESSMENTS
GAIT LEVEL OF ASSIST: SUPERVISED
DISTANCE (FEET): 200

## 2025-01-20 ASSESSMENT — COGNITIVE AND FUNCTIONAL STATUS - GENERAL
MOBILITY SCORE: 24
SUGGESTED CMS G CODE MODIFIER MOBILITY: CH

## 2025-01-20 ASSESSMENT — PAIN DESCRIPTION - PAIN TYPE: TYPE: ACUTE PAIN

## 2025-01-20 ASSESSMENT — FIBROSIS 4 INDEX: FIB4 SCORE: 60.87

## 2025-01-20 NOTE — PROGRESS NOTES
Discharge instructions provided to patient and relative. Discussed follow up appointments, diet, medications and activity. Patient states understanding. IV removed. Copy of discharge provided to the patient. Signed copy of discharge is in patients chart. All personal belongings in patients possession. All questions have been answered. Patient is being wheeled off the floor.

## 2025-01-20 NOTE — DISCHARGE SUMMARY
Discharge Summary    CHIEF COMPLAINT ON ADMISSION  No chief complaint on file.      Reason for Admission  Acute hepatic failure, ETOH abuse     Admission Date  1/18/2025    CODE STATUS  Full Code    HPI & HOSPITAL COURSE    64-year-old male with history of alcohol abuse, alcoholic liver cirrhosis, hepatitis C, sick sinus syndrome, PPM in place, paroxysmal A-fib, pancytopenia, disorganized schizophrenia, GERD, bleeding esophageal varices, gastritis, peripheral neuropathy, smoking, presents as a direct admit from [facility] with concern for liver failure, for possible need for GI consult.     He presented at outside hospital with complaint of right upper quadrant pain and alcohol intoxication.  History is limited as he is poor historian.     CT of the abdomen and pelvis with contrast: Small subcapsular splenic collection likely hematoma, age-indeterminate.  Possibly seen on prior CT, increased in size since 9/12/2024.  Cecum and proximal ascending colon wall thickening chronic colitis with portal enteropathy.  Appendix is not visualized.  Distended gallbladder.  Distended urinary bladder  Cirrhosis  Pertinent labwork:  AST 3431, , T. bili 2.2, alk phos 92, alcohol level 148.  AST/ALT was 100/50 several days ago.  PLT 11 and WBC 1.5; chronic.     Patient was admitted he was evaluated by GI initially started on IV ceftriaxone Protonix octreotide and received platelet transfusion.  Given his elevated transaminases GI recommended IV N-acetylcysteine which was started.  Patient serial hemoglobin remained stable with no clinical signs of active bleeding.  Abdominal ultrasound was negative for significant ascites for paracentesis.  His octreotide and ceftriaxone were discontinued his diet was advanced and he has been tolerating it sludge and gallstones and gallbladder wall thickening which is chronic present on previous ultrasound.    On my evaluation this morning the patient denies any abdominal pain his abdominal  exam is benign.  His hemoglobin is stable at 10.6.  He was counseled on the importance of abstaining from alcohol complying with medical therapy and close outpatient follow-up.  I discussed with GI and is cleared for discharge from their standpoint    Abdominal ultrasound revealed    Therefore, he is discharged in good and stable condition to home with close outpatient follow-up.    The patient met 2-midnight criteria for an inpatient stay at the time of discharge.    Discharge Date  1/20/2025    FOLLOW UP ITEMS POST DISCHARGE  Follow-up with PCP and GI  Consider treatment for hep C  Monitor LFTs and CBC at follow-up  Consider ursodiol for his gallstones as patient is a poor surgical candidate    DISCHARGE DIAGNOSES  Principal Problem:    Acute liver failure without hepatic coma (POA: Yes)  Active Problems:    Thrombocytopenia (HCC) (POA: Yes)    Hepatitis C (POA: Yes)    Schizophrenia (HCC) (POA: Yes)    Paroxysmal atrial fibrillation (HCC) (POA: Yes)    Pancytopenia (HCC) (POA: Yes)    Tobacco dependence (POA: Yes)    Neutropenia (HCC) (POA: Yes)  Resolved Problems:    Acute blood loss anemia (ABLA) (POA: Yes)    Alcohol withdrawal (HCC) (POA: Yes)    Possible GI bleed (POA: Unknown)    Hepatic encephalopathy (HCC) (POA: Unknown)    Electrolyte abnormality (POA: Unknown)      FOLLOW UP  No future appointments.  Nnamdi Ballesteros M.D.  [address] [facility] [place] [address]  [phone]    Follow up        MEDICATIONS ON DISCHARGE     Medication List        START taking these medications        Instructions   lactulose 10 g/15mL Soln   Take 30 mL by mouth every evening.  Dose: 30 mL     ondansetron 4 MG Tbdp  Commonly known as: Zofran ODT   Take 1 Tablet by mouth every 6 hours as needed for Nausea/Vomiting.  Dose: 4 mg            CHANGE how you take these medications        Instructions   pantoprazole 40 MG Tbec  What changed: when to take this  Commonly known as: Protonix   Take 1 Tablet by mouth every day.  Dose: 40 mg             CONTINUE taking these medications        Instructions   carvedilol 6.25 MG Tabs  Commonly known as: Coreg   Take 1 Tablet by mouth 2 times a day with meals.  Dose: 6.25 mg     dilTIAZem  MG Cp24  Commonly known as: Cardizem CD   Take 240 mg by mouth every day.  Dose: 240 mg     magnesium oxide 400 MG Tabs tablet  Commonly known as: Mag-Ox   Take 400 mg by mouth every day.  Dose: 400 mg     nicotine 21 MG/24HR Pt24  Commonly known as: Nicoderm   Place 1 Patch on the skin every 24 hours.  Dose: 1 Patch     QUEtiapine 50 MG tablet  Commonly known as: SEROquel   Take 50 mg by mouth 2 times a day.  Dose: 50 mg     thiamine 100 MG Tabs  Commonly known as: Vitamin B-1   Take 100 mg by mouth every day.  Dose: 100 mg            STOP taking these medications      potassium chloride SA 10 MEQ Tbcr  Commonly known as: K-Dur              Allergies  Allergies   Allergen Reactions    Haloperidol Unspecified     Twitching/involuntary movements        DIET  Orders Placed This Encounter   Procedures    Diet Order Diet: Regular     Standing Status:   Standing     Number of Occurrences:   1     Order Specific Question:   Diet:     Answer:   Regular [1]       ACTIVITY  As tolerated.  Weight bearing as tolerated    CONSULTATIONS  GI        LABORATORY  Lab Results   Component Value Date    SODIUM 131 (L) 01/20/2025    POTASSIUM 3.6 01/20/2025    CHLORIDE 97 01/20/2025    CO2 20 01/20/2025    GLUCOSE 119 (H) 01/20/2025    BUN 6 (L) 01/20/2025    CREATININE 0.49 (L) 01/20/2025        Lab Results   Component Value Date    WBC 1.7 (LL) 01/20/2025    HEMOGLOBIN 10.6 (L) 01/20/2025    HEMATOCRIT 32.3 (L) 01/20/2025    PLATELETCT 33 (L) 01/20/2025        Total time of the discharge process exceeds 35 minutes.

## 2025-01-20 NOTE — CARE PLAN
The patient is Watcher - Medium risk of patient condition declining or worsening    Shift Goals  Clinical Goals: monitor labs and vitals  Patient Goals: smoke cigarrette  Family Goals: na    Progress made toward(s) clinical / shift goals:    Problem: Optimal Care for Alcohol Withdrawal  Goal: Optimal Care for the alcohol withdrawal patient  Outcome: Progressing  Note: Patient's CIWA score has been below 5, no medication required.        Patient is not progressing towards the following goals:      Problem: Fall Risk  Goal: Patient will remain free from falls  Outcome: Not Progressing  Note: Patient continues to get out of bed without calling for assistance. Strip and frame alarm on.

## 2025-01-20 NOTE — PROGRESS NOTES
..Gastroenterology Progress Note               Author:  Virginia Ayala, ESTER,  APRN Date & Time Created: 1/20/2025 8:26 AM       Patient ID:  Name:             Inocencio Shabazz  YOB: 1960  Age:                 64 y.o.  male  MRN:               [id number]    Medical Decision Making, by Problem:  Active Hospital Problems    Diagnosis     Electrolyte abnormality [E87.8]     Acute liver failure without hepatic coma [K72.00]     Possible GI bleed [K92.2]     Hepatic encephalopathy (HCC) [K76.82]     Neutropenia (HCC) [D70.9]     Alcohol withdrawal (HCC) [F10.939]     Tobacco dependence [F17.200]     Pancytopenia (HCC) [D61.818]     Acute blood loss anemia (ABLA) [D62]     Paroxysmal atrial fibrillation (HCC) [I48.0]     Schizophrenia (HCC) [F20.9]     Hepatitis C [B19.20]     Thrombocytopenia (HCC) [D69.6]      Presenting Chief Complaint:  abdominal pain, abnormal LFTs     History of Present Illness:   64-year-old male known to the GI service having last been evaluated in September 2024.  Patient carries a diagnosis of alcohol related cirrhosis, hepatitis C.  Patient has a pacemaker.  Patient presents to outside hospital and was transferred here.  Complaining of abdominal pain.  Patient's LFTs significantly elevated.  Platelet count 8.  Patient getting a right upper quadrant ultrasound.  GI involvement requested for management of hepatitis and to evaluate for possible GI bleeding.  Patient is being treated actively for alcohol withdrawal.  He had a positive blood alcohol level on presentation.     On discussion with the patient he cannot tell me much other than he is not tolerating food very well.  He is throwing up undigested food.  He is eating a regular diet at this point.  He was unclear as to why he was in the hospital.  He is tangential and unable to answer ROS.      Prior endoscopic history:  9/27/24: EGD:  Esophagus: Moderate size EVs x 3 columns, some red tamara sign, no active bleeding.  GERD grade B.2. Stomach: Mild portal hypertensive gastropathy.   3. Duodenum: Grossly normal.     8/2023: Grade 1 varix,  one column, Portal hypertensive gastropathy, 2 AVMs treated     November 2019: with Dr. Harvey where he was found to have esophageal varices and was banded x5.     March 2019: With Dr. Ba, portal hypertensive gastropathy with superimposed gastritis, small 2 cm hiatal hernia with possible early Camerons lesions, erythema without erosion, and grade 1 esophageal varix not banded     April 2016: With Dr. Tobin, gastric body ulcer bleed injected with epinephrine and hemoclipped and erosive gastritis     January 2016: With Dr. Antonio, grade 2 distal esophageal varices banded x6, possible Sanchez's esophagus    Interval History:  1/19/2025: More alert and oriented today. Endorses abdominal pain but has good appetite. 2 brown BM overnight. Reports he wants to stop drinking. Pancytopenia worsening. AST down to 1761, , T. Bili 2.0>1.9. Phos 1.5.    1/20/2025: Just finished breakfast, reports he feels nauseous but has not vomited. Labs reviewed, transaminitis improving. Two brown BM.     MELD 3.0 - 18    Hospital Medications:  Current Facility-Administered Medications   Medication Dose Frequency Provider Last Rate Last Admin    phosphorus (K-Phos-Neutral) per tablet 500 mg  500 mg TID Mo Amador M.D.   500 mg at 01/20/25 0750    magnesium sulfate IVPB premix 2 g  2 g Once Mo Amador M.D. 25 mL/hr at 01/20/25 0757 2 g at 01/20/25 0757    Pharmacy Consult Request ...Pain Management Review 1 Each  1 Each PHARMACY TO DOSE Cyril Hernandez M.D.        oxyCODONE immediate-release (Roxicodone) tablet 5 mg  5 mg Q3HRS PRN Cyril Hernandez M.D.   5 mg at 01/19/25 1230    Or    oxyCODONE immediate release (Roxicodone) tablet 10 mg  10 mg Q3HRS PRN Cyril Hernandez M.D.        Or    morphine 4 MG/ML injection 4 mg  4 mg Q3HRS PRN DAVID Witt.D.        vane-docusate  (Pericolace Or Senokot S) 8.6-50 MG per tablet 2 Tablet  2 Tablet Q EVENING Cyril Hernandez M.D.        And    polyethylene glycol/lytes (Miralax) Packet 1 Packet  1 Packet QDAY PRN Cyril Hernandez M.D.        hydrALAZINE (Apresoline) injection 10 mg  10 mg Q4HRS PRN Cyril Hernandez M.D.        ondansetron (Zofran) syringe/vial injection 4 mg  4 mg Q4HRS PRN Cyril Hernandez M.D.   4 mg at 01/19/25 1954    ondansetron (Zofran ODT) dispertab 4 mg  4 mg Q4HRS PRN Cyril Hernandez M.D.        nicotine (Nicoderm) 21 MG/24HR 21 mg  21 mg Daily-0600 Cyril Hernandez M.D.   21 mg at 01/20/25 0545    And    nicotine polacrilex (Nicorette) 2 MG piece 2 mg  2 mg Q HOUR PRN Cyril Hernandez M.D.   2 mg at 01/19/25 1821    lactulose 20 GM/30ML solution 30 mL  30 mL Q EVENING Cyril Hernandez M.D.   30 mL at 01/19/25 1708    thiamine (Vitamin B-1) tablet 100 mg  100 mg DAILY Cyril Hernandez M.D.   100 mg at 01/20/25 0545    folic acid (Folvite) tablet 1 mg  1 mg DAILY Cyril Hernandez M.D.   1 mg at 01/20/25 0545    carvedilol (Coreg) tablet 6.25 mg  6.25 mg BID WITH MEALS Cyril Hernandez M.D.   6.25 mg at 01/20/25 0750    pantoprazole (Protonix) injection 40 mg  40 mg BID Cyril Hernandez M.D.   40 mg at 01/20/25 0545    LORazepam (Ativan) tablet 0.5 mg  0.5 mg Q4HRS PRN Cyril Hernandez M.D.   0.5 mg at 01/18/25 8296    LORazepam (Ativan) tablet 1 mg  1 mg Q4HRS PRN Cyril Hernandez M.D.        Or    LORazepam (Ativan) injection 0.5 mg  0.5 mg Q4HRS PRN Cyril Hernandez M.D.        LORazepam (Ativan) tablet 2 mg  2 mg Q2HRS PRN Cyril Hernandez M.D.        Or    LORazepam (Ativan) injection 1 mg  1 mg Q2HRS PRN Cyril Hernandez M.D.        LORazepam (Ativan) tablet 3 mg  3 mg Q HOUR PRN Cyril Hernandez M.D.        Or    LORazepam (Ativan) injection 1.5 mg  1.5 mg Q HOUR PRN Cyril Hernandez M.D.        LORazepam (Ativan) tablet 4 mg  4 mg Q15 MIN PRN Cyril Hernandez M.D.        Or    LORazepam  "(Ativan) injection 2 mg  2 mg Q15 MIN PRN Cyril Hernandez M.D.        acetylcysteine (Mucomyst) 6,000 mg in dextrose 5% 1,000 mL infusion  100 mg/kg Q16H Mo Amador M.D. 63 mL/hr at 01/19/25 2200 6,000 mg at 01/19/25 2200   Last reviewed on 1/18/2025  9:06 AM by Elysia Figueroa T     Review of Systems:  Review of Systems   Constitutional:  Positive for malaise/fatigue. Negative for chills and fever.   HENT:  Negative for hearing loss.    Eyes:  Negative for blurred vision.   Respiratory:  Negative for cough and shortness of breath.    Cardiovascular:  Negative for chest pain and leg swelling.   Gastrointestinal:  Positive for abdominal pain. Negative for blood in stool, constipation, diarrhea, heartburn, melena, nausea and vomiting.   Genitourinary:  Negative for dysuria.   Musculoskeletal:  Negative for back pain.   Skin:  Negative for rash.   Neurological:  Positive for weakness. Negative for dizziness.   Psychiatric/Behavioral:  Positive for memory loss and substance abuse. Negative for depression.    All other systems reviewed and are negative.    Vital signs:  Weight/BMI: Body mass index is 19.64 kg/m².  BP (!) 158/89   Pulse 68   Temp 36.7 °C (98.1 °F) (Temporal)   Resp 18   Ht 1.727 m (5' 8\")   Wt 58.6 kg (129 lb 3 oz)   SpO2 98%   Vitals:    01/19/25 2312 01/20/25 0306 01/20/25 0709 01/20/25 0750   BP: (!) 144/92 (!) 159/97 (!) 143/93 (!) 158/89   Pulse: 76 71 75 68   Resp: 18 18 18    Temp: 36.6 °C (97.9 °F) 37 °C (98.6 °F) 36.7 °C (98.1 °F)    TempSrc: Temporal Temporal Temporal    SpO2: 96% 96% 98%    Weight:  58.6 kg (129 lb 3 oz)     Height:         Oxygen Therapy:  Pulse Oximetry: 98 %, O2 (LPM): 0, O2 Delivery Device: None - Room Air    Intake/Output Summary (Last 24 hours) at 1/20/2025 0826  Last data filed at 1/20/2025 0709  Gross per 24 hour   Intake 1160 ml   Output 2800 ml   Net -1640 ml     Physical Exam  Vitals and nursing note reviewed.   Constitutional:       General: " He is not in acute distress.     Appearance: Normal appearance. He is ill-appearing.   HENT:      Head: Normocephalic and atraumatic.      Right Ear: External ear normal.      Left Ear: External ear normal.      Nose: Nose normal.      Mouth/Throat:      Mouth: Mucous membranes are moist.      Pharynx: Oropharynx is clear.   Eyes:      General: No scleral icterus.  Cardiovascular:      Rate and Rhythm: Normal rate and regular rhythm.      Pulses: Normal pulses.      Heart sounds: Normal heart sounds.   Pulmonary:      Effort: Pulmonary effort is normal.      Breath sounds: Normal breath sounds.   Abdominal:      General: Bowel sounds are normal. There is distension.      Tenderness: There is abdominal tenderness.   Musculoskeletal:         General: Normal range of motion.      Cervical back: Normal range of motion and neck supple.   Skin:     General: Skin is warm.      Capillary Refill: Capillary refill takes less than 2 seconds.      Coloration: Skin is pale.   Neurological:      Mental Status: He is alert. Mental status is at baseline.      Motor: Weakness present.   Psychiatric:         Mood and Affect: Mood normal.         Behavior: Behavior normal.         Labs:  Recent Labs     01/18/25  0604 01/19/25  0042 01/20/25  0129   SODIUM 136 128* 131*   POTASSIUM 3.9 3.4* 3.6   CHLORIDE 98 94* 97   CO2 22 23 20   BUN 9 7* 6*   CREATININE 0.42* 0.50 0.49*   MAGNESIUM  --  1.5 1.8   PHOSPHORUS  --  1.5* 2.1*   CALCIUM 8.4* 8.3* 8.1*     Recent Labs     01/18/25  0604 01/19/25  0042 01/20/25  0129   ALTSGPT 935* 796* 523*   ASTSGOT 2960* 1761* 696*   ALKPHOSPHAT 95 86 85   TBILIRUBIN 1.9* 2.0* 2.0*   LIPASE 63  --   --    GLUCOSE 76 188* 119*     Recent Labs     01/18/25  0604 01/18/25  1356 01/18/25  2024 01/19/25  0042 01/20/25  0129   WBC 1.9*   < > 2.0* 1.6* 1.7*   NEUTSPOLYS 78.40*  --   --  65.30  --    LYMPHOCYTES 13.20*  --   --  19.50*  --    MONOCYTES 6.90  --   --  12.20  --    EOSINOPHILS 0.50  --   --   1.80  --    BASOPHILS 0.50  --   --  0.60  --    ASTSGOT 2960*  --   --  1761* 696*   ALTSGPT 935*  --   --  796* 523*   ALKPHOSPHAT 95  --   --  86 85   TBILIRUBIN 1.9*  --   --  2.0* 2.0*    < > = values in this interval not displayed.     Recent Labs     01/18/25  0604 01/18/25  1356 01/18/25 2024 01/19/25  0042 01/19/25  0557 01/20/25  0129   RBC 3.94*   < > 3.71* 3.73*  --  3.71*   HEMOGLOBIN 11.3*   < > 10.3* 10.4* 10.4* 10.6*   HEMATOCRIT 33.6*   < > 31.2* 31.9*  --  32.3*   PLATELETCT 8*   < > 36* 33*  --  33*   PROTHROMBTM 20.5*  --   --   --   --  20.4*   INR 1.75*  --   --   --   --  1.74*    < > = values in this interval not displayed.     Recent Results (from the past 24 hours)   Prothrombin Time    Collection Time: 01/20/25  1:29 AM   Result Value Ref Range    PT 20.4 (H) 12.0 - 14.6 sec    INR 1.74 (H) 0.87 - 1.13   CBC WITHOUT DIFFERENTIAL    Collection Time: 01/20/25  1:29 AM   Result Value Ref Range    WBC 1.7 (LL) 4.8 - 10.8 K/uL    RBC 3.71 (L) 4.70 - 6.10 M/uL    Hemoglobin 10.6 (L) 14.0 - 18.0 g/dL    Hematocrit 32.3 (L) 42.0 - 52.0 %    MCV 87.1 81.4 - 97.8 fL    MCH 28.6 27.0 - 33.0 pg    MCHC 32.8 32.3 - 36.5 g/dL    RDW 52.2 (H) 35.9 - 50.0 fL    Platelet Count 33 (L) 164 - 446 K/uL    MPV 11.1 9.0 - 12.9 fL   Comp Metabolic Panel    Collection Time: 01/20/25  1:29 AM   Result Value Ref Range    Sodium 131 (L) 135 - 145 mmol/L    Potassium 3.6 3.6 - 5.5 mmol/L    Chloride 97 96 - 112 mmol/L    Co2 20 20 - 33 mmol/L    Anion Gap 14.0 7.0 - 16.0    Glucose 119 (H) 65 - 99 mg/dL    Bun 6 (L) 8 - 22 mg/dL    Creatinine 0.49 (L) 0.50 - 1.40 mg/dL    Calcium 8.1 (L) 8.5 - 10.5 mg/dL    Correct Calcium 8.5 8.5 - 10.5 mg/dL    AST(SGOT) 696 (H) 12 - 45 U/L    ALT(SGPT) 523 (H) 2 - 50 U/L    Alkaline Phosphatase 85 30 - 99 U/L    Total Bilirubin 2.0 (H) 0.1 - 1.5 mg/dL    Albumin 3.5 3.2 - 4.9 g/dL    Total Protein 7.3 6.0 - 8.2 g/dL    Globulin 3.8 (H) 1.9 - 3.5 g/dL    A-G Ratio 0.9 g/dL    MAGNESIUM    Collection Time: 01/20/25  1:29 AM   Result Value Ref Range    Magnesium 1.8 1.5 - 2.5 mg/dL   PHOSPHORUS    Collection Time: 01/20/25  1:29 AM   Result Value Ref Range    Phosphorus 2.1 (L) 2.5 - 4.5 mg/dL   IMMATURE PLT FRACTION    Collection Time: 01/20/25  1:29 AM   Result Value Ref Range    Imm. Plt Fraction 6.5 0.6 - 13.1 %   ESTIMATED GFR    Collection Time: 01/20/25  1:29 AM   Result Value Ref Range    GFR (CKD-EPI) 112 >60 mL/min/1.73 m 2       Radiology Review:  US-ABDOMEN LTD (SOFT TISSUE)   Final Result      Trace right upper quadrant fluid. There is no ascites amenable to paracentesis.      US-RUQ   Final Result         1. Echogenic liver consistent with fibrofatty change.      2. Sludge and small gallstones. Gallbladder wall thickening at 5 mm is noted which could be related to cholecystitis although this may be related to reactive changes. Gallbladder wall thickening was noted on 10/27/2023 ultrasound also. Clinical    correlation recommended.      3. No bile duct dilatation.            MDM (Data Review):   -Records reviewed and summarized in current documentation  -I personally reviewed and interpreted the laboratory results  -I personally reviewed the radiology images    Assessment/Recommendations:  64-year-old male with alcohol-related cirrhosis presents with acute severe hepatitis.  Also report of melena.  Patient cannot provide a useful history.       The hepatitis is beyond what we would expect for alcohol related hepatitis. Patient's blood alcohol level was positive.  He is having abdominal pain.   No signs of bleeding at this point but report of melena.  Patient's platelet count is 11.  Endoscopy contraindicated at this point.    RUQ US consistent positive for sludge and small gallstones with gallbladder wall thickening. ?abdominal pain    Recommendations:  Hepatitis C - prev RNA 5.45, new PCR pending  Not enough ascites to tap  Literature recommends against routine platelet  transfusions in the setting of portal hypertension unless actively bleeding  ? Anupam for gallstones seen on imaging given that patient is a poor surgical candidate, [facility]  Encouraged him to make an appointment with his primary care for continued management and consideration of hepatitis C treatment and rehab opportunities  Encouraged alcohol cessation    Discharging later today if tolerating meals    Discussed with patient, RN, Dr. Julian Amador, Dr. Brennan    ..Virginia Ayala, DNP,  APRN    Core Quality Measures   Reviewed items::  Labs, Medications and Radiology reports reviewed

## 2025-01-20 NOTE — PROGRESS NOTES
Monitor Summary  Rhythm: SR ST Partially Paced  Rate:   Ectopy: occ PVC, freq PAC   .17 / .09 / .40

## 2025-01-20 NOTE — PROGRESS NOTES
Bedside report received from off going RN/tech: Madeleine, assumed care of patient.     Fall Risk Score: HIGH RISK  Fall risk interventions in place: Place yellow fall risk ID band on patient, Provide patient/family education based on risk assessment, Educate patient/family to call staff for assistance when getting out of bed, Place fall precaution signage outside patient door, Place patient in room close to nursing station, Utilize bed/chair fall alarm, and Bed alarm connected correctly  Bed type: Regular (Brandon Score less than 17 interventions in place)  Patient on cardiac monitor: Yes  IVF/IV medications: Infusion per MAR (List Med(s)) Mucomyst  Oxygen: Room Air  Bedside sitter: Not Applicable   Isolation: Not applicable

## 2025-01-20 NOTE — PROGRESS NOTES
Bedside report received from off going RN/tech: Frankee, assumed care of patient.     Fall Risk Score: HIGH RISK  Fall risk interventions in place: Place yellow fall risk ID band on patient, Provide patient/family education based on risk assessment, Educate patient/family to call staff for assistance when getting out of bed, Place fall precaution signage outside patient door, and Utilize bed/chair fall alarm  Bed type: Regular (Brandon Score less than 17 interventions in place)  Patient on cardiac monitor: Yes  IVF/IV medications: Infusion per MAR (List Med(s)) Mucomyst  Oxygen: Room Air  Bedside sitter: Not Applicable   Isolation: Not applicable

## 2025-01-20 NOTE — THERAPY
Physical Therapy   Initial Evaluation     Patient Name: Inocencio Shabazz  Age:  64 y.o., Sex:  male  Medical Record #: [id number]  Today's Date: 1/20/2025          Assessment  Patient is 64 y.o. male w/ hx of ETOH, cirrhosis, Hep C, PPM, Afib, schizophrenia, esophageal varices.  Admitted from outside facility for GI c/s.  He lives alone in a single story house w/ 2 steps to enter.  He was indep w/ mobility PTA w/o AD.  Today, he is able to mobilize as noted below, including ambulating w/ AD, w/o loss of balance and w/o need of assist.  Also able to perform stairs.  No acute PT needs.  Plan    Physical Therapy Initial Treatment Plan   Duration: Evaluation only    DC Equipment Recommendations: None  Discharge Recommendations: Anticipate that the patient will have no further physical therapy needs after discharge from the hospital        Objective       01/20/25 0840   Prior Living Situation   Housing / Facility 1 Story House   Steps Into Home 3   Steps In Home 0   Rail Left Rail  (Steps into Home)   Equipment Owned None   Lives with - Patient's Self Care Capacity Alone and Able to Care For Self   Prior Level of Functional Mobility   Bed Mobility Independent   Transfer Status Independent   Ambulation Independent   Assistive Devices Used None   Stairs Independent   Cognition    Level of Consciousness Alert   Strength Lower Body   Comments grossly wnl   Balance Assessment   Sitting Balance (Static) Fair +   Sitting Balance (Dynamic) Fair +   Standing Balance (Static) Fair   Standing Balance (Dynamic) Fair   Weight Shift Sitting Good   Weight Shift Standing Good   Bed Mobility    Supine to Sit Supervised   Sit to Supine Supervised   Gait Analysis   Gait Level Of Assist Supervised   Assistive Device None   Distance (Feet) 200   # of Stairs Climbed 3   Level of Assist with Stairs Supervised   Functional Mobility   Sit to Stand Supervised   Bed, Chair, Wheelchair Transfer Supervised   Physical Therapy Initial Treatment Plan     Duration Evaluation only   Anticipated Discharge Equipment and Recommendations   DC Equipment Recommendations None   Discharge Recommendations Anticipate that the patient will have no further physical therapy needs after discharge from the hospital

## 2025-01-21 LAB
HCV RNA SERPL NAA+PROBE-ACNC: ABNORMAL IU/ML
HCV RNA SERPL NAA+PROBE-LOG IU: 6.05 LOG IU/ML
HCV RNA SERPL QL NAA+PROBE: DETECTED

## 2025-05-10 ENCOUNTER — APPOINTMENT (OUTPATIENT)
Dept: RADIOLOGY | Facility: MEDICAL CENTER | Age: 65
End: 2025-05-10
Attending: STUDENT IN AN ORGANIZED HEALTH CARE EDUCATION/TRAINING PROGRAM
Payer: COMMERCIAL

## 2025-05-10 ENCOUNTER — HOSPITAL ENCOUNTER (EMERGENCY)
Facility: MEDICAL CENTER | Age: 65
End: 2025-05-10
Attending: STUDENT IN AN ORGANIZED HEALTH CARE EDUCATION/TRAINING PROGRAM
Payer: COMMERCIAL

## 2025-05-10 VITALS
RESPIRATION RATE: 18 BRPM | OXYGEN SATURATION: 94 % | TEMPERATURE: 97.8 F | HEIGHT: 68 IN | SYSTOLIC BLOOD PRESSURE: 154 MMHG | DIASTOLIC BLOOD PRESSURE: 89 MMHG | BODY MASS INDEX: 20.92 KG/M2 | WEIGHT: 138.01 LBS | HEART RATE: 72 BPM

## 2025-05-10 DIAGNOSIS — D61.818 PANCYTOPENIA (HCC): ICD-10-CM

## 2025-05-10 DIAGNOSIS — R07.9 ACUTE CHEST PAIN: ICD-10-CM

## 2025-05-10 DIAGNOSIS — R00.0 SINUS TACHYCARDIA: ICD-10-CM

## 2025-05-10 LAB
ALBUMIN SERPL BCP-MCNC: 3.8 G/DL (ref 3.2–4.9)
ALBUMIN/GLOB SERPL: 0.8 G/DL
ALP SERPL-CCNC: 85 U/L (ref 30–99)
ALT SERPL-CCNC: 37 U/L (ref 2–50)
ANION GAP SERPL CALC-SCNC: 13 MMOL/L (ref 7–16)
AST SERPL-CCNC: 86 U/L (ref 12–45)
BASOPHILS # BLD AUTO: 1.3 % (ref 0–1.8)
BASOPHILS # BLD: 0.02 K/UL (ref 0–0.12)
BILIRUB SERPL-MCNC: 1.8 MG/DL (ref 0.1–1.5)
BUN SERPL-MCNC: 9 MG/DL (ref 8–22)
CALCIUM ALBUM COR SERPL-MCNC: 9.7 MG/DL (ref 8.5–10.5)
CALCIUM SERPL-MCNC: 9.5 MG/DL (ref 8.5–10.5)
CHLORIDE SERPL-SCNC: 103 MMOL/L (ref 96–112)
CO2 SERPL-SCNC: 22 MMOL/L (ref 20–33)
CREAT SERPL-MCNC: 0.78 MG/DL (ref 0.5–1.4)
D DIMER PPP IA.FEU-MCNC: 2.52 UG/ML (FEU) (ref 0–0.5)
EKG IMPRESSION: NORMAL
EOSINOPHIL # BLD AUTO: 0.08 K/UL (ref 0–0.51)
EOSINOPHIL NFR BLD: 5.1 % (ref 0–6.9)
ERYTHROCYTE [DISTWIDTH] IN BLOOD BY AUTOMATED COUNT: 49.9 FL (ref 35.9–50)
GFR SERPLBLD CREATININE-BSD FMLA CKD-EPI: 98 ML/MIN/1.73 M 2
GLOBULIN SER CALC-MCNC: 4.6 G/DL (ref 1.9–3.5)
GLUCOSE SERPL-MCNC: 150 MG/DL (ref 65–99)
HCT VFR BLD AUTO: 33.8 % (ref 42–52)
HGB BLD-MCNC: 10.8 G/DL (ref 14–18)
IMM GRANULOCYTES # BLD AUTO: 0.01 K/UL (ref 0–0.11)
IMM GRANULOCYTES NFR BLD AUTO: 0.6 % (ref 0–0.9)
LYMPHOCYTES # BLD AUTO: 0.33 K/UL (ref 1–4.8)
LYMPHOCYTES NFR BLD: 21 % (ref 22–41)
MCH RBC QN AUTO: 27.9 PG (ref 27–33)
MCHC RBC AUTO-ENTMCNC: 32 G/DL (ref 32.3–36.5)
MCV RBC AUTO: 87.3 FL (ref 81.4–97.8)
MONOCYTES # BLD AUTO: 0.17 K/UL (ref 0–0.85)
MONOCYTES NFR BLD AUTO: 10.8 % (ref 0–13.4)
NEUTROPHILS # BLD AUTO: 0.96 K/UL (ref 1.82–7.42)
NEUTROPHILS NFR BLD: 61.2 % (ref 44–72)
NRBC # BLD AUTO: 0 K/UL
NRBC BLD-RTO: 0 /100 WBC (ref 0–0.2)
NT-PROBNP SERPL IA-MCNC: 533 PG/ML (ref 0–125)
PLATELET # BLD AUTO: 22 K/UL (ref 164–446)
PLATELETS.RETICULATED NFR BLD AUTO: 7.7 % (ref 0.6–13.1)
PMV BLD AUTO: 12.1 FL (ref 9–12.9)
POTASSIUM SERPL-SCNC: 3.6 MMOL/L (ref 3.6–5.5)
PROT SERPL-MCNC: 8.4 G/DL (ref 6–8.2)
RBC # BLD AUTO: 3.87 M/UL (ref 4.7–6.1)
SODIUM SERPL-SCNC: 138 MMOL/L (ref 135–145)
TROPONIN T SERPL-MCNC: 7 NG/L (ref 6–19)
TROPONIN T SERPL-MCNC: 8 NG/L (ref 6–19)
WBC # BLD AUTO: 1.6 K/UL (ref 4.8–10.8)

## 2025-05-10 PROCEDURE — 93005 ELECTROCARDIOGRAM TRACING: CPT | Mod: TC

## 2025-05-10 PROCEDURE — 99284 EMERGENCY DEPT VISIT MOD MDM: CPT

## 2025-05-10 PROCEDURE — 700117 HCHG RX CONTRAST REV CODE 255: Performed by: STUDENT IN AN ORGANIZED HEALTH CARE EDUCATION/TRAINING PROGRAM

## 2025-05-10 PROCEDURE — 83880 ASSAY OF NATRIURETIC PEPTIDE: CPT

## 2025-05-10 PROCEDURE — 85379 FIBRIN DEGRADATION QUANT: CPT

## 2025-05-10 PROCEDURE — 93005 ELECTROCARDIOGRAM TRACING: CPT | Mod: TC | Performed by: STUDENT IN AN ORGANIZED HEALTH CARE EDUCATION/TRAINING PROGRAM

## 2025-05-10 PROCEDURE — 80053 COMPREHEN METABOLIC PANEL: CPT

## 2025-05-10 PROCEDURE — 85055 RETICULATED PLATELET ASSAY: CPT

## 2025-05-10 PROCEDURE — 84484 ASSAY OF TROPONIN QUANT: CPT | Mod: 91

## 2025-05-10 PROCEDURE — 71275 CT ANGIOGRAPHY CHEST: CPT

## 2025-05-10 PROCEDURE — 700102 HCHG RX REV CODE 250 W/ 637 OVERRIDE(OP): Performed by: STUDENT IN AN ORGANIZED HEALTH CARE EDUCATION/TRAINING PROGRAM

## 2025-05-10 PROCEDURE — A9270 NON-COVERED ITEM OR SERVICE: HCPCS | Performed by: STUDENT IN AN ORGANIZED HEALTH CARE EDUCATION/TRAINING PROGRAM

## 2025-05-10 PROCEDURE — 85025 COMPLETE CBC W/AUTO DIFF WBC: CPT

## 2025-05-10 PROCEDURE — 36415 COLL VENOUS BLD VENIPUNCTURE: CPT

## 2025-05-10 PROCEDURE — 71045 X-RAY EXAM CHEST 1 VIEW: CPT

## 2025-05-10 RX ORDER — ACETAMINOPHEN 325 MG/1
650 TABLET ORAL ONCE
Status: COMPLETED | OUTPATIENT
Start: 2025-05-10 | End: 2025-05-10

## 2025-05-10 RX ADMIN — IOHEXOL 65 ML: 350 INJECTION, SOLUTION INTRAVENOUS at 18:15

## 2025-05-10 RX ADMIN — ACETAMINOPHEN 650 MG: 325 TABLET ORAL at 18:19

## 2025-05-10 ASSESSMENT — PAIN DESCRIPTION - PAIN TYPE: TYPE: ACUTE PAIN

## 2025-05-10 ASSESSMENT — FIBROSIS 4 INDEX: FIB4 SCORE: 61.79

## 2025-05-10 NOTE — ED TRIAGE NOTES
"Chief Complaint   Patient presents with    Chest Pain     Pt reports central CP that started last night while he was laying in bed with intermittent dizziness.   Pt denies SOB.        64 yo M to triage for above complaint. Pt reports he is complaint with home medications. Pt also reports he is daily drinker. CP protocol ordered.      Pt placed in lobby. Pt educated on triage process. Pt encouraged to alert staff for any changes.     Patient and staff wearing appropriate PPE    BP (!) 155/102   Pulse (!) 132   Temp 36.6 °C (97.8 °F) (Temporal)   Resp 16   Ht 1.727 m (5' 8\")   Wt 62.6 kg (138 lb 0.1 oz)   SpO2 96%   BMI 20.98 kg/m²       "

## 2025-05-10 NOTE — ED PROVIDER NOTES
"ED Provider Note    CHIEF COMPLAINT  Chief Complaint   Patient presents with    Chest Pain     Pt reports central CP that started last night while he was laying in bed with intermittent dizziness.   Pt denies SOB.        EXTERNAL RECORDS REVIEWED  Inpatient Notes discharge summary on 1/20/2025 for patient with history of alcohol abuse, liver cirrhosis, hep C, sick sinus syndrome, pacemaker, atrial fibrillation not on anticoagulation, Schizophrenia    HPI/ROS  LIMITATION TO HISTORY   Select: : None  OUTSIDE HISTORIAN(S):    Inocencio Shabazz is a 65 y.o. male who presents with substernal chest pressure which is nonradiating.  Patient denies shortness of breath.  Patient denies nausea vomiting diarrhea.  Patient denies fever chills body aches or sweats.  Patient denies diaphoresis but does endorse some intermittent dizziness.    PAST MEDICAL HISTORY   has a past medical history of Alcohol abuse, Bipolar disorder (HCC), Cirrhosis (HCC), GIB (gastrointestinal bleeding) (01/03/2016), Hepatitis C, Pacemaker, Pancytopenia (HCC), and Schizophrenia (HCC).    SURGICAL HISTORY   has a past surgical history that includes gastroscopy (1/3/2016); gastroscopy (4/8/2016); gastroscopy (3/13/2019); gastroscopy-endo (N/A, 11/13/2019); upper gi endoscopy,diagnosis (N/A, 8/10/2023); upper gi endoscopy,ctrl bleed (N/A, 8/10/2023); and upper gi endoscopy,ligat varix (N/A, 9/27/2024).    FAMILY HISTORY  History reviewed. No pertinent family history.    SOCIAL HISTORY  Social History     Tobacco Use    Smoking status: Some Days     Current packs/day: 1.00     Average packs/day: 1 pack/day for 73.4 years (73.4 ttl pk-yrs)     Types: Cigarettes     Start date: 2000    Smokeless tobacco: Never   Vaping Use    Vaping status: Never Used   Substance and Sexual Activity    Alcohol use: Yes     Comment: \"couple of pints whiskey every day\"    Drug use: Yes     Types: Inhaled     Comment: marijuana sometimes    Sexual activity: Not on file " "      CURRENT MEDICATIONS  Home Medications       Reviewed by Rena Estrada R.N. (Registered Nurse) on 05/10/25 at 1417  Med List Status: Not Addressed     Medication Last Dose Status   carvedilol (COREG) 6.25 MG Tab  Active   dilTIAZem CD (CARDIZEM CD) 240 MG CAPSULE SR 24 HR  Active   lactulose 20 GM/30ML Solution  Active   magnesium oxide (MAG-OX) 400 MG Tab tablet  Active   nicotine (NICODERM) 21 MG/24HR PATCH 24 HR  Active   ondansetron (ZOFRAN ODT) 4 MG TABLET DISPERSIBLE  Active   pantoprazole (PROTONIX) 40 MG Tablet Delayed Response  Active   QUEtiapine (SEROQUEL) 50 MG tablet  Active   thiamine (VITAMIN B-1) 100 MG Tab  Active                  Audit from Redirected Encounters    **Home medications have not yet been reviewed for this encounter**         ALLERGIES  Allergies   Allergen Reactions    Haloperidol Unspecified     Twitching/involuntary movements        PHYSICAL EXAM  VITAL SIGNS: BP (!) 164/103   Pulse 87   Temp 36.6 °C (97.8 °F) (Temporal)   Resp 16   Ht 1.727 m (5' 8\")   Wt 62.6 kg (138 lb 0.1 oz)   SpO2 97%   BMI 20.98 kg/m²    Vitals and nursing note reviewed.   Constitutional:       Comments: Patient is lying in bed supine, pleasant, conversant, speaking in complete sentences   HENT:      Head: Normocephalic and atraumatic.   Eyes:      Extraocular Movements: Extraocular movements intact.      Conjunctiva/sclera: Conjunctivae normal.      Pupils: Pupils are equal, round, and reactive to light.   Cardiovascular:      Pulses: Normal pulses.      Comments:   Pulmonary:      Effort: Pulmonary effort is normal. No respiratory distress.   Clear to auscultation bilaterally  Musculoskeletal:         General: No lower extremity swelling. Normal range of motion.      Cervical back: Normal range of motion. No rigidity.   Skin:     General: Skin is warm and dry.      Capillary Refill: Capillary refill takes less than 2 seconds.   Neurological:      Mental Status: Alert.       EKG/LABS  No " acute ischemic changes  I have independently interpreted this EKG    RADIOLOGY/PROCEDURES   I have independently interpreted the diagnostic imaging associated with this visit and am waiting the final reading from the radiologist.   My preliminary interpretation is as follows: No pulmonary infiltrate    Radiologist interpretation:  DX-CHEST-PORTABLE (1 VIEW)   Final Result      No acute cardiopulmonary disease.      CT-CTA CHEST PULMONARY ARTERY W/ RECONS    (Results Pending)       COURSE & MEDICAL DECISION MAKING    ASSESSMENT, COURSE AND PLAN  Care Narrative: Patient with chronic pancytopenia, unchanged from prior.  CMP without evidence of worsening LFTs, bilirubin has improved.  No evidence of acute kidney injury or acute electro abnormality.  Troponin negative, EKG nonischemic, ACS less likely at this time, repeat troponin pending to rule out ACS.  D-dimer to rule out PE given tachycardia.  X-ray without evidence of acute cardiopulmonary process.  Patient reports he is chest pain-free at this time without intervention.    Electronically signed by: Dank Diaz M.D., 5/10/2025 4:31 PM    D-dimer elevated, repeat troponin negative.  CTPA to rule out acute pulmonary embolism.  ACS inconsistent with patient presentation at this time.  Care of patient ended up to Dr. Day.    This dictation has been created using voice recognition software. I am continuously working with the software to minimize the number of voice recognition errors and I have made every attempt to manually correct the errors within my dictation. However errors  related to this voice recognition software may still exist and should be interpreted within the appropriate context.     Electronically signed by: Dank Diaz M.D., 5/10/2025 5:52 PM      HEART SCORE:  History - 1  EKG - 0  Age - 2  Risk Factors - 0  Initial Troponin - 0  Total - 3        FINAL DIAGNOSIS  1. Acute chest pain    2. Sinus tachycardia    3. Pancytopenia  ([facility])         Electronically signed by: Dank Diaz M.D., 5/10/2025 4:29 PM

## 2025-05-11 NOTE — ED PROVIDER NOTES
Progress note    Patient signed out to me by Dr. Diaz who performed initial thorough evaluation of the patient who presented to the ER with substernal nonradiating chest pressure.  Notably patient with a history of alcohol abuse, liver cirrhosis, hepatitis C, sick sinus syndrome and a pacemaker, atrial fibrillation not anticoagulated.  Patient underwent a cardiac workup including serial troponins that were negative, chest x-ray that was unremarkable.  CBC demonstrated chronic baseline pancytopenia likely secondary to chronic alcohol use and unchanged.  D-dimer was positive therefore patient sent for a CT PA study and signed out to me pending the results.  CT scan ultimately did not demonstrate any evidence of PE nor suggestive of pneumonia, pneumothorax, pericardial effusion or additional significant abnormality to attribute to the patient's symptoms.  On my reassessment he denies any chest pain.  He has a heart score calculated to be low risk at 3 and plan from prior provider is to have him follow-up outpatient with cardiology.  Patient is comfortable with this plan.  I otherwise encouraged him to follow-up with his primary care doctor for reassessment as soon as possible.  Return precautions discussed and all questions answered he was discharged in stable condition.    Landon Day MD

## 2025-05-11 NOTE — ED NOTES
Lab called with critical result of WBC 1.6 at 1534. Critical lab result read back to Lab.   Dr. Diaz notified of critical lab result at 1535.  Critical lab result read back by Dr. Diaz.

## 2025-05-11 NOTE — DISCHARGE INSTRUCTIONS
[facility] and with cardiology.  You can call the attached number to schedule a follow-up appointment with the cardiologists within our system if you do not have one.

## 2025-05-11 NOTE — ED NOTES
Discharge instructions provided and reviewed with patient. Patient to follow with Cardiology/PCP as needed. Discussed to return to ER if symptoms worsen. Patient verbalized understanding. Pt ambulated to [facility].

## 2025-05-14 ENCOUNTER — TELEPHONE (OUTPATIENT)
Dept: HEALTH INFORMATION MANAGEMENT | Facility: OTHER | Age: 65
End: 2025-05-14
Payer: COMMERCIAL

## 2025-07-09 ENCOUNTER — HOSPITAL ENCOUNTER (EMERGENCY)
Facility: MEDICAL CENTER | Age: 65
End: 2025-07-10
Attending: EMERGENCY MEDICINE
Payer: COMMERCIAL

## 2025-07-09 ENCOUNTER — APPOINTMENT (OUTPATIENT)
Dept: RADIOLOGY | Facility: MEDICAL CENTER | Age: 65
End: 2025-07-09
Attending: STUDENT IN AN ORGANIZED HEALTH CARE EDUCATION/TRAINING PROGRAM
Payer: COMMERCIAL

## 2025-07-09 ENCOUNTER — HOSPITAL ENCOUNTER (EMERGENCY)
Facility: MEDICAL CENTER | Age: 65
End: 2025-07-09
Attending: STUDENT IN AN ORGANIZED HEALTH CARE EDUCATION/TRAINING PROGRAM
Payer: COMMERCIAL

## 2025-07-09 VITALS
HEART RATE: 80 BPM | DIASTOLIC BLOOD PRESSURE: 64 MMHG | BODY MASS INDEX: 21.67 KG/M2 | OXYGEN SATURATION: 93 % | HEIGHT: 68 IN | SYSTOLIC BLOOD PRESSURE: 105 MMHG | WEIGHT: 143 LBS | RESPIRATION RATE: 18 BRPM | TEMPERATURE: 98 F

## 2025-07-09 DIAGNOSIS — D61.818 PANCYTOPENIA (HCC): ICD-10-CM

## 2025-07-09 DIAGNOSIS — M79.604 PAIN IN BOTH LOWER EXTREMITIES: Primary | ICD-10-CM

## 2025-07-09 DIAGNOSIS — R68.89 FEELING UNWELL: ICD-10-CM

## 2025-07-09 DIAGNOSIS — E83.42 HYPOMAGNESEMIA: Primary | ICD-10-CM

## 2025-07-09 DIAGNOSIS — E87.6 HYPOKALEMIA: ICD-10-CM

## 2025-07-09 DIAGNOSIS — M79.605 PAIN IN BOTH LOWER EXTREMITIES: Primary | ICD-10-CM

## 2025-07-09 DIAGNOSIS — R53.81 MALAISE: ICD-10-CM

## 2025-07-09 LAB
ALBUMIN SERPL BCP-MCNC: 3.6 G/DL (ref 3.2–4.9)
ALBUMIN/GLOB SERPL: 0.9 G/DL
ALP SERPL-CCNC: 92 U/L (ref 30–99)
ALT SERPL-CCNC: 40 U/L (ref 2–50)
ANION GAP SERPL CALC-SCNC: 17 MMOL/L (ref 7–16)
AST SERPL-CCNC: 90 U/L (ref 12–45)
BASOPHILS # BLD AUTO: 0.9 % (ref 0–1.8)
BASOPHILS # BLD: 0.02 K/UL (ref 0–0.12)
BILIRUB SERPL-MCNC: 0.6 MG/DL (ref 0.1–1.5)
BUN SERPL-MCNC: 9 MG/DL (ref 8–22)
CALCIUM ALBUM COR SERPL-MCNC: 8.7 MG/DL (ref 8.5–10.5)
CALCIUM SERPL-MCNC: 8.4 MG/DL (ref 8.5–10.5)
CHLORIDE SERPL-SCNC: 109 MMOL/L (ref 96–112)
CO2 SERPL-SCNC: 17 MMOL/L (ref 20–33)
CREAT SERPL-MCNC: 0.68 MG/DL (ref 0.5–1.4)
EKG IMPRESSION: NORMAL
EOSINOPHIL # BLD AUTO: 0.06 K/UL (ref 0–0.51)
EOSINOPHIL NFR BLD: 3.5 % (ref 0–6.9)
ERYTHROCYTE [DISTWIDTH] IN BLOOD BY AUTOMATED COUNT: 59.7 FL (ref 35.9–50)
GFR SERPLBLD CREATININE-BSD FMLA CKD-EPI: 102 ML/MIN/1.73 M 2
GLOBULIN SER CALC-MCNC: 4.1 G/DL (ref 1.9–3.5)
GLUCOSE SERPL-MCNC: 97 MG/DL (ref 65–99)
HCT VFR BLD AUTO: 31.2 % (ref 42–52)
HGB BLD-MCNC: 9.2 G/DL (ref 14–18)
HYPOCHROMIA BLD QL SMEAR: ABNORMAL
LYMPHOCYTES # BLD AUTO: 0.3 K/UL (ref 1–4.8)
LYMPHOCYTES NFR BLD: 16.7 % (ref 22–41)
MANUAL DIFF BLD: NORMAL
MCH RBC QN AUTO: 25.4 PG (ref 27–33)
MCHC RBC AUTO-ENTMCNC: 29.5 G/DL (ref 32.3–36.5)
MCV RBC AUTO: 86.2 FL (ref 81.4–97.8)
MONOCYTES # BLD AUTO: 0.1 K/UL (ref 0–0.85)
MONOCYTES NFR BLD AUTO: 5.2 % (ref 0–13.4)
MORPHOLOGY BLD-IMP: NORMAL
NEUTROPHILS # BLD AUTO: 1.33 K/UL (ref 1.82–7.42)
NEUTROPHILS NFR BLD: 73.7 % (ref 44–72)
NRBC # BLD AUTO: 0 K/UL
NRBC BLD-RTO: 0 /100 WBC (ref 0–0.2)
NT-PROBNP SERPL IA-MCNC: 76 PG/ML (ref 0–125)
OVALOCYTES BLD QL SMEAR: NORMAL
PLATELET # BLD AUTO: 29 K/UL (ref 164–446)
PLATELET BLD QL SMEAR: NORMAL
PLATELETS.RETICULATED NFR BLD AUTO: 10.3 % (ref 0.6–13.1)
POIKILOCYTOSIS BLD QL SMEAR: NORMAL
POTASSIUM SERPL-SCNC: 3.4 MMOL/L (ref 3.6–5.5)
PROT SERPL-MCNC: 7.7 G/DL (ref 6–8.2)
RBC # BLD AUTO: 3.62 M/UL (ref 4.7–6.1)
RBC BLD AUTO: PRESENT
SODIUM SERPL-SCNC: 143 MMOL/L (ref 135–145)
TROPONIN T SERPL-MCNC: 8 NG/L (ref 6–19)
TROPONIN T SERPL-MCNC: 9 NG/L (ref 6–19)
WBC # BLD AUTO: 1.8 K/UL (ref 4.8–10.8)

## 2025-07-09 PROCEDURE — 85007 BL SMEAR W/DIFF WBC COUNT: CPT

## 2025-07-09 PROCEDURE — 99284 EMERGENCY DEPT VISIT MOD MDM: CPT

## 2025-07-09 PROCEDURE — A9270 NON-COVERED ITEM OR SERVICE: HCPCS | Performed by: STUDENT IN AN ORGANIZED HEALTH CARE EDUCATION/TRAINING PROGRAM

## 2025-07-09 PROCEDURE — 700102 HCHG RX REV CODE 250 W/ 637 OVERRIDE(OP): Performed by: STUDENT IN AN ORGANIZED HEALTH CARE EDUCATION/TRAINING PROGRAM

## 2025-07-09 PROCEDURE — 85027 COMPLETE CBC AUTOMATED: CPT

## 2025-07-09 PROCEDURE — 83880 ASSAY OF NATRIURETIC PEPTIDE: CPT

## 2025-07-09 PROCEDURE — 36415 COLL VENOUS BLD VENIPUNCTURE: CPT

## 2025-07-09 PROCEDURE — 85055 RETICULATED PLATELET ASSAY: CPT

## 2025-07-09 PROCEDURE — 99285 EMERGENCY DEPT VISIT HI MDM: CPT

## 2025-07-09 PROCEDURE — 84484 ASSAY OF TROPONIN QUANT: CPT | Mod: 91

## 2025-07-09 PROCEDURE — 71045 X-RAY EXAM CHEST 1 VIEW: CPT

## 2025-07-09 PROCEDURE — 80053 COMPREHEN METABOLIC PANEL: CPT

## 2025-07-09 PROCEDURE — 93005 ELECTROCARDIOGRAM TRACING: CPT | Mod: TC | Performed by: STUDENT IN AN ORGANIZED HEALTH CARE EDUCATION/TRAINING PROGRAM

## 2025-07-09 PROCEDURE — 93005 ELECTROCARDIOGRAM TRACING: CPT | Mod: TC

## 2025-07-09 RX ORDER — ONDANSETRON 4 MG/1
4 TABLET, ORALLY DISINTEGRATING ORAL ONCE
Status: DISCONTINUED | OUTPATIENT
Start: 2025-07-09 | End: 2025-07-09 | Stop reason: HOSPADM

## 2025-07-09 RX ORDER — ACETAMINOPHEN 500 MG
1000 TABLET ORAL ONCE
Status: COMPLETED | OUTPATIENT
Start: 2025-07-09 | End: 2025-07-09

## 2025-07-09 RX ORDER — POTASSIUM CHLORIDE 1500 MG/1
40 TABLET, EXTENDED RELEASE ORAL ONCE
Status: COMPLETED | OUTPATIENT
Start: 2025-07-09 | End: 2025-07-09

## 2025-07-09 RX ADMIN — POTASSIUM CHLORIDE 40 MEQ: 1500 TABLET, EXTENDED RELEASE ORAL at 20:30

## 2025-07-09 RX ADMIN — ACETAMINOPHEN 1000 MG: 500 TABLET ORAL at 18:42

## 2025-07-09 ASSESSMENT — PAIN DESCRIPTION - PAIN TYPE
TYPE: ACUTE PAIN
TYPE: ACUTE PAIN

## 2025-07-09 ASSESSMENT — FIBROSIS 4 INDEX
FIB4 SCORE: 32.88
FIB4 SCORE: 43.06

## 2025-07-09 NOTE — ED TRIAGE NOTES
"Chief Complaint   Patient presents with    Chest Pain     Pt reports central chest heaviness for unknown of time. Denies SOB, dizziness, N/V or abd pain.  No cardiac hx.        66 yo M to triage for above complaint. Pt reports he was \"at the other hospital and they did nothing for me\". Pt reports daily ETOH, states he \"thinks\" he drank today    CP protocol ordered.      Pt placed in lobby. Pt educated on triage process. Pt encouraged to alert staff for any changes.     Patient and staff wearing appropriate PPE    /79   Pulse (!) 115   Temp 36.9 °C (98.5 °F) (Temporal)   Resp 14   Ht 1.727 m (5' 8\")   Wt 64.9 kg (143 lb)   SpO2 95%   BMI 21.74 kg/m²     "

## 2025-07-10 ENCOUNTER — APPOINTMENT (OUTPATIENT)
Dept: RADIOLOGY | Facility: MEDICAL CENTER | Age: 65
End: 2025-07-10
Attending: EMERGENCY MEDICINE
Payer: COMMERCIAL

## 2025-07-10 ENCOUNTER — HOSPITAL ENCOUNTER (EMERGENCY)
Facility: MEDICAL CENTER | Age: 65
End: 2025-07-10
Attending: EMERGENCY MEDICINE
Payer: COMMERCIAL

## 2025-07-10 VITALS
HEART RATE: 96 BPM | OXYGEN SATURATION: 96 % | HEIGHT: 68 IN | SYSTOLIC BLOOD PRESSURE: 167 MMHG | RESPIRATION RATE: 19 BRPM | BODY MASS INDEX: 20.98 KG/M2 | WEIGHT: 138.45 LBS | TEMPERATURE: 97.6 F | DIASTOLIC BLOOD PRESSURE: 96 MMHG

## 2025-07-10 VITALS
HEART RATE: 101 BPM | SYSTOLIC BLOOD PRESSURE: 159 MMHG | DIASTOLIC BLOOD PRESSURE: 92 MMHG | TEMPERATURE: 98.1 F | BODY MASS INDEX: 20.65 KG/M2 | RESPIRATION RATE: 19 BRPM | HEIGHT: 68 IN | OXYGEN SATURATION: 92 % | WEIGHT: 136.24 LBS

## 2025-07-10 DIAGNOSIS — R53.83 FATIGUE, UNSPECIFIED TYPE: Primary | ICD-10-CM

## 2025-07-10 DIAGNOSIS — D61.818 PANCYTOPENIA (HCC): ICD-10-CM

## 2025-07-10 LAB
ALBUMIN SERPL BCP-MCNC: 3.6 G/DL (ref 3.2–4.9)
ALBUMIN SERPL BCP-MCNC: 3.9 G/DL (ref 3.2–4.9)
ALBUMIN/GLOB SERPL: 0.9 G/DL
ALBUMIN/GLOB SERPL: 0.9 G/DL
ALP SERPL-CCNC: 92 U/L (ref 30–99)
ALP SERPL-CCNC: 98 U/L (ref 30–99)
ALT SERPL-CCNC: 41 U/L (ref 2–50)
ALT SERPL-CCNC: 43 U/L (ref 2–50)
ANION GAP SERPL CALC-SCNC: 13 MMOL/L (ref 7–16)
ANION GAP SERPL CALC-SCNC: 13 MMOL/L (ref 7–16)
ANISOCYTOSIS BLD QL SMEAR: ABNORMAL
AST SERPL-CCNC: 80 U/L (ref 12–45)
AST SERPL-CCNC: 84 U/L (ref 12–45)
BASOPHILS # BLD AUTO: 0 % (ref 0–1.8)
BASOPHILS # BLD AUTO: 0.9 % (ref 0–1.8)
BASOPHILS # BLD: 0 K/UL (ref 0–0.12)
BASOPHILS # BLD: 0.01 K/UL (ref 0–0.12)
BILIRUB SERPL-MCNC: 1.1 MG/DL (ref 0.1–1.5)
BILIRUB SERPL-MCNC: 1.7 MG/DL (ref 0.1–1.5)
BUN SERPL-MCNC: 6 MG/DL (ref 8–22)
BUN SERPL-MCNC: 7 MG/DL (ref 8–22)
CALCIUM ALBUM COR SERPL-MCNC: 8.8 MG/DL (ref 8.5–10.5)
CALCIUM ALBUM COR SERPL-MCNC: 9.2 MG/DL (ref 8.5–10.5)
CALCIUM SERPL-MCNC: 8.7 MG/DL (ref 8.5–10.5)
CALCIUM SERPL-MCNC: 8.9 MG/DL (ref 8.5–10.5)
CHLORIDE SERPL-SCNC: 100 MMOL/L (ref 96–112)
CHLORIDE SERPL-SCNC: 104 MMOL/L (ref 96–112)
CO2 SERPL-SCNC: 21 MMOL/L (ref 20–33)
CO2 SERPL-SCNC: 23 MMOL/L (ref 20–33)
CREAT SERPL-MCNC: 0.58 MG/DL (ref 0.5–1.4)
CREAT SERPL-MCNC: 0.6 MG/DL (ref 0.5–1.4)
EKG IMPRESSION: NORMAL
EOSINOPHIL # BLD AUTO: 0.01 K/UL (ref 0–0.51)
EOSINOPHIL # BLD AUTO: 0.04 K/UL (ref 0–0.51)
EOSINOPHIL NFR BLD: 0.8 % (ref 0–6.9)
EOSINOPHIL NFR BLD: 3.5 % (ref 0–6.9)
ERYTHROCYTE [DISTWIDTH] IN BLOOD BY AUTOMATED COUNT: 55.6 FL (ref 35.9–50)
ERYTHROCYTE [DISTWIDTH] IN BLOOD BY AUTOMATED COUNT: 55.8 FL (ref 35.9–50)
FLUAV RNA SPEC QL NAA+PROBE: NEGATIVE
FLUBV RNA SPEC QL NAA+PROBE: NEGATIVE
GFR SERPLBLD CREATININE-BSD FMLA CKD-EPI: 106 ML/MIN/1.73 M 2
GFR SERPLBLD CREATININE-BSD FMLA CKD-EPI: 107 ML/MIN/1.73 M 2
GLOBULIN SER CALC-MCNC: 4.2 G/DL (ref 1.9–3.5)
GLOBULIN SER CALC-MCNC: 4.4 G/DL (ref 1.9–3.5)
GLUCOSE SERPL-MCNC: 112 MG/DL (ref 65–99)
GLUCOSE SERPL-MCNC: 91 MG/DL (ref 65–99)
HCT VFR BLD AUTO: 30.3 % (ref 42–52)
HCT VFR BLD AUTO: 31.1 % (ref 42–52)
HGB BLD-MCNC: 9.5 G/DL (ref 14–18)
HGB BLD-MCNC: 9.5 G/DL (ref 14–18)
HYPOCHROMIA BLD QL SMEAR: ABNORMAL
LIPASE SERPL-CCNC: 56 U/L (ref 11–82)
LYMPHOCYTES # BLD AUTO: 0.19 K/UL (ref 1–4.8)
LYMPHOCYTES # BLD AUTO: 0.25 K/UL (ref 1–4.8)
LYMPHOCYTES NFR BLD: 16.4 % (ref 22–41)
LYMPHOCYTES NFR BLD: 17.2 % (ref 22–41)
MAGNESIUM SERPL-MCNC: 1.4 MG/DL (ref 1.5–2.5)
MANUAL DIFF BLD: NORMAL
MANUAL DIFF BLD: NORMAL
MCH RBC QN AUTO: 25.7 PG (ref 27–33)
MCH RBC QN AUTO: 26.1 PG (ref 27–33)
MCHC RBC AUTO-ENTMCNC: 30.5 G/DL (ref 32.3–36.5)
MCHC RBC AUTO-ENTMCNC: 31.4 G/DL (ref 32.3–36.5)
MCV RBC AUTO: 83.2 FL (ref 81.4–97.8)
MCV RBC AUTO: 84.3 FL (ref 81.4–97.8)
MONOCYTES # BLD AUTO: 0.1 K/UL (ref 0–0.85)
MONOCYTES # BLD AUTO: 0.1 K/UL (ref 0–0.85)
MONOCYTES NFR BLD AUTO: 8.6 % (ref 0–13.4)
MONOCYTES NFR BLD AUTO: 9.5 % (ref 0–13.4)
MORPHOLOGY BLD-IMP: NORMAL
MORPHOLOGY BLD-IMP: NORMAL
NEUTROPHILS # BLD AUTO: 0.77 K/UL (ref 1.82–7.42)
NEUTROPHILS # BLD AUTO: 1.1 K/UL (ref 1.82–7.42)
NEUTROPHILS NFR BLD: 69.8 % (ref 44–72)
NEUTROPHILS NFR BLD: 73.3 % (ref 44–72)
NRBC # BLD AUTO: 0 K/UL
NRBC # BLD AUTO: 0 K/UL
NRBC BLD-RTO: 0 /100 WBC (ref 0–0.2)
NRBC BLD-RTO: 0 /100 WBC (ref 0–0.2)
OVALOCYTES BLD QL SMEAR: NORMAL
OVALOCYTES BLD QL SMEAR: NORMAL
PHOSPHATE SERPL-MCNC: 3.3 MG/DL (ref 2.5–4.5)
PLATELET # BLD AUTO: 20 K/UL (ref 164–446)
PLATELET # BLD AUTO: 22 K/UL (ref 164–446)
PLATELET BLD QL SMEAR: NORMAL
PLATELET BLD QL SMEAR: NORMAL
PLATELETS.RETICULATED NFR BLD AUTO: 12.3 % (ref 0.6–13.1)
PLATELETS.RETICULATED NFR BLD AUTO: 8.7 % (ref 0.6–13.1)
POIKILOCYTOSIS BLD QL SMEAR: NORMAL
POTASSIUM SERPL-SCNC: 3.6 MMOL/L (ref 3.6–5.5)
POTASSIUM SERPL-SCNC: 3.7 MMOL/L (ref 3.6–5.5)
PROT SERPL-MCNC: 7.8 G/DL (ref 6–8.2)
PROT SERPL-MCNC: 8.3 G/DL (ref 6–8.2)
RBC # BLD AUTO: 3.64 M/UL (ref 4.7–6.1)
RBC # BLD AUTO: 3.69 M/UL (ref 4.7–6.1)
RBC BLD AUTO: PRESENT
RBC BLD AUTO: PRESENT
RSV RNA SPEC QL NAA+PROBE: NEGATIVE
SARS-COV-2 RNA RESP QL NAA+PROBE: NEGATIVE
SODIUM SERPL-SCNC: 136 MMOL/L (ref 135–145)
SODIUM SERPL-SCNC: 138 MMOL/L (ref 135–145)
TOXIC GRANULES BLD QL SMEAR: NORMAL
TROPONIN T SERPL-MCNC: 12 NG/L (ref 6–19)
WBC # BLD AUTO: 1.1 K/UL (ref 4.8–10.8)
WBC # BLD AUTO: 1.5 K/UL (ref 4.8–10.8)
WBC TOXIC VACUOLES BLD QL SMEAR: NORMAL

## 2025-07-10 PROCEDURE — 0241U POC COV-2, FLU A/B, RSV BY PCR: CPT | Performed by: EMERGENCY MEDICINE

## 2025-07-10 PROCEDURE — 85027 COMPLETE CBC AUTOMATED: CPT | Mod: 91

## 2025-07-10 PROCEDURE — 85027 COMPLETE CBC AUTOMATED: CPT

## 2025-07-10 PROCEDURE — 93005 ELECTROCARDIOGRAM TRACING: CPT | Mod: TC

## 2025-07-10 PROCEDURE — 87637 SARSCOV2&INF A&B&RSV AMP PRB: CPT | Performed by: EMERGENCY MEDICINE

## 2025-07-10 PROCEDURE — A9270 NON-COVERED ITEM OR SERVICE: HCPCS | Performed by: EMERGENCY MEDICINE

## 2025-07-10 PROCEDURE — 85055 RETICULATED PLATELET ASSAY: CPT

## 2025-07-10 PROCEDURE — 84484 ASSAY OF TROPONIN QUANT: CPT

## 2025-07-10 PROCEDURE — 83735 ASSAY OF MAGNESIUM: CPT

## 2025-07-10 PROCEDURE — 36415 COLL VENOUS BLD VENIPUNCTURE: CPT

## 2025-07-10 PROCEDURE — 700105 HCHG RX REV CODE 258: Performed by: EMERGENCY MEDICINE

## 2025-07-10 PROCEDURE — 96365 THER/PROPH/DIAG IV INF INIT: CPT

## 2025-07-10 PROCEDURE — 71045 X-RAY EXAM CHEST 1 VIEW: CPT

## 2025-07-10 PROCEDURE — 85007 BL SMEAR W/DIFF WBC COUNT: CPT

## 2025-07-10 PROCEDURE — 99284 EMERGENCY DEPT VISIT MOD MDM: CPT

## 2025-07-10 PROCEDURE — 80053 COMPREHEN METABOLIC PANEL: CPT | Mod: 91

## 2025-07-10 PROCEDURE — 700102 HCHG RX REV CODE 250 W/ 637 OVERRIDE(OP): Performed by: EMERGENCY MEDICINE

## 2025-07-10 PROCEDURE — 85055 RETICULATED PLATELET ASSAY: CPT | Mod: 91

## 2025-07-10 PROCEDURE — 85007 BL SMEAR W/DIFF WBC COUNT: CPT | Mod: 91

## 2025-07-10 PROCEDURE — 84100 ASSAY OF PHOSPHORUS: CPT

## 2025-07-10 PROCEDURE — 83690 ASSAY OF LIPASE: CPT

## 2025-07-10 PROCEDURE — 93005 ELECTROCARDIOGRAM TRACING: CPT | Mod: TC | Performed by: EMERGENCY MEDICINE

## 2025-07-10 PROCEDURE — 700111 HCHG RX REV CODE 636 W/ 250 OVERRIDE (IP): Performed by: EMERGENCY MEDICINE

## 2025-07-10 PROCEDURE — 80053 COMPREHEN METABOLIC PANEL: CPT

## 2025-07-10 RX ORDER — SODIUM CHLORIDE, SODIUM LACTATE, POTASSIUM CHLORIDE, AND CALCIUM CHLORIDE .6; .31; .03; .02 G/100ML; G/100ML; G/100ML; G/100ML
1000 INJECTION, SOLUTION INTRAVENOUS ONCE
Status: COMPLETED | OUTPATIENT
Start: 2025-07-10 | End: 2025-07-10

## 2025-07-10 RX ORDER — MAGNESIUM SULFATE HEPTAHYDRATE 40 MG/ML
2 INJECTION, SOLUTION INTRAVENOUS ONCE
Status: COMPLETED | OUTPATIENT
Start: 2025-07-10 | End: 2025-07-10

## 2025-07-10 RX ORDER — ONDANSETRON 4 MG/1
8 TABLET, ORALLY DISINTEGRATING ORAL ONCE
Status: COMPLETED | OUTPATIENT
Start: 2025-07-10 | End: 2025-07-10

## 2025-07-10 RX ORDER — SODIUM CHLORIDE 9 MG/ML
1000 INJECTION, SOLUTION INTRAVENOUS ONCE
Status: COMPLETED | OUTPATIENT
Start: 2025-07-10 | End: 2025-07-10

## 2025-07-10 RX ADMIN — MAGNESIUM SULFATE HEPTAHYDRATE 2 G: 2 INJECTION, SOLUTION INTRAVENOUS at 01:47

## 2025-07-10 RX ADMIN — ONDANSETRON 8 MG: 4 TABLET, ORALLY DISINTEGRATING ORAL at 03:08

## 2025-07-10 RX ADMIN — SODIUM CHLORIDE, POTASSIUM CHLORIDE, SODIUM LACTATE AND CALCIUM CHLORIDE 1000 ML: 600; 310; 30; 20 INJECTION, SOLUTION INTRAVENOUS at 05:23

## 2025-07-10 RX ADMIN — LIDOCAINE HYDROCHLORIDE 30 ML: 20 SOLUTION ORAL; TOPICAL at 05:01

## 2025-07-10 RX ADMIN — SODIUM CHLORIDE 1000 ML: 9 INJECTION, SOLUTION INTRAVENOUS at 00:56

## 2025-07-10 ASSESSMENT — PAIN DESCRIPTION - PAIN TYPE: TYPE: ACUTE PAIN

## 2025-07-10 ASSESSMENT — FIBROSIS 4 INDEX: FIB4 SCORE: 41.85

## 2025-07-10 NOTE — DISCHARGE INSTRUCTIONS
You are seen for evaluation of leg pain and feeling unwell.  Your lab work shows no evidence of any heart attack.  Your potassium was slightly low therefore you given repletion.  You do have low white blood cells, red blood cells and platelets but this has been seen previously.  Please follow-up with your primary care doctor.  Return to emergency room as needed.

## 2025-07-10 NOTE — ED NOTES
Bedside report received from off going RN/tech: Asif, assumed care of patient.  POC discussed with patient. Call light within reach, all needs addressed at this time.       Fall risk interventions in place: Move the patient closer to the nurse's station, Patient's personal possessions are with in their safe reach, Place socks on patient, Place fall risk sign on patient's door, Give patient urinal if applicable, and Keep floor surfaces clean and dry (all applicable per [place] Fall risk assessment)   Continuous monitoring: Cardiac Leads, Pulse Ox, or Blood Pressure  IVF/IV medications: Infusion per MAR (List Med(s)) LR  Oxygen: Room Air  Bedside sitter: Not Applicable   Isolation: Not Applicable

## 2025-07-10 NOTE — ED PROVIDER NOTES
"ED Provider Note    CHIEF COMPLAINT  Chief Complaint   Patient presents with    Chest Pain     Pt reports central chest heaviness for unknown of time. Denies SOB, dizziness, N/V or abd pain.  No cardiac hx.        EXTERNAL RECORDS REVIEWED  Inpatient Notes Inpatient Notes patient was admitted to the hospital in January 2025 for liver failure and possible need for GI consult.  His AST and ALT were 3431 and 996 respectively.  His platelet count at that time was 11,000 and white blood cell 1.5 which was chronic.  Patient was seen in the emergency room for evaluation of chest pain and he had negative serial troponins and CT PE study was done and showed no evidence of PE.    HPI/ROS  LIMITATION TO HISTORY   Select: : None  OUTSIDE HISTORIAN(S):  None    Inocencio Shabazz is a 65 y.o. male with history of alcohol use disorder, hepatitis C, liver cirrhosis, schizophrenia and bipolar disorder, sick sinus syndrome status post pacemaker placement, atrial fibrillation not on anticoagulation who presents for evaluation of \"not feeling well.\"  He cannot further describe why he is not feeling well.  He cannot say how long he has been feeling that way.  [facility] so he decided come here.  He says that his legs have been hurting him and points to the bilateral shins and says that it has been hurting for months.  He has not take anything for the pain.  He states that he does drink alcohol but has not drank any today.  Patient did not say that he was having chest pain but when specifically asked, he points to the substernal area and states that has also been ongoing for months.  He denies any shortness of breath, abdominal pain, nausea, vomiting.  He has no leg swelling. He denies trauma.     PAST MEDICAL HISTORY   has a past medical history of Alcohol abuse, Bipolar disorder (HCC), Cirrhosis (HCC), GIB (gastrointestinal bleeding) (01/03/2016), Hepatitis C, Pacemaker, " "Pancytopenia (HCC), and Schizophrenia (HCC).    SURGICAL HISTORY   has a past surgical history that includes gastroscopy (1/3/2016); gastroscopy (4/8/2016); gastroscopy (3/13/2019); gastroscopy-endo (N/A, 11/13/2019); upper gi endoscopy,diagnosis (N/A, 8/10/2023); upper gi endoscopy,ctrl bleed (N/A, 8/10/2023); and upper gi endoscopy,ligat varix (N/A, 9/27/2024).    FAMILY HISTORY  No family history on file.    SOCIAL HISTORY  Social History     Tobacco Use    Smoking status: Some Days     Current packs/day: 1.00     Average packs/day: 1 pack/day for 73.5 years (73.5 ttl pk-yrs)     Types: Cigarettes     Start date: 2000    Smokeless tobacco: Never   Vaping Use    Vaping status: Never Used   Substance and Sexual Activity    Alcohol use: Yes     Comment: \"couple of pints whiskey every day\"    Drug use: Yes     Types: Inhaled     Comment: marijuana sometimes    Sexual activity: Not on file       CURRENT MEDICATIONS  Home Medications       Reviewed by Rena Estrada R.N. (Registered Nurse) on 07/09/25 at 1454  Med List Status: Not Addressed     Medication Last Dose Status   carvedilol (COREG) 6.25 MG Tab  Active   dilTIAZem CD (CARDIZEM CD) 240 MG CAPSULE SR 24 HR  Active   lactulose 20 GM/30ML Solution  Active   magnesium oxide (MAG-OX) 400 MG Tab tablet  Active   nicotine (NICODERM) 21 MG/24HR PATCH 24 HR  Active   ondansetron (ZOFRAN ODT) 4 MG TABLET DISPERSIBLE  Active   pantoprazole (PROTONIX) 40 MG Tablet Delayed Response  Active   QUEtiapine (SEROQUEL) 50 MG tablet  Active   thiamine (VITAMIN B-1) 100 MG Tab  Active                  Audit from Redirected Encounters    **Home medications have not yet been reviewed for this encounter**         ALLERGIES  Allergies[1]    PHYSICAL EXAM  VITAL SIGNS: /76   Pulse 78   Temp 36.8 °C (98.3 °F) (Temporal)   Resp 18   Ht 1.727 m (5' 8\")   Wt 64.9 kg (143 lb)   SpO2 98%   BMI 21.74 kg/m²      Constitutional: Alert, disheveled, laying on his right side "   HEENT: Normocephalic, Atraumatic,  external ears normal, pharynx pink,  Mucous  Membranes moist, No rhinorrhea or mucosal edema  Eyes: PERRL, EOMI, Conjunctiva normal, No discharge.   Neck: Normal range of motion, No tenderness, Supple, No stridor.   Cardiovascular: Regular Rate and Rhythm, No murmurs,  rubs, or gallops.   Thorax & Lungs: Lungs clear to auscultation bilaterally, No respiratory distress, No wheezes, rhales or rhonchi, No chest wall tenderness.   Abdomen: Soft, non tender, non distended,  No pulsatile masses., no rebound guarding or peritoneal signs.   Skin: Warm, Dry, No erythema, No rash,   Back:  No CVA tenderness,  No spinal tenderness, bony crepitance step offs or instability.   Extremities: Equal, intact distal pulses, No cyanosis, clubbing or edema,  No tenderness. States he has pain to the bilateral shins, no obvious deformities, no swelling noted to the bilateral lower extremities, no calf tenderness palpation  Musculoskeletal: Good range of motion in all major joints. No tenderness to palpation or major deformities noted.   Neurologic: Alert & oriented x 4, No focal deficits noted.  Psychiatric: Irritable    EKG/LABS  Results for orders placed or performed during the hospital encounter of 07/09/25   CBC with Differential    Collection Time: 07/09/25  3:04 PM   Result Value Ref Range    WBC 1.8 (LL) 4.8 - 10.8 K/uL    RBC 3.62 (L) 4.70 - 6.10 M/uL    Hemoglobin 9.2 (L) 14.0 - 18.0 g/dL    Hematocrit 31.2 (L) 42.0 - 52.0 %    MCV 86.2 81.4 - 97.8 fL    MCH 25.4 (L) 27.0 - 33.0 pg    MCHC 29.5 (L) 32.3 - 36.5 g/dL    RDW 59.7 (H) 35.9 - 50.0 fL    Platelet Count 29 (L) 164 - 446 K/uL    Neutrophils-Polys 73.70 (H) 44.00 - 72.00 %    Lymphocytes 16.70 (L) 22.00 - 41.00 %    Monocytes 5.20 0.00 - 13.40 %    Eosinophils 3.50 0.00 - 6.90 %    Basophils 0.90 0.00 - 1.80 %    Nucleated RBC 0.00 0.00 - 0.20 /100 WBC    Neutrophils (Absolute) 1.33 (L) 1.82 - 7.42 K/uL    Lymphs (Absolute) 0.30 (L)  1.00 - 4.80 K/uL    Monos (Absolute) 0.10 0.00 - 0.85 K/uL    Eos (Absolute) 0.06 0.00 - 0.51 K/uL    Baso (Absolute) 0.02 0.00 - 0.12 K/uL    NRBC (Absolute) 0.00 K/uL    Hypochromia 1+    Complete Metabolic Panel (CMP)    Collection Time: 07/09/25  3:04 PM   Result Value Ref Range    Sodium 143 135 - 145 mmol/L    Potassium 3.4 (L) 3.6 - 5.5 mmol/L    Chloride 109 96 - 112 mmol/L    Co2 17 (L) 20 - 33 mmol/L    Anion Gap 17.0 (H) 7.0 - 16.0    Glucose 97 65 - 99 mg/dL    Bun 9 8 - 22 mg/dL    Creatinine 0.68 0.50 - 1.40 mg/dL    Calcium 8.4 (L) 8.5 - 10.5 mg/dL    Correct Calcium 8.7 8.5 - 10.5 mg/dL    AST(SGOT) 90 (H) 12 - 45 U/L    ALT(SGPT) 40 2 - 50 U/L    Alkaline Phosphatase 92 30 - 99 U/L    Total Bilirubin 0.6 0.1 - 1.5 mg/dL    Albumin 3.6 3.2 - 4.9 g/dL    Total Protein 7.7 6.0 - 8.2 g/dL    Globulin 4.1 (H) 1.9 - 3.5 g/dL    A-G Ratio 0.9 g/dL   proBrain Natriuretic Peptide, NT (BNP)    Collection Time: 07/09/25  3:04 PM   Result Value Ref Range    NT-proBNP 76 0 - 125 pg/mL   Troponins NOW    Collection Time: 07/09/25  3:04 PM   Result Value Ref Range    Troponin T 9 6 - 19 ng/L   ESTIMATED GFR    Collection Time: 07/09/25  3:04 PM   Result Value Ref Range    GFR (CKD-EPI) 102 >60 mL/min/1.73 m 2   DIFFERENTIAL MANUAL    Collection Time: 07/09/25  3:04 PM   Result Value Ref Range    Manual Diff Status PERFORMED    PERIPHERAL SMEAR REVIEW    Collection Time: 07/09/25  3:04 PM   Result Value Ref Range    Peripheral Smear Review see below    PLATELET ESTIMATE    Collection Time: 07/09/25  3:04 PM   Result Value Ref Range    Plt Estimation Decreased    MORPHOLOGY    Collection Time: 07/09/25  3:04 PM   Result Value Ref Range    RBC Morphology Present     Poikilocytosis 1+     Ovalocytes 1+    IMMATURE PLT FRACTION    Collection Time: 07/09/25  3:04 PM   Result Value Ref Range    Imm. Plt Fraction 10.3 0.6 - 13.1 %   Troponins in two (2) hours    Collection Time: 07/09/25  6:51 PM   Result Value Ref Range     Troponin T 8 6 - 19 ng/L   EKG    Collection Time: 25  8:10 PM   Result Value Ref Range    Report       [facility] Emergency Dept.    Test Date:  2025  Pt Name:    JIMMY FERRER                 Department: ER  MRN:        [id number]                      Room:       [facility]  Gender:     Male                         Technician: [id number]  :        1960                   Requested By:ER TRIAGE PROTOCOL  Order #:    [id number]                    Reading MD: Laurie Perez    Measurements  Intervals                                Axis  Rate:       110                          P:          79  RI:         163                          QRS:        74  QRSD:       99                           T:          74  QT:         358  QTc:        485    Interpretive Statements  Sinus tachycardia  Atrial premature complexes  Borderline prolonged QT interval  Compared to ECG 05/10/2025 14:24:05  No significant changes  Electronically Signed On 2025 20:10:40 PDT by Laurie Perez         I have independently interpreted this EKG    RADIOLOGY/PROCEDURES   I have independently interpreted the diagnostic imaging associated with this visit and am waiting the final reading from the radiologist.   My preliminary interpretation is as follows: no pneumothorax    Radiologist interpretation:  DX-CHEST-PORTABLE (1 VIEW)   Final Result      New infiltrate in the right midlung field laterally.          COURSE & MEDICAL DECISION MAKING    ASSESSMENT, COURSE AND PLAN  Care Narrative:   This is a 65-year-old male with history as noted above who is presenting for evaluation of unclear complaints.  Patient tells me that he is having some pain to his bilateral legs to the bilateral shin area.  This has been ongoing for the past several months.  He also states that he is just not been feeling well but cannot tell me how long that has been.  On arrival here, his vital signs are stable.  He is nontoxic appearance.  He  is not in any respiratory distress.    Labs were obtained and demonstrate pancytopenia.  This has been seen previously.  It has been attributed to his alcohol use.  He was found to have hypokalemia with potassium 3.4 therefore this was repleted.  He told the triage nurse that he is having chest pain but patient did not tell me this.  When I asked him specifically he pointed to his substernal area but states that this has been ongoing for the past several months.  He has no cough or difficulty breathing.  He is not hypoxic.  Initial troponin is 9, repeat troponin is 8.  EKG with no acute ischemic changes.  I doubt PE.  Chest x-ray with no signs of pneumothorax.  Does show new infiltrate in the right mid lung field laterally however patient has no cough or symptoms consistent with pneumonia and his symptoms have been ongoing for the past several months therefore I think that this is less likely a pneumonia.     Patient was given Tylenol for his leg pain.  He has no history of trauma.  He has good pulses to his bilateral lower extremities. He has no calf pain or unilateral swelling, doubt DVT.     Results were discussed with patient.  He was given food and allowed to rest.  I have advised him to follow-up with his primary care doctor or return to the emergency room for any new or worsening symptoms.  Patient agreeable to discharge plan with no further questions.            ADDITIONAL PROBLEMS MANAGED  None    DISPOSITION AND DISCUSSIONS  I have discussed management of the patient with the following physicians and LAISHA's:  None    Discussion of management with other QHP or appropriate source(s): None     Escalation of care considered, and ultimately not performed:acute inpatient care management, however at this time, the patient is most appropriate for outpatient management    Barriers to care at this time, including but not limited to: None.     Decision tools and prescription drugs considered including, but not limited  to: None.     The patient will return for new or worsening symptoms and is stable at the time of discharge.    The patient is referred to a primary physician for blood pressure management, diabetic screening, and for all other preventative health concerns.      DISPOSITION:  Patient will be discharged home in stable condition.    FOLLOW UP:  Nnamdi Ballesteros M.D.  [address] [facility] [facility] [address]  [phone]          [facility] Emergency Dept  [address] [facility] [id number] [phone]          OUTPATIENT MEDICATIONS:  Discharge Medication List as of 7/9/2025  8:26 PM          FINAL DIAGNOSIS  1. Pain in both lower extremities Acute   2. Malaise Acute   3. Pancytopenia (HCC) Acute   4. Hypokalemia Acute        Electronically signed by: Laurie Perez M.D., 7/9/2025 5:34 PM           [1]   Allergies  Allergen Reactions    Haloperidol Unspecified     Twitching/involuntary movements

## 2025-07-10 NOTE — ED PROVIDER NOTES
"ED Provider Note      ED PHYSICIAN NOTE    CHIEF COMPLAINT  Chief Complaint   Patient presents with    Chest Pain     Pt reports since 3 days been having chest pain on and off and gotten worst this morning. Denies shortness of breath, n/v.       EXTERNAL RECORDS REVIEWED  Inpatient Notes patient was admitted January 2025 for liver failure with hepatic coma, noted schizophrenia atrial fibrillation as well, also was seen twice in the emergency department yesterday for abdominal pain leg pain pain with unremarkable workup including troponins ECGs and x-rays    HPI/ROS  LIMITATION TO HISTORY   Select: : None  OUTSIDE HISTORIAN(S):  none    Inocencio Shabazz is a 65 y.o. male who presents stating he does not \"feel well\".  He states this has been going on for a long time, he does also report some upper abdominal discomfort, he describes this as my \"gut feels sick\".  He does not really describe it as a pain and triage note states chest pain but patient denies any chest pain to me.  Regarding the sensation in his gut he states this has been there as long as he can remember.  He reports no nausea or vomiting.  No cough or shortness of breath, no fevers or chills, no blood in his stool or dark tarry stool.  No leg pain or swelling.  No lightheadedness or syncope    Patient was seen twice yesterday for leg pain and not feeling well, he reports no pain in his legs now    PAST MEDICAL HISTORY  Past Medical History[1]    SOCIAL HISTORY  Social History[2]    CURRENT MEDICATIONS  Home Medications    **Home medications have not yet been reviewed for this encounter**       Audit from Redirected Encounters    **Home medications have not yet been reviewed for this encounter**         ALLERGIES  Allergies[3]    PHYSICAL EXAM  VITAL SIGNS: BP (!) 159/92   Pulse (!) 101   Temp 36.7 °C (98.1 °F) (Temporal)   Resp 19   Ht 1.727 m (5' 8\")   Wt 61.8 kg (136 lb 3.9 oz)   SpO2 92%   BMI 20.72 kg/m²    Constitutional: Awake and alert "   HENT: Normal inspection, no signs of trauma  Eyes: Normal inspection, Pupils equal, non-icteric  Neck: Grossly normal range of motion. No stridor  Cardiovascular: Regular rate and rhythm, no murmurs.  Symmetric peripheral pulses at radial   Thorax & Lungs: No respiratory distress, No wheezing, No rales, No rhonchi, No chest tenderness.   Abdomen:  soft, non-distended, nontender, no mass  Skin: No obvious rash. Warm. Dry.   Back: No tenderness, No CVA tenderness.   Extremities: No cyanosis, trace bilateral lower extremity edema  Neurologic: AO3, Grossly normal,  Psychiatric: Normal affect for situation        DIAGNOSTIC STUDIES / PROCEDURES  LABS/EKG  Results for orders placed or performed during the hospital encounter of 07/10/25   EKG    Collection Time: 07/10/25  4:46 AM   Result Value Ref Range    Report       [facility] Emergency Dept.    Test Date:  2025-07-10  Pt Name:    JIMMY FERRER                 Department: ER  MRN:        [id number]                      Room:  Gender:     Male                         Technician: [id number]  :        1960                   Requested By:ER TRIAGE PROTOCOL  Order #:    [id number]                    Reading MD: JEROD GONSALEZ MD    Measurements  Intervals                                Axis  Rate:       94                           P:          75  PA:         169                          QRS:        76  QRSD:       98                           T:          74  QT:         379  QTc:        474    Interpretive Statements  Sinus rhythm  Compared to ECG 2025 14:48:57  Sinus tachycardia no longer present  Atrial premature complex(es) no longer present  Electronically Signed On 07- 04:46:48 PDT by JEROD GONSALEZ MD     CBC WITH DIFFERENTIAL    Collection Time: 07/10/25  5:25 AM   Result Value Ref Range    WBC 1.5 (LL) 4.8 - 10.8 K/uL    RBC 3.69 (L) 4.70 - 6.10 M/uL    Hemoglobin 9.5 (L) 14.0 - 18.0 g/dL    Hematocrit 31.1 (L) 42.0 - 52.0 %     MCV 84.3 81.4 - 97.8 fL    MCH 25.7 (L) 27.0 - 33.0 pg    MCHC 30.5 (L) 32.3 - 36.5 g/dL    RDW 55.6 (H) 35.9 - 50.0 fL    Platelet Count 22 (L) 164 - 446 K/uL    Neutrophils-Polys 73.30 (H) 44.00 - 72.00 %    Lymphocytes 16.40 (L) 22.00 - 41.00 %    Monocytes 9.50 0.00 - 13.40 %    Eosinophils 0.80 0.00 - 6.90 %    Basophils 0.00 0.00 - 1.80 %    Nucleated RBC 0.00 0.00 - 0.20 /100 WBC    Neutrophils (Absolute) 1.10 (L) 1.82 - 7.42 K/uL    Lymphs (Absolute) 0.25 (L) 1.00 - 4.80 K/uL    Monos (Absolute) 0.10 0.00 - 0.85 K/uL    Eos (Absolute) 0.01 0.00 - 0.51 K/uL    Baso (Absolute) 0.00 0.00 - 0.12 K/uL    NRBC (Absolute) 0.00 K/uL    Hypochromia 1+     Anisocytosis 1+    COMP METABOLIC PANEL    Collection Time: 07/10/25  5:25 AM   Result Value Ref Range    Sodium 136 135 - 145 mmol/L    Potassium 3.6 3.6 - 5.5 mmol/L    Chloride 100 96 - 112 mmol/L    Co2 23 20 - 33 mmol/L    Anion Gap 13.0 7.0 - 16.0    Glucose 112 (H) 65 - 99 mg/dL    Bun 6 (L) 8 - 22 mg/dL    Creatinine 0.60 0.50 - 1.40 mg/dL    Calcium 8.7 8.5 - 10.5 mg/dL    Correct Calcium 8.8 8.5 - 10.5 mg/dL    AST(SGOT) 84 (H) 12 - 45 U/L    ALT(SGPT) 43 2 - 50 U/L    Alkaline Phosphatase 98 30 - 99 U/L    Total Bilirubin 1.7 (H) 0.1 - 1.5 mg/dL    Albumin 3.9 3.2 - 4.9 g/dL    Total Protein 8.3 (H) 6.0 - 8.2 g/dL    Globulin 4.4 (H) 1.9 - 3.5 g/dL    A-G Ratio 0.9 g/dL   LIPASE    Collection Time: 07/10/25  5:25 AM   Result Value Ref Range    Lipase 56 11 - 82 U/L   TROPONIN    Collection Time: 07/10/25  5:25 AM   Result Value Ref Range    Troponin T 12 6 - 19 ng/L   ESTIMATED GFR    Collection Time: 07/10/25  5:25 AM   Result Value Ref Range    GFR (CKD-EPI) 106 >60 mL/min/1.73 m 2   DIFFERENTIAL MANUAL    Collection Time: 07/10/25  5:25 AM   Result Value Ref Range    Manual Diff Status PERFORMED    PERIPHERAL SMEAR REVIEW    Collection Time: 07/10/25  5:25 AM   Result Value Ref Range    Peripheral Smear Review see below    PLATELET ESTIMATE     "Collection Time: 07/10/25  5:25 AM   Result Value Ref Range    Plt Estimation SEE BELOW    MORPHOLOGY    Collection Time: 07/10/25  5:25 AM   Result Value Ref Range    RBC Morphology Present     Ovalocytes 1+     Toxic Gran Few     Toxic Vacuoles Few    IMMATURE PLT FRACTION    Collection Time: 07/10/25  5:25 AM   Result Value Ref Range    Imm. Plt Fraction 12.3 0.6 - 13.1 %       I have independently interpreted this EKG as documented above        RADIOLOGY  I have independently interpreted the diagnostic imaging associated with this visit and am waiting the final reading from the radiologist.   My preliminary interpretation is as follows: No edema    DX-CHEST-PORTABLE (1 VIEW)   Final Result         1.  No acute cardiopulmonary disease.            COURSE & MEDICAL DECISION MAKING    INITIAL ASSESSMENT, COURSE AND PLAN  Care Narrative: 4:45 AM  Patient presents with \"not feeling well\".  He has no real focal symptoms and has been seen twice for this in the last day.  He has a benign abdominal exam without tenderness to suggest obstruction perforation or surgical pathology, differential does include electrolyte or metabolic derangement or consideration for pancreatitis given his history of alcohol abuse as well.  He reports no chest pain to me although was apparently reporting this earlier, without symptoms and seems unlikely to be ACS although this was considered.  He has no fever noted here no reports of fever and reports no focal infectious symptoms.  I have ordered for diagnostic labs, x-ray,    7:24 AM  Patient is reevaluated and updated on all results, agreeable with discharge plan    Interventions  Medications   LR (Bolus) infusion 1,000 mL (0 mL Intravenous Stopped 7/10/25 0722)   GI Cocktail (hyoscyamine-lidocaine-Maalox) oral susp cup 30 mL (30 mL Oral Given 7/10/25 0501)       Measures  Hydration: Based on the patient's presentation of Inability to take oral fluids the patient was given IV fluids. IV " Hydration was used because oral hydration was not as rapid as required. Upon recheck following hydration, the patient was stable.       PROBLEM LIST    # Fatigue.  At this point no findings of emergent process.  Patient without any focal findings on exam to suggest urgent pathology.  Afebrile here, without any focal infectious symptoms either.  There is no significant electrolyte or metabolic derangement, he has a chronic anemia that is at baseline, no findings of arrhythmia    # Pancytopenia.  Likely with his history of chronic alcohol use.  This does appear to be stable.  No worsening.  Patient will follow-up with primary care        DISPOSITION AND DISCUSSIONS    Barriers to care at this time, including but not limited to: Patient lacks financial resources.     Prescription drugs considered and/or prescribed:   Considered opiate prescription, but nonnarcotic analgesic is most appropriate  Prescribed   New Prescriptions    No medications on file       The patient will return for new or worsening symptoms and is stable at the time of discharge.    The patient is referred to a primary physician for blood pressure management, diabetic screening, and for all other preventative health concerns.        DISPOSITION:  Patient will be discharged home in stable condition.    FOLLOW UP:  Nnamdi Ballesteros M.D.  [address] [place] [id number]  [phone]            OUTPATIENT MEDICATIONS:  New Prescriptions    No medications on file         FINAL DIAGNOSIS  1. Fatigue, unspecified type        2. Pancytopenia (HCC)               This dictation was created using voice recognition software. The accuracy of the dictation is limited to the abilities of the software. I expect there may be some errors of grammar and possibly content. The nursing notes were reviewed and certain aspects of this information were incorporated into this note.    Electronically signed by: Rohith Lerma M.D., 7/10/2025            [1]   Past Medical  "History:  Diagnosis Date    Alcohol abuse     Bipolar disorder (HCC)     Cirrhosis (HCC)     GIB (gastrointestinal bleeding) 01/03/2016    Hepatitis C     Pacemaker     left chest    Pancytopenia (HCC)     Schizophrenia (HCC)    [2]   Social History  Tobacco Use    Smoking status: Some Days     Current packs/day: 1.00     Average packs/day: 1 pack/day for 73.5 years (73.5 ttl pk-yrs)     Types: Cigarettes     Start date: 2000    Smokeless tobacco: Never   Vaping Use    Vaping status: Never Used   Substance Use Topics    Alcohol use: Yes     Comment: \"couple of pints whiskey every day\"    Drug use: Yes     Types: Inhaled     Comment: marijuana sometimes   [3]   Allergies  Allergen Reactions    Haloperidol Unspecified     Twitching/involuntary movements      "

## 2025-07-10 NOTE — ED PROVIDER NOTES
ED Provider Note    CHIEF COMPLAINT  Chief Complaint   Patient presents with    Abdominal Pain     Pt reports lower abd pain and L leg pain starting x3 hrs ago. -n/v. Pain 6/10.     Leg Pain       EXTERNAL RECORDS REVIEWED  ED note from earlier today    HPI/ROS  LIMITATION TO HISTORY   Select: : None  OUTSIDE HISTORIAN(S):  None    Inocencio Shabazz is a 65 y.o. male  with history of alcohol use disorder, hepatitis C, liver cirrhosis, schizophrenia and bipolar disorder, sick sinus syndrome status post pacemaker placement, atrial fibrillation not on anticoagulation who presents for not feeling well.  When asked about specifics, he does not give me any specific symptoms to states that he does not feel well.  He denies anything being in pain.  He denies any dizziness or nausea.  No difficulty breathing.    Was seen earlier today for b/l leg pain.  Had labs done that showed stable pancytopenia mild hypokalemia which was repleted.  There was no indication for imaging of the legs.  He also had a chest pain workup that was unremarkable.    PAST MEDICAL HISTORY   has a past medical history of Alcohol abuse, Bipolar disorder (HCC), Cirrhosis (HCC), GIB (gastrointestinal bleeding) (01/03/2016), Hepatitis C, Pacemaker, Pancytopenia (HCC), and Schizophrenia (HCC).    SURGICAL HISTORY   has a past surgical history that includes gastroscopy (1/3/2016); gastroscopy (4/8/2016); gastroscopy (3/13/2019); gastroscopy-endo (N/A, 11/13/2019); upper gi endoscopy,diagnosis (N/A, 8/10/2023); upper gi endoscopy,ctrl bleed (N/A, 8/10/2023); and upper gi endoscopy,ligat varix (N/A, 9/27/2024).    FAMILY HISTORY  History reviewed. No pertinent family history.    SOCIAL HISTORY  Social History     Tobacco Use    Smoking status: Some Days     Current packs/day: 1.00     Average packs/day: 1 pack/day for 73.5 years (73.5 ttl pk-yrs)     Types: Cigarettes     Start date: 2000    Smokeless tobacco: Never   Vaping Use    Vaping status: Never Used  "  Substance and Sexual Activity    Alcohol use: Yes     Comment: \"couple of pints whiskey every day\"    Drug use: Yes     Types: Inhaled     Comment: marijuana sometimes    Sexual activity: Not on file       CURRENT MEDICATIONS  Home Medications       Reviewed by Teodora Donahue R.N. (Registered Nurse) on 07/09/25 at 2132  Med List Status: Not Addressed     Medication Last Dose Status   carvedilol (COREG) 6.25 MG Tab  Active   dilTIAZem CD (CARDIZEM CD) 240 MG CAPSULE SR 24 HR  Active   lactulose 20 GM/30ML Solution  Active   magnesium oxide (MAG-OX) 400 MG Tab tablet  Active   nicotine (NICODERM) 21 MG/24HR PATCH 24 HR  Active   ondansetron (ZOFRAN ODT) 4 MG TABLET DISPERSIBLE  Active   pantoprazole (PROTONIX) 40 MG Tablet Delayed Response  Active   QUEtiapine (SEROQUEL) 50 MG tablet  Active   thiamine (VITAMIN B-1) 100 MG Tab  Active                    ALLERGIES  Allergies[1]    PHYSICAL EXAM  VITAL SIGNS: BP (!) 167/96   Pulse 96   Temp 36.4 °C (97.6 °F) (Temporal)   Resp 19   Ht 1.727 m (5' 8\")   Wt 62.8 kg (138 lb 7.2 oz)   SpO2 96%   BMI 21.05 kg/m²    General: Laying calmly in stretcher, awake no distress  HEENT: NCAT, moist mucous membranes, normal conjunctivae  CV: Regular rate rhythm no murmurs  Pulmonary: CTAB normal work of breathing  Abdomen: Soft nondistended nontender  Neuro: Alert and oriented, GCS 15, moves all extremities symmetrically, ambulates without unsteadiness    EKG/LABS  Labs with chronic pancytopenia.  White count 1.1, hemoglobin 9.5, platelets 20.  Hypomagnesemia 1.4.  CMP shows mild elevation AST 80.  Viral swab negative        COURSE & MEDICAL DECISION MAKING    ASSESSMENT, COURSE AND PLAN  Care Narrative: Differential includes dehydration, viral syndrome, GHANSHYMA, anemia, electrolyte derangement, among others    On arrival the patient is well-appearing.  He is slightly hypertensive.  His exam is quite benign.  He cannot tell me any specific symptoms of anything that is making " him feel unwell.  Differential above considered.  An IV was placed and he was given 1 L fluid bolus.  Labs were obtained.  He again has chronic pancytopenia no significant changes.  CMP shows stable elevation AST 80.  Electrolytes notable for hypomagnesemia 1.4.  He was given 2 g IV magnesium sulfate.  He has normal renal function.  Does not appear to be too dehydrated.  His viral swab is negative.  Patient again is resting comfortably, denies any new symptoms.  He ambulates briskly to the bathroom without any support.  Despite feeling unwell he is nontoxic and has a reassuring exam.  I do not feel he needs any further emergency department workup.  He did become nauseous prior to being discharged he was given 8 mg oral Zofran.  Return precautions were provided and he was discharged in stable condition.    DISPOSITION AND DISCUSSIONS    Escalation of care considered, and ultimately not performed:diagnostic imaging and acute inpatient care management, however at this time, the patient is most appropriate for outpatient management    Barriers to care at this time, including but not limited to: None.     Decision tools and prescription drugs considered including, but not limited to: N/A.    FINAL DIAGNOSIS  1. Hypomagnesemia    2. Pancytopenia (HCC)    3. Feeling unwell         Electronically signed by: Venkatesh Duran M.D., 7/10/2025 12:16 AM           [1]   Allergies  Allergen Reactions    Haloperidol Unspecified     Twitching/involuntary movements

## 2025-07-10 NOTE — DISCHARGE INSTRUCTIONS
Your blood work did not show any new findings other than one of your electrolytes, your magnesium being low.  Make sure you are eating and drinking over the next few days.  If things are getting worse either follow-up with your doctor or come back to the emergency room

## 2025-07-10 NOTE — ED NOTES
Discharge instructions provided to and reviewed with patient. Patient to follow with PCP as needed. Patient agreeable to DC plan. Discussed to return to ER if symptoms worsen. Patient verbalized understanding. Pt ambulatory and assisted to [facility] g[facility] per pt request.

## 2025-07-10 NOTE — ED TRIAGE NOTES
"Chief Complaint   Patient presents with    Chest Pain     Pt reports since 3 days been having chest pain on and off and gotten worst this morning. Denies shortness of breath, n/v.     BP (!) 142/96   Pulse (!) 108   Temp 36.7 °C (98.1 °F) (Temporal)   Resp 16   Ht 1.727 m (5' 8\")   Wt 61.8 kg (136 lb 3.9 oz)   SpO2 98%   BMI 20.72 kg/m²     Pain: 7 /10  Ambulatory:  Yes  Alert and Oriented: x 4  Oxygen Treatment: No    Pt came in to triage for the above complaints.   Pt is speaking in full sentences, follows commands and responds appropriately to questions.   Respirations are even and unlabored.  Pt placed in phlebo area. Pt educated on triage process.   Pt encouraged to inform staff for any changes in condition or if needs help while waiting to be room in.    "

## 2025-08-10 ENCOUNTER — HOSPITAL ENCOUNTER (EMERGENCY)
Facility: MEDICAL CENTER | Age: 65
End: 2025-08-10
Attending: EMERGENCY MEDICINE
Payer: COMMERCIAL

## 2025-08-10 ENCOUNTER — APPOINTMENT (OUTPATIENT)
Dept: RADIOLOGY | Facility: MEDICAL CENTER | Age: 65
End: 2025-08-10
Attending: EMERGENCY MEDICINE
Payer: COMMERCIAL

## 2025-08-10 VITALS
HEIGHT: 68 IN | RESPIRATION RATE: 13 BRPM | SYSTOLIC BLOOD PRESSURE: 137 MMHG | TEMPERATURE: 97.2 F | DIASTOLIC BLOOD PRESSURE: 79 MMHG | WEIGHT: 130.73 LBS | HEART RATE: 103 BPM | OXYGEN SATURATION: 94 % | BODY MASS INDEX: 19.81 KG/M2

## 2025-08-10 DIAGNOSIS — R10.12 LEFT UPPER QUADRANT ABDOMINAL PAIN: Primary | ICD-10-CM

## 2025-08-10 DIAGNOSIS — M79.674 PAIN OF TOE OF RIGHT FOOT: ICD-10-CM

## 2025-08-10 LAB
ALBUMIN SERPL BCP-MCNC: 3.8 G/DL (ref 3.2–4.9)
ALBUMIN/GLOB SERPL: 0.9 G/DL
ALP SERPL-CCNC: 91 U/L (ref 30–99)
ALT SERPL-CCNC: 42 U/L (ref 2–50)
ANION GAP SERPL CALC-SCNC: 13 MMOL/L (ref 7–16)
AST SERPL-CCNC: 104 U/L (ref 12–45)
BASOPHILS # BLD AUTO: 1 % (ref 0–1.8)
BASOPHILS # BLD: 0.04 K/UL (ref 0–0.12)
BILIRUB SERPL-MCNC: 0.6 MG/DL (ref 0.1–1.5)
BUN SERPL-MCNC: 5 MG/DL (ref 8–22)
CALCIUM ALBUM COR SERPL-MCNC: 8.6 MG/DL (ref 8.5–10.5)
CALCIUM SERPL-MCNC: 8.4 MG/DL (ref 8.5–10.5)
CHLORIDE SERPL-SCNC: 108 MMOL/L (ref 96–112)
CO2 SERPL-SCNC: 22 MMOL/L (ref 20–33)
CREAT SERPL-MCNC: 0.72 MG/DL (ref 0.5–1.4)
EOSINOPHIL # BLD AUTO: 0.11 K/UL (ref 0–0.51)
EOSINOPHIL NFR BLD: 2.7 % (ref 0–6.9)
ERYTHROCYTE [DISTWIDTH] IN BLOOD BY AUTOMATED COUNT: 59.2 FL (ref 35.9–50)
GFR SERPLBLD CREATININE-BSD FMLA CKD-EPI: 100 ML/MIN/1.73 M 2
GLOBULIN SER CALC-MCNC: 4.2 G/DL (ref 1.9–3.5)
GLUCOSE SERPL-MCNC: 167 MG/DL (ref 65–99)
HCT VFR BLD AUTO: 34.2 % (ref 42–52)
HGB BLD-MCNC: 10.4 G/DL (ref 14–18)
IMM GRANULOCYTES # BLD AUTO: 0.02 K/UL (ref 0–0.11)
IMM GRANULOCYTES NFR BLD AUTO: 0.5 % (ref 0–0.9)
LIPASE SERPL-CCNC: 90 U/L (ref 11–82)
LYMPHOCYTES # BLD AUTO: 0.79 K/UL (ref 1–4.8)
LYMPHOCYTES NFR BLD: 19.5 % (ref 22–41)
MCH RBC QN AUTO: 25.6 PG (ref 27–33)
MCHC RBC AUTO-ENTMCNC: 30.4 G/DL (ref 32.3–36.5)
MCV RBC AUTO: 84 FL (ref 81.4–97.8)
MONOCYTES # BLD AUTO: 0.47 K/UL (ref 0–0.85)
MONOCYTES NFR BLD AUTO: 11.6 % (ref 0–13.4)
NEUTROPHILS # BLD AUTO: 2.62 K/UL (ref 1.82–7.42)
NEUTROPHILS NFR BLD: 64.7 % (ref 44–72)
NRBC # BLD AUTO: 0 K/UL
NRBC BLD-RTO: 0 /100 WBC (ref 0–0.2)
PLATELET # BLD AUTO: 64 K/UL (ref 164–446)
PLATELETS.RETICULATED NFR BLD AUTO: 9.9 % (ref 0.6–13.1)
PMV BLD AUTO: 11.3 FL (ref 9–12.9)
POTASSIUM SERPL-SCNC: 3.6 MMOL/L (ref 3.6–5.5)
PROT SERPL-MCNC: 8 G/DL (ref 6–8.2)
RBC # BLD AUTO: 4.07 M/UL (ref 4.7–6.1)
SODIUM SERPL-SCNC: 143 MMOL/L (ref 135–145)
WBC # BLD AUTO: 4.1 K/UL (ref 4.8–10.8)

## 2025-08-10 PROCEDURE — 99284 EMERGENCY DEPT VISIT MOD MDM: CPT

## 2025-08-10 PROCEDURE — 36415 COLL VENOUS BLD VENIPUNCTURE: CPT

## 2025-08-10 PROCEDURE — 83690 ASSAY OF LIPASE: CPT

## 2025-08-10 PROCEDURE — 80053 COMPREHEN METABOLIC PANEL: CPT

## 2025-08-10 PROCEDURE — 85055 RETICULATED PLATELET ASSAY: CPT

## 2025-08-10 PROCEDURE — 99283 EMERGENCY DEPT VISIT LOW MDM: CPT

## 2025-08-10 PROCEDURE — 85025 COMPLETE CBC W/AUTO DIFF WBC: CPT

## 2025-08-10 PROCEDURE — 73630 X-RAY EXAM OF FOOT: CPT | Mod: RT

## 2025-08-10 ASSESSMENT — FIBROSIS 4 INDEX: FIB4 SCORE: 39.01

## (undated) DEVICE — SODIUM CHL IRRIGATION 0.9% 1000ML (12EA/CA)

## (undated) DEVICE — BITE BLOCK ADULT 60FR (100EA/CA)

## (undated) DEVICE — NEPTUNE 4 PORT MANIFOLD - (20/PK)

## (undated) DEVICE — SENSOR SPO2 ADULT LNCS ADTX (20/BX) ORDER ITEM #19593

## (undated) DEVICE — MASK OXYGEN VNYL ADLT MED CONC WITH 7 FOOT TUBING - (50EA/CA)

## (undated) DEVICE — TUBE CONNECTING SUCTION - CLEAR PLASTIC STERILE 72 IN (50EA/CA)

## (undated) DEVICE — SPEEDBAND SUPERVIEW SUPER 7

## (undated) DEVICE — SET LEADWIRE 5 LEAD BEDSIDE DISPOSABLE ECG (1SET OF 5/EA)

## (undated) DEVICE — CANISTER SUCTION RIGID RED 1500CC (40EA/CA)

## (undated) DEVICE — SENSOR OXIMETER ADULT SPO2 RD SET (20EA/BX)

## (undated) DEVICE — FILM CASSETTE ENDO

## (undated) DEVICE — KIT ANESTHESIA W/CIRCUIT & 3/LT BAG W/FILTER (20EA/CA)

## (undated) DEVICE — PORT AUXILLARY WATER (50EA/BX)

## (undated) DEVICE — SYRINGE 3 CC 22 GA X 1-1/2 - NDL SAFETY (50/BX 8BX/CA)

## (undated) DEVICE — BITE BLOCK, DISP.

## (undated) DEVICE — KIT  I.V. START (100EA/CA)

## (undated) DEVICE — SPONGE GAUZE NON-STERILE 4X4 - (2000/CA 10PK/CA)

## (undated) DEVICE — KIT CUSTOM PROCEDURE SINGLE FOR ENDO  (15/CA)

## (undated) DEVICE — ELECTRODE 850 FOAM ADHESIVE - HYDROGEL RADIOTRNSPRNT (50/PK)

## (undated) DEVICE — SPEEDBAND SUPERVIEW SUPER 7 (4/BX)

## (undated) DEVICE — GOWN SURGEONS X-LARGE - DISP. (30/CA)

## (undated) DEVICE — SET EXTENSION WITH 2 PORTS (48EA/CA) ***PART #2C8610 IS A SUBSTITUTE*****

## (undated) DEVICE — GOWN SURGEONS LARGE - (32/CA)

## (undated) DEVICE — BLOCK BITE MAXI DENTAL RETENTION RIM (100EA/BX)

## (undated) DEVICE — TOWEL STOP TIMEOUT SAFETY FLAG (40EA/CA)

## (undated) DEVICE — GLOVE, LITE (PAIR)

## (undated) DEVICE — TUBE E-T HI-LO CUFF 7.5MM (10EA/PK)

## (undated) DEVICE — CAPTIVATOR II-25MM ROUND STIFF

## (undated) DEVICE — TUBING CLEARLINK DUO-VENT - C-FLO (48EA/CA)

## (undated) DEVICE — TUBE E-T HI-LO CUFF 7.0MM (10EA/PK)

## (undated) DEVICE — CATHETER IV 20 GA X 1-1/4 ---SURG.& SDS ONLY--- (50EA/BX)

## (undated) DEVICE — CONTAINER, SPECIMEN, STERILE

## (undated) DEVICE — SYRINGE 6 CC 20 GA X 1 1/2 - NDL SAFETY  (50/BX)

## (undated) DEVICE — WATER IRRIGATION STERILE 1000ML (12EA/CA)

## (undated) DEVICE — ELECTRODE DUAL RETURN W/ CORD - (50/PK)

## (undated) DEVICE — SYRINGE DISP. 50CC LS - (40/BX)

## (undated) DEVICE — CANNULA W/ SUPPLY TUBING O2 - (50/CA)

## (undated) DEVICE — LACTATED RINGERS INJ 1000 ML - (14EA/CA 60CA/PF)

## (undated) DEVICE — KIT CUSTOM PROCEDURE SINGLE FOR ENDO (15/CA)

## (undated) DEVICE — TUBE SUCTION YANKAUER  1/4 X 6FT (20EA/CA)"

## (undated) DEVICE — CANNULA O2 COMFORT SOFT EAR ADULT 7 FT TUBING (50/CA)

## (undated) DEVICE — ERBE SIDE FIRE - (10/CA)

## (undated) DEVICE — SOD. CHL 10CC SYRINGE PREFILL - W/10 CC (30/BX)

## (undated) DEVICE — CAPTIVATOR II-15MM ROUND STIFF

## (undated) DEVICE — BUTTON ENDOSCOPY DISPOSABLE

## (undated) DEVICE — BASIN EMESIS DISP. - (250/CA)

## (undated) DEVICE — CON SEDATION/>5 YR 1ST 15 MIN

## (undated) DEVICE — CON SEDATION EA ADDL 15 MIN